# Patient Record
Sex: MALE | Race: WHITE | Employment: OTHER | RURAL
[De-identification: names, ages, dates, MRNs, and addresses within clinical notes are randomized per-mention and may not be internally consistent; named-entity substitution may affect disease eponyms.]

---

## 2017-01-04 ENCOUNTER — OFFICE VISIT (OUTPATIENT)
Dept: FAMILY MEDICINE CLINIC | Age: 72
End: 2017-01-04

## 2017-01-04 VITALS
HEART RATE: 61 BPM | BODY MASS INDEX: 24.2 KG/M2 | WEIGHT: 154.2 LBS | RESPIRATION RATE: 16 BRPM | OXYGEN SATURATION: 98 % | SYSTOLIC BLOOD PRESSURE: 118 MMHG | HEIGHT: 67 IN | DIASTOLIC BLOOD PRESSURE: 72 MMHG | TEMPERATURE: 98.2 F
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
## 2017-01-04 DIAGNOSIS — R41.3 MEMORY LOSS: ICD-10-CM

## 2017-01-04 DIAGNOSIS — G30.1 ALZHEIMER'S DEMENTIA, LATE ONSET, WITH BEHAVIORAL DISTURBANCE (HCC): ICD-10-CM

## 2017-01-04 DIAGNOSIS — I95.1 ORTHOSTATIC HYPOTENSION: ICD-10-CM

## 2017-01-04 DIAGNOSIS — M25.361 KNEE GIVES OUT, RIGHT: Primary | ICD-10-CM

## 2017-01-04 DIAGNOSIS — M25.561 RIGHT KNEE PAIN, UNSPECIFIED CHRONICITY: ICD-10-CM

## 2017-01-04 DIAGNOSIS — F02.818 ALZHEIMER'S DEMENTIA, LATE ONSET, WITH BEHAVIORAL DISTURBANCE (HCC): ICD-10-CM

## 2017-01-04 LAB
BILIRUB UR QL STRIP: NEGATIVE
GLUCOSE UR-MCNC: NEGATIVE MG/DL
KETONES P FAST UR STRIP-MCNC: NEGATIVE MG/DL
PH UR STRIP: 5.5 [PH] (ref 4.6–8)
PROT UR QL STRIP: NEGATIVE MG/DL
SP GR UR STRIP: 1.02 (ref 1–1.03)
UA UROBILINOGEN AMB POC: NORMAL (ref 0.2–1)
URINALYSIS CLARITY POC: CLEAR
URINALYSIS COLOR POC: YELLOW
URINE BLOOD POC: NEGATIVE
URINE LEUKOCYTES POC: NEGATIVE
URINE NITRITES POC: NEGATIVE

## 2017-01-04 RX ORDER — LISINOPRIL 5 MG/1
5 TABLET ORAL DAILY
COMMUNITY
Start: 2016-11-25 | End: 2017-09-15 | Stop reason: SDUPTHER

## 2017-01-04 RX ORDER — ACETAMINOPHEN AND CODEINE PHOSPHATE 300; 30 MG/1; MG/1
TABLET ORAL
COMMUNITY
Start: 2016-12-05 | End: 2017-07-17

## 2017-01-04 RX ORDER — GABAPENTIN 100 MG/1
CAPSULE ORAL
COMMUNITY
Start: 2016-11-25 | End: 2017-01-24 | Stop reason: SDUPTHER

## 2017-01-04 NOTE — PROGRESS NOTES
Yoshi Bush is a 66 y.o. male who presents to the office today with the following:  Chief Complaint   Patient presents with   Rere Po     has been fallen 3 times in a week, says his (R) knee gives out       HPI  Pt complains of his right knee giving out a lot recently causing him to fall multiple times this week. Wife confirms he fell twice yesterday. Refusing to use cane in house. Also fell once on frost on a ramp, hit his head, went to [facility] on 12/28/16. Reports neg imaging, pt says had neg CT scan, wife says she wass not sure if only performed XRs and/or CT. Denies any persistent HA or dizziness since fall, except for one episode of HA 2 days ago that resolved with Tylenol and one episode of dizziness upon standing yesterday. Going with wife to Ortho tomorrow. Plans to discuss knee with him then. Does not want further work-up here today. Wife also concerned he has been having worsening memory loss. Denies any confusion, behavioral changes, or hallucinations. Says drove past [employer] one day, forgot where they were going. Wife and pt have difficulty giving any other examples. She reports he saw Neurology once in the past, but has not had any f/u since. Dx with Alzheimer's, late onset with behavior dist.    Review of Systems   Constitutional: Negative for chills, fever and malaise/fatigue. Eyes: Negative. Respiratory: Negative for cough and shortness of breath. Cardiovascular: Negative for chest pain. Gastrointestinal: Negative. Genitourinary: Negative. Musculoskeletal:        + chronic pain   Skin: Negative for rash. Neurological: Negative for tingling, sensory change, speech change, focal weakness and loss of consciousness.        No Known Allergies    Current Outpatient Prescriptions   Medication Sig    acetaminophen-codeine (TYLENOL #3) 300-30 mg per tablet     gabapentin (NEURONTIN) 100 mg capsule     lisinopril (PRINIVIL, ZESTRIL) 5 mg tablet     metoprolol succinate (TOPROL-XL) 25 mg XL tablet Take 1 Tab by mouth daily. Indications: Hypertension    methocarbamol (ROBAXIN) 500 mg tablet TAKE 1/2 TABLET BY MOUTH TWICE DAILY    traMADol (ULTRAM) 50 mg tablet TAKE 1 TO 2 TABLETS BY MOUTH TID AS NEEDED    traZODone (DESYREL) 100 mg tablet Take 1 Tab by mouth nightly.  apixaban (ELIQUIS) 5 mg tablet Take 1 Tab by mouth every twelve (12) hours.  atorvastatin (LIPITOR) 40 mg tablet Take 1 Tab by mouth daily.  sertraline (ZOLOFT) 100 mg tablet Take 1 Tab by mouth daily.  fluticasone (FLONASE) 50 mcg/actuation nasal spray 2 Sprays by Both Nostrils route daily.  polyethylene glycol (MIRALAX) 17 gram/dose powder Take 17 g by mouth daily.  Incontinence Pad, Liner, Disp (BLADDER CONTROL PADS EX ABSORB) pads 1 Packet by Does Not Apply route as needed (incontinence).  nitroglycerin (NITROSTAT) 0.4 mg SL tablet 1 Tab by SubLINGual route every five (5) minutes as needed for Chest Pain (call 911 if not relieved by 3). No current facility-administered medications for this visit.         Past Medical History   Diagnosis Date    AICD (automatic cardioverter/defibrillator) present 5/13/2016 5/13/16 [employer] upgrade to dual chamber AICD implant  Dr. Andrea Greenberg [facility], other     Arrhythmia      'S IN THE PAST    Arthritis      OSTEO ARTHRITIS    AVNRT (AV bridgette re-entry tachycardia) ([facility] [address] 5/12/2016 5/12/16 AVNRT ablation    Back ache     CAD (coronary artery disease)     Cancer (HCC)      Prostate cancer    Chest tightness     Coagulation defects 2005     FACTOR V LEIDEN    Dizziness     FH: factor V Leiden deficiency     Generalized muscle ache     GERD (gastroesophageal reflux disease)      HEARTBURN    Heart failure ([facility] [address] 2014     EF 40%; Dr. Adela Morel    Hyperlipidemia, mixed     Hypertension      COREG    Other ill-defined conditions(799.89)      blood transfusion    Pacemaker     Pacemaker     Postsurgical aortocoronary bypass status 5/29/2012    Presence of cardiac defibrillator     Psychiatric disorder      depression    Stroke ([facility] [address]      SEVERAL-mini    Thromboembolus [facility])      2005    Weakness generalized        Past Surgical History   Procedure Laterality Date    Cardiac catheterization  3/4/2011          Hx other surgical       steroid injections    Hx prostatectomy        CA    Hx orthopaedic       LEFT KNEE CARTILAGE REPAIR;RIGHT ROTATOR CUFF REPAIR    Hx orthopaedic  2/14/12     C5-7 ANTERIIOR CERVICAL DISECTOMY AND FUSION    Hx orthopaedic  6/4/13     C5-C7 POSTERIOR DECOMPRESSION AND FUSION    Hx urological        urinary control system    Hx urological  12/3/13     REPLACEMENT ARTIFICAL URINARY SPHINCTER    Pr cardiac surg procedure unlist       CABG X1 3/5/11    Hx hernia repair      Hx pacemaker placement         Social History     Social History    Marital status:      Spouse name: N/A    Number of children: N/A    Years of education: N/A     Social History Main Topics    Smoking status: Former Smoker     Types: Cigarettes     Quit date: 10/10/1971    Smokeless tobacco: Never Used    Alcohol use 1.2 oz/week     0 Standard drinks or equivalent, 2 Cans of beer per week      Comment: QUIT 13 YEARS;occasional beer    Drug use: No    Sexual activity: No     Other Topics Concern    Sleep Concern Yes    Stress Concern Yes     Family concerns     Social History Narrative    , 2 children       Family History   Problem Relation Age of Onset    Asthma Mother     Alcohol abuse Mother     Alcohol abuse Father     Asthma Father     Cancer Sister     Heart Disease Sister     Alcohol abuse Brother     Cancer Brother     Heart Disease Brother          Physical Exam:  Visit Vitals    /80 (BP 1 Location: Left arm, BP Patient Position: Sitting)    Pulse 61    Temp 98.2 °F (36.8 °C) (Temporal)    Resp 16    Ht 5' 7\" (1.702 m)    Wt 154 lb 3.2 oz (69.9 kg)  SpO2 98%    BMI 24.15 kg/m2     Physical Exam   Constitutional: He is oriented to person, place, and time and well-developed, well-nourished, and in no distress. Thin WM. HENT:   Head: Normocephalic and atraumatic. Right Ear: External ear normal.   Left Ear: External ear normal.   Nose: Nose normal.   Mouth/Throat: Oropharynx is clear and moist.   [place]   Eyes: Conjunctivae and EOM are normal. Pupils are equal, round, and reactive to light. Neck: Normal range of motion. Neck supple. Cardiovascular: Normal rate and regular rhythm. AICD   Pulmonary/Chest: Effort normal and breath sounds normal.   Abdominal: Soft. There is no tenderness. Musculoskeletal: He exhibits tenderness (post right knee, no swelling/erythema). He exhibits no edema. Right knee: He exhibits normal range of motion, no swelling, no effusion, no deformity and no erythema. Tenderness found. Right ankle: Normal.   Lymphadenopathy:     He has no cervical adenopathy. Neurological: He is alert and oriented to person, place, and time. He has normal sensation, normal strength and normal reflexes. Gait (slow, uses cane for assistance) abnormal.   Skin: Skin is warm and dry. Psychiatric: Mood and affect normal.   Nursing note and vitals reviewed. Assessment/Plan:    ICD-10-CM ICD-9-CM    1. Knee gives out, right M25.361 718.86    2. Right knee pain, unspecified chronicity M25.561 719.46    3.  Alzheimer's dementia, late onset, with behavioral disturbance D56.4 228.0 METABOLIC PANEL, COMPREHENSIVE    F02.81 294.11 RPR      VITAMIN B12 & FOLATE      SED RATE (ESR)      REFERRAL TO NEUROLOGY   4. Memory loss R41.3 780.93 AMB POC URINALYSIS DIP STICK MANUAL W/O MICRO      CBC WITH AUTOMATED DIFF      NJ HANDLG&/OR CONVEY OF SPEC FOR TR OFFICE TO LAB      NJ COLLECTION VENOUS BLOOD,VENIPUNCTURE      METABOLIC PANEL, COMPREHENSIVE      RPR      VITAMIN B12 & FOLATE      SED RATE (ESR)      LYME AB/WESTERN BLOT REFLEX REFERRAL TO NEUROLOGY   5. Falls, initial encounter W19. XXXA E888.9    6. Orthostatic hypotension I95.1 458.0 Discussed staying properly hydrated. Taking Lisinopril at night. Encouraged to use cane in house and caution walking. Results for orders placed or performed in visit on 01/04/17   AMB POC URINALYSIS DIP STICK MANUAL W/O MICRO   Result Value Ref Range    Color (UA POC) Yellow     Clarity (UA POC) Clear     Glucose (UA POC) Negative Negative    Bilirubin (UA POC) Negative Negative    Ketones (UA POC) Negative Negative    Specific gravity (UA POC) 1.020 1.001 - 1.035    Blood (UA POC) Negative Negative    pH (UA POC) 5.5 4.6 - 8.0    Protein (UA POC) Negative Negative mg/dL    Urobilinogen (UA POC) 0.2 mg/dL 0.2 - 1    Nitrites (UA POC) Negative Negative    Leukocyte esterase (UA POC) Negative Negative     MRs with imaging from hospitals pending. Will confirm CT scan. Pt to return for repeat sed rate lab. Refer back to Neuro- wife will call tomorrow with name of previous. MMSE Moderate today. Labs pending. Wife believes pt has been on medication for memory loss previously, is not sure. Due to potential interaction will hold off and let discuss further with Neuro. Total time spent with the patient today was 25 minutes of which more than 50% was spent face to face with the patient. Discussed with Dr. Russel Merino who helped to devise/agrees with plan. Follow-up Disposition:  Return in about 1 month (around 2/4/2017), or sooner if symptoms worsen or fail to improve.     Roxanna Stone PA-C

## 2017-01-04 NOTE — MR AVS SNAPSHOT
Visit Information Date & Time Provider Department Dept. Phone Encounter #  
 1/4/2017  4:30 PM Shanique Mcdowell PA-C [id number] [id number] Follow-up Instructions Return if symptoms worsen or fail to improve. Your Appointments 2/9/2017  8:30 AM  
ESTABLISHED PATIENT with Shanique Mcdowell PA-C  
[facility] ([facility] Appt Note: f.up 3 months, cp$0 11/10/2016 [facility]  
 [address] [facility] [phone]  
[phone]  
  
   
 [address] [id number] Upcoming Health Maintenance Date Due FOBT Q 1 YEAR AGE 50-75 4/1/1994 ZOSTER VACCINE AGE 60> 4/1/2004 DTaP/Tdap/Td series (1 - Tdap) 6/20/2017* GLAUCOMA SCREENING Q2Y 12/14/2017* MEDICARE YEARLY EXAM 7/1/2017 *Topic was postponed. The date shown is not the original due date. Allergies as of 1/4/2017  Review Complete On: 1/4/2017 By: Shanique Mcdowell PA-C No Known Allergies Current Immunizations  Reviewed on 6/17/2016 Name Date Influenza High Dose Vaccine PF 11/10/2016 Influenza Vaccine 9/16/2014 Influenza Vaccine Split 9/14/2011 Influenza Vaccine Whole 9/1/2010 Pneumococcal Conjugate (PCV-13) 11/10/2016 Pneumococcal Vaccine (Unspecified Type) 9/14/2011 Not reviewed this visit You Were Diagnosed With   
  
 Codes Comments Knee gives out, right    -  Primary ICD-10-CM: M25.361 ICD-9-CM: 718.86 Right knee pain, unspecified chronicity     ICD-10-CM: M25.561 ICD-9-CM: 719.46 Alzheimer's dementia, late onset, with behavioral disturbance     ICD-10-CM: G30.1, F02.81 ICD-9-CM: 331.0, 294.11 Memory loss     ICD-10-CM: R41.3 ICD-9-CM: 780.93 Falls, initial encounter     ICD-10-CM: W19. Ana Rosa Fluke ICD-9-CM: E888.9 Vitals BP Pulse Temp Resp Height(growth percentile)  144/80 (BP 1 Location: Left arm, BP Patient Position: Sitting) 61 98.2 °F (36.8 °C) (Temporal) 16 5' 7\" (1.702 m) Weight(growth percentile) SpO2 BMI Smoking Status 154 lb 3.2 oz (69.9 kg) 98% 24.15 kg/m2 Former Smoker BMI and BSA Data Body Mass Index Body Surface Area  
 24.15 kg/m 2 1.82 m 2 Preferred Pharmacy Pharmacy Name Phone [facility] [address] [facility], [address] [place] [address] Your Updated Medication List  
  
   
This list is accurate as of: 1/4/17  6:15 PM.  Always use your most recent med list.  
  
  
  
  
 acetaminophen-codeine 300-30 mg per tablet Commonly known as:  TYLENOL #3  
  
 apixaban 5 mg tablet Commonly known as:  Lizeth Niece Take 1 Tab by mouth every twelve (12) hours. atorvastatin 40 mg tablet Commonly known as:  LIPITOR Take 1 Tab by mouth daily. fluticasone 50 mcg/actuation nasal spray Commonly known as:  Shoshana Gut 2 Sprays by Both Nostrils route daily. gabapentin 100 mg capsule Commonly known as:  NEURONTIN Incontinence Pad, Liner, Disp Pads Commonly known as:  BLADDER CONTROL PADS EX ABSORB  
1 Packet by Does Not Apply route as needed (incontinence). lisinopril 5 mg tablet Commonly known as:  PRINIVIL, ZESTRIL  
  
 methocarbamol 500 mg tablet Commonly known as:  ROBAXIN  
TAKE 1/2 TABLET BY MOUTH TWICE DAILY  
  
 metoprolol succinate 25 mg XL tablet Commonly known as:  TOPROL-XL Take 1 Tab by mouth daily. Indications: Hypertension MIRALAX 17 gram/dose powder Generic drug:  polyethylene glycol Take 17 g by mouth daily. nitroglycerin 0.4 mg SL tablet Commonly known as:  NITROSTAT  
1 Tab by SubLINGual route every five (5) minutes as needed for Chest Pain (call 911 if not relieved by 3). sertraline 100 mg tablet Commonly known as:  ZOLOFT Take 1 Tab by mouth daily. traMADol 50 mg tablet Commonly known as:  ULTRAM  
TAKE 1 TO 2 TABLETS BY MOUTH TID AS NEEDED  
  
 traZODone 100 mg tablet Commonly known as:  Adriana Braun Take 1 Tab by mouth nightly. Follow-up Instructions Return if symptoms worsen or fail to improve. To-Do List   
 01/04/2017 Lab:  SED RATE (ESR) Referral Information Referral ID Referred By Referred To  
  
 [id number] Luis KEITH Not Available Visits Status Start Date End Date 1 New Request 1/4/17 1/4/18 If your referral has a status of pending review or denied, additional information will be sent to support the outcome of this decision. Introducing [facility]! Dear Kelsey Huggins: Thank you for requesting a Lootsie account. Our records indicate that you already have an active Lootsie account. You can access your account anytime at [email or web address] Did you know that you can access your hospital and ER discharge instructions at any time in Lootsie? You can also review all of your test results from your hospital stay or ER visit. Additional Information If you have questions, please visit the Frequently Asked Questions section of the Lootsie website at [email or web address] Remember, Lootsie is NOT to be used for urgent needs. For medical emergencies, dial 911. Now available from your iPhone and Android! Please provide this summary of care documentation to your next provider. Lyme Disease Testing Disclaimer:   
 § 11.3-0244.0. (Expires July 1, 2018) Lyme disease testing information disclosure. A. Every licensee or his in-office designee who orders a laboratory test for the presence of Lyme disease shall provide to the patient or his legal representative the following written information: \"ACCORDING TO THE CENTERS FOR DISEASE CONTROL AND PREVENTION, AS OF 2011 LYME DISEASE IS THE SIXTH FASTEST GROWING DISEASE IN THE [place].   
YOUR HEALTH CARE PROVIDER HAS ORDERED A LABORATORY TEST FOR THE PRESENCE OF LYME DISEASE FOR YOU. CURRENT LABORATORY TESTING FOR LYME DISEASE CAN BE PROBLEMATIC AND STANDARD LABORATORY TESTS OFTEN RESULT IN FALSE NEGATIVE AND FALSE POSITIVE RESULTS, AND IF DONE TOO EARLY, YOU MAY NOT HAVE PRODUCED ENOUGH ANTIBODIES TO BE CONSIDERED POSITIVE BECAUSE YOUR IMMUNE RESPONSE REQUIRES TIME TO DEVELOP ANTIBODIES. IF YOU ARE TESTED FOR LYME DISEASE, AND THE RESULTS ARE NEGATIVE, THIS DOES NOT NECESSARILY MEAN YOU DO NOT HAVE LYME DISEASE. IF YOU CONTINUE TO EXPERIENCE SYMPTOMS, YOU SHOULD CONTACT YOUR HEALTH CARE PROVIDER AND INQUIRE ABOUT THE APPROPRIATENESS OF RETESTING OR ADDITIONAL TREATMENT. \"  
B. Licensees shall be immune from civil liability for the provision of the written information required by this section absent gross negligence or willful misconduct. Your primary care clinician is listed as Aicha Gomez. If you have any questions after today's visit, please call [phone].

## 2017-01-06 LAB
ALBUMIN SERPL-MCNC: 4.7 G/DL (ref 3.5–4.8)
ALBUMIN/GLOB SERPL: 1.7 {RATIO} (ref 1.1–2.5)
ALP SERPL-CCNC: 89 IU/L (ref 39–117)
ALT SERPL-CCNC: 13 IU/L (ref 0–44)
AST SERPL-CCNC: 20 IU/L (ref 0–40)
B BURGDOR IGG+IGM SER-ACNC: <0.91 ISR (ref 0–0.9)
B BURGDOR IGM SER IA-ACNC: <0.8 INDEX (ref 0–0.79)
BASOPHILS # BLD AUTO: 0 X10E3/UL (ref 0–0.2)
BASOPHILS NFR BLD AUTO: 0 %
BILIRUB SERPL-MCNC: 0.4 MG/DL (ref 0–1.2)
BUN SERPL-MCNC: 24 MG/DL (ref 8–27)
BUN/CREAT SERPL: 22 (ref 10–22)
CALCIUM SERPL-MCNC: 10.1 MG/DL (ref 8.6–10.2)
CHLORIDE SERPL-SCNC: 102 MMOL/L (ref 96–106)
CO2 SERPL-SCNC: 24 MMOL/L (ref 18–29)
CREAT SERPL-MCNC: 1.08 MG/DL (ref 0.76–1.27)
EOSINOPHIL # BLD AUTO: 0.1 X10E3/UL (ref 0–0.4)
EOSINOPHIL NFR BLD AUTO: 1 %
ERYTHROCYTE [DISTWIDTH] IN BLOOD BY AUTOMATED COUNT: 14 % (ref 12.3–15.4)
FOLATE SERPL-MCNC: 17.7 NG/ML
GLOBULIN SER CALC-MCNC: 2.8 G/DL (ref 1.5–4.5)
GLUCOSE SERPL-MCNC: 103 MG/DL (ref 65–99)
HCT VFR BLD AUTO: 36.3 % (ref 37.5–51)
HGB BLD-MCNC: 12.2 G/DL (ref 12.6–17.7)
IMM GRANULOCYTES # BLD: 0 X10E3/UL (ref 0–0.1)
IMM GRANULOCYTES NFR BLD: 0 %
LYMPHOCYTES # BLD AUTO: 1.6 X10E3/UL (ref 0.7–3.1)
LYMPHOCYTES NFR BLD AUTO: 22 %
MCH RBC QN AUTO: 28.8 PG (ref 26.6–33)
MCHC RBC AUTO-ENTMCNC: 33.6 G/DL (ref 31.5–35.7)
MCV RBC AUTO: 86 FL (ref 79–97)
MONOCYTES # BLD AUTO: 0.8 X10E3/UL (ref 0.1–0.9)
MONOCYTES NFR BLD AUTO: 11 %
NEUTROPHILS # BLD AUTO: 4.7 X10E3/UL (ref 1.4–7)
NEUTROPHILS NFR BLD AUTO: 66 %
PLATELET # BLD AUTO: 202 X10E3/UL (ref 150–379)
POTASSIUM SERPL-SCNC: 5.1 MMOL/L (ref 3.5–5.2)
PROT SERPL-MCNC: 7.5 G/DL (ref 6–8.5)
RBC # BLD AUTO: 4.23 X10E6/UL (ref 4.14–5.8)
RPR SER QL: NON REACTIVE
SODIUM SERPL-SCNC: 145 MMOL/L (ref 134–144)
VIT B12 SERPL-MCNC: 561 PG/ML (ref 211–946)
WBC # BLD AUTO: 7.2 X10E3/UL (ref 3.4–10.8)

## 2017-01-23 ENCOUNTER — OFFICE VISIT (OUTPATIENT)
Dept: NEUROLOGY | Age: 72
End: 2017-01-23

## 2017-01-23 VITALS
HEIGHT: 67 IN | WEIGHT: 156 LBS | BODY MASS INDEX: 24.48 KG/M2 | HEART RATE: 74 BPM | OXYGEN SATURATION: 98 % | SYSTOLIC BLOOD PRESSURE: 120 MMHG | DIASTOLIC BLOOD PRESSURE: 70 MMHG

## 2017-01-23 DIAGNOSIS — I65.23 STENOSIS OF BOTH CAROTID ARTERIES WITHOUT CEREBRAL INFARCTION: Primary | ICD-10-CM

## 2017-01-23 DIAGNOSIS — G30.1 ALZHEIMER'S DEMENTIA, LATE ONSET, WITH BEHAVIORAL DISTURBANCE (HCC): ICD-10-CM

## 2017-01-23 DIAGNOSIS — F02.818 ALZHEIMER'S DEMENTIA, LATE ONSET, WITH BEHAVIORAL DISTURBANCE (HCC): ICD-10-CM

## 2017-01-23 DIAGNOSIS — M54.16 LUMBAR BACK PAIN WITH RADICULOPATHY AFFECTING RIGHT LOWER EXTREMITY: ICD-10-CM

## 2017-01-23 DIAGNOSIS — M54.16 LUMBAR BACK PAIN WITH RADICULOPATHY AFFECTING LEFT LOWER EXTREMITY: ICD-10-CM

## 2017-01-23 DIAGNOSIS — I48.0 PAROXYSMAL ATRIAL FIBRILLATION (HCC): ICD-10-CM

## 2017-01-23 NOTE — PROGRESS NOTES
Date:            2017    Name:  Ben Selby  :  1945  MRN:  [id number]     PCP:  Danilo Herrera PA-C    Chief Complaint   Patient presents with    Follow-up    Dementia    Memory Loss    Stroke         HISTORY OF PRESENT ILLNESS:  Elise Small is a 66 y.o., male who presents today for follow up for memory loss, TIA. He is with his wife, who helps provide his history. He was in the hospital over the weekend for a possible TIA. He went to the hospital over the weekend because the left side of his face and neck went numb, still numb now. He has had progression of his dementia. He is forgetting words, has gotten more mean, he is falling more often. LDL controlled when last checked in 2016, no Hgb A1c in the system, BP is well controlled. He is on eliquis, has an AICD. Has factor V leiden mutation. He is falling, will feel wobbly and then his right leg gives out. This is not new, he has significant lumbar stenosis and is not a surgical candidate. Zoloft was increased after his last visit in April to help with irritability, his wife did not notice any improvement in his mood. He is also taking klonopin . 5 mg bid. Patient was on seroquel, is off it now and not sure why. Takes gabapentin 100 mg bid. 2016  7:41 AM - [place], [facility] Lab Results In   Component Results   Component Value Flag Ref Range Units Status   Cholesterol, total 157  100 - 199 mg/dL Final   Triglyceride 78  0 - 149 mg/dL Final   HDL Cholesterol 70  >39 mg/dL Final   Comment:   According to ATP-III Guidelines, HDL-C >59 mg/dL is considered a   negative risk factor for CHD. VLDL, calculated 16  5 - 40 mg/dL Final   LDL, calculated 71  0 - 99 mg/dL Final         2016 recap  Elise Small is a 66 y.o. right-handed  male seen for evaluation of a new problem of abnormal behavior and language around his grandchildren, increased irritability and abnormal behavior.  Patient is already on clonazepam 0.5 mg b.i.d., Aricept 10 mg a day Depakote 250 mg once a day for his dementia and Seroquel 50 mg a day for his depression. Patient is in chronic pain because of degenerative disc disease in his back and severe spinal stenosis. He had a recent CT myelogram done that shows severe stenosis at L2-L3, L3-L4, and L4-L5, and mild at L5-S1. CT myelogram of the cervical spine shows postoperative changes but no recurrent spinal stenosis, but moderate degenerative changes noted with these being done March 3, 2016. He is going to see his orthopedic surgeon soon. He thought he had several injections from Dr. Rudi Gusman without clear improvement. It maybe his dementia causing his problem board may be the severe pain he is having. He doesn't sleep well at night. We will have him try increasing his Zoloft to 100 mg at supper to see if this helps his pain, his behavior and his depression, other options may need to be Seroquel. Patient gives a one month history of headaches that occur every day in the posterior head regions described as a severe pressure sensation that is there all the time. He denies having this headache before. He had previous neck surgery about 3 years ago and had a cervical laminectomy and fusion from C4-C6. He denies any recent fever, meningismus, trauma or other precipitating causes. Patient states he takes 3-4 hydrocodone per day for his headaches. He has not tried any therapy but did have a CT of his head one week ago that was reportedly normal and we will try to get those results. His neck pain radiates into his shoulders, but is not associated with weakness or numbness in his arms, but he has chronic weakness and numbness in his arms and legs from his previous cervical myelopathy. He apparently also has memory loss which he downplays but is fairly obvious. His wife has to give him his medications so he doesn't overuse them. He has never been evaluated or treated for his memory loss.  He has no family history of similar problems. He has a cardiac pacemaker and probably can't get MRI. He also has a pump for his bladder but that is MRI compatible. He had prostate cancer and he has possibly had recurrence. He denies any past history of stroke, or head trauma or new medication or other causes of his memory loss. He had no family with him today. He has chronic visual problems in his right eye and his ophthalmologist recommends surgery for a retinal problem but he has not done it yet. Current Outpatient Prescriptions   Medication Sig    acetaminophen-codeine (TYLENOL #3) 300-30 mg per tablet     gabapentin (NEURONTIN) 100 mg capsule     lisinopril (PRINIVIL, ZESTRIL) 5 mg tablet     metoprolol succinate (TOPROL-XL) 25 mg XL tablet Take 1 Tab by mouth daily. Indications: Hypertension    methocarbamol (ROBAXIN) 500 mg tablet TAKE 1/2 TABLET BY MOUTH TWICE DAILY    traMADol (ULTRAM) 50 mg tablet TAKE 1 TO 2 TABLETS BY MOUTH TID AS NEEDED    traZODone (DESYREL) 100 mg tablet Take 1 Tab by mouth nightly.  apixaban (ELIQUIS) 5 mg tablet Take 1 Tab by mouth every twelve (12) hours.  atorvastatin (LIPITOR) 40 mg tablet Take 1 Tab by mouth daily.  sertraline (ZOLOFT) 100 mg tablet Take 1 Tab by mouth daily.  fluticasone (FLONASE) 50 mcg/actuation nasal spray 2 Sprays by Both Nostrils route daily.  polyethylene glycol (MIRALAX) 17 gram/dose powder Take 17 g by mouth daily.  Incontinence Pad, Liner, Disp (BLADDER CONTROL PADS EX ABSORB) pads 1 Packet by Does Not Apply route as needed (incontinence).  nitroglycerin (NITROSTAT) 0.4 mg SL tablet 1 Tab by SubLINGual route every five (5) minutes as needed for Chest Pain (call 911 if not relieved by 3). No current facility-administered medications for this visit.       No Known Allergies  Past Medical History   Diagnosis Date    AICD (automatic cardioverter/defibrillator) present 5/13/2016 5/13/16 [employer] upgrade to dual chamber AICD implant  Dr. Ruchi Christianson state, other     Arrhythmia      'S IN THE PAST    Arthritis      OSTEO ARTHRITIS    AVNRT (AV bridgette re-entry tachycardia) ([facility] [address] 5/12/2016 5/12/16 AVNRT ablation    Back ache     CAD (coronary artery disease)     Cancer (HCC)      Prostate cancer    Chest tightness     Coagulation defects 2005     FACTOR V LEIDEN    Dizziness     FH: factor V Leiden deficiency     Generalized muscle ache     GERD (gastroesophageal reflux disease)      HEARTBURN    Heart failure ([address] 2014     EF 40%; Dr. Mathews Channel    Hyperlipidemia, mixed     Hypertension      COREG    Other ill-defined conditions(799.89)      blood transfusion    Pacemaker     Pacemaker     Postsurgical aortocoronary bypass status 5/29/2012    Presence of cardiac defibrillator     Psychiatric disorder      depression    Stroke ([facility] [address]      SEVERAL-mini    Thromboembolus ([facility] [address]      2005    Weakness generalized      Past Surgical History   Procedure Laterality Date    Cardiac catheterization  3/4/2011          Hx other surgical       steroid injections    Hx prostatectomy        CA    Hx orthopaedic       LEFT KNEE CARTILAGE REPAIR;RIGHT ROTATOR CUFF REPAIR    Hx orthopaedic  2/14/12     C5-7 ANTERIIOR CERVICAL DISECTOMY AND FUSION    Hx orthopaedic  6/4/13     C5-C7 POSTERIOR DECOMPRESSION AND FUSION    Hx urological        urinary control system    Hx urological  12/3/13     REPLACEMENT ARTIFICAL URINARY SPHINCTER    Pr cardiac surg procedure unlist       CABG X1 3/5/11    Hx hernia repair      Hx pacemaker placement       Social History     Social History    Marital status:      Spouse name: N/A    Number of children: N/A    Years of education: N/A     Occupational History    Not on file.      Social History Main Topics    Smoking status: Former Smoker     Types: Cigarettes     Quit date: 10/10/1971    Smokeless tobacco: Never Used    Alcohol use 1.2 oz/week     0 Standard drinks or equivalent, 2 Cans of beer per week      Comment: QUIT 13 YEARS;occasional beer    Drug use: No    Sexual activity: No     Other Topics Concern    Sleep Concern Yes    Stress Concern Yes     Family concerns     Social History Narrative    , 2 children     Family History   Problem Relation Age of Onset    Asthma Mother     Alcohol abuse Mother     Alcohol abuse Father     Asthma Father     Cancer Sister     Heart Disease Sister     Alcohol abuse Brother     Cancer Brother     Heart Disease Brother          PHYSICAL EXAMINATION:    Visit Vitals    /70    Pulse 74    Ht 5' 7\" (1.702 m)    Wt 156 lb (70.8 kg)    SpO2 98%    BMI 24.43 kg/m2     General:  Well defined, nourished, and groomed individual in no acute distress. Neck: Supple, nontender, no bruits, no pain with resistance to active range of motion. Heart: Regular rate and rhythm, no murmurs, rub, or gallop. Normal S1S2. Lungs:  Clear to auscultation bilaterally with equal chest expansion, no cough, no wheeze  Musculoskeletal:  Extremities revealed no edema and had full range of motion of joints. Psych:  Good mood and bright affect    NEUROLOGICAL EXAMINATION:     Mental Status:   Alert and oriented to person, place, and time with recent and remote memory intact. Attention span and concentration are normal. Speech is fluent with a full fund of knowledge. Cranial Nerves:    II, III, IV, VI:  Visual acuity grossly intact. Pupils are equal, round, and reactive to light. Extra-ocular movements are full and fluid. No ptosis or nystagmus. V-XII: [place]. Facial features are symmetric, with normal sensation and strength. The palate rises symmetrically and the tongue protrudes midline. Sternocleidomastoids 5/5. Motor Examination: Normal tone, bulk, and strength, 5/5 muscle strength BUE, 4/5 BLE.    Coordination:  Finger to nose testing was normal.   No resting or intention tremor  Gait and Station:  Unsteady while walking, right leg drag. Normal arm swing. No pronator drift. No muscle wasting or fasciculations noted. ASSESSMENT AND PLAN    ICD-10-CM ICD-9-CM    1. Stenosis of both carotid arteries without cerebral infarction I65.23 433.10 DUPLEX CAROTID BILATERAL     433.30    2. Alzheimer's dementia, late onset, with behavioral disturbance G30.1 331.0 memantine (NAMENDA) 10 mg tablet    F02.81 294.11    3. Paroxysmal atrial fibrillation (HCC) I48.0 427.31    4. Lumbar back pain with radiculopathy affecting right lower extremity M54.17 724.4 gabapentin (NEURONTIN) 100 mg capsule   5. Lumbar back pain with radiculopathy affecting left lower extremity M54.17 724.4 gabapentin (NEURONTIN) 100 mg capsule    67-year-old male seen in follow-up for dementia, recently admitted for possible TIA because he developed left facial, neck, chest numbness. CT was unremarkable, MRI not able to be done because he has a pacemaker. Patient does have A. fib, but stroke risk factors are very well controlled. He is on Eliquis and has an AICD. LDL 71 in July 2016, no history of diabetes, blood pressure is well controlled. Memory may be worse per his wife, he is having more difficulty finding words and this is progressive. He gets agitated more easily, is more mean that he used to be. Falls frequently, but has severe stenosis and is not a surgical candidate. 1.  Increase gabapentin to 100 mg tid, discussed that this may help with both neuropathic pain and agitation  2. Patient appears to have been on Seroquel in the past for mood, they are not sure why this was stopped. May need to resume his mood and behavioral issues become more of an issue. 3.  Start memantine 10 mg twice daily for moderate dementia, titration instructions provided, side effects discussed.   4.  Carotid doppler to rule out stenosis      Follow-up in 6 months, call sooner with concerns    Donis Katz NP

## 2017-01-23 NOTE — PATIENT INSTRUCTIONS
[facility]   Statement to Patients  April 1, 2014      In an effort to ensure the large volume of patient prescription refills is processed in the most efficient and expeditious manner, we are asking our patients to assist us by calling your Pharmacy for all prescription refills, this will include also your  Mail Order Pharmacy. The pharmacy will contact our office electronically to continue the refill process. Please do not wait until the last minute to call your pharmacy. We need at least 48 hours (2days) to fill prescriptions. We also encourage you to call your pharmacy before going to  your prescription to make sure it is ready. With regard to controlled substance prescription refill requests (narcotic refills) that need to be picked up at our office, we ask your cooperation by providing us with at least 72 hours (3days) notice that you will need a refill. We will not refill narcotic prescription refill requests after 4:00pm on any weekday, Monday through Thursday, or after 2:00pm on Fridays, or on the weekends. We encourage everyone to explore another way of getting your prescription refill request processed using [employer], our patient web portal through our electronic medical record system. [employer] is an efficient and effective way to communicate your medication request directly to the office and  downloadable as an vernell on your smart phone . [employer] also features a review functionality that allows you to view your medication list as well as leave messages for your physician. Are you ready to get connected? If so please review the attatched instructions or speak to any of our staff to get you set up right away! Thank you so much for your cooperation. Should you have any questions please contact our Practice Administrator.     The Physicians and Staff,  [facility]          Alzheimer's Disease: Care Instructions  Your Care Instructions  Alzheimer's disease is a type of dementia. It causes memory loss and affects judgment, language, and behavior. You may have trouble making decisions or may get lost in places that you used to know well. Alzheimer's disease is different than mild memory loss that occurs with aging. It is not clear what causes Alzheimer's disease, but it is the most common form of dementia in older adults. Finding out that you have this disease is a shock. You may be afraid and worried about how the condition will change your life. Although there is no cure at this time, medicine in some cases may slow memory loss for a while. Other medicines may be able to help you sleep or cope with depression and behavior changes. Alzheimer's disease is different for everyone. It may take many years to develop. In some cases, people can function well for a long time. In the early stage of the disease, you can do things at home to make life easier and safer. You also can keep doing your hobbies and other activities. Many people find comfort in planning now for their future needs. Follow-up care is a key part of your treatment and safety. Be sure to make and go to all appointments, and call your doctor if you are having problems. Its also a good idea to know your test results and keep a list of the medicines you take. How can you care for yourself at home? Taking care of yourself  · If your doctor gives you medicines, take them exactly as prescribed. Call your doctor if you think you are having a problem with your medicine. You will get more details on the medicines your doctor prescribes. · Eat a balanced diet. Get plenty of whole grains, fruits, and vegetables every day. If you are not hungry at mealtimes, eat snacks at midmorning and in the afternoon. Try drinks such as Boost, Ensure, or Sustacal if you are having trouble keeping your weight up. · Stay active.  Exercise such as walking may slow the decline of your mental abilities. Try to stay active mentally too. Read and work crossword puzzles if you enjoy these activities. · If you have trouble sleeping, do not nap during the day. Get regular exercise (but not within several hours of bedtime). Drink a glass of warm milk or caffeine-free herbal tea before going to bed. · Ask your doctor about support groups and other resources in your area. They can help people who have Alzheimer's disease and their families. · Be patient. You may find that a task takes you longer than it used to. · If you have not already done so, make a list of advance directives. Advance directives are instructions to your doctor and family members about what kind of care you want if you become unable to speak or express yourself. Talk to a  about making a will, if you do not already have one. Keeping schedules  · Develop a routine. You will feel less frustrated or confused if you have a clear, simple plan of what to do every day. ¨ Make lists of your medicines and when to take them. ¨ Write down appointments and other tasks in a calendar. ¨ Put sticky notes around the house to help you remember events and other things you have to do. ¨ Schedule activities and tasks for times of the day when you are best able to handle them. Staying safe  · Tell someone when you are going out and where you are going. Let the person know when you will be back. Before you go out alone, write down where you are going, how to get there, and how to get back home. Do this even if you have gone there many times before. Take someone along with you when possible. · Make your home safe. Tack down rugs, put no-slip tape in the tub, use handrails, and put safety switches on stoves and appliances. · Have a family member or other caregiver tell you whether you are driving badly. Deciding to stop driving is very hard for many people. Driving helps you feel independent.  Your state s license bureau can do a driving test if there is any question. Plan for other means of getting around when you are no longer able to drive. · Use strong lighting, especially at night. Put night-lights in bedrooms, hallways, and bathrooms. · Lower the hot water temperature setting to 120°F or lower to avoid burns. When should you call for help? Call 911 anytime you think you may need emergency care. For example, call if:  · You are lost and do not know whom to call. · You are injured and do not know whom to call. Call your doctor now or seek immediate medical care if:  · Your symptoms suddenly get much worse. Watch closely for changes in your health, and be sure to contact your doctor if:  · You want more information about how you can take care of yourself. Where can you learn more? Go to http://kendell-stephanie.info/. Enter Y179 in the search box to learn more about \"Alzheimer's Disease: Care Instructions. \"  Current as of: July 26, 2016  Content Version: 11.1  © 2300-1727 [employer]. Care instructions adapted under license by [employer] (which disclaims liability or warranty for this information). If you have questions about a medical condition or this instruction, always ask your healthcare professional. [address] any warranty or liability for your use of this information.

## 2017-01-23 NOTE — MR AVS SNAPSHOT
Visit Information Date & Time Provider Department Dept. Phone Encounter #  
 1/23/2017  3:00 PM Jas Santillan NP [facility] [place] [phone] [id number] Your Appointments 2/9/2017  8:30 AM  
ESTABLISHED PATIENT with Shahzad Lee PA-C  
[facility] Appt Note: f.up 3 months, cp$0 11/10/2016 [facility]  
 [facility] [address]  
[phone]  
  
   
 [id number]  
  
    
 8/21/2017  3:00 PM  
Follow Up with Reba Braswell MD  
Neurology Clinic at [facility]) Appt Note: Follow up $0CP tdb 1/23/17  
 [address] [place] [address], [address] [phone]  
[address] [facility], [address] [place] [address], [address] [facility] [address] [address] Upcoming Health Maintenance Date Due FOBT Q 1 YEAR AGE 50-75 4/1/1994 ZOSTER VACCINE AGE 60> 4/1/2004 DTaP/Tdap/Td series (1 - Tdap) 6/20/2017* GLAUCOMA SCREENING Q2Y 12/14/2017* MEDICARE YEARLY EXAM 7/1/2017 *Topic was postponed. The date shown is not the original due date. Allergies as of 1/23/2017  Review Complete On: 1/23/2017 By: [place] [facility] [place]PN No Known Allergies Current Immunizations  Reviewed on 6/17/2016 Name Date Influenza High Dose Vaccine PF 11/10/2016 Influenza Vaccine 9/16/2014 Influenza Vaccine Split 9/14/2011 Influenza Vaccine Whole 9/1/2010 Pneumococcal Conjugate (PCV-13) 11/10/2016 Pneumococcal Vaccine (Unspecified Type) 9/14/2011 Not reviewed this visit You Were Diagnosed With   
  
 Codes Comments Stenosis of both carotid arteries without cerebral infarction    -  Primary ICD-10-CM: I65.23 ICD-9-CM: 433.10, 433.30 Alzheimer's dementia, late onset, with behavioral disturbance     ICD-10-CM: G30.1, F02.81 ICD-9-CM: 331.0, 294.11   
 Paroxysmal atrial fibrillation (HCC)     ICD-10-CM: I48.0 ICD-9-CM: 427.31 Vitals BP Pulse Height(growth percentile) Weight(growth percentile) SpO2 BMI  
 120/70 74 5' 7\" (1.702 m) 156 lb (70.8 kg) 98% 24.43 kg/m2 Smoking Status Former Smoker Vitals History BMI and BSA Data Body Mass Index Body Surface Area  
 24.43 kg/m 2 1.83 m 2 Preferred Pharmacy Pharmacy Name Phone [facility] [address] [place] [address], [address] [place] [address] Your Updated Medication List  
  
   
This list is accurate as of: 1/23/17  3:04 PM.  Always use your most recent med list.  
  
  
  
  
 acetaminophen-codeine 300-30 mg per tablet Commonly known as:  TYLENOL #3  
  
 apixaban 5 mg tablet Commonly known as:  Aleshia Can Take 1 Tab by mouth every twelve (12) hours. atorvastatin 40 mg tablet Commonly known as:  LIPITOR Take 1 Tab by mouth daily. fluticasone 50 mcg/actuation nasal spray Commonly known as:  Arlene Kincaid 2 Sprays by Both Nostrils route daily. gabapentin 100 mg capsule Commonly known as:  NEURONTIN Incontinence Pad, Liner, Disp Pads Commonly known as:  BLADDER CONTROL PADS EX ABSORB  
1 Packet by Does Not Apply route as needed (incontinence). lisinopril 5 mg tablet Commonly known as:  PRINIVIL, ZESTRIL  
  
 methocarbamol 500 mg tablet Commonly known as:  ROBAXIN  
TAKE 1/2 TABLET BY MOUTH TWICE DAILY  
  
 metoprolol succinate 25 mg XL tablet Commonly known as:  TOPROL-XL Take 1 Tab by mouth daily. Indications: Hypertension MIRALAX 17 gram/dose powder Generic drug:  polyethylene glycol Take 17 g by mouth daily. nitroglycerin 0.4 mg SL tablet Commonly known as:  NITROSTAT  
1 Tab by SubLINGual route every five (5) minutes as needed for Chest Pain (call 911 if not relieved by 3). sertraline 100 mg tablet Commonly known as:  ZOLOFT Take 1 Tab by mouth daily. traMADol 50 mg tablet Commonly known as:  ULTRAM  
TAKE 1 TO 2 TABLETS BY MOUTH TID AS NEEDED  
  
 traZODone 100 mg tablet Commonly known as:  Priscilla Camejoantonio Take 1 Tab by mouth nightly. To-Do List   
 01/24/2017 Imaging:  DUPLEX CAROTID BILATERAL Patient Instructions PRESCRIPTION REFILL POLICY [facility] Statement to Patients April 1, 2014 In an effort to ensure the large volume of patient prescription refills is processed in the most efficient and expeditious manner, we are asking our patients to assist us by calling your Pharmacy for all prescription refills, this will include also your  Mail Order Pharmacy. The pharmacy will contact our office electronically to continue the refill process. Please do not wait until the last minute to call your pharmacy. We need at least 48 hours (2days) to fill prescriptions. We also encourage you to call your pharmacy before going to  your prescription to make sure it is ready. With regard to controlled substance prescription refill requests (narcotic refills) that need to be picked up at our office, we ask your cooperation by providing us with at least 72 hours (3days) notice that you will need a refill. We will not refill narcotic prescription refill requests after 4:00pm on any weekday, Monday through Thursday, or after 2:00pm on Fridays, or on the weekends. We encourage everyone to explore another way of getting your prescription refill request processed using EXENDIS, our patient web portal through our electronic medical record system. EXENDIS is an efficient and effective way to communicate your medication request directly to the office and  downloadable as an vernell on your smart phone . EXENDIS also features a review functionality that allows you to view your medication list as well as leave messages for your physician. Are you ready to get connected?  If so please review the attatched instructions or speak to any of our staff to get you set up right away! Thank you so much for your cooperation. Should you have any questions please contact our Practice Administrator. The Physicians and Staff,  [facility] Alzheimer's Disease: Care Instructions Your Care Instructions Alzheimer's disease is a type of dementia. It causes memory loss and affects judgment, language, and behavior. You may have trouble making decisions or may get lost in places that you used to know well. Alzheimer's disease is different than mild memory loss that occurs with aging. It is not clear what causes Alzheimer's disease, but it is the most common form of dementia in older adults. Finding out that you have this disease is a shock. You may be afraid and worried about how the condition will change your life. Although there is no cure at this time, medicine in some cases may slow memory loss for a while. Other medicines may be able to help you sleep or cope with depression and behavior changes. Alzheimer's disease is different for everyone. It may take many years to develop. In some cases, people can function well for a long time. In the early stage of the disease, you can do things at home to make life easier and safer. You also can keep doing your hobbies and other activities. Many people find comfort in planning now for their future needs. Follow-up care is a key part of your treatment and safety. Be sure to make and go to all appointments, and call your doctor if you are having problems. Its also a good idea to know your test results and keep a list of the medicines you take. How can you care for yourself at home? Taking care of yourself · If your doctor gives you medicines, take them exactly as prescribed. Call your doctor if you think you are having a problem with your medicine. You will get more details on the medicines your doctor prescribes. · Eat a balanced diet. Get plenty of whole grains, fruits, and vegetables every day. If you are not hungry at mealtimes, eat snacks at midmorning and in the afternoon. Try drinks such as Boost, Ensure, or Sustacal if you are having trouble keeping your weight up. · Stay active. Exercise such as walking may slow the decline of your mental abilities. Try to stay active mentally too. Read and work crossword puzzles if you enjoy these activities. · If you have trouble sleeping, do not nap during the day. Get regular exercise (but not within several hours of bedtime). Drink a glass of warm milk or caffeine-free herbal tea before going to bed. · Ask your doctor about support groups and other resources in your area. They can help people who have Alzheimer's disease and their families. · Be patient. You may find that a task takes you longer than it used to. · If you have not already done so, make a list of advance directives. Advance directives are instructions to your doctor and family members about what kind of care you want if you become unable to speak or express yourself. Talk to a  about making a will, if you do not already have one. Keeping schedules · Develop a routine. You will feel less frustrated or confused if you have a clear, simple plan of what to do every day. ¨ Make lists of your medicines and when to take them. ¨ Write down appointments and other tasks in a calendar. ¨ Put sticky notes around the house to help you remember events and other things you have to do. ¨ Schedule activities and tasks for times of the day when you are best able to handle them. Staying safe · Tell someone when you are going out and where you are going. Let the person know when you will be back. Before you go out alone, write down where you are going, how to get there, and how to get back home. Do this even if you have gone there many times before. Take someone along with you when possible. · Make your home safe. Tack down rugs, put no-slip tape in the tub, use handrails, and put safety switches on stoves and appliances. · Have a family member or other caregiver tell you whether you are driving badly. Deciding to stop driving is very hard for many people. Driving helps you feel independent. Your state s license bureau can do a driving test if there is any question. Plan for other means of getting around when you are no longer able to drive. · Use strong lighting, especially at night. Put night-lights in bedrooms, hallways, and bathrooms. · Lower the hot water temperature setting to 120°F or lower to avoid burns. When should you call for help? Call 911 anytime you think you may need emergency care. For example, call if: 
· You are lost and do not know whom to call. · You are injured and do not know whom to call. Call your doctor now or seek immediate medical care if: 
· Your symptoms suddenly get much worse. Watch closely for changes in your health, and be sure to contact your doctor if: 
· You want more information about how you can take care of yourself. Where can you learn more? Go to http://kendell-stephanie.info/. Enter Y179 in the search box to learn more about \"Alzheimer's Disease: Care Instructions. \" Current as of: July 26, 2016 Content Version: 11.1 © 5711-5945 [employer]. Care instructions adapted under license by [employer]umb (which disclaims liability or warranty for this information). If you have questions about a medical condition or this instruction, always ask your healthcare professional. [place] [address] any warranty or liability for your use of this information. Introducing [facility]! Dear Amanda Perez: Thank you for requesting a Zilyo account. Our records indicate that you already have an active Zilyo account. You can access your account anytime at [email or web address] Did you know that you can access your hospital and ER discharge instructions at any time in OneTrueFan? You can also review all of your test results from your hospital stay or ER visit. Additional Information If you have questions, please visit the Frequently Asked Questions section of the OneTrueFan website at [email or web address] Remember, OneTrueFan is NOT to be used for urgent needs. For medical emergencies, dial 911. Now available from your iPhone and Android! Please provide this summary of care documentation to your next provider. Your primary care clinician is listed as Roxanna Stone. If you have any questions after today's visit, please call [phone].

## 2017-01-24 ENCOUNTER — OFFICE VISIT (OUTPATIENT)
Dept: FAMILY MEDICINE CLINIC | Age: 72
End: 2017-01-24

## 2017-01-24 ENCOUNTER — TELEPHONE (OUTPATIENT)
Dept: FAMILY MEDICINE CLINIC | Age: 72
End: 2017-01-24

## 2017-01-24 VITALS
OXYGEN SATURATION: 95 % | DIASTOLIC BLOOD PRESSURE: 67 MMHG | RESPIRATION RATE: 16 BRPM | HEIGHT: 67 IN | BODY MASS INDEX: 24.33 KG/M2 | TEMPERATURE: 99.2 F | HEART RATE: 81 BPM | SYSTOLIC BLOOD PRESSURE: 107 MMHG | WEIGHT: 155 LBS

## 2017-01-24 DIAGNOSIS — J32.0 CHRONIC MAXILLARY SINUSITIS: ICD-10-CM

## 2017-01-24 DIAGNOSIS — R05.9 COUGH: ICD-10-CM

## 2017-01-24 DIAGNOSIS — R50.9 FEVER, UNSPECIFIED FEVER CAUSE: Primary | ICD-10-CM

## 2017-01-24 DIAGNOSIS — B02.9 HERPES ZOSTER WITHOUT COMPLICATION: ICD-10-CM

## 2017-01-24 DIAGNOSIS — R51.9 FACIAL PAIN: ICD-10-CM

## 2017-01-24 DIAGNOSIS — F02.818 DEMENTIA DUE TO GENERAL MEDICAL CONDITION WITH BEHAVIORAL DISTURBANCE: ICD-10-CM

## 2017-01-24 RX ORDER — AMOXICILLIN AND CLAVULANATE POTASSIUM 500; 125 MG/1; MG/1
1 TABLET, FILM COATED ORAL 2 TIMES DAILY
Qty: 20 TAB | Refills: 0 | Status: SHIPPED | OUTPATIENT
Start: 2017-01-24 | End: 2017-02-03

## 2017-01-24 RX ORDER — FAMCICLOVIR 500 MG/1
500 TABLET ORAL 3 TIMES DAILY
Qty: 21 TAB | Refills: 0 | Status: SHIPPED | OUTPATIENT
Start: 2017-01-24 | End: 2017-01-31

## 2017-01-24 RX ORDER — AZITHROMYCIN 250 MG/1
TABLET, FILM COATED ORAL
Qty: 6 TAB | Refills: 0 | Status: SHIPPED | OUTPATIENT
Start: 2017-01-24 | End: 2017-01-24 | Stop reason: ALTCHOICE

## 2017-01-24 RX ORDER — GABAPENTIN 100 MG/1
100 CAPSULE ORAL 3 TIMES DAILY
Qty: 90 CAP | Refills: 5 | Status: SHIPPED | OUTPATIENT
Start: 2017-01-24 | End: 2017-04-24 | Stop reason: SDUPTHER

## 2017-01-24 RX ORDER — MEMANTINE HYDROCHLORIDE 10 MG/1
TABLET ORAL
Qty: 60 TAB | Refills: 5 | Status: SHIPPED | OUTPATIENT
Start: 2017-01-24 | End: 2017-05-11 | Stop reason: SDUPTHER

## 2017-01-24 NOTE — TELEPHONE ENCOUNTER
Mrs. Cole Holiday calling in regards to her . Reports he has recently been running a fever on/off since yest morn; often around 101.-102. F Comes down to 99 with Tylenol. Taryn in ER Sat for shoulder and chest pain also, unsure if febrile at that time, was told \"everything was fine,\" but she cannot recall specific testing that was done while there. Was also seen by Dr. Romana Deal' NP Alberta Rebolledo in Neuro yesterday, plans for carotid US. Otherwise she notes no other abnls with pt- denies him having any recent URI sxs or GI/ complaints, no cough, n/v/d. Did c/o of some ear pain recently. Advised pt to come in to clinic for further evaluation this morning, unless something changes/worsens in interim, then to seek more immediate med attn.

## 2017-01-24 NOTE — PROGRESS NOTES
Mercy Marquis is a 66 y.o. male who presents to the office today with the following:  Chief Complaint   Patient presents with    Facial Pain       No Known Allergies    Current Outpatient Prescriptions   Medication Sig    memantine (NAMENDA) 10 mg tablet Take one half tab twice a day for 1 week, then 1 tab twice a day    azithromycin (ZITHROMAX) 250 mg tablet Take 2 tablets today, then take 1 tablet daily    famciclovir (FAMVIR) 500 mg tablet Take 1 Tab by mouth three (3) times daily for 7 days.  acetaminophen-codeine (TYLENOL #3) 300-30 mg per tablet     lisinopril (PRINIVIL, ZESTRIL) 5 mg tablet     metoprolol succinate (TOPROL-XL) 25 mg XL tablet Take 1 Tab by mouth daily. Indications: Hypertension    methocarbamol (ROBAXIN) 500 mg tablet TAKE 1/2 TABLET BY MOUTH TWICE DAILY    traMADol (ULTRAM) 50 mg tablet TAKE 1 TO 2 TABLETS BY MOUTH TID AS NEEDED    traZODone (DESYREL) 100 mg tablet Take 1 Tab by mouth nightly.  atorvastatin (LIPITOR) 40 mg tablet Take 1 Tab by mouth daily.  sertraline (ZOLOFT) 100 mg tablet Take 1 Tab by mouth daily.  fluticasone (FLONASE) 50 mcg/actuation nasal spray 2 Sprays by Both Nostrils route daily.  polyethylene glycol (MIRALAX) 17 gram/dose powder Take 17 g by mouth daily.  Incontinence Pad, Liner, Disp (BLADDER CONTROL PADS EX ABSORB) pads 1 Packet by Does Not Apply route as needed (incontinence).  nitroglycerin (NITROSTAT) 0.4 mg SL tablet 1 Tab by SubLINGual route every five (5) minutes as needed for Chest Pain (call 911 if not relieved by 3).  gabapentin (NEURONTIN) 100 mg capsule Take 1 Cap by mouth three (3) times daily.  apixaban (ELIQUIS) 5 mg tablet Take 1 Tab by mouth every twelve (12) hours. No current facility-administered medications for this visit.         Past Medical History   Diagnosis Date    AICD (automatic cardioverter/defibrillator) present 5/13/2016 5/13/16 [employer] upgrade to dual chamber AICD implant Dr. Herbert Dickson state, other     Arrhythmia      'S IN THE PAST    Arthritis      OSTEO ARTHRITIS    AVNRT (AV bridgette re-entry tachycardia) ([address] 5/12/2016 5/12/16 AVNRT ablation    Back ache     CAD (coronary artery disease)     Cancer (HCC)      Prostate cancer    Chest tightness     Coagulation defects 2005     FACTOR V LEIDEN    Dizziness     FH: factor V Leiden deficiency     Generalized muscle ache     GERD (gastroesophageal reflux disease)      HEARTBURN    Heart failure ([address] 2014     EF 40%; Dr. Adrien Banegas    Hyperlipidemia, mixed     Hypertension      COREG    Other ill-defined conditions(799.89)      blood transfusion    Pacemaker     Pacemaker     Postsurgical aortocoronary bypass status 5/29/2012    Presence of cardiac defibrillator     Psychiatric disorder      depression    Stroke ([address]      SEVERAL-mini    Th[address]boembolus [facility]      2005    Weakness generalized        Past Surgical History   Procedure Laterality Date    Cardiac catheterization  3/4/2011          Hx other surgical       steroid injections    Hx prostatectomy        CA    Hx orthopaedic       LEFT KNEE CARTILAGE REPAIR;RIGHT ROTATOR CUFF REPAIR    Hx orthopaedic  2/14/12     C5-7 ANTERIIOR CERVICAL DISECTOMY AND FUSION    Hx orthopaedic  6/4/13     C5-C7 POSTERIOR DECOMPRESSION AND FUSION    Hx urological        urinary control system    Hx urological  12/3/13     REPLACEMENT ARTIFICAL URINARY SPHINCTER    Pr cardiac surg procedure unlist       CABG X1 3/5/11    Hx hernia repair      Hx pacemaker placement         History   Smoking Status    Former Smoker    Types: Cigarettes    Quit date: 10/10/1971   Smokeless Tobacco    Never Used       Family History   Problem Relation Age of Onset    Asthma Mother     Alcohol abuse Mother     Alcohol abuse Father     Asthma Father     Cancer Sister     Heart Disease Sister     Alcohol abuse Brother     Cancer Brother     Heart Disease Brother          History of Present Illness:  PHQ 2 / 9, over the last two weeks 6/2/2016   Little interest or pleasure in doing things Not at all   Feeling down, depressed or hopeless Not at all   Total Score PHQ 2 0   Trouble falling or staying asleep, or sleeping too much -   Feeling tired or having little energy -   Poor appetite or overeating -   Feeling bad about yourself - or that you are a failure or have let yourself or your family down -   Moving or speaking so slowly that other people could have noticed; or the opposite being so fidgety that others notice -   Thoughts of being better off dead, or hurting yourself in some way -   How difficult have these problems made it for you to do your work, take care of your home and get along with others -     Patient here for ER follow-up fever and facial pain    Patient states that this weekend he had the sensation of numbness and discomfort left side of his face cheek temple and behind his ear radiating to his shoulder. in the ER he had CBC which was essentially normal.  Chemistry panel normal.  Negative chest x-ray. EKG no acute changes. A CAT scan of his head which shows cerebral atrophy and some mild chronic sinus and mastoid air cell disease. He was told he might have had a small stroke. He just saw his neurologist yesterday who told him they did not think he had a new stroke. Starting Sunday he began to round low-grade fever 101/102. Fever has persisted although lower today. He does have some chills. He denies any nasal congestion or postnasal drip. He does have a slight cough during this time as well. As noted chest x-ray in emergency room was negative. He just saw his neurologist yesterday. He does have some dementia.   He has been started on Namenda for the dementia but I did ask that they wait until he is finished his current course of antibiotics before starting this new medication    He does continue on Eliquis for his atrial fibrillation. He does have a pacemaker implanted a year ago    Review of Systems:    Review of systems negative except as noted above      Physical Exam:  Visit Vitals    /67 (BP 1 Location: Right arm, BP Patient Position: Sitting)    Pulse 81    Temp 99.2 °F (37.3 °C) (Oral)    Resp 16    Ht 5' 7\" (1.702 m)    Wt 155 lb (70.3 kg)    SpO2 95%    BMI 24.28 kg/m2     Vitals:    01/24/17 1134   BP: 107/67   BP 1 Location: Right arm   BP Patient Position: Sitting   Pulse: 81   Resp: 16   Temp: 99.2 °F (37.3 °C)   TempSrc: Oral   SpO2: 95%   Weight: 155 lb (70.3 kg)   Height: 5' 7\" (1.702 m)     Patient no acute distress vitals as above. Temp minimally elevated. O2 sat acceptable  Head was normocephalic except he did have some erythema to the scalp in the left temple area. He had reproducible tenderness on palpation of the skin left temple cheek behind the ear and left side of his neck toward the shoulder. No vesicular lesions noted at this time. External ears were normal.  Ear canals normal.  Partially obscured with cerumen. Visualized portions of the TM were clear   nose external ears normal.  No lesions. Minimal congestion   Patient did have tenderness left cheek over the maxillary sinus but was more tender just to light touch     OP Mucosa normal.  Pharynx normal.  No erythema or exudate. Structures midline edentulous  Neck no nodes no masses  Chest is clear no wheezes rhonchi or rales. Good air exchange  Cor regular rate and rhythm no murmurs    Assessment/Plan:    Patient with several day history low-grade fever and left-sided facial pain sensitive to very light touch. Skin was erythematous in this area. I am most suspicious that we are dealing with early zoster here before the development of vesicular lesions. I am going to treat him with Famvir for this. He does however have some underlying sinus disease on CAT scan. Based on that I will also cover him with Augmentin.   I will check a sed rate to rule out less likely because of temporal arteritis. He will continue his routine medications except hold the Namenda until he is finished with the Famvir and Augmentin. He will be back in 1 week for follow-up sooner if he has any increasing new or changing symptoms    1. Fever, unspecified fever cause    - azithromycin (ZITHROMAX) 250 mg tablet; Take 2 tablets today, then take 1 tablet daily  Dispense: 6 Tab; Refill: 0    2. Cough      3. Herpes zoster without complication    - famciclovir (FAMVIR) 500 mg tablet; Take 1 Tab by mouth three (3) times daily for 7 days. Dispense: 21 Tab; Refill: 0    4. 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 Facial pain    - SED RATE (ESR)          Continue current therapy plan except for indicated above. Verbal and written instructions (see AVS) provided.  Patient expresses understanding of diagnosis and treatment plan. Follow-up Disposition:  Return in about 1 week (around 1/31/2017) for with [address] [place] [address]. Lorraine Perze.  Mirna Slaughter MD

## 2017-01-24 NOTE — MR AVS SNAPSHOT
Visit Information Date & Time Provider Department Dept. Phone Encounter #  
 1/24/2017 11:30 AM Marlyse Holter, MD [address] [place] [address] [phone] [id number] Follow-up Instructions Return in about 1 week (around 1/31/2017) for with Hallie Montalvo. Your Appointments 2/9/2017  8:30 AM  
ESTABLISHED PATIENT with Jocelin Oropeza PA-C  
[facility] ([address]) Appt Note: f.up 3 months, cp$0 11/10/2016 [facility]  
 [place] [address]  
[phone]  
  
   
 [id number]  
  
    
 8/21/2017  3:00 PM  
Follow Up with Art Mckinley MD  
[facility]urology [facility] at [facility] [address] [facility]) Appt Note: Follow up $0CP tdb 1/23/17  
 [address] [place] [address], 
[address] [place] [address], [address] [phone]  
[address] [facility], [address] [place] [address], [address] [facility] [address] [facility] [address] [phone] Upcoming Health Maintenance Date Due FOBT Q 1 YEAR AGE 50-75 4/1/1994 ZOSTER VACCINE AGE 60> 4/1/2004 DTaP/Tdap/Td series (1 - Tdap) 6/20/2017* GLAUCOMA SCREENING Q2Y 12/14/2017* MEDICARE YEARLY EXAM 7/1/2017 *Topic was postponed. The date shown is not the original due date. Allergies as of 1/24/2017  Review Complete On: 1/23/2017 By: Pete Montiel LPN No Known Allergies Current Immunizations  Reviewed on 6/17/2016 Name Date Influenza High Dose Vaccine PF 11/10/2016 Influenza Vaccine 9/16/2014 Influenza Vaccine Split 9/14/2011 Influenza Vaccine Whole 9/1/2010 Pneumococcal Conjugate (PCV-13) 11/10/2016 Pneumococcal Vaccine (Unspecified Type) 9/14/2011 Not reviewed this visit You Were Diagnosed With   
  
 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 0 Vitals BP Pulse Temp Resp Height(growth percentile) Weight(growth percentile) 107/67 (BP 1 Location: Right arm, BP Patient Position: Sitting) 81 99.2 °F (37.3 °C) (Oral) 16 5' 7\" (1.702 m) 155 lb (70.3 kg) SpO2 BMI Smoking Status 95% 24.28 kg/m2 Former Smoker Vitals History BMI and BSA Data Body Mass Index Body Surface Area  
 24.28 kg/m 2 1.82 m 2 Preferred Pharmacy Pharmacy Name Phone [facility] [address] [facility], [address] [place] [address] Street  Your Updated Medication List  
  
   
This list is accurate as of: 1/24/17 12:33 PM.  Always use your most recent med list.  
  
  
  
  
 acetaminophen-codeine 300-30 mg per tablet Commonly known as:  TYLENOL #3  
  
 apixaban 5 mg tablet Commonly known as:  Rosario Bryan Take 1 Tab by mouth every twelve (12) hours. atorvastatin 40 mg tablet Commonly known as:  LIPITOR Take 1 Tab by mouth daily. azithromycin 250 mg tablet Commonly known as:  Sunant Michelle Take 2 tablets today, then take 1 tablet daily  
  
 famciclovir 500 mg tablet Commonly known as:  [facility] Take 1 Tab by mouth three (3) times daily for 7 days. fluticasone 50 mcg/actuation nasal spray Commonly known as:  Shanks Blizzard 2 Sprays by Both Nostrils route daily. gabapentin 100 mg capsule Commonly known as:  NEURONTIN Take 1 Cap by mouth three (3) times daily. Incontinence Pad, Liner, Disp Pads Commonly known as:  BLADDER CONTROL PADS EX ABSORB  
1 Packet by Does Not Apply route as needed (incontinence). lisinopril 5 mg tablet Commonly known as:  PRINIVIL, ZESTRIL  
  
 memantine 10 mg tablet Commonly known as:  Omar Brood Take one half tab twice a day for 1 week, then 1 tab twice a day methocarbamol 500 mg tablet Commonly known as:  ROBAXIN  
TAKE 1/2 TABLET BY MOUTH TWICE DAILY  
  
 metoprolol succinate 25 mg XL tablet Commonly known as:  TOPROL-XL Take 1 Tab by mouth daily. Indications: Hypertension MIRALAX 17 gram/dose powder Generic drug:  polyethylene glycol Take 17 g by mouth daily. nitroglycerin 0.4 mg SL tablet Commonly known as:  NITROSTAT  
1 Tab by SubLINGual route every five (5) minutes as needed for Chest Pain (call 911 if not relieved by 3). sertraline 100 mg tablet Commonly known as:  ZOLOFT Take 1 Tab by mouth daily. traMADol 50 mg tablet Commonly known as:  ULTRAM  
TAKE 1 TO 2 TABLETS BY MOUTH TID AS NEEDED  
  
 traZODone 100 mg tablet Commonly known as:  Recinos Mary Ann Take 1 Tab by mouth nightly. Prescriptions Sent to Pharmacy Refills  
 azithromycin (ZITHROMAX) 250 mg tablet 0 Sig: Take 2 tablets today, then take 1 tablet daily Class: Normal  
 Pharmacy: [facility] [address] [place] [address], [address] [place] [address] [address] Ph #: [phone]  
 famciclovir (FAMVIR) 500 mg tablet 0 Sig: Take 1 Tab by mouth three (3) times daily for 7 days. Class: Normal  
 Pharmacy: [facility] [address] [place] [address] [address] Ph #: [phone] Route: Oral  
  
We Performed the Following RI COLLECTION VENOUS BLOOD,VENIPUNCTURE [id number] CPT(R)] RI HANDLG&/OR CONVEY OF SPEC FOR TR OFFICE TO LAB [27810 CPT(R)] SED RATE (ESR) [id number] CPT(R)] Follow-up Instructions Return in about 1 week (around 1/31/2017) for with Leon Meraz. Patient Instructions Same meds Add Famvir x 7 days Add augmentin Hold on meds for dementia until next week Await lab work Follow up 1 week sooner if not improving Introducing [facility]! Dear Mabel Stinson: Thank you for requesting a [employer] account. Our records indicate that you already have an active [employer] account.   You can access your account anytime at [email or web address] Did you know that you can access your hospital and ER discharge instructions at any time in [employer]? You can also review all of your test results from your hospital stay or ER visit. Additional Information If you have questions, please visit the Frequently Asked Questions section of the [employer] website at [email or web address] Remember, [employer] is NOT to be used for urgent needs. For medical emergencies, dial 911. Now available from your iPhone and Android! Please provide this summary of care documentation to your next provider. Your primary care clinician is listed as Elvia Amaya. If you have any questions after today's visit, please call [phone].

## 2017-01-24 NOTE — PATIENT INSTRUCTIONS
Same meds   Add Famvir x 7 days  Add augmentin    Hold on meds for dementia until next week  Await lab work  Follow up 1 week sooner if not improving

## 2017-01-25 ENCOUNTER — TELEPHONE (OUTPATIENT)
Dept: FAMILY MEDICINE CLINIC | Age: 72
End: 2017-01-25

## 2017-01-25 LAB — ERYTHROCYTE [SEDIMENTATION RATE] IN BLOOD BY WESTERGREN METHOD: 11 MM/HR (ref 0–30)

## 2017-01-26 ENCOUNTER — OFFICE VISIT (OUTPATIENT)
Dept: FAMILY MEDICINE CLINIC | Age: 72
End: 2017-01-26

## 2017-01-26 VITALS
DIASTOLIC BLOOD PRESSURE: 62 MMHG | SYSTOLIC BLOOD PRESSURE: 106 MMHG | OXYGEN SATURATION: 95 % | TEMPERATURE: 97.4 F | HEART RATE: 60 BPM | HEIGHT: 67 IN | BODY MASS INDEX: 24.33 KG/M2 | WEIGHT: 155 LBS

## 2017-01-26 DIAGNOSIS — R50.9 FEVER, UNSPECIFIED FEVER CAUSE: ICD-10-CM

## 2017-01-26 DIAGNOSIS — R19.5 DARK STOOLS: ICD-10-CM

## 2017-01-26 DIAGNOSIS — R51.9 NONINTRACTABLE HEADACHE, UNSPECIFIED CHRONICITY PATTERN, UNSPECIFIED HEADACHE TYPE: ICD-10-CM

## 2017-01-26 DIAGNOSIS — H61.22 IMPACTED CERUMEN, LEFT EAR: Primary | ICD-10-CM

## 2017-01-26 DIAGNOSIS — M62.838 MUSCLE SPASMS OF NECK: ICD-10-CM

## 2017-01-26 NOTE — PROGRESS NOTES
Awilda Carlin is a 66 y.o. male who presents to the office today with the following:  Chief Complaint   Patient presents with    Fever     x 2 days, bad headaches       HPI  Pt presents today c/o persistent low-grade fever, HAs, and left sided neck pain. Denies any injuries. Worse with movement, mostly when turning the the right and going back. No midline neck pain. No skin changes. Reports last elev temp was this morn 100.5F  However, wife states he has been running up to 102.6F  Has been taking Tylenol. Has had nothing since early this morning. Seen by Dr. Rajinder Martinez on Tues for left temporal/scalp/neck tenderness, fevers, HA. Started on Famvir and Augmentin; for suspected early zoster and maxillary sinusitis, respectively. Sinusitis was an incidental finding on CT, recently ordered by Neurologist Dr. Paula Velez pt saw on Monday, after visit to [facility] ER this past Sat for suspected TIA. Pt states he was told at Neurologists office that he did not have a TIA. At that time was also started on Namenda, his gabapentin and zoloft were also increased. Plans to see Ortho Dr. Eleanor Avendano soon for his back. Later (after pt's visit) wife also adds pt noted a small amt of dark \"almost black\" stool on TP one day. She asked him what colors his stool has been and he did not know. Worried it may be blood. Review of Systems   Constitutional: Negative for chills, fever and malaise/fatigue. HENT: Negative for congestion, ear pain (no ear pain, but fullness and worsening hearing L ear) and sore throat. Eyes: Negative. Respiratory: Negative for cough and shortness of breath. Cardiovascular: Negative for chest pain and leg swelling. Gastrointestinal: Negative for abdominal pain, diarrhea, nausea and vomiting. Genitourinary: Negative. Musculoskeletal: Negative for myalgias. Skin: Negative for rash. Neurological: Positive for headaches.  Negative for dizziness, tingling, tremors, sensory change, speech change and seizures. No Known Allergies    Current Outpatient Prescriptions   Medication Sig    memantine (NAMENDA) 10 mg tablet Take one half tab twice a day for 1 week, then 1 tab twice a day    gabapentin (NEURONTIN) 100 mg capsule Take 1 Cap by mouth three (3) times daily.  famciclovir (FAMVIR) 500 mg tablet Take 1 Tab by mouth three (3) times daily for 7 days.  amoxicillin-clavulanate (AUGMENTIN) 500-125 mg per tablet Take 1 Tab by mouth two (2) times a day for 10 days.  acetaminophen-codeine (TYLENOL #3) 300-30 mg per tablet     lisinopril (PRINIVIL, ZESTRIL) 5 mg tablet     metoprolol succinate (TOPROL-XL) 25 mg XL tablet Take 1 Tab by mouth daily. Indications: Hypertension    methocarbamol (ROBAXIN) 500 mg tablet TAKE 1/2 TABLET BY MOUTH TWICE DAILY    traMADol (ULTRAM) 50 mg tablet TAKE 1 TO 2 TABLETS BY MOUTH TID AS NEEDED    traZODone (DESYREL) 100 mg tablet Take 1 Tab by mouth nightly.  apixaban (ELIQUIS) 5 mg tablet Take 1 Tab by mouth every twelve (12) hours.  atorvastatin (LIPITOR) 40 mg tablet Take 1 Tab by mouth daily.  sertraline (ZOLOFT) 100 mg tablet Take 1 Tab by mouth daily.  fluticasone (FLONASE) 50 mcg/actuation nasal spray 2 Sprays by Both Nostrils route daily.  polyethylene glycol (MIRALAX) 17 gram/dose powder Take 17 g by mouth daily.  Incontinence Pad, Liner, Disp (BLADDER CONTROL PADS EX ABSORB) pads 1 Packet by Does Not Apply route as needed (incontinence).  nitroglycerin (NITROSTAT) 0.4 mg SL tablet 1 Tab by SubLINGual route every five (5) minutes as needed for Chest Pain (call 911 if not relieved by 3). No current facility-administered medications for this visit.         Past Medical History   Diagnosis Date    AICD (automatic cardioverter/defibrillator) present 5/13/2016 5/13/16 [employer] upgrade to dual chamber AICD implant  Dr. Guerita Jorge state, other     Arrhythmia      'S IN THE PAST    Arthritis OSTEO ARTHRITIS    AVNRT (AV bridgette re-entry tachycardia) ([facility] 5/12/2016 5/12/16 AVNRT ablation    Back ache     CAD (coronary artery disease)     Cancer (HCC)      Prostate cancer    Chest tightness     Coagulation defects 2005     FACTOR V LEIDEN    Dizziness     FH: factor V Leiden deficiency     Generalized muscle ache     GERD (gastroesophageal reflux disease)      HEARTBURN    Heart failure ([facility] 2014     EF 40%; Dr. Leatha Desir    Hyperlipidemia, mixed     Hypertension      COREG    Other ill-defined conditions(799.89)      blood transfusion    Pacemaker     Pacemaker     Postsurgical aortocoronary bypass status 5/29/2012    Presence of cardiac defibrillator     Psychiatric disorder      depression    Stroke ([facility]      SEVERAL-mini    Thromboembolus ([facility]      2005    Weakness generalized        Past Surgical History   Procedure Laterality Date    Cardiac catheterization  3/4/2011          Hx other surgical       steroid injections    Hx prostatectomy        CA    Hx orthopaedic       LEFT KNEE CARTILAGE REPAIR;RIGHT ROTATOR CUFF REPAIR    Hx orthopaedic  2/14/12     C5-7 ANTERIIOR CERVICAL DISECTOMY AND FUSION    Hx orthopaedic  6/4/13     C5-C7 POSTERIOR DECOMPRESSION AND FUSION    Hx urological        urinary control system    Hx urological  12/3/13     REPLACEMENT ARTIFICAL URINARY SPHINCTER    Pr cardiac surg procedure unlist       CABG X1 3/5/11    Hx hernia repair      Hx pacemaker placement         Social History     Social History    Marital status:      Spouse name: N/A    Number of children: N/A    Years of education: N/A     Social History Main Topics    Smoking status: Former Smoker     Types: Cigarettes     Quit date: 10/10/1971    Smokeless tobacco: Never Used    Alcohol use 1.2 oz/week     0 Standard drinks or equivalent, 2 Cans of beer per week      Comment: QUIT 13 YEARS;occasional beer    Drug use: No    Sexual activity: No     Other Topics Concern    Sleep Concern Yes    Stress Concern Yes     Family concerns     Social History Narrative    , 2 children       Family History   Problem Relation Age of Onset    Asthma Mother     Alcohol abuse Mother     Alcohol abuse Father     Asthma Father     Cancer Sister     Heart Disease Sister     Alcohol abuse Brother     Cancer Brother     Heart Disease Brother          Physical Exam:  Visit Vitals    /62 (BP 1 Location: Right arm, BP Patient Position: Sitting)    Pulse 60    Temp 97.4 °F (36.3 °C)    Ht 5' 7\" (1.702 m)    Wt 155 lb (70.3 kg)    SpO2 95%    BMI 24.28 kg/m2     Physical Exam   Constitutional: He is oriented to person, place, and time and well-developed, well-nourished, and in no distress. HENT:   Head: Normocephalic and atraumatic. Right Ear: Hearing, tympanic membrane, external ear and ear canal normal. No decreased hearing is noted. Left Ear: External ear and ear canal normal. Decreased hearing is noted. Nose: Right sinus exhibits no maxillary sinus tenderness and no frontal sinus tenderness. Left sinus exhibits no maxillary sinus tenderness and no frontal sinus tenderness. Mouth/Throat: Uvula is midline, oropharynx is clear and moist and mucous membranes are normal.   Yellow/white soft cerumen L ear canal, obstructing view and hearing decreased to finger rub. Eyes: Conjunctivae are normal.   Neck: Neck supple. RROM    Cardiovascular: Normal rate, regular rhythm and normal heart sounds. Pulmonary/Chest: Effort normal and breath sounds normal.   Musculoskeletal: He exhibits no edema. Left shoulder: Normal.        Cervical back: He exhibits decreased range of motion and spasm. He exhibits no tenderness (no midline or paraspinal ttp, trap ttp/spasm as described). Arms:  No abnls of LUE noted. Lymphadenopathy:     He has no cervical adenopathy. Neurological: He is alert and oriented to person, place, and time.  Gait normal. Skin: Skin is warm, dry and intact. Still no facial lesions/rash/erythema. No longer tender over scalp/face/temporal region. Still with neck ttp. Psychiatric: Mood and affect normal.   Nursing note and vitals reviewed. Assessment/Plan:    ICD-10-CM ICD-9-CM    1. Impacted cerumen, left ear H61.22 380.4 REMOVAL IMPACTED CERUMEN IRRIGATION/LVG UNILAT  - Left ear s/p irrig clear, wnl. Unable to appreciate full TM due to curvature of canal, but no abnls seen and pt reports improvement with hearing and also the discomfort he was feeling under his ear (alongside est tube/lat part of his neck)   2. Fever, unspecified fever cause R50.9 780.60 HI HANDLG&/OR CONVEY OF SPEC FOR TR OFFICE TO LAB      HI COLLECTION VENOUS BLOOD,VENIPUNCTURE      CBC WITH AUTOMATED DIFF   3. Nonintractable headache, unspecified chronicity pattern, unspecified headache type R51 784.0    4. Muscle spasms of neck M62.838 728.85 - MS tenderness vs zoster. - Will have pt finish current tx regimen as Rx, but also rec warm compresses 15-20min QID, light stretching, (warned against falling asleep with heating pad). - Already on muscle relaxant for spasms. 5. Dark stools R19.5 792.1 OCCULT BLOOD, IMMUNOASSAY (FIT)  Wife provided with FOBT for pt to take home and bring back. Instructed to rtc to be seen if any sxs persist/worsen. May continue Tylenol prn HA/fever. Rec try different thermometer when checking temp at home, afebrile in office. Cont to monitor and seek medical attn if continues high fever/sxs, notably if not resolving with Tylenol. Repeat CBC pending. Also has carotid doppler and further imaging pending with Neurology. Follow-up Disposition:  Return in about 1 week (around 2/2/2017), or sooner if symptoms worsen or fail to improve.     Jorje Houston PA-C

## 2017-01-26 NOTE — PATIENT INSTRUCTIONS
Learning About Fever  What is a fever? A fever is a high body temperature. It's one way your body fights being sick. A fever shows that the body is responding to infection or other illnesses, both minor and severe. A fever is a symptom, not an illness by itself. A fever can be a sign that you are ill, but most fevers are not caused by a serious problem. You may have a fever with a minor illness, such as a cold. But sometimes a very serious infection may cause little or no fever. It is important to look at other symptoms, other conditions you have, and how you feel in general. In children, notice how they act and see what symptoms they complain of. What is a normal body temperature? A normal body temperature is about 98. 6ºF. Some people have a normal temperature that is a little higher or a little lower than this. Your temperature may be a little lower in the morning than it is later in the day. It may go up during hot weather or when you exercise, wear heavy clothes, or take a hot bath. Your temperature may also be different depending on how you take it. A temperature taken in the mouth (oral) or under the arm may be a little lower than your core temperature (rectal). What is a fever temperature? A core temperature of 100.4°F or above is considered a fever. What can cause a fever? A fever may be caused by:  · Infections. This is the most common cause of a fever. Examples of infections that can cause a fever include the flu, a kidney infection, or pneumonia. · Some medicines. · Severe trauma or injury, such as a heart attack, stroke, heatstroke, or burns. · Other medical conditions, such as arthritis and some cancers. How can you treat a fever at home? · Ask your doctor if you can take an over-the-counter pain medicine, such as acetaminophen (Tylenol), ibuprofen (Advil, Motrin), or naproxen (Aleve). Be safe with medicines. Read and follow all instructions on the label.   · To prevent dehydration, drink plenty of fluids. Choose water and other caffeine-free clear liquids until you feel better. If you have kidney, heart, or liver disease and have to limit fluids, talk with your doctor before you increase the amount of fluids you drink. Follow-up care is a key part of your treatment and safety. Be sure to make and go to all appointments, and call your doctor if you are having problems. It's also a good idea to know your test results and keep a list of the medicines you take. Where can you learn more? Go to http://kendell-stephanie.info/. Enter P341 in the search box to learn more about \"Learning About Fever. \"  Current as of: May 27, 2016  Content Version: 11.1  © 0842-2927 [employer]. Care instructions adapted under license by [employer] (which disclaims liability or warranty for this information). If you have questions about a medical condition or this instruction, always ask your healthcare professional. [place] [address] any warranty or liability for your use of this information. Earwax Blockage: Care Instructions  Your Care Instructions    Earwax is a natural substance that protects the ear canal. Normally, earwax drains from the ears and does not cause problems. Sometimes earwax builds up and hardens. Earwax blockage (also called cerumen impaction) can cause some loss of hearing and pain. When wax is tightly packed, you will need to have your doctor remove it. Follow-up care is a key part of your treatment and safety. Be sure to make and go to all appointments, and call your doctor if you are having problems. Its also a good idea to know your test results and keep a list of the medicines you take. How can you care for yourself at home? · Do not try to remove earwax with cotton swabs, fingers, or other objects. This can make the blockage worse and damage the eardrum.   · If your doctor recommends that you try to remove earwax at home:  ¨ Soften and loosen the earwax with warm mineral oil. You also can try hydrogen peroxide mixed with an equal amount of room temperature water. Place 2 drops of the fluid, warmed to body temperature, in the ear two times a day for up to 5 days. ¨ Once the wax is loose and soft, all that is usually needed to remove it from the ear canal is a gentle, warm shower. Direct the water into the ear, then tip your head to let the earwax drain out. Dry your ear thoroughly with a hair dryer set on low. Hold the dryer several inches from your ear. ¨ If the warm mineral oil and shower do not work, use an over-the-counter wax softener followed by gentle flushing with an ear syringe each night for a week or two. Make sure the flushing solution is body temperature. Cool or hot fluids in the ear can cause dizziness. When should you call for help? Call your doctor now or seek immediate medical care if:  · Pus or blood drains from your ear. · Your ears are ringing or feel full. · You have a loss of hearing. Watch closely for changes in your health, and be sure to contact your doctor if:  · You have pain or reduced hearing after 1 week of home treatment. · You have any new symptoms, such as nausea or balance problems. Where can you learn more? Go to http://kendell-stephanie.info/. Enter D857 in the search box to learn more about \"Earwax Blockage: Care Instructions. \"  Current as of: May 27, 2016  Content Version: 11.1  © 2690-3526 [employer]. Care instructions adapted under license by [employer] (which disclaims liability or warranty for this information). If you have questions about a medical condition or this instruction, always ask your healthcare professional. [place] [address] any warranty or liability for your use of this information.        Head or Face Pain: Care Instructions  Your Care Instructions  Common causes of head or face pain are allergies, stress, and injuries. Other causes include tooth problems and sinus infections. Eating certain foods, such as chocolate or cheese, or drinking certain liquids, such as coffee or cola, can cause head pain for some people. If you have mild head pain, you may not need treatment. It is important to watch your symptoms and talk to your doctor if your pain continues or gets worse. Follow-up care is a key part of your treatment and safety. Be sure to make and go to all appointments, and call your doctor if you are having problems. It's also a good idea to know your test results and keep a list of the medicines you take. How can you care for yourself at home? · Take pain medicines exactly as directed. ¨ If the doctor gave you a prescription medicine for pain, take it as prescribed. ¨ If you are not taking a prescription pain medicine, ask your doctor if you can take an over-the-counter pain medicine. · Take it easy for the next few days or longer if you are not feeling well. · Use a warm, moist towel or heating pad set on low to relax tight muscles in your shoulder and neck. Have someone gently massage your neck and shoulders. · Put ice or a cold pack on the area for 10 to 20 minutes at a time. Put a thin cloth between the ice and your skin. When should you call for help? Call 911 anytime you think you may need emergency care. For example, call if:  · You have twitching, jerking, or a seizure. · You passed out (lost consciousness). · You have symptoms of a stroke. These may include:  ¨ Sudden numbness, tingling, weakness, or loss of movement in your face, arm, or leg, especially on only one side of your body. ¨ Sudden vision changes. ¨ Sudden trouble speaking. ¨ Sudden confusion or trouble understanding simple statements. ¨ Sudden problems with walking or balance. ¨ A sudden, severe headache that is different from past headaches. · You have jaw pain and pain in your chest, shoulder, neck, or arm.   Call your doctor now or seek immediate medical care if:  · You have a fever with a stiff neck or a severe headache. · You have nausea and vomiting, or you cannot keep food or liquids down. Watch closely for changes in your health, and be sure to contact your doctor if:  · Your head or face pain does not get better as expected. Where can you learn more? Go to http://kendell-stephanie.info/. Enter P568 in the search box to learn more about \"Head or Face Pain: Care Instructions. \"  Current as of: May 27, 2016  Content Version: 11.1  © 8756-3781 [employer]. Care instructions adapted under license by [employer] (which disclaims liability or warranty for this information). If you have questions about a medical condition or this instruction, always ask your healthcare professional. [address] any warranty or liability for your use of this information.

## 2017-01-26 NOTE — MR AVS SNAPSHOT
Visit Information Date & Time Provider Department Dept. Phone Encounter #  
 1/26/2017  1:30 PM Leotha Claude, PA-C [address] [id number] Your Appointments 1/31/2017  9:00 AM  
Any with Leotha Claude, PA-C  
[facility] ([address]) Appt Note: 1 wk f/u fever $0 CP mbm  
 [address]  
[phone]  
  
   
 [address]  
  
    
 2/9/2017  8:30 AM  
ESTABLISHED PATIENT with Leotha Claude, PA-C  
[facility] ([address]) Appt Note: f.up 3 months, cp$0 11/10/2016 hme  
 [address]  
[phone]  
  
   
 [address]  
  
    
 8/21/2017  3:00 PM  
Follow Up with Eva Underwood MD  
[facility] at [address]) Appt Note: Follow up $0CP tdb 1/23/17  
 [address], [address] [id number]  
[address], [address] [place] [address] [address] [phone] Upcoming Health Maintenance Date Due FOBT Q 1 YEAR AGE 50-75 4/1/1994 ZOSTER VACCINE AGE 60> 4/1/2004 DTaP/Tdap/Td series (1 - Tdap) 6/20/2017* GLAUCOMA SCREENING Q2Y 12/14/2017* MEDICARE YEARLY EXAM 7/1/2017 *Topic was postponed. The date shown is not the original due date. Allergies as of 1/26/2017  Review Complete On: 1/26/2017 By: Sara Aguilar NP No Known Allergies Current Immunizations  Reviewed on 6/17/2016 Name Date Influenza High Dose Vaccine PF 11/10/2016 Influenza Vaccine 9/16/2014 Influenza Vaccine Split 9/14/2011 Influenza Vaccine Whole 9/1/2010 Pneumococcal Conjugate (PCV-13) 11/10/2016 Pneumococcal Vaccine (Unspecified Type) 9/14/2011 Not reviewed this visit You Were Diagnosed With   
  
 Codes Comments Impacted cerumen, left ear    -  Primary ICD-10-CM: H61.22 
ICD-9-CM: 380.4 Fever, unspecified fever cause     ICD-10-CM: R50.9 ICD-9-CM: 780.60 Vitals BP Pulse Temp Height(growth percentile) Weight(growth percentile) SpO2  
 106/62 (BP 1 Location: Right arm, BP Patient Position: Sitting) 60 97.4 °F (36.3 °C) 5' 7\" (1.702 m) 155 lb (70.3 kg) 95% BMI Smoking Status 24.28 kg/m2 Former Smoker BMI and BSA Data Body Mass Index Body Surface Area  
 24.28 kg/m 2 1.82 m 2 Preferred Pharmacy Pharmacy Name Phone [facility] [address], [address] [place] [address] Your Updated Medication List  
  
   
This list is accurate as of: 1/26/17  3:17 PM.  Always use your most recent med list.  
  
  
  
  
 acetaminophen-codeine 300-30 mg per tablet Commonly known as:  TYLENOL #3  
  
 amoxicillin-clavulanate 500-125 mg per tablet Commonly known as:  AUGMENTIN Take 1 Tab by mouth two (2) times a day for 10 days. apixaban 5 mg tablet Commonly known as:  Jodelle Patient Take 1 Tab by mouth every twelve (12) hours. atorvastatin 40 mg tablet Commonly known as:  LIPITOR Take 1 Tab by mouth daily. famciclovir 500 mg tablet Commonly known as:  [facility] Take 1 Tab by mouth three (3) times daily for 7 days. fluticasone 50 mcg/actuation nasal spray Commonly known as:  [place] 2 Sprays by Both Nostrils route daily. gabapentin 100 mg capsule Commonly known as:  NEURONTIN Take 1 Cap by mouth three (3) times daily. Incontinence Pad, Liner, Disp Pads Commonly known as:  BLADDER CONTROL PADS EX ABSORB  
1 Packet by Does Not Apply route as needed (incontinence). lisinopril 5 mg tablet Commonly known as:  PRINIVIL, ZESTRIL  
  
 memantine 10 mg tablet Commonly known as:  Brigid Almanza Take one half tab twice a day for 1 week, then 1 tab twice a day  
  
 methocarbamol 500 mg tablet Commonly known as:  ROBAXIN  
TAKE 1/2 TABLET BY MOUTH TWICE DAILY  
  
 metoprolol succinate 25 mg XL tablet Commonly known as:  TOPROL-XL Take 1 Tab by mouth daily. Indications: Hypertension MIRALAX 17 gram/dose powder Generic drug:  polyethylene glycol Take 17 g by mouth daily. nitroglycerin 0.4 mg SL tablet Commonly known as:  NITROSTAT  
1 Tab by SubLINGual route every five (5) minutes as needed for Chest Pain (call 911 if not relieved by 3). sertraline 100 mg tablet Commonly known as:  ZOLOFT Take 1 Tab by mouth daily. traMADol 50 mg tablet Commonly known as:  ULTRAM  
TAKE 1 TO 2 TABLETS BY MOUTH TID AS NEEDED  
  
 traZODone 100 mg tablet Commonly known as:  Rebbecca Bunde Take 1 Tab by mouth nightly. We Performed the Following CBC WITH AUTOMATED DIFF [09370 CPT(R)] OR COLLECTION VENOUS BLOOD,VENIPUNCTURE B3329832 CPT(R)] OR HANDLG&/OR CONVEY OF SPEC FOR TR OFFICE TO LAB [23342 CPT(R)] REMOVAL IMPACTED CERUMEN IRRIGATION/LVG UNILAT [id number] CPT(R)] Introducing [facility]! Dear Milvia Boyle: Thank you for requesting a [employer] account. Our records indicate that you already have an active [employer] account. You can access your account anytime at [email or web address] Did you know that you can access your hospital and ER discharge instructions at any time in [employer]? You can also review all of your test results from your hospital stay or ER visit. Additional Information If you have questions, please visit the Frequently Asked Questions section of the [employer] website at [email or web address] Remember, [employer] is NOT to be used for urgent needs. For medical emergencies, dial 911. Now available from your iPhone and Android! Please provide this summary of care documentation to your next provider. Your primary care clinician is listed as Sigifredo Ackerman.  If you have any questions after today's visit, please call [phone].

## 2017-01-27 LAB
BASOPHILS # BLD AUTO: 0 X10E3/UL (ref 0–0.2)
BASOPHILS NFR BLD AUTO: 0 %
EOSINOPHIL # BLD AUTO: 0.1 X10E3/UL (ref 0–0.4)
EOSINOPHIL NFR BLD AUTO: 1 %
ERYTHROCYTE [DISTWIDTH] IN BLOOD BY AUTOMATED COUNT: 13.6 % (ref 12.3–15.4)
HCT VFR BLD AUTO: 33.7 % (ref 37.5–51)
HGB BLD-MCNC: 11.3 G/DL (ref 12.6–17.7)
IMM GRANULOCYTES # BLD: 0 X10E3/UL (ref 0–0.1)
IMM GRANULOCYTES NFR BLD: 0 %
LYMPHOCYTES # BLD AUTO: 1.6 X10E3/UL (ref 0.7–3.1)
LYMPHOCYTES NFR BLD AUTO: 20 %
MCH RBC QN AUTO: 29 PG (ref 26.6–33)
MCHC RBC AUTO-ENTMCNC: 33.5 G/DL (ref 31.5–35.7)
MCV RBC AUTO: 87 FL (ref 79–97)
MONOCYTES # BLD AUTO: 1 X10E3/UL (ref 0.1–0.9)
MONOCYTES NFR BLD AUTO: 13 %
NEUTROPHILS # BLD AUTO: 5 X10E3/UL (ref 1.4–7)
NEUTROPHILS NFR BLD AUTO: 66 %
PLATELET # BLD AUTO: 164 X10E3/UL (ref 150–379)
RBC # BLD AUTO: 3.89 X10E6/UL (ref 4.14–5.8)
WBC # BLD AUTO: 7.7 X10E3/UL (ref 3.4–10.8)

## 2017-01-27 NOTE — PROGRESS NOTES
Notify pt/wife, WBC still wnl. Very borderline anemic- encourage iron rich foods or otc vit with iron in it, otherwise lab unremarkable.

## 2017-01-30 ENCOUNTER — OFFICE VISIT (OUTPATIENT)
Dept: FAMILY MEDICINE CLINIC | Age: 72
End: 2017-01-30

## 2017-01-30 VITALS
RESPIRATION RATE: 18 BRPM | OXYGEN SATURATION: 94 % | TEMPERATURE: 96.1 F | HEIGHT: 67 IN | SYSTOLIC BLOOD PRESSURE: 120 MMHG | DIASTOLIC BLOOD PRESSURE: 62 MMHG | BODY MASS INDEX: 24.55 KG/M2 | WEIGHT: 156.4 LBS | HEART RATE: 60 BPM

## 2017-01-30 DIAGNOSIS — R50.9 FEVER OF UNKNOWN ORIGIN: ICD-10-CM

## 2017-01-30 DIAGNOSIS — R59.0 AXILLARY LYMPHADENOPATHY: Primary | ICD-10-CM

## 2017-01-30 DIAGNOSIS — Z09 FOLLOW UP: ICD-10-CM

## 2017-01-30 DIAGNOSIS — G89.29 CHRONIC LOW BACK PAIN WITH SCIATICA, SCIATICA LATERALITY UNSPECIFIED, UNSPECIFIED BACK PAIN LATERALITY: ICD-10-CM

## 2017-01-30 DIAGNOSIS — F51.01 PRIMARY INSOMNIA: ICD-10-CM

## 2017-01-30 DIAGNOSIS — M54.40 CHRONIC LOW BACK PAIN WITH SCIATICA, SCIATICA LATERALITY UNSPECIFIED, UNSPECIFIED BACK PAIN LATERALITY: ICD-10-CM

## 2017-01-30 DIAGNOSIS — F02.818 DEMENTIA DUE TO GENERAL MEDICAL CONDITION WITH BEHAVIORAL DISTURBANCE: ICD-10-CM

## 2017-01-30 RX ORDER — SULFAMETHOXAZOLE AND TRIMETHOPRIM 800; 160 MG/1; MG/1
1 TABLET ORAL 2 TIMES DAILY
Qty: 20 TAB | Refills: 0 | Status: SHIPPED | OUTPATIENT
Start: 2017-01-30 | End: 2017-02-09

## 2017-01-30 RX ORDER — ATORVASTATIN CALCIUM 40 MG/1
TABLET, FILM COATED ORAL
Qty: 30 TAB | Refills: 2 | Status: SHIPPED | OUTPATIENT
Start: 2017-01-30 | End: 2017-05-09 | Stop reason: SDUPTHER

## 2017-01-30 RX ORDER — TRAZODONE HYDROCHLORIDE 100 MG/1
TABLET ORAL
Qty: 30 TAB | Refills: 2 | OUTPATIENT
Start: 2017-01-30

## 2017-01-30 RX ORDER — METHOCARBAMOL 500 MG/1
TABLET, FILM COATED ORAL
Qty: 30 TAB | Refills: 0 | OUTPATIENT
Start: 2017-01-30

## 2017-01-30 RX ORDER — METHOCARBAMOL 500 MG/1
TABLET, FILM COATED ORAL
Qty: 30 TAB | Refills: 0 | Status: SHIPPED | OUTPATIENT
Start: 2017-01-30 | End: 2017-03-02

## 2017-01-30 NOTE — TELEPHONE ENCOUNTER
Requested Prescriptions     Pending Prescriptions Disp Refills    traZODone (DESYREL) 100 mg tablet [Pharmacy Med Name: TRAZODONE TABS 100mg] 30 Tab      Sig: TAKE 1 TABLET BY MOUTH NIGHTLY.     atorvastatin (LIPITOR) 40 mg tablet [Pharmacy Med Name: ATORVASTATIN CA TABS 40mg] 30 Tab      Sig: TAKE 1 TABLET BY MOUTH EVERY DAY

## 2017-01-30 NOTE — PROGRESS NOTES
Timoteo Mensah is a 66 y.o. male who presents to the office today with the following:  Chief Complaint   Patient presents with    Arm Pain     spot under left arm       HPI   Here for follow-up with new complaint. Reports neck pain is much better, however, now has a very painful knot under left arm. Thinks it showed up a few days ago. Wife reports still having borderline fevers. Checked with several thermometers to confirm. This morning temp was 100.1F, Yesterday was 101. ?F  No new cp, sob, or other complaints today. No longer c/o HA. Is coughing a little more than usual.  Wife is concerned of temperature still. Review of Systems   Constitutional: Positive for fever. Negative for chills and malaise/fatigue. Respiratory: Positive for cough (chronic, but a little worse recently). Negative for shortness of breath and wheezing. Cardiovascular: Negative for chest pain and leg swelling. Gastrointestinal: Negative. Genitourinary: Negative. Skin: Negative for rash. Neurological:        Reports no new sxs       No Known Allergies    Current Outpatient Prescriptions   Medication Sig    memantine (NAMENDA) 10 mg tablet Take one half tab twice a day for 1 week, then 1 tab twice a day    gabapentin (NEURONTIN) 100 mg capsule Take 1 Cap by mouth three (3) times daily.  famciclovir (FAMVIR) 500 mg tablet Take 1 Tab by mouth three (3) times daily for 7 days.  amoxicillin-clavulanate (AUGMENTIN) 500-125 mg per tablet Take 1 Tab by mouth two (2) times a day for 10 days.  acetaminophen-codeine (TYLENOL #3) 300-30 mg per tablet     lisinopril (PRINIVIL, ZESTRIL) 5 mg tablet     metoprolol succinate (TOPROL-XL) 25 mg XL tablet Take 1 Tab by mouth daily.  Indications: Hypertension    methocarbamol (ROBAXIN) 500 mg tablet TAKE 1/2 TABLET BY MOUTH TWICE DAILY    traMADol (ULTRAM) 50 mg tablet TAKE 1 TO 2 TABLETS BY MOUTH TID AS NEEDED    traZODone (DESYREL) 100 mg tablet Take 1 Tab by mouth nightly.  apixaban (ELIQUIS) 5 mg tablet Take 1 Tab by mouth every twelve (12) hours.  atorvastatin (LIPITOR) 40 mg tablet Take 1 Tab by mouth daily.  sertraline (ZOLOFT) 100 mg tablet Take 1 Tab by mouth daily.  fluticasone (FLONASE) 50 mcg/actuation nasal spray 2 Sprays by Both Nostrils route daily.  polyethylene glycol (MIRALAX) 17 gram/dose powder Take 17 g by mouth daily.  Incontinence Pad, Liner, Disp (BLADDER CONTROL PADS EX ABSORB) pads 1 Packet by Does Not Apply route as needed (incontinence).  nitroglycerin (NITROSTAT) 0.4 mg SL tablet 1 Tab by SubLINGual route every five (5) minutes as needed for Chest Pain (call 911 if not relieved by 3). No current facility-administered medications for this visit.         Past Medical History   Diagnosis Date    AICD (automatic cardioverter/defibrillator) present 5/13/2016 5/13/16 [employer] upgrade to dual chamber AICD implant  Dr. Gillian Powell state, other     Arrhythmia      'S IN THE PAST    Arthritis      OSTEO ARTHRITIS    AVNRT (AV bridgette re-entry tachycardia) ([facility] [address] 5/12/2016 5/12/16 AVNRT ablation    Back ache     CAD (coronary artery disease)     Cancer (HCC)      Prostate cancer    Chest tightness     Coagulation defects 2005     FACTOR V LEIDEN    Dizziness     FH: factor V Leiden deficiency     Generalized muscle ache     GERD (gastroesophageal reflux disease)      HEARTBURN    Heart failure ([address] 2014     EF 40%; Dr. Araceli Marquez    Hyperlipidemia, mixed     Hypertension      COREG    Other ill-defined conditions(799.89)      blood transfusion    Pacemaker     Pacemaker     Postsurgical aortocoronary bypass status 5/29/2012    Presence of cardiac defibrillator     Psychiatric disorder      depression    Stroke ([facility] [address]      SEVERAL-mini    Thromboembolus ([address]      2005    Weakness generalized        Past Surgical History   Procedure Laterality Date    Cardiac catheterization 3/4/2011          Hx other surgical       steroid injections    Hx prostatectomy        CA    Hx orthopaedic       LEFT KNEE CARTILAGE REPAIR;RIGHT ROTATOR CUFF REPAIR    Hx orthopaedic  2/14/12     C5-7 ANTERIIOR CERVICAL DISECTOMY AND FUSION    Hx orthopaedic  6/4/13     C5-C7 POSTERIOR DECOMPRESSION AND FUSION    Hx urological        urinary control system    Hx urological  12/3/13     REPLACEMENT ARTIFICAL URINARY SPHINCTER    Pr cardiac surg procedure unlist       CABG X1 3/5/11    Hx hernia repair      Hx pacemaker placement         Social History     Social History    Marital status:      Spouse name: N/A    Number of children: N/A    Years of education: N/A     Social History Main Topics    Smoking status: Former Smoker     Types: Cigarettes     Quit date: 10/10/1971    Smokeless tobacco: Never Used    Alcohol use 1.2 oz/week     0 Standard drinks or equivalent, 2 Cans of beer per week      Comment: QUIT 13 YEARS;occasional beer    Drug use: No    Sexual activity: No     Other Topics Concern    Sleep Concern Yes    Stress Concern Yes     Family concerns     Social History Narrative    , 2 children       Family History   Problem Relation Age of Onset    Asthma Mother     Alcohol abuse Mother     Alcohol abuse Father     Asthma Father     Cancer Sister     Heart Disease Sister     Alcohol abuse Brother     Cancer Brother     Heart Disease Brother          Physical Exam:  Visit Vitals    /62 (BP 1 Location: Left arm, BP Patient Position: Sitting)    Pulse 60    Temp 96.1 °F (35.6 °C) (Oral)    Resp 18    Ht 5' 7\" (1.702 m)    Wt 156 lb 6.4 oz (70.9 kg)    SpO2 94%    BMI 24.5 kg/m2     Physical Exam   Constitutional: He is oriented to person, place, and time and well-developed, well-nourished, and in no distress. HENT:   Head: Normocephalic and atraumatic. Eyes: Conjunctivae are normal.   Neck: Neck supple.    ROM improved, no longer tender alongside left neck   Cardiovascular: Normal rate and intact distal pulses. No murmur heard. Pulmonary/Chest: Effort normal and breath sounds normal.   Abdominal: Soft. There is no tenderness. Lymphadenopathy:     He has no cervical adenopathy. He has axillary adenopathy. Right axillary: No pectoral and no lateral adenopathy present. Left axillary: Lateral adenopathy present. No pectoral adenopathy present. Right: No supraclavicular and no epitrochlear adenopathy present. Left: No supraclavicular and no epitrochlear adenopathy present. Enlarged very tender to palpation, left axillary lymph node, soft, min mobile. No overlying skin abnls- no redness/warm/drainage. No cervical lymphadenopathy, pt denies any groin pain or swelling/nodes. Neurological: He is alert and oriented to person, place, and time. Skin: Skin is warm and dry. No erythema. Psychiatric: Mood and affect normal.       Assessment/Plan:    ICD-10-CM ICD-9-CM    1. Axillary lymphadenopathy R59.0 785.6 trimethoprim-sulfamethoxazole (BACTRIM DS, SEPTRA DS) 160-800 mg per tablet      METABOLIC PANEL, BASIC   2. Fever of unknown origin R50.9 780.60    3. Follow up Z09 V67.9      Pt also examined today by Dr. Davis Strauss who helped to devise/agrees with plan. Will have pt return later this week for BMP to monitor kidneys and potassium while on abx. If lymphadenopathy does not improve or worsens will consider further imaging/intervention at f/u visit; d/w pt/wife potential need for bx/CT if does not resolve. Also reassured recent white count on CBC was wnl. It is still unclear as to why he is having these low grade fevers at home, however, temps have been wnl here in office last several visits. Will continue to follow pt closely and send for further eval if sxs do persist.     Follow-up Disposition:  Return in about 1 week (around 2/6/2017), or sooner if symptoms worsen or fail to improve.     Beto Leiva, IFEOMA

## 2017-01-30 NOTE — PATIENT INSTRUCTIONS
Swollen Lymph Nodes: Care Instructions  Your Care Instructions  Lymph nodes are small, bean-shaped glands throughout the body. They help your body fight germs and infections. Lymph nodes often swell when there is a problem such as an injury, infection, or tumor. · The nodes in your neck, under your chin, or behind your ears may swell when you have a cold or sore throat. · An injury or infection in a leg or foot can make the nodes in your groin swell. · Sometimes medicine can make lymph nodes swell, but this is rare. Treatment depends on what caused your nodes to swell. Usually the nodes return to normal size without a problem. Follow-up care is a key part of your treatment and safety. Be sure to make and go to all appointments, and call your doctor if you are having problems. Its also a good idea to know your test results and keep a list of the medicines you take. How can you care for yourself at home? · Take your medicines exactly as prescribed. Call your doctor if you think you are having a problem with your medicine. · Avoid irritation. ¨ Do not squeeze or pick at the lump. ¨ Do not stick a needle in it. · Prevent infection. Do not squeeze, drain, or puncture a painful lump. Doing this can irritate or inflame the lump, push any existing infection deeper into the skin, or cause severe bleeding. · Get extra rest. Slow down just a little from your usual routine. · Drink plenty of fluids, enough so that your urine is light yellow or clear like water. If you have kidney, heart, or liver disease and have to limit fluids, talk with your doctor before you increase the amount of fluids you drink. · Take an over-the-counter pain medicine, such as acetaminophen (Tylenol), ibuprofen (Advil, Motrin), or naproxen (Aleve). Read and follow all instructions on the label. · Do not take two or more pain medicines at the same time unless the doctor told you to.  Many pain medicines have acetaminophen, which is Tylenol. Too much acetaminophen (Tylenol) can be harmful. When should you call for help? Watch closely for changes in your health, and be sure to contact your doctor if:  · Your lymph nodes do not get smaller, or they get bigger. · The area becomes red and feels tender. · The nodes feel hard and do not move when you push on them. · You have a fever of 100° F.  · You have night sweats. · You lose weight without trying. Where can you learn more? Go to http://kendell-stephanie.info/. Enter G341 in the search box to learn more about \"Swollen Lymph Nodes: Care Instructions. \"  Current as of: May 24, 2016  Content Version: 11.1  © 2068-0637 [employer], [employer]. Care instructions adapted under license by [employer] (which disclaims liability or warranty for this information). If you have questions about a medical condition or this instruction, always ask your healthcare professional. [address] any warranty or liability for your use of this information.

## 2017-02-09 ENCOUNTER — OFFICE VISIT (OUTPATIENT)
Dept: FAMILY MEDICINE CLINIC | Age: 72
End: 2017-02-09

## 2017-02-09 VITALS
OXYGEN SATURATION: 98 % | SYSTOLIC BLOOD PRESSURE: 124 MMHG | DIASTOLIC BLOOD PRESSURE: 65 MMHG | HEART RATE: 76 BPM | TEMPERATURE: 96.2 F | HEIGHT: 67 IN | WEIGHT: 153 LBS | BODY MASS INDEX: 24.01 KG/M2 | RESPIRATION RATE: 16 BRPM

## 2017-02-09 DIAGNOSIS — R59.0 LYMPHADENOPATHY, AXILLARY: Primary | ICD-10-CM

## 2017-02-09 DIAGNOSIS — R51.9 CHRONIC NONINTRACTABLE HEADACHE, UNSPECIFIED HEADACHE TYPE: ICD-10-CM

## 2017-02-09 DIAGNOSIS — G89.29 CHRONIC NONINTRACTABLE HEADACHE, UNSPECIFIED HEADACHE TYPE: ICD-10-CM

## 2017-02-09 DIAGNOSIS — H43.393 FLOATERS, BILATERAL: ICD-10-CM

## 2017-02-09 DIAGNOSIS — Z71.89 ACP (ADVANCE CARE PLANNING): ICD-10-CM

## 2017-02-09 NOTE — PROGRESS NOTES
Luz Mckenzie is a 66 y.o. male who presents to the office today with the following:  Chief Complaint   Patient presents with    Follow-up     lump under (L) arm       HPI  Here for f/u of left axillary lymph node enlargement. Reports still swollen and tender to touch. Has gone down slightly. No longer having fevers. Still having posterior headache, dull ache, no longer any scalp/temporal or neck pain. Has been tx with Tylenol #3, HAs do resolve with medication. Head CT scan was performed on 01/21/2017 no significant findings *see scanned media. Has been under the care of Neurology recently, understands will need to f/u. Otherwise doing okay with no new complaints. Also reports has had eye floaters for years, but worse recently. Sonny Doran Ophthalmologist in eye care. Plans to f/u soon for this also. Review of Systems   Constitutional: Negative for chills, fever and malaise/fatigue. HENT: Negative for congestion, ear pain and sore throat. Eyes: Negative for double vision, photophobia and pain. Floaters   Respiratory: Negative for cough, shortness of breath and wheezing. Cardiovascular: Negative for chest pain and leg swelling. Gastrointestinal: Negative. Genitourinary: Negative. Musculoskeletal:        No new complaints   Skin: Negative for rash. Neurological: Positive for headaches (not new). No Known Allergies    Current Outpatient Prescriptions   Medication Sig    atorvastatin (LIPITOR) 40 mg tablet TAKE 1 TABLET BY MOUTH EVERY DAY    trimethoprim-sulfamethoxazole (BACTRIM DS, SEPTRA DS) 160-800 mg per tablet Take 1 Tab by mouth two (2) times a day for 10 days.  methocarbamol (ROBAXIN) 500 mg tablet TAKE 1/2 TABLET BY MOUTH TWICE DAILY    memantine (NAMENDA) 10 mg tablet Take one half tab twice a day for 1 week, then 1 tab twice a day    gabapentin (NEURONTIN) 100 mg capsule Take 1 Cap by mouth three (3) times daily.     acetaminophen-codeine (TYLENOL #3) 300-30 mg per tablet     lisinopril (PRINIVIL, ZESTRIL) 5 mg tablet     metoprolol succinate (TOPROL-XL) 25 mg XL tablet Take 1 Tab by mouth daily. Indications: Hypertension    traMADol (ULTRAM) 50 mg tablet TAKE 1 TO 2 TABLETS BY MOUTH TID AS NEEDED    traZODone (DESYREL) 100 mg tablet Take 1 Tab by mouth nightly.  apixaban (ELIQUIS) 5 mg tablet Take 1 Tab by mouth every twelve (12) hours.  sertraline (ZOLOFT) 100 mg tablet Take 1 Tab by mouth daily.  fluticasone (FLONASE) 50 mcg/actuation nasal spray 2 Sprays by Both Nostrils route daily.  polyethylene glycol (MIRALAX) 17 gram/dose powder Take 17 g by mouth daily.  Incontinence Pad, Liner, Disp (BLADDER CONTROL PADS EX ABSORB) pads 1 Packet by Does Not Apply route as needed (incontinence).  nitroglycerin (NITROSTAT) 0.4 mg SL tablet 1 Tab by SubLINGual route every five (5) minutes as needed for Chest Pain (call 911 if not relieved by 3). No current facility-administered medications for this visit.         Past Medical History   Diagnosis Date    AICD (automatic cardioverter/defibrillator) present 5/13/2016 5/13/16 [employer] upgrade to dual chamber AICD implant  Dr. Kami Raman state, other     Arrhythmia      'S IN THE PAST    Arthritis      OSTEO ARTHRITIS    AVNRT (AV bridgette re-entry tachycardia) ([facility] [address] 5/12/2016 5/12/16 AVNRT ablation    Back ache     CAD (coronary artery disease)     Cancer (HCC)      Prostate cancer    Chest tightness     Coagulation defects 2005     FACTOR V LEIDEN    Dizziness     FH: factor V Leiden deficiency     Generalized muscle ache     GERD (gastroesophageal reflux disease)      HEARTBURN    Heart failure ([facility] [address] 2014     EF 40%; Dr. Leatha Desir    Hyperlipidemia, mixed     Hypertension      COREG    Other ill-defined conditions(799.89)      blood transfusion    Pacemaker     Pacemaker     Postsurgical aortocoronary bypass status 5/29/2012    Presence of cardiac defibrillator     Psychiatric disorder      depression    Stroke ([facility] [address]      SEVERAL-mini    Thromboembolus [facility])      2005    Weakness generalized        Past Surgical History   Procedure Laterality Date    Cardiac catheterization  3/4/2011          Hx other surgical       steroid injections    Hx prostatectomy        CA    Hx orthopaedic       LEFT KNEE CARTILAGE REPAIR;RIGHT ROTATOR CUFF REPAIR    Hx orthopaedic  2/14/12     C5-7 ANTERIIOR CERVICAL DISECTOMY AND FUSION    Hx orthopaedic  6/4/13     C5-C7 POSTERIOR DECOMPRESSION AND FUSION    Hx urological        urinary control system    Hx urological  12/3/13     REPLACEMENT ARTIFICAL URINARY SPHINCTER    Pr cardiac surg procedure unlist       CABG X1 3/5/11    Hx hernia repair      Hx pacemaker placement         Social History     Social History    Marital status:      Spouse name: N/A    Number of children: N/A    Years of education: N/A     Social History Main Topics    Smoking status: Former Smoker     Types: Cigarettes     Quit date: 10/10/1971    Smokeless tobacco: Never Used    Alcohol use 1.2 oz/week     0 Standard drinks or equivalent, 2 Cans of beer per week      Comment: QUIT 13 YEARS;occasional beer    Drug use: No    Sexual activity: No     Other Topics Concern    Sleep Concern Yes    Stress Concern Yes     Family concerns     Social History Narrative    , 2 children       Family History   Problem Relation Age of Onset    Asthma Mother     Alcohol abuse Mother     Alcohol abuse Father     Asthma Father     Cancer Sister     Heart Disease Sister     Alcohol abuse Brother     Cancer Brother     Heart Disease Brother          Physical Exam:  Visit Vitals    /65 (BP 1 Location: Right arm, BP Patient Position: Sitting)    Pulse 76    Temp 96.2 °F (35.7 °C) (Oral)    Resp 16    Ht 5' 7\" (1.702 m)    Wt 153 lb (69.4 kg)    SpO2 98%    BMI 23.96 kg/m2     Physical Exam   Constitutional: He is oriented to person, place, and time and well-developed, well-nourished, and in no distress. HENT:   Head: Normocephalic and atraumatic. Right Ear: External ear normal.   Left Ear: External ear normal.   Eyes: Conjunctivae are normal.   Neck: Neck supple. Cardiovascular: Normal rate. Pulmonary/Chest: Effort normal and breath sounds normal. No respiratory distress. He has no wheezes. He has no rales. Abdominal: Soft. There is no tenderness. Musculoskeletal: He exhibits no edema. Lymphadenopathy:     He has no cervical adenopathy. He has axillary adenopathy. Left axillary: Lateral (still inflammed and tender left axillary node, somewhat decreased in size (not visibly swollen as before) ) adenopathy present. Neurological: He is alert and oriented to person, place, and time. Gait normal.   Skin: Skin is warm and dry. Psychiatric: Mood and affect normal.       Assessment/Plan:  1. Lymphadenopathy, axillary  - Somewhat improved, but still enlarged and tender.  - Soft tissue US pending. 2. Chronic nonintractable headache, unspecified headache type  - F/u with Neuro. 3. Floaters, bilateral  - Chronic, but recently worsening.  - Instructed to f/u with Opthalmology. Pt verbalizes understanding and agrees with the plan. *NEEDS ACP. Follow-up Disposition:  Return in about 2 weeks (around 2/23/2017), or sooner prn.     Robson Wan PA-C

## 2017-02-09 NOTE — ACP (ADVANCE CARE PLANNING)
Advance Care Planning (ACP) Provider Conversation Snapshot    Date of ACP Conversation: 02/09/17  Persons included in Conversation:  patient  Length of ACP Conversation in minutes:  <16 minutes (Non-Billable)     Discussed with pt. Has previously been provided with ACP documentation, just has not brought it in. Reviewed documentation with him again and ask he provide his copy filled out. Pt agrees, will go home to discuss with wife.     Yinka Mirza PA-C

## 2017-02-10 ENCOUNTER — TELEPHONE (OUTPATIENT)
Dept: FAMILY MEDICINE CLINIC | Age: 72
End: 2017-02-10

## 2017-02-10 NOTE — TELEPHONE ENCOUNTER
Tried to call patient. No answer. Left voice message for a return call. Per verbal order from RENEE Ramirez:  Patient to be notified of benign ultra sound. Patient has a reactive lymph node that is doing it's job. Continue to monitor & follow up with [address] [place] [address] in 2 weeks.   Sunil Rock LPN

## 2017-02-13 DIAGNOSIS — R59.0 LYMPHADENOPATHY, AXILLARY: ICD-10-CM

## 2017-02-16 DIAGNOSIS — F51.01 PRIMARY INSOMNIA: ICD-10-CM

## 2017-02-16 RX ORDER — TRAZODONE HYDROCHLORIDE 100 MG/1
100 TABLET ORAL
Qty: 30 TAB | Refills: 5 | OUTPATIENT
Start: 2017-02-16

## 2017-02-17 ENCOUNTER — DOCUMENTATION ONLY (OUTPATIENT)
Dept: FAMILY MEDICINE CLINIC | Age: 72
End: 2017-02-17

## 2017-02-17 RX ORDER — TRAZODONE HYDROCHLORIDE 100 MG/1
100 TABLET ORAL
Qty: 30 TAB | Refills: 5 | Status: SHIPPED | OUTPATIENT
Start: 2017-02-17 | End: 2017-08-28 | Stop reason: SDUPTHER

## 2017-02-24 RX ORDER — BISMUTH SUBSALICYLATE 262 MG
1 TABLET,CHEWABLE ORAL DAILY
COMMUNITY
End: 2017-11-27

## 2017-02-24 RX ORDER — LANOLIN ALCOHOL/MO/W.PET/CERES
45 CREAM (GRAM) TOPICAL
COMMUNITY
End: 2017-08-21 | Stop reason: CLARIF

## 2017-02-24 NOTE — PERIOP NOTES
Called dr. Samson Theodore assistant, Juliana Serrato: patient taking Eliquis blood thinner. Please instruct patient per Dr. Samson Theodore protocol.

## 2017-02-28 ENCOUNTER — HOSPITAL ENCOUNTER (OUTPATIENT)
Dept: GENERAL RADIOLOGY | Age: 72
Discharge: HOME | End: 2017-02-28
Attending: PHYSICAL MEDICINE & REHABILITATION
Payer: MEDICARE

## 2017-02-28 ENCOUNTER — HOSPITAL ENCOUNTER (OUTPATIENT)
Age: 72
Setting detail: OUTPATIENT SURGERY
Discharge: HOME | End: 2017-02-28
Attending: PHYSICAL MEDICINE & REHABILITATION | Admitting: PHYSICAL MEDICINE & REHABILITATION
Payer: MEDICARE

## 2017-02-28 VITALS
OXYGEN SATURATION: 98 % | WEIGHT: 158.38 LBS | HEIGHT: 67 IN | SYSTOLIC BLOOD PRESSURE: 153 MMHG | RESPIRATION RATE: 16 BRPM | TEMPERATURE: 97.5 F | HEART RATE: 60 BPM | BODY MASS INDEX: 24.86 KG/M2 | DIASTOLIC BLOOD PRESSURE: 82 MMHG

## 2017-02-28 PROCEDURE — 74011250636 HC RX REV CODE- 250/636: Performed by: PHYSICAL MEDICINE & REHABILITATION

## 2017-02-28 PROCEDURE — 74011636320 HC RX REV CODE- 636/320: Performed by: PHYSICAL MEDICINE & REHABILITATION

## 2017-02-28 PROCEDURE — 76030000002 HC AMB SURG OR TIME FIRST 0.: Performed by: PHYSICAL MEDICINE & REHABILITATION

## 2017-02-28 PROCEDURE — 74011000250 HC RX REV CODE- 250: Performed by: PHYSICAL MEDICINE & REHABILITATION

## 2017-02-28 PROCEDURE — 76210000046 HC AMBSU PH II REC FIRST 0.5 HR: Performed by: PHYSICAL MEDICINE & REHABILITATION

## 2017-02-28 RX ORDER — METHYLPREDNISOLONE ACETATE 40 MG/ML
40 INJECTION, SUSPENSION INTRA-ARTICULAR; INTRALESIONAL; INTRAMUSCULAR; SOFT TISSUE ONCE
Status: COMPLETED | OUTPATIENT
Start: 2017-02-28 | End: 2017-02-28

## 2017-02-28 RX ORDER — BUPIVACAINE HYDROCHLORIDE 5 MG/ML
5 INJECTION, SOLUTION EPIDURAL; INTRACAUDAL ONCE
Status: DISCONTINUED | OUTPATIENT
Start: 2017-02-28 | End: 2017-02-28 | Stop reason: HOSPADM

## 2017-02-28 RX ORDER — LIDOCAINE HYDROCHLORIDE 20 MG/ML
7 INJECTION, SOLUTION INFILTRATION; PERINEURAL ONCE
Status: COMPLETED | OUTPATIENT
Start: 2017-02-28 | End: 2017-02-28

## 2017-02-28 NOTE — H&P
Procedural Case Note    2/28/2017    (2:59 PM)    Luna Alexander    1945   (66 y.o.)    [id number]    CC:  pain    ROS:   Complete ROS obtained, no CP, no SOB, no N or V    PMH:     Past Medical History:   Diagnosis Date    AICD (automatic cardioverter/defibrillator) present 5/13/2016 5/13/16 [employer] upgrade to dual chamber AICD implant  Dr. Yemi Patel state, other     Arrhythmia     'S IN THE PAST    Arthritis     OSTEO ARTHRITIS    AVNRT (AV bridgette re-entry tachycardia) ([facility] [address] 5/12/2016 5/12/16 AVNRT ablation: PM/AICD left upper chest :Dr. Filomena Tolliver Back ache     CAD (coronary artery disease)     Cancer [facility]) 2004    Prostate cancer    Chest tightness     last episode of chest pain 5/2016 before AICD placed; none since then    Coagulation defects 2005    FACTOR V LEIDEN    Dizziness     FH: factor V Leiden deficiency     Generalized muscle ache     GERD (gastroesophageal reflux disease)     HEARTBURN    Heart failure ([facility] [address] 2014    EF 40%; Dr. Alon Olsen    Hyperlipidemia, mixed     Hypertension     COREG    Other ill-defined conditions(799.89) 2004    blood transfusion    Pacemaker     Pacemaker     Postsurgical aortocoronary bypass status 05/29/2012    CABG    Presence of cardiac defibrillator 05/2016    left upper chest    Psychiatric disorder     depression    Stroke [facility]) 2011, 1990's    SEVERAL-mini    Thromboembolism ([facility] [address] 2014    left leg: changed to Eliquis from Warfarin    Thromboembolus ([facility] [address] 2005    left leg    Weakness generalized        ALLERGIES:   No Known Allergies    MEDS:     No current facility-administered medications for this encounter.            Visit Vitals    Ht 5' 7\" (1.702 m)    Wt 68.9 kg (152 lb)    BMI 23.81 kg/m2     PE:  Gen: NAD  Head: normocephalic  Heart: RRR  Lungs: CTA angeline  Abd: NT, ND, soft  Neuro: awake and alert  Skin: intact    IMPRESSION:   TINY DJD    Note:  The clinical status was discussed in detail with the patient. The procedure was again discussed and described in detail. All understand and accept the planned procedure and risks; reject other forms of treatment. All questions are answered.     Romain Stoddard MD

## 2017-02-28 NOTE — PERIOP NOTES
Skin assessment:   WNL   Skin color: pink   Skin condition: W,D, I     Neuro:  Push/Pull assessment:     LUE Response: strong   LLE Response: strong   RUE Response: strong   RLE Response: strong

## 2017-02-28 NOTE — PERIOP NOTES
Dgina Flores  1945  [id number]    Situation:  Verbal report given from: Dillon Agustin RN  Procedure: Procedure(s):  RIGHT L4-5 + L5-S1 TRANSFORAMINAL EPIDURAL STEROID INJECTION    Background:    Preoperative diagnosis: LS DDD    Postoperative diagnosis: LS DDD    :  Dr. Destini Panda    Assistant(s): Circ-1: Marley Simpson RN  Surg Asst-1: Pete Bartholomew    Specimens: * No specimens in log *    Assessment:  Intra-procedure medications         Anesthesia gave intra-procedure sedation and medications, see anesthesia flow sheet     Intravenous fluids: LR@ KVO     Vital signs stable. Pt states legs feel better. Denies chill.  Has equal dorsi and plantar flexion      Recommendation:    Permission to share finding with family or friend yes

## 2017-02-28 NOTE — OP NOTES
Epidural Steroid Injection Operative Report    Indications: This is a 66 y.o. male who presents with low back pain. He was positive for LS DDD. The patient was admitted for surgery as conservative measures have failed. Date of Surgery: 2/28/2017    Preoperative Diagnosis: LS DDD    Postoperative Diagnosis: LS DDD    Surgeon(s) and Role:     * Kelsy Sanchez MD - Primary     Procedure:  Procedure(s):  RIGHT L4-5 + L5-S1 TRANSFORAMINAL EPIDURAL STEROID INJECTION    Procedure in Detail:  After appropriate informed consent was obtained, the patient was taken to the operating suite and placed in the prone position on the operating table on appropriate padding. The LS region was prepped and draped in the usual sterile fashion. Intraoperative fluoroscopy was used to localize the LS spine. The skin was infiltrated with 2% lidocaine. An 22-g needle was advanced into the Right L4 and L5 neuroforamen under fluoroscopic guidance. A small amount of contrast was injected into the epidural space, confirming appropriate needle placement on fluoroscopy. Next, 2ml of 2% lidocaine and 80mg of Depo-Medrol were injected. The needle was removed from the patient. The patient was then turned back into the supine position on the stretcher and was taken to the Recovery Room in stable condition.     Estimated Blood Loss:  none     Specimens: None       Drains: None          Complications:  None    Signed By: Kelsy Sanchez MD                        February 28, 2017

## 2017-02-28 NOTE — PERIOP NOTES
Pt able to stand and hold weight. No swelling or bleeding at injection sites.  States legs feel tingling so stressed to assure extra care when standing

## 2017-02-28 NOTE — IP AVS SNAPSHOT
[address] [facility] 
[phone] Patient: Anel Dominguez MRN: [id number] HNR:[id number] You are allergic to the following No active allergies Recent Documentation Height  
  
  
  
  
  
 1.702 m Emergency Contacts Name Discharge Info Relation Home Work Mobile [address] CAREGIVER [3] Spouse [3] [phone] About your hospitalization You were admitted on:  February 28, 2017 You last received care in the:  [facility] You were discharged on:  February 28, 2017 Unit phone number:  [phone] Why you were hospitalized Your primary diagnosis was:  Not on File Providers Seen During Your Hospitalizations Provider Role Specialty Primary office phone Trev William MD Attending Provider Physical Medicine and Rehab [phone] Your Primary Care Physician (PCP) Primary Care Physician Office Phone Office Fax Henrey Ormond [phone] Follow-up Information Follow up With Details Comments Contact Info Kym Hawley PA-C   mickey [address] 
[phone] Your Appointments Tuesday February 28, 2017 LUMBAR EPIDURAL STEROID INJECTION with Trev William MD  
[facility] AMB SURGERY UNIT (RI OR PRE ASSESSMENT) [address] [facility]  
[phone] Current Discharge Medication List  
  
ASK your doctor about these medications Dose & Instructions Dispensing Information Comments Morning Noon Evening Bedtime  
 acetaminophen-codeine 300-30 mg per tablet Commonly known as:  TYLENOL #3 Your next dose is: Today, Tomorrow Other:  _________  
   
   
 two (2) times daily as needed. Refills:  0  
     
   
   
   
  
 apixaban 5 mg tablet Commonly known as:  Yazmin Harris Your next dose is: Today, Tomorrow Other:  _________ Dose:  5 mg Take 1 Tab by mouth every twelve (12) hours. Quantity:  60 Tab Refills:  6 Replaces order for 70 tabs  
    
   
   
   
  
 atorvastatin 40 mg tablet Commonly known as:  LIPITOR Your next dose is: Today, Tomorrow Other:  _________ TAKE 1 TABLET BY MOUTH EVERY DAY Quantity:  30 Tab Refills:  2  
     
   
   
   
  
 ferrous sulfate 325 mg (65 mg iron) tablet Your next dose is: Today, Tomorrow Other:  _________ Dose:  45 mg Take 45 mg by mouth Daily (before breakfast). Refills:  0  
     
   
   
   
  
 fluticasone 50 mcg/actuation nasal spray Commonly known as:  Amos Merles Your next dose is: Today, Tomorrow Other:  _________ Dose:  2 Spray 2 Sprays by Both Nostrils route daily. Quantity:  1 Bottle Refills:  6  
     
   
   
   
  
 gabapentin 100 mg capsule Commonly known as:  NEURONTIN Your next dose is: Today, Tomorrow Other:  _________ Dose:  100 mg Take 1 Cap by mouth three (3) times daily. Quantity:  90 Cap Refills:  5 Incontinence Pad, Liner, Disp Pads Commonly known as:  BLADDER CONTROL PADS EX ABSORB Your next dose is: Today, Tomorrow Other:  _________ Dose:  1 Packet 1 Packet by Does Not Apply route as needed (incontinence). Quantity:  30 Each Refills:  6  
     
   
   
   
  
 lisinopril 5 mg tablet Commonly known as:  Diana Shames Your next dose is: Today, Tomorrow Other:  _________ Dose:  5 mg  
5 mg daily. Refills:  0  
     
   
   
   
  
 memantine 10 mg tablet Commonly known as:  Danielle Sheller Your next dose is: Today, Tomorrow Other:  _________ Take one half tab twice a day for 1 week, then 1 tab twice a day Quantity:  60 Tab Refills:  5 methocarbamol 500 mg tablet Commonly known as:  ROBAXIN Your next dose is: Today, Tomorrow Other:  _________ TAKE 1/2 TABLET BY MOUTH TWICE DAILY Quantity:  30 Tab Refills:  0 Pt needs recheck   
    
   
   
   
  
 metoprolol succinate 25 mg XL tablet Commonly known as:  TOPROL-XL Your next dose is: Today, Tomorrow Other:  _________ Dose:  25 mg Take 1 Tab by mouth daily. Indications: Hypertension Quantity:  90 Tab Refills:  0 Recheck BP MIRALAX 17 gram/dose powder Generic drug:  polyethylene glycol Your next dose is: Today, Tomorrow Other:  _________ Dose:  17 g Take 17 g by mouth daily. Refills:  0  
     
   
   
   
  
 multivitamin tablet Commonly known as:  ONE A DAY Your next dose is: Today, Tomorrow Other:  _________ Dose:  1 Tab Take 1 Tab by mouth daily. Refills:  0  
     
   
   
   
  
 nitroglycerin 0.4 mg SL tablet Commonly known as:  NITROSTAT Your next dose is: Today, Tomorrow Other:  _________ Dose:  0.4 mg  
1 Tab by SubLINGual route every five (5) minutes as needed for Chest Pain (call 911 if not relieved by 3). Quantity:  25 Tab Refills:  1  
     
   
   
   
  
 sertraline 100 mg tablet Commonly known as:  ZOLOFT Your next dose is: Today, Tomorrow Other:  _________ Dose:  100 mg Take 1 Tab by mouth daily. Quantity:  30 Tab Refills:  11  
     
   
   
   
  
 traZODone 100 mg tablet Commonly known as:  Aubrey [place] Your next dose is: Today, Tomorrow Other:  _________ Dose:  100 mg Take 1 Tab by mouth nightly. Quantity:  30 Tab Refills:  5 Discharge Instructions Dr. Carrie Riojas Discharge Instructions Transforaminal Epidural Steroid Injection/ Selective Nerve Block You had a transforaminal epidural steroid injection/ selective nerve block today. You will probably have some numbness, and possibly weakness, in your leg for the next 6 to 8 hours. The steroids will slowly become effective, reducing your pain, over the next 2 weeks. You should begin feeling better after a few days, but it may take up to 2 weeks to notice the difference. The benefit you get from your injection will last a variable amount of time, depending on the severity of your lumbar spine problem. ? Pain: Most people do not have any increase in pain after this injection. However, you might experience some soreness in your low back. If this happens, putting an ice pack over the sore area will help. ? Bandage: You will have a small bandage covering the site of the injection. You may remove it once you get home. ? Restrictions: Someone should drive you home after the injection. After that, you have no restrictions. You need to be careful while walking, as you may still have some numbness or weakness in your leg. You may resume your normal level of activity. You may take a shower or bath, and you may eat normally. You should continue your current exercises and/or therapy routine. ?  Medications: Continue your current medications as prescribed. If your pain decreases, you may reduce the amount of your pain medicines. If you stopped taking anticoagulants or blood-thinners before the injection, start them tomorrow. If you have diabetes, your blood sugar may be elevated for a few days. Call your primary doctor with any questions. Call Dr. Samanta Lyons at [phone] if you experience: ? Fever (101 degrees Fahrenheit or greater) ? Nausea or vomiting 
? Headache unrelieved by your normal pain medicine ? Redness or swelling at the injection site that lasts more than 1 day ? New numbness, tingling, weakness, or pain that you didnt have before the injection Follow-up appointment: ? Call [phone] to schedule an appointment for 2-4 weeks DISCHARGE SUMMARY from Nurse The following personal items collected during your admission are returned to you:  
Dental Appliance: Dental Appliances: None Vision: Visual Aid: None Hearing Aid:   
Jewelry: Jewelry: None Clothing: Clothing: None Other Valuables: Other Valuables: [address] [facility] Valuables sent to safe: If you were given prescriptions, please review the written information on prescribed medications. · You will receive a Post Operative Call from one of the Recovery Room Nurses on the day after your surgery to check on you. It is very important for us to know how you are recovering after your surgery. · You may receive an e-mail or letter in the mail from [employer] regarding your experience with us in the Ambulatory Surgery Unit. Your feedback is valuable to us and we appreciate your participation in the survey. If you have not had your influenza or pneumococcal vaccines, please follow up with your primary care physician. The discharge information has been reviewed with the patient. The patient verbalized understanding. Discharge Orders None Introducing [facility]! Dear Mildred Caro: Thank you for requesting a [employer] [facility] account. Our records indicate that you already have an active [employer] [facility] account. You can access your account anytime at [email or web address] Did you know that you can access your hospital and ER discharge instructions at any time in [employer] [facility]? You can also review all of your test results from your hospital stay or ER visit. Additional Information If you have questions, please visit the Frequently Asked Questions section of the [employer] website at [email or web address] Remember, [employer] [facility] is NOT to be used for urgent needs. For medical emergencies, dial 911. Now available from your iPhone and Android! General Information Please provide this summary of care documentation to your next provider. Patient Signature:  ____________________________________________________________ Date:  ____________________________________________________________  
  
Scharlene Alosa Provider Signature:  ____________________________________________________________ Date:  ____________________________________________________________

## 2017-02-28 NOTE — DISCHARGE INSTRUCTIONS
Dr. Marquis Villa Discharge Instructions  Transforaminal Epidural Steroid Injection/ Selective Nerve Block    You had a transforaminal epidural steroid injection/ selective nerve block today. You will probably have some numbness, and possibly weakness, in your leg for the next 6 to 8 hours. The steroids will slowly become effective, reducing your pain, over the next 2 weeks. You should begin feeling better after a few days, but it may take up to 2 weeks to notice the difference. The benefit you get from your injection will last a variable amount of time, depending on the severity of your lumbar spine problem.  Pain: Most people do not have any increase in pain after this injection. However, you might experience some soreness in your low back. If this happens, putting an ice pack over the sore area will help.  Bandage: You will have a small bandage covering the site of the injection. You may remove it once you get home.  Restrictions: Someone should drive you home after the injection. After that, you have no restrictions. You need to be careful while walking, as you may still have some numbness or weakness in your leg. You may resume your normal level of activity. You may take a shower or bath, and you may eat normally. You should continue your current exercises and/or therapy routine.   Medications: Continue your current medications as prescribed. If your pain decreases, you may reduce the amount of your pain medicines. If you stopped taking anticoagulants or blood-thinners before the injection, start them tomorrow. If you have diabetes, your blood sugar may be elevated for a few days. Call your primary doctor with any questions.   Call Dr. Marquis Villa at [phone] if you experience:   Fever (101 degrees Fahrenheit or greater)   Nausea or vomiting   Headache unrelieved by your normal pain medicine   Redness or swelling at the injection site that lasts more than 1 day   New numbness, tingling, weakness, or pain that you didnt have before the injection    Follow-up appointment:  [facility] Call [phone] to schedule an appointment for 2-4 weeks        DISCHARGE SUMMARY from Nurse    The following personal items collected during your admission are returned to you:   Dental Appliance: Dental Appliances: None  Vision: Visual Aid: None  Hearing Aid:    Jewelry: Jewelry: None  Clothing: Clothing: None  Other Valuables: Other Valuables: Cane  Valuables sent to safe: If you were given prescriptions, please review the written information on prescribed medications. · You will receive a Post Operative Call from one of the Recovery Room Nurses on the day after your surgery to check on you. It is very important for us to know how you are recovering after your surgery. · You may receive an e-mail or letter in the mail from [place] regarding your experience with us in the Ambulatory Surgery Unit. Your feedback is valuable to us and we appreciate your participation in the survey. If you have not had your influenza or pneumococcal vaccines, please follow up with your primary care physician. The discharge information has been reviewed with the patient. The patient verbalized understanding.

## 2017-03-01 DIAGNOSIS — M54.40 CHRONIC LOW BACK PAIN WITH SCIATICA, SCIATICA LATERALITY UNSPECIFIED, UNSPECIFIED BACK PAIN LATERALITY: ICD-10-CM

## 2017-03-01 DIAGNOSIS — I82.492 ACUTE DEEP VEIN THROMBOSIS (DVT) OF OTHER SPECIFIED VEIN OF LEFT LOWER EXTREMITY (HCC): ICD-10-CM

## 2017-03-01 DIAGNOSIS — G89.29 CHRONIC LOW BACK PAIN WITH SCIATICA, SCIATICA LATERALITY UNSPECIFIED, UNSPECIFIED BACK PAIN LATERALITY: ICD-10-CM

## 2017-03-01 DIAGNOSIS — F02.818 DEMENTIA DUE TO GENERAL MEDICAL CONDITION WITH BEHAVIORAL DISTURBANCE: ICD-10-CM

## 2017-03-01 RX ORDER — METHOCARBAMOL 500 MG/1
TABLET, FILM COATED ORAL
Qty: 30 TAB | Refills: 0 | OUTPATIENT
Start: 2017-03-01

## 2017-03-01 RX ORDER — APIXABAN 5 MG/1
TABLET, FILM COATED ORAL
Qty: 60 TAB | Refills: 6 | Status: SHIPPED | OUTPATIENT
Start: 2017-03-01 | End: 2017-10-26 | Stop reason: SDUPTHER

## 2017-03-02 ENCOUNTER — OFFICE VISIT (OUTPATIENT)
Dept: FAMILY MEDICINE CLINIC | Age: 72
End: 2017-03-02

## 2017-03-02 VITALS
SYSTOLIC BLOOD PRESSURE: 140 MMHG | DIASTOLIC BLOOD PRESSURE: 60 MMHG | TEMPERATURE: 96.4 F | BODY MASS INDEX: 24.99 KG/M2 | WEIGHT: 159.2 LBS | OXYGEN SATURATION: 98 % | RESPIRATION RATE: 16 BRPM | HEART RATE: 60 BPM | HEIGHT: 67 IN

## 2017-03-02 DIAGNOSIS — G89.29 CHRONIC BILATERAL LOW BACK PAIN WITH LEFT-SIDED SCIATICA: ICD-10-CM

## 2017-03-02 DIAGNOSIS — Z12.11 COLON CANCER SCREENING: ICD-10-CM

## 2017-03-02 DIAGNOSIS — M62.830 MUSCLE SPASM OF BACK: Primary | ICD-10-CM

## 2017-03-02 DIAGNOSIS — M54.42 CHRONIC BILATERAL LOW BACK PAIN WITH LEFT-SIDED SCIATICA: ICD-10-CM

## 2017-03-02 DIAGNOSIS — M48.061 SPINAL STENOSIS OF LUMBAR REGION: ICD-10-CM

## 2017-03-02 RX ORDER — TIZANIDINE 2 MG/1
TABLET ORAL
Qty: 30 TAB | Refills: 0 | Status: SHIPPED | OUTPATIENT
Start: 2017-03-02 | End: 2017-05-05 | Stop reason: SDUPTHER

## 2017-03-02 NOTE — PROGRESS NOTES
Digna Flores is a 66 y.o. male who presents to the office today with the following:  Chief Complaint   Patient presents with    Medication Problem     Methocarbamol       HPI  Pt here for medication change with no complaints. Recently received notification from insurance that his muscle relaxant, Methocarbamol, is no longer on the formulary. Pt has run out and needs something for prn muscle spasms he often has in his lower back. Today he is in no pain, has no LE sxs, or bowel/bladder probs. Denies any recent fever/chills, n/t/w/s, HA, cp, or sob. Would like medication for if/when he needs it in the future. Pt recently seen by Callie Castañeda  and had epidural inj. He reports significant relief currently and is very happy with his results. Stating \"before I could barely walk, now I feel great! \"  Feeling well otherwise with no acute concerns. Pt has never had a colonoscopy. Agrees to FOBT but is not interested in pcx. Plans to go to local pharm for shingles. ROS  See HPI. No Known Allergies    Current Outpatient Prescriptions   Medication Sig    tiZANidine (ZANAFLEX) 2 mg tablet 2mg po x 1, may repeat q6-8hr prn. Max 3 doses/24h.  ELIQUIS 5 mg tablet TAKE 1 TABLET BY MOUTH EVERY 12 HOURS    multivitamin (ONE A DAY) tablet Take 1 Tab by mouth daily.  ferrous sulfate 325 mg (65 mg iron) tablet Take 45 mg by mouth Daily (before breakfast).  traZODone (DESYREL) 100 mg tablet Take 1 Tab by mouth nightly.  atorvastatin (LIPITOR) 40 mg tablet TAKE 1 TABLET BY MOUTH EVERY DAY    memantine (NAMENDA) 10 mg tablet Take one half tab twice a day for 1 week, then 1 tab twice a day    gabapentin (NEURONTIN) 100 mg capsule Take 1 Cap by mouth three (3) times daily.  acetaminophen-codeine (TYLENOL #3) 300-30 mg per tablet two (2) times daily as needed.  lisinopril (PRINIVIL, ZESTRIL) 5 mg tablet 5 mg daily.  metoprolol succinate (TOPROL-XL) 25 mg XL tablet Take 1 Tab by mouth daily. Indications: Hypertension    sertraline (ZOLOFT) 100 mg tablet Take 1 Tab by mouth daily.  fluticasone (FLONASE) 50 mcg/actuation nasal spray 2 Sprays by Both Nostrils route daily.  polyethylene glycol (MIRALAX) 17 gram/dose powder Take 17 g by mouth daily.  Incontinence Pad, Liner, Disp (BLADDER CONTROL PADS EX ABSORB) pads 1 Packet by Does Not Apply route as needed (incontinence).  nitroglycerin (NITROSTAT) 0.4 mg SL tablet 1 Tab by SubLINGual route every five (5) minutes as needed for Chest Pain (call 911 if not relieved by 3). No current facility-administered medications for this visit.         Past Medical History:   Diagnosis Date    AICD (automatic cardioverter/defibrillator) present 5/13/2016 5/13/16 [employer] upgrade to dual chamber AICD implant  Dr. Herbert Dickson state, other     Arrhythmia     'S IN THE PAST    Arthritis     OSTEO ARTHRITIS    AVNRT (AV bridgette re-entry tachycardia) ([facility] 5/12/2016 5/12/16 AVNRT ablation: PM/AICD left upper chest :Dr. Hook Bitter Back ache     CAD (coronary artery disease)     Cancer [facility]) 2004    Prostate cancer    Chest tightness     last episode of chest pain 5/2016 before AICD placed; none since then    Coagulation defects 2005    FACTOR V LEIDEN    Dizziness     FH: factor V Leiden deficiency     Generalized muscle ache     GERD (gastroesophageal reflux disease)     HEARTBURN    Heart failure ([facility] 2014    EF 40%; Dr. Adrien Banegas    Hyperlipidemia, mixed     Hypertension     COREG    Other ill-defined conditions(799.89) 2004    blood transfusion    Pacemaker     Pacemaker     Postsurgical aortocoronary bypass status 05/29/2012    CABG    Presence of cardiac defibrillator 05/2016    left upper chest    Psychiatric disorder     depression    Stroke [facility]) 2011, 1990's    [address]    Thromboembolism ([facility] 2014    left leg: changed to Eliquis from Warfarin    Thromboembolus ([facility] 2005    left leg    Weakness generalized        Past Surgical History:   Procedure Laterality Date    CARDIAC CATHETERIZATION  3/4/2011         CARDIAC SURG PROCEDURE UNLIST      CABG X1 3/5/11    HX HERNIA REPAIR  2002    Ingiunal hernia repair left    HX ORTHOPAEDIC      LEFT KNEE CARTILAGE REPAIR;RIGHT ROTATOR CUFF REPAIR    HX ORTHOPAEDIC  2/14/12    C5-7 ANTERIIOR CERVICAL DISECTOMY AND FUSION    HX ORTHOPAEDIC  6/4/13    C5-C7 POSTERIOR DECOMPRESSION AND FUSION    HX OTHER SURGICAL      steroid injections    HX PACEMAKER PLACEMENT      H[place] PR[place]ATECT[place]  2004     [place]    HX UROLOGICAL       urinary control system    HX UROLOGICAL  12/3/13    REPLACEMENT ARTIFICAL URINARY SPHINCTER       Social History     Social History    Marital status:      Spouse name: N/A    Number of children: N/A    Years of education: N/A     Social History Main Topics    Smoking status: Former Smoker     Types: Cigarettes     Quit date: 10/10/1971    Smokeless tobacco: Never Used    Alcohol use 0.6 oz/week     0 Standard drinks or equivalent, 1 Cans of beer per week      Comment: 2 times a year    Drug use: No    Sexual activity: No     Other Topics Concern    Sleep Concern Yes    Stress Concern Yes     Family concerns     Social History Narrative    , 2 children       Family History   Problem Relation Age of Onset    Asthma Mother     Alcohol abuse Mother     Alcohol abuse Father     Asthma Father     Cancer Sister     Heart Disease Sister     Alcohol abuse Brother     Cancer Brother     Heart Disease Brother          Physical Exam:  Visit Vitals    /60 (BP 1 Location: Left arm, BP Patient Position: Sitting)    Pulse 60    Temp 96.4 °F (35.8 °C) (Oral)    Resp 16    Ht 5' 7\" (1.702 m)    Wt 159 lb 3.2 oz (72.2 kg)    SpO2 98%    BMI 24.93 kg/m2     Physical Exam   Constitutional: He is oriented to person, place, and time and well-developed, well-nourished, and in no distress.    HENT: Head: Normocephalic and atraumatic. Right Ear: External ear normal.   Left Ear: External ear normal.   Eyes: Conjunctivae are normal.   Neck: Neck supple. Cardiovascular: Normal rate, regular rhythm, normal heart sounds and intact distal pulses. paced   Pulmonary/Chest: Effort normal and breath sounds normal.   Abdominal: Soft. There is no tenderness. Musculoskeletal: He exhibits no edema or tenderness. Lymphadenopathy:     He has no cervical adenopathy. Neurological: He is alert and oriented to person, place, and time. Gait normal.   Skin: Skin is warm and dry. Psychiatric: Mood and affect normal.   Nursing note and vitals reviewed. Assessment/Plan:    ICD-10-CM ICD-9-CM    1. Muscle spasm of back M62.830 724.8 tiZANidine (ZANAFLEX) 2 mg tablet   2. Chronic bilateral low back pain with left-sided sciatica M54.42 724.2     G89.29 724.3      338.29    3. Spinal stenosis of lumbar region M48.06 724.02    4. Colon cancer screening Z12.11 V76.51 OCCULT BLOOD, IMMUNOASSAY (FIT)      CANCELED: OCCULT BLOOD, IMMUNOASSAY (FIT)     Counseled pt on potential medication AEs/interactions. Avoid with other meds. Caution regarding sedation and safety. To notify and d/c if experiences any SEs. Reiterated need for ACP. Follow-up Disposition:  Return if symptoms worsen or fail to improve.     Anu Pizano PA-C

## 2017-03-02 NOTE — MR AVS SNAPSHOT
Visit Information Date & Time Provider Department Dept. Phone Encounter #  
 3/2/2017  3:00 PM Neo Hodgson PA-C [facility] [phone] [id number] Your Appointments 8/21/2017  3:00 PM  
Follow Up with Jens Madden MD  
[facility] at [facility]) Appt Note: Follow up $0CP tdb 1/23/17  
 [address], 
[address] [facility], [address] [phone]  
[address] , [address], [address] [phone] Upcoming Health Maintenance Date Due FOBT Q 1 YEAR AGE 50-75 4/1/1994 ZOSTER VACCINE AGE 60> 4/1/2004 DTaP/Tdap/Td series (1 - Tdap) 6/20/2017* GLAUCOMA SCREENING Q2Y 12/14/2017* MEDICARE YEARLY EXAM 7/1/2017 *Topic was postponed. The date shown is not the original due date. Allergies as of 3/2/2017  Review Complete On: 3/2/2017 By: Cecelia Berman LPN No Known Allergies Current Immunizations  Reviewed on 6/17/2016 Name Date Influenza High Dose Vaccine PF 11/10/2016 Influenza Vaccine 9/16/2014 Influenza Vaccine Split 9/14/2011 Influenza Vaccine Whole 9/1/2010 Pneumococcal Conjugate (PCV-13) 11/10/2016 Pneumococcal Vaccine (Unspecified Type) 9/14/2011 Not reviewed this visit You Were Diagnosed With   
  
 Codes Comments Muscle spasm of back    -  Primary ICD-10-CM: D73.920 ICD-9-CM: 724.8 Chronic bilateral low back pain with left-sided sciatica     ICD-10-CM: M54.42, G89.29 ICD-9-CM: 724.2, 724.3, 338.29 Spinal stenosis of lumbar region     ICD-10-CM: M48.06 
ICD-9-CM: 724.02 Vitals BP  
  
  
  
  
  
 140/60 (BP 1 Location: Left arm, BP Patient Position: Sitting) BMI and BSA Data Body Mass Index Body Surface Area 24.93 kg/m 2 1.85 m 2 Preferred Pharmacy Pharmacy Name Phone [facility] [address] [facility], [address] [place] [address] Your Updated Medication List  
  
   
This list is accurate as of: 3/2/17  3:45 PM.  Always use your most recent med list.  
  
  
  
  
 acetaminophen-codeine 300-30 mg per tablet Commonly known as:  TYLENOL #3  
two (2) times daily as needed. atorvastatin 40 mg tablet Commonly known as:  LIPITOR  
TAKE 1 TABLET BY MOUTH EVERY DAY  
  
 ELIQUIS 5 mg tablet Generic drug:  apixaban TAKE 1 TABLET BY MOUTH EVERY 12 HOURS  
  
 ferrous sulfate 325 mg (65 mg iron) tablet Take 45 mg by mouth Daily (before breakfast). fluticasone 50 mcg/actuation nasal spray Commonly known as:  Galen Sacks 2 Sprays by Both Nostrils route daily. gabapentin 100 mg capsule Commonly known as:  NEURONTIN Take 1 Cap by mouth three (3) times daily. Incontinence Pad, Liner, Disp Pads Commonly known as:  BLADDER CONTROL PADS EX ABSORB  
1 Packet by Does Not Apply route as needed (incontinence). lisinopril 5 mg tablet Commonly known as:  PRINIVIL, ZESTRIL  
5 mg daily. memantine 10 mg tablet Commonly known as:  Jayshree Frausto Take one half tab twice a day for 1 week, then 1 tab twice a day  
  
 metoprolol succinate 25 mg XL tablet Commonly known as:  TOPROL-XL Take 1 Tab by mouth daily. Indications: Hypertension MIRALAX 17 gram/dose powder Generic drug:  polyethylene glycol Take 17 g by mouth daily. multivitamin tablet Commonly known as:  ONE A DAY Take 1 Tab by mouth daily. nitroglycerin 0.4 mg SL tablet Commonly known as:  NITROSTAT  
1 Tab by SubLINGual route every five (5) minutes as needed for Chest Pain (call 911 if not relieved by 3). sertraline 100 mg tablet Commonly known as:  ZOLOFT Take 1 Tab by mouth daily. tiZANidine 2 mg tablet Commonly known as:  Dorenda Sham 2mg po x 1, may repeat q6-8hr prn. Max 3 doses/24h. traZODone 100 mg tablet Commonly known as:  Dany Durbins Take 1 Tab by mouth nightly. Prescriptions Sent to Pharmacy Refills  
 tiZANidine (ZANAFLEX) 2 mg tablet 0 Simg po x 1, may repeat q6-8hr prn. Max 3 doses/24h. Class: Normal  
 Pharmacy: [facility] [address] [place] [address] [address]  #: [phone] Patient Instructions Back Pain: Care Instructions Your Care Instructions Back pain has many possible causes. It is often related to problems with muscles and ligaments of the back. It may also be related to problems with the nerves, discs, or bones of the back. Moving, lifting, standing, sitting, or sleeping in an awkward way can strain the back. Sometimes you don't notice the injury until later. Arthritis is another common cause of back pain. Although it may hurt a lot, back pain usually improves on its own within several weeks. Most people recover in 12 weeks or less. Using good home treatment and being careful not to stress your back can help you feel better sooner. Follow-up care is a key part of your treatment and safety. Be sure to make and go to all appointments, and call your doctor if you are having problems. Its also a good idea to know your test results and keep a list of the medicines you take. How can you care for yourself at home? · Sit or lie in positions that are most comfortable and reduce your pain. Try one of these positions when you lie down: ¨ Lie on your back with your knees bent and supported by large pillows. ¨ Lie on the floor with your legs on the seat of a sofa or chair. Jeanna Gal on your side with your knees and hips bent and a pillow between your legs. ¨ Lie on your stomach if it does not make pain worse. · Do not sit up in bed, and avoid soft couches and twisted positions. Bed rest can help relieve pain at first, but it delays healing. Avoid bed rest after the first day of back pain. · Change positions every 30 minutes.  If you must sit for long periods of time, take breaks from sitting. Get up and walk around, or lie in a comfortable position. · Try using a heating pad on a low or medium setting for 15 to 20 minutes every 2 or 3 hours. Try a warm shower in place of one session with the heating pad. · You can also try an ice pack for 10 to 15 minutes every 2 to 3 hours. Put a thin cloth between the ice pack and your skin. · Take pain medicines exactly as directed. ¨ If the doctor gave you a prescription medicine for pain, take it as prescribed. ¨ If you are not taking a prescription pain medicine, ask your doctor if you can take an over-the-counter medicine. · Take short walks several times a day. You can start with 5 to 10 minutes, 3 or 4 times a day, and work up to longer walks. Walk on level surfaces and avoid hills and stairs until your back is better. · Return to work and other activities as soon as you can. Continued rest without activity is usually not good for your back. · To prevent future back pain, do exercises to stretch and strengthen your back and stomach. Learn how to use good posture, safe lifting techniques, and proper body mechanics. When should you call for help? Call your doctor now or seek immediate medical care if: 
· You have new or worsening numbness in your legs. · You have new or worsening weakness in your legs. (This could make it hard to stand up.) · You lose control of your bladder or bowels. Watch closely for changes in your health, and be sure to contact your doctor if: 
· Your pain gets worse. · You are not getting better after 2 weeks. Where can you learn more? Go to http://kendell-stephanie.info/. Enter O152 in the search box to learn more about \"Back Pain: Care Instructions. \" Current as of: May 23, 2016 Content Version: 11.1 © 1415-5354 [employer].  Care instructions adapted under license by [employer] (which disclaims liability or warranty for this information). If you have questions about a medical condition or this instruction, always ask your healthcare professional. [address] any warranty or liability for your use of this information. Back Stretches: Exercises Your Care Instructions Here are some examples of exercises for stretching your back. Start each exercise slowly. Ease off the exercise if you start to have pain. Your doctor or physical therapist will tell you when you can start these exercises and which ones will work best for you. How to do the exercises Overhead stretch 1. Stand comfortably with your feet shoulder-width apart. 2. Looking straight ahead, raise both arms over your head and reach toward the ceiling. Do not allow your head to tilt back. 3. Hold for 15 to 30 seconds, then lower your arms to your sides. 4. Repeat 2 to 4 times. Side stretch 1. Stand comfortably with your feet shoulder-width apart. 2. Raise one arm over your head, and then lean to the other side. 3. Slide your hand down your leg as you let the weight of your arm gently stretch your side muscles. Hold for 15 to 30 seconds. 4. Repeat 2 to 4 times on each side. Press-up 1. Lie on your stomach, supporting your body with your forearms. 2. Press your elbows down into the floor to raise your upper back. As you do this, relax your stomach muscles and allow your back to arch without using your back muscles. As your press up, do not let your hips or pelvis come off the floor. 3. Hold for 15 to 30 seconds, then relax. 4. Repeat 2 to 4 times. Relax and rest 
 
1. Lie on your back with a rolled towel under your neck and a pillow under your knees. Extend your arms comfortably to your sides. 2. Relax and breathe normally. 3. Remain in this position for about 10 minutes. 4. If you can, do this 2 or 3 times each day. Follow-up care is a key part of your treatment and safety.  Be sure to make and go to all appointments, and call your doctor if you are having problems. It's also a good idea to know your test results and keep a list of the medicines you take. Where can you learn more? Go to http://kendell-stephanie.info/. Enter E823 in the search box to learn more about \"Back Stretches: Exercises. \" Current as of: May 23, 2016 Content Version: 11.1 © 4778-5729 [employer]. Care instructions adapted under license by [employer] (which disclaims liability or warranty for this information). If you have questions about a medical condition or this instruction, always ask your healthcare professional. [address] any warranty or liability for your use of this information. Back Care and Preventing Injuries: Care Instructions Your Care Instructions You can hurt your back doing many everyday activities: lifting a heavy box, bending down to garden, exercising at the gym, and even getting out of bed. But you can keep your back strong and healthy by doing some exercises. You also can follow a few tips for sitting, sleeping, and lifting to avoid hurting your back again. Talk to your doctor before you start an exercise program. Ask for help if you want to learn more about keeping your back healthy. Follow-up care is a key part of your treatment and safety. Be sure to make and go to all appointments, and call your doctor if you are having problems. It's also a good idea to know your test results and keep a list of the medicines you take. How can you care for yourself at home? · Stay at a healthy weight to avoid strain on your lower back. · Do not smoke. Smoking increases the risk of osteoporosis, which weakens the spine. If you need help quitting, talk to your doctor about stop-smoking programs and medicines. These can increase your chances of quitting for good.  
· Make sure you sleep in a position that maintains your back's normal curves and on a mattress that feels comfortable. Sleep on your side with a pillow between your knees, or sleep on your back with a pillow under your knees. These positions can reduce strain on your back. · When you get out of bed, lie on your side and bend both knees. Drop your feet over the edge of the bed as you push up with both arms. Scoot to the edge of the bed. Make sure your feet are in line with your rear end (buttocks), and then stand up. · If you must stand for a long time, put one foot on a stool, ledge, or box. Exercise to strengthen your back and other muscles · Get at least 30 minutes of exercise on most days of the week. Walking is a good choice. You also may want to do other activities, such as running, swimming, cycling, or playing tennis or team sports. · Stretch your back muscles. Here are few exercises to try: ¨ Lie on your back with your knees bent and your feet flat on the floor. Gently pull one bent knee to your chest. Put that foot back on the floor, and then pull the other knee to your chest. Hold for 15 to 30 seconds. Repeat 2 to 4 times. ¨ Do pelvic tilts. Lie on your back with your knees bent. Tighten your stomach muscles. Pull your belly button (navel) in and up toward your ribs. You should feel like your back is pressing to the floor and your hips and pelvis are slightly lifting off the floor. Hold for 6 seconds while breathing smoothly. · Keep your core muscles strong. The muscles of your back, belly (abdomen), and buttocks support your spine. ¨ Pull in your belly, and imagine pulling your navel toward your spine. Hold this for 6 seconds, then relax. Remember to keep breathing normally as you tense your muscles. ¨ Do curl-ups. Always do them with your knees bent. Keep your low back on the floor, and curl your shoulders toward your knees using a smooth, slow motion.  Keep your arms folded across your chest. If this bothers your neck, try putting your hands behind your neck (not your head), with your elbows spread apart. ¨ Lie on your back with your knees bent and your feet flat on the floor. Tighten your belly muscles, and then push with your feet and raise your buttocks up a few inches. Hold this position 6 seconds as you continue to breathe normally, then lower yourself slowly to the floor. Repeat 8 to 12 times. ¨ If you like group exercise, try Pilates or yoga. These classes have poses that strengthen the core muscles. Protect your back when you sit · Place a small pillow, a rolled-up towel, or a lumbar roll in the curve of your back if you need extra support. · Sit in a chair that is low enough to let you place both feet flat on the floor with both knees nearly level with your hips. If your chair or desk is too high, use a foot rest to raise your knees. · When driving, keep your knees nearly level with your hips. Sit straight, and drive with both hands on the steering wheel. Your arms should be in a slightly bent position. · Try a kneeling chair, which helps tilt your hips forward. This takes pressure off your lower back. · Try sitting on an exercise ball. It can rock from side to side, which helps keep your back loose. Lift properly · Squat down, bending at the hips and knees only. If you need to, put one knee to the floor and extend your other knee in front of you, bent at a right angle (half kneeling). · Press your chest straight forward. This helps keep your upper back straight while keeping a slight arch in your low back. · Hold the load as close to your body as possible, at the level of your navel. · Use your feet to change direction, taking small steps. · Lead with your hips as you change direction. Keep your shoulders in line with your hips as you move. Do not twist your body. · Set down your load carefully, squatting with your knees and hips only. When should you call for help? Watch closely for changes in your health, and be sure to contact your doctor if: 
· You want more exercises to make your back and other core muscles stronger. Where can you learn more? Go to http://kendell-stephanie.info/. Enter S810 in the search box to learn more about \"Back Care and Preventing Injuries: Care Instructions. \" Current as of: May 23, 2016 Content Version: 11.1 © 6016-0073 [employer]. Care instructions adapted under license by [employer] (which disclaims liability or warranty for this information). If you have questions about a medical condition or this instruction, always ask your healthcare professional. [address] any warranty or liability for your use of this information. Introducing [facility]! Dear Tyler Xavier: Thank you for requesting a [facility] account. Our records indicate that you already have an active [facility] account. You can access your account anytime at [email or web address] Did you know that you can access your hospital and ER discharge instructions at any time in [facility]? You can also review all of your test results from your hospital stay or ER visit. Additional Information If you have questions, please visit the Frequently Asked Questions section of the [facility] website at [email or web address] Remember, [facility] is NOT to be used for urgent needs. For medical emergencies, dial 911. Now available from your iPhone and Android! Please provide this summary of care documentation to your next provider. Your primary care clinician is listed as Rios Cowart. If you have any questions after today's visit, please call [phone].

## 2017-03-02 NOTE — PATIENT INSTRUCTIONS
Back Pain: Care Instructions  Your Care Instructions    Back pain has many possible causes. It is often related to problems with muscles and ligaments of the back. It may also be related to problems with the nerves, discs, or bones of the back. Moving, lifting, standing, sitting, or sleeping in an awkward way can strain the back. Sometimes you don't notice the injury until later. Arthritis is another common cause of back pain. Although it may hurt a lot, back pain usually improves on its own within several weeks. Most people recover in 12 weeks or less. Using good home treatment and being careful not to stress your back can help you feel better sooner. Follow-up care is a key part of your treatment and safety. Be sure to make and go to all appointments, and call your doctor if you are having problems. Its also a good idea to know your test results and keep a list of the medicines you take. How can you care for yourself at home? · Sit or lie in positions that are most comfortable and reduce your pain. Try one of these positions when you lie down:  ¨ Lie on your back with your knees bent and supported by large pillows. ¨ Lie on the floor with your legs on the seat of a sofa or chair. Calista Natasha on your side with your knees and hips bent and a pillow between your legs. ¨ Lie on your stomach if it does not make pain worse. · Do not sit up in bed, and avoid soft couches and twisted positions. Bed rest can help relieve pain at first, but it delays healing. Avoid bed rest after the first day of back pain. · Change positions every 30 minutes. If you must sit for long periods of time, take breaks from sitting. Get up and walk around, or lie in a comfortable position. · Try using a heating pad on a low or medium setting for 15 to 20 minutes every 2 or 3 hours. Try a warm shower in place of one session with the heating pad. · You can also try an ice pack for 10 to 15 minutes every 2 to 3 hours.  Put a thin cloth between the ice pack and your skin. · Take pain medicines exactly as directed. ¨ If the doctor gave you a prescription medicine for pain, take it as prescribed. ¨ If you are not taking a prescription pain medicine, ask your doctor if you can take an over-the-counter medicine. · Take short walks several times a day. You can start with 5 to 10 minutes, 3 or 4 times a day, and work up to longer walks. Walk on level surfaces and avoid hills and stairs until your back is better. · Return to work and other activities as soon as you can. Continued rest without activity is usually not good for your back. · To prevent future back pain, do exercises to stretch and strengthen your back and stomach. Learn how to use good posture, safe lifting techniques, and proper body mechanics. When should you call for help? Call your doctor now or seek immediate medical care if:  · You have new or worsening numbness in your legs. · You have new or worsening weakness in your legs. (This could make it hard to stand up.)  · You lose control of your bladder or bowels. Watch closely for changes in your health, and be sure to contact your doctor if:  · Your pain gets worse. · You are not getting better after 2 weeks. Where can you learn more? Go to http://kendell-stephanie.info/. Enter C344 in the search box to learn more about \"Back Pain: Care Instructions. \"  Current as of: May 23, 2016  Content Version: 11.1  © 2273-0233 [employer]. Care instructions adapted under license by [employer] (which disclaims liability or warranty for this information). If you have questions about a medical condition or this instruction, always ask your healthcare professional. [address] any warranty or liability for your use of this information. Back Stretches: Exercises  Your Care Instructions  Here are some examples of exercises for stretching your back. Start each exercise slowly. Ease off the exercise if you start to have pain. Your doctor or physical therapist will tell you when you can start these exercises and which ones will work best for you. How to do the exercises  Overhead stretch    1. Stand comfortably with your feet shoulder-width apart. 2. Looking straight ahead, raise both arms over your head and reach toward the ceiling. Do not allow your head to tilt back. 3. Hold for 15 to 30 seconds, then lower your arms to your sides. 4. Repeat 2 to 4 times. Side stretch    1. Stand comfortably with your feet shoulder-width apart. 2. Raise one arm over your head, and then lean to the other side. 3. Slide your hand down your leg as you let the weight of your arm gently stretch your side muscles. Hold for 15 to 30 seconds. 4. Repeat 2 to 4 times on each side. Press-up    1. Lie on your stomach, supporting your body with your forearms. 2. Press your elbows down into the floor to raise your upper back. As you do this, relax your stomach muscles and allow your back to arch without using your back muscles. As your press up, do not let your hips or pelvis come off the floor. 3. Hold for 15 to 30 seconds, then relax. 4. Repeat 2 to 4 times. Relax and rest    1. Lie on your back with a rolled towel under your neck and a pillow under your knees. Extend your arms comfortably to your sides. 2. Relax and breathe normally. 3. Remain in this position for about 10 minutes. 4. If you can, do this 2 or 3 times each day. Follow-up care is a key part of your treatment and safety. Be sure to make and go to all appointments, and call your doctor if you are having problems. It's also a good idea to know your test results and keep a list of the medicines you take. Where can you learn more? Go to http://kendell-stephanie.info/. Enter C966 in the search box to learn more about \"Back Stretches: Exercises. \"  Current as of: May 23, 2016  Content Version: 11.1  © 9225-8709 [employer]. Care instructions adapted under license by [employer] (which disclaims liability or warranty for this information). If you have questions about a medical condition or this instruction, always ask your healthcare professional. [address] any warranty or liability for your use of this information. Back Care and Preventing Injuries: Care Instructions  Your Care Instructions  You can hurt your back doing many everyday activities: lifting a heavy box, bending down to garden, exercising at the gym, and even getting out of bed. But you can keep your back strong and healthy by doing some exercises. You also can follow a few tips for sitting, sleeping, and lifting to avoid hurting your back again. Talk to your doctor before you start an exercise program. Ask for help if you want to learn more about keeping your back healthy. Follow-up care is a key part of your treatment and safety. Be sure to make and go to all appointments, and call your doctor if you are having problems. It's also a good idea to know your test results and keep a list of the medicines you take. How can you care for yourself at home? · Stay at a healthy weight to avoid strain on your lower back. · Do not smoke. Smoking increases the risk of osteoporosis, which weakens the spine. If you need help quitting, talk to your doctor about stop-smoking programs and medicines. These can increase your chances of quitting for good. · Make sure you sleep in a position that maintains your back's normal curves and on a mattress that feels comfortable. Sleep on your side with a pillow between your knees, or sleep on your back with a pillow under your knees. These positions can reduce strain on your back. · When you get out of bed, lie on your side and bend both knees. Drop your feet over the edge of the bed as you push up with both arms. Scoot to the edge of the bed.  Make sure your feet are in line with your rear end (buttocks), and then stand up. · If you must stand for a long time, put one foot on a stool, ledge, or box. Exercise to strengthen your back and other muscles  · Get at least 30 minutes of exercise on most days of the week. Walking is a good choice. You also may want to do other activities, such as running, swimming, cycling, or playing tennis or team sports. · Stretch your back muscles. Here are few exercises to try:  Kip Robert on your back with your knees bent and your feet flat on the floor. Gently pull one bent knee to your chest. Put that foot back on the floor, and then pull the other knee to your chest. Hold for 15 to 30 seconds. Repeat 2 to 4 times. ¨ Do pelvic tilts. Lie on your back with your knees bent. Tighten your stomach muscles. Pull your belly button (navel) in and up toward your ribs. You should feel like your back is pressing to the floor and your hips and pelvis are slightly lifting off the floor. Hold for 6 seconds while breathing smoothly. · Keep your core muscles strong. The muscles of your back, belly (abdomen), and buttocks support your spine. ¨ Pull in your belly, and imagine pulling your navel toward your spine. Hold this for 6 seconds, then relax. Remember to keep breathing normally as you tense your muscles. ¨ Do curl-ups. Always do them with your knees bent. Keep your low back on the floor, and curl your shoulders toward your knees using a smooth, slow motion. Keep your arms folded across your chest. If this bothers your neck, try putting your hands behind your neck (not your head), with your elbows spread apart. ¨ Lie on your back with your knees bent and your feet flat on the floor. Tighten your belly muscles, and then push with your feet and raise your buttocks up a few inches. Hold this position 6 seconds as you continue to breathe normally, then lower yourself slowly to the floor. Repeat 8 to 12 times. ¨ If you like group exercise, try Pilates or yoga.  These classes have poses that strengthen the core muscles. Protect your back when you sit  · Place a small pillow, a rolled-up towel, or a lumbar roll in the curve of your back if you need extra support. · Sit in a chair that is low enough to let you place both feet flat on the floor with both knees nearly level with your hips. If your chair or desk is too high, use a foot rest to raise your knees. · When driving, keep your knees nearly level with your hips. Sit straight, and drive with both hands on the steering wheel. Your arms should be in a slightly bent position. · Try a kneeling chair, which helps tilt your hips forward. This takes pressure off your lower back. · Try sitting on an exercise ball. It can rock from side to side, which helps keep your back loose. Lift properly  · Squat down, bending at the hips and knees only. If you need to, put one knee to the floor and extend your other knee in front of you, bent at a right angle (half kneeling). · Press your chest straight forward. This helps keep your upper back straight while keeping a slight arch in your low back. · Hold the load as close to your body as possible, at the level of your navel. · Use your feet to change direction, taking small steps. · Lead with your hips as you change direction. Keep your shoulders in line with your hips as you move. Do not twist your body. · Set down your load carefully, squatting with your knees and hips only. When should you call for help? Watch closely for changes in your health, and be sure to contact your doctor if:  · You want more exercises to make your back and other core muscles stronger. Where can you learn more? Go to http://kendell-stephanie.info/. Enter S810 in the search box to learn more about \"Back Care and Preventing Injuries: Care Instructions. \"  Current as of: May 23, 2016  Content Version: 11.1  © 6609-5016 [employer].  Care instructions adapted under license by [employer] (which disclaims liability or warranty for this information). If you have questions about a medical condition or this instruction, always ask your healthcare professional. [address] any warranty or liability for your use of this information.

## 2017-03-02 NOTE — TELEPHONE ENCOUNTER
Tried to call patient. No answer. Left voice message for a return call. Patient needs an appointment to get medication refilled.   Sofia Arias LPN

## 2017-03-02 NOTE — PROGRESS NOTES
Health Maintenance Due   Topic Date Due    FOBT Q 1 YEAR AGE 50-75  04/01/1994 given to patient to do   [address] AGE 59>  04/01/2004 patient advised to get this

## 2017-03-10 DIAGNOSIS — I10 ESSENTIAL HYPERTENSION: Primary | ICD-10-CM

## 2017-03-10 RX ORDER — METOPROLOL SUCCINATE 25 MG/1
TABLET, EXTENDED RELEASE ORAL
Qty: 90 TAB | OUTPATIENT
Start: 2017-03-10

## 2017-03-10 RX ORDER — METOPROLOL SUCCINATE 25 MG/1
25 TABLET, EXTENDED RELEASE ORAL DAILY
Qty: 90 TAB | Refills: 0 | Status: SHIPPED | OUTPATIENT
Start: 2017-03-10 | End: 2017-06-20 | Stop reason: SDUPTHER

## 2017-04-24 DIAGNOSIS — M54.16 LUMBAR BACK PAIN WITH RADICULOPATHY AFFECTING RIGHT LOWER EXTREMITY: ICD-10-CM

## 2017-04-24 DIAGNOSIS — M54.16 LUMBAR BACK PAIN WITH RADICULOPATHY AFFECTING LEFT LOWER EXTREMITY: ICD-10-CM

## 2017-04-24 RX ORDER — GABAPENTIN 100 MG/1
100 CAPSULE ORAL 3 TIMES DAILY
Qty: 270 CAP | Refills: 3 | Status: SHIPPED | OUTPATIENT
Start: 2017-04-24 | End: 2018-03-20 | Stop reason: CLARIF

## 2017-04-24 NOTE — TELEPHONE ENCOUNTER
Requested Prescriptions     Pending Prescriptions Disp Refills    gabapentin (NEURONTIN) 100 mg capsule 270 Cap 3     Sig: Take 1 Cap by mouth three (3) times daily. Pharmacy would like a 90 day prescription.

## 2017-05-05 ENCOUNTER — OFFICE VISIT (OUTPATIENT)
Dept: FAMILY MEDICINE CLINIC | Age: 72
End: 2017-05-05

## 2017-05-05 ENCOUNTER — TELEPHONE (OUTPATIENT)
Dept: FAMILY MEDICINE CLINIC | Age: 72
End: 2017-05-05

## 2017-05-05 VITALS
HEIGHT: 67 IN | SYSTOLIC BLOOD PRESSURE: 100 MMHG | OXYGEN SATURATION: 96 % | DIASTOLIC BLOOD PRESSURE: 60 MMHG | WEIGHT: 158.8 LBS | TEMPERATURE: 97.4 F | HEART RATE: 65 BPM | BODY MASS INDEX: 24.92 KG/M2 | RESPIRATION RATE: 16 BRPM

## 2017-05-05 DIAGNOSIS — K59.00 CONSTIPATION, UNSPECIFIED CONSTIPATION TYPE: ICD-10-CM

## 2017-05-05 DIAGNOSIS — M62.830 MUSCLE SPASM OF BACK: ICD-10-CM

## 2017-05-05 DIAGNOSIS — I95.1 ORTHOSTATIC HYPOTENSION: ICD-10-CM

## 2017-05-05 DIAGNOSIS — I50.22 CHRONIC SYSTOLIC CONGESTIVE HEART FAILURE (HCC): Chronic | ICD-10-CM

## 2017-05-05 DIAGNOSIS — M25.512 ACUTE PAIN OF LEFT SHOULDER: ICD-10-CM

## 2017-05-05 DIAGNOSIS — S46.212A BICEPS STRAIN, LEFT, INITIAL ENCOUNTER: ICD-10-CM

## 2017-05-05 DIAGNOSIS — I49.5 SSS (SICK SINUS SYNDROME) (HCC): Chronic | ICD-10-CM

## 2017-05-05 DIAGNOSIS — Z95.0 PACEMAKER: ICD-10-CM

## 2017-05-05 DIAGNOSIS — R42 DIZZINESS: Primary | ICD-10-CM

## 2017-05-05 RX ORDER — METHOCARBAMOL 500 MG/1
TABLET, FILM COATED ORAL
COMMUNITY
Start: 2017-01-30 | End: 2017-06-05 | Stop reason: ALTCHOICE

## 2017-05-05 RX ORDER — TIZANIDINE 2 MG/1
TABLET ORAL
Qty: 30 TAB | Refills: 0 | Status: SHIPPED | OUTPATIENT
Start: 2017-05-05 | End: 2017-06-05 | Stop reason: SDUPTHER

## 2017-05-05 RX ORDER — TIZANIDINE 4 MG/1
TABLET ORAL
COMMUNITY
Start: 2017-03-02 | End: 2017-05-05

## 2017-05-05 NOTE — PROGRESS NOTES
Health Maintenance Due   Topic Date Due    FOBT Q 1 YEAR AGE 50-75  04/01/1994  Pt has the kit but hasn't done the test yet    ZOSTER VACCINE AGE 60>  04/01/2004  Discussed with pt and he will think about this vaccine     Ivon Quintanilla RN

## 2017-05-05 NOTE — PROGRESS NOTES
Nick Che is a 67 y.o. male who presents to the office today with the following:  Chief Complaint   Patient presents with    Dizziness     for about a month, and getting worse, also having (L) shoulder pain for about 3 days after picking up a        HPI  Left shoulder pain x 3 days ago. Had  on a charo, went to  and injured his shoulder. Pain worse with overhead movement. No n/t/w/s, except occasional tingling/cramps in hands when does not drink enough water. Also c/o feeling dizzy upon standing. Lasts few seconds. Denies CP, sob, agudelo, palpitations, syncope. Admits to not drinking any water. Also slightly constipated. Using Miralax. Otherwise feeling well. Review of Systems   Constitutional: Negative for chills, fever and malaise/fatigue. Respiratory: Negative for cough and shortness of breath. Cardiovascular: Negative for chest pain, palpitations and leg swelling. Gastrointestinal: Negative. Genitourinary: Negative. Skin: Negative for rash. Neurological: Positive for dizziness. Negative for tingling, tremors, sensory change, speech change, focal weakness, seizures, loss of consciousness and headaches. See HPI. No Known Allergies    Current Outpatient Prescriptions   Medication Sig    methocarbamol (ROBAXIN) 500 mg tablet     tiZANidine (ZANAFLEX) 2 mg tablet 2mg po x 1, may repeat q6-8hr prn. Max 3 doses/24h.  gabapentin (NEURONTIN) 100 mg capsule Take 1 Cap by mouth three (3) times daily.  metoprolol succinate (TOPROL-XL) 25 mg XL tablet Take 1 Tab by mouth daily. Indications: hypertension    ELIQUIS 5 mg tablet TAKE 1 TABLET BY MOUTH EVERY 12 HOURS    multivitamin (ONE A DAY) tablet Take 1 Tab by mouth daily.  ferrous sulfate 325 mg (65 mg iron) tablet Take 45 mg by mouth Daily (before breakfast).  traZODone (DESYREL) 100 mg tablet Take 1 Tab by mouth nightly.     atorvastatin (LIPITOR) 40 mg tablet TAKE 1 TABLET BY MOUTH EVERY DAY    memantine (NAMENDA) 10 mg tablet Take one half tab twice a day for 1 week, then 1 tab twice a day    acetaminophen-codeine (TYLENOL #3) 300-30 mg per tablet two (2) times daily as needed.  lisinopril (PRINIVIL, ZESTRIL) 5 mg tablet 5 mg daily.  sertraline (ZOLOFT) 100 mg tablet Take 1 Tab by mouth daily.  fluticasone (FLONASE) 50 mcg/actuation nasal spray 2 Sprays by Both Nostrils route daily.  polyethylene glycol (MIRALAX) 17 gram/dose powder Take 17 g by mouth daily.  Incontinence Pad, Liner, Disp (BLADDER CONTROL PADS EX ABSORB) pads 1 Packet by Does Not Apply route as needed (incontinence).  nitroglycerin (NITROSTAT) 0.4 mg SL tablet 1 Tab by SubLINGual route every five (5) minutes as needed for Chest Pain (call 911 if not relieved by 3). No current facility-administered medications for this visit.         Past Medical History:   Diagnosis Date    AICD (automatic cardioverter/defibrillator) present 5/13/2016 5/13/16 [employer] upgrade to dual chamber AICD implant  Dr. Christy Javier state, other     Arrhythmia     'S IN THE PAST    Arthritis     OSTEO ARTHRITIS    AVNRT (AV bridgette re-entry tachycardia) ([facility] [address] 5/12/2016 5/12/16 AVNRT ablation: PM/AICD left upper chest :Dr. Dannie Mauricio Back ache     CAD (coronary artery disease)     Cancer [facility]) 2004    Prostate cancer    Chest tightness     last episode of chest pain 5/2016 before AICD placed; none since then    Coagulation defects 2005    FACTOR V LEIDEN    Dizziness     FH: factor V Leiden deficiency     Generalized muscle ache     GERD (gastroesophageal reflux disease)     HEARTBURN    Heart failure ([facility] [address] 2014    EF 40%; Dr. Calos Solo    Hyperlipidemia, mixed     Hypertension     COREG    Other ill-defined conditions 2004    blood transfusion    Pacemaker     Pacemaker     Postsurgical aortocoronary bypass status 05/29/2012    CABG    Presence of cardiac defibrillator 05/2016    left upper chest    Psychiatric disorder     depression    Stroke [facility]) 2011, 1990's    SEVERAL-mini    Thromboembolism ([facility] [address] 2014    left leg: changed to Eliquis from Warfarin    Thromboembolus ([facility] [address] 2005    left leg    Weakness generalized        Past Surgical History:   Procedure Laterality Date    CARDIAC CATHETERIZATION  3/4/2011         CARDIAC SURG PROCEDURE UNLIST      CABG X1 3/5/11    HX HERNIA REPAIR  2002    Ingiunal hernia repair left    HX ORTHOPAEDIC      LEFT KNEE CARTILAGE REPAIR;RIGHT ROTATOR CUFF REPAIR    HX ORTHOPAEDIC  2/14/12    C5-7 ANTERIIOR CERVICAL DISECTOMY AND FUSION    HX ORTHOPAEDIC  6/4/13    C5-C7 POSTERIOR DECOMPRESSION AND FUSION    HX OTHER SURGICAL      steroid injections    HX PACEMAKER PLACEMENT      HX PROSTATECTOMY  2004     [place]    HX UROLOGICAL       urinary control system    HX UROLOGICAL  12/3/13    REPLACEMENT ARTIFICAL URINARY SPHINCTER       Social History     Social History    Marital status:      Spouse name: N/A    Number of children: N/A    Years of education: N/A     Social History Main Topics    Smoking status: Former Smoker     Types: Cigarettes     Quit date: 10/10/1971    Smokeless tobacco: Never Used    Alcohol use 0.6 oz/week     0 Standard drinks or equivalent, 1 Cans of beer per week      Comment: 2 times a year    Drug use: No    Sexual activity: No     Other Topics Concern    Sleep Concern Yes    Stress Concern Yes     Family concerns     Social History Narrative    , 2 children       Family History   Problem Relation Age of Onset    Asthma Mother     Alcohol abuse Mother     Alcohol abuse Father     Asthma Father     Cancer Sister     Heart Disease Sister     Alcohol abuse Brother     Cancer Brother     Heart Disease Brother          Physical Exam:  Visit Vitals    /60 (BP 1 Location: Left arm, BP Patient Position: Standing)    Pulse 65    Temp 97.4 °F (36.3 °C) (Oral)    Resp 16  Ht 5' 7\" (1.702 m)    Wt 158 lb 12.8 oz (72 kg)    SpO2 96%    BMI 24.87 kg/m2       Physical Exam   Constitutional: He is oriented to person, place, and time and well-developed, well-nourished, and in no distress. HENT:   Head: Normocephalic and atraumatic. Right Ear: External ear normal.   Left Ear: External ear normal.   Eyes: Conjunctivae are normal.   Neck: Normal range of motion. Cardiovascular: Normal rate, regular rhythm and normal heart sounds. Pulmonary/Chest: Effort normal and breath sounds normal.   Abdominal: Soft. There is no tenderness. Musculoskeletal:        Left shoulder: He exhibits decreased range of motion (pain in bicep with abduction and fwd flex >90 degrees). He exhibits no tenderness, no bony tenderness, no swelling, no effusion, no crepitus, no deformity, no laceration, no pain, no spasm, normal pulse and normal strength. Left upper arm: He exhibits tenderness (diffuse left bicep, no other abnls) and laceration (very small, superficial abrasion, no sxs of inf). He exhibits no bony tenderness, no swelling, no edema and no deformity. Remaining UE exam 5/5 strength. RROM of bilat shoulders with IR/ER that is chronic. Left shoulder limited as noted secondary to pain in bicep, no shoulder pain/tenderness. No weakness. Appears NV intact. Neurological: He is alert and oriented to person, place, and time. He has normal motor skills, normal sensation and normal strength. He displays no weakness, facial symmetry and normal speech. No cranial nerve deficit. He exhibits normal muscle tone. Gait normal. Coordination normal.   Skin: Skin is warm and dry. Psychiatric: Mood and affect normal.   Nursing note and vitals reviewed.       Assessment/Plan:    ICD-10-CM ICD-9-CM    1. Dizziness R42 780.4 CBC WITH AUTOMATED DIFF      AMB POC URINALYSIS DIP STICK MANUAL W/O MICRO      METABOLIC PANEL, BASIC      AMB POC EKG ROUTINE W/ 12 LEADS, INTER & REP      FL HANDLG&/OR CONVEY OF SPEC FOR TR OFFICE TO LAB      WI COLLECTION VENOUS BLOOD,VENIPUNCTURE   2. Orthostatic hypotension I95.1 458.0    3. Chronic systolic congestive heart failure (HCC) I50.22 428.22 AMB POC EKG ROUTINE W/ 12 LEADS, INTER & REP     428.0 WI HANDLG&/OR CONVEY OF SPEC FOR TR OFFICE TO LAB      WI COLLECTION VENOUS BLOOD,VENIPUNCTURE   4. SSS (sick sinus syndrome) ([facility]) I49.5 427.81 AMB POC EKG ROUTINE W/ 12 LEADS, INTER & REP      WI HANDLG&/OR CONVEY OF SPEC FOR TR OFFICE TO LAB      WI COLLECTION VENOUS BLOOD,VENIPUNCTURE   5. Pacemaker Z95.0 V45.01 AMB POC EKG ROUTINE W/ 12 LEADS, INTER & REP   6. Acute pain of left shoulder M25.512 719.41    7. Muscle spasm of back M62.830 724.8 tiZANidine (ZANAFLEX) 2 mg tablet   8. Biceps strain, left, initial encounter S46.112A 840.8 tiZANidine (ZANAFLEX) 2 mg tablet   9. Constipation, unspecified constipation type K59.00 564.00 - Will hold methocarbamol for now. Counseled pt on potential medication AEs/interactions with other meds. Caution regarding sedation and safety.  - Pt would like to hold off on any imaging for now, agrees likely strain/sprain. If pain continues/worsens will return for XR.  - Rec alt ice/heat. Tylenol. Limit overhead activity. Light rom/stretching. Avoid heavy lifting. Orthostatic Blood Pressure:  Blood pressure:    lying 134/74, sitting 108/72, standing 100/60. Pulse:    Lying 59 , sitting 65 , standing 65      EKG shows no change compared to previous. Counseled pt on orthostatic hypotension and importance of staying well hydrated. Pt agrees. Also counseled on caution/slow standing up. HO Provided. Will hold metoprolol x 1 if needed if BP/HR remains low and still symptomatic and resume tomorrow. Will monitor BP/HR closely. Call with any issues/concerns. F/u with cardiology (will check with wife on next apt). In meantime, to ER for any red flag sxs as discussed (cp, sob, worsening dizziness, fall/syncope or balance isues. ..etc).   Pt verbalizes understanding and agrees with the plan. Follow-up Disposition:  Return in about 2 months (around 7/5/2017), or sooner if symptoms worsen or fail to improve, for annual fasting labs.     Asim Jay PA-C

## 2017-05-05 NOTE — MR AVS SNAPSHOT
Visit Information Date & Time Provider Department Dept. Phone Encounter #  
 5/5/2017 11:00 AM Tata Heard PA-C [address] [facility] [id number] Your Appointments 8/21/2017  3:00 PM  
Follow Up with Bassam Gomez MD  
[facility]) Appt Note: Follow up $0CP tdb 1/23/17  
 [address] [place] [address], 
[address] [facility], [address] [phone]  
[address] , [address] [facility], [phone] Upcoming Health Maintenance Date Due FOBT Q 1 YEAR AGE 50-75 4/1/1994 ZOSTER VACCINE AGE 60> 4/1/2004 DTaP/Tdap/Td series (1 - Tdap) 6/20/2017* GLAUCOMA SCREENING Q2Y 12/14/2017* MEDICARE YEARLY EXAM 7/1/2017 INFLUENZA AGE 9 TO ADULT 8/1/2017 *Topic was postponed. The date shown is not the original due date. Allergies as of 5/5/2017  Review Complete On: 5/5/2017 By: Tata Heard PA-C No Known Allergies Current Immunizations  Reviewed on 6/17/2016 Name Date Influenza High Dose Vaccine PF 11/10/2016 Influenza Vaccine 9/16/2014 Influenza Vaccine Split 9/14/2011 Influenza Vaccine Whole 9/1/2010 Pneumococcal Conjugate (PCV-13) 11/10/2016 Pneumococcal Vaccine (Unspecified Type) 9/14/2011 Not reviewed this visit You Were Diagnosed With   
  
 Codes Comments Dizziness    -  Primary ICD-10-CM: O27 ICD-9-CM: 780.4 Orthostatic hypotension     ICD-10-CM: I95.1 ICD-9-CM: 046. 0 Chronic systolic congestive heart failure (HCC)     ICD-10-CM: I50.22 ICD-9-CM: 428.22, 428.0 SSS (sick sinus syndrome) (HCC)     ICD-10-CM: I49.5 ICD-9-CM: 427.81 Pacemaker     ICD-10-CM: Z95.0 ICD-9-CM: V45.01 Acute pain of left shoulder     ICD-10-CM: M25.512 ICD-9-CM: 719.41 Muscle spasm of back     ICD-10-CM: A72.451 ICD-9-CM: 724.8 Biceps strain, left, initial encounter     ICD-10-CM: F29.744L ICD-9-CM: 840.8 Vitals BP Pulse Temp Resp Height(growth percentile) Weight(growth percentile) 100/60 (BP 1 Location: Left arm, BP Patient Position: Standing) 65 97.4 °F (36.3 °C) (Oral) 16 5' 7\" (1.702 m) 158 lb 12.8 oz (72 kg) SpO2 BMI Smoking Status 96% 24.87 kg/m2 Former Smoker Vitals History BMI and BSA Data Body Mass Index Body Surface Area  
 24.87 kg/m 2 1.84 m 2 Preferred Pharmacy Pharmacy Name Phone [facility] [address] [facility], [address] [place] [address] Your Updated Medication List  
  
   
This list is accurate as of: 5/5/17 12:13 PM.  Always use your most recent med list.  
  
  
  
  
 acetaminophen-codeine 300-30 mg per tablet Commonly known as:  TYLENOL #3  
two (2) times daily as needed. atorvastatin 40 mg tablet Commonly known as:  LIPITOR  
TAKE 1 TABLET BY MOUTH EVERY DAY  
  
 ELIQUIS 5 mg tablet Generic drug:  apixaban TAKE 1 TABLET BY MOUTH EVERY 12 HOURS  
  
 ferrous sulfate 325 mg (65 mg iron) tablet Take 45 mg by mouth Daily (before breakfast). fluticasone 50 mcg/actuation nasal spray Commonly known as:  Arvind Bumpers 2 Sprays by Both Nostrils route daily. gabapentin 100 mg capsule Commonly known as:  NEURONTIN Take 1 Cap by mouth three (3) times daily. Incontinence Pad, Liner, Disp Pads Commonly known as:  BLADDER CONTROL PADS EX ABSORB  
1 Packet by Does Not Apply route as needed (incontinence). lisinopril 5 mg tablet Commonly known as:  PRINIVIL, ZESTRIL  
5 mg daily. memantine 10 mg tablet Commonly known as:  Lori Fabian Take one half tab twice a day for 1 week, then 1 tab twice a day  
  
 methocarbamol 500 mg tablet Commonly known as:  ROBAXIN  
  
 metoprolol succinate 25 mg XL tablet Commonly known as:  TOPROL-XL Take 1 Tab by mouth daily. Indications: hypertension MIRALAX 17 gram/dose powder Generic drug:  polyethylene glycol Take 17 g by mouth daily. multivitamin tablet Commonly known as:  ONE A DAY Take 1 Tab by mouth daily. nitroglycerin 0.4 mg SL tablet Commonly known as:  NITROSTAT  
1 Tab by SubLINGual route every five (5) minutes as needed for Chest Pain (call 911 if not relieved by 3). sertraline 100 mg tablet Commonly known as:  ZOLOFT Take 1 Tab by mouth daily. tiZANidine 2 mg tablet Commonly known as:  Cheryl Boyd 2mg po x 1, may repeat q6-8hr prn. Max 3 doses/24h. traZODone 100 mg tablet Commonly known as:  Roosevelt Cushing Take 1 Tab by mouth nightly. Prescriptions Sent to Pharmacy Refills  
 tiZANidine (ZANAFLEX) 2 mg tablet 0 Simg po x 1, may repeat q6-8hr prn. Max 3 doses/24h. Class: Normal  
 [facility] [address] &  Ph #: [phone] We Performed the Following AMB POC EKG ROUTINE W/ 12 LEADS, INTER & REP [82155 CPT(R)] AMB POC URINALYSIS DIP STICK MANUAL W/O MICRO [64514 CPT(R)] CBC WITH AUTOMATED DIFF [23870 CPT(R)] METABOLIC PANEL, BASIC [44036 CPT(R)] CT COLLECTION VENOUS BLOOD,VENIPUNCTURE [id number] CPT(R)] CT HANDLG&/OR CONVEY OF SPEC FOR TR OFFICE TO LAB [28187 CPT(R)] Patient Instructions Dizziness: Care Instructions Your Care Instructions Dizziness is the feeling of unsteadiness or fuzziness in your head. It is different than having vertigo, which is a feeling that the room is spinning or that you are moving or falling. It is also different from lightheadedness, which is the feeling that you are about to faint. It can be hard to know what causes dizziness. Some people feel dizzy when they have migraine headaches. Sometimes bouts of flu can make you feel dizzy. Some medical conditions, such as heart problems or high blood pressure, can make you feel dizzy. Many medicines can cause dizziness, including medicines for high blood pressure, pain, or anxiety. If a medicine causes your symptoms, your doctor may recommend that you stop or change the medicine. If it is a problem with your heart, you may need medicine to help your heart work better. If there is no clear reason for your symptoms, your doctor may suggest watching and waiting for a while to see if the dizziness goes away on its own. Follow-up care is a key part of your treatment and safety. Be sure to make and go to all appointments, and call your doctor if you are having problems. It's also a good idea to know your test results and keep a list of the medicines you take. How can you care for yourself at home? · If your doctor recommends or prescribes medicine, take it exactly as directed. Call your doctor if you think you are having a problem with your medicine. · Do not drive while you feel dizzy. · Try to prevent falls. Steps you can take include: ¨ Using nonskid mats, adding grab bars near the tub, and using night-lights. ¨ Clearing your home so that walkways are free of anything you might trip on. ¨ Letting family and friends know that you have been feeling dizzy. This will help them know how to help you. When should you call for help? Call 911 anytime you think you may need emergency care. For example, call if: 
· You passed out (lost consciousness). · You have dizziness along with symptoms of a heart attack. These may include: ¨ Chest pain or pressure, or a strange feeling in the chest. 
¨ Sweating. ¨ Shortness of breath. ¨ Nausea or vomiting. ¨ Pain, pressure, or a strange feeling in the back, neck, jaw, or upper belly or in one or both shoulders or arms. ¨ Lightheadedness or sudden weakness. ¨ A fast or irregular heartbeat. · You have symptoms of a stroke. These may include: 
¨ Sudden numbness, tingling, weakness, or loss of movement in your face, arm, or leg, especially on only one side of your body. ¨ Sudden vision changes. ¨ Sudden trouble speaking. ¨ Sudden confusion or trouble understanding simple statements. ¨ Sudden problems with walking or balance. ¨ A sudden, severe headache that is different from past headaches. Call your doctor now or seek immediate medical care if: 
· You feel dizzy and have a fever, headache, or ringing in your ears. · You have new or increased nausea and vomiting. · Your dizziness does not go away or comes back. Watch closely for changes in your health, and be sure to contact your doctor if: 
· You do not get better as expected. Where can you learn more? Go to http://kendell-stephanie.info/. Enter C101 in the search box to learn more about \"Dizziness: Care Instructions. \" Current as of: May 27, 2016 Content Version: 11.2 © 5923-3586 ClearEdge[employer]D. Care instructions adapted under license by [employer] (which disclaims liability or warranty for this information). If you have questions about a medical condition or this instruction, always ask your healthcare professional. [place] [address] any warranty or liability for your use of this information. Orthostatic Hypotension: Care Instructions Your Care Instructions Orthostatic hypotension is a quick drop in blood pressure. It happens when you get up from sitting or lying down. You may feel faint, lightheaded, or dizzy. When a person sits up or stands up, the body changes the way it pumps blood. This can slow the flow of blood to the brain for a very short time. And that can make you feel lightheaded. Many medicines can cause this problem, especially in older people. Lack of fluids (dehydration) or illnesses such as diabetes or heart disease also can cause it. Follow-up care is a key part of your treatment and safety. Be sure to make and go to all appointments, and call your doctor if you are having problems. It's also a good idea to know your test results and keep a list of the medicines you take. How can you care for yourself at home? · Tell your doctor about any problems you have with your medicines. · If your doctor prescribes medicine to help prevent a low blood pressure problem, take it exactly as prescribed. Call your doctor if you think you are having a problem with your medicine. · Drink plenty of fluids, enough so that your urine is light yellow or clear like water. Choose water and other caffeine-free clear liquids. If you have kidney, heart, or liver disease and have to limit fluids, talk with your doctor before you increase the amount of fluids you drink. · Limit or avoid alcohol and caffeine. · Get up slowly from bed or after sitting for a long time. If you are in bed, roll to your side and swing your legs over the edge of the bed and onto the floor. Push your body up to a sitting position. Wait for a while before you slowly stand up. If you are dizzy or lightheaded, sit or lie down. When should you call for help? Call 911 anytime you think you may need emergency care. For example, call if: 
· You passed out (lost consciousness). Watch closely for changes in your health, and be sure to contact your doctor if: 
· You do not get better as expected. Where can you learn more? Go to http://kendell-stephanie.info/. Enter F667 in the search box to learn more about \"Orthostatic Hypotension: Care Instructions. \" Current as of: January 27, 2016 Content Version: 11.2 © 6483-2182 PerkHub. Care instructions adapted under license by [employer] (which disclaims liability or warranty for this information). If you have questions about a medical condition or this instruction, always ask your healthcare professional. [address] any warranty or liability for your use of this information. Introducing hospitals & HEALTH SERVICES! Dear Kirti Freitas: Thank you for requesting a [employer] account.   Our records indicate that you already have an active [employer] account. You can access your account anytime at [email or web address] Did you know that you can access your hospital and ER discharge instructions at any time in GPNX? You can also review all of your test results from your hospital stay or ER visit. Additional Information If you have questions, please visit the Frequently Asked Questions section of the GPN[employer] website at [email or web address] Remember, GPNX is NOT to be used for urgent needs. For medical emergencies, dial 911. Now available from your iPhone and Android! Please provide this summary of care documentation to your next provider. Your primary care clinician is listed as Alejandro Vo. If you have any questions after today's visit, please call [phone].

## 2017-05-05 NOTE — PATIENT INSTRUCTIONS
Dizziness: Care Instructions  Your Care Instructions  Dizziness is the feeling of unsteadiness or fuzziness in your head. It is different than having vertigo, which is a feeling that the room is spinning or that you are moving or falling. It is also different from lightheadedness, which is the feeling that you are about to faint. It can be hard to know what causes dizziness. Some people feel dizzy when they have migraine headaches. Sometimes bouts of flu can make you feel dizzy. Some medical conditions, such as heart problems or high blood pressure, can make you feel dizzy. Many medicines can cause dizziness, including medicines for high blood pressure, pain, or anxiety. If a medicine causes your symptoms, your doctor may recommend that you stop or change the medicine. If it is a problem with your heart, you may need medicine to help your heart work better. If there is no clear reason for your symptoms, your doctor may suggest watching and waiting for a while to see if the dizziness goes away on its own. Follow-up care is a key part of your treatment and safety. Be sure to make and go to all appointments, and call your doctor if you are having problems. It's also a good idea to know your test results and keep a list of the medicines you take. How can you care for yourself at home? · If your doctor recommends or prescribes medicine, take it exactly as directed. Call your doctor if you think you are having a problem with your medicine. · Do not drive while you feel dizzy. · Try to prevent falls. Steps you can take include:  ¨ Using nonskid mats, adding grab bars near the tub, and using night-lights. ¨ Clearing your home so that walkways are free of anything you might trip on. ¨ Letting family and friends know that you have been feeling dizzy. This will help them know how to help you. When should you call for help? Call 911 anytime you think you may need emergency care.  For example, call if:  · You passed out (lost consciousness). · You have dizziness along with symptoms of a heart attack. These may include:  ¨ Chest pain or pressure, or a strange feeling in the chest.  ¨ Sweating. ¨ Shortness of breath. ¨ Nausea or vomiting. ¨ Pain, pressure, or a strange feeling in the back, neck, jaw, or upper belly or in one or both shoulders or arms. ¨ Lightheadedness or sudden weakness. ¨ A fast or irregular heartbeat. · You have symptoms of a stroke. These may include:  ¨ Sudden numbness, tingling, weakness, or loss of movement in your face, arm, or leg, especially on only one side of your body. ¨ Sudden vision changes. ¨ Sudden trouble speaking. ¨ Sudden confusion or trouble understanding simple statements. ¨ Sudden problems with walking or balance. ¨ A sudden, severe headache that is different from past headaches. Call your doctor now or seek immediate medical care if:  · You feel dizzy and have a fever, headache, or ringing in your ears. · You have new or increased nausea and vomiting. · Your dizziness does not go away or comes back. Watch closely for changes in your health, and be sure to contact your doctor if:  · You do not get better as expected. Where can you learn more? Go to http://kendell-stephanie.info/. Enter X144 in the search box to learn more about \"Dizziness: Care Instructions. \"  Current as of: May 27, 2016  Content Version: 11.2  © 0029-4050 [employer]ut. Care instructions adapted under license by [employer]OCK[employer] (which disclaims liability or warranty for this information). If you have questions about a medical condition or this instruction, always ask your healthcare professional. [place] [address] any warranty or liability for your use of this information. Orthostatic Hypotension: Care Instructions  Your Care Instructions  Orthostatic hypotension is a quick drop in blood pressure.  It happens when you get up from sitting or lying down. You may feel faint, lightheaded, or dizzy. When a person sits up or stands up, the body changes the way it pumps blood. This can slow the flow of blood to the brain for a very short time. And that can make you feel lightheaded. Many medicines can cause this problem, especially in older people. Lack of fluids (dehydration) or illnesses such as diabetes or heart disease also can cause it. Follow-up care is a key part of your treatment and safety. Be sure to make and go to all appointments, and call your doctor if you are having problems. It's also a good idea to know your test results and keep a list of the medicines you take. How can you care for yourself at home? · Tell your doctor about any problems you have with your medicines. · If your doctor prescribes medicine to help prevent a low blood pressure problem, take it exactly as prescribed. Call your doctor if you think you are having a problem with your medicine. · Drink plenty of fluids, enough so that your urine is light yellow or clear like water. Choose water and other caffeine-free clear liquids. If you have kidney, heart, or liver disease and have to limit fluids, talk with your doctor before you increase the amount of fluids you drink. · Limit or avoid alcohol and caffeine. · Get up slowly from bed or after sitting for a long time. If you are in bed, roll to your side and swing your legs over the edge of the bed and onto the floor. Push your body up to a sitting position. Wait for a while before you slowly stand up. If you are dizzy or lightheaded, sit or lie down. When should you call for help? Call 911 anytime you think you may need emergency care. For example, call if:  · You passed out (lost consciousness). Watch closely for changes in your health, and be sure to contact your doctor if:  · You do not get better as expected. Where can you learn more? Go to http://kendell-stephanie.info/.   Enter M096 in the search box to learn more about \"Orthostatic Hypotension: Care Instructions. \"  Current as of: January 27, 2016  Content Version: 11.2  © 1195-3707 TriplePulse. Care instructions adapted under license by [employer]mu[employer]te (which disclaims liability or warranty for this information). If you have questions about a medical condition or this instruction, always ask your healthcare professional. [address] any warranty or liability for your use of this information.

## 2017-05-05 NOTE — TELEPHONE ENCOUNTER
Called and spoke to patient's wife (on HIPPA), patient could not remember which medications he could not take together. I reviewed patient's visit notes from today, and advised her of recommendations from IFEOMA Saavedra. I advised her if they had anymore questions to please call the office. Wife verbalizes understanding.   Nicole Gomez RN

## 2017-05-06 LAB
BASOPHILS # BLD AUTO: 0 X10E3/UL (ref 0–0.2)
BASOPHILS NFR BLD AUTO: 0 %
BUN SERPL-MCNC: 29 MG/DL (ref 8–27)
BUN/CREAT SERPL: 29 (ref 10–24)
CALCIUM SERPL-MCNC: 10.4 MG/DL (ref 8.6–10.2)
CHLORIDE SERPL-SCNC: 99 MMOL/L (ref 96–106)
CO2 SERPL-SCNC: 27 MMOL/L (ref 18–29)
CREAT SERPL-MCNC: 1.01 MG/DL (ref 0.76–1.27)
EOSINOPHIL # BLD AUTO: 0 X10E3/UL (ref 0–0.4)
EOSINOPHIL NFR BLD AUTO: 0 %
ERYTHROCYTE [DISTWIDTH] IN BLOOD BY AUTOMATED COUNT: 15 % (ref 12.3–15.4)
GLUCOSE SERPL-MCNC: 86 MG/DL (ref 65–99)
HCT VFR BLD AUTO: 40.7 % (ref 37.5–51)
HGB BLD-MCNC: 13.3 G/DL (ref 12.6–17.7)
IMM GRANULOCYTES # BLD: 0 X10E3/UL (ref 0–0.1)
IMM GRANULOCYTES NFR BLD: 0 %
LYMPHOCYTES # BLD AUTO: 2 X10E3/UL (ref 0.7–3.1)
LYMPHOCYTES NFR BLD AUTO: 21 %
MCH RBC QN AUTO: 29.7 PG (ref 26.6–33)
MCHC RBC AUTO-ENTMCNC: 32.7 G/DL (ref 31.5–35.7)
MCV RBC AUTO: 91 FL (ref 79–97)
MONOCYTES # BLD AUTO: 0.8 X10E3/UL (ref 0.1–0.9)
MONOCYTES NFR BLD AUTO: 9 %
NEUTROPHILS # BLD AUTO: 6.5 X10E3/UL (ref 1.4–7)
NEUTROPHILS NFR BLD AUTO: 70 %
PLATELET # BLD AUTO: 245 X10E3/UL (ref 150–379)
POTASSIUM SERPL-SCNC: 5.3 MMOL/L (ref 3.5–5.2)
RBC # BLD AUTO: 4.48 X10E6/UL (ref 4.14–5.8)
SODIUM SERPL-SCNC: 139 MMOL/L (ref 134–144)
WBC # BLD AUTO: 9.5 X10E3/UL (ref 3.4–10.8)

## 2017-05-08 NOTE — PROGRESS NOTES
Notify pt, CBC is nl. Remaining labs okay except potassium and calcium just slightly elev. Rec repeat next lab to monitor, but in meantime avoid foods loaded with those vitamins.

## 2017-05-09 RX ORDER — ATORVASTATIN CALCIUM 40 MG/1
TABLET, FILM COATED ORAL
Qty: 30 TAB | Refills: 5 | Status: SHIPPED | OUTPATIENT
Start: 2017-05-09 | End: 2017-11-24 | Stop reason: SDUPTHER

## 2017-05-11 DIAGNOSIS — G30.1 ALZHEIMER'S DEMENTIA, LATE ONSET, WITH BEHAVIORAL DISTURBANCE (HCC): ICD-10-CM

## 2017-05-11 DIAGNOSIS — F02.818 ALZHEIMER'S DEMENTIA, LATE ONSET, WITH BEHAVIORAL DISTURBANCE (HCC): ICD-10-CM

## 2017-05-11 RX ORDER — MEMANTINE HYDROCHLORIDE 10 MG/1
TABLET ORAL
Qty: 180 TAB | Refills: 3 | Status: SHIPPED | OUTPATIENT
Start: 2017-05-11 | End: 2018-03-20 | Stop reason: SDUPTHER

## 2017-05-11 NOTE — TELEPHONE ENCOUNTER
Requested Prescriptions     Pending Prescriptions Disp Refills    memantine (NAMENDA) 10 mg tablet 120 Tab 3     Sig: Take one half tab twice a day for 1 week, then 1 tab twice a day       Patient needs a 90 day script.

## 2017-05-12 ENCOUNTER — OFFICE VISIT (OUTPATIENT)
Dept: FAMILY MEDICINE CLINIC | Age: 72
End: 2017-05-12

## 2017-05-12 VITALS
SYSTOLIC BLOOD PRESSURE: 112 MMHG | WEIGHT: 159 LBS | OXYGEN SATURATION: 99 % | BODY MASS INDEX: 24.96 KG/M2 | DIASTOLIC BLOOD PRESSURE: 74 MMHG | RESPIRATION RATE: 16 BRPM | HEIGHT: 67 IN | HEART RATE: 54 BPM | TEMPERATURE: 97 F

## 2017-05-12 DIAGNOSIS — M54.16 LUMBAR BACK PAIN WITH RADICULOPATHY AFFECTING RIGHT LOWER EXTREMITY: ICD-10-CM

## 2017-05-12 DIAGNOSIS — M54.16 LUMBAR BACK PAIN WITH RADICULOPATHY AFFECTING LEFT LOWER EXTREMITY: ICD-10-CM

## 2017-05-12 DIAGNOSIS — G30.1 ALZHEIMER'S DEMENTIA, LATE ONSET, WITH BEHAVIORAL DISTURBANCE (HCC): ICD-10-CM

## 2017-05-12 DIAGNOSIS — F02.818 ALZHEIMER'S DEMENTIA, LATE ONSET, WITH BEHAVIORAL DISTURBANCE (HCC): ICD-10-CM

## 2017-05-12 DIAGNOSIS — G44.86 CERVICOGENIC HEADACHE: ICD-10-CM

## 2017-05-12 DIAGNOSIS — R19.5 DARK STOOLS: ICD-10-CM

## 2017-05-12 DIAGNOSIS — M54.2 NECK PAIN: ICD-10-CM

## 2017-05-12 DIAGNOSIS — Z86.73 HISTORY OF STROKE: ICD-10-CM

## 2017-05-12 DIAGNOSIS — M48.062 SPINAL STENOSIS, LUMBAR REGION, WITH NEUROGENIC CLAUDICATION: ICD-10-CM

## 2017-05-12 DIAGNOSIS — I65.23 STENOSIS OF BOTH CAROTID ARTERIES WITHOUT CEREBRAL INFARCTION: ICD-10-CM

## 2017-05-12 DIAGNOSIS — M96.1 CERVICAL POST-LAMINECTOMY SYNDROME: ICD-10-CM

## 2017-05-12 DIAGNOSIS — M25.512 ACUTE PAIN OF LEFT SHOULDER: Primary | ICD-10-CM

## 2017-05-12 NOTE — TELEPHONE ENCOUNTER
Requested Prescriptions     Pending Prescriptions Disp Refills    sertraline (ZOLOFT) 100 mg tablet 30 Tab 11     Sig: Take 1 Tab by mouth daily.

## 2017-05-12 NOTE — PATIENT INSTRUCTIONS
Shoulder Stretches: Exercises  Your Care Instructions  Here are some examples of exercises for your shoulder. Start each exercise slowly. Ease off the exercise if you start to have pain. Your doctor or physical therapist will tell you when you can start these exercises and which ones will work best for you. How to do the exercises  Note: These exercises should cause you to feel a gentle stretch, but no pain. Shoulder stretch    1.  a doorway and place one arm against the door frame. Your elbow should be a little higher than your shoulder. 2. Relax your shoulders as you lean forward, allowing your chest and shoulder muscles to stretch. You can also turn your body slightly away from your arm to stretch the muscles even more. 3. Hold for 15 to 30 seconds. 4. Repeat 2 to 4 times with each arm. Shoulder and chest stretch    Shoulder and chest stretch  1. While sitting, relax your upper body so you slump slightly in your chair. 2. As you breathe in, straighten your back and open your arms out to the sides. 3. Gently pull your shoulder blades back and downward. 4. Hold for 15 to 30 seconds as your breathe normally. 5. Repeat 2 to 4 times. Overhead stretch    1. Reach up over your head with both arms. 2. Hold for 15 to 30 seconds. 3. Repeat 2 to 4 times. Follow-up care is a key part of your treatment and safety. Be sure to make and go to all appointments, and call your doctor if you are having problems. It's also a good idea to know your test results and keep a list of the medicines you take. Where can you learn more? Go to http://kendell-stephanie.info/. Enter S254 in the search box to learn more about \"Shoulder Stretches: Exercises. \"  Current as of: May 23, 2016  Content Version: 11.2  © 9614-3014 [email or web address]. Care instructions adapted under license by [employer] (which disclaims liability or warranty for this information).  If you have questions about a medical condition or this instruction, always ask your healthcare professional. [address] any warranty or liability for your use of this information. Shoulder Pain: Care Instructions  Your Care Instructions    You can hurt your shoulder by using it too much during an activity, such as fishing or baseball. It can also happen as part of the everyday wear and tear of getting older. Shoulder injuries can be slow to heal, but your shoulder should get better with time. Your doctor may recommend a sling to rest your shoulder. If you have injured your shoulder, you may need testing and treatment. Follow-up care is a key part of your treatment and safety. Be sure to make and go to all appointments, and call your doctor if you are having problems. It's also a good idea to know your test results and keep a list of the medicines you take. How can you care for yourself at home? · Take pain medicines exactly as directed. ¨ If the doctor gave you a prescription medicine for pain, take it as prescribed. ¨ If you are not taking a prescription pain medicine, ask your doctor if you can take an over-the-counter medicine. ¨ Do not take two or more pain medicines at the same time unless the doctor told you to. Many pain medicines contain acetaminophen, which is Tylenol. Too much acetaminophen (Tylenol) can be harmful. · If your doctor recommends that you wear a sling, use it as directed. Do not take it off before your doctor tells you to. · Put ice or a cold pack on the sore area for 10 to 20 minutes at a time. Put a thin cloth between the ice and your skin. · If there is no swelling, you can put moist heat, a heating pad, or a warm cloth on your shoulder. Some doctors suggest alternating between hot and cold. · Rest your shoulder for a few days. If your doctor recommends it, you can then begin gentle exercise of the shoulder, but do not lift anything heavy. When should you call for help?   Call 911 anytime you think you may need emergency care. For example, call if:  · You have chest pain or pressure. This may occur with:  ¨ Sweating. ¨ Shortness of breath. ¨ Nausea or vomiting. ¨ Pain that spreads from the chest to the neck, jaw, or one or both shoulders or arms. ¨ Dizziness or lightheadedness. ¨ A fast or uneven pulse. After calling 911, chew 1 adult-strength aspirin. Wait for an ambulance. Do not try to drive yourself. · Your arm or hand is cool or pale or changes color. Call your doctor now or seek immediate medical care if:  · You have signs of infection, such as:  ¨ Increased pain, swelling, warmth, or redness in your shoulder. ¨ Red streaks leading from a place on your shoulder. ¨ Pus draining from an area of your shoulder. ¨ Swollen lymph nodes in your neck, armpits, or groin. ¨ A fever. Watch closely for changes in your health, and be sure to contact your doctor if:  · You cannot use your shoulder. · Your shoulder does not get better as expected. Where can you learn more? Go to http://kendell-stephanie.info/. Enter M653 in the search box to learn more about \"Shoulder Pain: Care Instructions. \"  Current as of: May 23, 2016  Content Version: 11.2  © 6540-3128 [employer]. Care instructions adapted under license by [employer] (which disclaims liability or warranty for this information). If you have questions about a medical condition or this instruction, always ask your healthcare professional. [place] [address] any warranty or liability for your use of this information.

## 2017-05-12 NOTE — PROGRESS NOTES
Carmen Carbone is a 67 y.o. male who presents to the office today with the following:  Chief Complaint   Patient presents with    Arm Pain     L arm pain, shoulder to numbness in hand       HPI  Still with left shoulder pain. Good on medication, but would like to move fwd with XR. Has f/u with Ortho Dr. Suzy Barclay next week, does not want other referral.   Now with some intermittent tingling in left hand. No n/w/s. No skin changes. Still pain with overhead movement and RROM. Has had no new injuries. Also reported to nurse brief episode of dark stool last week, not red/tarry. Happened with 2-3 BMs, then completely resolved, Normal BM since. Did eat some dark greens. Noticed no blood. Had no pain. No n/v/d. Has had FOBT at home for months now and keeps forgetting to bring in. Review of Systems   Constitutional: Negative for chills and fever. Respiratory: Negative for shortness of breath. Cardiovascular: Negative for chest pain. Gastrointestinal: Negative for abdominal pain, blood in stool, constipation, diarrhea, heartburn, nausea and vomiting. Genitourinary: Negative. Musculoskeletal: Positive for joint pain (left shoulder). Negative for falls. Skin: Negative for rash. Neurological: Positive for tingling. Negative for dizziness, tremors, sensory change, speech change, focal weakness, seizures, loss of consciousness and headaches. See HPI. No Known Allergies    Current Outpatient Prescriptions   Medication Sig    memantine (NAMENDA) 10 mg tablet Take 1 tab twice a day    atorvastatin (LIPITOR) 40 mg tablet TAKE 1 TABLET BY MOUTH EVERY DAY    tiZANidine (ZANAFLEX) 2 mg tablet 2mg po x 1, may repeat q6-8hr prn. Max 3 doses/24h.  gabapentin (NEURONTIN) 100 mg capsule Take 1 Cap by mouth three (3) times daily.  metoprolol succinate (TOPROL-XL) 25 mg XL tablet Take 1 Tab by mouth daily.  Indications: hypertension    ELIQUIS 5 mg tablet TAKE 1 TABLET BY MOUTH EVERY 12 HOURS  multivitamin (ONE A DAY) tablet Take 1 Tab by mouth daily.  traZODone (DESYREL) 100 mg tablet Take 1 Tab by mouth nightly.  acetaminophen-codeine (TYLENOL #3) 300-30 mg per tablet two (2) times daily as needed.  lisinopril (PRINIVIL, ZESTRIL) 5 mg tablet 5 mg daily.  sertraline (ZOLOFT) 100 mg tablet Take 1 Tab by mouth daily.  fluticasone (FLONASE) 50 mcg/actuation nasal spray 2 Sprays by Both Nostrils route daily.  polyethylene glycol (MIRALAX) 17 gram/dose powder Take 17 g by mouth daily.  Incontinence Pad, Liner, Disp (BLADDER CONTROL PADS EX ABSORB) pads 1 Packet by Does Not Apply route as needed (incontinence).  nitroglycerin (NITROSTAT) 0.4 mg SL tablet 1 Tab by SubLINGual route every five (5) minutes as needed for Chest Pain (call 911 if not relieved by 3).  methocarbamol (ROBAXIN) 500 mg tablet     ferrous sulfate 325 mg (65 mg iron) tablet Take 45 mg by mouth Daily (before breakfast). No current facility-administered medications for this visit.         Past Medical History:   Diagnosis Date    AICD (automatic cardioverter/defibrillator) present 5/13/2016 5/13/16 [employer] upgrade to dual chamber AICD implant  Dr. Mario Whitman state, other     Arrhythmia     'S IN THE PAST    Arthritis     OSTEO ARTHRITIS    AVNRT (AV bridgette re-entry tachycardia) ([facility] [address] 5/12/2016 5/12/16 AVNRT ablation: PM/AICD left upper chest :Dr. Jean-Pierre Newell Back ache     CAD (coronary artery disease)     Cancer [facility]) 2004    Prostate cancer    Chest tightness     last episode of chest pain 5/2016 before AICD placed; none since then    Coagulation defects 2005    FACTOR V LEIDEN    Dizziness     FH: factor V Leiden deficiency     Generalized muscle ache     GERD (gastroesophageal reflux disease)     HEARTBURN    Heart failure ([facility] [address] 2014    EF 40%; Dr. Marley Forward    Hyperlipidemia, mixed     Hypertension     COREG    Other ill-defined conditions 2004 blood transfusion    Pacemaker     Pacemaker     Postsurgical aortocoronary bypass status 05/29/2012    CABG    Presence of cardiac defibrillator 05/2016    left upper chest    Psychiatric disorder     depression    Stroke [facility]) 2011, 1990's    SEVERAL-mini    Thromboembolism ([facility] [address] 2014    left leg: changed to Eliquis from Warfarin    Thromboembolus ([facility] [address] 2005    left leg    Weakness generalized        Past Surgical History:   Procedure Laterality Date    CARDIAC CATHETERIZATION  3/4/2011         CARDIAC SURG PROCEDURE UNLIST      CABG X1 3/5/11    HX HERNIA REPAIR  2002    Ingiunal hernia repair left    HX ORTHOPAEDIC      LEFT KNEE CARTILAGE REPAIR;RIGHT ROTATOR CUFF REPAIR    HX ORTHOPAEDIC  2/14/12    C5-7 ANTERIIOR CERVICAL DISECTOMY AND FUSION    HX ORTHOPAEDIC  6/4/13    C5-C7 POSTERIOR DECOMPRESSION AND FUSION    HX OTHER SURGICAL      steroid injections    HX PACEMAKER PLACEMENT      H[place] PROSTATECTOMY  2004     [place]    HX UROLOGICAL       urinary control system    HX UROLOGICAL  12/3/13    REPLACEMENT ARTIFICAL URINARY SPHINCTER       Social History     Social History    Marital status:      Spouse name: N/A    Number of children: N/A    Years of education: N/A     Social History Main Topics    Smoking status: Former Smoker     Types: Cigarettes     Quit date: 10/10/1971    Smokeless tobacco: Never Used    Alcohol use 0.6 oz/week     0 Standard drinks or equivalent, 1 Cans of beer per week      Comment: 2 times a year    Drug use: No    Sexual activity: No     Other Topics Concern    Sleep Concern Yes    Stress Concern Yes     Family concerns     Social History Narrative    , 2 children       Family History   Problem Relation Age of Onset    Asthma Mother     Alcohol abuse Mother     Alcohol abuse Father     Asthma Father     Cancer Sister     Heart Disease Sister     Alcohol abuse Brother     Cancer Brother     Heart Disease Brother          Physical Exam:  Visit Vitals    /74 (BP 1 Location: Right arm, BP Patient Position: Sitting)    Pulse (!) 54    Temp 97 °F (36.1 °C) (Temporal)    Resp 16    Ht 5' 7\" (1.702 m)    Wt 159 lb (72.1 kg)    SpO2 99%    BMI 24.9 kg/m2     Physical Exam   Constitutional: He is oriented to person, place, and time and well-developed, well-nourished, and in no distress. HENT:   Head: Normocephalic and atraumatic. Eyes: Conjunctivae are normal.   Neck: Neck supple. Cardiovascular: Normal rate and regular rhythm. Pulmonary/Chest: Effort normal and breath sounds normal.   Abdominal: Soft. There is no tenderness. Musculoskeletal: He exhibits no edema. Left shoulder: He exhibits decreased range of motion, tenderness, pain and decreased strength (strength ranges from 4/5-5/5, is actually better compared to right \"better\" arm ). He exhibits no bony tenderness, no swelling, no effusion, no crepitus, no deformity, no laceration, no spasm and normal pulse. Cervical back: He exhibits no tenderness and no swelling. Neg empty can test, except for asxs R arm 4/5 vs L arm (symptomatic shoulder) 5/5. Right shoulder exam otherwise nl, with FROM.  strength ~4/5 bilat. Neurological: He is alert and oriented to person, place, and time. He has normal motor skills, normal sensation and intact cranial nerves. Gait normal.   Skin: Skin is warm and dry. No rash noted. No erythema. Psychiatric: Mood and affect normal.   Nursing note and vitals reviewed. Assessment/Plan:    ICD-10-CM ICD-9-CM    1. Acute pain of left shoulder M25.512 719.41 XR SHOULDER LT AP/LAT MIN 2 V  - Will call Dr. Jas Salguero to confirm apt and also make aware of new shoulder issue. Can  XR report to take. - Counseled on light ROM/stretching. Avoid heavy lifting/overhead activity. May cont alt ice/heat.    2. Dark stools R19.5 792.1 - Agrees to bring in FOBT asap, today (still has at home, has not brought in as instructed previously). Declines rectal exam today, has no sxs. Was \"just mentioning, fine now\". Will need official f/u if sxs return for further eval if FOBT neg. To seek immediate care for any bleeding, abd pain, or alarm sxs. Already avoids NSAIDs. In meantime, counseled on red flag sxs and if any to seek immediate care/to ER. Follow-up Disposition:  Return if symptoms worsen or fail to improve.     Anjelica López PA-C

## 2017-05-12 NOTE — MR AVS SNAPSHOT
Visit Information Date & Time Provider Department Dept. Phone Encounter #  
 5/12/2017  8:30 AM Regina Garcia PA-C [address] [id number] Your Appointments 8/21/2017  3:00 PM  
Follow Up with Faina Zuleta MD  
[facility] at [facility] [address]) Appt Note: Follow up $0CP tdb 1/23/17  
 [address] [place] [address], 
[address] [facility], [address] [phone]  
[address], [address], [address] [address] Upcoming Health Maintenance Date Due FOBT Q 1 YEAR AGE 50-75 4/1/1994 ZOSTER VACCINE AGE 60> 4/1/2004 DTaP/Tdap/Td series (1 - Tdap) 6/20/2017* GLAUCOMA SCREENING Q2Y 12/14/2017* MEDICARE YEARLY EXAM 7/1/2017 INFLUENZA AGE 9 TO ADULT 8/1/2017 *Topic was postponed. The date shown is not the original due date. Allergies as of 5/12/2017  Review Complete On: 5/12/2017 By: Regina Garcia PA-C No Known Allergies Current Immunizations  Reviewed on 6/17/2016 Name Date Influenza High Dose Vaccine PF 11/10/2016 Influenza Vaccine 9/16/2014 Influenza Vaccine Split 9/14/2011 Influenza Vaccine Whole 9/1/2010 Pneumococcal Conjugate (PCV-13) 11/10/2016 Pneumococcal Vaccine (Unspecified Type) 9/14/2011 Not reviewed this visit You Were Diagnosed With   
  
 Codes Comments Acute pain of left shoulder    -  Primary ICD-10-CM: M25.512 ICD-9-CM: 719.41 Vitals BP Pulse Temp Resp Height(growth percentile) Weight(growth percentile) 112/74 (BP 1 Location: Right arm, BP Patient Position: Sitting) (!) 54 97 °F (36.1 °C) (Temporal) 16 5' 7\" (1.702 m) 159 lb (72.1 kg) SpO2 BMI Smoking Status 99% 24.9 kg/m2 Former Smoker BMI and BSA Data Body Mass Index Body Surface Area 24.9 kg/m 2 1.85 m 2 Preferred Pharmacy Pharmacy Name Phone [facility] [address] [place] [address], [address] [place] [address] Se Your Updated Medication List  
  
   
This list is accurate as of: 5/12/17  8:55 AM.  Always use your most recent med list.  
  
  
  
  
 acetaminophen-codeine 300-30 mg per tablet Commonly known as:  TYLENOL #3  
two (2) times daily as needed. atorvastatin 40 mg tablet Commonly known as:  LIPITOR  
TAKE 1 TABLET BY MOUTH EVERY DAY  
  
 ELIQUIS 5 mg tablet Generic drug:  apixaban TAKE 1 TABLET BY MOUTH EVERY 12 HOURS  
  
 ferrous sulfate 325 mg (65 mg iron) tablet Take 45 mg by mouth Daily (before breakfast). fluticasone 50 mcg/actuation nasal spray Commonly known as:  Angie Ariella 2 Sprays by Both Nostrils route daily. gabapentin 100 mg capsule Commonly known as:  NEURONTIN Take 1 Cap by mouth three (3) times daily. Incontinence Pad, Liner, Disp Pads Commonly known as:  BLADDER CONTROL PADS EX ABSORB  
1 Packet by Does Not Apply route as needed (incontinence). lisinopril 5 mg tablet Commonly known as:  PRINIVIL, ZESTRIL  
5 mg daily. memantine 10 mg tablet Commonly known as:  Ladi Aus Take 1 tab twice a day  
  
 methocarbamol 500 mg tablet Commonly known as:  ROBAXIN  
  
 metoprolol succinate 25 mg XL tablet Commonly known as:  TOPROL-XL Take 1 Tab by mouth daily. Indications: hypertension MIRALAX 17 gram/dose powder Generic drug:  polyethylene glycol Take 17 g by mouth daily. multivitamin tablet Commonly known as:  ONE A DAY Take 1 Tab by mouth daily. nitroglycerin 0.4 mg SL tablet Commonly known as:  NITROSTAT  
1 Tab by SubLINGual route every five (5) minutes as needed for Chest Pain (call 911 if not relieved by 3). sertraline 100 mg tablet Commonly known as:  ZOLOFT Take 1 Tab by mouth daily. tiZANidine 2 mg tablet Commonly known as:  Albertina Liming 2mg po x 1, may repeat q6-8hr prn. Max 3 doses/24h. traZODone 100 mg tablet Commonly known as:  Nichole Vasquez Take 1 Tab by mouth nightly. To-Do List   
 05/12/2017 Imaging:  XR SHOULDER LT AP/LAT MIN 2 V Introducing [facility]! Dear Yin Koroma: Thank you for requesting a [employer] account. Our records indicate that you already have an active [employer] Android account. You can access your account anytime at [email or web address] Did you know that you can access your hospital and ER discharge instructions at any time in Funky Android? You can also review all of your test results from your hospital stay or ER visit. Additional Information If you have questions, please visit the Frequently Asked Questions section of the Funky Android website at [email or web address] Remember, Funky Android is NOT to be used for urgent needs. For medical emergencies, dial 911. Now available from your iPhone and Android! Please provide this summary of care documentation to your next provider. Your primary care clinician is listed as Elizabeht Billings. If you have any questions after today's visit, please call [phone].

## 2017-05-15 ENCOUNTER — TELEPHONE (OUTPATIENT)
Dept: FAMILY MEDICINE CLINIC | Age: 72
End: 2017-05-15

## 2017-05-15 DIAGNOSIS — F02.818 ALZHEIMER'S DEMENTIA, LATE ONSET, WITH BEHAVIORAL DISTURBANCE (HCC): ICD-10-CM

## 2017-05-15 DIAGNOSIS — M54.2 NECK PAIN: ICD-10-CM

## 2017-05-15 DIAGNOSIS — G30.1 ALZHEIMER'S DEMENTIA, LATE ONSET, WITH BEHAVIORAL DISTURBANCE (HCC): ICD-10-CM

## 2017-05-15 DIAGNOSIS — M54.16 LUMBAR BACK PAIN WITH RADICULOPATHY AFFECTING RIGHT LOWER EXTREMITY: ICD-10-CM

## 2017-05-15 DIAGNOSIS — M96.1 CERVICAL POST-LAMINECTOMY SYNDROME: ICD-10-CM

## 2017-05-15 DIAGNOSIS — M54.16 LUMBAR BACK PAIN WITH RADICULOPATHY AFFECTING LEFT LOWER EXTREMITY: ICD-10-CM

## 2017-05-15 DIAGNOSIS — G44.86 CERVICOGENIC HEADACHE: ICD-10-CM

## 2017-05-15 DIAGNOSIS — Z86.73 HISTORY OF STROKE: ICD-10-CM

## 2017-05-15 DIAGNOSIS — I65.23 STENOSIS OF BOTH CAROTID ARTERIES WITHOUT CEREBRAL INFARCTION: ICD-10-CM

## 2017-05-15 DIAGNOSIS — M48.062 SPINAL STENOSIS, LUMBAR REGION, WITH NEUROGENIC CLAUDICATION: ICD-10-CM

## 2017-05-15 RX ORDER — SERTRALINE HYDROCHLORIDE 100 MG/1
100 TABLET, FILM COATED ORAL DAILY
Qty: 30 TAB | Refills: 11 | Status: SHIPPED | OUTPATIENT
Start: 2017-05-15 | End: 2018-05-29 | Stop reason: SDUPTHER

## 2017-05-15 RX ORDER — SERTRALINE HYDROCHLORIDE 100 MG/1
100 TABLET, FILM COATED ORAL DAILY
Qty: 30 TAB | Refills: 11 | OUTPATIENT
Start: 2017-05-15

## 2017-05-15 NOTE — TELEPHONE ENCOUNTER
Future Appointments  Date Time Provider Jayme Oralia   8/21/2017 3:00 PM Greg Florez MD [address]                         Last Appointment My Department:  1/23/2017    Please advise of refill below. Requested Prescriptions     Pending Prescriptions Disp Refills    sertraline (ZOLOFT) 100 mg tablet 30 Tab 11     Sig: Take 1 Tab by mouth daily.

## 2017-05-15 NOTE — TELEPHONE ENCOUNTER
Patient will get RX from Dr Massimo Lowry psych or Dr Larry Dozier Neurlogist thinks Dr Larry Dozier gave it to him because he was following up but with call Dr Massimo Lowry

## 2017-05-24 ENCOUNTER — TELEPHONE (OUTPATIENT)
Dept: NEUROLOGY | Age: 72
End: 2017-05-24

## 2017-05-24 RX ORDER — BUTALBITAL, ACETAMINOPHEN AND CAFFEINE 50; 325; 40 MG/1; MG/1; MG/1
TABLET ORAL
Qty: 50 TAB | Refills: 0 | Status: SHIPPED | OUTPATIENT
Start: 2017-05-24 | End: 2017-07-17

## 2017-05-24 NOTE — TELEPHONE ENCOUNTER
After looking in to the patient's chart, I noticed that the patient has not had this medication authorized since 9/2015. I called the patient's wife and was given below information. Patient started having headaches again a little while ago. Was taking fioricet every now and again when they would come on. I advised since we have not sent in since 9/22/2015 and was not mentioned in last office visit, we should discuss in the office before sending the medication. I put the patient on the schedule for this Friday to discuss.     Script was not called in to the pharmacy

## 2017-05-26 ENCOUNTER — TELEPHONE (OUTPATIENT)
Dept: NEUROLOGY | Age: 72
End: 2017-05-26

## 2017-05-26 ENCOUNTER — OFFICE VISIT (OUTPATIENT)
Dept: NEUROLOGY | Age: 72
End: 2017-05-26

## 2017-05-26 ENCOUNTER — HOSPITAL ENCOUNTER (OUTPATIENT)
Dept: GENERAL RADIOLOGY | Age: 72
Discharge: HOME | End: 2017-05-26
Payer: MEDICARE

## 2017-05-26 VITALS
HEIGHT: 67 IN | DIASTOLIC BLOOD PRESSURE: 72 MMHG | OXYGEN SATURATION: 97 % | WEIGHT: 170 LBS | BODY MASS INDEX: 26.68 KG/M2 | SYSTOLIC BLOOD PRESSURE: 118 MMHG | HEART RATE: 61 BPM

## 2017-05-26 DIAGNOSIS — Z98.1 S/P CERVICAL SPINAL FUSION: ICD-10-CM

## 2017-05-26 DIAGNOSIS — F02.818 ALZHEIMER'S DEMENTIA, LATE ONSET, WITH BEHAVIORAL DISTURBANCE (HCC): ICD-10-CM

## 2017-05-26 DIAGNOSIS — G30.1 ALZHEIMER'S DEMENTIA, LATE ONSET, WITH BEHAVIORAL DISTURBANCE (HCC): ICD-10-CM

## 2017-05-26 DIAGNOSIS — M54.2 NECK PAIN: Primary | ICD-10-CM

## 2017-05-26 DIAGNOSIS — G44.86 CERVICOGENIC HEADACHE: ICD-10-CM

## 2017-05-26 DIAGNOSIS — M54.2 NECK PAIN: ICD-10-CM

## 2017-05-26 PROCEDURE — 72050 X-RAY EXAM NECK SPINE 4/5VWS: CPT

## 2017-05-26 NOTE — PROGRESS NOTES
Please call the patient and let him know that there is no evidence of fracture or complication to hardware from his prior surgery, his degeneration in his discs is worse which may be causing some neck pain.   He should follow with Dr. Riri Leon to discuss whether epidural injections would be helpful

## 2017-05-26 NOTE — PROGRESS NOTES
Date:            May 26, 2017    Name:  Christy Aguilar  :  1945  MRN:  [id number]     PCP:  Senia Mitchell PA-C    Chief Complaint   Patient presents with    Follow-up    Dementia         HISTORY OF PRESENT ILLNESS:  Jazmin Fagan is a 67 y.o., male who presents today for follow up for memory loss, TIA, headache. When he was seen in January, headaches had improved. These have returned. His neck popped about 3 days ago, now HAs have worsened which is why he is here today. He also has pain in his right shoulder from bone spurs. Had surgery on his neck a few years ago. Dr. Noe Schmidt has been doing epidural injections in his back, but his wife does not think that he would want injections in his neck. He is currently unable to see the provider because he has an outstanding balance. Lately, his mood has been worse. His sister has noticed that he is less friendly some days. He does not get aggressive or inappropriate. He is still driving, his wife has noticed that he misses exits. He has never had we do not have formal memory testing results available, they think that he did that with the provider in the [address] [place] [address] Drive neck years ago. 2017 recap  Jazmin Fagan is a 66 y.o., male who presents today for follow up for memory loss, TIA. He is with his wife, who helps provide his history. He was in the hospital over the weekend for a possible TIA. He went to the hospital over the weekend because the left side of his face and neck went numb, still numb now. He has had progression of his dementia. He is forgetting words, has gotten more mean, he is falling more often. LDL controlled when last checked in 2016, no Hgb A1c in the system, BP is well controlled. He is on eliquis, has an AICD. Has factor V leiden mutation. He is falling, will feel wobbly and then his right leg gives out. This is not new, he has significant lumbar stenosis and is not a surgical candidate.  Zoloft was increased after his last visit in April to help with irritability, his wife did not notice any improvement in his mood. He is also taking klonopin . 5 mg bid. Patient was on seroquel, is off it now and not sure why. Takes gabapentin 100 mg bid. Current Outpatient Prescriptions   Medication Sig    butalbital-acetaminophen-caffeine (FIORICET, ESGIC) -40 mg per tablet TAKE 2 TABLETS BY MOUTH EVERY 8 HOURS AS NEEDED FOR HEADACHE    sertraline (ZOLOFT) 100 mg tablet Take 1 Tab by mouth daily.  memantine (NAMENDA) 10 mg tablet Take 1 tab twice a day    atorvastatin (LIPITOR) 40 mg tablet TAKE 1 TABLET BY MOUTH EVERY DAY    tiZANidine (ZANAFLEX) 2 mg tablet 2mg po x 1, may repeat q6-8hr prn. Max 3 doses/24h.  gabapentin (NEURONTIN) 100 mg capsule Take 1 Cap by mouth three (3) times daily.  metoprolol succinate (TOPROL-XL) 25 mg XL tablet Take 1 Tab by mouth daily. Indications: hypertension    ELIQUIS 5 mg tablet TAKE 1 TABLET BY MOUTH EVERY 12 HOURS    multivitamin (ONE A DAY) tablet Take 1 Tab by mouth daily.  ferrous sulfate 325 mg (65 mg iron) tablet Take 45 mg by mouth Daily (before breakfast).  traZODone (DESYREL) 100 mg tablet Take 1 Tab by mouth nightly.  lisinopril (PRINIVIL, ZESTRIL) 5 mg tablet 5 mg daily.  fluticasone (FLONASE) 50 mcg/actuation nasal spray 2 Sprays by Both Nostrils route daily.  polyethylene glycol (MIRALAX) 17 gram/dose powder Take 17 g by mouth daily.  Incontinence Pad, Liner, Disp (BLADDER CONTROL PADS EX ABSORB) pads 1 Packet by Does Not Apply route as needed (incontinence).  nitroglycerin (NITROSTAT) 0.4 mg SL tablet 1 Tab by SubLINGual route every five (5) minutes as needed for Chest Pain (call 911 if not relieved by 3).  methocarbamol (ROBAXIN) 500 mg tablet     acetaminophen-codeine (TYLENOL #3) 300-30 mg per tablet two (2) times daily as needed. No current facility-administered medications for this visit.       No Known Allergies  Past Medical History: Diagnosis Date    AICD (automatic cardioverter/defibrillator) present 5/13/2016 5/13/16 [employer] upgrade to dual chamber AICD implant  Dr. Micky Martinez state, other     Arrhythmia     'S IN THE PAST    Arthritis     OSTEO ARTHRITIS    AVNRT (AV bridgette re-entry tachycardia) ([facility] [address] 5/12/2016 5/12/16 AVNRT ablation: PM/AICD left upper chest :Dr. Mayi Dobbs Back ache     CAD (coronary artery disease)     Cancer [facility]) 2004    Prostate cancer    Chest tightness     last episode of chest pain 5/2016 before AICD placed; none since then    Coagulation defects 2005    FACTOR V LEIDEN    Dizziness     FH: factor V Leiden deficiency     Generalized muscle ache     GERD (gastroesophageal reflux disease)     HEARTBURN    Heart failure ([facility] [address] 2014    EF 40%; Dr. Alfredo Rivera    Hyperlipidemia, mixed     Hypertension     COREG    Other ill-defined conditions 2004    blood transfusion    Pacemaker     Pacemaker     Postsurgical aortocoronary bypass status 05/29/2012    CABG    Presence of cardiac defibrillator 05/2016    left upper chest    Psychiatric disorder     depression    Stroke [facility]) 2011, 1990's    [facility]    Thromboembolism ([facility] [address] 2014    left leg: changed to Eliquis from Warfarin    Thromboembolus ([facility] [address] 2005    left leg    Weakness generalized      Past Surgical History:   Procedure Laterality Date    CARDIAC CATHETERIZATION  3/4/2011         CARDIAC SURG PROCEDURE UNLIST      CABG X1 3/5/11    HX HERNIA REPAIR  2002    Ingiunal hernia repair left    HX ORTHOPAEDIC      LEFT KNEE CARTILAGE REPAIR;RIGHT ROTATOR CUFF REPAIR    HX ORTHOPAEDIC  2/14/12    C5-7 ANTERIIOR CERVICAL DISECTOMY AND FUSION    HX ORTHOPAEDIC  6/4/13    C5-C7 POSTERIOR DECOMPRESSION AND FUSION    HX OTHER SURGICAL      steroid injections    HX PACEMAKER PLACEMENT      H[place] PR[place]CT[place]  2004     [place]    HX UROLOGICAL       urinary control system    HX UROLOGICAL  12/3/13 REPLACEMENT ARTIFICAL URINARY SPHINCTER     Social History     Social History    Marital status:      Spouse name: N/A    Number of children: N/A    Years of education: N/A     Occupational History    Not on file. Social History Main Topics    Smoking status: Former Smoker     Types: Cigarettes     Quit date: 10/10/1971    Smokeless tobacco: Never Used    Alcohol use 0.6 oz/week     0 Standard drinks or equivalent, 1 Cans of beer per week      Comment: 2 times a year    Drug use: No    Sexual activity: No     Other Topics Concern    Sleep Concern Yes    Stress Concern Yes     Family concerns     Social History Narrative    , 2 children     Family History   Problem Relation Age of Onset    Asthma Mother     Alcohol abuse Mother     Alcohol abuse Father     Asthma Father     Cancer Sister     Heart Disease Sister     Alcohol abuse Brother     Cancer Brother     Heart Disease Brother          PHYSICAL EXAMINATION:    Visit Vitals    /72    Pulse 61    Ht 5' 7\" (1.702 m)    Wt 170 lb (77.1 kg)    SpO2 97%    BMI 26.63 kg/m2     General:  Well defined, nourished, and groomed individual in no acute distress. Neck: Supple, nontender, no bruits, no pain with resistance to active range of motion. Pain in the back of the neck with forward flexion, no radiation  Heart: Regular rate and rhythm, no murmurs, rub, or gallop. Normal S1S2. Lungs:  Clear to auscultation bilaterally with equal chest expansion, no cough, no wheeze  Musculoskeletal:  Extremities revealed no edema and had full range of motion of joints. Psych:  Good mood and bright affect    NEUROLOGICAL EXAMINATION:     Mental Status:   Alert and oriented to self,  year, self, month, president. Speech is clear, follows commands well. Cranial Nerves:    II, III, IV, VI:  Visual acuity grossly intact. Pupils are equal, round, and reactive to light. Extra-ocular movements are full and fluid.   No ptosis or nystagmus. V-XII: Hearing is grossly intact. Facial features are symmetric, with normal sensation and strength. The palate rises symmetrically and the tongue protrudes midline. Sternocleidomastoids 5/5. Motor Examination: Normal tone, bulk, and strength, 5/5 muscle strength BUE, 5/5 RLE, 4/5 LLE  Coordination:  Finger to nose testing was normal.   No resting or intention tremor  Gait and Station:  Steady while walking, left hemiplegic gait. Normal arm swing. No pronator drift. No muscle wasting or fasciculations noted. ASSESSMENT AND PLAN    ICD-10-CM ICD-9-CM    1. Neck pain M54.2 723.1 XR SPINE CERV 4 OR 5 V   2. S/P cervical spinal fusion Z98.1 V45.4 XR SPINE CERV 4 OR 5 V   3. Cervicogenic headache R51 784.0    4. Alzheimer's dementia, late onset, with behavioral disturbance G30.1 331.0 REFERRAL TO NEUROPSYCHOLOGY    F02.81 294.11      41-year-old male seen in follow-up for dementia and cervicogenic headache. Headaches were previously well controlled, but his neck pops this week and he has had significant neck pain and headaches since then. This happened about 3 days ago. He has tizanidine for that, has been taking it a little bit. Memory and mood seem to be getting worse per his wife, we do not have formal neuropsychological testing on record. They deny agitation, but he does have days where he seems to be angry. He is still driving. 1.  Referral to neuropsychology for updated memory testing, the fact that his wife is still letting them drive is concerning to me. We should have an evaluation of his current abilities, with recommendations for safety including driving  2. Can continue using tizanidine 2-4 mg as needed for neck pain, advised this will make him sleepy  3. Fioricet prescription are descended by Dr. Cesar Finley, he can use his for headache but advised him that use of it more than 1-2 days per week may cause a rebound headache  4.   We will get x-ray of cervical spine, patient has a history of laminectomy and is not a surgical candidate if any new issues are identified. Would need to follow with Dr. Larry Espinal to discuss interventional options    Follow-up after neuropsych, call sooner with concerns    Maryann Pfeiffer NP    This note was created using voice recognition software. Despite editing, there may be syntax errors.

## 2017-05-26 NOTE — PATIENT INSTRUCTIONS
You can take tizanidine 2-4 mg as needed for neck pain and tension, this medication will likely make you drowsy. You should not drive when you take it. You can also take Fioricet for headache, try to limit use of this medication to only severe headaches. Using it more than 1-2 days per week may give you a rebound headache        [facility]   Statement to Patients  April 1, 2014      In an effort to ensure the large volume of patient prescription refills is processed in the most efficient and expeditious manner, we are asking our patients to assist us by calling your Pharmacy for all prescription refills, this will include also your  Mail Order Pharmacy. The pharmacy will contact our office electronically to continue the refill process. Please do not wait until the last minute to call your pharmacy. We need at least 48 hours (2days) to fill prescriptions. We also encourage you to call your pharmacy before going to  your prescription to make sure it is ready. With regard to controlled substance prescription refill requests (narcotic refills) that need to be picked up at our office, we ask your cooperation by providing us with at least 72 hours (3days) notice that you will need a refill. We will not refill narcotic prescription refill requests after 4:00pm on any weekday, Monday through Thursday, or after 2:00pm on Fridays, or on the weekends. We encourage everyone to explore another way of getting your prescription refill request processed using [employer], our patient web portal through our electronic medical record system. [employer] is an efficient and effective way to communicate your medication request directly to the office and  downloadable as an vernell on your smart phone . [employer] also features a review functionality that allows you to view your medication list as well as leave messages for your physician. Are you ready to get connected?  If so please review the attatched instructions or speak to any of our staff to get you set up right away! Thank you so much for your cooperation. Should you have any questions please contact our Practice Administrator. The Physicians and Staff,  [facility]          A Healthy Lifestyle: Care Instructions  Your Care Instructions  A healthy lifestyle can help you feel good, stay at a healthy weight, and have plenty of energy for both work and play. A healthy lifestyle is something you can share with your whole family. A healthy lifestyle also can lower your risk for serious health problems, such as high blood pressure, heart disease, and diabetes. You can follow a few steps listed below to improve your health and the health of your family. Follow-up care is a key part of your treatment and safety. Be sure to make and go to all appointments, and call your doctor if you are having problems. Its also a good idea to know your test results and keep a list of the medicines you take. How can you care for yourself at home? · Do not eat too much sugar, fat, or fast foods. You can still have dessert and treats now and then. The goal is moderation. · Start small to improve your eating habits. Pay attention to portion sizes, drink less juice and soda pop, and eat more fruits and vegetables. ¨ Eat a healthy amount of food. A 3-ounce serving of meat, for example, is about the size of a deck of cards. Fill the rest of your plate with vegetables and whole grains. ¨ Limit the amount of soda and sports drinks you have every day. Drink more water when you are thirsty. ¨ Eat at least 5 servings of fruits and vegetables every day. It may seem like a lot, but it is not hard to reach this goal. A serving or helping is 1 piece of fruit, 1 cup of vegetables, or 2 cups of leafy, raw vegetables. Have an apple or some carrot sticks as an afternoon snack instead of a candy bar.  Try to have fruits and/or vegetables at every meal.  · Make exercise part of your daily routine. You may want to start with simple activities, such as walking, bicycling, or slow swimming. Try to be active 30 to 60 minutes every day. You do not need to do all 30 to 60 minutes all at once. For example, you can exercise 3 times a day for 10 or 20 minutes. Moderate exercise is safe for most people, but it is always a good idea to talk to your doctor before starting an exercise program.  · Keep moving. Ferdie Gold the lawn, work in the garden, or At [employer]. Take the stairs instead of the elevator at work. · If you smoke, quit. People who smoke have an increased risk for heart attack, stroke, cancer, and other lung illnesses. Quitting is hard, but there are ways to boost your chance of quitting tobacco for good. ¨ Use nicotine gum, patches, or lozenges. ¨ Ask your doctor about stop-smoking programs and medicines. ¨ Keep trying. In addition to reducing your risk of diseases in the future, you will notice some benefits soon after you stop using tobacco. If you have shortness of breath or asthma symptoms, they will likely get better within a few weeks after you quit. · Limit how much alcohol you drink. Moderate amounts of alcohol (up to 2 drinks a day for men, 1 drink a day for women) are okay. But drinking too much can lead to liver problems, high blood pressure, and other health problems. Family health  If you have a family, there are many things you can do together to improve your health. · Eat meals together as a family as often as possible. · Eat healthy foods. This includes fruits, vegetables, lean meats and dairy, and whole grains. · Include your family in your fitness plan. Most people think of activities such as jogging or tennis as the way to fitness, but there are many ways you and your family can be more active. Anything that makes you breathe hard and gets your heart pumping is exercise.  Here are some tips:  ¨ Walk to do errands or to take your child to school or the bus. ¨ Go for a family bike ride after dinner instead of watching TV. Where can you learn more? Go to http://kendell-stephanie.info/. Enter V206 in the search box to learn more about \"A Healthy Lifestyle: Care Instructions. \"  Current as of: July 26, 2016  Content Version: 11.2  © 6272-9025 [employer]. Care instructions adapted under license by [employer] (which disclaims liability or warranty for this information). If you have questions about a medical condition or this instruction, always ask your healthcare professional. [place] [address] any warranty or liability for your use of this information.

## 2017-05-26 NOTE — MR AVS SNAPSHOT
Visit Information Date & Time Provider Department Dept. Phone Encounter #  
 5/26/2017 11:00 AM Scarlett Farley NP Neurology Clinic at [facility] [phone] [id number] Your Appointments 8/21/2017  3:00 PM  
Follow Up with Francisco Javier Alvarez MD  
Neurology Clinic at [facility] Appt Note: Follow up $0CP tdb 1/23/17  
 [facility], 
[address] [place] [address], [address] [place] [id number]  
[address] [facility], [address] [place] [address], [address] [facility] [address] [facility] [address] Upcoming Health Maintenance Date Due FOBT Q 1 YEAR AGE 50-75 4/1/1994 ZOSTER VACCINE AGE 60> 4/1/2004 DTaP/Tdap/Td series (1 - Tdap) 6/20/2017* GLAUCOMA SCREENING Q2Y 12/14/2017* MEDICARE YEARLY EXAM 7/1/2017 INFLUENZA AGE 9 TO ADULT 8/1/2017 *Topic was postponed. The date shown is not the original due date. Allergies as of 5/26/2017  Review Complete On: 5/26/2017 By: Sarai Kowalski LPN No Known Allergies Current Immunizations  Reviewed on 6/17/2016 Name Date Influenza High Dose Vaccine PF 11/10/2016 Influenza Vaccine 9/16/2014 Influenza Vaccine Split 9/14/2011 Influenza Vaccine Whole 9/1/2010 Pneumococcal Conjugate (PCV-13) 11/10/2016 Pneumococcal Vaccine (Unspecified Type) 9/14/2011 Not reviewed this visit You Were Diagnosed With   
  
 Codes Comments Neck pain    -  Primary ICD-10-CM: M54.2 ICD-9-CM: 723.1 S/P cervical spinal fusion     ICD-10-CM: Z98.1 ICD-9-CM: V45.4 Cervicogenic headache     ICD-10-CM: Asim Gisselle ICD-9-CM: 784.0 Alzheimer's dementia, late onset, with behavioral disturbance     ICD-10-CM: G30.1, F02.81 ICD-9-CM: 331.0, 294.11 Vitals BP Pulse Height(growth percentile) Weight(growth percentile) SpO2 BMI  
 118/72 61 5' 7\" (1.702 m) 170 lb (77.1 kg) 97% 26.63 kg/m2 Smoking Status Former Smoker Vitals History BMI and BSA Data Body Mass Index Body Surface Area  
 26.63 kg/m 2 1.91 m 2 Preferred Pharmacy Pharmacy Name Phone [facility], [address] [place] [address] Your Updated Medication List  
  
   
This list is accurate as of: 5/26/17 11:07 AM.  Always use your most recent med list.  
  
  
  
  
 acetaminophen-codeine 300-30 mg per tablet Commonly known as:  TYLENOL #3  
two (2) times daily as needed. atorvastatin 40 mg tablet Commonly known as:  LIPITOR  
TAKE 1 TABLET BY MOUTH EVERY DAY  
  
 butalbital-acetaminophen-caffeine -40 mg per tablet Commonly known as:  FIORICET, ESGIC  
TAKE 2 TABLETS BY MOUTH EVERY 8 HOURS AS NEEDED FOR HEADACHE  
  
 ELIQUIS 5 mg tablet Generic drug:  apixaban TAKE 1 TABLET BY MOUTH EVERY 12 HOURS  
  
 ferrous sulfate 325 mg (65 mg iron) tablet Take 45 mg by mouth Daily (before breakfast). fluticasone 50 mcg/actuation nasal spray Commonly known as:  Daphnie Fray 2 Sprays by Both Nostrils route daily. gabapentin 100 mg capsule Commonly known as:  NEURONTIN Take 1 Cap by mouth three (3) times daily. Incontinence Pad, Liner, Disp Pads Commonly known as:  BLADDER CONTROL PADS EX ABSORB  
1 Packet by Does Not Apply route as needed (incontinence). lisinopril 5 mg tablet Commonly known as:  PRINIVIL, ZESTRIL  
5 mg daily. memantine 10 mg tablet Commonly known as:  Marvia Appl Take 1 tab twice a day  
  
 methocarbamol 500 mg tablet Commonly known as:  ROBAXIN  
  
 metoprolol succinate 25 mg XL tablet Commonly known as:  TOPROL-XL Take 1 Tab by mouth daily. Indications: hypertension MIRALAX 17 gram/dose powder Generic drug:  polyethylene glycol Take 17 g by mouth daily. multivitamin tablet Commonly known as:  ONE A DAY Take 1 Tab by mouth daily. nitroglycerin 0.4 mg SL tablet Commonly known as:  NITROSTAT 1 Tab by SubLINGual route every five (5) minutes as needed for Chest Pain (call 911 if not relieved by 3). sertraline 100 mg tablet Commonly known as:  ZOLOFT Take 1 Tab by mouth daily. tiZANidine 2 mg tablet Commonly known as:  Valerie Roseanne 2mg po x 1, may repeat q6-8hr prn. Max 3 doses/24h. traZODone 100 mg tablet Commonly known as:  Neisha Chroman Take 1 Tab by mouth nightly. We Performed the Following REFERRAL TO NEUROPSYCHOLOGY [DDK49 Custom] Comments:  
 Memory testing, evaluate for dementia, driving and safety recognitions appreciated To-Do List   
 05/26/2017 Imaging:  XR SPINE CERV 4 OR 5 V Referral Information Referral ID Referred By Referred To  
  
 [id number] Venus GRIGGS [address] [facility] [place] [address] [place] [address] [facility], [address] [facility] Phone: [phone] Fax: [phone] Visits Status Start Date End Date 1 New Request 5/26/17 5/26/18 If your referral has a status of pending review or denied, additional information will be sent to support the outcome of this decision. Patient Instructions You can take tizanidine 2-4 mg as needed for neck pain and tension, this medication will likely make you drowsy. You should not drive when you take it. You can also take Fioricet for headache, try to limit use of this medication to only severe headaches. Using it more than 1-2 days per week may give you a rebound headache PRESCRIPTION REFILL POLICY [facility] Statement to Patients April 1, 2014 In an effort to ensure the large volume of patient prescription refills is processed in the most efficient and expeditious manner, we are asking our patients to assist us by calling your Pharmacy for all prescription refills, this will include also your  Mail Order Pharmacy.  The pharmacy will contact our office electronically to continue the refill process. Please do not wait until the last minute to call your pharmacy. We need at least 48 hours (2days) to fill prescriptions. We also encourage you to call your pharmacy before going to  your prescription to make sure it is ready. With regard to controlled substance prescription refill requests (narcotic refills) that need to be picked up at our office, we ask your cooperation by providing us with at least 72 hours (3days) notice that you will need a refill. We will not refill narcotic prescription refill requests after 4:00pm on any weekday, Monday through Thursday, or after 2:00pm on Fridays, or on the weekends. We encourage everyone to explore another way of getting your prescription refill request processed using [employer] our patient web portal through our electronic medical record system. [employer] is an efficient and effective way to communicate your medication request directly to the office and  downloadable as an vernell on your smart phone . [employer] also features a review functionality that allows you to view your medication list as well as leave messages for your physician. Are you ready to get connected? If so please review the attatched instructions or speak to any of our staff to get you set up right away! Thank you so much for your cooperation. Should you have any questions please contact our Practice Administrator. The Physicians and Staff,  [facility] A Healthy Lifestyle: Care Instructions Your Care Instructions A healthy lifestyle can help you feel good, stay at a healthy weight, and have plenty of energy for both work and play. A healthy lifestyle is something you can share with your whole family. A healthy lifestyle also can lower your risk for serious health problems, such as high blood pressure, heart disease, and diabetes. You can follow a few steps listed below to improve your health and the health of your family. Follow-up care is a key part of your treatment and safety. Be sure to make and go to all appointments, and call your doctor if you are having problems. Its also a good idea to know your test results and keep a list of the medicines you take. How can you care for yourself at home? · Do not eat too much sugar, fat, or fast foods. You can still have dessert and treats now and then. The goal is moderation. · Start small to improve your eating habits. Pay attention to portion sizes, drink less juice and soda pop, and eat more fruits and vegetables. ¨ Eat a healthy amount of food. A 3-ounce serving of meat, for example, is about the size of a deck of cards. Fill the rest of your plate with vegetables and whole grains. ¨ Limit the amount of soda and sports drinks you have every day. Drink more water when you are thirsty. ¨ Eat at least 5 servings of fruits and vegetables every day. It may seem like a lot, but it is not hard to reach this goal. A serving or helping is 1 piece of fruit, 1 cup of vegetables, or 2 cups of leafy, raw vegetables. Have an apple or some carrot sticks as an afternoon snack instead of a candy bar. Try to have fruits and/or vegetables at every meal. 
· Make exercise part of your daily routine. You may want to start with simple activities, such as walking, bicycling, or slow swimming. Try to be active 30 to 60 minutes every day. You do not need to do all 30 to 60 minutes all at once. For example, you can exercise 3 times a day for 10 or 20 minutes. Moderate exercise is safe for most people, but it is always a good idea to talk to your doctor before starting an exercise program. 
· Keep moving. Gabriele Fritz the lawn, work in the garden, or [employer]. Take the stairs instead of the elevator at work. · If you smoke, quit.  People who smoke have an increased risk for heart attack, stroke, cancer, and other lung illnesses. Quitting is hard, but there are ways to boost your chance of quitting tobacco for good. ¨ Use nicotine gum, patches, or lozenges. ¨ Ask your doctor about stop-smoking programs and medicines. ¨ Keep trying. In addition to reducing your risk of diseases in the future, you will notice some benefits soon after you stop using tobacco. If you have shortness of breath or asthma symptoms, they will likely get better within a few weeks after you quit. · Limit how much alcohol you drink. Moderate amounts of alcohol (up to 2 drinks a day for men, 1 drink a day for women) are okay. But drinking too much can lead to liver problems, high blood pressure, and other health problems. Family health If you have a family, there are many things you can do together to improve your health. · Eat meals together as a family as often as possible. · Eat healthy foods. This includes fruits, vegetables, lean meats and dairy, and whole grains. · Include your family in your fitness plan. Most people think of activities such as jogging or tennis as the way to fitness, but there are many ways you and your family can be more active. Anything that makes you breathe hard and gets your heart pumping is exercise. Here are some tips: 
¨ Walk to do errands or to take your child to school or the bus. ¨ Go for a family bike ride after dinner instead of watching TV. Where can you learn more? Go to http://kendell-stephanie.info/. Enter M816 in the search box to learn more about \"A Healthy Lifestyle: Care Instructions. \" Current as of: July 26, 2016 Content Version: 11.2 © 4158-1182 [employer]. Care instructions adapted under license by [employer] (which disclaims liability or warranty for this information).  If you have questions about a medical condition or this instruction, always ask your healthcare professional. [employer] disclaims any warranty or liability for your use of this information. Introducing [facility] & HEALTH SERVICES! Dear Guilherme Roman: Thank you for requesting a [employer] account. Our records indicate that you already have an active [employer] account. You can access your account anytime at [email or web address] Did you know that you can access your hospital and ER discharge instructions at any time in [employer]? You can also review all of your test results from your hospital stay or ER visit. Additional Information If you have questions, please visit the Frequently Asked Questions section of the [employer] website at [email or web address] Remember, [employer] is NOT to be used for urgent needs. For medical emergencies, dial 911. Now available from your iPhone and Android! Please provide this summary of care documentation to your next provider. Your primary care clinician is listed as Nicole Maynard. If you have any questions after today's visit, please call [phone].

## 2017-06-08 ENCOUNTER — TELEPHONE (OUTPATIENT)
Dept: FAMILY MEDICINE CLINIC | Age: 72
End: 2017-06-08

## 2017-06-08 ENCOUNTER — TELEPHONE (OUTPATIENT)
Dept: NEUROLOGY | Age: 72
End: 2017-06-08

## 2017-06-08 NOTE — TELEPHONE ENCOUNTER
Patient wife is aware of patient's results and was given Dr. Abdulaziz Stephens phone number to schedule an appointment.

## 2017-06-16 DIAGNOSIS — S46.212A BICEPS STRAIN, LEFT, INITIAL ENCOUNTER: ICD-10-CM

## 2017-06-16 DIAGNOSIS — M62.830 MUSCLE SPASM OF BACK: ICD-10-CM

## 2017-06-16 RX ORDER — TIZANIDINE 4 MG/1
TABLET ORAL
Qty: 30 TAB | Refills: 0 | Status: SHIPPED | OUTPATIENT
Start: 2017-06-16 | End: 2017-08-10 | Stop reason: SDUPTHER

## 2017-06-20 DIAGNOSIS — I10 ESSENTIAL HYPERTENSION: ICD-10-CM

## 2017-06-22 RX ORDER — METOPROLOL SUCCINATE 25 MG/1
25 TABLET, EXTENDED RELEASE ORAL DAILY
Qty: 90 TAB | Refills: 1 | Status: SHIPPED | OUTPATIENT
Start: 2017-06-22 | End: 2019-02-20

## 2017-06-29 ENCOUNTER — OFFICE VISIT (OUTPATIENT)
Dept: FAMILY MEDICINE CLINIC | Age: 72
End: 2017-06-29

## 2017-06-29 VITALS
SYSTOLIC BLOOD PRESSURE: 110 MMHG | HEART RATE: 65 BPM | TEMPERATURE: 98.3 F | OXYGEN SATURATION: 97 % | DIASTOLIC BLOOD PRESSURE: 73 MMHG | RESPIRATION RATE: 16 BRPM | HEIGHT: 67 IN | BODY MASS INDEX: 25.36 KG/M2 | WEIGHT: 161.6 LBS

## 2017-06-29 DIAGNOSIS — Z95.810 AICD (AUTOMATIC CARDIOVERTER/DEFIBRILLATOR) PRESENT: ICD-10-CM

## 2017-06-29 DIAGNOSIS — Z00.00 MEDICARE ANNUAL WELLNESS VISIT, SUBSEQUENT: ICD-10-CM

## 2017-06-29 DIAGNOSIS — Z86.79 H/O ORTHOSTATIC HYPOTENSION: ICD-10-CM

## 2017-06-29 DIAGNOSIS — R42 DIZZINESS: Primary | ICD-10-CM

## 2017-06-29 DIAGNOSIS — F32.A DEPRESSION, UNSPECIFIED DEPRESSION TYPE: ICD-10-CM

## 2017-06-29 DIAGNOSIS — I10 ESSENTIAL HYPERTENSION: Chronic | ICD-10-CM

## 2017-06-29 NOTE — MR AVS SNAPSHOT
Visit Information Date & Time Provider Department Dept. Phone Encounter #  
 6/29/2017  1:00 PM Donna Amaya PA-C [facility] [phone] [id number] Your Appointments 8/21/2017  3:00 PM  
Follow Up with Kassie Angulo MD  
Neurology Clinic at [facility]) Appt Note: Follow up $0CP tdb 1/23/17  
 [address] [place] [address], 
[address] [place] [address], [address] [address]  
[address], [address], [address] [facility] [address] [address]  
  
    
 10/12/2017 11:20 AM  
New Patient with Meme Moe PsyD [address] [facility] Appt Note: new patient memory/ Jesus Lori [place] [address] [facility] [address] [address] [phone]  
  
   
 [place] [address]  
  
    
 10/12/2017  1:00 PM  
Any with Mmee Moe PsyD [address] [facility] Appt Note: 3 hour testing/ Jesus [place] [address] [facility] [address] [facility] [address] [address] [phone]  
  
   
 [place] [address] [address] [address] Upcoming Health Maintenance Date Due FOBT Q 1 YEAR AGE 50-75 4/1/1994 ZOSTER VACCINE AGE 60> 4/1/2004 GLAUCOMA SCREENING Q2Y 12/14/2017* MEDICARE YEARLY EXAM 7/1/2017 INFLUENZA AGE 9 TO ADULT 8/1/2017 DTaP/Tdap/Td series (2 - Td) 6/29/2027 *Topic was postponed. The date shown is not the original due date. Allergies as of 6/29/2017  Review Complete On: 6/29/2017 By: Donna Amaya PA-C No Known Allergies Current Immunizations  Reviewed on 6/17/2016 Name Date Influenza High Dose Vaccine PF 11/10/2016 Influenza Vaccine 9/16/2014 Influenza Vaccine Split 9/14/2011 Influenza Vaccine Whole 9/1/2010 Pneumococcal Conjugate (PCV-13) 11/10/2016 Pneumococcal Vaccine (Unspecified Type) 9/14/2011 Not reviewed this visit Vitals BP Pulse Temp Resp Height(growth percentile) Weight(growth percentile) 110/73 (BP 1 Location: Left arm, BP Patient Position: Standing) 65 98.3 °F (36.8 °C) (Oral) 16 5' 7\" (1.702 m) 161 lb 9.6 oz (73.3 kg) SpO2 BMI Smoking Status 97% 25.31 kg/m2 Former Smoker Vitals History BMI and BSA Data Body Mass Index Body Surface Area  
 25.31 kg/m 2 1.86 m 2 Preferred Pharmacy Pharmacy Name Phone [facility] [address] [facility], [address] [place] [address] Your Updated Medication List  
  
   
This list is accurate as of: 6/29/17  2:07 PM.  Always use your most recent med list.  
  
  
  
  
 acetaminophen-codeine 300-30 mg per tablet Commonly known as:  TYLENOL #3  
two (2) times daily as needed. atorvastatin 40 mg tablet Commonly known as:  LIPITOR  
TAKE 1 TABLET BY MOUTH EVERY DAY  
  
 butalbital-acetaminophen-caffeine -40 mg per tablet Commonly known as:  FIORICET, ESGIC  
TAKE 2 TABLETS BY MOUTH EVERY 8 HOURS AS NEEDED FOR HEADACHE  
  
 ELIQUIS 5 mg tablet Generic drug:  apixaban TAKE 1 TABLET BY MOUTH EVERY 12 HOURS  
  
 ferrous sulfate 325 mg (65 mg iron) tablet Take 45 mg by mouth Daily (before breakfast). fluticasone 50 mcg/actuation nasal spray Commonly known as:  Chales Copa 2 Sprays by Both Nostrils route daily. gabapentin 100 mg capsule Commonly known as:  NEURONTIN Take 1 Cap by mouth three (3) times daily. HYDROcodone-acetaminophen 5-325 mg per tablet Commonly known as:  Cammie Mandril Take 1 Tab by mouth every four (4) hours as needed for Pain. Max Daily Amount: 6 Tabs. Incontinence Pad, Liner, Disp Pads Commonly known as:  BLADDER CONTROL PADS EX ABSORB  
1 Packet by Does Not Apply route as needed (incontinence). lisinopril 5 mg tablet Commonly known as:  PRINIVIL, ZESTRIL  
5 mg daily. memantine 10 mg tablet Commonly known as:  Ree Lansing Take 1 tab twice a day  
  
 metoprolol succinate 25 mg XL tablet Commonly known as:  TOPROL-XL Take 1 Tab by mouth daily. Indications: hypertension MIRALAX 17 gram/dose powder Generic drug:  polyethylene glycol Take 17 g by mouth daily. multivitamin tablet Commonly known as:  ONE A DAY Take 1 Tab by mouth daily. nitroglycerin 0.4 mg SL tablet Commonly known as:  NITROSTAT  
1 Tab by SubLINGual route every five (5) minutes as needed for Chest Pain (call 911 if not relieved by 3). sertraline 100 mg tablet Commonly known as:  ZOLOFT Take 1 Tab by mouth daily. tiZANidine 4 mg tablet Commonly known as:  Richar Crew TAKE 1/2 TABLET (2MG) BY MOUTH EVERY 6-8 HOURS . MAX 3 DOSES IN 24 HOURS  
  
 traZODone 100 mg tablet Commonly known as:  Maralyn Lowers Take 1 Tab by mouth nightly. Patient Instructions Schedule of Personalized Health Plan (Provide Copy to Patient) The best way to stay healthy is to live a healthy lifestyle. A healthy lifestyle includes regular exercise, eating a well-balanced diet, keeping a healthy weight and not smoking. Regular physical exams and screening tests are another important way to take care of yourself. Preventive exams provided by health care providers can find health problems early when treatment works best and can keep you from getting certain diseases or illnesses. Preventive services include exams, lab tests, screenings, shots, monitoring and information to help you take care of your own health. All people over 65 should have a pneumonia shot. Pneumonia shots are usually only needed once in a lifetime unless your doctor decides differently. All people over 65 should have a yearly flu shot. People over 65 are at medium to high risk for Hepatitis B. Three shots are needed for complete protection. In addition to your physical exam, some screening tests are recommended: Bone mass measurement (dexa scan) is recommended every two years if you have certain risk factors, such as personal history of vertebral fracture or chronic steroid medication use Diabetes Mellitus screening is recommended every year. Glaucoma is an eye disease caused by high pressure in the eye. An eye exam is recommended every year. Cardiovascular screening tests that check your cholesterol and other blood fat (lipid) levels are recommended every five years. Colorectal Cancer screening tests help to find pre-cancerous polyps (growths in the colon) so they can be removed before they turn into cancer. Tests ordered for screening depend on your personal and family history risk factors. Screening for Prostate Cancer is recommended yearly with a digital rectal exam and/or a PSA test 
 
Here is a list of your current Health Maintenance items with a due date: 
Health Maintenance Topic Date Due  
 DTaP/Tdap/Td series (1 - Tdap) 04/01/1965  FOBT Q 1 YEAR AGE 50-75  04/01/1994  ZOSTER VACCINE AGE 60>  04/01/2004  GLAUCOMA SCREENING Q2Y  12/14/2017 (Originally 4/1/2009)  MEDICARE YEARLY EXAM  07/01/2017  INFLUENZA AGE 9 TO ADULT  08/01/2017  Pneumococcal 65+ High/Highest Risk  Completed Introducing [facility] & HEALTH SERVICES! Dear Beti Dietrich: Thank you for requesting a [employer] account. Our records indicate that you already have an active [employer] account. You can access your account anytime at [email or web address] Did you know that you can access your hospital and ER discharge instructions at any time in [employer]? You can also review all of your test results from your hospital stay or ER visit. Additional Information If you have questions, please visit the Frequently Asked Questions section of the [employer] website at [email or web address] Remember, [employer] is NOT to be used for urgent needs. For medical emergencies, dial 911. Now available from your iPhone and Android! Please provide this summary of care documentation to your next provider. Your primary care clinician is listed as Lorenzo Sykes. If you have any questions after today's visit, please call [phone].

## 2017-06-29 NOTE — PATIENT INSTRUCTIONS
Schedule of Personalized Health Plan  (Provide Copy to Patient)  The best way to stay healthy is to live a healthy lifestyle. A healthy lifestyle includes regular exercise, eating a well-balanced diet, keeping a healthy weight and not smoking. Regular physical exams and screening tests are another important way to take care of yourself. Preventive exams provided by health care providers can find health problems early when treatment works best and can keep you from getting certain diseases or illnesses. Preventive services include exams, lab tests, screenings, shots, monitoring and information to help you take care of your own health. All people over 65 should have a pneumonia shot. Pneumonia shots are usually only needed once in a lifetime unless your doctor decides differently. All people over 65 should have a yearly flu shot. People over 65 are at medium to high risk for Hepatitis B. Three shots are needed for complete protection. In addition to your physical exam, some screening tests are recommended:    Bone mass measurement (dexa scan) is recommended every two years if you have certain risk factors, such as personal history of vertebral fracture or chronic steroid medication use    Diabetes Mellitus screening is recommended every year. Glaucoma is an eye disease caused by high pressure in the eye. An eye exam is recommended every year. Cardiovascular screening tests that check your cholesterol and other blood fat (lipid) levels are recommended every five years. Colorectal Cancer screening tests help to find pre-cancerous polyps (growths in the colon) so they can be removed before they turn into cancer. Tests ordered for screening depend on your personal and family history risk factors.     Screening for Prostate Cancer is recommended yearly with a digital rectal exam and/or a PSA test    Here is a list of your current Health Maintenance items with a due date:  Health Maintenance Topic Date Due    DTaP/Tdap/Td series (1 - Tdap) 04/01/1965    FOBT Q 1 YEAR AGE 50-75  04/01/1994    ZOSTER VACCINE AGE 60>  04/01/2004    GLAUCOMA SCREENING Q2Y  12/14/2017 (Originally 4/1/2009)    MEDICARE YEARLY EXAM  07/01/2017    INFLUENZA AGE 9 TO ADULT  08/01/2017    Pneumococcal 65+ High/Highest Risk  Completed

## 2017-06-29 NOTE — PROGRESS NOTES
Tomas Miguel is a 67 y.o. male and presents for annual Medicare Wellness Visit. Problem List: Reviewed with patient and discussed risk factors.     Patient Active Problem List   Diagnosis Code    CAD (coronary artery disease) I25.10    Hypertension I10    Hyperlipemia E78.5    DVT (deep venous thrombosis) chronic coumadin anti-coagulation I82.409    History of factor V Leiden mutation Z86.2    CABG (coronary artery bypass graft)     Chronic venous embolism and thrombosis of unspecified deep vessels of lower extremity I82.509    Osteoarthritis M19.90    Prostate cancer ([facility] [address] C61    Hyperthyroidism E05.90    Mitral valve disorders I05.9    Postsurgical aortocoronary bypass status Z95.1    Coronary atherosclerosis of native coronary artery I25.10    Paroxysmal supraventricular tachycardia (HCC) I47.1    Chronic systolic heart failure (HCC) I50.22    Atrial fibrillation (HCC) I48.91    Mixed hyperlipidemia E78.2    Palpitations R00.2    S/P cervical spinal fusion Z98.1    Bradycardia R00.1    Left-sided chest wall pain R07.89    SSS (sick sinus syndrome) ([facility]) I49.5    Chest pain R07.9    Pacemaker Z95.0    Cardiac pacemaker in situ Z95.0    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    Lower GI bleeding K92.2    B12 deficiency E53.8    Hypothyroidism due to acquired atrophy of thyroid E03.4    Osteoporosis M81.0    Cervical post-laminectomy syndrome M96.1    Neck pain M54.2    ACP (advance care planning) Z71.89    Stenosis of both carotid arteries without cerebral infarction I65.23    Cervicogenic headache R51    Alzheimer's dementia, late onset, with behavioral disturbance G30.1, F02.81    Spinal stenosis, lumbar region, with neurogenic claudication M48.06    Lumbar back pain with radiculopathy affecting right lower extremity M54.17    Lumbar back pain with radiculopathy affecting left lower extremity M54.17    History of stroke Z86.73  SVT (supraventricular tachycardia) ([facility]) I47.1    Cardiomyopathy ([facility]) I42.9    Chronic systolic congestive heart failure (HCC) I50.22    AVNRT (AV bridgette re-entry tachycardia) ([facility]) I47.1    AICD (automatic cardioverter/defibrillator) present Z95.810    S/P CABG (coronary artery bypass graft) Z95.1       Current medical providers:  Patient Care Team:  Arturo Guzman PA-C as PCP - General (Physician Assistant)  Tawana Sanchez RN as Nurse Navigator (Cardiology)  Tawana Sanchez RN as Nurse Gordon Payne MD as Physician (Cardiology)  Shruti Gonzalez MD as Physician (Cardiology)  Alejandro Rachel MD (Family Practice)  Alden Kaur MD (Family Practice)    PSH: Reviewed with patient  Past Surgical History:   Procedure Laterality Date    CARDIAC CATHETERIZATION  3/4/2011         CARDIAC SURG PROCEDURE UNLIST      CABG X1 3/5/11    HX HERNIA REPAIR  2002    Ingiunal hernia repair left    HX ORTHOPAEDIC      LEFT KNEE CARTILAGE REPAIR;RIGHT ROTATOR CUFF REPAIR    HX ORTHOPAEDIC  2/14/12    C5-7 ANTERIIOR CERVICAL DISECTOMY AND FUSION    HX ORTHOPAEDIC  6/4/13    C5-C7 POSTERIOR DECOMPRESSION AND FUSION    HX OTHER SURGICAL      steroid injections    HX PACEMAKER PLACEMENT      [place]    HX UROLOGICAL       urinary control system    HX UROLOGICAL  12/3/13    REPLACEMENT ARTIFICAL URINARY SPHINCTER        SH: Reviewed with patient  Social History   Substance Use Topics    Smoking status: Former Smoker     Types: Cigarettes     Quit date: 10/10/1971    Smokeless tobacco: Never Used    Alcohol use No       FH: Reviewed with patient  Family History   Problem Relation Age of Onset    Asthma Mother     Alcohol abuse Mother     Alcohol abuse Father     Asthma Father     Cancer Sister     Heart Disease Sister     Alcohol abuse Brother     Cancer Brother     Heart Disease Brother        Medications/Allergies: Reviewed with patient  Current Outpatient Prescriptions on File Prior to Visit   Medication Sig Dispense Refill    metoprolol succinate (TOPROL-XL) 25 mg XL tablet Take 1 Tab by mouth daily. Indications: hypertension 90 Tab 1    tiZANidine (ZANAFLEX) 4 mg tablet TAKE 1/2 TABLET (2MG) BY MOUTH EVERY 6-8 HOURS . MAX 3 DOSES IN 24 HOURS 30 Tab 0    HYDROcodone-acetaminophen (NORCO) 5-325 mg per tablet Take 1 Tab by mouth every four (4) hours as needed for Pain. Max Daily Amount: 6 Tabs. 10 Tab 0    butalbital-acetaminophen-caffeine (FIORICET, ESGIC) -40 mg per tablet TAKE 2 TABLETS BY MOUTH EVERY 8 HOURS AS NEEDED FOR HEADACHE 50 Tab 0    sertraline (ZOLOFT) 100 mg tablet Take 1 Tab by mouth daily. 30 Tab 11    memantine (NAMENDA) 10 mg tablet Take 1 tab twice a day 180 Tab 3    atorvastatin (LIPITOR) 40 mg tablet TAKE 1 TABLET BY MOUTH EVERY DAY 30 Tab 5    gabapentin (NEURONTIN) 100 mg capsule Take 1 Cap by mouth three (3) times daily. 270 Cap 3    ELIQUIS 5 mg tablet TAKE 1 TABLET BY MOUTH EVERY 12 HOURS 60 Tab 6    multivitamin (ONE A DAY) tablet Take 1 Tab by mouth daily.  ferrous sulfate 325 mg (65 mg iron) tablet Take 45 mg by mouth Daily (before breakfast).  traZODone (DESYREL) 100 mg tablet Take 1 Tab by mouth nightly. 30 Tab 5    acetaminophen-codeine (TYLENOL #3) 300-30 mg per tablet two (2) times daily as needed.  lisinopril (PRINIVIL, ZESTRIL) 5 mg tablet 5 mg daily.  fluticasone (FLONASE) 50 mcg/actuation nasal spray 2 Sprays by Both Nostrils route daily. 1 Bottle 6    polyethylene glycol (MIRALAX) 17 gram/dose powder Take 17 g by mouth daily.  Incontinence Pad, Liner, Disp (BLADDER CONTROL PADS EX ABSORB) pads 1 Packet by Does Not Apply route as needed (incontinence). 30 Each 6    nitroglycerin (NITROSTAT) 0.4 mg SL tablet 1 Tab by SubLINGual route every five (5) minutes as needed for Chest Pain (call 911 if not relieved by 3).  25 Tab 1     No current facility-administered medications on file prior to visit. No Known Allergies    Objective: There were no vitals taken for this visit. There is no height or weight on file to calculate BMI. Assessment of cognitive impairment: Alert and oriented x 3    Depression Screen:   PHQ over the last two weeks 6/29/2017   PHQ Not Done -   Little interest or pleasure in doing things More than half the days   Feeling down, depressed or hopeless More than half the days   Total Score PHQ 2 4   Trouble falling or staying asleep, or sleeping too much Not at all   Feeling tired or having little energy Several days   Poor appetite or overeating Several days   Feeling bad about yourself - or that you are a failure or have let yourself or your family down More than half the days   Trouble concentrating on things such as school, work, reading or watching TV Not at all   Moving or speaking so slowly that other people could have noticed; or the opposite being so fidgety that others notice Several days   Thoughts of being better off dead, or hurting yourself in some way Not at all   PHQ 9 Score 9   How difficult have these problems made it for you to do your work, take care of your home and get along with others -       Fall Risk Assessment:    Fall Risk Assessment, last 12 mths 6/29/2017   Able to walk? Yes   Fall in past 12 months? No   Fall with injury? -   Number of falls in past 12 months -   Fall Risk Score -       Functional Ability:   Does the patient exhibit a steady gait?  no   How long did it take the patient to get up and walk from a sitting position? 4 seconds   Is the patient self reliant?  (ie can do own laundry, meals, household chores)  no     Does the patient handle his/her own medications?  no     Does the patient handle his/her own money? no     Is the patients home safe (ie good lighting, handrails on stairs and bath, etc.)? yes     Did you notice or did patient express any hearing difficulties?    yes     Did you notice or did patient express any vision difficulties? yes     Were distance and reading eye charts used? no       Advance Care Planning:   Patient was offered the opportunity to discuss advance care planning:  yes   Does patient have an Advance Directive:  no   If no, did you provide information on [facility]? No, patient already has the paperwork       Plan:      No orders of the defined types were placed in this encounter. Health Maintenance   Topic Date Due    DTaP/Tdap/Td series (1 - Tdap) 04/01/1965  Pt will RTC when needed    FOBT Q 1 YEAR AGE 50-75  04/01/1994  Pt already has a kit at home   [address] [place],[address] AGE 59>  04/01/2004  Pt will discuss with his pharmacist   [address]  12/14/2017 (Originally 4/1/2009)  Goes to [facility]. In [address]  07/01/2017  Done today    INFLUENZA AGE 9 TO ADULT  08/01/2017    Pneumococcal 65+ High/Highest Risk  Completed       *Patient verbalized understanding and agreement with the plan. A copy of the After Visit Summary with personalized health plan was given to the patient today.       Adriana Navas RN

## 2017-06-29 NOTE — PROGRESS NOTES
Briana Oscar is a 67 y.o. male who presents to the office today with the following:  Chief Complaint   Patient presents with    Depression     wants to discuss symptoms, low BP and dizziness, doesn't feel good    Annual Wellness Visit            HPI  Woke up feeling \"not too good\" yesterday morning, better today. Notices dizziness with standing, almost lost balance once while walking. Denies room spinning. No sxs at rest.  Reports sxs same as at previous visit. Hx orthostatic hypotension. Says he is drinking plenty of water, ~ 3 bottles of water daily. Wife reports his BP has been very good, has checked when pt dizzy and has been wnl. Known HTN, extensive cardiac hx with AICD in place. Denies any assoc cp, sob, agudelo, palpitations. Has not experienced any syncope or falls. No TIA sxs. Admits dizziness only yesterday, feeling better today. Was working outside in heat, cutting grass and working on . Otherwise feeling well with no other complaints or acute concerns today. Hx Depression. Has been well-controlled on Zoloft. Says recently has a lot on his mind. Has been stressing out over bills. Does not feel any significant worsening of depression. Denies any recent SI/HI. Is not interested in changing medication or going to psychiatry. Has been to counseling in the past, says was very helpful but does not feel necessary at this time. He will make apt to see mental health if he feels it gets that bad, which currently does not. Review of Systems   Constitutional: Negative for chills, fever and malaise/fatigue. Respiratory: Negative for cough and shortness of breath. Cardiovascular: Negative for chest pain and leg swelling. Genitourinary: Negative. Skin: Negative for rash. Neurological: Positive for dizziness. Negative for tingling, tremors, sensory change, speech change, focal weakness, loss of consciousness and headaches.    Psychiatric/Behavioral: Negative for hallucinations, memory loss, substance abuse and suicidal ideas. The patient is not nervous/anxious and does not have insomnia (doing well on medication). See HPI. No Known Allergies    Current Outpatient Prescriptions   Medication Sig    metoprolol succinate (TOPROL-XL) 25 mg XL tablet Take 1 Tab by mouth daily. Indications: hypertension    tiZANidine (ZANAFLEX) 4 mg tablet TAKE 1/2 TABLET (2MG) BY MOUTH EVERY 6-8 HOURS . MAX 3 DOSES IN 24 HOURS    HYDROcodone-acetaminophen (NORCO) 5-325 mg per tablet Take 1 Tab by mouth every four (4) hours as needed for Pain. Max Daily Amount: 6 Tabs.  butalbital-acetaminophen-caffeine (FIORICET, ESGIC) -40 mg per tablet TAKE 2 TABLETS BY MOUTH EVERY 8 HOURS AS NEEDED FOR HEADACHE    sertraline (ZOLOFT) 100 mg tablet Take 1 Tab by mouth daily.  memantine (NAMENDA) 10 mg tablet Take 1 tab twice a day    atorvastatin (LIPITOR) 40 mg tablet TAKE 1 TABLET BY MOUTH EVERY DAY    gabapentin (NEURONTIN) 100 mg capsule Take 1 Cap by mouth three (3) times daily.  ELIQUIS 5 mg tablet TAKE 1 TABLET BY MOUTH EVERY 12 HOURS    multivitamin (ONE A DAY) tablet Take 1 Tab by mouth daily.  ferrous sulfate 325 mg (65 mg iron) tablet Take 45 mg by mouth Daily (before breakfast).  traZODone (DESYREL) 100 mg tablet Take 1 Tab by mouth nightly.  acetaminophen-codeine (TYLENOL #3) 300-30 mg per tablet two (2) times daily as needed.  lisinopril (PRINIVIL, ZESTRIL) 5 mg tablet 5 mg daily.  fluticasone (FLONASE) 50 mcg/actuation nasal spray 2 Sprays by Both Nostrils route daily.  polyethylene glycol (MIRALAX) 17 gram/dose powder Take 17 g by mouth daily.  Incontinence Pad, Liner, Disp (BLADDER CONTROL PADS EX ABSORB) pads 1 Packet by Does Not Apply route as needed (incontinence).  nitroglycerin (NITROSTAT) 0.4 mg SL tablet 1 Tab by SubLINGual route every five (5) minutes as needed for Chest Pain (call 911 if not relieved by 3).      No current facility-administered medications for this visit.         Past Medical History:   Diagnosis Date    AICD (automatic cardioverter/defibrillator) present 5/13/2016 5/13/16 [employer] upgrade to dual chamber AICD implant  Dr. Sheri Murphy state, other     Arrhythmia     'S IN THE PAST    Arthritis     OSTEO ARTHRITIS    AVNRT (AV bridgette re-entry tachycardia) ([facility] [address] 5/12/2016 5/12/16 AVNRT ablation: PM/AICD left upper chest :Dr. Hunter Led Back ache     CAD (coronary artery disease)     Cancer [facility]) 2004    Prostate cancer    Chest tightness     last episode of chest pain 5/2016 before AICD placed; none since then    Coagulation defects 2005    FACTOR V LEIDEN    Dizziness     FH: factor V Leiden deficiency     Generalized muscle ache     GERD (gastroesophageal reflux disease)     HEARTBURN    Heart failure ([facility] [address] 2014    EF 40%; Dr. Alfredo Damico    Hyperlipidemia, mixed     Hypertension     COREG    Other ill-defined conditions 2004    blood transfusion    Pacemaker     Pacemaker     Postsurgical aortocoronary bypass status 05/29/2012    CABG    Presence of cardiac defibrillator 05/2016    left upper chest    Psychiatric disorder     depression    Stroke [facility]) 2011, 1990's    [facility]    Thromboembolism ([facility] [address] 2014    left leg: changed to Eliquis from Warfarin    Thromboembolus ([facility] [address] 2005    left leg    Weakness generalized        Past Surgical History:   Procedure Laterality Date    CARDIAC CATHETERIZATION  3/4/2011         CARDIAC SURG PROCEDURE UNLIST      CABG X1 3/5/11    HX HERNIA REPAIR  2002    Ingiunal hernia repair left    HX ORTHOPAEDIC      LEFT KNEE CARTILAGE REPAIR;RIGHT ROTATOR CUFF REPAIR    HX ORTHOPAEDIC  2/14/12    C5-7 ANTERIIOR CERVICAL DISECTOMY AND FUSION    HX ORTHOPAEDIC  6/4/13    C5-C7 POSTERIOR DECOMPRESSION AND FUSION    HX OTHER SURGICAL      steroid injections    HX PACEMAKER PLACEMENT      [place]  HX UROLOGICAL       urinary control system    HX UROLOGICAL  12/3/13    REPLACEMENT ARTIFICAL URINARY SPHINCTER       Social History     Social History    Marital status:      Spouse name: N/A    Number of children: N/A    Years of education: N/A     Social History Main Topics    Smoking status: Former Smoker     Types: Cigarettes     Quit date: 10/10/1971    Smokeless tobacco: Never Used    Alcohol use No    Drug use: No    Sexual activity: No     Other Topics Concern    Sleep Concern Yes    Stress Concern Yes     Family concerns     Social History Narrative    , 2 children       Family History   Problem Relation Age of Onset    Asthma Mother     Alcohol abuse Mother     Alcohol abuse Father     Asthma Father     Cancer Sister     Heart Disease Sister     Alcohol abuse Brother     Cancer Brother     Heart Disease Brother          Physical Exam:  Visit Vitals    /73 (BP 1 Location: Left arm, BP Patient Position: Standing)    Pulse 65    Temp 98.3 °F (36.8 °C) (Oral)    Resp 16    Ht 5' 7\" (1.702 m)    Wt 161 lb 9.6 oz (73.3 kg)    SpO2 97%    BMI 25.31 kg/m2     Physical Exam   Constitutional: He is oriented to person, place, and time and well-developed, well-nourished, and in no distress. HENT:   Head: Normocephalic and atraumatic. Right Ear: External ear normal.   Left Ear: External ear normal.   Nose: Nose normal.   Mouth/Throat: Oropharynx is clear and moist.   Eyes: Conjunctivae and EOM are normal.   Neck: Normal range of motion. Neck supple. Cardiovascular: Normal rate and regular rhythm. Pulmonary/Chest: Effort normal and breath sounds normal.   Abdominal: Soft. There is no tenderness. Musculoskeletal: He exhibits no edema. Lymphadenopathy:     He has no cervical adenopathy. Neurological: He is alert and oriented to person, place, and time. He has normal motor skills, normal sensation and normal strength. No cranial nerve deficit.  Gait normal.   Skin: Skin is warm and dry. Psychiatric: Mood and affect normal.   Nursing note and vitals reviewed. Assessment/Plan:    ICD-10-CM ICD-9-CM    1. Dizziness R42 780.4    2. H/O orthostatic hypotension Z86.79 V12.59    3. Essential hypertension I10 401.9    4. AICD (automatic cardioverter/defibrillator) present Z95.810 V45.02    5. Depression, unspecified depression type F32.9 311    6. Medicare annual wellness visit, subsequent Z00.00 V70.0      Orthostatic BP okay today. Recommend take Metoprolol in PM instead AM.  F/u with Cardiology soon. Reiterated proper hydration, avoid staying out/working in heat, caution with standing/walking/fall precautions given. Rec avoid muscle relaxants/pain medication or other medications that can cause drowsiness if has been feeling dizzy. To ER if develops any red flag sxs as discussed. Informed wife who agrees and will help pt follow through with instructions. Discussed A&P with Dr. Kurt Rodriguez who agrees. Follow-up Disposition:  Return if symptoms worsen or fail to improve. Also reminded pt to bring in FOBT and advance care plan documentation, still has both at home.     Senia Mitchell PA-C

## 2017-07-17 ENCOUNTER — OFFICE VISIT (OUTPATIENT)
Dept: FAMILY MEDICINE CLINIC | Age: 72
End: 2017-07-17

## 2017-07-17 VITALS
BODY MASS INDEX: 25.58 KG/M2 | HEART RATE: 60 BPM | RESPIRATION RATE: 16 BRPM | SYSTOLIC BLOOD PRESSURE: 102 MMHG | OXYGEN SATURATION: 96 % | TEMPERATURE: 97.1 F | DIASTOLIC BLOOD PRESSURE: 60 MMHG | WEIGHT: 163 LBS | HEIGHT: 67 IN

## 2017-07-17 DIAGNOSIS — M54.41 ACUTE BACK PAIN WITH SCIATICA, RIGHT: Primary | ICD-10-CM

## 2017-07-17 DIAGNOSIS — Z95.810 AICD (AUTOMATIC CARDIOVERTER/DEFIBRILLATOR) PRESENT: ICD-10-CM

## 2017-07-17 DIAGNOSIS — M54.16 LUMBAR BACK PAIN WITH RADICULOPATHY AFFECTING RIGHT LOWER EXTREMITY: ICD-10-CM

## 2017-07-17 DIAGNOSIS — Z79.01 CHRONIC ANTICOAGULATION: ICD-10-CM

## 2017-07-17 RX ORDER — PREDNISONE 10 MG/1
10 TABLET ORAL 3 TIMES DAILY
Qty: 12 TAB | Refills: 0 | Status: SHIPPED | OUTPATIENT
Start: 2017-07-17 | End: 2017-07-21

## 2017-07-17 NOTE — PROGRESS NOTES
Michi West is a 67 y.o. male who presents to the office today with the following:  Chief Complaint   Patient presents with    Follow-up     hypotension       HPI  Here for f/u from [facility] ER for back strain, with wife. No red flag sxs noted. XR unremarkable for acute process. Rx Hydrocodone and told to f/u PCP. Pt was bending over to fix toilet and felt a \"pop\" in his back. Admits to same acute on chronic pain in the past.  Goes to Dr. Zee Duque. Has no apt anytime soon that he knows of.  Dr. Alcira Vo does injections. Still reports right sided with sciatica sxs- pain down right leg. No n/t/w/s or skin changes. No new bowel/bladder sxs or hx incontinence. Otherwise feeling well with no other complaints or acute concerns. Review of Systems   Constitutional: Negative for chills, fever and malaise/fatigue. Respiratory: Negative for shortness of breath. Cardiovascular: Negative for chest pain. Gastrointestinal: Negative. Genitourinary: Negative. Musculoskeletal: Positive for back pain. Negative for myalgias. Skin: Negative for rash. Neurological: Negative. Negative for headaches. See HPI. No Known Allergies    Current Outpatient Prescriptions   Medication Sig    predniSONE (DELTASONE) 10 mg tablet Take 1 Tab by mouth three (3) times daily for 4 days.  HYDROcodone-acetaminophen (NORCO) 5-325 mg per tablet Take 1 Tab by mouth every four (4) hours as needed for Pain. Max Daily Amount: 6 Tabs.  metoprolol succinate (TOPROL-XL) 25 mg XL tablet Take 1 Tab by mouth daily. Indications: hypertension    tiZANidine (ZANAFLEX) 4 mg tablet TAKE 1/2 TABLET (2MG) BY MOUTH EVERY 6-8 HOURS . MAX 3 DOSES IN 24 HOURS    sertraline (ZOLOFT) 100 mg tablet Take 1 Tab by mouth daily.     memantine (NAMENDA) 10 mg tablet Take 1 tab twice a day    atorvastatin (LIPITOR) 40 mg tablet TAKE 1 TABLET BY MOUTH EVERY DAY    gabapentin (NEURONTIN) 100 mg capsule Take 1 Cap by mouth three (3) times daily.    ELIQUIS 5 mg tablet TAKE 1 TABLET BY MOUTH EVERY 12 HOURS    multivitamin (ONE A DAY) tablet Take 1 Tab by mouth daily.  traZODone (DESYREL) 100 mg tablet Take 1 Tab by mouth nightly.  lisinopril (PRINIVIL, ZESTRIL) 5 mg tablet 5 mg daily.  fluticasone (FLONASE) 50 mcg/actuation nasal spray 2 Sprays by Both Nostrils route daily.  polyethylene glycol (MIRALAX) 17 gram/dose powder Take 17 g by mouth daily.  Incontinence Pad, Liner, Disp (BLADDER CONTROL PADS EX ABSORB) pads 1 Packet by Does Not Apply route as needed (incontinence).  ferrous sulfate 325 mg (65 mg iron) tablet Take 45 mg by mouth Daily (before breakfast).  nitroglycerin (NITROSTAT) 0.4 mg SL tablet 1 Tab by SubLINGual route every five (5) minutes as needed for Chest Pain (call 911 if not relieved by 3). No current facility-administered medications for this visit.         Past Medical History:   Diagnosis Date    AICD (automatic cardioverter/defibrillator) present 5/13/2016 5/13/16 [employer] upgrade to dual chamber AICD implant  Dr. Jessika Mccall state, other     Arrhythmia     'S IN THE PAST    Arthritis     OSTEO ARTHRITIS    AVNRT (AV bridgette re-entry tachycardia) ([facility] [address] 5/12/2016 5/12/16 AVNRT ablation: PM/AICD left upper chest :Dr. Janessa Fournier Back ache     CAD (coronary artery disease)     Cancer [facility]) 2004    Prostate cancer    Chest tightness     last episode of chest pain 5/2016 before AICD placed; none since then    Coagulation defects 2005    FACTOR V LEIDEN    Dizziness     FH: factor V Leiden deficiency     Generalized muscle ache     GERD (gastroesophageal reflux disease)     HEARTBURN    Heart failure ([address] 2014    EF 40%; Dr. Pardeep Stinson    Hyperlipidemia, mixed     Hypertension     COREG    Other ill-defined conditions 2004    blood transfusion    Pacemaker     Pacemaker     Postsurgical aortocoronary bypass status 05/29/2012    CABG    Presence of cardiac defibrillator 05/2016    left upper chest    Psychiatric disorder     depression    Stroke [facility]) 2011, 1990's    SEVERAL-mini    Thromboembolism ([facility] [address] 2014    left leg: changed to Eliquis from Warfarin    Thromboembolus ([facility] [address] 2005    left leg    Weakness generalized        Past Surgical History:   Procedure Laterality Date    CARDIAC CATHETERIZATION  3/4/2011         CARDIAC SURG PROCEDURE UNLIST      CABG X1 3/5/11    HX HERNIA REPAIR  2002    Ingiunal hernia repair left    HX ORTHOPAEDIC      LEFT KNEE CARTILAGE REPAIR;RIGHT ROTATOR CUFF REPAIR    HX ORTHOPAEDIC  2/14/12    C5-7 ANTERIIOR CERVICAL DISECTOMY AND FUSION    HX ORTHOPAEDIC  6/4/13    C5-C7 POSTERIOR DECOMPRESSION AND FUSION    HX OTHER SURGICAL      steroid injections    HX PACEMAKER PLACEMENT      H[place] PROSTATECTOMY  2004     [place]    HX UROLOGICAL       urinary control system    HX UROLOGICAL  12/3/13    REPLACEMENT ARTIFICAL URINARY SPHINCTER       Social History     Social History    Marital status:      Spouse name: N/A    Number of children: N/A    Years of education: N/A     Social History Main Topics    Smoking status: Former Smoker     Types: Cigarettes     Quit date: 10/10/1971    Smokeless tobacco: Never Used    Alcohol use No    Drug use: No    Sexual activity: No     Other Topics Concern    Sleep Concern Yes    Stress Concern Yes     Family concerns     Social History Narrative    , 2 children       Family History   Problem Relation Age of Onset    Asthma Mother     Alcohol abuse Mother     Alcohol abuse Father     Asthma Father     Cancer Sister     Heart Disease Sister     Alcohol abuse Brother     Cancer Brother     Heart Disease Brother          Physical Exam:  Visit Vitals    /60 (BP 1 Location: Left arm, BP Patient Position: Sitting)    Pulse 60    Temp 97.1 °F (36.2 °C) (Oral)    Resp 16    Ht 5' 7\" (1.702 m)    Wt 163 lb (73.9 kg)    SpO2 96%    BMI 25.53 kg/m2     Physical Exam   Constitutional: He is oriented to person, place, and time and well-developed, well-nourished, and in no distress. HENT:   Head: Normocephalic and atraumatic. Eyes: Conjunctivae are normal.   Neck: Normal range of motion. Neck supple. Cardiovascular: Normal rate and regular rhythm. Pulmonary/Chest: Effort normal and breath sounds normal.   Abdominal: Soft. There is no tenderness. Musculoskeletal:        Lumbar back: He exhibits decreased range of motion, tenderness (lower lumbar midline and right paraspinal) and pain. He exhibits no swelling, no edema, no deformity and normal pulse. Lymphadenopathy:     He has no cervical adenopathy. Neurological: He is alert and oriented to person, place, and time. He has normal sensation and normal strength. He has an abnormal Straight Leg Raise Test (right leg with pain). Gait (slow, small steps, antalgic) abnormal.   Difficulty doing full assessment of LEs secondary to pt wants to stay in chair on floor to avoid getting on exam table. Skin: Skin is warm and dry. Psychiatric: Mood and affect normal.   Nursing note and vitals reviewed. Assessment/Plan:    ICD-10-CM ICD-9-CM    1. Acute back pain with sciatica, right M54.41 724. 3 predniSONE (DELTASONE) 10 mg tablet   2. Lumbar back pain with radiculopathy affecting right lower extremity M54.17 724.4    3. AICD (automatic cardioverter/defibrillator) present Z95.810 V45.02    4. Chronic anticoagulation Z79.01 V58.61      Avoid nsaids. Counseled pt on potential medication AEs/interactions. Caution regarding sedation and safety. Rec taking half pain pill, as wife reports making him drowsy. Discussed fall prevention. Rec avoid taking with any other pain medication or at same time as muscle relaxant. Counseled on otc medications for sxs relief and anti-inflammation. Recommend pt apply heat. Avoid strenuous activity/bending/lifting.   Light ROM and stretching exercises reviewed. RTC if sxs worsen or fail to improve. Handouts provided. Discussed potential for PT or further imaging/referral to Ortho. Declines for now. Follow-up Disposition:  Return if symptoms worsen or fail to improve.     Brandi Goldsmith PA-C

## 2017-07-17 NOTE — MR AVS SNAPSHOT
Visit Information Date & Time Provider Department Dept. Phone Encounter #  
 7/17/2017  1:00 PM Yulia Harris PA-C [facility] [phone] [id number] Your Appointments 8/17/2017 11:00 AM  
6 MONTH with Juan Martin MD  
[facility] [address] [place] [address]) Appt Note: both needed per nurse/pt  $0cp  ekr [address]  
[phone] [address]  
  
    
 8/17/2017 11:15 AM  
ANNUAL with Mehreen Obregon MD  
[facility] [address] [place] [address]) Appt Note: both needed per nurse/pt  $0cp  ekr [address]  
[phone] [address]  
  
    
 8/21/2017  3:00 PM  
Follow Up with Clemencia Fabian MD  
[facility] at [address]) Appt Note: Follow up $0CP tdb 1/23/17  
 [facility], 
[address] [place] [address], [address] [address]  
[facility], [address] [place] [address], [address] [place] [address] [address] [id number]  
  
    
 10/12/2017 11:20 AM  
New Patient with Ofelia Burgos PsyD 1991 [facility] ([address] [place] [address]) Appt Note: new patient memory/ Jesus Lori [place] [address] [facility] [address] [address] [phone]  
  
   
 [place] [address]  
  
    
 10/12/2017  1:00 PM  
Any with Ofelia Burgos, PsyD [facility] ([address] [place] [address]) Appt Note: 3 hour testing/ Jesus [place]ott [place] [address] [facility] [address] [address] [phone]  
  
   
 [place] [address] [id number] [address] Upcoming Health Maintenance Date Due FOBT Q 1 YEAR AGE 50-75 4/1/1994 ZOSTER VACCINE AGE 60> 4/1/2004 GLAUCOMA SCREENING Q2Y 12/14/2017* INFLUENZA AGE 9 TO ADULT 8/1/2017 MEDICARE YEARLY EXAM 6/30/2018 DTaP/Tdap/Td series (2 - Td) 6/29/2027 *Topic was postponed. The date shown is not the original due date. Allergies as of 7/17/2017  Review Complete On: 7/17/2017 By: Julieth Rivas PA-C No Known Allergies Current Immunizations  Reviewed on 7/17/2017 Name Date Influenza High Dose Vaccine PF 11/10/2016 Influenza Vaccine 9/16/2014 Influenza Vaccine Split 9/14/2011 Influenza Vaccine Whole 9/1/2010 Pneumococcal Conjugate (PCV-13) 11/10/2016 Pneumococcal Vaccine (Unspecified Type) 9/14/2011 Td 5/8/2012 Reviewed by Lisa Zimmerman LPN on 0/13/3420 at  9:45 AM  
You Were Diagnosed With   
  
 Codes Comments Acute back pain with sciatica, right    -  Primary ICD-10-CM: M54.41 
ICD-9-CM: 724.3 Vitals BP Pulse Temp Resp Height(growth percentile) Weight(growth percentile) 102/60 (BP 1 Location: Left arm, BP Patient Position: Sitting) 60 97.1 °F (36.2 °C) (Oral) 16 5' 7\" (1.702 m) 163 lb (73.9 kg) SpO2 BMI Smoking Status 96% 25.53 kg/m2 Former Smoker Vitals History BMI and BSA Data Body Mass Index Body Surface Area 25.53 kg/m 2 1.87 m 2 Preferred Pharmacy Pharmacy Name Phone [facility] [address] [place] [address] Your Updated Medication List  
  
   
This list is accurate as of: 7/17/17  1:50 PM.  Always use your most recent med list.  
  
  
  
  
 atorvastatin 40 mg tablet Commonly known as:  LIPITOR  
TAKE 1 TABLET BY MOUTH EVERY DAY  
  
 ELIQUIS 5 mg tablet Generic drug:  apixaban TAKE 1 TABLET BY MOUTH EVERY 12 HOURS  
  
 ferrous sulfate 325 mg (65 mg iron) tablet Take 45 mg by mouth Daily (before breakfast). fluticasone 50 mcg/actuation nasal spray Commonly known as:  Trenton Walker 2 Sprays by Both Nostrils route daily. gabapentin 100 mg capsule Commonly known as:  NEURONTIN Take 1 Cap by mouth three (3) times daily. HYDROcodone-acetaminophen 5-325 mg per tablet Commonly known as:  Daniella Dieter Take 1 Tab by mouth every four (4) hours as needed for Pain. Max Daily Amount: 6 Tabs. Incontinence Pad, Liner, Disp Pads Commonly known as:  BLADDER CONTROL PADS EX ABSORB  
1 Packet by Does Not Apply route as needed (incontinence). lisinopril 5 mg tablet Commonly known as:  PRINIVIL, ZESTRIL  
5 mg daily. memantine 10 mg tablet Commonly known as:  Jana Bill Take 1 tab twice a day  
  
 metoprolol succinate 25 mg XL tablet Commonly known as:  TOPROL-XL Take 1 Tab by mouth daily. Indications: hypertension MIRALAX 17 gram/dose powder Generic drug:  polyethylene glycol Take 17 g by mouth daily. multivitamin tablet Commonly known as:  ONE A DAY Take 1 Tab by mouth daily. nitroglycerin 0.4 mg SL tablet Commonly known as:  NITROSTAT  
1 Tab by SubLINGual route every five (5) minutes as needed for Chest Pain (call 911 if not relieved by 3). predniSONE 10 mg tablet Commonly known as:  Darene Reels Take 1 Tab by mouth three (3) times daily for 4 days. sertraline 100 mg tablet Commonly known as:  ZOLOFT Take 1 Tab by mouth daily. tiZANidine 4 mg tablet Commonly known as:  Lee Ann Core TAKE 1/2 TABLET (2MG) BY MOUTH EVERY 6-8 HOURS . MAX 3 DOSES IN 24 HOURS  
  
 traZODone 100 mg tablet Commonly known as:  Xiao Odor Take 1 Tab by mouth nightly. Prescriptions Sent to Pharmacy Refills  
 predniSONE (DELTASONE) 10 mg tablet 0 Sig: Take 1 Tab by mouth three (3) times daily for 4 days. Class: Normal  
 Pharmacy: [facility] [address] [place] [address] [address] Ph #: [phone] Route: Oral  
  
Patient Instructions Learning About Relief for Back Pain What is back tension and strain? Back strain happens when you overstretch, or pull, a muscle in your back. You may hurt your back in an accident or when you exercise or lift something. Most back pain will get better with rest and time. You can take care of yourself at home to help your back heal. 
What can you do first to relieve back pain? When you first feel back pain, try these steps: 
· Walk. Take a short walk (10 to 20 minutes) on a level surface (no slopes, hills, or stairs) every 2 to 3 hours. Walk only distances you can manage without pain, especially leg pain. · Relax. Find a comfortable position for rest. Some people are comfortable on the floor or a medium-firm bed with a small pillow under their head and another under their knees. Some people prefer to lie on their side with a pillow between their knees. Don't stay in one position for too long. · Try heat or ice. Try using a heating pad on a low or medium setting, or take a warm shower, for 15 to 20 minutes every 2 to 3 hours. Or you can buy single-use heat wraps that last up to 8 hours. You can also try an ice pack for 10 to 15 minutes every 2 to 3 hours. You can use an ice pack or a bag of frozen vegetables wrapped in a thin towel. There is not strong evidence that either heat or ice will help, but you can try them to see if they help. You may also want to try switching between heat and cold. · Take pain medicine exactly as directed. ¨ If the doctor gave you a prescription medicine for pain, take it as prescribed. ¨ If you are not taking a prescription pain medicine, ask your doctor if you can take an over-the-counter medicine. What else can you do? · Stretch and exercise. Exercises that increase flexibility may relieve your pain and make it easier for your muscles to keep your spine in a good, neutral position. And don't forget to keep walking. · Do self-massage.  You can use self-massage to unwind after work or school or to energize yourself in the morning. You can easily massage your feet, hands, or neck. Self-massage works best if you are in comfortable clothes and are sitting or lying in a comfortable position. Use oil or lotion to massage bare skin. · Reduce stress. Back pain can lead to a vicious [place]: Distress about the pain tenses the muscles in your back, which in turn causes more pain. Learn how to relax your mind and your muscles to lower your stress. Where can you learn more? Go to http://kendell-stephanie.info/. Enter T460 in the search box to learn more about \"Learning About Relief for Back Pain. \" Current as of: March 21, 2017 Content Version: 11.3 © 6306-0957 [employer]. Care instructions adapted under license by [employer] (which disclaims liability or warranty for this information). If you have questions about a medical condition or this instruction, always ask your healthcare professional. [place] [address] any warranty or liability for your use of this information. Back Pain, Emergency or Urgent Symptoms: Care Instructions Your Care Instructions Many people have back pain at one time or another. In most cases, pain gets better with self-care that includes over-the-counter pain medicine, ice, heat, and exercises. Unless you have symptoms of a severe injury or heart attack, you may be able to give yourself a few days before you call a doctor. But some back problems are very serious. Do not ignore symptoms that need to be checked right away. Follow-up care is a key part of your treatment and safety. Be sure to make and go to all appointments, and call your doctor if you are having problems. It's also a good idea to know your test results and keep a list of the medicines you take. How can you care for yourself at home?  
· Sit or lie in positions that are most comfortable and that reduce your pain. Try one of these positions when you lie down: ¨ Lie on your back with your knees bent and supported by large pillows. ¨ Lie on the floor with your legs on the seat of a sofa or chair. Chandra Ramp on your side with your knees and hips bent and a pillow between your legs. ¨ Lie on your stomach if it does not make pain worse. · Do not sit up in bed, and avoid soft couches and twisted positions. Bed rest can help relieve pain at first, but it delays healing. Avoid bed rest after the first day. · Change positions every 30 minutes. If you must sit for long periods of time, take breaks from sitting. Get up and walk around, or lie flat. · Try using a heating pad on a low or medium setting, for 15 to 20 minutes every 2 or 3 hours. Try a warm shower in place of one session with the heating pad. You can also buy single-use heat wraps that last up to 8 hours. You can also try ice or cold packs on your back for 10 to 20 minutes at a time, several times a day. (Put a thin cloth between the ice pack and your skin.) This reduces pain and makes it easier to be active and exercise. · Take pain medicines exactly as directed. ¨ If the doctor gave you a prescription medicine for pain, take it as prescribed. ¨ If you are not taking a prescription pain medicine, ask your doctor if you can take an over-the-counter medicine. When should you call for help? Call 911 anytime you think you may need emergency care. For example, call if: 
· You are unable to move a leg at all. · You have back pain with severe belly pain. · You have symptoms of a heart attack. These may include: ¨ Chest pain or pressure, or a strange feeling in the chest. 
¨ Sweating. ¨ Shortness of breath. ¨ Nausea or vomiting. ¨ Pain, pressure, or a strange feeling in the back, neck, jaw, or upper belly or in one or both shoulders or arms. ¨ Lightheadedness or sudden weakness. ¨ A fast or irregular heartbeat. After you call 911, the  may tell you to chew 1 adult-strength or 2 to 4 low-dose aspirin. Wait for an ambulance. Do not try to drive yourself. Call your doctor now or seek immediate medical care if: 
· You have new or worse symptoms in your arms, legs, chest, belly, or buttocks. Symptoms may include: ¨ Numbness or tingling. ¨ Weakness. ¨ Pain. · You lose bladder or bowel control. · You have back pain and: 
¨ You have injured your back while lifting or doing some other activity. Call if the pain is severe, has not gone away after 1 or 2 days, and you cannot do your normal daily activities. ¨ You have had a back injury before that needed treatment. ¨ Your pain has lasted longer than 4 weeks. ¨ You have had weight loss you cannot explain. ¨ You are age 48 or older. ¨ You have cancer now or have had it before. Watch closely for changes in your health, and be sure to contact your doctor if you are not getting better as expected. Where can you learn more? Go to http://kendell-stephanie.info/. Enter J286 in the search box to learn more about \"Back Pain, Emergency or Urgent Symptoms: Care Instructions. \" Current as of: March 20, 2017 Content Version: 11.3 © 0769-5577 [employer]. Care instructions adapted under license by [employer] (which disclaims liability or warranty for this information). If you have questions about a medical condition or this instruction, always ask your healthcare professional. [place] [address] any warranty or liability for your use of this information. Back Stretches: Exercises Your Care Instructions Here are some examples of exercises for stretching your back. Start each exercise slowly. Ease off the exercise if you start to have pain. Your doctor or physical therapist will tell you when you can start these exercises and which ones will work best for you. How to do the exercises Overhead stretch 1. Stand comfortably with your feet shoulder-width apart. 2. Looking straight ahead, raise both arms over your head and reach toward the ceiling. Do not allow your head to tilt back. 3. Hold for 15 to 30 seconds, then lower your arms to your sides. 4. Repeat 2 to 4 times. Side stretch 1. Stand comfortably with your feet shoulder-width apart. 2. Raise one arm over your head, and then lean to the other side. 3. Slide your hand down your leg as you let the weight of your arm gently stretch your side muscles. Hold for 15 to 30 seconds. 4. Repeat 2 to 4 times on each side. Press-up 1. Lie on your stomach, supporting your body with your forearms. 2. Press your elbows down into the floor to raise your upper back. As you do this, relax your stomach muscles and allow your back to arch without using your back muscles. As your press up, do not let your hips or pelvis come off the floor. 3. Hold for 15 to 30 seconds, then relax. 4. Repeat 2 to 4 times. Relax and rest 
 
1. Lie on your back with a rolled towel under your neck and a pillow under your knees. Extend your arms comfortably to your sides. 2. Relax and breathe normally. 3. Remain in this position for about 10 minutes. 4. If you can, do this 2 or 3 times each day. Follow-up care is a key part of your treatment and safety. Be sure to make and go to all appointments, and call your doctor if you are having problems. It's also a good idea to know your test results and keep a list of the medicines you take. Where can you learn more? Go to http://kendell-stephanie.info/. Enter Q126 in the search box to learn more about \"Back Stretches: Exercises. \" Current as of: March 21, 2017 Content Version: 11.3 © 6927-8047 [employer]. Care instructions adapted under license by J. [employer] (which disclaims liability or warranty for this information).  If you have questions about a medical condition or this instruction, always ask your healthcare professional. [address] any warranty or liability for your use of this information. Back Care and Preventing Injuries: Care Instructions Your Care Instructions You can hurt your back doing many everyday activities: lifting a heavy box, bending down to garden, exercising at the gym, and even getting out of bed. But you can keep your back strong and healthy by doing some exercises. You also can follow a few tips for sitting, sleeping, and lifting to avoid hurting your back again. Talk to your doctor before you start an exercise program. Ask for help if you want to learn more about keeping your back healthy. Follow-up care is a key part of your treatment and safety. Be sure to make and go to all appointments, and call your doctor if you are having problems. It's also a good idea to know your test results and keep a list of the medicines you take. How can you care for yourself at home? · Stay at a healthy weight to avoid strain on your lower back. · Do not smoke. Smoking increases the risk of osteoporosis, which weakens the spine. If you need help quitting, talk to your doctor about stop-smoking programs and medicines. These can increase your chances of quitting for good. · Make sure you sleep in a position that maintains your back's normal curves and on a mattress that feels comfortable. Sleep on your side with a pillow between your knees, or sleep on your back with a pillow under your knees. These positions can reduce strain on your back. · When you get out of bed, lie on your side and bend both knees. Drop your feet over the edge of the bed as you push up with both arms. Scoot to the edge of the bed. Make sure your feet are in line with your rear end (buttocks), and then stand up. · If you must stand for a long time, put one foot on a stool, ledge, or box. Exercise to strengthen your back and other muscles · Get at least 30 minutes of exercise on most days of the week. Walking is a good choice. You also may want to do other activities, such as running, swimming, cycling, or playing tennis or team sports. · Stretch your back muscles. Here are few exercises to try: ¨ Lie on your back with your knees bent and your feet flat on the floor. Gently pull one bent knee to your chest. Put that foot back on the floor, and then pull the other knee to your chest. Hold for 15 to 30 seconds. Repeat 2 to 4 times. ¨ Do pelvic tilts. Lie on your back with your knees bent. Tighten your stomach muscles. Pull your belly button (navel) in and up toward your ribs. You should feel like your back is pressing to the floor and your hips and pelvis are slightly lifting off the floor. Hold for 6 seconds while breathing smoothly. · Keep your core muscles strong. The muscles of your back, belly (abdomen), and buttocks support your spine. ¨ Pull in your belly, and imagine pulling your navel toward your spine. Hold this for 6 seconds, then relax. Remember to keep breathing normally as you tense your muscles. ¨ Do curl-ups. Always do them with your knees bent. Keep your low back on the floor, and curl your shoulders toward your knees using a smooth, slow motion. Keep your arms folded across your chest. If this bothers your neck, try putting your hands behind your neck (not your head), with your elbows spread apart. ¨ Lie on your back with your knees bent and your feet flat on the floor. Tighten your belly muscles, and then push with your feet and raise your buttocks up a few inches. Hold this position 6 seconds as you continue to breathe normally, then lower yourself slowly to the floor. Repeat 8 to 12 times. ¨ If you like group exercise, try Pilates or yoga. These classes have poses that strengthen the core muscles. Protect your back when you sit · Place a small pillow, a rolled-up towel, or a lumbar roll in the curve of your back if you need extra support. · Sit in a chair that is low enough to let you place both feet flat on the floor with both knees nearly level with your hips. If your chair or desk is too high, use a foot rest to raise your knees. · When driving, keep your knees nearly level with your hips. Sit straight, and drive with both hands on the steering wheel. Your arms should be in a slightly bent position. · Try a kneeling chair, which helps tilt your hips forward. This takes pressure off your lower back. · Try sitting on an exercise ball. It can rock from side to side, which helps keep your back loose. Lift properly · Squat down, bending at the hips and knees only. If you need to, put one knee to the floor and extend your other knee in front of you, bent at a right angle (half kneeling). · Press your chest straight forward. This helps keep your upper back straight while keeping a slight arch in your low back. · Hold the load as close to your body as possible, at the level of your navel. · Use your feet to change direction, taking small steps. · Lead with your hips as you change direction. Keep your shoulders in line with your hips as you move. Do not twist your body. · Set down your load carefully, squatting with your knees and hips only. When should you call for help? Watch closely for changes in your health, and be sure to contact your doctor if: 
· You want more exercises to make your back and other core muscles stronger. Where can you learn more? Go to http://kendell-stephanie.info/. Enter S810 in the search box to learn more about \"Back Care and Preventing Injuries: Care Instructions. \" Current as of: March 21, 2017 Content Version: 11.3 © 3079-0564 [employer]. Care instructions adapted under license by [employer] (which disclaims liability or warranty for this information).  If you have questions about a medical condition or this instruction, always ask your healthcare professional. [place] [address] any warranty or liability for your use of this information. Sciatica: Exercises Your Care Instructions Here are some examples of typical rehabilitation exercises for your condition. Start each exercise slowly. Ease off the exercise if you start to have pain. Your doctor or physical therapist will tell you when you can start these exercises and which ones will work best for you. When you are not being active, find a comfortable position for rest. Some people are comfortable on the floor or a medium-firm bed with a small pillow under their head and another under their knees. Some people prefer to lie on their side with a pillow between their knees. Don't stay in one position for too long. Take short walks (10 to 20 minutes) every 2 to 3 hours. Avoid slopes, hills, and stairs until you feel better. Walk only distances you can manage without pain, especially leg pain. How to do the exercises Back stretches 5. Get down on your hands and knees on the floor. 6. Relax your head and allow it to droop. Round your back up toward the ceiling until you feel a nice stretch in your upper, middle, and lower back. Hold this stretch for as long as it feels comfortable, or about 15 to 30 seconds. 7. Return to the starting position with a flat back while you are on your hands and knees. 8. Let your back sway by pressing your stomach toward the floor. Lift your buttocks toward the ceiling. 9. Hold this position for 15 to 30 seconds. 10. Repeat 2 to 4 times. Follow-up care is a key part of your treatment and safety. Be sure to make and go to all appointments, and call your doctor if you are having problems. It's also a good idea to know your test results and keep a list of the medicines you take. Where can you learn more? Go to http://kendell-stephanie.info/. Enter Z538 in the search box to learn more about \"Sciatica: Exercises. \" Current as of: March 21, 2017 Content Version: 11.3 © 4788-2413 [employer]. Care instructions adapted under license by [employer] (which disclaims liability or warranty for this information). If you have questions about a medical condition or this instruction, always ask your healthcare professional. [facility] [address] any warranty or liability for your use of this information. Sciatica: Care Instructions Your Care Instructions Sciatica (say \"xek-CG-lt-kuh\") is an irritation of one of the sciatic nerves, which come from the spinal cord in the lower back. The sciatic nerves and their branches extend down through the buttock to the foot. Sciatica can develop when an injured disc in the back presses against a spinal nerve root. Its main symptom is pain, numbness, or weakness that is often worse in the leg or foot than in the back. Sciatica often will improve and go away with time. Early treatment usually includes medicines and exercises to relieve pain. Follow-up care is a key part of your treatment and safety. Be sure to make and go to all appointments, and call your doctor if you are having problems. It's also a good idea to know your test results and keep a list of the medicines you take. How can you care for yourself at home? · Take pain medicines exactly as directed. ¨ If the doctor gave you a prescription medicine for pain, take it as prescribed. ¨ If you are not taking a prescription pain medicine, ask your doctor if you can take an over-the-counter medicine. · Use heat or ice to relieve pain. ¨ To apply heat, put a warm water bottle, heating pad set on low, or warm cloth on your back. Do not go to sleep with a heating pad on your skin. ¨ To use ice, put ice or a cold pack on the area for 10 to 20 minutes at a time. Put a thin cloth between the ice and your skin. · Avoid sitting if possible, unless it feels better than standing. · Alternate lying down with short walks. Increase your walking distance as you are able to without making your symptoms worse. · Do not do anything that makes your symptoms worse. When should you call for help? Call 911 anytime you think you may need emergency care. For example, call if: 
· You are unable to move a leg at all. Call your doctor now or seek immediate medical care if: 
· You have new or worse symptoms in your legs or buttocks. Symptoms may include: ¨ Numbness or tingling. ¨ Weakness. ¨ Pain. · You lose bladder or bowel control. Watch closely for changes in your health, and be sure to contact your doctor if: 
· You are not getting better as expected. Where can you learn more? Go to http://kendell-stephanie.info/. Enter [phone] in the search box to learn more about \"Sciatica: Care Instructions. \" Current as of: March 21, 2017 Content Version: 11.3 © 5305-0221 [employer]. Care instructions adapted under license by [employer] (which disclaims liability or warranty for this information). If you have questions about a medical condition or this instruction, always ask your healthcare professional. [place] [address] any warranty or liability for your use of this information. Introducing [facility]! Dear Esther Tinajero: Thank you for requesting a [facility] account. Our records indicate that you already have an active [employer] account. You can access your account anytime at [email or web address] Did you know that you can access your hospital and ER discharge instructions at any time in [employer]? You can also review all of your test results from your hospital stay or ER visit. Additional Information If you have questions, please visit the Frequently Asked Questions section of the [employer] website at [email or web address] Remember, MyChart is NOT to be used for urgent needs. For medical emergencies, dial 911. Now available from your iPhone and Android! Please provide this summary of care documentation to your next provider. Your primary care clinician is listed as Radha Wilkinson. If you have any questions after today's visit, please call [phone].

## 2017-07-17 NOTE — PATIENT INSTRUCTIONS
Learning About Relief for Back Pain  What is back tension and strain? Back strain happens when you overstretch, or pull, a muscle in your back. You may hurt your back in an accident or when you exercise or lift something. Most back pain will get better with rest and time. You can take care of yourself at home to help your back heal.  What can you do first to relieve back pain? When you first feel back pain, try these steps:  · Walk. Take a short walk (10 to 20 minutes) on a level surface (no slopes, hills, or stairs) every 2 to 3 hours. Walk only distances you can manage without pain, especially leg pain. · Relax. Find a comfortable position for rest. Some people are comfortable on the floor or a medium-firm bed with a small pillow under their head and another under their knees. Some people prefer to lie on their side with a pillow between their knees. Don't stay in one position for too long. · Try heat or ice. Try using a heating pad on a low or medium setting, or take a warm shower, for 15 to 20 minutes every 2 to 3 hours. Or you can buy single-use heat wraps that last up to 8 hours. You can also try an ice pack for 10 to 15 minutes every 2 to 3 hours. You can use an ice pack or a bag of frozen vegetables wrapped in a thin towel. There is not strong evidence that either heat or ice will help, but you can try them to see if they help. You may also want to try switching between heat and cold. · Take pain medicine exactly as directed. ¨ If the doctor gave you a prescription medicine for pain, take it as prescribed. ¨ If you are not taking a prescription pain medicine, ask your doctor if you can take an over-the-counter medicine. What else can you do? · Stretch and exercise. Exercises that increase flexibility may relieve your pain and make it easier for your muscles to keep your spine in a good, neutral position. And don't forget to keep walking. · Do self-massage.  You can use self-massage to unwind after work or school or to energize yourself in the morning. You can easily massage your feet, hands, or neck. Self-massage works best if you are in comfortable clothes and are sitting or lying in a comfortable position. Use oil or lotion to massage bare skin. · Reduce stress. Back pain can lead to a vicious [place]: Distress about the pain tenses the muscles in your back, which in turn causes more pain. Learn how to relax your mind and your muscles to lower your stress. Where can you learn more? Go to http://kendell-stephanie.info/. Enter J346 in the search box to learn more about \"Learning About Relief for Back Pain. \"  Current as of: March 21, 2017  Content Version: 11.3  © 3026-4787 [employer]. Care instructions adapted under license by [employer] (which disclaims liability or warranty for this information). If you have questions about a medical condition or this instruction, always ask your healthcare professional. [place] [address] any warranty or liability for your use of this information. Back Pain, Emergency or Urgent Symptoms: Care Instructions  Your Care Instructions  Many people have back pain at one time or another. In most cases, pain gets better with self-care that includes over-the-counter pain medicine, ice, heat, and exercises. Unless you have symptoms of a severe injury or heart attack, you may be able to give yourself a few days before you call a doctor. But some back problems are very serious. Do not ignore symptoms that need to be checked right away. Follow-up care is a key part of your treatment and safety. Be sure to make and go to all appointments, and call your doctor if you are having problems. It's also a good idea to know your test results and keep a list of the medicines you take. How can you care for yourself at home? · Sit or lie in positions that are most comfortable and that reduce your pain.  Try one of these positions when you lie down:  ¨ Lie on your back with your knees bent and supported by large pillows. ¨ Lie on the floor with your legs on the seat of a sofa or chair. Merlin Antu on your side with your knees and hips bent and a pillow between your legs. ¨ Lie on your stomach if it does not make pain worse. · Do not sit up in bed, and avoid soft couches and twisted positions. Bed rest can help relieve pain at first, but it delays healing. Avoid bed rest after the first day. · Change positions every 30 minutes. If you must sit for long periods of time, take breaks from sitting. Get up and walk around, or lie flat. · Try using a heating pad on a low or medium setting, for 15 to 20 minutes every 2 or 3 hours. Try a warm shower in place of one session with the heating pad. You can also buy single-use heat wraps that last up to 8 hours. You can also try ice or cold packs on your back for 10 to 20 minutes at a time, several times a day. (Put a thin cloth between the ice pack and your skin.) This reduces pain and makes it easier to be active and exercise. · Take pain medicines exactly as directed. ¨ If the doctor gave you a prescription medicine for pain, take it as prescribed. ¨ If you are not taking a prescription pain medicine, ask your doctor if you can take an over-the-counter medicine. When should you call for help? Call 911 anytime you think you may need emergency care. For example, call if:  · You are unable to move a leg at all. · You have back pain with severe belly pain. · You have symptoms of a heart attack. These may include:  ¨ Chest pain or pressure, or a strange feeling in the chest.  ¨ Sweating. ¨ Shortness of breath. ¨ Nausea or vomiting. ¨ Pain, pressure, or a strange feeling in the back, neck, jaw, or upper belly or in one or both shoulders or arms. ¨ Lightheadedness or sudden weakness. ¨ A fast or irregular heartbeat.   After you call 911, the  may tell you to chew 1 adult-strength or 2 to 4 low-dose aspirin. Wait for an ambulance. Do not try to drive yourself. Call your doctor now or seek immediate medical care if:  · You have new or worse symptoms in your arms, legs, chest, belly, or buttocks. Symptoms may include:  ¨ Numbness or tingling. ¨ Weakness. ¨ Pain. · You lose bladder or bowel control. · You have back pain and:  ¨ You have injured your back while lifting or doing some other activity. Call if the pain is severe, has not gone away after 1 or 2 days, and you cannot do your normal daily activities. ¨ You have had a back injury before that needed treatment. ¨ Your pain has lasted longer than 4 weeks. ¨ You have had weight loss you cannot explain. ¨ You are age 48 or older. ¨ You have cancer now or have had it before. Watch closely for changes in your health, and be sure to contact your doctor if you are not getting better as expected. Where can you learn more? Go to http://kendell-stephanie.info/. Enter Q741 in the search box to learn more about \"Back Pain, Emergency or Urgent Symptoms: Care Instructions. \"  Current as of: March 20, 2017  Content Version: 11.3  © 4155-6082 [employer]. Care instructions adapted under license by [employer] (which disclaims liability or warranty for this information). If you have questions about a medical condition or this instruction, always ask your healthcare professional. [place] [address] any warranty or liability for your use of this information. Back Stretches: Exercises  Your Care Instructions  Here are some examples of exercises for stretching your back. Start each exercise slowly. Ease off the exercise if you start to have pain. Your doctor or physical therapist will tell you when you can start these exercises and which ones will work best for you. How to do the exercises  Overhead stretch    1. Stand comfortably with your feet shoulder-width apart.   2. Looking straight ahead, raise both arms over your head and reach toward the ceiling. Do not allow your head to tilt back. 3. Hold for 15 to 30 seconds, then lower your arms to your sides. 4. Repeat 2 to 4 times. Side stretch    1. Stand comfortably with your feet shoulder-width apart. 2. Raise one arm over your head, and then lean to the other side. 3. Slide your hand down your leg as you let the weight of your arm gently stretch your side muscles. Hold for 15 to 30 seconds. 4. Repeat 2 to 4 times on each side. Press-up    1. Lie on your stomach, supporting your body with your forearms. 2. Press your elbows down into the floor to raise your upper back. As you do this, relax your stomach muscles and allow your back to arch without using your back muscles. As your press up, do not let your hips or pelvis come off the floor. 3. Hold for 15 to 30 seconds, then relax. 4. Repeat 2 to 4 times. Relax and rest    1. Lie on your back with a rolled towel under your neck and a pillow under your knees. Extend your arms comfortably to your sides. 2. Relax and breathe normally. 3. Remain in this position for about 10 minutes. 4. If you can, do this 2 or 3 times each day. Follow-up care is a key part of your treatment and safety. Be sure to make and go to all appointments, and call your doctor if you are having problems. It's also a good idea to know your test results and keep a list of the medicines you take. Where can you learn more? Go to http://kendell-stephanie.info/. Enter V066 in the search box to learn more about \"Back Stretches: Exercises. \"  Current as of: March 21, 2017  Content Version: 11.3  © 7691-1252 [employer]. Care instructions adapted under license by "[employer] (which disclaims liability or warranty for this information).  If you have questions about a medical condition or this instruction, always ask your healthcare professional. Albert Villagran any warranty or liability for your use of this information. Back Care and Preventing Injuries: Care Instructions  Your Care Instructions  You can hurt your back doing many everyday activities: lifting a heavy box, bending down to garden, exercising at the gym, and even getting out of bed. But you can keep your back strong and healthy by doing some exercises. You also can follow a few tips for sitting, sleeping, and lifting to avoid hurting your back again. Talk to your doctor before you start an exercise program. Ask for help if you want to learn more about keeping your back healthy. Follow-up care is a key part of your treatment and safety. Be sure to make and go to all appointments, and call your doctor if you are having problems. It's also a good idea to know your test results and keep a list of the medicines you take. How can you care for yourself at home? · Stay at a healthy weight to avoid strain on your lower back. · Do not smoke. Smoking increases the risk of osteoporosis, which weakens the spine. If you need help quitting, talk to your doctor about stop-smoking programs and medicines. These can increase your chances of quitting for good. · Make sure you sleep in a position that maintains your back's normal curves and on a mattress that feels comfortable. Sleep on your side with a pillow between your knees, or sleep on your back with a pillow under your knees. These positions can reduce strain on your back. · When you get out of bed, lie on your side and bend both knees. Drop your feet over the edge of the bed as you push up with both arms. Scoot to the edge of the bed. Make sure your feet are in line with your rear end (buttocks), and then stand up. · If you must stand for a long time, put one foot on a stool, ledge, or box. Exercise to strengthen your back and other muscles  · Get at least 30 minutes of exercise on most days of the week. Walking is a good choice.  You also may want to do other activities, such as running, swimming, cycling, or playing tennis or team sports. · Stretch your back muscles. Here are few exercises to try:  Bisi Lockett on your back with your knees bent and your feet flat on the floor. Gently pull one bent knee to your chest. Put that foot back on the floor, and then pull the other knee to your chest. Hold for 15 to 30 seconds. Repeat 2 to 4 times. ¨ Do pelvic tilts. Lie on your back with your knees bent. Tighten your stomach muscles. Pull your belly button (navel) in and up toward your ribs. You should feel like your back is pressing to the floor and your hips and pelvis are slightly lifting off the floor. Hold for 6 seconds while breathing smoothly. · Keep your core muscles strong. The muscles of your back, belly (abdomen), and buttocks support your spine. ¨ Pull in your belly, and imagine pulling your navel toward your spine. Hold this for 6 seconds, then relax. Remember to keep breathing normally as you tense your muscles. ¨ Do curl-ups. Always do them with your knees bent. Keep your low back on the floor, and curl your shoulders toward your knees using a smooth, slow motion. Keep your arms folded across your chest. If this bothers your neck, try putting your hands behind your neck (not your head), with your elbows spread apart. ¨ Lie on your back with your knees bent and your feet flat on the floor. Tighten your belly muscles, and then push with your feet and raise your buttocks up a few inches. Hold this position 6 seconds as you continue to breathe normally, then lower yourself slowly to the floor. Repeat 8 to 12 times. ¨ If you like group exercise, try Pilates or yoga. These classes have poses that strengthen the core muscles. Protect your back when you sit  · Place a small pillow, a rolled-up towel, or a lumbar roll in the curve of your back if you need extra support.   · Sit in a chair that is low enough to let you place both feet flat on the floor with both knees nearly level with your hips. If your chair or desk is too high, use a foot rest to raise your knees. · When driving, keep your knees nearly level with your hips. Sit straight, and drive with both hands on the steering wheel. Your arms should be in a slightly bent position. · Try a kneeling chair, which helps tilt your hips forward. This takes pressure off your lower back. · Try sitting on an exercise ball. It can rock from side to side, which helps keep your back loose. Lift properly  · Squat down, bending at the hips and knees only. If you need to, put one knee to the floor and extend your other knee in front of you, bent at a right angle (half kneeling). · Press your chest straight forward. This helps keep your upper back straight while keeping a slight arch in your low back. · Hold the load as close to your body as possible, at the level of your navel. · Use your feet to change direction, taking small steps. · Lead with your hips as you change direction. Keep your shoulders in line with your hips as you move. Do not twist your body. · Set down your load carefully, squatting with your knees and hips only. When should you call for help? Watch closely for changes in your health, and be sure to contact your doctor if:  · You want more exercises to make your back and other core muscles stronger. Where can you learn more? Go to http://kendell-stephanie.info/. Enter S810 in the search box to learn more about \"Back Care and Preventing Injuries: Care Instructions. \"  Current as of: March 21, 2017  Content Version: 11.3  © 4541-7741 [employer]wise, Incorporated. Care instructions adapted under license by [employer] (which disclaims liability or warranty for this information). If you have questions about a medical condition or this instruction, always ask your healthcare professional. [address] any warranty or liability for your use of this information. Sciatica: Exercises  Your Care Instructions  Here are some examples of typical rehabilitation exercises for your condition. Start each exercise slowly. Ease off the exercise if you start to have pain. Your doctor or physical therapist will tell you when you can start these exercises and which ones will work best for you. When you are not being active, find a comfortable position for rest. Some people are comfortable on the floor or a medium-firm bed with a small pillow under their head and another under their knees. Some people prefer to lie on their side with a pillow between their knees. Don't stay in one position for too long. Take short walks (10 to 20 minutes) every 2 to 3 hours. Avoid slopes, hills, and stairs until you feel better. Walk only distances you can manage without pain, especially leg pain. How to do the exercises  Back stretches    5. Get down on your hands and knees on the floor. 6. Relax your head and allow it to droop. Round your back up toward the ceiling until you feel a nice stretch in your upper, middle, and lower back. Hold this stretch for as long as it feels comfortable, or about 15 to 30 seconds. 7. Return to the starting position with a flat back while you are on your hands and knees. 8. Let your back sway by pressing your stomach toward the floor. Lift your buttocks toward the ceiling. 9. Hold this position for 15 to 30 seconds. 10. Repeat 2 to 4 times. Follow-up care is a key part of your treatment and safety. Be sure to make and go to all appointments, and call your doctor if you are having problems. It's also a good idea to know your test results and keep a list of the medicines you take. Where can you learn more? Go to http://kendell-stephanie.info/. Enter J817 in the search box to learn more about \"Sciatica: Exercises. \"  Current as of: March 21, 2017  Content Version: 11.3  © 4492-2795 [employer].  Care instructions adapted under license by [employer] (which disclaims liability or warranty for this information). If you have questions about a medical condition or this instruction, always ask your healthcare professional. [address] any warranty or liability for your use of this information. Sciatica: Care Instructions  Your Care Instructions    Sciatica (say \"yxy-FW-is-kuh\") is an irritation of one of the sciatic nerves, which come from the spinal cord in the lower back. The sciatic nerves and their branches extend down through the buttock to the foot. Sciatica can develop when an injured disc in the back presses against a spinal nerve root. Its main symptom is pain, numbness, or weakness that is often worse in the leg or foot than in the back. Sciatica often will improve and go away with time. Early treatment usually includes medicines and exercises to relieve pain. Follow-up care is a key part of your treatment and safety. Be sure to make and go to all appointments, and call your doctor if you are having problems. It's also a good idea to know your test results and keep a list of the medicines you take. How can you care for yourself at home? · Take pain medicines exactly as directed. ¨ If the doctor gave you a prescription medicine for pain, take it as prescribed. ¨ If you are not taking a prescription pain medicine, ask your doctor if you can take an over-the-counter medicine. · Use heat or ice to relieve pain. ¨ To apply heat, put a warm water bottle, heating pad set on low, or warm cloth on your back. Do not go to sleep with a heating pad on your skin. ¨ To use ice, put ice or a cold pack on the area for 10 to 20 minutes at a time. Put a thin cloth between the ice and your skin. · Avoid sitting if possible, unless it feels better than standing. · Alternate lying down with short walks. Increase your walking distance as you are able to without making your symptoms worse.   · Do not do anything that makes your symptoms worse. When should you call for help? Call 911 anytime you think you may need emergency care. For example, call if:  · You are unable to move a leg at all. Call your doctor now or seek immediate medical care if:  · You have new or worse symptoms in your legs or buttocks. Symptoms may include:  ¨ Numbness or tingling. ¨ Weakness. ¨ Pain. · You lose bladder or bowel control. Watch closely for changes in your health, and be sure to contact your doctor if:  · You are not getting better as expected. Where can you learn more? Go to http://kendell-stephanie.info/. Enter [phone] in the search box to learn more about \"Sciatica: Care Instructions. \"  Current as of: March 21, 2017  Content Version: 11.3  © 9547-5400 Healthwise, Incorporated. Care instructions adapted under license by [employer] (which disclaims liability or warranty for this information). If you have questions about a medical condition or this instruction, always ask your healthcare professional. [place] [address] any warranty or liability for your use of this information.

## 2017-07-18 ENCOUNTER — TELEPHONE (OUTPATIENT)
Dept: CARDIOLOGY CLINIC | Age: 72
End: 2017-07-18

## 2017-07-18 NOTE — TELEPHONE ENCOUNTER
Per Collette Eliquis 5 mg BID approved til 12-31-17 reference # [place] [id number]. Sarah Bray with [facility] informed.

## 2017-07-28 ENCOUNTER — TELEPHONE (OUTPATIENT)
Dept: FAMILY MEDICINE CLINIC | Age: 72
End: 2017-07-28

## 2017-07-28 ENCOUNTER — OFFICE VISIT (OUTPATIENT)
Dept: FAMILY MEDICINE CLINIC | Age: 72
End: 2017-07-28

## 2017-07-28 VITALS
DIASTOLIC BLOOD PRESSURE: 55 MMHG | WEIGHT: 163.4 LBS | SYSTOLIC BLOOD PRESSURE: 106 MMHG | RESPIRATION RATE: 16 BRPM | TEMPERATURE: 97 F | BODY MASS INDEX: 25.59 KG/M2 | OXYGEN SATURATION: 95 % | HEART RATE: 65 BPM

## 2017-07-28 DIAGNOSIS — J06.9 VIRAL URI WITH COUGH: ICD-10-CM

## 2017-07-28 DIAGNOSIS — J02.9 SORE THROAT: Primary | ICD-10-CM

## 2017-07-28 LAB
S PYO AG THROAT QL: NEGATIVE
VALID INTERNAL CONTROL?: YES

## 2017-07-28 RX ORDER — BUTALBITAL, ACETAMINOPHEN AND CAFFEINE 50; 325; 40 MG/1; MG/1; MG/1
TABLET ORAL
COMMUNITY
Start: 2017-06-05 | End: 2017-10-04 | Stop reason: SDUPTHER

## 2017-07-28 RX ORDER — AMOXICILLIN 500 MG/1
500 CAPSULE ORAL 2 TIMES DAILY
Qty: 20 CAP | Refills: 0 | Status: SHIPPED | OUTPATIENT
Start: 2017-07-28 | End: 2017-08-07

## 2017-07-28 NOTE — MR AVS SNAPSHOT
Visit Information Date & Time Provider Department Dept. Phone Encounter #  
 7/28/2017  4:30 PM Gregoria Caldwell PA-C [address] [id number] Follow-up Instructions Return if symptoms worsen or fail to improve. Follow-up and Disposition History Your Appointments 7/31/2017  1:30 PM  
Any with Gregoria Caldwell PA-C  
[address] [facility] ([facility]) Appt Note: 3 day f/u $0 cp mrm [address]  
[phone]  
  
   
 [address] [address]  
  
    
 8/17/2017 11:00 AM  
6 MONTH with Haritha Baig MD  
[facility]) Appt Note: both needed per nurse/pt  $0cp  [address]  
[phone] [address]  
  
    
 8/17/2017 11:15 AM  
ANNUAL with Irving Stanford MD  
[facility]) Appt Note: both needed per nurse/pt  $0cp  [address]  
[phone] [address]  
  
    
 8/21/2017  3:00 PM  
Follow Up with Cline Nyhan, MD  
[facility]) Appt Note: Follow up $0CP tdb 1/23/17  
 [address] [place] [address], [address] [address]  
[address] [facility], [address] [place] [address], [address] [facility] [address] [address]  
  
    
 10/12/2017 11:20 AM  
New Patient with Belen Moe PsyD [address] [facility] ([facility]) Appt Note: new patient memory/ Jesus [place] [address] [facility] [address] [facility] [address] [phone] [phone]  
  
   
 [place] [address]  
  
    
 10/12/2017  1:00 PM  
Any with Belen Moe PsyD [address] [facility] ([address]) Appt Note: 3 hour testing/ Jesus Lori [place] [address] [facility] [address] [facility] [address] [phone]  
  
   
 Rodrick Sierra  
  
    
 6/4/2018  1:00 PM  
Medicare Physical with Jos Keita PA-C  
175 [facility] ([address]) Appt Note:  $0 CP mbm  
 [phone]  
[phone]  
  
   
 [address] [facility] [facility] [address] Upcoming Health Maintenance Date Due FOBT Q 1 YEAR AGE 50-75 4/1/1994 ZOSTER VACCINE AGE 60> 2/1/2004 GLAUCOMA SCREENING Q2Y 12/14/2017* INFLUENZA AGE 9 TO ADULT 8/1/2017 MEDICARE YEARLY EXAM 6/30/2018 DTaP/Tdap/Td series (2 - Td) 6/29/2027 *Topic was postponed. The date shown is not the original due date. Allergies as of 7/28/2017  Review Complete On: 7/28/2017 By: Jos Keita PA-C No Known Allergies Current Immunizations  Reviewed on 7/17/2017 Name Date Influenza High Dose Vaccine PF 11/10/2016 Influenza Vaccine 9/16/2014 Influenza Vaccine Split 9/14/2011 Influenza Vaccine Whole 9/1/2010 Pneumococcal Conjugate (PCV-13) 11/10/2016 Td 5/8/2012 ZZZ-RETIRED (DO NOT USE) Pneumococcal Vaccine (Unspecified Type) 9/14/2011 Not reviewed this visit You Were Diagnosed With   
  
 Codes Comments Sore throat    -  Primary ICD-10-CM: J02.9 ICD-9-CM: 246 Viral URI with cough     ICD-10-CM: J06.9, B97.89 ICD-9-CM: 465.9 Vitals BP Pulse Temp Resp Weight(growth percentile) SpO2  
 106/55 (BP 1 Location: Right arm, BP Patient Position: Sitting) 65 97 °F (36.1 °C) (Oral) 16 163 lb 6.4 oz (74.1 kg) 95% BMI Smoking Status 25.59 kg/m2 Former Smoker BMI and BSA Data Body Mass Index Body Surface Area 25.59 kg/m 2 1.87 m 2 Preferred Pharmacy Pharmacy Name Phone [facility], [address] [place] [address] Your Updated Medication List  
  
   
This list is accurate as of: 7/28/17  5:37 PM.  Always use your most recent med list.  
  
  
  
  
 amoxicillin 500 mg capsule Commonly known as:  AMOXIL Take 1 Cap by mouth two (2) times a day for 10 days. atorvastatin 40 mg tablet Commonly known as:  LIPITOR  
TAKE 1 TABLET BY MOUTH EVERY DAY  
  
 butalbital-acetaminophen-caffeine -40 mg per tablet Commonly known as:  FIORICET, ESGIC  
  
 ELIQUIS 5 mg tablet Generic drug:  apixaban TAKE 1 TABLET BY MOUTH EVERY 12 HOURS  
  
 ferrous sulfate 325 mg (65 mg iron) tablet Take 45 mg by mouth Daily (before breakfast). fluticasone 50 mcg/actuation nasal spray Commonly known as:  Jared [place] 2 Sprays by Both Nostrils route daily. gabapentin 100 mg capsule Commonly known as:  NEURONTIN Take 1 Cap by mouth three (3) times daily. HYDROcodone-acetaminophen 5-325 mg per tablet Commonly known as:  Verlee Shack Take 1 Tab by mouth every four (4) hours as needed for Pain. Max Daily Amount: 6 Tabs. Incontinence Pad, Liner, Disp Pads Commonly known as:  BLADDER CONTROL PADS EX ABSORB  
1 Packet by Does Not Apply route as needed (incontinence). lisinopril 5 mg tablet Commonly known as:  PRINIVIL, ZESTRIL  
5 mg daily. memantine 10 mg tablet Commonly known as:  [place] Take 1 tab twice a day  
  
 metoprolol succinate 25 mg XL tablet Commonly known as:  TOPROL-XL Take 1 Tab by mouth daily. Indications: hypertension MIRALAX 17 gram/dose powder Generic drug:  polyethylene glycol Take 17 g by mouth daily. multivitamin tablet Commonly known as:  ONE A DAY Take 1 Tab by mouth daily. nitroglycerin 0.4 mg SL tablet Commonly known as:  NITROSTAT  
1 Tab by SubLINGual route every five (5) minutes as needed for Chest Pain (call 911 if not relieved by 3). sertraline 100 mg tablet Commonly known as:  ZOLOFT Take 1 Tab by mouth daily. tiZANidine 4 mg tablet Commonly known as:  Skye [place] TAKE 1/2 TABLET (2MG) BY MOUTH EVERY 6-8 HOURS . MAX 3 DOSES IN 24 HOURS  
  
 traZODone 100 mg tablet Commonly known as:  Darreld [place] Take 1 Tab by mouth nightly. Prescriptions Sent to Pharmacy Refills  
 amoxicillin (AMOXIL) 500 mg capsule 0 Sig: Take 1 Cap by mouth two (2) times a day for 10 days. Class: Normal  
 Pharmacy: [facility] [address] [place] [address] [address]  #: [phone] Route: Oral  
  
We Performed the Following AMB POC RAPID STREP A [61787 CPT(R)] Follow-up Instructions Return if symptoms worsen or fail to improve. Patient Instructions Sore Throat: Care Instructions Your Care Instructions Infection by bacteria or a virus causes most sore throats. Cigarette smoke, dry air, air pollution, allergies, and yelling can also cause a sore throat. Sore throats can be painful and annoying. Fortunately, most sore throats go away on their own. If you have a bacterial infection, your doctor may prescribe antibiotics. Follow-up care is a key part of your treatment and safety. Be sure to make and go to all appointments, and call your doctor if you are having problems. It's also a good idea to know your test results and keep a list of the medicines you take. How can you care for yourself at home? · If your doctor prescribed antibiotics, take them as directed. Do not stop taking them just because you feel better. You need to take the full course of antibiotics. · Gargle with warm salt water once an hour to help reduce swelling and relieve discomfort. Use 1 teaspoon of salt mixed in 1 cup of warm water. · Take an over-the-counter pain medicine, such as acetaminophen (Tylenol), ibuprofen (Advil, Motrin), or naproxen (Aleve).  Read and follow all instructions on the label. · Be careful when taking over-the-counter cold or flu medicines and Tylenol at the same time. Many of these medicines have acetaminophen, which is Tylenol. Read the labels to make sure that you are not taking more than the recommended dose. Too much acetaminophen (Tylenol) can be harmful. · Drink plenty of fluids. Fluids may help soothe an irritated throat. Hot fluids, such as tea or soup, may help decrease throat pain. · Use over-the-counter throat lozenges to soothe pain. Regular cough drops or hard candy may also help. These should not be given to young children because of the risk of choking. · Do not smoke or allow others to smoke around you. If you need help quitting, talk to your doctor about stop-smoking programs and medicines. These can increase your chances of quitting for good. · Use a vaporizer or humidifier to add moisture to your bedroom. Follow the directions for cleaning the machine. When should you call for help? Call your doctor now or seek immediate medical care if: 
· You have new or worse trouble swallowing. · Your sore throat gets much worse on one side. Watch closely for changes in your health, and be sure to contact your doctor if you do not get better as expected. Where can you learn more? Go to http://kendell-stephanie.info/. Enter [id number] in the search box to learn more about \"Sore Throat: Care Instructions. \" Current as of: July 29, 2016 Content Version: 11.3 © 5991-2720 [employer]. Care instructions adapted under license by [employer] (which disclaims liability or warranty for this information). If you have questions about a medical condition or this instruction, always ask your healthcare professional. [place] [address] any warranty or liability for your use of this information. Viral Respiratory Infection: Care Instructions Your Care Instructions Viruses are very small organisms. They grow in number after they enter your body. There are many types that cause different illnesses, such as colds and the mumps. The symptoms of a viral respiratory infection often start quickly. They include a fever, sore throat, and runny nose. You may also just not feel well. Or you may not want to eat much. Most viral respiratory infections are not serious. They usually get better with time and self-care. Antibiotics are not used to treat a viral infection. That's because antibiotics will not help cure a viral illness. In some cases, antiviral medicine can help your body fight a serious viral infection. Follow-up care is a key part of your treatment and safety. Be sure to make and go to all appointments, and call your doctor if you are having problems. It's also a good idea to know your test results and keep a list of the medicines you take. How can you care for yourself at home? · Rest as much as possible until you feel better. · Be safe with medicines. Take your medicine exactly as prescribed. Call your doctor if you think you are having a problem with your medicine. You will get more details on the specific medicine your doctor prescribes. · Take an over-the-counter pain medicine, such as acetaminophen (Tylenol), ibuprofen (Advil, Motrin), or naproxen (Aleve), as needed for pain and fever. Read and follow all instructions on the label. Do not give aspirin to anyone younger than 20. It has been linked to Reye syndrome, a serious illness. · Drink plenty of fluids, enough so that your urine is light yellow or clear like water. Hot fluids, such as tea or soup, may help relieve congestion in your nose and throat. If you have kidney, heart, or liver disease and have to limit fluids, talk with your doctor before you increase the amount of fluids you drink. · Try to clear mucus from your lungs by breathing deeply and coughing. · Gargle with warm salt water once an hour. This can help reduce swelling and throat pain. Use 1 teaspoon of salt mixed in 1 cup of warm water. · Do not smoke or allow others to smoke around you. If you need help quitting, talk to your doctor about stop-smoking programs and medicines. These can increase your chances of quitting for good. To avoid spreading the virus · Cough or sneeze into a tissue. Then throw the tissue away. · If you don't have a tissue, use your hand to cover your cough or sneeze. Then clean your hand. You can also cough into your sleeve. · Wash your hands often. Use soap and warm water. Wash for 15 to 20 seconds each time. · If you don't have soap and water near you, you can clean your hands with alcohol wipes or gel. When should you call for help? Call your doctor now or seek immediate medical care if: 
· You have a new or higher fever. · Your fever lasts more than 48 hours. · You have trouble breathing. · You have a fever with a stiff neck or a severe headache. · You are sensitive to light. · You feel very sleepy or confused. Watch closely for changes in your health, and be sure to contact your doctor if: 
· You do not get better as expected. Where can you learn more? Go to http://kendell-stephanie.info/. Enter B162 in the search box to learn more about \"Viral Respiratory Infection: Care Instructions. \" Current as of: March 25, 2017 Content Version: 11.3 © 9297-4069 [employer]. Care instructions adapted under license by [employer] (which disclaims liability or warranty for this information). If you have questions about a medical condition or this instruction, always ask your healthcare professional. [place] [address] any warranty or liability for your use of this information. Patient Instructions History Introducing [facility]! Dear Francisco Posey: Thank you for requesting a [employer] account. Our records indicate that you already have an active [employer] account. You can access your account anytime at [email or web address] Did you know that you can access your hospital and ER discharge instructions at any time in [employer]? You can also review all of your test results from your hospital stay or ER visit. Additional Information If you have questions, please visit the Frequently Asked Questions section of the [employer] website at [email or web address] Remember, [employer] is NOT to be used for urgent needs. For medical emergencies, dial 911. Now available from your iPhone and Android! Please provide this summary of care documentation to your next provider. Your primary care clinician is listed as Jonn Dover. If you have any questions after today's visit, please call [phone].

## 2017-07-28 NOTE — PROGRESS NOTES
Chief Complaint   Patient presents with    Sore Throat     sore throat X2 days, irritable, back hurts     Visit Vitals    /55 (BP 1 Location: Right arm, BP Patient Position: Sitting)    Pulse 65    Temp 97 °F (36.1 °C) (Oral)    Resp 16    Wt 163 lb 6.4 oz (74.1 kg)    SpO2 95%    BMI 25.59 kg/m2     Julia Pittman LPN

## 2017-07-28 NOTE — PROGRESS NOTES
Johanna Chow is a 67 y.o. male who presents to the office today with the following:  Chief Complaint   Patient presents with    Sore Throat     sore throat X2 days, irritable, back hurts       HPI  ST started yesterday. Hurts to swallow. Also with mild dry cough. No sick contacts. Did visit great granddaughter in hospital yesterday. Otherwise feeling well with no other complaints or acute concerns. Denies HA, ear pain, neck pain, cp, sob, wheeze, or sputum. Acute on chronic, usual low back pain, worse since lifted/moved sisters bird bath. Says he is \"too stubborn\" to say no or avoid lifting heavy things. Has another steroid injection planned on 7th or 17th. Says hydrocodone worked well from ER. Review of Systems   Constitutional: Negative for chills, fever and malaise/fatigue. HENT: Positive for congestion and sore throat. Negative for ear pain. Eyes: Negative. Respiratory: Positive for cough. Negative for sputum production, shortness of breath and wheezing. Cardiovascular: Negative for chest pain. Gastrointestinal: Negative. Genitourinary: Negative. Skin: Negative for rash. Neurological: Negative for headaches. See HPI. No Known Allergies    Current Outpatient Prescriptions   Medication Sig    butalbital-acetaminophen-caffeine (FIORICET, ESGIC) -40 mg per tablet     amoxicillin (AMOXIL) 500 mg capsule Take 1 Cap by mouth two (2) times a day for 10 days.  metoprolol succinate (TOPROL-XL) 25 mg XL tablet Take 1 Tab by mouth daily. Indications: hypertension    tiZANidine (ZANAFLEX) 4 mg tablet TAKE 1/2 TABLET (2MG) BY MOUTH EVERY 6-8 HOURS . MAX 3 DOSES IN 24 HOURS    sertraline (ZOLOFT) 100 mg tablet Take 1 Tab by mouth daily.  memantine (NAMENDA) 10 mg tablet Take 1 tab twice a day    atorvastatin (LIPITOR) 40 mg tablet TAKE 1 TABLET BY MOUTH EVERY DAY    gabapentin (NEURONTIN) 100 mg capsule Take 1 Cap by mouth three (3) times daily.     ELIQUIS 5 mg tablet TAKE 1 TABLET BY MOUTH EVERY 12 HOURS    multivitamin (ONE A DAY) tablet Take 1 Tab by mouth daily.  traZODone (DESYREL) 100 mg tablet Take 1 Tab by mouth nightly.  lisinopril (PRINIVIL, ZESTRIL) 5 mg tablet 5 mg daily.  fluticasone (FLONASE) 50 mcg/actuation nasal spray 2 Sprays by Both Nostrils route daily.  polyethylene glycol (MIRALAX) 17 gram/dose powder Take 17 g by mouth daily.  Incontinence Pad, Liner, Disp (BLADDER CONTROL PADS EX ABSORB) pads 1 Packet by Does Not Apply route as needed (incontinence).  nitroglycerin (NITROSTAT) 0.4 mg SL tablet 1 Tab by SubLINGual route every five (5) minutes as needed for Chest Pain (call 911 if not relieved by 3).  HYDROcodone-acetaminophen (NORCO) 5-325 mg per tablet Take 1 Tab by mouth every four (4) hours as needed for Pain. Max Daily Amount: 6 Tabs.  ferrous sulfate 325 mg (65 mg iron) tablet Take 45 mg by mouth Daily (before breakfast). No current facility-administered medications for this visit.         Past Medical History:   Diagnosis Date    AICD (automatic cardioverter/defibrillator) present 5/13/2016 5/13/16 [employer] upgrade to dual chamber AICD implant  Dr. Erika Mackenzie state, other     Arrhythmia     'S IN THE PAST    Arthritis     OSTEO ARTHRITIS    AVNRT (AV bridgette re-entry tachycardia) ([facility] [address] [address] 5/12/2016 5/12/16 AVNRT ablation: PM/AICD left upper chest :Dr. Leslie Krishnan Back ache     CAD (coronary artery disease)     Cancer [facility]) 2004    Prostate cancer    Chest tightness     last episode of chest pain 5/2016 before AICD placed; none since then    Coagulation defects 2005    FACTOR V LEIDEN    Dizziness     FH: factor V Leiden deficiency     Generalized muscle ache     GERD (gastroesophageal reflux disease)     HEARTBURN    Heart failure ([facility] [address] 2014    EF 40%; Dr. Jett Carter    Hyperlipidemia, mixed     Hypertension     COREG    Other ill-defined conditions 2004    blood transfusion    Pacemaker     Pacemaker     Postsurgical aortocoronary bypass status 05/29/2012    CABG    Presence of cardiac defibrillator 05/2016    left upper chest    Psychiatric disorder     depression    Stroke [facility]) 2011, 1990's    SEVERAL-mini    Thromboembolism ([facility] 2014    left leg: changed to Eliquis from Warfarin    Thromboembolus ([facility] 2005    left leg    Weakness generalized        Past Surgical History:   Procedure Laterality Date    CARDIAC CATHETERIZATION  3/4/2011         CARDIAC SURG PROCEDURE UNLIST      CABG X1 3/5/11    HX HERNIA REPAIR  2002    Ingiunal hernia repair left    HX ORTHOPAEDIC      LEFT KNEE CARTILAGE REPAIR;RIGHT ROTATOR CUFF REPAIR    HX ORTHOPAEDIC  2/14/12    C5-7 ANTERIIOR CERVICAL DISECTOMY AND FUSION    HX ORTHOPAEDIC  6/4/13    C5-C7 POSTERIOR DECOMPRESSION AND FUSION    HX OTHER SURGICAL      steroid injections    HX PACEMAKER PLACEMENT      H[place] PROST[place]CTOM[place]  2004     [place]    HX UROLOGICAL       urinary control system    HX UROLOGICAL  12/3/13    REPLACEMENT ARTIFICAL URINARY SPHINCTER       Social History     Social History    Marital status:      Spouse name: N/A    Number of children: N/A    Years of education: N/A     Social History Main Topics    Smoking status: Former Smoker     Types: Cigarettes     Quit date: 10/10/1971    Smokeless tobacco: Never Used    Alcohol use No    Drug use: No    Sexual activity: No     Other Topics Concern    Sleep Concern Yes    Stress Concern Yes     Family concerns     Social History Narrative    , 2 children       Family History   Problem Relation Age of Onset    Asthma Mother     Alcohol abuse Mother     Alcohol abuse Father     Asthma Father     Cancer Sister     Heart Disease Sister     Alcohol abuse Brother     Cancer Brother     Heart Disease Brother          Physical Exam:  Visit Vitals    /55 (BP 1 Location: Right arm, BP Patient Position: Sitting)    Pulse 65  Temp 97 °F (36.1 °C) (Oral)    Resp 16    Wt 163 lb 6.4 oz (74.1 kg)    SpO2 95%    BMI 25.59 kg/m2     Physical Exam   Constitutional: He is oriented to person, place, and time and well-developed, well-nourished, and in no distress. HENT:   Head: Normocephalic and atraumatic. Right Ear: Tympanic membrane, external ear and ear canal normal.   Left Ear: External ear and ear canal normal. Decreased hearing is noted. Nose: Mucosal edema (very mild, otherwise nl, trace clear mucus) present. Right sinus exhibits no maxillary sinus tenderness and no frontal sinus tenderness. Left sinus exhibits no maxillary sinus tenderness and no frontal sinus tenderness. Mouth/Throat: Uvula is midline and mucous membranes are normal. Posterior oropharyngeal erythema (very minimal along pillars only) present. No oropharyngeal exudate, posterior oropharyngeal edema or tonsillar abscesses. Cerumen left canal, unable to see TM. Ear with hearing aid. Eyes: Conjunctivae and EOM are normal.   Neck: Normal range of motion. Neck supple. Cardiovascular: Normal rate and regular rhythm. Pulmonary/Chest: Effort normal and breath sounds normal. No respiratory distress. He has no decreased breath sounds. He has no wheezes. He has no rhonchi. He has no rales. Abdominal: Soft. There is no tenderness. Musculoskeletal:        Lumbar back: He exhibits tenderness (lower midline somewhat ttp). He exhibits no swelling, no edema and no deformity. Lymphadenopathy:     He has no cervical adenopathy. Neurological: He is alert and oriented to person, place, and time. Gait normal.   Skin: Skin is warm and dry. Psychiatric: Mood and affect normal.   Nursing note and vitals reviewed. Assessment/Plan:    ICD-10-CM ICD-9-CM    1. Sore throat J02.9 462 AMB POC RAPID STREP A      amoxicillin (AMOXIL) 500 mg capsule   2.  Viral URI with cough J06.9 465.9     B97.89       Results for orders placed or performed in visit on 07/28/17 AMB POC RAPID STREP A   Result Value Ref Range    VALID INTERNAL CONTROL POC Yes     Group A Strep Ag Negative Negative       Explained to pt likely viral.  Rec hold abx to see if sxs improve with conservative tx. Rest & fluids. Warm salt water gargles. Tylenol prn for fever/discomfort. Counseled on otc meds for symptomatic relief. RTO if sxs persist/worsen or if develops any new sxs/concerns. To seek immediate care/to ER for any \"red flag\" sxs. Will see Ortho for back pain. Rec avoid heavy lifting. Continue usual meds. Follow-up Disposition:  Return if symptoms worsen or fail to improve.     Jonel Butterfield PA-C

## 2017-07-28 NOTE — PATIENT INSTRUCTIONS
Sore Throat: Care Instructions  Your Care Instructions    Infection by bacteria or a virus causes most sore throats. Cigarette smoke, dry air, air pollution, allergies, and yelling can also cause a sore throat. Sore throats can be painful and annoying. Fortunately, most sore throats go away on their own. If you have a bacterial infection, your doctor may prescribe antibiotics. Follow-up care is a key part of your treatment and safety. Be sure to make and go to all appointments, and call your doctor if you are having problems. It's also a good idea to know your test results and keep a list of the medicines you take. How can you care for yourself at home? · If your doctor prescribed antibiotics, take them as directed. Do not stop taking them just because you feel better. You need to take the full course of antibiotics. · Gargle with warm salt water once an hour to help reduce swelling and relieve discomfort. Use 1 teaspoon of salt mixed in 1 cup of warm water. · Take an over-the-counter pain medicine, such as acetaminophen (Tylenol), ibuprofen (Advil, Motrin), or naproxen (Aleve). Read and follow all instructions on the label. · Be careful when taking over-the-counter cold or flu medicines and Tylenol at the same time. Many of these medicines have acetaminophen, which is Tylenol. Read the labels to make sure that you are not taking more than the recommended dose. Too much acetaminophen (Tylenol) can be harmful. · Drink plenty of fluids. Fluids may help soothe an irritated throat. Hot fluids, such as tea or soup, may help decrease throat pain. · Use over-the-counter throat lozenges to soothe pain. Regular cough drops or hard candy may also help. These should not be given to young children because of the risk of choking. · Do not smoke or allow others to smoke around you. If you need help quitting, talk to your doctor about stop-smoking programs and medicines.  These can increase your chances of quitting for good. · Use a vaporizer or humidifier to add moisture to your bedroom. Follow the directions for cleaning the machine. When should you call for help? Call your doctor now or seek immediate medical care if:  · You have new or worse trouble swallowing. · Your sore throat gets much worse on one side. Watch closely for changes in your health, and be sure to contact your doctor if you do not get better as expected. Where can you learn more? Go to http://kendell-stephanie.info/. Enter [id number] [id number] in the search box to learn more about \"Sore Throat: Care Instructions. \"  Current as of: July 29, 2016  Content Version: 11.3  © 4210-5312 [employer]. Care instructions adapted under license by [employer] (which disclaims liability or warranty for this information). If you have questions about a medical condition or this instruction, always ask your healthcare professional. [place] [address] any warranty or liability for your use of this information. Viral Respiratory Infection: Care Instructions  Your Care Instructions  Viruses are very small organisms. They grow in number after they enter your body. There are many types that cause different illnesses, such as colds and the mumps. The symptoms of a viral respiratory infection often start quickly. They include a fever, sore throat, and runny nose. You may also just not feel well. Or you may not want to eat much. Most viral respiratory infections are not serious. They usually get better with time and self-care. Antibiotics are not used to treat a viral infection. That's because antibiotics will not help cure a viral illness. In some cases, antiviral medicine can help your body fight a serious viral infection. Follow-up care is a key part of your treatment and safety. Be sure to make and go to all appointments, and call your doctor if you are having problems.  It's also a good idea to know your test results and keep a list of the medicines you take. How can you care for yourself at home? · Rest as much as possible until you feel better. · Be safe with medicines. Take your medicine exactly as prescribed. Call your doctor if you think you are having a problem with your medicine. You will get more details on the specific medicine your doctor prescribes. · Take an over-the-counter pain medicine, such as acetaminophen (Tylenol), ibuprofen (Advil, Motrin), or naproxen (Aleve), as needed for pain and fever. Read and follow all instructions on the label. Do not give aspirin to anyone younger than 20. It has been linked to Reye syndrome, a serious illness. · Drink plenty of fluids, enough so that your urine is light yellow or clear like water. Hot fluids, such as tea or soup, may help relieve congestion in your nose and throat. If you have kidney, heart, or liver disease and have to limit fluids, talk with your doctor before you increase the amount of fluids you drink. · Try to clear mucus from your lungs by breathing deeply and coughing. · Gargle with warm salt water once an hour. This can help reduce swelling and throat pain. Use 1 teaspoon of salt mixed in 1 cup of warm water. · Do not smoke or allow others to smoke around you. If you need help quitting, talk to your doctor about stop-smoking programs and medicines. These can increase your chances of quitting for good. To avoid spreading the virus  · Cough or sneeze into a tissue. Then throw the tissue away. · If you don't have a tissue, use your hand to cover your cough or sneeze. Then clean your hand. You can also cough into your sleeve. · Wash your hands often. Use soap and warm water. Wash for 15 to 20 seconds each time. · If you don't have soap and water near you, you can clean your hands with alcohol wipes or gel. When should you call for help? Call your doctor now or seek immediate medical care if:  · You have a new or higher fever.   · Your fever lasts more than 48 hours. · You have trouble breathing. · You have a fever with a stiff neck or a severe headache. · You are sensitive to light. · You feel very sleepy or confused. Watch closely for changes in your health, and be sure to contact your doctor if:  · You do not get better as expected. Where can you learn more? Go to http://kendell-stephanie.info/. Enter K914 in the search box to learn more about \"Viral Respiratory Infection: Care Instructions. \"  Current as of: March 25, 2017  Content Version: 11.3  © 5130-1047 [employer]. Care instructions adapted under license by [employer] (which disclaims liability or warranty for this information). If you have questions about a medical condition or this instruction, always ask your healthcare professional. [address] any warranty or liability for your use of this information.

## 2017-07-31 ENCOUNTER — OFFICE VISIT (OUTPATIENT)
Dept: FAMILY MEDICINE CLINIC | Age: 72
End: 2017-07-31

## 2017-07-31 VITALS
HEART RATE: 60 BPM | DIASTOLIC BLOOD PRESSURE: 82 MMHG | OXYGEN SATURATION: 97 % | RESPIRATION RATE: 16 BRPM | WEIGHT: 161.4 LBS | BODY MASS INDEX: 25.28 KG/M2 | TEMPERATURE: 96.8 F | SYSTOLIC BLOOD PRESSURE: 144 MMHG

## 2017-07-31 DIAGNOSIS — J06.9 RECENT URI: ICD-10-CM

## 2017-07-31 DIAGNOSIS — R49.0 HOARSENESS OF VOICE: Primary | ICD-10-CM

## 2017-07-31 NOTE — PATIENT INSTRUCTIONS
Hoarseness: Care Instructions  Your Care Instructions  Many things can cause your voice to become rough, raspy, or hard to hear. Having a cold or a sinus infection, talking too loudly or yelling, smoking, or breathing dry air can cause a hoarse voice. You also can have voice problems from pollution and allergies. Sometimes, acid from your stomach can back up into your throat--called acid reflux--and change your voice. In some cases, a problem with the voice box, or larynx, causes hoarseness. Rest and home care may be all you need if you have a hoarse voice. Follow-up care is a key part of your treatment and safety. Be sure to make and go to all appointments, and call your doctor if you are having problems. It's also a good idea to know your test results and keep a list of the medicines you take. How can you care for yourself at home? · Follow your doctor's advice about how much to talk. Use email or write notes when you can to rest your voice. · If your doctor prescribed antibiotics, take them as directed. Do not stop taking them just because you feel better. You need to take the full course of antibiotics. · Talk to your doctor about treatment for allergies if you do not already treat them. · Follow your treatment plan for acid reflux (if you have the condition): ¨ Take your medicines exactly as prescribed. Call your doctor if you think you are having a problem with your medicine. ¨ Limit or stop eating foods that make your acid reflux worse. These may include tomatoes, spicy foods, and chocolate. ¨ Limit or stop drinking alcohol and drinks that have caffeine, such as coffee, tea, and sushma. ¨ Raise the head of your bed a few inches. Place a brick or a foam wedge under the mattress at the head of the bed. This will help keep stomach acid out of your throat at night. · When you do talk, do not whisper. It can be hard on your voice. · Use a vaporizer or humidifier to add moisture to your bedroom. Follow the directions for cleaning the machine. · Drink plenty of water to keep your throat moist. If you have kidney, heart, or liver disease and have to limit fluids, talk with your doctor before you increase the amount of fluids you drink. · Do not smoke. Smoking can make your voice raspy and can increase your risk of throat cancer. If you need help quitting, talk to your doctor about stop-smoking programs and medicines. These can increase your chances of quitting for good. · To keep your voice from getting hoarse in the future, try not to talk loudly or shout, such as at sports events. When should you call for help? Call 911 anytime you think you may need emergency care. For example, call if:  · You have trouble breathing. Call your doctor now or seek immediate medical care if:  · You have trouble swallowing. · You have new or worse pain. Watch closely for changes in your health, and be sure to contact your doctor if:  · You do not get better as expected. Where can you learn more? Go to http://kendell-stephanie.info/. Enter P454 in the search box to learn more about \"Hoarseness: Care Instructions. \"  Current as of: March 20, 2017  Content Version: 11.3  © 7368-1218 [employer]. Care instructions adapted under license by [employer] (which disclaims liability or warranty for this information). If you have questions about a medical condition or this instruction, always ask your healthcare professional. [place] [address] any warranty or liability for your use of this information.

## 2017-07-31 NOTE — PROGRESS NOTES
Chief Complaint   Patient presents with    Follow-up     f/u from last week; pt has been taking abx BID; still hoarse     Visit Vitals    /82 (BP 1 Location: Right arm, BP Patient Position: Sitting)    Pulse 60    Temp 96.8 °F (36 °C) (Oral)    Resp 16    Wt 161 lb 6.4 oz (73.2 kg)    SpO2 97%    BMI 25.28 kg/m2     Pola Wilson LPN

## 2017-07-31 NOTE — PROGRESS NOTES
Yelena Pimentel is a 67 y.o. male who presents to the office today with the following:  Chief Complaint   Patient presents with    Follow-up     f/u from last week; pt has been taking abx BID; still hoarse       HPI   Here for f/u from last week. Reports ST has fully resolved. Pt reports feeling much better overall, but is now with hoarse voice. Coughing still, but really only at night now, also getting better. Non-productive. Has not taken anything for cough. Otherwise feeling well with no other complaints or acute concerns. No new or worsening sxs. Denies any reflux/heartburn. No difficulty breathing/swallowing. Review of Systems   Constitutional: Negative for chills, fever and malaise/fatigue. HENT: Negative for congestion, ear pain and sore throat. Respiratory: Negative for shortness of breath and wheezing. Cardiovascular: Negative for chest pain. Gastrointestinal: Negative. Genitourinary: Negative. Musculoskeletal: Negative for myalgias. Skin: Negative for rash. Neurological: Negative. See HPI. No Known Allergies    Current Outpatient Prescriptions   Medication Sig    butalbital-acetaminophen-caffeine (FIORICET, ESGIC) -40 mg per tablet     amoxicillin (AMOXIL) 500 mg capsule Take 1 Cap by mouth two (2) times a day for 10 days.  HYDROcodone-acetaminophen (NORCO) 5-325 mg per tablet Take 1 Tab by mouth every four (4) hours as needed for Pain. Max Daily Amount: 6 Tabs.  metoprolol succinate (TOPROL-XL) 25 mg XL tablet Take 1 Tab by mouth daily. Indications: hypertension    tiZANidine (ZANAFLEX) 4 mg tablet TAKE 1/2 TABLET (2MG) BY MOUTH EVERY 6-8 HOURS . MAX 3 DOSES IN 24 HOURS    sertraline (ZOLOFT) 100 mg tablet Take 1 Tab by mouth daily.  memantine (NAMENDA) 10 mg tablet Take 1 tab twice a day    atorvastatin (LIPITOR) 40 mg tablet TAKE 1 TABLET BY MOUTH EVERY DAY    gabapentin (NEURONTIN) 100 mg capsule Take 1 Cap by mouth three (3) times daily.     ELIQUIS 5 mg tablet TAKE 1 TABLET BY MOUTH EVERY 12 HOURS    multivitamin (ONE A DAY) tablet Take 1 Tab by mouth daily.  traZODone (DESYREL) 100 mg tablet Take 1 Tab by mouth nightly.  lisinopril (PRINIVIL, ZESTRIL) 5 mg tablet 5 mg daily.  fluticasone (FLONASE) 50 mcg/actuation nasal spray 2 Sprays by Both Nostrils route daily.  polyethylene glycol (MIRALAX) 17 gram/dose powder Take 17 g by mouth daily.  Incontinence Pad, Liner, Disp (BLADDER CONTROL PADS EX ABSORB) pads 1 Packet by Does Not Apply route as needed (incontinence).  nitroglycerin (NITROSTAT) 0.4 mg SL tablet 1 Tab by SubLINGual route every five (5) minutes as needed for Chest Pain (call 911 if not relieved by 3).  ferrous sulfate 325 mg (65 mg iron) tablet Take 45 mg by mouth Daily (before breakfast). No current facility-administered medications for this visit.         Past Medical History:   Diagnosis Date    AICD (automatic cardioverter/defibrillator) present 5/13/2016 5/13/16 [employer] upgrade to dual chamber AICD implant  Dr. Martha Hernandez state, other     Arrhythmia     'S IN THE PAST    Arthritis     OSTEO ARTHRITIS    AVNRT (AV bridgette re-entry tachycardia) ([facility] [address] 5/12/2016 5/12/16 AVNRT ablation: PM/AICD left upper chest :Dr. Jame Canchola Back ache     CAD (coronary artery disease)     Cancer [facility]) 2004    Prostate cancer    Chest tightness     last episode of chest pain 5/2016 before AICD placed; none since then    Coagulation defects 2005    FACTOR V LEIDEN    Dizziness     FH: factor V Leiden deficiency     Generalized muscle ache     GERD (gastroesophageal reflux disease)     HEARTBURN    Heart failure ([address] 2014    EF 40%; Dr. Marcio Tiwari    Hyperlipidemia, mixed     Hypertension     COREG    Other ill-defined conditions 2004    blood transfusion    Pacemaker     Pacemaker     Postsurgical aortocoronary bypass status 05/29/2012    CABG    Presence of cardiac defibrillator 05/2016    left upper chest    Psychiatric disorder     depression    Stroke [facility]) 2011, 1990's    SEVERAL-mini    Thromboembolism ([facility] [address] 2014    left leg: changed to Eliquis from Warfarin    Thromboembolus ([facility] [address] 2005    left leg    Weakness generalized        Past Surgical History:   Procedure Laterality Date    CARDIAC CATHETERIZATION  3/4/2011         CARDIAC SURG PROCEDURE UNLIST      CABG X1 3/5/11    HX HERNIA REPAIR  2002    Ingiunal hernia repair left    HX ORTHOPAEDIC      LEFT KNEE CARTILAGE REPAIR;RIGHT ROTATOR CUFF REPAIR    HX ORTHOPAEDIC  2/14/12    C5-7 ANTERIIOR CERVICAL DISECTOMY AND FUSION    HX ORTHOPAEDIC  6/4/13    C5-C7 POSTERIOR DECOMPRESSION AND FUSION    HX OTHER SURGICAL      steroid injections    HX PACEMAKER PLACEMENT      H[place] PR[place]CT[place]  2004     [place]    HX UROLOGICAL       urinary control system    HX UROLOGICAL  12/3/13    REPLACEMENT ARTIFICAL URINARY SPHINCTER       Social History     Social History    Marital status:      Spouse name: N/A    Number of children: N/A    Years of education: N/A     Social History Main Topics    Smoking status: Former Smoker     Types: Cigarettes     Quit date: 10/10/1971    Smokeless tobacco: Never Used    Alcohol use No    Drug use: No    Sexual activity: No     Other Topics Concern    Sleep Concern Yes    Stress Concern Yes     Family concerns     Social History Narrative    , 2 children       Family History   Problem Relation Age of Onset    Asthma Mother     Alcohol abuse Mother     Alcohol abuse Father     Asthma Father     Cancer Sister     Heart Disease Sister     Alcohol abuse Brother     Cancer Brother     Heart Disease Brother          Physical Exam:  Visit Vitals    /82 (BP 1 Location: Right arm, BP Patient Position: Sitting)    Pulse 60    Temp 96.8 °F (36 °C) (Oral)    Resp 16    Wt 161 lb 6.4 oz (73.2 kg)    SpO2 97%    BMI 25.28 kg/m2     Physical Exam Constitutional: He is oriented to person, place, and time and well-developed, well-nourished, and in no distress. HENT:   Head: Normocephalic and atraumatic. Right Ear: Tympanic membrane, external ear and ear canal normal.   Left Ear: Tympanic membrane, external ear and ear canal normal.   Nose: Nose normal.   Mouth/Throat: Uvula is midline, oropharynx is clear and moist and mucous membranes are normal.   Eyes: Conjunctivae and EOM are normal.   Neck: Neck supple. Cardiovascular: Normal rate and regular rhythm. Pulmonary/Chest: Effort normal and breath sounds normal. No respiratory distress. He has no decreased breath sounds. He has no wheezes. He has no rhonchi. He has no rales. Abdominal: Soft. There is no tenderness. Musculoskeletal: He exhibits no edema. Lymphadenopathy:     He has no cervical adenopathy. Neurological: He is alert and oriented to person, place, and time. Gait normal.   Skin: Skin is warm and dry. Psychiatric: Mood and affect normal.   Nursing note and vitals reviewed. Assessment/Plan:    ICD-10-CM ICD-9-CM    1. Hoarseness of voice R49.0 784.42    2. Recent URI Z87.09 V49.89      Encouraged to finish tx, continue plenty rest & fluids, warm salt water gargles, warm liquids, voice rest, elevated head of bed. Discussed otc medications for symptomatic relief. Discussed some otc couch meds (rec for HTN if any), caution to check for interactions, may ask pharmacist or call office. RTO if sxs persist/worsen or develops any additional sxs/concerns. Seek immediate care/to ER for any \"red flag\" sxs. Handouts provided. Follow-up Disposition:  Return if symptoms worsen or fail to improve.     Gregoria Caldwell PA-C

## 2017-08-10 DIAGNOSIS — S46.212A BICEPS STRAIN, LEFT, INITIAL ENCOUNTER: ICD-10-CM

## 2017-08-10 DIAGNOSIS — M62.830 MUSCLE SPASM OF BACK: ICD-10-CM

## 2017-08-10 RX ORDER — TIZANIDINE 4 MG/1
TABLET ORAL
Qty: 30 TAB | Refills: 2 | Status: SHIPPED | OUTPATIENT
Start: 2017-08-10 | End: 2017-11-13 | Stop reason: SDUPTHER

## 2017-08-17 ENCOUNTER — CLINICAL SUPPORT (OUTPATIENT)
Dept: CARDIOLOGY CLINIC | Age: 72
End: 2017-08-17

## 2017-08-17 ENCOUNTER — OFFICE VISIT (OUTPATIENT)
Dept: CARDIOLOGY CLINIC | Age: 72
End: 2017-08-17

## 2017-08-17 VITALS
SYSTOLIC BLOOD PRESSURE: 120 MMHG | HEIGHT: 67 IN | HEART RATE: 60 BPM | WEIGHT: 166 LBS | RESPIRATION RATE: 18 BRPM | OXYGEN SATURATION: 93 % | DIASTOLIC BLOOD PRESSURE: 72 MMHG | BODY MASS INDEX: 26.06 KG/M2

## 2017-08-17 VITALS
HEART RATE: 60 BPM | WEIGHT: 166.5 LBS | DIASTOLIC BLOOD PRESSURE: 72 MMHG | HEIGHT: 67 IN | RESPIRATION RATE: 16 BRPM | SYSTOLIC BLOOD PRESSURE: 120 MMHG | OXYGEN SATURATION: 94 % | BODY MASS INDEX: 26.13 KG/M2

## 2017-08-17 DIAGNOSIS — I47.1 AVNRT (AV NODAL RE-ENTRY TACHYCARDIA) (HCC): ICD-10-CM

## 2017-08-17 DIAGNOSIS — Z95.810 AICD (AUTOMATIC CARDIOVERTER/DEFIBRILLATOR) PRESENT: Primary | ICD-10-CM

## 2017-08-17 DIAGNOSIS — I25.10 ATHEROSCLEROSIS OF NATIVE CORONARY ARTERY OF NATIVE HEART WITHOUT ANGINA PECTORIS: ICD-10-CM

## 2017-08-17 DIAGNOSIS — I25.10 CORONARY ARTERY DISEASE INVOLVING NATIVE CORONARY ARTERY OF NATIVE HEART WITHOUT ANGINA PECTORIS: Primary | Chronic | ICD-10-CM

## 2017-08-17 DIAGNOSIS — E78.5 HYPERLIPIDEMIA, UNSPECIFIED HYPERLIPIDEMIA TYPE: Chronic | ICD-10-CM

## 2017-08-17 DIAGNOSIS — I10 ESSENTIAL HYPERTENSION: Chronic | ICD-10-CM

## 2017-08-17 DIAGNOSIS — I50.22 CHRONIC SYSTOLIC HEART FAILURE (HCC): ICD-10-CM

## 2017-08-17 DIAGNOSIS — I47.1 PAROXYSMAL SUPRAVENTRICULAR TACHYCARDIA (HCC): ICD-10-CM

## 2017-08-17 DIAGNOSIS — I49.5 SSS (SICK SINUS SYNDROME) (HCC): Chronic | ICD-10-CM

## 2017-08-17 NOTE — PROGRESS NOTES
Chief Complaint   Patient presents with    Irregular Heart Beat     6 mo appt. Denied cardiac symptoms. EKG done today.

## 2017-08-17 NOTE — PROGRESS NOTES
NAME:  Felecia Patel   :   1945   MRN:   [id number]   PCP:  Joahn Hill PA-C           Subjective: The patient is a 67y.o. year old male  who returns for a routine follow-up. Since the last visit, patient reports no change in exercise tolerance, chest pain, edema, medication intolerance, palpitations, shortness of breath, PND/orthopnea wheezing, sputum, syncope, dizziness or light headedness. Doing well. Some discomfort at ICD site but admits that he is very active. Check today without any issues.      Patient Active Problem List   Diagnosis Code    CAD (coronary artery disease) I25.10    Hypertension I10    Hyperlipemia E78.5    DVT (deep venous thrombosis) chronic coumadin anti-coagulation I82.409    History of factor V Leiden mutation Z86.2    CABG (coronary artery bypass graft)     Chronic venous embolism and thrombosis of unspecified deep vessels of lower extremity I82.509    Osteoarthritis M19.90    Prostate cancer ([facility] C61    Hyperthyroidism E05.90    Mitral valve disorders I05.9    Postsurgical aortocoronary bypass status Z95.1    Coronary atherosclerosis of native coronary artery I25.10    Paroxysmal supraventricular tachycardia (HCC) I47.1    Chronic systolic heart failure (HCC) I50.22    Atrial fibrillation (HCC) I48.91    Mixed hyperlipidemia E78.2    Palpitations R00.2    S/P cervical spinal fusion Z98.1    Bradycardia R00.1    Left-sided chest wall pain R07.89    SSS (sick sinus syndrome) (HCC) I49.5    Chest pain R07.9    Pacemaker Z95.0    Cardiac pacemaker in situ Z95.0    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    Lower GI bleeding K92.2    B12 deficiency E53.8    Hypothyroidism due to acquired atrophy of thyroid E03.4    Osteoporosis M81.0    Cervical post-laminectomy syndrome M96.1    Neck pain M54.2    ACP (advance care planning) Z71.89    Stenosis of both carotid arteries without cerebral infarction I65.23    Cervicogenic headache R51    Alzheimer's dementia, late onset, with behavioral disturbance G30.1, F02.81    Spinal stenosis, lumbar region, with neurogenic claudication M48.06    Lumbar back pain with radiculopathy affecting right lower extremity M54.17    Lumbar back pain with radiculopathy affecting left lower extremity M54.17    History of stroke Z86.73    SVT (supraventricular tachycardia) ([facility]) I47.1    Cardiomyopathy ([facility]) I42.9    Chronic systolic congestive heart failure (HCC) I50.22    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 Jessika Mccall state, other     Arrhythmia     'S IN THE PAST    Arthritis     OSTEO ARTHRITIS    AVNRT (AV bridgette re-entry tachycardia) ([facility] 5/12/2016 5/12/16 AVNRT ablation: PM/AICD left upper chest :Dr. Janessa Fournier Back ache     CAD (coronary artery disease)     Cancer [facility]) 2004    Prostate cancer    Chest tightness     last episode of chest pain 5/2016 before AICD placed; none since then    Coagulation defects 2005    FACTOR V LEIDEN    Dizziness     FH: factor V Leiden deficiency     Generalized muscle ache     GERD (gastroesophageal reflux disease)     HEARTBURN    Heart failure ([facility] 2014    EF 40%; Dr. Pardeep Stinson    Hyperlipidemia, mixed     Hypertension     COREG    Other ill-defined conditions 2004    blood transfusion    Pacemaker     Pacemaker     Postsurgical aortocoronary bypass status 05/29/2012    CABG    Presence of cardiac defibrillator 05/2016    left upper chest    Psychiatric disorder     depression    Stroke [facility]) 2011, 1990's    SEVERAL-mini    Thromboembolism ([facility] 2014    left leg: changed to Eliquis from Warfarin    Thromboembolus ([facility] 2005    left leg    Weakness generalized        Social History   Substance Use Topics    Smoking status: Former Smoker     Types: Cigarettes     Quit date: 10/10/1971    Smokeless tobacco: Never Used    Alcohol use No      Family History   Problem Relation Age of Onset    Asthma Mother     Alcohol abuse Mother     Alcohol abuse Father     Asthma Father     Cancer Sister     Heart Disease Sister     Alcohol abuse Brother     Cancer Brother     Heart Disease Brother         Review of Systems  Constitutional: Negative for fever, chills, and diaphoresis. Respiratory: Negative for cough, hemoptysis, sputum production, shortness of breath and wheezing. Cardiovascular: Negative for chest pain, palpitations, orthopnea, claudication, leg swelling and PND. Gastrointestinal: Negative for heartburn, nausea, vomiting, blood in stool and melena. Genitourinary: Negative for dysuria and flank pain. Musculoskeletal: Negative for joint pain and back pain. Skin: Negative for rash. Neurological: Negative for focal weakness, seizures, loss of consciousness, weakness and headaches. Endo/Heme/Allergies: Does not bruise/bleed easily. Psychiatric/Behavioral: Negative for memory loss. The patient does not have insomnia. Objective:       Vitals:    08/17/17 1037 08/17/17 1046   BP: 114/72 120/72   Pulse: 60    Resp: 16    SpO2: 94%    Weight: 166 lb 8 oz (75.5 kg)    Height: 5' 7\" (1.702 m)     Body mass index is 26.08 kg/(m^2). General PE    Gen: NAD     Mental Status - Alert. General Appearance - Not in acute distress. Neck - no JVD     Chest and Lung Exam     Inspection: Accessory muscles - No use of accessory muscles in breathing.    Auscultation:   Breath sounds: - Normal.     Cardiovascular   Inspection: Jugular vein - Bilateral - Inspection Normal.   Palpation/Percussion:   Apical Impulse: - Normal. Auscultation: Rhythm - Regular. Heart Sounds - S1 WNL and S2 WNL. No S3 or S4. Murmurs & Other Heart Sounds: Auscultation of the heart reveals - No Murmurs. Peripheral Vascular   Upper Extremity: Inspection - Bilateral - No Cyanotic nailbeds or Digital clubbing. Lower Extremity:   Palpation: Edema - Bilateral - No edema. Abdomen: Soft, non-tender, bowel sounds are active. Neuro: A&O times 3, CN and motor grossly WNL      Data Review:     EKG -  A paced. Labs-  Lab Results   Component Value Date/Time    Cholesterol, total 157 07/05/2016 08:33 AM    HDL Cholesterol 70 07/05/2016 08:33 AM    LDL, calculated 71 07/05/2016 08:33 AM    VLDL, calculated 16 07/05/2016 08:33 AM    Triglyceride 78 07/05/2016 08:33 AM    CHOL/HDL Ratio 1.7 11/24/2014 02:05 AM       Allergies reviewed  No Known Allergies    Medications reviewed  Current Outpatient Prescriptions   Medication Sig    tiZANidine (ZANAFLEX) 4 mg tablet TAKE 1/2 TABLET (2MG) BY MOUTH EVERY 6-8 HOURS . MAX 3 DOSES IN 24 HOURS    butalbital-acetaminophen-caffeine (FIORICET, ESGIC) -40 mg per tablet     metoprolol succinate (TOPROL-XL) 25 mg XL tablet Take 1 Tab by mouth daily. Indications: hypertension    sertraline (ZOLOFT) 100 mg tablet Take 1 Tab by mouth daily.  memantine (NAMENDA) 10 mg tablet Take 1 tab twice a day    atorvastatin (LIPITOR) 40 mg tablet TAKE 1 TABLET BY MOUTH EVERY DAY    gabapentin (NEURONTIN) 100 mg capsule Take 1 Cap by mouth three (3) times daily.  ELIQUIS 5 mg tablet TAKE 1 TABLET BY MOUTH EVERY 12 HOURS    multivitamin (ONE A DAY) tablet Take 1 Tab by mouth daily.  traZODone (DESYREL) 100 mg tablet Take 1 Tab by mouth nightly.  lisinopril (PRINIVIL, ZESTRIL) 5 mg tablet 5 mg daily.  fluticasone (FLONASE) 50 mcg/actuation nasal spray 2 Sprays by Both Nostrils route daily.  polyethylene glycol (MIRALAX) 17 gram/dose powder Take 17 g by mouth daily.     Incontinence Pad, Liner, Disp (BLADDER CONTROL PADS EX ABSORB) pads 1 Packet by Does Not Apply route as needed (incontinence).  nitroglycerin (NITROSTAT) 0.4 mg SL tablet 1 Tab by SubLINGual route every five (5) minutes as needed for Chest Pain (call 911 if not relieved by 3).  HYDROcodone-acetaminophen (NORCO) 5-325 mg per tablet Take 1 Tab by mouth every four (4) hours as needed for Pain. Max Daily Amount: 6 Tabs.  ferrous sulfate 325 mg (65 mg iron) tablet Take 45 mg by mouth Daily (before breakfast). No current facility-administered medications for this visit. Assessment:       ICD-10-CM ICD-9-CM    1. Coronary artery disease involving native coronary artery of native heart without angina pectoris I25.10 414.01 2D ECHO COMPLETE ADULT (TTE) W OR WO CONTR   2. Hyperlipidemia, unspecified hyperlipidemia type E78.5 272.4 AMB POC EKG ROUTINE W/ 12 LEADS, INTER & REP      2D ECHO COMPLETE ADULT (TTE) W OR WO CONTR   3. SSS (sick sinus syndrome) (HCC) I49.5 427.81 2D ECHO COMPLETE ADULT (TTE) W OR WO CONTR   4. Essential hypertension I10 401.9 2D ECHO COMPLETE ADULT (TTE) W OR WO CONTR   5. Atherosclerosis of native coronary artery of native heart without angina pectoris I25.10 414.01 2D ECHO COMPLETE ADULT (TTE) W OR WO CONTR   6. Chronic systolic heart failure (HCC) I50.22 428.22 2D ECHO COMPLETE ADULT (TTE) W OR WO CONTR        Orders Placed This Encounter    AMB POC EKG ROUTINE W/ 12 LEADS, INTER & REP     Order Specific Question:   Reason for Exam:     Answer:   routine    2D ECHO COMPLETE ADULT (TTE) W OR WO CONTR     Standing Status:   Future     Standing Expiration Date:   2/14/2018     Order Specific Question:   Reason for Exam:     Answer:   HF           Plan:     Patient presents for follow up. CAD/ CABG:  Continue AS, BB, statin- clinically stable.     Cardiomyopathy, LVEF 30-35% by echo at [facility] April 2016:  Continue BB, ACEI, ASA  AICD in place.  Checked 08/17/17   Recheck echo at his wife's upcoming appointment here in our office. Depending on ejection fraction and percentage of RV pacing, may need to consider upgrade to Bi-V ICD and/or change from metoprolol to carvedilol    Hx of DVT:  On eliquis. Follow up in 6 months, sooner PRN.     Jarvis Kraus MD

## 2017-08-17 NOTE — MR AVS SNAPSHOT
Visit Information Date & Time Provider Department Dept. Phone Encounter #  
 8/17/2017 11:15 AM [place], [address] [facility] [phone] [id number] Your Appointments 8/21/2017  3:00 PM  
Follow Up with Mara Pratt MD  
[facility]) Appt Note: Follow up $0CP tdb 1/23/17  
 [address] [place] [address], 
[address] [place] [address], [address] [phone]  
[address], [address], [address] [address]  
  
    
 10/12/2017 11:20 AM  
New Patient with Duarte Puckett PsyD [address] [facility] ([facility]) Appt Note: new patient memory/ Jesus Lori [place] [address] [facility] [address] [address] [phone]  
  
   
 [place] [address]  
  
    
 10/12/2017  1:00 PM  
Any with Duarte Puckett PsyD [address] [facility] ([facility]) Appt Note: 3 hour testing/ Jesus [place] [address] [facility] [address] [address] [phone]  
  
   
 [place] [address]  
  
    
 11/16/2017  8:45 AM  
PACEMAKER with PACEMAKER, [facility]) Appt Note: BSC DC ICD 3mo check, no landline [address]  
[phone] [address]  
  
    
 6/4/2018  1:00 PM  
Medicare Physical with Carlos Roque PA-C  
[address] [facility] ([facility]) Appt Note:  $0 CP mbm  
 [address] [phone]  
[phone]  
  
   
 [address] [address] Upcoming Health Maintenance Date Due FOBT Q 1 YEAR AGE 50-75 4/1/1994 ZOSTER VACCINE AGE 60> 2/1/2004 INFLUENZA AGE 9 TO ADULT 8/1/2017 GLAUCOMA SCREENING Q2Y 12/14/2017* MEDICARE YEARLY EXAM 6/30/2018 DTaP/Tdap/Td series (2 - Td) 6/29/2027 *Topic was postponed. The date shown is not the original due date. Allergies as of 8/17/2017  Review Complete On: 8/17/2017 By: Nataliia Brown MD  
 No Known Allergies Current Immunizations  Reviewed on 7/17/2017 Name Date Influenza High Dose Vaccine PF 11/10/2016 Influenza Vaccine 9/16/2014 Influenza Vaccine Split 9/14/2011 Influenza Vaccine Whole 9/1/2010 Pneumococcal Conjugate (PCV-13) 11/10/2016 Td 5/8/2012 ZZZ-RETIRED (DO NOT USE) Pneumococcal Vaccine (Unspecified Type) 9/14/2011 Not reviewed this visit You Were Diagnosed With   
  
 Codes Comments Coronary artery disease involving native coronary artery of native heart without angina pectoris    -  Primary ICD-10-CM: I25.10 ICD-9-CM: 414.01 Hyperlipidemia, unspecified hyperlipidemia type     ICD-10-CM: E78.5 ICD-9-CM: 272.4 SSS (sick sinus syndrome) (HCC)     ICD-10-CM: I49.5 ICD-9-CM: 427.81 Essential hypertension     ICD-10-CM: I10 
ICD-9-CM: 401.9 Atherosclerosis of native coronary artery of native heart without angina pectoris     ICD-10-CM: I25.10 ICD-9-CM: 414.01 Chronic systolic heart failure (HCC)     ICD-10-CM: I50.22 ICD-9-CM: 428.22 Vitals BP Pulse Resp Height(growth percentile) Weight(growth percentile) SpO2  
 120/72 (BP 1 Location: Right arm, BP Patient Position: Sitting) 60 16 5' 7\" (1.702 m) 166 lb 8 oz (75.5 kg) 94% BMI Smoking Status 26.08 kg/m2 Former Smoker Vitals History BMI and BSA Data Body Mass Index Body Surface Area 26.08 kg/m 2 1.89 m 2 Preferred Pharmacy Pharmacy Name Phone [facility] [address] [facility], [address] Your Updated Medication List  
  
   
This list is accurate as of: 8/17/17 11:52 AM.  Always use your most recent med list.  
  
  
  
  
 atorvastatin 40 mg tablet Commonly known as:  LIPITOR  
TAKE 1 TABLET BY MOUTH EVERY DAY  
  
 butalbital-acetaminophen-caffeine -40 mg per tablet Commonly known as:  FIORICET, ESGIC  
  
 ELIQUIS 5 mg tablet Generic drug:  apixaban TAKE 1 TABLET BY MOUTH EVERY 12 HOURS  
  
 ferrous sulfate 325 mg (65 mg iron) tablet Take 45 mg by mouth Daily (before breakfast). fluticasone 50 mcg/actuation nasal spray Commonly known as:  Deetta Britain 2 Sprays by Both Nostrils route daily. gabapentin 100 mg capsule Commonly known as:  NEURONTIN Take 1 Cap by mouth three (3) times daily. HYDROcodone-acetaminophen 5-325 mg per tablet Commonly known as:  Charanjit Quirk Take 1 Tab by mouth every four (4) hours as needed for Pain. Max Daily Amount: 6 Tabs. Incontinence Pad, Liner, Disp Pads Commonly known as:  BLADDER CONTROL PADS EX ABSORB  
1 Packet by Does Not Apply route as needed (incontinence). lisinopril 5 mg tablet Commonly known as:  PRINIVIL, ZESTRIL  
5 mg daily. memantine 10 mg tablet Commonly known as:  Wellington Hung Take 1 tab twice a day  
  
 metoprolol succinate 25 mg XL tablet Commonly known as:  TOPROL-XL Take 1 Tab by mouth daily. Indications: hypertension MIRALAX 17 gram/dose powder Generic drug:  polyethylene glycol Take 17 g by mouth daily. multivitamin tablet Commonly known as:  ONE A DAY Take 1 Tab by mouth daily. nitroglycerin 0.4 mg SL tablet Commonly known as:  NITROSTAT  
1 Tab by SubLINGual route every five (5) minutes as needed for Chest Pain (call 911 if not relieved by 3). sertraline 100 mg tablet Commonly known as:  ZOLOFT Take 1 Tab by mouth daily. tiZANidine 4 mg tablet Commonly known as:  Flakita Morales TAKE 1/2 TABLET (2MG) BY MOUTH EVERY 6-8 HOURS . MAX 3 DOSES IN 24 HOURS  
  
 traZODone 100 mg tablet Commonly known as:  Tyler Carpio Take 1 Tab by mouth nightly. We Performed the Following AMB POC EKG ROUTINE W/ 12 LEADS, INTER & REP [74421 CPT(R)] Introducing [facility]! Dear Rivas Barreto: Thank you for requesting a [employer] account. Our records indicate that you already have an active [employer] account. You can access your account anytime at [email or web address] Did you know that you can access your hospital and ER discharge instructions at any time in [employer]? You can also review all of your test results from your hospital stay or ER visit. Additional Information If you have questions, please visit the Frequently Asked Questions section of the [employer] website at [email or web address] Remember, [employer] is NOT to be used for urgent needs. For medical emergencies, dial 911. Now available from your iPhone and Android! Please provide this summary of care documentation to your next provider. Your primary care clinician is listed as Yoshi Welch. If you have any questions after today's visit, please call [phone].

## 2017-08-17 NOTE — PROGRESS NOTES
Chief Complaint   Patient presents with    Cholesterol Problem     annual follow up, C/O SOB with activity and chest soreness at times at pacer site

## 2017-08-21 ENCOUNTER — OFFICE VISIT (OUTPATIENT)
Dept: NEUROLOGY | Age: 72
End: 2017-08-21

## 2017-08-21 VITALS
DIASTOLIC BLOOD PRESSURE: 52 MMHG | BODY MASS INDEX: 25.9 KG/M2 | HEART RATE: 58 BPM | HEIGHT: 67 IN | WEIGHT: 165 LBS | OXYGEN SATURATION: 97 % | SYSTOLIC BLOOD PRESSURE: 124 MMHG

## 2017-08-21 DIAGNOSIS — G30.1 ALZHEIMER'S DEMENTIA, LATE ONSET, WITH BEHAVIORAL DISTURBANCE (HCC): ICD-10-CM

## 2017-08-21 DIAGNOSIS — G44.86 CERVICOGENIC HEADACHE: ICD-10-CM

## 2017-08-21 DIAGNOSIS — F02.818 DEMENTIA DUE TO GENERAL MEDICAL CONDITION WITH BEHAVIORAL DISTURBANCE: ICD-10-CM

## 2017-08-21 DIAGNOSIS — I65.23 STENOSIS OF BOTH CAROTID ARTERIES WITHOUT CEREBRAL INFARCTION: Primary | ICD-10-CM

## 2017-08-21 DIAGNOSIS — F02.818 ALZHEIMER'S DEMENTIA, LATE ONSET, WITH BEHAVIORAL DISTURBANCE (HCC): ICD-10-CM

## 2017-08-21 NOTE — PROGRESS NOTES
Consult    Subjective:     Michi West is a 67 y.o. right-handed  male seen for evaluation of a problem of abnormal behavior and language around his grandchildren, increased irritability and abnormal behavior. Patient is already on clonazepam 0.5 mg b.i.d., Namenda 10 mg twice a day, for his dementia, Neurontin 100 mg 3 times a day for his chronic pain, trazodone 100 mg at nighttime, Zanaflex one half a 4 mg tablet 3 times a day and Zoloft 100 mg a day. Patient was previously on Aricept for his dementia, but for some reason that was stopped, probably on cardiovascular basis. His behavior and his memory seem to be stabilized on current medications and his main problem is his back pain. Patient is in chronic pain because of degenerative disc disease in his back and severe spinal stenosis. He had a recent CT myelogram done that shows severe stenosis at L2-L3, L3-L4, and L4-L5, and mild at L5-S1. CT myelogram of the cervical spine shows postoperative changes but no recurrent spinal stenosis, but moderate degenerative changes noted with these being done March 3, 2016. He is seeing his orthopedic surgeon soon. He thought he had several injections from Dr. Kelvin Mackey without clear improvement. He doesn't sleep well at night. We tried increasing his Zoloft to 100 mg at supper to see if this helps his pain, his behavior and his depression. Patient gives a history of headaches that occur every day in the posterior head regions described as a severe pressure sensation that is there all the time. He had previous neck surgery about 4 years ago and had a cervical laminectomy and fusion from C4-C6. He denies any recent fever, meningismus, trauma or other precipitating causes. Patient states he takes 3-4 hydrocodone per day for his headaches. He has not tried any therapy but did have a CT of his head one week ago that was reportedly normal and we will try to get those results.  His neck pain radiates into his shoulders, but is not associated with weakness or numbness in his arms, but he has chronic weakness and numbness in his arms and legs from his previous cervical myelopathy. He apparently also has memory loss which he downplays but is fairly obvious. His wife has to give him his medications so he doesn't overuse them. He has never been evaluated or treated for his memory loss. He has no family history of similar problems. He has a cardiac pacemaker and probably can't get MRI. He also has a pump for his bladder but that is MRI compatible. He had prostate cancer and he has possibly had recurrence. He denies any past history of stroke, or head trauma or new medication or other causes of his memory loss. He had no family with him today. He has chronic visual problems in his right eye and his ophthalmologist recommends surgery for a retinal problem but he has not done it yet.     Past Medical History:   Diagnosis Date    AICD (automatic cardioverter/defibrillator) present 5/13/2016 5/13/16 [employer] upgrade to dual chamber AICD implant  Dr. Makenna Whalen state, other     Arrhythmia     'S IN THE PAST    Arthritis     OSTEO ARTHRITIS    AVNRT (AV bridgette re-entry tachycardia) ([facility] [address] 5/12/2016 5/12/16 AVNRT ablation: PM/AICD left upper chest :Dr. Amita Uriostegui Back ache     CAD (coronary artery disease)     Cancer [facility]) 2004    Prostate cancer    Chest tightness     last episode of chest pain 5/2016 before AICD placed; none since then    Coagulation defects 2005    FACTOR V LEIDEN    Dizziness     FH: factor V Leiden deficiency     Generalized muscle ache     GERD (gastroesophageal reflux disease)     HEARTBURN    Heart failure ([address] 2014    EF 40%; Dr. Ralf Anne    Hyperlipidemia, mixed     Hypertension     COREG    Other ill-defined conditions 2004    blood transfusion    Pacemaker     Pacemaker     Postsurgical aortocoronary bypass status 05/29/2012    CABG    Presence of cardiac defibrillator 05/2016    left upper chest    Psychiatric disorder     depression    Stroke [facility]) 2011, 1990's    SEVERAL-mini    Thromboembolism ([facility] [address] 2014    left leg: changed to Eliquis from Warfarin    Thromboembolus ([facility] [address] 2005    left leg    Weakness generalized       Past Surgical History:   Procedure Laterality Date    CARDIAC CATHETERIZATION  3/4/2011         CARDIAC SURG PROCEDURE UNLIST      CABG X1 3/5/11    HX HERNIA REPAIR  2002    Ingiunal hernia repair left    HX ORTHOPAEDIC      LEFT KNEE CARTILAGE REPAIR;RIGHT ROTATOR CUFF REPAIR    HX ORTHOPAEDIC  2/14/12    C5-7 ANTERIIOR CERVICAL DISECTOMY AND FUSION    HX ORTHOPAEDIC  6/4/13    C5-C7 POSTERIOR DECOMPRESSION AND FUSION    HX OTHER SURGICAL      steroid injections    HX PACEMAKER PLACEMENT      HX PROSTATECTOMY  2004     [place]    HX UROLOGICAL       urinary control system    HX UROLOGICAL  12/3/13    REPLACEMENT ARTIFICAL URINARY SPHINCTER     Family History   Problem Relation Age of Onset    Asthma Mother     Alcohol abuse Mother     Alcohol abuse Father     Asthma Father     Cancer Sister     Heart Disease Sister     Alcohol abuse Brother     Cancer Brother     Heart Disease Brother       Social History   Substance Use Topics    Smoking status: Former Smoker     Types: Cigarettes     Quit date: 10/10/1971    Smokeless tobacco: Never Used    Alcohol use No         Current Outpatient Prescriptions:     tiZANidine (ZANAFLEX) 4 mg tablet, TAKE 1/2 TABLET (2MG) BY MOUTH EVERY 6-8 HOURS . MAX 3 DOSES IN 24 HOURS, Disp: 30 Tab, Rfl: 2    butalbital-acetaminophen-caffeine (FIORICET, ESGIC) -40 mg per tablet, , Disp: , Rfl:     metoprolol succinate (TOPROL-XL) 25 mg XL tablet, Take 1 Tab by mouth daily. Indications: hypertension, Disp: 90 Tab, Rfl: 1    sertraline (ZOLOFT) 100 mg tablet, Take 1 Tab by mouth daily. , Disp: 30 Tab, Rfl: 11    memantine (NAMENDA) 10 mg tablet, Take 1 tab twice a day, Disp: 180 Tab, Rfl: 3    atorvastatin (LIPITOR) 40 mg tablet, TAKE 1 TABLET BY MOUTH EVERY DAY, Disp: 30 Tab, Rfl: 5    gabapentin (NEURONTIN) 100 mg capsule, Take 1 Cap by mouth three (3) times daily. , Disp: 270 Cap, Rfl: 3    ELIQUIS 5 mg tablet, TAKE 1 TABLET BY MOUTH EVERY 12 HOURS, Disp: 60 Tab, Rfl: 6    multivitamin (ONE A DAY) tablet, Take 1 Tab by mouth daily. , Disp: , Rfl:     traZODone (DESYREL) 100 mg tablet, Take 1 Tab by mouth nightly., Disp: 30 Tab, Rfl: 5    lisinopril (PRINIVIL, ZESTRIL) 5 mg tablet, 5 mg daily. , Disp: , Rfl:     fluticasone (FLONASE) 50 mcg/actuation nasal spray, 2 Sprays by Both Nostrils route daily. , Disp: 1 Bottle, Rfl: 6    polyethylene glycol (MIRALAX) 17 gram/dose powder, Take 17 g by mouth daily. , Disp: , Rfl:     Incontinence Pad, Liner, Disp (BLADDER CONTROL PADS EX ABSORB) pads, 1 Packet by Does Not Apply route as needed (incontinence). , Disp: 30 Each, Rfl: 6    nitroglycerin (NITROSTAT) 0.4 mg SL tablet, 1 Tab by SubLINGual route every five (5) minutes as needed for Chest Pain (call 911 if not relieved by 3). , Disp: 25 Tab, Rfl: 1    HYDROcodone-acetaminophen (NORCO) 5-325 mg per tablet, Take 1 Tab by mouth every four (4) hours as needed for Pain. Max Daily Amount: 6 Tabs., Disp: 10 Tab, Rfl: 0    ferrous sulfate 325 mg (65 mg iron) tablet, Take 45 mg by mouth Daily (before breakfast). , Disp: , Rfl:         No Known Allergies     Review of Systems:  A comprehensive review of systems was negative except for: Constitutional: positive for fatigue and malaise  Eyes: positive for visual disturbance  Ears, nose, mouth, throat, and face: positive for hearing loss and tinnitus  Genitourinary: positive for frequency, nocturia, urinary incontinence and decreased stream  Musculoskeletal: positive for myalgias, arthralgias, stiff joints, neck pain, back pain and muscle weakness  Neurological: positive for headaches, memory problems, coordination problems, gait problems and weakness  Behvioral/Psych: positive for dementia and depression   Vitals:    08/21/17 1505   BP: 124/52   Pulse: (!) 58   SpO2: 97%   Weight: 165 lb (74.8 kg)   Height: 5' 7\" (1.702 m)     Objective:     I      NEUROLOGICAL EXAM:    Appearance: The patient is well developed, well nourished, provides a poor history and is in no acute distress. Mental Status: Oriented to place and person only, can remember one of 3 things at 30 seconds with distraction, cannot do serial sevens, cannot spell the word world backwards, and has difficulty drawing a clock that shows the time 10 minutes to 11. Mood and affect appropriate but depressed. Cranial Nerves:   Intact visual fields. Fundi are poorly seen. JANESSA, EOM's full, no nystagmus, no ptosis. Facial sensation is normal. Corneal reflexes are not tested. Facial movement is symmetric. Hearing is abnormal bilaterally. Palate is midline with normal sternocleidomastoid and trapezius muscles are normal. Tongue is midline. Neck without meningismus or bruits  Temporal arteries are not tender or enlarged   Motor:  4/5 strength in upper and lower proximal and distal muscles. Normal bulk and tone. No fasciculations. Neck muscles very tight with limited range of motion of the neck   Reflexes:   Deep tendon reflexes 2+/4 and symmetrical.  No babinski or clonus present   Sensory:   Normal to touch, pinprick and vibration and temperature decreased in both feet. DSS is intact   Gait:  Abnormal gait for his age Because he walks bent over because of severe back pain and has to move very slowly. Tremor:   No tremor noted. Cerebellar:  Mildly abnormal Romberg and tandem cerebellar signs present. Neurovascular:  Normal heart sounds and regular rhythm, peripheral pulses decreased in both feet, and no carotid bruits. Assessment:       ICD-10-CM ICD-9-CM    1. Stenosis of both carotid arteries without cerebral infarction I65.23 433.10      433.30    2.  Alzheimer's dementia, late onset, with behavioral disturbance G30.1 331.0     F02.81 294.11    3. Cervicogenic headache R51 784.0    4. 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 Patient did not improve with injections and will see his orthopedic surgeon soon  Patient went to physical therapy for his headaches and neck pain and is encouraged to continue the exercise program they gave him metabolic parameters sent ruled out treatable causes of his memory loss  He is to take a multivitamin and vitamin D on a daily basis and exercise regularly and remain mentally and physically active  He will call for results of his tests and is any problem in the interim and followup in 3-6 months time  One half hour spent with the patient and wife today    Signed By: Suyapa Fang MD     August 21, 2017       This note will not be viewable in [address]

## 2017-08-21 NOTE — PATIENT INSTRUCTIONS

## 2017-08-21 NOTE — MR AVS SNAPSHOT
Visit Information Date & Time Provider Department Dept. Phone Encounter #  
 8/21/2017  3:00 PM Chi Hickey MD [facility] [phone] [id number] Follow-up Instructions Return in about 6 months (around 2/21/2018). Your Appointments 10/12/2017 11:20 AM  
New Patient with Samra Santo PsyD [address] ([address]) Appt Note: new patient memory/ [place] [address] [address] [phone]  
  
   
 [place] [address]  
  
    
 10/12/2017  1:00 PM  
Any with Samramickey Santo PsyD [address] ([address]) Appt Note: 3 hour testing/ [place] [address] [address] [phone]  
  
   
 [place] [address]  
  
    
 11/16/2017  8:45 AM  
PACEMAKER with PACEMAKER, [facility] [address]) Appt Note: BSC DC ICD 3mo check, no landline [address]  
[phone] [address]  
  
    
 6/4/2018  1:00 PM  
Medicare Physical with Godfrey Barragan PA-C  
[facility] ([address]) Appt Note:  $0 CP mbm  
 [address] [phone]  
[phone]  
  
   
 [address] [address] Upcoming Health Maintenance Date Due FOBT Q 1 YEAR AGE 50-75 4/1/1994 ZOSTER VACCINE AGE 60> 2/1/2004 INFLUENZA AGE 9 TO ADULT 8/1/2017 GLAUCOMA SCREENING Q2Y 12/14/2017* MEDICARE YEARLY EXAM 6/30/2018 DTaP/Tdap/Td series (2 - Td) 6/29/2027 *Topic was postponed. The date shown is not the original due date. Allergies as of 8/21/2017  Review Complete On: 8/21/2017 By: Shane Pierce No Known Allergies Current Immunizations  Reviewed on 7/17/2017 Name Date Influenza High Dose Vaccine PF 11/10/2016 Influenza Vaccine 9/16/2014 Influenza Vaccine Split 9/14/2011 Influenza Vaccine Whole 9/1/2010 Pneumococcal Conjugate (PCV-13) 11/10/2016 Td 5/8/2012 ZZZ-RETIRED (DO NOT USE) Pneumococcal Vaccine (Unspecified Type) 9/14/2011 Not reviewed this visit You Were Diagnosed With   
  
 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  
 124/52 (!) 58 5' 7\" (1.702 m) 165 lb (74.8 kg) 97% 25.84 kg/m2 Smoking Status Former Smoker BMI and BSA Data Body Mass Index Body Surface Area  
 25.84 kg/m 2 1.88 m 2 Preferred Pharmacy Pharmacy Name Phone [facility] [address] [place] [address], [address] [place] [address] Your Updated Medication List  
  
   
This list is accurate as of: 8/21/17  3:18 PM.  Always use your most recent med list.  
  
  
  
  
 atorvastatin 40 mg tablet Commonly known as:  LIPITOR  
TAKE 1 TABLET BY MOUTH EVERY DAY  
  
 butalbital-acetaminophen-caffeine -40 mg per tablet Commonly known as:  FIORICET, ESGIC  
  
 ELIQUIS 5 mg tablet Generic drug:  apixaban TAKE 1 TABLET BY MOUTH EVERY 12 HOURS  
  
 ferrous sulfate 325 mg (65 mg iron) tablet Take 45 mg by mouth Daily (before breakfast). fluticasone 50 mcg/actuation nasal spray Commonly known as:  Nila Jarvis 2 Sprays by Both Nostrils route daily. gabapentin 100 mg capsule Commonly known as:  NEURONTIN Take 1 Cap by mouth three (3) times daily. HYDROcodone-acetaminophen 5-325 mg per tablet Commonly known as:  Kolton Ortega Take 1 Tab by mouth every four (4) hours as needed for Pain. Max Daily Amount: 6 Tabs. Incontinence Pad, Liner, Disp Pads Commonly known as:  BLADDER CONTROL PADS EX ABSORB  
1 Packet by Does Not Apply route as needed (incontinence). lisinopril 5 mg tablet Commonly known as:  PRINIVIL, ZESTRIL  
5 mg daily. memantine 10 mg tablet Commonly known as:  Jhon Parcel Take 1 tab twice a day  
  
 metoprolol succinate 25 mg XL tablet Commonly known as:  TOPROL-XL Take 1 Tab by mouth daily. Indications: hypertension MIRALAX 17 gram/dose powder Generic drug:  polyethylene glycol Take 17 g by mouth daily. multivitamin tablet Commonly known as:  ONE A DAY Take 1 Tab by mouth daily. nitroglycerin 0.4 mg SL tablet Commonly known as:  NITROSTAT  
1 Tab by SubLINGual route every five (5) minutes as needed for Chest Pain (call 911 if not relieved by 3). sertraline 100 mg tablet Commonly known as:  ZOLOFT Take 1 Tab by mouth daily. tiZANidine 4 mg tablet Commonly known as:  [place] Marroquin TAKE 1/2 TABLET (2MG) BY MOUTH EVERY 6-8 HOURS . MAX 3 DOSES IN 24 HOURS  
  
 traZODone 100 mg tablet Commonly known as:  Donlianla Eaton Take 1 Tab by mouth nightly. Follow-up Instructions Return in about 6 months (around 2/21/2018). Patient Instructions A Healthy Lifestyle: Care Instructions Your Care Instructions A healthy lifestyle can help you feel good, stay at a healthy weight, and have plenty of energy for both work and play. A healthy lifestyle is something you can share with your whole family. A healthy lifestyle also can lower your risk for serious health problems, such as high blood pressure, heart disease, and diabetes. You can follow a few steps listed below to improve your health and the health of your family. Follow-up care is a key part of your treatment and safety. Be sure to make and go to all appointments, and call your doctor if you are having problems. Its also a good idea to know your test results and keep a list of the medicines you take. How can you care for yourself at home? · Do not eat too much sugar, fat, or fast foods. You can still have dessert and treats now and then. The goal is moderation. · Start small to improve your eating habits. Pay attention to portion sizes, drink less juice and soda pop, and eat more fruits and vegetables. ¨ Eat a healthy amount of food. A 3-ounce serving of meat, for example, is about the size of a deck of cards. Fill the rest of your plate with vegetables and whole grains. ¨ Limit the amount of soda and sports drinks you have every day. Drink more water when you are thirsty. ¨ Eat at least 5 servings of fruits and vegetables every day. It may seem like a lot, but it is not hard to reach this goal. A serving or helping is 1 piece of fruit, 1 cup of vegetables, or 2 cups of leafy, raw vegetables. Have an apple or some carrot sticks as an afternoon snack instead of a candy bar. Try to have fruits and/or vegetables at every meal. 
· Make exercise part of your daily routine. You may want to start with simple activities, such as walking, bicycling, or slow swimming. Try to be active 30 to 60 minutes every day. You do not need to do all 30 to 60 minutes all at once. For example, you can exercise 3 times a day for 10 or 20 minutes. Moderate exercise is safe for most people, but it is always a good idea to talk to your doctor before starting an exercise program. 
· Keep moving. [place] Dine the lawn, work in the garden, or [employer]. Take the stairs instead of the elevator at work. · If you smoke, quit.  People who smoke have an increased risk for heart attack, stroke, cancer, and other lung illnesses. Quitting is hard, but there are ways to boost your chance of quitting tobacco for good. ¨ Use nicotine gum, patches, or lozenges. ¨ Ask your doctor about stop-smoking programs and medicines. ¨ Keep trying. In addition to reducing your risk of diseases in the future, you will notice some benefits soon after you stop using tobacco. If you have shortness of breath or asthma symptoms, they will likely get better within a few weeks after you quit. · Limit how much alcohol you drink. Moderate amounts of alcohol (up to 2 drinks a day for men, 1 drink a day for women) are okay. But drinking too much can lead to liver problems, high blood pressure, and other health problems. Family health If you have a family, there are many things you can do together to improve your health. · Eat meals together as a family as often as possible. · Eat healthy foods. This includes fruits, vegetables, lean meats and dairy, and whole grains. · Include your family in your fitness plan. Most people think of activities such as jogging or tennis as the way to fitness, but there are many ways you and your family can be more active. Anything that makes you breathe hard and gets your heart pumping is exercise. Here are some tips: 
¨ Walk to do errands or to take your child to school or the bus. ¨ Go for a family bike ride after dinner instead of watching TV. Where can you learn more? Go to http://kendell-stephanie.info/. Enter P000 in the search box to learn more about \"A Healthy Lifestyle: Care Instructions. \" Current as of: July 26, 2016 Content Version: 11.3 © 0649-0207 [employer]. Care instructions adapted under license by [employer] (which disclaims liability or warranty for this information).  If you have questions about a medical condition or this instruction, always ask your healthcare professional. [employer] disclaims any warranty or liability for your use of this information. Introducing [facility]! Dear Maria Fernanda Molina: Thank you for requesting a [employer] account. Our records indicate that you already have an active [employer] account. You can access your account anytime at [email or web address]. [email or web address] [email or web address] Did you know that you can access your hospital and ER discharge instructions at any time in [employer]? You can also review all of your test results from your hospital stay or ER visit. Additional Information If you have questions, please visit the Frequently Asked Questions section of the [employer] website at [email or web address] Remember, [employer] is NOT to be used for urgent needs. For medical emergencies, dial 911. Now available from your iPhone and Android! Please provide this summary of care documentation to your next provider. Your primary care clinician is listed as Irwin Schmitz. If you have any questions after today's visit, please call [phone].

## 2017-08-21 NOTE — LETTER
8/21/2017 6:16 PM 
 
Patient:  Gómez Heredia YOB: 1945 Date of Visit: 8/21/2017 Dear No Recipients: Thank you for referring . Kale Cuello to me for evaluation/treatment. Below are the relevant portions of my assessment and plan of care. Consult Subjective: Gómez Heredia is a 67 y.o. right-handed  male seen for evaluation of a problem of abnormal behavior and language around his grandchildren, increased irritability and abnormal behavior. Patient is already on clonazepam 0.5 mg b.i.d., Namenda 10 mg twice a day, for his dementia, Neurontin 100 mg 3 times a day for his chronic pain, trazodone 100 mg at nighttime, Zanaflex one half a 4 mg tablet 3 times a day and Zoloft 100 mg a day. Patient was previously on Aricept for his dementia, but for some reason that was stopped, probably on cardiovascular basis. His behavior and his memory seem to be stabilized on current medications and his main problem is his back pain. Patient is in chronic pain because of degenerative disc disease in his back and severe spinal stenosis. He had a recent CT myelogram done that shows severe stenosis at L2-L3, L3-L4, and L4-L5, and mild at L5-S1. CT myelogram of the cervical spine shows postoperative changes but no recurrent spinal stenosis, but moderate degenerative changes noted with these being done March 3, 2016. He is seeing his orthopedic surgeon soon. He thought he had several injections from Dr. Quita Campbell without clear improvement. He doesn't sleep well at night. We tried increasing his Zoloft to 100 mg at supper to see if this helps his pain, his behavior and his depression. Patient gives a history of headaches that occur every day in the posterior head regions described as a severe pressure sensation that is there all the time.  He had previous neck surgery about 4 years ago and had a cervical laminectomy and fusion from C4-C6. He denies any recent fever, meningismus, trauma or other precipitating causes. Patient states he takes 3-4 hydrocodone per day for his headaches. He has not tried any therapy but did have a CT of his head one week ago that was reportedly normal and we will try to get those results. His neck pain radiates into his shoulders, but is not associated with weakness or numbness in his arms, but he has chronic weakness and numbness in his arms and legs from his previous cervical myelopathy. He apparently also has memory loss which he downplays but is fairly obvious. His wife has to give him his medications so he doesn't overuse them. He has never been evaluated or treated for his memory loss. He has no family history of similar problems. He has a cardiac pacemaker and probably can't get MRI. He also has a pump for his bladder but that is MRI compatible. He had prostate cancer and he has possibly had recurrence. He denies any past history of stroke, or head trauma or new medication or other causes of his memory loss. He had no family with him today. He has chronic visual problems in his right eye and his ophthalmologist recommends surgery for a retinal problem but he has not done it yet. Past Medical History:  
Diagnosis Date  AICD (automatic cardioverter/defibrillator) present 5/13/2016 5/13/16 [employer] upgrade to dual chamber AICD implant  Dr. Sarah Segundo  Anxiety state, other  Arrhythmia 'S IN THE PAST  Arthritis OSTEO ARTHRITIS  AVNRT (AV bridgette re-entry tachycardia) ([facility] [address] 5/12/2016 5/12/16 AVNRT ablation: PM/AICD left upper chest :Dr. Peter Ward  Back ache  CAD (coronary artery disease)  Cancer [facility]) 2004 Prostate cancer  Chest tightness   
 last episode of chest pain 5/2016 before AICD placed; none since then  Coagulation defects 2005 FACTOR V LEIDEN  
 Dizziness  FH: factor V Leiden deficiency  Generalized muscle ache  GERD (gastroesophageal reflux disease) HEARTBURN  
 Heart failure ([facility] [address] 2014 EF 40%; Dr. Everardo Finley  Hyperlipidemia, mixed  Hypertension COREG  
 Other ill-defined conditions 2004  
 blood transfusion  Pacemaker  Pacemaker  Postsurgical aortocoronary bypass status 05/29/2012 CABG  
 Presence of cardiac defibrillator 05/2016  
 left upper chest  
 Psychiatric disorder   
 depression  Stroke [facility]) 2011, 1990's Scherry Edy  Thromboembolism ([facility] [address] 2014  
 left leg: changed to Eliquis from Warfarin  Thromboembolus ([facility] [address] 2005  
 left leg  Weakness generalized Past Surgical History:  
Procedure Laterality Date  CARDIAC CATHETERIZATION  3/4/2011  CARDIAC SURG PROCEDURE UNLIST CABG X1 3/5/11  HX HERNIA REPAIR  2002 Ingiunal hernia repair left  HX ORTHOPAEDIC    
 LEFT KNEE CARTILAGE REPAIR;RIGHT ROTATOR CUFF REPAIR  
 HX ORTHOPAEDIC  2/14/12 C5-7 ANTERIIOR CERVICAL DISECTOMY AND FUSION  
 HX ORTHOPAEDIC  6/4/13 C5-C7 POSTERIOR DECOMPRESSION AND FUSION  
 HX OTHER SURGICAL    
 steroid injections  HX PACEMAKER PLACEMENT    
 HX PROSTATECTOMY  2004 [place]  
 HX UROLOGICAL  urinary control system  HX UROLOGICAL  12/3/13 REPLACEMENT ARTIFICAL URINARY SPHINCTER Family History Problem Relation Age of Onset  Asthma Mother  Alcohol abuse Mother  Alcohol abuse Father  Asthma Father  Cancer Sister  Heart Disease Sister  Alcohol abuse Brother  Cancer Brother  Heart Disease Brother Social History Substance Use Topics  Smoking status: Former Smoker Types: Cigarettes Quit date: 10/10/1971  Smokeless tobacco: Never Used  Alcohol use No  
   
 
Current Outpatient Prescriptions:  
  tiZANidine (ZANAFLEX) 4 mg tablet, TAKE 1/2 TABLET (2MG) BY MOUTH EVERY 6-8 HOURS . MAX 3 DOSES IN 24 HOURS, Disp: 30 Tab, Rfl: 2   butalbital-acetaminophen-caffeine (FIORICET, ESGIC) -40 mg per tablet, , Disp: , Rfl:  
  metoprolol succinate (TOPROL-XL) 25 mg XL tablet, Take 1 Tab by mouth daily. Indications: hypertension, Disp: 90 Tab, Rfl: 1 
  sertraline (ZOLOFT) 100 mg tablet, Take 1 Tab by mouth daily. , Disp: 30 Tab, Rfl: 11 
  memantine (NAMENDA) 10 mg tablet, Take 1 tab twice a day, Disp: 180 Tab, Rfl: 3 
  atorvastatin (LIPITOR) 40 mg tablet, TAKE 1 TABLET BY MOUTH EVERY DAY, Disp: 30 Tab, Rfl: 5 
  gabapentin (NEURONTIN) 100 mg capsule, Take 1 Cap by mouth three (3) times daily. , Disp: 270 Cap, Rfl: 3 
  ELIQUIS 5 mg tablet, TAKE 1 TABLET BY MOUTH EVERY 12 HOURS, Disp: 60 Tab, Rfl: 6 
  multivitamin (ONE A DAY) tablet, Take 1 Tab by mouth daily. , Disp: , Rfl:  
  traZODone (DESYREL) 100 mg tablet, Take 1 Tab by mouth nightly., Disp: 30 Tab, Rfl: 5 
  lisinopril (PRINIVIL, ZESTRIL) 5 mg tablet, 5 mg daily. , Disp: , Rfl:  
  fluticasone (FLONASE) 50 mcg/actuation nasal spray, 2 Sprays by Both Nostrils route daily. , Disp: 1 Bottle, Rfl: 6 
  polyethylene glycol (MIRALAX) 17 gram/dose powder, Take 17 g by mouth daily. , Disp: , Rfl:  
  Incontinence Pad, Liner, Disp (BLADDER CONTROL PADS EX ABSORB) pads, 1 Packet by Does Not Apply route as needed (incontinence). , Disp: 30 Each, Rfl: 6 
  nitroglycerin (NITROSTAT) 0.4 mg SL tablet, 1 Tab by SubLINGual route every five (5) minutes as needed for Chest Pain (call 911 if not relieved by 3). , Disp: 25 Tab, Rfl: 1 
  HYDROcodone-acetaminophen (NORCO) 5-325 mg per tablet, Take 1 Tab by mouth every four (4) hours as needed for Pain. Max Daily Amount: 6 Tabs., Disp: 10 Tab, Rfl: 0 
  ferrous sulfate 325 mg (65 mg iron) tablet, Take 45 mg by mouth Daily (before breakfast). , Disp: , Rfl:  
 
 
 
No Known Allergies Review of Systems: A comprehensive review of systems was negative except for: Constitutional: positive for fatigue and malaise Eyes: positive for visual disturbance Ears, nose, mouth, throat, and face: positive for hearing loss and tinnitus Genitourinary: positive for frequency, nocturia, urinary incontinence and decreased stream 
Musculoskeletal: positive for myalgias, arthralgias, stiff joints, neck pain, back pain and muscle weakness Neurological: positive for headaches, memory problems, coordination problems, gait problems and weakness Behvioral/Psych: positive for dementia and depression Vitals:  
 08/21/17 1505 BP: 124/52 Pulse: (!) 58 SpO2: 97% Weight: 165 lb (74.8 kg) Height: 5' 7\" (1.702 m) Objective: I 
 
 
NEUROLOGICAL EXAM: 
 
Appearance: The patient is well developed, well nourished, provides a poor history and is in no acute distress. Mental Status: Oriented to place and person only, can remember one of 3 things at 30 seconds with distraction, cannot do serial sevens, cannot spell the word world backwards, and has difficulty drawing a clock that shows the time 10 minutes to 11. Mood and affect appropriate but depressed. Cranial Nerves:   Intact visual fields. Fundi are poorly seen. JANESSA, EOM's full, no nystagmus, no ptosis. Facial sensation is normal. Corneal reflexes are not tested. Facial movement is symmetric. Hearing is abnormal bilaterally. Palate is midline with normal sternocleidomastoid and trapezius muscles are normal. Tongue is midline. Neck without meningismus or bruits Temporal arteries are not tender or enlarged Motor:  4/5 strength in upper and lower proximal and distal muscles. Normal bulk and tone. No fasciculations. Neck muscles very tight with limited range of motion of the neck Reflexes:   Deep tendon reflexes 2+/4 and symmetrical. 
No babinski or clonus present Sensory:   Normal to touch, pinprick and vibration and temperature decreased in both feet. DSS is intact Gait:  Abnormal gait for his age Because he walks bent over because of severe back pain and has to move very slowly. Tremor:   No tremor noted. Cerebellar:  Mildly abnormal Romberg and tandem cerebellar signs present. Neurovascular:  Normal heart sounds and regular rhythm, peripheral pulses decreased in both feet, and no carotid bruits. Assessment: ICD-10-CM ICD-9-CM 1. Stenosis of both carotid arteries without cerebral infarction I65.23 433.10   
  433.30 2. Alzheimer's dementia, late onset, with behavioral disturbance G30.1 331.0 F02.81 294.11   
3. Cervicogenic headache R51 784.0 4. 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  
 
Patient with abnormal behavior, he'll continue on Zoloft and trazodone and Neurontin and Fioricet and Zanaflex and Namenda as prescribed Patient probably has muscle tension headaches because of his neck problem CT myelogram of the cervical spine showed moderate arthritis without recurrent spinal stenosis, but CT myelogram of the lumbar spine showed multilevel severe spinal stenosis. Patient did not improve with injections and will see his orthopedic surgeon soon Patient went to physical therapy for his headaches and neck pain and is encouraged to continue the exercise program they gave him metabolic parameters sent ruled out treatable causes of his memory loss He is to take a multivitamin and vitamin D on a daily basis and exercise regularly and remain mentally and physically active He will call for results of his tests and is any problem in the interim and followup in 3-6 months time One half hour spent with the patient and wife today Signed By: Ronny Finley MD   
 August 21, 2017 This note will not be viewable in [address] If you have questions, please do not hesitate to call me. I look forward to following Mr. Jing Webb along with you. Sincerely, Ronny Finley MD

## 2017-09-06 ENCOUNTER — OFFICE VISIT (OUTPATIENT)
Dept: FAMILY MEDICINE CLINIC | Age: 72
End: 2017-09-06

## 2017-09-06 VITALS
TEMPERATURE: 98.3 F | HEIGHT: 67 IN | SYSTOLIC BLOOD PRESSURE: 112 MMHG | HEART RATE: 64 BPM | WEIGHT: 165 LBS | OXYGEN SATURATION: 98 % | DIASTOLIC BLOOD PRESSURE: 64 MMHG | RESPIRATION RATE: 18 BRPM | BODY MASS INDEX: 25.9 KG/M2

## 2017-09-06 DIAGNOSIS — J40 BRONCHITIS: ICD-10-CM

## 2017-09-06 DIAGNOSIS — J01.10 ACUTE NON-RECURRENT FRONTAL SINUSITIS: Primary | ICD-10-CM

## 2017-09-06 RX ORDER — AMOXICILLIN AND CLAVULANATE POTASSIUM 500; 125 MG/1; MG/1
1 TABLET, FILM COATED ORAL 2 TIMES DAILY
Qty: 20 TAB | Refills: 0 | Status: SHIPPED | OUTPATIENT
Start: 2017-09-06 | End: 2017-09-16

## 2017-09-06 RX ORDER — BENZONATATE 200 MG/1
200 CAPSULE ORAL
Qty: 10 CAP | Refills: 0 | Status: SHIPPED | OUTPATIENT
Start: 2017-09-06 | End: 2017-10-11 | Stop reason: ALTCHOICE

## 2017-09-06 NOTE — PROGRESS NOTES
Anjali Cordova is a 67 y.o. male who presents to the office today with the following:  Chief Complaint   Patient presents with    Cough     bad cough x 1 week, congestion, head ache, right side back pain       No Known Allergies    Current Outpatient Prescriptions   Medication Sig    amoxicillin-clavulanate (AUGMENTIN) 500-125 mg per tablet Take 1 Tab by mouth two (2) times a day for 10 days.  benzonatate (TESSALON) 200 mg capsule Take 1 Cap by mouth three (3) times daily as needed for Cough.  traZODone (DESYREL) 100 mg tablet TAKE 1 TABLET BY MOUTH NIGHTLY.  tiZANidine (ZANAFLEX) 4 mg tablet TAKE 1/2 TABLET (2MG) BY MOUTH EVERY 6-8 HOURS . MAX 3 DOSES IN 24 HOURS    butalbital-acetaminophen-caffeine (FIORICET, ESGIC) -40 mg per tablet     metoprolol succinate (TOPROL-XL) 25 mg XL tablet Take 1 Tab by mouth daily. Indications: hypertension    sertraline (ZOLOFT) 100 mg tablet Take 1 Tab by mouth daily.  memantine (NAMENDA) 10 mg tablet Take 1 tab twice a day    atorvastatin (LIPITOR) 40 mg tablet TAKE 1 TABLET BY MOUTH EVERY DAY    gabapentin (NEURONTIN) 100 mg capsule Take 1 Cap by mouth three (3) times daily.  ELIQUIS 5 mg tablet TAKE 1 TABLET BY MOUTH EVERY 12 HOURS    multivitamin (ONE A DAY) tablet Take 1 Tab by mouth daily.  lisinopril (PRINIVIL, ZESTRIL) 5 mg tablet 5 mg daily.  fluticasone (FLONASE) 50 mcg/actuation nasal spray 2 Sprays by Both Nostrils route daily.  polyethylene glycol (MIRALAX) 17 gram/dose powder Take 17 g by mouth daily.  nitroglycerin (NITROSTAT) 0.4 mg SL tablet 1 Tab by SubLINGual route every five (5) minutes as needed for Chest Pain (call 911 if not relieved by 3).  Incontinence Pad, Liner, Disp (BLADDER CONTROL PADS EX ABSORB) pads 1 Packet by Does Not Apply route as needed (incontinence). No current facility-administered medications for this visit.         Past Medical History:   Diagnosis Date    AICD (automatic cardioverter/defibrillator) present 5/13/2016 5/13/16 [employer] upgrade to dual chamber AICD implant  Dr. Makenna Whalen state, other     Arrhythmia     'S IN THE PAST    Arthritis     OSTEO ARTHRITIS    AVNRT (AV bridgette re-entry tachycardia) ([facility] [address] 5/12/2016 5/12/16 AVNRT ablation: PM/AICD left upper chest :Dr. Amita Uriostegui Back ache     CAD (coronary artery disease)     Cancer [facility]) 2004    Prostate cancer    Chest tightness     last episode of chest pain 5/2016 before AICD placed; none since then    Coagulation defects 2005    FACTOR V LEIDEN    Dizziness     FH: factor V Leiden deficiency     Generalized muscle ache     GERD (gastroesophageal reflux disease)     HEARTBURN    Heart failure ([address] 2014    EF 40%; Dr. Ralf Anne    Hyperlipidemia, mixed     Hypertension     COREG    Other ill-defined conditions 2004    blood transfusion    Pacemaker     Pacemaker     Postsurgical aortocoronary bypass status 05/29/2012    CABG    Presence of cardiac defibrillator 05/2016    left upper chest    Psychiatric disorder     depression    Stroke [facility]) 2011, 1990's    [facility]    Thromboembolism ([address] 2014    left leg: changed to Eliquis from Warfarin    Thromboembolus ([address] 2005    left leg    Weakness generalized        Past Surgical History:   Procedure Laterality Date    CARDIAC CATHETERIZATION  3/4/2011         CARDIAC SURG PROCEDURE UNLIST      CABG X1 3/5/11    HX HERNIA REPAIR  2002    Ingiunal hernia repair left    HX ORTHOPAEDIC      LEFT KNEE CARTILAGE REPAIR;RIGHT ROTATOR CUFF REPAIR    HX ORTHOPAEDIC  2/14/12    C5-7 ANTERIIOR CERVICAL DISECTOMY AND FUSION    HX ORTHOPAEDIC  6/4/13    C5-C7 POSTERIOR DECOMPRESSION AND FUSION    HX OTHER SURGICAL      steroid injections    HX PACEMAKER PLACEMENT      H[place]ATECTOMY  2004     [place]    HX UROLOGICAL       urinary control system    HX UROLOGICAL  12/3/13    REPLACEMENT ARTIFICAL URINARY SPHINCTER       History   Smoking Status    Former Smoker    Types: Cigarettes    Quit date: 10/10/1971   Smokeless Tobacco    Never Used       Family History   Problem Relation Age of Onset    Asthma Mother     Alcohol abuse Mother     Alcohol abuse Father     Asthma Father     Cancer Sister     Heart Disease Sister     Alcohol abuse Brother     Cancer Brother     Heart Disease Brother          History of Present Illness:  Patient here for evaluation respiratory symptoms    Patient sick over the last 5-6 days. Complaining of nasal congestion with some postnasal drip. Frontal sinus discomfort. Cough productive of yellow phlegm. He has had no fever no chest pain. He has felt a bit short of breath during this time. He does not smoke. No history of COPD. He does have intermittent atrial fibrillation. He did experience some palpitations the other evening at rest no complaints today. He does have chronic low back pain with sciatic discomfort. This does bother his buttocks and leg on the right side when he coughs    He follows with Diane Toledo for his routine medical care he has had the pneumonia shots. I recommended he get the flu shot later on the season when he is feeling better        Review of Systems:      Review of systems negative except as noted above      Physical Exam:  Visit Vitals    /64 (BP 1 Location: Right arm, BP Patient Position: Sitting)    Pulse 64    Temp 98.3 °F (36.8 °C) (Temporal)    Resp 18    Ht 5' 7\" (1.702 m)    Wt 165 lb (74.8 kg)    SpO2 98%    BMI 25.84 kg/m2     Vitals:    09/06/17 1314   BP: 112/64   BP 1 Location: Right arm   BP Patient Position: Sitting   Pulse: 64   Resp: 18   Temp: 98.3 °F (36.8 °C)   TempSrc: Temporal   SpO2: 98%   Weight: 165 lb (74.8 kg)   Height: 5' 7\" (1.702 m)       Patient no acute distress vitals as above. Afebrile.   O2 sat was normal  Head was normocephalic  External ears were normal.  Ear canals normal.  TMs were clear  Nose external nose normal.  No lesions. There was some deformity of the nose from previous injuries. Plus congestion    with yellow rhinorrhea  Tenderness over the frontal sinus area  OP Mucosa normal.  Pharynx normal.  No erythema or exudate. Structures midline  Neck no nodes no masses  Chest had some expiratory coarse breath sounds no wheezes rhonchi or rales. Good air exchange  Cor regular rate and rhythm no murmurs. Sinus rhythm today. No murmurs    Assessment/Plan:  1. Acute non-recurrent frontal sinusitis  Patient with increasing respiratory symptoms consistent with sinusitis/bronchitis. He has no medication allergies. I will treat him with Augmentin. He has been taking over-the-counter cough syrup without much help. I have written for some Tessalon Perles. He is tolerated these well before. He will notify us and follow-up if he is getting worse or not better over the next several days    - amoxicillin-clavulanate (AUGMENTIN) 500-125 mg per tablet; Take 1 Tab by mouth two (2) times a day for 10 days. Dispense: 20 Tab; Refill: 0    2. Bronchitis    - amoxicillin-clavulanate (AUGMENTIN) 500-125 mg per tablet; Take 1 Tab by mouth two (2) times a day for 10 days. Dispense: 20 Tab; Refill: 0  - benzonatate (TESSALON) 200 mg capsule; Take 1 Cap by mouth three (3) times daily as needed for Cough. Dispense: 10 Cap; Refill: 0    Patient Instructions   Home, rest, lots of fluids,   augmentin x 10 days  Tessalon asneeded for cough  Flu shot later this season    . Follow up if worse or not better next 3-5 days. Continue current therapy plan except for indicated above. Verbal and written instructions (see AVS) provided.  Patient expresses understanding of diagnosis and treatment plan. Follow-up Disposition:  Return if symptoms worsen or fail to improve. Arlene Conte.  Beto Aaron MD

## 2017-09-06 NOTE — PATIENT INSTRUCTIONS
Home, rest, lots of fluids,   augmentin x 10 days  Tessalon asneeded for cough  Flu shot later this season    . Follow up if worse or not better next 3-5 days.

## 2017-09-15 RX ORDER — LISINOPRIL 5 MG/1
TABLET ORAL
Qty: 90 TAB | Refills: 1 | Status: SHIPPED | OUTPATIENT
Start: 2017-09-15 | End: 2018-03-26

## 2017-10-04 RX ORDER — BUTALBITAL, ACETAMINOPHEN AND CAFFEINE 50; 325; 40 MG/1; MG/1; MG/1
2 TABLET ORAL
Qty: 50 TAB | Refills: 1 | Status: SHIPPED | OUTPATIENT
Start: 2017-10-04 | End: 2017-11-27

## 2017-10-04 NOTE — TELEPHONE ENCOUNTER
Future Appointments  Date Time Provider Jayme Barton   10/11/2017 10:40 AM Wallace Monroe  [facility]   10/12/2017 11:20 AM Korina Neville 27   10/12/2017 1:00 PM Korina Neville 27   11/16/2017 8:45 AM PACEMAKER, [facility] SCHED   3/20/2018 1:40 PM Angelica Castro MD [address]   6/4/2018 1:00 PM Peggyann Shone, PA-C [address] [facility]                         Last Appointment My Department:  8/21/2017    Please advise of refill below. Requested Prescriptions     Pending Prescriptions Disp Refills    butalbital-acetaminophen-caffeine (FIORICET, ESGIC) -40 mg per tablet [Pharmacy Med Name: BUT/CAF/APAP TABS (QUAL)] 50 Tab 1     Sig: Take 2 Tabs by mouth every eight (8) hours as needed for Pain. Max Daily Amount: 6 Tabs.  MUST LAST 30 DAYS

## 2017-10-11 ENCOUNTER — OFFICE VISIT (OUTPATIENT)
Dept: FAMILY MEDICINE CLINIC | Age: 72
End: 2017-10-11

## 2017-10-11 VITALS
WEIGHT: 163.2 LBS | DIASTOLIC BLOOD PRESSURE: 66 MMHG | BODY MASS INDEX: 24.81 KG/M2 | SYSTOLIC BLOOD PRESSURE: 127 MMHG | HEART RATE: 60 BPM | TEMPERATURE: 96.6 F | RESPIRATION RATE: 18 BRPM | OXYGEN SATURATION: 95 %

## 2017-10-11 DIAGNOSIS — Z23 ENCOUNTER FOR IMMUNIZATION: ICD-10-CM

## 2017-10-11 DIAGNOSIS — G89.29 CHRONIC LEFT SHOULDER PAIN: Primary | ICD-10-CM

## 2017-10-11 DIAGNOSIS — M25.512 CHRONIC LEFT SHOULDER PAIN: Primary | ICD-10-CM

## 2017-10-11 RX ORDER — HYDROCODONE BITARTRATE AND ACETAMINOPHEN 5; 325 MG/1; MG/1
TABLET ORAL
Refills: 0 | COMMUNITY
Start: 2017-07-14 | End: 2017-10-11

## 2017-10-11 NOTE — MR AVS SNAPSHOT
Visit Information Date & Time Provider Department Dept. Phone Encounter #  
 10/11/2017 10:40 AM Isabel Quan MD [facility] [id number] Follow-up Instructions Return if symptoms worsen or fail to improve. Your Appointments 10/12/2017 11:20 AM  
New Patient with Alvaro Ambrocio PsyD [facility] ([facility]) Appt Note: new patient memory/ Jesus L[place] [address] [place] [address] [phone]  
  
   
 [place] [address]  
  
    
 10/12/2017  1:00 PM  
Any with Alvaro Ambrocio PsyD [facility] ([facility]) Appt Note: 3 hour testing/ Jesus [place] [address] [place] [address] [phone]  
  
   
 [place] [address]  
  
    
 11/16/2017  8:45 AM  
PACEMAKER with PACEMAKER, [facility]) Appt Note: BSC DC ICD 3mo check, no landline [address]  
[phone] [address]  
  
    
 3/20/2018  1:40 PM  
Follow Up with Ben Duffy MD  
[facility]) Appt Note: f/u memory loss, jrb 8/21/17  
 [address] [place] [address], [address] [phone]  
[address], [address] [place] [address], [address] [place] [address] St [address] [phone]  
  
    
 6/4/2018  1:00 PM  
Medicare Physical with Sigifredo Ackerman PA-C  
[facility] ([facility]) Appt Note:  $0 CP mbm  
 [phone] [phone]  
[phone]  
  
   
 [address] [address] Upcoming Health Maintenance Date Due FOBT Q 1 YEAR AGE 50-75 4/1/1994 ZOSTER VACCINE AGE 60> 2/1/2004 INFLUENZA AGE 9 TO ADULT 8/1/2017 GLAUCOMA SCREENING Q2Y 12/14/2017* MEDICARE YEARLY EXAM 6/30/2018 DTaP/Tdap/Td series (2 - Td) 6/29/2027 *Topic was postponed. The date shown is not the original due date. Allergies as of 10/11/2017  Review Complete On: 10/11/2017 By: Bethany Severin, MD  
 No Known Allergies Current Immunizations  Reviewed on 7/17/2017 Name Date Influenza High Dose Vaccine PF  Incomplete, 11/10/2016 Influenza Vaccine 9/16/2014 Influenza Vaccine Split 9/14/2011 Influenza Vaccine Whole 9/1/2010 Pneumococcal Conjugate (PCV-13) 11/10/2016 Pneumococcal Polysaccharide (PPSV-23) 11/13/2012 12:00 AM  
 Td 5/8/2012 ZZZ-RETIRED (DO NOT USE) Pneumococcal Vaccine (Unspecified Type) 9/14/2011 Not reviewed this visit You Were Diagnosed With   
  
 Codes Comments Chronic left shoulder pain    -  Primary ICD-10-CM: M25.512, X58.73 ICD-9-CM: 719.41, 338.29 Encounter for immunization     ICD-10-CM: F57 ICD-9-CM: V03.89 Vitals BP Pulse Temp Resp Weight(growth percentile) SpO2  
 127/66 (BP 1 Location: Right arm, BP Patient Position: Sitting) 60 96.6 °F (35.9 °C) (Oral) 18 163 lb 3.2 oz (74 kg) 95% BMI Smoking Status 24.81 kg/m2 Former Smoker BMI and BSA Data Body Mass Index Body Surface Area  
 24.81 kg/m 2 1.88 m 2 Preferred Pharmacy Pharmacy Name Phone [facility] [address] [place] [address], [address] [place] [address] Your Updated Medication List  
  
   
This list is accurate as of: 10/11/17 11:14 AM.  Always use your most recent med list.  
  
  
  
  
 atorvastatin 40 mg tablet Commonly known as:  LIPITOR  
TAKE 1 TABLET BY MOUTH EVERY DAY  
  
 butalbital-acetaminophen-caffeine -40 mg per tablet Commonly known as:  Renaye Seen Take 2 Tabs by mouth every eight (8) hours as needed for Pain.  Max Daily Amount: 6 Tabs. MUST LAST 30 DAYS  
  
 ELIQUIS 5 mg tablet Generic drug:  apixaban TAKE 1 TABLET BY MOUTH EVERY 12 HOURS  
  
 fluticasone 50 mcg/actuation nasal spray Commonly known as:  Arlene [place] 2 Sprays by Both Nostrils route daily. gabapentin 100 mg capsule Commonly known as:  NEURONTIN Take 1 Cap by mouth three (3) times daily. Incontinence Pad, Liner, Disp Pads Commonly known as:  BLADDER CONTROL PADS EX ABSORB  
1 Packet by Does Not Apply route as needed (incontinence). lisinopril 5 mg tablet Commonly known as:  PRINIVIL, ZESTRIL  
TAKE 1 TABLET BY MOUTH DAILY FOR 30 DAYS  
  
 memantine 10 mg tablet Commonly known as:  Bettey Renan Take 1 tab twice a day  
  
 metoprolol succinate 25 mg XL tablet Commonly known as:  TOPROL-XL Take 1 Tab by mouth daily. Indications: hypertension MIRALAX 17 gram/dose powder Generic drug:  polyethylene glycol Take 17 g by mouth daily. multivitamin tablet Commonly known as:  ONE A DAY Take 1 Tab by mouth daily. nitroglycerin 0.4 mg SL tablet Commonly known as:  NITROSTAT  
1 Tab by SubLINGual route every five (5) minutes as needed for Chest Pain (call 911 if not relieved by 3). sertraline 100 mg tablet Commonly known as:  ZOLOFT Take 1 Tab by mouth daily. tiZANidine 4 mg tablet Commonly known as:  Corlis Ax TAKE 1/2 TABLET (2MG) BY MOUTH EVERY 6-8 HOURS . MAX 3 DOSES IN 24 HOURS  
  
 traMADol 50 mg tablet Commonly known as:  ULTRAM  
Take 1 Tab by mouth every six (6) hours as needed for Pain. Max Daily Amount: 200 mg.  
  
 traZODone 100 mg tablet Commonly known as:  DESYREL  
TAKE 1 TABLET BY MOUTH NIGHTLY. We Performed the Following INFLUENZA VIRUS VACCINE, HIGH DOSE SEASONAL, PRESERVATIVE FREE [23080 CPT(R)] REFERRAL TO ORTHOPEDICS [QIC804 Custom] Comments: Follow up left shoulder pain Follow-up Instructions Return if symptoms worsen or fail to improve. Referral Information Referral ID Referred By Referred To  
  
 [id number] Jerome BOWDEN [address] [place], [address] [facility] Visits Status Start Date End Date 1 Authorized 10/11/17 10/11/18 If your referral has a status of pending review or denied, additional information will be sent to support the outcome of this decision. Patient Instructions Same meds Shoulder care as shown Follow up ortho Introducing [facility] SERVICES! Dear John Castillo: Thank you for requesting a [facility] account. Our records indicate that you already have an active [facility] account. You can access your account anytime at [email or web address] Did you know that you can access your hospital and ER discharge instructions at any time in [facility]? You can also review all of your test results from your hospital stay or ER visit. Additional Information If you have questions, please visit the Frequently Asked Questions section of the [facility] website at [email or web address] Remember, [facility] is NOT to be used for urgent needs. For medical emergencies, dial 911. Now available from your iPhone and Android! Please provide this summary of care documentation to your next provider. Your primary care clinician is listed as Lia De La Cruz. If you have any questions after today's visit, please call [phone].

## 2017-10-11 NOTE — PROGRESS NOTES
Chief Complaint   Patient presents with    Shoulder Pain     left shoulder pain (bone spurs) x3/4 MOS     Visit Vitals    /66 (BP 1 Location: Right arm, BP Patient Position: Sitting)    Pulse 60    Temp 96.6 °F (35.9 °C) (Oral)    Resp 18    Wt 163 lb 3.2 oz (74 kg)    SpO2 95%    BMI 24.81 kg/m2     Shira Garrido LPN

## 2017-10-11 NOTE — PROGRESS NOTES
Marcella Banegas is a 67 y.o. male who presents to the office today with the following:  Chief Complaint   Patient presents with    Shoulder Pain     left shoulder pain (bone spurs) x3/4 MOS       No Known Allergies    Current Outpatient Prescriptions   Medication Sig    traMADol (ULTRAM) 50 mg tablet Take 1 Tab by mouth every six (6) hours as needed for Pain. Max Daily Amount: 200 mg.  butalbital-acetaminophen-caffeine (FIORICET, ESGIC) -40 mg per tablet Take 2 Tabs by mouth every eight (8) hours as needed for Pain. Max Daily Amount: 6 Tabs. MUST LAST 30 DAYS    lisinopril (PRINIVIL, ZESTRIL) 5 mg tablet TAKE 1 TABLET BY MOUTH DAILY FOR 30 DAYS    traZODone (DESYREL) 100 mg tablet TAKE 1 TABLET BY MOUTH NIGHTLY.  tiZANidine (ZANAFLEX) 4 mg tablet TAKE 1/2 TABLET (2MG) BY MOUTH EVERY 6-8 HOURS . MAX 3 DOSES IN 24 HOURS    metoprolol succinate (TOPROL-XL) 25 mg XL tablet Take 1 Tab by mouth daily. Indications: hypertension    sertraline (ZOLOFT) 100 mg tablet Take 1 Tab by mouth daily.  memantine (NAMENDA) 10 mg tablet Take 1 tab twice a day    atorvastatin (LIPITOR) 40 mg tablet TAKE 1 TABLET BY MOUTH EVERY DAY    gabapentin (NEURONTIN) 100 mg capsule Take 1 Cap by mouth three (3) times daily.  ELIQUIS 5 mg tablet TAKE 1 TABLET BY MOUTH EVERY 12 HOURS    multivitamin (ONE A DAY) tablet Take 1 Tab by mouth daily.  fluticasone (FLONASE) 50 mcg/actuation nasal spray 2 Sprays by Both Nostrils route daily.  polyethylene glycol (MIRALAX) 17 gram/dose powder Take 17 g by mouth daily.  Incontinence Pad, Liner, Disp (BLADDER CONTROL PADS EX ABSORB) pads 1 Packet by Does Not Apply route as needed (incontinence).  nitroglycerin (NITROSTAT) 0.4 mg SL tablet 1 Tab by SubLINGual route every five (5) minutes as needed for Chest Pain (call 911 if not relieved by 3). No current facility-administered medications for this visit.         Past Medical History:   Diagnosis Date    [facility] 5/13/2016 5/13/16 [employer] upgrade to dual chamber AICD implant  Dr. Denis Mathew state, other     Arrhythmia     'S IN THE PAST    Arthritis     OSTEO ARTHRITIS    AVNRT (AV bridgette re-entry tachycardia) ([address] 5/12/2016 5/12/16 AVNRT ablation: PM/AICD left upper chest :Dr. Francisca Roblero Back ache     CAD (coronary artery disease)     Cancer [facility]) 2004    Prostate cancer    Chest tightness     last episode of chest pain 5/2016 before AICD placed; none since then    Coagulation defects 2005    FACTOR V LEIDEN    Dizziness     FH: factor V Leiden deficiency     Generalized muscle ache     GERD (gastroesophageal reflux disease)     HEARTBURN    Heart failure ([address] 2014    EF 40%; Dr. Kyle Kelley    Hyperlipidemia, mixed     Hypertension     COREG    Other ill-defined conditions(799.89) 2004    blood transfusion    Pacemaker     Pacemaker     Postsurgical aortocoronary bypass status 05/29/2012    CABG    Presence of cardiac defibrillator 05/2016    left upper chest    Psychiatric disorder     depression    Stroke [facility]) 2011, 1990's    [facility]    Thromboembolism ([address] 2014    left leg: changed to Eliquis from Warfarin    Thromboembolus ([address] 2005    left leg    Weakness generalized        Past Surgical History:   Procedure Laterality Date    CARDIAC CATHETERIZATION  3/4/2011         CARDIAC SURG PROCEDURE UNLIST      CABG X1 3/5/11    HX HERNIA REPAIR  2002    Ingiunal hernia repair left    HX ORTHOPAEDIC      LEFT KNEE CARTILAGE REPAIR;RIGHT ROTATOR CUFF REPAIR    HX ORTHOPAEDIC  2/14/12    C5-7 ANTERIIOR CERVICAL DISECTOMY AND FUSION    HX ORTHOPAEDIC  6/4/13    C5-C7 POSTERIOR DECOMPRESSION AND FUSION    HX OTHER SURGICAL      steroid injections    HX PACEMAKER PLACEMENT      [place]    HX UROLOGICAL       urinary control system    HX UROLOGICAL  12/3/13    REPLACEMENT ARTIFICAL URINARY SPHINCTER       History   Smoking Status    Former Smoker    Types: Cigarettes    Quit date: 10/10/1971   Smokeless Tobacco    Never Used       Family History   Problem Relation Age of Onset    Asthma Mother     Alcohol abuse Mother     Alcohol abuse Father     Asthma Father     Cancer Sister     Heart Disease Sister     Alcohol abuse Brother     Cancer Brother     Heart Disease Brother          History of Present Illness:  Patient here for ER follow-up left shoulder pain    Patient with a history of left shoulder pain. Previous x-rays do show some bone spurring at the [facility] joint as well as generalized DJD. He has been seen by orthopedist in the past.  He has had a cortisone shot in the past.  He is complaining of ongoing discomfort superior aspect of the shoulder. Pain when he tries to abduct his shoulder. Hurts to sleep on it at night. He was seen in the emergency room a couple nights ago. They did give him a very small supply of tramadol. This is not helping significantly    No other complaints today    Flu shot was given      Review of Systems:    Review of systems negative except as noted above \\      Physical Exam:  Visit Vitals    /66 (BP 1 Location: Right arm, BP Patient Position: Sitting)    Pulse 60    Temp 96.6 °F (35.9 °C) (Oral)    Resp 18    Wt 163 lb 3.2 oz (74 kg)    SpO2 95%    BMI 24.81 kg/m2     Vitals:    10/11/17 1039   BP: 127/66   BP 1 Location: Right arm   BP Patient Position: Sitting   Pulse: 60   Resp: 18   Temp: 96.6 °F (35.9 °C)   TempSrc: Oral   SpO2: 95%   Weight: 163 lb 3.2 oz (74 kg)       Patient no acute distress. Vital signs stable  Left shoulder today had no obvious deformity or rash  There was some tenderness over the [facility] joint  Decreased range of motion with discomfort on abduction and internal and external rotation    Assessment/Plan:  1.  Chronic left shoulder pain  Patient with DJD and bone spurs in the shoulder with ongoing discomfort. I did review Codman's exercise with the patient. I am referring him back to orthopedist for ongoing evaluation and consideration for additional therapy/cortisone shots  - REFERRAL TO ORTHOPEDICS    2. Encounter for immunization    - Influenza virus vaccine (FLUZONE HIGH DOSE) 65 years and older ([address])      Patient Instructions   Same meds  Shoulder care as shown  Follow up ortho        Continue current therapy plan except for indicated above. Verbal and written instructions (see AVS) provided.  Patient expresses understanding of diagnosis and treatment plan. Follow-up Disposition:  Return if symptoms worsen or fail to improve. Jackie Dhillon.  Martine Dover MD

## 2017-10-26 DIAGNOSIS — I82.492 ACUTE DEEP VEIN THROMBOSIS (DVT) OF OTHER SPECIFIED VEIN OF LEFT LOWER EXTREMITY (HCC): ICD-10-CM

## 2017-10-27 RX ORDER — APIXABAN 5 MG/1
TABLET, FILM COATED ORAL
Qty: 60 TAB | Refills: 12 | Status: SHIPPED | OUTPATIENT
Start: 2017-10-27 | End: 2018-12-21 | Stop reason: SDUPTHER

## 2017-11-13 ENCOUNTER — OFFICE VISIT (OUTPATIENT)
Dept: FAMILY MEDICINE CLINIC | Age: 72
End: 2017-11-13

## 2017-11-13 VITALS
SYSTOLIC BLOOD PRESSURE: 106 MMHG | HEART RATE: 53 BPM | RESPIRATION RATE: 18 BRPM | BODY MASS INDEX: 25.31 KG/M2 | OXYGEN SATURATION: 98 % | WEIGHT: 167 LBS | HEIGHT: 68 IN | TEMPERATURE: 97.4 F | DIASTOLIC BLOOD PRESSURE: 52 MMHG

## 2017-11-13 DIAGNOSIS — M54.50 MIDLINE LOW BACK PAIN WITHOUT SCIATICA, UNSPECIFIED CHRONICITY: Primary | ICD-10-CM

## 2017-11-13 DIAGNOSIS — M62.830 MUSCLE SPASM OF BACK: ICD-10-CM

## 2017-11-13 RX ORDER — PREDNISONE 10 MG/1
10 TABLET ORAL 3 TIMES DAILY
Qty: 12 TAB | Refills: 0 | Status: SHIPPED | OUTPATIENT
Start: 2017-11-13 | End: 2017-11-27

## 2017-11-13 RX ORDER — TIZANIDINE 4 MG/1
TABLET ORAL
Qty: 30 TAB | Refills: 2 | Status: SHIPPED | OUTPATIENT
Start: 2017-11-13 | End: 2018-03-20 | Stop reason: CLARIF

## 2017-11-27 ENCOUNTER — OFFICE VISIT (OUTPATIENT)
Dept: FAMILY MEDICINE CLINIC | Age: 72
End: 2017-11-27

## 2017-11-27 VITALS
TEMPERATURE: 98 F | HEART RATE: 60 BPM | RESPIRATION RATE: 16 BRPM | OXYGEN SATURATION: 96 % | BODY MASS INDEX: 25.31 KG/M2 | WEIGHT: 167 LBS | HEIGHT: 68 IN | DIASTOLIC BLOOD PRESSURE: 60 MMHG | SYSTOLIC BLOOD PRESSURE: 103 MMHG

## 2017-11-27 DIAGNOSIS — M51.36 DDD (DEGENERATIVE DISC DISEASE), LUMBAR: Primary | ICD-10-CM

## 2017-11-27 RX ORDER — METHYLPREDNISOLONE 4 MG/1
TABLET ORAL
Qty: 1 DOSE PACK | Refills: 0 | Status: SHIPPED | OUTPATIENT
Start: 2017-11-27 | End: 2017-12-06 | Stop reason: ALTCHOICE

## 2017-11-27 RX ORDER — HYDROCODONE BITARTRATE AND ACETAMINOPHEN 5; 325 MG/1; MG/1
1 TABLET ORAL
Qty: 15 TAB | Refills: 0 | Status: SHIPPED | OUTPATIENT
Start: 2017-11-27 | End: 2017-12-06 | Stop reason: SDUPTHER

## 2017-11-27 NOTE — PROGRESS NOTES
HISTORY OF PRESENT ILLNESS  Jennifer Waters is a 67 y.o. male.   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  Has seen Dr Yamile Yoo  Had CT scan done 1.5 w ago  Has not seen Dr Yamile Yoo again  Was told to need shot or poss surgery  CT showed DDD and spinal stenosis and impingement on review    Now pain 4-5/10 while sitting  Worse standing up 7-8/10  occ pain in right leg   No numbness or tingling  Has some weakness in leg  No incontinence  Had to use laxative     Not on NAIDS d/t Eliquis    No SE with current meds    Tramadol and Prednisone did not help  On Neurontin 100 tid  Muscle relaxant helping some    Dizziness this am      ROS  Past Medical History:   Diagnosis Date    AICD (automatic cardioverter/defibrillator) present 5/13/2016 5/13/16 [employer] upgrade to dual chamber AICD implant  Dr. Eddie Triplett state, other     Arrhythmia     'S IN THE PAST    Arthritis     OSTEO ARTHRITIS    AVNRT (AV bridgette re-entry tachycardia) ([facility] 5/12/2016 5/12/16 AVNRT ablation: PM/AICD left upper chest :Dr. Thai Leos Back ache     CAD (coronary artery disease)     Cancer [facility]) 2004    Prostate cancer    Chest tightness     last episode of chest pain 5/2016 before AICD placed; none since then    Coagulation defects 2005    FACTOR V LEIDEN    Dizziness     FH: factor V Leiden deficiency     Generalized muscle ache     GERD (gastroesophageal reflux disease)     HEARTBURN    Heart failure ([facility] 2014    EF 40%; Dr. Tonja Vu    Hyperlipidemia, mixed     Hypertension     COREG    Other ill-defined conditions(799.89) 2004    blood transfusion    Pacemaker     Pacemaker     Postsurgical aortocoronary bypass status 05/29/2012    CABG    Presence of cardiac defibrillator 05/2016    left upper chest    Psychiatric disorder     depression    Stroke [facility]) 2011, 1990's SEVERAL-mini    Thromboembolism ([facility] 2014    left leg: changed to Eliquis from Warfarin    Thromboembolus ([facility] 2005    left leg    Weakness generalized      Current Outpatient Prescriptions   Medication Sig Dispense Refill    HYDROcodone-acetaminophen (NORCO) 5-325 mg per tablet Take 1 Tab by mouth every eight (8) hours as needed for Pain. Max Daily Amount: 3 Tabs. 15 Tab 0    methylPREDNISolone (MEDROL DOSEPACK) 4 mg tablet Follow instructions 1 Dose Pack 0    atorvastatin (LIPITOR) 40 mg tablet TAKE 1 TABLET BY MOUTH EVERY DAY 30 Tab 0    tiZANidine (ZANAFLEX) 4 mg tablet TAKE 1/2 TABLET (2MG) BY MOUTH EVERY 6-8 HOURS . MAX 3 DOSES IN 24 HOURS 30 Tab 2    ELIQUIS 5 mg tablet TAKE 1 TABLET BY MOUTH EVERY 12 HOURS 60 Tab 12    lisinopril (PRINIVIL, ZESTRIL) 5 mg tablet TAKE 1 TABLET BY MOUTH DAILY FOR 30 DAYS 90 Tab 1    traZODone (DESYREL) 100 mg tablet TAKE 1 TABLET BY MOUTH NIGHTLY. 30 Tab 5    metoprolol succinate (TOPROL-XL) 25 mg XL tablet Take 1 Tab by mouth daily. Indications: hypertension 90 Tab 1    sertraline (ZOLOFT) 100 mg tablet Take 1 Tab by mouth daily. 30 Tab 11    memantine (NAMENDA) 10 mg tablet Take 1 tab twice a day 180 Tab 3    gabapentin (NEURONTIN) 100 mg capsule Take 1 Cap by mouth three (3) times daily. 270 Cap 3    fluticasone (FLONASE) 50 mcg/actuation nasal spray 2 Sprays by Both Nostrils route daily. 1 Bottle 6    polyethylene glycol (MIRALAX) 17 gram/dose powder Take 17 g by mouth daily.  Incontinence Pad, Liner, Disp (BLADDER CONTROL PADS EX ABSORB) pads 1 Packet by Does Not Apply route as needed (incontinence). 30 Each 6    nitroglycerin (NITROSTAT) 0.4 mg SL tablet 1 Tab by SubLINGual route every five (5) minutes as needed for Chest Pain (call 911 if not relieved by 3).  25 Tab 1     No Known Allergies  Visit Vitals    /60 (BP 1 Location: Left arm, BP Patient Position: Sitting)    Pulse 60    Temp 98 °F (36.7 °C) (Temporal)    Resp 16    Ht 5' 8\" (1.727 m)    Wt 167 lb (75.8 kg)    SpO2 96%    BMI 25.39 kg/m2     Physical Exam   Constitutional: He is oriented to person, place, and time. He appears well-developed and well-nourished. No distress. HENT:   Head: Normocephalic and atraumatic. Eyes: Conjunctivae and EOM are normal.   Cardiovascular: Normal rate and regular rhythm. Pulmonary/Chest: Effort normal and breath sounds normal.   Musculoskeletal: He exhibits tenderness (lumbar spine). He exhibits no edema or deformity. Neurological: He is alert and oriented to person, place, and time. He exhibits normal muscle tone. Skin: Skin is warm and dry. Psychiatric: He has a normal mood and affect. Nursing note and vitals reviewed. ASSESSMENT and PLAN    ICD-10-CM ICD-9-CM    1.  DDD (degenerative disc disease), lumbar M51.36 722.52 HYDROcodone-acetaminophen (NORCO) 5-325 mg per tablet      methylPREDNISolone (MEDROL DOSEPACK) 4 mg tablet   F/U with Dr Juan Miguel Prery asap  BP low at first, better after drinking fluids, advised to cont increasing fluids

## 2017-12-06 ENCOUNTER — OFFICE VISIT (OUTPATIENT)
Dept: FAMILY MEDICINE CLINIC | Age: 72
End: 2017-12-06

## 2017-12-06 VITALS
HEIGHT: 68 IN | DIASTOLIC BLOOD PRESSURE: 77 MMHG | BODY MASS INDEX: 25.01 KG/M2 | OXYGEN SATURATION: 97 % | WEIGHT: 165 LBS | TEMPERATURE: 97.5 F | SYSTOLIC BLOOD PRESSURE: 141 MMHG | RESPIRATION RATE: 18 BRPM | HEART RATE: 60 BPM

## 2017-12-06 DIAGNOSIS — M51.36 DDD (DEGENERATIVE DISC DISEASE), LUMBAR: Primary | ICD-10-CM

## 2017-12-06 RX ORDER — HYDROCODONE BITARTRATE AND ACETAMINOPHEN 5; 325 MG/1; MG/1
1 TABLET ORAL
Qty: 20 TAB | Refills: 0 | Status: SHIPPED | OUTPATIENT
Start: 2017-12-06 | End: 2018-01-24
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
## 2017-12-29 RX ORDER — ATORVASTATIN CALCIUM 40 MG/1
TABLET, FILM COATED ORAL
Qty: 30 TAB | Refills: 0 | Status: SHIPPED | OUTPATIENT
Start: 2017-12-29 | End: 2018-02-03 | Stop reason: SDUPTHER

## 2017-12-29 NOTE — TELEPHONE ENCOUNTER
I have called and talked to patient's wife, advised that RX filled for a month, patient needs apt for visit and FBW to check his cholesterol. Patient's wife verbalizes understanding.

## 2017-12-29 NOTE — TELEPHONE ENCOUNTER
Patient of Ricki Bai is requesting refill of atorvastatin   Patient to be contacted and requested to follow-up for lab work   last lab work for lipids 1 year ago  Medication refilled ×1 month

## 2018-01-09 ENCOUNTER — CLINICAL SUPPORT (OUTPATIENT)
Dept: CARDIOLOGY CLINIC | Age: 73
End: 2018-01-09

## 2018-01-09 DIAGNOSIS — I50.22 CHRONIC SYSTOLIC CONGESTIVE HEART FAILURE (HCC): Chronic | ICD-10-CM

## 2018-01-09 DIAGNOSIS — I50.22 CHRONIC SYSTOLIC HEART FAILURE (HCC): ICD-10-CM

## 2018-01-09 DIAGNOSIS — Z95.810 AICD (AUTOMATIC CARDIOVERTER/DEFIBRILLATOR) PRESENT: Primary | ICD-10-CM

## 2018-01-09 DIAGNOSIS — I47.1 PAROXYSMAL SUPRAVENTRICULAR TACHYCARDIA (HCC): ICD-10-CM

## 2018-01-16 ENCOUNTER — TELEPHONE (OUTPATIENT)
Dept: FAMILY MEDICINE CLINIC | Age: 73
End: 2018-01-16

## 2018-01-16 ENCOUNTER — OFFICE VISIT (OUTPATIENT)
Dept: FAMILY MEDICINE CLINIC | Age: 73
End: 2018-01-16

## 2018-01-16 VITALS
RESPIRATION RATE: 16 BRPM | TEMPERATURE: 97.2 F | BODY MASS INDEX: 24.63 KG/M2 | HEART RATE: 63 BPM | WEIGHT: 162 LBS | OXYGEN SATURATION: 97 % | DIASTOLIC BLOOD PRESSURE: 69 MMHG | SYSTOLIC BLOOD PRESSURE: 116 MMHG

## 2018-01-16 DIAGNOSIS — T59.811A SMOKE INHALATION: Primary | ICD-10-CM

## 2018-01-16 DIAGNOSIS — R05.9 COUGH: ICD-10-CM

## 2018-01-16 RX ORDER — BISACODYL 5 MG
5 TABLET, DELAYED RELEASE (ENTERIC COATED) ORAL DAILY PRN
COMMUNITY
End: 2019-09-04

## 2018-01-16 RX ORDER — PREDNISONE 10 MG/1
TABLET ORAL
Qty: 14 TAB | Refills: 0 | Status: SHIPPED | OUTPATIENT
Start: 2018-01-16 | End: 2018-01-24 | Stop reason: ALTCHOICE

## 2018-01-16 RX ORDER — AZITHROMYCIN 250 MG/1
TABLET, FILM COATED ORAL
Qty: 6 TAB | Refills: 0 | Status: SHIPPED | OUTPATIENT
Start: 2018-01-16 | End: 2018-01-21

## 2018-01-16 NOTE — PATIENT INSTRUCTIONS
Same meds  zithromax x 5 days  Prednisone taper  Over counter throat spray  Xray  Recheck next week, sooner if not improving
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
## 2018-01-24 ENCOUNTER — OFFICE VISIT (OUTPATIENT)
Dept: FAMILY MEDICINE CLINIC | Age: 73
End: 2018-01-24

## 2018-01-24 VITALS
RESPIRATION RATE: 18 BRPM | HEART RATE: 67 BPM | DIASTOLIC BLOOD PRESSURE: 70 MMHG | BODY MASS INDEX: 23.89 KG/M2 | HEIGHT: 68 IN | TEMPERATURE: 96.7 F | WEIGHT: 157.6 LBS | SYSTOLIC BLOOD PRESSURE: 110 MMHG | OXYGEN SATURATION: 97 %

## 2018-01-24 DIAGNOSIS — T59.811A SMOKE INHALATION: Primary | ICD-10-CM

## 2018-01-24 DIAGNOSIS — M25.551 RIGHT HIP PAIN: ICD-10-CM
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
## 2018-02-03 DIAGNOSIS — E78.5 HYPERLIPIDEMIA, UNSPECIFIED HYPERLIPIDEMIA TYPE: Primary | ICD-10-CM

## 2018-02-05 RX ORDER — ATORVASTATIN CALCIUM 40 MG/1
TABLET, FILM COATED ORAL
Qty: 30 TAB | Refills: 2 | Status: SHIPPED | OUTPATIENT
Start: 2018-02-05 | End: 2018-04-25 | Stop reason: SDUPTHER

## 2018-02-05 NOTE — TELEPHONE ENCOUNTER
Patient patient requested refill Lipitor  Currently following with Darl Butt refilled ×2 months  Patient due for routine lab work  To follow-up on his lipids.   This is been ordered  Patient already scheduled to see Filomena Richard back in March

## 2018-02-09 ENCOUNTER — OFFICE VISIT (OUTPATIENT)
Dept: FAMILY MEDICINE CLINIC | Age: 73
End: 2018-02-09

## 2018-02-09 VITALS
DIASTOLIC BLOOD PRESSURE: 91 MMHG | BODY MASS INDEX: 24.71 KG/M2 | TEMPERATURE: 98.4 F | RESPIRATION RATE: 18 BRPM | HEART RATE: 70 BPM | HEIGHT: 68 IN | OXYGEN SATURATION: 97 % | SYSTOLIC BLOOD PRESSURE: 119 MMHG | WEIGHT: 163 LBS

## 2018-02-09 DIAGNOSIS — M54.31 SCIATIC PAIN, RIGHT: Primary | ICD-10-CM

## 2018-02-09 RX ORDER — HYDROCODONE BITARTRATE AND ACETAMINOPHEN 5; 325 MG/1; MG/1
TABLET ORAL
COMMUNITY
Start: 2017-12-06 | End: 2018-02-09 | Stop reason: SDUPTHER

## 2018-02-09 RX ORDER — DEXAMETHASONE SODIUM PHOSPHATE 4 MG/ML
10 INJECTION, SOLUTION INTRA-ARTICULAR; INTRALESIONAL; INTRAMUSCULAR; INTRAVENOUS; SOFT TISSUE ONCE
Qty: 2.5 ML | Refills: 0
Start: 2018-02-09 | End: 2018-02-09

## 2018-02-09 RX ORDER — HYDROCODONE BITARTRATE AND ACETAMINOPHEN 5; 325 MG/1; MG/1
1 TABLET ORAL
Qty: 10 TAB | Refills: 0 | Status: SHIPPED | OUTPATIENT
Start: 2018-02-09 | End: 2018-03-01 | Stop reason: ALTCHOICE

## 2018-02-09 NOTE — PROGRESS NOTES
HISTORY OF PRESENT ILLNESS  Za Steve is a 67 y.o. male.   Chief Complaint   Patient presents with    Hip Pain       HPI  Right hip goes out  Started 2 d ago  No injury  Painful in right low back radiating to right lateral side to knee  8/10, shooting pain, knife like, last 5-10 min  Sitting in chair helps  No numbness or tingling  No urine or bowel problems  No weakness    Tried nothing    On muscle relaxant and Neurontin    ROS  Past Medical History:   Diagnosis Date    AICD (automatic cardioverter/defibrillator) present 5/13/2016 5/13/16 [employer] upgrade to dual chamber AICD implant  Dr. Alfie Carrizaels state, other     Arrhythmia     'S IN THE PAST    Arthritis     OSTEO ARTHRITIS    AVNRT (AV bridgette re-entry tachycardia) ([facility] [address] 5/12/2016 5/12/16 AVNRT ablation: PM/AICD left upper chest :Dr. Virginia Mackenzie Back ache     CAD (coronary artery disease)     Cancer [facility]) 2004    Prostate cancer    Chest tightness     last episode of chest pain 5/2016 before AICD placed; none since then    Coagulation defects 2005    FACTOR V LEIDEN    Dizziness     FH: factor V Leiden deficiency     Generalized muscle ache     GERD (gastroesophageal reflux disease)     HEARTBURN    Heart failure ([facility] [address] 2014    EF 40%; Dr. Edgardo Larios    Hyperlipidemia, mixed     Hypertension     COREG    Other ill-defined conditions(799.89) 2004    blood transfusion    Pacemaker     Pacemaker     Postsurgical aortocoronary bypass status 05/29/2012    CABG    Presence of cardiac defibrillator 05/2016    left upper chest    Psychiatric disorder     depression    Stroke [facility]) 2011, 1990's    SEVERAL-mini    Thromboembolism ([facility] [address] 2014    left leg: changed to Eliquis from Warfarin    Thromboembolus ([facility] [address] 2005    left leg    Weakness generalized      Current Outpatient Prescriptions   Medication Sig Dispense Refill    dexamethasone (DECADRON) 4 mg/mL injection 2.5 mL by IntraMUSCular route once for 1 dose. 2.5 mL 0    HYDROcodone-acetaminophen (NORCO) 5-325 mg per tablet Take 1 Tab by mouth every eight (8) hours as needed for Pain. Max Daily Amount: 3 Tabs. 10 Tab 0    atorvastatin (LIPITOR) 40 mg tablet TAKE 1 TABLET BY MOUTH EVERY DAY 30 Tab 2    bisacodyl (DULCOLAX, BISACODYL,) 5 mg EC tablet Take 5 mg by mouth daily as needed for Constipation.  tiZANidine (ZANAFLEX) 4 mg tablet TAKE 1/2 TABLET (2MG) BY MOUTH EVERY 6-8 HOURS . MAX 3 DOSES IN 24 HOURS 30 Tab 2    ELIQUIS 5 mg tablet TAKE 1 TABLET BY MOUTH EVERY 12 HOURS 60 Tab 12    lisinopril (PRINIVIL, ZESTRIL) 5 mg tablet TAKE 1 TABLET BY MOUTH DAILY FOR 30 DAYS 90 Tab 1    metoprolol succinate (TOPROL-XL) 25 mg XL tablet Take 1 Tab by mouth daily. Indications: hypertension 90 Tab 1    sertraline (ZOLOFT) 100 mg tablet Take 1 Tab by mouth daily. 30 Tab 11    memantine (NAMENDA) 10 mg tablet Take 1 tab twice a day 180 Tab 3    gabapentin (NEURONTIN) 100 mg capsule Take 1 Cap by mouth three (3) times daily. 270 Cap 3    polyethylene glycol (MIRALAX) 17 gram/dose powder Take 17 g by mouth daily.  Incontinence Pad, Liner, Disp (BLADDER CONTROL PADS EX ABSORB) pads 1 Packet by Does Not Apply route as needed (incontinence). 30 Each 6    nitroglycerin (NITROSTAT) 0.4 mg SL tablet 1 Tab by SubLINGual route every five (5) minutes as needed for Chest Pain (call 911 if not relieved by 3). 25 Tab 1    traZODone (DESYREL) 100 mg tablet TAKE 1 TABLET BY MOUTH NIGHTLY. 30 Tab 5    fluticasone (FLONASE) 50 mcg/actuation nasal spray 2 Sprays by Both Nostrils route daily. 1 Bottle 6     No Known Allergies  Visit Vitals    BP (!) 119/91 (BP 1 Location: Right arm, BP Patient Position: Sitting)    Pulse 70    Temp 98.4 °F (36.9 °C) (Temporal)    Resp 18    Ht 5' 8\" (1.727 m)    Wt 163 lb (73.9 kg)    SpO2 97%    BMI 24.78 kg/m2     Physical Exam   Constitutional: He is oriented to person, place, and time.  He appears well-developed and well-nourished. No distress. HENT:   Head: Normocephalic and atraumatic. Eyes: Conjunctivae and EOM are normal.   Pulmonary/Chest: Effort normal.   Musculoskeletal:   Tender right buttock, normal ROM of right hip w/o pain, pt able to go on toes and heels   Neurological: He is alert and oriented to person, place, and time. Skin: Skin is warm and dry. Psychiatric: He has a normal mood and affect. Nursing note and vitals reviewed.       ASSESSMENT and PLAN    ICD-10-CM ICD-9-CM    1. Sciatic pain, right M54.31 724.3 dexamethasone (DECADRON) 4 mg/mL injection      DEXAMETHASONE SODIUM PHOSPHATE INJECTION 1 MG      ND THER/PROPH/DIAG INJECTION, SUBCUT/IM      HYDROcodone-acetaminophen (NORCO) 5-325 mg per tablet

## 2018-02-22 ENCOUNTER — CLINICAL SUPPORT (OUTPATIENT)
Dept: CARDIOLOGY CLINIC | Age: 73
End: 2018-02-22

## 2018-02-22 DIAGNOSIS — I48.0 PAROXYSMAL ATRIAL FIBRILLATION (HCC): ICD-10-CM

## 2018-02-22 DIAGNOSIS — Z95.0 CARDIAC PACEMAKER IN SITU: Primary | ICD-10-CM

## 2018-03-01 ENCOUNTER — OFFICE VISIT (OUTPATIENT)
Dept: FAMILY MEDICINE CLINIC | Age: 73
End: 2018-03-01

## 2018-03-01 VITALS
OXYGEN SATURATION: 99 % | TEMPERATURE: 96.6 F | DIASTOLIC BLOOD PRESSURE: 64 MMHG | HEART RATE: 40 BPM | BODY MASS INDEX: 24.71 KG/M2 | WEIGHT: 163 LBS | HEIGHT: 68 IN | SYSTOLIC BLOOD PRESSURE: 170 MMHG | RESPIRATION RATE: 16 BRPM

## 2018-03-01 DIAGNOSIS — M54.31 SCIATIC PAIN, RIGHT: ICD-10-CM

## 2018-03-01 DIAGNOSIS — M25.551 RIGHT HIP PAIN: Primary | ICD-10-CM

## 2018-03-01 RX ORDER — HYDROCODONE BITARTRATE AND ACETAMINOPHEN 5; 325 MG/1; MG/1
1 TABLET ORAL
Qty: 10 TAB | Refills: 0 | Status: SHIPPED | OUTPATIENT
Start: 2018-03-01 | End: 2018-03-20 | Stop reason: CLARIF

## 2018-03-01 NOTE — PROGRESS NOTES
HISTORY OF PRESENT ILLNESS  Bella Catherine is a 67 y.o. male.   Chief Complaint   Patient presents with    Hip Pain     started  about 2 weeks ago, worse today and can barely walk on it       HPI  Right hip to knee 'killing me'  Did not injury himself  Started 2 w ago  Getting worse  Sharp when standing on it and walking 6-7/10  No bad pain pain sitting 4/10  No redness or swelling  Knee brace is not helping    Had Gout in past, years ago  Not on meds for it  Has not had Xrays done of hip or knee    Has Hx of back pain and pain radiating from there    Tried steroid shot in left shoulder  And Norco has helped  Finished them 2 d ago    Not taking antiinflammatory meds  Did not try Aleve or Advil d/t Eliquis  Tylenol ES helped when taking 3 tablets    No knee or hip replacement on right bit had it on left side    ROS  Past Medical History:   Diagnosis Date    AICD (automatic cardioverter/defibrillator) present 5/13/2016 5/13/16 [employer] upgrade to dual chamber AICD implant  Dr. Meaghan Canchola state, other     Arrhythmia     'S IN THE PAST    Arthritis     OSTEO ARTHRITIS    AVNRT (AV bridgette re-entry tachycardia) ([facility] [address] 5/12/2016 5/12/16 AVNRT ablation: PM/AICD left upper chest :Dr. Tre Blevins Back ache     CAD (coronary artery disease)     Cancer [facility]) 2004    Prostate cancer    Chest tightness     last episode of chest pain 5/2016 before AICD placed; none since then    Coagulation defects 2005    FACTOR V LEIDEN    Dizziness     FH: factor V Leiden deficiency     Generalized muscle ache     GERD (gastroesophageal reflux disease)     HEARTBURN    Heart failure ([facility] [address] 2014    EF 40%; Dr. Mikie Dao    Hyperlipidemia, mixed     Hypertension     COREG    Other ill-defined conditions(799.89) 2004    blood transfusion    Pacemaker     Pacemaker     Postsurgical aortocoronary bypass status 05/29/2012    CABG    Presence of cardiac defibrillator 05/2016    left upper chest    Psychiatric disorder     depression    Stroke [facility]) 2011, 1990's    SEVERAL-mini    Thromboembolism ([facility] 2014    left leg: changed to Eliquis from Warfarin    Thromboembolus ([facility] 2005    left leg    Weakness generalized      Current Outpatient Prescriptions   Medication Sig Dispense Refill    HYDROcodone-acetaminophen (NORCO) 5-325 mg per tablet Take 1 Tab by mouth every eight (8) hours as needed for Pain. Max Daily Amount: 3 Tabs. 10 Tab 0    atorvastatin (LIPITOR) 40 mg tablet TAKE 1 TABLET BY MOUTH EVERY DAY 30 Tab 2    tiZANidine (ZANAFLEX) 4 mg tablet TAKE 1/2 TABLET (2MG) BY MOUTH EVERY 6-8 HOURS . MAX 3 DOSES IN 24 HOURS 30 Tab 2    ELIQUIS 5 mg tablet TAKE 1 TABLET BY MOUTH EVERY 12 HOURS 60 Tab 12    lisinopril (PRINIVIL, ZESTRIL) 5 mg tablet TAKE 1 TABLET BY MOUTH DAILY FOR 30 DAYS 90 Tab 1    traZODone (DESYREL) 100 mg tablet TAKE 1 TABLET BY MOUTH NIGHTLY. 30 Tab 5    metoprolol succinate (TOPROL-XL) 25 mg XL tablet Take 1 Tab by mouth daily. Indications: hypertension 90 Tab 1    sertraline (ZOLOFT) 100 mg tablet Take 1 Tab by mouth daily. 30 Tab 11    memantine (NAMENDA) 10 mg tablet Take 1 tab twice a day 180 Tab 3    gabapentin (NEURONTIN) 100 mg capsule Take 1 Cap by mouth three (3) times daily. 270 Cap 3    fluticasone (FLONASE) 50 mcg/actuation nasal spray 2 Sprays by Both Nostrils route daily. 1 Bottle 6    polyethylene glycol (MIRALAX) 17 gram/dose powder Take 17 g by mouth daily.  Incontinence Pad, Liner, Disp (BLADDER CONTROL PADS EX ABSORB) pads 1 Packet by Does Not Apply route as needed (incontinence). 30 Each 6    nitroglycerin (NITROSTAT) 0.4 mg SL tablet 1 Tab by SubLINGual route every five (5) minutes as needed for Chest Pain (call 911 if not relieved by 3). 25 Tab 1    bisacodyl (DULCOLAX, BISACODYL,) 5 mg EC tablet Take 5 mg by mouth daily as needed for Constipation.        No Known Allergies  Visit Vitals    /64 (BP 1 Location: Right arm, BP Patient Position: Sitting)    Pulse (!) 40    Temp 96.6 °F (35.9 °C) (Oral)    Resp 16    Ht 5' 8\" (1.727 m)    Wt 163 lb (73.9 kg)    SpO2 99%    BMI 24.78 kg/m2     Physical Exam   Constitutional: He is oriented to person, place, and time. He appears well-developed and well-nourished. No distress. HENT:   Head: Normocephalic and atraumatic. Eyes: Conjunctivae and EOM are normal.   Pulmonary/Chest: Effort normal.   Musculoskeletal: He exhibits tenderness. He exhibits no edema or deformity. Right knee no redness or swelling, normal ROM and no pain with it but crepitus  Right hip tender laterally and pain with ROM   Neurological: He is alert and oriented to person, place, and time. Skin: Skin is warm and dry. Psychiatric: He has a normal mood and affect. Nursing note and vitals reviewed. ASSESSMENT and PLAN    ICD-10-CM ICD-9-CM    1.  Right hip pain M25.551 719.45 XR HIP RT W OR WO PELV 2-3 VWS   2. Sciatic pain, right M54.31 724.3 HYDROcodone-acetaminophen (NORCO) 5-325 mg per tablet

## 2018-03-20 ENCOUNTER — OFFICE VISIT (OUTPATIENT)
Dept: NEUROLOGY | Age: 73
End: 2018-03-20

## 2018-03-20 VITALS
DIASTOLIC BLOOD PRESSURE: 50 MMHG | WEIGHT: 168 LBS | HEART RATE: 76 BPM | SYSTOLIC BLOOD PRESSURE: 116 MMHG | BODY MASS INDEX: 25.46 KG/M2 | RESPIRATION RATE: 16 BRPM | TEMPERATURE: 98 F | OXYGEN SATURATION: 96 % | HEIGHT: 68 IN

## 2018-03-20 DIAGNOSIS — M48.062 SPINAL STENOSIS, LUMBAR REGION, WITH NEUROGENIC CLAUDICATION: ICD-10-CM

## 2018-03-20 DIAGNOSIS — F02.818 DEMENTIA DUE TO GENERAL MEDICAL CONDITION WITH BEHAVIORAL DISTURBANCE: Primary | ICD-10-CM

## 2018-03-20 DIAGNOSIS — M54.16 LUMBAR BACK PAIN WITH RADICULOPATHY AFFECTING LEFT LOWER EXTREMITY: ICD-10-CM

## 2018-03-20 DIAGNOSIS — I65.23 STENOSIS OF BOTH CAROTID ARTERIES WITHOUT CEREBRAL INFARCTION: ICD-10-CM

## 2018-03-20 DIAGNOSIS — M96.1 CERVICAL POST-LAMINECTOMY SYNDROME: ICD-10-CM

## 2018-03-20 DIAGNOSIS — G30.1 ALZHEIMER'S DEMENTIA, LATE ONSET, WITH BEHAVIORAL DISTURBANCE (HCC): ICD-10-CM

## 2018-03-20 DIAGNOSIS — G44.86 CERVICOGENIC HEADACHE: ICD-10-CM

## 2018-03-20 DIAGNOSIS — M54.16 LUMBAR BACK PAIN WITH RADICULOPATHY AFFECTING RIGHT LOWER EXTREMITY: ICD-10-CM

## 2018-03-20 DIAGNOSIS — F02.818 ALZHEIMER'S DEMENTIA, LATE ONSET, WITH BEHAVIORAL DISTURBANCE (HCC): ICD-10-CM

## 2018-03-20 RX ORDER — GABAPENTIN 300 MG/1
300 CAPSULE ORAL 3 TIMES DAILY
Qty: 100 CAP | Refills: 11 | Status: SHIPPED | OUTPATIENT
Start: 2018-03-20 | End: 2018-04-02 | Stop reason: SINTOL

## 2018-03-20 RX ORDER — MEMANTINE HYDROCHLORIDE 10 MG/1
TABLET ORAL
Qty: 180 TAB | Refills: 3 | Status: SHIPPED | OUTPATIENT
Start: 2018-03-20 | End: 2019-04-27 | Stop reason: SDUPTHER

## 2018-03-20 NOTE — MR AVS SNAPSHOT
[address] [facility] [address], 
[address], [address] 
[phone] Patient: Elmer Kirkpatrick MRN: [id number] OFH:[birth year] Visit Information Date & Time Provider Department Dept. Phone Encounter #  
 3/20/2018  1:40 PM Jhon Bautista MD Neurology [facility] at [facility] [phone] [id number] Follow-up Instructions Return in about 6 months (around 9/20/2018). Your Appointments 3/22/2018  1:00 PM  
Any with Katherine Barone PA-C  
[address] [place] [address] ([facility]) Appt Note: 2 mo f/u,CP$0/1.24.2018  
 [address]  
[phone]  
  
   
 [address] [address]  
  
    
 4/12/2018 10:45 AM  
PACEMAKER with PACEMAKER, [facility]) Appt Note: BSC DC ICD 3mo check, no landline [address] [facility] [address]  
[phone] [address] [facility] [address]  
  
    
 6/4/2018  1:00 PM  
Medicare Physical with Katherine Barone PA-C  
[address] [place] [address] ([facility]) Appt Note:  $0 CP mbm  
 [address] [address]  
[phone]  
  
   
 [address] [address] Upcoming Health Maintenance Date Due FOBT Q 1 YEAR AGE 50-75 4/1/1994 ZOSTER VACCINE AGE 60> 2/1/2004 GLAUCOMA SCREENING Q2Y 4/1/2009 MEDICARE YEARLY EXAM 6/30/2018 DTaP/Tdap/Td series (2 - Td) 6/29/2027 Allergies as of 3/20/2018  Review Complete On: 3/20/2018 By: Jhon Bautista MD  
 No Known Allergies Current Immunizations  Reviewed on 7/17/2017 Name Date Influenza High Dose Vaccine PF 10/11/2017, 11/10/2016 Influenza Vaccine 9/16/2014 Influenza Vaccine Split 9/14/2011 Influenza Vaccine Whole 9/1/2010 Pneumococcal Conjugate (PCV-13) 11/10/2016 Pneumococcal Polysaccharide (PPSV-23) 11/13/2012 12:00 AM  
 Td 5/8/2012 ZZZ-RETIRED (DO NOT USE) Pneumococcal Vaccine (Unspecified Type) 9/14/2011 Not reviewed this visit You Were Diagnosed With   
  
 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    -  Primary ICD-10-CM: F02.81 ICD-9-CM: 294.11 Stenosis of both carotid arteries without cerebral infarction     ICD-10-CM: I65.23 ICD-9-CM: 433.10, 433.30 Cervicogenic headache     ICD-10-CM: R46 ICD-9-CM: 784.0 Lumbar back pain with radiculopathy affecting right lower extremity     ICD-10-CM: M54.17 ICD-9-CM: 724.4 Lumbar back pain with radiculopathy affecting left lower extremity     ICD-10-CM: M54.17 ICD-9-CM: 724.4 Alzheimer's dementia, late onset, with behavioral disturbance     ICD-10-CM: G30.1, F02.81 ICD-9-CM: 331.0, 294.11 Cervical post-laminectomy syndrome     ICD-10-CM: M96.1 ICD-9-CM: 722.81 Spinal stenosis, lumbar region, with neurogenic claudication     ICD-10-CM: M48.062 
ICD-9-CM: 724.03 Vitals BP Pulse Temp Resp Height(growth percentile) Weight(growth percentile) 116/50 76 98 °F (36.7 °C) 16 5' 8\" (1.727 m) 168 lb (76.2 kg) SpO2 BMI Smoking Status 96% 25.54 kg/m2 Former Smoker Vitals History BMI and BSA Data Body Mass Index Body Surface Area 25.54 kg/m 2 1.91 m 2 Preferred Pharmacy Pharmacy Name Phone [facility] [address] [place] [address] Your Updated Medication List  
  
   
This list is accurate as of 3/20/18  2:06 PM.  Always use your most recent med list.  
  
  
  
  
 atorvastatin 40 mg tablet Commonly known as:  LIPITOR  
TAKE 1 TABLET BY MOUTH EVERY DAY  
  
 DULCOLAX (BISACODYL) 5 mg EC tablet Generic drug:  bisacodyl Take 5 mg by mouth daily as needed for Constipation. ELIQUIS 5 mg tablet Generic drug:  apixaban TAKE 1 TABLET BY MOUTH EVERY 12 HOURS  
  
 fluticasone 50 mcg/actuation nasal spray Commonly known as:  Memory Lust 2 Sprays by Both Nostrils route daily. gabapentin 300 mg capsule Commonly known as:  NEURONTIN Take 1 Cap by mouth three (3) times daily. Incontinence Pad, Liner, Disp Pads Commonly known as:  BLADDER CONTROL PADS EX ABSORB  
1 Packet by Does Not Apply route as needed (incontinence). lisinopril 5 mg tablet Commonly known as:  PRINIVIL, ZESTRIL  
TAKE 1 TABLET BY MOUTH DAILY FOR 30 DAYS  
  
 memantine 10 mg tablet Commonly known as:  Lelon Dus Take 1 tab twice a day  
  
 metoprolol succinate 25 mg XL tablet Commonly known as:  TOPROL-XL Take 1 Tab by mouth daily. Indications: hypertension MIRALAX 17 gram/dose powder Generic drug:  polyethylene glycol Take 17 g by mouth daily. nitroglycerin 0.4 mg SL tablet Commonly known as:  NITROSTAT  
1 Tab by SubLINGual route every five (5) minutes as needed for Chest Pain (call 911 if not relieved by 3). sertraline 100 mg tablet Commonly known as:  ZOLOFT Take 1 Tab by mouth daily. Prescriptions Sent to Pharmacy Refills  
 gabapentin (NEURONTIN) 300 mg capsule 11 Sig: Take 1 Cap by mouth three (3) times daily. Class: Normal  
 Pharmacy: [facility] [id number] Ph #: [phone] Route: Oral  
 memantine (NAMENDA) 10 mg tablet 3 Sig: Take 1 tab twice a day Class: Normal  
 Pharmacy: [facility] [address] Ph #: [phone] Follow-up Instructions Return in about 6 months (around 9/20/2018). Patient Instructions A Healthy Lifestyle: Care Instructions Your Care Instructions A healthy lifestyle can help you feel good, stay at a healthy weight, and have plenty of energy for both work and play. A healthy lifestyle is something you can share with your whole family. A healthy lifestyle also can lower your risk for serious health problems, such as high blood pressure, heart disease, and diabetes. You can follow a few steps listed below to improve your health and the health of your family. Follow-up care is a key part of your treatment and safety. Be sure to make and go to all appointments, and call your doctor if you are having problems. It's also a good idea to know your test results and keep a list of the medicines you take. How can you care for yourself at home? · Do not eat too much sugar, fat, or fast foods. You can still have dessert and treats now and then. The goal is moderation. · Start small to improve your eating habits. Pay attention to portion sizes, drink less juice and soda pop, and eat more fruits and vegetables. ¨ Eat a healthy amount of food. A 3-ounce serving of meat, for example, is about the size of a deck of cards. Fill the rest of your plate with vegetables and whole grains. ¨ Limit the amount of soda and sports drinks you have every day. Drink more water when you are thirsty. ¨ Eat at least 5 servings of fruits and vegetables every day. It may seem like a lot, but it is not hard to reach this goal. A serving or helping is 1 piece of fruit, 1 cup of vegetables, or 2 cups of leafy, raw vegetables. Have an apple or some carrot sticks as an afternoon snack instead of a candy bar. Try to have fruits and/or vegetables at every meal. 
· Make exercise part of your daily routine. You may want to start with simple activities, such as walking, bicycling, or slow swimming. Try to be active 30 to 60 minutes every day. You do not need to do all 30 to 60 minutes all at once. For example, you can exercise 3 times a day for 10 or 20 minutes. Moderate exercise is safe for most people, but it is always a good idea to talk to your doctor before starting an exercise program. 
· Keep moving. Stevenson Hoop the lawn, work in the garden, or Kontest.  Take the stairs instead of the elevator at work. · If you smoke, quit. People who smoke have an increased risk for heart attack, stroke, cancer, and other lung illnesses. Quitting is hard, but there are ways to boost your chance of quitting tobacco for good. ¨ Use nicotine gum, patches, or lozenges. ¨ Ask your doctor about stop-smoking programs and medicines. ¨ Keep trying. In addition to reducing your risk of diseases in the future, you will notice some benefits soon after you stop using tobacco. If you have shortness of breath or asthma symptoms, they will likely get better within a few weeks after you quit. · Limit how much alcohol you drink. Moderate amounts of alcohol (up to 2 drinks a day for men, 1 drink a day for women) are okay. But drinking too much can lead to liver problems, high blood pressure, and other health problems. Family health If you have a family, there are many things you can do together to improve your health. · Eat meals together as a family as often as possible. · Eat healthy foods. This includes fruits, vegetables, lean meats and dairy, and whole grains. · Include your family in your fitness plan. Most people think of activities such as jogging or tennis as the way to fitness, but there are many ways you and your family can be more active. Anything that makes you breathe hard and gets your heart pumping is exercise. Here are some tips: 
¨ Walk to do errands or to take your child to school or the bus. ¨ Go for a family bike ride after dinner instead of watching TV. Where can you learn more? Go to http://kendell-stephanie.info/. Enter T509 in the search box to learn more about \"A Healthy Lifestyle: Care Instructions. \" Current as of: May 12, 2017 Content Version: 11.4 © 2345-1777 [employer].  Care instructions adapted under license by [employer] (which disclaims liability or warranty for this information). If you have questions about a medical condition or this instruction, always ask your healthcare professional. [address] any warranty or liability for your use of this information. Introducing hospitals & HEALTH SERVICES! Dear Grazyna Montano: Thank you for requesting a nextsocial account. Our records indicate that you already have an active nextsocial account. You can access your account anytime at [email or web address] Did you know that you can access your hospital and ER discharge instructions at any time in nextsocial? You can also review all of your test results from your hospital stay or ER visit. Additional Information If you have questions, please visit the Frequently Asked Questions section of the nextsocial website at [email or web address] Remember, nextsocial is NOT to be used for urgent needs. For medical emergencies, dial 911. Now available from your iPhone and Android! Please provide this summary of care documentation to your next provider. Your primary care clinician is listed as José Miguel Manzo. If you have any questions after today's visit, please call [phone].

## 2018-03-20 NOTE — LETTER
3/20/2018 2:17 PM 
 
Patient:  Barbara Hoover YOB: 1945 Date of Visit: 3/20/2018 Dear No Recipients: Thank you for referring Mr. Rajinder Figueredo to me for evaluation/treatment. Below are the relevant portions of my assessment and plan of care. Consult Subjective: Barbara Hoover is a 67 y.o. right-handed  male seen for evaluation at the request of Dr. Gurinder Garland of a problem of abnormal behavior and language around his grandchildren, increased irritability and abnormal behavior and for severe back and right leg pain that is getting worse, and the wife wants to know we can give him anything for it. He was given lumbar epidural steroid injection by Dr. Diann Petersen, and did better for a while, and has another one coming in several weeks, so we will try to increase his Neurontin to 300 mg 3 times a day from 100 mg 3 times a day. He does not sleep well at nighttime, and the wife is already taking 1800 mg are soft and notes a side effect of the medication will watch him for any drowsiness, ataxia, or side effects of the medication. 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 Patient is already on clonazepam 0.5 mg b.i.d., Namenda 10 mg twice a day, for his dementia, Neurontin 100 mg 3 times a day for his chronic pain, and he is off trazodone 100 mg at nighttime, and Zoloft 100 mg a day. Patient was previously on Aricept for his dementia, but for some reason that was stopped, probably on cardiovascular basis. His behavior and his memory seem to be stabilized on current medications and his main problem is his back pain. Patient is in chronic pain because of degenerative disc disease in his back and severe spinal stenosis. He had a recent CT myelogram done that shows severe stenosis at L2-L3, L3-L4, and L4-L5, and mild at L5-S1.  CT myelogram of the cervical spine shows postoperative changes but no recurrent spinal stenosis, but moderate degenerative changes noted with these being done March 3, 2016. He is seeing his orthopedic surgeon soon. He thought he had several injections from Dr. Margi Beebe without clear improvement. He doesn't sleep well at night. Patient gives a history of headaches that occur every day in the posterior head regions described as a severe pressure sensation that is there all the time, and we will increase to gabapentin for this to see if that helps. He had previous neck surgery about 4 years ago and had a cervical laminectomy and fusion from C4-C6. He denies any recent fever, meningismus, trauma or other precipitating causes. Patient states he takes 3-4 hydrocodone per day for his headaches. He has not tried any therapy but did have a CT of his head one week ago that was reportedly normal and we will try to get those results. His neck pain radiates into his shoulders, but is not associated with weakness or numbness in his arms, but he has chronic weakness and numbness in his arms and legs from his previous cervical myelopathy. He apparently also has memory loss which he downplays but is fairly obvious. His wife has to give him his medications so he doesn't overuse them. He has never been evaluated or treated for his memory loss. He has no family history of similar problems. He has a cardiac pacemaker and probably can't get MRI. He also has a pump for his bladder but that is MRI compatible. He had prostate cancer and he has possibly had recurrence. He denies any past history of stroke, or head trauma or new medication or other causes of his memory loss. He had no family with him today. He has chronic visual problems in his right eye and his ophthalmologist recommends surgery for a retinal problem but he has not done it yet. Past Medical History:  
Diagnosis Date  AICD (automatic cardioverter/defibrillator) present 5/13/2016 5/13/16 [employer] upgrade to dual chamber AICD implant  Dr. Phill Davenport  Anxiety state, other  Arrhythmia 'S IN THE PAST  Arthritis OSTEO ARTHRITIS  AVNRT (AV bridgette re-entry tachycardia) ([address] 5/12/2016 5/12/16 AVNRT ablation: PM/AICD left upper chest :Dr. Jean Damico  Back ache  CAD (coronary artery disease)  Cancer [facility]) 2004 Prostate cancer  Chest tightness   
 last episode of chest pain 5/2016 before AICD placed; none since then  Coagulation defects 2005 FACTOR V LEIDEN  
 Dizziness  FH: factor V Leiden deficiency  Generalized muscle ache  GERD (gastroesophageal reflux disease) HEARTBURN  
 Heart failure ([address] 2014 EF 40%; Dr. Jonathan Bowen  Hyperlipidemia, mixed  Hypertension COREG  
 Other ill-defined conditions(799.89) 2004  
 blood transfusion  Pacemaker  Pacemaker  Postsurgical aortocoronary bypass status 05/29/2012 CABG  
 Presence of cardiac defibrillator 05/2016  
 left upper chest  
 Psychiatric disorder   
 depression  Stroke [facility]) 2011, 1990's Argelia Garbe  Thromboembolism ([address] 2014  
 left leg: changed to Eliquis from Warfarin  Thromboembolus ([address] 2005  
 left leg  Weakness generalized Past Surgical History:  
Procedure Laterality Date  CARDIAC CATHETERIZATION  3/4/2011  CARDIAC SURG PROCEDURE UNLIST CABG X1 3/5/11  HX HERNIA REPAIR  2002 Ingiunal hernia repair left  HX ORTHOPAEDIC    
 LEFT KNEE CARTILAGE REPAIR;RIGHT ROTATOR CUFF REPAIR  
 HX ORTHOPAEDIC  2/14/12 C5-7 ANTERIIOR CERVICAL DISECTOMY AND FUSION  
 HX ORTHOPAEDIC  6/4/13 C5-C7 POSTERIOR DECOMPRESSION AND FUSION  
 HX OTHER SURGICAL    
 steroid injections  HX PACEMAKER PLACEMENT    
 HX PROSTATECTOMY  2004 CA  
 HX UROLOGICAL  urinary control system  HX UROLOGICAL  12/3/13 REPLACEMENT ARTIFICAL URINARY SPHINCTER Family History Problem Relation Age of Onset  Asthma Mother  Alcohol abuse Mother  Alcohol abuse Father  Asthma Father  Cancer Sister  Heart Disease Sister  Alcohol abuse Brother  Cancer Brother  Heart Disease Brother Social History Substance Use Topics  Smoking status: Former Smoker Types: Pipe, Cigars Quit date: 10/10/1971  Smokeless tobacco: Never Used  Alcohol use No  
   
 
Current Outpatient Prescriptions:  
  gabapentin (NEURONTIN) 300 mg capsule, Take 1 Cap by mouth three (3) times daily. , Disp: 100 Cap, Rfl: 11 
  memantine (NAMENDA) 10 mg tablet, Take 1 tab twice a day, Disp: 180 Tab, Rfl: 3 
  atorvastatin (LIPITOR) 40 mg tablet, TAKE 1 TABLET BY MOUTH EVERY DAY, Disp: 30 Tab, Rfl: 2 
  bisacodyl (DULCOLAX, BISACODYL,) 5 mg EC tablet, Take 5 mg by mouth daily as needed for Constipation. , Disp: , Rfl:  
  ELIQUIS 5 mg tablet, TAKE 1 TABLET BY MOUTH EVERY 12 HOURS, Disp: 60 Tab, Rfl: 12 
  lisinopril (PRINIVIL, ZESTRIL) 5 mg tablet, TAKE 1 TABLET BY MOUTH DAILY FOR 30 DAYS, Disp: 90 Tab, Rfl: 1 
  metoprolol succinate (TOPROL-XL) 25 mg XL tablet, Take 1 Tab by mouth daily. Indications: hypertension, Disp: 90 Tab, Rfl: 1 
  sertraline (ZOLOFT) 100 mg tablet, Take 1 Tab by mouth daily. , Disp: 30 Tab, Rfl: 11 
  polyethylene glycol (MIRALAX) 17 gram/dose powder, Take 17 g by mouth daily. , Disp: , Rfl:  
  Incontinence Pad, Liner, Disp (BLADDER CONTROL PADS EX ABSORB) pads, 1 Packet by Does Not Apply route as needed (incontinence). , Disp: 30 Each, Rfl: 6 
  nitroglycerin (NITROSTAT) 0.4 mg SL tablet, 1 Tab by SubLINGual route every five (5) minutes as needed for Chest Pain (call 911 if not relieved by 3). , Disp: 25 Tab, Rfl: 1 
  fluticasone (FLONASE) 50 mcg/actuation nasal spray, 2 Sprays by Both Nostrils route daily. , Disp: 1 Bottle, Rfl: 6 No Known Allergies Review of Systems: A comprehensive review of systems was negative except for: Constitutional: positive for fatigue and malaise Eyes: positive for visual disturbance Ears, nose, mouth, throat, and face: positive for hearing loss and tinnitus Genitourinary: positive for frequency, nocturia, urinary incontinence and decreased stream 
Musculoskeletal: positive for myalgias, arthralgias, stiff joints, neck pain, back pain and muscle weakness Neurological: positive for headaches, memory problems, coordination problems, gait problems and weakness Behvioral/Psych: positive for dementia and depression Vitals:  
 03/20/18 1351 BP: 116/50 Pulse: 76 Resp: 16 Temp: 98 °F (36.7 °C) SpO2: 96% Weight: 168 lb (76.2 kg) Height: 5' 8\" (1.727 m) Objective: I 
 
 
NEUROLOGICAL EXAM: 
 
Appearance: The patient is well developed, well nourished, provides a poor history and is in no acute distress. Mental Status: Oriented to place and person and the date and president only, can remember one of 3 things at 30 seconds with distraction, cannot do serial sevens, cannot spell the word world backwards, and has difficulty drawing a clock that shows the time 10 minutes to 11. Mood and affect appropriate but depressed. Patient has normal speech without aphasia or dysarthria Cranial Nerves:   Intact visual fields. Fundi are poorly seen. JANESSA, EOM's full, no nystagmus, no ptosis. Facial sensation is normal. Corneal reflexes are not tested. Facial movement is symmetric. Hearing is abnormal bilaterally. Palate is midline with normal sternocleidomastoid and trapezius muscles are normal. Tongue is midline. Neck without meningismus or bruits Temporal arteries are not tender or enlarged Motor:  4/5 strength in upper and lower proximal and distal muscles. Normal bulk and tone. No fasciculations. Neck muscles very tight with limited range of motion of the neck Reflexes:   Deep tendon reflexes 2+/4 and symmetrical. 
 No babinski or clonus present Sensory:   Normal to touch, pinprick and vibration and temperature decreased in both feet. DSS is intact Gait:  Abnormal gait for his age Because he walks bent over because of severe back pain and has to move very slowly and limps very badly because of his right hip and back pain. Tremor:   No tremor noted. Cerebellar:  Mildly abnormal Romberg and tandem cerebellar signs present. Neurovascular:  Normal heart sounds and regular rhythm, peripheral pulses decreased in both feet, and no carotid bruits. Assessment: ICD-10-CM ICD-9-CM 1. 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 Stenosis of both carotid arteries without cerebral infarction I65.23 433.10 gabapentin (NEURONTIN) 300 mg capsule 433.30 3. Cervicogenic headache R51 784.0 gabapentin (NEURONTIN) 300 mg capsule 4. Lumbar back pain with radiculopathy affecting right lower extremity M54.17 724.4 gabapentin (NEURONTIN) 300 mg capsule 5. Lumbar back pain with radiculopathy affecting left lower extremity M54.17 724.4 gabapentin (NEURONTIN) 300 mg capsule 6. Alzheimer's dementia, late onset, with behavioral disturbance G30.1 331.0 gabapentin (NEURONTIN) 300 mg capsule F02.81 294.11 memantine (NAMENDA) 10 mg tablet 7. Cervical post-laminectomy syndrome M96.1 722.81 gabapentin (NEURONTIN) 300 mg capsule 8. Spinal stenosis, lumbar region, with neurogenic claudication M48.062 724.03 gabapentin (NEURONTIN) 300 mg capsule Plan:  
 
Patient with severe right hip and right lumbar radiculopathy, will try to increase his gabapentin for this to 300 mg 3 times a day, and new prescription sent in for patient, and wife knows the side effect of the medication to watch for further drowsiness sedation ataxia any side effect to call us immediately. His been taking off his trazodone and his tizanidine, and hopefully the increase in Neurontin will help with his pain Patient with abnormal behavior, he'll continue on Zoloft and Neurontin and Fioricet and Namenda as prescribed Patient probably has muscle tension headaches because of his neck problem CT myelogram of the cervical spine showed moderate arthritis without recurrent spinal stenosis, but CT myelogram of the lumbar spine showed multilevel severe spinal stenosis. Patient did not improve with injections and will see his orthopedic surgeon soon Patient went to physical therapy for his headaches and neck pain and is encouraged to continue the exercise program they gave him metabolic parameters sent ruled out treatable causes of his memory loss He is to take a multivitamin and vitamin D on a daily basis and exercise regularly and remain mentally and physically active He will call for results of his tests and is any problem in the interim and followup in 3-6 months time 35 minutes spent with the patient and wife today Signed By: Tab Fair MD   
 March 20, 2018 This note will not be viewable in [address] If you have questions, please do not hesitate to call me. I look forward to following Mr. Jl Cardenas along with you. Sincerely, Tab Fair MD

## 2018-03-20 NOTE — PATIENT INSTRUCTIONS

## 2018-03-22 ENCOUNTER — OFFICE VISIT (OUTPATIENT)
Dept: FAMILY MEDICINE CLINIC | Age: 73
End: 2018-03-22

## 2018-03-22 VITALS
SYSTOLIC BLOOD PRESSURE: 139 MMHG | OXYGEN SATURATION: 97 % | HEIGHT: 68 IN | WEIGHT: 169.4 LBS | BODY MASS INDEX: 25.67 KG/M2 | HEART RATE: 63 BPM | RESPIRATION RATE: 14 BRPM | DIASTOLIC BLOOD PRESSURE: 75 MMHG | TEMPERATURE: 96.1 F

## 2018-03-22 DIAGNOSIS — M19.019 SHOULDER ARTHRITIS: ICD-10-CM

## 2018-03-22 DIAGNOSIS — G89.29 CHRONIC LEFT SHOULDER PAIN: Primary | ICD-10-CM

## 2018-03-22 DIAGNOSIS — M75.82 BONE SPUR OF ACROMIOCLAVICULAR JOINT, LEFT: ICD-10-CM

## 2018-03-22 DIAGNOSIS — E78.5 HYPERLIPIDEMIA, UNSPECIFIED HYPERLIPIDEMIA TYPE: ICD-10-CM

## 2018-03-22 DIAGNOSIS — M25.512 CHRONIC LEFT SHOULDER PAIN: Primary | ICD-10-CM

## 2018-03-22 RX ORDER — HYDROCODONE BITARTRATE AND ACETAMINOPHEN 5; 325 MG/1; MG/1
1 TABLET ORAL
Qty: 10 TAB | Refills: 0 | Status: SHIPPED | OUTPATIENT
Start: 2018-03-22 | End: 2018-04-02

## 2018-03-22 RX ORDER — HYDROCODONE BITARTRATE AND ACETAMINOPHEN 5; 325 MG/1; MG/1
1 TABLET ORAL
COMMUNITY
End: 2018-03-22 | Stop reason: SDUPTHER

## 2018-03-22 NOTE — PROGRESS NOTES
Aury Oropeza is a 67 y.o. male who presents to the office today with the following:  Chief Complaint   Patient presents with    Shoulder Pain     left shoulder pain worse X2 days       HPI  Left shoulder pain has flared up past two days after working on [employer]. Known hx of DJD and spurring. Pain worse with ABDuction with some weakness, noting difficulty even lifting a pot of coffee. Pain radiates down to fingers at times, but no numbness/tingling. When it begins to hurt he goes in and rests. Has tried max dose of Tylenol with some relief. No relief with Tramadol in the past and tries to avoid NSAIDS due to anticoag. 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 Was referred back in the fall to Dr. Birdie Hogue for this and received a CS injection which did provide relief temporarily. He has not been seen since and Dr. Birdie Hogue has since retired. Interested in f/u with Dr. Elian Barkley now. Says had good results with right shoulder after that was operated on and has not had trouble with since. Otherwise feeling well with no other complaints or acute concerns. Review of Systems   Constitutional: Negative for chills, fever, malaise/fatigue and weight loss. Respiratory: Negative for cough and shortness of breath. Cardiovascular: Negative for chest pain and leg swelling. Gastrointestinal: Negative. Genitourinary: Negative. Musculoskeletal: Positive for joint pain. Negative for myalgias. Skin: Negative for rash. Neurological: Negative for tingling, sensory change, speech change and focal weakness. See HPI.     Past Medical History:   Diagnosis Date    AICD (automatic cardioverter/defibrillator) present 5/13/2016 5/13/16 [employer] upgrade to dual chamber AICD implant  Dr. Nely Miguel state, other     Arrhythmia     'S IN THE PAST    Arthritis     OSTEO ARTHRITIS    AVNRT (AV bridgette re-entry tachycardia) ([facility] [address] 5/12/2016 5/12/16 AVNRT ablation: PM/AICD left upper chest :Dr. Mason Faria    Back ache     CAD (coronary artery disease)     Cancer [facility]) 2004    Prostate cancer    Chest tightness     last episode of chest pain 5/2016 before AICD placed; none since then    Coagulation defects 2005    FACTOR V LEIDEN    Dizziness     FH: factor V Leiden deficiency     Generalized muscle ache     GERD (gastroesophageal reflux disease)     HEARTBURN    Heart failure ([facility] [address] 2014    EF 40%; Dr. Melly Pollock    Hyperlipidemia, mixed     Hypertension     COREG    Other ill-defined conditions(799.89) 2004    blood transfusion    Pacemaker     Pacemaker     Postsurgical aortocoronary bypass status 05/29/2012    CABG    Presence of cardiac defibrillator 05/2016    left upper chest    Psychiatric disorder     depression    Stroke [facility]) 2011, 1990's    SEVERAL-mini    Thromboembolism ([facility] [address] 2014    left leg: changed to Eliquis from Warfarin    Thromboembolus ([facility] [address] 2005    left leg    Weakness generalized        Past Surgical History:   Procedure Laterality Date    CARDIAC CATHETERIZATION  3/4/2011         CARDIAC SURG PROCEDURE UNLIST      CABG X1 3/5/11    HX HERNIA REPAIR  2002    Ingiunal hernia repair left    HX ORTHOPAEDIC      LEFT KNEE CARTILAGE REPAIR;RIGHT ROTATOR CUFF REPAIR    HX ORTHOPAEDIC  2/14/12    C5-7 ANTERIIOR CERVICAL DISECTOMY AND FUSION    HX ORTHOPAEDIC  6/4/13    C5-C7 POSTERIOR DECOMPRESSION AND FUSION    HX OTHER SURGICAL      steroid injections    HX PACEMAKER PLACEMENT      [place]  2004     [place]    HX UROLOGICAL       urinary control system    HX UROLOGICAL  12/3/13    REPLACEMENT ARTIFICAL URINARY SPHINCTER       No Known Allergies    Current Outpatient Prescriptions   Medication Sig    HYDROcodone-acetaminophen (NORCO) 5-325 mg per tablet Take 1 Tab by mouth every six (6) hours as needed for Pain. Max Daily Amount: 4 Tabs.     gabapentin (NEURONTIN) 300 mg capsule Take 1 Cap by mouth three (3) times daily.    memantine (NAMENDA) 10 mg tablet Take 1 tab twice a day    atorvastatin (LIPITOR) 40 mg tablet TAKE 1 TABLET BY MOUTH EVERY DAY    bisacodyl (DULCOLAX, BISACODYL,) 5 mg EC tablet Take 5 mg by mouth daily as needed for Constipation.  ELIQUIS 5 mg tablet TAKE 1 TABLET BY MOUTH EVERY 12 HOURS    lisinopril (PRINIVIL, ZESTRIL) 5 mg tablet TAKE 1 TABLET BY MOUTH DAILY FOR 30 DAYS    metoprolol succinate (TOPROL-XL) 25 mg XL tablet Take 1 Tab by mouth daily. Indications: hypertension    sertraline (ZOLOFT) 100 mg tablet Take 1 Tab by mouth daily.  fluticasone (FLONASE) 50 mcg/actuation nasal spray 2 Sprays by Both Nostrils route daily.  polyethylene glycol (MIRALAX) 17 gram/dose powder Take 17 g by mouth daily.  Incontinence Pad, Liner, Disp (BLADDER CONTROL PADS EX ABSORB) pads 1 Packet by Does Not Apply route as needed (incontinence).  nitroglycerin (NITROSTAT) 0.4 mg SL tablet 1 Tab by SubLINGual route every five (5) minutes as needed for Chest Pain (call 911 if not relieved by 3). No current facility-administered medications for this visit.         Social History     Social History    Marital status:      Spouse name: N/A    Number of children: N/A    Years of education: N/A     Social History Main Topics    Smoking status: Former Smoker     Types: Pipe, Cigars     Quit date: 10/10/1971    Smokeless tobacco: Never Used    Alcohol use No    Drug use: No    Sexual activity: No     Other Topics Concern    Sleep Concern Yes    Stress Concern Yes     Family concerns     Social History Narrative    , 2 children       Family History   Problem Relation Age of Onset    Asthma Mother     Alcohol abuse Mother     Alcohol abuse Father     Asthma Father     Cancer Sister     Heart Disease Sister     Alcohol abuse Brother     Cancer Brother     Heart Disease Brother          Physical Exam:  Visit Vitals    /75 (BP 1 Location: Left arm, BP Patient Position: Sitting)    Pulse 63    Temp 96.1 °F (35.6 °C) (Oral)    Resp 14    Ht 5' 8\" (1.727 m)    Wt 169 lb 6.4 oz (76.8 kg)    SpO2 97%    BMI 25.76 kg/m2     Physical Exam   Constitutional: He is oriented to person, place, and time and well-developed, well-nourished, and in no distress. HENT:   Head: Normocephalic and atraumatic. Eyes: Conjunctivae are normal.   Neck: Neck supple. Cardiovascular: Normal rate and regular rhythm. Pulmonary/Chest: Effort normal and breath sounds normal.   Musculoskeletal: He exhibits tenderness. He exhibits no edema. Left shoulder: He exhibits decreased range of motion (limited throughout, worse with ABD >90 degrees in all axes,, unable to ER >5degrees), tenderness, bony tenderness, pain (anterior shoulder tenderness, worse along AC jt) and decreased strength (good , but shoulder 4/5 secondary to pain). He exhibits no swelling, no effusion and normal pulse. NV intact UE. Lymphadenopathy:     He has no cervical adenopathy. Neurological: He is alert and oriented to person, place, and time. Gait normal.   Skin: Skin is warm and dry. No erythema. Psychiatric: Mood and affect normal.       Assessment/Plan:    ICD-10-CM ICD-9-CM    1. Chronic left shoulder pain M25.512 719.41 REFERRAL TO ORTHOPEDICS    G89.29 338.29 HYDROcodone-acetaminophen (NORCO) 5-325 mg per tablet   2. Shoulder arthritis M19.019 716.91 REFERRAL TO ORTHOPEDICS      HYDROcodone-acetaminophen (NORCO) 5-325 mg per tablet   3. Bone spur of acromioclavicular joint, left M75.82 726.91 REFERRAL TO ORTHOPEDICS      HYDROcodone-acetaminophen (NORCO) 5-325 mg per tablet   4. Hyperlipidemia, unspecified hyperlipidemia type D43.6 515.5 METABOLIC PANEL, BASIC      LIPID PANEL      AST      ALT      RI HANDLG&/OR CONVEY OF SPEC FOR TR OFFICE TO LAB      COLLECTION VENOUS BLOOD,VENIPUNCTURE        reviewed. Counseled pt on potential medication AEs/interactions.   Caution regarding sedation and safety- will only take pain medication sparingly as needed, may start with half dose. No additional Tylenol. Has tolerated well with other meds in the past w/o issues. F/u with Ortho as planned. Seek care in interim for any concerns. Follow-up Disposition:  Return if symptoms worsen or fail to improve.     Carmel Cazares PA-C

## 2018-03-22 NOTE — MR AVS SNAPSHOT
[facility] [address] 
[phone] Patient: Emerald Bedolla MRN: [id number] MQT:8[birth year] Visit Information Date & Time Provider Department Dept. Phone Encounter #  
 3/22/2018  8:00 AM Walter Potts PA-C [address] [facility] [id number] Follow-up Instructions Return if symptoms worsen or fail to improve. Your Appointments 4/12/2018 10:45 AM  
PACEMAKER with PACEMAKER, [facility] [address]) Appt Note: BSC DC ICD 3mo check, no landline [id number] [facility]  
[phone] [id number] [facility]  
  
    
 6/4/2018  1:00 PM  
Medicare Physical with Walter Potts PA-C  
[facility] ([address]) Appt Note:  $0 CP mbm  
 [address] [facility]  
[phone]  
  
   
 [address]  Chris Reeves  
  
    
 11/12/2018  2:00 PM  
Follow Up with Aneta Berrios MD  
[facility] [address] [facility]) Appt Note: f/u memory, jrb 3/20/18  
 [facility], 
[address] [place] [address], [address] [phone]  
[address], [address] [place] [address] [address] [phone] Upcoming Health Maintenance Date Due FOBT Q 1 YEAR AGE 50-75 4/1/1994 ZOSTER VACCINE AGE 60> 2/1/2004 GLAUCOMA SCREENING Q2Y 4/1/2009 MEDICARE YEARLY EXAM 6/30/2018 DTaP/Tdap/Td series (2 - Td) 6/29/2027 Allergies as of 3/22/2018  Review Complete On: 3/22/2018 By: Walter Potts PA-C No Known Allergies Current Immunizations  Reviewed on 7/17/2017 Name Date Influenza High Dose Vaccine PF 10/11/2017, 11/10/2016 Influenza Vaccine 9/16/2014 Influenza Vaccine Split 9/14/2011 Influenza Vaccine Whole 9/1/2010 Pneumococcal Conjugate (PCV-13) 11/10/2016 Pneumococcal Polysaccharide (PPSV-23) 11/13/2012 12:00 AM  
 Td 5/8/2012 ZZZ-RETIRED (DO NOT USE) Pneumococcal Vaccine (Unspecified Type) 9/14/2011 Not reviewed this visit You Were Diagnosed With   
  
 Codes Comments Chronic left shoulder pain    -  Primary ICD-10-CM: M25.512, E95.64 ICD-9-CM: 719.41, 338.29 Shoulder arthritis     ICD-10-CM: M19.019 
ICD-9-CM: 716.91 Bone spur of acromioclavicular joint, left     ICD-10-CM: M75.82 ICD-9-CM: 726.91 Hyperlipidemia, unspecified hyperlipidemia type     ICD-10-CM: E78.5 ICD-9-CM: 272.4 Vitals BP Pulse Temp Resp Height(growth percentile) Weight(growth percentile) 139/75 (BP 1 Location: Left arm, BP Patient Position: Sitting) 63 96.1 °F (35.6 °C) (Oral) 14 5' 8\" (1.727 m) 169 lb 6.4 oz (76.8 kg) SpO2 BMI Smoking Status 97% 25.76 kg/m2 Former Smoker BMI and BSA Data Body Mass Index Body Surface Area 25.76 kg/m 2 1.92 m 2 Preferred Pharmacy Pharmacy Name Phone [facility] [address] [place] [address] Your Updated Medication List  
  
   
This list is accurate as of 3/22/18  8:49 AM.  Always use your most recent med list.  
  
  
  
  
 atorvastatin 40 mg tablet Commonly known as:  LIPITOR  
TAKE 1 TABLET BY MOUTH EVERY DAY  
  
 DULCOLAX (BISACODYL) 5 mg EC tablet Generic drug:  bisacodyl Take 5 mg by mouth daily as needed for Constipation. ELIQUIS 5 mg tablet Generic drug:  apixaban TAKE 1 TABLET BY MOUTH EVERY 12 HOURS  
  
 fluticasone 50 mcg/actuation nasal spray Commonly known as:  Delmon Pickup 2 Sprays by Both Nostrils route daily. gabapentin 300 mg capsule Commonly known as:  NEURONTIN Take 1 Cap by mouth three (3) times daily. HYDROcodone-acetaminophen 5-325 mg per tablet Commonly known as:  Mortimer Newness  
 Take 1 Tab by mouth every six (6) hours as needed for Pain. Max Daily Amount: 4 Tabs. Incontinence Pad, Liner, Disp Pads Commonly known as:  BLADDER CONTROL PADS EX ABSORB  
1 Packet by Does Not Apply route as needed (incontinence). lisinopril 5 mg tablet Commonly known as:  PRINIVIL, ZESTRIL  
TAKE 1 TABLET BY MOUTH DAILY FOR 30 DAYS  
  
 memantine 10 mg tablet Commonly known as:  Roxy Flirt Take 1 tab twice a day  
  
 metoprolol succinate 25 mg XL tablet Commonly known as:  TOPROL-XL Take 1 Tab by mouth daily. Indications: hypertension MIRALAX 17 gram/dose powder Generic drug:  polyethylene glycol Take 17 g by mouth daily. nitroglycerin 0.4 mg SL tablet Commonly known as:  NITROSTAT  
1 Tab by SubLINGual route every five (5) minutes as needed for Chest Pain (call 911 if not relieved by 3). sertraline 100 mg tablet Commonly known as:  ZOLOFT Take 1 Tab by mouth daily. Prescriptions Printed Refills HYDROcodone-acetaminophen (NORCO) 5-325 mg per tablet 0 Sig: Take 1 Tab by mouth every six (6) hours as needed for Pain. Max Daily Amount: 4 Tabs. Class: Print Route: Oral  
  
We Performed the Following ALT [id number] CPT(R)] AST [id number] CPT(R)] LIPID PANEL [90313 CPT(R)] METABOLIC PANEL, BASIC [44271 CPT(R)] REFERRAL TO ORTHOPEDICS [MVA952 Custom] Comments:  
 Left shoulder pain/weakness/ROM- DJD/spurring Follow-up Instructions Return if symptoms worsen or fail to improve. Referral Information Referral ID Referred By Referred To  
  
 [id number] Juanita Higgins MD   
   [address] [place] [address] Phone: [phone] Fax: [phone] Visits Status Start Date End Date 1 New Request 3/22/18 3/22/19 If your referral has a status of pending review or denied, additional information will be sent to support the outcome of this decision. Introducing [facility]! Dear Sascha Tate: Thank you for requesting a [employer] account. Our records indicate that you already have an active [employer] account. You can access your account anytime at [email or web address] Did you know that you can access your hospital and ER discharge instructions at any time in [employer]? You can also review all of your test results from your hospital stay or ER visit. Additional Information If you have questions, please visit the Frequently Asked Questions section of the [employer] website at [email or web address] Remember, [employer] is NOT to be used for urgent needs. For medical emergencies, dial 911. Now available from your iPhone and Android! Please provide this summary of care documentation to your next provider. Your primary care clinician is listed as Fareed Hearn. If you have any questions after today's visit, please call [phone].

## 2018-03-22 NOTE — PROGRESS NOTES
Chief Complaint   Patient presents with    Shoulder Pain     left shoulder pain worse X2 days     Health Maintenance Due   Topic Date Due    FOBT Q 1 YEAR AGE 50-75  04/01/1994    ZOSTER VACCINE AGE 60>  02/01/2004    GLAUCOMA SCREENING Q2Y  04/01/2009     Visit Vitals    /75 (BP 1 Location: Left arm, BP Patient Position: Sitting)    Pulse 63    Temp 96.1 °F (35.6 °C) (Oral)    Resp 14    Ht 5' 8\" (1.727 m)    Wt 169 lb 6.4 oz (76.8 kg)    SpO2 97%    BMI 25.76 kg/m2     James Bell LPN

## 2018-03-23 LAB
ALT SERPL-CCNC: 11 IU/L (ref 0–44)
AST SERPL-CCNC: 20 IU/L (ref 0–40)
BUN SERPL-MCNC: 25 MG/DL (ref 8–27)
BUN/CREAT SERPL: 25 (ref 10–24)
CALCIUM SERPL-MCNC: 9.8 MG/DL (ref 8.6–10.2)
CHLORIDE SERPL-SCNC: 105 MMOL/L (ref 96–106)
CHOLEST SERPL-MCNC: 157 MG/DL (ref 100–199)
CO2 SERPL-SCNC: 25 MMOL/L (ref 18–29)
CREAT SERPL-MCNC: 1.01 MG/DL (ref 0.76–1.27)
GFR SERPLBLD CREATININE-BSD FMLA CKD-EPI: 73 ML/MIN/1.73
GFR SERPLBLD CREATININE-BSD FMLA CKD-EPI: 85 ML/MIN/1.73
GLUCOSE SERPL-MCNC: 80 MG/DL (ref 65–99)
HDLC SERPL-MCNC: 66 MG/DL
INTERPRETATION, 910389: NORMAL
LDLC SERPL CALC-MCNC: 74 MG/DL (ref 0–99)
POTASSIUM SERPL-SCNC: 4.7 MMOL/L (ref 3.5–5.2)
SODIUM SERPL-SCNC: 146 MMOL/L (ref 134–144)
TRIGL SERPL-MCNC: 84 MG/DL (ref 0–149)
VLDLC SERPL CALC-MCNC: 17 MG/DL (ref 5–40)

## 2018-03-26 ENCOUNTER — OFFICE VISIT (OUTPATIENT)
Dept: FAMILY MEDICINE CLINIC | Age: 73
End: 2018-03-26

## 2018-03-26 VITALS
HEIGHT: 70 IN | WEIGHT: 166.8 LBS | BODY MASS INDEX: 23.88 KG/M2 | OXYGEN SATURATION: 97 % | HEART RATE: 65 BPM | DIASTOLIC BLOOD PRESSURE: 63 MMHG | SYSTOLIC BLOOD PRESSURE: 122 MMHG | TEMPERATURE: 97.1 F

## 2018-03-26 DIAGNOSIS — R42 DIZZINESS: Primary | ICD-10-CM

## 2018-03-26 DIAGNOSIS — F02.81 ALZHEIMER'S DEMENTIA WITH BEHAVIORAL DISTURBANCE, UNSPECIFIED TIMING OF DEMENTIA ONSET: ICD-10-CM

## 2018-03-26 DIAGNOSIS — G30.9 ALZHEIMER'S DEMENTIA WITH BEHAVIORAL DISTURBANCE, UNSPECIFIED TIMING OF DEMENTIA ONSET: ICD-10-CM

## 2018-03-26 DIAGNOSIS — M19.019 SHOULDER ARTHRITIS: ICD-10-CM

## 2018-03-26 RX ORDER — LISINOPRIL 5 MG/1
5 TABLET ORAL DAILY
COMMUNITY
End: 2018-03-28 | Stop reason: SDUPTHER

## 2018-03-26 NOTE — PATIENT INSTRUCTIONS
Hold neurontin  Hold hydrocodone  CT scan  Ambulate slowly   Use walker  Follow up 1 week, to ER if worse    Tylenol if needed for pain

## 2018-03-26 NOTE — PROGRESS NOTES
Kaley Overall  Identified pt with two pt identifiers(name and ). Chief Complaint   Patient presents with    Dizziness     patient was not able to walk into the exam room/unstable       1. Have you been to the ER, urgent care clinic since your last visit? No   Hospitalized since your last visit? NO    2. Have you seen or consulted any other health care providers outside of the [address] [place] [address] since your last visit? Include any pap smears or colon screening. NO      Dr Emily Maharaj notified of reason for visit, vitals and flowsheets obtained on patients.      Patient received paperwork for advance directive during previous visit but has not completed at this time     Reviewed record In preparation for visit, huddled with provider and have obtained necessary documentation      Health Maintenance Due   Topic    FOBT Q 1 YEAR AGE 50-75     ZOSTER VACCINE AGE 60>     GLAUCOMA SCREENING Q2Y        Wt Readings from Last 3 Encounters:   18 166 lb 12.8 oz (75.7 kg)   18 169 lb 6.4 oz (76.8 kg)   18 169 lb (76.7 kg)     Temp Readings from Last 3 Encounters:   18 97.1 °F (36.2 °C) (Oral)   18 96.1 °F (35.6 °C) (Oral)   18 98 °F (36.7 °C)     BP Readings from Last 3 Encounters:   18 122/63   18 139/75   18 116/50     Pulse Readings from Last 3 Encounters:   18 65   18 63   18 76     Vitals:    18 1413   BP: 122/63   Pulse: 65   Temp: 97.1 °F (36.2 °C)   TempSrc: Oral   SpO2: 97%   Weight: 166 lb 12.8 oz (75.7 kg)   Height: 5' 10\" (1.778 m)   PainSc:   0 - No pain         Learning Assessment:  :     Learning Assessment 2017   PRIMARY LEARNER Patient Patient Patient Patient Patient Patient Patient   HIGHEST LEVEL OF EDUCATION - PRIMARY LEARNER  - DID NOT GRADUATE HIGH SCHOOL - - DID NOT GRADUATE HIGH SCHOOL - DID NOT GRADUATE [facility] LEARNER - - - - - - HEARING   CO-LEARNER CAREGIVER - - - - - - No   PRIMARY LANGUAGE ENGLISH ENGLISH ENGLISH ENGLISH ENGLISH ENGLISH ENGLISH    NEED - - - - - - No   LEARNER PREFERENCE PRIMARY DEMONSTRATION DEMONSTRATION DEMONSTRATION DEMONSTRATION LISTENING DEMONSTRATION READING   LEARNING SPECIAL TOPICS - - - - - - no   ANSWERED BY patient patient patient patient patient patient patient   RELATIONSHIP SELF SELF SELF SELF SELF SELF SELF       Depression Screening:  :     PHQ over the last two weeks 3/26/2018   PHQ Not Done -   Little interest or pleasure in doing things Not at all   Feeling down, depressed or hopeless Several days   Total Score PHQ 2 1   Trouble falling or staying asleep, or sleeping too much -   Feeling tired or having little energy -   Poor appetite or overeating -   Feeling bad about yourself - or that you are a failure or have let yourself or your family down -   Trouble concentrating on things such as school, work, reading or watching TV -   Moving or speaking so slowly that other people could have noticed; or the opposite being so fidgety that others notice -   Thoughts of being better off dead, or hurting yourself in some way -   PHQ 9 Score -   How difficult have these problems made it for you to do your work, take care of your home and get along with others -       Fall Risk Assessment:  :     Fall Risk Assessment, last 12 mths 3/26/2018   Able to walk? No   Fall in past 12 months? -   Fall with injury? -   Number of falls in past 12 months -   Fall Risk Score -       Abuse Screening:  :     Abuse Screening Questionnaire 3/19/2015   Do you ever feel afraid of your partner? N   Are you in a relationship with someone who physically or mentally threatens you? N   Is it safe for you to go home?  Y       ADL Screening:  :     ADL Assessment 6/29/2017   Feeding yourself No Help Needed   Getting from bed to chair No Help Needed   Getting dressed No Help Needed   Bathing or showering No Help Needed   Walk across the room (includes cane/walker) No Help Needed   Using the telphone No Help Needed   Taking your medications No Help Needed   Preparing meals No Help Needed   Managing money (expenses/bills) No Help Needed   Moderately strenuous housework (laundry) No Help Needed   Shopping for personal items (toiletries/medicines) No Help Needed   Shopping for groceries No Help Needed   Driving No Help Needed   Climbing a flight of stairs No Help Needed   Getting to places beyond walking distances No Help Needed               Patient is accompanied by self I have received verbal consent from Kalyani Hirsch to discuss any/all medical information while they are present in the room. Medication reconciliation up to date and corrected with patient at this time.

## 2018-03-26 NOTE — PERIOP NOTES
[facility]  Ambulatory Surgery Unit  Pre-operative Instructions    Procedure Date  3/27            Tentative Arrival Time 2390      1. On the day of your procedure, please report to the Ambulatory Surgery Unit Registration Desk and sign in at your designated time. The Ambulatory Surgery Unit is located in [facility] on the [facility] side of the hospitals across from the [address] [place] [address]. Please have all of your health insurance cards and a photo ID. 2. You must have someone with you to drive you home as directed by your surgeon. 3. You may have a light breakfast and take normal morning medications. 4. We recommend you do not drink any alcoholic beverages for 24 hours before and after your procedure. 5. Contact your surgeons office for instructions on the following medications: non-steroidal anti-inflammatory drugs (i.e. Advil, Aleve), vitamins, and supplements. (Some surgeons will want you to stop these medications prior to surgery and others may allow you to take them)   **If you are currently taking Plavix, Coumadin, Aspirin and/or other blood-thinning agents, contact your surgeon for instructions. ** Your surgeon will partner with the physician prescribing these medications to determine if it is safe to stop or if you need to continue taking. Please do not stop taking these medications without instructions from your surgeon. 6. In an effort to help prevent surgical site infection, we ask that you shower with an anti-bacterial soap (i.e. Dial or Safeguard) on the morning of your procedure. Do not apply any lotions, powders, or deodorants after showering. 7. Wear comfortable clothes. Wear glasses instead of contacts. Do not bring any jewelry or money (other than copays or fees as instructed). Do not wear make-up, particularly mascara, the morning of your procedure. Wear your hair loose or down, no ponytails, buns, louise pins or clips.  All body piercings must be removed. 8. You should understand that if you do not follow these instructions your procedure may be cancelled. If your physical condition changes (i.e. fever, cold or flu) please contact your surgeon as soon as possible. 9. It is important that you be on time. If a situation occurs where you may be late, or if you have any questions or problems, please call [phone].    10. Your procedure time may be subject to change. You will receive a phone call the day prior to confirm your arrival time. I understand a pre-operative phone call will be made to verify my procedure time. In the event that I am not available, I give permission for a message to be left on my answering service and/or with another person?       yes    Reviewed by phone with wife, verbalized understanding.     ___________________      ___________________      ___________________  (Signature of Patient)          (Witness)                   (Date and Time)

## 2018-03-26 NOTE — PROGRESS NOTES
Suzy Zayas is a 67 y.o. male who presents to the office today with the following:  Chief Complaint   Patient presents with    Dizziness     patient was not able to walk into the exam room/unstable       No Known Allergies    Current Outpatient Prescriptions   Medication Sig    lisinopril (PRINIVIL, ZESTRIL) 5 mg tablet Take 5 mg by mouth daily.  gabapentin (NEURONTIN) 300 mg capsule Take 1 Cap by mouth three (3) times daily.  memantine (NAMENDA) 10 mg tablet Take 1 tab twice a day    atorvastatin (LIPITOR) 40 mg tablet TAKE 1 TABLET BY MOUTH EVERY DAY    ELIQUIS 5 mg tablet TAKE 1 TABLET BY MOUTH EVERY 12 HOURS    metoprolol succinate (TOPROL-XL) 25 mg XL tablet Take 1 Tab by mouth daily. Indications: hypertension    sertraline (ZOLOFT) 100 mg tablet Take 1 Tab by mouth daily.  polyethylene glycol (MIRALAX) 17 gram/dose powder Take 17 g by mouth daily.  nitroglycerin (NITROSTAT) 0.4 mg SL tablet 1 Tab by SubLINGual route every five (5) minutes as needed for Chest Pain (call 911 if not relieved by 3).  HYDROcodone-acetaminophen (NORCO) 5-325 mg per tablet Take 1 Tab by mouth every six (6) hours as needed for Pain. Max Daily Amount: 4 Tabs.  bisacodyl (DULCOLAX, BISACODYL,) 5 mg EC tablet Take 5 mg by mouth daily as needed for Constipation.  fluticasone (FLONASE) 50 mcg/actuation nasal spray 2 Sprays by Both Nostrils route daily.  Incontinence Pad, Liner, Disp (BLADDER CONTROL PADS EX ABSORB) pads 1 Packet by Does Not Apply route as needed (incontinence). No current facility-administered medications for this visit.         Past Medical History:   Diagnosis Date    AICD (automatic cardioverter/defibrillator) present 5/13/2016 5/13/16 [employer] upgrade to dual chamber AICD implant  Dr. Steve Hughes state, other     Arrhythmia     'S IN THE PAST    Arthritis     OSTEO ARTHRITIS    AVNRT (AV bridgette re-entry tachycardia) ([facility] [address] 5/12/2016 5/12/16 AVNRT ablation: PM/AICD left upper chest :Dr. Fabio Loja Back ache     CAD (coronary artery disease)     Cancer [facility]) 2004    Prostate cancer    Chest tightness     last episode of chest pain 5/2016 before AICD placed; none since then    Coagulation defects 2005    FACTOR V LEIDEN    Dementia     Dizziness     FH: factor V Leiden deficiency     Generalized muscle ache     GERD (gastroesophageal reflux disease)     HEARTBURN    Heart failure ([facility] [address] 2014    EF 40%; Dr. Rosalee Stock    Hyperlipidemia, mixed     Hypertension     COREG    Other ill-defined conditions(799.89) 2004    blood transfusion    Pacemaker     Pacemaker     Postsurgical aortocoronary bypass status 05/29/2012    CABG    Presence of cardiac defibrillator 05/2016    left upper chest    Psychiatric disorder     depression    Stroke [facility]) 2011, 1990's    SEVERAL-mini    Thromboembolism ([facility] [address] 2014    left leg: changed to Eliquis from Warfarin    Thromboembolus ([facility] [address] 2005    left leg    Weakness generalized        Past Surgical History:   Procedure Laterality Date    CARDIAC CATHETERIZATION  3/4/2011         CARDIAC SURG PROCEDURE UNLIST      CABG X1 3/5/11    HX HERNIA REPAIR  2002    Ingiunal hernia repair left    HX ORTHOPAEDIC      LEFT KNEE CARTILAGE REPAIR;RIGHT ROTATOR CUFF REPAIR    HX ORTHOPAEDIC  2/14/12    C5-7 ANTERIIOR CERVICAL DISECTOMY AND FUSION    HX ORTHOPAEDIC  6/4/13    C5-C7 POSTERIOR DECOMPRESSION AND FUSION    HX OTHER SURGICAL      steroid injections    HX PACEMAKER PLACEMENT      H[place] PR[place]CT[place]  2004     [place]    HX UROLOGICAL       urinary control system    HX UROLOGICAL  12/3/13    REPLACEMENT ARTIFICAL URINARY SPHINCTER       History   Smoking Status    Former Smoker    Types: Pipe, Cigars    Quit date: 10/10/1971   Smokeless Tobacco    Never Used       Family History   Problem Relation Age of Onset    Asthma Mother     Alcohol abuse Mother     Alcohol abuse Father     Asthma Father     Cancer Sister     Heart Disease Sister     Alcohol abuse Brother     Cancer Brother     Heart Disease Brother          History of Present Illness:  Patient here with his wife for evaluation of dizziness    According to his wife patient has been feeling a bit dizzy and unsteady on his feet over the last month. Symptoms significantly worse over the last 2 days. Patient states when he tries to walk he feels like he is \"drunk\". He wobbles from side to side front to back. He has not fallen. On one occasion when he sat up this morning the room seemed to spin but otherwise he is not having any nystagmus type symptoms. No associated palpitations or cardiac complaints. No other focal neurologic complaints. Some nasal congestion but no significant ENT complaints. \    Patient was seen by neurology last week. He saw them for evaluation of memory loss with some behavioral changes. He did not mention any symptoms of dizziness or gait unsteadiness to them. they felt that he did have an Alzheimer's type dementia and wanted him to continue with the [address] Neurology also felt many of his symptoms had to do with his severe spinal stenosis. They did recommend he increase his Neurontin from 100 mg to 300 mg daily and did warn him of potential side effects of dizziness or gait unsteadiness. Patient's wife states they have not made that increase yet. Neurology was under the impression that he had a CAT scan of his head one week ago that was normal.  He did not. Last CT of the head was over a year ago. He did have some headaches last month but none currently. .  Blood pressures have been normal during the last month and are normal today    Patient was seen in this office by Justin Gonzalez end of last week with shoulder pain and given a prescription for hydrocodone. There seems to be some confusion on whether his been taking it. His wife does the medicine box but she states she let him handle the hydrocodone.   He states he has not been taking it but again there are some memory deficits here. He also used to be on Klonopin but states he is not taking that currently and his wife confirms    He does have A. fib. He is compliant with his Eliquis          Review of Systems:      Review of systems negative except as noted above    Physical Exam:  Visit Vitals    /63 (BP 1 Location: Left arm, BP Patient Position: Sitting)    Pulse 65    Temp 97.1 °F (36.2 °C) (Oral)    Ht 5' 10\" (1.778 m)    Wt 166 lb 12.8 oz (75.7 kg)    SpO2 97%    BMI 23.93 kg/m2     Vitals:    03/26/18 1413   BP: 122/63   BP 1 Location: Left arm   BP Patient Position: Sitting   Pulse: 65   Temp: 97.1 °F (36.2 °C)   TempSrc: Oral   SpO2: 97%   Weight: 166 lb 12.8 oz (75.7 kg)   Height: 5' 10\" (1.778 m)     Patient no acute distress vitals as above. Blood pressure was normal today. Patient was seated in a wheelchair in my office. He was alert in my office. Recent mental status exam with neurology reveals cognitive problems and dementia  My nursing staff did note that he was quite unsteady on his feet and did place him in a wheelchair  Head was normocephalic  External ears were normal.  Ear canals normal.  TMs were partially obscured with cerumen. Visualized portions were clear  Nose external nose normal.  No lesions. No significant congestion      OP Mucosa normal.  Pharynx normal.  No erythema or exudate. Structures midline  Neck no nodes no masses no bruits  Chest is clear no wheezes rhonchi or rales. Good air exchange  Cor atrial fibrillation with good rate control no murmurs  Cranial nerves II through XII are intact  Motor sensory reflexes intact upper and lower extremity. No tremors      Assessment/Plan:  1. Dizziness  Given the above history I am most suspicious that symptoms are due to side effects from medication. I cannot be completely sure he has not increased to the higher dose of Neurontin.   He is also taking hydrocodone at times with this. I have asked his wife to take over his medications entirely at this point. I am going to hold his Neurontin and hold his hydrocodone at this point. He will use Tylenol for pain. I confirmed that he should not be taking the Klonopin. He will continue his other routine medications including the lisinopril Namenda Lipitor Eliquis metoprolol Zoloft Flonase. Recent lab work within the last month was normal I am going to get a CAT scan of the head. I will see him back next week in follow-up. In the interim I encouraged him to ambulate slowly using a walker that they have at home. He is not driving. They will follow-up sooner with any increasing symptoms     \" CT HEAD W CONT; Future    2. Shoulder arthritis  Holding on hydrocodone at this point    3. Alzheimer's dementia with behavioral disturbance, unspecified timing of dementia onset  Followed by neurology. Will continue Namenda      Patient Instructions   Hold neurontin  Hold hydrocodone  CT scan  Ambulate slowly   Use walker  Follow up 1 week, to ER if worse    Tylenol if needed for pain        Continue current therapy plan except for indicated above. Verbal and written instructions (see AVS) provided.  Patient expresses understanding of diagnosis and treatment plan. Follow-up Disposition:  Return in about 1 week (around 4/2/2018). Mervat Cavanaugh.  Gurinder Garland MD

## 2018-03-26 NOTE — MR AVS SNAPSHOT
[facility] 
[phone] Patient: Tyler Luu MRN: [id number]:[id number] Visit Information Date & Time Provider Department Dept. Phone Encounter #  
 3/26/2018  2:00 PM Kristi Quinones MD [address] [facility] [id number] Follow-up Instructions Return in about 1 week (around 4/2/2018). Your Appointments 4/12/2018 10:45 AM  
PACEMAKER with PACEMAKER, [facility]) Appt Note: BSC DC ICD 3mo check, no landline [id number] [facility]  
[phone] [address] [facility]  
  
    
 6/4/2018  1:00 PM  
Medicare Physical with Ekta Ely PA-C  
[address] [facility] ([facility]) Appt Note:  $0 CP mbm  
 [address] [facility] [address] [facility] 6  
[phone]  
  
   
 [id number]  
  
    
 11/12/2018  2:00 PM  
Follow Up with Heather Ro MD  
[facility]) Appt Note: f/u memory, jrb 3/20/18  
 [address], [address] [address]  
[address], [address] [place] [address] [address] [address] Upcoming Health Maintenance Date Due FOBT Q 1 YEAR AGE 50-75 4/1/1994 ZOSTER VACCINE AGE 60> 2/1/2004 GLAUCOMA SCREENING Q2Y 4/1/2009 MEDICARE YEARLY EXAM 6/30/2018 DTaP/Tdap/Td series (2 - Td) 6/29/2027 Allergies as of 3/26/2018  Review Complete On: 3/26/2018 By: Kristi Quinones MD  
 No Known Allergies Current Immunizations  Reviewed on 7/17/2017 Name Date Influenza High Dose Vaccine PF 10/11/2017, 11/10/2016 Influenza Vaccine 9/16/2014 Influenza Vaccine Split 9/14/2011 Influenza Vaccine Whole 9/1/2010 Pneumococcal Conjugate (PCV-13) 11/10/2016 Pneumococcal Polysaccharide (PPSV-23) 11/13/2012 12:00 AM  
 Td 5/8/2012 ZZZ-RETIRED (DO NOT USE) Pneumococcal Vaccine (Unspecified Type) 9/14/2011 Not reviewed this visit You Were Diagnosed With   
  
 Codes Comments Dizziness    -  Primary ICD-10-CM: S37 ICD-9-CM: 780. 4 Shoulder arthritis     ICD-10-CM: M19.019 
ICD-9-CM: 716.91 Alzheimer's dementia with behavioral disturbance, unspecified timing of dementia onset     ICD-10-CM: G30.8, F02.81 ICD-9-CM: 331.0, 294.11 Vitals BP Pulse Temp Height(growth percentile) Weight(growth percentile) SpO2  
 122/63 (BP 1 Location: Left arm, BP Patient Position: Sitting) 65 97.1 °F (36.2 °C) (Oral) 5' 10\" (1.778 m) 166 lb 12.8 oz (75.7 kg) 97% BMI Smoking Status 23.93 kg/m2 Former Smoker Vitals History BMI and BSA Data Body Mass Index Body Surface Area  
 23.93 kg/m 2 1.93 m 2 Preferred Pharmacy Pharmacy Name Phone [facility] [address] [place] [address] Your Updated Medication List  
  
   
This list is accurate as of 3/26/18  2:48 PM.  Always use your most recent med list.  
  
  
  
  
 atorvastatin 40 mg tablet Commonly known as:  LIPITOR  
TAKE 1 TABLET BY MOUTH EVERY DAY  
  
 DULCOLAX (BISACODYL) 5 mg EC tablet Generic drug:  bisacodyl Take 5 mg by mouth daily as needed for Constipation. ELIQUIS 5 mg tablet Generic drug:  apixaban TAKE 1 TABLET BY MOUTH EVERY 12 HOURS  
  
 fluticasone 50 mcg/actuation nasal spray Commonly known as:  Lorry Sinning 2 Sprays by Both Nostrils route daily. gabapentin 300 mg capsule Commonly known as:  NEURONTIN Take 1 Cap by mouth three (3) times daily. HYDROcodone-acetaminophen 5-325 mg per tablet Commonly known as:  Elena Kai Take 1 Tab by mouth every six (6) hours as needed for Pain. Max Daily Amount: 4 Tabs. Incontinence Pad, Liner, Disp Pads Commonly known as:  BLADDER CONTROL PADS EX ABSORB  
1 Packet by Does Not Apply route as needed (incontinence). lisinopril 5 mg tablet Commonly known as:  Gideon Cleaning Take 5 mg by mouth daily. memantine 10 mg tablet Commonly known as:  Samantha Jimmie Take 1 tab twice a day  
  
 metoprolol succinate 25 mg XL tablet Commonly known as:  TOPROL-XL Take 1 Tab by mouth daily. Indications: hypertension MIRALAX 17 gram/dose powder Generic drug:  polyethylene glycol Take 17 g by mouth daily. nitroglycerin 0.4 mg SL tablet Commonly known as:  NITROSTAT  
1 Tab by SubLINGual route every five (5) minutes as needed for Chest Pain (call 911 if not relieved by 3). sertraline 100 mg tablet Commonly known as:  ZOLOFT Take 1 Tab by mouth daily. Follow-up Instructions Return in about 1 week (around 4/2/2018). To-Do List   
 03/27/2018 Imaging:  CT HEAD W CONT Patient Instructions Hold neurontin Hold hydrocodone CT scan Ambulate slowly Use walker Follow up 1 week, to ER if worse Tylenol if needed for pain Introducing [facility]! Dear Julio Winters: Thank you for requesting a [email or web address] account. Our records indicate that you already have an active [email or web address] account. You can access your account anytime at [email or web address] Did you know that you can access your hospital and ER discharge instructions at any time in [email or web address]? You can also review all of your test results from your hospital stay or ER visit. Additional Information If you have questions, please visit the Frequently Asked Questions section of the [email or web address] website at [email or web address] Remember, [email or web address] is NOT to be used for urgent needs. For medical emergencies, dial 911. Now available from your iPhone and Android! Please provide this summary of care documentation to your next provider. Your primary care clinician is listed as Esme Soriano. If you have any questions after today's visit, please call [phone].

## 2018-03-27 ENCOUNTER — HOSPITAL ENCOUNTER (OUTPATIENT)
Age: 73
Setting detail: OUTPATIENT SURGERY
Discharge: HOME | End: 2018-03-27
Attending: PHYSICAL MEDICINE & REHABILITATION | Admitting: PHYSICAL MEDICINE & REHABILITATION
Payer: MEDICARE

## 2018-03-27 ENCOUNTER — APPOINTMENT (OUTPATIENT)
Dept: GENERAL RADIOLOGY | Age: 73
End: 2018-03-27
Attending: PHYSICAL MEDICINE & REHABILITATION
Payer: MEDICARE

## 2018-03-27 VITALS
HEART RATE: 66 BPM | RESPIRATION RATE: 16 BRPM | BODY MASS INDEX: 23.34 KG/M2 | HEIGHT: 70 IN | OXYGEN SATURATION: 96 % | DIASTOLIC BLOOD PRESSURE: 81 MMHG | WEIGHT: 163 LBS | SYSTOLIC BLOOD PRESSURE: 153 MMHG | TEMPERATURE: 98 F

## 2018-03-27 PROCEDURE — 74011250636 HC RX REV CODE- 250/636: Performed by: PHYSICAL MEDICINE & REHABILITATION

## 2018-03-27 PROCEDURE — 72020 X-RAY EXAM OF SPINE 1 VIEW: CPT

## 2018-03-27 PROCEDURE — 76210000046 HC AMBSU PH II REC FIRST 0.5 HR: Performed by: PHYSICAL MEDICINE & REHABILITATION

## 2018-03-27 PROCEDURE — 74011000250 HC RX REV CODE- 250: Performed by: PHYSICAL MEDICINE & REHABILITATION

## 2018-03-27 PROCEDURE — 77030003665 HC NDL SPN BBMI -A: Performed by: PHYSICAL MEDICINE & REHABILITATION

## 2018-03-27 PROCEDURE — 76000 FLUOROSCOPY <1 HR PHYS/QHP: CPT

## 2018-03-27 PROCEDURE — 76030000002 HC AMB SURG OR TIME FIRST 0.: Performed by: PHYSICAL MEDICINE & REHABILITATION

## 2018-03-27 RX ORDER — LIDOCAINE HYDROCHLORIDE 20 MG/ML
5 INJECTION, SOLUTION INFILTRATION; PERINEURAL ONCE
Status: COMPLETED | OUTPATIENT
Start: 2018-03-27 | End: 2018-03-27

## 2018-03-27 RX ORDER — BUPIVACAINE HYDROCHLORIDE 5 MG/ML
5 INJECTION, SOLUTION EPIDURAL; INTRACAUDAL ONCE
Status: COMPLETED | OUTPATIENT
Start: 2018-03-27 | End: 2018-03-27

## 2018-03-27 RX ORDER — METHYLPREDNISOLONE ACETATE 40 MG/ML
40 INJECTION, SUSPENSION INTRA-ARTICULAR; INTRALESIONAL; INTRAMUSCULAR; SOFT TISSUE ONCE
Status: COMPLETED | OUTPATIENT
Start: 2018-03-27 | End: 2018-03-27

## 2018-03-27 NOTE — OP NOTES
Facet Steroid Injection Operative Report    Indications: This is a 67 y.o. male who presents with LBP. He was positive for LS DJD. The patient was admitted for surgery as conservative measures have failed. Date of Surgery: 3/27/2018    Preoperative Diagnosis: LS DJD    Postoperative Diagnosis: LS DJD    Surgeon(s) and Role:     * Roxana Leung MD - Primary     Procedure:  Procedure(s):  BILATERAL L4-5 + L5-S1 FACET INJECTION    Procedure in Detail:  After appropriate informed consent was obtained, the patient was taken to the operating suite and placed in the prone position on the operating table on appropriate padding. The LS region was prepped and draped in the usual sterile fashion. Intraoperative fluoroscopy was used to localize the LS spine. The skin was infiltrated with 2% lidocaine. An 22-g needle was advanced into the [place] L4-5 and L5-S1 facets under fluoroscopic guidance. Next, 2ml of 0.5% marcaine and 80mg of Depo-Medrol were injected. The needle was removed from the patient. The patient was then turned back into the supine position on the stretcher and was taken to the Recovery Room in stable condition.     Estimated Blood Loss:  none     Specimens: None       Drains: None          Complications:  None    Signed By: Roxana Leung MD                        March 27, 2018

## 2018-03-27 NOTE — IP AVS SNAPSHOT
[address] [facility] [address] [facility] 83. [phone] Patient: Evelyn Son MRN: [id number] About your hospitalization You were admitted on:  March 27, 2018 You last received care in the:  [facility] You were discharged on:  March 27, 2018 Why you were hospitalized Your primary diagnosis was:  Not on File Follow-up Information Follow up With Details Comments Contact Info Harrison Muhammad MD   [address] [facility] 
[phone] Your Scheduled Appointments Tuesday March 27, 2018 FACET JOINT BLOCK/INJECTION with Viola Frank MD  
[facility] AMB SURGERY UNIT (RI OR PRE ASSESSMENT) [address] [facility] [address]. [phone] Monday April 02, 2018  1:20 PM EDT Any with Harrison Muhammad MD  
[address] [place] [address]) [facility] [address]  
[phone] Thursday April 12, 2018 10:45 AM EDT  
PACEMAKER with PACEMAKER, [facility] [facility]) [address]. [phone] Discharge Orders None A check linda indicates which time of day the medication should be taken. My Medications ASK your doctor about these medications Instructions Each Dose to Equal  
 Morning Noon Evening Bedtime  
 atorvastatin 40 mg tablet Commonly known as:  LIPITOR Your last dose was: Your next dose is: TAKE 1 TABLET BY MOUTH EVERY DAY  
     
   
   
   
  
 DULCOLAX (BISACODYL) 5 mg EC tablet Generic drug:  bisacodyl Your last dose was: Your next dose is: Take 5 mg by mouth daily as needed for Constipation. 5 mg ELIQUIS 5 mg tablet Generic drug:  apixaban Your last dose was: Your next dose is: TAKE 1 TABLET BY MOUTH EVERY 12 HOURS  
     
   
   
   
  
 fluticasone 50 mcg/actuation nasal spray Commonly known as:  Richar Golden Your last dose was: Your next dose is: 2 Sprays by Both Nostrils route daily. 2 Spray  
    
   
   
   
  
 gabapentin 300 mg capsule Commonly known as:  NEURONTIN Your last dose was: Your next dose is: Take 1 Cap by mouth three (3) times daily. 300 mg HYDROcodone-acetaminophen 5-325 mg per tablet Commonly known as:  Archie Wise Your last dose was: Your next dose is: Take 1 Tab by mouth every six (6) hours as needed for Pain. Max Daily Amount: 4 Tabs. 1 Tab Incontinence Pad, Liner, Disp Pads Commonly known as:  BLADDER CONTROL PADS EX ABSORB Your last dose was: Your next dose is:    
   
   
 1 Packet by Does Not Apply route as needed (incontinence). 1 Packet  
    
   
   
   
  
 lisinopril 5 mg tablet Commonly known as:  Scottie King Your last dose was: Your next dose is: Take 5 mg by mouth daily. 5 mg  
    
   
   
   
  
 memantine 10 mg tablet Commonly known as:  Norva Koyanagi Your last dose was: Your next dose is: Take 1 tab twice a day  
     
   
   
   
  
 metoprolol succinate 25 mg XL tablet Commonly known as:  TOPROL-XL Your last dose was: Your next dose is: Take 1 Tab by mouth daily. Indications: hypertension 25 mg MIRALAX 17 gram/dose powder Generic drug:  polyethylene glycol Your last dose was: Your next dose is: Take 17 g by mouth daily. 17 g  
    
   
   
   
  
 nitroglycerin 0.4 mg SL tablet Commonly known as:  NITROSTAT Your last dose was:     
   
Your next dose is:    
   
   
 1 Tab by SubLINGual route every five (5) minutes as needed for Chest Pain (call 911 if not relieved by 3). 0.4 mg  
    
   
   
   
  
 sertraline 100 mg tablet Commonly known as:  ZOLOFT Your last dose was: Your next dose is: Take 1 Tab by mouth daily. 100 mg Opioid Education Prescription Opioids: What You Need to Know: 
 
Prescription opioids can be used to help relieve moderate-to-severe pain and are often prescribed following a surgery or injury, or for certain health conditions. These medications can be an important part of treatment but also come with serious risks. Opioids are strong pain medicines. Examples include hydrocodone, oxycodone, fentanyl, and morphine. Heroin is an example of an illegal opioid. It is important to work with your health care provider to make sure you are getting the safest, most effective care. WHAT ARE THE RISKS AND SIDE EFFECTS OF OPIOID USE? Prescription opioids carry serious risks of addiction and overdose, especially with prolonged use. An opioid overdose, often marked by slow breathing, can cause sudden death. The use of prescription opioids can have a number of side effects as well, even when taken as directed. · Tolerance-meaning you might need to take more of a medication for the same pain relief · Physical dependence-meaning you have symptoms of withdrawal when the medication is stopped. Withdrawal symptoms can include nausea, sweating, chills, diarrhea, stomach cramps, and muscle aches. Withdrawal can last up to several weeks, depending on which drug you took and how long you took it. · Increased sensitivity to pain · Constipation · Nausea, vomiting, and dry mouth · Sleepiness and dizziness · Confusion · Depression · Low levels of testosterone that can result in lower sex drive, energy, and strength · Itching and sweating RISKS ARE GREATER WITH:      
· History of drug misuse, substance use disorder, or overdose · Mental health conditions (such as depression or anxiety) · Sleep apnea · Older age (72 years or older) · Pregnancy Avoid alcohol while taking prescription opioids. Also, unless specifically advised by your health care provider, medications to avoid include: · Benzodiazepines (such as Xanax or Valium) · Muscle relaxants (such as Soma or Flexeril) · Hypnotics (such as Ambien or Lunesta) · Other prescription opioids KNOW YOUR OPTIONS Talk to your health care provider about ways to manage your pain that don't involve prescription opioids. Some of these options may actually work better and have fewer risks and side effects. Options may include: 
· Pain relievers such as acetaminophen, ibuprofen, and naproxen · Some medications that are also used for depression or seizures · Physical therapy and exercise · Counseling to help patients learn how to cope better with triggers of pain and stress. · Application of heat or cold compress · Massage therapy · Relaxation techniques Be Informed Make sure you know the name of your medication, how much and how often to take it, and its potential risks & side effects. IF YOU ARE PRESCRIBED OPIOIDS FOR PAIN: 
· Never take opioids in greater amounts or more often than prescribed. Remember the goal is not to be pain-free but to manage your pain at a tolerable level. · Follow up with your primary care provider to: · Work together to create a plan on how to manage your pain. · Talk about ways to help manage your pain that don't involve prescription opioids. · Talk about any and all concerns and side effects. · Help prevent misuse and abuse. · Never sell or share prescription opioids · Help prevent misuse and abuse. · Store prescription opioids in a secure place and out of reach of others (this may include visitors, children, friends, and family).  
· Safely dispose of unused/unwanted prescription opioids: Find your community drug take-back program or your pharmacy mail-back program, or flush them down the toilet, following guidance from the Food and Drug Administration (www.fda.gov/Drugs/ResourcesForYou). · Visit www.cdc.gov/drugoverdose to learn about the risks of opioid abuse and overdose. · If you believe you may be struggling with addiction, tell your health care provider and ask for guidance or call Zarina true[x[employer] at 9-995-565-GQAA. Discharge Instructions Discharge Instructions Lumbar Facet Block/Medial Branch Block You had a lumbar facet injection today. You will probably have some numbness in your lower back area for the next 6-8 hours. The steroids will slowly become effective, reducing your pain, over the next 2 weeks. You should begin feeling better after a few days, but it may take up to 2 weeks to notice the difference. The benefit you get from your injection will last a variable amount of time, depending on the severity of your spine problem. If the results you experience are significant, but not lasting a long time, you may be a candidate for a procedure that can be longer lasting (radiofrequency ablation of the nerves innervating the facet joints). ? Pain:  Most people do not have any increase in pain after this injection. However, you might experience some soreness at the site of the injection. If this happens, putting an icepack over the sore area will help. ? Bandage: You have a small bandage covering the site of the injection. You may remove it when you get home. ? Restrictions:  Someone should drive you home after the injection. After that, you have no restrictions. You may resume your normal level of activity. You may take a shower or bath, and you may eat normally. You should continue your current exercised and/or therapy routine. ? Medications:  Continue your current medications as prescribed. If your pain decreases, you may reduce the amount of your pain medicines. If you stopped taking anticoagulants or blood-thinners before the injection, start them tomorrow. If you have diabetes, your blood sugar may be elevated for a few days. Call your primary doctor with any questions. Call Dr. Manan Treadwell at [phone] if you experience: ? Fever (101 degrees Fahrenheit or greater) ? Nausea or vomiting 
? Headache unrelieved by your normal pain medicine ? Redness or swelling at the injection site that lasts more than 1 day ? New numbness, tingling, weakness, or pain that you didnt have before the injection If still having pain in 1-2 weeks, call office at [phone] for a follow up appointment. DISCHARGE SUMMARY from Nurse The following personal items collected during your admission are returned to you:  
Dental Appliance:   
Vision:   
Hearing Aid:   
Jewelry:   
Clothing:   
Other Valuables:   
Valuables sent to safe: If you were given prescriptions, please review the written information on prescribed medications. · You will receive a Post Operative Call from one of the Recovery Room Nurses on the day after your surgery to check on you. It is very important for us to know how you are recovering after your surgery. · You may receive an e-mail or letter in the mail from [employer] regarding your experience with us in the Ambulatory Surgery Unit. Your feedback is valuable to us and we appreciate your participation in the survey. If you have not had your influenza or pneumococcal vaccines, please follow up with your primary care physician. The discharge information has been reviewed with the patient. The patient verbalized understanding. ACO Transitions of Care Introducing [employer] [address] Tanya Reilly offers a voluntary care coordination program to provide high quality service and care to [facility] fee-for-service beneficiaries. Lia Mccall was designed to help you enhance your health and well-being through the following services: ? Transitions of Care  support for individuals who are transitioning from one care setting to another (example: Hospital to home). ? Chronic and Complex Care Coordination  support for individuals and caregivers of those with serious or chronic illnesses or with more than one chronic (ongoing) condition and those who take a number of different medications. If you meet specific medical criteria, a [address] [place] [address] Rd may call you directly to coordinate your care with your primary care physician and your other care providers. For questions about the [facility] programs, please, contact your physicians office. For general questions or additional information about Accountable Care Organizations: 
Please visit www.medicare.gov/acos. html or call 1-800-MEDICARE (5-614.741.5301) TTY users should call 0-591.663.3600. Introducing \[facility] [place] [facility]\"" & HEALTH SERVICES! Dear Mmoo Cornejo: Thank you for requesting a [employer] account. Our records indicate that you already have an active [employer] account. You can access your account anytime at [email or web address] Did you know that you can access your hospital and ER discharge instructions at any time in [employer]? You can also review all of your test results from your hospital stay or ER visit. Additional Information If you have questions, please visit the Frequently Asked Questions section of the [employer] website at [email or web address] Remember, [employer] is NOT to be used for urgent needs. For medical emergencies, dial 911. Now available from your iPhone and Android! Introducing Julian Kasper As a Stacey Roblero patient, I wanted to make you aware of our electronic visit tool called Julian FinchInPronto. Stacey Roblero [employer]Matcher[employer] allows you to connect within minutes with a medical provider 24 hours a day, seven days a week via a mobile device or tablet or logging into a secure website from your computer. You can access Julian Finchfin from anywhere in the [place]. A virtual visit might be right for you when you have a simple condition and feel like you just dont want to get out of bed, or cant get away from work for an appointment, when your regular [facility] provider is not available (evenings, weekends or holidays), or when youre out of town and need minor care. Electronic visits cost only $49 and if the [facility] PadMatcher/7 provider determines a prescription is needed to treat your condition, one can be electronically transmitted to a nearby pharmacy*. Please take a moment to enroll today if you have not already done so. The enrollment process is free and takes just a few minutes. To enroll, please download the [employer] vernell to your tablet or phone, or visit [email or web address] to enroll on your computer. And, as an [address] [place] [address] patient with a [facility] account, the results of your visits will be scanned into your electronic medical record and your primary care provider will be able to view the scanned results. We urge you to continue to see your regular [facility] provider for your ongoing medical care. And while your primary care provider may not be the one available when you seek a Julian Kasper virtual visit, the peace of mind you get from getting a real diagnosis real time can be priceless. For more information on Julian Goldbergnimafin, view our Frequently Asked Questions (FAQs) at [email or web address]. Sincerely, 
 
Jonn Engle MD 
Chief Medical Officer Nicky Reilly *:  certain medications cannot be prescribed via Julian Kasper Providers Seen During Your Hospitalization Provider Specialty Primary office phone Julian Parnell MD Physical Medicine and Rehab [phone] Your Primary Care Physician (PCP) Primary Care Physician Office Phone Office Fax Herlinda Bryson [phone] You are allergic to the following No active allergies Recent Documentation Height Weight BMI Smoking Status 1.778 m 76.7 kg 24.25 kg/m2 Former Smoker Emergency Contacts Name Discharge Info Relation Home Work Mobile [address] CAREGIVER [3] Spouse [3] [phone] Patient Belongings The following personal items are in your possession at time of discharge: 
                             
 
  
  
 Please provide this summary of care documentation to your next provider. Signatures-by signing, you are acknowledging that this After Visit Summary has been reviewed with you and you have received a copy. Patient Signature:  ____________________________________________________________ Date:  ____________________________________________________________  
  
Cong Yen Provider Signature:  ____________________________________________________________ Date:  ____________________________________________________________

## 2018-03-27 NOTE — PERIOP NOTES
Genaro Sneed  1945  [id number]    Situation:  Verbal report given from: SESAR Polanco RN  Procedure: Procedure(s):  BILATERAL L4-5 + L5-S1 FACET INJECTION    Background:    Preoperative diagnosis: LUMBOSACRAL DEGENERATIVE JOINT DISEASE    Postoperative diagnosis: LUMBOSACRAL DEGENERATIVE JOINT DISEASE    :  Dr. Dunham [facility]:  Intra-procedure medications, procedure, and allergies reviewed        Recommendation:    Discharge patient home after discharge instructions reviewed with patient. Rest until local has worn off.

## 2018-03-27 NOTE — DISCHARGE INSTRUCTIONS
Discharge Instructions    Lumbar Facet Block/Medial Branch Block    You had a lumbar facet injection today. You will probably have some numbness in your lower back area for the next 6-8 hours. The steroids will slowly become effective, reducing your pain, over the next 2 weeks. You should begin feeling better after a few days, but it may take up to 2 weeks to notice the difference. The benefit you get from your injection will last a variable amount of time, depending on the severity of your spine problem. If the results you experience are significant, but not lasting a long time, you may be a candidate for a procedure that can be longer lasting (radiofrequency ablation of the nerves innervating the facet joints).  Pain:  Most people do not have any increase in pain after this injection. However, you might experience some soreness at the site of the injection. If this happens, putting an icepack over the sore area will help.  Bandage: You have a small bandage covering the site of the injection. You may remove it when you get home.  Restrictions:  Someone should drive you home after the injection. After that, you have no restrictions. You may resume your normal level of activity. You may take a shower or bath, and you may eat normally. You should continue your current exercised and/or therapy routine.  Medications:  Continue your current medications as prescribed. If your pain decreases, you may reduce the amount of your pain medicines. If you stopped taking anticoagulants or blood-thinners before the injection, start them tomorrow. If you have diabetes, your blood sugar may be elevated for a few days. Call your primary doctor with any questions.     Call Dr. Alfredo Marcos at [phone] if you experience:   Fever (101 degrees Fahrenheit or greater)   Nausea or vomiting   Headache unrelieved by your normal pain medicine   Redness or swelling at the injection site that lasts more than 1 day   New numbness, tingling, weakness, or pain that you didnt have before the injection     If still having pain in 1-2 weeks, call office at [phone] for a follow up appointment. DISCHARGE SUMMARY from Nurse    The following personal items collected during your admission are returned to you:   Dental Appliance:    Vision:    Hearing Aid:    Jewelry:    Clothing:    Other Valuables:    Valuables sent to safe: If you were given prescriptions, please review the written information on prescribed medications. · You will receive a Post Operative Call from one of the Recovery Room Nurses on the day after your surgery to check on you. It is very important for us to know how you are recovering after your surgery. · You may receive an e-mail or letter in the mail from [employer] regarding your experience with us in the Ambulatory Surgery Unit. Your feedback is valuable to us and we appreciate your participation in the survey. If you have not had your influenza or pneumococcal vaccines, please follow up with your primary care physician. The discharge information has been reviewed with the patient. The patient verbalized understanding.

## 2018-03-27 NOTE — PERIOP NOTES
Skin assessment:   WNL   Skin color: WNL   Skin condition: multiple red bruises to bilateral forearms   Skin integrity: WNL   Turgor:  WNL    Neuro:  Push/Pull assessment:     LUE Response:  strong, equal bilaterally   LLE Response: push/pull strong, equal bilaterally   RUE Response:  strong, equal bilaterally   RLE Response: push/pull strong, equal bilaterally

## 2018-03-27 NOTE — H&P
Procedural Case Note    3/27/2018    (2:07 PM)    Za Steve    1945   (67 y.o.)    [id number]    CC:  pain    ROS:   Complete ROS obtained, no CP, no SOB, no N or V    PMH:     Past Medical History:   Diagnosis Date    AICD (automatic cardioverter/defibrillator) present 5/13/2016 5/13/16 [employer] upgrade to dual chamber AICD implant  Dr. Alfie Carrizales state, other     Arrhythmia     'S IN THE PAST    Arthritis     OSTEO ARTHRITIS    AVNRT (AV bridgette re-entry tachycardia) ([facility] 5/12/2016 5/12/16 AVNRT ablation: PM/AICD left upper chest :Dr. Virginia Mackenzie Back ache     CAD (coronary artery disease)     Cancer [facility]) 2004    Prostate cancer    Chest tightness     last episode of chest pain 5/2016 before AICD placed; none since then    Coagulation defects 2005    FACTOR V LEIDEN    Dementia     Dizziness     FH: factor V Leiden deficiency     Generalized muscle ache     GERD (gastroesophageal reflux disease)     HEARTBURN    Heart failure ([facility] 2014    EF 40%; Dr. Edgardo Larios    Hyperlipidemia, mixed     Hypertension     COREG    Other ill-defined conditions(799.89) 2004    blood transfusion    Pacemaker     Pacemaker     Postsurgical aortocoronary bypass status 05/29/2012    CABG    Presence of cardiac defibrillator 05/2016    left upper chest    Psychiatric disorder     depression    Stroke [facility]) 2011, 1990's    SEVERAL-mini    Thromboembolism ([facility] 2014    left leg: changed to Eliquis from Warfarin    Thromboembolus ([facility] 2005    left leg    Weakness generalized        ALLERGIES:   No Known Allergies    MEDS:     No current facility-administered medications for this encounter.            Visit Vitals    BP (!) 160/92 (BP 1 Location: Right arm, BP Patient Position: At rest)    Pulse 60    Temp 98 °F (36.7 °C)    Resp 18    Ht 5' 10\" (1.778 m)    Wt 73.9 kg (163 lb)    SpO2 98%    BMI 23.39 kg/m2     PE:  Gen: NAD  Head: normocephalic  Heart: RRR  Lungs: CTA angeline  Abd: NT, ND, soft  Neuro: awake and alert  Skin: intact    IMPRESSION:   DDD/DJD    Note:  The clinical status was discussed in detail with the patient. The procedure was again discussed and described in detail. All understand and accept the planned procedure and risks; reject other forms of treatment. All questions are answered.     Maureen Martinez MD

## 2018-03-28 ENCOUNTER — DOCUMENTATION ONLY (OUTPATIENT)
Dept: FAMILY MEDICINE CLINIC | Age: 73
End: 2018-03-28

## 2018-03-30 ENCOUNTER — TELEPHONE (OUTPATIENT)
Dept: FAMILY MEDICINE CLINIC | Age: 73
End: 2018-03-30

## 2018-03-30 RX ORDER — LISINOPRIL 5 MG/1
TABLET ORAL
Qty: 90 TAB | Refills: 3 | Status: SHIPPED | OUTPATIENT
Start: 2018-03-30 | End: 2019-01-29 | Stop reason: SDUPTHER

## 2018-03-30 NOTE — TELEPHONE ENCOUNTER
I have called and talked to patient's wife, advised of IFEOMA Woodruff response to her question. She verbalizes understanding.

## 2018-03-30 NOTE — TELEPHONE ENCOUNTER
Pt's wife calls. She is very concerned because pt is acting very 'mean'. She also notes that all he wants to do is sleep. He has an appointment on 4/2/2018 to be seen which is this coming Monday. Please call to advise at [phone].

## 2018-03-30 NOTE — TELEPHONE ENCOUNTER
I have called and talked to the patient's wife, patient was taken off his Gabapentin and pain medicaiton on Monday 3/26/18. He has started to be rude and mean, he seems to sleep more. She does not that the dizziness is gone. Would like to know if this is normal, or does she need to be concerned.

## 2018-04-02 ENCOUNTER — OFFICE VISIT (OUTPATIENT)
Dept: FAMILY MEDICINE CLINIC | Age: 73
End: 2018-04-02

## 2018-04-02 VITALS
HEART RATE: 51 BPM | TEMPERATURE: 97.1 F | BODY MASS INDEX: 23.22 KG/M2 | SYSTOLIC BLOOD PRESSURE: 134 MMHG | HEIGHT: 70 IN | OXYGEN SATURATION: 97 % | WEIGHT: 162.2 LBS | RESPIRATION RATE: 14 BRPM | DIASTOLIC BLOOD PRESSURE: 72 MMHG

## 2018-04-02 DIAGNOSIS — G30.9 ALZHEIMER'S DEMENTIA WITH BEHAVIORAL DISTURBANCE, UNSPECIFIED TIMING OF DEMENTIA ONSET: ICD-10-CM

## 2018-04-02 DIAGNOSIS — Z95.810 AICD (AUTOMATIC CARDIOVERTER/DEFIBRILLATOR) PRESENT: ICD-10-CM

## 2018-04-02 DIAGNOSIS — I10 ESSENTIAL HYPERTENSION: ICD-10-CM

## 2018-04-02 DIAGNOSIS — M51.36 DDD (DEGENERATIVE DISC DISEASE), LUMBAR: ICD-10-CM

## 2018-04-02 DIAGNOSIS — Z85.46 HISTORY OF PROSTATE CANCER: ICD-10-CM

## 2018-04-02 DIAGNOSIS — Z79.01 CHRONIC ANTICOAGULATION: ICD-10-CM

## 2018-04-02 DIAGNOSIS — F02.81 ALZHEIMER'S DEMENTIA WITH BEHAVIORAL DISTURBANCE, UNSPECIFIED TIMING OF DEMENTIA ONSET: ICD-10-CM

## 2018-04-02 DIAGNOSIS — R42 DIZZINESS: Primary | ICD-10-CM

## 2018-04-02 DIAGNOSIS — E53.8 B12 DEFICIENCY: ICD-10-CM

## 2018-04-02 NOTE — PROGRESS NOTES
Daniel Silva is a 75 y.o. male who presents to the office today with the following:  Chief Complaint   Patient presents with    Follow-up     1 wk f/u dizziness       No Known Allergies    Current Outpatient Prescriptions   Medication Sig    lisinopril (PRINIVIL, ZESTRIL) 5 mg tablet TAKE 1 TABLET BY MOUTH DAILY FOR 30 DAYS    memantine (NAMENDA) 10 mg tablet Take 1 tab twice a day    atorvastatin (LIPITOR) 40 mg tablet TAKE 1 TABLET BY MOUTH EVERY DAY    bisacodyl (DULCOLAX, BISACODYL,) 5 mg EC tablet Take 5 mg by mouth daily as needed for Constipation.  ELIQUIS 5 mg tablet TAKE 1 TABLET BY MOUTH EVERY 12 HOURS    metoprolol succinate (TOPROL-XL) 25 mg XL tablet Take 1 Tab by mouth daily. Indications: hypertension    sertraline (ZOLOFT) 100 mg tablet Take 1 Tab by mouth daily.  polyethylene glycol (MIRALAX) 17 gram/dose powder Take 17 g by mouth daily.  Incontinence Pad, Liner, Disp (BLADDER CONTROL PADS EX ABSORB) pads 1 Packet by Does Not Apply route as needed (incontinence).  nitroglycerin (NITROSTAT) 0.4 mg SL tablet 1 Tab by SubLINGual route every five (5) minutes as needed for Chest Pain (call 911 if not relieved by 3).  fluticasone (FLONASE) 50 mcg/actuation nasal spray 2 Sprays by Both Nostrils route daily. No current facility-administered medications for this visit.         Past Medical History:   Diagnosis Date    AICD (automatic cardioverter/defibrillator) present 5/13/2016 5/13/16 [employer] upgrade to dual chamber AICD implant  Dr. Whaley Marcelo state, other     Arrhythmia     'S IN THE PAST    Arthritis     OSTEO ARTHRITIS    AVNRT (AV bridgette re-entry tachycardia) ([facility] [address] 5/12/2016 5/12/16 AVNRT ablation: PM/AICD left upper chest :Dr. Shahla Gross Back ache     CAD (coronary artery disease)     Cancer [facility]) 2004    Prostate cancer    Chest tightness     last episode of chest pain 5/2016 before AICD placed; none since then   Catrachita Kirby Coagulation defects 2005    FACTOR V LEIDEN    Dementia     Dizziness     FH: factor V Leiden deficiency     Generalized muscle ache     GERD (gastroesophageal reflux disease)     HEARTBURN    Heart failure ([facility] [address] 2014    EF 40%; Dr. Sophia Hernandez    Hyperlipidemia, mixed     Hypertension     COREG    Other ill-defined conditions(799.89) 2004    blood transfusion    Pacemaker     Pacemaker     Postsurgical aortocoronary bypass status 05/29/2012    CABG    Presence of cardiac defibrillator 05/2016    left upper chest    Psychiatric disorder     depression    Stroke [facility]) 2011, 1990's    SEVERAL-mini    Thromboembolism ([facility] [address] 2014    left leg: changed to Eliquis from Warfarin    Thromboembolus ([facility] [address] 2005    left leg    Weakness generalized        Past Surgical History:   Procedure Laterality Date    CARDIAC CATHETERIZATION  3/4/2011         CARDIAC SURG PROCEDURE UNLIST      CABG X1 3/5/11    HX HERNIA REPAIR  2002    Ingiunal hernia repair left    HX ORTHOPAEDIC      LEFT KNEE CARTILAGE REPAIR;RIGHT ROTATOR CUFF REPAIR    HX ORTHOPAEDIC  2/14/12    C5-7 ANTERIIOR CERVICAL DISECTOMY AND FUSION    HX ORTHOPAEDIC  6/4/13    C5-C7 POSTERIOR DECOMPRESSION AND FUSION    HX OTHER SURGICAL      steroid injections    HX PACEMAKER PLACEMENT      H[place] PROSTATECTOMY  2004     [place]    HX UROLOGICAL       urinary control system    HX UROLOGICAL  12/3/13    REPLACEMENT ARTIFICAL URINARY SPHINCTER       History   Smoking Status    Former Smoker    Types: Pipe, Cigars    Quit date: 10/10/1971   Smokeless Tobacco    Never Used       Family History   Problem Relation Age of Onset    Asthma Mother     Alcohol abuse Mother     Alcohol abuse Father     Asthma Father     Cancer Sister     Heart Disease Sister     Alcohol abuse Brother     Cancer Brother     Heart Disease Brother          History of Present Illness:  Patient here for follow-up from last week and for ongoing medical follow-up.   He is switching to my practice    Patient was seen last week for symptoms of dizziness and gait unsteadiness. Cranial CAT scan done since that time revealed no acute changes. It was felt secondary to recent medication changes including increasing dose of Neurontin and hydrocodone that he was taking for pain. We stopped both those medications. He feels much better at this point. He is not feeling dizzy. Gait is normal.  No significant pain. He is taking Tylenol. Patient does have chronic degenerative disc disease lumbar and cervical area. He has had surgery in the past.  He is currently getting Facet injections. He is going to continue follow-up with his orthopedist    Patient does have hypertension. Blood pressure in good control on his current regimen. Recent base metabolic profile normal.  No cardiac complaints. Patient on Eliquis. Follows with cardiology    Patient does have hyperlipidemia. Compliant with this medication. Recent lipid panel acceptable with normal LFTs March 2018. Patient has extensive cardiac history including coronary artery disease post stenting. History of cardiomyopathy and CHF. History of atrial fibrillation. History of sick sinus syndrome and PSVT with an implantable defibrillator/pacemaker. As noted he has no cardiac complaints. He keeps routine follow-up with cardiology. Has an upcoming appointment to see them. Patient does have a history of anxiety and depression. Currently on Zoloft. He states he is doing well on this. No complaints of depression. No thoughts of hurting himself. Patient does have history of some dementia. He is currently followed by neurology. They do have him on Namenda. Patient with remote history of prostate cancer. Had surgery with the prostatectomy in 2005. No prostate complaints. No recent exams. He is willing to have a PSA. Patient with a history of factor V Leiden defect. Currently on Eliquis.   No recent thromboembolic events. He has had DVT in the past.    Remote history of GERD but currently a symptomatically no medications    Patient does have history of B12 deficiency. Not currently on replacement. B12 levels ordered     we discussed routine screening for colon cancer. He declines colonoscopy or Hemoccult fit tests. Aware of the indications    Patient has had both pneumonia shots. Tetanus booster 2012. He gets flu shots. Shingles shot has been advised for his pharmacy          Review of Systems:      Review of systems negative except as noted above    Physical Exam:  Visit Vitals    /72 (BP 1 Location: Left arm, BP Patient Position: Sitting)    Pulse (!) 51    Temp 97.1 °F (36.2 °C) (Oral)    Resp 14    Ht 5' 10\" (1.778 m)    Wt 162 lb 3.2 oz (73.6 kg)    SpO2 97%    BMI 23.27 kg/m2     Vitals:    04/02/18 1314   BP: 134/72   BP 1 Location: Left arm   BP Patient Position: Sitting   Pulse: (!) 51   Resp: 14   Temp: 97.1 °F (36.2 °C)   TempSrc: Oral   SpO2: 97%   Weight: 162 lb 3.2 oz (73.6 kg)   Height: 5' 10\" (1.778 m)     Patient no acute distress vital signs stable as above  Head is normocephalic  External ears normal.  Hearing aids in place  Eyes PERRLA. EOMs full. Sclera clear. Routine eye exam recommended  Nose within normal limits. Nares  normal.  No significant congestion  OP mucosa normal, no obvious lesions. Pharynx normal.  No erythema or exudate. Structures midline. Edentulous  Neck no nodes no masses no bruits   Chest was clear no wheezes rhonchi or rales. Good air exchange  Cor regular rate and rhythm, sinus rhythm today no S3-S4 no murmurs  Abdomen no HSM no masses soft and was nontender  Extremities no edema. Nontender. Good range of motion  Gait and gross neurologic exam were normal  Gait was normal    Assessment/Plan:  1. Dizziness  Improved off Neurontin and hydrocodone. Will continue holding these medications. He will follow-up with neurology and orthopedist    2. Alzheimer's dementia with behavioral disturbance, unspecified timing of dementia onset  Followed by neurology    3. Essential hypertension  Controlled on his current regimen    4. DDD (degenerative disc disease), lumbar  Being followed by his orthopedist getting injections    5. AICD (automatic cardioverter/defibrillator) present  Followed by cardiology    6. Chronic anticoagulation  On Eliquis  - CBC WITH AUTOMATED DIFF    7. History of prostate cancer    - PROSTATE SPECIFIC AG    Overall patient seems medically stable at this point. He is going to continue his current medications. I am getting some additional screening lab work today. If all goes well I will see him back in 3 months sooner if needed    Patient Instructions   Same medications  Continue to hold on hydrocodone and Neurontin  Keep plan follow-up with neurology orthopedist cardiology and all of your other providers  Follow-up 3 months. Sooner if needed  Lab work today        Continue current therapy plan except for indicated above. Verbal and written instructions (see AVS) provided.  Patient expresses understanding of diagnosis and treatment plan. Follow-up Disposition:  Return in about 3 months (around 7/2/2018). Jil Guallpa.  Gregory Wheatley MD

## 2018-04-02 NOTE — PATIENT INSTRUCTIONS
Same medications  Continue to hold on hydrocodone and Neurontin  Keep plan follow-up with neurology orthopedist cardiology and all of your other providers  Follow-up 3 months.   Sooner if needed  Lab work today

## 2018-04-02 NOTE — MR AVS SNAPSHOT
[facility] 
[phone] Patient: Shanika Duarte MRN: [id number] LXB:[birth year] Visit Information Date & Time Provider Department Dept. Phone Encounter #  
 4/2/2018  1:20 PM Isaías Conte  [facility] [phone] [id number] Follow-up Instructions Return in about 3 months (around 7/2/2018). Your Appointments 4/12/2018 10:45 AM  
PACEMAKER with PACEMAKER, [facility] [address]) Appt Note: BSC DC ICD 3mo check, no landline [id number] [facility]  
[phone] [address] [facility]  
  
    
 6/4/2018  1:00 PM  
Medicare Physical with Rony Yu PA-C  
[address] [facility] ([address]) Appt Note:  $0 CP mbm  
 [address]  
[phone]  
  
   
 [address]  Chris [address]ala  
  
    
 11/12/2018  2:00 PM  
Follow Up with Graciela Roland MD  
[facility] [address]) Appt Note: f/u memory, jrb 3/20/18  
 [facility], 
[address] [place] [address], [address] [phone]  
[address], [address] [address] Upcoming Health Maintenance Date Due FOBT Q 1 YEAR AGE 50-75 4/1/1994 ZOSTER VACCINE AGE 60> 2/1/2004 GLAUCOMA SCREENING Q2Y 4/1/2009 MEDICARE YEARLY EXAM 6/30/2018 DTaP/Tdap/Td series (2 - Td) 6/29/2027 Allergies as of 4/2/2018  Review Complete On: 4/2/2018 By: Isaías Conte MD  
 No Known Allergies Current Immunizations  Reviewed on 7/17/2017 Name Date Influenza High Dose Vaccine PF 10/11/2017, 11/10/2016 Influenza Vaccine 9/16/2014 Influenza Vaccine Split 9/14/2011 Influenza Vaccine Whole 9/1/2010 Pneumococcal Conjugate (PCV-13) 11/10/2016 Pneumococcal Polysaccharide (PPSV-23) 11/13/2012 12:00 AM  
 Td 5/8/2012 ZZZ-RETIRED (DO NOT USE) Pneumococcal Vaccine (Unspecified Type) 9/14/2011 Not reviewed this visit You Were Diagnosed With   
  
 Codes Comments Dizziness    -  Primary ICD-10-CM: F28 ICD-9-CM: 780.4 Alzheimer's dementia with behavioral disturbance, unspecified timing of dementia onset     ICD-10-CM: G30.9, F02.81 ICD-9-CM: 331.0, 294.11 Essential hypertension     ICD-10-CM: I10 
ICD-9-CM: 401.9 DDD (degenerative disc disease), lumbar     ICD-10-CM: M51.36 
ICD-9-CM: 722.52   
 AICD (automatic cardioverter/defibrillator) present     ICD-10-CM: Z95.810 ICD-9-CM: V45.02 Chronic anticoagulation     ICD-10-CM: Z79.01 
ICD-9-CM: V58.61 History of prostate cancer     ICD-10-CM: Z85.46 
ICD-9-CM: V10.46   
 B12 deficiency     ICD-10-CM: E53.8 ICD-9-CM: 266.2 Vitals BP Pulse Temp Resp Height(growth percentile) Weight(growth percentile) 134/72 (BP 1 Location: Left arm, BP Patient Position: Sitting) (!) 51 97.1 °F (36.2 °C) (Oral) 14 5' 10\" (1.778 m) 162 lb 3.2 oz (73.6 kg) SpO2 BMI Smoking Status 97% 23.27 kg/m2 Former Smoker BMI and BSA Data Body Mass Index Body Surface Area  
 23.27 kg/m 2 1.91 m 2 Preferred Pharmacy Pharmacy Name Phone [facility] [address] [place] [address], [address] [place] [address] Your Updated Medication List  
  
   
This list is accurate as of 4/2/18  1:38 PM.  Always use your most recent med list.  
  
  
  
  
 atorvastatin 40 mg tablet Commonly known as:  LIPITOR  
TAKE 1 TABLET BY MOUTH EVERY DAY  
  
 DULCOLAX (BISACODYL) 5 mg EC tablet Generic drug:  bisacodyl Take 5 mg by mouth daily as needed for Constipation. ELIQUIS 5 mg tablet Generic drug:  apixaban TAKE 1 TABLET BY MOUTH EVERY 12 HOURS  
  
 fluticasone 50 mcg/actuation nasal spray Commonly known as:  Nila Jarvis 2 Sprays by Both Nostrils route daily. Incontinence Pad, Liner, Disp Pads Commonly known as:  BLADDER CONTROL PADS EX ABSORB  
1 Packet by Does Not Apply route as needed (incontinence). lisinopril 5 mg tablet Commonly known as:  PRINIVIL, ZESTRIL  
TAKE 1 TABLET BY MOUTH DAILY FOR 30 DAYS  
  
 memantine 10 mg tablet Commonly known as:  Chanel Spinner Take 1 tab twice a day  
  
 metoprolol succinate 25 mg XL tablet Commonly known as:  TOPROL-XL Take 1 Tab by mouth daily. Indications: hypertension MIRALAX 17 gram/dose powder Generic drug:  polyethylene glycol Take 17 g by mouth daily. nitroglycerin 0.4 mg SL tablet Commonly known as:  NITROSTAT  
1 Tab by SubLINGual route every five (5) minutes as needed for Chest Pain (call 911 if not relieved by 3). sertraline 100 mg tablet Commonly known as:  ZOLOFT Take 1 Tab by mouth daily. We Performed the Following CBC WITH AUTOMATED DIFF [11351 CPT(R)] PSA, DIAGNOSTIC (PROSTATE SPECIFIC AG) [id number] CPT(R)] Follow-up Instructions Return in about 3 months (around 7/2/2018). Patient Instructions Same medications Continue to hold on hydrocodone and Neurontin Keep plan follow-up with neurology orthopedist cardiology and all of your other providers Follow-up 3 months. Sooner if needed Lab work today [facility] SERVICES! Dear Tony Gilford: Thank you for requesting a [employer] account. Our records indicate that you already have an active [employer] account. You can access your account anytime at [email or web address] Did you know that you can access your hospital and ER discharge instructions at any time in [employer]? You can also review all of your test results from your hospital stay or ER visit. Additional Information If you have questions, please visit the Frequently Asked Questions section of the [employer] website at [email or web address] Remember, [employer] is NOT to be used for urgent needs. For medical emergencies, dial 911. Now available from your iPhone and Android! Please provide this summary of care documentation to your next provider. Your primary care clinician is listed as Tony Clancy. If you have any questions after today's visit, please call [phone].

## 2018-04-02 NOTE — PROGRESS NOTES
Chief Complaint   Patient presents with    Follow-up     1 wk f/u dizziness     Health Maintenance Due   Topic Date Due    FOBT Q 1 YEAR AGE 50-75  04/01/1994    ZOSTER VACCINE AGE 60>  02/01/2004    GLAUCOMA SCREENING Q2Y  04/01/2009     Visit Vitals    /72 (BP 1 Location: Left arm, BP Patient Position: Sitting)    Pulse (!) 51    Temp 97.1 °F (36.2 °C) (Oral)    Resp 14    Ht 5' 10\" (1.778 m)    Wt 162 lb 3.2 oz (73.6 kg)    SpO2 97%    BMI 23.27 kg/m2     Larry Flores LPN

## 2018-04-03 LAB
BASOPHILS # BLD AUTO: 0 X10E3/UL (ref 0–0.2)
BASOPHILS NFR BLD AUTO: 0 %
EOSINOPHIL # BLD AUTO: 0 X10E3/UL (ref 0–0.4)
EOSINOPHIL NFR BLD AUTO: 1 %
ERYTHROCYTE [DISTWIDTH] IN BLOOD BY AUTOMATED COUNT: 14.2 % (ref 12.3–15.4)
HCT VFR BLD AUTO: 38.7 % (ref 37.5–51)
HGB BLD-MCNC: 12.7 G/DL (ref 13–17.7)
IMM GRANULOCYTES # BLD: 0 X10E3/UL (ref 0–0.1)
IMM GRANULOCYTES NFR BLD: 0 %
LYMPHOCYTES # BLD AUTO: 1.8 X10E3/UL (ref 0.7–3.1)
LYMPHOCYTES NFR BLD AUTO: 23 %
MCH RBC QN AUTO: 29 PG (ref 26.6–33)
MCHC RBC AUTO-ENTMCNC: 32.8 G/DL (ref 31.5–35.7)
MCV RBC AUTO: 88 FL (ref 79–97)
MONOCYTES # BLD AUTO: 0.7 X10E3/UL (ref 0.1–0.9)
MONOCYTES NFR BLD AUTO: 9 %
NEUTROPHILS # BLD AUTO: 5.3 X10E3/UL (ref 1.4–7)
NEUTROPHILS NFR BLD AUTO: 67 %
PLATELET # BLD AUTO: 199 X10E3/UL (ref 150–379)
PSA SERPL-MCNC: <0.1 NG/ML (ref 0–4)
RBC # BLD AUTO: 4.38 X10E6/UL (ref 4.14–5.8)
VIT B12 SERPL-MCNC: 530 PG/ML (ref 232–1245)
WBC # BLD AUTO: 7.8 X10E3/UL (ref 3.4–10.8)

## 2018-04-03 NOTE — PROGRESS NOTES
I have called and talked to this patient's wife, advised of lab results per Dr Yeni Perez review. Wife verbalizes understanding.

## 2018-04-12 ENCOUNTER — CLINICAL SUPPORT (OUTPATIENT)
Dept: CARDIOLOGY CLINIC | Age: 73
End: 2018-04-12

## 2018-04-12 DIAGNOSIS — I47.1 PAROXYSMAL SUPRAVENTRICULAR TACHYCARDIA (HCC): ICD-10-CM

## 2018-04-12 DIAGNOSIS — I42.9 CARDIOMYOPATHY, UNSPECIFIED TYPE (HCC): Chronic | ICD-10-CM

## 2018-04-12 DIAGNOSIS — I47.1 AVNRT (AV NODAL RE-ENTRY TACHYCARDIA) (HCC): ICD-10-CM

## 2018-04-12 DIAGNOSIS — Z95.810 AICD (AUTOMATIC CARDIOVERTER/DEFIBRILLATOR) PRESENT: Primary | ICD-10-CM

## 2018-04-12 DIAGNOSIS — I50.22 CHRONIC SYSTOLIC CONGESTIVE HEART FAILURE (HCC): Chronic | ICD-10-CM

## 2018-04-25 DIAGNOSIS — E78.5 HYPERLIPIDEMIA, UNSPECIFIED HYPERLIPIDEMIA TYPE: ICD-10-CM

## 2018-04-25 RX ORDER — ATORVASTATIN CALCIUM 40 MG/1
TABLET, FILM COATED ORAL
Qty: 90 TAB | Refills: 1 | Status: SHIPPED | OUTPATIENT
Start: 2018-04-25 | End: 2018-11-16 | Stop reason: SDUPTHER

## 2018-04-25 NOTE — TELEPHONE ENCOUNTER
Lipitor refilled ×6 months  Recent lipid panel LFTs normal  Patient has follow-up scheduled the summer

## 2018-04-25 NOTE — TELEPHONE ENCOUNTER
Requested Prescriptions     Pending Prescriptions Disp Refills    atorvastatin (LIPITOR) 40 mg tablet 30 Tab 2         Would like 90 day supply.   Cheaper than 30 day supply with his insurance

## 2018-04-26 ENCOUNTER — TELEPHONE (OUTPATIENT)
Dept: FAMILY MEDICINE CLINIC | Age: 73
End: 2018-04-26

## 2018-04-26 DIAGNOSIS — H91.90 DECREASED HEARING, UNSPECIFIED LATERALITY: Primary | ICD-10-CM

## 2018-04-26 NOTE — TELEPHONE ENCOUNTER
Ms. Nicole Conte reports that Sy Al wants to go to [facility].     The fax to send to referral is [phone]

## 2018-04-26 NOTE — TELEPHONE ENCOUNTER
Patient requesting referral to audiology for decreased hearing    Referral ordered    Please contact patient and schedule

## 2018-04-26 NOTE — TELEPHONE ENCOUNTER
I have called and talked to Dmitriy Kidd, patient's wife. She advises that patient used to wear hearing aids and lost them, now his Insurance will pay for new ones. He needs an order to have his hearing checked sent to [facility] so that he can be evaluated and get new hearing aids.

## 2018-05-14 ENCOUNTER — TELEPHONE (OUTPATIENT)
Dept: FAMILY MEDICINE CLINIC | Age: 73
End: 2018-05-14

## 2018-05-14 DIAGNOSIS — J20.9 ACUTE BRONCHITIS, UNSPECIFIED ORGANISM: ICD-10-CM

## 2018-05-14 RX ORDER — FLUTICASONE PROPIONATE 50 MCG
2 SPRAY, SUSPENSION (ML) NASAL DAILY
Qty: 1 BOTTLE | Refills: 6 | OUTPATIENT
Start: 2018-05-14

## 2018-05-14 RX ORDER — FLUTICASONE PROPIONATE 50 MCG
2 SPRAY, SUSPENSION (ML) NASAL DAILY
Qty: 1 BOTTLE | Refills: 6 | Status: SHIPPED | OUTPATIENT
Start: 2018-05-14 | End: 2019-01-29

## 2018-05-14 NOTE — TELEPHONE ENCOUNTER
Requested Prescriptions     Pending Prescriptions Disp Refills    fluticasone (FLONASE) 50 mcg/actuation nasal spray 1 Bottle 6     Si Sprays by Both Nostrils route daily.

## 2018-05-14 NOTE — TELEPHONE ENCOUNTER
I have called and left a detailed message on this patient's wife's voice mail box. YF'K approved and sent to pharmacy to fill.

## 2018-05-17 NOTE — PERIOP NOTES
[facility]  Ambulatory Surgery Unit  Pre-operative Instructions    Procedure Date  May 22, 2018            Tentative Arrival Time 1300      1. On the day of your procedure, please report to the Ambulatory Surgery Unit Registration Desk and sign in at your designated time. The Ambulatory Surgery Unit is located in [facility] on the [facility] side [facility] across from the [address] [place] [address]. Please have all of your health insurance cards and a photo ID. 2. You must have someone with you to drive you home as directed by your surgeon. 3. You may have a light breakfast and take normal morning medications. 4. We recommend you do not drink any alcoholic beverages for 24 hours before and after your procedure. 5. Contact your surgeons office for instructions on the following medications: non-steroidal anti-inflammatory drugs (i.e. Advil, Aleve), vitamins, and supplements. (Some surgeons will want you to stop these medications prior to surgery and others may allow you to take them)   **If you are currently taking Plavix, Coumadin, Aspirin and/or other blood-thinning agents, contact your surgeon for instructions. ** Your surgeon will partner with the physician prescribing these medications to determine if it is safe to stop or if you need to continue taking. Please do not stop taking these medications without instructions from your surgeon. 6. In an effort to help prevent surgical site infection, we ask that you shower with an anti-bacterial soap (i.e. Dial or Safeguard) on the morning of your procedure. Do not apply any lotions, powders, or deodorants after showering. 7. Wear comfortable clothes. Wear glasses instead of contacts. Do not bring any jewelry or money (other than copays or fees as instructed). Do not wear make-up, particularly mascara, the morning of your procedure. Wear your hair loose or down, no ponytails, buns, louise pins or clips.  All body piercings must be removed. 8. You should understand that if you do not follow these instructions your procedure may be cancelled. If your physical condition changes (i.e. fever, cold or flu) please contact your surgeon as soon as possible. 9. It is important that you be on time. If a situation occurs where you may be late, or if you have any questions or problems, please call [phone].    10. Your procedure time may be subject to change. You will receive a phone call the day prior to confirm your arrival time. I understand a pre-operative phone call will be made to verify my procedure time. In the event that I am not available, I give permission for a message to be left on my answering service and/or with another person?       yes    Preop instructions reviewed  Pt verbalized understanding.    ___________________      ___________________      ___________________  (Signature of Patient)          (Witness)                   (Date and Time)

## 2018-05-22 ENCOUNTER — HOSPITAL ENCOUNTER (OUTPATIENT)
Age: 73
Setting detail: OUTPATIENT SURGERY
Discharge: HOME | End: 2018-05-22
Attending: PHYSICAL MEDICINE & REHABILITATION | Admitting: PHYSICAL MEDICINE & REHABILITATION
Payer: MEDICARE

## 2018-05-22 ENCOUNTER — APPOINTMENT (OUTPATIENT)
Dept: GENERAL RADIOLOGY | Age: 73
End: 2018-05-22
Attending: PHYSICAL MEDICINE & REHABILITATION
Payer: MEDICARE

## 2018-05-22 VITALS
RESPIRATION RATE: 20 BRPM | WEIGHT: 164.2 LBS | SYSTOLIC BLOOD PRESSURE: 153 MMHG | HEIGHT: 69 IN | BODY MASS INDEX: 24.32 KG/M2 | OXYGEN SATURATION: 97 % | HEART RATE: 67 BPM | DIASTOLIC BLOOD PRESSURE: 91 MMHG | TEMPERATURE: 97.7 F

## 2018-05-22 PROCEDURE — 77030003666 HC NDL SPINAL BD -A: Performed by: PHYSICAL MEDICINE & REHABILITATION

## 2018-05-22 PROCEDURE — 74011636320 HC RX REV CODE- 636/320: Performed by: PHYSICAL MEDICINE & REHABILITATION

## 2018-05-22 PROCEDURE — 72020 X-RAY EXAM OF SPINE 1 VIEW: CPT

## 2018-05-22 PROCEDURE — 74011250636 HC RX REV CODE- 250/636: Performed by: PHYSICAL MEDICINE & REHABILITATION

## 2018-05-22 PROCEDURE — 74011000250 HC RX REV CODE- 250: Performed by: PHYSICAL MEDICINE & REHABILITATION

## 2018-05-22 PROCEDURE — 76000 FLUOROSCOPY <1 HR PHYS/QHP: CPT

## 2018-05-22 PROCEDURE — 76210000046 HC AMBSU PH II REC FIRST 0.5 HR: Performed by: PHYSICAL MEDICINE & REHABILITATION

## 2018-05-22 PROCEDURE — 76030000002 HC AMB SURG OR TIME FIRST 0.: Performed by: PHYSICAL MEDICINE & REHABILITATION

## 2018-05-22 RX ORDER — METHYLPREDNISOLONE ACETATE 40 MG/ML
80 INJECTION, SUSPENSION INTRA-ARTICULAR; INTRALESIONAL; INTRAMUSCULAR; SOFT TISSUE ONCE
Status: COMPLETED | OUTPATIENT
Start: 2018-05-22 | End: 2018-05-22

## 2018-05-22 RX ORDER — BUPIVACAINE HYDROCHLORIDE 5 MG/ML
10 INJECTION, SOLUTION EPIDURAL; INTRACAUDAL ONCE
Status: DISCONTINUED | OUTPATIENT
Start: 2018-05-22 | End: 2018-05-22 | Stop reason: HOSPADM

## 2018-05-22 RX ORDER — LIDOCAINE HYDROCHLORIDE 20 MG/ML
5 INJECTION, SOLUTION EPIDURAL; INFILTRATION; INTRACAUDAL; PERINEURAL ONCE
Status: COMPLETED | OUTPATIENT
Start: 2018-05-22 | End: 2018-05-22

## 2018-05-22 NOTE — PERIOP NOTES
Patient with strong bilateral dorsi and plantar flexion. Injection site without drainage. Ambulate to wheelchair with good gait, denies weakness or pain. Pt discharged via wheelchair, accompanied by RN. Pt discharged awake and alert, respirations equal and unlabored, skin warm, dry, and intact. Pt and family members' questions and concerns addressed prior to discharge.

## 2018-05-22 NOTE — PERIOP NOTES
Skin assessment:   WNL   Skin color: normal for ethnicity   Skin condition: normal for age   Skin integrity: good   Turgor: good    Neuro:  Push/Pull assessment:     LUE Response: strong   LLE Response: strong push/pull   RUE Response: strong   RLE Response: strong push/pull

## 2018-05-22 NOTE — IP AVS SNAPSHOT
[address] [place] [facility] 
[phone] Patient: Lambert Miranda MRN: [id number] PUA:[id number] About your hospitalization You were admitted on:  May 22, 2018 You last received care in the:  [facility] ASU [facility]ING You were discharged on:  May 22, 2018 Why you were hospitalized Your primary diagnosis was:  Not on File Follow-up Information Follow up With Details Comments Contact Info Chi Hutchins MD   [address] [facility], [facility] [address] 
[phone] Your Scheduled Appointments Tuesday May 22, 2018 LUMBAR EPIDURAL STEROID INJECTION with Pastora Martinez MD  
[facility] AMB SURGERY UNIT (RI OR PRE ASSESSMENT) [address] [facility]  
[phone] Monday June 04, 2018  1:00 PM EDT Medicare Physical with Viola Juan PA-C  
[facility] ([facility]) [facility] [address]  
[phone] Tuesday July 03, 2018  1:00 PM EDT Any with Chi Hutchins MD  
[address] [place] [address]) [facility] [address]  
[phone] Tuesday July 03, 2018  1:20 PM EDT Any with Chi Hutchins MD  
[address] [place] [address] [address] [facility] 6  
[phone] Discharge Orders None A check linda indicates which time of day the medication should be taken. My Medications ASK your doctor about these medications Instructions Each Dose to Equal  
 Morning Noon Evening Bedtime  
 atorvastatin 40 mg tablet Commonly known as:  LIPITOR Your last dose was: Your next dose is: TAKE 1 TABLET BY MOUTH EVERY DAY  
     
   
   
   
  
 DULCOLAX (BISACODYL) 5 mg EC tablet Generic drug:  bisacodyl Your last dose was: Your next dose is: Take 5 mg by mouth daily as needed for Constipation. 5 mg ELIQUIS 5 mg tablet Generic drug:  apixaban Your last dose was: Your next dose is: TAKE 1 TABLET BY MOUTH EVERY 12 HOURS  
     
   
   
   
  
 fluticasone 50 mcg/actuation nasal spray Commonly known as:  Julita Lee Your last dose was: Your next dose is: 2 Sprays by Both Nostrils route daily. 2 Spray  
    
   
   
   
  
 gabapentin 300 mg capsule Commonly known as:  NEURONTIN Your last dose was: Your next dose is: Take 300 mg by mouth three (3) times daily. 300 mg Incontinence Pad, Liner, Disp Pads Commonly known as:  BLADDER CONTROL PADS EX ABSORB Your last dose was: Your next dose is:    
   
   
 1 Packet by Does Not Apply route as needed (incontinence). 1 Packet  
    
   
   
   
  
 lisinopril 5 mg tablet Commonly known as:  Anival Bernabe Your last dose was: Your next dose is: TAKE 1 TABLET BY MOUTH DAILY FOR 30 DAYS  
     
   
   
   
  
 meclizine 25 mg tablet Commonly known as:  ANTIVERT Your last dose was: Your next dose is: Take 1 Tab by mouth three (3) times daily as needed for Dizziness. 25 mg  
    
   
   
   
  
 memantine 10 mg tablet Commonly known as:  Luz Golden Your last dose was: Your next dose is: Take 1 tab twice a day  
     
   
   
   
  
 metoprolol succinate 25 mg XL tablet Commonly known as:  TOPROL-XL Your last dose was: Your next dose is: Take 1 Tab by mouth daily. Indications: hypertension 25 mg MIRALAX 17 gram/dose powder Generic drug:  polyethylene glycol Your last dose was: Your next dose is: Take 17 g by mouth daily. 17 g nitroglycerin 0.4 mg SL tablet Commonly known as:  NITROSTAT Your last dose was: Your next dose is:    
   
   
 1 Tab by SubLINGual route every five (5) minutes as needed for Chest Pain (call 911 if not relieved by 3). 0.4 mg  
    
   
   
   
  
 sertraline 100 mg tablet Commonly known as:  ZOLOFT Your last dose was: Your next dose is: Take 1 Tab by mouth daily. 100 mg Discharge Instructions Dr. Kelsey Forrest Discharge Instructions Transforaminal Epidural Steroid Injection/ Selective Nerve Block You had a transforaminal epidural steroid injection/ selective nerve block today. You will probably have some numbness, and possibly weakness, in your leg for the next 6 to 8 hours. The steroids will slowly become effective, reducing your pain, over the next 2 weeks. You should begin feeling better after a few days, but it may take up to 2 weeks to notice the difference. The benefit you get from your injection will last a variable amount of time, depending on the severity of your lumbar spine problem. ? Pain: Most people do not have any increase in pain after this injection. However, you might experience some soreness in your low back. If this happens, putting an ice pack over the sore area will help. ? Bandage: You will have a small bandage covering the site of the injection. You may remove it once you get home. ? Restrictions: Someone should drive you home after the injection. After that, you have no restrictions. You need to be careful while walking, as you may still have some numbness or weakness in your leg. You may resume your normal level of activity. You may take a shower or bath, and you may eat normally. You should continue your current exercises and/or therapy routine. ?  Medications: Continue your current medications as prescribed. If your pain decreases, you may reduce the amount of your pain medicines.   If you stopped taking anticoagulants or blood-thinners before the injection, start them tomorrow. If you have diabetes, your blood sugar may be elevated for a few days. Call your primary doctor with any questions. Call Dr. Kelsey Forrest at [phone] if you experience: ? Fever (101 degrees Fahrenheit or greater) ? Nausea or vomiting 
? Headache unrelieved by your normal pain medicine ? Redness or swelling at the injection site that lasts more than 1 day ? New numbness, tingling, weakness, or pain that you didnt have before the injection Follow-up appointment: If still having pain in 1-2 weeks, call office at [phone] for a follow up appointment. DISCHARGE SUMMARY from Nurse The following personal items collected during your admission are returned to you:  
Dental Appliance: Dental Appliances: None Vision:   
Hearing Aid:   
Jewelry: Jewelry: Watch, With patient Clothing: Clothing: With patient Other Valuables: Other Valuables: Other (comment), With patient (hearing aids) Valuables sent to safe: If you were given prescriptions, please review the written information on prescribed medications. · You will receive a Post Operative Call from one of the Recovery Room Nurses on the day after your surgery to check on you. It is very important for us to know how you are recovering after your surgery. · You may receive an e-mail or letter in the mail from [employer] regarding your experience with us in the Ambulatory Surgery Unit. Your feedback is valuable to us and we appreciate your participation in the survey. If you have not had your influenza or pneumococcal vaccines, please follow up with your primary care physician. The discharge information has been reviewed with the patient. The patient verbalized understanding. ACO Transitions of Care Introducing [employer] [address] offers a voluntary care coordination program to provide high quality service and care to [facility] fee-for-service beneficiaries. Angela Polanco was designed to help you enhance your health and well-being through the following services: ? Transitions of Care  support for individuals who are transitioning from one care setting to another (example: Hospital to home). ? Chronic and Complex Care Coordination  support for individuals and caregivers of those with serious or chronic illnesses or with more than one chronic (ongoing) condition and those who take a number of different medications. If you meet specific medical criteria, a [address] [place] [address] Rd may call you directly to coordinate your care with your primary care physician and your other care providers. For questions about the [facility] programs, please, contact your physicians office. For general questions or additional information about Accountable Care Organizations: 
Please visit www.medicare.gov/acos. html or call 1-800-MEDICARE (4-624.319.5229) TTY users should call 2-303.542.8822. Introducing [facility] & HEALTH SERVICES! Dear Francisco Posey: Thank you for requesting a Riidr account. Our records indicate that you already have an active Riidr account. You can access your account anytime at [email or web address] Did you know that you can access your hospital and ER discharge instructions at any time in Riidr? You can also review all of your test results from your hospital stay or ER visit. Additional Information If you have questions, please visit the Frequently Asked Questions section of the Riidr website at [email or web address] Remember, Riidr is NOT to be used for urgent needs. For medical emergencies, dial 911. Now available from your iPhone and Android! Introducing Julian Kasper As a [employer] patient, I wanted to make you aware of our electronic visit tool called Julian Kasper. [facility] 24/7 allows you to connect within minutes with a medical provider 24 hours a day, seven days a week via a mobile device or tablet or logging into a secure website from your computer. You can access GloNav from anywhere in the [place]. A virtual visit might be right for you when you have a simple condition and feel like you just dont want to get out of bed, or cant get away from work for an appointment, when your regular [email or web address] System provider is not available (evenings, weekends or holidays), or when youre out of town and need minor care. Electronic visits cost only $49 and if the [email or web address] provider determines a prescription is needed to treat your condition, one can be electronically transmitted to a nearby pharmacy*. Please take a moment to enroll today if you have not already done so. The enrollment process is free and takes just a few minutes. To enroll, please download the GoToTags vernell to your tablet or phone, or visit [email or web address] to enroll on your computer. And, as an [address] [place] [address] patient with a Ultius account, the results of your visits will be scanned into your electronic medical record and your primary care provider will be able to view the scanned results. We urge you to continue to see your regular [facility] provider for your ongoing medical care. And while your primary care provider may not be the one available when you seek a [employer] Regenixnim[employer]in virtual visit, the peace of mind you get from getting a real diagnosis real time can be priceless. For more information on Julian Tissue Regenixnimafin, view our Frequently Asked Questions (FAQs) at [email or web address]. Sincerely, 
 
Mercedes Gupta MD 
Chief Medical Officer [address] *:  certain medications cannot be prescribed via Julian Kasper Providers Seen During Your Hospitalization Provider Specialty Primary office phone Ramu Moore MD Physical Medicine and Rehab [phone] Your Primary Care Physician (PCP) Primary Care Physician Office Phone Office Fax Sarah Voss [phone] You are allergic to the following No active allergies Recent Documentation Height Weight BMI Smoking Status 1.753 m 74.5 kg 24.25 kg/m2 Former Smoker Emergency Contacts Name Discharge Info Relation Home Work Mobile [address] CAREGIVER [3] Spouse [3] [phone] Patient Belongings The following personal items are in your possession at time of discharge: 
  Dental Appliances: None         Home Medications: None   Jewelry: Watch, With patient  Clothing: With patient    Other Valuables: Other (comment), With patient (hearing aids) Please provide this summary of care documentation to your next provider. Signatures-by signing, you are acknowledging that this After Visit Summary has been reviewed with you and you have received a copy. Patient Signature:  ____________________________________________________________ Date:  ____________________________________________________________  
  
[facility] Provider Signature:  ____________________________________________________________ Date:  ____________________________________________________________

## 2018-05-22 NOTE — DISCHARGE INSTRUCTIONS
Dr. Aguilar Ou Discharge Instructions  Transforaminal Epidural Steroid Injection/ Selective Nerve Block    You had a transforaminal epidural steroid injection/ selective nerve block today. You will probably have some numbness, and possibly weakness, in your leg for the next 6 to 8 hours. The steroids will slowly become effective, reducing your pain, over the next 2 weeks. You should begin feeling better after a few days, but it may take up to 2 weeks to notice the difference. The benefit you get from your injection will last a variable amount of time, depending on the severity of your lumbar spine problem.  Pain: Most people do not have any increase in pain after this injection. However, you might experience some soreness in your low back. If this happens, putting an ice pack over the sore area will help.  Bandage: You will have a small bandage covering the site of the injection. You may remove it once you get home.  Restrictions: Someone should drive you home after the injection. After that, you have no restrictions. You need to be careful while walking, as you may still have some numbness or weakness in your leg. You may resume your normal level of activity. You may take a shower or bath, and you may eat normally. You should continue your current exercises and/or therapy routine.   Medications: Continue your current medications as prescribed. If your pain decreases, you may reduce the amount of your pain medicines. If you stopped taking anticoagulants or blood-thinners before the injection, start them tomorrow. If you have diabetes, your blood sugar may be elevated for a few days. Call your primary doctor with any questions.   Call Dr. Lauren Wynne at [phone] if you experience:   Fever (101 degrees Fahrenheit or greater)   Nausea or vomiting   Headache unrelieved by your normal pain medicine   Redness or swelling at the injection site that lasts more than 1 day   New numbness, tingling, weakness, or pain that you didnt have before the injection    Follow-up appointment:   If still having pain in 1-2 weeks, call office at [phone] for a follow up appointment. DISCHARGE SUMMARY from Nurse    The following personal items collected during your admission are returned to you:   Dental Appliance: Dental Appliances: None  Vision:    Hearing Aid:    Jewelry: Jewelry: Watch, With patient  Clothing: Clothing: With patient  Other Valuables: Other Valuables: Other (comment), With patient (hearing aids)  Valuables sent to safe: If you were given prescriptions, please review the written information on prescribed medications. · You will receive a Post Operative Call from one of the Recovery Room Nurses on the day after your surgery to check on you. It is very important for us to know how you are recovering after your surgery. · You may receive an e-mail or letter in the mail from [employer] regarding your experience with us in the Ambulatory Surgery Unit. Your feedback is valuable to us and we appreciate your participation in the survey. If you have not had your influenza or pneumococcal vaccines, please follow up with your primary care physician. The discharge information has been reviewed with the patient. The patient verbalized understanding.

## 2018-05-22 NOTE — PERIOP NOTES
Neetu Crhardt  1945  [id number]    Situation:  Verbal report given from: HEATHER Santoro RN  Procedure: Procedure(s):  RIGHT L4-L5 AND L5-S1 TRANSFORAMINAL EPIDURAL STEROID INJECTION    Background:    Preoperative diagnosis: UNKNOWN-OFFICE DID NOT PROVIDE    Postoperative diagnosis: UNKNOWN-OFFICE DID NOT PROVIDE    :  Dr. Paola Hernandez      Assessment:  Intra-procedure medications, procedure, and allergies reviewed        Recommendation:    Discharge patient home after discharge instructions reviewed with patient. Rest until local has worn off.

## 2018-05-22 NOTE — H&P
Procedural Case Note    5/22/2018    (2:13 PM)    Cris Montero    1945   (75 y.o.)    [id number]    CC:  pain    ROS:   Complete ROS obtained, no CP, no SOB, no N or V    PMH:     Past Medical History:   Diagnosis Date    AICD (automatic cardioverter/defibrillator) present 5/13/2016 5/13/16 [employer] upgrade to dual chamber AICD implant  Dr. Janet Kurtz state, other     Arrhythmia     'S IN THE PAST    Arthritis     OSTEO ARTHRITIS    AVNRT (AV bridgette re-entry tachycardia) ([facility] [address] 5/12/2016 5/12/16 AVNRT ablation: PM/AICD left upper chest :Dr. Hutchins Sircornell Back ache     CAD (coronary artery disease)     Cancer [facility]) 2004    Prostate cancer    Chest tightness     last episode of chest pain 5/2016 before AICD placed; none since then    Coagulation defects 2005    FACTOR V LEIDEN    Dementia     Dizziness     FH: factor V Leiden deficiency     Generalized muscle ache     GERD (gastroesophageal reflux disease)     HEARTBURN    Heart failure ([facility] [address] 2014    EF 40%; Dr. Priscilla Villafana    Hyperlipidemia, mixed     Hypertension     COREG    Other ill-defined conditions(799.89) 2004    blood transfusion    Pacemaker     Pacemaker     Postsurgical aortocoronary bypass status 05/29/2012    CABG    Presence of cardiac defibrillator 05/2016    left upper chest    Psychiatric disorder     depression    Stroke [facility]) 2011, 1990's    SEVERAL-mini    Thromboembolism ([facility] [address] [facility] 2014    left leg: changed to Eliquis from Warfarin    Thromboembolus ([facility] [address] 2005    left leg    Weakness generalized        ALLERGIES:   No Known Allergies    MEDS:     No current facility-administered medications for this encounter.            Visit Vitals    /84 (BP 1 Location: Right arm, BP Patient Position: At rest)    Pulse 68    Temp 98.1 °F (36.7 °C)    Resp 20    Ht 5' 9\" (1.753 m)    Wt 74.5 kg (164 lb 3.2 oz)    SpO2 98%    BMI 24.25 kg/m2     PE:  Gen: NAD  Head: normocephalic  Heart: RRR  Lungs: CTA angeline  Abd: NT, ND, soft  Neuro: awake and alert  Skin: intact    IMPRESSION:   DDD/DJD    Note:  The clinical status was discussed in detail with the patient. The procedure was again discussed and described in detail. All understand and accept the planned procedure and risks; reject other forms of treatment. All questions are answered.     Lauren Finn MD

## 2018-05-22 NOTE — H&P
Procedural Case Note    5/22/2018    (3:14 PM)    Julia Watters    1945   (75 y.o.)    [id number]    CC:  pain    ROS:   Complete ROS obtained, no CP, no SOB, no N or V    PMH:     Past Medical History:   Diagnosis Date    AICD (automatic cardioverter/defibrillator) present 5/13/2016 5/13/16 [employer] upgrade to dual chamber AICD implant  Dr. Licea Roxbury state, other     Arrhythmia     'S IN THE PAST    Arthritis     OSTEO ARTHRITIS    AVNRT (AV bridgette re-entry tachycardia) ([facility] [address] 5/12/2016 5/12/16 AVNRT ablation: PM/AICD left upper chest :Dr. Chitra Aleman Back ache     CAD (coronary artery disease)     Cancer [facility]) 2004    Prostate cancer    Chest tightness     last episode of chest pain 5/2016 before AICD placed; none since then    Coagulation defects 2005    FACTOR V LEIDEN    Dementia     Dizziness     FH: factor V Leiden deficiency     Generalized muscle ache     GERD (gastroesophageal reflux disease)     HEARTBURN    Heart failure ([facility] [address] [facility] 2014    EF 40%; Dr. Mandy Snowden    Hyperlipidemia, mixed     Hypertension     COREG    Other ill-defined conditions(799.89) 2004    blood transfusion    Pacemaker     Pacemaker     Postsurgical aortocoronary bypass status 05/29/2012    CABG    Presence of cardiac defibrillator 05/2016    left upper chest    Psychiatric disorder     depression    Stroke [facility]) 2011, 1990's    SEVERAL-mini    Thromboembolism ([facility] [address] [facility] 2014    left leg: changed to Eliquis from Warfarin    Thromboembolus ([facility] [address] 2005    left leg    Weakness generalized        ALLERGIES:   No Known Allergies    MEDS:     No current facility-administered medications for this encounter.            Visit Vitals    /84 (BP 1 Location: Right arm, BP Patient Position: At rest)    Pulse 68    Temp 98.1 °F (36.7 °C)    Resp 20    Ht 5' 9\" (1.753 m)    Wt 74.5 kg (164 lb 3.2 oz)    SpO2 98%    BMI 24.25 kg/m2     PE:  Gen: NAD  Head: normocephalic  Heart: RRR  Lungs: CTA angeline  Abd: NT, ND, soft  Neuro: awake and alert  Skin: intact    IMPRESSION:   LS DDD    Note:  The clinical status was discussed in detail with the patient. The procedure was again discussed and described in detail. All understand and accept the planned procedure and risks; reject other forms of treatment. All questions are answered.     Jaxson Buchanan MD

## 2018-05-22 NOTE — OP NOTES
Epidural Steroid Injection Operative Report    Indications: This is a 75 y.o. male who presents with low back pain. He was positive for LS DDD. The patient was admitted for surgery as conservative measures have failed. Date of Surgery: 5/22/2018    Preoperative Diagnosis: LS DDD    Postoperative Diagnosis: LS DDD    Surgeon(s) and Role:     * Greer Crum MD - Primary     Procedure:  Procedure(s):  RIGHT L4-L5 AND L5-S1 TRANSFORAMINAL EPIDURAL STEROID INJECTION    Procedure in Detail:  After appropriate informed consent was obtained, the patient was taken to the operating suite and placed in the prone position on the operating table on appropriate padding. The LS region was prepped and draped in the usual sterile fashion. Intraoperative fluoroscopy was used to localize the LS spine. The skin was infiltrated with 2% lidocaine. An 22-g needle was advanced into the Right L4 and L5 neuroforamen under fluoroscopic guidance. A small amount of contrast was injected into the epidural space, confirming appropriate needle placement on fluoroscopy. Next, 2ml of 2% lidocaine and 80mg of Depo-Medrol were injected. The needle was removed from the patient. The patient was then turned back into the supine position on the stretcher and was taken to the Recovery Room in stable condition.     Estimated Blood Loss:  none     Specimens: None       Drains: None          Complications:  None    Signed By: Greer Crum MD                        May 22, 2018

## 2018-05-29 DIAGNOSIS — M96.1 CERVICAL POST-LAMINECTOMY SYNDROME: ICD-10-CM

## 2018-05-29 DIAGNOSIS — M54.2 NECK PAIN: ICD-10-CM

## 2018-05-29 DIAGNOSIS — I65.23 STENOSIS OF BOTH CAROTID ARTERIES WITHOUT CEREBRAL INFARCTION: ICD-10-CM

## 2018-05-29 DIAGNOSIS — Z86.73 HISTORY OF STROKE: ICD-10-CM

## 2018-05-29 DIAGNOSIS — G30.1 ALZHEIMER'S DEMENTIA, LATE ONSET, WITH BEHAVIORAL DISTURBANCE (HCC): ICD-10-CM

## 2018-05-29 DIAGNOSIS — M54.16 LUMBAR BACK PAIN WITH RADICULOPATHY AFFECTING RIGHT LOWER EXTREMITY: ICD-10-CM

## 2018-05-29 DIAGNOSIS — M54.16 LUMBAR BACK PAIN WITH RADICULOPATHY AFFECTING LEFT LOWER EXTREMITY: ICD-10-CM

## 2018-05-29 DIAGNOSIS — F02.818 ALZHEIMER'S DEMENTIA, LATE ONSET, WITH BEHAVIORAL DISTURBANCE (HCC): ICD-10-CM

## 2018-05-29 DIAGNOSIS — G44.86 CERVICOGENIC HEADACHE: ICD-10-CM

## 2018-05-29 DIAGNOSIS — M48.062 SPINAL STENOSIS, LUMBAR REGION, WITH NEUROGENIC CLAUDICATION: ICD-10-CM

## 2018-05-29 RX ORDER — SERTRALINE HYDROCHLORIDE 100 MG/1
TABLET, FILM COATED ORAL
Qty: 30 TAB | Refills: 5 | Status: SHIPPED | OUTPATIENT
Start: 2018-05-29 | End: 2018-07-03 | Stop reason: SDUPTHER

## 2018-05-31 ENCOUNTER — OFFICE VISIT (OUTPATIENT)
Dept: FAMILY MEDICINE CLINIC | Age: 73
End: 2018-05-31

## 2018-05-31 VITALS
BODY MASS INDEX: 24.68 KG/M2 | HEART RATE: 66 BPM | SYSTOLIC BLOOD PRESSURE: 111 MMHG | WEIGHT: 166.6 LBS | TEMPERATURE: 96.7 F | DIASTOLIC BLOOD PRESSURE: 58 MMHG | RESPIRATION RATE: 18 BRPM | HEIGHT: 69 IN | OXYGEN SATURATION: 97 %

## 2018-05-31 DIAGNOSIS — M51.36 DDD (DEGENERATIVE DISC DISEASE), LUMBAR: ICD-10-CM

## 2018-05-31 DIAGNOSIS — R30.0 DYSURIA: Primary | ICD-10-CM

## 2018-05-31 DIAGNOSIS — M25.551 RIGHT HIP PAIN: ICD-10-CM

## 2018-05-31 DIAGNOSIS — Z85.46 HISTORY OF PROSTATE CANCER: ICD-10-CM

## 2018-05-31 LAB
BILIRUB UR QL STRIP: NEGATIVE
GLUCOSE UR-MCNC: NEGATIVE MG/DL
KETONES P FAST UR STRIP-MCNC: NEGATIVE MG/DL
PH UR STRIP: 5.5 [PH] (ref 4.6–8)
PROT UR QL STRIP: NEGATIVE
SP GR UR STRIP: 1 (ref 1–1.03)
UA UROBILINOGEN AMB POC: NORMAL (ref 0.2–1)
URINALYSIS CLARITY POC: CLEAR
URINALYSIS COLOR POC: YELLOW
URINE BLOOD POC: NEGATIVE
URINE LEUKOCYTES POC: NEGATIVE
URINE NITRITES POC: NEGATIVE

## 2018-05-31 RX ORDER — GABAPENTIN 400 MG/1
400 CAPSULE ORAL 3 TIMES DAILY
Qty: 90 CAP | Refills: 1 | Status: SHIPPED | OUTPATIENT
Start: 2018-05-31 | End: 2018-08-03 | Stop reason: SDUPTHER

## 2018-05-31 RX ORDER — CEFUROXIME AXETIL 250 MG/1
250 TABLET ORAL 2 TIMES DAILY
Qty: 20 TAB | Refills: 0 | Status: SHIPPED | OUTPATIENT
Start: 2018-05-31 | End: 2018-06-10

## 2018-05-31 RX ORDER — MECLIZINE HYDROCHLORIDE 25 MG/1
TABLET ORAL
COMMUNITY
Start: 2018-04-13 | End: 2018-05-31 | Stop reason: SDUPTHER

## 2018-05-31 NOTE — PATIENT INSTRUCTIONS
Finish antibiotics  Increase gabapentin to 400 mg 3 times daily  Await urine culture  Follow-up of urinary symptoms persist  Discuss recurrent hip/leg pain with your orthopedist

## 2018-05-31 NOTE — PROGRESS NOTES
Pramod Chaudhry is a 75 y.o. male who presents to the office today with the following:  Chief Complaint   Patient presents with    Leg Pain     right leg pain; had injection only lasted 2 weeks; pt also c/o burning when urinating       No Known Allergies    Current Outpatient Prescriptions   Medication Sig    cefUROXime (CEFTIN) 250 mg tablet Take 1 Tab by mouth two (2) times a day for 10 days.  gabapentin (NEURONTIN) 400 mg capsule Take 1 Cap by mouth three (3) times daily.  sertraline (ZOLOFT) 100 mg tablet TAKE 1 TABLET BY MOUTH EVERY DAY    fluticasone (FLONASE) 50 mcg/actuation nasal spray 2 Sprays by Both Nostrils route daily.  atorvastatin (LIPITOR) 40 mg tablet TAKE 1 TABLET BY MOUTH EVERY DAY    meclizine (ANTIVERT) 25 mg tablet Take 1 Tab by mouth three (3) times daily as needed for Dizziness.  lisinopril (PRINIVIL, ZESTRIL) 5 mg tablet TAKE 1 TABLET BY MOUTH DAILY FOR 30 DAYS    memantine (NAMENDA) 10 mg tablet Take 1 tab twice a day    bisacodyl (DULCOLAX, BISACODYL,) 5 mg EC tablet Take 5 mg by mouth daily as needed for Constipation.  ELIQUIS 5 mg tablet TAKE 1 TABLET BY MOUTH EVERY 12 HOURS    metoprolol succinate (TOPROL-XL) 25 mg XL tablet Take 1 Tab by mouth daily. Indications: hypertension    polyethylene glycol (MIRALAX) 17 gram/dose powder Take 17 g by mouth daily.  Incontinence Pad, Liner, Disp (BLADDER CONTROL PADS EX ABSORB) pads 1 Packet by Does Not Apply route as needed (incontinence).  nitroglycerin (NITROSTAT) 0.4 mg SL tablet 1 Tab by SubLINGual route every five (5) minutes as needed for Chest Pain (call 911 if not relieved by 3). No current facility-administered medications for this visit.         Past Medical History:   Diagnosis Date    AICD (automatic cardioverter/defibrillator) present 5/13/2016 5/13/16 [employer] upgrade to dual chamber AICD implant  Dr. Herrera Born state, other     Arrhythmia     'S IN THE PAST    Arthritis     OSTEO ARTHRITIS    AVNRT (AV bridgette re-entry tachycardia) ([facility] [address] 5/12/2016 5/12/16 AVNRT ablation: PM/AICD left upper chest :Dr. Kaylie Cisneros    Back ache     CAD (coronary artery disease)     Cancer [facility]) 2004    Prostate cancer    Chest tightness     last episode of chest pain 5/2016 before AICD placed; none since then    Coagulation defects 2005    FACTOR V LEIDEN    Dementia     Dizziness     FH: factor V Leiden deficiency     Generalized muscle ache     GERD (gastroesophageal reflux disease)     HEARTBURN    Heart failure ([facility] [address] 2014    EF 40%; Dr. Ralf Anne    Hyperlipidemia, mixed     Hypertension     COREG    Other ill-defined conditions(799.89) 2004    blood transfusion    Pacemaker     Pacemaker     Postsurgical aortocoronary bypass status 05/29/2012    CABG    Presence of cardiac defibrillator 05/2016    left upper chest    Psychiatric disorder     depression    Stroke [facility]) 2011, 1990's    SEVERAL-mini    Thromboembolism ([facility] [address] 2014    left leg: changed to Eliquis from Warfarin    Thromboembolus ([facility] [address] 2005    left leg    Weakness generalized        Past Surgical History:   Procedure Laterality Date    CARDIAC CATHETERIZATION  3/4/2011         CARDIAC SURG PROCEDURE UNLIST      CABG X1 3/5/11    HX HERNIA REPAIR  2002    Ingiunal hernia repair left    HX ORTHOPAEDIC      LEFT KNEE CARTILAGE REPAIR;RIGHT ROTATOR CUFF REPAIR    HX ORTHOPAEDIC  2/14/12    C5-7 ANTERIIOR CERVICAL DISECTOMY AND FUSION    HX ORTHOPAEDIC  6/4/13    C5-C7 POSTERIOR DECOMPRESSION AND FUSION    HX OTHER SURGICAL      steroid injections    HX PACEMAKER Left     HX PACEMAKER PLACEMENT      [place]    HX UROLOGICAL       urinary control system    HX UROLOGICAL  12/3/13    REPLACEMENT ARTIFICAL URINARY SPHINCTER       History   Smoking Status    Former Smoker    Types: Pipe, Cigars    Quit date: 10/10/1971   Smokeless Tobacco    Never Used Family History   Problem Relation Age of Onset    Asthma Mother     Alcohol abuse Mother     Alcohol abuse Father     Asthma Father     Cancer Sister     Heart Disease Sister     Alcohol abuse Brother     Cancer Brother     Heart Disease Brother          History of Present Illness:  Patient here for evaluation back and leg pain as well as some urinary symptoms    Patient states over the last week he has had some burning when he urinates. He has urinary frequency and some stress incontinence. Those symptoms are a bit worse during this time. He has had no fever no chills no abdominal pain. He is status post prostatectomy for prostate cancer. PSA 0 April 2018. He has had UTIs in the past and states symptoms are very similar to his current complaints. Patient does have chronic degenerative disc disease of his lumbar spine as well as DJD of his hips. He has followed with orthopedist.  He recently underwent facet injections in his back. He states his symptoms improved for a couple days but now has recurred again. He is complaining of pain right lower lumbar area radiating to the  lateral right hip. He tells me his orthopedist recommended hip replacement in the past but he declined. He is currently on gabapentin. At the last visit this was being held because of some complaints of dizziness. He states he is back on the 300 mg 3 times a day tolerating it well. He states that has helped the pain but does not take it away. He uses Tylenol as needed. He is not a candidate for the and nonsteroidals because he is on Eliquis. He used to take hydrocodone and is wondering if I would prescribe this for him. He tells me his orthopedist told him he would not write for pain medication    Patient was in to see me in April for routine medical follow-up.   He is Irvin scheduled back over the summer      Review of Systems:      Review of systems negative except as noted above    Physical Exam:  Visit Vitals    /58 (BP 1 Location: Left arm, BP Patient Position: Sitting)    Pulse 66    Temp 96.7 °F (35.9 °C) (Oral)    Resp 18    Ht 5' 9\" (1.753 m)    Wt 166 lb 9.6 oz (75.6 kg)    SpO2 97%    BMI 24.6 kg/m2     Vitals:    05/31/18 1443   BP: 111/58   BP 1 Location: Left arm   BP Patient Position: Sitting   Pulse: 66   Resp: 18   Temp: 96.7 °F (35.9 °C)   TempSrc: Oral   SpO2: 97%   Weight: 166 lb 9.6 oz (75.6 kg)   Height: 5' 9\" (1.753 m)     Patient was in no acute distress vital signs as above. Afebrile  Chest was clear no wheezes rhonchi rales  Cor regular  rate and rhythm. No murmurs no ectopy  Abdomen soft and nontender  Back had no CVA tenderness. There was reproducible tenderness right lower lumbar area and sciatic notch. Right hip today no tenderness to palpation. Some discomfort with flexion and internal and external rotation  Normal male genitalia uncircumcised. No evidence of phimosis. Descended testes. He did have an erectile pump noted in the right scrotum. Testes and epididymis nontender  UA was dip negative    Assessment/Plan:  1. Dysuria  Symptoms consistent with UTI. I am going to presumptively treat him with Ceftin for 10 days. I am sending urine for culture. He will follow-up if his symptoms persist  - AMB POC URINALYSIS DIP STICK MANUAL W/O MICRO  - cefUROXime (CEFTIN) 250 mg tablet; Take 1 Tab by mouth two (2) times a day for 10 days. Dispense: 20 Tab; Refill: 0  - CULTURE, URINE  - OK HANDLG&/OR CONVEY OF SPEC FOR TR OFFICE TO LAB    2. DDD (degenerative disc disease), lumbar  Patient with both degenerative disc disease lumbar spine and DJD hip. I would like to avoid starting long-term narcotic medications with him. I am going to increase his gabapentin. I told him he needs to speak with his orthopedist about his persistent pain discuss other treatment options.   If his orthopedist feels that there are no other options other than chronic pain medication for this patient than I would request that he contact me with that information and requests that I write the medication  - gabapentin (NEURONTIN) 400 mg capsule; Take 1 Cap by mouth three (3) times daily. Dispense: 90 Cap; Refill: 1    3. DJD right hip  - gabapentin (NEURONTIN) 400 mg capsule; Take 1 Cap by mouth three (3) times daily. Dispense: 90 Cap; Refill: 1    4. History of prostate cancer  Recent PSA normal    Patient Instructions   Finish antibiotics  Increase gabapentin to 400 mg 3 times daily  Await urine culture  Follow-up of urinary symptoms persist  Discuss recurrent hip/leg pain with your orthopedist          Continue current therapy plan except for indicated above. Verbal and written instructions (see AVS) provided.  Patient expresses understanding of diagnosis and treatment plan. Follow-up Disposition:  Return if symptoms worsen or fail to improve. Mookie Cai.  Sheldon Brandt MD

## 2018-05-31 NOTE — PROGRESS NOTES
Chief Complaint   Patient presents with    Leg Pain     right leg pain; had injection only lasted 2 weeks     Health Maintenance Due   Topic Date Due    FOBT Q 1 YEAR AGE 50-75  04/01/1994    ZOSTER VACCINE AGE 60>  02/01/2004    GLAUCOMA SCREENING Q2Y  04/01/2009     Visit Vitals    /58 (BP 1 Location: Left arm, BP Patient Position: Sitting)    Pulse 66    Temp 96.7 °F (35.9 °C) (Oral)    Resp 18    Ht 5' 9\" (1.753 m)    Wt 166 lb 9.6 oz (75.6 kg)    SpO2 97%    BMI 24.6 kg/m2     1. Have you been to the ER, urgent care clinic since your last visit? Hospitalized since your last visit? No    2. Have you seen or consulted any other health care providers outside of the [address] [place] [address] since your last visit? Include any pap smears or colon screening.  Yes Where: Brenda Oviedo LPN

## 2018-06-02 LAB — BACTERIA UR CULT: NORMAL

## 2018-07-03 ENCOUNTER — OFFICE VISIT (OUTPATIENT)
Dept: FAMILY MEDICINE CLINIC | Age: 73
End: 2018-07-03

## 2018-07-03 VITALS
HEIGHT: 69 IN | TEMPERATURE: 97.9 F | HEART RATE: 58 BPM | DIASTOLIC BLOOD PRESSURE: 72 MMHG | BODY MASS INDEX: 25.03 KG/M2 | WEIGHT: 169 LBS | SYSTOLIC BLOOD PRESSURE: 133 MMHG | RESPIRATION RATE: 16 BRPM

## 2018-07-03 DIAGNOSIS — M54.16 LUMBAR BACK PAIN WITH RADICULOPATHY AFFECTING LEFT LOWER EXTREMITY: ICD-10-CM

## 2018-07-03 DIAGNOSIS — I10 ESSENTIAL HYPERTENSION: ICD-10-CM

## 2018-07-03 DIAGNOSIS — M51.36 DDD (DEGENERATIVE DISC DISEASE), LUMBAR: ICD-10-CM

## 2018-07-03 DIAGNOSIS — M96.1 CERVICAL POST-LAMINECTOMY SYNDROME: ICD-10-CM

## 2018-07-03 DIAGNOSIS — H11.32 CONJUNCTIVAL HEMORRHAGE OF LEFT EYE: ICD-10-CM

## 2018-07-03 DIAGNOSIS — M54.2 NECK PAIN: ICD-10-CM

## 2018-07-03 DIAGNOSIS — F32.A DEPRESSION, UNSPECIFIED DEPRESSION TYPE: ICD-10-CM

## 2018-07-03 DIAGNOSIS — Z00.00 MEDICARE ANNUAL WELLNESS VISIT, SUBSEQUENT: Primary | ICD-10-CM

## 2018-07-03 DIAGNOSIS — Z12.11 SCREENING FOR COLON CANCER: ICD-10-CM

## 2018-07-03 DIAGNOSIS — Z86.73 HISTORY OF STROKE: ICD-10-CM

## 2018-07-03 DIAGNOSIS — M48.062 SPINAL STENOSIS, LUMBAR REGION, WITH NEUROGENIC CLAUDICATION: ICD-10-CM

## 2018-07-03 DIAGNOSIS — G44.86 CERVICOGENIC HEADACHE: ICD-10-CM

## 2018-07-03 DIAGNOSIS — E78.5 HYPERLIPIDEMIA, UNSPECIFIED HYPERLIPIDEMIA TYPE: ICD-10-CM

## 2018-07-03 DIAGNOSIS — Z79.01 CHRONIC ANTICOAGULATION: ICD-10-CM

## 2018-07-03 DIAGNOSIS — G30.1 ALZHEIMER'S DEMENTIA, LATE ONSET, WITH BEHAVIORAL DISTURBANCE (HCC): ICD-10-CM

## 2018-07-03 DIAGNOSIS — M54.16 LUMBAR BACK PAIN WITH RADICULOPATHY AFFECTING RIGHT LOWER EXTREMITY: ICD-10-CM

## 2018-07-03 DIAGNOSIS — F02.818 ALZHEIMER'S DEMENTIA, LATE ONSET, WITH BEHAVIORAL DISTURBANCE (HCC): ICD-10-CM

## 2018-07-03 DIAGNOSIS — I65.23 STENOSIS OF BOTH CAROTID ARTERIES WITHOUT CEREBRAL INFARCTION: ICD-10-CM

## 2018-07-03 PROBLEM — F32.1 MODERATE MAJOR DEPRESSION (HCC): Status: ACTIVE | Noted: 2018-07-03

## 2018-07-03 RX ORDER — SERTRALINE HYDROCHLORIDE 100 MG/1
TABLET, FILM COATED ORAL
Qty: 45 TAB | Refills: 5 | Status: SHIPPED | OUTPATIENT
Start: 2018-07-03 | End: 2018-12-27 | Stop reason: SDUPTHER

## 2018-07-03 NOTE — PROGRESS NOTES
Adrian Garcia is a 75 y.o. male and presents for annual Medicare Wellness Visit. Problem List: Reviewed with patient and discussed risk factors.     Patient Active Problem List   Diagnosis Code    CAD (coronary artery disease) I25.10    Hypertension I10    Hyperlipemia E78.5    DVT (deep venous thrombosis) chronic coumadin anti-coagulation I82.409    History of factor V Leiden mutation Z86.2    CABG (coronary artery bypass graft)     Chronic venous embolism and thrombosis of unspecified deep vessels of lower extremity I82.509    Osteoarthritis M19.90    Prostate cancer ([facility] [address] C61    Hyperthyroidism E05.90    Mitral valve disorders(424.0) I05.9    Postsurgical aortocoronary bypass status Z95.1    Coronary atherosclerosis of native coronary artery I25.10    Paroxysmal supraventricular tachycardia (HCC) I47.1    Chronic systolic heart failure (HCC) I50.22    Atrial fibrillation (HCC) I48.91    Mixed hyperlipidemia E78.2    Palpitations R00.2    S/P cervical spinal fusion Z98.1    Bradycardia R00.1    Left-sided chest wall pain R07.89    SSS (sick sinus syndrome) (HCC) I49.5    Chest pain R07.9    Pacemaker Z95.0    Cardiac pacemaker in situ Z95.0    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    Lower GI bleeding K92.2    B12 deficiency E53.8    Hypothyroidism due to acquired atrophy of thyroid E03.4    Osteoporosis M81.0    Cervical post-laminectomy syndrome M96.1    Neck pain M54.2    ACP (advance care planning) Z71.89    Stenosis of both carotid arteries without cerebral infarction I65.23    Cervicogenic headache R51    Alzheimer's dementia, late onset, with behavioral disturbance G30.1, F02.81    Spinal stenosis, lumbar region, with neurogenic claudication M48.062    Lumbar back pain with radiculopathy affecting right lower extremity M54.17    Lumbar back pain with radiculopathy affecting left lower extremity M54.17    History of stroke Z80.78    SVT (supraventricular tachycardia) ([facility]) I47.1    Cardiomyopathy (HCC) I42.9    Chronic systolic congestive heart failure (HCC) I50.22    AVNRT (AV bridgette re-entry tachycardia) ([facility]) I47.1    AICD (automatic cardioverter/defibrillator) present Z95.810    S/P CABG (coronary artery bypass graft) Z95.1    Chronic anticoagulation Z79.01    DDD (degenerative disc disease), lumbar M51.36       Current medical providers:  Patient Care Team:  Mony Elliott MD as PCP - General (Family Practice)  Gino Lowery RN as Nurse Navigator (Cardiology)  Gino Lowery RN as Nurse Kaylan Cabrera MD as Physician (Cardiology)  Alison Alvarado MD as Physician (Cardiology)  Nicolasa Pimentel MD ([facility])  Mony Elliott MD ([facility])    PSH: Reviewed with patient  Past Surgical History:   Procedure Laterality Date    CARDIAC CATHETERIZATION  3/4/2011         CARDIAC SURG PROCEDURE UNLIST      CABG X1 3/5/11    HX HERNIA REPAIR  2002    Ingiunal hernia repair left    HX ORTHOPAEDIC      LEFT KNEE CARTILAGE REPAIR;RIGHT ROTATOR CUFF REPAIR    HX ORTHOPAEDIC  2/14/12    C5-7 ANTERIIOR CERVICAL DISECTOMY AND FUSION    HX ORTHOPAEDIC  6/4/13    C5-C7 POSTERIOR DECOMPRESSION AND FUSION    HX OTHER SURGICAL      steroid injections    HX PACEMAKER Left     HX PACEMAKER PLACEMENT      [place]    HX UROLOGICAL       urinary control system    HX UROLOGICAL  12/3/13    REPLACEMENT ARTIFICAL URINARY SPHINCTER        SH: Reviewed with patient  Social History   Substance Use Topics    Smoking status: Former Smoker     Types: Pipe, Cigars     Quit date: 10/10/1971    Smokeless tobacco: Never Used    Alcohol use No       FH: Reviewed with patient  Family History   Problem Relation Age of Onset    Asthma Mother     Alcohol abuse Mother     Alcohol abuse Father     Asthma Father     Cancer Sister     Heart Disease Sister     Alcohol abuse Brother     Cancer Brother     Heart Disease Brother        Medications/Allergies: Reviewed with patient  Current Outpatient Prescriptions on File Prior to Visit   Medication Sig Dispense Refill    gabapentin (NEURONTIN) 400 mg capsule Take 1 Cap by mouth three (3) times daily. 90 Cap 1    sertraline (ZOLOFT) 100 mg tablet TAKE 1 TABLET BY MOUTH EVERY DAY 30 Tab 5    fluticasone (FLONASE) 50 mcg/actuation nasal spray 2 Sprays by Both Nostrils route daily. 1 Bottle 6    atorvastatin (LIPITOR) 40 mg tablet TAKE 1 TABLET BY MOUTH EVERY DAY 90 Tab 1    meclizine (ANTIVERT) 25 mg tablet Take 1 Tab by mouth three (3) times daily as needed for Dizziness. 20 Tab 0    lisinopril (PRINIVIL, ZESTRIL) 5 mg tablet TAKE 1 TABLET BY MOUTH DAILY FOR 30 DAYS 90 Tab 3    memantine (NAMENDA) 10 mg tablet Take 1 tab twice a day 180 Tab 3    bisacodyl (DULCOLAX, BISACODYL,) 5 mg EC tablet Take 5 mg by mouth daily as needed for Constipation.  ELIQUIS 5 mg tablet TAKE 1 TABLET BY MOUTH EVERY 12 HOURS 60 Tab 12    metoprolol succinate (TOPROL-XL) 25 mg XL tablet Take 1 Tab by mouth daily. Indications: hypertension 90 Tab 1    polyethylene glycol (MIRALAX) 17 gram/dose powder Take 17 g by mouth daily.  Incontinence Pad, Liner, Disp (BLADDER CONTROL PADS EX ABSORB) pads 1 Packet by Does Not Apply route as needed (incontinence). 30 Each 6    nitroglycerin (NITROSTAT) 0.4 mg SL tablet 1 Tab by SubLINGual route every five (5) minutes as needed for Chest Pain (call 911 if not relieved by 3). 25 Tab 1     No current facility-administered medications on file prior to visit. No Known Allergies    Objective:  Visit Vitals    Wt 169 lb (76.7 kg)    BMI 24.96 kg/m2    Body mass index is 24.96 kg/(m^2).     Assessment of cognitive impairment: Alert and oriented x 3    Depression Screen:   PHQ over the last two weeks 3/26/2018   PHQ Not Done -   Little interest or pleasure in doing things Not at all   Feeling down, depressed or hopeless Several days   Total Score PHQ 2 1   Trouble falling or staying asleep, or sleeping too much -   Feeling tired or having little energy -   Poor appetite or overeating -   Feeling bad about yourself - or that you are a failure or have let yourself or your family down -   Trouble concentrating on things such as school, work, reading or watching TV -   Moving or speaking so slowly that other people could have noticed; or the opposite being so fidgety that others notice -   Thoughts of being better off dead, or hurting yourself in some way -   PHQ 9 Score -   How difficult have these problems made it for you to do your work, take care of your home and get along with others -       Fall Risk Assessment:    Fall Risk Assessment, last 12 mths 3/26/2018   Able to walk? No   Fall in past 12 months? -   Fall with injury? -   Number of falls in past 12 months -   Fall Risk Score -       Functional Ability:   Does the patient exhibit a steady gait? No,unbalanced   How long did it take the patient to get up and walk from a sitting position? 5 to 6 seconds   Is the patient self reliant?  (ie can do own laundry, meals, household chores)  yes     Does the patient handle his/her own medications? No,wife handles this     Does the patient handle his/her own money? No,wife handles this     Is the patients home safe (ie good lighting, handrails on stairs and bath, etc.)? yes     Did you notice or did patient express any hearing difficulties? Yes,wears hearing aids     Did you notice or did patient express any vision difficulties? Yes,wears glasses     Were distance and reading eye charts used? no       Advance Care Planning:   Patient was offered the opportunity to discuss advance care planning:  yes     Does patient have an Advance Directive:  no   If no, did you provide information on [facility]ctions? yes       Plan: Information reviewed.   Hemoccult fit test ordered shingles shot recommended  Sertraline increased    No orders of the defined types were placed in this encounter. Health Maintenance   Topic Date Due    FOBT Q 1 YEAR AGE 50-75  04/01/1994    ZOSTER VACCINE AGE 60>  02/01/2004    GLAUCOMA SCREENING Q2Y  04/01/2009    MEDICARE YEARLY EXAM  06/30/2018    Influenza Age 9 to Adult  08/01/2018    DTaP/Tdap/Td series (2 - Td) 06/29/2027    Pneumococcal 65+ High/Highest Risk  Completed       *Patient verbalized understanding and agreement with the plan. A copy of the After Visit Summary with personalized health plan was given to the patient today. Chief Complaint   Patient presents with    Cholesterol Problem     3 month f/u    Annual Wellness Visit     [facility] Maintenance Due   Topic Date Due    FOBT Q 1 YEAR AGE 50-75  04/01/1994 gave this to patient    ZOSTER VACCINE AGE 60>  02/01/2004     GLAUCOMA SCREENING Q2Y  04/01/2009 has a future appointment set up   [address] [place] [address]  06/30/2018     Visit Vitals    /72 (BP 1 Location: Left arm, BP Patient Position: Sitting)    Pulse (!) 58    Temp 97.9 °F (36.6 °C) (Oral)    Resp 16    Ht 5' 9\" (1.753 m)    Wt 169 lb (76.7 kg)    BMI 24.96 kg/m2     1. Have you been to the ER, urgent care clinic since your last visit? Hospitalized since your last visit? No    2. Have you seen or consulted any other health care providers outside of the [address] [place] [address] since your last visit? Include any pap smears or colon screening.  No   Geno Webber LPN

## 2018-07-03 NOTE — PROGRESS NOTES
Katherine Hernandez is a 75 y.o. male who presents to the office today with the following:  Chief Complaint   Patient presents with    Cholesterol Problem     3 month f/u    Annual Wellness Visit     ,Sub,Well       No Known Allergies    Current Outpatient Prescriptions   Medication Sig    gabapentin (NEURONTIN) 400 mg capsule Take 1 Cap by mouth three (3) times daily.  sertraline (ZOLOFT) 100 mg tablet TAKE 1 TABLET BY MOUTH EVERY DAY    fluticasone (FLONASE) 50 mcg/actuation nasal spray 2 Sprays by Both Nostrils route daily.  atorvastatin (LIPITOR) 40 mg tablet TAKE 1 TABLET BY MOUTH EVERY DAY    meclizine (ANTIVERT) 25 mg tablet Take 1 Tab by mouth three (3) times daily as needed for Dizziness.  lisinopril (PRINIVIL, ZESTRIL) 5 mg tablet TAKE 1 TABLET BY MOUTH DAILY FOR 30 DAYS    memantine (NAMENDA) 10 mg tablet Take 1 tab twice a day    bisacodyl (DULCOLAX, BISACODYL,) 5 mg EC tablet Take 5 mg by mouth daily as needed for Constipation.  ELIQUIS 5 mg tablet TAKE 1 TABLET BY MOUTH EVERY 12 HOURS    metoprolol succinate (TOPROL-XL) 25 mg XL tablet Take 1 Tab by mouth daily. Indications: hypertension    polyethylene glycol (MIRALAX) 17 gram/dose powder Take 17 g by mouth daily.  Incontinence Pad, Liner, Disp (BLADDER CONTROL PADS EX ABSORB) pads 1 Packet by Does Not Apply route as needed (incontinence).  nitroglycerin (NITROSTAT) 0.4 mg SL tablet 1 Tab by SubLINGual route every five (5) minutes as needed for Chest Pain (call 911 if not relieved by 3). No current facility-administered medications for this visit.         Past Medical History:   Diagnosis Date    AICD (automatic cardioverter/defibrillator) present 5/13/2016 5/13/16 [employer] upgrade to dual chamber AICD implant  Dr. Luisa Monroy state, other     Arrhythmia     'S IN THE PAST    Arthritis     OSTEO ARTHRITIS    AVNRT (AV bridgette re-entry tachycardia) ([facility] [address] 5/12/2016 5/12/16 AVNRT ablation: PM/AICD left upper chest :Dr. Josafat Gibson Back ache     CAD (coronary artery disease)     Cancer [facility]) 2004    Prostate cancer    Chest tightness     last episode of chest pain 5/2016 before AICD placed; none since then    Coagulation defects 2005    FACTOR V LEIDEN    Dementia     Dizziness     FH: factor V Leiden deficiency     Generalized muscle ache     GERD (gastroesophageal reflux disease)     HEARTBURN    Heart failure ([facility] [address] 2014    EF 40%; Dr. Nataly Brown    Hyperlipidemia, mixed     Hypertension     COREG    Other ill-defined conditions(799.89) 2004    blood transfusion    Pacemaker     Pacemaker     Postsurgical aortocoronary bypass status 05/29/2012    CABG    Presence of cardiac defibrillator 05/2016    left upper chest    Psychiatric disorder     depression    Stroke [facility]) 2011, 1990's    SEVERAL-mini    Thromboembolism ([facility] [address] 2014    left leg: changed to Eliquis from Warfarin    Thromboembolus ([facility] [address] 2005    left leg    Weakness generalized        Past Surgical History:   Procedure Laterality Date    CARDIAC CATHETERIZATION  3/4/2011         CARDIAC SURG PROCEDURE UNLIST      CABG X1 3/5/11    HX HERNIA REPAIR  2002    Ingiunal hernia repair left    HX ORTHOPAEDIC      LEFT KNEE CARTILAGE REPAIR;RIGHT ROTATOR CUFF REPAIR    HX ORTHOPAEDIC  2/14/12    C5-7 ANTERIIOR CERVICAL DISECTOMY AND FUSION    HX ORTHOPAEDIC  6/4/13    C5-C7 POSTERIOR DECOMPRESSION AND FUSION    HX OTHER SURGICAL      steroid injections    HX PACEMAKER Left     HX PACEMAKER PLACEMENT      [place]    HX UROLOGICAL       urinary control system    HX UROLOGICAL  12/3/13    REPLACEMENT ARTIFICAL URINARY SPHINCTER       History   Smoking Status    Former Smoker    Types: Pipe, Cigars    Quit date: 10/10/1971   Smokeless Tobacco    Never Used       Family History   Problem Relation Age of Onset    Asthma Mother     Alcohol abuse Mother     Alcohol abuse Father     Asthma Father     Cancer Sister     Heart Disease Sister     Alcohol abuse Brother     Cancer Brother     Heart Disease Brother          History of Present Illness:  Patient here for Medicare wellness visit and ongoing medical follow-up    Patient was seen last earlier in the spring for symptoms of dizziness and gait unsteadiness. Cranial CAT scan done at that time revealed no acute changes. He had recently increase his dose of Neurontin and hydrocodone. Those were both held. He did go to the emergency room afterwards and received some meclizine. Since that time symptoms have improved. No further dizziness. He is back on the gabapentin. The hydrocodone has not been restarted. No complaints of dizziness today. Patient does have chronic degenerative disc disease lumbar and cervical area. He has had surgery in the past.  He is currently getting Facet injections. He does have continued low back pain with some radiation down his right leg toward the ankle. He does wear back brace. He has an appointment later on this month for some additional injections in his back. He is going to keep planned follow-up with his orthopedist.  No new complaints there    Patient does have hypertension. Blood pressure in good control on his current regimen. Most recent basic metabolic profile normal 8/90. No cardiac complaints. Patient on Eliquis. Follows with cardiology    Patient does have hyperlipidemia. Compliant with this medication. Recent lipid panel acceptable with normal LFTs March 2018. Patient has extensive cardiac history including coronary artery disease post stenting. History of cardiomyopathy and CHF. History of atrial fibrillation. History of sick sinus syndrome and PSVT with an implantable defibrillator/pacemaker. He is compliant with his Eliquis as noted he has no cardiac complaints. He keeps routine follow-up with cardiology.     Patient does have a history of anxiety and depression. Currently on Zoloft. When I asked the day he admits that he feels sad at times. He also gets very irritable. He tells me he then says mean things to his wife which she regrets. She confirmed this. No reported physical abuse. No thoughts of hurting himself. He would like to increase his Zoloft      Patient does have history of some dementia. He is currently followed by neurology. They do have him on Namenda. He is up-to-date with his follow-ups there    Patient with remote history of prostate cancer. Had surgery with the prostatectomy in 2005. No prostate complaints. Addendum 9/24/18. I confirmed that patient has had problems with urinary incontinence ever since his prostate surgery and does use disposable liner. .  No recent exams. PSA 0 4/18    Patient with a history of factor V Leiden defect. Currently on Eliquis. No recent thromboembolic events. He has had DVT in the past.    Remote history of GERD but currently a symptomatically no medications    Patient does have history of B12 deficiency. Not currently on replacement. B12 levels normal 4/18    Patient states over the weekend he was watching an automobile race on a dirt track. Something flew into his left eye. Was read at that time. It is improving at this point. His vision is normal there is no pain there is no discharge. we discussed routine screening for colon cancer. He declines colonoscopy he has previously declined the Hemoccult fit test.  He was willing to do this today    Patient has had both pneumonia shots. Tetanus booster 2012. He gets flu shots.   Shingles shot has been advised for his pharmacy          Review of Systems:      Review of systems negative except as noted above    Physical Exam:  Visit Vitals    /72 (BP 1 Location: Left arm, BP Patient Position: Sitting)    Pulse (!) 58    Temp 97.9 °F (36.6 °C) (Oral)    Resp 16    Ht 5' 9\" (1.753 m)    Wt 169 lb (76.7 kg)    BMI 24.96 kg/m2 Vitals:    07/03/18 1316   BP: 133/72   BP 1 Location: Left arm   BP Patient Position: Sitting   Pulse: (!) 58   Resp: 16   Temp: 97.9 °F (36.6 °C)   TempSrc: Oral   Weight: 169 lb (76.7 kg)   Height: 5' 9\" (1.753 m)     Patient no acute distress vital signs stable as above  Head is normocephalic  External ears normal.  Hearing aids in place  Eyes PERRLA. EOMs full. Sclera clear on the right. The left eye did have a sub-conjunctival hemorrhage lower portion of the sclera. Fluorescein test was negative. Vision was grossly normal.  I have recommended routine eye exam with an eye doctor  Nose within normal limits. Nares  normal.  No significant congestion  OP mucosa normal, no obvious lesions. Pharynx normal.  No erythema or exudate. Structures midline. Edentulous  Neck no nodes no masses no bruits. Band-Aid left side of his neck where he cut himself shaving this morning  Chest was clear no wheezes rhonchi or rales. Good air exchange  Cor regular rate and rhythm, sinus rhythm today no S3-S4 no murmurs  Abdomen no HSM no masses soft and was nontender  He was wearing a back brace  Extremities no edema. Nontender. Good range of motion  Gait and gross neurologic exam were normal  Gait was normal  Negative sitting straight leg raising sign. No calf tenderness. Good pedal pulses on the right    1. Medicare annual wellness visit, subsequent  See separate Medicare wellness note    2. Essential hypertension  Blood pressure in good control. Recent lab work stable. Overall patient medically stable. He is can continue his current medical regimen except as noted below. I will see him back in 3 months for follow-up sooner if needed    3. Hyperlipidemia, unspecified hyperlipidemia type  In good control on his current regiment    4. DDD (degenerative disc disease), lumbar  We will continue his Neurontin. Patient will follow with his surgeon    5.  Chronic anticoagulation  We will continue current medications including Eliquis. Patient will follow with cardiology    6. Conjunctival hemorrhage of left eye  Mild  subconjunctival hemorrhage. Improving on its own. Patient will notify me if not continuing to resolve    7. Depression, unspecified depression type  I am increasing his Zoloft to 150 mg daily. - sertraline (ZOLOFT) 100 mg tablet; 1-1/2 tablets daily  Dispense: 45 Tab; Refill: 5    8. Screening for colon cancer    - OCCULT BLOOD, IMMUNOASSAY (FIT); Future        Patient Instructions     Schedule of Personalized Health Plan  (Provide Copy to Patient)  The best way to stay healthy is to live a healthy lifestyle. A healthy lifestyle includes regular exercise, eating a well-balanced diet, keeping a healthy weight and not smoking. Regular physical exams and screening tests are another important way to take care of yourself. Preventive exams provided by health care providers can find health problems early when treatment works best and can keep you from getting certain diseases or illnesses. Preventive services include exams, lab tests, screenings, shots, monitoring and information to help you take care of your own health. All people over 65 should have a pneumonia shot. Pneumonia shots are usually only needed once in a lifetime unless your doctor decides differently. All people over 65 should have a yearly flu shot. People over 65 are at medium to high risk for Hepatitis B. Three shots are needed for complete protection. In addition to your physical exam, some screening tests are recommended:    Bone mass measurement (dexa scan) is recommended every two years if you have certain risk factors, such as personal history of vertebral fracture or chronic steroid medication use    Diabetes Mellitus screening is recommended every year. Glaucoma is an eye disease caused by high pressure in the eye. An eye exam is recommended every year.      Cardiovascular screening tests that check your cholesterol and other blood fat (lipid) levels are recommended every five years. Colorectal Cancer screening tests help to find pre-cancerous polyps (growths in the colon) so they can be removed before they turn into cancer. Tests ordered for screening depend on your personal and family history risk factors. Screening for Prostate Cancer is recommended yearly with a digital rectal exam and/or a PSA test    Here is a list of your current Health Maintenance items with a due date:  Health Maintenance   Topic Date Due    FOBT Q 1 YEAR AGE 50-75  04/01/1994    ZOSTER VACCINE AGE 60>  02/01/2004    GLAUCOMA SCREENING Q2Y  04/01/2009    MEDICARE YEARLY EXAM  06/30/2018    Influenza Age 9 to Adult  08/01/2018    DTaP/Tdap/Td series (2 - Td) 06/29/2027    Pneumococcal 65+ High/Highest Risk  Completed     Continue current medications  Increase sertraline to 1-1/2 tablets daily    Work on diet and exercise    Turn in stool study    Keep planned follow up visit         Continue current therapy plan except for indicated above. Verbal and written instructions (see AVS) provided.  Patient expresses understanding of diagnosis and treatment plan. Follow-up Disposition:  Return in about 3 months (around 10/3/2018). Piotr Bedolla.  Yudith Alcantar MD

## 2018-07-03 NOTE — MR AVS SNAPSHOT
[facility] 
[phone] Patient: Katherine Hernandez MRN: [id number] PATO:[birth year] Visit Information Date & Time Provider Department Dept. Phone Encounter #  
 7/3/2018  1:20 PM Sarah Wheeler MD [address] [place] [address] [phone] [id number] Your Appointments 7/19/2018 10:15 AM  
PROCEDURE with PACEMAKER, [facility]) Appt Note: BSC DC ICD 3mo check, no landline [id number] [facility]  
[phone] [id number] [facility]  
  
    
 11/12/2018  2:00 PM  
Follow Up with Rubina Mendez MD  
[facility] Appt Note: f/u memory, jrb 3/20/18  
 [facility], 
[address] [place] [address], [phone]  
[address] [facility], [address] [place] [address], [phone] Upcoming Health Maintenance Date Due FOBT Q 1 YEAR AGE 50-75 4/1/1994 ZOSTER VACCINE AGE 60> 2/1/2004 GLAUCOMA SCREENING Q2Y 4/1/2009 MEDICARE YEARLY EXAM 6/30/2018 Influenza Age 5 to Adult 8/1/2018 DTaP/Tdap/Td series (2 - Td) 6/29/2027 Allergies as of 7/3/2018  Review Complete On: 7/3/2018 By: Sarah Wheeler MD  
 No Known Allergies Current Immunizations  Reviewed on 7/17/2017 Name Date Influenza High Dose Vaccine PF 10/11/2017, 11/10/2016 Influenza Vaccine 9/16/2014 Influenza Vaccine Split 9/14/2011 Influenza Vaccine Whole 9/1/2010 Pneumococcal Conjugate (PCV-13) 11/10/2016 Pneumococcal Polysaccharide (PPSV-23) 11/13/2012 12:00 AM  
 Td 5/8/2012 ZZZ-RETIRED (DO NOT USE) Pneumococcal Vaccine (Unspecified Type) 9/14/2011 Not reviewed this visit You Were Diagnosed With   
  
 Codes Comments Medicare annual wellness visit, subsequent    -  Primary ICD-10-CM: Z00.00 ICD-9-CM: V70.0 Vitals BP Pulse Temp Resp Height(growth percentile) Weight(growth percentile) 133/72 (BP 1 Location: Left arm, BP Patient Position: Sitting) (!) 58 97.9 °F (36.6 °C) (Oral) 16 5' 9\" (1.753 m) 169 lb (76.7 kg) BMI Smoking Status 24.96 kg/m2 Former Smoker Vitals History BMI and BSA Data Body Mass Index Body Surface Area 24.96 kg/m 2 1.93 m 2 Preferred Pharmacy Pharmacy Name Phone [facility] [address] [facility], [address] [place] [address] Your Updated Medication List  
  
   
This list is accurate as of 7/3/18  2:10 PM.  Always use your most recent med list.  
  
  
  
  
 atorvastatin 40 mg tablet Commonly known as:  LIPITOR  
TAKE 1 TABLET BY MOUTH EVERY DAY  
  
 DULCOLAX (BISACODYL) 5 mg EC tablet Generic drug:  bisacodyl Take 5 mg by mouth daily as needed for Constipation. ELIQUIS 5 mg tablet Generic drug:  apixaban TAKE 1 TABLET BY MOUTH EVERY 12 HOURS  
  
 fluticasone 50 mcg/actuation nasal spray Commonly known as:  [place] 2 Sprays by Both Nostrils route daily. gabapentin 400 mg capsule Commonly known as:  NEURONTIN Take 1 Cap by mouth three (3) times daily. Incontinence Pad, Liner, Disp Pads Commonly known as:  BLADDER CONTROL PADS EX ABSORB  
1 Packet by Does Not Apply route as needed (incontinence). lisinopril 5 mg tablet Commonly known as:  PRINIVIL, ZESTRIL  
TAKE 1 TABLET BY MOUTH DAILY FOR 30 DAYS  
  
 meclizine 25 mg tablet Commonly known as:  ANTIVERT Take 1 Tab by mouth three (3) times daily as needed for Dizziness. memantine 10 mg tablet Commonly known as:  Norlin Gambler Take 1 tab twice a day  
  
 metoprolol succinate 25 mg XL tablet Commonly known as:  TOPROL-XL Take 1 Tab by mouth daily. Indications: hypertension MIRALAX 17 gram/dose powder Generic drug:  polyethylene glycol Take 17 g by mouth daily. nitroglycerin 0.4 mg SL tablet Commonly known as:  NITROSTAT  
1 Tab by SubLINGual route every five (5) minutes as needed for Chest Pain (call 911 if not relieved by 3). sertraline 100 mg tablet Commonly known as:  ZOLOFT  
TAKE 1 TABLET BY MOUTH EVERY DAY Patient Instructions Schedule of Personalized Health Plan (Provide Copy to Patient) The best way to stay healthy is to live a healthy lifestyle. A healthy lifestyle includes regular exercise, eating a well-balanced diet, keeping a healthy weight and not smoking. Regular physical exams and screening tests are another important way to take care of yourself. Preventive exams provided by health care providers can find health problems early when treatment works best and can keep you from getting certain diseases or illnesses. Preventive services include exams, lab tests, screenings, shots, monitoring and information to help you take care of your own health. All people over 65 should have a pneumonia shot. Pneumonia shots are usually only needed once in a lifetime unless your doctor decides differently. All people over 65 should have a yearly flu shot. People over 65 are at medium to high risk for Hepatitis B. Three shots are needed for complete protection. In addition to your physical exam, some screening tests are recommended: 
 
Bone mass measurement (dexa scan) is recommended every two years if you have certain risk factors, such as personal history of vertebral fracture or chronic steroid medication use Diabetes Mellitus screening is recommended every year. Glaucoma is an eye disease caused by high pressure in the eye. An eye exam is recommended every year. Cardiovascular screening tests that check your cholesterol and other blood fat (lipid) levels are recommended every five years.   
 
Colorectal Cancer screening tests help to find pre-cancerous polyps (growths in the colon) so they can be removed before they turn into cancer. Tests ordered for screening depend on your personal and family history risk factors. Screening for Prostate Cancer is recommended yearly with a digital rectal exam and/or a PSA test 
 
Here is a list of your current Health Maintenance items with a due date: 
Health Maintenance Topic Date Due  
 FOBT Q 1 YEAR AGE 50-75  04/01/1994  ZOSTER VACCINE AGE 60>  02/01/2004  GLAUCOMA SCREENING Q2Y  04/01/2009  MEDICARE YEARLY EXAM  06/30/2018  Influenza Age 5 to Adult  08/01/2018  DTaP/Tdap/Td series (2 - Td) 06/29/2027  Pneumococcal 65+ High/Highest Risk  Completed Introducing [facility] SERVICES! Dear Eloy Monique: Thank you for requesting a [facility] account. Our records indicate that you already have an active [facility] account. You can access your account anytime at [email or web address] Did you know that you can access your hospital and ER discharge instructions at any time in [facility]? You can also review all of your test results from your hospital stay or ER visit. Additional Information If you have questions, please visit the Frequently Asked Questions section of the [facility] website at [email or web address] Remember, [facility] is NOT to be used for urgent needs. For medical emergencies, dial 911. Now available from your iPhone and Android! Please provide this summary of care documentation to your next provider. Your primary care clinician is listed as Kym Tarango. If you have any questions after today's visit, please call [phone].

## 2018-07-03 NOTE — PATIENT INSTRUCTIONS
Schedule of Personalized Health Plan  (Provide Copy to Patient)  The best way to stay healthy is to live a healthy lifestyle. A healthy lifestyle includes regular exercise, eating a well-balanced diet, keeping a healthy weight and not smoking. Regular physical exams and screening tests are another important way to take care of yourself. Preventive exams provided by health care providers can find health problems early when treatment works best and can keep you from getting certain diseases or illnesses. Preventive services include exams, lab tests, screenings, shots, monitoring and information to help you take care of your own health. All people over 65 should have a pneumonia shot. Pneumonia shots are usually only needed once in a lifetime unless your doctor decides differently. All people over 65 should have a yearly flu shot. People over 65 are at medium to high risk for Hepatitis B. Three shots are needed for complete protection. In addition to your physical exam, some screening tests are recommended:    Bone mass measurement (dexa scan) is recommended every two years if you have certain risk factors, such as personal history of vertebral fracture or chronic steroid medication use    Diabetes Mellitus screening is recommended every year. Glaucoma is an eye disease caused by high pressure in the eye. An eye exam is recommended every year. Cardiovascular screening tests that check your cholesterol and other blood fat (lipid) levels are recommended every five years. Colorectal Cancer screening tests help to find pre-cancerous polyps (growths in the colon) so they can be removed before they turn into cancer. Tests ordered for screening depend on your personal and family history risk factors.     Screening for Prostate Cancer is recommended yearly with a digital rectal exam and/or a PSA test    Here is a list of your current Health Maintenance items with a due date:  Health Maintenance Topic Date Due    FOBT Q 1 YEAR AGE 50-75  04/01/1994    ZOSTER VACCINE AGE 60>  02/01/2004    GLAUCOMA SCREENING Q2Y  04/01/2009    MEDICARE YEARLY EXAM  06/30/2018    Influenza Age 9 to Adult  08/01/2018    DTaP/Tdap/Td series (2 - Td) 06/29/2027    Pneumococcal 65+ High/Highest Risk  Completed     Continue current medications  Increase sertraline to 1-1/2 tablets daily    Work on diet and exercise    Turn in stool study    Keep planned follow up visit

## 2018-07-19 ENCOUNTER — CLINICAL SUPPORT (OUTPATIENT)
Dept: CARDIOLOGY CLINIC | Age: 73
End: 2018-07-19

## 2018-07-19 DIAGNOSIS — I48.0 PAROXYSMAL ATRIAL FIBRILLATION (HCC): ICD-10-CM

## 2018-07-19 DIAGNOSIS — Z95.810 AICD (AUTOMATIC CARDIOVERTER/DEFIBRILLATOR) PRESENT: Primary | ICD-10-CM

## 2018-07-19 DIAGNOSIS — I47.1 PAROXYSMAL SUPRAVENTRICULAR TACHYCARDIA (HCC): ICD-10-CM

## 2018-07-19 DIAGNOSIS — I50.22 CHRONIC SYSTOLIC HEART FAILURE (HCC): ICD-10-CM

## 2018-07-25 ENCOUNTER — DOCUMENTATION ONLY (OUTPATIENT)
Dept: FAMILY MEDICINE CLINIC | Age: 73
End: 2018-07-25

## 2018-07-25 ENCOUNTER — OFFICE VISIT (OUTPATIENT)
Dept: FAMILY MEDICINE CLINIC | Age: 73
End: 2018-07-25

## 2018-07-25 VITALS
DIASTOLIC BLOOD PRESSURE: 68 MMHG | WEIGHT: 169.6 LBS | TEMPERATURE: 95.7 F | BODY MASS INDEX: 24.28 KG/M2 | SYSTOLIC BLOOD PRESSURE: 121 MMHG | HEIGHT: 70 IN | RESPIRATION RATE: 14 BRPM | HEART RATE: 64 BPM

## 2018-07-25 DIAGNOSIS — S46.812A TRAPEZIUS STRAIN, LEFT, INITIAL ENCOUNTER: ICD-10-CM

## 2018-07-25 DIAGNOSIS — M25.612 DECREASED ROM OF LEFT SHOULDER: ICD-10-CM

## 2018-07-25 DIAGNOSIS — M25.512 ACUTE PAIN OF LEFT SHOULDER: Primary | ICD-10-CM

## 2018-07-25 DIAGNOSIS — M19.012 ARTHRITIS OF LEFT SHOULDER REGION: ICD-10-CM

## 2018-07-25 RX ORDER — HYDROCODONE BITARTRATE AND ACETAMINOPHEN 5; 325 MG/1; MG/1
1 TABLET ORAL
Qty: 15 TAB | Refills: 0 | Status: SHIPPED | OUTPATIENT
Start: 2018-07-25 | End: 2018-08-14 | Stop reason: SDUPTHER

## 2018-07-25 NOTE — PERIOP NOTES
[facility]  Ambulatory Surgery Unit  Pre-operative Instructions    Procedure Date  Tuesday, July 31, 2018            Tentative Arrival Time 200      1. On the day of your procedure, please report to the Ambulatory Surgery Unit Registration Desk and sign in at your designated time. The Ambulatory Surgery Unit is located in [facility] on the [facility] side of the [facility] across from the [address] [place] [address]. Please have all of your health insurance cards and a photo ID. 2. You must have someone with you to drive you home as directed by your surgeon. 3. You may have a light breakfast and take normal morning medications. 4. We recommend you do not drink any alcoholic beverages for 24 hours before and after your procedure. 5. Contact your surgeons office for instructions on the following medications: non-steroidal anti-inflammatory drugs (i.e. Advil, Aleve), vitamins, and supplements. (Some surgeons will want you to stop these medications prior to surgery and others may allow you to take them)   **If you are currently taking Plavix, Coumadin, Aspirin and/or other blood-thinning agents, contact your surgeon for instructions. ** Your surgeon will partner with the physician prescribing these medications to determine if it is safe to stop or if you need to continue taking. Please do not stop taking these medications without instructions from your surgeon. 6. In an effort to help prevent surgical site infection, we ask that you shower with an anti-bacterial soap (i.e. Dial or Safeguard) on the morning of your procedure. Do not apply any lotions, powders, or deodorants after showering. 7. Wear comfortable clothes. Wear glasses instead of contacts. Do not bring any jewelry or money (other than copays or fees as instructed). Do not wear make-up, particularly mascara, the morning of your procedure. Wear your hair loose or down, no ponytails, buns, louise pins or clips.  All body piercings must be removed. 8. You should understand that if you do not follow these instructions your procedure may be cancelled. If your physical condition changes (i.e. fever, cold or flu) please contact your surgeon as soon as possible. 9. It is important that you be on time. If a situation occurs where you may be late, or if you have any questions or problems, please call [phone].    10. Your procedure time may be subject to change. You will receive a phone call the day prior to confirm your arrival time. I understand a pre-operative phone call will be made to verify my procedure time. In the event that I am not available, I give permission for a message to be left on my answering service and/or with another person?       yes    Preop instructions reviewed  Pt verbalized understanding.      ___________________      ___________________      ___________________  (Signature of Patient)          (Witness)                   (Date and Time)

## 2018-07-25 NOTE — PROGRESS NOTES
Chief Complaint   Patient presents with    Arm Pain     left arm pain since yesterday, can't move left arm today     Health Maintenance Due   Topic Date Due    FOBT Q 1 YEAR AGE 50-75  04/01/1994    ZOSTER VACCINE AGE 60>  02/01/2004    GLAUCOMA SCREENING Q2Y  04/01/2009     Visit Vitals    /68 (BP 1 Location: Right arm, BP Patient Position: Sitting)    Pulse 64    Temp 95.7 °F (35.4 °C) (Oral)    Resp 14    Ht 5' 10\" (1.778 m)    Wt 169 lb 9.6 oz (76.9 kg)    BMI 24.34 kg/m2     1. Have you been to the ER, urgent care clinic since your last visit? Hospitalized since your last visit? No    2. Have you seen or consulted any other health care providers outside of the [address] [place] [address] since your last visit? Include any pap smears or colon screening.  No    Antonio Ledezma LPN

## 2018-07-25 NOTE — PROGRESS NOTES
Katherine Hernandez is a 75 y.o. male who presents to the office today with the following:  Chief Complaint   Patient presents with    Arm Pain     left arm pain since yesterday, can't move left arm today       HPI  Pain in left shoulder x 1 day after lifting large  motor yesterday. Arm also feels a little swollen. No redness, warmth. Some tingling, no numbness. Unable to lift arm much in any direction due to severe pain. Known arthritis- XR last May of \"moderate spurring at the margins [facility] joint and at  the caudal margin of the glenohumeral joint. The space between the acromion and  the humeral head is narrowed. Coronal acromial spur is also noted. The  visualized portions of the left lung and ribs are unremarkable. Extensive spinal  fixation hardware within the cervical spine. \"    H/o RC injury other shoulder. Has f/u with Ortho for inj in back next week. Otherwise no other new sxs or addnl complaints today. Took zanaflex this morning w/o relief. Review of Systems   Constitutional: Negative for chills, fever and malaise/fatigue. Respiratory: Negative for shortness of breath. Cardiovascular: Negative for chest pain. Gastrointestinal: Negative. Musculoskeletal: Positive for joint pain. Negative for falls and neck pain. Skin: Negative for rash. Neurological: Positive for tingling. Negative for sensory change. See HPI.     Past Medical History:   Diagnosis Date    AICD (automatic cardioverter/defibrillator) present 5/13/2016 5/13/16 [employer] upgrade to dual chamber AICD implant  Dr. Luisa Monroy state, other     Arrhythmia     'S IN THE PAST    Arthritis     OSTEO ARTHRITIS    AVNRT (AV bridgette re-entry tachycardia) ([facility] [address] 5/12/2016 5/12/16 AVNRT ablation: PM/AICD left upper chest :Dr. Kecia Spence Back ache     CAD (coronary artery disease)     Cancer [facility]) 2004    Prostate cancer    Chest tightness     last episode of chest pain 5/2016 before AICD placed; none since then    Coagulation defects 2005    FACTOR V LEIDEN    Dementia     Dizziness     FH: factor V Leiden deficiency     Generalized muscle ache     GERD (gastroesophageal reflux disease)     HEARTBURN    Heart failure ([facility] [address] 2014    EF 40%; Dr. Ole Klinefelter    Hyperlipidemia, mixed     Hypertension     COREG    Other ill-defined conditions(799.89) 2004    blood transfusion    Pacemaker     Pacemaker     Postsurgical aortocoronary bypass status 05/29/2012    CABG    Presence of cardiac defibrillator 05/2016    left upper chest    Psychiatric disorder     depression    Stroke [facility]) 2011, 1990's    SEVERAL-mini    Thromboembolism ([facility] [address] 2014    left leg: changed to Eliquis from Warfarin    Thromboembolus ([facility] [address] 2005    left leg    Weakness generalized        Past Surgical History:   Procedure Laterality Date    CARDIAC CATHETERIZATION  3/4/2011         CARDIAC SURG PROCEDURE UNLIST      CABG X1 3/5/11    HX HERNIA REPAIR  2002    Ingiunal hernia repair left    HX ORTHOPAEDIC      LEFT KNEE CARTILAGE REPAIR;RIGHT ROTATOR CUFF REPAIR    HX ORTHOPAEDIC  2/14/12    C5-7 ANTERIIOR CERVICAL DISECTOMY AND FUSION    HX ORTHOPAEDIC  6/4/13    C5-C7 POSTERIOR DECOMPRESSION AND FUSION    HX ORTHOPAEDIC      right thumb partial amputation of tip    HX OTHER SURGICAL      steroid injections    HX PACEMAKER Left     HX PACEMAKER PLACEMENT      H[place] PROSTATECTOMY  2004     [place]    HX UROLOGICAL       urinary control system    HX UROLOGICAL  12/3/13    REPLACEMENT ARTIFICAL URINARY SPHINCTER       No Known Allergies    Current Outpatient Prescriptions   Medication Sig    HYDROcodone-acetaminophen (NORCO) 5-325 mg per tablet Take 1 Tab by mouth every six (6) hours as needed for Pain. Max Daily Amount: 4 Tabs.  sertraline (ZOLOFT) 100 mg tablet 1-1/2 tablets daily (Patient taking differently: nightly.  1-1/2 tablets daily)    gabapentin (NEURONTIN) 400 mg capsule Take 1 Cap by mouth three (3) times daily.  atorvastatin (LIPITOR) 40 mg tablet TAKE 1 TABLET BY MOUTH EVERY DAY    lisinopril (PRINIVIL, ZESTRIL) 5 mg tablet TAKE 1 TABLET BY MOUTH DAILY FOR 30 DAYS    memantine (NAMENDA) 10 mg tablet Take 1 tab twice a day    bisacodyl (DULCOLAX, BISACODYL,) 5 mg EC tablet Take 5 mg by mouth daily as needed for Constipation.  ELIQUIS 5 mg tablet TAKE 1 TABLET BY MOUTH EVERY 12 HOURS    metoprolol succinate (TOPROL-XL) 25 mg XL tablet Take 1 Tab by mouth daily. Indications: hypertension    polyethylene glycol (MIRALAX) 17 gram/dose powder Take 17 g by mouth daily.  Incontinence Pad, Liner, Disp (BLADDER CONTROL PADS EX ABSORB) pads 1 Packet by Does Not Apply route as needed (incontinence).  nitroglycerin (NITROSTAT) 0.4 mg SL tablet 1 Tab by SubLINGual route every five (5) minutes as needed for Chest Pain (call 911 if not relieved by 3).  fluticasone (FLONASE) 50 mcg/actuation nasal spray 2 Sprays by Both Nostrils route daily.  meclizine (ANTIVERT) 25 mg tablet Take 1 Tab by mouth three (3) times daily as needed for Dizziness. No current facility-administered medications for this visit.         Social History     Social History    Marital status:      Spouse name: N/A    Number of children: N/A    Years of education: N/A     Social History Main Topics    Smoking status: Former Smoker     Types: Pipe, Cigars     Quit date: 10/10/1971    Smokeless tobacco: Never Used    Alcohol use No    Drug use: No    Sexual activity: No     Other Topics Concern    Sleep Concern Yes    Stress Concern Yes     Family concerns     Social History Narrative    , 2 children       Family History   Problem Relation Age of Onset    Asthma Mother     Alcohol abuse Mother     Alcohol abuse Father     Asthma Father     Cancer Sister     Heart Disease Sister     Alcohol abuse Brother     Cancer Brother     Heart Disease Brother          Physical Exam:  Visit Vitals    /68 (BP 1 Location: Right arm, BP Patient Position: Sitting)    Pulse 64    Temp 95.7 °F (35.4 °C) (Oral)    Resp 14    Ht 5' 10\" (1.778 m)    Wt 169 lb 9.6 oz (76.9 kg)    BMI 24.34 kg/m2     Physical Exam   Constitutional: He is oriented to person, place, and time and well-developed, well-nourished, and in no distress. HENT:   Head: Normocephalic and atraumatic. Eyes: Conjunctivae are normal.   Neck: Neck supple. Cardiovascular: Normal rate and regular rhythm. Pulmonary/Chest: Effort normal and breath sounds normal.   Musculoskeletal: He exhibits tenderness. He exhibits no edema. Left shoulder: He exhibits decreased range of motion, tenderness (diffuse L shoulder and notably over L trap, severe at [facility] jt/bicipital groove), pain and decreased strength (throughout, secondary to severe pain). He exhibits no bony tenderness, no swelling, no effusion, no deformity, no laceration, no spasm and normal pulse. Left elbow: Normal.        Left wrist: Normal.   Neurological: He is alert and oriented to person, place, and time. Skin: Skin is warm and dry. Psychiatric: Mood and affect normal.   Nursing note and vitals reviewed. Assessment/Plan:    ICD-10-CM ICD-9-CM    1. Acute pain of left shoulder M25.512 719.41 REFERRAL TO ORTHOPEDICS      HYDROcodone-acetaminophen (NORCO) 5-325 mg per tablet   2. Trapezius strain, left, initial encounter S46.812A 840.8    3. Arthritis of left shoulder region M19.012 716.91 REFERRAL TO ORTHOPEDICS      HYDROcodone-acetaminophen (NORCO) 5-325 mg per tablet   4. Decreased ROM of left shoulder M25.612 719.51 REFERRAL TO ORTHOPEDICS      HYDROcodone-acetaminophen (NORCO) 5-325 mg per tablet      reviewed. Counseled pt on potential medication AEs/interactions. Caution regarding sedation and safety. Take as sparingly as possible, understand temp refill only to allow time to get to Ortho. F/u with Ortho as planned.   Seek immediate care in interim for any new/worsening sxs. Follow-up Disposition:  Return if symptoms worsen or fail to improve.     Peggyann Shone, PA-C

## 2018-07-31 ENCOUNTER — HOSPITAL ENCOUNTER (OUTPATIENT)
Age: 73
Setting detail: OUTPATIENT SURGERY
Discharge: HOME | End: 2018-07-31
Attending: PHYSICAL MEDICINE & REHABILITATION | Admitting: PHYSICAL MEDICINE & REHABILITATION
Payer: MEDICARE

## 2018-07-31 ENCOUNTER — APPOINTMENT (OUTPATIENT)
Dept: GENERAL RADIOLOGY | Age: 73
End: 2018-07-31
Attending: PHYSICAL MEDICINE & REHABILITATION
Payer: MEDICARE

## 2018-07-31 VITALS
WEIGHT: 168 LBS | BODY MASS INDEX: 24.05 KG/M2 | HEIGHT: 70 IN | SYSTOLIC BLOOD PRESSURE: 149 MMHG | DIASTOLIC BLOOD PRESSURE: 82 MMHG | OXYGEN SATURATION: 97 % | HEART RATE: 63 BPM | RESPIRATION RATE: 17 BRPM | TEMPERATURE: 97.4 F

## 2018-07-31 PROCEDURE — 76000 FLUOROSCOPY <1 HR PHYS/QHP: CPT

## 2018-07-31 PROCEDURE — 74011000250 HC RX REV CODE- 250: Performed by: PHYSICAL MEDICINE & REHABILITATION

## 2018-07-31 PROCEDURE — 74011250636 HC RX REV CODE- 250/636: Performed by: PHYSICAL MEDICINE & REHABILITATION

## 2018-07-31 PROCEDURE — 76030000002 HC AMB SURG OR TIME FIRST 0.: Performed by: PHYSICAL MEDICINE & REHABILITATION

## 2018-07-31 PROCEDURE — 74011636320 HC RX REV CODE- 636/320: Performed by: PHYSICAL MEDICINE & REHABILITATION

## 2018-07-31 PROCEDURE — 76210000046 HC AMBSU PH II REC FIRST 0.5 HR: Performed by: PHYSICAL MEDICINE & REHABILITATION

## 2018-07-31 PROCEDURE — 72020 X-RAY EXAM OF SPINE 1 VIEW: CPT

## 2018-07-31 RX ORDER — METHYLPREDNISOLONE ACETATE 40 MG/ML
80 INJECTION, SUSPENSION INTRA-ARTICULAR; INTRALESIONAL; INTRAMUSCULAR; SOFT TISSUE ONCE
Status: COMPLETED | OUTPATIENT
Start: 2018-07-31 | End: 2018-07-31

## 2018-07-31 RX ORDER — BUPIVACAINE HYDROCHLORIDE 5 MG/ML
10 INJECTION, SOLUTION EPIDURAL; INTRACAUDAL ONCE
Status: DISCONTINUED | OUTPATIENT
Start: 2018-07-31 | End: 2018-07-31 | Stop reason: HOSPADM

## 2018-07-31 RX ORDER — LIDOCAINE HYDROCHLORIDE 20 MG/ML
5 INJECTION, SOLUTION EPIDURAL; INFILTRATION; INTRACAUDAL; PERINEURAL ONCE
Status: COMPLETED | OUTPATIENT
Start: 2018-07-31 | End: 2018-07-31

## 2018-07-31 NOTE — PERIOP NOTES
Neuro:  Push/Pull assessment: 
  
                      LUE Response: moderate , pt states left rotator cuff pain LLE Response: moderate push/pull                       RUE Response: moderate , slightly stronger than LUE 
                      RLE Response: moderate push/pull, equal to LLE

## 2018-07-31 NOTE — IP AVS SNAPSHOT
[address] [facility] 
[phone] Patient: Yoshi Bsuh MRN: [id number] NCC:[id number] About your hospitalization You were admitted on:  July 31, 2018 You last received care in the:  [facility] You were discharged on:  July 31, 2018 Why you were hospitalized Your primary diagnosis was:  Not on File Follow-up Information Follow up With Details Comments Contact Info Aníbal Herrera MD   [address] [facility] 
[phone] Your Scheduled Appointments Tuesday July 31, 2018 LUMBAR EPIDURAL STEROID INJECTION with Estuardo Linton MD  
[facility] AMB SURGERY UNIT (RI OR PRE ASSESSMENT) [address] [facility]  
[phone] Discharge Orders None A check linda indicates which time of day the medication should be taken. My Medications ASK your doctor about these medications Instructions Each Dose to Equal  
 Morning Noon Evening Bedtime  
 atorvastatin 40 mg tablet Commonly known as:  LIPITOR Your last dose was: Your next dose is: TAKE 1 TABLET BY MOUTH EVERY DAY  
     
   
   
   
  
 DULCOLAX (BISACODYL) 5 mg EC tablet Generic drug:  bisacodyl Your last dose was: Your next dose is: Take 5 mg by mouth daily as needed for Constipation. 5 mg ELIQUIS 5 mg tablet Generic drug:  apixaban Your last dose was: Your next dose is: TAKE 1 TABLET BY MOUTH EVERY 12 HOURS  
     
   
   
   
  
 fluticasone 50 mcg/actuation nasal spray Commonly known as:  Alric Eaves Your last dose was: Your next dose is: 2 Sprays by Both Nostrils route daily. 2 Spray  
    
   
   
   
  
 gabapentin 400 mg capsule Commonly known as:  NEURONTIN Your last dose was: Your next dose is: Take 1 Cap by mouth three (3) times daily. 400 mg HYDROcodone-acetaminophen 5-325 mg per tablet Commonly known as:  Solange King Your last dose was: Your next dose is: Take 1 Tab by mouth every six (6) hours as needed for Pain. Max Daily Amount: 4 Tabs. 1 Tab Incontinence Pad, Liner, Disp Pads Commonly known as:  BLADDER CONTROL PADS EX ABSORB Your last dose was: Your next dose is:    
   
   
 1 Packet by Does Not Apply route as needed (incontinence). 1 Packet  
    
   
   
   
  
 lisinopril 5 mg tablet Commonly known as:  Tri Kennedy Your last dose was: Your next dose is: TAKE 1 TABLET BY MOUTH DAILY FOR 30 DAYS  
     
   
   
   
  
 meclizine 25 mg tablet Commonly known as:  ANTIVERT Your last dose was: Your next dose is: Take 1 Tab by mouth three (3) times daily as needed for Dizziness. 25 mg  
    
   
   
   
  
 memantine 10 mg tablet Commonly known as:  Diandra Campos Your last dose was: Your next dose is: Take 1 tab twice a day  
     
   
   
   
  
 metoprolol succinate 25 mg XL tablet Commonly known as:  TOPROL-XL Your last dose was: Your next dose is: Take 1 Tab by mouth daily. Indications: hypertension 25 mg MIRALAX 17 gram/dose powder Generic drug:  polyethylene glycol Your last dose was: Your next dose is: Take 17 g by mouth daily. 17 g  
    
   
   
   
  
 nitroglycerin 0.4 mg SL tablet Commonly known as:  NITROSTAT Your last dose was: Your next dose is:    
   
   
 1 Tab by SubLINGual route every five (5) minutes as needed for Chest Pain (call 911 if not relieved by 3). 0.4 mg  
    
   
   
   
  
 sertraline 100 mg tablet Commonly known as:  ZOLOFT Your last dose was: Your next dose is:    
   
   
 1-1/2 tablets daily Opioid Education Prescription Opioids: What You Need to Know: 
 
Prescription opioids can be used to help relieve moderate-to-severe pain and are often prescribed following a surgery or injury, or for certain health conditions. These medications can be an important part of treatment but also come with serious risks. Opioids are strong pain medicines. Examples include hydrocodone, oxycodone, fentanyl, and morphine. Heroin is an example of an illegal opioid. It is important to work with your health care provider to make sure you are getting the safest, most effective care. WHAT ARE THE RISKS AND SIDE EFFECTS OF OPIOID USE? Prescription opioids carry serious risks of addiction and overdose, especially with prolonged use. An opioid overdose, often marked by slow breathing, can cause sudden death. The use of prescription opioids can have a number of side effects as well, even when taken as directed. · Tolerance-meaning you might need to take more of a medication for the same pain relief · Physical dependence-meaning you have symptoms of withdrawal when the medication is stopped. Withdrawal symptoms can include nausea, sweating, chills, diarrhea, stomach cramps, and muscle aches. Withdrawal can last up to several weeks, depending on which drug you took and how long you took it. · Increased sensitivity to pain · Constipation · Nausea, vomiting, and dry mouth · Sleepiness and dizziness · Confusion · Depression · Low levels of testosterone that can result in lower sex drive, energy, and strength · Itching and sweating RISKS ARE GREATER WITH:      
· History of drug misuse, substance use disorder, or overdose · Mental health conditions (such as depression or anxiety) · Sleep apnea · Older age (72 years or older) · Pregnancy Avoid alcohol while taking prescription opioids.   Also, unless specifically advised by your health care provider, medications to avoid include: · Benzodiazepines (such as Xanax or Valium) · Muscle relaxants (such as Soma or Flexeril) · Hypnotics (such as Ambien or Lunesta) · Other prescription opioids KNOW YOUR OPTIONS Talk to your health care provider about ways to manage your pain that don't involve prescription opioids. Some of these options may actually work better and have fewer risks and side effects. Options may include: 
· Pain relievers such as acetaminophen, ibuprofen, and naproxen · Some medications that are also used for depression or seizures · Physical therapy and exercise · Counseling to help patients learn how to cope better with triggers of pain and stress. · Application of heat or cold compress · Massage therapy · Relaxation techniques Be Informed Make sure you know the name of your medication, how much and how often to take it, and its potential risks & side effects. IF YOU ARE PRESCRIBED OPIOIDS FOR PAIN: 
· Never take opioids in greater amounts or more often than prescribed. Remember the goal is not to be pain-free but to manage your pain at a tolerable level. · Follow up with your primary care provider to: · Work together to create a plan on how to manage your pain. · Talk about ways to help manage your pain that don't involve prescription opioids. · Talk about any and all concerns and side effects. · Help prevent misuse and abuse. · Never sell or share prescription opioids · Help prevent misuse and abuse. · Store prescription opioids in a secure place and out of reach of others (this may include visitors, children, friends, and family). · Safely dispose of unused/unwanted prescription opioids: Find your community drug take-back program or your pharmacy mail-back program, or flush them down the toilet, following guidance from the Food and Drug Administration (www.fda.gov/Drugs/ResourcesForYou). · Visit www.cdc.gov/drugoverdose to learn about the risks of opioid abuse and overdose. · If you believe you may be struggling with addiction, tell your health care provider and ask for guidance or call Zarina Barajas at 1-354-091-APJO. Discharge Instructions Dr. Rebekah Savage Discharge Instructions Transforaminal Epidural Steroid Injection/ Selective Nerve Block You had a transforaminal epidural steroid injection/ selective nerve block today. You will probably have some numbness, and possibly weakness, in your leg for the next 6 to 8 hours. The steroids will slowly become effective, reducing your pain, over the next 2 weeks. You should begin feeling better after a few days, but it may take up to 2 weeks to notice the difference. The benefit you get from your injection will last a variable amount of time, depending on the severity of your lumbar spine problem. ? Pain: Most people do not have any increase in pain after this injection. However, you might experience some soreness in your low back. If this happens, putting an ice pack over the sore area will help. ? Bandage: You will have a small bandage covering the site of the injection. You may remove it once you get home. ? Restrictions: Someone should drive you home after the injection. After that, you have no restrictions. You need to be careful while walking, as you may still have some numbness or weakness in your leg. You may resume your normal level of activity. You may take a shower or bath, and you may eat normally. You should continue your current exercises and/or therapy routine. ?  Medications: Continue your current medications as prescribed. If your pain decreases, you may reduce the amount of your pain medicines. If you stopped taking anticoagulants or blood-thinners before the injection, start them tomorrow.  If you have diabetes, your blood sugar may be elevated for a few days. Call your primary doctor with any questions. Call Dr. Merline Mallet at [phone] if you experience: ? Fever (101 degrees Fahrenheit or greater) ? Nausea or vomiting 
? Headache unrelieved by your normal pain medicine ? Redness or swelling at the injection site that lasts more than 1 day ? New numbness, tingling, weakness, or pain that you didnt have before the injection Follow-up appointment: If still having pain in 1-2 weeks, call office at [phone] for a follow up appointment. DISCHARGE SUMMARY from Nurse The following personal items collected during your admission are returned to you:  
Dental Appliance: Dental Appliances: None Vision:   
Hearing Aid:   
Jewelry: Jewelry: None Clothing: Clothing: With patient Other Valuables: Other Valuables: Minna Fall (spouse) Valuables sent to safe: If you were given prescriptions, please review the written information on prescribed medications. · You will receive a Post Operative Call from one of the Recovery Room Nurses on the day after your surgery to check on you. It is very important for us to know how you are recovering after your surgery. · You may receive an e-mail or letter in the mail from [employer] regarding your experience with us in the Ambulatory Surgery Unit. Your feedback is valuable to us and we appreciate your participation in the survey. If you have not had your influenza or pneumococcal vaccines, please follow up with your primary care physician. The discharge information has been reviewed with the patient. The patient verbalized understanding. ACO Transitions of Care Introducing [employer] [address] Tanya Reilly offers a voluntary care coordination program to provide high quality service and care to [facility] fee-for-service beneficiaries.   
 
Bridgette Pabon was designed to help you enhance your health and well-being through the following services: ? Transitions of Care  support for individuals who are transitioning from one care setting to another (example: Hospital to home). ? Chronic and Complex Care Coordination  support for individuals and caregivers of those with serious or chronic illnesses or with more than one chronic (ongoing) condition and those who take a number of different medications. If you meet specific medical criteria, a [facility] [address] may call you directly to coordinate your care with your primary care physician and your other care providers. For questions about the [facility] programs, please, contact your physicians office. For general questions or additional information about Accountable Care Organizations: 
Please visit www.medicare.gov/acos. html or call 1-800-MEDICARE (6-620.864.3781) TTY users should call 3-202.666.3712. Introducing [facility] [place] [facility]! Dear Mabel Stinson: Thank you for requesting a [employer] account. Our records indicate that you already have an active [employer] account. You can access your account anytime at [email or web address] Did you know that you can access your hospital and ER discharge instructions at any time in [employer]? You can also review all of your test results from your hospital stay or ER visit. Additional Information If you have questions, please visit the Frequently Asked Questions section of the [employer] website at [email or web address] Remember, [employer] is NOT to be used for urgent needs. For medical emergencies, dial 911. Now available from your iPhone and Android! Introducing Julian Kasper As a [employer] patient, I wanted to make you aware of our electronic visit tool called Julian Kasper. [employer] 24/7 allows you to connect within minutes with a medical provider 24 hours a day, seven days a week via a mobile device or tablet or logging into a secure website from your computer. You can access [employer] from anywhere in the [place]. A virtual visit might be right for you when you have a simple condition and feel like you just dont want to get out of bed, or cant get away from work for an appointment, when your [facility] is not available (evenings, weekends or holidays), or when youre out of town and need minor care. Electronic visits cost only $49 and if the [facility] 24/7 provider determines a prescription is needed to treat your condition, one can be electronically transmitted to a nearby pharmacy*. Please take a moment to enroll today if you have not already done so. The enrollment process is free and takes just a few minutes. To enroll, please download the [facility] 24/7 vernell to your tablet or phone, or visit [email or web address] to enroll on your computer. And, as an [address] [place] [address] patient with a [employer] account, the results of your visits will be scanned into your electronic medical record and your primary care provider will be able to view the scanned results. We urge you to continue to see your regular [facility] provider for your ongoing medical care. And while your primary care provider may not be the one available when you seek a [employer] virtual visit, the peace of mind you get from getting a real diagnosis real time can be priceless. For more information on [employer], view our Frequently Asked Questions (FAQs) at [email or web address]. Sincerely, 
 
Boy Mares MD 
Chief Medical Officer [employer] *:  certain medications cannot be prescribed via [employer] Providers Seen During Your Hospitalization Provider Specialty Primary office phone Mariel Aly MD Physical Medicine and Rehab [phone] Your Primary Care Physician (PCP) Primary Care Physician Office Phone Office Fax Marilee Adorno [phone] You are allergic to the following No active allergies Recent Documentation Height Weight BMI Smoking Status 1.778 m 76.2 kg 24.11 kg/m2 Former Smoker Emergency Contacts Name Discharge Info Relation Home Work Mobile [address] CAREGIVER [3] Spouse [3] [phone] Patient Belongings The following personal items are in your possession at time of discharge: 
  Dental Appliances: None         Home Medications: None   Jewelry: None  Clothing: With patient    Other Valuables: Becky Monet (spouse) Please provide this summary of care documentation to your next provider. Signatures-by signing, you are acknowledging that this After Visit Summary has been reviewed with you and you have received a copy. Patient Signature:  ____________________________________________________________ Date:  ____________________________________________________________  
  
Amanda Sky Provider Signature:  ____________________________________________________________ Date:  ____________________________________________________________

## 2018-07-31 NOTE — DISCHARGE INSTRUCTIONS
Dr. Rojelio Aleman Discharge Instructions  Transforaminal Epidural Steroid Injection/ Selective Nerve Block    You had a transforaminal epidural steroid injection/ selective nerve block today. You will probably have some numbness, and possibly weakness, in your leg for the next 6 to 8 hours. The steroids will slowly become effective, reducing your pain, over the next 2 weeks. You should begin feeling better after a few days, but it may take up to 2 weeks to notice the difference. The benefit you get from your injection will last a variable amount of time, depending on the severity of your lumbar spine problem.  Pain: Most people do not have any increase in pain after this injection. However, you might experience some soreness in your low back. If this happens, putting an ice pack over the sore area will help.  Bandage: You will have a small bandage covering the site of the injection. You may remove it once you get home.  Restrictions: Someone should drive you home after the injection. After that, you have no restrictions. You need to be careful while walking, as you may still have some numbness or weakness in your leg. You may resume your normal level of activity. You may take a shower or bath, and you may eat normally. You should continue your current exercises and/or therapy routine.   Medications: Continue your current medications as prescribed. If your pain decreases, you may reduce the amount of your pain medicines. If you stopped taking anticoagulants or blood-thinners before the injection, start them tomorrow. If you have diabetes, your blood sugar may be elevated for a few days. Call your primary doctor with any questions.   Call Dr. Rojelio Aleman at [phone] if you experience:   Fever (101 degrees Fahrenheit or greater)   Nausea or vomiting   Headache unrelieved by your normal pain medicine   Redness or swelling at the injection site that lasts more than 1 day   New numbness, tingling, weakness, or pain that you didnt have before the injection    Follow-up appointment:   If still having pain in 1-2 weeks, call office at [phone] for a follow up appointment. DISCHARGE SUMMARY from Nurse    The following personal items collected during your admission are returned to you:   Dental Appliance: Dental Appliances: None  Vision:    Hearing Aid:    Jewelry: Jewelry: None  Clothing: Clothing: With patient  Other Valuables: Other Valuables: Jennifer 1924 (spouse)  Valuables sent to safe: If you were given prescriptions, please review the written information on prescribed medications. · You will receive a Post Operative Call from one of the Recovery Room Nurses on the day after your surgery to check on you. It is very important for us to know how you are recovering after your surgery. · You may receive an e-mail or letter in the mail from [employer] regarding your experience with us in the Ambulatory Surgery Unit. Your feedback is valuable to us and we appreciate your participation in the survey. If you have not had your influenza or pneumococcal vaccines, please follow up with your primary care physician. The discharge information has been reviewed with the patient. The patient verbalized understanding.

## 2018-07-31 NOTE — PERIOP NOTES
Permission received to review discharge instructions and discuss private health information with Chris Allan, wife.

## 2018-07-31 NOTE — PERIOP NOTES
1545: Reviewed dc instructions with patient. Voiced understanding. Denies any questions. Patient states that he is ready to go home. 1545: Patient's sensation intact to legs, bilat. Denies back pain. States that he needs a left shoulder replacement and that is the only place where he currently has pain. Leg/foot pushes are equally strong, bilat. 1546: Pt has all of his belongings he came with. Pt discharged home in stable condition via w/c to car. Instructed pt to get up with assistance today.

## 2018-07-31 NOTE — H&P
Procedural Case Note 7/31/2018    (2:10 PM) Augustine Cao 1945   (75 y.o.) [id number] CC:  pain ROS:   Complete ROS obtained, no CP, no SOB, no N or V 
 
PMH:    
Past Medical History:  
Diagnosis Date  AICD (automatic cardioverter/defibrillator) present 5/13/2016 5/13/16 [employer] upgrade to dual chamber AICD implant  Dr. Tamara Swan  Anxiety state, other  Arrhythmia 'S IN THE PAST  Arthritis OSTEO ARTHRITIS  AVNRT (AV bridgette re-entry tachycardia) ([facility] [address] 5/12/2016 5/12/16 AVNRT ablation: PM/AICD left upper chest :Dr. Salas Snider  Back ache  CAD (coronary artery disease)  Cancer [facility]) 2004 Prostate cancer  Chest tightness   
 last episode of chest pain 5/2016 before AICD placed; none since then  Coagulation defects 2005 FACTOR V LEIDEN  
 Dementia  Dizziness  FH: factor V Leiden deficiency  Generalized muscle ache  GERD (gastroesophageal reflux disease) HEARTBURN  
 Heart failure ([facility] [address] 2014 EF 40%; Dr. Christine Guzmán  Hyperlipidemia, mixed  Hypertension COREG  
 Other ill-defined conditions(799.89) 2004  
 blood transfusion  Pacemaker  Pacemaker  Postsurgical aortocoronary bypass status 05/29/2012 CABG  
 Presence of cardiac defibrillator 05/2016  
 left upper chest  
 Psychiatric disorder   
 depression  Stroke [facility]) 2011, 1990's Valentin Given  Thromboembolism ([facility] [address] 2014  
 left leg: changed to Eliquis from Warfarin  Thromboembolus ([facility] [address] 2005  
 left leg  Weakness generalized ALLERGIES:   No Known Allergies MEDS:    
No current facility-administered medications for this encounter. Visit Vitals  /80 (BP 1 Location: Right arm, BP Patient Position: At rest)  Pulse 63  Temp 97.6 °F (36.4 °C)  Resp 18  Ht 5' 10\" (1.778 m)  Wt 76.2 kg (168 lb)  SpO2 98%  BMI 24.11 kg/m2 PE: 
Gen: NAD Head: normocephalic Heart: RRR 
Lungs: CTA angeline Abd: NT, ND, soft Neuro: awake and alert Skin: intact IMPRESSION:   LS DDD Note:  The clinical status was discussed in detail with the patient. The procedure was again discussed and described in detail. All understand and accept the planned procedure and risks; reject other forms of treatment. All questions are answered.  
 
Trev William MD

## 2018-07-31 NOTE — OP NOTES
Epidural Steroid Injection Operative Report    Indications: This is a 75 y.o. male who presents with low back pain. He was positive for LS DDD. The patient was admitted for surgery as conservative measures have failed. Date of Surgery: 7/31/2018    Preoperative Diagnosis: LS DDD    Postoperative Diagnosis: LS DDD    Surgeon(s) and Role:     * Sandra Barber MD - Primary     Procedure:  Procedure(s):  RIGHT L4-5 AND L5-S1 TRANSFORAMINAL EPIDURAL STEROID INJECTION    Procedure in Detail:  After appropriate informed consent was obtained, the patient was taken to the operating suite and placed in the prone position on the operating table on appropriate padding. The LS region was prepped and draped in the usual sterile fashion. Intraoperative fluoroscopy was used to localize the LS spine. The skin was infiltrated with 2% lidocaine. An 22-g needle was advanced into the Right L4 and L5 neuroforamen under fluoroscopic guidance. A small amount of contrast was injected into the epidural space, confirming appropriate needle placement on fluoroscopy. Next, 2ml of 2% lidocaine and 80mg of Depo-Medrol were injected. The needle was removed from the patient. The patient was then turned back into the supine position on the stretcher and was taken to the Recovery Room in stable condition.     Estimated Blood Loss:  none     Specimens: None       Drains: None          Complications:  None    Signed By: Sandra Barber MD                        July 31, 2018

## 2018-07-31 NOTE — PERIOP NOTES
Awilda Carlin 1945 
[id number] Situation: 
Verbal report given from: SESAR [place] Procedure: Procedure(s): RIGHT L4-5 AND L5-S1 TRANSFORAMINAL EPIDURAL STEROID INJECTION Background: 
 
Preoperative diagnosis: Annular tear of lumbar disc [M51.36] Postoperative diagnosis: Annular tear of lumbar disc [M51.36] :  Dr. Zan Rojas Assessment: 
Intra-procedure medications, procedure, and allergies reviewed Recommendation: 
 
Discharge patient home after discharge instructions reviewed with patient. Rest until local has worn off.

## 2018-08-03 DIAGNOSIS — M25.551 RIGHT HIP PAIN: ICD-10-CM

## 2018-08-03 DIAGNOSIS — M51.36 DDD (DEGENERATIVE DISC DISEASE), LUMBAR: ICD-10-CM

## 2018-08-03 RX ORDER — GABAPENTIN 400 MG/1
CAPSULE ORAL
Qty: 90 CAP | Refills: 0 | Status: SHIPPED | OUTPATIENT
Start: 2018-08-03 | End: 2018-08-14 | Stop reason: DRUGHIGH

## 2018-08-09 ENCOUNTER — OFFICE VISIT (OUTPATIENT)
Dept: FAMILY MEDICINE CLINIC | Age: 73
End: 2018-08-09

## 2018-08-09 VITALS
TEMPERATURE: 97.9 F | RESPIRATION RATE: 16 BRPM | WEIGHT: 165 LBS | HEIGHT: 70 IN | BODY MASS INDEX: 23.62 KG/M2 | SYSTOLIC BLOOD PRESSURE: 116 MMHG | DIASTOLIC BLOOD PRESSURE: 60 MMHG | HEART RATE: 60 BPM

## 2018-08-09 DIAGNOSIS — M25.512 ACUTE PAIN OF LEFT SHOULDER: ICD-10-CM

## 2018-08-09 DIAGNOSIS — L82.1 SEBORRHEIC KERATOSIS: Primary | ICD-10-CM

## 2018-08-09 NOTE — MR AVS SNAPSHOT
[place] [address] 
[phone] Patient: Anel Dominguez MRN: [id number] SUA:[birth year] Visit Information Date & Time Provider Department Dept. Phone Encounter #  
 8/9/2018 11:00 AM Clint Roper  [facility] [phone] [id number] Your Appointments 10/18/2018  1:30 PM  
PROCEDURE with PACEMAKER, [address] [place] [address]) Appt Note: BSC ICD 3 mo device check no landline [address]  
[phone] 400 [facility]  
  
    
 10/18/2018  1:45 PM  
ESTABLISHED PATIENT with Andrea Claude, ANP [facility] [address] [place] [address]) Appt Note: BSC ICD 3 mo device check no landline [address]  
[phone] [address]  
  
    
 11/12/2018  2:00 PM  
Follow Up with Adrianna Orozco MD  
Neurology Clinic at [address]) Appt Note: f/u memory, jrb 3/20/18  
 [address] [place] [address], [address] [phone]  
[address] [facility], [address] [place] [address], [address] [place] [address] [address] [phone] Upcoming Health Maintenance Date Due FOBT Q 1 YEAR AGE 50-75 4/1/1994 ZOSTER VACCINE AGE 60> 2/1/2004 GLAUCOMA SCREENING Q2Y 4/1/2009 Influenza Age 5 to Adult 8/1/2018 MEDICARE YEARLY EXAM 7/4/2019 DTaP/Tdap/Td series (2 - Td) 6/29/2027 Allergies as of 8/9/2018  Review Complete On: 8/9/2018 By: Clint Roper MD  
 No Known Allergies Current Immunizations  Reviewed on 7/17/2017 Name Date Influenza High Dose Vaccine PF 10/11/2017, 11/10/2016 Influenza Vaccine 9/16/2014 Influenza Vaccine Split 9/14/2011 Influenza Vaccine Whole 9/1/2010 Pneumococcal Conjugate (PCV-13) 11/10/2016 Pneumococcal Polysaccharide (PPSV-23) 11/13/2012 12:00 AM  
 Td 5/8/2012 ZZZ-RETIRED (DO NOT USE) Pneumococcal Vaccine (Unspecified Type) 9/14/2011 Not reviewed this visit You Were Diagnosed With   
  
 Codes Comments Seborrheic keratosis    -  Primary ICD-10-CM: L82.1 ICD-9-CM: 702.19 Acute pain of left shoulder     ICD-10-CM: M25.512 ICD-9-CM: 719.41 Vitals BP Pulse Temp Resp Height(growth percentile) Weight(growth percentile) 116/60 (BP 1 Location: Right arm, BP Patient Position: Sitting) 60 97.9 °F (36.6 °C) (Oral) 16 5' 10\" (1.778 m) 165 lb (74.8 kg) BMI Smoking Status 23.68 kg/m2 Former Smoker Vitals History BMI and BSA Data Body Mass Index Body Surface Area  
 23.68 kg/m 2 1.92 m 2 Preferred Pharmacy Pharmacy Name Phone [facility] [address] [place] [address] Your Updated Medication List  
  
   
This list is accurate as of 8/9/18 11:20 AM.  Always use your most recent med list.  
  
  
  
  
 atorvastatin 40 mg tablet Commonly known as:  LIPITOR  
TAKE 1 TABLET BY MOUTH EVERY DAY  
  
 DULCOLAX (BISACODYL) 5 mg EC tablet Generic drug:  bisacodyl Take 5 mg by mouth daily as needed for Constipation. ELIQUIS 5 mg tablet Generic drug:  apixaban TAKE 1 TABLET BY MOUTH EVERY 12 HOURS  
  
 fluticasone 50 mcg/actuation nasal spray Commonly known as:  [place] 2 Sprays by Both Nostrils route daily. gabapentin 400 mg capsule Commonly known as:  NEURONTIN  
TAKE 1 CAPSULE BY MOUTH THREE TIMES A DAY - STRENGTH INCREASED- DO NOT USE WITH 300MG HYDROcodone-acetaminophen 5-325 mg per tablet Commonly known as:  [address] Take 1 Tab by mouth every six (6) hours as needed for Pain. Max Daily Amount: 4 Tabs. Incontinence Pad, Liner, Disp Pads Commonly known as:  BLADDER CONTROL PADS EX ABSORB  
1 Packet by Does Not Apply route as needed (incontinence). lisinopril 5 mg tablet Commonly known as:  PRINIVIL, ZESTRIL  
TAKE 1 TABLET BY MOUTH DAILY FOR 30 DAYS  
  
 meclizine 25 mg tablet Commonly known as:  ANTIVERT Take 1 Tab by mouth three (3) times daily as needed for Dizziness. memantine 10 mg tablet Commonly known as:  Ollen Arleen Take 1 tab twice a day  
  
 metoprolol succinate 25 mg XL tablet Commonly known as:  TOPROL-XL Take 1 Tab by mouth daily. Indications: hypertension MIRALAX 17 gram/dose powder Generic drug:  polyethylene glycol Take 17 g by mouth daily. nitroglycerin 0.4 mg SL tablet Commonly known as:  NITROSTAT  
1 Tab by SubLINGual route every five (5) minutes as needed for Chest Pain (call 911 if not relieved by 3). sertraline 100 mg tablet Commonly known as:  ZOLOFT  
1-1/2 tablets daily Introducing [facility] SERVICES! Dear Shira Vee: Thank you for requesting a [employer]ived[employer] account. Our records indicate that you already have an active Kiveda account. You can access your account anytime at [email or web address]. [email or web address] Did you know that you can access your hospital and ER discharge instructions at any time in Kiveda? You can also review all of your test results from your hospital stay or ER visit. Additional Information If you have questions, please visit the Frequently Asked Questions section of the Kiveda website at [email or web address] Remember, Kiveda is NOT to be used for urgent needs. For medical emergencies, dial 911. Now available from your iPhone and Android! Please provide this summary of care documentation to your next provider. Your primary care clinician is listed as Prisca Cartwright. If you have any questions after today's visit, please call [phone].

## 2018-08-09 NOTE — PROGRESS NOTES
Calin Joya is a 75 y.o. male who presents to the office today with the following:  Chief Complaint   Patient presents with    Skin Problem     on his back       No Known Allergies    Current Outpatient Prescriptions   Medication Sig    gabapentin (NEURONTIN) 400 mg capsule TAKE 1 CAPSULE BY MOUTH THREE TIMES A DAY - STRENGTH INCREASED- DO NOT USE WITH 300MG    HYDROcodone-acetaminophen (NORCO) 5-325 mg per tablet Take 1 Tab by mouth every six (6) hours as needed for Pain. Max Daily Amount: 4 Tabs.  sertraline (ZOLOFT) 100 mg tablet 1-1/2 tablets daily (Patient taking differently: nightly. 1-1/2 tablets daily)    fluticasone (FLONASE) 50 mcg/actuation nasal spray 2 Sprays by Both Nostrils route daily.  atorvastatin (LIPITOR) 40 mg tablet TAKE 1 TABLET BY MOUTH EVERY DAY    meclizine (ANTIVERT) 25 mg tablet Take 1 Tab by mouth three (3) times daily as needed for Dizziness.  lisinopril (PRINIVIL, ZESTRIL) 5 mg tablet TAKE 1 TABLET BY MOUTH DAILY FOR 30 DAYS    memantine (NAMENDA) 10 mg tablet Take 1 tab twice a day    bisacodyl (DULCOLAX, BISACODYL,) 5 mg EC tablet Take 5 mg by mouth daily as needed for Constipation.  ELIQUIS 5 mg tablet TAKE 1 TABLET BY MOUTH EVERY 12 HOURS    metoprolol succinate (TOPROL-XL) 25 mg XL tablet Take 1 Tab by mouth daily. Indications: hypertension    polyethylene glycol (MIRALAX) 17 gram/dose powder Take 17 g by mouth daily.  Incontinence Pad, Liner, Disp (BLADDER CONTROL PADS EX ABSORB) pads 1 Packet by Does Not Apply route as needed (incontinence).  nitroglycerin (NITROSTAT) 0.4 mg SL tablet 1 Tab by SubLINGual route every five (5) minutes as needed for Chest Pain (call 911 if not relieved by 3). No current facility-administered medications for this visit.         Past Medical History:   Diagnosis Date    AICD (automatic cardioverter/defibrillator) present 5/13/2016 5/13/16 [employer] upgrade to dual chamber AICD implant  Dr. Lindsay Salgado  Anxiety state, other     Arrhythmia     'S IN THE PAST    Arthritis     OSTEO ARTHRITIS    AVNRT (AV bridgette re-entry tachycardia) ([facility] [address] 5/12/2016 5/12/16 AVNRT ablation: PM/AICD left upper chest :Dr. Brad Patino Back ache     CAD (coronary artery disease)     Cancer [facility]) 2004    Prostate cancer    Chest tightness     last episode of chest pain 5/2016 before AICD placed; none since then    Coagulation defects 2005    FACTOR V LEIDEN    Dementia     Dizziness     FH: factor V Leiden deficiency     Generalized muscle ache     GERD (gastroesophageal reflux disease)     HEARTBURN    Heart failure ([address] 2014    EF 40%; Dr. Matias Jones    Hyperlipidemia, mixed     Hypertension     COREG    Other ill-defined conditions(799.89) 2004    blood transfusion    Pacemaker     Pacemaker     Postsurgical aortocoronary bypass status 05/29/2012    CABG    Presence of cardiac defibrillator 05/2016    left upper chest    Psychiatric disorder     depression    [facility]roke [facility]) 2011, 1990's    [facility]    Thromboembolism ([facility] [place] [address] 2014    left leg: changed to Eliquis from Warfarin    Thromboembolus ([facility] [address] 2005    left leg    Weakness generalized        Past Surgical History:   Procedure Laterality Date    CARDIAC CATHETERIZATION  3/4/2011         CARDIAC SURG PROCEDURE UNLIST      CABG X1 3/5/11    HX HERNIA REPAIR  2002    Ingiunal hernia repair left    HX ORTHOPAEDIC      LEFT KNEE CARTILAGE REPAIR;RIGHT ROTATOR CUFF REPAIR    HX ORTHOPAEDIC  2/14/12    C5-7 ANTERIIOR CERVICAL DISECTOMY AND FUSION    HX ORTHOPAEDIC  6/4/13    C5-C7 POSTERIOR DECOMPRESSION AND FUSION    HX ORTHOPAEDIC      right thumb partial amputation of tip    HX OTHER SURGICAL      steroid injections    HX PACEMAKER Left     HX PACEMAKER PLACEMENT      [place]    HX UROLOGICAL       urinary control system    HX UROLOGICAL  12/3/13    REPLACEMENT ARTIFICAL URINARY SPHINCTER History   Smoking Status    Former Smoker    Types: Pipe, Cigars    Quit date: 10/10/1971   Smokeless Tobacco    Never Used       Family History   Problem Relation Age of Onset    Asthma Mother     Alcohol abuse Mother     Alcohol abuse Father     Asthma Father     Cancer Sister     Heart Disease Sister     Alcohol abuse Brother     Cancer Brother     Heart Disease Brother          History of Present Illness:  Patient here for evaluation skin lesion right shoulder    Patient has a lesion upper back right shoulder area. It has been there for some time. He cannot see it but he states his wife keeps picking at it and he wanted to know what it was. It does not hurt it has not bled     he was in to see my partner end of last month with intermittent left shoulder pain. Mabeline Sink He was referred to orthopedist.  He seen him. He is seeing them back and they are contemplating whether he is going to need any additional treatment surgery etc.    Patient was in to see me early this summer for Medicare wellness visit and is scheduled to see me back in October          Review of Systems:      Review of systems negative except as noted above    Physical Exam:  Visit Vitals    /60 (BP 1 Location: Right arm, BP Patient Position: Sitting)    Pulse 60    Temp 97.9 °F (36.6 °C) (Oral)    Resp 16    Ht 5' 10\" (1.778 m)    Wt 165 lb (74.8 kg)    BMI 23.68 kg/m2     Vitals:    08/09/18 1046   BP: 116/60   BP 1 Location: Right arm   BP Patient Position: Sitting   Pulse: 60   Resp: 16   Temp: 97.9 °F (36.6 °C)   TempSrc: Oral   Weight: 165 lb (74.8 kg)   Height: 5' 10\" (1.778 m)     Patient was in no acute distress vital signs as above  Posterior aspect of right shoulder patient had a 2 cm waxy brown skin lesion classic for a seborrheic keratosis    Assessment/Plan:  1. Seborrheic keratosis  Patient with seborrheic keratosis. I discussed the benign nature of this lesion with him.   If it bothers him I could treated with cryotherapy but since it is not bothering him at all we are going to leave this alone and watch it. I I recommended his wife not to pick at it    2. Acute pain of left shoulder  Patient will follow-up with orthopedist as scheduled    Patient will otherwise continue his current medication and keep previously planned follow-up          Continue current therapy plan except for indicated above. Verbal and written instructions (see AVS) provided.  Patient expresses understanding of diagnosis and treatment plan. Follow-up Disposition: Not on [address] [place] [address].  Demetrio Fong MD

## 2018-08-09 NOTE — PROGRESS NOTES
1. Have you been to the ER, urgent care clinic since your last visit? Hospitalized since your last visit? No    2. Have you seen or consulted any other health care providers outside of the [address] [place] [address] since your last visit? Include any pap smears or colon screening. Yes When: saw  in [address] [place] [address]. for his left shoulder pain

## 2018-08-14 ENCOUNTER — OFFICE VISIT (OUTPATIENT)
Dept: FAMILY MEDICINE CLINIC | Age: 73
End: 2018-08-14

## 2018-08-14 VITALS
OXYGEN SATURATION: 96 % | SYSTOLIC BLOOD PRESSURE: 116 MMHG | HEART RATE: 70 BPM | BODY MASS INDEX: 23.59 KG/M2 | DIASTOLIC BLOOD PRESSURE: 68 MMHG | TEMPERATURE: 98 F | WEIGHT: 164.8 LBS | HEIGHT: 70 IN | RESPIRATION RATE: 18 BRPM

## 2018-08-14 DIAGNOSIS — M19.012 ARTHRITIS OF LEFT SHOULDER REGION: ICD-10-CM

## 2018-08-14 DIAGNOSIS — M25.612 DECREASED ROM OF LEFT SHOULDER: ICD-10-CM

## 2018-08-14 DIAGNOSIS — M51.36 DDD (DEGENERATIVE DISC DISEASE), LUMBAR: Primary | ICD-10-CM

## 2018-08-14 DIAGNOSIS — M25.512 ACUTE PAIN OF LEFT SHOULDER: ICD-10-CM

## 2018-08-14 RX ORDER — GABAPENTIN 600 MG/1
600 TABLET ORAL 3 TIMES DAILY
Qty: 90 TAB | Refills: 1 | Status: SHIPPED | OUTPATIENT
Start: 2018-08-14 | End: 2018-10-25 | Stop reason: SDUPTHER

## 2018-08-14 RX ORDER — HYDROCODONE BITARTRATE AND ACETAMINOPHEN 5; 325 MG/1; MG/1
1 TABLET ORAL
Qty: 15 TAB | Refills: 0 | Status: SHIPPED | OUTPATIENT
Start: 2018-08-14 | End: 2018-11-14 | Stop reason: SDUPTHER

## 2018-08-14 NOTE — PROGRESS NOTES
Sandy Hilton is a 75 y.o. male who presents to the office today with the following:  Chief Complaint   Patient presents with    Hip Pain     right hip pain X3 years since pt hurt back       No Known Allergies    Current Outpatient Prescriptions   Medication Sig    HYDROcodone-acetaminophen (NORCO) 5-325 mg per tablet Take 1 Tab by mouth every six (6) hours as needed for Pain. Max Daily Amount: 4 Tabs.  gabapentin (NEURONTIN) 600 mg tablet Take 1 Tab by mouth three (3) times daily.  sertraline (ZOLOFT) 100 mg tablet 1-1/2 tablets daily (Patient taking differently: nightly. 1-1/2 tablets daily)    fluticasone (FLONASE) 50 mcg/actuation nasal spray 2 Sprays by Both Nostrils route daily.  atorvastatin (LIPITOR) 40 mg tablet TAKE 1 TABLET BY MOUTH EVERY DAY    meclizine (ANTIVERT) 25 mg tablet Take 1 Tab by mouth three (3) times daily as needed for Dizziness.  lisinopril (PRINIVIL, ZESTRIL) 5 mg tablet TAKE 1 TABLET BY MOUTH DAILY FOR 30 DAYS    memantine (NAMENDA) 10 mg tablet Take 1 tab twice a day    bisacodyl (DULCOLAX, BISACODYL,) 5 mg EC tablet Take 5 mg by mouth daily as needed for Constipation.  ELIQUIS 5 mg tablet TAKE 1 TABLET BY MOUTH EVERY 12 HOURS    metoprolol succinate (TOPROL-XL) 25 mg XL tablet Take 1 Tab by mouth daily. Indications: hypertension    polyethylene glycol (MIRALAX) 17 gram/dose powder Take 17 g by mouth daily.  Incontinence Pad, Liner, Disp (BLADDER CONTROL PADS EX ABSORB) pads 1 Packet by Does Not Apply route as needed (incontinence).  nitroglycerin (NITROSTAT) 0.4 mg SL tablet 1 Tab by SubLINGual route every five (5) minutes as needed for Chest Pain (call 911 if not relieved by 3). No current facility-administered medications for this visit.         Past Medical History:   Diagnosis Date    AICD (automatic cardioverter/defibrillator) present 5/13/2016 5/13/16 [employer] upgrade to dual chamber AICD implant  Dr. Grace Self state, other     Arrhythmia     'S IN THE PAST    Arthritis     OSTEO ARTHRITIS    AVNRT (AV bridgette re-entry tachycardia) ([facility] [address] 5/12/2016 5/12/16 AVNRT ablation: PM/AICD left upper chest :Dr. Edelmira Vaz Back ache     CAD (coronary artery disease)     Cancer [facility]) 2004    Prostate cancer    Chest tightness     last episode of chest pain 5/2016 before AICD placed; none since then    Coagulation defects 2005    FACTOR V LEIDEN    Dementia     Dizziness     FH: factor V Leiden deficiency     Generalized muscle ache     GERD (gastroesophageal reflux disease)     HEARTBURN    Heart failure ([address] 2014    EF 40%; Dr. Matthieu Linda    Hyperlipidemia, mixed     Hypertension     COREG    Other ill-defined conditions(799.89) 2004    blood transfusion    Pacemaker     Pacemaker     Postsurgical aortocoronary bypass status 05/29/2012    CABG    Presence of cardiac defibrillator 05/2016    left upper chest    Psychiatric disorder     depression    Stroke [facility]) 2011, 1990's    [facility]    Thromboembolism ([facility] 2014    left leg: changed to Eliquis from Warfarin    Thromboembolus ([facility] 2005    left leg    Weakness generalized        Past Surgical History:   Procedure Laterality Date    CARDIAC CATHETERIZATION  3/4/2011         CARDIAC SURG PROCEDURE UNLIST      CABG X1 3/5/11    HX HERNIA REPAIR  2002    Ingiunal hernia repair left    HX ORTHOPAEDIC      LEFT KNEE CARTILAGE REPAIR;RIGHT ROTATOR CUFF REPAIR    HX ORTHOPAEDIC  2/14/12    C5-7 ANTERIIOR CERVICAL DISECTOMY AND FUSION    HX ORTHOPAEDIC  6/4/13    C5-C7 POSTERIOR DECOMPRESSION AND FUSION    HX ORTHOPAEDIC      right thumb partial amputation of tip    HX OTHER SURGICAL      steroid injections    HX PACEMAKER Left     HX PACEMAKER PLACEMENT      [place]    HX UROLOGICAL       urinary control system    HX UROLOGICAL  12/3/13    REPLACEMENT ARTIFICAL URINARY SPHINCTER       History   Smoking Status  Former Smoker    Types: Pipe, Cigars    Quit date: 10/10/1971   Smokeless Tobacco    Never Used       Family History   Problem Relation Age of Onset    Asthma Mother     Alcohol abuse Mother     Alcohol abuse Father     Asthma Father     Cancer Sister     Heart Disease Sister     Alcohol abuse Brother     Cancer Brother     Heart Disease Brother          History of Present Illness:  Patient here for follow-up evaluation of right-sided sciatica    Patient with a history of lumbar disc disease and annular tear of the lumbar disc. He is currently being followed by orthopedist.  He has had a series of injections in that area. He continues to complain of some right-sided low back pain. It does radiate down his right leg toward his ankle. At times it feels a bit weak. No numbness. No change in his bowel or bladder control. Patient states his surgeon has discussed surgical options in the past but he has been quite hesitant to proceed as his wife had complications from back surgery. He is currently on Neurontin 400 mg 3 times a day. It does help some but he has persistent pain. He is not a candidate for nonsteroidals as he is on Eliquis. He has taken some occasional hydrocodone in the past with some temporary relief as well. He was in physical therapy in the past but has not tried this recently.     He has no other new complaints    Patient was in recently for routine medical checkup and is already scheduled to see me back in October      Review of Systems:    Review of systems negative except as noted above      Physical Exam:  Visit Vitals    /68 (BP 1 Location: Left arm, BP Patient Position: Sitting)    Pulse 70    Temp 98 °F (36.7 °C) (Oral)    Resp 18    Ht 5' 10\" (1.778 m)    Wt 164 lb 12.8 oz (74.8 kg)    SpO2 96%    BMI 23.65 kg/m2     Vitals:    08/14/18 1511   BP: 116/68   BP 1 Location: Left arm   BP Patient Position: Sitting   Pulse: 70   Resp: 18   Temp: 98 °F (36.7 °C)   TempSrc: Oral   SpO2: 96%   Weight: 164 lb 12.8 oz (74.8 kg)   Height: 5' 10\" (1.778 m)     Patient was in no acute distress. Vital signs as above  Back today had some reproducible tenderness right lower lumbar area and sciatic notch area  Motor sensory reflexes intact no lower extremity. Negative sitting straight leg raising sign    Assessment/Plan:  1. DDD (degenerative disc disease), lumbar  I had a long discussion with patient and his wife. I recommended follow-up with orthopedist and at least consider the surgical options for more definitive long-term solution. In the interim I am going to increase his Neurontin. I am reinstituting physical therapy. I have given him a very short supply of hydrocodone to be used as needed.  was reviewed. - HYDROcodone-acetaminophen (NORCO) 5-325 mg per tablet; Take 1 Tab by mouth every six (6) hours as needed for Pain. Max Daily Amount: 4 Tabs. Dispense: 15 Tab; Refill: 0  - gabapentin (NEURONTIN) 600 mg tablet; Take 1 Tab by mouth three (3) times daily. Dispense: 90 Tab; Refill: 1  - REFERRAL TO PHYSICAL THERAPY    Patient will otherwise continue his routine medications and keep his planned follow-up with me        Continue current therapy plan except for indicated above. Verbal and written instructions (see AVS) provided.  Patient expresses understanding of diagnosis and treatment plan. Follow-up Disposition: Not on [address] [place] [address].  Bridger Adan MD

## 2018-08-14 NOTE — PROGRESS NOTES
Chief Complaint   Patient presents with    Hip Pain     right hip pain X3 years since pt hurt back     Health Maintenance Due   Topic Date Due    FOBT Q 1 YEAR AGE 50-75  04/01/1994    ZOSTER VACCINE AGE 60>  02/01/2004    GLAUCOMA SCREENING Q2Y  04/01/2009    Influenza Age 5 to Adult  08/01/2018     Visit Vitals    /68 (BP 1 Location: Left arm, BP Patient Position: Sitting)    Pulse 70    Temp 98 °F (36.7 °C) (Oral)    Resp 18    Ht 5' 10\" (1.778 m)    Wt 164 lb 12.8 oz (74.8 kg)    SpO2 96%    BMI 23.65 kg/m2     1. Have you been to the ER, urgent care clinic since your last visit? Hospitalized since your last visit? No    2. Have you seen or consulted any other health care providers outside of the [address] [place] [address] since your last visit? Include any pap smears or colon screening.  No    Chemo Sharma LPN

## 2018-08-14 NOTE — PATIENT INSTRUCTIONS
Increase gabapentin to 600 mg three times daily  May use occasional hydrocodone  Physical therapy  Follow up with ortho and discuss surgical options

## 2018-08-14 NOTE — MR AVS SNAPSHOT
[place] [address] 
[phone] Patient: Lea Grover MRN: [id number] Visit Information Date & Time Provider Department Dept. Phone Encounter #  
 8/14/2018  3:40 PM Keila Bowling  [facility] [phone] [id number] Your Appointments 10/18/2018  1:30 PM  
PROCEDURE with PACEMAKER, [address] [place] [address]) Appt Note: BSC ICD 3 mo device check no landline [address]  
[phone] 400 [facility]  
  
    
 10/18/2018  1:45 PM  
ESTABLISHED PATIENT with CARLENE Mcgill [facility] [address] [place] [address]) Appt Note: BSC ICD 3 mo device check no landline [address]  
[phone] [address]  
  
    
 11/12/2018  2:00 PM  
Follow Up with Garima Myers MD  
Neurology Clinic at [address]) Appt Note: f/u memory, jrb 3/20/18  
 [address], [address] [phone]  
[address], [address] [place] [address] St [address] [phone] Upcoming Health Maintenance Date Due FOBT Q 1 YEAR AGE 50-75 4/1/1994 ZOSTER VACCINE AGE 60> 2/1/2004 GLAUCOMA SCREENING Q2Y 4/1/2009 Influenza Age 5 to Adult 8/1/2018 MEDICARE YEARLY EXAM 7/4/2019 DTaP/Tdap/Td series (2 - Td) 6/29/2027 Allergies as of 8/14/2018  Review Complete On: 8/14/2018 By: Keila Bowling MD  
 No Known Allergies Current Immunizations  Reviewed on 7/17/2017 Name Date Influenza High Dose Vaccine PF 10/11/2017, 11/10/2016 Influenza Vaccine 9/16/2014 Influenza Vaccine Split 9/14/2011 Influenza Vaccine Whole 9/1/2010 Pneumococcal Conjugate (PCV-13) 11/10/2016 Pneumococcal Polysaccharide (PPSV-23) 11/13/2012 12:00 AM  
 Td 5/8/2012 ZZZ-RETIRED (DO NOT USE) Pneumococcal Vaccine (Unspecified Type) 9/14/2011 Not reviewed this visit You Were Diagnosed With   
  
 Codes Comments DDD (degenerative disc disease), lumbar    -  Primary ICD-10-CM: M51.36 
ICD-9-CM: 722.52 Acute pain of left shoulder     ICD-10-CM: M25.512 ICD-9-CM: 719.41 Decreased ROM of left shoulder     ICD-10-CM: M25.612 ICD-9-CM: 719.51 Arthritis of left shoulder region     ICD-10-CM: M19.012 
ICD-9-CM: 716.91 Vitals BP Pulse Temp Resp Height(growth percentile) Weight(growth percentile) 116/68 (BP 1 Location: Left arm, BP Patient Position: Sitting) 70 98 °F (36.7 °C) (Oral) 18 5' 10\" (1.778 m) 164 lb 12.8 oz (74.8 kg) SpO2 BMI Smoking Status 96% 23.65 kg/m2 Former Smoker BMI and BSA Data Body Mass Index Body Surface Area  
 23.65 kg/m 2 1.92 m 2 Preferred Pharmacy Pharmacy Name Phone [facility] [address] [place] [address] Your Updated Medication List  
  
   
This list is accurate as of 8/14/18  4:10 PM.  Always use your most recent med list.  
  
  
  
  
 atorvastatin 40 mg tablet Commonly known as:  LIPITOR  
TAKE 1 TABLET BY MOUTH EVERY DAY  
  
 DULCOLAX (BISACODYL) 5 mg EC tablet Generic drug:  bisacodyl Take 5 mg by mouth daily as needed for Constipation. ELIQUIS 5 mg tablet Generic drug:  apixaban TAKE 1 TABLET BY MOUTH EVERY 12 HOURS  
  
 fluticasone 50 mcg/actuation nasal spray Commonly known as:  Michaelyn Drought 2 Sprays by Both Nostrils route daily. gabapentin 600 mg tablet Commonly known as:  NEURONTIN Take 1 Tab by mouth three (3) times daily. HYDROcodone-acetaminophen 5-325 mg per tablet Commonly known as:  Del Rosario Jocy Take 1 Tab by mouth every six (6) hours as needed for Pain. Max Daily Amount: 4 Tabs. Incontinence Pad, Liner, Disp Pads Commonly known as:  BLADDER CONTROL PADS EX ABSORB  
1 Packet by Does Not Apply route as needed (incontinence). lisinopril 5 mg tablet Commonly known as:  PRINIVIL, ZESTRIL  
TAKE 1 TABLET BY MOUTH DAILY FOR 30 DAYS  
  
 meclizine 25 mg tablet Commonly known as:  ANTIVERT Take 1 Tab by mouth three (3) times daily as needed for Dizziness. memantine 10 mg tablet Commonly known as:  Jeremiah Sorrow Take 1 tab twice a day  
  
 metoprolol succinate 25 mg XL tablet Commonly known as:  TOPROL-XL Take 1 Tab by mouth daily. Indications: hypertension MIRALAX 17 gram/dose powder Generic drug:  polyethylene glycol Take 17 g by mouth daily. nitroglycerin 0.4 mg SL tablet Commonly known as:  NITROSTAT  
1 Tab by SubLINGual route every five (5) minutes as needed for Chest Pain (call 911 if not relieved by 3). sertraline 100 mg tablet Commonly known as:  ZOLOFT  
1-1/2 tablets daily Prescriptions Printed Refills HYDROcodone-acetaminophen (NORCO) 5-325 mg per tablet 0 Sig: Take 1 Tab by mouth every six (6) hours as needed for Pain. Max Daily Amount: 4 Tabs. Class: Print Route: Oral  
  
Prescriptions Sent to Pharmacy Refills  
 gabapentin (NEURONTIN) 600 mg tablet 1 Sig: Take 1 Tab by mouth three (3) times daily. Class: Normal  
 [facility]: [facility] [address] & [facility] #: [phone] Route: Oral  
  
We Performed the Following REFERRAL TO PHYSICAL THERAPY [VDG36 Custom] Comments:  
 Right sciatica Referral Information Referral ID Referred By Referred To  
  
 [id number] Emanuel Hamilton Not Available Visits Status Start Date End Date 1 New Request 8/14/18 8/14/19 If your referral has a status of pending review or denied, additional information will be sent to support the outcome of this decision. Patient Instructions Increase gabapentin to 600 mg three times daily May use occasional hydrocodone Physical therapy Follow up with ortho and discuss surgical options Introducing [facility]! Dear Laura Vidales: Thank you for requesting a [employer]al[employer] account. Our records indicate that you already have an active Zaldiva account. You can access your account anytime at [email or web address] Did you know that you can access your hospital and ER discharge instructions at any time in Zaldiva? You can also review all of your test results from your hospital stay or ER visit. Additional Information If you have questions, please visit the Frequently Asked Questions section of the Zaldiva website at [email or web address] Remember, Zaldiva is NOT to be used for urgent needs. For medical emergencies, dial 911. Now available from your iPhone and Android! Please provide this summary of care documentation to your next provider. Your primary care clinician is listed as Chayito Delacruz. If you have any questions after today's visit, please call [phone].

## 2018-08-23 ENCOUNTER — DOCUMENTATION ONLY (OUTPATIENT)
Dept: FAMILY MEDICINE CLINIC | Age: 73
End: 2018-08-23

## 2018-10-18 ENCOUNTER — CLINICAL SUPPORT (OUTPATIENT)
Dept: CARDIOLOGY CLINIC | Age: 73
End: 2018-10-18

## 2018-10-18 ENCOUNTER — OFFICE VISIT (OUTPATIENT)
Dept: CARDIOLOGY CLINIC | Age: 73
End: 2018-10-18

## 2018-10-18 VITALS
BODY MASS INDEX: 24.2 KG/M2 | HEIGHT: 70 IN | RESPIRATION RATE: 18 BRPM | WEIGHT: 169 LBS | OXYGEN SATURATION: 98 % | HEART RATE: 60 BPM | DIASTOLIC BLOOD PRESSURE: 86 MMHG | SYSTOLIC BLOOD PRESSURE: 150 MMHG

## 2018-10-18 DIAGNOSIS — I47.1 SVT (SUPRAVENTRICULAR TACHYCARDIA) (HCC): ICD-10-CM

## 2018-10-18 DIAGNOSIS — I47.1 AVNRT (AV NODAL RE-ENTRY TACHYCARDIA) (HCC): ICD-10-CM

## 2018-10-18 DIAGNOSIS — I49.9 IRREGULAR CARDIAC RHYTHM: ICD-10-CM

## 2018-10-18 DIAGNOSIS — Z95.810 AICD (AUTOMATIC CARDIOVERTER/DEFIBRILLATOR) PRESENT: ICD-10-CM

## 2018-10-18 DIAGNOSIS — I25.10 ATHEROSCLEROSIS OF NATIVE CORONARY ARTERY OF NATIVE HEART WITHOUT ANGINA PECTORIS: ICD-10-CM

## 2018-10-18 DIAGNOSIS — I50.22 CHRONIC SYSTOLIC HEART FAILURE (HCC): ICD-10-CM

## 2018-10-18 DIAGNOSIS — I49.9 IRREGULAR CARDIAC RHYTHM: Primary | ICD-10-CM

## 2018-10-18 DIAGNOSIS — I10 ESSENTIAL HYPERTENSION: ICD-10-CM

## 2018-10-18 DIAGNOSIS — I49.5 SSS (SICK SINUS SYNDROME) (HCC): ICD-10-CM

## 2018-10-18 DIAGNOSIS — I50.22 CHRONIC SYSTOLIC CONGESTIVE HEART FAILURE (HCC): Chronic | ICD-10-CM

## 2018-10-18 DIAGNOSIS — Z95.810 PRESENCE OF AUTOMATIC CARDIOVERTER/DEFIBRILLATOR (AICD): Primary | ICD-10-CM

## 2018-10-18 DIAGNOSIS — I42.9 CARDIOMYOPATHY, UNSPECIFIED TYPE (HCC): ICD-10-CM

## 2018-10-18 NOTE — PROGRESS NOTES
Subjective:      Reva Moralez is a 75 y.o. male is here for EP follow up. He  denies chest pain/ shortness of breath, orthopnea, PND, LE edema, palpitations, syncope, presyncope or fatigue.        Patient Active Problem List    Diagnosis Date Noted    Moderate major depression ([address] 07/03/2018    Depression 07/03/2018    DDD (degenerative disc disease), lumbar 04/02/2018    Chronic anticoagulation 07/17/2017    S/P CABG (coronary artery bypass graft) 06/17/2016    AICD (automatic cardioverter/defibrillator) present 05/13/2016    AVNRT (AV bridgette re-entry tachycardia) ([address] 05/12/2016    SVT (supraventricular tachycardia) ([address] 04/27/2016    Cardiomyopathy ([address] 04/27/2016    Chronic systolic congestive heart failure ([address] 04/27/2016    Stenosis of both carotid arteries without cerebral infarction 04/12/2016    Cervicogenic headache 04/12/2016    Alzheimer's dementia, late onset, with behavioral disturbance 04/12/2016    Spinal stenosis, lumbar region, with neurogenic claudication 04/12/2016    Lumbar back pain with radiculopathy affecting right lower extremity 04/12/2016    Lumbar back pain with radiculopathy affecting left lower extremity 04/12/2016    History of stroke 04/12/2016    ACP (advance care planning) 12/30/2015    B12 deficiency 09/22/2015    Hypothyroidism due to acquired atrophy of thyroid 09/22/2015    Osteoporosis 09/22/2015    Cervical post-laminectomy syndrome 09/22/2015    Neck pain 09/22/2015    Lower GI bleeding 08/19/2015    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 07/23/2015    Cardiac pacemaker in situ 03/17/2015    Pacemaker 11/26/2014    Chest pain 11/24/2014    Bradycardia 11/23/2014    Left-sided chest wall pain 11/23/2014    SSS (sick sinus syndrome) ([address] 11/23/2014    S/P cervical spinal fusion 06/06/2013    Chronic venous embolism and thrombosis of unspecified deep vessels of lower extremity 05/29/2012    Osteoarthritis 05/29/2012    Prostate cancer ([address] 05/29/2012    Hyperthyroidism 05/29/2012    Mitral valve disorders(424.0) 05/29/2012    Postsurgical aortocoronary bypass status 05/29/2012    Coronary atherosclerosis of native coronary artery 05/29/2012    Paroxysmal supraventricular tachycardia ([address] 05/29/2012    Chronic systolic heart failure ([address] 05/29/2012    Atrial fibrillation ([address] 05/29/2012    Mixed hyperlipidemia 05/29/2012    Palpitations 05/29/2012    History of factor V Leiden mutation 03/07/2011    CABG (coronary artery bypass graft) 03/07/2011    CAD (coronary artery disease) 03/04/2011    Hypertension 03/04/2011    Hyperlipemia 03/04/2011    DVT (deep venous thrombosis) chronic coumadin anti-coagulation 03/04/2011      Delmi Villafana MD  Past Medical History:   Diagnosis Date    AICD (automatic cardioverter/defibrillator) present 5/13/2016 5/13/16 [employer] upgrade to dual chamber AICD implant  Dr. Andrae Tripathi state, other     Arrhythmia     'S IN THE PAST    Arthritis     OSTEO ARTHRITIS    AVNRT (AV bridgette re-entry tachycardia) ([facility] 5/12/2016 5/12/16 AVNRT ablation: PM/AICD left upper chest :Dr. Leana Lemos Back ache     CAD (coronary artery disease)     Cancer [facility]) 2004    Prostate cancer    Chest tightness     last episode of chest pain 5/2016 before AICD placed; none since then    Coagulation defects 2005    FACTOR V LEIDEN    Dementia     Dizziness     FH: factor V Leiden deficiency     Generalized muscle ache     GERD (gastroesophageal reflux disease)     HEARTBURN    Heart failure ([address] 2014    EF 40%; Dr. Christos Wilson    Hyperlipidemia, mixed     Hypertension     COREG    Other ill-defined conditions(799.89) 2004    blood transfusion    Pacemaker     Pacemaker     Postsurgical aortocoronary bypass status 05/29/2012    CABG    Presence of cardiac defibrillator 05/2016    left upper chest    Psychiatric disorder     depression    Stroke ([facility]     SEVERAL-mini    Thromboembolism ([facility]     left leg: changed to Eliquis from Warfarin    Thromboembolus ([facility]     left leg    Weakness generalized       Past Surgical History:   Procedure Laterality Date    CARDIAC CATHETERIZATION  3/4/2011         CARDIAC SURG PROCEDURE UNLIST      CABG X1 3/5/11    HX HERNIA REPAIR  2002    Ingiunal hernia repair left    HX ORTHOPAEDIC      LEFT KNEE CARTILAGE REPAIR;RIGHT ROTATOR CUFF REPAIR    HX ORTHOPAEDIC  12    C5-7 ANTERIIOR CERVICAL DISECTOMY AND FUSION    HX ORTHOPAEDIC  13    C5-C7 POSTERIOR DECOMPRESSION AND FUSION    HX ORTHOPAEDIC      right thumb partial amputation of tip    HX OTHER SURGICAL      steroid injections    HX PACEMAKER Left     HX PACEMAKER PLACEMENT      HX PROSTATECTOMY       CA    HX UROLOGICAL       urinary control system    HX UROLOGICAL  12/3/13    REPLACEMENT ARTIFICAL URINARY SPHINCTER     No Known Allergies   Family History   Problem Relation Age of Onset    Asthma Mother     Alcohol abuse Mother     Alcohol abuse Father     Asthma Father     Cancer Sister     Heart Disease Sister     Alcohol abuse Brother     Cancer Brother     Heart Disease Brother     negative for cardiac disease  Social History     Socioeconomic History    Marital status:      Spouse name: Not on file    Number of children: Not on file    Years of education: Not on file    Highest education level: Not on file   Social Needs    Financial resource strain: Not on file    Food insecurity - worry: Not on file    Food insecurity - inability: Not on file   [facility] needs - medical: Not on file   [facility] needs - non-medical: Not on file   Occupational History    Not on file   Tobacco Use    Smoking status: Former Smoker     Types: Pipe, Cigars     Last attempt to quit: 10/10/1971     Years since quittin.0    Smokeless tobacco: Never Used Substance and Sexual Activity    Alcohol use: No     Alcohol/week: 0.6 oz     Types: 1 Cans of beer per week    Drug use: No    Sexual activity: No     Partners: Female   Other Topics Concern     Service Not Asked    Blood Transfusions Not Asked    Caffeine Concern Not Asked    Occupational Exposure Not Asked    Hobby Hazards Not Asked    Sleep Concern Yes    Stress Concern Yes     Comment: Family concerns    Weight Concern Not Asked    Special Diet Not Asked    Back Care Not Asked    Exercise Not Asked    Bike Helmet Not Asked   [address] Not Asked    Self-Exams Not Asked   Social History Narrative    , 2 children     Current Outpatient Medications   Medication Sig    gabapentin (NEURONTIN) 600 mg tablet Take 1 Tab by mouth three (3) times daily.  sertraline (ZOLOFT) 100 mg tablet 1-1/2 tablets daily (Patient taking differently: nightly. 1-1/2 tablets daily)    fluticasone (FLONASE) 50 mcg/actuation nasal spray 2 Sprays by Both Nostrils route daily.  atorvastatin (LIPITOR) 40 mg tablet TAKE 1 TABLET BY MOUTH EVERY DAY    lisinopril (PRINIVIL, ZESTRIL) 5 mg tablet TAKE 1 TABLET BY MOUTH DAILY FOR 30 DAYS    memantine (NAMENDA) 10 mg tablet Take 1 tab twice a day    bisacodyl (DULCOLAX, BISACODYL,) 5 mg EC tablet Take 5 mg by mouth daily as needed for Constipation.  ELIQUIS 5 mg tablet TAKE 1 TABLET BY MOUTH EVERY 12 HOURS    metoprolol succinate (TOPROL-XL) 25 mg XL tablet Take 1 Tab by mouth daily. Indications: hypertension    polyethylene glycol (MIRALAX) 17 gram/dose powder Take 17 g by mouth daily.  Incontinence Pad, Liner, Disp (BLADDER CONTROL PADS EX ABSORB) pads 1 Packet by Does Not Apply route as needed (incontinence).  nitroglycerin (NITROSTAT) 0.4 mg SL tablet 1 Tab by SubLINGual route every five (5) minutes as needed for Chest Pain (call 911 if not relieved by 3).     HYDROcodone-acetaminophen (NORCO) 5-325 mg per tablet Take 1 Tab by mouth every six (6) hours as needed for Pain. Max Daily Amount: 4 Tabs.  meclizine (ANTIVERT) 25 mg tablet Take 1 Tab by mouth three (3) times daily as needed for Dizziness. No current facility-administered medications for this visit. Vitals:    10/18/18 1400   BP: 150/86   Pulse: 60   Resp: 18   SpO2: 98%   Weight: 169 lb (76.7 kg)   Height: 5' 10\" (1.778 m)       I have reviewed the nurses notes, vitals, problem list, allergy list, medical history, family, social history and medications. Review of Symptoms:    General: Pt denies excessive weight gain or loss. Pt is able to conduct ADL's  HEENT: Denies blurred vision, headaches, epistaxis and difficulty swallowing. Respiratory: Denies shortness of breath, HOLCOMB, wheezing or stridor. Cardiovascular: Denies precordial pain, palpitations, edema or PND  Gastrointestinal: Denies poor appetite, indigestion, abdominal pain or blood in stool  Urinary: Denies dysuria, pyuria  Musculoskeletal: Denies pain or swelling from muscles or joints  Neurologic: Denies tremor, paresthesias, or sensory motor disturbance  Skin: Denies rash, itching or texture change. Psych: Denies depression      Physical Exam:      General: Well developed, in no acute distress. HEENT: Eyes - PERRL, no jvd  Heart:  Normal S1/S2 negative S3 or S4. Regular, no murmur, gallop or rub.   Respiratory: Clear bilaterally x 4, no wheezing or rales  Extremities:  No edema, normal cap refill, no cyanosis. Musculoskeletal: No clubbing  Neuro: A&Ox3, speech clear, gait stable. Skin: Skin color is normal. No rashes or lesions.  Non diaphoretic  Vascular: 2+ pulses symmetric in all extremities    Cardiographics    Ekg: Electronic atrial pacemaker  -possibly demand type   -Poor R-wave progression -nonspecific -consider old anterior infarct.    -Nonspecific ST depression     Results for orders placed or performed during the hospital encounter of 11/23/14   EKG, 12 LEAD, INITIAL   Result Value Ref Range Ventricular Rate 44 BPM    Atrial Rate 44 BPM    P-R Interval 168 ms    QRS Duration 100 ms    Q-T Interval 490 ms    QTC Calculation (Bezet) 418 ms    Calculated P Axis 50 degrees    Calculated R Axis 46 degrees    Calculated T Axis 129 degrees    Diagnosis       Marked sinus bradycardia  ST & T wave abnormality, consider lateral ischemia  When compared with ECG of 23-NOV-2014 04:09,  MANUAL COMPARISON REQUIRED, DATA IS UNCONFIRMED         Lab Results   Component Value Date/Time    WBC 7.8 04/02/2018 01:38 PM    Hemoglobin (POC) 11.6 (L) 02/14/2012 10:03 AM    HGB 12.7 (L) 04/02/2018 01:38 PM    Hematocrit (POC) 34 (L) 02/14/2012 10:03 AM    HCT 38.7 04/02/2018 01:38 PM    PLATELET 969 07/44/3665 01:38 PM    MCV 88 04/02/2018 01:38 PM      Lab Results   Component Value Date/Time    Sodium 146 (H) 03/22/2018 08:44 AM    Potassium 4.7 03/22/2018 08:44 AM    Chloride 105 03/22/2018 08:44 AM    CO2 25 03/22/2018 08:44 AM    Anion gap 3 (L) 06/17/2016 12:51 AM    Glucose 80 03/22/2018 08:44 AM    BUN 25 03/22/2018 08:44 AM    Creatinine 1.01 03/22/2018 08:44 AM    BUN/Creatinine ratio 25 (H) 03/22/2018 08:44 AM    GFR est AA 85 03/22/2018 08:44 AM    GFR est non-AA 73 03/22/2018 08:44 AM    Calcium 9.8 03/22/2018 08:44 AM    Bilirubin, total 0.4 01/04/2017 06:01 PM    AST (SGOT) 20 03/22/2018 08:44 AM    Alk. phosphatase 89 01/04/2017 06:01 PM    Protein, total 7.5 01/04/2017 06:01 PM    Albumin 4.7 01/04/2017 06:01 PM    Globulin 3.5 11/25/2014 01:30 AM    A-G Ratio 1.7 01/04/2017 06:01 PM    ALT (SGPT) 11 03/22/2018 08:44 AM      Lab Results   Component Value Date/Time    TSH 0.805 08/19/2016 03:41 PM        Assessment:            ICD-10-CM ICD-9-CM    1. Irregular cardiac rhythm I49.9 427.9 AMB POC EKG ROUTINE W/ 12 LEADS, INTER & REP      2D ECHO COMPLETE ADULT (TTE) W OR WO CONTR   2. AVNRT (AV bridgette re-entry tachycardia) (HCC) I47.1 427.89 2D ECHO COMPLETE ADULT (TTE) W OR WO CONTR   3.  AICD (automatic cardioverter/defibrillator) present Z95.810 V45.02 2D ECHO COMPLETE ADULT (TTE) W OR WO CONTR   4. Chronic systolic heart failure (HCC) I50.22 428.22 2D ECHO COMPLETE ADULT (TTE) W OR WO CONTR   5. SSS (sick sinus syndrome) (HCC) I49.5 427.81 2D ECHO COMPLETE ADULT (TTE) W OR WO CONTR   6. Atherosclerosis of native coronary artery of native heart without angina pectoris I25.10 414.01 2D ECHO COMPLETE ADULT (TTE) W OR WO CONTR   7. Essential hypertension I10 401.9 2D ECHO COMPLETE ADULT (TTE) W OR WO CONTR     Orders Placed This Encounter    AMB POC EKG ROUTINE W/ 12 LEADS, INTER & REP     Order Specific Question:   Reason for Exam:     Answer:   routine    2D ECHO COMPLETE ADULT (TTE) W OR WO CONTR     Standing Status:   Future     Standing Expiration Date:   10/18/2019     Order Specific Question:   Reason for Exam:     Answer:   ICD, cardiolyopathy     Order Specific Question:   Contrast Enhancement (Bubble Study, Definity, Optison) may be used if criteria listed in established evidence-based protocol has been identified. Answer:   Yes        Plan:     Mr Art Jiang is here for annual follow up on dual chamber ICD. He is 45% AP and > 1% RVP. No events. Hx of Cardiomyopathy, LVEF 30-35% by echo at [facility] April 2016. WiIl repeat echo today. Set up on [facility]. We will see him back in 1 year. Continue medical management for CAD, hyperlipidemia and cardiomyopathy. Thank you for allowing me to participate in [facility] 's care.       Eleni Evans NP

## 2018-10-18 NOTE — PROGRESS NOTES
Chief Complaint   Patient presents with    Irregular Heart Beat     3 mo appt. Denied cardiac symptoms. 1. Have you been to the ER, urgent care clinic since your last visit? Hospitalized since your last visit? No    2. Have you seen or consulted any other health care providers outside of the [address] [place] [address] since your last visit? Include any pap smears or colon screening.  No

## 2018-10-25 DIAGNOSIS — M51.36 DDD (DEGENERATIVE DISC DISEASE), LUMBAR: ICD-10-CM

## 2018-10-25 RX ORDER — GABAPENTIN 600 MG/1
TABLET ORAL
Qty: 90 TAB | Refills: 0 | Status: SHIPPED | OUTPATIENT
Start: 2018-10-25 | End: 2018-11-23 | Stop reason: SDUPTHER

## 2018-10-25 NOTE — TELEPHONE ENCOUNTER
Patient requesting refill gabapentin  Refill okay times 1 month  Patient was supposed to follow-up with me in October  He will be contacted and reminded to schedule this for November

## 2018-10-26 ENCOUNTER — TELEPHONE (OUTPATIENT)
Dept: CARDIOLOGY CLINIC | Age: 73
End: 2018-10-26

## 2018-10-26 NOTE — TELEPHONE ENCOUNTER
----- Message from Wilbern Closs, ANP sent at 10/24/2018  3:51 PM EDT -----  His Ef is down a bit from before however he is asymptomatic on bb and ACE. Please let him know that there are new drugs for heart failure and a heart failure clinic if he is interested. Thanks.    ----- Message -----  From: Jaquan, Jovanni Result In  Sent: 10/19/2018   2:49 PM  To:  CARLENE Shafer

## 2018-10-26 NOTE — TELEPHONE ENCOUNTER
----- Message from CARLENE Tang sent at 10/24/2018  3:51 PM EDT -----  His Ef is down a bit from before however he is asymptomatic on bb and ACE. Please let him know that there are new drugs for heart failure and a heart failure clinic if he is interested. Thanks.    ----- Message -----  From: Jovanni Partida Result In  Sent: 10/19/2018   2:49 PM  To:  CARLENE Back

## 2018-10-26 NOTE — TELEPHONE ENCOUNTER
Spoke with patient. He reports that he is not sure if he wants to try anything new yet but he will think about it and let us know. He verbalized understanding of test results noted below.

## 2018-11-23 DIAGNOSIS — M51.36 DDD (DEGENERATIVE DISC DISEASE), LUMBAR: ICD-10-CM

## 2018-11-23 RX ORDER — GABAPENTIN 600 MG/1
TABLET ORAL
Qty: 90 TAB | Refills: 0 | Status: SHIPPED | OUTPATIENT
Start: 2018-11-23 | End: 2018-12-27 | Stop reason: SDUPTHER

## 2018-11-29 ENCOUNTER — TELEPHONE (OUTPATIENT)
Dept: FAMILY MEDICINE CLINIC | Age: 73
End: 2018-11-29

## 2018-11-29 NOTE — TELEPHONE ENCOUNTER
Patient's wife calling to see if Dr. Klarissa Morelos had any openings today. States patient is having back pain and wants a refill on his pain medication. States he is driving her crazy. Offered patient's wife an appointment with Lilly Montez, but wife is adamant they do not want to see her. Offered appointment with Dr. Campbell [place], but per wife, they \"can not wait that long. \" Wife states if patient gets worse, she will either take him to the ED or an urgent care.

## 2018-12-21 DIAGNOSIS — I82.492 ACUTE DEEP VEIN THROMBOSIS (DVT) OF OTHER SPECIFIED VEIN OF LEFT LOWER EXTREMITY (HCC): ICD-10-CM

## 2018-12-21 RX ORDER — APIXABAN 5 MG/1
TABLET, FILM COATED ORAL
Qty: 180 TAB | Refills: 0 | Status: SHIPPED | OUTPATIENT
Start: 2018-12-21 | End: 2019-04-08 | Stop reason: SDUPTHER

## 2018-12-27 DIAGNOSIS — M51.36 DDD (DEGENERATIVE DISC DISEASE), LUMBAR: ICD-10-CM

## 2018-12-27 DIAGNOSIS — F32.A DEPRESSION, UNSPECIFIED DEPRESSION TYPE: ICD-10-CM

## 2018-12-27 RX ORDER — SERTRALINE HYDROCHLORIDE 100 MG/1
TABLET, FILM COATED ORAL
Qty: 135 TAB | Refills: 0 | Status: SHIPPED | OUTPATIENT
Start: 2018-12-27 | End: 2019-04-08 | Stop reason: SDUPTHER

## 2018-12-27 RX ORDER — GABAPENTIN 600 MG/1
TABLET ORAL
Qty: 90 TAB | Refills: 0 | Status: SHIPPED | OUTPATIENT
Start: 2018-12-27 | End: 2019-01-31 | Stop reason: SDUPTHER

## 2018-12-27 NOTE — TELEPHONE ENCOUNTER
Gabapentin refilled times 1 month. Last refill in November.   He will be reminded to schedule follow-up

## 2019-01-23 ENCOUNTER — CLINICAL SUPPORT (OUTPATIENT)
Dept: CARDIOLOGY CLINIC | Age: 74
End: 2019-01-23

## 2019-01-23 DIAGNOSIS — Z95.810 PRESENCE OF AUTOMATIC CARDIOVERTER/DEFIBRILLATOR (AICD): Primary | ICD-10-CM

## 2019-01-23 DIAGNOSIS — I50.22 CHRONIC SYSTOLIC CONGESTIVE HEART FAILURE (HCC): Chronic | ICD-10-CM

## 2019-02-20 ENCOUNTER — OFFICE VISIT (OUTPATIENT)
Dept: CARDIOLOGY CLINIC | Age: 74
End: 2019-02-20

## 2019-02-20 VITALS
RESPIRATION RATE: 16 BRPM | DIASTOLIC BLOOD PRESSURE: 68 MMHG | HEIGHT: 67 IN | BODY MASS INDEX: 27.81 KG/M2 | OXYGEN SATURATION: 94 % | SYSTOLIC BLOOD PRESSURE: 110 MMHG | WEIGHT: 177.2 LBS | HEART RATE: 75 BPM

## 2019-02-20 DIAGNOSIS — Z95.1 S/P CABG X 1: ICD-10-CM

## 2019-02-20 DIAGNOSIS — I47.1 AVNRT (AV NODAL RE-ENTRY TACHYCARDIA) (HCC): ICD-10-CM

## 2019-02-20 DIAGNOSIS — Z95.0 CARDIAC PACEMAKER IN SITU: ICD-10-CM

## 2019-02-20 DIAGNOSIS — E78.2 MIXED HYPERLIPIDEMIA: ICD-10-CM

## 2019-02-20 DIAGNOSIS — I25.10 ASHD (ARTERIOSCLEROTIC HEART DISEASE): Primary | ICD-10-CM

## 2019-02-20 DIAGNOSIS — I49.5 SSS (SICK SINUS SYNDROME) (HCC): ICD-10-CM

## 2019-02-20 DIAGNOSIS — Z95.810 PRESENCE OF AUTOMATIC CARDIOVERTER/DEFIBRILLATOR (AICD): ICD-10-CM

## 2019-02-20 DIAGNOSIS — I10 ESSENTIAL HYPERTENSION: ICD-10-CM

## 2019-02-20 DIAGNOSIS — I50.22 CHRONIC SYSTOLIC CONGESTIVE HEART FAILURE (HCC): ICD-10-CM

## 2019-02-20 DIAGNOSIS — E78.5 HYPERLIPIDEMIA, UNSPECIFIED HYPERLIPIDEMIA TYPE: Chronic | ICD-10-CM

## 2019-02-20 RX ORDER — GUAIFENESIN 100 MG/5ML
81 LIQUID (ML) ORAL DAILY
Qty: 30 TAB | Refills: 11 | Status: SHIPPED | OUTPATIENT
Start: 2019-02-20 | End: 2020-02-06

## 2019-02-20 RX ORDER — CARVEDILOL 3.12 MG/1
3.12 TABLET ORAL 2 TIMES DAILY
Qty: 180 TAB | Refills: 2 | Status: SHIPPED | OUTPATIENT
Start: 2019-02-20 | End: 2019-05-17

## 2019-02-20 RX ORDER — FUROSEMIDE 20 MG/1
20 TABLET ORAL
Qty: 30 TAB | Refills: 0 | Status: SHIPPED | OUTPATIENT
Start: 2019-02-20 | End: 2019-04-27 | Stop reason: SDUPTHER

## 2019-02-20 NOTE — PROGRESS NOTES
[address] [facility]  [phone]     Subjective:      Minnie Garcia is a 75 y.o. male is here for annual f/u. Reports agudelo; this is a new symptom. Also mild dizziness after taking Tramadol for back pain. Pt has some mild dementia, wife is present to help answer questions. The patient denies shortness of breath, orthopnea, PND, LE edema, palpitations, syncope, or presyncope.        Patient Active Problem List    Diagnosis Date Noted    Moderate major depression ([address] 07/03/2018    Depression 07/03/2018    DDD (degenerative disc disease), lumbar 04/02/2018    Chronic anticoagulation 07/17/2017    S/P CABG (coronary artery bypass graft) 06/17/2016    AICD (automatic cardioverter/defibrillator) present 05/13/2016    AVNRT (AV bridgette re-entry tachycardia) ([address] 05/12/2016    SVT (supraventricular tachycardia) ([address] 04/27/2016    Cardiomyopathy ([address] 04/27/2016    Chronic systolic congestive heart failure ([address] 04/27/2016    Stenosis of both carotid arteries without cerebral infarction 04/12/2016    Cervicogenic headache 04/12/2016    Alzheimer's dementia, late onset, with behavioral disturbance 04/12/2016    Spinal stenosis, lumbar region, with neurogenic claudication 04/12/2016    Lumbar back pain with radiculopathy affecting right lower extremity 04/12/2016    Lumbar back pain with radiculopathy affecting left lower extremity 04/12/2016    History of stroke 04/12/2016    ACP (advance care planning) 12/30/2015    B12 deficiency 09/22/2015    Hypothyroidism due to acquired atrophy of thyroid 09/22/2015    Osteoporosis 09/22/2015    Cervical post-laminectomy syndrome 09/22/2015    Neck pain 09/22/2015    Lower GI bleeding 08/19/2015    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 07/23/2015    Cardiac pacemaker in situ 03/17/2015    Pacemaker 11/26/2014    Chest pain 11/24/2014    Bradycardia 11/23/2014    Left-sided chest wall pain 11/23/2014    SSS (sick sinus syndrome) ([address] 11/23/2014    S/P cervical spinal fusion 06/06/2013    Chronic venous embolism and thrombosis of unspecified deep vessels of lower extremity 05/29/2012    Osteoarthritis 05/29/2012    Prostate cancer ([address] 05/29/2012    Hyperthyroidism 05/29/2012    Mitral valve disorders(424.0) 05/29/2012    Postsurgical aortocoronary bypass status 05/29/2012    Coronary atherosclerosis of native coronary artery 05/29/2012    Paroxysmal supraventricular tachycardia ([address] 05/29/2012    Chronic systolic heart failure ([address] 05/29/2012    Atrial fibrillation ([address] 05/29/2012    Mixed hyperlipidemia 05/29/2012    Palpitations 05/29/2012    History of factor V Leiden mutation 03/07/2011    CABG (coronary artery bypass graft) 03/07/2011    CAD (coronary artery disease) 03/04/2011    Hypertension 03/04/2011    Hyperlipemia 03/04/2011    DVT (deep venous thrombosis) chronic coumadin anti-coagulation 03/04/2011      Scottie Jacob MD  Past Medical History:   Diagnosis Date    AICD (automatic cardioverter/defibrillator) present 5/13/2016 5/13/16 [employer] upgrade to dual chamber AICD implant  Dr. Janet Kurtz state, other     Arrhythmia     'S IN THE PAST    Arthritis     OSTEO ARTHRITIS    AVNRT (AV bridgette re-entry tachycardia) ([address] 5/12/2016 5/12/16 AVNRT ablation: PM/AICD left upper chest :Dr. Hutchins Sircornell Back ache     CAD (coronary artery disease)     Cancer [facility]) 2004    Prostate cancer    Chest tightness     last episode of chest pain 5/2016 before AICD placed; none since then    Coagulation defects 2005    FACTOR V LEIDEN    Dementia     Dizziness     FH: factor V Leiden deficiency     Generalized muscle ache     GERD (gastroesophageal reflux disease)     HEARTBURN    Heart failure ([address] 2014    EF 40%; Dr. Priscilla Villafana    Hyperlipidemia, mixed     Hypertension     COREG    Other ill-defined conditions(799.89) 2004    blood transfusion    Pacemaker     Pacemaker     Postsurgical aortocoronary bypass status 05/29/2012    CABG    Presence of cardiac defibrillator 05/2016    left upper chest    Psychiatric disorder     depression    Stroke [facility]) 2011, 1990's    SEVERAL-mini    Thromboembolism ([facility] 2014    left leg: changed to Eliquis from Warfarin    Thromboembolus ([facility] 2005    left leg    Weakness generalized       Past Surgical History:   Procedure Laterality Date    CARDIAC CATHETERIZATION  3/4/2011         CARDIAC SURG PROCEDURE UNLIST      CABG X1 3/5/11    HX HERNIA REPAIR  2002    Ingiunal hernia repair left    HX ORTHOPAEDIC      LEFT KNEE CARTILAGE REPAIR;RIGHT ROTATOR CUFF REPAIR    HX ORTHOPAEDIC  2/14/12    C5-7 ANTERIIOR CERVICAL DISECTOMY AND FUSION    HX ORTHOPAEDIC  6/4/13    C5-C7 POSTERIOR DECOMPRESSION AND FUSION    HX ORTHOPAEDIC      right thumb partial amputation of tip    HX OTHER SURGICAL      steroid injections    HX PACEMAKER Left     HX PACEMAKER PLACEMENT      H[place] PR[place]CT[place]  2004     [place]    HX UROLOGICAL       urinary control system    HX UROLOGICAL  12/3/13    REPLACEMENT ARTIFICAL URINARY SPHINCTER     No Known Allergies   Family History   Problem Relation Age of Onset    Asthma Mother     Alcohol abuse Mother     Alcohol abuse Father     Asthma Father     Cancer Sister     Heart Disease Sister     Alcohol abuse Brother     Cancer Brother     Heart Disease Brother       Social History     Socioeconomic History    Marital status:      Spouse name: Not on file    Number of children: Not on file    Years of education: Not on file    Highest education level: Not on file   Social Needs    Financial resource strain: Not on file    Food insecurity - worry: Not on file    Food insecurity - inability: Not on file   [employer] needs - medical: Not on file   [employer] needs - non-medical: Not on file   Occupational History    Not on file   Tobacco Use    Smoking status: Former Smoker     Types: Pipe, Cigars     Last attempt to quit: 10/10/1971     Years since quittin.3    Smokeless tobacco: Never Used   Substance and Sexual Activity    Alcohol use: No     Alcohol/week: 0.6 oz     Types: 1 Cans of beer per week    Drug use: No    Sexual activity: No     Partners: Female   Other Topics Concern     Service Not Asked    Blood Transfusions Not Asked    Caffeine Concern Not Asked    Occupational Exposure Not Asked    Hobby Hazards Not Asked    Sleep Concern Yes    Stress Concern Yes     Comment: Family concerns    Weight Concern Not Asked    Special Diet Not Asked    Back Care Not Asked    Exercise Not Asked    Bike Helmet Not Asked    [facility] Not Asked    Self-Exams Not Asked   Social History Narrative    , 2 children      Current Outpatient Medications   Medication Sig    multivit-min/FA/lycopen/lutein (SENTRY SENIOR PO) Take  by mouth daily.  carvedilol (COREG) 3.125 mg tablet Take 1 Tab by mouth two (2) times a day.  aspirin 81 mg chewable tablet Take 1 Tab by mouth daily.  furosemide (LASIX) 20 mg tablet Take 1 Tab by mouth daily as needed.  traMADol (ULTRAM) 50 mg tablet Take 1 Tab by mouth two (2) times daily as needed for Pain. Max Daily Amount: 100 mg.    lisinopril (PRINIVIL, ZESTRIL) 5 mg tablet TAKE 1 TABLET BY MOUTH DAILY    gabapentin (NEURONTIN) 600 mg tablet TAKE 1 TABLET BY MOUTH THREE TIMES A DAY    traZODone (DESYREL) 100 mg tablet Take 1 Tab by mouth nightly.  sertraline (ZOLOFT) 100 mg tablet TAKE 1&1/2 TABLETS (150MG) BY MOUTH ONCE DAILY    ELIQUIS 5 mg tablet TAKE 1 TABLET BY MOUTH EVERY 12 HOURS    atorvastatin (LIPITOR) 40 mg tablet TAKE 1 TABLET BY MOUTH EVERY DAY    memantine (NAMENDA) 10 mg tablet Take 1 tab twice a day    bisacodyl (DULCOLAX, BISACODYL,) 5 mg EC tablet Take 5 mg by mouth daily as needed for Constipation.     polyethylene glycol (MIRALAX) 17 gram/dose powder Take 17 g by mouth daily.  Incontinence Pad, Liner, Disp (BLADDER CONTROL PADS EX ABSORB) pads 1 Packet by Does Not Apply route as needed (incontinence).  nitroglycerin (NITROSTAT) 0.4 mg SL tablet 1 Tab by SubLINGual route every five (5) minutes as needed for Chest Pain (call 911 if not relieved by 3). No current facility-administered medications for this visit. Review of Symptoms:  11 systems reviewed, negative other than as stated in the HPI    Physical ExamPhysical Exam:    Vitals:    02/20/19 1435 02/20/19 1452 02/20/19 1453   BP: 120/78 118/70 110/68   Pulse: 75     Resp: 16     SpO2: 94%     Weight: 177 lb 3.2 oz (80.4 kg)     Height: 5' 7\" (1.702 m)       Body mass index is 27.75 kg/m². General PE   Gen:  NAD  Mental Status - Alert. General Appearance - Not in acute distress. Chest and Lung Exam   Inspection: Accessory muscles - No use of accessory muscles in breathing. Auscultation:   Breath sounds: - Normal.   Cardiovascular   Inspection: Jugular vein - Bilateral - Inspection Normal.   Palpation/Percussion:   Apical Impulse: - Normal.   Auscultation: Rhythm - Regular. Heart Sounds - S1 WNL and S2 WNL. No S3 or S4. Murmurs & Other Heart Sounds: Auscultation of the heart reveals - No Murmurs. Peripheral Vascular   Upper Extremity: Inspection - Bilateral - No Cyanotic nailbeds or Digital clubbing. Lower Extremity:   Palpation: Edema - Bilateral - No edema. Abdomen:   Soft, non-tender, bowel sounds are active.   Neuro: A&O times 3, CN and motor grossly WNL    Labs:   Lab Results   Component Value Date/Time    Cholesterol, total 160 11/14/2018 12:02 PM    Cholesterol, total 157 03/22/2018 08:44 AM    Cholesterol, total 157 07/05/2016 08:33 AM    Cholesterol, total 170 03/19/2015 11:36 AM    Cholesterol, total 145 11/24/2014 02:05 AM    HDL Cholesterol 72 11/14/2018 12:02 PM    HDL Cholesterol 66 03/22/2018 08:44 AM    HDL Cholesterol 70 07/05/2016 08:33 AM    HDL Cholesterol 83 03/19/2015 11:36 AM    HDL Cholesterol 83 11/24/2014 02:05 AM    LDL, calculated 67 11/14/2018 12:02 PM    LDL, calculated 74 03/22/2018 08:44 AM    LDL, calculated 71 07/05/2016 08:33 AM    LDL, calculated 77 03/19/2015 11:36 AM    LDL, calculated 52.8 11/24/2014 02:05 AM    Triglyceride 105 11/14/2018 12:02 PM    Triglyceride 84 03/22/2018 08:44 AM    Triglyceride 78 07/05/2016 08:33 AM    Triglyceride 51 03/19/2015 11:36 AM    Triglyceride 46 11/24/2014 02:05 AM    CHOL/HDL Ratio 1.7 11/24/2014 02:05 AM    CHOL/HDL Ratio 3.6 03/04/2011 11:30 AM     Lab Results   Component Value Date/Time    CK 75 11/23/2014 12:10 PM     Lab Results   Component Value Date/Time    Sodium 141 12/01/2018 11:05 AM    Potassium 4.3 12/01/2018 11:05 AM    Chloride 104 12/01/2018 11:05 AM    CO2 27 12/01/2018 11:05 AM    Anion gap 10 12/01/2018 11:05 AM    Glucose 105 (H) 12/01/2018 11:05 AM    BUN 25 (H) 12/01/2018 11:05 AM    Creatinine 1.29 12/01/2018 11:05 AM    BUN/Creatinine ratio 19 12/01/2018 11:05 AM    GFR est AA >60 12/01/2018 11:05 AM    GFR est non-AA 54 (L) 12/01/2018 11:05 AM    Calcium 9.6 12/01/2018 11:05 AM    Bilirubin, total 0.5 12/01/2018 11:05 AM    AST (SGOT) 17 12/01/2018 11:05 AM    Alk. phosphatase 86 12/01/2018 11:05 AM    Protein, total 7.5 12/01/2018 11:05 AM    Albumin 3.6 12/01/2018 11:05 AM    Globulin 3.9 12/01/2018 11:05 AM    A-G Ratio 0.9 (L) 12/01/2018 11:05 AM    ALT (SGPT) 19 12/01/2018 11:05 AM       EKG:  Paced     Assessment:     Assessment:      1. ASHD (arteriosclerotic heart disease)    2. Hyperlipidemia, unspecified hyperlipidemia type    3. AVNRT (AV bridgette re-entry tachycardia) ([address]    4. Chronic systolic congestive heart failure ([address]    5. Mixed hyperlipidemia    6. SSS (sick sinus syndrome) (HCC)    7. Cardiac pacemaker in situ    8. Presence of automatic cardioverter/defibrillator (AICD)    9. Essential hypertension    10.  S/P CABG x 1 Orders Placed This Encounter    AMB POC EKG ROUTINE W/ 12 LEADS, INTER & REP     Order Specific Question:   Reason for Exam:     Answer:   routine    multivit-min/FA/lycopen/lutein (SENTRY SENIOR PO)     Sig: Take  by mouth daily.  carvedilol (COREG) 3.125 mg tablet     Sig: Take 1 Tab by mouth two (2) times a day. Dispense:  180 Tab     Refill:  2    aspirin 81 mg chewable tablet     Sig: Take 1 Tab by mouth daily. Dispense:  30 Tab     Refill:  11    furosemide (LASIX) 20 mg tablet     Sig: Take 1 Tab by mouth daily as needed. Dispense:  30 Tab     Refill:  0     For weight gain 2-3 lbs overnight        Plan:     Patient presents for annual f/u. Reports agudelo; this is a new symptom. Also mild dizziness after taking Tramadol for back pain. Pt has some mild dementia, wife is present to help answer questions. ASHD, Hx CABG x 1 in 2011  Reports agudelo. Has not been on ASA, BB. They do not know who stopped these medications. Will restart BB in the form of coreg daily, baby ASA  On statin  Evaluate with Lexiscan-this was previously ordered but not done    HFrEF, dual chamber ICD  EF 48-12%, grade 2 diastolic dysfunction, mild MR/AR per echo 10/18 (EF 30-35% by [facility] 4[facility]16)  Wt up 6 lbs since October 2018 OV with EP. No swelling on exam.  Continue ACEI. Restarting BB. Stable kidney function 12/18. Will start Furosemide 20 mg daily prn wt gain 2-3 lbs overnight. Device followed by EP. No events noted per most recent device check 2/19    BP controlled. Non orthostatic    HLD  11/18 LDL at 67. On statin. Lipids and labs followed by PCP.       Hx of DVT:  On eliquis.     Continue current care and f/u in 1 month unless testing abnormal    Ivis Bray MD

## 2019-02-20 NOTE — PROGRESS NOTES
Chief Complaint   Patient presents with    Cholesterol Problem     annual follow up     1. Have you been to the ER, urgent care clinic since your last visit? Hospitalized since your last visit? No    2. Have you seen or consulted any other health care providers outside of the [address] [place] [address] since your last visit? Include any pap smears or colon screening.  No

## 2019-03-11 ENCOUNTER — TELEPHONE (OUTPATIENT)
Dept: CARDIOLOGY CLINIC | Age: 74
End: 2019-03-11

## 2019-03-11 NOTE — TELEPHONE ENCOUNTER
Message left.----- Message from Josse Kirkpatrick NP sent at 3/8/2019  4:05 PM EST -----  Danica Higgins,    Mr. Barroso's stress test is inconclusive. We can't tell whether it's positive, but we can't say it's negative either. Please make sure that he is taking the mediations we restarted at his last visit. We will keep his f/u appointment scheduled for 1 month to reassess symptoms and determine further plan of care from there.     Thanks,  [employer]

## 2019-03-11 NOTE — TELEPHONE ENCOUNTER
Patient's wife, on [employer], called again. I scheduled an appointment for Wednesday, April 10, 2019 01:45 PM per Clarissa's note since he did not have a scheduled appointment already. She is concerned/reassure  patient is \"giving up\" and she was quite tearful. I did my best to comfort her, but she still would like to speak with you. Thanks!

## 2019-03-12 NOTE — TELEPHONE ENCOUNTER
Patient seen in ED was told he had a virus. His wife is questing the results of his recent stress test. Have recommend PCP for evaluation for depression if he continues to be inactive.

## 2019-04-08 DIAGNOSIS — I82.492 ACUTE DEEP VEIN THROMBOSIS (DVT) OF OTHER SPECIFIED VEIN OF LEFT LOWER EXTREMITY (HCC): ICD-10-CM

## 2019-04-08 RX ORDER — APIXABAN 5 MG/1
TABLET, FILM COATED ORAL
Qty: 180 TAB | Refills: 0 | Status: SHIPPED | OUTPATIENT
Start: 2019-04-08 | End: 2019-07-15 | Stop reason: SDUPTHER

## 2019-04-10 ENCOUNTER — OFFICE VISIT (OUTPATIENT)
Dept: CARDIOLOGY CLINIC | Age: 74
End: 2019-04-10

## 2019-04-10 VITALS
DIASTOLIC BLOOD PRESSURE: 80 MMHG | OXYGEN SATURATION: 95 % | WEIGHT: 172.5 LBS | BODY MASS INDEX: 27.07 KG/M2 | HEART RATE: 60 BPM | HEIGHT: 67 IN | RESPIRATION RATE: 16 BRPM | SYSTOLIC BLOOD PRESSURE: 140 MMHG

## 2019-04-10 DIAGNOSIS — I50.22 CHRONIC SYSTOLIC CONGESTIVE HEART FAILURE (HCC): Chronic | ICD-10-CM

## 2019-04-10 DIAGNOSIS — I10 ESSENTIAL HYPERTENSION: Chronic | ICD-10-CM

## 2019-04-10 DIAGNOSIS — Z95.1 S/P CABG (CORONARY ARTERY BYPASS GRAFT): ICD-10-CM

## 2019-04-10 DIAGNOSIS — Z95.810 AICD (AUTOMATIC CARDIOVERTER/DEFIBRILLATOR) PRESENT: ICD-10-CM

## 2019-04-10 DIAGNOSIS — I48.0 PAROXYSMAL ATRIAL FIBRILLATION (HCC): ICD-10-CM

## 2019-04-10 DIAGNOSIS — I25.10 CORONARY ARTERY DISEASE INVOLVING NATIVE CORONARY ARTERY OF NATIVE HEART WITHOUT ANGINA PECTORIS: Primary | Chronic | ICD-10-CM

## 2019-04-10 DIAGNOSIS — E78.2 MIXED HYPERLIPIDEMIA: Chronic | ICD-10-CM

## 2019-04-10 NOTE — LETTER
4/10/19 Patient: Matias Worley YOB: 1945 Date of Visit: 4/10/2019 Bethany Severin, MD 
[address] [facility] [phone] VIA In Basket Dear Bethany Severin, MD, Thank you for referring Mr. Teresa Campos to [address] [place] [address] [place] for evaluation. My notes for this consultation are attached. If you have questions, please do not hesitate to call me. I look forward to following your patient along with you.  
 
 
Sincerely, 
 
Dawit Cardona MD

## 2019-04-10 NOTE — PROGRESS NOTES
1. Have you been to the ER, urgent care clinic since your last visit? Hospitalized since your last visit? Yes 3/2019 fatigue & malaise. 2. Have you seen or consulted any other health care providers outside of the [address] [place] [address] since your last visit? Include any pap smears or colon screening. No  Chief Complaint   Patient presents with   [facility] Follow Up     Chronic systolic heart failure     Patient had recent testing is being worked up GI issues he C/O weakness .

## 2019-04-24 ENCOUNTER — CLINICAL SUPPORT (OUTPATIENT)
Dept: CARDIOLOGY CLINIC | Age: 74
End: 2019-04-24

## 2019-04-24 DIAGNOSIS — I47.1 SVT (SUPRAVENTRICULAR TACHYCARDIA) (HCC): ICD-10-CM

## 2019-04-24 DIAGNOSIS — I48.0 PAROXYSMAL ATRIAL FIBRILLATION (HCC): ICD-10-CM

## 2019-04-24 DIAGNOSIS — I42.9 CARDIOMYOPATHY, UNSPECIFIED TYPE (HCC): ICD-10-CM

## 2019-04-24 DIAGNOSIS — Z95.810 PRESENCE OF AUTOMATIC CARDIOVERTER/DEFIBRILLATOR (AICD): Primary | ICD-10-CM

## 2019-04-27 DIAGNOSIS — G30.1 ALZHEIMER'S DEMENTIA, LATE ONSET, WITH BEHAVIORAL DISTURBANCE (HCC): ICD-10-CM

## 2019-04-27 DIAGNOSIS — F02.818 ALZHEIMER'S DEMENTIA, LATE ONSET, WITH BEHAVIORAL DISTURBANCE (HCC): ICD-10-CM

## 2019-04-29 RX ORDER — MEMANTINE HYDROCHLORIDE 10 MG/1
TABLET ORAL
Qty: 180 TAB | Refills: 2 | Status: SHIPPED | OUTPATIENT
Start: 2019-04-29 | End: 2019-09-04

## 2019-04-30 RX ORDER — FUROSEMIDE 20 MG/1
TABLET ORAL
Qty: 30 TAB | Refills: 0 | Status: SHIPPED | OUTPATIENT
Start: 2019-04-30 | End: 2019-06-24 | Stop reason: SDUPTHER

## 2019-05-20 ENCOUNTER — OFFICE VISIT (OUTPATIENT)
Dept: CARDIOLOGY CLINIC | Age: 74
End: 2019-05-20

## 2019-05-20 VITALS
HEIGHT: 67 IN | OXYGEN SATURATION: 98 % | HEART RATE: 59 BPM | DIASTOLIC BLOOD PRESSURE: 62 MMHG | RESPIRATION RATE: 16 BRPM | WEIGHT: 170.4 LBS | SYSTOLIC BLOOD PRESSURE: 104 MMHG | BODY MASS INDEX: 26.74 KG/M2

## 2019-05-20 DIAGNOSIS — I25.10 ATHEROSCLEROSIS OF NATIVE CORONARY ARTERY OF NATIVE HEART WITHOUT ANGINA PECTORIS: ICD-10-CM

## 2019-05-20 DIAGNOSIS — I48.0 PAROXYSMAL ATRIAL FIBRILLATION (HCC): ICD-10-CM

## 2019-05-20 DIAGNOSIS — I50.22 CHRONIC SYSTOLIC CONGESTIVE HEART FAILURE (HCC): ICD-10-CM

## 2019-05-20 DIAGNOSIS — E78.5 HYPERLIPIDEMIA, UNSPECIFIED HYPERLIPIDEMIA TYPE: Chronic | ICD-10-CM

## 2019-05-20 DIAGNOSIS — I25.10 ASHD (ARTERIOSCLEROTIC HEART DISEASE): Primary | ICD-10-CM

## 2019-05-20 DIAGNOSIS — Z95.810 AICD (AUTOMATIC CARDIOVERTER/DEFIBRILLATOR) PRESENT: ICD-10-CM

## 2019-05-20 DIAGNOSIS — Z95.1 S/P CABG (CORONARY ARTERY BYPASS GRAFT): ICD-10-CM

## 2019-05-20 NOTE — LETTER
5/20/19 Patient: Marcella Castellanos YOB: 1945 Date of Visit: 5/20/2019 Tha Verde MD 
[phone] VIA In Basket Dear Tha Verde MD, Thank you for referring Mr. Santi Mendoza to [address] [place] [address] for evaluation. My notes for this consultation are attached. If you have questions, please do not hesitate to call me. I look forward to following your patient along with you.  
 
 
Sincerely, 
 
Lissett Spicer MD

## 2019-05-20 NOTE — PROGRESS NOTES
1. Have you been to the ER, urgent care clinic since your last visit? Hospitalized since your last visit? Yes When: on 5/17/19 for acute chest pain at [address]    2. Have you seen or consulted any other health care providers outside of the [address] [place] [address] since your last visit? Include any pap smears or colon screening.  No    Chief Complaint   Patient presents with    Medication Evaluation     for Entresto 1 mo f/u   [facility] Follow Up     ED f/u for chest pain; pt denies chest pain episode since ED visit    Shortness of Breath    Leg Swelling     pt c/o swelling to legs bilaterally; denies any pain, numbness, tingling, but feels heavy and achy at times x 6 mos

## 2019-05-20 NOTE — PROGRESS NOTES
[address] [place] [address], [address] [facility]  [phone]     Subjective:      Figueroa Alejo is a 75 y.o. male presents for one month f/u since initiation of Entresto therapy. He was also in the ED 5/17/19 c/o cp (burning sensation) and dizziness:  negative troponin x 2 but EKG was abnormal: appears more ST depression laterally. His CXR showed  Bibasilar interstitial prominence left greater than right. Consider developing  interstitial edema. He was dc on increased dose of Coreg (6.25 mg BID), instructed to take Lasix 20 mg x 3 days, and start Protonix 40 mg daily. Today, he denies any further cp. He does report unchanged agudelo and fatigue. His weight is down 2 lbs, but still endorses bilateral ankle swelling which resolves with leg elevation. The patient denies chest pain, orthopnea, PND, palpitations, syncope, or presyncope.        Patient Active Problem List    Diagnosis Date Noted    Moderate major depression ([address] 07/03/2018    Depression 07/03/2018    DDD (degenerative disc disease), lumbar 04/02/2018    Chronic anticoagulation 07/17/2017    S/P CABG (coronary artery bypass graft) 06/17/2016    AICD (automatic cardioverter/defibrillator) present 05/13/2016    AVNRT (AV bridgette re-entry tachycardia) ([address] 05/12/2016    SVT (supraventricular tachycardia) ([address] 04/27/2016    Cardiomyopathy ([address] 04/27/2016    Chronic systolic congestive heart failure ([address] 04/27/2016    Stenosis of both carotid arteries without cerebral infarction 04/12/2016    Cervicogenic headache 04/12/2016    Alzheimer's dementia, late onset, with behavioral disturbance 04/12/2016    Spinal stenosis, lumbar region, with neurogenic claudication 04/12/2016    Lumbar back pain with radiculopathy affecting right lower extremity 04/12/2016    Lumbar back pain with radiculopathy affecting left lower extremity 04/12/2016    History of stroke 04/12/2016    ACP (advance care planning) 12/30/2015    B12 deficiency 09/22/2015    Hypothyroidism due to acquired atrophy of thyroid 09/22/2015    Osteoporosis 09/22/2015    Cervical post-laminectomy syndrome 09/22/2015    Neck pain 09/22/2015    Lower GI bleeding 08/19/2015    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 07/23/2015    Cardiac pacemaker in situ 03/17/2015    Pacemaker 11/26/2014    Chest pain 11/24/2014    Bradycardia 11/23/2014    Left-sided chest wall pain 11/23/2014    SSS (sick sinus syndrome) ([address] 11/23/2014    S/P cervical spinal fusion 06/06/2013    Chronic venous embolism and thrombosis of unspecified deep vessels of lower extremity 05/29/2012    Osteoarthritis 05/29/2012    Prostate cancer ([address] 05/29/2012    Hyperthyroidism 05/29/2012    Mitral valve disorders(424.0) 05/29/2012    Postsurgical aortocoronary bypass status 05/29/2012    Coronary atherosclerosis of native coronary artery 05/29/2012    Paroxysmal supraventricular tachycardia ([address] 05/29/2012    Chronic systolic heart failure ([address] 05/29/2012    Atrial fibrillation ([address] 05/29/2012    Mixed hyperlipidemia 05/29/2012    Palpitations 05/29/2012    History of factor V Leiden mutation 03/07/2011    CABG (coronary artery bypass graft) 03/07/2011    CAD (coronary artery disease) 03/04/2011    Hypertension 03/04/2011    Hyperlipemia 03/04/2011    DVT (deep venous thrombosis) chronic coumadin anti-coagulation 03/04/2011      Lambert Townsend MD  Past Medical History:   Diagnosis Date    AICD (automatic cardioverter/defibrillator) present 5/13/2016 5/13/16 [employer] upgrade to dual chamber AICD implant  Dr. Delgado Ramachandran state, other     Arrhythmia     'S IN THE PAST    Arthritis     OSTEO ARTHRITIS    AVNRT (AV bridgette re-entry tachycardia) ([address] 5/12/2016 5/12/16 AVNRT ablation: PM/AICD left upper chest :Dr. Earline Kevin    Back ache     CAD (coronary artery disease)     Cancer [facility]) 2004 Prostate cancer    Chest tightness     last episode of chest pain 5/2016 before AICD placed; none since then    Coagulation defects 2005    FACTOR V LEIDEN    Dementia     Dizziness     FH: factor V Leiden deficiency     Generalized muscle ache     GERD (gastroesophageal reflux disease)     HEARTBURN    Heart failure ([facility] 2014    EF 40%; Dr. Arlene Figueredo    Hyperlipidemia, mixed     Hypertension     COREG    Other ill-defined conditions(799.89) 2004    blood transfusion    Pacemaker     Pacemaker     Postsurgical aortocoronary bypass status 05/29/2012    CABG    Presence of cardiac defibrillator 05/2016    left upper chest    Psychiatric disorder     depression    Stroke [facility]) 2011, 1990's    SEVERAL-mini    Thromboembolism ([facility] 2014    left leg: changed to Eliquis from Warfarin    Thromboembolus ([facility] 2005    left leg    Weakness generalized       Past Surgical History:   Procedure Laterality Date    CARDIAC CATHETERIZATION  3/4/2011         CARDIAC SURG PROCEDURE UNLIST      CABG X1 3/5/11    HX HERNIA REPAIR  2002    Ingiunal hernia repair left    HX ORTHOPAEDIC      LEFT KNEE CARTILAGE REPAIR;RIGHT ROTATOR CUFF REPAIR    HX ORTHOPAEDIC  2/14/12    C5-7 ANTERIIOR CERVICAL DISECTOMY AND FUSION    HX ORTHOPAEDIC  6/4/13    C5-C7 POSTERIOR DECOMPRESSION AND FUSION    HX ORTHOPAEDIC      right thumb partial amputation of tip    HX OTHER SURGICAL      steroid injections    HX PACEMAKER Left     HX PACEMAKER PLACEMENT      H[place] PROSTATECT[place]  2004     [place]    HX UROLOGICAL       urinary control system    HX UROLOGICAL  12/3/13    REPLACEMENT ARTIFICAL URINARY SPHINCTER     No Known Allergies   Family History   Problem Relation Age of Onset    Asthma Mother     Alcohol abuse Mother     Alcohol abuse Father     Asthma Father     Cancer Sister     Heart Disease Sister     Alcohol abuse Brother     Cancer Brother     Heart Disease Brother       Social History Socioeconomic History    Marital status:      Spouse name: Not on file    Number of children: Not on file    Years of education: Not on file    Highest education level: Not on file   Occupational History    Not on file   Social Needs    Financial resource strain: Not on file    Food insecurity:     Worry: Not on file     Inability: Not on file    Transportation needs:     Medical: Not on file     Non-medical: Not on file   Tobacco Use    Smoking status: Former Smoker     Types: Pipe, Cigars     Last attempt to quit: 10/10/1971     Years since quittin.6    Smokeless tobacco: Never Used   Substance and Sexual Activity    Alcohol use: No     Alcohol/week: 0.6 oz     Types: 1 Cans of beer per week    Drug use: No    Sexual activity: Never     Partners: Female   Lifestyle    Physical activity:     Days per week: Not on file     Minutes per session: Not on file    Stress: Not on file   Relationships    Social connections:     Talks on phone: Not on file     Gets together: Not on file     Attends Sabianist service: Not on file     Active member of club or organization: Not on file     Attends meetings of clubs or organizations: Not on file     Relationship status: Not on file    Intimate partner violence:     Fear of current or ex partner: Not on file     Emotionally abused: Not on file     Physically abused: Not on file     Forced sexual activity: Not on file   Other Topics Concern   [address] Service Not Asked    Blood Transfusions Not Asked    Caffeine Concern Not Asked    Occupational Exposure Not Asked    Hobby Hazards Not Asked    Sleep Concern Yes    Stress Concern Yes     Comment: Family concerns    Weight Concern Not Asked    Special Diet Not Asked    Back Care Not Asked    Exercise Not Asked    Bike Helmet Not Asked    [address] Not Asked    Self-Exams Not Asked   Social History Narrative    , 2 children      Current Outpatient Medications   Medication Sig    carvedilol (COREG) 3.125 mg tablet Take 2 Tabs by mouth two (2) times a day.  pantoprazole (PROTONIX) 40 mg tablet Take 1 Tab by mouth daily for 20 days.  gabapentin (NEURONTIN) 600 mg tablet TAKE 1 TABLET BY MOUTH THREE TIMES A DAY    furosemide (LASIX) 20 mg tablet TAKE 1 TABLET BY MOUTH ONCE DAILY AS NEEDED FOR 2 TO 3 LB WEIGHT GAIN OVERNIGHT    memantine (NAMENDA) 10 mg tablet TAKE 1 TABLET BY MOUTH TWICE DAILY    sacubitril-valsartan (ENTRESTO) 24 mg/26 mg tablet Take 1 Tab by mouth two (2) times a day.  ELIQUIS 5 mg tablet TAKE 1 TABLET BY MOUTH EVERY 12 HOURS    sertraline (ZOLOFT) 100 mg tablet TAKE 1&1/2 TABLETS (150MG) BY MOUTH ONCE DAILY    multivit-min/FA/lycopen/lutein (SENTRY SENIOR PO) Take  by mouth daily.  aspirin 81 mg chewable tablet Take 1 Tab by mouth daily.  atorvastatin (LIPITOR) 40 mg tablet TAKE 1 TABLET BY MOUTH EVERY DAY    bisacodyl (DULCOLAX, BISACODYL,) 5 mg EC tablet Take 5 mg by mouth daily as needed for Constipation.  polyethylene glycol (MIRALAX) 17 gram/dose powder Take 17 g by mouth daily.  Incontinence Pad, Liner, Disp (BLADDER CONTROL PADS EX ABSORB) pads 1 Packet by Does Not Apply route as needed (incontinence).  nitroglycerin (NITROSTAT) 0.4 mg SL tablet 1 Tab by SubLINGual route every five (5) minutes as needed for Chest Pain (call 911 if not relieved by 3). No current facility-administered medications for this visit. Review of Symptoms:  11 systems reviewed, negative other than as stated in the HPI    Physical ExamPhysical Exam:    Vitals:    05/20/19 1059 05/20/19 1114   BP: 98/70 104/62   Pulse: (!) 59    Resp: 16    SpO2: 98%    Weight: 170 lb 6.4 oz (77.3 kg)    Height: 5' 7\" (1.702 m)      Body mass index is 26.69 kg/m². General PE   Gen:  NAD  Mental Status - Alert. General Appearance - Not in acute distress. Chest and Lung Exam   Inspection: Accessory muscles - No use of accessory muscles in breathing.    Auscultation: Breath sounds: - Normal.   Cardiovascular   Inspection: Jugular vein - Bilateral - Inspection Normal.   Palpation/Percussion:   Apical Impulse: - Normal.   Auscultation: Rhythm - Regular. Heart Sounds - S1 WNL and S2 WNL. No S3 or S4. Murmurs & Other Heart Sounds: Auscultation of the heart reveals - No Murmurs. Peripheral Vascular   Upper Extremity: Inspection - Bilateral - No Cyanotic nailbeds or Digital clubbing. Lower Extremity:   Palpation: Edema - Bilateral - No edema. Abdomen:   Soft, non-tender, bowel sounds are active.   Neuro: A&O times 3, CN and motor grossly WNL    Labs:   Lab Results   Component Value Date/Time    Cholesterol, total 160 11/14/2018 12:02 PM    Cholesterol, total 157 03/22/2018 08:44 AM    Cholesterol, total 157 07/05/2016 08:33 AM    Cholesterol, total 170 03/19/2015 11:36 AM    Cholesterol, total 145 11/24/2014 02:05 AM    HDL Cholesterol 72 11/14/2018 12:02 PM    HDL Cholesterol 66 03/22/2018 08:44 AM    HDL Cholesterol 70 07/05/2016 08:33 AM    HDL Cholesterol 83 03/19/2015 11:36 AM    HDL Cholesterol 83 11/24/2014 02:05 AM    LDL, calculated 67 11/14/2018 12:02 PM    LDL, calculated 74 03/22/2018 08:44 AM    LDL, calculated 71 07/05/2016 08:33 AM    LDL, calculated 77 03/19/2015 11:36 AM    LDL, calculated 52.8 11/24/2014 02:05 AM    Triglyceride 105 11/14/2018 12:02 PM    Triglyceride 84 03/22/2018 08:44 AM    Triglyceride 78 07/05/2016 08:33 AM    Triglyceride 51 03/19/2015 11:36 AM    Triglyceride 46 11/24/2014 02:05 AM    CHOL/HDL Ratio 1.7 11/24/2014 02:05 AM    CHOL/HDL Ratio 3.6 03/04/2011 11:30 AM     Lab Results   Component Value Date/Time    CK 55 03/10/2019 10:10 AM     Lab Results   Component Value Date/Time    Sodium 142 05/17/2019 12:22 PM    Potassium 4.5 05/17/2019 12:22 PM    Chloride 106 05/17/2019 12:22 PM    CO2 27 05/17/2019 12:22 PM    Anion gap 9 05/17/2019 12:22 PM    Glucose 108 (H) 05/17/2019 12:22 PM    BUN 21 (H) 05/17/2019 12:22 PM    Creatinine 1.32 (H) 05/17/2019 12:22 PM    BUN/Creatinine ratio 16 05/17/2019 12:22 PM    GFR est AA >60 05/17/2019 12:22 PM    GFR est non-AA 53 (L) 05/17/2019 12:22 PM    Calcium 9.4 05/17/2019 12:22 PM    Bilirubin, total 0.5 05/17/2019 12:22 PM    AST (SGOT) 20 05/17/2019 12:22 PM    Alk. phosphatase 88 05/17/2019 12:22 PM    Protein, total 7.0 05/17/2019 12:22 PM    Albumin 3.5 05/17/2019 12:22 PM    Globulin 3.5 05/17/2019 12:22 PM    A-G Ratio 1.0 (L) 05/17/2019 12:22 PM    ALT (SGPT) 23 05/17/2019 12:22 PM       EKG:       Assessment:     Assessment:      1. ASHD (arteriosclerotic heart disease)    2. Hyperlipidemia, unspecified hyperlipidemia type    3. S/P CABG (coronary artery bypass graft)    4. Chronic systolic congestive heart failure ([address]    5. Atherosclerosis of native coronary artery of native heart without angina pectoris    6. Paroxysmal atrial fibrillation (HCC)    7. AICD (automatic cardioverter/defibrillator) present        Orders Placed This Encounter    AMB POC EKG ROUTINE W/ 12 LEADS, INTER & REP     Order Specific Question:   Reason for Exam:     Answer:   Routine        Plan:     Patient presents for one month f/u since initiation of Entresto therapy. He was also in the ED 5/17/19 c/o cp (burning sensation) and dizziness:  negative troponin x 2 but EKG was abnormal: appears more ST depression laterally. His CXR showed  Bibasilar interstitial prominence left greater than right. Consider developing  interstitial edema. He was dc on increased dose of Coreg (6.25 mg BID), instructed to take Lasix 20 mg x 3 days, and start Protonix 40 mg daily. Today, he denies any further cp. He does report unchanged agudelo and fatigue. His weight is down 2 lbs, but still endorses bilateral ankle swelling which resolves with leg elevation.     Coronary artery disease involving native coronary artery of native heart without angina pectoris  S/p CABGx1 in 2011 with LIMA to LAD   Lexiscan in 3/2019 was negative for ischemia. Reports inconclusive stress test due to cardiomyopathy, which is known. Continue BB, ASA and statin therapy. Hgb 13.1 in 5/19      Chronic systolic congestive heart failure ([address]  Echo in 10/2018 with LVEF 25-30% with grade 2 DD with mild MR/AI and mild-mod TR. NYHA II. Wt down 2 pounds. Continue coreg and Lasix as needed  Stable kidney function 5/17/19 Cr 1.23 BP low end, will defer further titration of Entresto at this time  Limited echo in 7/19, 3 months after starting Entresto.     Paroxysmal atrial fibrillation (HCC)  Continue Eliquis therapy and rate control with Coreg.     Essential hypertension  BP controlled; continue anti-hypertensive therapy and low sodium diet.     Mixed hyperlipidemia  LDL 67 in 11/2018; continue statin therapy and low fat, low cholesterol diet.     AICD (automatic cardioverter/defibrillator) present  S/p dual chamber [employer] ICD. Continue with routine interrogations with Dr. Artemio Sprageu current care and f/u in on the same day as echo in July.       Sheri Klein MD

## 2019-06-21 ENCOUNTER — TELEPHONE (OUTPATIENT)
Dept: NEUROLOGY | Age: 74
End: 2019-06-21

## 2019-06-21 NOTE — TELEPHONE ENCOUNTER
----- Message from [employer] sent at 6/21/2019 12:05 PM EDT -----  Regarding: Dr. Rajendra Dobbs  Contact: [phone]  [address] [place] [address]) requesting a soon appt for pt due to serval headaches, stated has been to the ER for it but the medication they give him only works for that moment. Also stated the pt was on Rx \"Fioricet\" and it seem to work for the pt.  [facility] ([facility] file) [phone]

## 2019-06-25 DIAGNOSIS — G44.86 CERVICOGENIC HEADACHE: Primary | ICD-10-CM

## 2019-06-25 RX ORDER — BUTALBITAL, ACETAMINOPHEN AND CAFFEINE 50; 325; 40 MG/1; MG/1; MG/1
2 TABLET ORAL
Qty: 50 TAB | Refills: 11 | Status: SHIPPED | OUTPATIENT
Start: 2019-06-25 | End: 2019-09-04

## 2019-06-25 RX ORDER — FUROSEMIDE 20 MG/1
TABLET ORAL
Qty: 30 TAB | Refills: 0 | Status: SHIPPED | OUTPATIENT
Start: 2019-06-25 | End: 2019-08-31 | Stop reason: SDUPTHER

## 2019-06-28 ENCOUNTER — TELEPHONE (OUTPATIENT)
Dept: NEUROLOGY | Age: 74
End: 2019-06-28

## 2019-06-28 NOTE — TELEPHONE ENCOUNTER
Fioricet PA     Sent via CMM and faxed manually to [facility] D[facility]G w/ Nov 2018 office visit notes. 14 pgs.

## 2019-07-01 ENCOUNTER — TELEPHONE (OUTPATIENT)
Dept: NEUROLOGY | Age: 74
End: 2019-07-01

## 2019-07-01 NOTE — TELEPHONE ENCOUNTER
rec'd call from [address] [place]: Lisa DURAN    Answered additional clinicals - letter scanned into chart. Status pending for case [id number].

## 2019-07-17 DIAGNOSIS — I50.22 CHRONIC SYSTOLIC CONGESTIVE HEART FAILURE (HCC): Primary | ICD-10-CM

## 2019-07-18 ENCOUNTER — OFFICE VISIT (OUTPATIENT)
Dept: CARDIOLOGY CLINIC | Age: 74
End: 2019-07-18

## 2019-07-18 VITALS
WEIGHT: 176.2 LBS | BODY MASS INDEX: 27.65 KG/M2 | OXYGEN SATURATION: 98 % | HEART RATE: 44 BPM | HEIGHT: 67 IN | SYSTOLIC BLOOD PRESSURE: 112 MMHG | DIASTOLIC BLOOD PRESSURE: 60 MMHG | RESPIRATION RATE: 16 BRPM

## 2019-07-18 DIAGNOSIS — E78.2 MIXED HYPERLIPIDEMIA: ICD-10-CM

## 2019-07-18 DIAGNOSIS — I50.22 CHRONIC SYSTOLIC CONGESTIVE HEART FAILURE (HCC): Primary | Chronic | ICD-10-CM

## 2019-07-18 DIAGNOSIS — Z95.1 S/P CABG (CORONARY ARTERY BYPASS GRAFT): ICD-10-CM

## 2019-07-18 DIAGNOSIS — I48.0 PAROXYSMAL ATRIAL FIBRILLATION (HCC): ICD-10-CM

## 2019-07-18 DIAGNOSIS — I25.10 CORONARY ARTERY DISEASE INVOLVING NATIVE CORONARY ARTERY OF NATIVE HEART WITHOUT ANGINA PECTORIS: Chronic | ICD-10-CM

## 2019-07-18 DIAGNOSIS — Z95.810 AICD (AUTOMATIC CARDIOVERTER/DEFIBRILLATOR) PRESENT: ICD-10-CM

## 2019-07-18 NOTE — LETTER
7/18/19 Patient: Jose E Duvall YOB: 1945 Date of Visit: 7/18/2019 Bao Aguilar MD 
[id number]  [address] [id number] VIA In Basket Dear Bao Aguilar MD, Thank you for referring Mr. Radha Hennessy to [facility] for evaluation. My notes for this consultation are attached. If you have questions, please do not hesitate to call me. I look forward to following your patient along with you.  
 
 
Sincerely, 
 
Mal Tyson MD

## 2019-07-18 NOTE — PROGRESS NOTES
Brandyn Nieto, [facility]    Subjective/HPI:     Mr. Kimberly Verde is a 75 y.o. male is here for routine f/u. He has a PMHx of CAD s/p CABGx1, ischemic cardiomyopathy s/p ICD, PAF, chronic DVT with Factor V Leiden mutation, HTN and HLD. He had limited echocardiogram done today for reassessment of ejection fraction after initiation of Entresto therapy. He feels well. He stays active throughout the day. He works on his lawn mowers most days. A few days ago, he went over to his sister's and helped fix her storm door. He notes no complaints of chest pains, dizziness, orthopnea, PND or edema. He denies palpitation symptoms or shortness of breath.          PCP Provider  Ilda Lloyd MD    Patient Active Problem List   Diagnosis Code    CAD (coronary artery disease) I25.10    Hypertension I10    Hyperlipemia E78.5    DVT (deep venous thrombosis) chronic coumadin anti-coagulation I82.409    History of factor V Leiden mutation Z86.2    CABG (coronary artery bypass graft)     Chronic venous embolism and thrombosis of unspecified deep vessels of lower extremity I82.509    Osteoarthritis M19.90    Prostate cancer ([facility] [address] C61    Hyperthyroidism E05.90    Mitral valve disorders(424.0) I05.9    Postsurgical aortocoronary bypass status Z95.1    Coronary atherosclerosis of native coronary artery I25.10    Paroxysmal supraventricular tachycardia (HCC) I47.1    Chronic systolic heart failure (HCC) I50.22    Atrial fibrillation (HCC) I48.91    Mixed hyperlipidemia E78.2    Palpitations R00.2    S/P cervical spinal fusion Z98.1    Bradycardia R00.1    Left-sided chest wall pain R07.89    SSS (sick sinus syndrome) (HCC) I49.5    Chest pain R07.9    Pacemaker Z95.0    Cardiac pacemaker in situ Z95.0    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    Lower GI bleeding K92.2    B12 deficiency E53.8    Hypothyroidism due to acquired atrophy of thyroid E03.4  Osteoporosis M81.0    Cervical post-laminectomy syndrome M96.1    Neck pain M54.2    ACP (advance care planning) Z71.89    Stenosis of both carotid arteries without cerebral infarction I65.23    Cervicogenic headache R51    Alzheimer's dementia, late onset, with behavioral disturbance G30.1, F02.81    Spinal stenosis, lumbar region, with neurogenic claudication M48.062    Lumbar back pain with radiculopathy affecting right lower extremity M54.16    Lumbar back pain with radiculopathy affecting left lower extremity M54.16    History of stroke Z86.73    SVT (supraventricular tachycardia) ([facility]) I47.1    Cardiomyopathy ([facility]) I42.9    Chronic systolic congestive heart failure ([facility]) I50.22    AVNRT (AV bridgette re-entry tachycardia) ([facility]) I47.1    AICD (automatic cardioverter/defibrillator) present Z95.810    S/P CABG (coronary artery bypass graft) Z95.1    Chronic anticoagulation Z79.01    DDD (degenerative disc disease), lumbar M51.36    Moderate major depression ([facility]) F32.1    Depression F32.9     Past Surgical History:   Procedure Laterality Date    CARDIAC CATHETERIZATION  3/4/2011         CARDIAC SURG PROCEDURE UNLIST      CABG X1 3/5/11    HX HERNIA REPAIR  2002    Ingiunal hernia repair left    HX ORTHOPAEDIC      LEFT KNEE CARTILAGE REPAIR;RIGHT ROTATOR CUFF REPAIR    HX ORTHOPAEDIC  2/14/12    C5-7 ANTERIIOR CERVICAL DISECTOMY AND FUSION    HX ORTHOPAEDIC  6/4/13    C5-C7 POSTERIOR DECOMPRESSION AND FUSION    HX ORTHOPAEDIC      right thumb partial amputation of tip    HX OTHER SURGICAL      steroid injections    HX PACEMAKER Left     HX PACEMAKER PLACEMENT      [place]    HX UROLOGICAL       urinary control system    HX UROLOGICAL  12/3/13    REPLACEMENT ARTIFICAL URINARY SPHINCTER     Family History   Problem Relation Age of Onset    Asthma Mother     Alcohol abuse Mother     Alcohol abuse Father     Asthma Father     Cancer Sister     Heart Disease Sister     Alcohol abuse Brother     Cancer Brother     Heart Disease Brother      Social History     Socioeconomic History    Marital status:      Spouse name: Not on file    Number of children: Not on file    Years of education: Not on file    Highest education level: Not on file   Occupational History    Not on file   Social Needs    Financial resource strain: Not on file    Food insecurity:     Worry: Not on file     Inability: Not on file    Transportation needs:     Medical: Not on file     Non-medical: Not on file   Tobacco Use    Smoking status: Former Smoker     Types: Pipe, Cigars     Last attempt to quit: 10/10/1971     Years since quittin.8    Smokeless tobacco: Never Used   Substance and Sexual Activity    Alcohol use: No     Alcohol/week: 1.0 standard drinks     Types: 1 Cans of beer per week    Drug use: No    Sexual activity: Never     Partners: Female   Lifestyle    Physical activity:     Days per week: Not on file     Minutes per session: Not on file    Stress: Not on file   Relationships    Social connections:     Talks on phone: Not on file     Gets together: Not on file     Attends Hinduism service: Not on file     Active member of club or organization: Not on file     Attends meetings of clubs or organizations: Not on file     Relationship status: Not on file    Intimate partner violence:     Fear of current or ex partner: Not on file     Emotionally abused: Not on file     Physically abused: Not on file     Forced sexual activity: Not on file   Other Topics Concern   [address] Service Not Asked    Blood Transfusions Not Asked    Caffeine Concern Not Asked    Occupational Exposure Not Asked    Hobby Hazards Not Asked    Sleep Concern Yes    Stress Concern Yes     Comment: Family concerns    Weight Concern Not Asked    Special Diet Not Asked    Back Care Not Asked    Exercise Not Asked    Bike Helmet Not Asked    [address] Not Asked    Self-Exams Not Asked   Social History Narrative    , 2 children       No Known Allergies     Current Outpatient Medications   Medication Sig    ELIQUIS 5 mg tablet TAKE 1 TABLET BY MOUTH EVERY 12 HOURS    pantoprazole (PROTONIX) 40 mg tablet TAKE 1 TABLET BY MOUTH ONCE DAILY    gabapentin (NEURONTIN) 600 mg tablet TAKE 1 TABLET BY MOUTH THREE TIMES A DAY    sertraline (ZOLOFT) 100 mg tablet TAKE 1&1/2 TABLETS (150MG) BY MOUTH ONCE DAILY (Patient taking differently: 200 mg. TAKE 1&1/2 TABLETS (150MG) BY MOUTH ONCE DAILY)    carvedilol (COREG) 3.125 mg tablet TAKE 2 TABLETS BY MOUTH TWICE DAILY    furosemide (LASIX) 20 mg tablet TAKE 1 TABLET BY MOUTH ONCE DAILY AS NEEDED FOR 2 TO 3 LB WEIGHT GAIN OVERNIGHT    butalbital-acetaminophen-caffeine (FIORICET, ESGIC) -40 mg per tablet Take 2 Tabs by mouth every eight (8) hours as needed for Pain or Headache. Indications: migraine headache, Tension Headache    atorvastatin (LIPITOR) 40 mg tablet TAKE 1 TABLET BY MOUTH EVERY DAY    memantine (NAMENDA) 10 mg tablet TAKE 1 TABLET BY MOUTH TWICE DAILY    sacubitril-valsartan (ENTRESTO) 24 mg/26 mg tablet Take 1 Tab by mouth two (2) times a day.  multivit-min/FA/lycopen/lutein (SENTRY SENIOR PO) Take  by mouth daily.  bisacodyl (DULCOLAX, BISACODYL,) 5 mg EC tablet Take 5 mg by mouth daily as needed for Constipation.  polyethylene glycol (MIRALAX) 17 gram/dose powder Take 17 g by mouth as needed.  Incontinence Pad, Liner, Disp (BLADDER CONTROL PADS EX ABSORB) pads 1 Packet by Does Not Apply route as needed (incontinence).  nitroglycerin (NITROSTAT) 0.4 mg SL tablet 1 Tab by SubLINGual route every five (5) minutes as needed for Chest Pain (call 911 if not relieved by 3).  aspirin 81 mg chewable tablet Take 1 Tab by mouth daily. No current facility-administered medications for this visit.          I have reviewed the problem list, allergy list, medical history, family, social history and medications. Review of Symptoms:    Review of Systems   Constitutional: Negative for chills, fever and weight loss. HENT: Negative for nosebleeds. Eyes: Negative for blurred vision and double vision. Respiratory: Negative for cough, shortness of breath and wheezing. Cardiovascular: Negative for chest pain, palpitations, orthopnea, leg swelling and PND. Gastrointestinal: Negative for abdominal pain, blood in stool, diarrhea, nausea and vomiting. Musculoskeletal: Negative for joint pain. Skin: Negative for rash. Neurological: Negative for dizziness, tingling and loss of consciousness. Endo/Heme/Allergies: Does not bruise/bleed easily. Physical Exam:      General: Well developed, in no acute distress, cooperative and alert  HEENT: No carotid bruits, no JVD, trach is midline. Neck Supple, PEERL, EOM intact. Heart:  reg rate and rhythm; normal S1/S2; no murmurs, gallops or rubs. Respiratory: Clear bilaterally x 4, no wheezing or rales  Abdomen:   Soft, non-tender, no distention, no masses. + BS. Extremities:  Normal cap refill, no cyanosis, atraumatic. No edema. Neuro: A&Ox3, speech clear, gait stable. Skin: Skin color is normal. No rashes or lesions.  Non diaphoretic  Vascular: 2+ pulses symmetric in all extremities    Vitals:    07/18/19 0859 07/18/19 0913 07/18/19 0914   BP: 140/70 130/70 112/60   Pulse: (!) 44     Resp: 16     SpO2: 98%     Weight: 176 lb 3.2 oz (79.9 kg)     Height: 5' 7\" (1.702 m)         Cardiographics    ECG: sinus rhythm with trigeminal multifocal PVCs  Results for orders placed or performed during the hospital encounter of 05/17/19   EKG, 12 LEAD, INITIAL   Result Value Ref Range    Ventricular Rate 69 BPM    Atrial Rate 60 BPM    P-R Interval 200 ms    QRS Duration 106 ms    Q-T Interval 444 ms    QTC Calculation (Bezet) 475 ms    Calculated P Axis 0 degrees    Calculated R Axis 5 degrees    Calculated T Axis 146 degrees    Diagnosis       Atrial-paced rhythm with frequent premature ventricular complexes  Incomplete left bundle branch block  ST & T wave abnormality, consider lateral ischemia  Prolonged QT  Abnormal ECG  When compared with ECG of 10-MAR-2019 10:02,  Electronic atrial pacemaker has replaced Sinus rhythm  Confirmed by Celeste Ruiz MD, --- ([id number]) on 5/18/2019 8:19:05 PM         Cardiology Labs:  Lab Results   Component Value Date/Time    Cholesterol, total 160 11/14/2018 12:02 PM    HDL Cholesterol 72 11/14/2018 12:02 PM    LDL, calculated 67 11/14/2018 12:02 PM    Triglyceride 105 11/14/2018 12:02 PM    CHOL/HDL Ratio 1.7 11/24/2014 02:05 AM       Lab Results   Component Value Date/Time    Sodium 142 05/17/2019 12:22 PM    Potassium 4.5 05/17/2019 12:22 PM    Chloride 106 05/17/2019 12:22 PM    CO2 27 05/17/2019 12:22 PM    Anion gap 9 05/17/2019 12:22 PM    Glucose 108 (H) 05/17/2019 12:22 PM    BUN 21 (H) 05/17/2019 12:22 PM    Creatinine 1.32 (H) 05/17/2019 12:22 PM    BUN/Creatinine ratio 16 05/17/2019 12:22 PM    GFR est AA >60 05/17/2019 12:22 PM    GFR est non-AA 53 (L) 05/17/2019 12:22 PM    Calcium 9.4 05/17/2019 12:22 PM    Bilirubin, total 0.5 05/17/2019 12:22 PM    AST (SGOT) 20 05/17/2019 12:22 PM    Alk. phosphatase 88 05/17/2019 12:22 PM    Protein, total 7.0 05/17/2019 12:22 PM    Albumin 3.5 05/17/2019 12:22 PM    Globulin 3.5 05/17/2019 12:22 PM    A-G Ratio 1.0 (L) 05/17/2019 12:22 PM    ALT (SGPT) 23 05/17/2019 12:22 PM        Orders Placed This Encounter    AMB POC EKG ROUTINE W/ 12 LEADS, INTER & REP     Order Specific Question:   Reason for Exam:     Answer:   ROUTINE        Assessment:     Assessment:       ICD-10-CM ICD-9-CM    1. Chronic systolic congestive heart failure (HCC) I50.22 428.22      428.0    2. Coronary artery disease involving native coronary artery of native heart without angina pectoris I25.10 414.01    3.  Paroxysmal atrial fibrillation (HCC) I48.0 427.31 AMB POC EKG ROUTINE W/ 12 LEADS, INTER & REP 4. Mixed hyperlipidemia E78.2 272.2    5. S/P CABG (coronary artery bypass graft) Z95.1 V45.81    6. AICD (automatic cardioverter/defibrillator) present Z95.810 V45.02         Plan:     1. Chronic systolic congestive heart failure (HCC)  Previous echo in 10/2018 with LVEF 25-30% with grade 2 DD and mild MR/AI and mild-mod TR  On Coreg, Entresto, and Lasix every other day  Echocardiogram done today shows ejection fraction improved, now 30-35%  Would continue with present medications for now  NYHA FC I-II. Reassess sx at f/u with borderline LBBB, consider eventual repeat echo and upgrade to Bi-V PRN    2. Coronary artery disease involving native coronary artery of native heart without angina pectoris  S/p CABGx1 with LIMA to LAD in 3/2011  Jose De Jesus Gretchen in 3/2019 with normal perfusion, without evidence of TID -- reported inconclusive due to cardiomyopathy, which is known  Without anginal or anginal equivalent symptoms  Continue with present medical management    3. Paroxysmal atrial fibrillation (HCC)  Stable; asymptomatic  Continue rate control with Coreg; continue Eliquis therapy for stroke prevention  [id number] (chf/age/vasc/htn)    4. Mixed hyperlipidemia  LDL 67 in 11/2018  Continue statin therapy and low fat, low cholesterol diet  Lipids managed by PCP    5. S/P CABG (coronary artery bypass graft)  Stable    6.  AICD (automatic cardioverter/defibrillator) present  Continue with routine device interrogation with Dr. Leesa Mccullough    F/u with Dr. Macy De Los Santos in 6 months    Sallie Moe MD

## 2019-07-24 ENCOUNTER — HOSPITAL ENCOUNTER (OUTPATIENT)
Dept: GENERAL RADIOLOGY | Age: 74
Discharge: HOME | End: 2019-07-24
Attending: ORTHOPAEDIC SURGERY
Payer: MEDICARE

## 2019-07-24 ENCOUNTER — HOSPITAL ENCOUNTER (OUTPATIENT)
Dept: CT IMAGING | Age: 74
Discharge: HOME | End: 2019-07-24
Attending: ORTHOPAEDIC SURGERY
Payer: MEDICARE

## 2019-07-24 ENCOUNTER — OFFICE VISIT (OUTPATIENT)
Dept: CARDIOLOGY CLINIC | Age: 74
End: 2019-07-24

## 2019-07-24 DIAGNOSIS — M51.36 DDD (DEGENERATIVE DISC DISEASE), LUMBAR: ICD-10-CM

## 2019-07-24 DIAGNOSIS — M54.41 CHRONIC RIGHT-SIDED LOW BACK PAIN WITH BILATERAL SCIATICA: ICD-10-CM

## 2019-07-24 DIAGNOSIS — G89.29 CHRONIC RIGHT-SIDED LOW BACK PAIN WITH BILATERAL SCIATICA: ICD-10-CM

## 2019-07-24 DIAGNOSIS — M54.42 CHRONIC RIGHT-SIDED LOW BACK PAIN WITH BILATERAL SCIATICA: ICD-10-CM

## 2019-07-24 DIAGNOSIS — I50.22 CHRONIC SYSTOLIC CONGESTIVE HEART FAILURE (HCC): ICD-10-CM

## 2019-07-24 DIAGNOSIS — Z95.810 AICD (AUTOMATIC CARDIOVERTER/DEFIBRILLATOR) PRESENT: Primary | ICD-10-CM

## 2019-07-24 PROCEDURE — 62304 MYELOGRAPHY LUMBAR INJECTION: CPT

## 2019-07-24 PROCEDURE — 74011636320 HC RX REV CODE- 636/320: Performed by: RADIOLOGY

## 2019-07-24 PROCEDURE — 72132 CT LUMBAR SPINE W/DYE: CPT

## 2019-07-24 RX ADMIN — IOHEXOL 15 ML: 180 INJECTION INTRAVENOUS at 10:08

## 2019-07-24 NOTE — DISCHARGE INSTRUCTIONS
[facility]  [facility]  [phone]    Radiologist: Dr Simona Barnhart  Date: 07/24/2019      Myelogram Discharge Instructions    Go home and rest and restrict your activity the next 24 - 48 hours. Rest in a reclined position, keep your head elevated to minimize post procedure complications. Resume your previous diet and medications. Increase your fluid intake over the next 1-2 days to help the kidneys flush out the dye that was injected during your procedure. You may take Tylenol, as directed on the label, for pain or discomfort. Avoid Ibuprofen (Advil, Motrin etc.) and Aspirin today as they may increase your risk of bleeding. Avoid heavy lifting (nothing greater than 5 pounds),  excessive bending, pushing or pulling movements for 2 days to minimize your risk of post procedure headache. You may shower in 24 hours. Do not soak or swim until the site has healed completely. Results will be sent to your physician as soon as they become available. Follow up with your physician as previously discussed and be sure to bring the CD to that was provided for you today to your appointment.

## 2019-08-08 ENCOUNTER — TELEPHONE (OUTPATIENT)
Dept: CARDIOLOGY CLINIC | Age: 74
End: 2019-08-08

## 2019-08-08 NOTE — TELEPHONE ENCOUNTER
Left voicemail instructing patient to notify his orthopedic surgeon that he has a pacemaker and the doctors office will fax us a form for pacemaker instructions.

## 2019-08-08 NOTE — TELEPHONE ENCOUNTER
Patient is having surgery on 9/6, with Adi Lamb MD shoulder surgery. going to do cardiac clearance with Rebecca Gorman however; what instructions will be need with him having a pacemaker?

## 2019-08-09 ENCOUNTER — DOCUMENTATION ONLY (OUTPATIENT)
Dept: CARDIOLOGY CLINIC | Age: 74
End: 2019-08-09

## 2019-08-09 NOTE — PROGRESS NOTES
Patient recently seen in office on 7/18/19 for routine cardiac follow-up  Patient had no complaints at this time  Echocardiogram showed improved ejection fraction 30-35%  Lexiscan in 3/2019 without evidence of ischemia  Patient was recommended to continue present medical management  Patient requiring cardiac clearance for orthopedic surgery  Given no new symptoms, patient would be considered low operative risk for procedure from cardiovascular standpoint. He may hold Eliquis for 3 days prior to procedure. He may hold ASA 5-7 days prior to procedure.     Marii Galarza NP  8/9/2019  12:48 PM

## 2019-08-14 ENCOUNTER — TELEPHONE (OUTPATIENT)
Dept: CARDIOLOGY CLINIC | Age: 74
End: 2019-08-14

## 2019-08-14 NOTE — TELEPHONE ENCOUNTER
----- Message from Dominique Ny sent at 8/9/2019  9:05 AM EDT -----  Called and spoke with wife who said he was not having any symptoms. Appt has been cancelled. Thanks!  ----- Message -----  From: Ezekiel Briones NP  Sent: 8/9/2019   8:43 AM EDT  To: Tre Henry RENEE, [facility],  Mr. Salima Townsend has an appt on 8/12/19 for cardiac clearance. We just saw him in 7/2019 and he was doing well and needed a 6 month f/u. If he has no symptoms, he does not need to come in for clearance, we can provide a clearance letter to Dr. Yanelis Parks. He will need directions on managing his pacemaker settings via EP though.     Thanks,  [employer]

## 2019-08-28 ENCOUNTER — HOSPITAL ENCOUNTER (OUTPATIENT)
Dept: SURGERY | Age: 74
Discharge: HOME | End: 2019-08-28
Attending: ORTHOPAEDIC SURGERY
Payer: MEDICARE

## 2019-08-28 VITALS
HEIGHT: 67 IN | HEART RATE: 46 BPM | DIASTOLIC BLOOD PRESSURE: 35 MMHG | BODY MASS INDEX: 25.74 KG/M2 | WEIGHT: 164 LBS | RESPIRATION RATE: 19 BRPM | SYSTOLIC BLOOD PRESSURE: 92 MMHG | TEMPERATURE: 97.7 F | OXYGEN SATURATION: 97 %

## 2019-08-28 LAB
ABO + RH BLD: NORMAL
ALBUMIN SERPL-MCNC: 4.2 G/DL (ref 3.5–5)
ALBUMIN/GLOB SERPL: 1.2 {RATIO} (ref 1.1–2.2)
ALP SERPL-CCNC: 89 U/L (ref 45–117)
ALT SERPL-CCNC: 19 U/L (ref 12–78)
ANION GAP SERPL CALC-SCNC: 7 MMOL/L (ref 5–15)
APPEARANCE UR: CLEAR
AST SERPL-CCNC: 15 U/L (ref 15–37)
BACTERIA URNS QL MICRO: NEGATIVE /HPF
BILIRUB SERPL-MCNC: 0.4 MG/DL (ref 0.2–1)
BILIRUB UR QL: NEGATIVE
BLOOD GROUP ANTIBODIES SERPL: NORMAL
BUN SERPL-MCNC: 28 MG/DL (ref 6–20)
BUN/CREAT SERPL: 20 (ref 12–20)
CALCIUM SERPL-MCNC: 9.6 MG/DL (ref 8.5–10.1)
CHLORIDE SERPL-SCNC: 102 MMOL/L (ref 97–108)
CO2 SERPL-SCNC: 29 MMOL/L (ref 21–32)
COLOR UR: NORMAL
CREAT SERPL-MCNC: 1.39 MG/DL (ref 0.7–1.3)
EPITH CASTS URNS QL MICRO: NORMAL /LPF
ERYTHROCYTE [DISTWIDTH] IN BLOOD BY AUTOMATED COUNT: 13.6 % (ref 11.5–14.5)
EST. AVERAGE GLUCOSE BLD GHB EST-MCNC: 114 MG/DL
GLOBULIN SER CALC-MCNC: 3.5 G/DL (ref 2–4)
GLUCOSE SERPL-MCNC: 99 MG/DL (ref 65–100)
GLUCOSE UR STRIP.AUTO-MCNC: NEGATIVE MG/DL
HBA1C MFR BLD: 5.6 % (ref 4.2–6.3)
HCT VFR BLD AUTO: 39.7 % (ref 36.6–50.3)
HGB BLD-MCNC: 12.7 G/DL (ref 12.1–17)
HGB UR QL STRIP: NEGATIVE
HYALINE CASTS URNS QL MICRO: NORMAL /LPF (ref 0–5)
INR PPP: 1.1 (ref 0.9–1.1)
KETONES UR QL STRIP.AUTO: NEGATIVE MG/DL
LEUKOCYTE ESTERASE UR QL STRIP.AUTO: NEGATIVE
MCH RBC QN AUTO: 29.5 PG (ref 26–34)
MCHC RBC AUTO-ENTMCNC: 32 G/DL (ref 30–36.5)
MCV RBC AUTO: 92.3 FL (ref 80–99)
NITRITE UR QL STRIP.AUTO: NEGATIVE
NRBC # BLD: 0 K/UL (ref 0–0.01)
NRBC BLD-RTO: 0 PER 100 WBC
PH UR STRIP: 5.5 [PH] (ref 5–8)
PLATELET # BLD AUTO: 174 K/UL (ref 150–400)
PMV BLD AUTO: 11.7 FL (ref 8.9–12.9)
POTASSIUM SERPL-SCNC: 4.5 MMOL/L (ref 3.5–5.1)
PROT SERPL-MCNC: 7.7 G/DL (ref 6.4–8.2)
PROT UR STRIP-MCNC: NEGATIVE MG/DL
PROTHROMBIN TIME: 11.1 SEC (ref 9–11.1)
RBC # BLD AUTO: 4.3 M/UL (ref 4.1–5.7)
RBC #/AREA URNS HPF: NORMAL /HPF (ref 0–5)
SODIUM SERPL-SCNC: 138 MMOL/L (ref 136–145)
SP GR UR REFRACTOMETRY: 1.01 (ref 1–1.03)
SPECIMEN EXP DATE BLD: NORMAL
UA: UC IF INDICATED,UAUC: NORMAL
UROBILINOGEN UR QL STRIP.AUTO: 0.2 EU/DL (ref 0.2–1)
WBC # BLD AUTO: 7 K/UL (ref 4.1–11.1)
WBC URNS QL MICRO: NORMAL /HPF (ref 0–4)

## 2019-08-28 PROCEDURE — 81001 URINALYSIS AUTO W/SCOPE: CPT

## 2019-08-28 PROCEDURE — 36415 COLL VENOUS BLD VENIPUNCTURE: CPT

## 2019-08-28 PROCEDURE — 85027 COMPLETE CBC AUTOMATED: CPT

## 2019-08-28 PROCEDURE — 77030020782 HC GWN BAIR PAWS FLX 3M -B

## 2019-08-28 PROCEDURE — 86900 BLOOD TYPING SEROLOGIC ABO: CPT

## 2019-08-28 PROCEDURE — 83036 HEMOGLOBIN GLYCOSYLATED A1C: CPT

## 2019-08-28 PROCEDURE — 77030032497 HC WRP SHLDR WO BGS SOLM -B

## 2019-08-28 PROCEDURE — 85610 PROTHROMBIN TIME: CPT

## 2019-08-28 PROCEDURE — 80053 COMPREHEN METABOLIC PANEL: CPT

## 2019-08-28 RX ORDER — KETOROLAC TROMETHAMINE 10 MG/1
10 TABLET, FILM COATED ORAL
COMMUNITY
End: 2019-08-28

## 2019-08-28 RX ORDER — TRAMADOL HYDROCHLORIDE 50 MG/1
50 TABLET ORAL
COMMUNITY
End: 2019-09-08

## 2019-08-28 NOTE — PERIOP NOTES
[facility]  Ambulatory Surgery Unit  Pre-operative Instructions    Surgery/Procedure Date 09/6/19  Tentative Arrival Time TBD      1. On the day of your surgery/procedure, please report to the Ambulatory Surgery Unit Registration Desk and sign in at your designated time. The Ambulatory Surgery Unit is located in [facility] on the [facility] side of the Hospitals in [place] across from the [address] [place] [address]. Please have all of your health insurance cards and a photo ID. 2. You must have someone with you to drive you home, as you should not drive a car for 24 hours following anesthesia. Please make arrangements for a responsible adult friend or family member to stay with you for at least the first 24 hours after your surgery. 3. Do not have anything to eat or drink (including water, gum, mints, coffee, juice) after 11:59 PM  08/5/19. This may not apply to medications prescribed by your physician. (Please note below the special instructions with medications to take the morning of surgery, if applicable.)    4. We recommend you do not drink any alcoholic beverages for 24 hours before and after your surgery. 5. Contact your surgeons office for instructions on the following medications: non-steroidal anti-inflammatory drugs (i.e. Advil, Aleve), vitamins, and supplements. (Some surgeons will want you to stop these medications prior to surgery and others may allow you to take them)   **If you are currently taking Plavix, Coumadin, Aspirin and/or other blood-thinning agents, contact your surgeon for instructions. ** Your surgeon will partner with the physician prescribing these medications to determine if it is safe to stop or if you need to continue taking. Please do not stop taking these medications without instructions from your surgeon. 6. See next set of instructions     7. Wear comfortable clothes. Wear glasses instead of contacts.  Do not bring any jewelry or money (other than copays or fees as instructed). Do not wear make-up, particularly mascara, the morning of your surgery. Do not wear nail polish, particularly if you are having foot /hand surgery. Wear your hair loose or down, no ponytails, buns, louise pins or clips. All body piercings must be removed. 8. You should understand that if you do not follow these instructions your surgery may be cancelled. If your physical condition changes (i.e. fever, cold or flu) please contact your surgeon as soon as possible. 9. It is important that you be on time. If a situation occurs where you may be late, or if you have any questions or problems, please call [phone].    10. Your surgery time may be subject to change. You will receive a phone call the day prior to surgery to confirm your arrival time. Special Instructions: Take all medications and inhalers, as prescribed, on the morning of surgery with a sip of water EXCEPT:  sacubitril-valsartan (ENTRESTO)  per Dr Katerine Oneal :  Stop ASA 81 mg on Sunday 09/1 and Eliquis 5 mg on Tuesday 9/3     I understand a pre-operative phone call will be made to verify my surgery time. In the event that I am not available, I give permission for a message to be left on my answering service and/or with another person?       yes         ___________________      ___________________      ________________  (Signature of Patient)          (Witness)                   (Date and Time)

## 2019-08-28 NOTE — PERIOP NOTES
Kaela Noyola called , Pt with BP 92/35 and 84/49 C/o feeling dizzy this morning     08:51 Orpha Plenty with patient     [id number]  Discharge Instruction reviewed with patient and wife  verbalized understanding     Patient is following up with PCP today at 13:30    If you have any symptoms of dizziness/lightheadedness/feeling faint/ or change in mental status- get evaluated in the Emergency Room

## 2019-08-28 NOTE — PERIOP NOTES
Preventing Infections Before and After  Your Surgery    IMPORTANT INSTRUCTIONS    Please read and follow these instructions carefully. If you are unable to comply with the below instructions your procedure will be cancelled. Every Night for Three (3) nights before your surgery:  1. Shower with an antibacterial soap, such as Dial, or the soap provided at your preassessment appointment. A shower is better than a bath for cleaning your skin. 2. If needed, ask someone to help you reach all areas of your body. Dont forget to clean your belly button with every shower. The night before your surgery: If you lose your Hibiclens please contact surgery center or you can purchase it at a local pharmacy  1. On the night before your surgery, shower with an antibacterial soap, such as Dial, or the soap provided at your preassessment appointment. 2. With one packet of Hibiclens in hand, turn water off.  3. Apply Hibiclens antiseptic skin cleanser with a clean, freshly washed washcloth. ? Gently apply to your body from chin to toes (except the genital area) and especially the area(s) where your incision(s) will be. ? Leave Hibiclens on your skin for at least 20 seconds. CAUTION: If needed, Hibiclens may be used to clean the folds of skin of the legs (such as in the area of the groin) and on your buttocks and hips. However, do not use Hibiclens above the neck or in the genital area (your bottom) or put inside any area of your body. 4. Turn the water back on and rinse. 5. Dry gently with a clean, freshly washed towel. 6. After your shower, do not use any powder, deodorant, perfumes or lotion. 7. Use clean, freshly washed towels and washcloths every time you shower. 8. Wear clean, freshly washed pajamas to bed the night before surgery. 9. Sleep on clean, freshly washed sheets. 10. Do not allow pets to sleep in your bed with you. The Morning of your surgery:  1.  Shower again thoroughly with an antibacterial soap, such as Dial or the soap provided at your preassessment appointment. If needed, ask someone for help to reach all areas of your body. Dont forget to clean your belly button! Rinse. 2. Dry gently with a clean, freshly washed towel. 3. After your shower, do not use any powder, deodorant, perfumes or lotion prior to surgery. 4. Put on clean, freshly washed clothing. Tips to help prevent infections after your surgery:  1. Protect your surgical wound from germs:  ? Hand washing is the most important thing you and your caregivers can do to prevent infections. ? Keep your bandage clean and dry! ? Do not touch your surgical wound. 2. Use clean, freshly washed towels and washcloths every time you shower; do not share bath linens with others. 3. Until your surgical wound is healed, wear clothing and sleep on bed linens each day that are clean and freshly washed. 4. Do not allow pets to sleep in your bed with you or touch your surgical wound. 5. Do not smoke  smoking delays wound healing. This may be a good time to stop smoking. 6. If you have diabetes, it is important for you to manage your blood sugar levels properly before your surgery as well as after your surgery. Poorly managed blood sugar levels slow down wound healing and prevent you from healing completely. If you lose your Hibiclens, please call the [facility], or it is available for purchase at your pharmacy.                ___________________      ___________________      ________________  (Signature of Patient)          (Witness)                   (Date and Time)

## 2019-08-28 NOTE — ADVANCED PRACTICE NURSE
Pt in for PAT assessment prior to surgery w/ lower than normal for pt BPs noted. Pt and wife report recent adjustment of medications, as well as decrease intake of fluids secondary to increase in traveling. Pt reports feeling a \"little dizzy today\" but denies CP, SOB at this time. Recommended pt FU w/ PCP today for evaluation of low blood pressure w/ dizziness reported this morning. Appt scheduled by PAT RN for pt with PCP today. PAT RN to contact surgeon regarding pt's reported hx of Factor V Leiden deficiency for further recommendations prior to surgery.

## 2019-08-29 LAB
BACTERIA SPEC CULT: NORMAL
BACTERIA SPEC CULT: NORMAL
SERVICE CMNT-IMP: NORMAL

## 2019-09-04 ENCOUNTER — OFFICE VISIT (OUTPATIENT)
Dept: CARDIOLOGY CLINIC | Age: 74
End: 2019-09-04

## 2019-09-04 VITALS
HEIGHT: 67 IN | OXYGEN SATURATION: 96 % | BODY MASS INDEX: 26.12 KG/M2 | RESPIRATION RATE: 16 BRPM | DIASTOLIC BLOOD PRESSURE: 61 MMHG | HEART RATE: 61 BPM | SYSTOLIC BLOOD PRESSURE: 104 MMHG | WEIGHT: 166.4 LBS

## 2019-09-04 DIAGNOSIS — I47.1 AVNRT (AV NODAL RE-ENTRY TACHYCARDIA) (HCC): ICD-10-CM

## 2019-09-04 DIAGNOSIS — Z95.810 AICD (AUTOMATIC CARDIOVERTER/DEFIBRILLATOR) PRESENT: ICD-10-CM

## 2019-09-04 DIAGNOSIS — I50.22 CHRONIC SYSTOLIC CONGESTIVE HEART FAILURE (HCC): ICD-10-CM

## 2019-09-04 DIAGNOSIS — I49.5 SSS (SICK SINUS SYNDROME) (HCC): ICD-10-CM

## 2019-09-04 DIAGNOSIS — I82.492 ACUTE DEEP VEIN THROMBOSIS (DVT) OF OTHER SPECIFIED VEIN OF LEFT LOWER EXTREMITY (HCC): ICD-10-CM

## 2019-09-04 DIAGNOSIS — I25.10 CORONARY ARTERY DISEASE INVOLVING NATIVE CORONARY ARTERY OF NATIVE HEART WITHOUT ANGINA PECTORIS: Primary | Chronic | ICD-10-CM

## 2019-09-04 DIAGNOSIS — Z95.0 CARDIAC PACEMAKER IN SITU: ICD-10-CM

## 2019-09-04 DIAGNOSIS — E78.2 MIXED HYPERLIPIDEMIA: ICD-10-CM

## 2019-09-04 DIAGNOSIS — Z95.1 S/P CABG (CORONARY ARTERY BYPASS GRAFT): ICD-10-CM

## 2019-09-04 DIAGNOSIS — I48.0 PAROXYSMAL ATRIAL FIBRILLATION (HCC): ICD-10-CM

## 2019-09-04 DIAGNOSIS — E78.5 HYPERLIPIDEMIA, UNSPECIFIED HYPERLIPIDEMIA TYPE: ICD-10-CM

## 2019-09-04 PROBLEM — M75.101 RIGHT ROTATOR CUFF TEAR ARTHROPATHY: Status: ACTIVE | Noted: 2019-09-04

## 2019-09-04 PROBLEM — M12.811 RIGHT ROTATOR CUFF TEAR ARTHROPATHY: Status: ACTIVE | Noted: 2019-09-04

## 2019-09-04 RX ORDER — METOPROLOL SUCCINATE 25 MG/1
12.5 TABLET, EXTENDED RELEASE ORAL DAILY
Qty: 45 TAB | Refills: 3 | Status: SHIPPED | OUTPATIENT
Start: 2019-09-04 | End: 2020-01-16

## 2019-09-04 NOTE — H&P
PRE- OP History and Physical                             Subjective:     Patient is a 75 y.o. male presented with a history of several years of atraumatic left shoulder pain. He was last seen 1 year ago and had a cortisone injection for treatment of left cuff arthropathy. At that time he was having significant pain and considering surgery and had discussed the CT scan for preoperative planning purposes. He is here today to review results of CT scan and discuss surgical treatment with a reverse shoulder arthroplasty.     He has persistent left shoulder pain a locates anterior lateral deltoid. Pain worse with any use the left arm and has significant pain at night that wakes him from sleep. Rates the pain 8/10. Somewhat improved with previous cortisone injection. He is a non . The patient's condition has been resistant to non-operative treatment and is being admitted for surgical management of this condition.      Patient Active Problem List    Diagnosis Date Noted    Right rotator cuff tear arthropathy 09/04/2019    Moderate major depression ([address] 07/03/2018    Depression 07/03/2018    DDD (degenerative disc disease), lumbar 04/02/2018    Chronic anticoagulation 07/17/2017    S/P CABG (coronary artery bypass graft) 06/17/2016    AICD (automatic cardioverter/defibrillator) present 05/13/2016    AVNRT (AV bridgette re-entry tachycardia) ([address] 05/12/2016    SVT (supraventricular tachycardia) ([address] 04/27/2016    Cardiomyopathy ([address] 04/27/2016    Chronic systolic congestive heart failure ([address] 04/27/2016    Stenosis of both carotid arteries without cerebral infarction 04/12/2016    Cervicogenic headache 04/12/2016    Alzheimer's dementia, late onset, with behavioral disturbance 04/12/2016    Spinal stenosis, lumbar region, with neurogenic claudication 04/12/2016    Lumbar back pain with radiculopathy affecting right lower extremity 04/12/2016    Lumbar back pain with radiculopathy affecting left lower extremity 04/12/2016    History of stroke 04/12/2016    ACP (advance care planning) 12/30/2015    B12 deficiency 09/22/2015    Hypothyroidism due to acquired atrophy of thyroid 09/22/2015    Osteoporosis 09/22/2015    Cervical post-laminectomy syndrome 09/22/2015    Neck pain 09/22/2015    Lower GI bleeding 08/19/2015    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 07/23/2015    Cardiac pacemaker in situ 03/17/2015    Pacemaker 11/26/2014    Chest pain 11/24/2014    Bradycardia 11/23/2014    Left-sided chest wall pain 11/23/2014    SSS (sick sinus syndrome) ([facility] [address] 11/23/2014    S/P cervical spinal fusion 06/06/2013    Chronic venous embolism and thrombosis of unspecified deep vessels of lower extremity 05/29/2012    Osteoarthritis 05/29/2012    Prostate cancer ([address] 05/29/2012    Hyperthyroidism 05/29/2012    Mitral valve disorders(424.0) 05/29/2012    Postsurgical aortocoronary bypass status 05/29/2012    Coronary atherosclerosis of native coronary artery 05/29/2012    Paroxysmal supraventricular tachycardia ([address] 05/29/2012    Chronic systolic heart failure ([address] 05/29/2012    Atrial fibrillation ([address] 05/29/2012    Mixed hyperlipidemia 05/29/2012    Palpitations 05/29/2012    History of factor V Leiden mutation 03/07/2011    CABG (coronary artery bypass graft) 03/07/2011    CAD (coronary artery disease) 03/04/2011    Hypertension 03/04/2011    Hyperlipemia 03/04/2011    DVT (deep venous thrombosis) chronic coumadin anti-coagulation 03/04/2011     Past Medical History:   Diagnosis Date    AICD (automatic cardioverter/defibrillator) present 5/13/2016 5/13/16 [employer] upgrade to dual chamber AICD implant  Dr. Jeannie Collet state, other     Arthritis     OSTEO ARTHRITIS    AVNRT (AV bridgette re-entry tachycardia) ([address] 5/12/2016 5/12/16 AVNRT ablation: PM/AICD left upper chest :Dr. Camacho Quiet Wittkamp    Back ache     CAD (coronary artery disease)     Cancer ([facility] [address] 2004    Prostate cancer    Chest tightness     last episode of chest pain 5/2016 before AICD placed; none since then    Coagulation defects 2005    FACTOR V LEIDEN    Dementia     Dizziness     FH: factor V Leiden deficiency     Generalized muscle ache     GERD (gastroesophageal reflux disease)     HEARTBURN    Heart failure ([facility] [address] 2014    as of 07/2019 EF 30-35; Dr. Nicole Hu    Hyperlipidemia, mixed     Hypertension     Other ill-defined conditions(799.89) 2004    blood transfusion    Pacemaker     Paroxysmal atrial fibrillation ([facility] [address]     rate controlled with coreg     Postsurgical aortocoronary bypass status 05/29/2012    CABG    Presence of cardiac defibrillator 05/2016    left upper chest    Psychiatric disorder     depression    Stroke [facility]) 2011, 1990's    SEVERAL-mini    Thromboembolism ([facility] [address] 2014    left leg: changed to Eliquis from Warfarin    Thromboembolus ([facility] [address] 2005    left leg    Weakness generalized       Past Surgical History:   Procedure Laterality Date    CARDIAC CATHETERIZATION  3/4/2011         CARDIAC SURG PROCEDURE UNLIST      CABG X1 3/5/11    HX HERNIA REPAIR  2002    Ingiunal hernia repair left    HX ORTHOPAEDIC      LEFT KNEE CARTILAGE REPAIR;RIGHT ROTATOR CUFF REPAIR    HX ORTHOPAEDIC  2/14/12    C5-7 ANTERIIOR CERVICAL DISECTOMY AND FUSION    HX ORTHOPAEDIC  6/4/13    C5-C7 POSTERIOR DECOMPRESSION AND FUSION    HX ORTHOPAEDIC      right thumb partial amputation of tip    HX OTHER SURGICAL      steroid injections    HX PACEMAKER Left     HX PACEMAKER PLACEMENT      [place]  2004     [place]    HX UROLOGICAL       urinary control system    HX UROLOGICAL  12/3/13    REPLACEMENT ARTIFICAL URINARY SPHINCTER      Prior to Admission medications    Medication Sig Start Date End Date Taking?  Authorizing Provider   atorvastatin (LIPITOR) 40 mg tablet TAKE 1 TABLET BY MOUTH EVERY DAY 19  Yes Hayes Higgins PA-C   traMADol [facility]) 50 mg tablet Take 50 mg by mouth every six (6) hours as needed for Pain. Yes Provider, Historical   sucralfate (CARAFATE) 1 gram tablet Take 1 Tab by mouth four (4) times daily. 19  Yes Estuardo Malhotra MD   pantoprazole (PROTONIX) 40 mg tablet TAKE 1 TABLET BY MOUTH ONCE DAILY  Patient taking differently: 80 mg. 19  Yes uKlwant Koch MD   sertraline (ZOLOFT) 100 mg tablet TAKE 1&1/2 TABLETS (150MG) BY MOUTH ONCE DAILY  Patient taking differently: 200 mg. TAKE 1&1/2 TABLETS (150MG) BY MOUTH ONCE DAILY 19  Yes Kulwant Koch MD   aspirin 81 mg chewable tablet Take 1 Tab by mouth daily. 19  Yes Tasha Romero MD   polyethylene glycol (MIRALAX) 17 gram/dose powder Take 17 g by mouth as needed. Yes Provider, Historical   nitroglycerin (NITROSTAT) 0.4 mg SL tablet 1 Tab by SubLINGual route every five (5) minutes as needed for Chest Pain (call 911 if not relieved by 3). 13  Yes Lita Garcia, ANP   metoprolol succinate (TOPROL-XL) 25 mg XL tablet Take 0.5 Tabs by mouth daily. 19   Fabi Toussaint, NP   gabapentin (NEURONTIN) 600 mg tablet TAKE 1 TABLET BY MOUTH THREE TIMES A DAY 19   Kulwant Koch MD   ELIQUIS 5 mg tablet TAKE 1 TABLET BY MOUTH EVERY 12 HOURS 7/15/19   Tasha Romero MD   multivit-min/FA/lycopen/lutein (SENTRY SENIOR PO) Take  by mouth daily. Provider, Historical   Incontinence Pad, Liner, Disp (BLADDER CONTROL PADS EX ABSORB) pads 1 Packet by Does Not Apply route as needed (incontinence).  12/3/13   James Mendez MD     No Known Allergies   Social History     Tobacco Use    Smoking status: Former Smoker     Types: Pipe, Cigars     Last attempt to quit: 10/10/1971     Years since quittin.9    Smokeless tobacco: Never Used   Substance Use Topics    Alcohol use: No     Alcohol/week: 1.0 standard drinks     Types: 1 Cans of beer per week      Family History Problem Relation Age of Onset    Asthma Mother     Alcohol abuse Mother     Alcohol abuse Father     Asthma Father     Cancer Sister     Heart Disease Sister     Alcohol abuse Brother     Cancer Brother     Heart Disease Brother       Review of Systems  A comprehensive review of systems was negative except for that written in the HPI. Objective:     Patient Vitals for the past 8 hrs:   BP Temp Pulse Resp SpO2 Weight   09/06/19 0613 141/90 97.6 °F (36.4 °C) 68 18 96 %    09/06/19 0601      74.8 kg (164 lb 14.5 oz)     Visit Vitals  /90 (BP 1 Location: Right arm, BP Patient Position: At rest;Supine)   Pulse 68   Temp 97.6 °F (36.4 °C)   Resp 18   Wt 74.8 kg (164 lb 14.5 oz)   SpO2 96%   BMI 25.83 kg/m²     General:  Alert, cooperative, no distress, appears stated age. Head:  Normocephalic, without obvious abnormality, atraumatic. Eyes:  Conjunctivae/corneas clear. PERRL, EOMs intact. Ears:  Normal TMs and external ear canals both ears. Nose: Nares normal. Septum midline. Mucosa normal. No drainage or sinus tenderness. Throat: Lips, mucosa, and tongue normal. Teeth and gums normal.   Neck: Supple, symmetrical, trachea midline, no adenopathy, thyroid: no enlargement/tenderness/nodules, no carotid bruit and no JVD. Back:   Symmetric, no curvature. ROM normal. No CVA tenderness. Lungs:   Clear to auscultation bilaterally. Chest wall:  No tenderness or deformity. Heart:  Regular rate and rhythm, S1, S2, no murmur, click, rub or gallop. Abdomen:   Soft, non-tender. Bowel sounds normal. No masses,  No organomegaly. Extremities: Extremities normal except Range of motion 0 to 60 and forward elevation on the left.  He has pain and weakness with forward elevation and external rotation testing strength testing.  External rotation range of motion 0 to 40.  Good biceps and triceps strength, no apparent biceps deformity.  No significant AC pain bilaterally.  Positive painful Jose Shed. , atraumatic, no cyanosis or edema. Pulses: 2+ and symmetric all extremities. Skin: Skin color, texture, turgor normal. No rashes or lesions   Lymph nodes: Cervical, supraclavicular, and axillary nodes normal.   Neurologic: CNII-XII intact. Neurovascular exam intact in distal extremities        Imaging Review  MRI IMPRESSION: 1. Massive full-thickness tears of the supraspinatus and infraspinatus with high riding humeral head and moderate degeneration of the glenohumeral joint      Assessment:     Active Problems:    Right rotator cuff tear arthropathy (9/4/2019)        Plan:   Reviewed his diagnosis of cuff arthropathy and treatment options to include no treatment, anti-inflammatories or Tylenol, cortisone injections, Ama deltoid exercises, heat, stretching, and reverse shoulder arthroplasty.     We reviewed indications, perioperative experience, and rehab for reverse shoulder arthroplasty. Discussed the risks and benefits including range of motion, stiffness, infection, persistent pain.     Patient has significant past medical history and will need preoperative clearance prior to any surgery. We will schedule for reverse shoulder arthroplasty after clearance is obtained. Patient has Factor V Leiden deficiency and is undergoing an upperextremity surgery. Will have SCDs intra-op and post-op, will be encouraged to ambulate after surgery, and will resume anticoagulation with Eliquis POD#1. Operative and non-operative treatments have been discussed with the patient including risks and benefits of each. After consideration of risks, benefits limitations to the consented procedures and alternative options for treatment, the patient has consented to surgical interventions. Questions were answered and Pre-op teaching was completed.       RENEE Reece

## 2019-09-04 NOTE — PROGRESS NOTES
Edis Hinson, [facility]    Subjective/HPI:     Mr. Sarah Hutson is a 75 y.o. male is here with concerns for hypotension. He has a PMHx of CAD s/p CABGx1, ischemic cardiomyopathy s/p ICD, PAF, chronic DVT with Factor V Leiden mutation, HTN and HLD. He was getting pre-op testing done in anticipation of left shoulder surgery and was noted to be hypotensive with BP in the 90s/50s. He was seen by PCP who stopped his Entresto, Lasix and Namenda on 8/28. Follow-up in 2 days with BP in the 100/60, so was restarted on Entresto. However, his BP dropped again, so he stopped the Entresto a few days ago. His blood pressure remains normal.  He reports dizziness only when he sits up or stands up. He otherwise denies complaints of chest pains, orthopnea, PND or edema. He denies orthopnea, PND or edema.   He denies shortness of breath symptoms or palpitations         PCP Provider  Shantal Rodriguez PA-C    Patient Active Problem List   Diagnosis Code    CAD (coronary artery disease) I25.10    Hypertension I10    Hyperlipemia E78.5    DVT (deep venous thrombosis) chronic coumadin anti-coagulation I82.409    History of factor V Leiden mutation Z86.2    CABG (coronary artery bypass graft)     Chronic venous embolism and thrombosis of unspecified deep vessels of lower extremity I82.509    Osteoarthritis M19.90    Prostate cancer ([facility] C61    Hyperthyroidism E05.90    Mitral valve disorders(424.0) I05.9    Postsurgical aortocoronary bypass status Z95.1    Coronary atherosclerosis of native coronary artery I25.10    Paroxysmal supraventricular tachycardia (HCC) I47.1    Chronic systolic heart failure (HCC) I50.22    Atrial fibrillation (HCC) I48.91    Mixed hyperlipidemia E78.2    Palpitations R00.2    S/P cervical spinal fusion Z98.1    Bradycardia R00.1    Left-sided chest wall pain R07.89    SSS (sick sinus syndrome) ([facility] I49.5    Chest pain R07.9    Pacemaker Z95.0    Cardiac pacemaker in situ Z95.0    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    Lower GI bleeding K92.2    B12 deficiency E53.8    Hypothyroidism due to acquired atrophy of thyroid E03.4    Osteoporosis M81.0    Cervical post-laminectomy syndrome M96.1    Neck pain M54.2    ACP (advance care planning) Z71.89    Stenosis of both carotid arteries without cerebral infarction I65.23    Cervicogenic headache R51    Alzheimer's dementia, late onset, with behavioral disturbance G30.1, F02.81    Spinal stenosis, lumbar region, with neurogenic claudication M48.062    Lumbar back pain with radiculopathy affecting right lower extremity M54.16    Lumbar back pain with radiculopathy affecting left lower extremity M54.16    History of stroke Z86.73    SVT (supraventricular tachycardia) ([facility]) I47.1    Cardiomyopathy ([facility]) I42.9    Chronic systolic congestive heart failure ([facility]) I50.22    AVNRT (AV bridgette re-entry tachycardia) ([facility]) I47.1    AICD (automatic cardioverter/defibrillator) present Z95.810    S/P CABG (coronary artery bypass graft) Z95.1    Chronic anticoagulation Z79.01    DDD (degenerative disc disease), lumbar M51.36    Moderate major depression ([facility]) F32.1    Depression F32.9     Past Surgical History:   Procedure Laterality Date    CARDIAC CATHETERIZATION  3/4/2011         CARDIAC SURG PROCEDURE UNLIST      CABG X1 3/5/11    HX HERNIA REPAIR  2002    Ingiunal hernia repair left    HX ORTHOPAEDIC      LEFT KNEE CARTILAGE REPAIR;RIGHT ROTATOR CUFF REPAIR    HX ORTHOPAEDIC  2/14/12    C5-7 ANTERIIOR CERVICAL DISECTOMY AND FUSION    HX ORTHOPAEDIC  6/4/13    C5-C7 POSTERIOR DECOMPRESSION AND FUSION    HX ORTHOPAEDIC      right thumb partial amputation of tip    HX OTHER SURGICAL      steroid injections    HX PACEMAKER Left     HX PACEMAKER PLACEMENT      H[place] PR[place]CT[place]  2004     [place]    HX UROLOGICAL       urinary control system    HX UROLOGICAL  12/3/13 REPLACEMENT ARTIFICAL URINARY SPHINCTER     Family History   Problem Relation Age of Onset    Asthma Mother     Alcohol abuse Mother     Alcohol abuse Father     Asthma Father     Cancer Sister     Heart Disease Sister     Alcohol abuse Brother     Cancer Brother     Heart Disease Brother      Social History     Socioeconomic History    Marital status:      Spouse name: Not on file    Number of children: Not on file    Years of education: Not on file    Highest education level: Not on file   Occupational History    Not on file   Social Needs    Financial resource strain: Not on file    Food insecurity:     Worry: Not on file     Inability: Not on file    Transportation needs:     Medical: Not on file     Non-medical: Not on file   Tobacco Use    Smoking status: Former Smoker     Types: Pipe, Cigars     Last attempt to quit: 10/10/1971     Years since quittin.9    Smokeless tobacco: Never Used   Substance and Sexual Activity    Alcohol use: No     Alcohol/week: 1.0 standard drinks     Types: 1 Cans of beer per week    Drug use: No    Sexual activity: Never     Partners: Female   Lifestyle    Physical activity:     Days per week: Not on file     Minutes per session: Not on file    Stress: Not on file   Relationships    Social connections:     Talks on phone: Not on file     Gets together: Not on file     Attends [employer] service: Not on file     Active member of club or organization: Not on file     Attends meetings of clubs or organizations: Not on file     Relationship status: Not on file    Intimate partner violence:     Fear of current or ex partner: Not on file     Emotionally abused: Not on file     Physically abused: Not on file     Forced sexual activity: Not on file   Other Topics Concern     Service Not Asked    Blood Transfusions Not Asked    Caffeine Concern Not Asked    Occupational Exposure Not Asked    Hobby Hazards Not Asked    Sleep Concern Yes    Stress Concern Yes     Comment: Family concerns    Weight Concern Not Asked    Special Diet Not Asked    Back Care Not Asked    Exercise Not Asked    Bike Helmet Not Asked   [address] Not Asked    Self-Exams Not Asked   Social History Narrative    , 2 children       No Known Allergies     Current Outpatient Medications   Medication Sig    metoprolol succinate (TOPROL-XL) 25 mg XL tablet Take 0.5 Tabs by mouth daily.  atorvastatin (LIPITOR) 40 mg tablet TAKE 1 TABLET BY MOUTH EVERY DAY    traMADol (ULTRAM) 50 mg tablet Take 50 mg by mouth every six (6) hours as needed for Pain.  sucralfate (CARAFATE) 1 gram tablet Take 1 Tab by mouth four (4) times daily.  pantoprazole (PROTONIX) 40 mg tablet TAKE 1 TABLET BY MOUTH ONCE DAILY (Patient taking differently: 80 mg.)    sertraline (ZOLOFT) 100 mg tablet TAKE 1&1/2 TABLETS (150MG) BY MOUTH ONCE DAILY (Patient taking differently: 200 mg. TAKE 1&1/2 TABLETS (150MG) BY MOUTH ONCE DAILY)    multivit-min/FA/lycopen/lutein (SENTRY SENIOR PO) Take  by mouth daily.  polyethylene glycol (MIRALAX) 17 gram/dose powder Take 17 g by mouth as needed.  Incontinence Pad, Liner, Disp (BLADDER CONTROL PADS EX ABSORB) pads 1 Packet by Does Not Apply route as needed (incontinence).  nitroglycerin (NITROSTAT) 0.4 mg SL tablet 1 Tab by SubLINGual route every five (5) minutes as needed for Chest Pain (call 911 if not relieved by 3).  ELIQUIS 5 mg tablet TAKE 1 TABLET BY MOUTH EVERY 12 HOURS    aspirin 81 mg chewable tablet Take 1 Tab by mouth daily. No current facility-administered medications for this visit. I have reviewed the problem list, allergy list, medical history, family, social history and medications. Review of Symptoms:    Review of Systems   Constitutional: Negative for chills, fever and weight loss. HENT: Negative for nosebleeds. Eyes: Negative for blurred vision and double vision.    Respiratory: Negative for cough, shortness of breath and wheezing. Cardiovascular: Negative for chest pain, palpitations, orthopnea, leg swelling and PND. Gastrointestinal: Negative for abdominal pain, blood in stool, diarrhea, nausea and vomiting. Musculoskeletal: Negative for joint pain. Skin: Negative for rash. Neurological: Negative for dizziness, tingling and loss of consciousness. Endo/Heme/Allergies: Does not bruise/bleed easily. Physical Exam:      General: Well developed, in no acute distress, cooperative and alert  HEENT: No carotid bruits, no JVD, trach is midline. Neck Supple, PEERL, EOM intact. Heart:  reg rate and rhythm; normal S1/S2; no murmurs, gallops or rubs. Respiratory: Clear bilaterally x 4, no wheezing or rales  Abdomen:   Soft, non-tender, no distention, no masses. + BS. Extremities:  Normal cap refill, no cyanosis, atraumatic. No edema. Neuro: A&Ox3, speech clear, gait stable. Skin: Skin color is normal. No rashes or lesions.  Non diaphoretic  Vascular: 2+ pulses symmetric in all extremities    Vitals:    09/04/19 1310 09/04/19 1326 09/04/19 1327   BP: 122/64 114/66 104/61   Pulse: (!) 59 (!) 58 61   Resp: 16     SpO2: 96%     Weight: 166 lb 6.4 oz (75.5 kg)     Height: 5' 7\" (1.702 m)         Cardiographics    ECG: atrially paced rhythm; PVCs  Results for orders placed or performed during the hospital encounter of 08/24/19   EKG, 12 LEAD, INITIAL   Result Value Ref Range    Ventricular Rate 65 BPM    Atrial Rate 65 BPM    P-R Interval 236 ms    QRS Duration 118 ms    Q-T Interval 424 ms    QTC Calculation (Bezet) 440 ms    Calculated P Axis 8 degrees    Calculated R Axis 29 degrees    Calculated T Axis 151 degrees    Diagnosis       Atrial-paced rhythm with prolonged AV conduction with frequent premature   ventricular complexes  Incomplete left bundle branch block  ST & T wave abnormality, consider lateral ischemia  Abnormal ECG  When compared with ECG of 17-MAY-2019 11:40,  No significant change was found  Confirmed by Prema Carlos MD, --- ([id number]) on 8/24/2019 7:48:27 PM         Cardiology Labs:  Lab Results   Component Value Date/Time    Cholesterol, total 160 11/14/2018 12:02 PM    HDL Cholesterol 72 11/14/2018 12:02 PM    LDL, calculated 67 11/14/2018 12:02 PM    Triglyceride 105 11/14/2018 12:02 PM    CHOL/HDL Ratio 1.7 11/24/2014 02:05 AM       Lab Results   Component Value Date/Time    Sodium 138 08/28/2019 10:28 AM    Potassium 4.5 08/28/2019 10:28 AM    Chloride 102 08/28/2019 10:28 AM    CO2 29 08/28/2019 10:28 AM    Anion gap 7 08/28/2019 10:28 AM    Glucose 99 08/28/2019 10:28 AM    BUN 28 (H) 08/28/2019 10:28 AM    Creatinine 1.39 (H) 08/28/2019 10:28 AM    BUN/Creatinine ratio 20 08/28/2019 10:28 AM    GFR est AA >60 08/28/2019 10:28 AM    GFR est non-AA 50 (L) 08/28/2019 10:28 AM    Calcium 9.6 08/28/2019 10:28 AM    Bilirubin, total 0.4 08/28/2019 10:28 AM    AST (SGOT) 15 08/28/2019 10:28 AM    Alk. phosphatase 89 08/28/2019 10:28 AM    Protein, total 7.7 08/28/2019 10:28 AM    Albumin 4.2 08/28/2019 10:28 AM    Globulin 3.5 08/28/2019 10:28 AM    A-G Ratio 1.2 08/28/2019 10:28 AM    ALT (SGPT) 19 08/28/2019 10:28 AM        Orders Placed This Encounter    AMB POC EKG ROUTINE W/ 12 LEADS, INTER & REP     Order Specific Question:   Reason for Exam:     Answer:   ROUTINE    metoprolol succinate (TOPROL-XL) 25 mg XL tablet     Sig: Take 0.5 Tabs by mouth daily. Dispense:  45 Tab     Refill:  3        Assessment:     Assessment:       ICD-10-CM ICD-9-CM    1. Coronary artery disease involving native coronary artery of native heart without angina pectoris I25.10 414.01 AMB POC EKG ROUTINE W/ 12 LEADS, INTER & REP      metoprolol succinate (TOPROL-XL) 25 mg XL tablet   2. AICD (automatic cardioverter/defibrillator) present Z95.810 V45.02    3. Chronic systolic congestive heart failure (HCC) I50.22 428.22      428.0    4. S/P CABG (coronary artery bypass graft) Z95.1 V45.81    5.  Mixed hyperlipidemia E78.2 272.2    6. Paroxysmal atrial fibrillation (HCC) I48.0 427.31    7. Acute deep vein thrombosis (DVT) of other specified vein of left lower extremity ([facility]) I82.492 453.40    8. Hyperlipidemia, unspecified hyperlipidemia type E78.5 272.4    9. SSS (sick sinus syndrome) ([facility]) I49.5 427.81    10. Cardiac pacemaker in situ Z95.0 V45.01    11. AVNRT (AV bridgette re-entry tachycardia) ([facility] I47.1 427.89         Plan:     1. Chronic systolic congestive heart failure ([facility]  Echo in 7/2019 with LVEF 30-35%  Was on Entresto, Coreg and Lasix, but Entresto & Lasix were held due to hypotension  No signs of volume overload  Will stop Coreg given borderline orthostatic hypotension with dizziness. Switch to Toprol 12.5 mg at bedtime. Discussed slow, positional changes, and use compression stockings  Plan to follow-up in 1 month to reassess BP. Consider repeat echocardiogram about 6 months to consider BiV ICD implant --  msec    2. Coronary artery disease involving native coronary artery of native heart without angina pectoris  S/p CABGx1 with LIMA to LAD in 3/2011  Jogreta Paiz in 3/2019 with normal perfusion  Denies anginal or anginal equivalent symptoms  Continue with present medical management    3. Paroxysmal atrial fibrillation (HCC)  Stable; asymptomatic  Switch to Toprol today  Continue Eliquis therapy for stroke prevention; on hold now for pre-op  [id number] (chf/age/vasc/htn)    4. Mixed hyperlipidemia  LDL 67 in 11/2018  Continue statin therapy  Encouraged low fat, low cholesterol diet  Lipids managed by PCP    5. S/P CABG (coronary artery bypass graft)  Stable    6. AICD (automatic cardioverter/defibrillator) present  Continue with routine device interrogation with Dr. Virginie Romero    He may proceed with planned left shoulder surgery, scheduled 9/6/19, considered low operative risk from cardiac standpoint. Consider neosynephrine for BP support in the alice- and post-operative period if needed.   No further cardiac testing is necessary at this time. Low threshold for cardiology consultation perioperatively if any concerns. F/u with NP in 1 month to reassess BP, MD follow-up to be determined at that time.     Madyson Ramos MD

## 2019-09-04 NOTE — PERIOP NOTES
Reached out to [facility] at Dr. Spencer Pino' office and notified pt needs moved to main OR due to bp issues and cardiology note. Dr. Spencer Pino also needs to treat pt's Factor V disease. She will pass on information.

## 2019-09-04 NOTE — PROGRESS NOTES
Chief Complaint   Patient presents with    Blood Pressure Check     Blood pressure has been running very low 84/50 and less     Dizziness     octavio getting up quickly     1. Have you been to the ER, urgent care clinic since your last visit? Hospitalized since your last visit? Yes [facility] for stomach     2. Have you seen or consulted any other health care providers outside of the [address] [place] [address] since your last visit? Include any pap smears or colon screening. Yes Dr Irasema Pinto - having left shoulder replacement on 9/6/19  Verified patient with 2 identifiers   Reviewed record in preparation for visit and obtained necessary documentation.

## 2019-09-04 NOTE — LETTER
9/4/19 Patient: Nilesh Reynolds YOB: 1945 Date of Visit: 9/4/2019 IFEOMA [facility] [address] [id number] VIA In Basket Dear Jackelin Vance PA-C, Thank you for referring Mr. Trent Queen to [address] for evaluation. My notes for this consultation are attached. If you have questions, please do not hesitate to call me. I look forward to following your patient along with you.  
 
 
Sincerely, 
 
Destini Crow MD

## 2019-09-05 ENCOUNTER — ANESTHESIA EVENT (OUTPATIENT)
Dept: SURGERY | Age: 74
DRG: 483 | End: 2019-09-05
Payer: MEDICARE

## 2019-09-06 ENCOUNTER — ANESTHESIA (OUTPATIENT)
Dept: SURGERY | Age: 74
DRG: 483 | End: 2019-09-06
Payer: MEDICARE

## 2019-09-06 ENCOUNTER — HOSPITAL ENCOUNTER (INPATIENT)
Age: 74
LOS: 2 days | Discharge: HOME | DRG: 483 | End: 2019-09-08
Attending: ORTHOPAEDIC SURGERY | Admitting: ORTHOPAEDIC SURGERY
Payer: MEDICARE

## 2019-09-06 DIAGNOSIS — M75.101 RIGHT ROTATOR CUFF TEAR ARTHROPATHY: Primary | ICD-10-CM

## 2019-09-06 DIAGNOSIS — M12.811 RIGHT ROTATOR CUFF TEAR ARTHROPATHY: Primary | ICD-10-CM

## 2019-09-06 PROBLEM — Z96.619 STATUS POST REVERSE ARTHROPLASTY OF SHOULDER: Status: ACTIVE | Noted: 2019-09-06

## 2019-09-06 PROBLEM — M12.812 ROTATOR CUFF ARTHROPATHY OF LEFT SHOULDER: Status: ACTIVE | Noted: 2019-09-06

## 2019-09-06 PROCEDURE — 74011250636 HC RX REV CODE- 250/636: Performed by: ANESTHESIOLOGY

## 2019-09-06 PROCEDURE — 74011000250 HC RX REV CODE- 250: Performed by: ORTHOPAEDIC SURGERY

## 2019-09-06 PROCEDURE — 74011250636 HC RX REV CODE- 250/636: Performed by: ORTHOPAEDIC SURGERY

## 2019-09-06 PROCEDURE — 77030002922 HC SUT FBRWRE ARTH -B: Performed by: ORTHOPAEDIC SURGERY

## 2019-09-06 PROCEDURE — 77010033678 HC OXYGEN DAILY

## 2019-09-06 PROCEDURE — 77030018673: Performed by: ORTHOPAEDIC SURGERY

## 2019-09-06 PROCEDURE — 77030010782 HC BOWL MX BN ENCR -B: Performed by: ORTHOPAEDIC SURGERY

## 2019-09-06 PROCEDURE — 77030013797 HC KT TRNSDUC PRSSR EDWD -A: Performed by: NURSE ANESTHETIST, CERTIFIED REGISTERED

## 2019-09-06 PROCEDURE — 77030002912 HC SUT ETHBND J&J -A: Performed by: ORTHOPAEDIC SURGERY

## 2019-09-06 PROCEDURE — 77030010516 HC APPL HEMA CLP TELE -B: Performed by: ORTHOPAEDIC SURGERY

## 2019-09-06 PROCEDURE — 76060000034 HC ANESTHESIA 1.5 TO 2 HR: Performed by: ORTHOPAEDIC SURGERY

## 2019-09-06 PROCEDURE — C1713 ANCHOR/SCREW BN/BN,TIS/BN: HCPCS | Performed by: ORTHOPAEDIC SURGERY

## 2019-09-06 PROCEDURE — 74011250636 HC RX REV CODE- 250/636: Performed by: NURSE ANESTHETIST, CERTIFIED REGISTERED

## 2019-09-06 PROCEDURE — 74011250637 HC RX REV CODE- 250/637: Performed by: PHYSICIAN ASSISTANT

## 2019-09-06 PROCEDURE — 77030011640 HC PAD GRND REM COVD -A: Performed by: ORTHOPAEDIC SURGERY

## 2019-09-06 PROCEDURE — 77030006835 HC BLD SAW SAG STRY -B: Performed by: ORTHOPAEDIC SURGERY

## 2019-09-06 PROCEDURE — 74011000250 HC RX REV CODE- 250: Performed by: NURSE ANESTHETIST, CERTIFIED REGISTERED

## 2019-09-06 PROCEDURE — 94760 N-INVAS EAR/PLS OXIMETRY 1: CPT

## 2019-09-06 PROCEDURE — 77030031139 HC SUT VCRL2 J&J -A: Performed by: ORTHOPAEDIC SURGERY

## 2019-09-06 PROCEDURE — 77030013708 HC HNDPC SUC IRR PULS STRY –B: Performed by: ORTHOPAEDIC SURGERY

## 2019-09-06 PROCEDURE — 0RRK00Z REPLACEMENT OF LEFT SHOULDER JOINT WITH REVERSE BALL AND SOCKET SYNTHETIC SUBSTITUTE, OPEN APPROACH: ICD-10-PCS | Performed by: ORTHOPAEDIC SURGERY

## 2019-09-06 PROCEDURE — 77030040361 HC SLV COMPR DVT MDII -B: Performed by: ORTHOPAEDIC SURGERY

## 2019-09-06 PROCEDURE — 77030018846 HC SOL IRR STRL H20 ICUM -A: Performed by: ORTHOPAEDIC SURGERY

## 2019-09-06 PROCEDURE — 77030033138 HC SUT PGA STRATFX J&J -B: Performed by: ORTHOPAEDIC SURGERY

## 2019-09-06 PROCEDURE — 65270000029 HC RM PRIVATE

## 2019-09-06 PROCEDURE — 76010000162 HC OR TIME 1.5 TO 2 HR INTENSV-TIER 1: Performed by: ORTHOPAEDIC SURGERY

## 2019-09-06 PROCEDURE — 76210000006 HC OR PH I REC 0.5 TO 1 HR: Performed by: ORTHOPAEDIC SURGERY

## 2019-09-06 PROCEDURE — 77030018836 HC SOL IRR NACL ICUM -A: Performed by: ORTHOPAEDIC SURGERY

## 2019-09-06 PROCEDURE — 77030008684 HC TU ET CUF COVD -B: Performed by: NURSE ANESTHETIST, CERTIFIED REGISTERED

## 2019-09-06 PROCEDURE — 74011250636 HC RX REV CODE- 250/636

## 2019-09-06 PROCEDURE — 77030026438 HC STYL ET INTUB CARD -A: Performed by: NURSE ANESTHETIST, CERTIFIED REGISTERED

## 2019-09-06 PROCEDURE — 77030039266 HC ADH SKN EXOFIN S2SG -A: Performed by: ORTHOPAEDIC SURGERY

## 2019-09-06 PROCEDURE — 77030019908 HC STETH ESOPH SIMS -A: Performed by: NURSE ANESTHETIST, CERTIFIED REGISTERED

## 2019-09-06 PROCEDURE — 74011250636 HC RX REV CODE- 250/636: Performed by: PHYSICIAN ASSISTANT

## 2019-09-06 PROCEDURE — C1776 JOINT DEVICE (IMPLANTABLE): HCPCS | Performed by: ORTHOPAEDIC SURGERY

## 2019-09-06 PROCEDURE — 74011250637 HC RX REV CODE- 250/637: Performed by: ORTHOPAEDIC SURGERY

## 2019-09-06 DEVICE — GLENOID, HEAD W/RETAINING SCREW, RSP, 36MM, NEUTRAL
Type: IMPLANTABLE DEVICE | Site: SHOULDER | Status: FUNCTIONAL
Brand: DJO SURGICAL

## 2019-09-06 DEVICE — PLUG, CEMENT SZ. 10.0
Type: IMPLANTABLE DEVICE | Site: SHOULDER | Status: FUNCTIONAL
Brand: DJO SURGICAL

## 2019-09-06 DEVICE — COBALT G-HV BONE CEMENT 40GM
Type: IMPLANTABLE DEVICE | Site: SHOULDER | Status: FUNCTIONAL
Brand: DJO SURGICAL

## 2019-09-06 DEVICE — SCREW, LOCKING BONE, RSP, 5X14
Type: IMPLANTABLE DEVICE | Site: SHOULDER | Status: FUNCTIONAL
Brand: DJO SURGICAL

## 2019-09-06 DEVICE — SCREW, LOCKING BONE, RSP, 5X18
Type: IMPLANTABLE DEVICE | Site: SHOULDER | Status: FUNCTIONAL
Brand: DJO SURGICAL

## 2019-09-06 DEVICE — BASEPLATE, GLENOID HA-COAT, RSP, 6.5MM X 30MM
Type: IMPLANTABLE DEVICE | Site: SHOULDER | Status: FUNCTIONAL
Brand: DJO SURGICAL

## 2019-09-06 DEVICE — COMPONENT SHLDR REVERSED RSP: Type: IMPLANTABLE DEVICE | Status: FUNCTIONAL

## 2019-09-06 RX ORDER — SUCCINYLCHOLINE CHLORIDE 20 MG/ML
INJECTION INTRAMUSCULAR; INTRAVENOUS AS NEEDED
Status: DISCONTINUED | OUTPATIENT
Start: 2019-09-06 | End: 2019-09-06 | Stop reason: HOSPADM

## 2019-09-06 RX ORDER — HYDROMORPHONE HYDROCHLORIDE 1 MG/ML
0.2 INJECTION, SOLUTION INTRAMUSCULAR; INTRAVENOUS; SUBCUTANEOUS
Status: DISCONTINUED | OUTPATIENT
Start: 2019-09-06 | End: 2019-09-06 | Stop reason: HOSPADM

## 2019-09-06 RX ORDER — CEFAZOLIN SODIUM/WATER 2 G/20 ML
2 SYRINGE (ML) INTRAVENOUS EVERY 8 HOURS
Status: COMPLETED | OUTPATIENT
Start: 2019-09-06 | End: 2019-09-06

## 2019-09-06 RX ORDER — SODIUM CHLORIDE, SODIUM LACTATE, POTASSIUM CHLORIDE, CALCIUM CHLORIDE 600; 310; 30; 20 MG/100ML; MG/100ML; MG/100ML; MG/100ML
25 INJECTION, SOLUTION INTRAVENOUS CONTINUOUS
Status: DISCONTINUED | OUTPATIENT
Start: 2019-09-06 | End: 2019-09-06

## 2019-09-06 RX ORDER — ACETAMINOPHEN 500 MG
1000 TABLET ORAL EVERY 6 HOURS
Status: DISCONTINUED | OUTPATIENT
Start: 2019-09-06 | End: 2019-09-08 | Stop reason: HOSPADM

## 2019-09-06 RX ORDER — NALOXONE HYDROCHLORIDE 0.4 MG/ML
0.4 INJECTION, SOLUTION INTRAMUSCULAR; INTRAVENOUS; SUBCUTANEOUS AS NEEDED
Status: DISCONTINUED | OUTPATIENT
Start: 2019-09-06 | End: 2019-09-08 | Stop reason: HOSPADM

## 2019-09-06 RX ORDER — DIPHENHYDRAMINE HYDROCHLORIDE 50 MG/ML
12.5 INJECTION, SOLUTION INTRAMUSCULAR; INTRAVENOUS AS NEEDED
Status: DISCONTINUED | OUTPATIENT
Start: 2019-09-06 | End: 2019-09-06 | Stop reason: HOSPADM

## 2019-09-06 RX ORDER — ROCURONIUM BROMIDE 10 MG/ML
INJECTION, SOLUTION INTRAVENOUS AS NEEDED
Status: DISCONTINUED | OUTPATIENT
Start: 2019-09-06 | End: 2019-09-06 | Stop reason: HOSPADM

## 2019-09-06 RX ORDER — SERTRALINE HYDROCHLORIDE 50 MG/1
200 TABLET, FILM COATED ORAL
Status: DISCONTINUED | OUTPATIENT
Start: 2019-09-06 | End: 2019-09-08 | Stop reason: HOSPADM

## 2019-09-06 RX ORDER — SERTRALINE HYDROCHLORIDE 50 MG/1
200 TABLET, FILM COATED ORAL DAILY
Status: DISCONTINUED | OUTPATIENT
Start: 2019-09-06 | End: 2019-09-06 | Stop reason: DRUGHIGH

## 2019-09-06 RX ORDER — FAMOTIDINE 20 MG/1
20 TABLET, FILM COATED ORAL EVERY EVENING
Status: DISCONTINUED | OUTPATIENT
Start: 2019-09-06 | End: 2019-09-08 | Stop reason: HOSPADM

## 2019-09-06 RX ORDER — OXYCODONE HYDROCHLORIDE 5 MG/1
10 TABLET ORAL
Status: DISCONTINUED | OUTPATIENT
Start: 2019-09-06 | End: 2019-09-08 | Stop reason: HOSPADM

## 2019-09-06 RX ORDER — SODIUM CHLORIDE, SODIUM LACTATE, POTASSIUM CHLORIDE, CALCIUM CHLORIDE 600; 310; 30; 20 MG/100ML; MG/100ML; MG/100ML; MG/100ML
25 INJECTION, SOLUTION INTRAVENOUS CONTINUOUS
Status: DISCONTINUED | OUTPATIENT
Start: 2019-09-06 | End: 2019-09-06 | Stop reason: HOSPADM

## 2019-09-06 RX ORDER — SODIUM CHLORIDE 9 MG/ML
100 INJECTION, SOLUTION INTRAVENOUS CONTINUOUS
Status: DISPENSED | OUTPATIENT
Start: 2019-09-06 | End: 2019-09-07

## 2019-09-06 RX ORDER — SODIUM CHLORIDE 0.9 % (FLUSH) 0.9 %
5-40 SYRINGE (ML) INJECTION EVERY 8 HOURS
Status: DISCONTINUED | OUTPATIENT
Start: 2019-09-06 | End: 2019-09-06 | Stop reason: HOSPADM

## 2019-09-06 RX ORDER — POLYETHYLENE GLYCOL 3350 17 G/17G
17 POWDER, FOR SOLUTION ORAL DAILY
Status: DISCONTINUED | OUTPATIENT
Start: 2019-09-06 | End: 2019-09-08 | Stop reason: HOSPADM

## 2019-09-06 RX ORDER — SODIUM CHLORIDE 0.9 % (FLUSH) 0.9 %
5-40 SYRINGE (ML) INJECTION EVERY 8 HOURS
Status: DISCONTINUED | OUTPATIENT
Start: 2019-09-06 | End: 2019-09-08 | Stop reason: HOSPADM

## 2019-09-06 RX ORDER — GUAIFENESIN 100 MG/5ML
81 LIQUID (ML) ORAL DAILY
Status: DISCONTINUED | OUTPATIENT
Start: 2019-09-07 | End: 2019-09-08 | Stop reason: HOSPADM

## 2019-09-06 RX ORDER — ONDANSETRON 2 MG/ML
INJECTION INTRAMUSCULAR; INTRAVENOUS AS NEEDED
Status: DISCONTINUED | OUTPATIENT
Start: 2019-09-06 | End: 2019-09-06 | Stop reason: HOSPADM

## 2019-09-06 RX ORDER — PROPOFOL 10 MG/ML
INJECTION, EMULSION INTRAVENOUS AS NEEDED
Status: DISCONTINUED | OUTPATIENT
Start: 2019-09-06 | End: 2019-09-06 | Stop reason: HOSPADM

## 2019-09-06 RX ORDER — LIDOCAINE HYDROCHLORIDE 20 MG/ML
INJECTION, SOLUTION EPIDURAL; INFILTRATION; INTRACAUDAL; PERINEURAL AS NEEDED
Status: DISCONTINUED | OUTPATIENT
Start: 2019-09-06 | End: 2019-09-06 | Stop reason: HOSPADM

## 2019-09-06 RX ORDER — METOPROLOL SUCCINATE 25 MG/1
12.5 TABLET, EXTENDED RELEASE ORAL DAILY
Status: DISCONTINUED | OUTPATIENT
Start: 2019-09-06 | End: 2019-09-08 | Stop reason: HOSPADM

## 2019-09-06 RX ORDER — FENTANYL CITRATE 50 UG/ML
INJECTION, SOLUTION INTRAMUSCULAR; INTRAVENOUS AS NEEDED
Status: DISCONTINUED | OUTPATIENT
Start: 2019-09-06 | End: 2019-09-06 | Stop reason: HOSPADM

## 2019-09-06 RX ORDER — GLYCOPYRROLATE 0.2 MG/ML
INJECTION INTRAMUSCULAR; INTRAVENOUS AS NEEDED
Status: DISCONTINUED | OUTPATIENT
Start: 2019-09-06 | End: 2019-09-06 | Stop reason: HOSPADM

## 2019-09-06 RX ORDER — NEOSTIGMINE METHYLSULFATE 1 MG/ML
INJECTION INTRAVENOUS AS NEEDED
Status: DISCONTINUED | OUTPATIENT
Start: 2019-09-06 | End: 2019-09-06 | Stop reason: HOSPADM

## 2019-09-06 RX ORDER — DIPHENHYDRAMINE HYDROCHLORIDE 50 MG/ML
12.5 INJECTION, SOLUTION INTRAMUSCULAR; INTRAVENOUS
Status: DISPENSED | OUTPATIENT
Start: 2019-09-06 | End: 2019-09-07

## 2019-09-06 RX ORDER — CEFAZOLIN SODIUM/WATER 2 G/20 ML
2 SYRINGE (ML) INTRAVENOUS ONCE
Status: COMPLETED | OUTPATIENT
Start: 2019-09-06 | End: 2019-09-06

## 2019-09-06 RX ORDER — FENTANYL CITRATE 50 UG/ML
25 INJECTION, SOLUTION INTRAMUSCULAR; INTRAVENOUS
Status: DISCONTINUED | OUTPATIENT
Start: 2019-09-06 | End: 2019-09-06 | Stop reason: HOSPADM

## 2019-09-06 RX ORDER — AMOXICILLIN 250 MG
1 CAPSULE ORAL 2 TIMES DAILY
Status: DISCONTINUED | OUTPATIENT
Start: 2019-09-06 | End: 2019-09-08 | Stop reason: HOSPADM

## 2019-09-06 RX ORDER — GABAPENTIN 300 MG/1
600 CAPSULE ORAL 2 TIMES DAILY
Status: DISCONTINUED | OUTPATIENT
Start: 2019-09-06 | End: 2019-09-08 | Stop reason: HOSPADM

## 2019-09-06 RX ORDER — SODIUM CHLORIDE 0.9 % (FLUSH) 0.9 %
5-40 SYRINGE (ML) INJECTION AS NEEDED
Status: DISCONTINUED | OUTPATIENT
Start: 2019-09-06 | End: 2019-09-06 | Stop reason: HOSPADM

## 2019-09-06 RX ORDER — ONDANSETRON 2 MG/ML
4 INJECTION INTRAMUSCULAR; INTRAVENOUS
Status: ACTIVE | OUTPATIENT
Start: 2019-09-06 | End: 2019-09-07

## 2019-09-06 RX ORDER — LIDOCAINE HYDROCHLORIDE 10 MG/ML
0.1 INJECTION, SOLUTION EPIDURAL; INFILTRATION; INTRACAUDAL; PERINEURAL AS NEEDED
Status: DISCONTINUED | OUTPATIENT
Start: 2019-09-06 | End: 2019-09-06 | Stop reason: HOSPADM

## 2019-09-06 RX ORDER — SUCRALFATE 1 G/1
1 TABLET ORAL 4 TIMES DAILY
Status: DISCONTINUED | OUTPATIENT
Start: 2019-09-06 | End: 2019-09-08 | Stop reason: HOSPADM

## 2019-09-06 RX ORDER — LIDOCAINE HYDROCHLORIDE AND EPINEPHRINE 10; 10 MG/ML; UG/ML
INJECTION, SOLUTION INFILTRATION; PERINEURAL AS NEEDED
Status: DISCONTINUED | OUTPATIENT
Start: 2019-09-06 | End: 2019-09-06 | Stop reason: HOSPADM

## 2019-09-06 RX ORDER — NITROGLYCERIN 0.4 MG/1
0.4 TABLET SUBLINGUAL
Status: DISCONTINUED | OUTPATIENT
Start: 2019-09-06 | End: 2019-09-08 | Stop reason: HOSPADM

## 2019-09-06 RX ORDER — ATORVASTATIN CALCIUM 40 MG/1
40 TABLET, FILM COATED ORAL
Status: DISCONTINUED | OUTPATIENT
Start: 2019-09-06 | End: 2019-09-08 | Stop reason: HOSPADM

## 2019-09-06 RX ORDER — HYDROMORPHONE HYDROCHLORIDE 1 MG/ML
0.5 INJECTION, SOLUTION INTRAMUSCULAR; INTRAVENOUS; SUBCUTANEOUS
Status: ACTIVE | OUTPATIENT
Start: 2019-09-06 | End: 2019-09-07

## 2019-09-06 RX ORDER — MIDAZOLAM HYDROCHLORIDE 1 MG/ML
INJECTION, SOLUTION INTRAMUSCULAR; INTRAVENOUS AS NEEDED
Status: DISCONTINUED | OUTPATIENT
Start: 2019-09-06 | End: 2019-09-06 | Stop reason: HOSPADM

## 2019-09-06 RX ORDER — ROPIVACAINE HYDROCHLORIDE 5 MG/ML
INJECTION, SOLUTION EPIDURAL; INFILTRATION; PERINEURAL AS NEEDED
Status: DISCONTINUED | OUTPATIENT
Start: 2019-09-06 | End: 2019-09-06 | Stop reason: HOSPADM

## 2019-09-06 RX ORDER — SODIUM CHLORIDE 0.9 % (FLUSH) 0.9 %
5-40 SYRINGE (ML) INJECTION AS NEEDED
Status: DISCONTINUED | OUTPATIENT
Start: 2019-09-06 | End: 2019-09-08 | Stop reason: HOSPADM

## 2019-09-06 RX ORDER — PANTOPRAZOLE SODIUM 40 MG/1
80 TABLET, DELAYED RELEASE ORAL
Status: DISCONTINUED | OUTPATIENT
Start: 2019-09-07 | End: 2019-09-08 | Stop reason: HOSPADM

## 2019-09-06 RX ORDER — OXYCODONE HYDROCHLORIDE 5 MG/1
5 TABLET ORAL
Status: DISCONTINUED | OUTPATIENT
Start: 2019-09-06 | End: 2019-09-08 | Stop reason: HOSPADM

## 2019-09-06 RX ADMIN — ROCURONIUM BROMIDE 10 MG: 10 INJECTION INTRAVENOUS at 07:38

## 2019-09-06 RX ADMIN — GLYCOPYRROLATE 0.4 MG: 0.2 INJECTION, SOLUTION INTRAMUSCULAR; INTRAVENOUS at 08:50

## 2019-09-06 RX ADMIN — Medication 2 G: at 15:51

## 2019-09-06 RX ADMIN — SENNOSIDES, DOCUSATE SODIUM 1 TABLET: 50; 8.6 TABLET, FILM COATED ORAL at 18:36

## 2019-09-06 RX ADMIN — PHENYLEPHRINE HYDROCHLORIDE 60 MCG/MIN: 10 INJECTION INTRAVENOUS at 07:44

## 2019-09-06 RX ADMIN — SENNOSIDES, DOCUSATE SODIUM 1 TABLET: 50; 8.6 TABLET, FILM COATED ORAL at 11:00

## 2019-09-06 RX ADMIN — ROPIVACAINE HYDROCHLORIDE 30 ML: 5 INJECTION, SOLUTION EPIDURAL; INFILTRATION; PERINEURAL at 07:08

## 2019-09-06 RX ADMIN — SUCRALFATE 1 G: 1 TABLET ORAL at 13:45

## 2019-09-06 RX ADMIN — SODIUM CHLORIDE 100 ML/HR: 900 INJECTION, SOLUTION INTRAVENOUS at 09:34

## 2019-09-06 RX ADMIN — Medication 10 ML: at 13:46

## 2019-09-06 RX ADMIN — ROCURONIUM BROMIDE 20 MG: 10 INJECTION INTRAVENOUS at 07:51

## 2019-09-06 RX ADMIN — Medication 2 G: at 07:45

## 2019-09-06 RX ADMIN — FENTANYL CITRATE 100 MCG: 50 INJECTION, SOLUTION INTRAMUSCULAR; INTRAVENOUS at 07:38

## 2019-09-06 RX ADMIN — ONDANSETRON HYDROCHLORIDE 4 MG: 2 INJECTION, SOLUTION INTRAMUSCULAR; INTRAVENOUS at 08:46

## 2019-09-06 RX ADMIN — SODIUM CHLORIDE, SODIUM LACTATE, POTASSIUM CHLORIDE, AND CALCIUM CHLORIDE 25 ML/HR: 600; 310; 30; 20 INJECTION, SOLUTION INTRAVENOUS at 06:46

## 2019-09-06 RX ADMIN — NEOSTIGMINE METHYLSULFATE 3 MG: 1 INJECTION INTRAVENOUS at 08:50

## 2019-09-06 RX ADMIN — PROPOFOL 100 MG: 10 INJECTION, EMULSION INTRAVENOUS at 07:38

## 2019-09-06 RX ADMIN — ATORVASTATIN CALCIUM 40 MG: 40 TABLET, FILM COATED ORAL at 21:21

## 2019-09-06 RX ADMIN — OXYCODONE HYDROCHLORIDE 5 MG: 5 TABLET ORAL at 23:51

## 2019-09-06 RX ADMIN — LIDOCAINE HYDROCHLORIDE 100 MG: 20 INJECTION, SOLUTION INTRAVENOUS at 07:38

## 2019-09-06 RX ADMIN — FAMOTIDINE 20 MG: 20 TABLET ORAL at 18:36

## 2019-09-06 RX ADMIN — Medication 2 G: at 23:41

## 2019-09-06 RX ADMIN — SUCRALFATE 1 G: 1 TABLET ORAL at 21:21

## 2019-09-06 RX ADMIN — SUCCINYLCHOLINE CHLORIDE 140 MG: 20 INJECTION, SOLUTION INTRAMUSCULAR; INTRAVENOUS at 07:38

## 2019-09-06 RX ADMIN — ACETAMINOPHEN 1000 MG: 500 TABLET ORAL at 11:00

## 2019-09-06 RX ADMIN — SUCRALFATE 1 G: 1 TABLET ORAL at 18:36

## 2019-09-06 RX ADMIN — ACETAMINOPHEN 1000 MG: 500 TABLET ORAL at 18:36

## 2019-09-06 RX ADMIN — POLYETHYLENE GLYCOL 3350 17 G: 17 POWDER, FOR SOLUTION ORAL at 11:00

## 2019-09-06 RX ADMIN — GABAPENTIN 600 MG: 300 CAPSULE ORAL at 18:36

## 2019-09-06 RX ADMIN — Medication 10 ML: at 21:26

## 2019-09-06 RX ADMIN — MIDAZOLAM HYDROCHLORIDE 2 MG: 1 INJECTION INTRAMUSCULAR; INTRAVENOUS at 07:01

## 2019-09-06 RX ADMIN — SODIUM CHLORIDE 100 ML/HR: 900 INJECTION, SOLUTION INTRAVENOUS at 19:01

## 2019-09-06 RX ADMIN — METOPROLOL SUCCINATE 12.5 MG: 25 TABLET, EXTENDED RELEASE ORAL at 15:51

## 2019-09-06 RX ADMIN — SERTRALINE HYDROCHLORIDE 200 MG: 50 TABLET ORAL at 21:21

## 2019-09-06 NOTE — PERIOP NOTES
[address] and updated wife in waiting room. Allowed time for questions and answers. Richard Muniz to clarify A-line status. Order received to discontinue A-line. 0968 Right A-line removed with ease and pressure applied. New bandage placed on site. Encouraged to rest arm.

## 2019-09-06 NOTE — ANESTHESIA POSTPROCEDURE EVALUATION
Procedure(s):  LEFT REVERSE SHOULDER ARTHROPLASTY/AXILLARY NERVE NEUROPLASTY/ROTATOR CUFF REPAIR/BICEPS TENODESIS. general    Anesthesia Post Evaluation        Patient location during evaluation: PACU  Note status: Adequate. Level of consciousness: responsive to verbal stimuli and sleepy but conscious  Pain management: satisfactory to patient  Airway patency: patent  Anesthetic complications: no  Cardiovascular status: acceptable  Respiratory status: acceptable  Hydration status: acceptable  Comments: +Post-Anesthesia Evaluation and Assessment    Patient: Gurinder Ordonez MRN: [id number]  SSN: [id number]   YOB: 1945  Age: 75 y.o. Sex: male      Cardiovascular Function/Vital Signs    /42 (BP 1 Location: Right arm, BP Patient Position: At rest)   Pulse 66   Temp 36.8 °C (98.2 °F)   Resp 14   Wt 74.8 kg (164 lb 14.5 oz)   SpO2 99%   BMI 25.83 kg/m²     Patient is status post Procedure(s):  LEFT REVERSE SHOULDER ARTHROPLASTY/AXILLARY NERVE NEUROPLASTY/ROTATOR CUFF REPAIR/BICEPS TENODESIS. Nausea/Vomiting: Controlled. Postoperative hydration reviewed and adequate. Pain:  Pain Scale 1: Numeric (0 - 10) (09/06/19 0925)  Pain Intensity 1: 0 (09/06/19 0925)   Managed. Neurological Status:   Neuro (WDL): Exceptions to WDL (09/06/19 0909)   At baseline. Mental Status and Level of Consciousness: Arousable. Pulmonary Status:   O2 Device: Nasal cannula (09/06/19 0925)   Adequate oxygenation and airway patent. Complications related to anesthesia: None    Post-anesthesia assessment completed. No concerns. Signed By: Kelly Flood MD    9/6/2019  Post anesthesia nausea and vomiting:  controlled      Vitals Value Taken Time   /42 9/6/2019  9:30 AM   Temp 36.8 °C (98.2 °F) 9/6/2019  9:14 AM   Pulse 64 9/6/2019  9:38 AM   Resp 16 9/6/2019  9:38 AM   SpO2 94 % 9/6/2019  9:38 AM   Vitals shown include unvalidated device data.

## 2019-09-06 NOTE — ANESTHESIA PROCEDURE NOTES
Peripheral Block    Performed by: Lillie Bamberger, MD  Authorized by: Lillie Bamberger, MD       Block Type:     Assessment:    Injection Assessment:           Interscalene Nerve Block    left Interscalene nerve block:     Risks, benefits, alternatives explained at length and patient agrees to proceed. Time out performed and site for block/surgery identified. Standard monitors applied, 3 L NC O2, and sedation given as recorded by RN so as to achieve patient comfort and anxiolysis, but maintain meaningful verbal contact. Sterile prep followed by 1-2 mL local at insertion site. A 40 mm, 22G insulated stimiplex needle was inserted with ultrasound guidance. 30 ml 0.5% ropivacaine was injected without resistance and with gentle aspiration every 3-5 ml. No pain, paresthesias, or blood were noted. No complications. VSS throughout. Per the request of surgeon for post-op pain.

## 2019-09-06 NOTE — PROGRESS NOTES
Orthopedic End of Shift Note    Bedside shift change report given to MARY CARMEN Bonner (oncoming nurse) by Rose Montero RN (offgoing nurse). Report included the following information SBAR, Kardex, OR Summary, Procedure Summary, Intake/Output, MAR, Accordion, Recent Results and Med Rec Status.      POD#     Significant issues during shift: none    Issues for Physician to address: none    Activity This Shift  (check all that apply) [] chair  [x] dangle   [] bathroom  [] bedside commode [] hallway  [] bedrest   Nausea/Vomiting [] yes [x] no     Voiding Status [x] void [] Cote [] I&O Cath   Bowel Movements [] yes [x] no     Foot Pumps or SCD [x] yes [] no    Ice Pack [x] yes    [] no    Incentive Spirometer [] yes [] no Volume:      Telemetry Monitoring   [] yes [x] no Rhythm:   Supplemental O2 [] yes [] no Sat off O2:   92%

## 2019-09-06 NOTE — PROGRESS NOTES
Pharmacy Automatic Renal Dosing Protocol     Current Regimen:  Gabapentin 600mg po q8h  Famotidine 20 mg po BID    Labs:  No results for input(s): CREA, BUN, WBC in the last 72 hours. Temp (24hrs), Av.6 °F (36.4 °C), Min:96.5 °F (35.8 °C), Max:98.2 °F (36.8 °C)    Creatinine Clearance (mL/min) or Dialysis: 43    Impression/Plan:   Due to CrCl of 43 mL/min will reduce gabapentin dose to 600mg po BID and famotidine dose to 20 mg po daily     Pharmacy will follow daily and adjust medications as appropriate for renal function and/or serum levels. Thank you,  Herbert Harrington    Renal Dosing  [email or web address] pdf

## 2019-09-06 NOTE — BRIEF OP NOTE
BRIEF OPERATIVE NOTE    Date of Procedure: 9/6/2019   Preoperative Diagnosis: LEFT ROTATOR CUFF TEAR ARTHROPATHY  Postoperative Diagnosis: LEFT ROTATOR CUFF TEAR ARTHROPATHY    Procedure(s):  LEFT REVERSE SHOULDER ARTHROPLASTY/AXILLARY NERVE NEUROPLASTY/ROTATOR CUFF REPAIR/BICEPS TENODESIS  Surgeon(s) and Role:     Nestor Michaels MD - Primary         Surgical Assistant: Corien Cornelius PA-C  - Assist     Surgical Staff:  Circ-1: Deanna Eason RN  Physician Assistant: RENEE Del Valle  Scrub Tech-1: Lucrecia Calderon Staff: Cali JETER  Event Time In Time Out   Incision Start 2578    Incision Close       Anesthesia: General   Estimated Blood Loss: 80cc  Specimens: * No specimens in log *   Findings: see above   Complications: none  Implants:   Implant Name Type Inv.  Item Serial No.  Lot No. LRB No. Used Action   CEMENT BNE [place] G-HV 40/20GM -- [id number] - SNA  CEMENT BNE [place] G-HV 40/20GM -- [id number] NA [employer] [id number] Left 2 Implanted   BASEPLT GLENOID REVER 6.5X30MM --  - SNA  BASEPLT GLENOID REVER 6.5X30MM --  NA [employer] [id number] Left 1 Implanted   HEAD GLENOID REVERSE SHOULDER --  - SNA  HEAD GLENOID REVERSE SHOULDER --  NA [employer] [id number] Left 1 Implanted   SCR LCK GLENOID REVERSE BA --  - SNA  SCR LCK GLENOID REVERSE BA --  NA [employer] [id number] Left 1 Implanted   SCR LCK GLENOID REVERSE BA --  - SNA  SCR LCK GLENOID REVERSE BA --  NA [employer] [id number] Left 1 Implanted   SCR LCK GLENOID REVERSE BA --  - SNA  SCR LCK GLENOID REVERSE BA --  NA [employer] [id number] Left 1 Implanted   SCR LCK GLENOID REVERSE BA --  - SNA  SCR LCK GLENOID REVERSE BA --  NA [employer] [id number] Left 1 Implanted   RSTRCTR LETI BNE BIOABSRB 10MM --  - SNA  RSTRCTR LETI BNE BIOABSRB 10MM --  NA [employer] [id number] Left 1 Implanted   36mm humeral socket   NA  [id number] Left 1 Implanted   8 monoblock  stem   NA  [id number] Left 1 Implanted

## 2019-09-06 NOTE — PROGRESS NOTES
Attempted to see pt for OT eval.  Pt is lethargic and unable to stay awake. Wife was present at bedside and was briefly educated on precautions. Pillow placed behind operative shoulder to prevent extension. Will have OT follow up tomorrow.

## 2019-09-06 NOTE — PERIOP NOTES
Left SC block completed by Dr Anna Olsen w/out complications noted. Pt drowsy but able to respond appropriately to verbal and nonverbal stimuli. VSS pt on 2lpm NC O2. . Denies of any SOB or CP at this time. Will continue to closely monitor pt.

## 2019-09-06 NOTE — ANESTHESIA PREPROCEDURE EVALUATION
Anesthetic History   No history of anesthetic complications            Review of Systems / Medical History  Patient summary reviewed, nursing notes reviewed and pertinent labs reviewed    Pulmonary  Within defined limits                 Neuro/Psych       CVA  TIA, psychiatric history and dementia     Cardiovascular    Hypertension        Dysrhythmias ( AVNRT (AV bridgette re-entry tachycardia) ) : atrial fibrillation  Pacemaker (Aicd) and CAD    Exercise tolerance: >4 METS  Comments: EF 31-35%    Cleared by cardiology for this    Stenosis of both carotid arteries    GI/Hepatic/Renal     GERD           Endo/Other      Hypothyroidism  Arthritis     Other Findings   Comments: S/P cervical spinal fusion         Physical Exam    Airway  Mallampati: II  TM Distance: 4 - 6 cm  Neck ROM: normal range of motion   Mouth opening: Normal     Cardiovascular  Regular rate and rhythm,  S1 and S2 normal,  no murmur, click, rub, or gallop             Dental    Dentition: Edentulous     Pulmonary  Breath sounds clear to auscultation               Abdominal  GI exam deferred       Other Findings            Anesthetic Plan    ASA: 3  Anesthesia type: general    Monitoring Plan: BIS  Post-op pain plan if not by surgeon: peripheral nerve block single    Induction: Intravenous  Anesthetic plan and risks discussed with: Patient

## 2019-09-06 NOTE — PERIOP NOTES
Handoff Report from Operating Room to PACU    Report received from Jacqui Valdivia RN and Jorge Dunlap CRNA regarding Yas Alvarado. Surgeon(s):  Delores Monte MD  And Procedure(s) (LRB):  LEFT REVERSE SHOULDER ARTHROPLASTY/AXILLARY NERVE NEUROPLASTY/ROTATOR CUFF REPAIR/BICEPS TENODESIS (Left)  confirmed   with allergies and dressings discussed. Anesthesia type, drugs, patient history, complications, estimated blood loss, vital signs, intake and output, and last pain medication, lines, reversal medications and temperature were reviewed.

## 2019-09-07 PROCEDURE — 65270000029 HC RM PRIVATE

## 2019-09-07 PROCEDURE — 74011250636 HC RX REV CODE- 250/636: Performed by: PHYSICIAN ASSISTANT

## 2019-09-07 PROCEDURE — 74011250637 HC RX REV CODE- 250/637: Performed by: PHYSICIAN ASSISTANT

## 2019-09-07 PROCEDURE — 97165 OT EVAL LOW COMPLEX 30 MIN: CPT | Performed by: OCCUPATIONAL THERAPIST

## 2019-09-07 PROCEDURE — 97110 THERAPEUTIC EXERCISES: CPT | Performed by: OCCUPATIONAL THERAPIST

## 2019-09-07 PROCEDURE — 97535 SELF CARE MNGMENT TRAINING: CPT | Performed by: OCCUPATIONAL THERAPIST

## 2019-09-07 PROCEDURE — 77010033678 HC OXYGEN DAILY

## 2019-09-07 PROCEDURE — 97530 THERAPEUTIC ACTIVITIES: CPT | Performed by: OCCUPATIONAL THERAPIST

## 2019-09-07 PROCEDURE — 94760 N-INVAS EAR/PLS OXIMETRY 1: CPT

## 2019-09-07 PROCEDURE — 74011250637 HC RX REV CODE- 250/637: Performed by: ORTHOPAEDIC SURGERY

## 2019-09-07 RX ADMIN — SUCRALFATE 1 G: 1 TABLET ORAL at 08:18

## 2019-09-07 RX ADMIN — Medication 10 ML: at 21:24

## 2019-09-07 RX ADMIN — SUCRALFATE 1 G: 1 TABLET ORAL at 12:17

## 2019-09-07 RX ADMIN — ACETAMINOPHEN 1000 MG: 500 TABLET ORAL at 17:20

## 2019-09-07 RX ADMIN — OXYCODONE HYDROCHLORIDE 5 MG: 5 TABLET ORAL at 12:16

## 2019-09-07 RX ADMIN — SERTRALINE HYDROCHLORIDE 200 MG: 50 TABLET ORAL at 21:24

## 2019-09-07 RX ADMIN — SUCRALFATE 1 G: 1 TABLET ORAL at 17:20

## 2019-09-07 RX ADMIN — APIXABAN 5 MG: 5 TABLET, FILM COATED ORAL at 17:21

## 2019-09-07 RX ADMIN — Medication 10 ML: at 06:28

## 2019-09-07 RX ADMIN — SUCRALFATE 1 G: 1 TABLET ORAL at 21:24

## 2019-09-07 RX ADMIN — Medication 10 ML: at 17:21

## 2019-09-07 RX ADMIN — GABAPENTIN 600 MG: 300 CAPSULE ORAL at 08:18

## 2019-09-07 RX ADMIN — FAMOTIDINE 20 MG: 20 TABLET ORAL at 17:21

## 2019-09-07 RX ADMIN — ACETAMINOPHEN 1000 MG: 500 TABLET ORAL at 12:17

## 2019-09-07 RX ADMIN — ATORVASTATIN CALCIUM 40 MG: 40 TABLET, FILM COATED ORAL at 21:24

## 2019-09-07 RX ADMIN — SODIUM CHLORIDE 100 ML/HR: 900 INJECTION, SOLUTION INTRAVENOUS at 05:12

## 2019-09-07 RX ADMIN — ACETAMINOPHEN 1000 MG: 500 TABLET ORAL at 06:28

## 2019-09-07 RX ADMIN — ASPIRIN 81 MG 81 MG: 81 TABLET ORAL at 08:18

## 2019-09-07 RX ADMIN — GABAPENTIN 600 MG: 300 CAPSULE ORAL at 17:21

## 2019-09-07 RX ADMIN — PANTOPRAZOLE SODIUM 80 MG: 40 TABLET, DELAYED RELEASE ORAL at 08:18

## 2019-09-07 RX ADMIN — APIXABAN 5 MG: 5 TABLET, FILM COATED ORAL at 08:18

## 2019-09-07 RX ADMIN — POLYETHYLENE GLYCOL 3350 17 G: 17 POWDER, FOR SOLUTION ORAL at 08:19

## 2019-09-07 RX ADMIN — OXYCODONE HYDROCHLORIDE 5 MG: 5 TABLET ORAL at 23:35

## 2019-09-07 RX ADMIN — METOPROLOL SUCCINATE 12.5 MG: 25 TABLET, EXTENDED RELEASE ORAL at 08:19

## 2019-09-07 RX ADMIN — SENNOSIDES, DOCUSATE SODIUM 1 TABLET: 50; 8.6 TABLET, FILM COATED ORAL at 08:18

## 2019-09-07 RX ADMIN — SENNOSIDES, DOCUSATE SODIUM 1 TABLET: 50; 8.6 TABLET, FILM COATED ORAL at 17:21

## 2019-09-07 NOTE — PROGRESS NOTES
Orthopedic End of Shift Note    Bedside and Verbal shift change report given to MARY CARMEN Bonner (oncoming nurse) by Ricarda Vela RN (offgoing nurse). Report included the following information SBAR, Kardex, Intake/Output, MAR, Accordion and Recent Results.      POD# 1    Significant issues during shift: none    Issues for Physician to address: none    Activity This Shift  (check all that apply) [x] chair  [] dangle   [x] bathroom  [] bedside commode [x] hallway  [x] bedrest   Nausea/Vomiting [] yes [x] no     Voiding Status [x] void [] Cote [] I&O Cath   Bowel Movements [] yes [x] no     Foot Pumps or SCD [x] yes [] no    Ice Pack [x] yes    [] no    Incentive Spirometer [] yes [x] no    Telemetry Monitoring   [] yes [x] no Rhythm:   Supplemental O2 [] yes [x] no Sat off O2:

## 2019-09-07 NOTE — PROGRESS NOTES
Orthopedic End of Shift Note    Bedside and Verbal shift change report given to Neetu Santiago (oncoming nurse) by Jamey Zarco (offgoing nurse). Report included the following information SBAR, Kardex, Intake/Output, MAR and Med Rec Status. POD# 1    Significant issues during shift: n/a    Issues for Physician to address:       Activity This Shift  (check all that apply) [] chair  [x] dangle   [] bathroom  [] bedside commode [] hallway  [x] bedrest   Nausea/Vomiting [] yes [x] no     Voiding Status [x] void [] Cote [] I&O Cath   Bowel Movements [] yes [x] no     Foot Pumps or SCD [x] yes [] no    Ice Pack [x] yes    [] no    Incentive Spirometer [] yes [] no Volume:       Telemetry Monitoring   [] yes [x] no Rhythm:   Supplemental O2 [x] yes [] no Sat off O2:   86%

## 2019-09-07 NOTE — PROGRESS NOTES
OCCUPATIONAL THERAPY EVALUATION/DISCHARGE  Patient: Patricia Alston (89 y.o. male)  Date: 9/7/2019  Primary Diagnosis: Rotator cuff arthropathy of left shoulder [M12.812]  Status post reverse arthroplasty of shoulder [Z96.619]  Procedure(s) (LRB):  LEFT REVERSE SHOULDER ARTHROPLASTY/AXILLARY NERVE NEUROPLASTY/ROTATOR CUFF REPAIR/BICEPS TENODESIS (Left) 1 Day Post-Op   Precautions: NWB on LUE, no L shoulder external rotation, passive or active assisted L shoulder flexion permitted and performance of Codman's. ASSESSMENT  Based on the objective data described below, the patient presents with post op precautions s/p L reverse total shoulder which prevent him from using his LUE for ADL performance. His is otherwise demonstrating intact strength, activity tolerance and balance for the performance of ADLs and functional mobility. Patient and his wife were receptive to all training and education provided this session. Patient noted to demonstrate compliance with all LUE post op precautions and only needed cueing for proper technique during dressing ADL and LUE HEP training. Patient's wife observed all training provided, verbalized full understanding of training and is competent to provide the patient with any need cueing, supervision and assistance needed after discharge. The patient is functioning within the limits of his post op LUE precaution to the best of his ability for all ADL performance, as described below under current level of function. No further acute OT needs indicated, but he will need out patient therapy for L shoulder rehabilitation once cleared by his surgeon. Current Level of Function (ADLs/self-care): Patient is supervision/setup to mod A for ADLs and is independent with all functional mobility. Patient managing LUE sling with min A and is performing L shoulder/UE HEP with minimal cueing for proper performance s/p training.  Patient's wife present for and verbalizing full understanding of all HEP, sling management and all compensatory ADL training provided. Functional Outcome Measure: The patient scored 65/100 on the Barthel Index outcome measure which is indicative of a 35% decline in function. .      Other factors to consider for discharge: Patient has adequate support of his wife for after discharge. PLAN :    Recommendation for discharge: (in order for the patient to meet his/her long term goals)  No skilled occupational therapy/ follow up rehabilitation needs identified at this time. Patient will need out patient therapy to address his shoulder rehabilitation once cleared by his surgeon. Equipment recommendations for successful discharge: none         OBJECTIVE DATA SUMMARY:   HISTORY:   Past Medical History:   Diagnosis Date    AICD (automatic cardioverter/defibrillator) present 5/13/2016 5/13/16 [employer] upgrade to dual chamber AICD implant  Dr. Dieudonne Hernandez state, other     Arthritis     OSTEO ARTHRITIS    AVNRT (AV bridgette re-entry tachycardia) ([facility] [address] 5/12/2016 5/12/16 AVNRT ablation: PM/AICD left upper chest :Dr. Tony Dickerson Back ache     CAD (coronary artery disease)     Cancer [facility]) 2004    Prostate cancer    Chest tightness     last episode of chest pain 5/2016 before AICD placed; none since then    Coagulation defects 2005    FACTOR V LEIDEN    Dementia     Dizziness     FH: factor V Leiden deficiency     Generalized muscle ache     GERD (gastroesophageal reflux disease)     HEARTBURN    Heart failure ([address] 2014    as of 07/2019 EF 30-35;  Dr. Rose Xavier    Hyperlipidemia, mixed     Hypertension     Other ill-defined conditions(799.89) 2004    blood transfusion    Pacemaker     Paroxysmal atrial fibrillation ([address]     rate controlled with coreg     Postsurgical aortocoronary bypass status 05/29/2012    CABG    Presence of cardiac defibrillator 05/2016    left upper chest    Psychiatric disorder     depression    Stroke [facility]) 2011, 1990's    SEVERAL-mini    Thromboembolism ([facility] [address] 2014    left leg: changed to Eliquis from Warfarin    Thromboembolus ([facility] [address] 2005    left leg    Weakness generalized      Past Surgical History:   Procedure Laterality Date    CARDIAC CATHETERIZATION  3/4/2011         CARDIAC SURG PROCEDURE UNLIST      CABG X1 3/5/11    HX HERNIA REPAIR  2002    Ingiunal hernia repair left    HX ORTHOPAEDIC      LEFT KNEE CARTILAGE REPAIR;RIGHT ROTATOR CUFF REPAIR    HX ORTHOPAEDIC  2/14/12    C5-7 ANTERIIOR CERVICAL DISECTOMY AND FUSION    HX ORTHOPAEDIC  6/4/13    C5-C7 POSTERIOR DECOMPRESSION AND FUSION    HX ORTHOPAEDIC      right thumb partial amputation of tip    HX OTHER SURGICAL      steroid injections    HX PACEMAKER Left     HX PACEMAKER PLACEMENT      H[place] PR[place]CT[place]  2004     [place]    HX UROLOGICAL       urinary control system    HX UROLOGICAL  12/3/13    REPLACEMENT ARTIFICAL URINARY SPHINCTER       Prior Level of Function/Environment/Context: Independent with ADLs, assisting with IADLs and ambulating without an AD. Expanded or extensive additional review of patient history:   Home Situation  One/Two Story Residence: One story  Living Alone: No  Tub or Shower Type: Tub/Shower combination    Hand dominance: Right    EXAMINATION OF PERFORMANCE DEFICITS:  Cognitive/Behavioral Status:  Neurologic State: Alert  Orientation Level: Oriented X4  Cognition: Appropriate for age attention/concentration; Appropriate decision making; Appropriate safety awareness; Follows commands        Safety/Judgement: Insight into deficits; Awareness of environment;Good awareness of safety precautions    Hearing:   WFL wearing B hearing Aides    Vision/Perceptual:              Acuity: Within Defined Limits         Range of Motion:  AROM: Generally decreased, functional(with exception of post op L shoulder)                         Strength:  Strength: Generally decreased, functional(with exception of post op L shoulder) Coordination:  Coordination: Within functional limits  Fine Motor Skills-Upper: Left Intact; Right Intact    Gross Motor Skills-Upper: Left Intact; Right Intact    Tone & Sensation:  Tone: Normal  Sensation: Intact                      Balance:  Sitting: Intact  Standing: Intact    Functional Mobility and Transfers for ADLs:  Bed Mobility:  Rolling: Independent  Supine to Sit: Independent  Scooting: Independent    Transfers:  Sit to Stand: Independent  Stand to Sit: Independent  Bed to Chair: Independent  Bathroom Mobility: Independent  Toilet Transfer : Independent    ADL Assessment:  Feeding: Supervision;Setup    Oral Facial Hygiene/Grooming: Supervision;Setup    Bathing: Minimum assistance    Upper Body Dressing: Moderate assistance    Lower Body Dressing: Moderate assistance    Toileting: Minimum assistance                ADL Intervention and task modifications:  Upper Body Dressing Assistance  Front Opened Shirt: Moderate assistance; Compensatory technique training  Cues: Verbal cues provided; Tactile cues provided    Lower Body Dressing Assistance  Underpants: Supervision;Set-up  Pants With Button/Zipper: Supervision;Set-up  Socks: Maximum assistance  Shoes with Cloth Laces: Maximum assistance  Leg Crossed Method Used: Yes  Position Performed: Seated edge of bed;Bending forward method;Standing  Cues: Verbal cues provided;Visual cues provided; Anand Contreras patient on donning/doffing of LUE sling and patient able to perform with min A. Also issued hand out on sling management. Cognitive Retraining  Safety/Judgement: Insight into deficits; Awareness of environment;Good awareness of safety precautions    Therapeutic Exercise:  LUE  EXERCISE   Sets   Reps   Active Active Assist   Passive   Comments   Shoulder Flexion 1 5 []           [x]           []           Performed supine    Codman's/ pendulums 1 set each 5 each []           []           [x]              Elbow flexion/extension 1 5 [x]           [x] []              Wrist flexion/extension 1 10 [x]           []           []              Wrist supination pronation 1 10 [x]           []           []              Digit flexion/extension 1 10 [x]           []           []              Issued HEP hand outs. Functional Measure:  Barthel Index:    Bathin  Bladder: 10  Bowels: 10  Groomin  Dressin  Feedin  Mobility: 15  Stairs: 5  Toilet Use: 5  Transfer (Bed to Chair and Back): 15  Total: 65/100        The Barthel ADL Index: Guidelines  1. The index should be used as a record of what a patient does, not as a record of what a patient could do. 2. The main aim is to establish degree of independence from any help, physical or verbal, however minor and for whatever reason. 3. The need for supervision renders the patient not independent. 4. A patient's performance should be established using the best available evidence. Asking the patient, friends/relatives and nurses are the usual sources, but direct observation and common sense are also important. However direct testing is not needed. 5. Usually the patient's performance over the preceding 24-48 hours is important, but occasionally longer periods will be relevant. 6. Middle categories imply that the patient supplies over 50 per cent of the effort. 7. Use of aids to be independent is allowed. [place]., Barthel, D.W. ([id number]). Functional evaluation: the Barthel Index. [address] [facility] (14)2. JAYDEN Russell, Joshua Capone., [facility], [address] [place] [address] (). Measuring the change indisability after inpatient rehabilitation; comparison of the responsiveness of the Barthel Index and Functional [place] Measure. Journal of Neurology, Neurosurgery, and Psychiatry, 66(4), 544-775. Eduardo Mckeon, N.J.A, ELIER Ambrose, & Emiliana Quiroga M.A. (2004.) Assessment of post-stroke quality of life in cost-effectiveness studies:  The usefulness of the Barthel Index and the EuroQoL-5D. Quality of Life Research, 13, 474-19       Activity Tolerance:   Good  Please refer to the flowsheet for vital signs taken during this treatment. After treatment patient left in no apparent distress:    Sitting in chair, Call bell within reach and Caregiver / family present    COMMUNICATION/EDUCATION:   The patients plan of care was discussed with: Registered Nurse.     Thank you for this referral.  Shan Snow, OTR/L  Time Calculation: 75 mins

## 2019-09-07 NOTE — OP NOTES
[address]  OPERATIVE REPORT    Name:  Radha Pelaez  MR#:  [id number]  :  1945  ACCOUNT #:  [de-identified]  DATE OF SERVICE:  2019      PREOPERATIVE DIAGNOSIS:  Left shoulder rotator cuff arthropathy. POSTOPERATIVE DIAGNOSIS:  Left shoulder rotator cuff arthropathy. PROCEDURE PERFORMED:  Left reverse total shoulder arthroplasty, including glenosphere, axillary nerve neuroplasty. SURGEON:  Tanika Sofia MD.    ASSISTANT:  RENEE Greenwood.    ANESTHESIA:  General endotracheal.    COMPLICATIONS:  None. SPECIMENS REMOVED:  Humeral head. IMPLANTS:  DJO reverse arthroplasty. ESTIMATED BLOOD LOSS:  50 mL. INDICATION:  This is a 48-year-old with intractable pain due to cuff arthropathy, comes in for arthroplasty. This is a complex surgical procedure, requiring an assistant for patient positioning, for wound closure, but critically for retraction and assistance in placing implants, and one was used. PROCEDURE:  The patient was brought into the operating room theater, given airway, IV antibiotics, and preoperative interscalene block. Sat in the beach chair position, prepped and draped. After time-out, I made a deltopectoral incision taking the vein laterally and protecting it throughout the procedure. A subdeltoid dissection was performed with the retractor placed over the humeral head. The supraspinatus and infraspinatus both torn. Subscap three vessels of the bottom of the muscle were suture ligated and the subscap was released, including the inferior capsular release of the medial humeral neck, protecting the axillary nerve with a retractor. This was done by assistant extending and externally rotated the proximal humerus exposing the bald head. A 30-degree retrograde proximal humerus cut was made and the osteophytes removed. Then we put a Fukuda posteroinferiorly and exposed the glenoid.   I first dissected out the axillary nerve from the bottom of the subscapular posterior humeral shaft. Then using curved Mayos, cut the interval between the anterior capsule and the back of the subscapularis down the 3 o' clock. Then put Zee Bias on the inferior capsule and with the never in full view, we used curved Mayos to resect the inferior capsule, then the anterior capsule, then superior, then posterior labrum and stump of biceps. Posterior  retractor placed exposing the glenoid. Preoperative CAT scan measurements were taken into account. Guide pin using the VoiceBunnyO system placed in the center of the glenoid. Small medium larger reamer was used and baseplate placed with 4 locking screws, 36 neutral head placed with locking screw. Attention turned then back to the humeral side, rigidly reamed to an 8, broached to an 8, used the small medium large counterforce reamers, trailed the standard 2 mm poly, I found no impingement and good stability. So cemented in using a 10 mm cement restrictor, and 8 monoblock stem, 30 degrees retroversion, standard 2 mm poly insert. Again, final construct reduced, stability was excellent. No impingement. Copiously irrigated with bacitracin and saline multiple times throughout the procedure, closed in layers, took the patient to recovery room in stable condition.       Edwar Calzada MD [id number]  D:  09/06/2019 17:21  T:  09/06/2019 22:44  JOB #:  [id number]

## 2019-09-07 NOTE — PROGRESS NOTES
[facility] - Progress Note  POD#1 Left reverse shoulder arthroplasty    Pt resting in bed. No complaints. Pain well managed. Tolerating PO. OT working with the patient. Some hypotension. VSS Afebrile. Incision and dressing c.d.i  Calves soft and supple; No pain with passive stretch  Sensation and motor intact  SCD/Foot pumps for mechanical DVT proph while in bed     PLAN:  1) PT/OT BID  2) Pain control continue current  3) Plan d/c possibly later today, will monitor BP and progress with PT/OT, will have a very low threshold to keep patient tonight.     RENEE Kohli

## 2019-09-08 VITALS
SYSTOLIC BLOOD PRESSURE: 113 MMHG | OXYGEN SATURATION: 96 % | HEART RATE: 77 BPM | TEMPERATURE: 98 F | BODY MASS INDEX: 25.83 KG/M2 | WEIGHT: 164.9 LBS | DIASTOLIC BLOOD PRESSURE: 63 MMHG | RESPIRATION RATE: 16 BRPM

## 2019-09-08 LAB
ANION GAP SERPL CALC-SCNC: 5 MMOL/L (ref 5–15)
BUN SERPL-MCNC: 15 MG/DL (ref 6–20)
BUN/CREAT SERPL: 13 (ref 12–20)
CALCIUM SERPL-MCNC: 9.2 MG/DL (ref 8.5–10.1)
CHLORIDE SERPL-SCNC: 103 MMOL/L (ref 97–108)
CO2 SERPL-SCNC: 26 MMOL/L (ref 21–32)
CREAT SERPL-MCNC: 1.13 MG/DL (ref 0.7–1.3)
ERYTHROCYTE [DISTWIDTH] IN BLOOD BY AUTOMATED COUNT: 13.4 % (ref 11.5–14.5)
GLUCOSE SERPL-MCNC: 111 MG/DL (ref 65–100)
HCT VFR BLD AUTO: 33.2 % (ref 36.6–50.3)
HGB BLD-MCNC: 10.6 G/DL (ref 12.1–17)
MCH RBC QN AUTO: 29.8 PG (ref 26–34)
MCHC RBC AUTO-ENTMCNC: 31.9 G/DL (ref 30–36.5)
MCV RBC AUTO: 93.3 FL (ref 80–99)
NRBC # BLD: 0 K/UL (ref 0–0.01)
NRBC BLD-RTO: 0 PER 100 WBC
PLATELET # BLD AUTO: 127 K/UL (ref 150–400)
PMV BLD AUTO: 11.5 FL (ref 8.9–12.9)
POTASSIUM SERPL-SCNC: 3.7 MMOL/L (ref 3.5–5.1)
RBC # BLD AUTO: 3.56 M/UL (ref 4.1–5.7)
SODIUM SERPL-SCNC: 134 MMOL/L (ref 136–145)
WBC # BLD AUTO: 10.4 K/UL (ref 4.1–11.1)

## 2019-09-08 PROCEDURE — 80048 BASIC METABOLIC PNL TOTAL CA: CPT

## 2019-09-08 PROCEDURE — 74011250637 HC RX REV CODE- 250/637: Performed by: PHYSICIAN ASSISTANT

## 2019-09-08 PROCEDURE — 85027 COMPLETE CBC AUTOMATED: CPT

## 2019-09-08 PROCEDURE — 36415 COLL VENOUS BLD VENIPUNCTURE: CPT

## 2019-09-08 PROCEDURE — 77010033678 HC OXYGEN DAILY

## 2019-09-08 PROCEDURE — 94760 N-INVAS EAR/PLS OXIMETRY 1: CPT

## 2019-09-08 RX ORDER — OXYCODONE HYDROCHLORIDE 5 MG/1
5 TABLET ORAL
Qty: 40 TAB | Refills: 0 | Status: SHIPPED
Start: 2019-09-08 | End: 2019-09-11

## 2019-09-08 RX ADMIN — SUCRALFATE 1 G: 1 TABLET ORAL at 09:20

## 2019-09-08 RX ADMIN — ACETAMINOPHEN 1000 MG: 500 TABLET ORAL at 12:14

## 2019-09-08 RX ADMIN — SENNOSIDES, DOCUSATE SODIUM 1 TABLET: 50; 8.6 TABLET, FILM COATED ORAL at 09:21

## 2019-09-08 RX ADMIN — ASPIRIN 81 MG 81 MG: 81 TABLET ORAL at 09:21

## 2019-09-08 RX ADMIN — GABAPENTIN 600 MG: 300 CAPSULE ORAL at 09:21

## 2019-09-08 RX ADMIN — POLYETHYLENE GLYCOL 3350 17 G: 17 POWDER, FOR SOLUTION ORAL at 09:19

## 2019-09-08 RX ADMIN — SUCRALFATE 1 G: 1 TABLET ORAL at 12:14

## 2019-09-08 RX ADMIN — PANTOPRAZOLE SODIUM 80 MG: 40 TABLET, DELAYED RELEASE ORAL at 09:21

## 2019-09-08 RX ADMIN — ACETAMINOPHEN 1000 MG: 500 TABLET ORAL at 06:47

## 2019-09-08 RX ADMIN — METOPROLOL SUCCINATE 12.5 MG: 25 TABLET, EXTENDED RELEASE ORAL at 09:21

## 2019-09-08 RX ADMIN — APIXABAN 5 MG: 5 TABLET, FILM COATED ORAL at 09:21

## 2019-09-08 RX ADMIN — Medication 10 ML: at 06:49

## 2019-09-08 NOTE — CONSULTS
[address]  [phone]        Date of  Admission: 9/6/2019  5:30 AM     Admission type:Elective    Consult for: Rotator cuff arthropathy of left shoulder [M12.812]  Status post reverse arthroplasty of shoulder [Z96.619]  Consult by: Dr Chele Montez, NP     Subjective:     Carrington Holder is a 75 y.o. male admitted for Rotator cuff arthropathy of left shoulder [M12.812]  Status post reverse arthroplasty of shoulder [Z96.619]. Patient  Denies chest pain, pressure, tightness, dyspnea or edema. Previous treatment/evaluation includes echocardiogram and stress thallium . Cardiac risk factors: dyslipidemia, obesity, male gender, hypertension.     Patient Active Problem List    Diagnosis Date Noted    Rotator cuff arthropathy of left shoulder 09/06/2019    Status post reverse arthroplasty of shoulder 09/06/2019    Right rotator cuff tear arthropathy 09/04/2019    Moderate major depression ([address] 07/03/2018    Depression 07/03/2018    DDD (degenerative disc disease), lumbar 04/02/2018    Chronic anticoagulation 07/17/2017    S/P CABG (coronary artery bypass graft) 06/17/2016    AICD (automatic cardioverter/defibrillator) present 05/13/2016    AVNRT (AV bridgette re-entry tachycardia) ([address] 05/12/2016    SVT (supraventricular tachycardia) ([address] 04/27/2016    Cardiomyopathy ([address] 04/27/2016    Chronic systolic congestive heart failure ([address] 04/27/2016    Stenosis of both carotid arteries without cerebral infarction 04/12/2016    Cervicogenic headache 04/12/2016    Alzheimer's dementia, late onset, with behavioral disturbance 04/12/2016    Spinal stenosis, lumbar region, with neurogenic claudication 04/12/2016    Lumbar back pain with radiculopathy affecting right lower extremity 04/12/2016    Lumbar back pain with radiculopathy affecting left lower extremity 04/12/2016    History of stroke 04/12/2016    ACP (advance care planning) 12/30/2015    B12 deficiency 09/22/2015    Hypothyroidism due to acquired atrophy of thyroid 09/22/2015    Osteoporosis 09/22/2015    Cervical post-laminectomy syndrome 09/22/2015    Neck pain 09/22/2015    Lower GI bleeding 08/19/2015    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 07/23/2015    Cardiac pacemaker in situ 03/17/2015    Pacemaker 11/26/2014    Chest pain 11/24/2014    Bradycardia 11/23/2014    Left-sided chest wall pain 11/23/2014    SSS (sick sinus syndrome) ([address] 11/23/2014    S/P cervical spinal fusion 06/06/2013    Chronic venous embolism and thrombosis of unspecified deep vessels of lower extremity 05/29/2012    Osteoarthritis 05/29/2012    Prostate cancer ([address] 05/29/2012    Hyperthyroidism 05/29/2012    Mitral valve disorders(424.0) 05/29/2012    Postsurgical aortocoronary bypass status 05/29/2012    Coronary atherosclerosis of native coronary artery 05/29/2012    Paroxysmal supraventricular tachycardia ([address] 05/29/2012    Chronic systolic heart failure ([address] 05/29/2012    Atrial fibrillation ([address] 05/29/2012    Mixed hyperlipidemia 05/29/2012    Palpitations 05/29/2012    History of factor V Leiden mutation 03/07/2011    CABG (coronary artery bypass graft) 03/07/2011    CAD (coronary artery disease) 03/04/2011    Hypertension 03/04/2011    Hyperlipemia 03/04/2011    DVT (deep venous thrombosis) chronic coumadin anti-coagulation 03/04/2011      Ulises Gonzalez MD  Past Medical History:   Diagnosis Date    AICD (automatic cardioverter/defibrillator) present 5/13/2016 5/13/16 [employer] upgrade to dual chamber AICD implant  Dr. Salvador Crumble state, other     Arthritis     OSTEO ARTHRITIS    AVNRT (AV bridgette re-entry tachycardia) ([address] 5/12/2016 5/12/16 AVNRT ablation: PM/AICD left upper chest :Dr. Nelson Carry    Back ache     CAD (coronary artery disease)     Cancer [facility]) 2004    Prostate cancer    Chest tightness last episode of chest pain 2016 before AICD placed; none since then    Coagulation defects     FACTOR V LEIDEN    Dementia     Dizziness     FH: factor V Leiden deficiency     Generalized muscle ache     GERD (gastroesophageal reflux disease)     HEARTBURN    Heart failure ([facility]     as of 2019 EF 30-35;  Dr. Bibi Saleh    Hyperlipidemia, mixed     Hypertension     Other ill-defined conditions(799.89) 2004    blood transfusion    Pacemaker     Paroxysmal atrial fibrillation ([facility]     rate controlled with coreg     Postsurgical aortocoronary bypass status 2012    CABG    Presence of cardiac defibrillator 2016    left upper chest    Psychiatric disorder     depression    Stroke [facility]) ,     SEVERAL-mini    Thromboembolism ([facility]     left leg: changed to Eliquis from Warfarin    [facility]     left leg    Weakness generalized       Social History     Socioeconomic History    Marital status:      Spouse name: Not on file    Number of children: Not on file    Years of education: Not on file    Highest education level: Not on file   Tobacco Use    Smoking status: Former Smoker     Types: Pipe, Cigars     Last attempt to quit: 10/10/1971     Years since quittin.9    Smokeless tobacco: Never Used   Substance and Sexual Activity    Alcohol use: No     Alcohol/week: 1.0 standard drinks     Types: 1 Cans of beer per week    Drug use: No    Sexual activity: Never     Partners: Female   Other Topics Concern    Sleep Concern Yes    Stress Concern Yes     Comment: Family concerns   Social History Narrative    , 2 children     No Known Allergies   Family History   Problem Relation Age of Onset    Asthma Mother     Alcohol abuse Mother     Alcohol abuse Father     Asthma Father     Cancer Sister     Heart Disease Sister     Alcohol abuse Brother     Cancer Brother     Heart Disease Brother       Current Facility-Administered Medications   Medication Dose Route Frequency    aspirin chewable tablet 81 mg  81 mg Oral DAILY    atorvastatin (LIPITOR) tablet 40 mg  40 mg Oral QHS    apixaban (ELIQUIS) tablet 5 mg  5 mg Oral BID    gabapentin (NEURONTIN) capsule 600 mg  600 mg Oral BID    metoprolol succinate (TOPROL-XL) XL tablet 12.5 mg  12.5 mg Oral DAILY    nitroglycerin (NITROSTAT) tablet 0.4 mg  0.4 mg SubLINGual Q5MIN PRN    pantoprazole (PROTONIX) tablet 80 mg  80 mg Oral ACB    sucralfate (CARAFATE) tablet 1 g  1 g Oral QID    sodium chloride (NS) flush 5-40 mL  5-40 mL IntraVENous Q8H    sodium chloride (NS) flush 5-40 mL  5-40 mL IntraVENous PRN    naloxone (NARCAN) injection 0.4 mg  0.4 mg IntraVENous PRN    famotidine (PEPCID) tablet 20 mg  20 mg Oral QPM    senna-docusate (PERICOLACE) 8.6-50 mg per tablet 1 Tab  1 Tab Oral BID    polyethylene glycol (MIRALAX) packet 17 g  17 g Oral DAILY    acetaminophen (TYLENOL) tablet 1,000 mg  1,000 mg Oral Q6H    oxyCODONE IR (ROXICODONE) tablet 5 mg  5 mg Oral Q3H PRN    oxyCODONE IR (ROXICODONE) tablet 10 mg  10 mg Oral Q3H PRN    sertraline (ZOLOFT) tablet 200 mg  200 mg Oral QHS         Review of Symptoms:  Constitutional: negative  Eyes: negative  Ears, nose, mouth, throat, and face: negative  Respiratory: negative  Cardiovascular: negative  Gastrointestinal: negative  Genitourinary:negative  Musculoskeletal:negative  Neurological: negative  Endocrine: negative     Subjective:      Visit Vitals  /63 (BP 1 Location: Right arm, BP Patient Position: At rest)   Pulse 77   Temp 98 °F (36.7 °C)   Resp 16   Wt 164 lb 14.5 oz (74.8 kg)   SpO2 96%   BMI 25.83 kg/m²       Physical:    General: WD, WN. Alert, cooperative, no acute distress  Heart: Regular, S1 WNL and S2 WNL.  No S3 or S4, no m/S3/JVD, no carotid bruits   Lungs: clear   Abdomen: Soft, +BS, NTND   Extremities: LE angeline +DP/PT, no edema   Neurologic: Grossly normal    Data Review:   Recent Labs     09/08/19  0357 WBC 10.4   HGB 10.6*   HCT 33.2*   *     Recent Labs     09/08/19  0359   *   K 3.7      CO2 26   *   BUN 15   CREA 1.13   CA 9.2       No results for input(s): TROIQ, CPK, CKMB in the last 72 hours. Intake/Output Summary (Last 24 hours) at 9/8/2019 1110  Last data filed at 9/8/2019 0041  Gross per 24 hour   Intake    Output 700 ml   Net -700 ml        Cardiographics    Telemetry:   ECG:   Echocardiogram:      Assessment:     Assessment:       Active Problems:    Right rotator cuff tear arthropathy (9/4/2019)      Rotator cuff arthropathy of left shoulder (9/6/2019)      Status post reverse arthroplasty of shoulder (9/6/2019)         Plan:     Felix Fair is a pleasant 75year old male, s/p LEFT REVERSE SHOULDER ARTHROPLASTY/AXILLARY NERVE NEUROPLASTY/ROTATOR CUFF REPAIR/BICEPS TENODESIS. He has a PMHx of CAD s/p CABGx1, ischemic cardiomyopathy s/p ICD, PAF, chronic DVT with Factor V Leiden mutation, HTN and HLD. He is without cardiac complaints at this time. Has not been on tele this visit. Last remote check he was 45% AP and 1% . Due for annual follow up in our office in 2 mo. He is stable cardiac wise for discharge. Thank you for this interesting consultation. Lita Bryant ANP    Patient seen and examined by me with nurse practitioner. I personally performed all components of the history, physical, and medical decision making and agree with the assessment and plan with minor modifications as noted. Doing well. A paced 45% for sick sinus. Cont med rx for htn and hyperlipidemia. [address] [address]ari [address] for [employer]. F/u in our office in 1-2 months.     [place] Boast, MD, [place] [facility]

## 2019-09-08 NOTE — PROGRESS NOTES
Orthopedic End of Shift Note    Bedside and Verbal shift change report given to Dean (oncoming nurse) by Kieran Cade (offgoing nurse). Report included the following information SBAR, Kardex, Intake/Output, MAR, Recent Results and Med Rec Status.      POD# 2    Significant issues during shift: N/a    Issues for Physician to address: Discharge    Activity This Shift  (check all that apply) [] chair  [] dangle   [x] bathroom  [] bedside commode [] hallway  [] bedrest   Nausea/Vomiting [] yes [x] no     Voiding Status [x] void [] Cote [] I&O Cath   Bowel Movements [] yes [x] no     Foot Pumps or SCD [x] yes [] no    Ice Pack [x] yes    [] no    Incentive Spirometer [x] yes [] no Volume:   1000   Telemetry Monitoring   [] yes [x] no Rhythm:   Supplemental O2 [] yes [x] no Sat off O2:   * %

## 2019-09-08 NOTE — PROGRESS NOTES
Reviewed discharge instructions, prescriptions, and side effects with patient and his wife. Reviewed wound care, follow-up instructions, and medication instructions. Answered all questions and provided contact information for future questions. Took IV out per policy, Catheter tip intact. Provided Post-op Hygiene kit. Going home in a car with family.

## 2019-09-08 NOTE — PROGRESS NOTES
Called cardiology consult. Informed that Dr Veronique Brownlee was on call.  Waiting for a call back

## 2019-09-08 NOTE — PROGRESS NOTES
ORTHO - Progress Note  Post Op day: 2 Days Post-Op    Jose E Duvall     [id number]  male    75 y.o.    1945    Admit date: 2019  Date of Surgery: 2019   Procedures: Procedure(s):LEFT REVERSE SHOULDER ARTHROPLASTY/AXILLARY NERVE NEUROPLASTY/ROTATOR CUFF REPAIR/BICEPS TENODESIS  Admitting Physician: Souleymane Livingston MD   Surgeon:  Rosalinda Cole) and Role:   Jesus Orr MD - Primary    Consulting Physician(s): Treatment Team: Attending Provider: Leonidas Tuttle MD    SUBJECTIVE:     Jose E Duvall is a 75 y.o. male s/p a LEFT REVERSE SHOULDER ARTHROPLASTY/AXILLARY NERVE NEUROPLASTY/ROTATOR CUFF REPAIR/BICEPS TENODESIS resting in the bed/ambulating in room. Patient has complaints of appropriate(minimal) post-op pain, tolerating PO pain medications. \"I feel like i've put on weight. \"  Denies F/C, nausea, vomiting, dizziness, lightheadedness, chest pain, or shortness of breath. OBJECTIVE:       Physical Exam:  General: alert, cooperative, no distress. Gastrointestinal:  non-distended . Cardiovascular: equal pulses in the upper extremities,  Brisk cap refill in all distal extremities   Genitourinary: Voiding independently   Respiratory: No respiratory distress   Neurological:Neurovascular exam within normal limits. Senstion intact: LUE, axillary nerve distrubution    Motor: + hand/digits,  wrist and elbow  Musculoskeletal: Sana's sign negative in bilateral lower extremities. Calves soft, supple, non-tender upon palpation or with passive stretch. Dressing/Wound:  Clean, dry and intact. No significant erythema or swelling.     Vital Signs:         Patient Vitals for the past 8 hrs:   BP Temp Pulse Resp SpO2   19 0834  98 °F (36.7 °C)      19 0737 113/63 100.1 °F (37.8 °C) 77 16 96 %   19 0315 125/69 98.9 °F (37.2 °C) 72 16 97 %                                          Temp (24hrs), Av.9 °F (37.2 °C), Min:98 °F (36.7 °C), Max:100.1 °F (37.8 °C)      Labs:        Recent Labs     09/08/19  0359   HCT 33.2*   HGB 10.6*     PT/OT:              ASSESSMENT / PLAN:   Active Problems:    Right rotator cuff tear arthropathy (9/4/2019)      Rotator cuff arthropathy of left shoulder (9/6/2019)      Status post reverse arthroplasty of shoulder (9/6/2019)         -  Continue PT/OT - can use sling as needed.  NWB on the the LUE  -  DVT prophylaxis- eliquis  -  Cardio consult requested due to multiple heart issues, Wife very concerned about his well being  -  DC planning - Home today likely     Signed By: Luis Harris PA-C

## 2019-09-08 NOTE — DISCHARGE INSTRUCTIONS
Nita Giraldo M.D.           Shanti Comer  Surgery: Total Shoulder Replacement  Surgeon:   Alex De Leon MD  Surgery Date:  9/6/2019      Going Home from the [address] [place] [address] can let your arm hang loosely by your side, but avoid actively lifting the surgical arm. You may perform Codman dangles and shoulder blade pinching exercises as instructed. Do not put weight on the affected arm. Do not lean on it, and do not hold objects with that hand. In general for the first week keep arm in sling, rest, perform simple stretching exercises and ice your shoulder. Common Post-op Concerns      Hand swelling: Adjust sling, squeeze a stress ball, do biceps curls to help pump  the fluid out of your arm. Call the office if severe and we can see you to wrap  your hand/arm and send you for hand therapy. Bruising: Very common and will subside over 2-3 weeks. Nausea: If you have severe nausea call the office and a medicine can be called  in. Often times reducing pain medicine doses may provide relief. Fever: Somewhat common the first 3 days after surgery, take Tylenol    Sling  Your sling is optional.  You can wear the sling for comfort. You may need to adjust the front shoulder strap so that your hand is slightly above your elbow, this will prevent some of the hand swelling that is common after surgery. You may remove the sling when sitting down and allow your arm to rest in your lap. You may take the sling off to shower. To relieve pain:   Use ice/gel packs.  Put the ice pack directly over the wound, or anywhere you are hurting or swollen.  To control pain and swelling, keep ice on regularly, especially after physical activity.  The packs should stay cold for 3-4 hours. When it is not cold anymore, rotate with the packs in the freezer.     Rest for at least 20 minutes between activity or exercises.      You will be sent home with 2 different pain medicines. We recommend using hydromorphone first.  If this medicine makes you feel nauseous or does not adequately control her pain then stopped using this medicine, throw away, and use oxycodone.  To keep track of your pain medications, write down what you take and when you take it.  The last dose of pain medication you got in the hospital was:       Medication    Dose    Date & Time             Choose your medications based on the pain scale below:     To keep your pain under control, take Tylenol every 6 hours for 14 days - even if you feel like you dont need it.  For mild to moderate pain (1-6 on pain scale), take one pain pill every 3-4 hours as needed.  For severe pain (7-10 on pain scale), take two pain pills every 3-4 hours as needed.  To prevent nausea, take your pain medications with food. Pain Scale                   As your pain lessens:     Slowly start taking less pain medication. You may do this by waiting longer between doses or by taking smaller doses.  Stop using the pain medications as soon as you no longer need it, usually in 2-3 weeks.  Generally after shoulder surgery, ambulation or walking around and being active is the best prevention for blood clots.  If you are at risk for blood clots due to your inability to walk around or have had blood clots in the past you may take Aspirin 325 mg once a day to lower the chance of developing a blood clot.  To prevent stomach upset or bleeding:   Do not take non-steroidal anti-inflammatory medications (Ibuprofen, Advil, Motrin, Naproxen, etc.)    Take Pepcid 20 mg twice a day, or a similar home medication, while you are taking a blood thinner.             OPSITE (Honeycomb dressing)     Keep your clear, waterproof dressing in place for 5-7 days after your leave the hospital.     If you are still having drainage, you will need to change your dressing in 5-7 days. You will be given one extra dressing to use at home.  If there is no more drainage from the wound, you may leave it open to the air.    Your staples will be removed at your 1st postop appointment usually about 10 days after surgery. OPSITE DRESSING INSTRUCTIONS                       To increase and promote healing:   Stop Smoking (or at least cut back on       Smoking).  Eat a well-balanced diet (high in protein       and vitamin C).  If you have a poor appetite, drink Ensure, Glucerna, or         [place] Instant Breakfast for the next 30 days.  If you are diabetic, you should control your blood         sugars to prevent infection and help your wound         to heal.                         To prevent constipation, stay active and drink plenty of fluid.  While using pain medications, you should also take stool softeners and laxatives, such as Pericolace       and Miralax.  If you are having too many bowel       Movements, then you may need       to stop taking the laxatives.  You should have a bowel movement 3-4 days        after surgery and then at least every other day while       on pain medication.  Take short walks (in your home)         about every 1-2 hours during the day.  Try to increase how far you walk each day.  NO DRIVING until your surgeon tells you it is ok. Rehabilitation  Healing is the main focus during the early phase of your rehabilitation. This means protecting the shoulder and only performing elbow/hand/wrist exercises to prevent stiffness. DO NOT PUSH OFF WITH YOUR LEFT ARM(for example getting up from a seated position)    · Weeks 0-3:  You should begin dangle exercises the day after surgery. Work at this for 5 repetitions about 5 times a day.  You may also begin active-assisted forward flexion at this time by starting to lift your operative arm with your good arm there to help push it up. At this time avoid moving your arm out to the side or externally rotating. · Weeks 3-6: You may begin active forward flexion at this time and continue using your other arm to help raise the operative arm. You may be set up to see a physical therapist to work on your range of motion at this time.  Weeks 6-12: Focus is on actively lifting your arm and achieving full range of motion and beginning with some light weight lifting. You may now begin externally rotating your arm and gently strengthening. At 2 months weight bearing on the arm is now permitted for the use of walkers, pushing up out of a chair, etc.        Please call your physician immediately if you have:     Constant bleeding from your wound.  Increasing redness or swelling around your wound (Some warmth, bruising and swelling is normal).  Change in wound drainage (increase in amount, color, or bad smell).  Change in mental status (unusual behavior   Temperature over 101.5 degrees Fahrenheit      Pain or redness in the calf (back of your lower leg - see picture)     Increased swelling of the thigh, ankle, calf, or foot.  Emergency: CALL 911 if you have:     Shortness of breath     Chest pain when you cough or taking a deep breath          A follow up appointment card will be given to you or your family member after the surgery. If you are not aware of your follow up time or lost the card, please call the office at [phone].  If you have questions or concerns during normal business hours, you may reach Dr. Raymona Babinski team at [phone]. If you have immediate concerns or questions you may call the office and reach Dr Raymona Babinski or another [address] [place] [address] provider who is on call.  [phone]

## 2019-09-08 NOTE — PROGRESS NOTES
ANJU: Follow-up Care Appointments (PCP, Ortho)    Pt to discharge home today by private vehicle with family. OT consulted - no recommendations at this time, however will recommend Outpatient OT once cleared by surgeon. Ortho f/u appointment scheduled. Pt/family to call and schedule PCP f/u appointment, as offices are closed at this time. All information entered into pt AVS.     Pt has no additional CM needs at this time. Care Management Interventions  PCP Verified by CM: Yes  Palliative Care Criteria Met (RRAT>21 & CHF Dx)?: Yes  Palliative Consult Recommended?: No  Reason Palliative Care Not Recommended?: Provider preference to wait  Mode of Transport at Discharge:  Other (see comment)(by private vehicle with family)  Transition of Care Consult (CM Consult): Discharge Planning  Discharge Durable Medical Equipment: No  Health Maintenance Reviewed: Yes  Physical Therapy Consult: No  Occupational Therapy Consult: Yes  Speech Therapy Consult: No  Current Support Network: Lives with Spouse  Confirm Follow Up Transport: Family  Plan discussed with Pt/Family/Caregiver: Yes  Discharge Location  Discharge Placement: Home with family assistance    JOSE MARTIN Salas [employer] in Social Work, [address] [place] [address]  [phone]

## 2019-09-11 NOTE — DISCHARGE SUMMARY
Ortho Discharge Summary    Patient ID:  Sindy Storm  [id number]  male  75 y.o.  1945    Admit date: 9/6/2019    Discharge date: 9/11/2019    Date of Surgery:   9/6/2019     Preoperative Diagnosis:  LEFT ROTATOR CUFF TEAR ARTHROPATHY    Postoperative Diagnosis:   LEFT ROTATOR CUFF TEAR ARTHROPATHY    Procedure(s):  LEFT REVERSE SHOULDER ARTHROPLASTY/AXILLARY NERVE NEUROPLASTY/ROTATOR CUFF REPAIR/BICEPS TENODESIS     Anesthesia Type:   General     Surgeon: Lei Montano MD                            HPI:  Pt is a 75 y.o. male who has a history of LEFT ROTATOR CUFF TEAR ARTHROPATHY with pain and limitations of activities of daily living who presents at this time for a LEFT REVERSE SHOULDER ARTHROPLASTY/AXILLARY NERVE NEUROPLASTY/ROTATOR CUFF REPAIR/BICEPS TENODESIS following the failure of conservative management. PMH:   Past Medical History:   Diagnosis Date    AICD (automatic cardioverter/defibrillator) present 5/13/2016 5/13/16 [employer] upgrade to dual chamber AICD implant  Dr. Iglesias Sicks state, other     Arthritis     OSTEO ARTHRITIS    AVNRT (AV bridgette re-entry tachycardia) ([facility] 5/12/2016 5/12/16 AVNRT ablation: PM/AICD left upper chest :Dr. Lannie Hatchet Back ache     CAD (coronary artery disease)     Cancer [facility]) 2004    Prostate cancer    Chest tightness     last episode of chest pain 5/2016 before AICD placed; none since then    Coagulation defects 2005    FACTOR V LEIDEN    Dementia     Dizziness     FH: factor V Leiden deficiency     Generalized muscle ache     GERD (gastroesophageal reflux disease)     HEARTBURN    Heart failure ([facility] [address] 2014    as of 07/2019 EF 30-35;  Dr. Yakelin Varela    Hyperlipidemia, mixed     Hypertension     Other ill-defined conditions(799.89) 2004    blood transfusion    Pacemaker     Paroxysmal atrial fibrillation (HCC)     rate controlled with coreg     Postsurgical aortocoronary bypass status 05/29/2012    CABG    Presence of cardiac defibrillator 2016    left upper chest    Psychiatric disorder     depression    Stroke [facility]) ,     SEVERAL-mini    Thromboembolism ([facility] [address]     left leg: changed to Eliquis from Warfarin    Thromboembolus ([facility] [address]     left leg    Weakness generalized        Medications upon admission :   Prior to Admission Medications   Prescriptions Last Dose Informant Patient Reported? Taking? ELIQUIS 5 mg tablet 9/3/2019  No No   Sig: TAKE 1 TABLET BY MOUTH EVERY 12 HOURS   Incontinence Pad, Liner, Disp (BLADDER CONTROL PADS EX ABSORB) pads   No No   Si Packet by Does Not Apply route as needed (incontinence). aspirin 81 mg chewable tablet 2019 at Unknown time  No Yes   Sig: Take 1 Tab by mouth daily. atorvastatin (LIPITOR) 40 mg tablet 2019 at Unknown time  No Yes   Sig: TAKE 1 TABLET BY MOUTH EVERY DAY   gabapentin (NEURONTIN) 600 mg tablet Not Taking at Unknown time  No No   Sig: TAKE 1 TABLET BY MOUTH THREE TIMES A DAY   metoprolol succinate (TOPROL-XL) 25 mg XL tablet Not Taking at Unknown time  No No   Sig: Take 0.5 Tabs by mouth daily. multivit-min/FA/lycopen/lutein (SENTRY SENIOR PO) 2019  Yes No   Sig: Take  by mouth daily. nitroglycerin (NITROSTAT) 0.4 mg SL tablet 2019 at Unknown time  No Yes   Si Tab by SubLINGual route every five (5) minutes as needed for Chest Pain (call 911 if not relieved by 3). pantoprazole (PROTONIX) 40 mg tablet 2019 at Unknown time  No Yes   Sig: TAKE 1 TABLET BY MOUTH ONCE DAILY   Patient taking differently: 80 mg.   polyethylene glycol (MIRALAX) 17 gram/dose powder 2019 at Unknown time  Yes Yes   Sig: Take 17 g by mouth as needed. sertraline (ZOLOFT) 100 mg tablet 2019 at Unknown time  No Yes   Sig: TAKE 1&1/2 TABLETS (150MG) BY MOUTH ONCE DAILY   Patient taking differently: 200 mg.   sucralfate (CARAFATE) 1 gram tablet 2019 at Unknown time  No Yes   Sig: Take 1 Tab by mouth four (4) times daily. traMADol (ULTRAM) 50 mg tablet 2019 at Unknown time  Yes No   Sig: Take 50 mg by mouth every six (6) hours as needed for Pain. Facility-Administered Medications: None        Allergies:  No Known Allergies     Hospital Course: The patient underwent surgery. Complications:  None; patient tolerated the procedure well. Was taken to the PACU in stable condition and then transferred to the ortho floor. Perioperative Antibiotics:  Ancef     Postoperative Pain Management:  Oxycodone po     DVT Prophylaxis: SCD,     Postoperative transfusions:    Number of units banked PRBCs =   none     Post Op complications: none    Hemoglobin at discharge:    Lab Results   Component Value Date/Time    HGB 10.6 (L) 2019 03:59 AM    INR 1.1 2019 10:28 AM       Dressing was changed on POD # 1. Incision - clean, dry and intact. No significant erythema or swelling. Neurovascular exam found to be within normal limits. Wound appears to be healing without any evidence of infection. Physical Therapy started on the day following surgery and participated in bed mobility, transfers and ambulation. Home exercises explained and demonstrated. Will begin formal PT after post op office visit. Gait:                      Discharged to: Home. Condition on Discharge:   good    Discharge instructions:  - Take pain medications as prescribed  - Resume pre hospital diet      - Discharge activity: activity as tolerated, perform home exercises as instructed  - Wound Care Keep wound clean and dry.   See discharge instruction sheet.  - Staples or Sutures to be removed around 7-10 days after surgery at post op office visit            -Myriam Cowan 15[address] LIST     Discharge Medication List as of 2019 11:20 AM      CONTINUE these medications which have NOT CHANGED    Details   atorvastatin (LIPITOR) 40 mg tablet TAKE 1 TABLET BY MOUTH EVERY DAY, Normal, Disp-90 Tab, R-0      sucralfate (CARAFATE) 1 gram tablet Take 1 Tab by mouth four (4) times daily. , Print, Disp-30 Tab, R-0      pantoprazole (PROTONIX) 40 mg tablet TAKE 1 TABLET BY MOUTH ONCE DAILY, Normal, Disp-30 Tab, R-3      sertraline (ZOLOFT) 100 mg tablet TAKE 1&1/2 TABLETS (150MG) BY MOUTH ONCE DAILY, Normal, Disp-135 Tab, R-1      aspirin 81 mg chewable tablet Take 1 Tab by mouth daily. , Normal, Disp-30 Tab, R-11      polyethylene glycol (MIRALAX) 17 gram/dose powder Take 17 g by mouth as needed., Historical Med      nitroglycerin (NITROSTAT) 0.4 mg SL tablet 1 Tab by SubLINGual route every five (5) minutes as needed for Chest Pain (call 911 if not relieved by 3). , Normal, Disp-25 Tab, R-1      metoprolol succinate (TOPROL-XL) 25 mg XL tablet Take 0.5 Tabs by mouth daily. , Normal, Disp-45 Tab, R-3      gabapentin (NEURONTIN) 600 mg tablet TAKE 1 TABLET BY MOUTH THREE TIMES A DAY, Print, Disp-90 Tab, R-5      traMADol (ULTRAM) 50 mg tablet Take 50 mg by mouth every six (6) hours as needed for Pain., Historical Med      ELIQUIS 5 mg tablet TAKE 1 TABLET BY MOUTH EVERY 12 HOURS, Normal, Disp-180 Tab, R-0      multivit-min/FA/lycopen/lutein (SENTRY SENIOR PO) Take  by mouth daily. , Historical Med      Incontinence Pad, Liner, Disp (BLADDER CONTROL PADS EX ABSORB) pads 1 Packet by Does Not Apply route as needed (incontinence). , Print, Disp-30 Each, R-6          per medical continuation form      -Follow up in office as scheduled, appointment card given. Signed:   Ana Sin [place]    9/11/2019  8:10 AM

## 2019-10-23 ENCOUNTER — OFFICE VISIT (OUTPATIENT)
Dept: CARDIOLOGY CLINIC | Age: 74
End: 2019-10-23

## 2019-10-23 DIAGNOSIS — I50.22 CHRONIC SYSTOLIC CONGESTIVE HEART FAILURE (HCC): ICD-10-CM

## 2019-10-23 DIAGNOSIS — Z95.810 AICD (AUTOMATIC CARDIOVERTER/DEFIBRILLATOR) PRESENT: Primary | ICD-10-CM

## 2019-11-01 ENCOUNTER — OFFICE VISIT (OUTPATIENT)
Dept: CARDIOLOGY CLINIC | Age: 74
End: 2019-11-01

## 2019-11-01 VITALS
RESPIRATION RATE: 18 BRPM | WEIGHT: 161 LBS | OXYGEN SATURATION: 97 % | HEART RATE: 74 BPM | HEIGHT: 67 IN | DIASTOLIC BLOOD PRESSURE: 70 MMHG | BODY MASS INDEX: 25.27 KG/M2 | SYSTOLIC BLOOD PRESSURE: 128 MMHG

## 2019-11-01 DIAGNOSIS — I42.9 CARDIOMYOPATHY, UNSPECIFIED TYPE (HCC): Primary | Chronic | ICD-10-CM

## 2019-11-01 DIAGNOSIS — I48.0 PAROXYSMAL ATRIAL FIBRILLATION (HCC): ICD-10-CM

## 2019-11-01 DIAGNOSIS — Z95.810 AICD (AUTOMATIC CARDIOVERTER/DEFIBRILLATOR) PRESENT: ICD-10-CM

## 2019-11-01 DIAGNOSIS — E78.2 MIXED HYPERLIPIDEMIA: ICD-10-CM

## 2019-11-01 DIAGNOSIS — I50.22 CHRONIC SYSTOLIC CONGESTIVE HEART FAILURE (HCC): ICD-10-CM

## 2019-11-01 DIAGNOSIS — I25.10 CORONARY ARTERY DISEASE INVOLVING NATIVE CORONARY ARTERY OF NATIVE HEART WITHOUT ANGINA PECTORIS: ICD-10-CM

## 2019-11-01 DIAGNOSIS — Z95.1 S/P CABG (CORONARY ARTERY BYPASS GRAFT): ICD-10-CM

## 2019-11-01 NOTE — PROGRESS NOTES
Alen Roth, [facility]    Subjective/HPI:     Mr. Jovani Díaz is a 75 y.o. male is here with concerns for hypotension. He has a PMHx of CAD s/p CABGx1, ischemic cardiomyopathy s/p ICD, PAF, chronic DVT with Factor V Leiden mutation, HTN and HLD. At last visit, we stopped Entresto and Lasix for hypotension and switched Coreg to Toprol. He notes BP has improved since. He denies dizziness, light-headedness. He has successful shoulder surgery. He notes weakness and shortness of breath with usual activities. It took him 30 minutes trying to get dressed this morning. He reports weights have been stable since coming off Entresto & Lasix. He weighed 161 lbs this morning.            PCP Provider  Celi Hutchison MD    Patient Active Problem List   Diagnosis Code    CAD (coronary artery disease) I25.10    Hypertension I10    Hyperlipemia E78.5    DVT (deep venous thrombosis) chronic coumadin anti-coagulation I82.409    History of factor V Leiden mutation Z86.2    CABG (coronary artery bypass graft)     Chronic venous embolism and thrombosis of unspecified deep vessels of lower extremity I82.509    Osteoarthritis M19.90    Prostate cancer ([facility] [address] C61    Hyperthyroidism E05.90    Mitral valve disorders(424.0) I05.9    Postsurgical aortocoronary bypass status Z95.1    Coronary atherosclerosis of native coronary artery I25.10    Paroxysmal supraventricular tachycardia (HCC) I47.1    Chronic systolic heart failure (HCC) I50.22    Atrial fibrillation (HCC) I48.91    Mixed hyperlipidemia E78.2    Palpitations R00.2    S/P cervical spinal fusion Z98.1    Bradycardia R00.1    Left-sided chest wall pain R07.89    SSS (sick sinus syndrome) ([facility]) I49.5    Chest pain R07.9    Pacemaker Z95.0    Cardiac pacemaker in situ Z95.0    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 ([address] F02.81    Lower GI bleeding K92.2    B12 deficiency E53.8    Hypothyroidism due to acquired atrophy of thyroid E03.4    Osteoporosis M81.0    Cervical post-laminectomy syndrome M96.1    Neck pain M54.2    ACP (advance care planning) Z71.89    Stenosis of both carotid arteries without cerebral infarction I65.23    Cervicogenic headache R51    Alzheimer's dementia, late onset, with behavioral disturbance ([facility]) G30.1, F02.81    Spinal stenosis, lumbar region, with neurogenic claudication M48.062    Lumbar back pain with radiculopathy affecting right lower extremity M54.16    Lumbar back pain with radiculopathy affecting left lower extremity M54.16    History of stroke Z86.73    SVT (supraventricular tachycardia) ([facility]) I47.1    Cardiomyopathy ([facility]) I42.9    Chronic systolic congestive heart failure ([facility]) I50.22    AVNRT (AV bridgette re-entry tachycardia) ([facility]) I47.1    AICD (automatic cardioverter/defibrillator) present Z95.810    S/P CABG (coronary artery bypass graft) Z95.1    Chronic anticoagulation Z79.01    DDD (degenerative disc disease), lumbar M51.36    Moderate major depression ([facility]) F32.1    Depression F32.9    Right rotator cuff tear arthropathy M75.101, M12.811    Rotator cuff arthropathy of left shoulder M12.812    Status post reverse arthroplasty of shoulder Z96.619     Past Surgical History:   Procedure Laterality Date    CARDIAC CATHETERIZATION  3/4/2011         CARDIAC SURG PROCEDURE UNLIST      CABG X1 3/5/11    HX HERNIA REPAIR  2002    Ingiunal hernia repair left    HX ORTHOPAEDIC      LEFT KNEE CARTILAGE REPAIR;RIGHT ROTATOR CUFF REPAIR    HX ORTHOPAEDIC  2/14/12    C5-7 ANTERIIOR CERVICAL DISECTOMY AND FUSION    HX ORTHOPAEDIC  6/4/13    C5-C7 POSTERIOR DECOMPRESSION AND FUSION    HX ORTHOPAEDIC      right thumb partial amputation of tip    HX OTHER SURGICAL      steroid injections    HX PACEMAKER Left     HX PACEMAKER PLACEMENT      H[place] PROSTATECTOMY  2004     [place]    HX UROLOGICAL       urinary control system    HX UROLOGICAL  12/3/13 REPLACEMENT ARTIFICAL URINARY SPHINCTER     Family History   Problem Relation Age of Onset    Asthma Mother     Alcohol abuse Mother     Alcohol abuse Father     Asthma Father     Cancer Sister     Heart Disease Sister     Alcohol abuse Brother     Cancer Brother     Heart Disease Brother      Social History     Socioeconomic History    Marital status:      Spouse name: Not on file    Number of children: Not on file    Years of education: Not on file    Highest education level: Not on file   Occupational History    Not on file   Social Needs    Financial resource strain: Not on file    Food insecurity:     Worry: Not on file     Inability: Not on file    Transportation needs:     Medical: Not on file     Non-medical: Not on file   Tobacco Use    Smoking status: Former Smoker     Types: Pipe, Cigars     Last attempt to quit: 10/10/1971     Years since quittin.1    Smokeless tobacco: Never Used   Substance and Sexual Activity    Alcohol use: No     Alcohol/week: 1.0 standard drinks     Types: 1 Cans of beer per week    Drug use: No    Sexual activity: Never     Partners: Female   Lifestyle    Physical activity:     Days per week: Not on file     Minutes per session: Not on file    Stress: Not on file   Relationships    Social connections:     Talks on phone: Not on file     Gets together: Not on file     Attends Buddhism service: Not on file     Active member of club or organization: Not on file     Attends meetings of clubs or organizations: Not on file     Relationship status: Not on file    Intimate partner violence:     Fear of current or ex partner: Not on file     Emotionally abused: Not on file     Physically abused: Not on file     Forced sexual activity: Not on file   Other Topics Concern     Service Not Asked    Blood Transfusions Not Asked    Caffeine Concern Not Asked    Occupational Exposure Not Asked    Hobby Hazards Not Asked    Sleep Concern Yes    Stress Concern Yes     Comment: Family concerns    Weight Concern Not Asked    Special Diet Not Asked    Back Care Not Asked    Exercise Not Asked    Bike Helmet Not Asked   [address] Not Asked    Self-Exams Not Asked   Social History Narrative    , 2 children       No Known Allergies     Current Outpatient Medications   Medication Sig    metoprolol succinate (TOPROL-XL) 25 mg XL tablet Take 0.5 Tabs by mouth daily.  gabapentin (NEURONTIN) 600 mg tablet TAKE 1 TABLET BY MOUTH THREE TIMES A DAY    atorvastatin (LIPITOR) 40 mg tablet TAKE 1 TABLET BY MOUTH EVERY DAY    sucralfate (CARAFATE) 1 gram tablet Take 1 Tab by mouth four (4) times daily.  pantoprazole (PROTONIX) 40 mg tablet TAKE 1 TABLET BY MOUTH ONCE DAILY (Patient taking differently: 80 mg.)    ELIQUIS 5 mg tablet TAKE 1 TABLET BY MOUTH EVERY 12 HOURS    sertraline (ZOLOFT) 100 mg tablet TAKE 1&1/2 TABLETS (150MG) BY MOUTH ONCE DAILY (Patient taking differently: 200 mg.)    multivit-min/FA/lycopen/lutein (SENTRY SENIOR PO) Take  by mouth daily.  aspirin 81 mg chewable tablet Take 1 Tab by mouth daily.  polyethylene glycol (MIRALAX) 17 gram/dose powder Take 17 g by mouth as needed.  Incontinence Pad, Liner, Disp (BLADDER CONTROL PADS EX ABSORB) pads 1 Packet by Does Not Apply route as needed (incontinence).  nitroglycerin (NITROSTAT) 0.4 mg SL tablet 1 Tab by SubLINGual route every five (5) minutes as needed for Chest Pain (call 911 if not relieved by 3). No current facility-administered medications for this visit. I have reviewed the problem list, allergy list, medical history, family, social history and medications. Review of Symptoms:    Review of Systems   Constitutional: Negative for chills, fever and weight loss. HENT: Negative for nosebleeds. Eyes: Negative for blurred vision and double vision. Respiratory: Negative for cough, shortness of breath and wheezing.     Cardiovascular: Negative for chest pain, palpitations, orthopnea, leg swelling and PND. Gastrointestinal: Negative for abdominal pain, blood in stool, diarrhea, nausea and vomiting. Musculoskeletal: Negative for joint pain. Skin: Negative for rash. Neurological: Negative for dizziness, tingling and loss of consciousness. Endo/Heme/Allergies: Does not bruise/bleed easily. Physical Exam:      General: Well developed, in no acute distress, cooperative and alert  HEENT: No carotid bruits, no JVD, trach is midline. Neck Supple, PEERL, EOM intact. Heart:  reg rate and rhythm; normal S1/S2; no murmurs, gallops or rubs. Respiratory: Clear bilaterally x 4, no wheezing or rales  Abdomen:   Soft, non-tender, no distention, no masses. + BS. Extremities:  Normal cap refill, no cyanosis, atraumatic. No edema. Neuro: A&Ox3, speech clear, gait stable. Skin: Skin color is normal. No rashes or lesions.  Non diaphoretic  Vascular: 2+ pulses symmetric in all extremities    Vitals:    11/01/19 1025 11/01/19 1032   BP: 118/64 128/70   Pulse: 74    Resp: 18    SpO2: 97%    Weight: 161 lb (73 kg)    Height: 5' 7\" (1.702 m)        Cardiographics    ECG: atrially paced rhythm; PVCs  Results for orders placed or performed during the hospital encounter of 08/24/19   EKG, 12 LEAD, INITIAL   Result Value Ref Range    Ventricular Rate 65 BPM    Atrial Rate 65 BPM    P-R Interval 236 ms    QRS Duration 118 ms    Q-T Interval 424 ms    QTC Calculation (Bezet) 440 ms    Calculated P Axis 8 degrees    Calculated R Axis 29 degrees    Calculated T Axis 151 degrees    Diagnosis       Atrial-paced rhythm with prolonged AV conduction with frequent premature   ventricular complexes  Incomplete left bundle branch block  ST & T wave abnormality, consider lateral ischemia  Abnormal ECG  When compared with ECG of 17-MAY-2019 11:40,  No significant change was found  Confirmed by Patti Pacheco MD, --- ([id number]) on 8/24/2019 7:48:27 PM         Cardiology Labs:  Lab Results   Component Value Date/Time    Cholesterol, total 160 11/14/2018 12:02 PM    HDL Cholesterol 72 11/14/2018 12:02 PM    LDL, calculated 67 11/14/2018 12:02 PM    Triglyceride 105 11/14/2018 12:02 PM    CHOL/HDL Ratio 1.7 11/24/2014 02:05 AM       Lab Results   Component Value Date/Time    Sodium 134 (L) 09/08/2019 03:59 AM    Potassium 3.7 09/08/2019 03:59 AM    Chloride 103 09/08/2019 03:59 AM    CO2 26 09/08/2019 03:59 AM    Anion gap 5 09/08/2019 03:59 AM    Glucose 111 (H) 09/08/2019 03:59 AM    BUN 15 09/08/2019 03:59 AM    Creatinine 1.13 09/08/2019 03:59 AM    BUN/Creatinine ratio 13 09/08/2019 03:59 AM    GFR est AA >60 09/08/2019 03:59 AM    GFR est non-AA >60 09/08/2019 03:59 AM    Calcium 9.2 09/08/2019 03:59 AM    Bilirubin, total 0.4 08/28/2019 10:28 AM    AST (SGOT) 15 08/28/2019 10:28 AM    Alk. phosphatase 89 08/28/2019 10:28 AM    Protein, total 7.7 08/28/2019 10:28 AM    Albumin 4.2 08/28/2019 10:28 AM    Globulin 3.5 08/28/2019 10:28 AM    A-G Ratio 1.2 08/28/2019 10:28 AM    ALT (SGPT) 19 08/28/2019 10:28 AM        Orders Placed This Encounter    AMB POC EKG ROUTINE W/ 12 LEADS, INTER & REP     Order Specific Question:   Reason for Exam:     Answer:   routine        Assessment:     Assessment:       ICD-10-CM ICD-9-CM    1. Cardiomyopathy, unspecified type (HCC) I42.9 425.4 AMB POC EKG ROUTINE W/ 12 LEADS, INTER & REP   2. Chronic systolic congestive heart failure (HCC) I50.22 428.22 ECHO ADULT COMPLETE     428.0    3. Coronary artery disease involving native coronary artery of native heart without angina pectoris I25.10 414.01    4. Paroxysmal atrial fibrillation (HCC) I48.0 427.31    5. Mixed hyperlipidemia E78.2 272.2    6. S/P CABG (coronary artery bypass graft) Z95.1 V45.81    7. AICD (automatic cardioverter/defibrillator) present Z95.810 V45.02         Plan:     1.  Chronic systolic congestive heart failure ([address]  Echo in 7/2019 with LVEF 30-35%  Doing well; BP has normalized  Continue with Toprol only; hold ACEi/ARB/ARNI given low BP. Hold Lasix -- has not needed it, weights have remained stable  Plan to repeat echo in 3 months, may require BiV ICD upgrade;  msec with NYHA Class IIB    2. Coronary artery disease involving native coronary artery of native heart without angina pectoris  S/p CABGx1 with LIMA to LAD in 3/2011  Lexiscan done 3/2019 without evidence of ischemia  No anginal or anginal equivalent symptoms  Continue Toprol, ASA and statin    3. Paroxysmal atrial fibrillation (HCC)  Remains asymptomatic; no recurrence of A fib on monitor  Continue Eliquis therapy for stroke prevention  [id number] (chf/age/vasc/htn)    4. Mixed hyperlipidemia  Continue statin therapy and low fat, low cholesterol diet  Lipids managed by PCP    5. S/P CABG (coronary artery bypass graft)  Stable    6. AICD (automatic cardioverter/defibrillator) present  Continue with routine device interrogation with Dr. Anuja South.    Follow-up in 3 months for echo and EKG. Based on results, consider whether he is a candidate for bi-V upgrade. Follow-up in 6 months with me.     Julianna Lewis MD

## 2019-11-01 NOTE — LETTER
11/4/19 Patient: Polo  YOB: 1945 Date of Visit: 11/1/2019 Sheldon Kevin MD 
[address] [facility]  [phone] VIA In Basket Dear Sheldon Kevin MD, Thank you for referring Mr. Jaylyn Saldaña to [address] for evaluation. My notes for this consultation are attached. If you have questions, please do not hesitate to call me. I look forward to following your patient along with you.  
 
 
Sincerely, 
 
Khushbu Dunn MD

## 2019-11-01 NOTE — PROGRESS NOTES
1. Have you been to the ER, urgent care clinic since your last visit? Hospitalized since your last visit? ED [facility] Sept 2019 right rotator cuff repair. 2. Have you seen or consulted any other health care providers outside of the [address] [place] [address] since your last visit? Include any pap smears or colon screening. No    Chief Complaint   Patient presents with    Cardiomyopathy     1 mo appt. Denied cardiac symptoms.

## 2019-12-05 ENCOUNTER — CLINICAL SUPPORT (OUTPATIENT)
Dept: CARDIOLOGY CLINIC | Age: 74
End: 2019-12-05

## 2019-12-05 ENCOUNTER — OFFICE VISIT (OUTPATIENT)
Dept: CARDIOLOGY CLINIC | Age: 74
End: 2019-12-05

## 2019-12-05 VITALS
WEIGHT: 168.5 LBS | HEART RATE: 45 BPM | BODY MASS INDEX: 26.45 KG/M2 | OXYGEN SATURATION: 99 % | HEIGHT: 67 IN | SYSTOLIC BLOOD PRESSURE: 118 MMHG | RESPIRATION RATE: 18 BRPM | DIASTOLIC BLOOD PRESSURE: 72 MMHG

## 2019-12-05 DIAGNOSIS — Z95.0 CARDIAC PACEMAKER IN SITU: ICD-10-CM

## 2019-12-05 DIAGNOSIS — Z95.810 AICD (AUTOMATIC CARDIOVERTER/DEFIBRILLATOR) PRESENT: Primary | ICD-10-CM

## 2019-12-05 DIAGNOSIS — E78.2 MIXED HYPERLIPIDEMIA: ICD-10-CM

## 2019-12-05 DIAGNOSIS — I42.9 CARDIOMYOPATHY, UNSPECIFIED TYPE (HCC): ICD-10-CM

## 2019-12-05 DIAGNOSIS — I48.0 PAROXYSMAL ATRIAL FIBRILLATION (HCC): Primary | ICD-10-CM

## 2019-12-05 DIAGNOSIS — I50.22 CHRONIC SYSTOLIC CONGESTIVE HEART FAILURE (HCC): ICD-10-CM

## 2019-12-05 DIAGNOSIS — Z95.810 AICD (AUTOMATIC CARDIOVERTER/DEFIBRILLATOR) PRESENT: ICD-10-CM

## 2019-12-05 DIAGNOSIS — I10 ESSENTIAL HYPERTENSION: ICD-10-CM

## 2019-12-05 RX ORDER — TRAMADOL HYDROCHLORIDE 50 MG/1
50 TABLET ORAL
COMMUNITY
End: 2019-12-20 | Stop reason: ALTCHOICE

## 2019-12-05 NOTE — PROGRESS NOTES
Verified patient with 2 identifiers   Reviewed record in preparation for visit and obtained necessary documentation. Chief Complaint   Patient presents with    Pacemaker Check     Annual follow up     Leg Swelling     angeline' legs, ankles and feet      1. Have you been to the ER, urgent care clinic since your last visit? Hospitalized since your last visit? No     2. Have you seen or consulted any other health care providers outside of the [address] [place] [address] since your last visit? Include any pap smears or colon screening.   No

## 2019-12-05 NOTE — PROGRESS NOTES
Subjective:      Estefany Carson is a 75 y.o. male is here for EP follow up. The patient denies chest pain/ shortness of breath, orthopnea, PND, LE edema, palpitations, syncope, presyncope or fatigue. Did well post shoulder surgery. Here for annual follow up.       Patient Active Problem List    Diagnosis Date Noted    Rotator cuff arthropathy of left shoulder 09/06/2019    Status post reverse arthroplasty of shoulder 09/06/2019    Right rotator cuff tear arthropathy 09/04/2019    Moderate major depression ([address] 07/03/2018    Depression 07/03/2018    DDD (degenerative disc disease), lumbar 04/02/2018    Chronic anticoagulation 07/17/2017    S/P CABG (coronary artery bypass graft) 06/17/2016    AICD (automatic cardioverter/defibrillator) present 05/13/2016    AVNRT (AV bridgette re-entry tachycardia) ([address] 05/12/2016    SVT (supraventricular tachycardia) ([address] 04/27/2016    Cardiomyopathy ([address] 04/27/2016    Chronic systolic congestive heart failure ([address] 04/27/2016    Stenosis of both carotid arteries without cerebral infarction 04/12/2016    Cervicogenic headache 04/12/2016    Alzheimer's dementia, late onset, with behavioral disturbance ([address] 04/12/2016    Spinal stenosis, lumbar region, with neurogenic claudication 04/12/2016    Lumbar back pain with radiculopathy affecting right lower extremity 04/12/2016    Lumbar back pain with radiculopathy affecting left lower extremity 04/12/2016    History of stroke 04/12/2016    ACP (advance care planning) 12/30/2015    B12 deficiency 09/22/2015    Hypothyroidism due to acquired atrophy of thyroid 09/22/2015    Osteoporosis 09/22/2015    Cervical post-laminectomy syndrome 09/22/2015    Neck pain 09/22/2015    Lower GI bleeding 08/19/2015    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 ([address] 07/23/2015    Cardiac pacemaker in situ 03/17/2015    Pacemaker 11/26/2014    Chest pain 11/24/2014    Bradycardia 11/23/2014    Left-sided chest wall pain 11/23/2014    SSS (sick sinus syndrome) ([address] 11/23/2014    S/P cervical spinal fusion 06/06/2013    Chronic venous embolism and thrombosis of unspecified deep vessels of lower extremity 05/29/2012    Osteoarthritis 05/29/2012    Prostate cancer ([address] 05/29/2012    Hyperthyroidism 05/29/2012    Mitral valve disorders(424.0) 05/29/2012    Postsurgical aortocoronary bypass status 05/29/2012    Coronary atherosclerosis of native coronary artery 05/29/2012    Paroxysmal supraventricular tachycardia ([address] 05/29/2012    Chronic systolic heart failure ([address] 05/29/2012    Atrial fibrillation ([address] 05/29/2012    Mixed hyperlipidemia 05/29/2012    Palpitations 05/29/2012    History of factor V Leiden mutation 03/07/2011    CABG (coronary artery bypass graft) 03/07/2011    CAD (coronary artery disease) 03/04/2011    Hypertension 03/04/2011    Hyperlipemia 03/04/2011    DVT (deep venous thrombosis) chronic coumadin anti-coagulation 03/04/2011      Carlos Eduardo Momin MD  Past Medical History:   Diagnosis Date    AICD (automatic cardioverter/defibrillator) present 5/13/2016 5/13/16 [employer] upgrade to dual chamber AICD implant  Dr. Samantha Watters state, other     Arthritis     OSTEO ARTHRITIS    AVNRT (AV bridgette re-entry tachycardia) ([address] 5/12/2016 5/12/16 AVNRT ablation: PM/AICD left upper chest :Dr. Renato Palmer Back ache     CAD (coronary artery disease)     Cancer [facility]) 2004    Prostate cancer    Chest tightness     last episode of chest pain 5/2016 before AICD placed; none since then    Coagulation defects 2005    FACTOR V LEIDEN    Dementia ([address]     Dizziness     FH: factor V Leiden deficiency     Generalized muscle ache     GERD (gastroesophageal reflux disease)     HEARTBURN    Heart failure ([address] 2014    as of 07/2019 EF 30-35;  Dr. Juice Spain    Hyperlipidemia, mixed     Hypertension     Other ill-defined conditions(799.89) 2004    blood transfusion    Pacemaker     Paroxysmal atrial fibrillation [facility]     rate controlled with coreg     Postsurgical aortocoronary bypass status 05/29/2012    CABG    Presence of cardiac defibrillator 05/2016    left upper chest    Psychiatric disorder     depression    Stroke [facility]) 2011, 1990's    SEVERAL-mini    Thromboembolism ([address] 2014    left leg: changed to Eliquis from Warfarin    Thromboembolus ([facility] 2005    left leg    Weakness generalized       Past Surgical History:   Procedure Laterality Date    CARDIAC CATHETERIZATION  3/4/2011         CARDIAC SURG PROCEDURE UNLIST      CABG X1 3/5/11    HX HERNIA REPAIR Left 2002    Ingiunal hernia repair left    HX ORTHOPAEDIC      LEFT KNEE CARTILAGE REPAIR;RIGHT ROTATOR CUFF REPAIR    HX ORTHOPAEDIC  2/14/12    C5-7 ANTERIIOR CERVICAL DISECTOMY AND FUSION    HX ORTHOPAEDIC  6/4/13    C5-C7 POSTERIOR DECOMPRESSION AND FUSION    HX ORTHOPAEDIC      right thumb partial amputation of tip    HX OTHER SURGICAL      steroid injections    HX PACEMAKER Left     HX PACEMAKER PLACEMENT      [place]OST[place]    HX ROTATOR CUFF REPAIR Left 2019    HX UROLOGICAL       urinary control system    HX UROLOGICAL  12/3/13    REPLACEMENT ARTIFICAL URINARY SPHINCTER     No Known Allergies   Family History   Problem Relation Age of Onset    Asthma Mother     Alcohol abuse Mother     Alcohol abuse Father     Asthma Father     Cancer Sister     Heart Disease Sister     Alcohol abuse Brother     Cancer Brother     Heart Disease Brother     negative for cardiac disease  Social History     Socioeconomic History    Marital status:      Spouse name: Not on file    Number of children: Not on file    Years of education: Not on file    Highest education level: Not on file   Tobacco Use    Smoking status: Former Smoker     Types: Pipe, Cigars     Last attempt to quit: 10/10/1971     Years since quitting: 48.1    Smokeless tobacco: Never Used   Substance and Sexual Activity    Alcohol use: No     Alcohol/week: 1.0 standard drinks     Types: 1 Cans of beer per week    Drug use: No    Sexual activity: Never     Partners: Female   Other Topics Concern    Sleep Concern Yes    Stress Concern Yes     Comment: Family concerns   Social History Narrative    , 2 children     Current Outpatient Medications   Medication Sig    traMADol (ULTRAM) 50 mg tablet Take 50 mg by mouth every six (6) hours as needed for Pain.  pantoprazole (PROTONIX) 40 mg tablet TAKE 1 TABLET BY MOUTH ONCE DAILY    sertraline (ZOLOFT) 100 mg tablet Take 2 Tabs by mouth daily.  ELIQUIS 5 mg tablet TAKE 1 TABLET BY MOUTH EVERY 12 HOURS    metoprolol succinate (TOPROL-XL) 25 mg XL tablet Take 0.5 Tabs by mouth daily.  gabapentin (NEURONTIN) 600 mg tablet TAKE 1 TABLET BY MOUTH THREE TIMES A DAY    atorvastatin (LIPITOR) 40 mg tablet TAKE 1 TABLET BY MOUTH EVERY DAY    multivit-min/FA/lycopen/lutein (SENTRY SENIOR PO) Take  by mouth daily.  aspirin 81 mg chewable tablet Take 1 Tab by mouth daily.  polyethylene glycol (MIRALAX) 17 gram/dose powder Take 17 g by mouth as needed.  Incontinence Pad, Liner, Disp (BLADDER CONTROL PADS EX ABSORB) pads 1 Packet by Does Not Apply route as needed (incontinence).  nitroglycerin (NITROSTAT) 0.4 mg SL tablet 1 Tab by SubLINGual route every five (5) minutes as needed for Chest Pain (call 911 if not relieved by 3).  sucralfate (CARAFATE) 1 gram tablet Take 1 Tab by mouth four (4) times daily. No current facility-administered medications for this visit. Vitals:    12/05/19 1351   BP: 118/72   Pulse: (!) 45   Resp: 18   SpO2: 99%   Weight: 168 lb 8 oz (76.4 kg)   Height: 5' 7\" (1.702 m)       I have reviewed the nurses notes, vitals, problem list, allergy list, medical history, family, social history and medications.     Review of Symptoms:    General: Pt denies excessive weight gain or loss. Pt is able to conduct ADL's  HEENT: Denies blurred vision, headaches, epistaxis and difficulty swallowing. Respiratory: Denies shortness of breath, HOLCOMB, wheezing or stridor. Cardiovascular: Denies precordial pain, palpitations, edema or PND  Gastrointestinal: Denies poor appetite, indigestion, abdominal pain or blood in stool  Urinary: Denies dysuria, pyuria  Musculoskeletal: Denies pain or swelling from muscles or joints  Neurologic: Denies tremor, paresthesias, or sensory motor disturbance  Skin: Denies rash, itching or texture change. Psych: Denies depression      Physical Exam:      General: Well developed, in no acute distress. HEENT: Eyes - PERRL, no jvd  Heart:  Normal S1/S2 negative S3 or S4. Regular, no murmur, gallop or rub. Respiratory: Clear bilaterally x 4, no wheezing or rales  Extremities:  No edema, normal cap refill, no cyanosis. Musculoskeletal: No clubbing  Neuro: A&Ox3, speech clear, gait stable. Skin: Skin color is normal. No rashes or lesions. Non diaphoretic  Vascular: 2+ pulses symmetric in all extremities    Cardiographics    Echo7/19  · Left Ventricle: Normal cavity size. Mild concentric hypertrophy. Moderate global systolic dysfunction. Estimated left ventricular ejection fraction is 31 - 35%. Biplane method used to measure ejection fraction. Left ventricular global hypokinesis. · Left Atrium: Moderately dilated left atrium. · Right Ventricle: Pacing wire present and appears normal.  · Right Atrium: Mildly dilated right atrium.        Ekg: A paced    Results for orders placed or performed during the hospital encounter of 08/24/19   EKG, 12 LEAD, INITIAL   Result Value Ref Range    Ventricular Rate 65 BPM    Atrial Rate 65 BPM    P-R Interval 236 ms    QRS Duration 118 ms    Q-T Interval 424 ms    QTC Calculation (Bezet) 440 ms    Calculated P Axis 8 degrees    Calculated R Axis 29 degrees    Calculated T Axis 151 degrees    Diagnosis       Atrial-paced rhythm with prolonged AV conduction with frequent premature   ventricular complexes  Incomplete left bundle branch block  ST & T wave abnormality, consider lateral ischemia  Abnormal ECG  When compared with ECG of 17-MAY-2019 11:40,  No significant change was found  Confirmed by Dot Yi MD, --- ([id number]) on 8/24/2019 7:48:27 PM           Lab Results   Component Value Date/Time    WBC 10.4 09/08/2019 03:59 AM    Hemoglobin (POC) 11.6 (L) 02/14/2012 10:03 AM    HGB 10.6 (L) 09/08/2019 03:59 AM    Hematocrit (POC) 34 (L) 02/14/2012 10:03 AM    HCT 33.2 (L) 09/08/2019 03:59 AM    PLATELET 527 (L) 38/56/4554 03:59 AM    MCV 93.3 09/08/2019 03:59 AM      Lab Results   Component Value Date/Time    Sodium 134 (L) 09/08/2019 03:59 AM    Potassium 3.7 09/08/2019 03:59 AM    Chloride 103 09/08/2019 03:59 AM    CO2 26 09/08/2019 03:59 AM    Anion gap 5 09/08/2019 03:59 AM    Glucose 111 (H) 09/08/2019 03:59 AM    BUN 15 09/08/2019 03:59 AM    Creatinine 1.13 09/08/2019 03:59 AM    BUN/Creatinine ratio 13 09/08/2019 03:59 AM    GFR est AA >60 09/08/2019 03:59 AM    GFR est non-AA >60 09/08/2019 03:59 AM    Calcium 9.2 09/08/2019 03:59 AM    Bilirubin, total 0.4 08/28/2019 10:28 AM    AST (SGOT) 15 08/28/2019 10:28 AM    Alk. phosphatase 89 08/28/2019 10:28 AM    Protein, total 7.7 08/28/2019 10:28 AM    Albumin 4.2 08/28/2019 10:28 AM    Globulin 3.5 08/28/2019 10:28 AM    A-G Ratio 1.2 08/28/2019 10:28 AM    ALT (SGPT) 19 08/28/2019 10:28 AM      Lab Results   Component Value Date/Time    TSH 0.529 03/21/2019 04:31 PM        Assessment:           ICD-10-CM ICD-9-CM    1. Paroxysmal atrial fibrillation (HCC) I48.0 427.31 AMB POC EKG ROUTINE W/ 12 LEADS, INTER & REP   2. Cardiac pacemaker in situ Z95.0 V45.01    3. Essential hypertension I10 401.9    4. Mixed hyperlipidemia E78.2 272.2    5. Chronic systolic congestive heart failure (HCC) I50.22 428.22      428.0    6.  AICD (automatic cardioverter/defibrillator) present Z95.810 V45.02    7. Cardiomyopathy, unspecified type ([facility] I42.9 425.4      Orders Placed This Encounter    AMB POC EKG ROUTINE W/ 12 LEADS, INTER & REP     Order Specific Question:   Reason for Exam:     Answer:   routine    traMADol (ULTRAM) 50 mg tablet     Sig: Take 50 mg by mouth every six (6) hours as needed for Pain. Plan:     Mr Dayton Maynard is here for annual follow up and device check. He is 48% AP and 1 % RVP. He has 10 years battery left, <1 % ATR on [address] [place] [address]. EKG AP and normotensive. Continue same medications. Is scheduled for repeat echo and EKG in February to assess for upgrade to BiV. Follow up remotely and then in our office in 1 year. Continue medical management for AF, HTN and hyperlipidemia. Thank you for allowing me to participate in Stephanie Shelby 's care.       Nadja Jefferson NP

## 2019-12-24 NOTE — PROGRESS NOTES
Arrived into the xray recovery area post procedure, A/O x 3 w/o complaint. Resting comfortably, drinking a ginger ale.
No

## 2019-12-31 ENCOUNTER — DOCUMENTATION ONLY (OUTPATIENT)
Dept: FAMILY MEDICINE CLINIC | Age: 74
End: 2019-12-31

## 2020-01-16 DIAGNOSIS — I25.10 CORONARY ARTERY DISEASE INVOLVING NATIVE CORONARY ARTERY OF NATIVE HEART WITHOUT ANGINA PECTORIS: Chronic | ICD-10-CM

## 2020-01-16 RX ORDER — METOPROLOL SUCCINATE 25 MG/1
TABLET, EXTENDED RELEASE ORAL
Qty: 45 TAB | Refills: 2 | Status: SHIPPED | OUTPATIENT
Start: 2020-01-16 | End: 2020-02-18

## 2020-01-20 ENCOUNTER — TELEPHONE (OUTPATIENT)
Dept: CARDIOLOGY CLINIC | Age: 75
End: 2020-01-20

## 2020-01-20 NOTE — TELEPHONE ENCOUNTER
Requesting cardiac clearance for surgery with Dr Igor Briseno LV 9/4/19 [place] 2/3/20 please advise thanks

## 2020-01-28 ENCOUNTER — TELEPHONE (OUTPATIENT)
Dept: FAMILY MEDICINE CLINIC | Age: 75
End: 2020-01-28

## 2020-01-29 ENCOUNTER — OFFICE VISIT (OUTPATIENT)
Dept: NEUROLOGY | Age: 75
End: 2020-01-29

## 2020-01-29 VITALS
HEIGHT: 67 IN | BODY MASS INDEX: 26.21 KG/M2 | HEART RATE: 54 BPM | WEIGHT: 167 LBS | SYSTOLIC BLOOD PRESSURE: 138 MMHG | DIASTOLIC BLOOD PRESSURE: 78 MMHG | OXYGEN SATURATION: 99 %

## 2020-01-29 DIAGNOSIS — M48.062 SPINAL STENOSIS, LUMBAR REGION, WITH NEUROGENIC CLAUDICATION: ICD-10-CM

## 2020-01-29 DIAGNOSIS — G30.1 ALZHEIMER'S DEMENTIA, LATE ONSET, WITH BEHAVIORAL DISTURBANCE (HCC): Primary | ICD-10-CM

## 2020-01-29 DIAGNOSIS — F02.818 ALZHEIMER'S DEMENTIA, LATE ONSET, WITH BEHAVIORAL DISTURBANCE (HCC): Primary | ICD-10-CM

## 2020-01-29 DIAGNOSIS — G44.86 CERVICOGENIC HEADACHE: ICD-10-CM

## 2020-01-29 DIAGNOSIS — Z63.6 CAREGIVER BURDEN: ICD-10-CM

## 2020-01-29 DIAGNOSIS — M54.16 LUMBAR BACK PAIN WITH RADICULOPATHY AFFECTING RIGHT LOWER EXTREMITY: ICD-10-CM

## 2020-01-29 RX ORDER — ACETAMINOPHEN 325 MG/1
1000 TABLET ORAL
Status: ON HOLD | COMMUNITY
End: 2022-01-01

## 2020-01-29 RX ORDER — QUETIAPINE FUMARATE 25 MG/1
TABLET, FILM COATED ORAL
Qty: 90 TAB | Refills: 2 | Status: SHIPPED | OUTPATIENT
Start: 2020-01-29 | End: 2020-02-18

## 2020-01-29 RX ORDER — MEMANTINE HYDROCHLORIDE 10 MG/1
10 TABLET ORAL 2 TIMES DAILY
Qty: 180 TAB | Refills: 4 | Status: SHIPPED | OUTPATIENT
Start: 2020-01-29 | End: 2021-02-22

## 2020-01-29 RX ORDER — PREDNISONE 5 MG/1
TABLET ORAL
COMMUNITY
Start: 2020-01-13 | End: 2020-01-29

## 2020-01-29 RX ORDER — BUTALBITAL, ASPIRIN AND CAFFEINE 50; 325; 40 MG/1; MG/1; MG/1
2 TABLET ORAL EVERY 8 HOURS
COMMUNITY
End: 2020-01-29

## 2020-01-29 RX ORDER — TRAMADOL HYDROCHLORIDE 50 MG/1
50 TABLET ORAL
Qty: 120 TAB | Refills: 5 | Status: ON HOLD | OUTPATIENT
Start: 2020-01-29 | End: 2020-02-12 | Stop reason: SDUPTHER

## 2020-01-29 SDOH — SOCIAL STABILITY - SOCIAL INSECURITY: DEPENDENT RELATIVE NEEDING CARE AT HOME: Z63.6

## 2020-01-29 NOTE — PATIENT INSTRUCTIONS
Office Policies 
 
o Phone calls/patient messages: Please allow up to 24 hours for someone in the office to contact you about your call or message. Be mindful your provider may be out of the office or your message may require further review. We encourage you to use [employer] for your messages as this is a faster, more efficient way to communicate with our office 
 
o Medication Refills: 
Prescription medications require up to 48 business hours to process. We encourage you to use [employer] for your refills. For controlled medications: Please allow up to 72 business hours to process. Certain medications may require you to  a written prescription at our office. NO narcotic/controlled medications will be prescribed after 4pm Monday through Friday or on weekends 
 
o Form/Paperwork Completion: We ask that you allow 7-14 business days. You may also download your forms to [employer] to have your doctor print off. We will restart Namenda. Week one 1/2 tablet daily Week 2: 1/2 tablet 2 times per day Week 3: 1 full tablet in the a.m. 1/2 tablet in the p.m. Week 4 and thereafter: 1 full tablet twice a day Caregiver resources: 36-hour day by Inez Eaton and [employer] You can buy this a [employer] or order it online off [facility] [employer]. 
 
Agitation: 
May improve with reinstating the use of Namenda Pain is the trigger we need to keep this under better control therefore will adjust medications. We will discontinue the Fioricet as that is ineffective in managing her headaches. You have chronic low back pain along with headaches We will liberalize the tramadol to 1 tablet 3 times a day. If agitation continues to increase you can try Seroquel 25 mg. You can use that up to 3 times a day as needed Do not wait till your pain level becomes an 8 9 or 10. Take your pain medicine as the pain starts to escalate from your baseline generally speaking you will use less pain medicine to manage your pain Pick and choose your battles and try to minimize factors that cause agitation to include person-to-person behaviors Try a whiteboard to help with daily reminders So in summary we are discontinuing the Fioricet for headaches. We are increasing the use of the tramadol up to 3 times a day We are adding Seroquel as backup for agitation or worsening headaches We are reinstituting Namenda

## 2020-01-29 NOTE — PROGRESS NOTES
Luz Mckenzie is a 75 y.o. male presents today for   Chief Complaint   Patient presents with    Follow-up    Alzheimers    Dementia     patient's wife requests testing to see progression       HPI  Historical Data    Patient is known to the practice. We follow him for dementia with behavioral disturbances. He is no longer on Aricept presumably for cardiac reasons    He is maintained on Namenda 10 mg twice daily    He is on clonazepam and Zoloft    He also has lumbar stenosis resulting in chronic pain and chronic headaches              Interim Data:   Patient is known to this practice. This is my first time seeing the patient. Chart and history reviewed in detail at today's office visit. Patient is here and accompanied by his wife history is obtained mostly from his wife    It is reported that he remains essentially independent with ADLs. He does have some difficulty with getting a short arm but that is mostly due to orthopedic and he has some difficulty getting his shoes on and tying them more due to back pain than functional ability. He is more easily confused.   For example his wife tells me he cannot remember what are the things that they need to accomplish for the day such as going to a doctor's office visit etc.    Patient's Blase Cater was reportedly stopped because he was deriving \"no benefit from it\"  Over the past several months his wife is stated his irritability and mood has gotten significantly worse. He has had longstanding problems with that but this is escalated. There is more activity in the house with a grandson living there [which overall tends to be a positive experience but he has certain issues due to prior traumas] and the wife's sister is also living with them. So there is a lot of activity in the house which can be aggravated to the patient. Pain is another major issue for the patient. He has chronic pain in his back and his leg. And he also has chronic headache. He tries to refrain from taking medications because often they do not work he does not want to take more medicines than he needs to. But when the pain escalates he tends to again get very agitated and irritable. For his headaches he has been using Fioricet but it is reported that it has 0 effect in reducing his headaches  He also has tramadol which she can take 1 a day as needed but it is on a limited basis. So he tends not to use it        Flowsheets    3 most recent PHQ Screens 1/20/2020   PHQ Not Done -   Little interest or pleasure in doing things Not at all   Feeling down, depressed, irritable, or hopeless Not at all   Total Score PHQ 2 0   Trouble falling or staying asleep, or sleeping too much -   Feeling tired or having little energy -   Poor appetite, weight loss, or overeating Not at all   Feeling bad about yourself - or that you are a failure or have let yourself or your family down -   Trouble concentrating on things such as school, work, reading, or watching TV -   Moving or speaking so slowly that other people could have noticed; or the opposite being so fidgety that others notice -   Thoughts of being better off dead, or hurting yourself in some way -   PHQ 9 Score -   How difficult have these problems made it for you to do your work, take care of your home and get along with others -       Fall Risk Assessment, last 12 mths 1/29/2020   Able to walk? Yes   Fall in past 12 months?  Yes   Fall with injury? No   Number of falls in past 12 months 2   Fall Risk Score 2       No flowsheet data found. Past Medical History:   Diagnosis Date    AICD (automatic cardioverter/defibrillator) present 5/13/2016 5/13/16 [employer] upgrade to dual chamber AICD implant  Dr. Monroy Bi state, other     Arthritis     OSTEO ARTHRITIS    AVNRT (AV bridgette re-entry tachycardia) ([facility] [address] 5/12/2016 5/12/16 AVNRT ablation: PM/AICD left upper chest :Dr. Oscar Forget Back ache     CAD (coronary artery disease)     Cancer [facility]) 2004    Prostate cancer    Chest tightness     last episode of chest pain 5/2016 before AICD placed; none since then    Coagulation defects 2005    FACTOR V LEIDEN    Dementia ([address]     Dizziness     FH: factor V Leiden deficiency     Generalized muscle ache     GERD (gastroesophageal reflux disease)     HEARTBURN    Heart failure ([address] 2014    as of 07/2019 EF 30-35;  Dr. Jeffy Cardenas    Hyperlipidemia, mixed     Hypertension     Other ill-defined conditions(799.89) 2004    blood transfusion    Pacemaker     Paroxysmal atrial fibrillation ([address]     rate controlled with coreg     Postsurgical aortocoronary bypass status 05/29/2012    CABG    Presence of cardiac defibrillator 05/2016    left upper chest    Psychiatric disorder     depression    Stroke [facility]) 2011, 1990's    SEVERAL-mini    Thromboembolism ([address] 2014    left leg: changed to Eliquis from Warfarin    Thromboembolus ([address] 2005    left leg    Weakness generalized        Past Surgical History:   Procedure Laterality Date    CARDIAC CATHETERIZATION  3/4/2011         CARDIAC SURG PROCEDURE UNLIST      CABG X1 3/5/11    HX HERNIA REPAIR Left 2002    Ingiunal hernia repair left    HX ORTHOPAEDIC      LEFT KNEE CARTILAGE REPAIR;RIGHT ROTATOR CUFF REPAIR    HX ORTHOPAEDIC  2/14/12    C5-7 ANTERIIOR CERVICAL DISECTOMY AND FUSION    HX ORTHOPAEDIC  6/4/13    C5-C7 POSTERIOR DECOMPRESSION AND FUSION    HX ORTHOPAEDIC      right thumb partial amputation of tip    HX OTHER SURGICAL      steroid injections    HX PACEMAKER Left     HX PACEMAKER PLACEMENT      [place]    HX ROTATOR CUFF REPAIR Left 2019    HX UROLOGICAL       urinary control system    HX UROLOGICAL  12/3/13    REPLACEMENT ARTIFICAL URINARY SPHINCTER       Social History     Socioeconomic History    Marital status:      Spouse name: Not on file    Number of children: Not on file    Years of education: Not on file    Highest education level: Not on file   Tobacco Use    Smoking status: Former Smoker     Types: Pipe, Cigars     Last attempt to quit: 10/10/1971     Years since quittin.3    Smokeless tobacco: Never Used   Substance and Sexual Activity    Alcohol use: No     Alcohol/week: 1.0 standard drinks     Types: 1 Cans of beer per week    Drug use: No    Sexual activity: Never     Partners: Female   Other Topics Concern    Sleep Concern Yes    Stress Concern Yes     Comment: Family concerns   Social History Narrative    , 2 children       Patient does not qualify to have social determinant information on file (likely too young). Family History   Problem Relation Age of Onset    Asthma Mother     Alcohol abuse Mother     Alcohol abuse Father     Asthma Father     Cancer Sister     Heart Disease Sister     Alcohol abuse Brother     Cancer Brother     Heart Disease Brother        Current Outpatient Medications   Medication Sig    acetaminophen (TYLENOL) 325 mg tablet Take  by mouth every four (4) hours as needed for Pain.  sacubitril-valsartan (ENTRESTO) 24 mg/26 mg tablet Take 1 Tab by mouth daily.  memantine (NAMENDA) 10 mg tablet Take 1 Tab by mouth two (2) times a day.  traMADol (ULTRAM) 50 mg tablet Take 1 Tab by mouth every six (6) hours as needed for Pain for up to 30 days. Max Daily Amount: 200 mg.  Indications: pain    QUEtiapine (SEROQUEL) 25 mg tablet 1 tab Tid prn agitation headache    metoprolol succinate (TOPROL-XL) 25 mg XL tablet TAKE 1/2 TABLET BY MOUTH ONCE DAILY    cyclobenzaprine (FLEXERIL) 10 mg tablet Take 1 Tab by mouth three (3) times daily as needed for Muscle Spasm(s).  atorvastatin (LIPITOR) 40 mg tablet TAKE 1 TABLET BY MOUTH EVERY DAY    pantoprazole (PROTONIX) 40 mg tablet TAKE 1 TABLET BY MOUTH ONCE DAILY    sertraline (ZOLOFT) 100 mg tablet Take 2 Tabs by mouth daily.  ELIQUIS 5 mg tablet TAKE 1 TABLET BY MOUTH EVERY 12 HOURS    multivit-min/FA/lycopen/lutein (SENTRY SENIOR PO) Take  by mouth daily.  aspirin 81 mg chewable tablet Take 1 Tab by mouth daily.  polyethylene glycol (MIRALAX) 17 gram/dose powder Take 17 g by mouth as needed.  Incontinence Pad, Liner, Disp (BLADDER CONTROL PADS EX ABSORB) pads 1 Packet by Does Not Apply route as needed (incontinence).  nitroglycerin (NITROSTAT) 0.4 mg SL tablet 1 Tab by SubLINGual route every five (5) minutes as needed for Chest Pain (call 911 if not relieved by 3). No current facility-administered medications for this visit. No visits with results within 3 Month(s) from this visit.    Latest known visit with results is:   Admission on 09/06/2019, Discharged on 09/08/2019   Component Date Value    WBC 09/08/2019 10.4     RBC 09/08/2019 3.56*    HGB 09/08/2019 10.6*    HCT 09/08/2019 33.2*    MCV 09/08/2019 93.3     MCH 09/08/2019 29.8     MCHC 09/08/2019 31.9     RDW 09/08/2019 13.4     PLATELET 19/20/7520 132*    MPV 09/08/2019 11.5     NRBC 09/08/2019 0.0     ABSOLUTE NRBC 09/08/2019 0.00     Sodium 09/08/2019 134*    Potassium 09/08/2019 3.7     Chloride 09/08/2019 103     CO2 09/08/2019 26     Anion gap 09/08/2019 5     Glucose 09/08/2019 111*    BUN 09/08/2019 15     Creatinine 09/08/2019 1.13     BUN/Creatinine ratio 09/08/2019 13     GFR est AA 09/08/2019 >60     GFR est non-AA 09/08/2019 >60     Calcium 09/08/2019 9.2 XR Results (maximum last 3): Results from [place] encounter on 01/12/20   XR SPINE LUMB 2 OR 3 V    Impression IMPRESSION:  1. Extensive multilevel degenerative change without interval progression     Results from Hospital Encounter encounter on 11/13/19   XR HIPS BI W PELV 3 OR 4 VWS    Impression IMPRESSION: Evidence of degenerative change involving both hips. Results from [place] encounter on 07/24/19   XR MYELO LUMBAR    Impression IMPRESSION: Spinal stenosis worse at L3-4 and L4-5. CT obtained for further  evaluation. CT Results (maximum last 3): Results from [place] encounter on 08/05/19   CT UP EXT LT WO CONT    Impression IMPRESSION:   1. Massive full-thickness tears of the supraspinatus and infraspinatus with high  riding humeral head and moderate degeneration of the glenohumeral joint     Results from Hospital Encounter encounter on 07/24/19   CT SPINE LUMB W CONT    Impression IMPRESSION:   Multilevel foraminal stenosis from L1 to L5. Findings appear worse at the   L3-L4 and L4-5. Left-sided disc protrusion T11-T12. Results from [place] encounter on 02/06/19   CT HEAD WO CONT    Impression IMPRESSION:  1. No evidence of intracranial hemorrhage. 2. Evidence of mild white matter disease. 3. Evidence of moderate cerebral atrophy. 4. Evidence of right maxillary sinus disease. 5. Evidence of bilateral mastoiditis (left greater than right). MRI Results (maximum last 3): Results from [place] encounter on 05/16/13   MRI CERV SPINE WO CONT   Results from [place] encounter on 04/20/10   MRI [facility]       Nuclear Medicine Results (maximum last 3): Results from [place] encounter on 12/03/09   NM CARDIAC SPECT REST AND STRESS         IR Results (maximum last 3): No results found for this or any previous visit.         A ten system review of constitutional, cardiovascular, respiratory, musculoskeletal, endocrine, skin, SHEENT, genitourinary, psychiatric and neurologic systems was obtained and is unremarkable with the exception of the following: Except is what is discussed under HPI, nothing additional to add       EXAMINATION  Visit Vitals  /78 (BP 1 Location: Left arm, BP Patient Position: Sitting)   Pulse (!) 54   Ht 5' 7\" (1.702 m)   Wt 167 lb (75.8 kg)   SpO2 99%   BMI 26.16 kg/m²        General:   General appearance: Patient is well-developed and well-nourished in no apparent distress. They are well groomed. Affect: Nonagitated, calm in the office    Neurological Examination:   Mental Status:    Formal testing was not completed however he lets his wife do most of the talking. When asked a question directly he will look to his wife to answer. He will spontaneously contribute to the conversation seemingly appropriate but in general terms    He is oriented to his full name and date of birth. When asked today's date he could not tell me but when broken down into the smaller elements he could tell me that the month was January and the year was 2020 he cannot tell me the date or the day    Cranial Nerves:  PERRLA, Extraocular movements are full. Gaze is conjugate. Facial sensation intact V1- V3. Facial movement intact, symmetric. Hearing intact to conversation. Voice is strong without evidence of secretion pooling, palate elevates symmetrically. Shoulder shrug symmetric. Tongue midline. Motor: Moves all extremities spontaneously and without abnormal movements  Normal tone and bulk  No tremor appreciated      Sensation: Light touch - Normal    Coordination/Cerebellar: Grossly intact    Gait: Ambulates independently. Casual gait unremarkable            Assessment and Plan  Dementia with behavioral issues is the major concern.     Reducing agitative factors would be helpful to include better pain management and reduction of household stimuli    There is also caregiver burden issues. His wife needs some support as well. I think would be best to restart the Namenda. I explained realistic expectations to the patient and his wife. In addition sometimes going back on these medications because it helps stabilize neurochemical's behavior can somewhat improved. There was baseline behavior before he came off but it seemed to escalate significantly after this was discontinued several months ago. Patient's pain needs to be under better control. I am going to liberalize the tramadol to 1 tablet 3 times a day. I have also suggested taking the pain medication at the onset of escalation of pain to keep it better controlled    I am going to discontinue the Fioricet since it is ineffective    Finally I am going to give Seroquel 25 mg as directed up to 1 tab 3 times a day for agitation if it is not controllable or if the headache becomes rather severe. They are aware that this is a total off label use. Common and serious side effects were discussed. They are wanting to proceed      ICD-10-CM ICD-9-CM    1. Alzheimer's dementia, late onset, with behavioral disturbance ([facility] G30.1 331.0     F02.81 294.11    2. Lumbar back pain with radiculopathy affecting right lower extremity M54.16 724.4 traMADol (ULTRAM) 50 mg tablet   3. Spinal stenosis, lumbar region, with neurogenic claudication M48.062 724.03    4. Cervicogenic headache R51 784.0    5. Caregiver burden Z63.6 V61.49        Follow-up and Dispositions    · Return in about 3 months (around 4/29/2020).            Shiv Smith, MS, ANP-BC, MSCN, CNRN

## 2020-02-03 ENCOUNTER — OFFICE VISIT (OUTPATIENT)
Dept: CARDIOLOGY CLINIC | Age: 75
End: 2020-02-03

## 2020-02-03 VITALS
SYSTOLIC BLOOD PRESSURE: 118 MMHG | DIASTOLIC BLOOD PRESSURE: 62 MMHG | HEIGHT: 67 IN | OXYGEN SATURATION: 99 % | BODY MASS INDEX: 26.38 KG/M2 | HEART RATE: 70 BPM | WEIGHT: 168.1 LBS | RESPIRATION RATE: 18 BRPM

## 2020-02-03 DIAGNOSIS — E78.2 MIXED HYPERLIPIDEMIA: ICD-10-CM

## 2020-02-03 DIAGNOSIS — I25.10 ASHD (ARTERIOSCLEROTIC HEART DISEASE): Primary | ICD-10-CM

## 2020-02-03 DIAGNOSIS — Z95.0 CARDIAC PACEMAKER IN SITU: ICD-10-CM

## 2020-02-03 DIAGNOSIS — I48.0 PAROXYSMAL ATRIAL FIBRILLATION (HCC): ICD-10-CM

## 2020-02-03 DIAGNOSIS — I10 ESSENTIAL HYPERTENSION: ICD-10-CM

## 2020-02-03 DIAGNOSIS — I50.22 CHRONIC SYSTOLIC CONGESTIVE HEART FAILURE (HCC): ICD-10-CM

## 2020-02-03 NOTE — PROGRESS NOTES
[address] [address] [address] [facility]  [phone]     Subjective:      Dariusz Zhou is a 75 y.o. male with pmhx of CAD s/p CABGx1, ischemic cardiomyopathy s/p ICD, PAF, chronic DVT with Factor V Leiden mutation, HTN and HLD  presents for follow up re EKG check--- mSec today. He is also scheduled for echocardiogram to reeval EF. Patient has baseline dementia, wife present to help answer questions. His home weight has been stable at 166 lbs. His dizziness has resolved, BP normotensive. Occasional mild sob, unchanged from previous. The patient denies chest pain, orthopnea, PND, LE edema, palpitations, syncope, or presyncope.        Patient Active Problem List    Diagnosis Date Noted    Rotator cuff arthropathy of left shoulder 09/06/2019    Status post reverse arthroplasty of shoulder 09/06/2019    Right rotator cuff tear arthropathy 09/04/2019    Moderate major depression ([address] 07/03/2018    Depression 07/03/2018    DDD (degenerative disc disease), lumbar 04/02/2018    Chronic anticoagulation 07/17/2017    S/P CABG (coronary artery bypass graft) 06/17/2016    AICD (automatic cardioverter/defibrillator) present 05/13/2016    AVNRT (AV bridgette re-entry tachycardia) ([address] 05/12/2016    SVT (supraventricular tachycardia) ([address] 04/27/2016    Cardiomyopathy ([address] 04/27/2016    Chronic systolic congestive heart failure ([address] 04/27/2016    Stenosis of both carotid arteries without cerebral infarction 04/12/2016    Cervicogenic headache 04/12/2016    Alzheimer's dementia, late onset, with behavioral disturbance ([address] 04/12/2016    Spinal stenosis, lumbar region, with neurogenic claudication 04/12/2016    Lumbar back pain with radiculopathy affecting right lower extremity 04/12/2016    Lumbar back pain with radiculopathy affecting left lower extremity 04/12/2016    History of stroke 04/12/2016    ACP (advance care planning) 12/30/2015    B12 deficiency 09/22/2015    Hypothyroidism due to acquired atrophy of thyroid 09/22/2015    Osteoporosis 09/22/2015    Cervical post-laminectomy syndrome 09/22/2015    Neck pain 09/22/2015    Lower GI bleeding 08/19/2015    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 ([address] 07/23/2015    Cardiac pacemaker in situ 03/17/2015    Pacemaker 11/26/2014    Chest pain 11/24/2014    Bradycardia 11/23/2014    Left-sided chest wall pain 11/23/2014    SSS (sick sinus syndrome) ([address] 11/23/2014    S/P cervical spinal fusion 06/06/2013    Chronic venous embolism and thrombosis of unspecified deep vessels of lower extremity 05/29/2012    Osteoarthritis 05/29/2012    Prostate cancer ([address] 05/29/2012    Hyperthyroidism 05/29/2012    Mitral valve disorders(424.0) 05/29/2012    Postsurgical aortocoronary bypass status 05/29/2012    Coronary atherosclerosis of native coronary artery 05/29/2012    Paroxysmal supraventricular tachycardia ([address] 05/29/2012    Chronic systolic heart failure ([address] 05/29/2012    Atrial fibrillation ([address] 05/29/2012    Mixed hyperlipidemia 05/29/2012    Palpitations 05/29/2012    History of factor V Leiden mutation 03/07/2011    CABG (coronary artery bypass graft) 03/07/2011    CAD (coronary artery disease) 03/04/2011    Hypertension 03/04/2011    Hyperlipemia 03/04/2011    DVT (deep venous thrombosis) chronic coumadin anti-coagulation 03/04/2011      Kyle Taylor MD  Past Medical History:   Diagnosis Date    AICD (automatic cardioverter/defibrillator) present 5/13/2016 5/13/16 [employer] upgrade to dual chamber AICD implant  Dr. Nela Toscano state, other     Arthritis     OSTEO ARTHRITIS    AVNRT (AV bridgette re-entry tachycardia) ([address] 5/12/2016 5/12/16 AVNRT ablation: PM/AICD left upper chest :Dr. Lake Thurman    Back ache     CAD (coronary artery disease)     Cancer ([address] 2004    Prostate cancer    Chest tightness     last episode of chest pain 5/2016 before AICD placed; none since then    Coagulation defects 2005    FACTOR V LEIDEN    Dementia ([facility]     Dizziness     FH: factor V Leiden deficiency     Generalized muscle ache     GERD (gastroesophageal reflux disease)     HEARTBURN    Heart failure ([address] 2014    as of 07/2019 EF 30-35;  Dr. Emmy Garcia    Hyperlipidemia, mixed     Hypertension     Other ill-defined conditions(799.89) 2004    blood transfusion    Pacemaker     Paroxysmal atrial fibrillation ([address]     rate controlled with coreg     Postsurgical aortocoronary bypass status 05/29/2012    CABG    Presence of cardiac defibrillator 05/2016    left upper chest    Psychiatric disorder     depression    Stroke [facility]) 2011, 1990's    SEVERAL-mini    Thromboembolism ([address] 2014    left leg: changed to Eliquis from Warfarin    Thromboembolus ([facility] 2005    left leg    Weakness generalized       Past Surgical History:   Procedure Laterality Date    CARDIAC CATHETERIZATION  3/4/2011         CARDIAC SURG PROCEDURE UNLIST      CABG X1 3/5/11    HX HERNIA REPAIR Left 2002    Ingiunal hernia repair left    HX ORTHOPAEDIC      LEFT KNEE CARTILAGE REPAIR;RIGHT ROTATOR CUFF REPAIR    HX ORTHOPAEDIC  2/14/12    C5-7 ANTERIIOR CERVICAL DISECTOMY AND FUSION    HX ORTHOPAEDIC  6/4/13    C5-C7 POSTERIOR DECOMPRESSION AND FUSION    HX ORTHOPAEDIC      right thumb partial amputation of tip    HX OTHER SURGICAL      steroid injections    HX PACEMAKER Left     HX PACEMAKER PLACEMENT      [place]OST[place]    HX ROTATOR CUFF REPAIR Left 2019    HX UROLOGICAL       urinary control system    HX UROLOGICAL  12/3/13    REPLACEMENT ARTIFICAL URINARY SPHINCTER     No Known Allergies   Family History   Problem Relation Age of Onset    Asthma Mother     Alcohol abuse Mother     Alcohol abuse Father     Asthma Father     Cancer Sister     Heart Disease Sister     Alcohol abuse Brother     Cancer Brother  Heart Disease Brother       Social History     Socioeconomic History    Marital status:      Spouse name: Not on file    Number of children: Not on file    Years of education: Not on file    Highest education level: Not on file   Occupational History    Not on file   Social Needs    Financial resource strain: Not on file    Food insecurity:     Worry: Not on file     Inability: Not on file    Transportation needs:     Medical: Not on file     Non-medical: Not on file   Tobacco Use    Smoking status: Never Smoker    Smokeless tobacco: Never Used   Substance and Sexual Activity    Alcohol use: No     Alcohol/week: 1.0 standard drinks     Types: 1 Cans of beer per week    Drug use: No    Sexual activity: Never     Partners: Female   Lifestyle    Physical activity:     Days per week: Not on file     Minutes per session: Not on file    Stress: Not on file   Relationships    Social connections:     Talks on phone: Not on file     Gets together: Not on file     Attends Restorationism service: Not on file     Active member of club or organization: Not on file     Attends meetings of clubs or organizations: Not on file     Relationship status: Not on file    Intimate partner violence:     Fear of current or ex partner: Not on file     Emotionally abused: Not on file     Physically abused: Not on file     Forced sexual activity: Not on file   Other Topics Concern     Service Not Asked    Blood Transfusions Not Asked    Caffeine Concern Not Asked    Occupational Exposure Not Asked    Hobby Hazards Not Asked    Sleep Concern Yes    Stress Concern Yes     Comment: Family concerns    Weight Concern Not Asked    Special Diet Not Asked    Back Care Not Asked    Exercise Not Asked    Bike Helmet Not Asked    Seat Belt Not Asked    Self-Exams Not Asked   Social History Narrative    , 2 children      Current Outpatient Medications   Medication Sig    acetaminophen (TYLENOL) 325 mg tablet Take  by mouth every four (4) hours as needed for Pain.  sacubitril-valsartan (ENTRESTO) 24 mg/26 mg tablet Take 1 Tab by mouth daily.  memantine (NAMENDA) 10 mg tablet Take 1 Tab by mouth two (2) times a day.  traMADol (ULTRAM) 50 mg tablet Take 1 Tab by mouth every six (6) hours as needed for Pain for up to 30 days. Max Daily Amount: 200 mg. Indications: pain    QUEtiapine (SEROQUEL) 25 mg tablet 1 tab Tid prn agitation headache    metoprolol succinate (TOPROL-XL) 25 mg XL tablet TAKE 1/2 TABLET BY MOUTH ONCE DAILY    cyclobenzaprine (FLEXERIL) 10 mg tablet Take 1 Tab by mouth three (3) times daily as needed for Muscle Spasm(s).  atorvastatin (LIPITOR) 40 mg tablet TAKE 1 TABLET BY MOUTH EVERY DAY    pantoprazole (PROTONIX) 40 mg tablet TAKE 1 TABLET BY MOUTH ONCE DAILY    sertraline (ZOLOFT) 100 mg tablet Take 2 Tabs by mouth daily.  ELIQUIS 5 mg tablet TAKE 1 TABLET BY MOUTH EVERY 12 HOURS    multivit-min/FA/lycopen/lutein (SENTRY SENIOR PO) Take  by mouth daily.  aspirin 81 mg chewable tablet Take 1 Tab by mouth daily.  polyethylene glycol (MIRALAX) 17 gram/dose powder Take 17 g by mouth as needed.  Incontinence Pad, Liner, Disp (BLADDER CONTROL PADS EX ABSORB) pads 1 Packet by Does Not Apply route as needed (incontinence).  nitroglycerin (NITROSTAT) 0.4 mg SL tablet 1 Tab by SubLINGual route every five (5) minutes as needed for Chest Pain (call 911 if not relieved by 3). No current facility-administered medications for this visit. Review of Symptoms:  11 systems reviewed, negative other than as stated in the HPI    Physical ExamPhysical Exam:    Vitals:    02/03/20 0918 02/03/20 0926   BP: 120/66 118/62   Pulse: 70    Resp: 18    SpO2: 99%    Weight: 168 lb 1.6 oz (76.2 kg)    Height: 5' 7\" (1.702 m)      Body mass index is 26.33 kg/m². General PE  Gen:  NAD  Mental Status - Alert. General Appearance - Not in acute distress.    HEENT:  PERRL, no carotid bruits or JVD  Chest and Lung Exam   Inspection: Accessory muscles - No use of accessory muscles in breathing. Auscultation:   Breath sounds: - Normal.   Cardiovascular   Inspection: Jugular vein - Bilateral - Inspection Normal.   Palpation/Percussion:   Apical Impulse: - Normal.   Auscultation: Rhythm - Regular. Heart Sounds - S1 WNL and S2 WNL. No S3 or S4. Murmurs & Other Heart Sounds: Auscultation of the heart reveals - No Murmurs. Peripheral Vascular   Upper Extremity: Inspection - Bilateral - No Cyanotic nailbeds or Digital clubbing. Lower Extremity:   Palpation: Edema - Bilateral - No edema. Abdomen:   Soft, non-tender, bowel sounds are active.   Neuro: A&O times 2, CN and motor grossly WNL    Labs:   Lab Results   Component Value Date/Time    Cholesterol, total 160 11/14/2018 12:02 PM    Cholesterol, total 157 03/22/2018 08:44 AM    Cholesterol, total 157 07/05/2016 08:33 AM    Cholesterol, total 170 03/19/2015 11:36 AM    Cholesterol, total 145 11/24/2014 02:05 AM    HDL Cholesterol 72 11/14/2018 12:02 PM    HDL Cholesterol 66 03/22/2018 08:44 AM    HDL Cholesterol 70 07/05/2016 08:33 AM    HDL Cholesterol 83 03/19/2015 11:36 AM    HDL Cholesterol 83 11/24/2014 02:05 AM    LDL, calculated 67 11/14/2018 12:02 PM    LDL, calculated 74 03/22/2018 08:44 AM    LDL, calculated 71 07/05/2016 08:33 AM    LDL, calculated 77 03/19/2015 11:36 AM    LDL, calculated 52.8 11/24/2014 02:05 AM    Triglyceride 105 11/14/2018 12:02 PM    Triglyceride 84 03/22/2018 08:44 AM    Triglyceride 78 07/05/2016 08:33 AM    Triglyceride 51 03/19/2015 11:36 AM    Triglyceride 46 11/24/2014 02:05 AM    CHOL/HDL Ratio 1.7 11/24/2014 02:05 AM    CHOL/HDL Ratio 3.6 03/04/2011 11:30 AM     Lab Results   Component Value Date/Time    CK 55 03/10/2019 10:10 AM     Lab Results   Component Value Date/Time    Sodium 134 (L) 09/08/2019 03:59 AM    Potassium 3.7 09/08/2019 03:59 AM    Chloride 103 09/08/2019 03:59 AM    CO2 26 09/08/2019 03:59 AM    Anion gap 5 09/08/2019 03:59 AM    Glucose 111 (H) 09/08/2019 03:59 AM    BUN 15 09/08/2019 03:59 AM    Creatinine 1.13 09/08/2019 03:59 AM    BUN/Creatinine ratio 13 09/08/2019 03:59 AM    GFR est AA >60 09/08/2019 03:59 AM    GFR est non-AA >60 09/08/2019 03:59 AM    Calcium 9.2 09/08/2019 03:59 AM    Bilirubin, total 0.4 08/28/2019 10:28 AM    AST (SGOT) 15 08/28/2019 10:28 AM    Alk. phosphatase 89 08/28/2019 10:28 AM    Protein, total 7.7 08/28/2019 10:28 AM    Albumin 4.2 08/28/2019 10:28 AM    Globulin 3.5 08/28/2019 10:28 AM    A-G Ratio 1.2 08/28/2019 10:28 AM    ALT (SGPT) 19 08/28/2019 10:28 AM       EKG:  Paced     Assessment:     Assessment:      1. ASHD (arteriosclerotic heart disease)    2. Paroxysmal atrial fibrillation (HCC)    3. Cardiac pacemaker in situ    4. Chronic systolic congestive heart failure ([address]    5. Mixed hyperlipidemia    6. Essential hypertension        Orders Placed This Encounter    AMB POC EKG ROUTINE W/ 12 LEADS, INTER & REP     Order Specific Question:   Reason for Exam:     Answer:   routine        Plan:     Patient presents for follow up re EKG check--- mSec today. He is also scheduled for echocardiogram to reeval EF. Patient has baseline dementia, wife present to help answer questions. His home weight has been stable at 166 lbs. His dizziness has resolved, BP normotensive. Occasional mild sob, unchanged from previous. Chronic systolic congestive heart failure, ICD in 5/2016  Echo in 7/2019 with LVEF 30-35%  Wt stable  Continue with low dose BB, Entresto 24/26 mg BID.  Kidney fxn stable 9/19  For echo today,  msec with NYHA Class IIB--may require BiV-ICD upgrade       ASHD  S/p CABGx1 with LIMA to LAD in 3/2011  Lexiscan done 3/2019 without evidence of ischemia  No anginal or anginal equivalent symptoms  Continue Toprol, ASA and statin     PAF  Continue Eliquis therapy for stroke prevention  [id number] 4 (chf/age/vasc/htn)  On low dose BB     HLD  Continue statin therapy and low fat, low cholesterol diet  Lipids managed by PCP       Continue current care and f/u in 6 months from with DR Milena Hay or sooner depending on echo result        Miguel Angel Roberts, NP

## 2020-02-03 NOTE — PROGRESS NOTES
1. Have you been to the ER, urgent care clinic since your last visit? Hospitalized since your last visit? ED [facility] ER 1-12-20, leg pain. 2. Have you seen or consulted any other health care providers outside of the [address] [place] [address] since your last visit? Include any pap smears or colon screening. No    Chief Complaint   Patient presents with    Coronary Artery Disease     ref by Dr. Mniisterio Auguste for EKG.

## 2020-02-05 ENCOUNTER — TELEPHONE (OUTPATIENT)
Dept: CARDIOLOGY CLINIC | Age: 75
End: 2020-02-05

## 2020-02-05 NOTE — TELEPHONE ENCOUNTER
----- Message from Trista Galan NP sent at 2/3/2020  2:34 PM EST -----  Jemima,    Please call the patient and inform that echocardiogram is normal, ejection fraction 50-55%. This is an improvement from EF 30-35%, which is good. No concern for heart failure at this time. Valves working appropriately. Would still keep appt with Dr. Alvaro Lloyd as he may be eligible for BiV upgrade if he remains symptomatic. Will route this to Lita for evaluation.     Thanks,  [employer]

## 2020-02-06 ENCOUNTER — HOSPITAL ENCOUNTER (OUTPATIENT)
Dept: PREADMISSION TESTING | Age: 75
Discharge: HOME | End: 2020-02-06
Attending: ORTHOPAEDIC SURGERY
Payer: MEDICARE

## 2020-02-06 ENCOUNTER — HOSPITAL ENCOUNTER (OUTPATIENT)
Dept: GENERAL RADIOLOGY | Age: 75
Discharge: HOME | End: 2020-02-06
Attending: ORTHOPAEDIC SURGERY
Payer: MEDICARE

## 2020-02-06 ENCOUNTER — HOSPITAL ENCOUNTER (OUTPATIENT)
Dept: GENERAL RADIOLOGY | Age: 75
End: 2020-02-06
Attending: ORTHOPAEDIC SURGERY
Payer: MEDICARE

## 2020-02-06 VITALS
OXYGEN SATURATION: 98 % | WEIGHT: 171.74 LBS | HEIGHT: 67 IN | DIASTOLIC BLOOD PRESSURE: 65 MMHG | HEART RATE: 60 BPM | TEMPERATURE: 97.9 F | RESPIRATION RATE: 18 BRPM | SYSTOLIC BLOOD PRESSURE: 138 MMHG | BODY MASS INDEX: 26.96 KG/M2

## 2020-02-06 LAB
25(OH)D3 SERPL-MCNC: 31.3 NG/ML (ref 30–100)
ABO + RH BLD: NORMAL
ALBUMIN SERPL-MCNC: 3.4 G/DL (ref 3.5–5)
ALBUMIN/GLOB SERPL: 1 {RATIO} (ref 1.1–2.2)
ALP SERPL-CCNC: 99 U/L (ref 45–117)
ALT SERPL-CCNC: 16 U/L (ref 12–78)
ANION GAP SERPL CALC-SCNC: 3 MMOL/L (ref 5–15)
APPEARANCE UR: CLEAR
AST SERPL-CCNC: 19 U/L (ref 15–37)
BACTERIA URNS QL MICRO: NEGATIVE /HPF
BILIRUB SERPL-MCNC: 0.8 MG/DL (ref 0.2–1)
BILIRUB UR QL: NEGATIVE
BLOOD GROUP ANTIBODIES SERPL: NORMAL
BUN SERPL-MCNC: 21 MG/DL (ref 6–20)
BUN/CREAT SERPL: 18 (ref 12–20)
CALCIUM SERPL-MCNC: 8.8 MG/DL (ref 8.5–10.1)
CHLORIDE SERPL-SCNC: 108 MMOL/L (ref 97–108)
CO2 SERPL-SCNC: 27 MMOL/L (ref 21–32)
COLOR UR: NORMAL
CREAT SERPL-MCNC: 1.15 MG/DL (ref 0.7–1.3)
EPITH CASTS URNS QL MICRO: NORMAL /LPF
ERYTHROCYTE [DISTWIDTH] IN BLOOD BY AUTOMATED COUNT: 14.5 % (ref 11.5–14.5)
EST. AVERAGE GLUCOSE BLD GHB EST-MCNC: 120 MG/DL
GLOBULIN SER CALC-MCNC: 3.4 G/DL (ref 2–4)
GLUCOSE SERPL-MCNC: 94 MG/DL (ref 65–100)
GLUCOSE UR STRIP.AUTO-MCNC: NEGATIVE MG/DL
HBA1C MFR BLD: 5.8 % (ref 4–5.6)
HCT VFR BLD AUTO: 36 % (ref 36.6–50.3)
HGB BLD-MCNC: 11.3 G/DL (ref 12.1–17)
HGB UR QL STRIP: NEGATIVE
HYALINE CASTS URNS QL MICRO: NORMAL /LPF (ref 0–5)
INR PPP: 1.1 (ref 0.9–1.1)
KETONES UR QL STRIP.AUTO: NEGATIVE MG/DL
LEUKOCYTE ESTERASE UR QL STRIP.AUTO: NEGATIVE
MCH RBC QN AUTO: 28.7 PG (ref 26–34)
MCHC RBC AUTO-ENTMCNC: 31.4 G/DL (ref 30–36.5)
MCV RBC AUTO: 91.4 FL (ref 80–99)
NITRITE UR QL STRIP.AUTO: NEGATIVE
NRBC # BLD: 0 K/UL (ref 0–0.01)
NRBC BLD-RTO: 0 PER 100 WBC
PH UR STRIP: 6 [PH] (ref 5–8)
PLATELET # BLD AUTO: 185 K/UL (ref 150–400)
PMV BLD AUTO: 11.2 FL (ref 8.9–12.9)
POTASSIUM SERPL-SCNC: 4.6 MMOL/L (ref 3.5–5.1)
PREALB SERPL-MCNC: 19.4 MG/DL (ref 20–40)
PROT SERPL-MCNC: 6.8 G/DL (ref 6.4–8.2)
PROT UR STRIP-MCNC: NEGATIVE MG/DL
PROTHROMBIN TIME: 11.1 SEC (ref 9–11.1)
RBC # BLD AUTO: 3.94 M/UL (ref 4.1–5.7)
RBC #/AREA URNS HPF: NORMAL /HPF (ref 0–5)
SODIUM SERPL-SCNC: 138 MMOL/L (ref 136–145)
SP GR UR REFRACTOMETRY: 1.01 (ref 1–1.03)
SPECIMEN EXP DATE BLD: NORMAL
UA: UC IF INDICATED,UAUC: NORMAL
UROBILINOGEN UR QL STRIP.AUTO: 1 EU/DL (ref 0.2–1)
WBC # BLD AUTO: 6.9 K/UL (ref 4.1–11.1)
WBC URNS QL MICRO: NORMAL /HPF (ref 0–4)

## 2020-02-06 PROCEDURE — 80053 COMPREHEN METABOLIC PANEL: CPT

## 2020-02-06 PROCEDURE — 97116 GAIT TRAINING THERAPY: CPT

## 2020-02-06 PROCEDURE — 83036 HEMOGLOBIN GLYCOSYLATED A1C: CPT

## 2020-02-06 PROCEDURE — 85027 COMPLETE CBC AUTOMATED: CPT

## 2020-02-06 PROCEDURE — 86900 BLOOD TYPING SEROLOGIC ABO: CPT

## 2020-02-06 PROCEDURE — 97161 PT EVAL LOW COMPLEX 20 MIN: CPT

## 2020-02-06 PROCEDURE — 85610 PROTHROMBIN TIME: CPT

## 2020-02-06 PROCEDURE — 84134 ASSAY OF PREALBUMIN: CPT

## 2020-02-06 PROCEDURE — 71046 X-RAY EXAM CHEST 2 VIEWS: CPT

## 2020-02-06 PROCEDURE — 81001 URINALYSIS AUTO W/SCOPE: CPT

## 2020-02-06 PROCEDURE — 82306 VITAMIN D 25 HYDROXY: CPT

## 2020-02-06 PROCEDURE — 36415 COLL VENOUS BLD VENIPUNCTURE: CPT

## 2020-02-06 RX ORDER — ACETAMINOPHEN 10 MG/ML
1000 INJECTION, SOLUTION INTRAVENOUS ONCE
Status: CANCELLED | OUTPATIENT
Start: 2020-02-10 | End: 2020-02-10

## 2020-02-06 RX ORDER — PREGABALIN 150 MG/1
150 CAPSULE ORAL ONCE
Status: CANCELLED | OUTPATIENT
Start: 2020-02-10 | End: 2020-02-10

## 2020-02-06 RX ORDER — SODIUM CHLORIDE, SODIUM LACTATE, POTASSIUM CHLORIDE, CALCIUM CHLORIDE 600; 310; 30; 20 MG/100ML; MG/100ML; MG/100ML; MG/100ML
25 INJECTION, SOLUTION INTRAVENOUS CONTINUOUS
Status: CANCELLED | OUTPATIENT
Start: 2020-02-10

## 2020-02-06 RX ORDER — GABAPENTIN 600 MG/1
600 TABLET ORAL 3 TIMES DAILY
COMMUNITY
End: 2020-03-06

## 2020-02-06 NOTE — PERIOP NOTES
Hibaclens/Chlorhexidine    Preventing Infections Before and After  Your Surgery    IMPORTANT INSTRUCTIONS    Please read and follow these instructions carefully. If you are unable to comply with the below instructions your procedure will be cancelled. Every Night for Three (3) nights before your surgery:  1. Shower with an antibacterial soap, such as Dial, or the soap provided at your preassessment appointment. A shower is better than a bath for cleaning your skin. 2. If needed, ask someone to help you reach all areas of your body. Dont forget to clean your belly button with every shower. The night before your surgery: If you lose your Hibiclens/chlorhexidine please contact surgery center or you can purchase it at a local pharmacy  1. On the night before your surgery, shower with an antibacterial soap, such as Dial, or the soap provided at your preassessment appointment. 2. With one packet of Hibiclens/Chlorhexidine in hand, turn water off.  3. Apply Hibiclens antiseptic skin cleanser with a clean, freshly washed washcloth. ? Gently apply to your body from chin to toes (except the genital area) and especially the area(s) where your incision(s) will be. ? Leave Hibiclens/Chlorhexidine on your skin for at least 20 seconds. CAUTION: If needed, Hibiclens/chlorhexidine may be used to clean the folds of skin of the legs (such as in the area of the groin) and on your buttocks and hips. However, do not use Hibiclens/Chlorhexidine above the neck or in the genital area (your bottom) or put inside any area of your body. 4. Turn the water back on and rinse. 5. Dry gently with a clean, freshly washed towel. 6. After your shower, do not use any powder, deodorant, perfumes or lotion. 7. Use clean, freshly washed towels and washcloths every time you shower. 8. Wear clean, freshly washed pajamas to bed the night before surgery. 9. Sleep on clean, freshly washed sheets.   10. Do not allow pets to sleep in your bed with you. The Morning of your surgery:  1. Shower again thoroughly with an antibacterial soap, such as Dial or the soap provided at your preassessment appointment. If needed, ask someone for help to reach all areas of your body. Dont forget to clean your belly button! Rinse. 2. Dry gently with a clean, freshly washed towel. 3. After your shower, do not use any powder, deodorant, perfumes or lotion prior to surgery. 4. Put on clean, freshly washed clothing. Tips to help prevent infections after your surgery:  1. Protect your surgical wound from germs:  ? Hand washing is the most important thing you and your caregivers can do to prevent infections. ? Keep your bandage clean and dry! ? Do not touch your surgical wound. 2. Use clean, freshly washed towels and washcloths every time you shower; do not share bath linens with others. 3. Until your surgical wound is healed, wear clothing and sleep on bed linens each day that are clean and freshly washed. 4. Do not allow pets to sleep in your bed with you or touch your surgical wound. 5. Do not smoke  smoking delays wound healing. This may be a good time to stop smoking. 6. If you have diabetes, it is important for you to manage your blood sugar levels properly before your surgery as well as after your surgery. Poorly managed blood sugar levels slow down wound healing and prevent you from healing completely. If you lose your Hibiclens/chlorhexidine, please call the [facility], or it is available for purchase at your pharmacy.                ___________________      ___________________      2/6/20 @ 1000  (Signature of Patient)          (Witness)                   (Date and Time)

## 2020-02-06 NOTE — PERIOP NOTES
2/6/20 @ 1000: called to Dr. Myron Astudillo office, [facility] for Costania Bellis. Requested Surgery Clearance, when pt is to stop Eliquis, and Orders for second surgery scheduled for 2/11/20. Advised unable to obtain Consent for second surgery due to no orders received.

## 2020-02-06 NOTE — PERIOP NOTES
[facility]  Joint/Spine Preoperative Instructions    Surgery Date 2/10/20          Time of Arrival 1230  Contact #:  [phone] cell    1. On the day of your surgery, please report to the Surgical Services Registration Desk and sign in at your designated time. The Surgery Center is located to the right of the Emergency Room. 2. You must have someone with you to drive you home. You should not drive a car for 24 hours following surgery. Please make arrangements for a friend or family member to stay with you for the first 24 hours after your surgery. 3. No food after midnight 2/9/20. Medications morning of surgery should be taken with a sip of water. Please follow pre-surgery drink instructions that were given at your Pre Admission Testing appointment. 4. We recommend you do not drink any alcoholic beverages for 24 hours before and after your surgery. 5. Contact your surgeons office for instructions on the following medications: non-steroidal anti-inflammatory drugs (i.e. Advil, Aleve), vitamins, and supplements. (Some surgeons will want you to stop these medications prior to surgery and others may allow you to take them)  **If you are currently taking Plavix, Coumadin, Aspirin and/or other blood-thinning agents, contact your surgeon for instructions. ** Your surgeon will partner with the physician prescribing these medications to determine if it is safe to stop or if you need to continue taking. Please do not stop taking these medications without instructions from your surgeon    6. Wear comfortable clothes. Wear glasses instead of contacts. Do not bring any money or jewelry. Please bring picture ID, insurance card, and any prearranged co-payment or hospital payment. Do not wear make-up, particularly mascara the morning of your surgery. Do not wear nail polish, particularly if you are having foot /hand surgery.   Wear your hair loose or down, no ponytails, buns, louise pins or clips. All body piercings must be removed. Please shower with antibacterial soap for three consecutive days before and on the morning of surgery, but do not apply any lotions, powders or deodorants after the shower on the day of surgery. Please use a fresh towels after each shower. Please sleep in clean clothes and change bed linens the night before surgery. Please do not shave for 48 hours prior to surgery. Shaving of the face is acceptable. 7. You should understand that if you do not follow these instructions your surgery may be cancelled. If your physical condition changes (I.e. fever, cold or flu) please contact your surgeon as soon as possible. 8. It is important that you be on time. If a situation occurs where you may be late, please call [phone] (OR Holding Area). 9. If you have any questions and or problems, please call [phone] (Pre-admission Testing). 10. Your surgery time may be subject to change. You will receive a phone call the evening prior if your time changes. 11.  If having outpatient surgery, you must have someone to drive you here, stay with you during the duration of your stay, and to drive you home at time of discharge. 12.   In an effort to improve the efficiency, privacy, and safety for all of our Pre-op patients visitors are not allowed in the Holding area. Once you arrive and are registered your family/visitors will be asked to remain in the waiting room. The Pre-op staff will get you from the Surgical Waiting Area and will explain to you and your family/visitors that the Pre-op phase is beginning. The staff will answer any questions and provide instructions for tracking of the patient, by use of the existing tracking number and color-coded status board in the waiting room.   At this time the staff will also ask for your designated spokesperson information in the event that the physician or staff need to provide an update or obtain any pertinent information. The designated spokesperson will be notified if the physician needs to speak to family during the pre-operative phase. If at any time your family/visitors has questions or concerns they may approach the volunteer desk in the waiting area for assistance. Special Instructions:  Practice incentive spirometer per instructions and bring to hospital day of surgery. Review your Spine Surgery Book and follow all your doctors instructions. Follow your doctors instructions when to stop Eliquis. TAKE ALL MEDICATIONS THE DAY OF SURGERY EXCEPT:  Entresto ([address] [facility])    I understand a pre-operative phone call will be made to verify my surgery time. In the event that I am not available, I give permission for a message to be left on my answering service and/or with another person?   yes         ___________________        __________  2/6/20 @ 1000    (Signature of Patient)             (Witness)                (Date and Time)

## 2020-02-06 NOTE — PROGRESS NOTES
[facility]  Physical Therapy Pre-surgery evaluation  [address]  [place], [address] [facility]    physical Therapy pre SPINE surgery EVALUATION  Patient:Dharmesh Kumar (75 y.o. male)  Date: 2/6/2020    PAT       Precautions:          ASSESSMENT :  Based on the objective data described below, the patient presents with increased pain, falls, and decreased mobility due to end stage degenerative joint disease in the spinal level: lumbar spine. Discussed anticipated disposition to home with possible discharge within a 1 to 2 day time frame post-surgery. Patient and  in agreement. Anticipate patient will need acute PT and OT orders based on staged procedure planned. GOALS: (Goals have been discussed and agreed upon with patient.)  DISCHARGE GOALS: Time Frame: 1 DAY  1. Patient will demonstrate increased strength, range of motion, and pain control via a home exercise program in order to minimize functional deficits in preparation for their upcoming surgery. This will be achieved by using education, demonstration and through the use of an informational handout including a home exercise program.  REHABILITATION POTENTIAL FOR STATED GOALS: Good     RECOMMENDATIONS AND PLANNED INTERVENTIONS: (Benefits and precautions of physical therapy have been discussed with the patient.)  1. Home Exercise Program  TREATMENT PLAN EFFECTIVE DATES: 2/6/2020 to 2/6/2020   FREQUENCY/DURATION: Patient to continue to perform home exercise program at least twice daily until surgery. SUBJECTIVE:   Patient stated I went back to work two days after I got a pacemaker.     OBJECTIVE DATA SUMMARY:   HISTORY:      Past Medical History:   Diagnosis Date    AICD (automatic cardioverter/defibrillator) present 5/13/2016 5/13/16 [employer] upgrade to dual chamber AICD implant  Dr. Coy Abreu    Alzheimer disease [facility])     Anxiety state, other     Arthritis     OSTEO ARTHRITIS    AVNRT (AV bridgette re-entry tachycardia) ([facility] 5/12/2016 5/12/16 AVNRT ablation: PM/AICD left upper chest :Dr. Sin Hamming    Back ache     CAD (coronary artery disease)     Cancer [facility]) 2004    Prostate cancer    Chest tightness     last episode of chest pain 5/2016 before AICD placed; none since then    Coagulation defects 2005    FACTOR V LEIDEN    Dementia ([facility]     Dementia ([address]     Dizziness     FH: factor V Leiden deficiency     Generalized muscle ache     GERD (gastroesophageal reflux disease)     HEARTBURN    Heart failure ([address] 2014    as of 07/2019 EF 30-35;  Dr. Nando Nur, Cardiomyopathy    Hyperlipidemia, mixed     Hypertension     Other ill-defined conditions(799.89) 2004    blood transfusion    Pacemaker     Paroxysmal atrial fibrillation ([address]     rate controlled with coreg     Postsurgical aortocoronary bypass status 05/29/2012    CABG    Presence of cardiac defibrillator 05/2016    left upper chest    Psychiatric disorder     depression    SSS (sick sinus syndrome) ([facility]     Stroke ([facility] 2011, 1990's    SEVERAL-mini    Thromboembolism ([address] 2014    left leg: changed to Eliquis from Warfarin    Thromboembolus ([facility] 2005    left leg    Weakness generalized      Past Surgical History:   Procedure Laterality Date    CARDIAC CATHETERIZATION  3/4/2011         CARDIAC SURG PROCEDURE UNLIST      CABG X1 3/5/11    HX HEENT  2019    oral surgery, removed teeth, dentures upper/lower    HX HERNIA REPAIR Left 2002    Ingiunal hernia repair left    HX ORTHOPAEDIC      LEFT KNEE CARTILAGE REPAIR;RIGHT ROTATOR CUFF REPAIR    HX ORTHOPAEDIC  2/14/12    C5-7 ANTERIIOR CERVICAL DISECTOMY AND FUSION    HX ORTHOPAEDIC  6/4/13    C5-C7 POSTERIOR DECOMPRESSION AND FUSION    HX ORTHOPAEDIC      right thumb partial amputation of tip    HX OTHER SURGICAL      steroid injections    HX PACEMAKER Left 05/13/2016    BS D142, Dr. Bibi Bass HX PROSTATECT[place]Y  2004     [place]    HX ROTATOR CUFF REPAIR Left 2019    HX UROLOGICAL       urinary control system    HX UROLOGICAL  12/3/13    REPLACEMENT ARTIFICAL URINARY SPHINCTER       Prior Level of Function/Home Situation: patient lives with his wife and family. He is independent with all aspects of functional mobility and ADLS. He has difficulty tucking in a shirt due to L shoulder surgery. He reports 3 falls recently from BLE giving out. He enjoys working on [employer]. Personal factors and/or comorbidities impacting plan of care: pain      Home Situation  Home Environment: Trailer/mobile home  # Steps to Enter: 0  Wheelchair Ramp: Yes  One/Two Story Residence: One story  Living Alone: No  Support Systems: Family member(s), Spouse/Significant Other/Partner  Patient Expects to be Discharged to[de-identified] Trailer/mobile home  Current DME Used/Available at Home: [address], [place], straight, Walker, rolling, Shower chair, Commode, bedside, Wheelchair  Tub or Shower Type: Tub/Shower combination           EXAMINATION/PRESENTATION/DECISION MAKING:     ADLs (Current Functional Status):    Bathing/Showering:   [x] Independent  [] Requires Assistance from Someone  [] Sponge Bath Only   Ambulation:  [x] Independent  [] Walk Indoors Only  [] Walk Outdoors  [] Use Assistive Device  [] Use Wheelchair Only     Dressing:  [x] [address] from Someone for:  [] Sock/Shoes  [] Pants  [] Everything   Household Activities:  [x] Routine house and yard work  [] Light Housework Only  [] None                     Strength:     Strength: Generally decreased, functional                       Tone & Sensation:   Tone: Normal              Sensation: Impaired(tingling in B toes )                  Range Of Motion:     AROM: Within functional limits           PROM: Within functional limits                Coordination:   Coordination: Within functional limits    Functional Mobility:  Transfers:  Sit to Stand: Independent  Stand to Sit: Independent Balance:   Sitting: Intact  Standing: Intact  Ambulation/Gait Training:  Distance (ft): 150 Feet (ft)     Ambulation - Level of Assistance: Independent     Gait Description (WDL): Exceptions to WDL  Gait Abnormalities: Antalgic        Base of Support: Widened     Speed/Hazel: Pace decreased (<100 feet/min)                         Functional Measure:  10 Meter walk test:  (Specify if any supplemental oxygen is used, the type, pre, during and post sats.)    Self-Selected Or Fast-Velocity: Self Selected Velocity  Trial 1 (Time to Walk 10 Meters): 11.63 Seconds  Trial 2 (Time to Walk 10 Meters): 11.6 Seconds  Trial 3 (Time to Walk 10 Meters): 10.73 Seconds  Average : 11.3 Seconds  Score (Meters/Second): 0.9 Meters/Second             Walking Speed (m/s)  Modifier Scale Age 52-63 Age 61-76 Age 66-77 Age 80-80    Male Female Male Female Male Female Male Female   CH   0% Impaired ? 1.39 ? 1.40 ? 1.36 ? 1.30 ? 1.33 ? 1.27 ? 1.21 ? 1.15   CI   1-19% Impaired 1.11-1.38 1.12-1.39 1.09-1.35 1.04-1.29 1.06-1.32 1.01-1.26 0.96-1.20 0.92-1.14   CJ   20-39% Impaired 0.83-1.10 0.84-1.11 0.82-1.08 0.78-1.03 0.80-1.05 0.76-1.00 0.72-0.95 0.69-0.91   CK   40-59% Impaired 0.56-0.82 0.57-0.83 0.54-0.81 0.52-0.77 0.53-0.79 0.51-0.75 0.48-0.71 0.46-0.68   CL   60-79% Impaired 0.28-0.55 0.28-0.56 0.27-0.53 0.26-0.51 0.27-0.52 0.25-0.50 0.24-0.49 0.23-0.45   CM   80-99% Impaired 0.01-0.28 < 0.01-0.28 < 0.01-0.27 < 0.01-0.26 0.01-0.27 0.01-0.24 0.01-0.23 0.01-0.22   CN   100% Impaired Cannot Perform   Minimal Detectable Change (MDC-90) = 0.1 m/s  Angela KILGORE \"Comfortable and maximum walking speed of adults aged 20-79 years: reference values and determinants. \" Age and Agin Volume 26(1):15-9. Katja Givens. \"Age- and gender-related test performance in community-dwelling elderly people: Six-Minute Walk Test, Caban Balance Scale, Timed Up & Go Test, and gait speeds. \" Physical Therapy: 2002 Volume 82(2):128-37. John Magdaleno DM, Fernando PW, Clint MULLINSD, Johan DOBSON. \"Assessing stability and change of four performance measures: a longitudinal study evaluating outcome following total hip and knee arthroplasty. \" [facility] Musculoskeletal Disorders: 2005 Volume 6(3). Kesha Sharma, PhD; Marion Hubbard, PhD. Saar Andreanirudh Paper: \"Walking Speed: the Sixth Vital Sign\" Journal of Geriatric Physical Therapy: 2009 - Volume 32 - Issue 2 - p 25 . Pain:  Pain Scale 1: Numeric (0 - 10)  Pain Intensity 1: 0                Radicular pain:   Reports RLE shooting pain to toes and B feet tingling. Activity Tolerance:   Good. Patient []   does  [x]   does not demonstrate signs/symptoms of shortness of breath/dyspnea on exertion/respiratory distress. COMMUNICATION/EDUCATION:   The patient was educated on:  [x]         Early post operative mobility is imperative to achieve a patient's desired outcomes and to restore biological function. [x]         Post operative spinal precautions may/may not be applicable. These precautions are based on the patient's physician and the procedure(s) performed. [x]         Spinal precautions including:  ·   No bending forward, sideways, or backwards  ·   No twisting   ·   No lifting more than 5-10 pounds  ·   No sitting longer than 30-60 minutes at a time  ·   Prince brace when out of bed and mobilizing    The patients plan of care was discussed as follows:   [x]         The patient verbalized understanding of his/her plan in preparation for their upcoming surgery  [x]         The patient's  was present for this session  []        The patient reports that he/she does not have a  identified at this time  [x]         The  verbalized understanding of the education regarding the patient's upcoming surgery  [x]         Patient/family agree to work toward stated goals and plan of care.   []         Patient understands intent and goals of therapy, but is neutral about his/her participation. []         Patient is unable to participate in goal setting and plan of care.       Thank you for this referral.  Chriss Merlos, PT, DPT    Time Calculation: 17 mins

## 2020-02-06 NOTE — PERIOP NOTES
Incentive Spirometer        Using the incentive spirometer helps expand the small air sacs of your lungs, helps you breathe deeply, and helps improve your lung function. Use your incentive spirometer twice a day (10 breaths each time) prior to surgery. How to Use Your Incentive Spirometer:  1. Hold the incentive spirometer in an upright position. 2. Breathe out as usual.   3. Place the mouthpiece in your mouth and seal your lips tightly around it. 4. Take a deep breath. Breathe in slowly and as deeply as possible. Keep the blue flow rate guide between the arrows. 5. Hold your breath as long as possible. Then exhale slowly and allow the piston to fall to the bottom of the column. 6. Rest for a few seconds and repeat steps one through five at least 10 times. PAT Tidal Volume____1250_____  x___2___  Date__2/6/20    Leory Hose THE INCENTIVE SPIROMETER WITH YOU TO THE HOSPITAL ON THE DAY OF YOUR SURGERY. Opportunity given to ask and answer questions as well as to observe return demonstration.     Patient signature_____________________________            Witness____________________________

## 2020-02-06 NOTE — PERIOP NOTES

## 2020-02-07 LAB
BACTERIA SPEC CULT: NORMAL
BACTERIA SPEC CULT: NORMAL
SERVICE CMNT-IMP: NORMAL

## 2020-02-07 NOTE — PERIOP NOTES
MRSA nares swab is still preliminary but show Apparent Staph. Prescription called in for Mupirocin ointment bid x 5 days to pharmacy. Spoke to patients wife and gave instructions to  and start today. She verbalized understanding.

## 2020-02-10 ENCOUNTER — APPOINTMENT (OUTPATIENT)
Dept: GENERAL RADIOLOGY | Age: 75
DRG: 454 | End: 2020-02-10
Attending: ORTHOPAEDIC SURGERY
Payer: MEDICARE

## 2020-02-10 ENCOUNTER — ANESTHESIA EVENT (OUTPATIENT)
Dept: SURGERY | Age: 75
DRG: 454 | End: 2020-02-10
Payer: MEDICARE

## 2020-02-10 ENCOUNTER — APPOINTMENT (OUTPATIENT)
Dept: CT IMAGING | Age: 75
DRG: 454 | End: 2020-02-10
Attending: ORTHOPAEDIC SURGERY
Payer: MEDICARE

## 2020-02-10 ENCOUNTER — HOSPITAL ENCOUNTER (INPATIENT)
Age: 75
LOS: 5 days | Discharge: HOME | DRG: 454 | End: 2020-02-15
Attending: ORTHOPAEDIC SURGERY | Admitting: ORTHOPAEDIC SURGERY
Payer: MEDICARE

## 2020-02-10 ENCOUNTER — ANESTHESIA (OUTPATIENT)
Dept: SURGERY | Age: 75
DRG: 454 | End: 2020-02-10
Payer: MEDICARE

## 2020-02-10 DIAGNOSIS — M54.16 LUMBAR BACK PAIN WITH RADICULOPATHY AFFECTING RIGHT LOWER EXTREMITY: ICD-10-CM

## 2020-02-10 PROCEDURE — 77030014007 HC SPNG HEMSTAT J&J -B: Performed by: ORTHOPAEDIC SURGERY

## 2020-02-10 PROCEDURE — 74011000250 HC RX REV CODE- 250: Performed by: NURSE ANESTHETIST, CERTIFIED REGISTERED

## 2020-02-10 PROCEDURE — 77030018723 HC ELCTRD BLD COVD -A: Performed by: ORTHOPAEDIC SURGERY

## 2020-02-10 PROCEDURE — 76210000016 HC OR PH I REC 1 TO 1.5 HR: Performed by: ORTHOPAEDIC SURGERY

## 2020-02-10 PROCEDURE — 77030040356 HC CORD BPLR FRCP COVD -A: Performed by: ORTHOPAEDIC SURGERY

## 2020-02-10 PROCEDURE — 74011000250 HC RX REV CODE- 250: Performed by: ORTHOPAEDIC SURGERY

## 2020-02-10 PROCEDURE — 77030014647 HC SEAL FBRN TISSL BAXT -D: Performed by: ORTHOPAEDIC SURGERY

## 2020-02-10 PROCEDURE — 77030013079 HC BLNKT BAIR HGGR 3M -A: Performed by: ANESTHESIOLOGY

## 2020-02-10 PROCEDURE — 77030026438 HC STYL ET INTUB CARD -A: Performed by: ANESTHESIOLOGY

## 2020-02-10 PROCEDURE — 77030020268 HC MISC GENERAL SUPPLY: Performed by: ORTHOPAEDIC SURGERY

## 2020-02-10 PROCEDURE — 74011250636 HC RX REV CODE- 250/636: Performed by: ORTHOPAEDIC SURGERY

## 2020-02-10 PROCEDURE — C1713 ANCHOR/SCREW BN/BN,TIS/BN: HCPCS | Performed by: ORTHOPAEDIC SURGERY

## 2020-02-10 PROCEDURE — 77030018836 HC SOL IRR NACL ICUM -A: Performed by: ORTHOPAEDIC SURGERY

## 2020-02-10 PROCEDURE — 77030018846 HC SOL IRR STRL H20 ICUM -A: Performed by: ORTHOPAEDIC SURGERY

## 2020-02-10 PROCEDURE — 01NB0ZZ RELEASE LUMBAR NERVE, OPEN APPROACH: ICD-10-PCS | Performed by: ORTHOPAEDIC SURGERY

## 2020-02-10 PROCEDURE — 72100 X-RAY EXAM L-S SPINE 2/3 VWS: CPT

## 2020-02-10 PROCEDURE — 77030040361 HC SLV COMPR DVT MDII -B: Performed by: ORTHOPAEDIC SURGERY

## 2020-02-10 PROCEDURE — 77030033138 HC SUT PGA STRATFX J&J -B: Performed by: ORTHOPAEDIC SURGERY

## 2020-02-10 PROCEDURE — 77030005513 HC CATH URETH FOL11 MDII -B: Performed by: ORTHOPAEDIC SURGERY

## 2020-02-10 PROCEDURE — 74011250636 HC RX REV CODE- 250/636: Performed by: NURSE ANESTHETIST, CERTIFIED REGISTERED

## 2020-02-10 PROCEDURE — 0ST20ZZ RESECTION OF LUMBAR VERTEBRAL DISC, OPEN APPROACH: ICD-10-PCS | Performed by: ORTHOPAEDIC SURGERY

## 2020-02-10 PROCEDURE — 74011250636 HC RX REV CODE- 250/636: Performed by: ANESTHESIOLOGY

## 2020-02-10 PROCEDURE — 77030039267 HC ADH SKN EXOFIN S2SG -B: Performed by: ORTHOPAEDIC SURGERY

## 2020-02-10 PROCEDURE — 77030008684 HC TU ET CUF COVD -B: Performed by: ANESTHESIOLOGY

## 2020-02-10 PROCEDURE — 77030008462 HC STPLR SKN PROX J&J -A: Performed by: ORTHOPAEDIC SURGERY

## 2020-02-10 PROCEDURE — 72131 CT LUMBAR SPINE W/O DYE: CPT

## 2020-02-10 PROCEDURE — 76000 FLUOROSCOPY <1 HR PHYS/QHP: CPT

## 2020-02-10 PROCEDURE — 76060000034 HC ANESTHESIA 1.5 TO 2 HR: Performed by: ORTHOPAEDIC SURGERY

## 2020-02-10 PROCEDURE — 77030040951 HC GRFT BN OSTEOAMP SELCT BTUS -I: Performed by: ORTHOPAEDIC SURGERY

## 2020-02-10 PROCEDURE — 77030003029 HC SUT VCRL J&J -B: Performed by: ORTHOPAEDIC SURGERY

## 2020-02-10 PROCEDURE — 76010000162 HC OR TIME 1.5 TO 2 HR INTENSV-TIER 1: Performed by: ORTHOPAEDIC SURGERY

## 2020-02-10 PROCEDURE — 07DR3ZZ EXTRACTION OF ILIAC BONE MARROW, PERCUTANEOUS APPROACH: ICD-10-PCS | Performed by: ORTHOPAEDIC SURGERY

## 2020-02-10 PROCEDURE — 77030034475 HC MISC IMPL SPN: Performed by: ORTHOPAEDIC SURGERY

## 2020-02-10 PROCEDURE — 65270000029 HC RM PRIVATE

## 2020-02-10 PROCEDURE — 0SG10AJ FUSION OF 2 OR MORE LUMBAR VERTEBRAL JOINTS WITH INTERBODY FUSION DEVICE, POSTERIOR APPROACH, ANTERIOR COLUMN, OPEN APPROACH: ICD-10-PCS | Performed by: ORTHOPAEDIC SURGERY

## 2020-02-10 PROCEDURE — 77030035129: Performed by: ORTHOPAEDIC SURGERY

## 2020-02-10 PROCEDURE — 77030003028 HC SUT VCRL J&J -A: Performed by: ORTHOPAEDIC SURGERY

## 2020-02-10 PROCEDURE — 77030002986 HC SUT PROL J&J -A: Performed by: ORTHOPAEDIC SURGERY

## 2020-02-10 PROCEDURE — 77030029251 HC SPCR SPN GLMB -K1: Performed by: ORTHOPAEDIC SURGERY

## 2020-02-10 PROCEDURE — 74011250637 HC RX REV CODE- 250/637: Performed by: ORTHOPAEDIC SURGERY

## 2020-02-10 PROCEDURE — 77030029099 HC BN WAX SSPC -A: Performed by: ORTHOPAEDIC SURGERY

## 2020-02-10 PROCEDURE — 77030020704 HC DISECT ENDOSC BLNT J&J -B: Performed by: ORTHOPAEDIC SURGERY

## 2020-02-10 DEVICE — PLYMOUTH THORACOLUMBAR PLATE, L4-L5, 21MM
Type: IMPLANTABLE DEVICE | Site: SPINE LUMBAR | Status: FUNCTIONAL
Brand: PLYMOUTH

## 2020-02-10 DEVICE — 5.5MM VARIABLE ANGLE SCREW, 30MM
Type: IMPLANTABLE DEVICE | Site: SPINE LUMBAR | Status: FUNCTIONAL
Brand: TRUSS

## 2020-02-10 DEVICE — GRAFT BONE 10 CC: Type: IMPLANTABLE DEVICE | Site: SPINE LUMBAR | Status: FUNCTIONAL

## 2020-02-10 DEVICE — RISE-L SPACER 22 X 55MM, 7-14MM, 3-15&DEG;
Type: IMPLANTABLE DEVICE | Site: SPINE LUMBAR | Status: FUNCTIONAL
Brand: RISE-L

## 2020-02-10 DEVICE — RISE-L SPACER 22 X 60MM, 7-14MM, 3-15&DEG;
Type: IMPLANTABLE DEVICE | Site: SPINE LUMBAR | Status: FUNCTIONAL
Brand: RISE-L

## 2020-02-10 RX ORDER — SODIUM CHLORIDE, SODIUM LACTATE, POTASSIUM CHLORIDE, CALCIUM CHLORIDE 600; 310; 30; 20 MG/100ML; MG/100ML; MG/100ML; MG/100ML
25 INJECTION, SOLUTION INTRAVENOUS CONTINUOUS
Status: DISCONTINUED | OUTPATIENT
Start: 2020-02-10 | End: 2020-02-10 | Stop reason: HOSPADM

## 2020-02-10 RX ORDER — PREGABALIN 75 MG/1
150 CAPSULE ORAL ONCE
Status: COMPLETED | OUTPATIENT
Start: 2020-02-10 | End: 2020-02-10

## 2020-02-10 RX ORDER — FENTANYL CITRATE 50 UG/ML
25 INJECTION, SOLUTION INTRAMUSCULAR; INTRAVENOUS
Status: DISCONTINUED | OUTPATIENT
Start: 2020-02-10 | End: 2020-02-10 | Stop reason: HOSPADM

## 2020-02-10 RX ORDER — MEMANTINE HYDROCHLORIDE 10 MG/1
10 TABLET ORAL 2 TIMES DAILY
Status: DISCONTINUED | OUTPATIENT
Start: 2020-02-10 | End: 2020-02-15 | Stop reason: HOSPADM

## 2020-02-10 RX ORDER — HYDROCODONE BITARTRATE AND ACETAMINOPHEN 5; 325 MG/1; MG/1
1 TABLET ORAL AS NEEDED
Status: DISCONTINUED | OUTPATIENT
Start: 2020-02-10 | End: 2020-02-10 | Stop reason: HOSPADM

## 2020-02-10 RX ORDER — EPHEDRINE SULFATE/0.9% NACL/PF 50 MG/5 ML
SYRINGE (ML) INTRAVENOUS AS NEEDED
Status: DISCONTINUED | OUTPATIENT
Start: 2020-02-10 | End: 2020-02-10 | Stop reason: HOSPADM

## 2020-02-10 RX ORDER — ACETAMINOPHEN 10 MG/ML
1000 INJECTION, SOLUTION INTRAVENOUS ONCE
Status: COMPLETED | OUTPATIENT
Start: 2020-02-10 | End: 2020-02-10

## 2020-02-10 RX ORDER — MIDAZOLAM HYDROCHLORIDE 1 MG/ML
1 INJECTION, SOLUTION INTRAMUSCULAR; INTRAVENOUS AS NEEDED
Status: DISCONTINUED | OUTPATIENT
Start: 2020-02-10 | End: 2020-02-10 | Stop reason: HOSPADM

## 2020-02-10 RX ORDER — THERA TABS 400 MCG
1 TAB ORAL DAILY
Status: DISCONTINUED | OUTPATIENT
Start: 2020-02-11 | End: 2020-02-15 | Stop reason: HOSPADM

## 2020-02-10 RX ORDER — HYDROMORPHONE HYDROCHLORIDE 1 MG/ML
0.5 INJECTION, SOLUTION INTRAMUSCULAR; INTRAVENOUS; SUBCUTANEOUS
Status: DISCONTINUED | OUTPATIENT
Start: 2020-02-10 | End: 2020-02-10 | Stop reason: HOSPADM

## 2020-02-10 RX ORDER — QUETIAPINE FUMARATE 25 MG/1
25 TABLET, FILM COATED ORAL
Status: DISCONTINUED | OUTPATIENT
Start: 2020-02-10 | End: 2020-02-15 | Stop reason: HOSPADM

## 2020-02-10 RX ORDER — MIDAZOLAM HYDROCHLORIDE 1 MG/ML
0.5 INJECTION, SOLUTION INTRAMUSCULAR; INTRAVENOUS
Status: DISCONTINUED | OUTPATIENT
Start: 2020-02-10 | End: 2020-02-10 | Stop reason: HOSPADM

## 2020-02-10 RX ORDER — FENTANYL CITRATE 50 UG/ML
INJECTION, SOLUTION INTRAMUSCULAR; INTRAVENOUS AS NEEDED
Status: DISCONTINUED | OUTPATIENT
Start: 2020-02-10 | End: 2020-02-10 | Stop reason: HOSPADM

## 2020-02-10 RX ORDER — ONDANSETRON 2 MG/ML
INJECTION INTRAMUSCULAR; INTRAVENOUS AS NEEDED
Status: DISCONTINUED | OUTPATIENT
Start: 2020-02-10 | End: 2020-02-10 | Stop reason: HOSPADM

## 2020-02-10 RX ORDER — PROPOFOL 10 MG/ML
INJECTION, EMULSION INTRAVENOUS AS NEEDED
Status: DISCONTINUED | OUTPATIENT
Start: 2020-02-10 | End: 2020-02-10 | Stop reason: HOSPADM

## 2020-02-10 RX ORDER — PROPOFOL 10 MG/ML
INJECTION, EMULSION INTRAVENOUS
Status: DISCONTINUED | OUTPATIENT
Start: 2020-02-10 | End: 2020-02-10 | Stop reason: HOSPADM

## 2020-02-10 RX ORDER — GABAPENTIN 300 MG/1
600 CAPSULE ORAL 3 TIMES DAILY
Status: DISCONTINUED | OUTPATIENT
Start: 2020-02-10 | End: 2020-02-15 | Stop reason: HOSPADM

## 2020-02-10 RX ORDER — SUCCINYLCHOLINE CHLORIDE 20 MG/ML
INJECTION INTRAMUSCULAR; INTRAVENOUS AS NEEDED
Status: DISCONTINUED | OUTPATIENT
Start: 2020-02-10 | End: 2020-02-10 | Stop reason: HOSPADM

## 2020-02-10 RX ORDER — SERTRALINE HYDROCHLORIDE 50 MG/1
200 TABLET, FILM COATED ORAL DAILY
Status: DISCONTINUED | OUTPATIENT
Start: 2020-02-11 | End: 2020-02-15 | Stop reason: HOSPADM

## 2020-02-10 RX ORDER — NITROGLYCERIN 0.4 MG/1
0.4 TABLET SUBLINGUAL
Status: DISCONTINUED | OUTPATIENT
Start: 2020-02-10 | End: 2020-02-15 | Stop reason: HOSPADM

## 2020-02-10 RX ORDER — PHENYLEPHRINE HCL IN 0.9% NACL 0.4MG/10ML
SYRINGE (ML) INTRAVENOUS AS NEEDED
Status: DISCONTINUED | OUTPATIENT
Start: 2020-02-10 | End: 2020-02-10 | Stop reason: HOSPADM

## 2020-02-10 RX ORDER — LIDOCAINE HYDROCHLORIDE 20 MG/ML
INJECTION, SOLUTION EPIDURAL; INFILTRATION; INTRACAUDAL; PERINEURAL AS NEEDED
Status: DISCONTINUED | OUTPATIENT
Start: 2020-02-10 | End: 2020-02-10 | Stop reason: HOSPADM

## 2020-02-10 RX ORDER — DEXAMETHASONE SODIUM PHOSPHATE 4 MG/ML
INJECTION, SOLUTION INTRA-ARTICULAR; INTRALESIONAL; INTRAMUSCULAR; INTRAVENOUS; SOFT TISSUE AS NEEDED
Status: DISCONTINUED | OUTPATIENT
Start: 2020-02-10 | End: 2020-02-10 | Stop reason: HOSPADM

## 2020-02-10 RX ORDER — LIDOCAINE HYDROCHLORIDE 10 MG/ML
0.1 INJECTION, SOLUTION EPIDURAL; INFILTRATION; INTRACAUDAL; PERINEURAL AS NEEDED
Status: DISCONTINUED | OUTPATIENT
Start: 2020-02-10 | End: 2020-02-10 | Stop reason: HOSPADM

## 2020-02-10 RX ORDER — ATORVASTATIN CALCIUM 40 MG/1
40 TABLET, FILM COATED ORAL
Status: DISCONTINUED | OUTPATIENT
Start: 2020-02-10 | End: 2020-02-15 | Stop reason: HOSPADM

## 2020-02-10 RX ORDER — PANTOPRAZOLE SODIUM 40 MG/1
40 TABLET, DELAYED RELEASE ORAL
Status: DISCONTINUED | OUTPATIENT
Start: 2020-02-11 | End: 2020-02-15 | Stop reason: HOSPADM

## 2020-02-10 RX ORDER — POLYETHYLENE GLYCOL 3350 17 G/17G
17 POWDER, FOR SOLUTION ORAL
Status: DISCONTINUED | OUTPATIENT
Start: 2020-02-10 | End: 2020-02-15 | Stop reason: HOSPADM

## 2020-02-10 RX ORDER — METOPROLOL SUCCINATE 25 MG/1
12.5 TABLET, EXTENDED RELEASE ORAL DAILY
Status: DISCONTINUED | OUTPATIENT
Start: 2020-02-11 | End: 2020-02-15 | Stop reason: HOSPADM

## 2020-02-10 RX ORDER — DIPHENHYDRAMINE HYDROCHLORIDE 50 MG/ML
12.5 INJECTION, SOLUTION INTRAMUSCULAR; INTRAVENOUS AS NEEDED
Status: DISCONTINUED | OUTPATIENT
Start: 2020-02-10 | End: 2020-02-10 | Stop reason: HOSPADM

## 2020-02-10 RX ORDER — FENTANYL CITRATE 50 UG/ML
50 INJECTION, SOLUTION INTRAMUSCULAR; INTRAVENOUS AS NEEDED
Status: DISCONTINUED | OUTPATIENT
Start: 2020-02-10 | End: 2020-02-10 | Stop reason: HOSPADM

## 2020-02-10 RX ORDER — ONDANSETRON 2 MG/ML
4 INJECTION INTRAMUSCULAR; INTRAVENOUS AS NEEDED
Status: DISCONTINUED | OUTPATIENT
Start: 2020-02-10 | End: 2020-02-10 | Stop reason: HOSPADM

## 2020-02-10 RX ADMIN — ONDANSETRON HYDROCHLORIDE 4 MG: 2 INJECTION, SOLUTION INTRAMUSCULAR; INTRAVENOUS at 17:19

## 2020-02-10 RX ADMIN — Medication 1 AMPULE: at 21:50

## 2020-02-10 RX ADMIN — Medication 10 MG: at 15:51

## 2020-02-10 RX ADMIN — Medication 10 MG: at 15:55

## 2020-02-10 RX ADMIN — PHENYLEPHRINE HYDROCHLORIDE 50 MCG/MIN: 10 INJECTION INTRAVENOUS at 16:15

## 2020-02-10 RX ADMIN — SUCCINYLCHOLINE CHLORIDE 140 MG: 20 INJECTION, SOLUTION INTRAMUSCULAR; INTRAVENOUS at 15:46

## 2020-02-10 RX ADMIN — PROPOFOL 125 MCG/KG/MIN: 10 INJECTION, EMULSION INTRAVENOUS at 17:14

## 2020-02-10 RX ADMIN — ACETAMINOPHEN 1000 MG: 10 INJECTION, SOLUTION INTRAVENOUS at 15:00

## 2020-02-10 RX ADMIN — ATORVASTATIN CALCIUM 40 MG: 40 TABLET, FILM COATED ORAL at 22:02

## 2020-02-10 RX ADMIN — PROPOFOL 140 MG: 10 INJECTION, EMULSION INTRAVENOUS at 15:45

## 2020-02-10 RX ADMIN — Medication 10 MG: at 16:00

## 2020-02-10 RX ADMIN — FENTANYL CITRATE 25 MCG: 50 INJECTION, SOLUTION INTRAMUSCULAR; INTRAVENOUS at 18:21

## 2020-02-10 RX ADMIN — WATER 2 G: 1 INJECTION INTRAMUSCULAR; INTRAVENOUS; SUBCUTANEOUS at 15:55

## 2020-02-10 RX ADMIN — PROPOFOL 150 MCG/KG/MIN: 10 INJECTION, EMULSION INTRAVENOUS at 15:51

## 2020-02-10 RX ADMIN — FENTANYL CITRATE 50 MCG: 50 INJECTION, SOLUTION INTRAMUSCULAR; INTRAVENOUS at 16:11

## 2020-02-10 RX ADMIN — LIDOCAINE HYDROCHLORIDE 80 MG: 20 INJECTION, SOLUTION EPIDURAL; INFILTRATION; INTRACAUDAL; PERINEURAL at 15:45

## 2020-02-10 RX ADMIN — SODIUM CHLORIDE, SODIUM LACTATE, POTASSIUM CHLORIDE, AND CALCIUM CHLORIDE 25 ML/HR: 600; 310; 30; 20 INJECTION, SOLUTION INTRAVENOUS at 15:00

## 2020-02-10 RX ADMIN — GABAPENTIN 600 MG: 300 CAPSULE ORAL at 22:06

## 2020-02-10 RX ADMIN — MEMANTINE HYDROCHLORIDE 10 MG: 10 TABLET ORAL at 22:02

## 2020-02-10 RX ADMIN — Medication 80 MCG: at 16:09

## 2020-02-10 RX ADMIN — FENTANYL CITRATE 50 MCG: 50 INJECTION, SOLUTION INTRAMUSCULAR; INTRAVENOUS at 15:45

## 2020-02-10 RX ADMIN — DEXAMETHASONE SODIUM PHOSPHATE 4 MG: 4 INJECTION, SOLUTION INTRAMUSCULAR; INTRAVENOUS at 16:00

## 2020-02-10 RX ADMIN — Medication 80 MCG: at 16:04

## 2020-02-10 RX ADMIN — PREGABALIN 150 MG: 75 CAPSULE ORAL at 15:00

## 2020-02-10 RX ADMIN — FENTANYL CITRATE 25 MCG: 50 INJECTION, SOLUTION INTRAMUSCULAR; INTRAVENOUS at 18:30

## 2020-02-10 RX ADMIN — PROPOFOL 40 MG: 10 INJECTION, EMULSION INTRAVENOUS at 16:17

## 2020-02-10 RX ADMIN — Medication 3 AMPULE: at 15:00

## 2020-02-10 NOTE — ANESTHESIA POSTPROCEDURE EVALUATION
Procedure(s):  L3-5 LATERAL FUSION WITH BONE MARROW ASPIRATION FROM ILIAC CREST. general    Anesthesia Post Evaluation        Patient location during evaluation: PACU  Note status: Adequate. Level of consciousness: responsive to verbal stimuli and sleepy but conscious  Pain management: satisfactory to patient  Airway patency: patent  Anesthetic complications: no  Cardiovascular status: acceptable  Respiratory status: acceptable  Hydration status: acceptable  Comments: +Post-Anesthesia Evaluation and Assessment    Patient: Augustine Cao MRN: [id number]  SSN: [id number]   YOB: 1945  Age: 75 y.o. Sex: male      Cardiovascular Function/Vital Signs    /64 (BP 1 Location: Left arm, BP Patient Position: At rest)   Pulse 72   Temp 36.5 °C (97.7 °F)   Resp 13   Wt 75.3 kg (166 lb 0.1 oz)   SpO2 98%   BMI 26.00 kg/m²     Patient is status post Procedure(s):  L3-5 LATERAL FUSION WITH BONE MARROW ASPIRATION FROM ILIAC CREST. Nausea/Vomiting: Controlled. Postoperative hydration reviewed and adequate. Pain:  Pain Scale 1: Numeric (0 - 10) (02/10/20 1830)  Pain Intensity 1: 5 (02/10/20 1830)   Managed. Neurological Status:   Neuro (WDL): Exceptions to WDL (02/10/20 1750)   At baseline. Mental Status and Level of Consciousness: Arousable. Pulmonary Status:   O2 Device: Nasal cannula (02/10/20 1830)   Adequate oxygenation and airway patent. Complications related to anesthesia: None    Post-anesthesia assessment completed. No concerns. Signed By: Dilshad Herring MD    2/10/2020  Post anesthesia nausea and vomiting:  controlled      Vitals Value Taken Time   /64 2/10/2020  6:30 PM   Temp 36.5 °C (97.7 °F) 2/10/2020  5:45 PM   Pulse 75 2/10/2020  6:38 PM   Resp 18 2/10/2020  6:38 PM   SpO2 96 % 2/10/2020  6:38 PM   Vitals shown include unvalidated device data.

## 2020-02-10 NOTE — PERIOP NOTES
TRANSFER - OUT REPORT:    Verbal report given to TASHA Berumen on Stephanie Shelby  being transferred to [facility] [address], [address] for routine post - op       Report consisted of patients Situation, Background, Assessment and   Recommendations(SBAR). Information from the following report(s) SBAR, Kardex, OR Summary, Intake/Output, MAR and Cardiac Rhythm paced was reviewed with the receiving nurse. Opportunity for questions and clarification was provided. Patient transported with:   O2 @ 2 liters  Tech     Updated patient's family at time of transfer.

## 2020-02-10 NOTE — BRIEF OP NOTE
BRIEF OPERATIVE NOTE    Date of Procedure: 2/10/2020   Preoperative Diagnosis: SPINAL STENOSIS, AQUIRED SPONDILOSIS, FORAMINAL STENOSIS, RIGHT SCIATICA, CHRONIC LOW BACK PAIN  Postoperative Diagnosis: SPINAL STENOSIS, AQUIRED SPONDILOSIS, FORAMINAL STENOSIS, RIGHT SCIATICA, CHRONIC LOW BACK PAIN    Procedure(s):  L3-5 LATERAL FUSION WITH BONE MARROW ASPIRATION FROM ILIAC CREST  Surgeon(s) and Role:     Barb Mahan MD - Primary         Surgical Assistant: Felix Goodman    Surgical Staff:  Circ-1: Manuel Dillon  Circ-2: Soumya Lockett  Circ-Relief: Royer Wan RN  Physician Assistant: RENEE Barton  Scrub Tech-1: Donald Philip  Scrub Tech-Relief: Ferrell Lunch A  Event Time In Time Out   Incision Start 1623    Incision Close 1727      Anesthesia: General   Estimated Blood Loss: 50cc  Specimens: * No specimens in log *   Findings: stenosis   Complications: None  Implants:   Implant Name Type Inv.  Item Serial No.  Lot No. LRB No. Used Action   OSTEOAMP FIBERS 10CC   [id number] [facility] N/A N/A 1 Implanted   SPACER EXP [id number] 7MM 3-15D -- RISE-L - SN/A  SPACER EXP [id number] 7MM 3-15D -- RISE[employer]L N/A [employer] N/A Left 1 Implanted   PLATE THORLUM J0-[id number] 13DA -- [place] - SN/A  PLATE THORLUM [id number] 72WD -- [place] N/A [employer] N/A Left 2 Implanted   SCR BNE VA 5.5X30MM -- [place] - SN/A  SCR BNE VA 5.5X30MM -- [place] N/A [employer] N/A Left 4 Implanted   7-14X60 mm spacer   N/A [employer] N/A Left 1 Implanted

## 2020-02-10 NOTE — H&P
Progress notes     Expand All Collapse All    Subjective:      Patient ID: Anthony Chris is a 75 y.o. male.     Chief Complaint: Pain of the Lower Back        HPI:  Anthony Chris is a 75 y.o. male with complaints of low back pain radiating into the right hip into the groin, down the RLE, posterior > lateral, to the big toe. He admits to giving way of the right leg. The pain has been present 1-3 months. It is described as dull, sharp, tingling, burning, achy and stabbing and is there constantly. It is worse with walking, climbing stairs, bending over, standing and lifting and better with sitting. Anthony Chris has tried prednisone and 600 mg gabapentin TID. The pain is rated 8 out of 10 on the VAS. He ambulates with a cane.  Recently, he had a shoulder surgery.               Patient Active Problem List     Diagnosis Date Noted    Depression 07/03/2018    Moderate major depression 07/03/2018    DDD (degenerative disc disease), lumbar 04/02/2018    Chronic anticoagulation 07/17/2017    AICD (automatic cardioverter/defibrillator) present 05/13/2016    AVNRT (AV bridgette re-entry tachycardia) 05/12/2016    Cardiomyopathy 04/27/2016    Alzheimer's dementia, late onset, with behavioral disturbance 04/12/2016    Cervicogenic headache 04/12/2016    History of stroke 04/12/2016    Spinal stenosis, lumbar region, with neurogenic claudication 04/12/2016    Stenosis of both carotid arteries without cerebral infarction 04/12/2016    ACP (advance care planning) 12/30/2015    B12 deficiency 09/22/2015    Cervical post-laminectomy syndrome 09/22/2015    Hypothyroidism due to acquired atrophy of thyroid 09/22/2015    Osteoporosis 09/22/2015    Neck pain 09/22/2015    Lower GI bleeding 08/19/2015    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 07/23/2015    Presence of cardiac pacemaker 11/26/2014    Bradycardia 11/23/2014    Left-sided chest wall pain 11/23/2014    SSS (sick sinus syndrome) 11/23/2014    S/P cervical spinal fusion 06/06/2013    Atrial fibrillation 05/29/2012    Chronic systolic heart failure 45/35/1398    Chronic venous embolism and thrombosis of deep vessels of lower extremity 05/29/2012    Hyperthyroidism 05/29/2012    Mitral valve disease 05/29/2012    Osteoarthritis 05/29/2012    Palpitations 05/29/2012    Paroxysmal supraventricular tachycardia 05/29/2012    Prostate cancer 05/29/2012    History of coronary artery bypass surgery 03/07/2011    History of factor V Leiden mutation 03/07/2011    Coronary atherosclerosis of native coronary artery 03/04/2011    DVT (deep venous thrombosis) 03/04/2011    Hypertension 03/04/2011    Hyperlipemia 03/04/2011               Family History   Problem Relation Age of Onset    No Known Problems Mother      No Known Problems Father      No Known Problems Brother      No Known Problems Sister      No Known Problems Son      No Known Problems Daughter      No Known Problems Other      Diabetes Neg Hx      Coronary artery disease Neg Hx      Clotting disorder Neg Hx      Anesthesia problems Neg Hx           Social History           Tobacco Use    Smoking status: Never Smoker    Smokeless tobacco: Never Used   Substance Use Topics    Alcohol use:  Yes         Medical History        Past Medical History:   Diagnosis Date    Alzheimer's dementia, late onset, with behavioral disturbance 4/12/2016    Atrial fibrillation 5/29/2012    Cancer       prostate    Coronary artery disease      Deep vein thrombosis      Depression 7/3/2018    Hyperlipemia 3/4/2011    Hypertension      Osteoarthritis      Osteoporosis              Surgical History         Past Surgical History:   Procedure Laterality Date    CARDIAC DEFIBRILLATOR PLACEMENT        INSERT / REPLACE / REMOVE PACEMAKER                   Current Outpatient Medications:     atorvastatin (LIPITOR) 40 MG tablet, , Disp: , Rfl:     carvedilol (COREG) 3.125 MG tablet, Take 3.125 mg by mouth 2 (two) times a day with meals, Disp: , Rfl:     cyclobenzaprine (FLEXERIL) 10 MG tablet, , Disp: , Rfl:     ELIQUIS 5 MG tablet, Take 1 tablet by mouth daily  , Disp: , Rfl:     furosemide (LASIX) 20 MG tablet, Take 20 mg by mouth 2 (two) times a day, Disp: , Rfl:     gabapentin (NEURONTIN) 100 MG capsule, Take 600 mg by mouth 3 (three) times a day  , Disp: , Rfl:     gabapentin (NEURONTIN) 600 MG tablet, , Disp: , Rfl:     memantine (NAMENDA) 10 MG tablet, Take 10 mg by mouth daily  , Disp: , Rfl:     metoprolol succinate XL (TOPROL-XL) 25 MG 24 hr tablet, , Disp: , Rfl:     ondansetron (ZOFRAN) 4 MG tablet, Take 1 tablet (4 mg total) by mouth every 8 (eight) hours as needed for nausea or vomiting, Disp: 30 tablet, Rfl: 0    pantoprazole (PROTONIX) 40 MG EC tablet, Take 40 mg by mouth daily, Disp: , Rfl:     predniSONE (DELTASONE) 5 MG tablet, See administration instruction per 5mg dose pack, Disp: , Rfl:     Sacubitril-Valsartan (ENTRESTO) 24-26 MG tablet, Take 1 tablet by mouth daily, Disp: , Rfl:     sertraline (ZOLOFT) 100 MG tablet, Take 100 mg by mouth daily  , Disp: , Rfl:     traMADol (ULTRAM) 50 MG tablet, Take 50 mg by mouth, Disp: , Rfl:      No Known Allergies      ROS:   No new bowel or bladder incontinence. No fever. No saddle anesthesia. Objective:          Vitals:     01/20/20 1037   BP: 116/66         Body mass index is 26 kg/m². , a BMI over 30 is considered obese and a BMI over 40 has been associated with a higher risk of surgical complications.     Constitutional: No acute distress. Well nourished. HEENT: Normocephalic. Respiratory:  No labored breathing. Cardiovascular:  No marked cyanosis. Skin:  No marked skin ulcers/lesions on bilateral upper or lower extremities. Psychiatric: Alert and oriented x3. Inspection: No gross deformity of bilateral upper or lower extremities.   Musculoskeletal/Neurological:   Gait/balance:  - Normal. Able to walk on heels and toes. Thoracolumbar spine:  - No tenderness to palpation  - Full range of motion. Right lower extremity:  - No tenderness to palpation   - Full range of motion  - No pain with internal/external rotation of the hip  - Strength:  - 5 out of 5 to hip flexors  - 5 out of 5 to quads  - 5 out of 5 to TA  - 5 out of 5 to EHL  - 5 out of 5 to Gastroc/Soleus  - Negative straight leg raise  Left lower extremity:  - No tenderness to palpation   - Full range of motion  - No pain with internal/external rotation of the hip  - Strength:  - 5 out of 5 to hip flexors  - 5 out of 5 to quads  - 5 out of 5 to TA  - 5 out of 5 to EHL  - 5 out of 5 to Gastroc/Soleus  - Negative straight leg raise  Sensation:  - Intact to light touch  Reflexes:  - +2 Patellar tendon   - +2 Achilles tendon            Radiographs:           No imaging obtained   I independently reviewed the CT myelogram that shows spondylilosthesis at L4-L5 and a spinal stenosis at L3-S1. Assessment:          ICD-10-CM   1. Spinal stenosis, lumbar region, with neurogenic claudication M48.062   2. Acquired spondylolisthesis M43.10   3. Chronic low back pain with bilateral sciatica, unspecified back pain laterality M54.41     G89.29     M54.42   4. Foraminal stenosis of lumbar region M48.061   5. Sciatica of right side M54.31   6. Chronic bilateral low back pain with left-sided sciatica M54.42     G89.29         Plan:   I reviewed the above findings and discussed treatment options with Mr. Yanique Naik today. He presents with low back pain radiating into the right hip into the groin, down the RLE, posterior > lateral, to the big toe. I scheduled a staged operation: L3-L5 lateral, day one, L3-S1 posterior, day two.  Mr Yanique Naik must obtain clearance from his cardiologist before we proceed.      I have discussed the procedure in detail with the patient and mentioned complications, including but not limited to: death, permanent disability, heart attack, stroke, lung injury or infection, blindness, ileus, bladder or bowel problems, ureter injury, bleeding, nerve injury (including numbness, pain and weakness), paralysis (which may be permanent), failure to heal, failure to fuse bone together in fusion procedures, failure to relief symptoms, failure to relief pain, increased pain, need for further surgeries, failure or breakage or hardware, malpositioning of hardware, need to fuse or operate on additional levels determined either during or after surgery, destabilization of the spine (which may require fusion or later surgery), infections (which may or may not require additional surgery), dural tears (tears of the sac holding in nerves and spinal fluid), meningitis, voice changes, vocal cord injury, hoarseness, blood clots, pulmonary embolus, Neville syndrome, recurrent disc herniation, diaphragm paralysis, and anesthetic complications. Comorbidities such as obesity, smoking, rheumatoid arthritis, chronic steroid use and diabetes increase these risks. The patient understands and wants to proceed.      The patient has been prescribed a LSO spinal orthosis pre-operatively for pain relief. The orthosis is medically necessary to reduce pain by restricting mobility of the trunk and to otherwise support weak spinal muscles and/or deformed spine. The patient will meet with our bracing coordinator to be fit for the brace. Follow up after the surgery.             I have instructed the patient to continue to work on the physician supervised home exercise program.          Orders Placed This Encounter    Surgical Posting Sheet    Surgical Posting Sheet    BMI >=25 PATIENT INSTRUCTIONS & EDUCATION      Return for Follow up 2-3 weeks after surgery.      I, Sadia Garcia MD, personally, performed the services described in this documentation, as scribed in my presence, and it is both accurate and complete.   Scribed by: Jude Bangura      This note has been transcribed electronically using voice recognition and a trained scribe. It is believed to be accurate, but may contain errors secondary to technological limitations and other factors.

## 2020-02-10 NOTE — ANESTHESIA PREPROCEDURE EVALUATION
Anesthetic History   No history of anesthetic complications            Review of Systems / Medical History  Patient summary reviewed, nursing notes reviewed and pertinent labs reviewed    Pulmonary  Within defined limits                 Neuro/Psych       CVA  TIA, psychiatric history and dementia     Cardiovascular    Hypertension        Dysrhythmias ( AVNRT (AV bridgette re-entry tachycardia) ) : atrial fibrillation  Pacemaker (Aicd) and CAD    Exercise tolerance: >4 METS  Comments: EF 31-35%  Stenosis of both carotid arteries    GI/Hepatic/Renal     GERD           Endo/Other      Hypothyroidism  Arthritis     Other Findings   Comments: S/P cervical spinal fusion           Physical Exam    Airway  Mallampati: II  TM Distance: 4 - 6 cm  Neck ROM: normal range of motion   Mouth opening: Normal     Cardiovascular  Regular rate and rhythm,  S1 and S2 normal,  no murmur, click, rub, or gallop             Dental    Dentition: Edentulous     Pulmonary  Breath sounds clear to auscultation               Abdominal  GI exam deferred       Other Findings            Anesthetic Plan    ASA: 3  Anesthesia type: general    Monitoring Plan: BIS      Induction: Intravenous  Anesthetic plan and risks discussed with: Patient

## 2020-02-10 NOTE — PERIOP NOTES
Patient: Polo  MRN: [id number]  SSN: [id number]   YOB: 1945  Age: 75 y.o. Sex: male     Patient is status post Procedure(s):  L3-5 LATERAL FUSION WITH BONE MARROW ASPIRATION FROM ILIAC CREST. Surgeon(s) and Role:     Argelia Ashley MD - Primary                    Peripheral IV 02/10/20 Right Forearm (Active)   Site Assessment Clean, dry, & intact 2/10/2020  3:05 PM   Phlebitis Assessment 0 2/10/2020  3:05 PM   Infiltration Assessment 0 2/10/2020  3:05 PM   Dressing Status Clean, dry, & intact 2/10/2020  3:05 PM   Dressing Type Tape;Transparent 2/10/2020  3:05 PM   Hub Color/Line Status Pink; Infusing;Patent 2/10/2020  3:05 PM            Airway - Endotracheal Tube 02/10/20 Oral (Active)   Line Gurmeet Lips 2/10/2020 12:00 AM       Airway - Endotracheal Tube 02/10/20 Oral (Active)                   Dressing/Packing:  Wound Flank Left-Dressing Type: Topical skin adhesive/glue (02/10/20 1613)    Splint/Cast:  ]

## 2020-02-11 ENCOUNTER — APPOINTMENT (OUTPATIENT)
Dept: GENERAL RADIOLOGY | Age: 75
DRG: 454 | End: 2020-02-11
Attending: ORTHOPAEDIC SURGERY
Payer: MEDICARE

## 2020-02-11 ENCOUNTER — ANESTHESIA (OUTPATIENT)
Dept: SURGERY | Age: 75
DRG: 454 | End: 2020-02-11
Payer: MEDICARE

## 2020-02-11 PROCEDURE — 77030003029 HC SUT VCRL J&J -B: Performed by: ORTHOPAEDIC SURGERY

## 2020-02-11 PROCEDURE — 76000 FLUOROSCOPY <1 HR PHYS/QHP: CPT

## 2020-02-11 PROCEDURE — 74011250636 HC RX REV CODE- 250/636: Performed by: ORTHOPAEDIC SURGERY

## 2020-02-11 PROCEDURE — 74011250636 HC RX REV CODE- 250/636: Performed by: ANESTHESIOLOGY

## 2020-02-11 PROCEDURE — 0SG3071 FUSION OF LUMBOSACRAL JOINT WITH AUTOLOGOUS TISSUE SUBSTITUTE, POSTERIOR APPROACH, POSTERIOR COLUMN, OPEN APPROACH: ICD-10-PCS | Performed by: ORTHOPAEDIC SURGERY

## 2020-02-11 PROCEDURE — C1713 ANCHOR/SCREW BN/BN,TIS/BN: HCPCS | Performed by: ORTHOPAEDIC SURGERY

## 2020-02-11 PROCEDURE — 0SG1071 FUSION OF 2 OR MORE LUMBAR VERTEBRAL JOINTS WITH AUTOLOGOUS TISSUE SUBSTITUTE, POSTERIOR APPROACH, POSTERIOR COLUMN, OPEN APPROACH: ICD-10-PCS | Performed by: ORTHOPAEDIC SURGERY

## 2020-02-11 PROCEDURE — 77030003666 HC NDL SPINAL BD -A: Performed by: ORTHOPAEDIC SURGERY

## 2020-02-11 PROCEDURE — 74011000250 HC RX REV CODE- 250: Performed by: NURSE ANESTHETIST, CERTIFIED REGISTERED

## 2020-02-11 PROCEDURE — 8E0W0CZ ROBOTIC ASSISTED PROCEDURE OF TRUNK REGION, OPEN APPROACH: ICD-10-PCS | Performed by: ORTHOPAEDIC SURGERY

## 2020-02-11 PROCEDURE — 77010033678 HC OXYGEN DAILY

## 2020-02-11 PROCEDURE — 65270000029 HC RM PRIVATE

## 2020-02-11 PROCEDURE — 77030002986 HC SUT PROL J&J -A: Performed by: ORTHOPAEDIC SURGERY

## 2020-02-11 PROCEDURE — 74011000250 HC RX REV CODE- 250: Performed by: ORTHOPAEDIC SURGERY

## 2020-02-11 PROCEDURE — 74011250636 HC RX REV CODE- 250/636: Performed by: NURSE PRACTITIONER

## 2020-02-11 PROCEDURE — 74011250637 HC RX REV CODE- 250/637: Performed by: ORTHOPAEDIC SURGERY

## 2020-02-11 PROCEDURE — 76060000035 HC ANESTHESIA 2 TO 2.5 HR: Performed by: ORTHOPAEDIC SURGERY

## 2020-02-11 PROCEDURE — 76210000016 HC OR PH I REC 1 TO 1.5 HR: Performed by: ORTHOPAEDIC SURGERY

## 2020-02-11 PROCEDURE — 76010000171 HC OR TIME 2 TO 2.5 HR INTENSV-TIER 1: Performed by: ORTHOPAEDIC SURGERY

## 2020-02-11 PROCEDURE — 74011250637 HC RX REV CODE- 250/637: Performed by: NURSE PRACTITIONER

## 2020-02-11 PROCEDURE — 77030018723 HC ELCTRD BLD COVD -A: Performed by: ORTHOPAEDIC SURGERY

## 2020-02-11 PROCEDURE — 77030002933 HC SUT MCRYL J&J -A: Performed by: ORTHOPAEDIC SURGERY

## 2020-02-11 PROCEDURE — 07DR0ZZ EXTRACTION OF ILIAC BONE MARROW, OPEN APPROACH: ICD-10-PCS | Performed by: ORTHOPAEDIC SURGERY

## 2020-02-11 PROCEDURE — 77030022704 HC SUT VLOC COVD -B: Performed by: ORTHOPAEDIC SURGERY

## 2020-02-11 PROCEDURE — 72100 X-RAY EXAM L-S SPINE 2/3 VWS: CPT

## 2020-02-11 PROCEDURE — 77030008684 HC TU ET CUF COVD -B: Performed by: ANESTHESIOLOGY

## 2020-02-11 PROCEDURE — 77030040356 HC CORD BPLR FRCP COVD -A: Performed by: ORTHOPAEDIC SURGERY

## 2020-02-11 PROCEDURE — 74011250636 HC RX REV CODE- 250/636: Performed by: NURSE ANESTHETIST, CERTIFIED REGISTERED

## 2020-02-11 PROCEDURE — 77030035129: Performed by: ORTHOPAEDIC SURGERY

## 2020-02-11 PROCEDURE — 77030040951 HC GRFT BN OSTEOAMP SELCT BTUS -I: Performed by: ORTHOPAEDIC SURGERY

## 2020-02-11 PROCEDURE — 77030018846 HC SOL IRR STRL H20 ICUM -A: Performed by: ORTHOPAEDIC SURGERY

## 2020-02-11 PROCEDURE — 77030029099 HC BN WAX SSPC -A: Performed by: ORTHOPAEDIC SURGERY

## 2020-02-11 PROCEDURE — 77030032806 HC CAP SPN LOK CREO GLBM -B: Performed by: ORTHOPAEDIC SURGERY

## 2020-02-11 PROCEDURE — 77030018836 HC SOL IRR NACL ICUM -A: Performed by: ORTHOPAEDIC SURGERY

## 2020-02-11 PROCEDURE — 77030040236 HC DEV DRSG WND PICO S&N -D: Performed by: ORTHOPAEDIC SURGERY

## 2020-02-11 PROCEDURE — 77030008462 HC STPLR SKN PROX J&J -A: Performed by: ORTHOPAEDIC SURGERY

## 2020-02-11 PROCEDURE — 77030003028 HC SUT VCRL J&J -A: Performed by: ORTHOPAEDIC SURGERY

## 2020-02-11 PROCEDURE — 77030040361 HC SLV COMPR DVT MDII -B: Performed by: ORTHOPAEDIC SURGERY

## 2020-02-11 PROCEDURE — 74011250636 HC RX REV CODE- 250/636

## 2020-02-11 PROCEDURE — 77030014647 HC SEAL FBRN TISSL BAXT -D: Performed by: ORTHOPAEDIC SURGERY

## 2020-02-11 DEVICE — 7.5 X 45MM CANNULATED SCREW, MODULAR, CREO AMP
Type: IMPLANTABLE DEVICE | Site: SPINE LUMBAR | Status: FUNCTIONAL
Brand: CREO

## 2020-02-11 DEVICE — CREO MIS MODULAR POLYAXIAL TULIP, 30MM REDUCTION
Type: IMPLANTABLE DEVICE | Site: SPINE LUMBAR | Status: FUNCTIONAL
Brand: CREO

## 2020-02-11 DEVICE — 7.5 X 50MM CANNULATED SCREW, MODULAR, CREO AMP
Type: IMPLANTABLE DEVICE | Site: SPINE LUMBAR | Status: FUNCTIONAL
Brand: CREO

## 2020-02-11 DEVICE — 6.5 X 45MM CANNULATED SCREW, MODULAR, CREO AMP
Type: IMPLANTABLE DEVICE | Site: SPINE LUMBAR | Status: FUNCTIONAL
Brand: CREO

## 2020-02-11 RX ORDER — TRAMADOL HYDROCHLORIDE 50 MG/1
50 TABLET ORAL
Status: DISCONTINUED | OUTPATIENT
Start: 2020-02-11 | End: 2020-02-15 | Stop reason: HOSPADM

## 2020-02-11 RX ORDER — MIDAZOLAM HYDROCHLORIDE 1 MG/ML
1 INJECTION, SOLUTION INTRAMUSCULAR; INTRAVENOUS AS NEEDED
Status: DISCONTINUED | OUTPATIENT
Start: 2020-02-11 | End: 2020-02-11 | Stop reason: HOSPADM

## 2020-02-11 RX ORDER — PROPOFOL 10 MG/ML
INJECTION, EMULSION INTRAVENOUS
Status: DISCONTINUED | OUTPATIENT
Start: 2020-02-11 | End: 2020-02-11 | Stop reason: HOSPADM

## 2020-02-11 RX ORDER — LIDOCAINE HYDROCHLORIDE 10 MG/ML
0.1 INJECTION, SOLUTION EPIDURAL; INFILTRATION; INTRACAUDAL; PERINEURAL AS NEEDED
Status: DISCONTINUED | OUTPATIENT
Start: 2020-02-11 | End: 2020-02-11 | Stop reason: HOSPADM

## 2020-02-11 RX ORDER — SODIUM CHLORIDE, SODIUM LACTATE, POTASSIUM CHLORIDE, CALCIUM CHLORIDE 600; 310; 30; 20 MG/100ML; MG/100ML; MG/100ML; MG/100ML
100 INJECTION, SOLUTION INTRAVENOUS CONTINUOUS
Status: DISCONTINUED | OUTPATIENT
Start: 2020-02-11 | End: 2020-02-12

## 2020-02-11 RX ORDER — ONDANSETRON 2 MG/ML
INJECTION INTRAMUSCULAR; INTRAVENOUS AS NEEDED
Status: DISCONTINUED | OUTPATIENT
Start: 2020-02-11 | End: 2020-02-11 | Stop reason: HOSPADM

## 2020-02-11 RX ORDER — DIPHENHYDRAMINE HYDROCHLORIDE 50 MG/ML
12.5 INJECTION, SOLUTION INTRAMUSCULAR; INTRAVENOUS AS NEEDED
Status: DISCONTINUED | OUTPATIENT
Start: 2020-02-11 | End: 2020-02-11 | Stop reason: HOSPADM

## 2020-02-11 RX ORDER — LIDOCAINE HYDROCHLORIDE AND EPINEPHRINE 10; 10 MG/ML; UG/ML
INJECTION, SOLUTION INFILTRATION; PERINEURAL AS NEEDED
Status: DISCONTINUED | OUTPATIENT
Start: 2020-02-11 | End: 2020-02-11 | Stop reason: HOSPADM

## 2020-02-11 RX ORDER — FENTANYL CITRATE 50 UG/ML
INJECTION, SOLUTION INTRAMUSCULAR; INTRAVENOUS AS NEEDED
Status: DISCONTINUED | OUTPATIENT
Start: 2020-02-11 | End: 2020-02-11 | Stop reason: HOSPADM

## 2020-02-11 RX ORDER — DEXAMETHASONE SODIUM PHOSPHATE 4 MG/ML
INJECTION, SOLUTION INTRA-ARTICULAR; INTRALESIONAL; INTRAMUSCULAR; INTRAVENOUS; SOFT TISSUE AS NEEDED
Status: DISCONTINUED | OUTPATIENT
Start: 2020-02-11 | End: 2020-02-11 | Stop reason: HOSPADM

## 2020-02-11 RX ORDER — SODIUM CHLORIDE 0.9 % (FLUSH) 0.9 %
5-40 SYRINGE (ML) INJECTION EVERY 8 HOURS
Status: DISCONTINUED | OUTPATIENT
Start: 2020-02-11 | End: 2020-02-11 | Stop reason: HOSPADM

## 2020-02-11 RX ORDER — HYDROMORPHONE HYDROCHLORIDE 2 MG/ML
INJECTION, SOLUTION INTRAMUSCULAR; INTRAVENOUS; SUBCUTANEOUS AS NEEDED
Status: DISCONTINUED | OUTPATIENT
Start: 2020-02-11 | End: 2020-02-11 | Stop reason: HOSPADM

## 2020-02-11 RX ORDER — SODIUM CHLORIDE 9 MG/ML
25 INJECTION, SOLUTION INTRAVENOUS CONTINUOUS
Status: DISCONTINUED | OUTPATIENT
Start: 2020-02-11 | End: 2020-02-11 | Stop reason: HOSPADM

## 2020-02-11 RX ORDER — SODIUM CHLORIDE 0.9 % (FLUSH) 0.9 %
5-40 SYRINGE (ML) INJECTION AS NEEDED
Status: DISCONTINUED | OUTPATIENT
Start: 2020-02-11 | End: 2020-02-11 | Stop reason: HOSPADM

## 2020-02-11 RX ORDER — ROPIVACAINE HYDROCHLORIDE 5 MG/ML
INJECTION, SOLUTION EPIDURAL; INFILTRATION; PERINEURAL AS NEEDED
Status: DISCONTINUED | OUTPATIENT
Start: 2020-02-11 | End: 2020-02-11 | Stop reason: HOSPADM

## 2020-02-11 RX ORDER — LIDOCAINE HYDROCHLORIDE 20 MG/ML
INJECTION, SOLUTION EPIDURAL; INFILTRATION; INTRACAUDAL; PERINEURAL AS NEEDED
Status: DISCONTINUED | OUTPATIENT
Start: 2020-02-11 | End: 2020-02-11 | Stop reason: HOSPADM

## 2020-02-11 RX ORDER — ONDANSETRON 2 MG/ML
4 INJECTION INTRAMUSCULAR; INTRAVENOUS AS NEEDED
Status: DISCONTINUED | OUTPATIENT
Start: 2020-02-11 | End: 2020-02-11 | Stop reason: HOSPADM

## 2020-02-11 RX ORDER — ACETAMINOPHEN 325 MG/1
650 TABLET ORAL ONCE
Status: DISCONTINUED | OUTPATIENT
Start: 2020-02-11 | End: 2020-02-11 | Stop reason: HOSPADM

## 2020-02-11 RX ORDER — FENTANYL CITRATE 50 UG/ML
25 INJECTION, SOLUTION INTRAMUSCULAR; INTRAVENOUS
Status: DISCONTINUED | OUTPATIENT
Start: 2020-02-11 | End: 2020-02-11 | Stop reason: HOSPADM

## 2020-02-11 RX ORDER — MORPHINE SULFATE 10 MG/ML
2 INJECTION, SOLUTION INTRAMUSCULAR; INTRAVENOUS
Status: DISCONTINUED | OUTPATIENT
Start: 2020-02-11 | End: 2020-02-11 | Stop reason: HOSPADM

## 2020-02-11 RX ORDER — FENTANYL CITRATE 50 UG/ML
INJECTION, SOLUTION INTRAMUSCULAR; INTRAVENOUS
Status: COMPLETED
Start: 2020-02-11 | End: 2020-02-11

## 2020-02-11 RX ORDER — MIDAZOLAM HYDROCHLORIDE 1 MG/ML
0.5 INJECTION, SOLUTION INTRAMUSCULAR; INTRAVENOUS
Status: DISCONTINUED | OUTPATIENT
Start: 2020-02-11 | End: 2020-02-11 | Stop reason: HOSPADM

## 2020-02-11 RX ORDER — ROPIVACAINE HYDROCHLORIDE 5 MG/ML
30 INJECTION, SOLUTION EPIDURAL; INFILTRATION; PERINEURAL AS NEEDED
Status: DISCONTINUED | OUTPATIENT
Start: 2020-02-11 | End: 2020-02-11 | Stop reason: HOSPADM

## 2020-02-11 RX ORDER — CEFAZOLIN SODIUM 1 G/3ML
INJECTION, POWDER, FOR SOLUTION INTRAMUSCULAR; INTRAVENOUS AS NEEDED
Status: DISCONTINUED | OUTPATIENT
Start: 2020-02-11 | End: 2020-02-11 | Stop reason: HOSPADM

## 2020-02-11 RX ORDER — PROPOFOL 10 MG/ML
INJECTION, EMULSION INTRAVENOUS AS NEEDED
Status: DISCONTINUED | OUTPATIENT
Start: 2020-02-11 | End: 2020-02-11 | Stop reason: HOSPADM

## 2020-02-11 RX ORDER — SODIUM CHLORIDE, SODIUM LACTATE, POTASSIUM CHLORIDE, CALCIUM CHLORIDE 600; 310; 30; 20 MG/100ML; MG/100ML; MG/100ML; MG/100ML
100 INJECTION, SOLUTION INTRAVENOUS CONTINUOUS
Status: DISCONTINUED | OUTPATIENT
Start: 2020-02-11 | End: 2020-02-11 | Stop reason: HOSPADM

## 2020-02-11 RX ORDER — FENTANYL CITRATE 50 UG/ML
50 INJECTION, SOLUTION INTRAMUSCULAR; INTRAVENOUS AS NEEDED
Status: DISCONTINUED | OUTPATIENT
Start: 2020-02-11 | End: 2020-02-11 | Stop reason: HOSPADM

## 2020-02-11 RX ORDER — ACETAMINOPHEN 325 MG/1
650 TABLET ORAL
Status: DISCONTINUED | OUTPATIENT
Start: 2020-02-11 | End: 2020-02-15 | Stop reason: HOSPADM

## 2020-02-11 RX ORDER — HYDROMORPHONE HYDROCHLORIDE 1 MG/ML
0.5 INJECTION, SOLUTION INTRAMUSCULAR; INTRAVENOUS; SUBCUTANEOUS
Status: DISCONTINUED | OUTPATIENT
Start: 2020-02-11 | End: 2020-02-11 | Stop reason: HOSPADM

## 2020-02-11 RX ORDER — ROCURONIUM BROMIDE 10 MG/ML
INJECTION, SOLUTION INTRAVENOUS AS NEEDED
Status: DISCONTINUED | OUTPATIENT
Start: 2020-02-11 | End: 2020-02-11 | Stop reason: HOSPADM

## 2020-02-11 RX ORDER — PHENYLEPHRINE HCL IN 0.9% NACL 0.4MG/10ML
SYRINGE (ML) INTRAVENOUS AS NEEDED
Status: DISCONTINUED | OUTPATIENT
Start: 2020-02-11 | End: 2020-02-11 | Stop reason: HOSPADM

## 2020-02-11 RX ORDER — SUCCINYLCHOLINE CHLORIDE 20 MG/ML
INJECTION INTRAMUSCULAR; INTRAVENOUS AS NEEDED
Status: DISCONTINUED | OUTPATIENT
Start: 2020-02-11 | End: 2020-02-11 | Stop reason: HOSPADM

## 2020-02-11 RX ADMIN — PHENYLEPHRINE HYDROCHLORIDE 20 MCG/MIN: 10 INJECTION INTRAVENOUS at 14:24

## 2020-02-11 RX ADMIN — SERTRALINE HYDROCHLORIDE 200 MG: 50 TABLET ORAL at 08:51

## 2020-02-11 RX ADMIN — Medication 3 AMPULE: at 11:22

## 2020-02-11 RX ADMIN — SODIUM CHLORIDE, SODIUM LACTATE, POTASSIUM CHLORIDE, AND CALCIUM CHLORIDE: 600; 310; 30; 20 INJECTION, SOLUTION INTRAVENOUS at 16:04

## 2020-02-11 RX ADMIN — METOPROLOL SUCCINATE 12.5 MG: 25 TABLET, EXTENDED RELEASE ORAL at 08:51

## 2020-02-11 RX ADMIN — SODIUM CHLORIDE, SODIUM LACTATE, POTASSIUM CHLORIDE, AND CALCIUM CHLORIDE 100 ML/HR: 600; 310; 30; 20 INJECTION, SOLUTION INTRAVENOUS at 11:20

## 2020-02-11 RX ADMIN — GABAPENTIN 600 MG: 300 CAPSULE ORAL at 08:50

## 2020-02-11 RX ADMIN — Medication 80 MCG: at 14:20

## 2020-02-11 RX ADMIN — PROPOFOL 130 MG: 10 INJECTION, EMULSION INTRAVENOUS at 14:11

## 2020-02-11 RX ADMIN — CEFAZOLIN 2 G: 1 INJECTION, POWDER, FOR SOLUTION INTRAMUSCULAR; INTRAVENOUS; PARENTERAL at 14:20

## 2020-02-11 RX ADMIN — ROCURONIUM BROMIDE 20 MG: 10 INJECTION INTRAVENOUS at 15:16

## 2020-02-11 RX ADMIN — Medication 1 AMPULE: at 08:49

## 2020-02-11 RX ADMIN — GABAPENTIN 600 MG: 300 CAPSULE ORAL at 22:04

## 2020-02-11 RX ADMIN — FENTANYL CITRATE 25 MCG: 50 INJECTION, SOLUTION INTRAMUSCULAR; INTRAVENOUS at 16:53

## 2020-02-11 RX ADMIN — ACETAMINOPHEN 650 MG: 325 TABLET ORAL at 09:28

## 2020-02-11 RX ADMIN — PROPOFOL 75 MCG/KG/MIN: 10 INJECTION, EMULSION INTRAVENOUS at 14:18

## 2020-02-11 RX ADMIN — Medication 1 AMPULE: at 21:00

## 2020-02-11 RX ADMIN — LIDOCAINE HYDROCHLORIDE 80 MG: 20 INJECTION, SOLUTION EPIDURAL; INFILTRATION; INTRACAUDAL; PERINEURAL at 14:11

## 2020-02-11 RX ADMIN — FENTANYL CITRATE 25 MCG: 50 INJECTION, SOLUTION INTRAMUSCULAR; INTRAVENOUS at 16:39

## 2020-02-11 RX ADMIN — FENTANYL CITRATE 50 MCG: 50 INJECTION, SOLUTION INTRAMUSCULAR; INTRAVENOUS at 15:22

## 2020-02-11 RX ADMIN — DEXAMETHASONE SODIUM PHOSPHATE 8 MG: 4 INJECTION, SOLUTION INTRAMUSCULAR; INTRAVENOUS at 14:20

## 2020-02-11 RX ADMIN — FENTANYL CITRATE 50 MCG: 50 INJECTION, SOLUTION INTRAMUSCULAR; INTRAVENOUS at 14:11

## 2020-02-11 RX ADMIN — ACETAMINOPHEN 650 MG: 325 TABLET ORAL at 17:34

## 2020-02-11 RX ADMIN — ATORVASTATIN CALCIUM 40 MG: 40 TABLET, FILM COATED ORAL at 22:05

## 2020-02-11 RX ADMIN — PANTOPRAZOLE SODIUM 40 MG: 40 TABLET, DELAYED RELEASE ORAL at 08:50

## 2020-02-11 RX ADMIN — SODIUM CHLORIDE, SODIUM LACTATE, POTASSIUM CHLORIDE, AND CALCIUM CHLORIDE 100 ML/HR: 600; 310; 30; 20 INJECTION, SOLUTION INTRAVENOUS at 18:14

## 2020-02-11 RX ADMIN — MEMANTINE HYDROCHLORIDE 10 MG: 10 TABLET ORAL at 19:00

## 2020-02-11 RX ADMIN — PROPOFOL 40 MG: 10 INJECTION, EMULSION INTRAVENOUS at 14:22

## 2020-02-11 RX ADMIN — SUCCINYLCHOLINE CHLORIDE 120 MG: 20 INJECTION, SOLUTION INTRAMUSCULAR; INTRAVENOUS at 14:13

## 2020-02-11 RX ADMIN — THERA TABS 1 TABLET: TAB at 08:50

## 2020-02-11 RX ADMIN — GABAPENTIN 600 MG: 300 CAPSULE ORAL at 17:34

## 2020-02-11 RX ADMIN — MEMANTINE HYDROCHLORIDE 10 MG: 10 TABLET ORAL at 08:51

## 2020-02-11 RX ADMIN — HYDROMORPHONE HYDROCHLORIDE 0.2 MG: 2 INJECTION, SOLUTION INTRAMUSCULAR; INTRAVENOUS; SUBCUTANEOUS at 15:18

## 2020-02-11 RX ADMIN — ROCURONIUM BROMIDE 30 MG: 10 INJECTION INTRAVENOUS at 14:30

## 2020-02-11 RX ADMIN — Medication 80 MCG: at 14:17

## 2020-02-11 RX ADMIN — ONDANSETRON HYDROCHLORIDE 4 MG: 2 INJECTION, SOLUTION INTRAMUSCULAR; INTRAVENOUS at 14:04

## 2020-02-11 NOTE — PERIOP NOTES
TRANSFER - OUT REPORT:    Verbal report given to Paulette Elaine on Adelina Lancaster  being transferred to [facility], [address] [facility] for ordered procedure       Report consisted of patients Situation, Background, Assessment and   Recommendations(SBAR). Information from the following report(s) SBAR, Kardex, OR Summary, Intake/Output, MAR and Cardiac Rhythm paced was reviewed with the receiving nurse. Opportunity for questions and clarification was provided. Patient transported with:   O2 @ 3 liters  Tech     Patient reports wife left hospital prior to surgery.

## 2020-02-11 NOTE — ANESTHESIA POSTPROCEDURE EVALUATION
Procedure(s):  ROBOTIC ASSISTED L3-S1 POSTERIOR DECOMPRESSION AND FUSION WITH BONE MARROW ASPIRATION FROM ILIAC CREST. general    Anesthesia Post Evaluation        Patient location during evaluation: PACU  Note status: Adequate. Level of consciousness: responsive to verbal stimuli and sleepy but conscious  Pain management: satisfactory to patient  Airway patency: patent  Anesthetic complications: no  Cardiovascular status: acceptable  Respiratory status: acceptable  Hydration status: acceptable  Comments: +Post-Anesthesia Evaluation and Assessment    Patient: Luna Alexander MRN: [id number]  SSN: [id number]   YOB: 1945  Age: 75 y.o. Sex: male      Cardiovascular Function/Vital Signs    /72 (BP 1 Location: Left arm, BP Patient Position: At rest)   Pulse 68   Temp 36.3 °C (97.4 °F)   Resp 14   Wt 75.3 kg (166 lb 0.1 oz)   SpO2 96%   BMI 26.00 kg/m²     Patient is status post Procedure(s):  ROBOTIC ASSISTED L3-S1 POSTERIOR DECOMPRESSION AND FUSION WITH BONE MARROW ASPIRATION FROM ILIAC CREST. Nausea/Vomiting: Controlled. Postoperative hydration reviewed and adequate. Pain:  Pain Scale 1: Numeric (0 - 10) (02/11/20 1734)  Pain Intensity 1: 4 (02/11/20 1734)   Managed. Neurological Status:   Neuro (WDL): Exceptions to WDL (02/11/20 1625)   At baseline. Mental Status and Level of Consciousness: Arousable. Pulmonary Status:   O2 Device: Nasal cannula (02/11/20 1734)   Adequate oxygenation and airway patent. Complications related to anesthesia: None    Post-anesthesia assessment completed. No concerns. Signed By: Lashawn Ruiz MD    2/11/2020  Post anesthesia nausea and vomiting:  controlled      Vitals Value Taken Time   /72 2/11/2020  5:34 PM   Temp 36.3 °C (97.4 °F) 2/11/2020  4:21 PM   Pulse 68 2/11/2020  5:40 PM   Resp 18 2/11/2020  5:40 PM   SpO2 95 % 2/11/2020  5:40 PM   Vitals shown include unvalidated device data.

## 2020-02-11 NOTE — PROGRESS NOTES
TRANSFER - IN REPORT:    Verbal report received from Luis Amador RN(name) on Adelina Lancaster  being received from [facility](unit) for ordered procedure      Report consisted of patients Situation, Background, Assessment and   Recommendations(SBAR). Information from the following report(s) SBAR, MAR and Recent Results was reviewed with the receiving nurse. Opportunity for questions and clarification was provided. Assessment completed upon patients arrival to unit and care assumed.

## 2020-02-11 NOTE — PERIOP NOTES
Handoff Report from Operating Room to PACU    Report received from José Ochoa and Elisa Tolliver CRNA regarding Rosy Her. Surgeon(s):  Stephanie Hicks MD  And Procedure(s) (LRB):  ROBOTIC ASSISTED L3-S1 POSTERIOR DECOMPRESSION AND FUSION WITH BONE MARROW ASPIRATION FROM ILIAC CREST (N/A)  confirmed   with allergies and dressings discussed. Anesthesia type, drugs, patient history, complications, estimated blood loss, vital signs, intake and output, and last pain medication, lines and temperature were reviewed.

## 2020-02-11 NOTE — OP NOTES
[address]  OPERATIVE REPORT    Name:  David Pollock  MR#:  [id number]  :  1945  ACCOUNT #:  [de-identified]  DATE OF SERVICE:  02/10/2020      PREOPERATIVE DIAGNOSES:  1. Spinal stenosis with claudication. 2.  Lumbago. 3.  Sciatica. 4.  Lumbar spondylolisthesis. 5.  Foraminal stenosis. POSTOPERATIVE DIAGNOSES:  1. Spinal stenosis with claudication. 2.  Lumbago. 3.  Sciatica. 4.  Lumbar spondylolisthesis. 5.  Foraminal stenosis. PROCEDURES PERFORMED:  1. An L3 through L5 anterior fusion. 2.  An L3 through L5 anterior instrumentation. 3.  L3 through L5 insertion of interbody biomechanical device x2.  4.  Bone marrow aspirate from the left anterior superior iliac spine for spinal fusion augmentation. 5.  Use of allograft bone for spine fusion. SURGEON:  Ileana Gasca MD    FIRST ASSISTANT:  RENEE Coombs    ANESTHESIA:  General.    COMPLICATIONS:  None. SPECIMENS REMOVED:  None. DRAINS:  None. IMPLANTS:  Globus RISE-L interbody biomechanical device x2 and the Globus anterior plate called [facility]. ESTIMATED BLOOD LOSS:  50 mL. INDICATIONS FOR PROCEDURE:  The patient is a pleasant 26-year-old gentleman with lumbar spinal stenosis, lumbar spondylolisthesis, lumbago and sciatica that has failed to improve with nonoperative treatment. At this point, would like to proceed with surgical intervention. I have given him warnings about possible complications including, but not limited to pain, scar, bleeding, infection, nonunion, damage to surrounding structures, death, paralysis, blindness, stroke. He understands and wants to proceed. NARRATIVE OF THE PROCEDURE:  After informed consent was obtained and the operative site was properly marked, the patient was brought back to the operating room and underwent general endotracheal anesthesia. He was positioned in lateral decubitus with the left side up using the regular OR bed.   All the bony extremities were well padded and he was safely secured to the bed with 3-inch tape. Then proceeded to prep and drape in the usual manner followed by obtaining a time-out verifying that this is the correct patient, the correct surgery, the correct site as well as that he had received IV Ancef within 30 minutes of the incision. I then proceeded to perform a standard anterior approach to the lumbar spine for an L3 through L5 OLIF. Once the abdominal musculature was incised in layers, I was able to enter the retroperitoneal space. In the retroperitoneal space, I was able to retract the abdominal contents anteriorly and the psoas muscle posteriorly exposing the lumbar spine. I exposed L3-4 and L4-5 disks. Once both disks were exposed, I proceeded to place a self-retaining retractor beginning at L3-L4. I was able to perform a box annulotomy and remove the annulus with a pituitary. Once the annulus was removed, I proceeded to remove it with a pituitary, followed by using the Royal to detach the superior and inferior cartilaginous endplate and leaving behind punctate bleeding bone. I then proceeded to use a box shaver to complete a full diskectomy and then proceeded to use a curette to remove the remainder of the cartilaginous endplate. Once this was completed, a trial was used to determine the appropriate size for the implant and then a Jamshidi needle was inserted on the left anterior superior iliac spine and 20 mL of bone marrow were aspirated to use for bone graft augmentation. I then proceeded to use a 15-blade after retracting the vasculature and performed an ALL release. Once the ALL was incised, I proceeded to insert my interbody biomechanical device at L3-L4 and I proceeded to use the distractor to distract to its final height.   Once in its final height, I proceeded to use a funnel to backfill it with bone graft soaked in bone marrow aspirate followed by selecting a plate and inserting a plate and screws at L3 and L4. Once that was completed, I moved my retractor down to L4-5 and repeated the procedure in the exact same manner including the diskectomy and insertion of interbody biomechanical device, the anterior fusion and the anterior instrumentation. Once this was completed, the wound was irrigated with normal saline and final AP and lateral x-rays were taken. I proceeded to close the abdominal musculature in layers with #1 Vicryl figure-of-eight interrupted sutures, followed by irrigating the subcu, closed the subcu with 2-0 Vicryl and the skin with 3-0 running Monocryl and Dermabond. Sterile dressing was applied. The patient was then awakened and transferred to PACU in stable condition. POSTOPERATIVE PLAN:  The patient is going to remain here overnight. We are going to give him SCDs and JAMSHID hoses for DVT prophylaxis and Ancef for infection prophylaxis.         Kelly Hall MD      [id number]  D:  02/10/2020 17:34  T:  02/11/2020 2:23  JOB #:  [id number]  CC:  Doni Persaud MD

## 2020-02-11 NOTE — ANESTHESIA PREPROCEDURE EVALUATION
Relevant Problems   No relevant active problems       Anesthetic History   No history of anesthetic complications            Review of Systems / Medical History  Patient summary reviewed, nursing notes reviewed and pertinent labs reviewed    Pulmonary  Within defined limits                 Neuro/Psych   Within defined limits      TIA and psychiatric history     Cardiovascular  Within defined limits  Hypertension      CHF  Dysrhythmias   Pacemaker, CAD, CABG and hyperlipidemia    Exercise tolerance: <4 METS  Comments: as of 07/2019 EF 30-35; Dr. Jennifer Mcclain, Cardiomyopathy    The ventricle was mildly dilated. Systolic function was  severely reduced. Ejection fraction was estimated in the range of 25 % to  30 %. There was moderate diffuse hypokinesis. Features were consistent  with a pseudonormal left ventricular filling pattern, with concomitant  abnormal relaxation and increased filling pressure (grade 2 diastolic  dysfunction).    GI/Hepatic/Renal  Within defined limits   GERD           Endo/Other  Within defined limits    Hypothyroidism  Arthritis and cancer     Other Findings   Comments: FACTOR V LEIDEN  REPLACEMENT ARTIFICAL URINARY SPHINCTER           Physical Exam    Airway  Mallampati: III  TM Distance: 4 - 6 cm  Neck ROM: decreased range of motion   Mouth opening: Normal     Cardiovascular  Regular rate and rhythm,  S1 and S2 normal,  no murmur, click, rub, or gallop             Dental    Dentition: Edentulous     Pulmonary  Breath sounds clear to auscultation               Abdominal  GI exam deferred       Other Findings            Anesthetic Plan    ASA: 3  Anesthesia type: general          Induction: Intravenous  Anesthetic plan and risks discussed with: Patient

## 2020-02-11 NOTE — BRIEF OP NOTE
BRIEF OPERATIVE NOTE    Date of Procedure: 2/11/2020   Preoperative Diagnosis: SPINAL STENOSIS, AQUIRED SPONDILOSIS, FORAMINAL STENOSIS, RIGHT SCIATICA, CHRONIC LOW BACK PAIN  Postoperative Diagnosis: SPINAL STENOSIS, AQUIRED SPONDILOSIS, FORAMINAL STENOSIS, RIGHT SCIATICA, CHRONIC LOW BACK PAIN    Procedure(s):  ROBOTIC ASSISTED L3-S1 POSTERIOR DECOMPRESSION AND FUSION WITH BONE MARROW ASPIRATION FROM ILIAC CREST  Surgeon(s) and Role:     Alona Almaraz MD - Primary         Surgical Assistant: Erick Fry    Surgical Staff:  Circ-1: Pooja Almonte  Physician Assistant: Robbie Murry [place]  Radiology Technician: Tatiana Wolfe  Scrub Tech-1: Riley CADENA  Event Time In Time Out   Incision Start 1447    Incision Close       Anesthesia: General   Estimated Blood Loss: 100cc  Specimens: * No specimens in log *   Findings: Stenosis   Complications: None  Implants:   Implant Name Type Inv.  Item Serial No.  Lot No. LRB No. Used Action   OSTEOAMP SELCT FIBERS 10CC   [id number] [employer] N/A N/A 1 Implanted   TULIP POLYAXL MOD 30MM REDUC -- CREO MIS - SN/A  TULIP POLYAXL MOD 30MM REDUC -- CREO MIS N/A [employer] N/A N/A 7 Implanted   SET SCREW   N/A [employer] N/A N/A 7 Implanted   SCR TAMAR MOD 6.5X45MM -- CREO - SN/A  SCR TAMAR MOD 6.5X45MM -- CREO N/A [employer] N/A N/A 4 Implanted   SCR TAMAR MOD 7.5X45MM -- CREO - SN/A  SCR TAMAR MOD 7.5X45MM -- CREO N/A [employer] N/A N/A 2 Implanted   SCR TAMAR MOD 7.5X50MM -- CREO - SN/A  SCR TAMAR MOD 7.5X50MM -- CREO N/A [employer] N/A N/A 1 Implanted   100MM MIKE   N/A [employer] N/A N/A 2 Implanted

## 2020-02-11 NOTE — PERIOP NOTES
Patient: David Valenzuela MRN: [id number]  SSN: [id number]   YOB: 1945  Age: 75 y.o. Sex: male     Patient is status post Procedure(s):  ROBOTIC ASSISTED L3-S1 POSTERIOR DECOMPRESSION AND FUSION WITH BONE MARROW ASPIRATION FROM ILIAC CREST. Surgeon(s) and Role:     * Sly Lira MD - Primary    Local/Dose/Irrigation:  20ml  Lidocaine 1% with epi, 60ml 0.5% ropivicaine                    Peripheral IV 02/10/20 Right Forearm (Active)   Site Assessment Clean, dry, & intact 2/11/2020  9:00 AM   Phlebitis Assessment 0 2/11/2020  9:00 AM   Infiltration Assessment 0 2/11/2020  9:00 AM   Dressing Status Clean, dry, & intact 2/11/2020  9:00 AM   Dressing Type Tape;Transparent 2/11/2020  9:00 AM   Hub Color/Line Status Pink; Infusing 2/11/2020  9:00 AM                           Dressing/Packing:  Wound Flank Left exofin fusion dressing-Dressing Type: Topical skin adhesive/glue (02/11/20 0900)  Wound Back Incision sites x2-Dressing Type: Staples(CURT) (02/11/20 1541)  Wound Back Left and right pin sites-Dressing Type:  Adhesive wound dressing (Band-Aid) (02/11/20 1501)    Splint/Cast:  ]

## 2020-02-12 ENCOUNTER — APPOINTMENT (OUTPATIENT)
Dept: GENERAL RADIOLOGY | Age: 75
DRG: 454 | End: 2020-02-12
Attending: NURSE PRACTITIONER
Payer: MEDICARE

## 2020-02-12 LAB
ANION GAP SERPL CALC-SCNC: 4 MMOL/L (ref 5–15)
BUN SERPL-MCNC: 17 MG/DL (ref 6–20)
BUN/CREAT SERPL: 18 (ref 12–20)
CALCIUM SERPL-MCNC: 8.5 MG/DL (ref 8.5–10.1)
CHLORIDE SERPL-SCNC: 109 MMOL/L (ref 97–108)
CO2 SERPL-SCNC: 25 MMOL/L (ref 21–32)
CREAT SERPL-MCNC: 0.94 MG/DL (ref 0.7–1.3)
ERYTHROCYTE [DISTWIDTH] IN BLOOD BY AUTOMATED COUNT: 14.7 % (ref 11.5–14.5)
GLUCOSE SERPL-MCNC: 114 MG/DL (ref 65–100)
HCT VFR BLD AUTO: 30.6 % (ref 36.6–50.3)
HGB BLD-MCNC: 9.6 G/DL (ref 12.1–17)
MCH RBC QN AUTO: 29.6 PG (ref 26–34)
MCHC RBC AUTO-ENTMCNC: 31.4 G/DL (ref 30–36.5)
MCV RBC AUTO: 94.4 FL (ref 80–99)
NRBC # BLD: 0 K/UL (ref 0–0.01)
NRBC BLD-RTO: 0 PER 100 WBC
PLATELET # BLD AUTO: 129 K/UL (ref 150–400)
PMV BLD AUTO: 11.5 FL (ref 8.9–12.9)
POTASSIUM SERPL-SCNC: 5 MMOL/L (ref 3.5–5.1)
RBC # BLD AUTO: 3.24 M/UL (ref 4.1–5.7)
SODIUM SERPL-SCNC: 138 MMOL/L (ref 136–145)
WBC # BLD AUTO: 9.9 K/UL (ref 4.1–11.1)

## 2020-02-12 PROCEDURE — 71045 X-RAY EXAM CHEST 1 VIEW: CPT

## 2020-02-12 PROCEDURE — 65270000029 HC RM PRIVATE

## 2020-02-12 PROCEDURE — 94760 N-INVAS EAR/PLS OXIMETRY 1: CPT

## 2020-02-12 PROCEDURE — 97535 SELF CARE MNGMENT TRAINING: CPT

## 2020-02-12 PROCEDURE — 97530 THERAPEUTIC ACTIVITIES: CPT

## 2020-02-12 PROCEDURE — 97162 PT EVAL MOD COMPLEX 30 MIN: CPT

## 2020-02-12 PROCEDURE — 97116 GAIT TRAINING THERAPY: CPT

## 2020-02-12 PROCEDURE — 36415 COLL VENOUS BLD VENIPUNCTURE: CPT

## 2020-02-12 PROCEDURE — 74011250637 HC RX REV CODE- 250/637: Performed by: ORTHOPAEDIC SURGERY

## 2020-02-12 PROCEDURE — 77010033678 HC OXYGEN DAILY

## 2020-02-12 PROCEDURE — 97165 OT EVAL LOW COMPLEX 30 MIN: CPT

## 2020-02-12 PROCEDURE — 85027 COMPLETE CBC AUTOMATED: CPT

## 2020-02-12 PROCEDURE — 80048 BASIC METABOLIC PNL TOTAL CA: CPT

## 2020-02-12 RX ORDER — POLYETHYLENE GLYCOL 3350 17 G/17G
17 POWDER, FOR SOLUTION ORAL
Qty: 15 PACKET | Refills: 0 | Status: SHIPPED | OUTPATIENT
Start: 2020-02-12 | End: 2020-02-25 | Stop reason: DRUGHIGH

## 2020-02-12 RX ORDER — TRAMADOL HYDROCHLORIDE 50 MG/1
50-100 TABLET ORAL
Qty: 120 TAB | Refills: 5 | Status: SHIPPED | OUTPATIENT
Start: 2020-02-12 | End: 2020-02-26

## 2020-02-12 RX ORDER — AMOXICILLIN 250 MG
1 CAPSULE ORAL DAILY
Qty: 30 TAB | Refills: 0 | Status: SHIPPED | OUTPATIENT
Start: 2020-02-12 | End: 2022-01-01

## 2020-02-12 RX ADMIN — METOPROLOL SUCCINATE 12.5 MG: 25 TABLET, EXTENDED RELEASE ORAL at 08:43

## 2020-02-12 RX ADMIN — Medication 1 AMPULE: at 08:47

## 2020-02-12 RX ADMIN — APIXABAN 5 MG: 5 TABLET, FILM COATED ORAL at 05:53

## 2020-02-12 RX ADMIN — GABAPENTIN 600 MG: 300 CAPSULE ORAL at 08:42

## 2020-02-12 RX ADMIN — THERA TABS 1 TABLET: TAB at 08:43

## 2020-02-12 RX ADMIN — GABAPENTIN 600 MG: 300 CAPSULE ORAL at 21:14

## 2020-02-12 RX ADMIN — MEMANTINE HYDROCHLORIDE 10 MG: 10 TABLET ORAL at 08:42

## 2020-02-12 RX ADMIN — SACUBITRIL AND VALSARTAN 1 TABLET: 24; 26 TABLET, FILM COATED ORAL at 08:42

## 2020-02-12 RX ADMIN — TRAMADOL HYDROCHLORIDE 50 MG: 50 TABLET, FILM COATED ORAL at 13:32

## 2020-02-12 RX ADMIN — ACETAMINOPHEN 650 MG: 325 TABLET ORAL at 08:42

## 2020-02-12 RX ADMIN — APIXABAN 5 MG: 5 TABLET, FILM COATED ORAL at 17:13

## 2020-02-12 RX ADMIN — GABAPENTIN 600 MG: 300 CAPSULE ORAL at 17:13

## 2020-02-12 RX ADMIN — PANTOPRAZOLE SODIUM 40 MG: 40 TABLET, DELAYED RELEASE ORAL at 08:42

## 2020-02-12 RX ADMIN — SERTRALINE HYDROCHLORIDE 200 MG: 50 TABLET ORAL at 08:42

## 2020-02-12 RX ADMIN — MEMANTINE HYDROCHLORIDE 10 MG: 10 TABLET ORAL at 17:13

## 2020-02-12 RX ADMIN — ATORVASTATIN CALCIUM 40 MG: 40 TABLET, FILM COATED ORAL at 21:14

## 2020-02-12 RX ADMIN — ACETAMINOPHEN 650 MG: 325 TABLET ORAL at 03:28

## 2020-02-12 RX ADMIN — ACETAMINOPHEN 650 MG: 325 TABLET ORAL at 17:13

## 2020-02-12 RX ADMIN — Medication 1 AMPULE: at 21:14

## 2020-02-12 NOTE — PROGRESS NOTES
ANJU: home with home health and RW. Wife to provide transportation at d/c    Update:   bs declined patient for [facility] services as they would not be able to staff case. CM made room visit with patient who provided [facility] as second option. FOC updated. Referral sent to [facility] and waiting for response. Update:  [facility] reported that they would not be able to deliver RW to patient in hospital but could ship to patient's house with anticipated 3 buisness days to arrive. CM sent referral to [facility] who reported that they could deliver RW to patient this evening. AVS updated. FANNY requested that be done and notified [place] that patient will be using another DME agency. CM contacted [facility] and spoke to Noris as CM has not received an update as to if they can accept patient since FANNY has not received an update since 11:50am (it is now 2:45pm). Noris reported that she would make some phone calls and get back with CM promptly. Initial Note:  Reason for Admission: L3-S1 posterior decompression and fusion with bone marrow aspiration from iliac crest                     RUR Score: 18%                    Plan for utilizing home health: patient agreeable and requesting bs                          Current Advanced Directive/Advance Care Plan: not at this time                          Transition of Care Plan:                      Patient is a 76 y/o male who was admitted to [facility] on 02/10/2020 for a planned L3-S1 posterior decompression and fusion with bone marrow aspiration from iliac crest. CM made room visit with patient who was alert and oriented with wife and grandson (15 y/o) at bedside. Patient confirmed demographics, insurance, and emergency contact on file. Patient's PCP is Dr. Corrine San and was last seen 2 wk ago. Patient uses [facility] pharmacy in [place]. Patient, wife, sister-in-law, and grandson (15 y/o) reside in a single level home with an entry ramp. Patient has access to a cane at home but will need a RW prior to d/c. Patient agreeable to a RW being ordered. Prior to admission patient was independent with ALDs, IADLs (some difficulty, unsteady balance, no device used), and driving. Patient denied any history of SNF or IPR but has used [facility] for [facility] in the past.     Patient's plan is to d/c home with home health. Patient's wife to provide transportation at d/c. Patient requested to use bs as he has in the past. FOC singed and on bedside chart. Referral sent to [facility] and waiting for response. Referral also sent to [facility] and waiting for response. Care Management Interventions  PCP Verified by CM: Yes(last seen 2 wk ago )  Mode of Transport at Discharge:  Other (see comment)(wife)  Transition of Care Consult (CM Consult): Discharge Planning, Home Health  Discharge Durable Medical Equipment: Yes(RW)  Physical Therapy Consult: Yes  Occupational Therapy Consult: Yes  Speech Therapy Consult: No  Current Support Network: Lives with Spouse, Own Home, Family Lives Nearby, Other(patient, wife, sister-in-law, and grandson live in a single level home with entry ramp)  Confirm Follow Up Transport: Family(wife)  The Plan for Transition of Care is Related to the Following Treatment Goals : [facility]  The Patient and/or Patient Representative was Provided with a Choice of Provider and Agrees with the Discharge Plan?: Yes  Freedom of Choice List was Provided with Basic Dialogue that Supports the Patient's Individualized Plan of Care/Goals, Treatment Preferences and Shares the Quality Data Associated with the Providers?: Yes  Discharge Location  Discharge Placement: Home with home health    Mirza Navarro, [address]

## 2020-02-12 NOTE — PROGRESS NOTES
Problem: Mobility Impaired (Adult and Pediatric)  Goal: *Acute Goals and Plan of Care (Insert Text)  Description  FUNCTIONAL STATUS PRIOR TO ADMISSION: Patient was modified independent using a single point cane for functional mobility. HOME SUPPORT PRIOR TO ADMISSION: The patient lived with wife. Wife uses a rollator and they take care of their grandson. Physical Therapy Goals  Initiated 2/12/2020    1. Patient will move from supine to sit and sit to supine  in bed with independence within 4 days. 2. Patient will perform sit to stand with modified independence within 4 days. 3. Patient will ambulate with modified independence for 300 feet with the least restrictive device within 4 days. 4. Patient will ascend/descend 4 stairs with B handrail(s) with supervision/set-up within 4 days. 5. Patient will verbalize and demonstrate understanding of spinal precautions (No bending, lifting greater than 5 lbs, or twisting; log-roll technique; frequent repositioning as instructed) within 4 days. Outcome: Progressing Towards Goal  PHYSICAL THERAPY EVALUATION  Patient: Michelle Goodman (35 y.o. male)  Date: 2/12/2020  Primary Diagnosis: Spinal stenosis of lumbar region at multiple levels [M48.061]  Procedure(s) (LRB):  ROBOTIC ASSISTED L3-S1 POSTERIOR DECOMPRESSION AND FUSION WITH BONE MARROW ASPIRATION FROM ILIAC CREST (N/A) 1 Day Post-Op   Precautions:   Spinal, Fall      ASSESSMENT  Based on the objective data described below, the patient presents with generalized weakness, increased pain, SOB, increased need for oxygen, impaired balance and altered gait. Pt was received in supine on 2L and cleared by nursing to mobilize. Educated pt on log rolling and he was able to come to the EOB with increased time. Donned brace with minimal assistance. Ambulated into the sepulveda with RW to wheelchair, oxygen drops to 84% with activity but HR remains in the 70s. At rest if he PLB he can recover to 94%.  Transported ortho gym, negotiated steps, performed car transfer and tub transfer. He was able to ambulate back to the room and left sitting up working with OT. NP aware of pt response during session. Current Level of Function Impacting Discharge (mobility/balance): CGA    Functional Outcome Measure: The patient scored 65/100 on the barthel outcome measure which is indicative of 35% impaired. Other factors to consider for discharge: SOB     Patient will benefit from skilled therapy intervention to address the above noted impairments. PLAN :  Recommendations and Planned Interventions: bed mobility training, transfer training, gait training, therapeutic exercises, patient and family training/education, and therapeutic activities      Frequency/Duration: Patient will be followed by physical therapy:  twice daily to address goals. Recommendation for discharge: (in order for the patient to meet his/her long term goals)  Physical therapy at least 2 days/week in the home AND ensure assist and/or supervision for safety with mobility     This discharge recommendation:  Has been made in collaboration with the attending provider and/or case management    IF patient discharges home will need the following DME: rolling walker         SUBJECTIVE:   Patient stated I feel better sitting up .     OBJECTIVE DATA SUMMARY:   HISTORY:    Past Medical History:   Diagnosis Date    AICD (automatic cardioverter/defibrillator) present 5/13/2016 5/13/16 [employer] upgrade to dual chamber AICD implant  Dr. Charlene Langston    Alzheimer disease [facility])     Anxiety state, other     Arthritis     OSTEO ARTHRITIS    AVNRT (AV bridgetet re-entry tachycardia) ([address] 5/12/2016 5/12/16 AVNRT ablation: PM/AICD left upper chest :Dr. Carlos Eduardo Garza    Back ache     CAD (coronary artery disease)     Cancer [facility]) 2004    Prostate cancer    Chest tightness     last episode of chest pain 5/2016 before AICD placed; none since then    Coagulation defects 2005 FACTOR V LEIDEN    Dementia ([facility]     Dementia (HCC)     Dizziness     FH: factor V Leiden deficiency     Generalized muscle ache     GERD (gastroesophageal reflux disease)     HEARTBURN    Heart failure ([facility] 2014    as of 07/2019 EF 30-35;  Dr. Indu Sky, Cardiomyopathy    Hyperlipidemia, mixed     Hypertension     Other ill-defined conditions(799.89) 2004    blood transfusion    Pacemaker     Paroxysmal atrial fibrillation ([address]     rate controlled with coreg     Postsurgical aortocoronary bypass status 05/29/2012    CABG    Presence of cardiac defibrillator 05/2016    left upper chest    Psychiatric disorder     depression    SSS (sick sinus syndrome) ([facility]     Stroke ([facility] [address] 2011, 1990's    SEVERAL-mini    Thromboembolism ([facility] [address] [facility] 2014    left leg: changed to Eliquis from Warfarin    Thromboembolus ([facility] [address] [facility] 2005    left leg    Weakness generalized      Past Surgical History:   Procedure Laterality Date    CARDIAC CATHETERIZATION  3/4/2011         CARDIAC SURG PROCEDURE UNLIST      CABG X1 3/5/11    HX HEENT  2019    oral surgery, removed teeth, dentures upper/lower    HX HERNIA REPAIR Left 2002    Ingiunal hernia repair left    HX ORTHOPAEDIC      LEFT KNEE CARTILAGE REPAIR;RIGHT ROTATOR CUFF REPAIR    HX ORTHOPAEDIC  2/14/12    C5-7 ANTERIIOR CERVICAL DISECTOMY AND FUSION    HX ORTHOPAEDIC  6/4/13    C5-C7 POSTERIOR DECOMPRESSION AND FUSION    HX ORTHOPAEDIC      right thumb partial amputation of tip    HX OTHER SURGICAL      steroid injections    HX PACEMAKER Left 05/13/2016    BS D142, Dr. Pandey Bolus      HX PROSTATECTOMY  2004     [place]    HX ROTATOR CUFF REPAIR Left 2019    HX UROLOGICAL       urinary control system    HX UROLOGICAL  12/3/13    REPLACEMENT ARTIFICAL URINARY SPHINCTER       Personal factors and/or comorbidities impacting plan of care:     Home Situation  Home Environment: Private residence  # Steps to Enter: 0  Wheelchair Ramp: Yes  One/Two Story Residence: [facility] story  Living Alone: No  Patient Expects to be Discharged to[de-identified] Private residence  Current DME Used/Available at Home: Shira Quinn, straight, Shower chair  Tub or Shower Type: Tub/Shower combination    EXAMINATION/PRESENTATION/DECISION MAKING:   Critical Behavior:  Neurologic State: Alert, Appropriate for age  Orientation Level: Oriented X4  Cognition: Follows commands     Hearing: Auditory  Auditory Impairment: Hard of hearing, bilateral, Hearing aid(s)  Hearing Aids/Status: Functioning  Skin:  intact  Edema: none  Range Of Motion:  AROM: Generally decreased, functional           PROM: Generally decreased, functional           Strength:    Strength: Generally decreased, functional                    Tone & Sensation:   Tone: Normal              Sensation: Intact               Coordination:  Coordination: Within functional limits  Vision:      Functional Mobility:  Bed Mobility:  Rolling: Contact guard assistance  Supine to Sit: Contact guard assistance     Scooting: Stand-by assistance  Transfers:  Sit to Stand: Contact guard assistance  Stand to Sit: Contact guard assistance                       Balance:   Sitting: Intact  Standing: Impaired  Standing - Static: Good;Constant support  Standing - Dynamic : Fair;Constant support  Ambulation/Gait Training:  Distance (ft): 300 Feet (ft)  Assistive Device: Gait belt;Walker, rolling  Ambulation - Level of Assistance: Contact guard assistance        Gait Abnormalities: Decreased step clearance        Base of Support: Narrowed     Speed/Hazel: Pace decreased (<100 feet/min); Shuffled  Step Length: Left shortened;Right shortened              Stairs:  Number of Stairs Trained: 4  Stairs - Level of Assistance: Contact guard assistance   Rail Use: Both        Functional Measure:  Barthel Index:    Bathin  Bladder: 5  Bowels: 10  Groomin  Dressin  Feeding: 10  Mobility: 10  Stairs: 5  Toilet Use: 5  Transfer (Bed to Chair and Back): 10  Total: 65/100       The Barthel ADL Index: Guidelines  1. The index should be used as a record of what a patient does, not as a record of what a patient could do. 2. The main aim is to establish degree of independence from any help, physical or verbal, however minor and for whatever reason. 3. The need for supervision renders the patient not independent. 4. A patient's performance should be established using the best available evidence. Asking the patient, friends/relatives and nurses are the usual sources, but direct observation and common sense are also important. However direct testing is not needed. 5. Usually the patient's performance over the preceding 24-48 hours is important, but occasionally longer periods will be relevant. 6. Middle categories imply that the patient supplies over 50 per cent of the effort. 7. Use of aids to be independent is allowed. Jean-Pierre Busch., Barthel, DEnW. (7285). Functional evaluation: the Barthel Index. [address] [facility] (14)2. Farheen Ernandez nate JAYDEN Rodriguez, Miguel brittny., [facility] [place], [address] (1999). Measuring the change indisability after inpatient rehabilitation; comparison of the responsiveness of the Barthel Index and Functional [place] Measure. Journal of Neurology, Neurosurgery, and Psychiatry, 66(4), 941-957. Logan Ortega, N.J.A, ELIER Ambrose, & Lalo Ellis M.A. (2004.) Assessment of post-stroke quality of life in cost-effectiveness studies: The usefulness of the Barthel Index and the EuroQoL-5D.  Quality of Life Research, 15, 456-02            Physical Therapy Evaluation Charge Determination   History Examination Presentation Decision-Making   HIGH Complexity :3+ comorbidities / personal factors will impact the outcome/ POC  MEDIUM Complexity : 3 Standardized tests and measures addressing body structure, function, activity limitation and / or participation in recreation  MEDIUM Complexity : Evolving with changing characteristics  Other outcome measures barthel  MEDIUM      Based on the above components, the patient evaluation is determined to be of the following complexity level: MEDIUM        Activity Tolerance:   Fair and desaturates with exertion and requires oxygen  Please refer to the flowsheet for vital signs taken during this treatment. After treatment patient left in no apparent distress:   Sitting in chair and Call bell within reach    COMMUNICATION/EDUCATION:   The patients plan of care was discussed with: Occupational Therapist and Registered Nurse. Fall prevention education was provided and the patient/caregiver indicated understanding. and Patient/family agree to work toward stated goals and plan of care.     Thank you for this referral.  Jaclyn Reyna, PT, DPT   Time Calculation: 53 mins

## 2020-02-12 NOTE — DISCHARGE INSTRUCTIONS
[facility]    Discharge Instruction Sheet: POSTERIOR & Lateral two stage SPINAL FUSION    DR. Shaan Lang    Pain control:   Typically, we will prescribe a narcotic usually 1-2 tabs every four hours is    sufficient for the pain. Most patients need this only for the first few weeks. You   should discontinue this as the pain decreases. You should not drive while taking any narcotic pain medications. Constipation  Pain medicines and anesthesia can be constipating-this can be prevented by gentle physical activity and drinking plenty of fluid. It should be treated with over-the-counter medications such as Miralax or suppositories, and/or Fleets enema. You should have a bowel movement at least every other day following surgery. Incision care     Keep this area clean and dry. Your dressing is designed to stay in place for 5-7 days. You will be sent home with one additional dressing to change at that time. Leave this new dressing in place until our follow up visit in the office in about 10-14 days. If staples are in place, they should be removed about 14-20 days after surgery. You may shower with this impermeable dressing in place. DO NOT take a tub bath or go swimming until cleared by your doctor. DO NOT apply lotions, oils, or creams to incision. To increase and promote healing:   Stop Smoking (or at least cut back on smoking).  Eat a well-balanced diet (high in protein and vitamin C)   If your appetite is poor, consider nutritional supplements like Ensure, Glucerna, or [place] Instant Breakfast.   If you are diabetic, controlling you blood sugars is very important to prevent infection and promote wound healing. Nutrition:   If you were on a supplement such as Ensure or Glucerna) while in the hospital, please continue using them with each meal for the next 30 days.    Eat a well-balanced diet - High in protein, high in vitamins and minerals, especially vitamin C and zinc.     Restrictions:   Remember your \"BLT's\"    1. Limited bending at waist    2. Lift no more than 10 pounds    3. No Twisting     If you were given a brace, wear it when out of bed. Warning signs : Please call your physician immediately at [phone] if you have   Bleeding from incision that is constant.  Change in mental status (unusual behavior or confusion)   If your incision develops redness or swelling   Change in wound drainage (increase in amount, color, or foul odor)   [place] over 101.5 degrees Fahrenheit    Headache that is not relieved with pain medication   Tenderness or redness in the calf of your leg    Emergency: CALL 911 if you have   Shortness of breath   Chest pain   Localized chest pain when coughing or taking a deep breath    Follow-up  Please call Dr. Pérez Rear office for a follow up appointment in 2-3 weeks at [phone]. You can return to work when cleared by a physician. During normal business hours you may reach Dr. Ashvin Recio' team directly at [phone] if you have concerns or questions.     Nola Edwards

## 2020-02-12 NOTE — PROGRESS NOTES
Ortho / Neurosurgery NP Note    POD# 1  s/p ROBOTIC ASSISTED L3-S1 POSTERIOR DECOMPRESSION AND FUSION WITH BONE MARROW ASPIRATION FROM ILIAC CREST   Pt seen with Aissatou Dickinson NP    Pt resting in bed. No complaints. States his pre-op pains radiating down legs are completely gone now. VSS Afebrile. Wean O2 - currently on nasal cannula  Voiding status: + void  Output (mL)  Urine Voided: 400 ml (02/12/20 0735)  Last Bowel Movement Date: 02/10/20 (02/11/20 2200)  Unmeasurable Output  Urine Occurrence(s): 200 (02/11/20 2200)      Labs  Lab Results   Component Value Date/Time    HGB 9.6 (L) 02/12/2020 03:43 AM      Lab Results   Component Value Date/Time    INR 1.1 02/06/2020 09:07 AM        Recent Glucose Results:   Lab Results   Component Value Date/Time     (H) 02/12/2020 03:43 AM         Body mass index is 26 kg/m². : A BMI > 30 is classified as obesity and > 40 is classified as morbid obesity. CURT Dressing c.d.i  Cryotherapy in place over incision  Calves soft and supple; No pain with passive stretch  Sensation and motor intact  SCDs for mechanical DVT proph while in bed     PLAN:  1) PT BID  2) Pain control  3) Anticoagulation - Eliquis - hx of Factor V leiden. 4) Readniess for discharge:     [x] Vital Signs stable    [x] Hgb stable    [x] + Voiding    [x] Wound intact, drainage minimal    [x] Tolerating PO intake     [] Cleared by PT (OT if applicable)     [] Stair training completed (if applicable)    [] Independent / Contact Guard Assist (household distance)     [] Bed mobility     [] Car transfers     [] ADLs    [x] Adequate pain control on oral medication alone     Plan home with family when clears PT    Dino Cifuentes NP  DNP, ACNP-BC, ONP-C      Addendum @ 11:00- Per PT, O2 sats dropped to mid 80's with dyspnea during session. Pt was on 2L O2 at his time. HR remained 70s. Pt re-evaluated, sitting up in chair. Denies dyspnea, cough, cp. Lungs clear. Now on 3L O2.  Encouraged IS hourly - pt demonstrated correct use to 1250. Explained plan to continue pulmonary hygiene, monitor VSS, and discussed warning signs to alert nursing staff. Will f/u this afternoon. 14:00- Nursing weaning O2, now 2L. Pt denies dyspnea with ambulation, lungs remain clear, HR  Stable. CM still working on obtaining RW. Pt lives > 80 miles away, wife has left for the day. Plans to stay overnight, monitor VS, wean O2 and await RW.     15:30- Pt seen by PT for pm session, continues to desat with activity (as low as 82% on 3L) with dyspnea. Pt reports increased sob prior to admission at home as well. CXR ordered.    Aleisha Bustos NP

## 2020-02-12 NOTE — PROGRESS NOTES
1115 - Bedside shift change report given to Mary Ann Villagran RN (oncoming nurse) by Gianna Carlin RN (offgoing nurse). Report included the following information SBAR, Kardex, Procedure Summary, Intake/Output, MAR, Accordion, Recent Results and Med Rec Status.

## 2020-02-12 NOTE — PROGRESS NOTES
Problem: Self Care Deficits Care Plan (Adult)  Goal: *Acute Goals and Plan of Care (Insert Text)  Description  FUNCTIONAL STATUS PRIOR TO ADMISSION: Patient was modified independent using a single point cane for functional mobility. Wife helped don socks/shoes PTA d/t back pain. Drives. Enjoys fixing lawn mowers/cars. HOME SUPPORT PRIOR TO ADMISSION: The patient lived with wife. Wife uses a rollator and they take care of their grandson. Occupational Therapy Goals  Initiated 2/12/2020    1. Patient will perform grooming tasks standing at sink with modified independence within 7 days. 2.  Patient will perform seated/standing aspects of lower body dressing with modified independence using most appropriate DME within 7 days. 3.  Patient will perform all aspects of toileting with modified independence (adaptive strategies/AE PRN) within 7 days. 4.  Patient will don/doff back brace with modified independence within 7 days. 5.  Patient will verbalize/demonstrate 3/3 back precautions during ADL tasks without cues within 7 days. Outcome: Progressing Towards Goal   OCCUPATIONAL THERAPY EVALUATION  Patient: Phil Hernandez (75 y.o. male)  Date: 2/12/2020  Primary Diagnosis: Spinal stenosis of lumbar region at multiple levels [M48.061]  Procedure(s) (LRB):  ROBOTIC ASSISTED L3-S1 POSTERIOR DECOMPRESSION AND FUSION WITH BONE MARROW ASPIRATION FROM ILIAC CREST (N/A) 1 Day Post-Op   Precautions:  Spinal, Fall    ASSESSMENT  Based on the objective data described below, the patient presents with increased need for O2 supplementation, decreased activity tolerance and decreased functional mobility on POD #1 s/p L3-S1 posterior decompression and fusion. BP remained stable with position changes. Pt was received on 2 l/min O2 with O2 sats in low 90s at rest and desaturating to mid 80s with minimal activity, yet HR remained stable within the 70s this session.  Pt recalled 3/3 back precautions at onset of session and is requiring only Min A with seated aspects of LB ADLs using long handled AE issued this session. He is performing excellent return demonstration of PLB techniques throughout session during seated rest breaks for energy conservation and activity pacing. Completed trial/return demo using sock aid, dressing stick and reacher this session to don underpants and doff/don socks. Plan to review additional LB AE next session in addition to item retrieval during functional tasks prior to Pt discharging home. He completed tub transfer trial with Min A this session. Recommended to Pt that he continue sitting on his shower chair to bathe at d/c to maximize his safety with showering. Recommend HH OT at and assist at D/C. Current Level of Function Impacting Discharge (ADLs/self-care): Independent to Min A with ADLs; CGA with mobility using RW    Functional Outcome Measure: The patient scored 65/100 on the Barthel Index outcome measure which is indicative of moderate impairment with ADLs and related mobility. Other factors to consider for discharge: Currently requires O2 supplementation     Patient will benefit from skilled therapy intervention to address the above noted impairments. PLAN :  Recommendations and Planned Interventions: self care training, functional mobility training, therapeutic activities, endurance activities, patient education, and home safety training    Frequency/Duration: Patient will be followed by occupational therapy 5 times a week to address goals. Recommendation for discharge: (in order for the patient to meet his/her long term goals)  Occupational therapy at least 2 days/week in the home AND ensure assist and/or supervision for safety with tub transfers and standing aspects of ADLs.       This discharge recommendation:  Has been made in collaboration with the attending provider and/or case management    IF patient discharges home will need the following DME: Issued kit with long handled bathing/dressing equipment this session       SUBJECTIVE:   Patient stated I like fixing lawn mowers\"    OBJECTIVE DATA SUMMARY:   HISTORY:   Past Medical History:   Diagnosis Date    AICD (automatic cardioverter/defibrillator) present 5/13/2016 5/13/16 [employer] upgrade to dual chamber AICD implant  Dr. Cinthia Haddad Alzheimer disease [facility])     Anxiety state, other     Arthritis     OSTEO ARTHRITIS    AVNRT (AV bridgette re-entry tachycardia) ([address] 5/12/2016 5/12/16 AVNRT ablation: PM/AICD left upper chest :Dr. Mishel Cardona Back ache     CAD (coronary artery disease)     Cancer [facility]) 2004    Prostate cancer    Chest tightness     last episode of chest pain 5/2016 before AICD placed; none since then    Coagulation defects 2005    FACTOR V LEIDEN    Dementia ([address]     Dementia ([address]     Dizziness     FH: factor V Leiden deficiency     Generalized muscle ache     GERD (gastroesophageal reflux disease)     HEARTBURN    Heart failure ([address] 2014    as of 07/2019 EF 30-35;  Dr. Eloy Bender, Cardiomyopathy    Hyperlipidemia, mixed     Hypertension     Other ill-defined conditions(799.89) 2004    blood transfusion    Pacemaker     Paroxysmal atrial fibrillation ([address]     rate controlled with coreg     Postsurgical aortocoronary bypass status 05/29/2012    CABG    Presence of cardiac defibrillator 05/2016    left upper chest    Psychiatric disorder     depression    SSS (sick sinus syndrome) ([address]     Stroke ([address] 2011, 1990's    SEVERAL-mini    Thromboembolism ([address] 2014    left leg: changed to Eliquis from Warfarin    Thromboembolus ([address] 2005    left leg    Weakness generalized      Past Surgical History:   Procedure Laterality Date    CARDIAC CATHETERIZATION  3/4/2011         CARDIAC SURG PROCEDURE UNLIST      CABG X1 3/5/11    HX HEENT  2019    oral surgery, removed teeth, dentures upper/lower    HX HERNIA REPAIR Left 2002    Ingiunal hernia repair left    HX ORTHOPAEDIC      LEFT KNEE CARTILAGE REPAIR;RIGHT ROTATOR CUFF REPAIR    HX ORTHOPAEDIC  2/14/12    C5-7 ANTERIIOR CERVICAL DISECTOMY AND FUSION    HX ORTHOPAEDIC  6/4/13    C5-C7 POSTERIOR DECOMPRESSION AND FUSION    HX ORTHOPAEDIC      right thumb partial amputation of tip    HX OTHER SURGICAL      steroid injections    HX PACEMAKER Left 05/13/2016    BS D142, Dr. Bhargavi Branch HX PROST[place]CT[place]  2004     [place]    HX ROTATOR CUFF REPAIR Left 2019    HX UROLOGICAL       urinary control system    HX UROLOGICAL  12/3/13    REPLACEMENT ARTIFICAL URINARY SPHINCTER       Expanded or extensive additional review of patient history:     Home Situation  Home Environment: Private residence  # Steps to Enter: 0  Wheelchair Ramp: Yes  One/Two Story Residence: One story  Living Alone: No  Patient Expects to be Discharged to[de-identified] Private residence  Current DME Used/Available at Home: Mimi Yi, harshal, Shower chair  Tub or Shower Type: Tub/Shower combination    Hand dominance: Right    EXAMINATION OF PERFORMANCE DEFICITS:  Cognitive/Behavioral Status:  Neurologic State: Alert  Orientation Level: Oriented X4  Cognition: Follows commands             Skin: Surgical dressing intact    Edema: Mild edema b/l feet    Hearing: Auditory  Auditory Impairment: Hard of hearing, bilateral, Hearing aid(s)  Hearing Aids/Status: Functioning    Vision/Perceptual:         Acuity: Within Defined Limits         Range of Motion:  AROM: Generally decreased, functional(limited L shoulder AROM, Hx of prior L TSA in 2019)  PROM: Generally decreased, functional                      Strength:  Strength: Generally decreased, functional                Coordination:  Coordination: Within functional limits  Fine Motor Skills-Upper: Left Intact; Right Intact    Gross Motor Skills-Upper: Left Impaired;Right Intact    Tone & Sensation:  Tone: Normal  Sensation: Intact          Balance:  Sitting: Intact  Standing: Impaired  Standing - Static: Good;Constant support  Standing - Dynamic : Fair;Constant support    Functional Mobility and Transfers for ADLs:  Bed Mobility:  Rolling: Contact guard assistance  Supine to Sit: Contact guard assistance  Scooting: Stand-by assistance    Transfers:  Sit to Stand: Contact guard assistance  Stand to Sit: Contact guard assistance  Bed to Chair: Contact guard assistance  Bathroom Mobility: Contact guard assistance  Toilet Transfer : Contact guard assistance  Tub Transfer: Minimum assistance  Assistive Device : Walker, rolling    ADL Assessment:  Feeding: Independent    Oral Facial Hygiene/Grooming: Setup(seated)    Bathing: Minimum assistance(using LB AE issued this session)    Upper Body Dressing: Setup    Lower Body Dressing: Minimum assistance(using AE during trial this session, unable to cross legs)    Toileting: Minimum assistance    ADL Intervention and task modifications:  Patient instructed and demonstrated 3/3 back precautions with verbal cues. Upper Body Dressing Assistance  Orthotics(Brace): Moderate assistance  Cues: Physical assistance;Verbal cues provided;Visual cues provided    Lower Body Dressing Assistance  Underpants: Minimum assistance(using reacher)  Socks: Minimum assistance  Leg Crossed Method Used: No  Position Performed: Seated in chair  Cues: Doff;Don;Physical assistance; Tactile cues provided;Verbal cues provided;Visual cues provided  Adaptive Equipment Used: Dressing stick; Reacher;Sock aid    Patient instructed and indicated understanding the benefits of maintaining activity tolerance, functional mobility, and independence with self care tasks during acute stay  to ensure safe return home and to baseline. Encouraged patient to increase frequency and duration OOB, not sitting longer than 30 mins without marching/walking with staff, be out of bed for all meals, perform daily ADLs (as approved by RN/MD regarding bathing etc), and performing functional mobility to/from bathroom. Patient instruction and indicated understanding on body mechanics, ergonomics and gravitational force on the spine during different body positions to plan activities in prep for return home to complete basic ADLs, instrumental ADLs and back to work safely. Bathing: Patient instructed and indicated understanding when bathing to not submerge wound in water, stand to shower or sponge bathe, cover wound with plastic and tape to ensure no water reaches bandage/wound without cues. Dressing lower body: Patient instructed to don brace first and on the benefits to remain seated to don all clothing to increase independence with precautions and pain management. Patient instructed and demonstrated tailor sitting for lower body dressing with Min A. Standing: Patient instructed and indicated understanding to walk up to sink/counter top/surfaces, step into walker, square off while using objects, slide objects along surfaces, to increase adherence to back precautions and fall prevention. Patient instructed to increase amount of time standing in order to increase independence and tolerance with ADLs. During prolonged standing, can open cabinet door or place foot on stool to decrease spinal pressure/increase pain. Tub transfer: Patient instructed and indicated understanding regarding when it is safe to begin transfer into tub (complete stairs with PT, advance exercises with PT high enough to clear tub height, and while clothes donned practice with another person present). Functional Measure:  Barthel Index:    Bathin  Bladder: 5  Bowels: 10  Groomin  Dressin  Feeding: 10  Mobility: 10  Stairs: 5  Toilet Use: 5  Transfer (Bed to Chair and Back): 10  Total: 65/100        The Barthel ADL Index: Guidelines  1. The index should be used as a record of what a patient does, not as a record of what a patient could do.   2. The main aim is to establish degree of independence from any help, physical or verbal, however minor and for whatever reason. 3. The need for supervision renders the patient not independent. 4. A patient's performance should be established using the best available evidence. Asking the patient, friends/relatives and nurses are the usual sources, but direct observation and common sense are also important. However direct testing is not needed. 5. Usually the patient's performance over the preceding 24-48 hours is important, but occasionally longer periods will be relevant. 6. Middle categories imply that the patient supplies over 50 per cent of the effort. 7. Use of aids to be independent is allowed. Jesus Lyman., Barthel, D.W. (3085). Functional evaluation: the Barthel Index. [facility] (14)2. Raven Hennessy nate JAYDEN Rodriguez, Peggy Puga., Rosana Falcon., Kiesha, [address] (1999). Measuring the change indisability after inpatient rehabilitation; comparison of the responsiveness of the Barthel Index and Functional [place] Measure. Journal of Neurology, Neurosurgery, and Psychiatry, 66(4), 080-879. Preet Gray, [place].LENCHO, ELIER Ambrose, & Bianca Abbott MLUCIA. (2004.) Assessment of post-stroke quality of life in cost-effectiveness studies: The usefulness of the Barthel Index and the EuroQoL-5D.  Quality of Life Research, 15, 076-15        Occupational Therapy Evaluation Charge Determination   History Examination Decision-Making   LOW Complexity : Brief history review  LOW Complexity : 1-3 performance deficits relating to physical, cognitive , or psychosocial skils that result in activity limitations and / or participation restrictions  LOW Complexity : No comorbidities that affect functional and no verbal or physical assistance needed to complete eval tasks       Based on the above components, the patient evaluation is determined to be of the following complexity level: LOW   Pain Rating:  Minimal pain     Activity Tolerance:   Fair, desaturates with exertion and requires oxygen, requires rest breaks, and observed SOB with activity  Please refer to the flowsheet for vital signs taken during this treatment. Vitals:    02/12/20 0451 02/12/20 0756 02/12/20 1002 02/12/20 1033   BP: 121/72 150/69 109/75    BP 1 Location:  Left arm Left arm    BP Patient Position:  At rest Sitting;Pre-activity    Pulse: 68 72 60    Resp: 18 18 20    Temp: 98.2 °F (36.8 °C) 97.9 °F (36.6 °C)     SpO2: 99% 95% 94% 93%   Weight:            After treatment patient left in no apparent distress:    Sitting in chair, Call bell within reach, and Caregiver / family present    COMMUNICATION/EDUCATION:   The patients plan of care was discussed with: Physical Therapist, Registered Nurse, and Patient. Home safety education was provided and the patient/caregiver indicated understanding., Patient/family have participated as able in goal setting and plan of care. , and Patient/family agree to work toward stated goals and plan of care.     Thank you for this referral.  Lj Petersen OTR/L  Time Calculation: 58 mins

## 2020-02-12 NOTE — PROGRESS NOTES
Problem: Falls - Risk of  Goal: *Absence of Falls  Description  Document Celi Tavera Fall Risk and appropriate interventions in the flowsheet. Outcome: Progressing Towards Goal  Note: Fall Risk Interventions:  Mobility Interventions: Utilize walker, cane, or other assistive device, PT Consult for assist device competence, PT Consult for mobility concerns, Patient to call before getting OOB, Communicate number of staff needed for ambulation/transfer         Medication Interventions: Assess postural VS orthostatic hypotension, Patient to call before getting OOB, Teach patient to arise slowly    Elimination Interventions: Elevated toilet seat, Call light in reach, Toileting schedule/hourly rounds              Problem: Pressure Injury - Risk of  Goal: *Prevention of pressure injury  Description  Document Fernando Scale and appropriate interventions in the flowsheet.   Outcome: Progressing Towards Goal  Note: Pressure Injury Interventions:  Sensory Interventions: Assess changes in LOC    Moisture Interventions: Absorbent underpads    Activity Interventions: Increase time out of bed, Pressure redistribution bed/mattress(bed type), PT/OT evaluation    Mobility Interventions: HOB 30 degrees or less, Pressure redistribution bed/mattress (bed type), PT/OT evaluation    Nutrition Interventions: Document food/fluid/supplement intake

## 2020-02-12 NOTE — PROGRESS NOTES
Problem: Mobility Impaired (Adult and Pediatric)  Goal: *Acute Goals and Plan of Care (Insert Text)  Description  FUNCTIONAL STATUS PRIOR TO ADMISSION: Patient was modified independent using a single point cane for functional mobility. HOME SUPPORT PRIOR TO ADMISSION: The patient lived with wife. Wife uses a rollator and they take care of their grandson. Physical Therapy Goals  Initiated 2/12/2020    1. Patient will move from supine to sit and sit to supine  in bed with independence within 4 days. 2. Patient will perform sit to stand with modified independence within 4 days. 3. Patient will ambulate with modified independence for 300 feet with the least restrictive device within 4 days. 4. Patient will ascend/descend 4 stairs with B handrail(s) with supervision/set-up within 4 days. 5. Patient will verbalize and demonstrate understanding of spinal precautions (No bending, lifting greater than 5 lbs, or twisting; log-roll technique; frequent repositioning as instructed) within 4 days. 8/08/9424 [id number] by Conrado Boswell PT  Outcome: Progressing Towards Goal  PHYSICAL THERAPY TREATMENT  Patient: Gee Heredia (82 y.o. male)  Date: 2/12/2020  Diagnosis: Spinal stenosis of lumbar region at multiple levels [M48.061]   <principal problem not specified>  Procedure(s) (LRB):  ROBOTIC ASSISTED L3-S1 POSTERIOR DECOMPRESSION AND FUSION WITH BONE MARROW ASPIRATION FROM ILIAC CREST (N/A) 1 Day Post-Op  Precautions: Spinal, Fall  Chart, physical therapy assessment, plan of care and goals were reviewed. ASSESSMENT  Patient continues with skilled PT services and is progressing towards goals. Pt was received in supine on 2L and cleared by nursing to mobilize. Performed log roll to come to the EOB and donned back brace. While on room A with minimal activity oxygen saturation dropped to 83%. He was placed on 3L for activity and ambulated into the sepulveda. If pt is breathing normally he desats to 82% on 3L.  If he is PLB he cant maintain oxygen saturation 88%-93%. He was returned to the room and left sitting up in the chair. Current Level of Function Impacting Discharge (mobility/balance): CGA    Other factors to consider for discharge: desaturated with activity         PLAN :  Patient continues to benefit from skilled intervention to address the above impairments. Continue treatment per established plan of care. to address goals. Recommendation for discharge: (in order for the patient to meet his/her long term goals)  Physical therapy at least 2 days/week in the home AND ensure assist and/or supervision for safety with mobility. Concerned with pt oxygen levels and wife is on a rollator . Rehab may be safer option if he does not improve. This discharge recommendation:  Has been made in collaboration with the attending provider and/or case management    IF patient discharges home will need the following DME: to be determined (TBD)       SUBJECTIVE:   Patient stated i get short of breath at home while messing around in the shed.     OBJECTIVE DATA SUMMARY:   Critical Behavior:  Neurologic State: Alert, Appropriate for age  Orientation Level: Oriented X4  Cognition: Appropriate decision making, Appropriate for age attention/concentration, Appropriate safety awareness, Follows commands     Functional Mobility Training:  Bed Mobility:  Rolling: Contact guard assistance  Supine to Sit: Minimum assistance     Scooting: Contact guard assistance        Transfers:  Sit to Stand: Contact guard assistance  Stand to Sit: Setup        Bed to Chair: Contact guard assistance                    Balance:  Sitting: Intact  Standing: Impaired  Standing - Static: Constant support;Good  Standing - Dynamic : Constant support; Fair  Ambulation/Gait Training:  Distance (ft): 200 Feet (ft)  Assistive Device: Gait belt;Walker, rolling  Ambulation - Level of Assistance: Contact guard assistance        Gait Abnormalities: Decreased step clearance        Base of Support: Narrowed     Speed/Hazel: Slow  Step Length: Left shortened;Right shortened                Stairs:  Number of Stairs Trained: 4  Stairs - Level of Assistance: Contact guard assistance   Rail Use: Both    Activity Tolerance:   desaturates with exertion and requires oxygen  Please refer to the flowsheet for vital signs taken during this treatment.     After treatment patient left in no apparent distress:   Sitting in chair and Call bell within reach    COMMUNICATION/COLLABORATION:   The patients plan of care was discussed with: Registered Nurse and NP    Tip Pierce PT, DPT   Time Calculation: 24 mins

## 2020-02-12 NOTE — OP NOTES
[address]  OPERATIVE REPORT    Name:  Chetan Guerra  MR#:  [id number]  :  1945  ACCOUNT #:  [de-identified]  DATE OF SERVICE:  2020    PREOPERATIVE DIAGNOSES:  1. Spinal stenosis with claudication. 2.  Lumbago. 3.  Sciatica. 4.  Lumbar spondylolisthesis. 5.  Spinal stenosis both central and foraminal.    POSTOPERATIVE DIAGNOSES:  1. Spinal stenosis with claudication. 2.  Lumbago. 3.  Sciatica. 4.  Lumbar spondylolisthesis. 5.  Spinal stenosis both central and foraminal.    PROCEDURE PERFORMED:  1. L3 through S1 posterior fusion. 2.  L3 through S1 posterior instrumentation. 3.  Use of allograft bone for spine fusion. 4.  Bone marrow aspirate from the left posterior superior iliac spine for spinal fusion augmentation. 5.  Use of [employer] robotic system for placement of pedicle screws. SURGEON:  Jonatan Christian MD    ASSISTANT:  RENEE Bone    ANESTHESIA:  General.    COMPLICATIONS:  None. SPECIMENS REMOVED:  None. IMPLANTS:  [employer] pedicle screw system. ESTIMATED BLOOD LOSS:  100 mL. DRAINS:  None. INDICATIONS FOR THE PROCEDURE:  The patient is a pleasant 79-year-old gentleman with lumbar spinal stenosis, lumbago and sciatica. He has failed to improve with nonoperative treatment and at this point, would like to proceed with surgical intervention. He has been given warnings about possible complications including, but not limited to pain, scar, bleeding, infection, nonunion, damage to surrounding structures, death, paralysis, blindness and stroke. He understands and wants to proceed. NARRATIVE OF THE PROCEDURE:  After informed consent was obtained and the operative site was properly marked, the patient was moved back to the operating room and underwent general endotracheal anesthesia. He was positioned prone in the operating room table using the [employer] frame. His arms were placed in a 90/90 position.   The knees were gently bent with pillows. Fluoroscopy was used to linda the level of the incision. We then proceeded to prep and drape in the usual manner. A time-out was obtained verifying that this was the correct patient, the correct surgery, and the correct site as well as that he had received IV Ancef within 30 minutes of the incision. I then proceeded to perform a stab incision over the left posterior superior iliac spine and aspirated 20 mL of bone marrow to be used for augmentation of the bone graft. We then proceeded to place a navigation pin through that incision for the [employer] robotic navigation. We then proceeded to place a second pin on the opposite side and fluoroscopy was brought into the field to register all the levels for this procedure. Once fluoroscopy was in place, we proceeded to register with an AP view, both at L3-L4, L5 and S1 and then in the lateral views. With all of them registered, we then brought in the [employer] robot and proceeded to demarcate bilateral Kimani approaches. The Kimani approach was performed bilaterally and I proceeded then to use a dilator to linda the entry points for L3 and then used the drill followed by the reamer and finally the premeasured screw. I did that at L4, L5, and S1 on the right and then repeated on the same way on the left at all four levels. Once all the screws were in place, fluoroscopy was brought into the field. We verified that the screws were in the proper position on both AP and lateral views. I then proceeded to decorticate the posterior elements bilaterally, placing the allograft bone soaked in bone marrow aspirate from L3 through S1 bilaterally for the posterior fusion followed by then placing a jonatan, then locking caps and final tightened the construct in place.   Once this was completed, I proceeded to close the fascia with #1 Vicryl figure-of-eight interrupted sutures, followed by irrigating the subcu, closed the subcu with 2-0 Vicryl and the skin with staples. Sterile dressing was applied. The patient was then awakened and transferred to PACU in stable condition. POSTOPERATIVE PLAN:  The patient is going to remain here overnight. We are going to give him SCDs and JAMSHID blood for DVT prophylaxis and Ancef for infection prophylaxis.         Lashay Naik MD      [id number]  D:  02/11/2020 16:03  T:  02/11/2020 20:20  JOB #:  [id number]  CC:  [facility]

## 2020-02-12 NOTE — PROGRESS NOTES
1805 - Patient returned to floor from surgery    1930 - Bedside shift change report given to Oscar West RN (oncoming nurse) by Jeni Lagos RN (offgoing nurse). Report included the following information SBAR, Kardex, Procedure Summary, Intake/Output, MAR, Accordion, Recent Results and Med Rec Status.

## 2020-02-13 ENCOUNTER — APPOINTMENT (OUTPATIENT)
Dept: CT IMAGING | Age: 75
DRG: 454 | End: 2020-02-13
Attending: INTERNAL MEDICINE
Payer: MEDICARE

## 2020-02-13 LAB
ANION GAP SERPL CALC-SCNC: 3 MMOL/L (ref 5–15)
BUN SERPL-MCNC: 14 MG/DL (ref 6–20)
BUN/CREAT SERPL: 12 (ref 12–20)
CALCIUM SERPL-MCNC: 8.4 MG/DL (ref 8.5–10.1)
CHLORIDE SERPL-SCNC: 105 MMOL/L (ref 97–108)
CO2 SERPL-SCNC: 30 MMOL/L (ref 21–32)
CREAT SERPL-MCNC: 1.13 MG/DL (ref 0.7–1.3)
GLUCOSE SERPL-MCNC: 115 MG/DL (ref 65–100)
POTASSIUM SERPL-SCNC: 4.2 MMOL/L (ref 3.5–5.1)
SODIUM SERPL-SCNC: 138 MMOL/L (ref 136–145)

## 2020-02-13 PROCEDURE — 74011250637 HC RX REV CODE- 250/637: Performed by: ORTHOPAEDIC SURGERY

## 2020-02-13 PROCEDURE — 97530 THERAPEUTIC ACTIVITIES: CPT

## 2020-02-13 PROCEDURE — 65270000029 HC RM PRIVATE

## 2020-02-13 PROCEDURE — 36415 COLL VENOUS BLD VENIPUNCTURE: CPT

## 2020-02-13 PROCEDURE — 74011250636 HC RX REV CODE- 250/636: Performed by: INTERNAL MEDICINE

## 2020-02-13 PROCEDURE — 97110 THERAPEUTIC EXERCISES: CPT

## 2020-02-13 PROCEDURE — 77010033678 HC OXYGEN DAILY

## 2020-02-13 PROCEDURE — 71250 CT THORAX DX C-: CPT

## 2020-02-13 PROCEDURE — 97535 SELF CARE MNGMENT TRAINING: CPT

## 2020-02-13 PROCEDURE — 80048 BASIC METABOLIC PNL TOTAL CA: CPT

## 2020-02-13 PROCEDURE — 97116 GAIT TRAINING THERAPY: CPT

## 2020-02-13 PROCEDURE — 94760 N-INVAS EAR/PLS OXIMETRY 1: CPT

## 2020-02-13 RX ORDER — FUROSEMIDE 10 MG/ML
20 INJECTION INTRAMUSCULAR; INTRAVENOUS DAILY
Status: DISCONTINUED | OUTPATIENT
Start: 2020-02-13 | End: 2020-02-13

## 2020-02-13 RX ADMIN — THERA TABS 1 TABLET: TAB at 08:50

## 2020-02-13 RX ADMIN — APIXABAN 5 MG: 5 TABLET, FILM COATED ORAL at 06:22

## 2020-02-13 RX ADMIN — SERTRALINE HYDROCHLORIDE 200 MG: 50 TABLET ORAL at 08:50

## 2020-02-13 RX ADMIN — Medication 1 AMPULE: at 08:49

## 2020-02-13 RX ADMIN — Medication 1 AMPULE: at 21:39

## 2020-02-13 RX ADMIN — PANTOPRAZOLE SODIUM 40 MG: 40 TABLET, DELAYED RELEASE ORAL at 06:32

## 2020-02-13 RX ADMIN — MEMANTINE HYDROCHLORIDE 10 MG: 10 TABLET ORAL at 08:50

## 2020-02-13 RX ADMIN — METOPROLOL SUCCINATE 12.5 MG: 25 TABLET, EXTENDED RELEASE ORAL at 08:50

## 2020-02-13 RX ADMIN — SACUBITRIL AND VALSARTAN 1 TABLET: 24; 26 TABLET, FILM COATED ORAL at 08:50

## 2020-02-13 RX ADMIN — METHYLPREDNISOLONE SODIUM SUCCINATE 40 MG: 40 INJECTION, POWDER, FOR SOLUTION INTRAMUSCULAR; INTRAVENOUS at 23:38

## 2020-02-13 RX ADMIN — FUROSEMIDE 20 MG: 10 INJECTION, SOLUTION INTRAMUSCULAR; INTRAVENOUS at 17:39

## 2020-02-13 RX ADMIN — MEMANTINE HYDROCHLORIDE 10 MG: 10 TABLET ORAL at 17:39

## 2020-02-13 RX ADMIN — GABAPENTIN 600 MG: 300 CAPSULE ORAL at 08:50

## 2020-02-13 RX ADMIN — ACETAMINOPHEN 650 MG: 325 TABLET ORAL at 03:06

## 2020-02-13 RX ADMIN — ATORVASTATIN CALCIUM 40 MG: 40 TABLET, FILM COATED ORAL at 21:38

## 2020-02-13 RX ADMIN — ACETAMINOPHEN 650 MG: 325 TABLET ORAL at 11:28

## 2020-02-13 RX ADMIN — GABAPENTIN 600 MG: 300 CAPSULE ORAL at 21:38

## 2020-02-13 RX ADMIN — GABAPENTIN 600 MG: 300 CAPSULE ORAL at 17:39

## 2020-02-13 NOTE — PROGRESS NOTES
Ortho / Neurosurgery NP Note    POD# 2  s/p ROBOTIC ASSISTED L3-S1 POSTERIOR DECOMPRESSION AND FUSION WITH BONE MARROW ASPIRATION FROM ILIAC CREST     Pt resting in bed. No complaints. Asking when he can go home - explained results of CXR and consult to IM  States his pre-op pains radiating down legs are completely gone now. VSS Afebrile. 3L O2     Visit Vitals  /75 (BP 1 Location: Left arm, BP Patient Position: At rest)   Pulse 78   Temp 98.5 °F (36.9 °C)   Resp 14   Wt 75.3 kg (166 lb 0.1 oz)   SpO2 92%   BMI 26.00 kg/m²       Voiding status: + void  Output (mL)  Urine Voided: 500 ml (02/13/20 0855)  Last Bowel Movement Date: 02/10/20 (02/13/20 9421)  Unmeasurable Output  Urine Occurrence(s): 200 (02/11/20 2200)      Labs  Lab Results   Component Value Date/Time    HGB 9.6 (L) 02/12/2020 03:43 AM      Lab Results   Component Value Date/Time    INR 1.1 02/06/2020 09:07 AM        Recent Glucose Results:   Lab Results   Component Value Date/Time     (H) 02/13/2020 03:10 AM         Body mass index is 26 kg/m². : A BMI > 30 is classified as obesity and > 40 is classified as morbid obesity. CURT Dressing c.d.i  Calves soft and supple; No pain with passive stretch  Sensation and motor intact  SCDs for mechanical DVT proph while in bed  Denies dyspnea at rest but reports dyspnea with activity. Non-labored breathing while resting in bed - reg rate - crackles in left base, right side clear     PLAN:  1) PT BID  2) Pain control  3) Anticoagulation - Eliquis - hx of Factor V leiden.     4) Left Pleural Effusion - Hospitalist consulted for management   5) Readniess for discharge:     [x] Vital Signs stable    [x] Hgb stable    [x] + Voiding    [x] Wound intact, drainage minimal    [x] Tolerating PO intake     [] Cleared by PT (OT if applicable)     [] Stair training completed (if applicable)    [] Independent / Contact Guard Assist (household distance)     [] Bed mobility     [] Car transfers     [] ADLs    [x] Adequate pain control on oral medication alone     Consult IM today for management of left pleural effusion     Tammy Johnson NP

## 2020-02-13 NOTE — PROGRESS NOTES
Problem: Mobility Impaired (Adult and Pediatric)  Goal: *Acute Goals and Plan of Care (Insert Text)  Description  FUNCTIONAL STATUS PRIOR TO ADMISSION: Patient was modified independent using a single point cane for functional mobility. HOME SUPPORT PRIOR TO ADMISSION: The patient lived with wife. Wife uses a rollator and they take care of their grandson. Physical Therapy Goals  Initiated 2/12/2020    1. Patient will move from supine to sit and sit to supine  in bed with independence within 4 days. 2. Patient will perform sit to stand with modified independence within 4 days. 3. Patient will ambulate with modified independence for 300 feet with the least restrictive device within 4 days. 4. Patient will ascend/descend 4 stairs with B handrail(s) with supervision/set-up within 4 days. 5. Patient will verbalize and demonstrate understanding of spinal precautions (No bending, lifting greater than 5 lbs, or twisting; log-roll technique; frequent repositioning as instructed) within 4 days. Outcome: Progressing Towards Goal   PHYSICAL THERAPY TREATMENT  Patient: Polo Wan (58 y.o. male)  Date: 2/13/2020  Diagnosis: Spinal stenosis of lumbar region at multiple levels [M48.061]   <principal problem not specified>  Procedure(s) (LRB):  ROBOTIC ASSISTED L3-S1 POSTERIOR DECOMPRESSION AND FUSION WITH BONE MARROW ASPIRATION FROM ILIAC CREST (N/A) 2 Days Post-Op  Precautions: Spinal, Fall No bending, no lifting greater than 5 lbs, no twisting, log-roll technique, repositioning every 20-30 min except when sleeping, brace when OOB (if ordered)  Chart, physical therapy assessment, plan of care and goals were reviewed. ASSESSMENT  Patient continues with skilled PT services and is progressing towards goals. Pt presents with decreased strength and endurance. Pt performed bed mobility at min A with cueing for sequencing. Pt performed sit to stand transfer at min A/CGA.  Pt with improved transfer after initial transfer due to stiffness. Pt ambulated 90ft x2 with RW at [facility]. Pts o2 stats stable after first bout but decreased to 85% after second bout. Pt performed standing exercises to improve LE strength. Current Level of Function Impacting Discharge (mobility/balance): bed mobility at min A, sit to stand transfer min A/CGA, ambulation at CGA. Other factors to consider for discharge: respiratory status         PLAN :  Patient continues to benefit from skilled intervention to address the above impairments. Continue treatment per established plan of care. to address goals. Recommendation for discharge: (in order for the patient to meet his/her long term goals)  Physical therapy at least 2 days/week in the home AND ensure assist and/or supervision for safety with mobility. This discharge recommendation:  Has been made in collaboration with the attending provider and/or case management    IF patient discharges home will need the following DME: pt has received rolling walker       SUBJECTIVE:   Patient stated  They say I have fluid on my lungs.     OBJECTIVE DATA SUMMARY:   Critical Behavior:  Neurologic State: Alert  Orientation Level: Oriented X4  Cognition: Appropriate for age attention/concentration, Follows commands       Spinal diagnosis intervention:  The patient stated 3/3 back precautions when prompted. Reviewed all 3 back precautions, log roll technique, and sitting for 30 minutes at a time. Functional Mobility Training:    Bed Mobility:  Log Rolling: Contact guard assistance  Supine to Sit: Minimum assistance     Scooting: Contact guard assistance        Transfers:  Sit to Stand: Contact guard assistance  Stand to Sit: Stand-by assistance                             Balance:  Sitting: Intact  Standing: Impaired; With support  Standing - Static: Good;Constant support  Standing - Dynamic : Fair;Constant support  Ambulation/Gait Training:  Distance (ft): 90 Feet (ft)(x2)  Assistive Device: Gait belt;Walker, rolling  Ambulation - Level of Assistance: Contact guard assistance        Gait Abnormalities: Decreased step clearance        Base of Support: Narrowed     Speed/Hazel: Pace decreased (<100 feet/min)  Step Length: Right shortened;Left shortened          Therapeutic Exercises:   Standing   Marching x7  Mini squats x7  Pain Rating:  Pt with no complaints of pain     Activity Tolerance:   Fair, desaturates with exertion and requires oxygen, and requires rest breaks  Please refer to the flowsheet for vital signs taken during this treatment.     After treatment patient left in no apparent distress:   Sitting in chair and Call bell within reach    COMMUNICATION/COLLABORATION:   The patients plan of care was discussed with: Registered Nurse    Sami Yen PTA   Time Calculation: 35 mins

## 2020-02-13 NOTE — PROGRESS NOTES
Problem: Mobility Impaired (Adult and Pediatric)  Goal: *Acute Goals and Plan of Care (Insert Text)  Description  FUNCTIONAL STATUS PRIOR TO ADMISSION: Patient was modified independent using a single point cane for functional mobility. HOME SUPPORT PRIOR TO ADMISSION: The patient lived with wife. Wife uses a rollator and they take care of their grandson. Physical Therapy Goals  Initiated 2/12/2020    1. Patient will move from supine to sit and sit to supine  in bed with independence within 4 days. 2. Patient will perform sit to stand with modified independence within 4 days. 3. Patient will ambulate with modified independence for 300 feet with the least restrictive device within 4 days. 4. Patient will ascend/descend 4 stairs with B handrail(s) with supervision/set-up within 4 days. 5. Patient will verbalize and demonstrate understanding of spinal precautions (No bending, lifting greater than 5 lbs, or twisting; log-roll technique; frequent repositioning as instructed) within 4 days. 2/13/2020 1636 by Wing Gao PTA  Outcome: Progressing Towards Goal  2/13/2020 1541 by Wing Gao PTA  Outcome: Progressing Towards Goal   PHYSICAL THERAPY TREATMENT  Patient: Michelle Goodman (75 y.o. male)  Date: 2/13/2020  Diagnosis: Spinal stenosis of lumbar region at multiple levels [M48.061]   <principal problem not specified>  Procedure(s) (LRB):  ROBOTIC ASSISTED L3-S1 POSTERIOR DECOMPRESSION AND FUSION WITH BONE MARROW ASPIRATION FROM ILIAC CREST (N/A) 2 Days Post-Op  Precautions: Spinal, Fall No bending, no lifting greater than 5 lbs, no twisting, log-roll technique, repositioning every 20-30 min except when sleeping, brace when OOB (if ordered)  Chart, physical therapy assessment, plan of care and goals were reviewed. ASSESSMENT  Patient continues with skilled PT services and is progressing towards goals. Pt presents with decreased strength and endurance.  Pt receive don 2LPM. Pt performed supine to sit at min A. Pt terrence to stand at [facility]. Pt ambulated 90ft x2 with RW at [facility]. Pt requiring cueing for PLB during ambulation. Pts o2 stats maintaining at >90% throughout activity. Pt requiring an extended rest break after first bout. Pt educated on seated exercises to performed while in chair. Pt set up for lunch after session. Pt with improved transfers this afternoon. Current Level of Function Impacting Discharge (mobility/balance): bed mobility at min A, sit to stand at [facility], ambulation at [facility]. Other factors to consider for discharge: respiratory status          PLAN :  Patient continues to benefit from skilled intervention to address the above impairments. Continue treatment per established plan of care. to address goals. Recommendation for discharge: (in order for the patient to meet his/her long term goals)  Physical therapy at least 2 days/week in the home AND ensure assist and/or supervision for safety with mobility. This discharge recommendation:  Has been made in collaboration with the attending provider and/or case management    IF patient discharges home will need the following DME: rolling walker       SUBJECTIVE:   Patient stated  I keep sleeping so much.     OBJECTIVE DATA SUMMARY:   Critical Behavior:  Neurologic State: Alert  Orientation Level: Oriented X4  Cognition: Appropriate for age attention/concentration, Follows commands       Spinal diagnosis intervention:  The patient stated 3/3 back precautions when prompted. Reviewed all 3 back precautions, log roll technique, and sitting for 30 minutes at a time. Functional Mobility Training:    Bed Mobility:  Log Rolling: Contact guard assistance  Supine to Sit: Minimum assistance     Scooting: Contact guard assistance        Transfers:  Sit to Stand: Contact guard assistance  Stand to Sit: Contact guard assistance                             Balance:  Sitting: Intact  Standing: Impaired; With support  Standing - Static: Good;Constant support  Standing - Dynamic : Fair;Constant support  Ambulation/Gait Training:  Distance (ft): 90 Feet (ft)(x2)  Assistive Device: Gait belt;Walker, rolling  Ambulation - Level of Assistance: Contact guard assistance        Gait Abnormalities: Decreased step clearance        Base of Support: Narrowed     Speed/Hazel: Pace decreased (<100 feet/min)  Step Length: Right shortened;Left shortened     Pain Rating:  Pt with no complaints of pain     Activity Tolerance:   Fair and requires rest breaks  Please refer to the flowsheet for vital signs taken during this treatment.     After treatment patient left in no apparent distress:   Sitting in chair and Call bell within reach    COMMUNICATION/COLLABORATION:   The patients plan of care was discussed with: Registered Nurse    Ebenezer Sainz PTA   Time Calculation: 23 mins

## 2020-02-13 NOTE — CONSULTS
Hospitalist Consultation Note    NAME:  Jessica Garrido   :   1945   MRN:   [id number]     ATTENDING: Bright Jackson MD  PCP:  Bella Hemphill MD    Date/Time:  2020 3:13 PM      Recommendations/Plan:     Lumbar spinal stenosis with neurogenic claudication, now POD #2 s/p robotic assisted L3-S1 posterior decompression and fusion with bone marrow aspiration from iliac crest and POD #3 s/p L3-5 lateral fusion with bone marrow aspiration from iliac crest  -Management as per primary team    Left pleural effusion  -Difficult to assess size based on single portable CXR, especially given patient's lung scarring and possible concurrent lung opacity. He actually sounds fairly clear on exam, all the way to the left lung base. I think that CT chest would better inform size of effusion and potential need for thoracentesis. Order placed. -Most likely culprit seems congestive heart failure by patient's history as well as relatively acute development of effusion (not present on CXR 2/6), though typically this would be associated with bilateral pleural effusions. On my view of CXR, it does appear that there may be a small right pleural effusion as well, as the right costophrenic angle is not sharp.   -Parapneumonic effusion is also a consideration given possible opacity on CXR, though I would tend to expect fever and leukocytosis in that circumstance. Patient does endorse new cough with yellow phlegm that just started today. Send sputum for culture. No antibiotics unless there is clinical change and/or pending CT findings.  -Pending CT, will hold Eliquis in case patient needs thoracentesis. -Will empirically start diuretics and adjust plan pending CT result.   -Per wife, patient's \"dry\" weight is 165-168 lb. Today's weight is a bed weight. Plan to obtain standing weight in AM before oral intake.     Acute hypoxic respiratory failure  -Presumably due to effusion and lung opacity  -Supplemental oxygen as needed, wean as able  -Diuretics ordered    Complicated cardiac history including chronic congestive heart failure s/p AICD, last EF 50-55% with mild LVDD (2/3/20), AVNRT s/p ablation, sick sinus syndrome, paroxysmal atrial fibrillation, CABG status  -Clinically stable with no chest pain, no dyspnea at present, warm extremities with no edema, hemodynamically stable  -Hold Eliquis for possible thoracentesis  -Continue Lipitor  -Continue metoprolol  -Continue Entresto    Factor V Leiden deficiency with history of VTE  -Resume Eliquis if no need for thoracentesis    Constipation  -Patient reports no BM yet this admission  -Consider adding a stimulant laxative to patient's regimen    Dementia  -Continue Namenda    History of prostate cancer    Hypertension  -Fairly well controlled presently; continue current regimen    Probable CKD  -Per lab review, it appears patient's baseline creatinine ranges 1.1-1.3  -As I am adding a diuretic, monitor renal function and electrolytes closely    GERD    DVT Prophylaxis: s/p Eliquis, last dose this AM, holding for now pending CT          Subjective:   REQUESTING PHYSICIAN: Dr. Alcaraz Me:      Rock Hernandes is a 75 y.o.  male who I was asked to see for hypoxia and pleural effusion. Patient is currently hospitalized following staged surgical procedure for management of lumbar spinal stenosis, including L3-5 lateral fusion with bone marrow aspiration from iliac crest on 2/10 and robotic assisted L3-S1 posterior decompression and fusion with bone marrow aspiration from iliac crest on 2/11. Yesterday, the patient was noted to develop dyspnea during a physical therapy session with hypoxia, SpO2 to mid-80s. This improved with application of supplemental oxygen at 2L. This recurred during an afternoon PT session as well. The patient is noted to be on no supplemental oxygen at home and has an extensive cardiac history but no known pulmonary disease.  A chest x-ray was completed yesterday evening and was notable for left pleural effusion with left lung opacity, mildly prominent interstitium. The effusion and opacity are new compared to chest x-ray obtained 2/6/20. At bedside, the patient denies complaints. He denies feeling short of breath currently but says that he would get short-winded if he were to take a walk. He notes new cough productive of yellow phlegm that started today per his report. He denies lower extremity edema. Denies chest pain or pressure or pleuritic pain. Denies personal history of pleural effusion but states that his brother \"is dying of fluid around the lungs,\" requiring frequent drainage. He endorses minimal smoking history (if I understood him correctly, he has smoked 1 cigar in his life) and quit drinking alcohol several years ago. He does measure his weight daily and his wife thinks his dry weight is 165-168 lb. A bed weight today is documented at 184 lb; patient was 166 lb at time of admission. Back pain is improved with surgery and patient is no longer experiencing lower extremity paresthesias, which were a problem before surgery.     Past Medical History:   Diagnosis Date    AICD (automatic cardioverter/defibrillator) present 5/13/2016 5/13/16 [employer] upgrade to dual chamber AICD implant  Dr. Iris Amin Alzheimer disease [facility])     Anxiety state, other     Arthritis     OSTEO ARTHRITIS    AVNRT (AV bridgette re-entry tachycardia) ([facility] 5/12/2016 5/12/16 AVNRT ablation: PM/AICD left upper chest :Dr. Renato Palmer Back ache     CAD (coronary artery disease)     Cancer [facility]) 2004    Prostate cancer    Chest tightness     last episode of chest pain 5/2016 before AICD placed; none since then    Coagulation defects 2005    FACTOR V LEIDEN    Dementia ([facility]     Dementia ([facility] [facility]     Dizziness     FH: factor V Leiden deficiency     Generalized muscle ache     GERD (gastroesophageal reflux disease)     HEARTBURN    Heart failure ([facility] [address] 2014    as of 07/2019 EF 30-35;  Dr. Jose Mckay, Cardiomyopathy    Hyperlipidemia, mixed     Hypertension     Other ill-defined conditions(799.89) 2004    blood transfusion    Pacemaker     Paroxysmal atrial fibrillation ([address]     rate controlled with coreg     Postsurgical aortocoronary bypass status 05/29/2012    CABG    Presence of cardiac defibrillator 05/2016    left upper chest    Psychiatric disorder     depression    SSS (sick sinus syndrome) ([facility] [address] [facility]     Stroke ([facility] [address] [facility] 2011, 1990's    SEVERAL-mini    Thromboembolism ([facility] [address] 2014    left leg: changed to Eliquis from Warfarin    Thromboembolus ([facility] [address] 2005    left leg    Weakness generalized       Past Surgical History:   Procedure Laterality Date    CARDIAC CATHETERIZATION  3/4/2011         CARDIAC SURG PROCEDURE UNLIST      CABG X1 3/5/11    HX HEENT  2019    oral surgery, removed teeth, dentures upper/lower    HX HERNIA REPAIR Left 2002    Ingiunal hernia repair left    HX ORTHOPAEDIC      LEFT KNEE CARTILAGE REPAIR;RIGHT ROTATOR CUFF REPAIR    HX ORTHOPAEDIC  2/14/12    C5-7 ANTERIIOR CERVICAL DISECTOMY AND FUSION    HX ORTHOPAEDIC  6/4/13    C5-C7 POSTERIOR DECOMPRESSION AND FUSION    HX ORTHOPAEDIC      right thumb partial amputation of tip    HX OTHER SURGICAL      steroid injections    HX PACEMAKER Left 05/13/2016    BS D142, Dr. Lissy Quan HX PROSTATECT[place]  2004     [place]    HX ROTATOR CUFF REPAIR Left 2019    HX UROLOGICAL       urinary control system    HX UROLOGICAL  12/3/13    REPLACEMENT ARTIFICAL URINARY SPHINCTER     Social History     Tobacco Use    Smoking status: Never Smoker    Smokeless tobacco: Never Used   Substance Use Topics    Alcohol use: Not Currently     Alcohol/week: 0.0 standard drinks     Comment: stopped 2010      Family History   Problem Relation Age of Onset    Asthma Mother     Alcohol abuse Mother     Alcohol abuse Father     Asthma Father     Cancer Sister     Heart Disease Sister     Alcohol abuse Brother     Cancer Brother     Heart Disease Brother        No Known Allergies   Prior to Admission medications    Medication Sig Start Date End Date Taking? Authorizing Provider   traMADol (ULTRAM) 50 mg tablet Take 1-2 Tabs by mouth every six (6) hours as needed for Pain for up to 14 days. Max Daily Amount: 400 mg. Indications: pain 2/12/20 2/26/20 Yes Ciara Alvarez NP   polyethylene glycol [facility]) 17 gram packet Take 1 Packet by mouth daily as needed (constipation) for up to 15 days. 2/12/20 2/27/20 Yes Ciara Alvarez NP   senna-docusate (PERICOLACE) 8.6-50 mg per tablet Take 1 Tab by mouth daily. 2/12/20  Yes Ciara Alvarez NP   gabapentin (NEURONTIN) 600 mg tablet Take 600 mg by mouth three (3) times daily. Yes Provider, Historical   acetaminophen (TYLENOL) 325 mg tablet Take  by mouth every four (4) hours as needed for Pain. Yes Provider, Historical   memantine (NAMENDA) 10 mg tablet Take 1 Tab by mouth two (2) times a day. 1/29/20  Yes Neal Alexander NP   QUEtiapine (SEROQUEL) 25 mg tablet 1 tab Tid prn agitation headache 1/29/20  Yes Neal Alexander NP   metoprolol succinate (TOPROL-XL) 25 mg XL tablet TAKE 1/2 TABLET BY MOUTH ONCE DAILY  Patient taking differently: Take 12.5 mg by mouth daily. May split tablet but DO NOT CHEW OR CRUSH tablet, swallow whole 1/16/20  Yes Lv Toussaint NP   atorvastatin (LIPITOR) 40 mg tablet TAKE 1 TABLET BY MOUTH EVERY DAY  Patient taking differently: Take 40 mg by mouth nightly. TAKE 1 TABLET BY MOUTH EVERY DAY 12/17/19  Yes Kathryn Aguilar MD   pantoprazole (PROTONIX) 40 mg tablet TAKE 1 TABLET BY MOUTH ONCE DAILY 11/29/19  Yes Kathryn Aguilar MD   sertraline (ZOLOFT) 100 mg tablet Take 2 Tabs by mouth daily. 11/20/19  Yes Kathryn Aguilar MD   polyethylene glycol [facility]) 17 gram/dose powder Take 17 g by mouth as needed.    Yes Provider, Historical   sacubitril-valsartan (ENTRESTO) 24 mg/26 mg tablet Take 1 Tab by mouth daily. Provider, Historical   ELIQUIS 5 mg tablet TAKE 1 TABLET BY MOUTH EVERY 12 HOURS 11/12/19   Alberta Andino MD   multivit-min/FA/lycopen/lutein (SENTRY SENIOR PO) Take  by mouth daily. Provider, Historical   Incontinence Pad, Liner, Disp (BLADDER CONTROL PADS EX ABSORB) pads 1 Packet by Does Not Apply route as needed (incontinence). 12/3/13   Inessa Bar MD   nitroglycerin (NITROSTAT) 0.4 mg SL tablet 1 Tab by SubLINGual route every five (5) minutes as needed for Chest Pain (call 911 if not relieved by 3). 11/25/13   Lita Garcia, ANP       REVIEW OF SYSTEMS:     Total of 12 systems reviewed as follows:   I am not able to complete the review of systems because:    The patient is intubated and sedated    The patient has altered mental status due to his acute medical problems    The patient has baseline aphasia from prior stroke(s)    The patient has baseline dementia and is not reliable historian                 POSITIVE= underlined text  Negative = text not underlined  General:  fever, chills (last night), sweats, generalized weakness, weight loss/gain, loss of appetite   Eyes:    blurred vision, eye pain, loss of vision, double vision  ENT:    rhinorrhea, pharyngitis   Respiratory:   cough, sputum production, SOB, wheezing, HOLCOMB, pleuritic pain   Cardiology:   chest pain, palpitations, orthopnea, PND, edema, syncope   Gastrointestinal:  abdominal pain , N/V, dysphagia, diarrhea, constipation, bleeding   Genitourinary:  frequency, urgency, dysuria, hematuria, incontinence   Muskuloskeletal :  arthralgia, myalgia, back pain  Hematology:  easy bruising, nose or gum bleeding, lymphadenopathy   Dermatological: rash, ulceration, pruritis   Endocrine:   hot flashes or polydipsia   Neurological:  headache, dizziness, confusion, focal weakness, paresthesia,     Speech difficulties, memory loss, gait disturbance  Psychological: Feelings of anxiety, depression, agitation    Objective:   VITALS:    Visit Vitals  /77   Pulse 73   Temp 97.9 °F (36.6 °C)   Resp 16   Wt 83.8 kg (184 lb 12.8 oz)   SpO2 94%   BMI 28.94 kg/m²     Temp (24hrs), Av.5 °F (36.9 °C), Min:97.9 °F (36.6 °C), Max:99 °F (37.2 °C)      PHYSICAL EXAM:   General:    Alert, cooperative, no distress, appears older than stated age. HEENT: Atraumatic, anicteric sclerae, pink conjunctivae. Hearing aids in place bilaterally. No oral ulcers, mucosa moist, throat clear, edentulous  Neck:  Supple, symmetrical,  thyroid: non tender  Lungs:   Clear to auscultation bilaterally. No Wheezing or Rhonchi. No rales. Chest wall:  No tenderness  No Accessory muscle use. Heart:   Regular  rhythm,  No  murmur   No edema  Abdomen:   Soft, non-tender. Not distended. Bowel sounds normal  Extremities: No cyanosis. No clubbing  Skin:     Not pale. Not Jaundiced  No rashes   Psych:  Fair insight. Not depressed. Not anxious or agitated. Neurologic: EOMs intact. No facial asymmetry. No aphasia or slurred speech. Alert and oriented X to self, place, purpose. Somewhat hard of hearing.     _______________________________________________________________________  Care Plan discussed with:    Comments   Patient x    Family  x    RN x    Care Manager                    Consultant:  x Primary orthopedic team NP   ____________________________________________________________________  TOTAL TIME:     45 mins    Comments    x Reviewed previous records   >50% of visit spent in counseling and coordination of care x Discussion with patient and/or family and questions answered       Critical Care Provided     Minutes non procedure based  ________________________________________________________________________  Signed: Bia Nguyen MD      Procedures: see electronic medical records for all procedures/Xrays and details which were not copied into this note but were reviewed prior to creation of Plan.     LAB DATA REVIEWED:    Recent Results (from the past 24 hour(s))   METABOLIC PANEL, BASIC    Collection Time: 02/13/20  3:10 AM   Result Value Ref Range    Sodium 138 136 - 145 mmol/L    Potassium 4.2 3.5 - 5.1 mmol/L    Chloride 105 97 - 108 mmol/L    CO2 30 21 - 32 mmol/L    Anion gap 3 (L) 5 - 15 mmol/L    Glucose 115 (H) 65 - 100 mg/dL    BUN 14 6 - 20 MG/DL    Creatinine 1.13 0.70 - 1.30 MG/DL    BUN/Creatinine ratio 12 12 - 20      GFR est AA >60 >60 ml/min/1.73m2    GFR est non-AA >60 >60 ml/min/1.73m2    Calcium 8.4 (L) 8.5 - 10.1 MG/DL       _____________________________  Hospitalist: Barry Kapadia MD

## 2020-02-13 NOTE — PROGRESS NOTES
Bedside and Verbal shift change report given to Bruna Winter RN  (oncoming nurse) by Radha Hernandez  (offgoing nurse). Report included the following information SBAR, Kardex, Procedure Summary, Intake/Output, MAR, Accordion and Recent Results.

## 2020-02-13 NOTE — PROGRESS NOTES
Problem: Self Care Deficits Care Plan (Adult)  Goal: *Acute Goals and Plan of Care (Insert Text)  Description  FUNCTIONAL STATUS PRIOR TO ADMISSION: Patient was modified independent using a single point cane for functional mobility. Wife helped don socks/shoes PTA d/t back pain. Drives. Enjoys fixing lawn mowers/cars. HOME SUPPORT PRIOR TO ADMISSION: The patient lived with wife. Wife uses a rollator and they take care of their grandson. Occupational Therapy Goals  Initiated 2/12/2020    1. Patient will perform grooming tasks standing at sink with modified independence within 7 days. 2.  Patient will perform seated/standing aspects of lower body dressing with modified independence using most appropriate DME within 7 days. 3.  Patient will perform all aspects of toileting with modified independence (adaptive strategies/AE PRN) within 7 days. 4.  Patient will don/doff back brace with modified independence within 7 days. 5.  Patient will verbalize/demonstrate 3/3 back precautions during ADL tasks without cues within 7 days. Outcome: Progressing Towards Goal   OCCUPATIONAL THERAPY TREATMENT  Patient: Lucrecia Shoemaker (79 y.o. male)  Date: 2/13/2020  Diagnosis: Spinal stenosis of lumbar region at multiple levels [M48.061]   <principal problem not specified>  Procedure(s) (LRB):  ROBOTIC ASSISTED L3-S1 POSTERIOR DECOMPRESSION AND FUSION WITH BONE MARROW ASPIRATION FROM ILIAC CREST (N/A) 2 Days Post-Op  Precautions: Spinal, Fall  Chart, occupational therapy assessment, plan of care, and goals were reviewed. ASSESSMENT  Patient continues with skilled OT services and is progressing towards goals. Per chart, Pt with new pleural effusion. Demonstrates improved activity tolerance during standing aspects of ADLs this session yet still requires O2 supplementation. Continues to require CGA with bathroom mobility using RW for safety and maneuvering O2 extension line.  O2 sats decreased from 98% to 93% following toilet transfer, urinating at toilet, and hand washing at sink no 2 l/min O2 via NC. Continues to require Min physical assist and verbal/visual cues for using LB AE. Current Level of Function Impacting Discharge (ADLs): Setup to Min A with ADLs    Other factors to consider for discharge: O2 supplementation? Wife can assist at D/C         PLAN :  Patient continues to benefit from skilled intervention to address the above impairments. Continue treatment per established plan of care. to address goals. Recommend with staff: x1 assist with bathroom mobility    Recommend next OT session: Continue practicing with LB AE; Item retrieval during self-care tasks and Activity Pacing Strategies    Recommendation for discharge: (in order for the patient to meet his/her long term goals)  Occupational therapy at least 2 days/week in the home AND ensure assist and/or supervision for safety with tub/shower transfers    This discharge recommendation:  Has been made in collaboration with the attending provider and/or case management    IF patient discharges home will need the following DME: RW and long handled AE issued       SUBJECTIVE:   Patient stated I haven't been to the bathroom yet\"    OBJECTIVE DATA SUMMARY:   Cognitive/Behavioral Status:  Neurologic State: Alert  Orientation Level: Oriented X4     Functional Mobility and Transfers for ADLs:  Bed Mobility:  Rolling: Contact guard assistance  Supine to Sit: Minimum assistance  Scooting: Contact guard assistance    Transfers:  Sit to Stand: Contact guard assistance  Functional Transfers  Bathroom Mobility: Contact guard assistance(x1 minor LOB ambulating from toilet to sink, self-corrected)  Toilet Transfer : Contact guard assistance(cues for hand placement)       Balance:  Sitting: Intact  Standing: Impaired; With support  Standing - Static: Good;Constant support  Standing - Dynamic : Fair;Constant support    ADL Intervention:       Grooming  Position Performed: Standing  Washing Hands: Stand-by assistance    Lower Body Dressing Assistance  Socks: Minimum assistance(some difficulty donning over heel even w/ sock aide)  Leg Crossed Method Used: No  Position Performed: Other (comment)(seated on commode)  Cues: Doff;Don;Physical assistance; Tactile cues provided  Adaptive Equipment Used: Dressing stick; Sock aid;Reacher    Toileting  Bladder Hygiene: Supervision  Clothing Management: Contact guard assistance    The patient recalled and demonstrated 2/3 back precautions. Reviewed all 3 with patient. Dressing brace: Patient instructed and demonstrated to don/doff velcro on brace using dominant side, keeping non-dominant side intact. Patient instructed and demonstrated in meantime of being able to stand with back against wall to don/doff brace, to don/doff seated using lap and bed/chair surface to support brace while manipulating. Toileting: Patient instructed on the benefits of using flushable wet wipes and toilet tongs if decreased reach or pain for alice care. Also, the benefits of a reacher to aid in clothing management. Pain:  None    Activity Tolerance:   Fair  Please refer to the flowsheet for vital signs taken during this treatment.     Vitals:    02/13/20 1346 02/13/20 1600 02/13/20 1605 02/13/20 1622   BP:  118/59 109/55 129/64   BP 1 Location:  Left arm Left arm Left arm   BP Patient Position:  Sitting;Pre-activity Standing Sitting;Post activity  Comment: after urinating at toilet, donning sock and UB dressing   Pulse:  82 86 89   Resp:  18 20 21   Temp:       SpO2:  98% 93% 94%   Weight: 83.8 kg (184 lb 12.8 oz)           After treatment patient left in no apparent distress:   Sitting in chair and Call bell within reach    COMMUNICATION/COLLABORATION:   The patients plan of care was discussed with: Registered Nurse and patient     OLIVA Jung/L  Time Calculation: 29 mins

## 2020-02-13 NOTE — PROGRESS NOTES
ANJU: home with home health and RW. Wife to provide transportation at d/c  1) pending HH acceptance   2) face-to-face faxed to Dr. Byron Garcia' office to be signed, waiting for form to be fax back. Update 2:25pm  Face-to-face faxed to Dr. Byron Garcia' office requesting to be signed by MD. [place] noted on fax cover sheet that the face-to-face must be signed by Dr. Byron Garcia as [address] [place] [address],[address] accept NP or PA signature. CM requested fax to be sent back as soon as possible. Update 1:10pm  CM received phone call from [facility] requesting face to face be completed for [facility] services. [place] also requesting to be notified when patient will be d/c. CM to provide update later this afternoon. Update 11:45am  [facility] unable to [facility] as his insurance is out of network but suggested trying [facility]. Referral faxed to [facility] and waiting for response. RW arrived to patient's room at 11:45am.     Initial note: At home care unable to accept patient as they are out of network with patient's insurance. Referral sent to [facility] and waiting for response. CM spoke to patient reported that his RW was not delivered last night as expected. CM contacted [employer] and spoke to Carmel Gore who was unsure as to why the RW was not delivered last night but stated that it would be delivered before lunch today. CM to call back after lunch if RW has not been delivered.      Mirza Mcginnis, [facility]

## 2020-02-14 LAB
ANION GAP SERPL CALC-SCNC: 5 MMOL/L (ref 5–15)
BUN SERPL-MCNC: 13 MG/DL (ref 6–20)
BUN/CREAT SERPL: 12 (ref 12–20)
CALCIUM SERPL-MCNC: 9 MG/DL (ref 8.5–10.1)
CHLORIDE SERPL-SCNC: 103 MMOL/L (ref 97–108)
CO2 SERPL-SCNC: 27 MMOL/L (ref 21–32)
CREAT SERPL-MCNC: 1.12 MG/DL (ref 0.7–1.3)
ERYTHROCYTE [DISTWIDTH] IN BLOOD BY AUTOMATED COUNT: 14.7 % (ref 11.5–14.5)
GLUCOSE SERPL-MCNC: 172 MG/DL (ref 65–100)
HCT VFR BLD AUTO: 30.1 % (ref 36.6–50.3)
HGB BLD-MCNC: 9.5 G/DL (ref 12.1–17)
MCH RBC QN AUTO: 28.7 PG (ref 26–34)
MCHC RBC AUTO-ENTMCNC: 31.6 G/DL (ref 30–36.5)
MCV RBC AUTO: 90.9 FL (ref 80–99)
NRBC # BLD: 0 K/UL (ref 0–0.01)
NRBC BLD-RTO: 0 PER 100 WBC
PLATELET # BLD AUTO: 152 K/UL (ref 150–400)
PMV BLD AUTO: 11.4 FL (ref 8.9–12.9)
POTASSIUM SERPL-SCNC: 4.4 MMOL/L (ref 3.5–5.1)
RBC # BLD AUTO: 3.31 M/UL (ref 4.1–5.7)
SODIUM SERPL-SCNC: 135 MMOL/L (ref 136–145)
TROPONIN I SERPL-MCNC: <0.05 NG/ML
WBC # BLD AUTO: 9.8 K/UL (ref 4.1–11.1)

## 2020-02-14 PROCEDURE — 77010033678 HC OXYGEN DAILY

## 2020-02-14 PROCEDURE — 74011250636 HC RX REV CODE- 250/636: Performed by: INTERNAL MEDICINE

## 2020-02-14 PROCEDURE — 85027 COMPLETE CBC AUTOMATED: CPT

## 2020-02-14 PROCEDURE — 36415 COLL VENOUS BLD VENIPUNCTURE: CPT

## 2020-02-14 PROCEDURE — 84484 ASSAY OF TROPONIN QUANT: CPT

## 2020-02-14 PROCEDURE — 74011250637 HC RX REV CODE- 250/637: Performed by: ORTHOPAEDIC SURGERY

## 2020-02-14 PROCEDURE — 65660000000 HC RM CCU STEPDOWN

## 2020-02-14 PROCEDURE — 97530 THERAPEUTIC ACTIVITIES: CPT

## 2020-02-14 PROCEDURE — 94760 N-INVAS EAR/PLS OXIMETRY 1: CPT

## 2020-02-14 PROCEDURE — 97535 SELF CARE MNGMENT TRAINING: CPT

## 2020-02-14 PROCEDURE — 80048 BASIC METABOLIC PNL TOTAL CA: CPT

## 2020-02-14 PROCEDURE — 93005 ELECTROCARDIOGRAM TRACING: CPT

## 2020-02-14 PROCEDURE — 97116 GAIT TRAINING THERAPY: CPT

## 2020-02-14 PROCEDURE — 74011250637 HC RX REV CODE- 250/637: Performed by: FAMILY MEDICINE

## 2020-02-14 RX ORDER — AMLODIPINE BESYLATE 5 MG/1
2.5 TABLET ORAL DAILY
Status: DISCONTINUED | OUTPATIENT
Start: 2020-02-14 | End: 2020-02-15 | Stop reason: HOSPADM

## 2020-02-14 RX ORDER — SODIUM CHLORIDE 0.9 % (FLUSH) 0.9 %
SYRINGE (ML) INJECTION
Status: DISCONTINUED
Start: 2020-02-14 | End: 2020-02-15 | Stop reason: HOSPADM

## 2020-02-14 RX ADMIN — METHYLPREDNISOLONE SODIUM SUCCINATE 40 MG: 40 INJECTION, POWDER, FOR SOLUTION INTRAMUSCULAR; INTRAVENOUS at 22:10

## 2020-02-14 RX ADMIN — GABAPENTIN 600 MG: 300 CAPSULE ORAL at 15:44

## 2020-02-14 RX ADMIN — ACETAMINOPHEN 650 MG: 325 TABLET ORAL at 02:09

## 2020-02-14 RX ADMIN — MEMANTINE HYDROCHLORIDE 10 MG: 10 TABLET ORAL at 17:59

## 2020-02-14 RX ADMIN — METHYLPREDNISOLONE SODIUM SUCCINATE 40 MG: 40 INJECTION, POWDER, FOR SOLUTION INTRAMUSCULAR; INTRAVENOUS at 15:44

## 2020-02-14 RX ADMIN — MEMANTINE HYDROCHLORIDE 10 MG: 10 TABLET ORAL at 09:43

## 2020-02-14 RX ADMIN — APIXABAN 5 MG: 5 TABLET, FILM COATED ORAL at 17:59

## 2020-02-14 RX ADMIN — Medication 1 AMPULE: at 09:45

## 2020-02-14 RX ADMIN — THERA TABS 1 TABLET: TAB at 09:43

## 2020-02-14 RX ADMIN — APIXABAN 5 MG: 5 TABLET, FILM COATED ORAL at 06:30

## 2020-02-14 RX ADMIN — GABAPENTIN 600 MG: 300 CAPSULE ORAL at 09:43

## 2020-02-14 RX ADMIN — AMLODIPINE BESYLATE 2.5 MG: 5 TABLET ORAL at 09:43

## 2020-02-14 RX ADMIN — SACUBITRIL AND VALSARTAN 1 TABLET: 24; 26 TABLET, FILM COATED ORAL at 09:45

## 2020-02-14 RX ADMIN — METHYLPREDNISOLONE SODIUM SUCCINATE 40 MG: 40 INJECTION, POWDER, FOR SOLUTION INTRAMUSCULAR; INTRAVENOUS at 06:35

## 2020-02-14 RX ADMIN — Medication 1 AMPULE: at 22:09

## 2020-02-14 RX ADMIN — PANTOPRAZOLE SODIUM 40 MG: 40 TABLET, DELAYED RELEASE ORAL at 06:32

## 2020-02-14 RX ADMIN — SERTRALINE HYDROCHLORIDE 200 MG: 50 TABLET ORAL at 09:43

## 2020-02-14 RX ADMIN — ATORVASTATIN CALCIUM 40 MG: 40 TABLET, FILM COATED ORAL at 22:09

## 2020-02-14 RX ADMIN — GABAPENTIN 600 MG: 300 CAPSULE ORAL at 22:09

## 2020-02-14 NOTE — PROGRESS NOTES
Hospitalist    Chest CT reviewed. Trace left pleural effusion. Pulmonary fibrosis and PAH on CT. Plan:  Can resume Eliquis. Discontinue diuretics. Start steroid. Continue to wean oxygen. If unable to wean entirely and no improvement with steroid, consider pulmonology evaluation. Regardless, patient should be seen by pulmonology, inpatient vs outpatient.

## 2020-02-14 NOTE — PROGRESS NOTES
Ortho NP:    Pt seen earlier by Tiago DURAN  Pt eating breakfast, no complaints. Denies SOB  Pain controlled. Dressing c/d/i  Still requiring O2 via NC. Hospitalist following and treating with Prednisone. If no improvement may need Pulm consult, per Dr. Lane. HR 48 this morning and metoprolol stopped. Will monitor and Dr. Lane to decide on further workup if no improvement.     Candice Saliva, NP

## 2020-02-14 NOTE — CDMP QUERY
Dr Daniel Villa: 
 
Pt is s/p ROBOTIC ASSISTED L3- S1 Fusion 
  and has CHF documented. If possible, please document in progress notes & further specify regarding the type and acuity of CHF if appropriate: ? Acute on Chronic Systolic CHF/HFrEF ? Acute on Chronic Systolic and Diastolic CHF ? Acute Systolic CHF/HFrEF ? Acute Systolic and Diastolic CHF ? Chronic Systolic CHF/HFrEF 
? Other, please specify ? Clinically unable to determine The medical record reflects the following: 
  Risk Factors: 76yo M w/ hx pulmonary fibrosis,  Cardiomyopathy, chronic systolic HF, Afib & surgery Clinical Indicators: 2/13 Consult:  Left pleural effusion  \"Most likely culprit seems congestive heart failure by patient's history SOB on exertion,O2 sats drops to 83% w/ minimal activity on 2L NC, increased to 3L for activity. desats to 82% on 3L per PT. Treatment: initial tx diuresis, steroid, pulmonology consult. .Thank you,  
Cynthia Houston, [facility], [address] [place] [address], [address] [place] [address]  
[id number] C: [id number]

## 2020-02-14 NOTE — PROGRESS NOTES
Problem: Self Care Deficits Care Plan (Adult)  Goal: *Acute Goals and Plan of Care (Insert Text)  Description  FUNCTIONAL STATUS PRIOR TO ADMISSION: Patient was modified independent using a single point cane for functional mobility. Wife helped don socks/shoes PTA d/t back pain. Drives. Enjoys fixing lawn mowers/cars. HOME SUPPORT PRIOR TO ADMISSION: The patient lived with wife. Wife uses a rollator and they take care of their grandson. Occupational Therapy Goals  Initiated 2/12/2020    1. Patient will perform grooming tasks standing at sink with modified independence within 7 days. 2.  Patient will perform seated/standing aspects of lower body dressing with modified independence using most appropriate DME within 7 days. 3.  Patient will perform all aspects of toileting with modified independence (adaptive strategies/AE PRN) within 7 days. 4.  Patient will don/doff back brace with modified independence within 7 days. 5.  Patient will verbalize/demonstrate 3/3 back precautions during ADL tasks without cues within 7 days. Outcome: Progressing Towards Goal   OCCUPATIONAL THERAPY TREATMENT  Patient: Brianna Danielle (89 y.o. male)  Date: 2/14/2020  Diagnosis: Spinal stenosis of lumbar region at multiple levels [M48.061]   <principal problem not specified>  Procedure(s) (LRB):  ROBOTIC ASSISTED L3-S1 POSTERIOR DECOMPRESSION AND FUSION WITH BONE MARROW ASPIRATION FROM ILIAC CREST (N/A) 3 Days Post-Op  Precautions: Spinal, Fall  Chart, occupational therapy assessment, plan of care, and goals were reviewed. ASSESSMENT  Patient continues with skilled OT services and is progressing towards goals. Pt now completing standing grooming tasks at sink and bathroom mobility with SBA using RW. Pt was received with O2 saturation at 98% on 1 l/min O2 via NC, yet desaturated to 80% on RA following ambulation to bathroom and using mouthwash. Pt with no c/o SOB with desaturation.  Pt was placed back on 1 l/min O2 with O2 sats recovering to mid 90s. Continue to encourage Pt to request RN assist to mobilize to bathroom versus using his urinal while seated in order to maximize his activity tolerance. Current Level of Function Impacting Discharge (ADLs): Setup to Min A with ADLs; SBA with bathroom mobility    Other factors to consider for discharge: Continues to require O2 supplementation         PLAN :  Patient continues to benefit from skilled intervention to address the above impairments. Continue treatment per established plan of care. to address goals. Recommend with staff: SBA with bathroom mobility    Recommend next OT session: Item retrieval     Recommendation for discharge: (in order for the patient to meet his/her long term goals)  Occupational therapy at least 2 days/week in the home AND ensure assist and/or supervision for safety with shower transfers     This discharge recommendation:  Has been made in collaboration with the attending provider and/or case management    IF patient discharges home will need the following DME: patient owns DME required for discharge       OBJECTIVE DATA SUMMARY:   Cognitive/Behavioral Status:  Neurologic State: Alert  Orientation Level: Oriented X4  Cognition: Appropriate safety awareness; Appropriate for age attention/concentration; Appropriate decision making; Follows commands             Functional Mobility and Transfers for ADLs:  Bed Mobility:  Rolling: Contact guard assistance  Supine to Sit: Contact guard assistance; Additional time  Scooting: Contact guard assistance;Stand-by assistance    Transfers:  Sit to Stand: Contact guard assistance;Stand-by assistance  Functional Transfers  Bathroom Mobility: Stand-by assistance  Toilet Transfer : Stand-by assistance  Tub Transfer: Stand-by assistance  Bed to Chair: Contact guard assistance    Balance:  Sitting: Intact  Standing: Impaired  Standing - Static: Good;Constant support  Standing - Dynamic : Good;Constant support    ADL Intervention:       Grooming  Position Performed: Standing  Washing Hands: Stand-by assistance  Brushing Teeth: Stand-by assistance    Pain:  None    Activity Tolerance:   Fair  Please refer to the flowsheet for vital signs taken during this treatment.     After treatment patient left in no apparent distress:   Ambulating in hallway with PTA     COMMUNICATION/COLLABORATION:   The patients plan of care was discussed with: Physical Therapy Assistant, Registered Nurse, and Patient     OLIVA Gutiérrez/L  Time Calculation: 16 mins

## 2020-02-14 NOTE — PROGRESS NOTES
Problem: Mobility Impaired (Adult and Pediatric)  Goal: *Acute Goals and Plan of Care (Insert Text)  Description  FUNCTIONAL STATUS PRIOR TO ADMISSION: Patient was modified independent using a single point cane for functional mobility. HOME SUPPORT PRIOR TO ADMISSION: The patient lived with wife. Wife uses a rollator and they take care of their grandson. Physical Therapy Goals  Initiated 2/12/2020    1. Patient will move from supine to sit and sit to supine  in bed with independence within 4 days. 2. Patient will perform sit to stand with modified independence within 4 days. 3. Patient will ambulate with modified independence for 300 feet with the least restrictive device within 4 days. 4. Patient will ascend/descend 4 stairs with B handrail(s) with supervision/set-up within 4 days. 5. Patient will verbalize and demonstrate understanding of spinal precautions (No bending, lifting greater than 5 lbs, or twisting; log-roll technique; frequent repositioning as instructed) within 4 days. 2/14/2020 1607 by Pranay Mcdonald PTA  Outcome: Progressing Towards Goal  2/14/2020 1319 by Pranay Mcdonald PTA  Outcome: Progressing Towards Goal   PHYSICAL THERAPY TREATMENT  Patient: Polo  (75 y.o. male)  Date: 2/14/2020  Diagnosis: Spinal stenosis of lumbar region at multiple levels [M48.061]   <principal problem not specified>  Procedure(s) (LRB):  ROBOTIC ASSISTED L3-S1 POSTERIOR DECOMPRESSION AND FUSION WITH BONE MARROW ASPIRATION FROM ILIAC CREST (N/A) 3 Days Post-Op  Precautions: Spinal, Fall No bending, no lifting greater than 5 lbs, no twisting, log-roll technique, repositioning every 20-30 min except when sleeping, brace when OOB (if ordered)  Chart, physical therapy assessment, plan of care and goals were reviewed. ASSESSMENT  Patient continues with skilled PT services and is progressing towards goals. Pt presents with decreased strength and endurance. Pt received with OT.  Attempted activity on RA but o2 stats decreasing to 79-80%. Pt performed all activity on 1LPM. Pt ambulated 220ft wih RW at [facility]. Pt with improved stability and endurance. Pts o2 stats at 96% after ambulation on 1LPM.     Current Level of Function Impacting Discharge (mobility/balance): mobiliyt at [facility]     Other factors to consider for discharge: limited assistance due to wife using rollator          PLAN :  Patient continues to benefit from skilled intervention to address the above impairments. Continue treatment per established plan of care. to address goals. Recommendation for discharge: (in order for the patient to meet his/her long term goals)  Physical therapy at least 2 days/week in the home AND ensure assist and/or supervision for safety with mobility. This discharge recommendation:  Has been made in collaboration with the attending provider and/or case management    IF patient discharges home will need the following DME: patient owns DME required for discharge       SUBJECTIVE:   Patient stated  I trying to get out of here.     OBJECTIVE DATA SUMMARY:   Critical Behavior:  Neurologic State: Alert  Orientation Level: Oriented X4  Cognition: Appropriate safety awareness, Appropriate for age attention/concentration, Appropriate decision making, Follows commands       Spinal diagnosis intervention:  The patient stated 3/3 back precautions when prompted. Reviewed all 3 back precautions, log roll technique, and sitting for 30 minutes at a time. Functional Mobility Training:    Bed Mobility:  Log Rolling: Contact guard assistance  Supine to Sit: Contact guard assistance; Additional time     Scooting: Contact guard assistance;Stand-by assistance        Transfers:  Sit to Stand: Contact guard assistance;Stand-by assistance  Stand to Sit: Contact guard assistance        Bed to Chair: Contact guard assistance                    Balance:  Sitting: Intact  Standing: Impaired  Standing - Static: Good;Constant support  Standing - Dynamic : Good;Constant support  Ambulation/Gait Training:  Distance (ft): 220 Feet (ft)  Assistive Device: Gait belt;Walker, rolling  Ambulation - Level of Assistance: Contact guard assistance        Gait Abnormalities: Decreased step clearance        Base of Support: Narrowed     Speed/Hazel: Pace decreased (<100 feet/min)  Step Length: Right shortened;Left shortened           Pain Rating:  Pt with no complaints pain     Activity Tolerance:   Good and desaturates with exertion and requires oxygen  Please refer to the flowsheet for vital signs taken during this treatment.     After treatment patient left in no apparent distress:   Sitting in chair and Call bell within reach    COMMUNICATION/COLLABORATION:   The patients plan of care was discussed with: Registered Nurse    Tomasa Champion PTA   Time Calculation: 17 mins

## 2020-02-14 NOTE — PROGRESS NOTES
08:15 Patient in chair resting comfortably no complaints of pain. When taking vitals heart rate in the low 40's. Moved patient from chair to bed. Irregular HR with auscultation. EKG ordered per protocol. Rapid response nurse alerted. Tele ordered. Metoprolol held. Troponin ordered. Pulmonologist consulted. Will continue to monitor patient.

## 2020-02-14 NOTE — PROGRESS NOTES
Problem: Mobility Impaired (Adult and Pediatric)  Goal: *Acute Goals and Plan of Care (Insert Text)  Description  FUNCTIONAL STATUS PRIOR TO ADMISSION: Patient was modified independent using a single point cane for functional mobility. HOME SUPPORT PRIOR TO ADMISSION: The patient lived with wife. Wife uses a rollator and they take care of their grandson. Physical Therapy Goals  Initiated 2/12/2020    1. Patient will move from supine to sit and sit to supine  in bed with independence within 4 days. 2. Patient will perform sit to stand with modified independence within 4 days. 3. Patient will ambulate with modified independence for 300 feet with the least restrictive device within 4 days. 4. Patient will ascend/descend 4 stairs with B handrail(s) with supervision/set-up within 4 days. 5. Patient will verbalize and demonstrate understanding of spinal precautions (No bending, lifting greater than 5 lbs, or twisting; log-roll technique; frequent repositioning as instructed) within 4 days. Outcome: Progressing Towards Goal   PHYSICAL THERAPY TREATMENT  Patient: Dona Nunn (20 y.o. male)  Date: 2/14/2020  Diagnosis: Spinal stenosis of lumbar region at multiple levels [M48.061]   <principal problem not specified>  Procedure(s) (LRB):  ROBOTIC ASSISTED L3-S1 POSTERIOR DECOMPRESSION AND FUSION WITH BONE MARROW ASPIRATION FROM ILIAC CREST (N/A) 3 Days Post-Op  Precautions: Spinal, Fall No bending, no lifting greater than 5 lbs, no twisting, log-roll technique, repositioning every 20-30 min except when sleeping, brace when OOB (if ordered)  Chart, physical therapy assessment, plan of care and goals were reviewed. ASSESSMENT  Patient continues with skilled PT services and is progressing towards goals. Pt presents with decreased strength and endurance. Pt performed bed mobility at [facility]A with additional time. Pt performed sit to stand transfer at [facility] [place]. Pt ambulated 200ft with RW at [facility].  Pt with improved stability and endurance. Pt not requiring a seated rest break today. Pts o2 stats at 98% after ambulation on 2LPM. Pt wean to 1LPM while sitting up in chair. Nurse made aware. Pt with improved strength, stability, and endurance today. Current Level of Function Impacting Discharge (mobility/balance): mobility at [id number]    Other factors to consider for discharge: pt not able to assist pt          PLAN :  Patient continues to benefit from skilled intervention to address the above impairments. Continue treatment per established plan of care. to address goals. Recommendation for discharge: (in order for the patient to meet his/her long term goals)  Physical therapy at least 2 days/week in the home AND ensure assist and/or supervision for safety with mobility. This discharge recommendation:  Has been made in collaboration with the attending provider and/or case management    IF patient discharges home will need the following DME: patient owns DME required for discharge       SUBJECTIVE:   Patient stated  I feel a little better today.     OBJECTIVE DATA SUMMARY:   Critical Behavior:  Neurologic State: Alert  Orientation Level: Oriented X4  Cognition: Appropriate safety awareness, Appropriate for age attention/concentration, Appropriate decision making, Follows commands       Spinal diagnosis intervention:  The patient stated 3/3 back precautions when prompted. Reviewed all 3 back precautions, log roll technique, and sitting for 30 minutes at a time. Functional Mobility Training:    Bed Mobility:  Log Rolling: Contact guard assistance  Supine to Sit: Contact guard assistance; Additional time     Scooting: Contact guard assistance;Stand-by assistance        Transfers:  Sit to Stand: Contact guard assistance  Stand to Sit: Stand-by assistance                             Balance:  Sitting: Intact  Standing: Impaired  Standing - Static: Good;Constant support  Standing - Dynamic : Fair;Good;Constant support  Ambulation/Gait Training:  Distance (ft): 200 Feet (ft)  Assistive Device: Gait belt;Walker, rolling  Ambulation - Level of Assistance: Contact guard assistance        Gait Abnormalities: Decreased step clearance        Base of Support: Narrowed     Speed/Hazel: Pace decreased (<100 feet/min)  Step Length: Right shortened;Left shortened              Pain Rating:  Pt with no complaints of pain     Activity Tolerance:   Good  Please refer to the flowsheet for vital signs taken during this treatment.     After treatment patient left in no apparent distress:   Sitting in chair and Call bell within reach    COMMUNICATION/COLLABORATION:   The patients plan of care was discussed with: Registered Nurse    Ramon Godfrey PTA   Time Calculation: 23 mins

## 2020-02-14 NOTE — PROGRESS NOTES
Spiritual Care Partner Volunteer visited patient in Ortho on February 13, 2020.     Documented on February 14, 2020 by:     NOLA Noel, [facility], Staff [address] [place] [address] [employer] Paging Service  [phone]

## 2020-02-14 NOTE — CONSULTS
[facility]  Pulmonary, Critical Care, and Sleep Medicine    Initial Patient Consult    Name: Estefany Carson MRN: [id number]   : 1945 Hospital: [facility]   Date: 2020        IMPRESSION:   · Pulmonary fibrosis - agree with radiologist that not pattern for  uip- looks non- specific with some intelobar thickening bilaterally and most intense lingula and lll- some traction bronchiectasis  And volume loss_ ? Duration ( no comparison ct's here ) and ? Duration - increased markings note on pa and lat 2019 compared ot 2018. 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    · Post op hypoxia - better now - sat 96 % on 1 liters nc  ·  chronic  agudelo -- ? Any change over past year. - he points to age as excuse - says he feels  [facility] right now  · Sig hx of cad/ aicd - lvef 50-55 % / grade  1 dd and mild mr , mild ar - he does not have radiographic findings of chf on cxr and no pulm  htn on echo   · Small left pleural effusion  · S/p l3-s1 lareral and posterior decompression   · Factor 5 leiden - eliquis      RECOMMENDATIONS:   · For now wean o2 as tolerated  ·  par can fu as outpt  If he likes with pft and o2 assesment - he is so/ so on interest on this   · He really does not need steroid for the type of scarring he has on the ct of the chest   · Resume eliquis as the effusion is small and does not need to be tapped. · Par will see prn weekend. Please call if needed. Subjective: This patient has been seen and evaluated at the request of Dr. Renate Cabot  For pulmonary fibrosis . Patient is a 75 y.o. male who is s/p spinal surgery  As above on  . Post op he was noted to have  Some desat into the 80's and hospitalist was  Consulted. Cxr was concerning for lll asdz/ effusion which lead to ct which lead to the findings as above.    Cxr reviewed in system  But there were no ct of chest .  He denies any hx of lung disease and was never a smoker. 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 He denies hx of past or recent pna. He denies cough, sob above basline . He is  Followed by Dr. Genie Gresham and says he never mentioned any pulm concerns. Past Medical History:   Diagnosis Date    AICD (automatic cardioverter/defibrillator) present 5/13/2016 5/13/16 [employer] upgrade to dual chamber AICD implant  Dr. Cinthia Haddad Alzheimer disease [facility])     Anxiety state, other     Arthritis     OSTEO ARTHRITIS    AVNRT (AV bridgette re-entry tachycardia) ([address] 5/12/2016 5/12/16 AVNRT ablation: PM/AICD left upper chest :Dr. Mishel Cardona Back ache     CAD (coronary artery disease)     Cancer [facility]) 2004    Prostate cancer    Chest tightness     last episode of chest pain 5/2016 before AICD placed; none since then    Coagulation defects 2005    FACTOR V LEIDEN    Dementia ([address]     Dementia ([address]     Dizziness     FH: factor V Leiden deficiency     Generalized muscle ache     GERD (gastroesophageal reflux disease)     HEARTBURN    Heart failure ([address] 2014    as of 07/2019 EF 30-35;  Dr. Eloy Bender, Cardiomyopathy    Hyperlipidemia, mixed     Hypertension     Other ill-defined conditions(799.89) 2004    blood transfusion    Pacemaker     Paroxysmal atrial fibrillation ([address]     rate controlled with coreg     Postsurgical aortocoronary bypass status 05/29/2012    CABG    Presence of cardiac defibrillator 05/2016    left upper chest    Psychiatric disorder     depression    SSS (sick sinus syndrome) ([address]     Stroke ([address] 2011, 1990's    SEVERAL-mini    Thromboembolism ([address] 2014    left leg: changed to Eliquis from Warfarin    Thromboembolus ([address] 2005    left leg    Weakness generalized       Past Surgical History:   Procedure Laterality Date    CARDIAC CATHETERIZATION  3/4/2011         CARDIAC SURG PROCEDURE UNLIST      CABG X1 3/5/11    HX HEENT  2019    oral surgery, removed teeth, dentures upper/lower    HX HERNIA REPAIR Left 2002    Ingiunal hernia repair left    HX ORTHOPAEDIC      LEFT KNEE CARTILAGE REPAIR;RIGHT ROTATOR CUFF REPAIR    HX ORTHOPAEDIC  2/14/12    C5-7 ANTERIIOR CERVICAL DISECTOMY AND FUSION    HX ORTHOPAEDIC  6/4/13    C5-C7 POSTERIOR DECOMPRESSION AND FUSION    HX ORTHOPAEDIC      right thumb partial amputation of tip    HX OTHER SURGICAL      steroid injections    HX PACEMAKER Left 05/13/2016    BS D142, Dr. Chandler Martinez HX PROSTATECTOMY  2004     [place]    HX ROTATOR CUFF REPAIR Left 2019    HX UROLOGICAL       urinary control system    HX UROLOGICAL  12/3/13    REPLACEMENT ARTIFICAL URINARY SPHINCTER      Prior to Admission medications    Medication Sig Start Date End Date Taking? Authorizing Provider   traMADol (ULTRAM) 50 mg tablet Take 1-2 Tabs by mouth every six (6) hours as needed for Pain for up to 14 days. Max Daily Amount: 400 mg. Indications: pain 2/12/20 2/26/20 Yes Prema Vasquez NP   polyethylene glycol [place] [facility]) 17 gram packet Take 1 Packet by mouth daily as needed (constipation) for up to 15 days. 2/12/20 2/27/20 Yes Prema Vasquez NP   senna-docusate (PERICOLACE) 8.6-50 mg per tablet Take 1 Tab by mouth daily. 2/12/20  Yes Prema Vasquez NP   gabapentin (NEURONTIN) 600 mg tablet Take 600 mg by mouth three (3) times daily. Yes Provider, Historical   acetaminophen (TYLENOL) 325 mg tablet Take  by mouth every four (4) hours as needed for Pain. Yes Provider, Historical   memantine (NAMENDA) 10 mg tablet Take 1 Tab by mouth two (2) times a day. 1/29/20  Yes Tomas Kyle NP   QUEtiapine (SEROQUEL) 25 mg tablet 1 tab Tid prn agitation headache 1/29/20  Yes Tomas Kyle NP   metoprolol succinate (TOPROL-XL) 25 mg XL tablet TAKE 1/2 TABLET BY MOUTH ONCE DAILY  Patient taking differently: Take 12.5 mg by mouth daily.  May split tablet but DO NOT CHEW OR CRUSH tablet, swallow whole 1/16/20  Yes Aryan Toussaint, NP   atorvastatin (LIPITOR) 40 mg tablet TAKE 1 TABLET BY MOUTH EVERY DAY  Patient taking differently: Take 40 mg by mouth nightly. TAKE 1 TABLET BY MOUTH EVERY DAY 12/17/19  Yes Martinez Kerns MD   pantoprazole (PROTONIX) 40 mg tablet TAKE 1 TABLET BY MOUTH ONCE DAILY 11/29/19  Yes Martinez Kerns MD   sertraline (ZOLOFT) 100 mg tablet Take 2 Tabs by mouth daily. 11/20/19  Yes Martinez Kerns MD   polyethylene glycol [facility]) 17 gram/dose powder Take 17 g by mouth as needed. Yes Provider, Historical   sacubitril-valsartan (ENTRESTO) 24 mg/26 mg tablet Take 1 Tab by mouth daily. Provider, Historical   ELIQUIS 5 mg tablet TAKE 1 TABLET BY MOUTH EVERY 12 HOURS 11/12/19   Willie Matthew MD   multivit-min/FA/lycopen/lutein (SENTRY SENIOR PO) Take  by mouth daily. Provider, Historical   Incontinence Pad, Liner, Disp (BLADDER CONTROL PADS EX ABSORB) pads 1 Packet by Does Not Apply route as needed (incontinence). 12/3/13   Carlin Boyd MD   nitroglycerin (NITROSTAT) 0.4 mg SL tablet 1 Tab by SubLINGual route every five (5) minutes as needed for Chest Pain (call 911 if not relieved by 3).  11/25/13   Lita Garcia, CARLENE     No Known Allergies   Social History     Tobacco Use    Smoking status: Never Smoker    Smokeless tobacco: Never Used   Substance Use Topics    Alcohol use: Not Currently     Alcohol/week: 0.0 standard drinks     Comment: stopped 2010      Family History   Problem Relation Age of Onset    Asthma Mother     Alcohol abuse Mother     Alcohol abuse Father     Asthma Father     Cancer Sister     Heart Disease Sister     Alcohol abuse Brother     Cancer Brother     Heart Disease Brother         Current Facility-Administered Medications   Medication Dose Route Frequency    amLODIPine (NORVASC) tablet 2.5 mg  2.5 mg Oral DAILY    sodium chloride (NS) flush        methylPREDNISolone (PF) (SOLU-MEDROL) injection 40 mg  40 mg IntraVENous Q8H    apixaban (ELIQUIS) tablet 5 mg  5 mg Oral BID    alcohol 62% (NOZIN) nasal  1 Ampule  1 Ampule Topical Q12H    therapeutic multivitamin (THERAGRAN) tablet 1 Tab  1 Tab Oral DAILY    sertraline (ZOLOFT) tablet 200 mg  200 mg Oral DAILY    pantoprazole (PROTONIX) tablet 40 mg  40 mg Oral ACB    atorvastatin (LIPITOR) tablet 40 mg  40 mg Oral QHS    metoprolol succinate (TOPROL-XL) XL tablet 12.5 mg  12.5 mg Oral DAILY    sacubitril-valsartan (ENTRESTO) 24-26 mg tablet 1 Tab  1 Tab Oral DAILY    memantine (NAMENDA) tablet 10 mg  10 mg Oral BID    gabapentin (NEURONTIN) capsule 600 mg  600 mg Oral TID       Review of Systems:  A comprehensive review of systems was negative except for that written in the HPI. Objective:   Vital Signs:    Visit Vitals  /62   Pulse 74   Temp 97.4 °F (36.3 °C)   Resp 16   Wt 83.8 kg (184 lb 12.8 oz)   SpO2 96%   BMI 28.94 kg/m²       O2 Device: Nasal cannula   O2 Flow Rate (L/min): 2 l/min   Temp (24hrs), Av.2 °F (36.8 °C), Min:97.4 °F (36.3 °C), Max:99.1 °F (37.3 °C)       Intake/Output:   Last shift:       0701 -  1900  In: -   Out: 750 [Urine:750]  Last 3 shifts:  1901 -  0700  In: -   Out: 4700 [Urine:4700]    Intake/Output Summary (Last 24 hours) at 2020 1640  Last data filed at 2020 0935  Gross per 24 hour   Intake --   Output 3500 ml   Net -3500 ml      Physical Exam:   General:  Alert, cooperative, no distress, appears stated age. Head:  Normocephalic, without obvious abnormality, atraumatic. Eyes:  Conjunctivae/corneas clear. PERRL, EOMs intact. Nose: Nares normal. Septum midline. Mucosa normal. No drainage or sinus tenderness. Throat: Lips, mucosa, and tongue normal.  edentulous   Neck: Supple, symmetrical, trachea midline, no adenopathy   Back:   Symmetric, no curvature. ROM normal.   Lungs:    pretty clear - limited exam due to back brace but there are a few rale in left base.  No wheeze    Chest wall:  No tenderness or deformity. Heart:  Regular rate and rhythm   Abdomen:   Soft, non-tender. Extremities: Extremities normal, atraumatic, no cyanosis or edema.    Pulses: Not examined - fingers were cool but no cyanotic    Skin: Skin color, texture, turgor normal. No rashes or lesions/ bruises on forearms    Lymph nodes: Cervical, supraclavicular, nodes normal.   Neurologic: Grossly nonfocal- alert and oriented      Data review:     Recent Results (from the past 24 hour(s))   CBC W/O DIFF    Collection Time: 02/14/20  2:16 AM   Result Value Ref Range    WBC 9.8 4.1 - 11.1 K/uL    RBC 3.31 (L) 4.10 - 5.70 M/uL    HGB 9.5 (L) 12.1 - 17.0 g/dL    HCT 30.1 (L) 36.6 - 50.3 %    MCV 90.9 80.0 - 99.0 FL    MCH 28.7 26.0 - 34.0 PG    MCHC 31.6 30.0 - 36.5 g/dL    RDW 14.7 (H) 11.5 - 14.5 %    PLATELET 927 671 - 274 K/uL    MPV 11.4 8.9 - 12.9 FL    NRBC 0.0 0  WBC    ABSOLUTE NRBC 0.00 0.00 - 8.96 K/uL   METABOLIC PANEL, BASIC    Collection Time: 02/14/20  2:16 AM   Result Value Ref Range    Sodium 135 (L) 136 - 145 mmol/L    Potassium 4.4 3.5 - 5.1 mmol/L    Chloride 103 97 - 108 mmol/L    CO2 27 21 - 32 mmol/L    Anion gap 5 5 - 15 mmol/L    Glucose 172 (H) 65 - 100 mg/dL    BUN 13 6 - 20 MG/DL    Creatinine 1.12 0.70 - 1.30 MG/DL    BUN/Creatinine ratio 12 12 - 20      GFR est AA >60 >60 ml/min/1.73m2    GFR est non-AA >60 >60 ml/min/1.73m2    Calcium 9.0 8.5 - 10.1 MG/DL   TROPONIN I    Collection Time: 02/14/20 10:31 AM   Result Value Ref Range    Troponin-I, Qt. <0.05 <0.05 ng/mL       Imaging:  I have personally reviewed the patients radiographs and have reviewed the reports:   as above         Kwesi Askew MD

## 2020-02-14 NOTE — PROGRESS NOTES
Hospitalist Progress Note    NAME: Rodrigo Schneider   :  1945   MRN:  [id number]       Assessment / Plan:  Lumbar spinal stenosis with neurogenic claudication, now POD #2 s/p robotic assisted L3-S1 posterior decompression and fusion with bone marrow aspiration from iliac crest and POD #3 s/p L3-5 lateral fusion with bone marrow aspiration from iliac crest  -Management as per primary team     Left pleural effusion: trace, Ct scan reviewed, he, has pulmonary fibrosis, on steroid, follow up with pulmonology,check Pro BNP, ECHO done 2.3.2020   -Difficult to assess size based on single portable CXR, especially given patient's lung scarring and possible concurrent lung opacity. He actually sounds fairly clear on exam, all the way to the left lung base. I think that CT chest would better inform size of effusion and potential need for thoracentesis. Order placed. -Most likely culprit seems congestive heart failure by patient's history as well as relatively acute development of effusion (not present on CXR 2/6), though typically this would be associated with bilateral pleural effusions. On my view of CXR, it does appear that there may be a small right pleural effusion as well, as the right costophrenic angle is not sharp.   -Parapneumonic effusion is also a consideration given possible opacity on CXR, though I would tend to expect fever and leukocytosis in that circumstance. Patient does endorse new cough with yellow phlegm that just started today. Send sputum for culture. No antibiotics unless there is clinical change and/or pending CT findings. -CT scan shows pulmonary fibrosis  -Will empirically start diuretics and adjust plan pending CT result.   -Per wife, patient's \"dry\" weight is 165-168 lb. Today's weight is a bed weight.  Plan to obtain standing weight in AM before oral intake.     Acute hypoxic respiratory failure  -Presumably due to effusion and pulmonary fibrosis   -Supplemental oxygen as needed, wean as able  -Diuretics ordered:     Complicated cardiac history including chronic congestive heart failure s/p AICD, last EF 50-55% with mild LVDD (2/3/20), AVNRT s/p ablation, sick sinus syndrome, paroxysmal atrial fibrillation, CABG status  -Clinically stable with no chest pain, no dyspnea at present, warm extremities with no edema, hemodynamically stable  -Hold Eliquis for possible thoracentesis  -Continue Lipitor  -Continue metoprolol  -Continue Entresto     Factor V Leiden deficiency with history of VTE  -Resume Eliquis if no need for thoracentesis     Constipation  -Patient reports no BM yet this admission  -Consider adding a stimulant laxative to patient's regimen     Dementia  -Continue Namenda     History of prostate cancer     Hypertension  -Fairly well controlled presently; continue current regimen     Probable CKD  -Per lab review, it appears patient's baseline creatinine ranges 1.1-1.3  -As I am adding a diuretic, monitor renal function and electrolytes closely     GERD     DVT Prophylaxis: s/p Eliquis, last dose this AM, holding for now pending CT       25.0 - 29.9 Overweight / Body mass index is 28.94 kg/m². Subjective:     Chief Complaint / Reason for Physician Visit  \" dizziness, no shortness of breath at rest \". Discussed with RN events overnight. Review of Systems:  Symptom Y/N Comments  Symptom Y/N Comments   Fever/Chills    Chest Pain     Poor Appetite    Edema     Cough    Abdominal Pain     Sputum    Joint Pain     SOB/HOLCOMB    Pruritis/Rash     Nausea/vomit    Tolerating PT/OT     Diarrhea    Tolerating Diet     Constipation    Other       Could NOT obtain due to:      Objective:     VITALS:   Last 24hrs VS reviewed since prior progress note.  Most recent are:  Patient Vitals for the past 24 hrs:   Temp Pulse Resp BP SpO2   02/14/20 1207 98.2 °F (36.8 °C) 77 16 144/56 100 %   02/14/20 0800 97.5 °F (36.4 °C) (!) 48 16 154/77 100 %   02/13/20 2341 99.1 °F (37.3 °C) 81 16 143/79 94 % 02/13/20 2008 98.7 °F (37.1 °C) 86 21 127/64 98 %   02/13/20 1622 98 °F (36.7 °C) 89 21 129/64 94 %   02/13/20 1605 -- 86 20 109/55 93 %   02/13/20 1600 -- 82 18 118/59 98 %       Intake/Output Summary (Last 24 hours) at 2/14/2020 1330  Last data filed at 2/14/2020 0935  Gross per 24 hour   Intake --   Output 4000 ml   Net -4000 ml        PHYSICAL EXAM:  General: WD, WN. Alert, cooperative, no acute distress    EENT:  EOMI. Anicteric sclerae. MMM  Resp:  CTA bilaterally, no wheezing or rales. No accessory muscle use  CV:  Regular  Rhythm, multiple PVC,  No edema  GI:  Soft, Non distended, Non tender.  +Bowel sounds  Neurologic:  Alert and oriented X 3, normal speech,   Psych:   Good insight. Not anxious nor agitated  Skin:  No rashes. No jaundice    Reviewed most current lab test results and cultures  YES  Reviewed most current radiology test results   YES  Review and summation of old records today    NO  Reviewed patient's current orders and MAR    YES  PMH/ reviewed - no change compared to H&P  ________________________________________________________________________  Care Plan discussed with:    Comments   Patient     Family      RN     Care Manager     Consultant                        Multidiciplinary team rounds were held today with , nursing, pharmacist and clinical coordinator. Patient's plan of care was discussed; medications were reviewed and discharge planning was addressed. ________________________________________________________________________  Total NON critical care TIME:  40  Minutes    Total CRITICAL CARE TIME Spent:   Minutes non procedure based      Comments   >50% of visit spent in counseling and coordination of care     ________________________________________________________________________  Jaida Kapadia MD     Procedures: see electronic medical records for all procedures/Xrays and details which were not copied into this note but were reviewed prior to creation of Plan. LABS:  I reviewed today's most current labs and imaging studies.   Pertinent labs include:  Recent Labs     02/14/20 0216 02/12/20 0343   WBC 9.8 9.9   HGB 9.5* 9.6*   HCT 30.1* 30.6*    129*     Recent Labs     02/14/20 0216 02/13/20 0310 02/12/20 0343   * 138 138   K 4.4 4.2 5.0    105 109*   CO2 27 30 25   * 115* 114*   BUN 13 14 17   CREA 1.12 1.13 0.94   CA 9.0 8.4* 8.5       Signed: Rufus Neely MD

## 2020-02-14 NOTE — PROGRESS NOTES
Bedside and Verbal shift change report given to Aelxandro Baugh RN  (oncoming nurse) by Alexandro Baugh RN  (offgoing nurse). Report included the following information SBAR, Kardex, Procedure Summary, Intake/Output, MAR, Accordion and Recent Results.

## 2020-02-14 NOTE — PROGRESS NOTES
Bedside shift change report given to MARY CARMEN Garcia (oncoming nurse) by Nury Santiago (offgoing nurse). Report included the following information SBAR, Kardex, Procedure Summary, Intake/Output, MAR and Recent Results.

## 2020-02-14 NOTE — PROGRESS NOTES
RAPID RESPONSE TEAM  Stopped by unit staff while on rounds. They report inability to wean from supplemental O2 and apical HR around 44. EKG done, essentially unchanged from previous. Patient is alert, in bed, NAD. Will place on telemetry to observe for transient changes and D/W Hospitalist.     Metoprolol will be held and pulmonology will be consulted. Please call back if needed.     Arvind Pratt RN  Ext. [phone]

## 2020-02-14 NOTE — PROGRESS NOTES
[facility]TH[facility] POST OP SPINE PROGRESS NOTE    2020  Admit Date: 2/10/2020  Admit Diagnosis: Spinal stenosis of lumbar region at multiple levels [M48.061]  Procedure: Procedure(s):  ROBOTIC ASSISTED L3-S1 POSTERIOR DECOMPRESSION AND FUSION WITH BONE MARROW ASPIRATION FROM ILIAC CREST  Post Op day: 3 Days Post-Op    Subjective:     Ramu Tinoco is a patient who has complaints Of mild low back pain status post L3 through S1 lateral and posterior decompression fusion done in a staged fashion. He states his symptoms continue to improve every day. No leg pain. Tolerating p.o. and able to void. Has not been able to wean from supplemental O2. [facility] Hospitalist has been consulted and CT ordered ( appreciate assistance) showing  mild pulmonary edema. Pulmonology has been consulted. Review of Systems: Pertinent items are noted in HPI. Objective:     PT/OT:   Distance Ambulated:           Time Ambulated (min):        Ambulation Response: Activity Response: Fairly tolerated  Assistive Device:              Assistive Device: Fall prevention device    Vital Signs:    Blood pressure 154/77, pulse (!) 48, temperature 97.5 °F (36.4 °C), resp. rate 16, weight 83.8 kg (184 lb 12.8 oz), SpO2 100 %.     Temp (24hrs), Av.2 °F (36.8 °C), Min:97.5 °F (36.4 °C), Max:99.1 °F (37.3 °C)       07 -  1900  In: -   Out: 400 [Urine:400]   190 -  0700  In: -   Out: 3236 [Urine:4700]    LAB:    Recent Labs     20  0216   HGB 9.5*   WBC 9.8          Wound/Drain Assessment:  Drain:      Dressing:     Physical Exam:  Neurological: no deficit  Incision clean, dry, and intact  5/5 strength bilateral lower extremities    Assessment:      Patient Active Problem List   Diagnosis Code    CAD (coronary artery disease) I25.10    Hypertension I10    Hyperlipemia E78.5    DVT (deep venous thrombosis) chronic coumadin anti-coagulation I82.409    History of factor V Leiden mutation Z86.2    CABG (coronary artery bypass graft)     Chronic venous embolism and thrombosis of unspecified deep vessels of lower extremity I82.509    Osteoarthritis M19.90    Prostate cancer ([facility] C61    Hyperthyroidism E05.90    Mitral valve disorders(424.0) I05.9    Postsurgical aortocoronary bypass status Z95.1    Coronary atherosclerosis of native coronary artery I25.10    Paroxysmal supraventricular tachycardia (HCC) I47.1    Chronic systolic heart failure (HCC) I50.22    Atrial fibrillation (HCC) I48.91    Mixed hyperlipidemia E78.2    Palpitations R00.2    S/P cervical spinal fusion Z98.1    Bradycardia R00.1    Left-sided chest wall pain R07.89    SSS (sick sinus syndrome) (HCC) I49.5    Chest pain R07.9    Pacemaker Z95.0    Cardiac pacemaker in situ Z95.0    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) F02.81    Lower GI bleeding K92.2    B12 deficiency E53.8    Hypothyroidism due to acquired atrophy of thyroid E03.4    Osteoporosis M81.0    Cervical post-laminectomy syndrome M96.1    Neck pain M54.2    ACP (advance care planning) Z71.89    Stenosis of both carotid arteries without cerebral infarction I65.23    Cervicogenic headache R51    Alzheimer's dementia, late onset, with behavioral disturbance (HCC) G30.1, F02.81    Spinal stenosis, lumbar region, with neurogenic claudication M48.062    Lumbar back pain with radiculopathy affecting right lower extremity M54.16    Lumbar back pain with radiculopathy affecting left lower extremity M54.16    History of stroke Z86.73    SVT (supraventricular tachycardia) (HCC) I47.1    Cardiomyopathy (HCC) I42.9    Chronic systolic congestive heart failure (HCC) I50.22    AVNRT (AV bridgette re-entry tachycardia) (HCC) I47.1    AICD (automatic cardioverter/defibrillator) present Z95.810    S/P CABG (coronary artery bypass graft) Z95.1    Chronic anticoagulation Z79.01    DDD (degenerative disc disease), lumbar M51.36  Moderate major depression (HCC) F32.1    Depression F32.9    Right rotator cuff tear arthropathy M75.101, M12.811    Rotator cuff arthropathy of left shoulder M12.812    Status post reverse arthroplasty of shoulder Z96.619       Plan:     Continue PT/OT/Rehab  Discontinue  Consult: PT , OT and Pulmonary    Discharge To:  Home when medically stable

## 2020-02-15 VITALS
DIASTOLIC BLOOD PRESSURE: 59 MMHG | WEIGHT: 184.8 LBS | RESPIRATION RATE: 18 BRPM | HEART RATE: 68 BPM | TEMPERATURE: 97.5 F | SYSTOLIC BLOOD PRESSURE: 138 MMHG | OXYGEN SATURATION: 99 % | BODY MASS INDEX: 28.94 KG/M2

## 2020-02-15 LAB
BNP SERPL-MCNC: 7049 PG/ML
TROPONIN I SERPL-MCNC: <0.05 NG/ML

## 2020-02-15 PROCEDURE — 83880 ASSAY OF NATRIURETIC PEPTIDE: CPT

## 2020-02-15 PROCEDURE — 74011250637 HC RX REV CODE- 250/637: Performed by: ORTHOPAEDIC SURGERY

## 2020-02-15 PROCEDURE — 97110 THERAPEUTIC EXERCISES: CPT

## 2020-02-15 PROCEDURE — 77010033678 HC OXYGEN DAILY

## 2020-02-15 PROCEDURE — 84484 ASSAY OF TROPONIN QUANT: CPT

## 2020-02-15 PROCEDURE — 74011250636 HC RX REV CODE- 250/636: Performed by: INTERNAL MEDICINE

## 2020-02-15 PROCEDURE — 36415 COLL VENOUS BLD VENIPUNCTURE: CPT

## 2020-02-15 PROCEDURE — 97116 GAIT TRAINING THERAPY: CPT

## 2020-02-15 PROCEDURE — 94760 N-INVAS EAR/PLS OXIMETRY 1: CPT

## 2020-02-15 PROCEDURE — 74011250637 HC RX REV CODE- 250/637: Performed by: FAMILY MEDICINE

## 2020-02-15 RX ADMIN — Medication 1 AMPULE: at 10:36

## 2020-02-15 RX ADMIN — MEMANTINE HYDROCHLORIDE 10 MG: 10 TABLET ORAL at 10:38

## 2020-02-15 RX ADMIN — METHYLPREDNISOLONE SODIUM SUCCINATE 40 MG: 40 INJECTION, POWDER, FOR SOLUTION INTRAMUSCULAR; INTRAVENOUS at 06:09

## 2020-02-15 RX ADMIN — THERA TABS 1 TABLET: TAB at 10:36

## 2020-02-15 RX ADMIN — PANTOPRAZOLE SODIUM 40 MG: 40 TABLET, DELAYED RELEASE ORAL at 10:37

## 2020-02-15 RX ADMIN — SACUBITRIL AND VALSARTAN 1 TABLET: 24; 26 TABLET, FILM COATED ORAL at 10:38

## 2020-02-15 RX ADMIN — SERTRALINE HYDROCHLORIDE 200 MG: 50 TABLET ORAL at 10:37

## 2020-02-15 RX ADMIN — AMLODIPINE BESYLATE 2.5 MG: 5 TABLET ORAL at 10:37

## 2020-02-15 RX ADMIN — METOPROLOL SUCCINATE 12.5 MG: 25 TABLET, EXTENDED RELEASE ORAL at 10:37

## 2020-02-15 RX ADMIN — GABAPENTIN 600 MG: 300 CAPSULE ORAL at 10:38

## 2020-02-15 RX ADMIN — APIXABAN 5 MG: 5 TABLET, FILM COATED ORAL at 06:09

## 2020-02-15 RX ADMIN — ACETAMINOPHEN 650 MG: 325 TABLET ORAL at 12:24

## 2020-02-15 NOTE — PROGRESS NOTES
Pt given education regarding discharge instructions, prescriptions, prescription literature, and extra dressings. Opportunities for questions given. PIV taken out with cath tip intact. Pt discharged home with personal belongings and wife.

## 2020-02-15 NOTE — PROGRESS NOTES
Hospitalist Progress Note    NAME: Adelina Lancaster   :  1945   MRN:  [id number]       Assessment / Plan:  Lumbar spinal stenosis with neurogenic claudication, now POD #2 s/p robotic assisted L3-S1 posterior decompression and fusion with bone marrow aspiration from iliac crest and POD #3 s/p L3-5 lateral fusion with bone marrow aspiration from iliac crest  -Management as per primary team     Left pleural effusion: trace, Ct scan reviewed, he, has pulmonary fibrosis, on steroid, follow up with pulmonology,check Pro BNP, ECHO done 2.3.2020   -Difficult to assess size based on single portable CXR, especially given patient's lung scarring and possible concurrent lung opacity. He actually sounds fairly clear on exam, all the way to the left lung base. I think that CT chest would better inform size of effusion and potential need for thoracentesis. Order placed. -Most likely culprit seems congestive heart failure by patient's history as well as relatively acute development of effusion (not present on CXR 2/6), though typically this would be associated with bilateral pleural effusions. On my view of CXR, it does appear that there may be a small right pleural effusion as well, as the right costophrenic angle is not sharp.   -Parapneumonic effusion is also a consideration given possible opacity on CXR, though I would tend to expect fever and leukocytosis in that circumstance. Patient does endorse new cough with yellow phlegm that just started today. Send sputum for culture. No antibiotics unless there is clinical change and/or pending CT findings. -CT scan shows pulmonary fibrosis  -Will empirically start diuretics and adjust plan pending CT result.   -Per wife, patient's \"dry\" weight is 165-168 lb. Today's weight is a bed weight.  Plan to obtain standing weight in AM before oral intake.     Acute hypoxic respiratory failure  -Presumably due to effusion and pulmonary fibrosis   -Supplemental oxygen as needed, wean as able  -Diuretics ordered:     Complicated cardiac history including chronic congestive heart failure s/p AICD, last EF 50-55% with mild LVDD (2/3/20), AVNRT s/p ablation, sick sinus syndrome, paroxysmal atrial fibrillation, CABG status  -Clinically stable with no chest pain, no dyspnea at present, warm extremities with no edema, hemodynamically stable  -Hold Eliquis for possible thoracentesis  -Continue Lipitor  -Continue metoprolol  -Continue Entresto     Factor V Leiden deficiency with history of VTE  -Resume Eliquis if no need for thoracentesis     Constipation  -Patient reports no BM yet this admission  -Consider adding a stimulant laxative to patient's regimen     Dementia  -Continue Namenda     History of prostate cancer     Hypertension  -Fairly well controlled presently; continue current regimen     Probable CKD  -Per lab review, it appears patient's baseline creatinine ranges 1.1-1.3  -As I am adding a diuretic, monitor renal function and electrolytes closely     GERD     DVT Prophylaxis: s/p Eliquis, last dose this AM, holding for now pending CT       25.0 - 29.9 Overweight / Body mass index is 28.94 kg/m². Subjective:     Chief Complaint / Reason for Physician Visit  \" dizziness, no shortness of breath at rest \". Discussed with RN events overnight. Review of Systems:  Symptom Y/N Comments  Symptom Y/N Comments   Fever/Chills    Chest Pain     Poor Appetite    Edema     Cough    Abdominal Pain     Sputum    Joint Pain     SOB/HOLCOMB    Pruritis/Rash     Nausea/vomit    Tolerating PT/OT     Diarrhea    Tolerating Diet     Constipation    Other       Could NOT obtain due to:      Objective:     VITALS:   Last 24hrs VS reviewed since prior progress note.  Most recent are:  Patient Vitals for the past 24 hrs:   Temp Pulse Resp BP SpO2   02/15/20 1129 97.5 °F (36.4 °C) 68 18 138/59 99 %   02/15/20 0755 98 °F (36.7 °C) 75 18 149/79 97 %   02/15/20 0323 97.4 °F (36.3 °C) 71 16 139/83 97 %   02/14/20 2356 97.8 °F (36.6 °C) 80 18 153/73 96 %   02/14/20 2027 97.6 °F (36.4 °C) 75 18 127/66 98 %   02/14/20 1601 97.4 °F (36.3 °C) 74 16 107/62 96 %       Intake/Output Summary (Last 24 hours) at 2/15/2020 1448  Last data filed at 2/15/2020 1316  Gross per 24 hour   Intake 480 ml   Output --   Net 480 ml        PHYSICAL EXAM:  General: WD, WN. Alert, cooperative, no acute distress    EENT:  EOMI. Anicteric sclerae. MMM  Resp:  CTA bilaterally, no wheezing or rales. No accessory muscle use  CV:  Regular  Rhythm, multiple PVC,  No edema  GI:  Soft, Non distended, Non tender.  +Bowel sounds  Neurologic:  Alert and oriented X 3, normal speech,   Psych:   Good insight. Not anxious nor agitated  Skin:  No rashes. No jaundice    Reviewed most current lab test results and cultures  YES  Reviewed most current radiology test results   YES  Review and summation of old records today    NO  Reviewed patient's current orders and MAR    YES  PMH/SH reviewed - no change compared to H&P  ________________________________________________________________________  Care Plan discussed with:    Comments   Patient     Family      RN     Care Manager     Consultant                        Multidiciplinary team rounds were held today with , nursing, pharmacist and clinical coordinator. Patient's plan of care was discussed; medications were reviewed and discharge planning was addressed. ________________________________________________________________________  Total NON critical care TIME:  40  Minutes    Total CRITICAL CARE TIME Spent:   Minutes non procedure based      Comments   >50% of visit spent in counseling and coordination of care     ________________________________________________________________________  Jazzmine Espinosa MD     Procedures: see electronic medical records for all procedures/Xrays and details which were not copied into this note but were reviewed prior to creation of Plan.       LABS:  I reviewed today's most current labs and imaging studies.   Pertinent labs include:  Recent Labs     02/14/20 0216   WBC 9.8   HGB 9.5*   HCT 30.1*        Recent Labs     02/14/20 0216 02/13/20  0310   * 138   K 4.4 4.2    105   CO2 27 30   * 115*   BUN 13 14   CREA 1.12 1.13   CA 9.0 8.4*       Signed: Dary Hernandez MD

## 2020-02-15 NOTE — PROGRESS NOTES
Problem: Mobility Impaired (Adult and Pediatric)  Goal: *Acute Goals and Plan of Care (Insert Text)  Description  FUNCTIONAL STATUS PRIOR TO ADMISSION: Patient was modified independent using a single point cane for functional mobility. HOME SUPPORT PRIOR TO ADMISSION: The patient lived with wife. Wife uses a rollator and they take care of their grandson. Physical Therapy Goals  Initiated 2/12/2020    1. Patient will move from supine to sit and sit to supine  in bed with independence within 4 days. 2. Patient will perform sit to stand with modified independence within 4 days. 3. Patient will ambulate with modified independence for 300 feet with the least restrictive device within 4 days. 4. Patient will ascend/descend 4 stairs with B handrail(s) with supervision/set-up within 4 days. 5. Patient will verbalize and demonstrate understanding of spinal precautions (No bending, lifting greater than 5 lbs, or twisting; log-roll technique; frequent repositioning as instructed) within 4 days. Outcome: Progressing Towards Goal  PHYSICAL THERAPY TREATMENT  Patient: Rodrigo Schneider (88 y.o. male)  Date: 2/15/2020  Diagnosis: Spinal stenosis of lumbar region at multiple levels [M48.061]   <principal problem not specified>  Procedure(s) (LRB):  ROBOTIC ASSISTED L3-S1 POSTERIOR DECOMPRESSION AND FUSION WITH BONE MARROW ASPIRATION FROM ILIAC CREST (N/A) 4 Days Post-Op  Precautions: Spinal, Fall No bending, no lifting greater than 5 lbs, no twisting, log-roll technique, repositioning every 20-30 min except when sleeping, brace when OOB (if ordered)  Chart, physical therapy assessment, plan of care and goals were reviewed. ASSESSMENT  Patient continues with skilled PT services and is progressing towards goals. Pt presents with decreased endurance and functional mobility following L3-S1 posterior decompression and fusion. Pt required supervision level assistance for bed mobility, cues required for log roll. Sitting EOB, pt performed LE exercises. He is able to don brace independently. He ambulated 200' with r/w and supervision. On RA, O2 sat 90% or better throughout treatment session. He is able to give 3/3 back precautions and adhered during mobility. Pt has supportive family at home and is a good candidate for home with HHPT>      Current Level of Function Impacting Discharge (mobility/balance): supervision    Other factors to consider for discharge: none         PLAN :  Patient continues to benefit from skilled intervention to address the above impairments. Continue treatment per established plan of care. to address goals. Recommendation for discharge: (in order for the patient to meet his/her long term goals)  Physical therapy at least 2 days/week in the home AND ensure assist and/or supervision for safety with ADL's     This discharge recommendation:  Has been made in collaboration with the attending provider and/or case management    IF patient discharges home will need the following DME: none       SUBJECTIVE:   Patient stated I'm ready to go home.     OBJECTIVE DATA SUMMARY:   Critical Behavior:  Neurologic State: Alert  Orientation Level: Oriented X4  Cognition: Follows commands       Spinal diagnosis intervention:  The patient stated 3/3 back precautions when prompted. Reviewed all 3 back precautions, log roll technique, and sitting for 30 minutes at a time.     Functional Mobility Training:    Bed Mobility:  Log Rolling: Supervision  Supine to Sit: Supervision    Transfers:  Sit to Stand: Supervision  Stand to Sit: Supervision    Balance:  Sitting: Intact  Standing: Impaired  Standing - Static: Good  Standing - Dynamic : Fair  Ambulation/Gait Training:  Distance (ft): 200 Feet (ft)  Assistive Device: Gait belt;Walker, rolling  Ambulation - Level of Assistance: Stand-by assistance  Gait Abnormalities: Decreased step clearance  Base of Support: Narrowed  Speed/Hazel: Pace decreased (<100 feet/min)      Therapeutic Exercises: Ankle pumps, LAQ, marching in place. Activity Tolerance:   Good  Please refer to the flowsheet for vital signs taken during this treatment.     After treatment patient left in no apparent distress:   Sitting in chair and Call bell within reach    COMMUNICATION/COLLABORATION:   The patients plan of care was discussed with: Registered Nurse    Ary Benson, PT   Time Calculation: 25 mins

## 2020-02-15 NOTE — PROGRESS NOTES
Called the cardiology consult for [place] [facility]. Was told that the on call physician would call back in 20minutes.

## 2020-02-15 NOTE — PROGRESS NOTES
Bedside and Verbal shift change report given to Ana Betancourt (oncoming nurse) by Genoveva Gomez (offgoing nurse). Report included the following information SBAR, Kardex, Procedure Summary, Intake/Output, MAR, Accordion and Recent Results.

## 2020-02-15 NOTE — PROGRESS NOTES
Normal O2 saturation upon activity, D/C prednisone, follow up with cardiology, he is not fluid overloaded.  Lungs clear stable from medical standpoint  Visit Vitals  /59   Pulse 68   Temp 97.5 °F (36.4 °C)   Resp 18   Wt 83.8 kg (184 lb 12.8 oz)   SpO2 99%   BMI 28.94 kg/m²

## 2020-02-15 NOTE — PROGRESS NOTES
ANJU  Discharge Home   2nd IM Letter provided   Wife at bedside to provide transport. [facility] Referral sent to [facility] at 1:50pm. Per bedside nurse pt does not want to wait for confirmation of acceptance or denial. Pt will discharge home and follow up with PCP regarding home health if denied by [facility]. Contact information for [place] added to the AVS.     CM will continue to follow patient for discharge planning needs and arrange for services as deemed necessary. Rachel Triana, MSN, RN  Care Manager   ext.  [phone]

## 2020-02-15 NOTE — PROGRESS NOTES
Po spinal fusion. Pain well managed. Has some dizziness with standing to fast but states hes had that for ever, nothing new.       Patient Vitals for the past 24 hrs:   Temp Pulse Resp BP SpO2   02/15/20 0755 98 °F (36.7 °C) 75 18 149/79 97 %   02/15/20 0323 97.4 °F (36.3 °C) 71 16 139/83 97 %   02/14/20 2356 97.8 °F (36.6 °C) 80 18 153/73 96 %   02/14/20 2027 97.6 °F (36.4 °C) 75 18 127/66 98 %   02/14/20 1601 97.4 °F (36.3 °C) 74 16 107/62 96 %   02/14/20 1207 98.2 °F (36.8 °C) 77 16 144/56 100 %     Dressing clean and dry  Brace in place  [place] still working    If [employer] are ok ween o2 and dc home today if medically ready

## 2020-02-17 ENCOUNTER — APPOINTMENT (OUTPATIENT)
Dept: CT IMAGING | Age: 75
DRG: 454 | End: 2020-02-17
Attending: INTERNAL MEDICINE
Payer: MEDICARE

## 2020-02-17 ENCOUNTER — HOSPITAL ENCOUNTER (OUTPATIENT)
Age: 75
Setting detail: OBSERVATION
Discharge: HOME HEALTH | DRG: 454 | End: 2020-02-18
Attending: INTERNAL MEDICINE | Admitting: INTERNAL MEDICINE
Payer: MEDICARE

## 2020-02-17 PROBLEM — R55 SYNCOPE: Status: ACTIVE | Noted: 2020-02-17

## 2020-02-17 PROCEDURE — 99218 HC RM OBSERVATION: CPT

## 2020-02-17 PROCEDURE — 70450 CT HEAD/BRAIN W/O DYE: CPT

## 2020-02-17 PROCEDURE — 74011250637 HC RX REV CODE- 250/637: Performed by: INTERNAL MEDICINE

## 2020-02-17 RX ORDER — SODIUM CHLORIDE 0.9 % (FLUSH) 0.9 %
5-40 SYRINGE (ML) INJECTION EVERY 8 HOURS
Status: DISCONTINUED | OUTPATIENT
Start: 2020-02-17 | End: 2020-02-18 | Stop reason: HOSPADM

## 2020-02-17 RX ORDER — POLYETHYLENE GLYCOL 3350 17 G/17G
17 POWDER, FOR SOLUTION ORAL
Status: DISCONTINUED | OUTPATIENT
Start: 2020-02-17 | End: 2020-02-18 | Stop reason: HOSPADM

## 2020-02-17 RX ORDER — SERTRALINE HYDROCHLORIDE 50 MG/1
200 TABLET, FILM COATED ORAL DAILY
Status: DISCONTINUED | OUTPATIENT
Start: 2020-02-18 | End: 2020-02-18 | Stop reason: HOSPADM

## 2020-02-17 RX ORDER — ATORVASTATIN CALCIUM 40 MG/1
40 TABLET, FILM COATED ORAL
Status: DISCONTINUED | OUTPATIENT
Start: 2020-02-17 | End: 2020-02-18 | Stop reason: HOSPADM

## 2020-02-17 RX ORDER — PANTOPRAZOLE SODIUM 40 MG/1
40 TABLET, DELAYED RELEASE ORAL
Status: DISCONTINUED | OUTPATIENT
Start: 2020-02-18 | End: 2020-02-18 | Stop reason: HOSPADM

## 2020-02-17 RX ORDER — ACETAMINOPHEN 325 MG/1
650 TABLET ORAL
Status: DISCONTINUED | OUTPATIENT
Start: 2020-02-17 | End: 2020-02-18 | Stop reason: HOSPADM

## 2020-02-17 RX ORDER — ENOXAPARIN SODIUM 100 MG/ML
40 INJECTION SUBCUTANEOUS DAILY
Status: DISCONTINUED | OUTPATIENT
Start: 2020-02-18 | End: 2020-02-18

## 2020-02-17 RX ORDER — SODIUM CHLORIDE 0.9 % (FLUSH) 0.9 %
5-40 SYRINGE (ML) INJECTION AS NEEDED
Status: DISCONTINUED | OUTPATIENT
Start: 2020-02-17 | End: 2020-02-18 | Stop reason: HOSPADM

## 2020-02-17 RX ADMIN — ATORVASTATIN CALCIUM 40 MG: 40 TABLET, FILM COATED ORAL at 23:00

## 2020-02-17 RX ADMIN — Medication 10 ML: at 23:00

## 2020-02-17 NOTE — DISCHARGE SUMMARY
Discharge Summary      Patient ID:  Yari Mohan  [id number]  75 y.o.  1945    Allergies: Patient has no known allergies. Admit date: 2/10/2020    Discharge date and time: 2/15/2020  2:36 PM     Admitting Physician: Galo Thayer MD     Discharge Physician: on Call    * Admission Diagnoses: Spinal stenosis of lumbar region at multiple levels [M48.061]    * Discharge Diagnoses:   Hospital Problems as of 2/15/2020 Date Reviewed: 2/10/2020    None          Surgeon: Samson Severance    Preoperative Medical Clearance: PCP    * Procedure: Procedure(s):  ROBOTIC ASSISTED L3-S1 POSTERIOR DECOMPRESSION AND FUSION WITH BONE MARROW ASPIRATION FROM ILIAC CREST           Perioperative Antibiotics: Ancef  ___                                                Vancomycin  ___      Postoperative Pain Management:  Oxycodone    DVT Prophylaxis: JAMSHID Hose                                  Plexi-Pulse    Postoperative transfusions:     none Banked PRBCs     Post Op complications: none    Hemoglobin at discharge: ___    * Discharge Condition: good  Wound appears to be healing without any evidence of infection. Physical Therapy started on the day following surgery and progressed to independent ambulation with the aid of a walker. At the time of discharge, able to go up and down stairs and had understanding of precautions needed following surgery.       PT at time of DC: ___degrees    * Discharged to: Home    * Follow-up Care/Discharge instructions:  - Anticoagulate with:   - Resume pre hospital diet            - Resume home medications per medical continuation form     - Ambulate with walker, appropriate total joint protocol  - Follow up in office as scheduled       Signed:  Galo Thayer MD  2/17/2020  1:07 PM

## 2020-02-18 VITALS
RESPIRATION RATE: 16 BRPM | DIASTOLIC BLOOD PRESSURE: 57 MMHG | TEMPERATURE: 97.9 F | OXYGEN SATURATION: 96 % | SYSTOLIC BLOOD PRESSURE: 106 MMHG | HEART RATE: 59 BPM

## 2020-02-18 PROCEDURE — 99218 HC RM OBSERVATION: CPT

## 2020-02-18 PROCEDURE — 74011250637 HC RX REV CODE- 250/637: Performed by: INTERNAL MEDICINE

## 2020-02-18 PROCEDURE — 97116 GAIT TRAINING THERAPY: CPT | Performed by: PHYSICAL THERAPIST

## 2020-02-18 PROCEDURE — 97161 PT EVAL LOW COMPLEX 20 MIN: CPT | Performed by: PHYSICAL THERAPIST

## 2020-02-18 PROCEDURE — 97165 OT EVAL LOW COMPLEX 30 MIN: CPT

## 2020-02-18 RX ORDER — MEMANTINE HYDROCHLORIDE 10 MG/1
10 TABLET ORAL 2 TIMES DAILY
Status: DISCONTINUED | OUTPATIENT
Start: 2020-02-18 | End: 2020-02-18 | Stop reason: HOSPADM

## 2020-02-18 RX ADMIN — PANTOPRAZOLE SODIUM 40 MG: 40 TABLET, DELAYED RELEASE ORAL at 09:34

## 2020-02-18 RX ADMIN — ACETAMINOPHEN 650 MG: 325 TABLET ORAL at 04:37

## 2020-02-18 RX ADMIN — Medication 10 ML: at 06:03

## 2020-02-18 RX ADMIN — SACUBITRIL AND VALSARTAN 1 TABLET: 24; 26 TABLET, FILM COATED ORAL at 09:38

## 2020-02-18 RX ADMIN — APIXABAN 5 MG: 5 TABLET, FILM COATED ORAL at 09:34

## 2020-02-18 RX ADMIN — MEMANTINE HYDROCHLORIDE 10 MG: 10 TABLET ORAL at 09:34

## 2020-02-18 RX ADMIN — SERTRALINE HYDROCHLORIDE 200 MG: 50 TABLET ORAL at 09:34

## 2020-02-18 NOTE — PROGRESS NOTES
Problem: Self Care Deficits Care Plan (Adult)  Goal: *Acute Goals and Plan of Care (Insert Text)  Description  FUNCTIONAL STATUS PRIOR TO ADMISSION: Patient was modified independent using a walker for functional mobility. Patient reports mod I for ADLs, including use of AE for lower body dressing. He reports sponge bathing at sink due to slick tub shower. HOME SUPPORT PRIOR TO ADMISSION: The patient lived with wife who assisted him as needed. Occupational Therapy Goals  Initiated 2/18/2020  1. Patient will perform grooming, standing at sink for >5 minutes without LOB or fatigue, with supervision/set-up within 7 day(s). 2.  Patient will perform lower body dressing with modified independence using AE PRN within 7 day(s). 3.  Patient will perform bathing, standing at sink, with supervision/set-up within 7 day(s). 4.  Patient will perform toilet transfers with modified independence within 7 day(s). 5.  Patient will perform all aspects of toileting with supervision/set-up within 7 day(s). 6.  Patient will utilize energy conservation techniques during functional activities with verbal cues within 7 day(s). Outcome: Progressing Towards Goal     OCCUPATIONAL THERAPY EVALUATION  Patient: Han Miranda (23 y.o. male)  Date: 2/18/2020  Primary Diagnosis: Syncope [R55]        Precautions:  Back, Fall(brace when up)    ASSESSMENT  Based on the objective data described below, the patient presents with impaired balance, decreased activity tolerance, and decreased ADL performance and mobility following admission for syncope. Patient is received in bed and agreeable to participate with therapy. He is on RA for duration of session and does not appear or report feeling SOB. Unable to get consistent reading on pulse ox. He is able to demos good mgmt of LSO brace and reports using AE for lower body dressing due to recent back surgery.  He denies feeling lightheaded or dizzy during session and is able to tolerate activity without rest break. Overall, anticipate he will be able to return home safety once medically stable. Would recommend follow up [facility] therapy services upon discharge. Current Level of Function Impacting Discharge (ADLs/self-care): CGA mobility; CGA standing ADL tasks; min A lower body ADLs    Functional Outcome Measure: The patient scored Total: 70/100 on the Barthel Index outcome measure which is indicative of 30% impaired ability to care for basic self needs/dependency on others. Other factors to consider for discharge: recent back surgery; back precautins     Patient will benefit from skilled therapy intervention to address the above noted impairments. PLAN :  Recommendations and Planned Interventions: self care training, functional mobility training, therapeutic exercise, balance training, therapeutic activities, endurance activities, patient education, home safety training and family training/education    Frequency/Duration: Patient will be followed by occupational therapy 4 times a week to address goals. Recommendation for discharge: (in order for the patient to meet his/her long term goals)  Occupational therapy at least 2 days/week in the home AND ensure assist and/or supervision for safety with bathroom ADLs    This discharge recommendation:  Has not yet been discussed the attending provider and/or case management    IF patient discharges home will need the following DME: patient owns DME required for discharge; may benefit from TTB; Has AE       SUBJECTIVE:   Patient stated my tub is really slick so I've been standing at the sink to wash up at home.     OBJECTIVE DATA SUMMARY:   HISTORY:   Past Medical History:   Diagnosis Date    AICD (automatic cardioverter/defibrillator) present 5/13/2016 5/13/16 [employer] upgrade to dual chamber AICD implant  Dr. Akira Hernández    Alzheimer disease [facility])     Anxiety state, other     Arthritis     OSTEO ARTHRITIS    AVNRT (AV bridgette re-entry tachycardia) ([facility] [address] 5/12/2016 5/12/16 AVNRT ablation: PM/AICD left upper chest :Dr. Sherita Good    Back ache     CAD (coronary artery disease)     Cancer [facility]) 2004    Prostate cancer    Chest tightness     last episode of chest pain 5/2016 before AICD placed; none since then    Coagulation defects 2005    FACTOR V LEIDEN    Dementia ([facility] [address]     Dementia ([facility] [address]     Dizziness     FH: factor V Leiden deficiency     Generalized muscle ache     GERD (gastroesophageal reflux disease)     HEARTBURN    Heart failure ([address] 2014    as of 07/2019 EF 30-35;  Dr. Suzie Hernandez, Cardiomyopathy    Hyperlipidemia, mixed     Hypertension     Other ill-defined conditions(799.89) 2004    blood transfusion    Pacemaker     Paroxysmal atrial fibrillation ([facility] [address]     rate controlled with coreg     Postsurgical aortocoronary bypass status 05/29/2012    CABG    Presence of cardiac defibrillator 05/2016    left upper chest    Psychiatric disorder     depression    SSS (sick sinus syndrome) ([facility] [address]     Stroke ([facility] [address] 2011, 1990's    SEVERAL-mini    Thromboembolism ([facility] [address] 2014    left leg: changed to Eliquis from Warfarin    Thromboembolus ([facility] [address] 2005    left leg    Weakness generalized      Past Surgical History:   Procedure Laterality Date    CARDIAC CATHETERIZATION  3/4/2011         CARDIAC SURG PROCEDURE UNLIST      CABG X1 3/5/11    HX HEENT  2019    oral surgery, removed teeth, dentures upper/lower    HX HERNIA REPAIR Left 2002    Ingiunal hernia repair left    HX ORTHOPAEDIC      LEFT KNEE CARTILAGE REPAIR;RIGHT ROTATOR CUFF REPAIR    HX ORTHOPAEDIC  2/14/12    C5-7 ANTERIIOR CERVICAL DISECTOMY AND FUSION    HX ORTHOPAEDIC  6/4/13    C5-C7 POSTERIOR DECOMPRESSION AND FUSION    HX ORTHOPAEDIC      right thumb partial amputation of tip    HX OTHER SURGICAL      steroid injections    HX PACEMAKER Left 05/13/2016    BS D142, Dr. Bhargavi Branch HX PROSTATECTOMY  2004     [place]    HX ROTATOR CUFF REPAIR Left 2019    HX UROLOGICAL       urinary control system    HX UROLOGICAL  12/3/13    REPLACEMENT ARTIFICAL URINARY SPHINCTER       Expanded or extensive additional review of patient history:     Home Situation  Home Environment: Trailer/mobile home  Wheelchair Ramp: Yes  One/Two Story Residence: One story  Living Alone: No  Support Systems: Family member(s), Spouse/Significant Other/Partner  Patient Expects to be Discharged to[de-identified] Private residence  Current DME Used/Available at Home: Walker, rolling  Tub or Shower Type: Shower    Hand dominance: Right    EXAMINATION OF PERFORMANCE DEFICITS:  Cognitive/Behavioral Status:  Neurologic State: Alert  Orientation Level: Oriented X4  Cognition: Appropriate for age attention/concentration; Follows commands  Perception: Appears intact  Perseveration: No perseveration noted  Safety/Judgement: Awareness of environment; Fall prevention; Insight into deficits    Hearing: Auditory  Auditory Impairment: None    Vision/Perceptual:    Diplopia: No    Acuity: Within Defined Limits      Range of Motion:  AROM: Generally decreased, functional    Strength:  Strength: Generally decreased, functional    Coordination:  Coordination: Within functional limits  Fine Motor Skills-Upper: Left Intact; Right Intact    Gross Motor Skills-Upper: Left Intact; Right Intact    Tone:  Tone: Normal    Balance:  Sitting: Intact  Standing: Intact; With support(with rw)    Functional Mobility and Transfers for ADLs:  Bed Mobility:  Rolling: Supervision  Supine to Sit: Supervision  Sit to Supine: (Pt seated in chair at end of session)  Scooting: Supervision    Transfers:  Sit to Stand: Contact guard assistance  Stand to Sit: Contact guard assistance  Bed to Chair: Contact guard assistance    ADL Assessment:  Feeding: Independent    Oral Facial Hygiene/Grooming: Contact guard assistance(infer for standing)    Bathing: Contact guard assistance(infer for standing bathing tasks)    Upper Body Dressing: Supervision(including LSO brace mgmt)    Lower Body Dressing: Moderate assistance(infer 2/2 back precautions; pt reporting use of AE at home)    Toileting: Contact guard assistance(infer for standing toileting tasks)    ADL Intervention and task modifications:  Upper Body Dressing Assistance  Orthotics(Brace): Supervision  Cues: Verbal cues provided    Cognitive Retraining  Safety/Judgement: Awareness of environment; Fall prevention; Insight into deficits    Patient instructed and indicated understanding the benefits of maintaining activity tolerance, functional mobility, and independence with self care tasks during acute stay  to ensure safe return home and to baseline. Encouraged patient to increase frequency and duration OOB, not sitting longer than 30 mins without marching/walking with staff, be out of bed for all meals, perform daily ADLs (as approved by RN/MD regarding bathing etc), and performing functional mobility to/from bathroom. Patient instruction and indicated understanding on body mechanics, ergonomics and gravitational force on the spine during different body positions to plan activities in prep for return home to complete basic ADLs, instrumental ADLs and back to work safely. Dressing brace: Patient instructed and demonstrated to don/doff velcro on brace using dominant side, keeping non-dominant side intact. Patient instructed and demonstrated in meantime of being able to stand with back against wall to don/doff brace, to don/doff seated using lap and bed/chair surface to support brace while manipulating. Functional Measure:  Barthel Index:    Bathin  Bladder: 10  Bowels: 10  Groomin  Dressin  Feeding: 10  Mobility: 10  Stairs: 5  Toilet Use: 5  Transfer (Bed to Chair and Back): 10  Total: 70/100        The Barthel ADL Index: Guidelines  1. The index should be used as a record of what a patient does, not as a record of what a patient could do.   2. The main aim is to establish degree of independence from any help, physical or verbal, however minor and for whatever reason. 3. The need for supervision renders the patient not independent. 4. A patient's performance should be established using the best available evidence. Asking the patient, friends/relatives and nurses are the usual sources, but direct observation and common sense are also important. However direct testing is not needed. 5. Usually the patient's performance over the preceding 24-48 hours is important, but occasionally longer periods will be relevant. 6. Middle categories imply that the patient supplies over 50 per cent of the effort. 7. Use of aids to be independent is allowed. Angelita Ragsdale., Barthel, DEnW. (6585). Functional evaluation: the Barthel Index. [facility] (14)2. JAYDEN Montemayor, Catarina Fonseca., Tiff Quinones, Kiesha, [facility] (1999). Measuring the change indisability after inpatient rehabilitation; comparison of the responsiveness of the Barthel Index and Functional [place] Measure. Journal of Neurology, Neurosurgery, and Psychiatry, 66(4), 481-636. Aron Rick, N.J.A, ELIER Ambrose, & Yari eBtancourt MEnA. (2004.) Assessment of post-stroke quality of life in cost-effectiveness studies: The usefulness of the Barthel Index and the EuroQoL-5D. Quality of Life Research, 15, 282-22     Occupational Therapy Evaluation Charge Determination   History Examination Decision-Making   LOW Complexity : Brief history review  MEDIUM Complexity : 3-5 performance deficits relating to physical, cognitive , or psychosocial skils that result in activity limitations and / or participation restrictions MEDIUM Complexity : Patient may present with comorbidities that affect occupational performnce.  Miniml to moderate modification of tasks or assistance (eg, physical or verbal ) with assesment(s) is necessary to enable patient to complete evaluation       Based on the above components, the patient evaluation is determined to be of the following complexity level: LOW   Pain Rating:  Pt reporting moderate pain this session    Activity Tolerance:   Fair and SpO2 stable on RA  Please refer to the flowsheet for vital signs taken during this treatment. After treatment patient left in no apparent distress:    Sitting in chair, Call bell within reach and Bed / chair alarm activated    COMMUNICATION/EDUCATION:   The patients plan of care was discussed with: Physical Therapist and Registered Nurse. Home safety education was provided and the patient/caregiver indicated understanding., Patient/family have participated as able in goal setting and plan of care. and Patient/family agree to work toward stated goals and plan of care. This patients plan of care is appropriate for delegation to [facility].     Thank you for this referral.  Bridger Espino OT  Time Calculation: 17 mins

## 2020-02-18 NOTE — PROGRESS NOTES
Transition of Care   · Resumption PT/OT [facility]   · PCP follow-up   · UAI to be completed for PCA     Reason for Admission:   syncope               RUR Score:    26%              Resources/supports as identified by patient/family:   Pt's wife Rekha Almeida is his primary support . Top Challenges facing patient (as identified by patient/family and CM): Finances/Medication cost?  No issues reported                  Transportation? No issues reported               Support system or lack thereof? No issues reported                      Living arrangements? Pt lives with his wife              Self-care/ADLs/Cognition? Pt is independent at baseline. Usually alert and oriented at baseline. Current Advanced Directive/Advance Care Plan:  Not on file at this time                          Plan for utilizing home health:   Pt is currently open to [address] [place] [address]. Transition of Care Plan:  Pt to discharge home with follow-up appointment and resume [facility] services. Pt is a 75year old male under observation status. Pt's wife was contacted to provide information due to Pt resting. Pt was recently admitted on 2/10/2020 for a planned L3-S1 posterior decompression and fusion with bone marrow aspiration from iliac crest Pt was then discharged home with [facility] with [facility] on 2/15/2020. Pt is reported to in a 1 story home with a ramp at the entrance. Pt's wife is Pt's only support in the home at this time. Pt's sister in-law and grandson are in the home but is unable to assist.     Pt's wife is to transport home. All questions were answered, no additional discharge needs identified at this time and patient expects to return to their (home, assisted living facility, relatives home, etc.) after discharge today.  Oral and Written notification given to patient and/or caregiver informing them that they are currently an Outpatient receiving care in our facility. Outpatient services include Observation Services under [facility] requirements. Care Management Interventions  PCP Verified by CM: Yes  Mode of Transport at Discharge:  Other (see comment)  Transition of Care Consult (CM Consult): Discharge Planning, [address]: No  Reason Outside [place]: Patient already serviced by other home care/hospice agency  Physical Therapy Consult: Yes  Occupational Therapy Consult: Yes  Current Support Network: Lives with Spouse  Discharge Location  Discharge Placement: Home with home health    [place], [address] [place] [address]

## 2020-02-18 NOTE — CDMP QUERY
Dr Jese Dela Cruz: 
 
Patient is s/p spinal surgery at multiple levels. INitially Hospitalist was consulted for pleural effusion & postop hypoxia followed by Pulmonologist as pt was unable to wean off O2 on POD 2  . Hospitalist noted 'Pulmonary Fibrosis, and acute hypoxic respiratory failure.' Pulmonologist noted 'post-op hypoxia - better now - sat 96 % on 1 liters nc.' If possible, please document in progress notes and discharge summary if this patient was evaluated and /or treated for any of the following: ? Acute hypoxic respiratory failure d/t pulmonary fibrosis & trace pleural effusion ? Acute pulmonary insufficiency following spinal surgery d/t pulmonary fibrosis & pleural effusion ? Chronic pulmonary insufficiency following spinal surgery d/t pulmonary fibrosis & pleural effusion ? Other, please specify ? Clinically unable to determine The medical record reflects the following: 
  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 POD #2 pt desat w/ ambulation, O2 sats dropped to mid 80% - 83% on RA  w/ dyspnea with ambulation during PT sessions. Placed on 3L for activity, still desats to 82% on 3L. At rest on 3L O2 sats at 92%. RR  Recorded 18-20. Pt does complain of dizziness. 2/14 POD#3  Rapid Response team called still unable to wean off O2. Calculated P/F ratio:  SpO2 83% ~ 48mm Hg pO2 on 3L NC~VIQ904% PF ratio: 48 /0.32 = 150  ~ PF ratio <300 represents acute hypoxemic respiratory failure Treatment:  Supplemental O2 from 3L NC to 1L NC until patient weaned off O2 on POD4 on day of discharge,  Hospitalist & pulmonology consult recommending PFT & O2 assessment, diuresis incentive spirometer, IV solumedrol. Thank you,  
Kenrick Escobar, [facility], [address] [place] [address], [address] [place] [address]  
[phone]

## 2020-02-18 NOTE — PROGRESS NOTES
Bedside and Verbal shift change report given to *** (oncoming nurse) by Cong Boyd  (offgoing nurse). Report included the following information SBAR, Kardex, Procedure Summary, Intake/Output, MAR, Accordion and Recent Results.

## 2020-02-18 NOTE — DISCHARGE SUMMARY
Hospitalist Discharge Summary     Patient ID:  Gee Heredia  [id number]  75 y.o.  1945 2/17/2020    PCP on record: Ike Roe MD    Admit date: 2/17/2020  Discharge date and time: 2/18/2020    DISCHARGE DIAGNOSIS:  See below    CONSULTATIONS:  IP CONSULT TO ORTHOPEDIC SURGERY    Excerpted HPI from H&P of [facility], DO:  Andres Rosas is a 75 y.o. With past medical history significant for spinal stenosis, CAD, GERD, dementia, hypertension, CHF who presented to area ED with complaint of possible syncopal episode after falling out of bed at skilled nursing facility. Patient reported that his legs buckled and he collapsed to the floor. Fall was unwitnessed; however, patient seems to recall the events around the fall and doubtful there was LOC. Patient denies any visual changes, headache, palpitations, chest pain. He also denies any fevers chills or nausea/vomiting. Of note, he recently underwent surgical decompression and fusion of L3-S1. MR GOMEZ with postop course complicated by transient hypoxia. He is currently maintaining sats on room air.  ______________________________________________________________________  DISCHARGE SUMMARY/HOSPITAL COURSE:  for full details see H&P, daily progress notes, labs, consult notes. ? Syncope  Ground level fall  Spinal stenosis/back pain  -Status post lumbar decompression and fusion  -Ortho courtesy consult  -PT/OT     2/18:  Pt back to baseline. Telemetry noted; no significant abnormalities noted. Pt's fall was mechanical, as he states that his knee buckled prior to the fall.   PT/OT evals appreciated - recommendation is for [facility] PT/OT  Pt stable for discharge back home.     Transient post-op hypoxia - resolved  -Maintaining sats on room air     Factor V Leiden deficiency with history of VTE  -Continue Eliquis     Dementia  -Continue Namenda     History of prostate cancer     Hypertension  -Will discontinue home BB as BP is well controlled without and do not exacerbate any potential orthostatic hypotension in this elderly pt.     _______________________________________________________________________  Patient seen and examined by me on discharge day. Pertinent Findings:  Gen:    Not in distress  Chest: Clear lungs  CVS:   Regular rhythm. No edema  Abd:  Soft, not distended, not tender  Neuro:  Alert, oriented x3, no focal deficits  _______________________________________________________________________  DISCHARGE MEDICATIONS:   Current Discharge Medication List      CONTINUE these medications which have NOT CHANGED    Details   traMADol (ULTRAM) 50 mg tablet Take 1-2 Tabs by mouth every six (6) hours as needed for Pain for up to 14 days. Max Daily Amount: 400 mg. Indications: pain  Qty: 120 Tab, Refills: 5    Associated Diagnoses: Lumbar back pain with radiculopathy affecting right lower extremity      polyethylene glycol (MIRALAX) 17 gram packet Take 1 Packet by mouth daily as needed (constipation) for up to 15 days. Qty: 15 Packet, Refills: 0      senna-docusate (PERICOLACE) 8.6-50 mg per tablet Take 1 Tab by mouth daily. Qty: 30 Tab, Refills: 0      gabapentin (NEURONTIN) 600 mg tablet Take 600 mg by mouth three (3) times daily. sacubitril-valsartan (ENTRESTO) 24 mg/26 mg tablet Take 1 Tab by mouth daily. memantine (NAMENDA) 10 mg tablet Take 1 Tab by mouth two (2) times a day. Qty: 180 Tab, Refills: 4      atorvastatin (LIPITOR) 40 mg tablet TAKE 1 TABLET BY MOUTH EVERY DAY  Qty: 90 Tab, Refills: 0    Associated Diagnoses: Hyperlipidemia, unspecified hyperlipidemia type      pantoprazole (PROTONIX) 40 mg tablet TAKE 1 TABLET BY MOUTH ONCE DAILY  Qty: 30 Tab, Refills: 2    Associated Diagnoses: GERD without esophagitis      sertraline (ZOLOFT) 100 mg tablet Take 2 Tabs by mouth daily.   Qty: 180 Tab, Refills: 1    Associated Diagnoses: Depression, unspecified depression type      ELIQUIS 5 mg tablet TAKE 1 TABLET BY MOUTH EVERY 12 HOURS  Qty: 180 Tab, Refills: 2    Associated Diagnoses: Acute deep vein thrombosis (DVT) of other specified vein of left lower extremity (HCC)      multivit-min/FA/lycopen/lutein (SENTRY SENIOR PO) Take  by mouth daily. polyethylene glycol (MIRALAX) 17 gram/dose powder Take 17 g by mouth as needed. Incontinence Pad, Liner, Disp (BLADDER CONTROL PADS EX ABSORB) pads 1 Packet by Does Not Apply route as needed (incontinence). Qty: 30 Each, Refills: 6      nitroglycerin (NITROSTAT) 0.4 mg SL tablet 1 Tab by SubLINGual route every five (5) minutes as needed for Chest Pain (call 911 if not relieved by 3). Qty: 25 Tab, Refills: 1         STOP taking these medications       QUEtiapine (SEROQUEL) 25 mg tablet Comments:   Reason for Stopping:         metoprolol succinate (TOPROL-XL) 25 mg XL tablet Comments:   Reason for Stopping:               Patient Follow Up Instructions:    Activity: Activity as tolerated  Diet: Resume previous diet  Wound Care: None needed    Follow-up Information     Follow up With Specialties Details Why Contact Info    Kaz Pham MD [facility] [address]  [phone]          ________________________________________________________________    Risk of deterioration: Low    Condition at Discharge:  Stable  __________________________________________________________________    Disposition  Home with family and home health services    ____________________________________________________________________    Code Status: Full Code  ___________________________________________________________________      Total time in minutes spent coordinating this discharge (includes going over instructions, follow-up, prescriptions, and preparing report for sign off to her PCP) :  35 minutes    Signed:  Pradeep Trejo MD

## 2020-02-18 NOTE — CONSULTS
Ortho Consult Note:    HPI-   Pt is s/p L3-S1 fusion with Dr. Evie Ratliff, pt is 7 days post op. Pt was discharged home with [facility] PT and was ambulating well at [place]. Pt states his RLE has been buckling with he first gets up and still has pain, but improved. Pt was transferred from [facility] for admission due to a \"fall\" in the ED after a fall at home. Exam -   FROM of Bilat UE/LE with no focal weakness, icnsion clean and dry with staples in place    Plan -   PT/OT - as tolerated, ambulate with assistance   Pain management PRN     Dr. Evie Ratliff aware of readmission and plan       Babatunde Agueror.  FINN Hayes-BC  Orthopedic Trauma Team ED [facility]

## 2020-02-18 NOTE — H&P
Hospitalist Admission Note    NAME: Za Patel   :  1945   MRN:  [id number]     Date/Time:  2020 10:06 PM    Patient PCP: Melinda Parham MD  ______________________________________________________________________  Given the patient's current clinical presentation, I have a high level of concern for decompensation if discharged from the emergency department. Complex decision making was performed, which includes reviewing the patient's available past medical records, laboratory results, and x-ray films. My assessment of this patient's clinical condition and my plan of care is as follows. Assessment / Plan:  Syncope  Ground level fall  Spinal stenosis/back pain  -Status post lumbar decompression and fusion  -Ortho courtesy consult  -PT/OT     Transient post-op hypoxia  -seems resolved  -Maintaining sats on room air    Factor V Leiden deficiency with history of VTE  -Continue Eliquis    Dementia  -Continue Namenda    History of prostate cancer    Hypertension  -well-controlled  -Hold beta-blocker for now in setting of syncope       Code Status: Full  Surrogate Decision Maker:    DVT Prophylaxis: eliquis  GI Prophylaxis: not indicated      Subjective:   CHIEF COMPLAINT: fall at SNF    HISTORY OF PRESENT ILLNESS:     Genie Cm is a 75 y.o. With past medical history significant for spinal stenosis, CAD, GERD, dementia, hypertension, CHF who presented to area ED with complaint of possible syncopal episode after falling out of bed at skilled nursing facility. Patient reported that his legs buckled and he collapsed to the floor. Fall was unwitnessed; however, patient seems to recall the events around the fall and doubtful there was LOC. Patient denies any visual changes, headache, palpitations, chest pain. He also denies any fevers chills or nausea/vomiting. Of note, he recently underwent surgical decompression and fusion of L3-S1.   MR GOMEZ with postop course complicated by transient hypoxia. He is currently maintaining sats on room air. We were asked to admit for work up and evaluation of the above problems. Past Medical History:   Diagnosis Date    AICD (automatic cardioverter/defibrillator) present 5/13/2016 5/13/16 [employer] upgrade to dual chamber AICD implant  Dr. Cynthia Iqbal Alzheimer disease [facility])     Anxiety state, other     Arthritis     OSTEO ARTHRITIS    AVNRT (AV bridgette re-entry tachycardia) ([address] 5/12/2016 5/12/16 AVNRT ablation: PM/AICD left upper chest :Dr. Yahaira Unger Back ache     CAD (coronary artery disease)     Cancer [facility]) 2004    Prostate cancer    Chest tightness     last episode of chest pain 5/2016 before AICD placed; none since then    Coagulation defects 2005    FACTOR V LEIDEN    Dementia ([address]     Dementia ([address]     Dizziness     FH: factor V Leiden deficiency     Generalized muscle ache     GERD (gastroesophageal reflux disease)     HEARTBURN    Heart failure ([address] 2014    as of 07/2019 EF 30-35;  Dr. Violetta Allen, Cardiomyopathy    Hyperlipidemia, mixed     Hypertension     Other ill-defined conditions(799.89) 2004    blood transfusion    Pacemaker     Paroxysmal atrial fibrillation ([address]     rate controlled with coreg     Postsurgical aortocoronary bypass status 05/29/2012    CABG    Presence of cardiac defibrillator 05/2016    left upper chest    Psychiatric disorder     depression    SSS (sick sinus syndrome) ([address]     Stroke ([address] 2011, 1990's    SEVERAL-mini    Thromboembolism ([address] 2014    left leg: changed to Eliquis from Warfarin    Thromboembolus ([address] 2005    left leg    Weakness generalized         Past Surgical History:   Procedure Laterality Date    CARDIAC CATHETERIZATION  3/4/2011         CARDIAC SURG PROCEDURE UNLIST      CABG X1 3/5/11    HX HEENT  2019    oral surgery, removed teeth, dentures upper/lower    HX HERNIA REPAIR Left 2002    Ingiunal hernia repair left    HX ORTHOPAEDIC      LEFT KNEE CARTILAGE REPAIR;RIGHT ROTATOR CUFF REPAIR    HX ORTHOPAEDIC  2/14/12    C5-7 ANTERIIOR CERVICAL DISECTOMY AND FUSION    HX ORTHOPAEDIC  6/4/13    C5-C7 POSTERIOR DECOMPRESSION AND FUSION    HX ORTHOPAEDIC      right thumb partial amputation of tip    HX OTHER SURGICAL      steroid injections    HX PACEMAKER Left 05/13/2016    BS D142, Dr. Solange Chamberlain [place]  [place]    HX ROTATOR CUFF REPAIR Left 2019    HX UROLOGICAL       urinary control system    HX UROLOGICAL  12/3/13    REPLACEMENT ARTIFICAL URINARY SPHINCTER       Social History     Tobacco Use    Smoking status: Never Smoker    Smokeless tobacco: Never Used   Substance Use Topics    Alcohol use: Not Currently     Alcohol/week: 0.0 standard drinks     Comment: stopped 2010        Family History   Problem Relation Age of Onset    Asthma Mother     Alcohol abuse Mother     Alcohol abuse Father     Asthma Father     Cancer Sister     Heart Disease Sister     Alcohol abuse Brother     Cancer Brother     Heart Disease Brother      No Known Allergies     Prior to Admission medications    Medication Sig Start Date End Date Taking? Authorizing Provider   traMADol (ULTRAM) 50 mg tablet Take 1-2 Tabs by mouth every six (6) hours as needed for Pain for up to 14 days. Max Daily Amount: 400 mg. Indications: pain 2/12/20 2/26/20  Yrn Tripathi NP   polyethylene glycol [facility]) 17 gram packet Take 1 Packet by mouth daily as needed (constipation) for up to 15 days. 2/12/20 2/27/20  Yrn Tripathi NP   senna-docusate (PERICOLACE) 8.6-50 mg per tablet Take 1 Tab by mouth daily. 2/12/20   Yrn Tripathi NP   gabapentin (NEURONTIN) 600 mg tablet Take 600 mg by mouth three (3) times daily. Provider, Historical   acetaminophen (TYLENOL) 325 mg tablet Take  by mouth every four (4) hours as needed for Pain.     Provider, Historical   sacubitril-valsartan (ENTRESTO) 24 mg/26 mg tablet Take 1 Tab by mouth daily. Provider, Historical   memantine (NAMENDA) 10 mg tablet Take 1 Tab by mouth two (2) times a day. 1/29/20   Albertina Smith NP   QUEtiapine (SEROQUEL) 25 mg tablet 1 tab Tid prn agitation headache 1/29/20   Albertina Smith NP   metoprolol succinate (TOPROL-XL) 25 mg XL tablet TAKE 1/2 TABLET BY MOUTH ONCE DAILY  Patient taking differently: Take 12.5 mg by mouth daily. May split tablet but DO NOT CHEW OR CRUSH tablet, swallow whole 1/16/20   Daisy Toussaint NP   atorvastatin (LIPITOR) 40 mg tablet TAKE 1 TABLET BY MOUTH EVERY DAY  Patient taking differently: Take 40 mg by mouth nightly. TAKE 1 TABLET BY MOUTH EVERY DAY 12/17/19   Celi Hutchison MD   pantoprazole (PROTONIX) 40 mg tablet TAKE 1 TABLET BY MOUTH ONCE DAILY 11/29/19   Celi Hutchison MD   sertraline (ZOLOFT) 100 mg tablet Take 2 Tabs by mouth daily. 11/20/19   Celi Hutchison MD   ELIQUIS 5 mg tablet TAKE 1 TABLET BY MOUTH EVERY 12 HOURS 11/12/19   Irwin Martinez MD   multivit-min/FA/lycopen/lutein (SENTRY SENIOR PO) Take  by mouth daily. Provider, Historical   polyethylene glycol (MIRALAX) 17 gram/dose powder Take 17 g by mouth as needed. Provider, Historical   Incontinence Pad, Liner, Disp (BLADDER CONTROL PADS EX ABSORB) pads 1 Packet by Does Not Apply route as needed (incontinence). 12/3/13   Scotty Youssef MD   nitroglycerin (NITROSTAT) 0.4 mg SL tablet 1 Tab by SubLINGual route every five (5) minutes as needed for Chest Pain (call 911 if not relieved by 3). 11/25/13   Lita Garcia ANP       REVIEW OF SYSTEMS:     I am not able to complete the review of systems because:    The patient is intubated and sedated    The patient has altered mental status due to his acute medical problems    The patient has baseline aphasia from prior stroke(s)    The patient has baseline dementia and is not reliable historian    The patient is in acute medical distress and unable to provide information           Total of 12 systems reviewed as follows:       POSITIVE= underlined text  Negative = text not underlined  General:  fever, chills, sweats, generalized weakness, weight loss/gain,      loss of appetite   Eyes:    blurred vision, eye pain, loss of vision, double vision  ENT:    rhinorrhea, pharyngitis   Respiratory:   cough, sputum production, SOB, HOLCOMB, wheezing, pleuritic pain   Cardiology:   chest pain, palpitations, orth Bystolic at this from worsening swelling only a few documents, was given recent as a [employer] raise consult was given opnea, PND, edema, syncope   Gastrointestinal:  abdominal pain , N/V, diarrhea, dysphagia, constipation, bleeding   Genitourinary:  frequency, urgency, dysuria, hematuria, incontinence   Muskuloskeletal :  arthralgia, myalgia, back pain  Hematology:  easy bruising, nose or gum bleeding, lymphadenopathy   Dermatological: rash, ulceration, pruritis, color change / jaundice  Endocrine:   hot flashes or polydipsia   Neurological:  headache, dizziness, confusion, focal weakness, paresthesia,     Speech difficulties, memory loss, gait difficulty  Psychological: Feelings of anxiety, depression, agitation    Objective:   VITALS:    Visit Vitals  BP (P) 127/62   Pulse (P) 62   Temp (P) 98.1 °F (36.7 °C)   Resp (P) 16   SpO2 (P) 98%       PHYSICAL EXAM:    General:    Alert, cooperative, no distress, appears stated age. HEENT: Atraumatic, anicteric sclerae, pink conjunctivae     No oral ulcers, mucosa moist, throat clear, dentition fair  Neck:  Supple, symmetrical,  thyroid: non tender  Lungs:   Clear to auscultation bilaterally. No Wheezing or Rhonchi. No rales. Chest wall:  No tenderness  No Accessory muscle use. Heart:   Regular  rhythm,  No  murmur   No edema  Abdomen:   Soft, non-tender. Not distended. Bowel sounds normal  Extremities: No cyanosis. No clubbing,      Skin turgor normal, Capillary refill normal, Radial dial pulse 2+  Skin:     Not pale.   Not Jaundiced  No rashes   Psych:  Fair insight not depressed. Not anxious or agitated. Flat affect  Neurologic: EOMs intact. No facial asymmetry. No aphasia or slurred speech. Symmetrical strength, Sensation grossly intact. Alert and oriented X 4.     _______________________________________________________________________  Care Plan discussed with:    Comments   Patient x    Family      RN x    Care Manager                    Consultant:      _______________________________________________________________________  Expected  Disposition:   Home with Family    HH/PT/OT/RN    SNF/LTC x   NORM    ________________________________________________________________________  TOTAL TIME:  72 Minutes    Critical Care Provided     Minutes non procedure based      Comments    x Reviewed previous records   >50% of visit spent in counseling and coordination of care x Discussion with patient and/or family and questions answered       ________________________________________________________________________  Signed: Clearance Hale, DO    Procedures: see electronic medical records for all procedures/Xrays and details which were not copied into this note but were reviewed prior to creation of Plan. LAB DATA REVIEWED:    Recent Results (from the past 24 hour(s))   CBC WITH AUTOMATED DIFF    Collection Time: 02/17/20  3:10 PM   Result Value Ref Range    WBC 7.8 4.1 - 11.1 K/uL    RBC 3.63 (L) 4.10 - 5.70 M/uL    HGB 10.4 (L) 12.1 - 17.0 g/dL    HCT 33.4 (L) 36.6 - 50.3 %    MCV 92.0 80.0 - 99.0 FL    MCH 28.7 26.0 - 34.0 PG    MCHC 31.1 30.0 - 36.5 g/dL    RDW 14.7 (H) 11.5 - 14.5 %    PLATELET 730 123 - 130 K/uL    MPV 11.0 8.9 - 12.9 FL    NRBC 0.0 0  WBC    ABSOLUTE NRBC 0.00 0.00 - 0.01 K/uL    NEUTROPHILS 58 32 - 75 %    LYMPHOCYTES 27 12 - 49 %    MONOCYTES 11 5 - 13 %    EOSINOPHILS 3 0 - 7 %    BASOPHILS 0 0 - 1 %    IMMATURE GRANULOCYTES 1 (H) 0.0 - 0.5 %    ABS. NEUTROPHILS 4.5 1.8 - 8.0 K/UL    ABS.  LYMPHOCYTES 2.1 0.8 - 3.5 K/UL    ABS. MONOCYTES 0.8 0.0 - 1.0 K/UL    ABS. EOSINOPHILS 0.2 0.0 - 0.4 K/UL    ABS. BASOPHILS 0.0 0.0 - 0.1 K/UL    ABS. IMM. GRANS. 0.1 (H) 0.00 - 0.04 K/UL    DF AUTOMATED     METABOLIC PANEL, COMPREHENSIVE    Collection Time: 02/17/20  3:10 PM   Result Value Ref Range    Sodium 141 136 - 145 mmol/L    Potassium 4.0 3.5 - 5.1 mmol/L    Chloride 105 97 - 108 mmol/L    CO2 31 21 - 32 mmol/L    Anion gap 5 5 - 15 mmol/L    Glucose 81 65 - 100 mg/dL    BUN 35 (H) 6 - 20 MG/DL    Creatinine 1.09 0.70 - 1.30 MG/DL    BUN/Creatinine ratio 32 (H) 12 - 20      GFR est AA >60 >60 ml/min/1.73m2    GFR est non-AA >60 >60 ml/min/1.73m2    Calcium 8.8 8.5 - 10.1 MG/DL    Bilirubin, total 0.5 0.2 - 1.0 MG/DL    ALT (SGPT) 81 (H) 12 - 78 U/L    AST (SGOT) 31 15 - 37 U/L    Alk.  phosphatase 104 45 - 117 U/L    Protein, total 6.3 (L) 6.4 - 8.2 g/dL    Albumin 2.6 (L) 3.5 - 5.0 g/dL    Globulin 3.7 2.0 - 4.0 g/dL    A-G Ratio 0.7 (L) 1.1 - 2.2     TROPONIN I    Collection Time: 02/17/20  3:10 PM   Result Value Ref Range    Troponin-I, Qt. <0.05 <0.05 ng/mL   URINALYSIS W/ REFLEX CULTURE    Collection Time: 02/17/20  3:35 PM   Result Value Ref Range    Color YELLOW/STRAW      Appearance CLEAR CLEAR      Specific gravity 1.010 1.003 - 1.030      pH (UA) 7.0 5.0 - 8.0      Protein NEGATIVE  NEG mg/dL    Glucose NEGATIVE  NEG mg/dL    Ketone NEGATIVE  NEG mg/dL    Bilirubin NEGATIVE  NEG      Blood NEGATIVE  NEG      Urobilinogen >8.0 (H) 0.2 - 1.0 EU/dL    Nitrites NEGATIVE  NEG      Leukocyte Esterase NEGATIVE  NEG      WBC 0-4 /hpf    RBC 5-10 /hpf    Epithelial cells FEW /lpf    Bacteria NEGATIVE  /hpf    UA:UC IF INDICATED CULTURE NOT INDICATED BY UA RESULT

## 2020-02-18 NOTE — PROGRESS NOTES
Problem: Mobility Impaired (Adult and Pediatric)  Goal: *Acute Goals and Plan of Care (Insert Text)  Description  FUNCTIONAL STATUS PRIOR TO ADMISSION: Patient was modified independent using a walker for functional mobility. HOME SUPPORT PRIOR TO ADMISSION: The patient lived with wife who assisted him as needed. Physical Therapy Goals  Initiated 2/18/2020    1. Patient will move from supine to sit and sit to supine  in bed with modified independence within 4 days. 2. Patient will perform sit to stand with modified independence within 4 days. 3. Patient will ambulate with modified independence for 200 feet with the least restrictive device within 4 days. 4. Patient will ascend/descend 4 stairs with 1 handrail(s) with modified independence within 4 days. 5. Patient will verbalize and demonstrate understanding of spinal precautions (No bending, lifting greater than 5 lbs, or twisting; log-roll technique; frequent repositioning as instructed) within 4 days. Outcome: Progressing Towards Goal   PHYSICAL THERAPY EVALUATION  Patient: Za Patel (37 y.o. male)  Date: 2/18/2020  Primary Diagnosis: Syncope [R55]        Precautions:   Back, Fall(brace when up)      ASSESSMENT  Based on the objective data described below, the patient presents with decreased functional mobility from baseline level of function. Patient limited by mild pain but otherwise moving well. Admits to being mainly sedentary at home and ambulates only short distances majority of the time. Overall needing CGA to ambulate with RW without any overt LOB. SpO2 maintained 98% on room air during gait training. Anticipate he will be safe to DC home when medically ready. Suggest resumption of  PT to continue mobility progression. Current Level of Function Impacting Discharge (mobility/balance):  CGA for ambulation with RW    Other factors to consider for discharge: at risk for falls, some memory deficits at baseline     Patient will benefit from skilled therapy intervention to address the above noted impairments. PLAN :  Recommendations and Planned Interventions: bed mobility training, transfer training, gait training, therapeutic exercises, neuromuscular re-education, patient and family training/education, and therapeutic activities      Frequency/Duration: Patient will be followed by physical therapy:  5 times a week to address goals. 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     OBJECTIVE DATA SUMMARY:   HISTORY:    Past Medical History:   Diagnosis Date    AICD (automatic cardioverter/defibrillator) present 5/13/2016 5/13/16 [employer] upgrade to dual chamber AICD implant  Dr. Viola Baig    Alzheimer disease [facility])     Anxiety state, other     Arthritis     OSTEO ARTHRITIS    AVNRT (AV bridgette re-entry tachycardia) ([facility] 5/12/2016 5/12/16 AVNRT ablation: PM/AICD left upper chest :Dr. Arianna Manzo    Back ache     CAD (coronary artery disease)     Cancer ([address] 2004    Prostate cancer    Chest tightness     last episode of chest pain 5/2016 before AICD placed; none since then    Coagulation defects 2005    FACTOR V LEIDEN    Dementia ([address]     Dementia ([facility]     Dizziness     FH: factor V Leiden deficiency     Generalized muscle ache     GERD (gastroesophageal reflux disease)     HEARTBURN    Heart failure ([address] 2014    as of 07/2019 EF 30-35;  Dr. Villareal Hand, Cardiomyopathy    Hyperlipidemia, mixed     Hypertension     Other ill-defined conditions(799.89) 2004    blood transfusion    Pacemaker     Paroxysmal atrial fibrillation (HCC)     rate controlled with coreg     Postsurgical aortocoronary bypass status 05/29/2012    CABG    Presence of cardiac defibrillator 05/2016    left upper chest    Psychiatric disorder     depression    SSS (sick sinus syndrome) [facility]     Stroke ([facility] 2011, 1990's    SEVERAL-mini    Thromboembolism ([facility] 2014    left leg: changed to Eliquis from Warfarin    Thromboembolus ([facility] 2005    left leg    Weakness generalized      Past Surgical History:   Procedure Laterality Date    CARDIAC CATHETERIZATION  3/4/2011         CARDIAC SURG PROCEDURE UNLIST      CABG X1 3/5/11    HX HEENT  2019    oral surgery, removed teeth, dentures upper/lower    HX HERNIA REPAIR Left 2002    Ingiunal hernia repair left    HX ORTHOPAEDIC      LEFT KNEE CARTILAGE REPAIR;RIGHT ROTATOR CUFF REPAIR    HX ORTHOPAEDIC  2/14/12    C5-7 ANTERIIOR CERVICAL DISECTOMY AND FUSION    HX ORTHOPAEDIC  6/4/13    C5-C7 POSTERIOR DECOMPRESSION AND FUSION    HX ORTHOPAEDIC      right thumb partial amputation of tip    HX OTHER SURGICAL      steroid injections    HX PACEMAKER Left 05/13/2016    BS D142, Dr. Elisa Gomez      [place]  [place]     [place]    HX Birdena Drum Left 2019    HX UROLOGICAL       urinary control system    HX UROLOGICAL  12/3/13    REPLACEMENT ARTIFICAL URINARY SPHINCTER       Personal factors and/or comorbidities impacting plan of care:     Home Situation  Home Environment: Trailer/mobile home  Wheelchair Ramp: Yes  One/Two Story Residence: One story  Living Alone: No  Support Systems: Family member(s), Spouse/Significant Other/Partner  Patient Expects to be Discharged to[de-identified] Private residence  Current DME Used/Available at Home: Walker, rolling    EXAMINATION/PRESENTATION/DECISION MAKING:   Critical Behavior:  Neurologic State: Alert  Orientation Level: Oriented to person, Oriented to place, Oriented to situation       Range Of Motion:  AROM: Generally decreased, functional                       Strength:    Strength: Generally decreased, functional           Functional Mobility:  Bed Mobility:  Rolling: Supervision  Supine to Sit: Supervision     Scooting: Supervision  Transfers:  Sit to Stand: Contact guard assistance  Stand to Sit: Contact guard assistance                       Balance:   Sitting: Intact  Standing: Intact; With support  Ambulation/Gait Training:  Distance (ft): 115 Feet (ft)  Assistive Device: Gait belt;Walker, rolling  Ambulation - Level of Assistance: Contact guard assistance     Gait Description (WDL): Exceptions to WDL  Gait Abnormalities: Decreased step clearance        Base of Support: Narrowed     Speed/Hazel: Pace decreased (<100 feet/min); Slow  Step Length: Left shortened;Right shortened         Pain Rating:  No c/o pain    Activity Tolerance:   Fair and requires rest breaks  Please refer to the flowsheet for vital signs taken during this treatment. After treatment patient left in no apparent distress:   Sitting in chair and Call bell within reach, chair alarm in place    COMMUNICATION/EDUCATION:   The patients plan of care was discussed with: Physical Therapist, Occupational Therapist, and Registered Nurse. Fall prevention education was provided and the patient/caregiver indicated understanding., Patient/family have participated as able in goal setting and plan of care. , and Patient/family agree to work toward stated goals and plan of care.     Thank you for this referral.  Mily Ramirez, PT, DPT   Time Calculation: 18 mins

## 2020-02-18 NOTE — ROUTINE PROCESS
The following appointments have been successfully scheduled: 
 
Date/time Tuesday, February 25, 2020 10:30 AM 
Patient  Scotty Jacobson 1945 (19TL M) #[id number] S#[id number] [facility] OFFICE Appointment type Any Provider Bandar Parrish

## 2020-02-19 DIAGNOSIS — I50.22 CHRONIC SYSTOLIC (CONGESTIVE) HEART FAILURE (HCC): ICD-10-CM

## 2020-02-19 LAB
ATRIAL RATE: 77 BPM
CALCULATED P AXIS, ECG09: 41 DEGREES
CALCULATED R AXIS, ECG10: 167 DEGREES
CALCULATED T AXIS, ECG11: 4 DEGREES
DIAGNOSIS, 93000: NORMAL
P-R INTERVAL, ECG05: 152 MS
Q-T INTERVAL, ECG07: 510 MS
QRS DURATION, ECG06: 194 MS
QTC CALCULATION (BEZET), ECG08: 577 MS
VENTRICULAR RATE, ECG03: 77 BPM

## 2020-02-19 RX ORDER — SACUBITRIL AND VALSARTAN 24; 26 MG/1; MG/1
TABLET, FILM COATED ORAL
Qty: 180 TAB | Refills: 0 | Status: SHIPPED | OUTPATIENT
Start: 2020-02-19 | End: 2020-06-16

## 2020-02-28 NOTE — PERIOP NOTES
Reviewed case with Dr Florinda Luna. Pt was seen today by PCP Bp 84/50 pulse 43  Was instructed to stop entresto and furosemide     Call to [facility] notified of above information,  Pt is symptomatic with dizziness and bp's 84/50 with pulse of 43.     Msg sent to NP with Dr Ksenia Carrizales office   Cardiac clearance was based on patient being asymptomatic Home

## 2020-03-11 ENCOUNTER — OFFICE VISIT (OUTPATIENT)
Dept: CARDIOLOGY CLINIC | Age: 75
End: 2020-03-11

## 2020-03-11 DIAGNOSIS — Z95.810 AICD (AUTOMATIC CARDIOVERTER/DEFIBRILLATOR) PRESENT: Primary | ICD-10-CM

## 2020-03-11 DIAGNOSIS — I42.9 CARDIOMYOPATHY, UNSPECIFIED TYPE (HCC): ICD-10-CM

## 2020-03-13 ENCOUNTER — TELEPHONE (OUTPATIENT)
Dept: CARDIOLOGY CLINIC | Age: 75
End: 2020-03-13

## 2020-03-13 NOTE — TELEPHONE ENCOUNTER
Please give pts wife a call back, she has some concerns about the pts health, she also asked for an apt, April 20th apt was offered she wanted something sooner, I advised her that that was the earliest apt, and she said what is she to do about his health, advised if she feel he need help asap to go to the ER and one of our cardiologist will see the pt if it is a cardiac problem, pt did not want the apt and an apt was not made.     thanks

## 2020-03-13 NOTE — TELEPHONE ENCOUNTER
JUAN Castro LPN   Caller: Unspecified (Today,  2:00 PM)             I reviewed his transmission from the 11th of this month. He has a lower rate limit of 60 which means his HR will not drop for long below 60 before his device stimulates his heart to beat. He is actually pacing less this check then he was before. Only 37% of the time in the top chambers. 1% in the bottom chambers which is his normal. He is having a lot of premature extra beats which will cause the HR to look artificially low when being palpated or by machine. It doesn't count the extra beats. Stopping the Metoprolol will likely worsen this issue. I would not stop unless his BP is running systolic 502 or lower. We can bring him in to see Lita sooner if they want. It doesn't look like he needs the upgrade by the improved EF and low VPacing     Verified patient with two identifiers. Spoke with Laurence Stephen on HIPAA, advised her of above note. She will continue to take metoprolol.

## 2020-03-13 NOTE — TELEPHONE ENCOUNTER
Verified patient with two identifiers. Spoke with Sera Daniels on HIPAA, pt has been having low HR 40-44, /70. He has not energy, sleeping all the time. No c/o chest pain, swelling or sob. Last office visit Dr. Josesito Rodriguez suggested he may been an ICD. Please advise.

## 2020-04-15 ENCOUNTER — NURSE TRIAGE (OUTPATIENT)
Dept: OTHER | Facility: CLINIC | Age: 75
End: 2020-04-15

## 2020-04-15 NOTE — TELEPHONE ENCOUNTER
Patient called in via [facility] [facility] to report having symptoms of sore throat, which began yesterday. Patient informed of disposition. Provided patient with telehealth options and walk-in clinics. Care advice as documented. Instructed patient to call back with worsening symptoms. Patient verbalized understanding and denies any further questions/concerns. Please do not respond to the triage nurse through this encounter. Any subsequent communication should be directly with the patient.     Reason for Disposition   [1] Drinking very little AND [2] dehydration suspected (e.g., no urine > 12 hours, very dry mouth, very lightheaded)    Protocols used: SORE THROAT-ADULT-AH

## 2020-04-17 ENCOUNTER — TELEPHONE (OUTPATIENT)
Dept: CARDIOLOGY CLINIC | Age: 75
End: 2020-04-17

## 2020-05-22 ENCOUNTER — PATIENT OUTREACH (OUTPATIENT)
Dept: FAMILY MEDICINE CLINIC | Age: 75
End: 2020-05-22

## 2020-05-22 NOTE — PROGRESS NOTES
Patient contacted regarding COVID-19  risk. Discussed COVID-19 related testing which was pending at this time. Test results were pending. Patient informed of results, if available? Pending     Care Transition Nurse/ Ambulatory Care Manager contacted the patient by telephone to perform post discharge assessment. Verified name and  with patient as identifiers. Provided introduction to self, and explanation of the CTN/ACM role, and reason for call due to risk factors for infection and/or exposure to COVID-19. Symptoms reviewed with patient who verbalized the following symptoms: weakness. Due to no new or worsening symptoms encounter was not routed to provider for escalation. Patient has following risk factors of: CHF, HTN. CTN/ACM reviewed discharge instructions, medical action plan and red flags such as increased shortness of breath, increasing fever and signs of decompensation with patient who verbalized understanding. Discussed exposure protocols and quarantine with CDC Guidelines What to do if you are sick with coronavirus disease .  Patient was given an opportunity for questions and concerns. The patient agrees to contact the [facility] exposure line [phone], [facility]  ([phone]) and PCP office for questions related to their healthcare. CTN/ACM provided contact information for future needs. Reviewed and educated patient on any new and changed medications related to discharge diagnosis. Patient/family/caregiver given information for [employer] and agrees to enroll yes  Patient's preferred e-mail:  [email or web address]  Patient's preferred phone [phone]  Based on Loop alert triggers, patient will be contacted by nurse care manager for worsening symptoms. Pt will be further monitored by COVID Loop Team based on severity of symptoms and risk factors. Patient spoke with PCP today.

## 2020-06-11 ENCOUNTER — OFFICE VISIT (OUTPATIENT)
Dept: CARDIOLOGY CLINIC | Age: 75
End: 2020-06-11

## 2020-06-11 DIAGNOSIS — I42.9 CARDIOMYOPATHY, UNSPECIFIED TYPE (HCC): ICD-10-CM

## 2020-06-11 DIAGNOSIS — Z95.810 AICD (AUTOMATIC CARDIOVERTER/DEFIBRILLATOR) PRESENT: Primary | ICD-10-CM

## 2020-06-16 NOTE — PROGRESS NOTES
1. Have you been to the ER, urgent care clinic since your last visit? Hospitalized since your last visit? No    2. Have you seen or consulted any other health care providers outside of the [address] [place] [address] since your last visit? Include any pap smears or colon screening.  No    C/O PAIN IN LEFT SHOULDER WHEN TRYING TO  ANYTHING, SWELLING IN BLE, DIZZINESS Called and talked with patient  States that she got blood work taken at [facility]  States that her sed rate was 34 and notified her PCP regarding high rate  The other test looks to be within normal range  Patient will attempt to send results through [employer]

## 2020-08-03 ENCOUNTER — APPOINTMENT (OUTPATIENT)
Dept: CT IMAGING | Age: 75
End: 2020-08-03
Attending: EMERGENCY MEDICINE
Payer: MEDICARE

## 2020-08-03 ENCOUNTER — HOSPITAL ENCOUNTER (EMERGENCY)
Age: 75
Discharge: HOME | End: 2020-08-03
Attending: EMERGENCY MEDICINE
Payer: MEDICARE

## 2020-08-03 VITALS
SYSTOLIC BLOOD PRESSURE: 110 MMHG | RESPIRATION RATE: 16 BRPM | BODY MASS INDEX: 24.8 KG/M2 | OXYGEN SATURATION: 100 % | HEIGHT: 67 IN | HEART RATE: 66 BPM | TEMPERATURE: 97.6 F | DIASTOLIC BLOOD PRESSURE: 63 MMHG | WEIGHT: 158 LBS

## 2020-08-03 DIAGNOSIS — S20.212A CONTUSION OF LEFT CHEST WALL, INITIAL ENCOUNTER: Primary | ICD-10-CM

## 2020-08-03 PROCEDURE — 74011000250 HC RX REV CODE- 250: Performed by: PHYSICIAN ASSISTANT

## 2020-08-03 PROCEDURE — 99283 EMERGENCY DEPT VISIT LOW MDM: CPT

## 2020-08-03 PROCEDURE — 71250 CT THORAX DX C-: CPT

## 2020-08-03 RX ORDER — LIDOCAINE 4 G/100G
1 PATCH TOPICAL EVERY 12 HOURS
Qty: 30 PATCH | Refills: 0 | Status: SHIPPED | OUTPATIENT
Start: 2020-08-03 | End: 2021-03-03

## 2020-08-03 RX ORDER — LIDOCAINE 4 G/100G
1 PATCH TOPICAL
Status: DISCONTINUED | OUTPATIENT
Start: 2020-08-03 | End: 2020-08-03 | Stop reason: HOSPADM

## 2020-08-03 NOTE — ED TRIAGE NOTES
[facility] last Wednesday had xray Friday at [address] that showed no fractures but continuing to have pain left ribs since fall.

## 2020-08-03 NOTE — ED PROVIDER NOTES
EMERGENCY DEPARTMENT HISTORY AND PHYSICAL EXAM      Date: 8/3/2020  Patient Name: Keena Echeverria    History of Presenting Illness     Chief Complaint   Patient presents with    Fall    Rib Injury       History Provided By: Patient and Patient's Wife    HPI: Keena Echeverria, 67 y.o. male with PMHx significant for Alzheimer disease, paroxysmal A. fib, stroke, hypertension, coronary artery disease, presents to the ED with cc of left chest wall pain after fall 5 days ago. The patient reports that he lost his balance and fell over, hitting the left side of his chest wall against the railing to a ramp. Since then, he has had pain to his left anterior chest wall, particularly at the site of his defibrillator. He saw his PCP and had negative x-rays of his ribs. He has been taking tramadol without relief of pain. He reports he sometimes has difficulty breathing due to the pain but denies any shortness of breath currently. He denies other injuries. There are no other complaints, changes, or physical findings at this time. PCP: Delmy Cardona MD    No current facility-administered medications on file prior to encounter. Current Outpatient Medications on File Prior to Encounter   Medication Sig Dispense Refill    donepeziL (ARICEPT) 10 mg tablet Take 1 Tab by mouth nightly. 90 Tab 1    atorvastatin (LIPITOR) 40 mg tablet TAKE 1 TABLET BY MOUTH AT BEDTIME 90 Tab 1    sertraline (ZOLOFT) 100 mg tablet TAKE 2 TABLETS BY MOUTH ONCE DAILY 180 Tab 1    Entresto 24-26 mg tablet TAKE 1 TABLET BY MOUTH TWICE DAILY 180 Tab 3    traMADoL (ULTRAM) 50 mg tablet Take 50 mg by mouth every six (6) hours as needed for Pain.  pantoprazole (PROTONIX) 40 mg tablet TAKE 1 TABLET BY MOUTH ONCE DAILY 90 Tab 1    gabapentin (NEURONTIN) 600 mg tablet TAKE 1 TABLET BY MOUTH THREE TIMES A DAY 90 Tab 4    senna-docusate (PERICOLACE) 8.6-50 mg per tablet Take 1 Tab by mouth daily.  30 Tab 0    acetaminophen (TYLENOL) 325 mg tablet Take  by mouth every four (4) hours as needed for Pain.  memantine (NAMENDA) 10 mg tablet Take 1 Tab by mouth two (2) times a day. 180 Tab 4    ELIQUIS 5 mg tablet TAKE 1 TABLET BY MOUTH EVERY 12 HOURS 180 Tab 2    multivit-min/FA/lycopen/lutein (SENTRY SENIOR PO) Take  by mouth daily.  polyethylene glycol (MIRALAX) 17 gram/dose powder Take 17 g by mouth as needed.  Incontinence Pad, Liner, Disp (BLADDER CONTROL PADS EX ABSORB) pads 1 Packet by Does Not Apply route as needed (incontinence). 30 Each 6    nitroglycerin (NITROSTAT) 0.4 mg SL tablet 1 Tab by SubLINGual route every five (5) minutes as needed for Chest Pain (call 911 if not relieved by 3). 25 Tab 1       Past History     Past Medical History:  Past Medical History:   Diagnosis Date    AICD (automatic cardioverter/defibrillator) present 5/13/2016 5/13/16 [employer] upgrade to dual chamber AICD implant  Dr. Radha Mitchell Alzheimer disease [facility])     Anxiety state, other     Arthritis     OSTEO ARTHRITIS    AVNRT (AV bridgette re-entry tachycardia) ([facility] 5/12/2016 5/12/16 AVNRT ablation: PM/AICD left upper chest :Dr. Raya Mojica Back ache     CAD (coronary artery disease)     Cancer [facility]) 2004    Prostate cancer    Chest tightness     last episode of chest pain 5/2016 before AICD placed; none since then    Coagulation defects 2005    FACTOR V LEIDEN    Dementia ([facility]     Dementia ([facility]     Dizziness     FH: factor V Leiden deficiency     Generalized muscle ache     GERD (gastroesophageal reflux disease)     HEARTBURN    Heart failure ([facility] 2014    as of 07/2019 EF 30-35;  Dr. Juliana Akins, Cardiomyopathy    Hyperlipidemia, mixed     Hypertension     Other ill-defined conditions(799.89) 2004    blood transfusion    Pacemaker     Paroxysmal atrial fibrillation (HCC)     rate controlled with coreg     Postsurgical aortocoronary bypass status 05/29/2012    CABG    Presence of cardiac defibrillator 05/2016    left upper chest    Psychiatric disorder     depression    SSS (sick sinus syndrome) ([facility] [address]     Stroke ([facility] 2011, 1990's    SEVERAL-mini    Thromboembolism ([facility] 2014    left leg: changed to Eliquis from Warfarin    Thromboembolus ([facility] 2005    left leg    Weakness generalized        Past Surgical History:  Past Surgical History:   Procedure Laterality Date    CARDIAC CATHETERIZATION  3/4/2011         CARDIAC SURG PROCEDURE UNLIST      CABG X1 3/5/11    HX HEENT  2019    oral surgery, removed teeth, dentures upper/lower    HX HERNIA REPAIR Left 2002    Ingiunal hernia repair left    HX ORTHOPAEDIC      LEFT KNEE CARTILAGE REPAIR;RIGHT ROTATOR CUFF REPAIR    HX ORTHOPAEDIC  2/14/12    C5-7 ANTERIIOR CERVICAL DISECTOMY AND FUSION    HX ORTHOPAEDIC  6/4/13    C5-C7 POSTERIOR DECOMPRESSION AND FUSION    HX ORTHOPAEDIC      right thumb partial amputation of tip    HX OTHER SURGICAL      steroid injections    HX PACEMAKER Left 05/13/2016    BS D142, Dr. Mariella Zuniga HX PROSTATECTOMY  2004     [place]    HX ROTATOR CUFF REPAIR Left 2019    HX UROLOGICAL       urinary control system    HX UROLOGICAL  12/3/13    REPLACEMENT ARTIFICAL URINARY SPHINCTER       Family History:  Family History   Problem Relation Age of Onset    Asthma Mother     Alcohol abuse Mother     Alcohol abuse Father     Asthma Father     Cancer Sister     Heart Disease Sister     Alcohol abuse Brother     Cancer Brother     Heart Disease Brother        Social History:  Social History     Tobacco Use    Smoking status: Never Smoker    Smokeless tobacco: Never Used   Substance Use Topics    Alcohol use: Not Currently     Alcohol/week: 0.0 standard drinks     Comment: stopped 2010    Drug use: Never       Allergies:  No Known Allergies      Review of Systems   Review of Systems   Constitutional: Negative for chills and fever. HENT: Negative for ear pain and sore throat. Eyes: Negative for redness and visual disturbance. Respiratory: Negative for cough and shortness of breath. Cardiovascular: Negative for chest pain and palpitations. Gastrointestinal: Negative for abdominal pain, nausea and vomiting. Genitourinary: Negative for dysuria and hematuria. Musculoskeletal: Negative for back pain and gait problem. Positive for left chest wall pain   Skin: Negative for rash and wound. Neurological: Negative for dizziness and headaches. Psychiatric/Behavioral: Negative for behavioral problems and confusion. All other systems reviewed and are negative. Physical Exam   Physical Exam  Constitutional:       Appearance: He is not toxic-appearing. Comments: Elderly male in no acute distress. HENT:      Head: Normocephalic and atraumatic. Mouth/Throat:      Mouth: Mucous membranes are moist.   Eyes:      Extraocular Movements: Extraocular movements intact. Pupils: Pupils are equal, round, and reactive to light. Neck:      Musculoskeletal: Normal range of motion and neck supple. Cardiovascular:      Rate and Rhythm: Normal rate and regular rhythm. Pulmonary:      Effort: Pulmonary effort is normal. No respiratory distress. Breath sounds: Normal breath sounds. Comments: Midline sternotomy scar and defibrillator over the left chest wall. Tenderness to palpation over the left mid anterior chest wall, most pronounced around defibrillator. No significant swelling. No ecchymosis or hematoma. Musculoskeletal: Normal range of motion. General: No deformity. Skin:     General: Skin is warm and dry. Neurological:      General: No focal deficit present. Mental Status: He is alert and oriented to person, place, and time. Psychiatric:         Behavior: Behavior normal.           Diagnostic Study Results     Labs -   No results found for this or any previous visit (from the past 12 hour(s)).     Radiologic Studies -   CT CHEST WO CONT   Preliminary Result   IMPRESSION:   Underlying interstitial fibrosis, most pronounced in the lingula. No fractures        CT Results  (Last 48 hours)               08/03/20 1033  CT CHEST WO CONT Preliminary result    Impression:  IMPRESSION:   Underlying interstitial fibrosis, most pronounced in the lingula. No fractures       Narrative:  INDICATION: Rib pain after fall       COMPARISON: 5/21/2020       CONTRAST: None. TECHNIQUE:  5 mm axial images were obtained through the chest. Coronal and   sagittal reformats were generated. CT dose reduction was achieved through use   of a standardized protocol tailored for this examination and automatic exposure   control for dose modulation. The absence of intravenous contrast reduces the sensitivity for evaluation of   the mediastinum, azalea, vasculature, and upper abdominal organs. FINDINGS:       CHEST WALL: No mass or axillary lymphadenopathy. Pacemaker device in the left   upper chest.   THYROID: No nodule. MEDIASTINUM: No mass or lymphadenopathy. Calcified mediastinal nodes. AZALEA: No mass or lymphadenopathy. THORACIC AORTA: No aneurysm. MAIN PULMONARY ARTERY: Normal in caliber. TRACHEA/BRONCHI: Patent. ESOPHAGUS: No wall thickening or dilatation. HEART: Mild cardiomegaly. Minimal coronary artery calcification. Pacemaker   device in place. Anna Frizzle PLEURA: No effusion or pneumothorax. LUNGS: No nodule, mass, or airspace disease. There is an increase to the   interstitial markings diffusely, most pronounced in the lingula. INCIDENTALLY IMAGED UPPER ABDOMEN: There is a left upper renal pole cyst with   density measurement of -29 HU. BONES: No destructive bone lesion. CXR Results  (Last 48 hours)    None            Medical Decision Making   I am the first provider for this patient.     I reviewed the vital signs, available nursing notes, past medical history, past surgical history, family history and social history. Vital Signs-Reviewed the patient's vital signs. Patient Vitals for the past 12 hrs:   Temp Pulse Resp BP SpO2   08/03/20 0950 97.6 °F (36.4 °C) 66 16 110/63 100 %         Records Reviewed: Nursing Notes and Old Medical Records      Provider Notes (Medical Decision Making):   DDx: Rib fracture, rib contusion, pneumothorax    Given x-rays are negative, plan to obtain CT of the contrast for further evaluation. Will treat symptomatically with lidocaine patch. ED Course:   Initial assessment performed. The patients presenting problems have been discussed, and they are in agreement with the care plan formulated and outlined with them. I have encouraged them to ask questions as they arise throughout their visit. ED Course as of Aug 03 1134   Mon Aug 03, 2020   1123 Discussed results with patient and his wife. Will treat symptomatically with lidocaine patches, in addition to the tramadol he is already prescribed. Discussed follow up and return precautions. [IRAIDA]      ED Course User Index  [IRAIDA] [place], [place]         Disposition:  11:34 AM    The patient has been re-evaluated and is ready for discharge. Reviewed available results with patient. Counseled patient on diagnosis and care plan. Patient has expressed understanding, and all questions have been answered. Patient agrees with plan and agrees to follow up as recommended, or to return to the ED if their symptoms worsen. Discharge instructions have been provided and explained to the patient, along with reasons to return to the ED. PLAN:  1. Current Discharge Medication List      START taking these medications    Details   lidocaine 4 % patch 1 Patch by TransDERmal route every twelve (12) hours every twelve (12) hours. Qty: 30 Patch, Refills: 0           2.    Follow-up Information     Follow up With Specialties Details Why Adryan Fuller MD [facility] Medicine Schedule an appointment as soon as possible for a visit in 1 week for a recheck [address] [facility]  [phone]      [facility] EMERGENCY DEPT Emergency Medicine Go to If symptoms worsen, having difficulty breathing, severe cough, fever [address] [place] [address]  [phone]        Return to ED if worse     Diagnosis     Clinical Impression:   1. Contusion of left chest wall, initial encounter            Rafael Anglin PA-C

## 2020-08-03 NOTE — DISCHARGE INSTRUCTIONS
Patient Education        Bruised Rib: Care Instructions  Overview     You can get a bruised rib if you fall or get hit, such as in an accident or while playing sports. The medical term for a bruise is \"contusion. \" Small blood vessels get torn and leak blood under the skin. Most people think of a bruise as a black-and-blue area. But bones and muscles can also get bruised. An injury may damage the rib but not cause a bruise that you can see. Sometimes it can be hard to tell if a rib is bruised or broken. The symptoms may be the same. And a broken bone can't always be seen on an X-ray. But the treatment for a bruised rib is often the same as treatment for a broken one. An injury to the ribs can cause pain. The pain may be worse when you breathe deeply, cough, or sneeze. In most cases, a bruised rib will heal on its own. You can take pain medicine while the rib mends. Pain relief allows you to take deep breaths. Follow-up care is a key part of your treatment and safety. Be sure to make and go to all appointments, and call your doctor if you are having problems. It's also a good idea to know your test results and keep a list of the medicines you take. How can you care for yourself at home? · Rest and protect the injured or sore area. Stop, change, or take a break from any activity that causes pain. · Put ice or a cold pack on the area for 10 to 20 minutes at a time. Put a thin cloth between the ice and your skin. · After 2 or 3 days, if your swelling is gone, put a heating pad set on low or a warm cloth on your chest. Some doctors suggest that you go back and forth between hot and cold. Put a thin cloth between the heating pad and your skin. · Ask your doctor if you can take an over-the-counter pain medicine, such as acetaminophen (Tylenol), ibuprofen (Advil, Motrin), or naproxen (Aleve). Be safe with medicines. Read and follow all instructions on the label.   · As your pain gets better, slowly return to your normal activities. Be patient. Rib bruises can take weeks or months to heal. If the pain gets worse, it may be a sign that you need to rest a while longer. When should you call for help? Call 911 anytime you think you may need emergency care. For example, call if:  · You have severe trouble breathing. Call your doctor now or seek immediate medical care if:  · You have trouble breathing. · You have a fever. · You have a new or worse cough. · You have new or worse pain. Watch closely for changes in your health, and be sure to contact your doctor if:  · You do not get better as expected. Where can you learn more? Go to http://kendell-stephanie.info/  Enter R125 in the search box to learn more about \"Bruised Rib: Care Instructions. \"  Current as of: June 26, 2019               Content Version: 12.5  © 5781-2813 [employer]wise, [employer]orporated. Care instructions adapted under license by [employer] (which disclaims liability or warranty for this information). If you have questions about a medical condition or this instruction, always ask your healthcare professional. [address] any warranty or liability for your use of this information.

## 2020-08-04 PROCEDURE — 74011250636 HC RX REV CODE- 250/636: Performed by: ORTHOPAEDIC SURGERY

## 2020-08-05 NOTE — PERIOP NOTES
Handoff Report from Operating Room to PACU    Report received from [address] [place] [address] and HEATHER Valero CRNA regarding Dot Keane. Surgeon(s):  Lorena Ovalle MD  And Procedure(s) (LRB):  L3-5 LATERAL FUSION WITH BONE MARROW ASPIRATION FROM ILIAC CREST (Left)  confirmed   with allergies, drains and dressings discussed. Anesthesia type, drugs, patient history, complications, estimated blood loss, vital signs, intake and output, and last pain medication, lines, reversal medications and temperature were reviewed. [facility]  IMMUNIZATION CERTIFICATE  (Required of each child enrolled in a public or private school,  program, day care center, certified family  home, or other licensed facility which cares for children.)     Name:  Sarah Melvin  YOB: 2005  Address:  [address] [place] [id number]  -------------------------------------------------------------------------------------------------------------------  Immunization History   Administered Date(s) Administered    DTaP (Infanrix) 2004, 01/18/2005, 02/18/2005, 03/15/2006, 12/03/2008    HPV 9-valent Peraza Kvng) 05/31/2018, 11/01/2018    Hepatitis A Ped/Adol (Havrix, Vaqta) 08/05/2020    Hepatitis A Ped/Adol (Vaqta) 05/31/2018, 11/01/2018    Hepatitis B Ped/Adol (Engerix-B, Recombivax HB) 2004, 2004, 03/21/2005    Influenza Virus Vaccine 10/26/2015    Influenza, MDCK Quadv, IM, PF (Flucelvax 4 yrs and older) 10/15/2019    Influenza, Mayra Puls, IM, PF (6 mo and older Fluzone, Flulaval, Fluarix, and 3 yrs and older Afluria) 11/13/2018    MMR 11/07/2005, 12/03/2008    Meningococcal MCV4O (Menveo) 03/21/2016    Polio IPV (IPOL) 2004, 01/18/2005, 03/15/2006, 12/03/2008    Tdap (Boostrix, Adacel) 03/21/2016    Varicella (Varivax) 12/03/2008, 03/21/2016      -------------------------------------------------------------------------------------------------------------------  *DTaP, DTP, DT, Td   *MMR  for one dose, measles-containing for second. *Hib not required at age 11 years or more. ** Alternative two dose series of approved  adult hepatitis B vaccine for  children 615 years of age. **Varicella  required for children 19 months to 7 years unless a parent, guardian or physician states that the child has had chickenpox disease.      This child is current for immunizations until ____/____/____, (two weeks after the next shot is due)  after which this certificate is no longer valid and a new certificate must be obtained. I CERTIFY THAT THE ABOVE NAMED CHILD HAS RECEIVED IMMUNIZATIONS AS STIPULATED ABOVE. Signature of provider___________________________________________Date_______________  This Certificate should be presented to the school or facility in which the child intends to enroll and should be retained by the school or facility and filed with the childs health record.   [id number] (Rev 8/2002)

## 2020-08-13 ENCOUNTER — OFFICE VISIT (OUTPATIENT)
Dept: CARDIOLOGY CLINIC | Age: 75
End: 2020-08-13

## 2020-08-13 ENCOUNTER — OFFICE VISIT (OUTPATIENT)
Dept: CARDIOLOGY CLINIC | Age: 75
End: 2020-08-13
Payer: COMMERCIAL

## 2020-08-13 ENCOUNTER — TELEPHONE (OUTPATIENT)
Dept: CARDIOLOGY CLINIC | Age: 75
End: 2020-08-13

## 2020-08-13 ENCOUNTER — CLINICAL SUPPORT (OUTPATIENT)
Dept: CARDIOLOGY CLINIC | Age: 75
End: 2020-08-13

## 2020-08-13 VITALS
OXYGEN SATURATION: 95 % | RESPIRATION RATE: 16 BRPM | DIASTOLIC BLOOD PRESSURE: 62 MMHG | HEIGHT: 67 IN | HEART RATE: 59 BPM | WEIGHT: 157.9 LBS | SYSTOLIC BLOOD PRESSURE: 110 MMHG | BODY MASS INDEX: 24.78 KG/M2

## 2020-08-13 VITALS
SYSTOLIC BLOOD PRESSURE: 110 MMHG | OXYGEN SATURATION: 97 % | DIASTOLIC BLOOD PRESSURE: 62 MMHG | HEART RATE: 59 BPM | BODY MASS INDEX: 24.64 KG/M2 | HEIGHT: 67 IN | WEIGHT: 157 LBS | RESPIRATION RATE: 18 BRPM

## 2020-08-13 DIAGNOSIS — I10 ESSENTIAL HYPERTENSION: ICD-10-CM

## 2020-08-13 DIAGNOSIS — I25.5 ISCHEMIC CARDIOMYOPATHY: ICD-10-CM

## 2020-08-13 DIAGNOSIS — I49.5 SSS (SICK SINUS SYNDROME) (HCC): Primary | ICD-10-CM

## 2020-08-13 DIAGNOSIS — Z95.1 S/P CABG X 1: ICD-10-CM

## 2020-08-13 DIAGNOSIS — I42.9 CARDIOMYOPATHY, UNSPECIFIED TYPE (HCC): ICD-10-CM

## 2020-08-13 DIAGNOSIS — Z95.810 AICD (AUTOMATIC CARDIOVERTER/DEFIBRILLATOR) PRESENT: Primary | ICD-10-CM

## 2020-08-13 DIAGNOSIS — I50.22 CHRONIC SYSTOLIC CONGESTIVE HEART FAILURE (HCC): ICD-10-CM

## 2020-08-13 DIAGNOSIS — I50.22 CHRONIC SYSTOLIC (CONGESTIVE) HEART FAILURE (HCC): ICD-10-CM

## 2020-08-13 DIAGNOSIS — E78.2 MIXED HYPERLIPIDEMIA: ICD-10-CM

## 2020-08-13 DIAGNOSIS — I25.10 CORONARY ARTERY DISEASE INVOLVING NATIVE CORONARY ARTERY OF NATIVE HEART WITHOUT ANGINA PECTORIS: Primary | ICD-10-CM

## 2020-08-13 DIAGNOSIS — Z95.810 AICD (AUTOMATIC CARDIOVERTER/DEFIBRILLATOR) PRESENT: ICD-10-CM

## 2020-08-13 DIAGNOSIS — I48.0 PAROXYSMAL ATRIAL FIBRILLATION (HCC): ICD-10-CM

## 2020-08-13 PROCEDURE — 3288F FALL RISK ASSESSMENT DOCD: CPT | Performed by: INTERNAL MEDICINE

## 2020-08-13 PROCEDURE — G8754 DIAS BP LESS 90: HCPCS | Performed by: INTERNAL MEDICINE

## 2020-08-13 PROCEDURE — G8427 DOCREV CUR MEDS BY ELIG CLIN: HCPCS | Performed by: INTERNAL MEDICINE

## 2020-08-13 PROCEDURE — 93000 ELECTROCARDIOGRAM COMPLETE: CPT | Performed by: INTERNAL MEDICINE

## 2020-08-13 PROCEDURE — G8536 NO DOC ELDER MAL SCRN: HCPCS | Performed by: INTERNAL MEDICINE

## 2020-08-13 PROCEDURE — G8420 CALC BMI NORM PARAMETERS: HCPCS | Performed by: INTERNAL MEDICINE

## 2020-08-13 PROCEDURE — 1100F PTFALLS ASSESS-DOCD GE2>/YR: CPT | Performed by: INTERNAL MEDICINE

## 2020-08-13 PROCEDURE — G9717 DOC PT DX DEP/BP F/U NT REQ: HCPCS | Performed by: INTERNAL MEDICINE

## 2020-08-13 PROCEDURE — 93283 PRGRMG EVAL IMPLANTABLE DFB: CPT | Performed by: INTERNAL MEDICINE

## 2020-08-13 PROCEDURE — G8752 SYS BP LESS 140: HCPCS | Performed by: INTERNAL MEDICINE

## 2020-08-13 PROCEDURE — 99214 OFFICE O/P EST MOD 30 MIN: CPT | Performed by: INTERNAL MEDICINE

## 2020-08-13 RX ORDER — NITROGLYCERIN 0.4 MG/1
0.4 TABLET SUBLINGUAL
Qty: 1 BOTTLE | Refills: 1 | Status: ON HOLD | OUTPATIENT
Start: 2020-08-13 | End: 2022-01-01

## 2020-08-13 NOTE — PROGRESS NOTES
Subjective:      Sylvia Hendricks is a 67 y.o. male is here for EP consult. He fell recently and landed on his device site. The patient denies chest pain/ shortness of breath, orthopnea, PND, LE edema, palpitations, syncope, presyncope or fatigue.        Patient Active Problem List    Diagnosis Date Noted    Syncope 02/17/2020    Rotator cuff arthropathy of left shoulder 09/06/2019    Status post reverse arthroplasty of shoulder 09/06/2019    Right rotator cuff tear arthropathy 09/04/2019    Moderate major depression ([address] 07/03/2018    Depression 07/03/2018    DDD (degenerative disc disease), lumbar 04/02/2018    Chronic anticoagulation 07/17/2017    S/P CABG (coronary artery bypass graft) 06/17/2016    AICD (automatic cardioverter/defibrillator) present 05/13/2016    AVNRT (AV bridgette re-entry tachycardia) ([address] 05/12/2016    Cardiomyopathy ([address] 04/27/2016    Chronic systolic congestive heart failure ([address] 04/27/2016    Stenosis of both carotid arteries without cerebral infarction 04/12/2016    Cervicogenic headache 04/12/2016    Alzheimer's dementia, late onset, with behavioral disturbance ([address] 04/12/2016    Spinal stenosis, lumbar region, with neurogenic claudication 04/12/2016    Lumbar back pain with radiculopathy affecting right lower extremity 04/12/2016    Lumbar back pain with radiculopathy affecting left lower extremity 04/12/2016    History of stroke 04/12/2016    ACP (advance care planning) 12/30/2015    B12 deficiency 09/22/2015    Hypothyroidism due to acquired atrophy of thyroid 09/22/2015    Osteoporosis 09/22/2015    Cervical post-laminectomy syndrome 09/22/2015    Neck pain 09/22/2015    Lower GI bleeding 08/19/2015    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 ([address] 07/23/2015    Cardiac pacemaker in situ 03/17/2015    Pacemaker 11/26/2014    Chest pain 11/24/2014    Bradycardia 11/23/2014    Left-sided chest wall pain 11/23/2014    SSS (sick sinus syndrome) ([address] 11/23/2014    S/P cervical spinal fusion 06/06/2013    Osteoarthritis 05/29/2012    Hyperthyroidism 05/29/2012    Mitral valve disorders(424.0) 05/29/2012    Postsurgical aortocoronary bypass status 05/29/2012    Coronary atherosclerosis of native coronary artery 05/29/2012    Paroxysmal supraventricular tachycardia ([address] 05/29/2012    Chronic systolic heart failure ([address] 05/29/2012    Atrial fibrillation ([address] 05/29/2012    Mixed hyperlipidemia 05/29/2012    Palpitations 05/29/2012    History of factor V Leiden mutation 03/07/2011    CABG (coronary artery bypass graft) 03/07/2011    CAD (coronary artery disease) 03/04/2011    Hypertension 03/04/2011    Hyperlipemia 03/04/2011      Apollo Robledo MD  Past Medical History:   Diagnosis Date    AICD (automatic cardioverter/defibrillator) present 5/13/2016 5/13/16 [employer] upgrade to dual chamber AICD implant  Dr. Lillian Goodman Alzheimer disease [facility])     Anxiety state, other     Arthritis     OSTEO ARTHRITIS    AVNRT (AV bridgette re-entry tachycardia) ([facility] 5/12/2016 5/12/16 AVNRT ablation: PM/AICD left upper chest :Dr. Cierra Ashby Back ache     CAD (coronary artery disease)     Cancer [facility]) 2004    Prostate cancer    Chest tightness     last episode of chest pain 5/2016 before AICD placed; none since then    Coagulation defects 2005    FACTOR V LEIDEN    Dementia ([address]     Dementia ([address]     Dizziness     FH: factor V Leiden deficiency     Generalized muscle ache     GERD (gastroesophageal reflux disease)     HEARTBURN    Heart failure ([address] 2014    as of 07/2019 EF 30-35;  Dr. Brooks Estimable, Cardiomyopathy    Hyperlipidemia, mixed     Hypertension     Other ill-defined conditions(799.89) 2004    blood transfusion    Pacemaker     Paroxysmal atrial fibrillation (HCC)     rate controlled with coreg     Postsurgical aortocoronary bypass status 05/29/2012    CABG    Presence of cardiac defibrillator 05/2016    left upper chest    Psychiatric disorder     depression    SSS (sick [facility] syndrome) [facility]     Stroke ([facility] 2011, 1990's    SEVERAL-mini    Thromboembolism ([facility] 2014    left leg: changed to Eliquis from Warfarin    Thromboembolus ([facility] 2005    left leg    Weakness generalized       Past Surgical History:   Procedure Laterality Date    CARDIAC CATHETERIZATION  3/4/2011         CARDIAC SURG PROCEDURE UNLIST      CABG X1 3/5/11    HX HEENT  2019    oral surgery, removed teeth, dentures upper/lower    HX HERNIA REPAIR Left 2002    Ingiunal hernia repair left    HX ORTHOPAEDIC      LEFT KNEE CARTILAGE REPAIR;RIGHT ROTATOR CUFF REPAIR    HX ORTHOPAEDIC  2/14/12    C5-7 ANTERIIOR CERVICAL DISECTOMY AND FUSION    HX ORTHOPAEDIC  6/4/13    C5-C7 POSTERIOR DECOMPRESSION AND FUSION    HX ORTHOPAEDIC      right thumb partial amputation of tip    HX OTHER SURGICAL      steroid injections    HX PACEMAKER Left 05/13/2016    BS D142, Dr. Lord Ignacio HX PROSTATECT[place]  2004     [place]    HX ROTATOR CUFF REPAIR Left 2019    HX UROLOGICAL       urinary control system    HX UROLOGICAL  12/3/13    REPLACEMENT ARTIFICAL URINARY SPHINCTER     No Known Allergies   Family History   Problem Relation Age of Onset    Asthma Mother     Alcohol abuse Mother     Alcohol abuse Father     Asthma Father     Cancer Sister     Heart Disease Sister     Alcohol abuse Brother     Cancer Brother     Heart Disease Brother     negative for cardiac disease  Social History     Socioeconomic History    Marital status:      Spouse name: Not on file    Number of children: Not on file    Years of education: Not on file    Highest education level: Not on file   Tobacco Use    Smoking status: Never Smoker    Smokeless tobacco: Never Used   Substance and Sexual Activity    Alcohol use: Not Currently     Alcohol/week: 0.0 standard drinks     Comment: stopped 2010    Drug use: Never    Sexual activity: Not Currently     Partners: Female   Other Topics Concern    Sleep Concern Yes    Stress Concern Yes     Comment: Family concerns   Social History Narrative    , 2 children     Current Outpatient Medications   Medication Sig    nitroglycerin (NITROSTAT) 0.4 mg SL tablet 1 Tab by SubLINGual route every five (5) minutes as needed for Chest Pain (call 911 if not relieved by 3).  lidocaine 4 % patch 1 Patch by TransDERmal route every twelve (12) hours every twelve (12) hours.  donepeziL (ARICEPT) 10 mg tablet Take 1 Tab by mouth nightly.  atorvastatin (LIPITOR) 40 mg tablet TAKE 1 TABLET BY MOUTH AT BEDTIME    sertraline (ZOLOFT) 100 mg tablet TAKE 2 TABLETS BY MOUTH ONCE DAILY    Entresto 24-26 mg tablet TAKE 1 TABLET BY MOUTH TWICE DAILY    traMADoL (ULTRAM) 50 mg tablet Take 50 mg by mouth every six (6) hours as needed for Pain.  pantoprazole (PROTONIX) 40 mg tablet TAKE 1 TABLET BY MOUTH ONCE DAILY    gabapentin (NEURONTIN) 600 mg tablet TAKE 1 TABLET BY MOUTH THREE TIMES A DAY    senna-docusate (PERICOLACE) 8.6-50 mg per tablet Take 1 Tab by mouth daily. (Patient taking differently: Take 1 Tab by mouth as needed.)    acetaminophen (TYLENOL) 325 mg tablet Take  by mouth every four (4) hours as needed for Pain.  memantine (NAMENDA) 10 mg tablet Take 1 Tab by mouth two (2) times a day.  ELIQUIS 5 mg tablet TAKE 1 TABLET BY MOUTH EVERY 12 HOURS    multivit-min/FA/lycopen/lutein (SENTRY SENIOR PO) Take  by mouth daily.  polyethylene glycol (MIRALAX) 17 gram/dose powder Take 17 g by mouth as needed.  Incontinence Pad, Liner, Disp (BLADDER CONTROL PADS EX ABSORB) pads 1 Packet by Does Not Apply route as needed (incontinence). No current facility-administered medications for this visit.        Vitals:    08/13/20 1059   BP: 110/62   Pulse: (!) 59   Resp: 18   SpO2: 97%   Weight: 157 lb (71.2 kg)   Height: 5' 7\" (1.702 m) I have reviewed the nurses notes, vitals, problem list, allergy list, medical history, family, social history and medications. Review of Symptoms:    General: Pt denies excessive weight gain or loss. Pt is able to conduct ADL's  HEENT: Denies blurred vision, headaches, hearing loss, epistaxis and difficulty swallowing. Respiratory: Denies cough, congestion, shortness of breath, HOLCOMB, wheezing or stridor. Cardiovascular: Denies precordial pain, palpitations, edema or PND  Gastrointestinal: Denies poor appetite, indigestion, abdominal pain or blood in stool  Genitourinary: Denies hematuria, dysuria, increased urinary frequency  Musculoskeletal: Denies joint pain or swelling from muscles or joints  Neurologic: Denies tremor, paresthesias, headache, or sensory motor disturbance  Psychiatric: Denies confusion, insomnia, depression  Integumentray: Denies rash, itching or ulcers. Hematologic: Denies easy bruising, bleeding    Physical Exam:      General: Well developed, in no acute distress. HEENT: Eyes - PERRL, no jvd  Heart:  Normal S1/S2 negative S3 or S4. Regular, no murmur, gallop or rub. Respiratory: Clear bilaterally x 4, no wheezing or rales  Abdomen:   Soft, non-tender, bowel sounds are active. Extremities:  No edema, normal cap refill, no cyanosis. Musculoskeletal: No clubbing  Neuro: A&Ox3, speech clear, gait stable. Skin: Skin color is normal. No rashes or lesions.  Non diaphoretic, no ulcers or subcutaneous nodule  Vascular: 2+ pulses symmetric in all extremities  Psych - judgement intact and orientation is wnl     Cardiographics    Ekg: A paced 60 bpm    icd - A paced 43%    Results for orders placed or performed during the hospital encounter of 05/21/20   EKG, 12 LEAD, INITIAL   Result Value Ref Range    Ventricular Rate 74 BPM    Atrial Rate 73 BPM    QRS Duration 114 ms    Q-T Interval 416 ms    QTC Calculation (Bezet) 461 ms    Calculated R Axis 12 degrees    Calculated T Axis 123 degrees    Diagnosis       Accelerated Junctional rhythm  Incomplete left bundle branch block  ST & T wave abnormality, consider inferior ischemia  Prolonged QT  Abnormal ECG  When compared with ECG of 17-FEB-2020 15:03,  Junctional rhythm has replaced Electronic atrial pacemaker  T wave inversion more evident in Inferior leads  Confirmed by Yakelin Renee MD, --- ([id number]) on 5/23/2020 12:29:19 AM           Lab Results   Component Value Date/Time    WBC 9.1 05/21/2020 03:00 PM    Hemoglobin (POC) 11.6 (L) 02/14/2012 10:03 AM    HGB 13.5 05/21/2020 03:00 PM    Hematocrit (POC) 34 (L) 02/14/2012 10:03 AM    HCT 41.6 05/21/2020 03:00 PM    PLATELET 433 47/86/5935 03:00 PM    MCV 86.3 05/21/2020 03:00 PM      Lab Results   Component Value Date/Time    Sodium 137 05/21/2020 03:00 PM    Potassium 4.0 05/21/2020 03:00 PM    Chloride 102 05/21/2020 03:00 PM    CO2 27 05/21/2020 03:00 PM    Anion gap 8 05/21/2020 03:00 PM    Glucose 111 (H) 05/21/2020 03:00 PM    BUN 23 (H) 05/21/2020 03:00 PM    Creatinine 1.22 05/21/2020 03:00 PM    BUN/Creatinine ratio 19 05/21/2020 03:00 PM    GFR est AA >60 05/21/2020 03:00 PM    GFR est non-AA 58 (L) 05/21/2020 03:00 PM    Calcium 9.9 05/21/2020 03:00 PM    Bilirubin, total 0.6 05/21/2020 03:00 PM    Alk. phosphatase 120 (H) 05/21/2020 03:00 PM    Protein, total 7.6 05/21/2020 03:00 PM    Albumin 3.7 05/21/2020 03:00 PM    Globulin 3.9 05/21/2020 03:00 PM    A-G Ratio 0.9 (L) 05/21/2020 03:00 PM    ALT (SGPT) 18 05/21/2020 03:00 PM         Assessment:     Assessment:        ICD-10-CM ICD-9-CM    1. SSS (sick sinus syndrome) (HCC)  I49.5 427.81    2. Ischemic cardiomyopathy  I25.5 414.8    3. Chronic systolic congestive heart failure (HCC)  I50.22 428.22      428.0    4. Essential hypertension  I10 401.9    5. Paroxysmal atrial fibrillation (HCC)  I48.0 427.31      No orders of the defined types were placed in this encounter. Plan:   Nola Edwards is A paced 43% for sick sinus. He is on oac for PAF (none detected on device). Cont med rx for ischemic cardiomyopathy and htn. Gee Heredia will follow up with me in one year and in the device clinic per routine. Continue medical management for sss, paf and htn. Thank you for allowing me to participate in Gee Heredia 's care.     Elisha Wilkinson MD, [facility]

## 2020-08-13 NOTE — PROGRESS NOTES
Chief Complaint   Patient presents with    Irregular Heart Beat     Follow up     Dizziness     had fallen outside on 8/1/20     Ankle swelling     angeline'     1. Have you been to the ER, urgent care clinic since your last visit? Hospitalized since your last visit? Yes ER for fall and contusion on left chest wall     2. Have you seen or consulted any other health care providers outside of the [address] [place] [address] since your last visit? Include any pap smears or colon screening.   No

## 2020-08-13 NOTE — LETTER
8/13/20 Patient: Sylvia Hendricks YOB: 1945 Date of Visit: 8/13/2020 Riri Myles MD 
[id number] [facility] [id number] VIA In Basket Dear Riri Myles MD, Thank you for referring Mr. Boston Fields to [facility] for evaluation. My notes for this consultation are attached. If you have questions, please do not hesitate to call me. I look forward to following your patient along with you. Sincerely, Linsey Osullivan MD

## 2020-08-13 NOTE — PROGRESS NOTES
1. Have you been to the ER, urgent care clinic since your last visit? Hospitalized since your last visit? Yes When: on 8/3/20 for contusion to left chest wall    2. Have you seen or consulted any other health care providers outside of the [address] [place] [address] since your last visit? Include any pap smears or colon screening. No    Chief Complaint   Patient presents with    Irregular Heart Beat     6 mo f/u    Cholesterol Problem     6 mo f/u    Hypertension     6 mo f/u    Ankle swelling     bilateral ankle swelling     Pt had a fall d/t dizziness. ED visit on 8/3/20. Denies dizziness today.

## 2020-08-13 NOTE — PROGRESS NOTES
[address] [address] [address] [facility]  [phone]     Subjective:      Rosy Her is a 67 y.o. male with pmhx of CAD s/p CABGx1, ischemic cardiomyopathy s/p ICD, PAF, chronic DVT with Factor V Leiden mutation, HTN and HLD  presents for follow up appt. Patient has baseline dementia, wife present to help answer questions. He had an ED visit 5/21/20 for SOB and weakness. Diagnosed with CAP and given abx, d/c home from ED. Ari Cordova, had been 7049 in February of this year. He had ED visit after a fall on 7/29, he had left anterior CP primarily around his device, previously had neg CXR by PCP. W/U in ED was neg and he was given Lidocaine patches and Tramadol. He doesn't remember the fall at all, his wife thinks he passed out. He reports the area around his device is . His home weight has been stable at 157lbs. No chest pain prior to hitting his chest and no chest pain since. He also states that he needs clearance for a urologic procedure with Dr. Suzy Driver. The patient denies orthopnea, PND, LE edema, palpitations, syncope, or presyncope.        Patient Active Problem List    Diagnosis Date Noted    Syncope 02/17/2020    Rotator cuff arthropathy of left shoulder 09/06/2019    Status post reverse arthroplasty of shoulder 09/06/2019    Right rotator cuff tear arthropathy 09/04/2019    Moderate major depression ([address] 07/03/2018    Depression 07/03/2018    DDD (degenerative disc disease), lumbar 04/02/2018    Chronic anticoagulation 07/17/2017    S/P CABG (coronary artery bypass graft) 06/17/2016    AICD (automatic cardioverter/defibrillator) present 05/13/2016    AVNRT (AV bridgette re-entry tachycardia) ([address] 05/12/2016    Cardiomyopathy ([address] 04/27/2016    Chronic systolic congestive heart failure ([address] 04/27/2016    Stenosis of both carotid arteries without cerebral infarction 04/12/2016    Cervicogenic headache 04/12/2016    Alzheimer's dementia, late onset, with behavioral disturbance ([address] 04/12/2016    Spinal stenosis, lumbar region, with neurogenic claudication 04/12/2016    Lumbar back pain with radiculopathy affecting right lower extremity 04/12/2016    Lumbar back pain with radiculopathy affecting left lower extremity 04/12/2016    History of stroke 04/12/2016    ACP (advance care planning) 12/30/2015    B12 deficiency 09/22/2015    Hypothyroidism due to acquired atrophy of thyroid 09/22/2015    Osteoporosis 09/22/2015    Cervical post-laminectomy syndrome 09/22/2015    Neck pain 09/22/2015    Lower GI bleeding 08/19/2015    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 ([address] 07/23/2015    Cardiac pacemaker in situ 03/17/2015    Pacemaker 11/26/2014    Chest pain 11/24/2014    Bradycardia 11/23/2014    Left-sided chest wall pain 11/23/2014    SSS (sick sinus syndrome) ([address] 11/23/2014    S/P cervical spinal fusion 06/06/2013    Osteoarthritis 05/29/2012    Hyperthyroidism 05/29/2012    Mitral valve disorders(424.0) 05/29/2012    Postsurgical aortocoronary bypass status 05/29/2012    Coronary atherosclerosis of native coronary artery 05/29/2012    Paroxysmal supraventricular tachycardia ([address] 05/29/2012    Chronic systolic heart failure ([address] 05/29/2012    Atrial fibrillation ([address] 05/29/2012    Mixed hyperlipidemia 05/29/2012    Palpitations 05/29/2012    History of factor V Leiden mutation 03/07/2011    CABG (coronary artery bypass graft) 03/07/2011    CAD (coronary artery disease) 03/04/2011    Hypertension 03/04/2011    Hyperlipemia 03/04/2011      Danny Evans MD  Past Medical History:   Diagnosis Date    AICD (automatic cardioverter/defibrillator) present 5/13/2016 5/13/16 [employer] upgrade to dual chamber AICD implant  Dr. Laurie Phan Alzheimer disease [facility])     Anxiety state, other     Arthritis     OSTEO ARTHRITIS    AVNRT (AV bridgette re-entry tachycardia) ([address] 5/12/2016 5/12/16 AVNRT ablation: PM/AICD left upper chest :Dr. Nico Villegas    Back ache     CAD (coronary artery disease)     Cancer [facility]) 2004    Prostate cancer    Chest tightness     last episode of chest pain 5/2016 before AICD placed; none since then    Coagulation defects 2005    FACTOR V LEIDEN    Dementia ([address]     Dementia ([address]     Dizziness     FH: factor V Leiden deficiency     Generalized muscle ache     GERD (gastroesophageal reflux disease)     HEARTBURN    Heart failure ([address] 2014    as of 07/2019 EF 30-35;  Dr. Reggie Carroll, Cardiomyopathy    Hyperlipidemia, mixed     Hypertension     Other ill-defined conditions(799.89) 2004    blood transfusion    Pacemaker     Paroxysmal atrial fibrillation ([address]     rate controlled with coreg     Postsurgical aortocoronary bypass status 05/29/2012    CABG    Presence of cardiac defibrillator 05/2016    left upper chest    Psychiatric disorder     depression    SSS (sick sinus syndrome) ([address]     Stroke ([address] 2011, 1990's    SEVERAL-mini    Thromboembolism ([address] 2014    left leg: changed to Eliquis from Warfarin    Thromboembolus ([address] 2005    left leg    Weakness generalized       Past Surgical History:   Procedure Laterality Date    CARDIAC CATHETERIZATION  3/4/2011         CARDIAC SURG PROCEDURE UNLIST      CABG X1 3/5/11    HX HEENT  2019    oral surgery, removed teeth, dentures upper/lower    HX HERNIA REPAIR Left 2002    Ingiunal hernia repair left    HX ORTHOPAEDIC      LEFT KNEE CARTILAGE REPAIR;RIGHT ROTATOR CUFF REPAIR    HX ORTHOPAEDIC  2/14/12    C5-7 ANTERIIOR CERVICAL DISECTOMY AND FUSION    HX ORTHOPAEDIC  6/4/13    C5-C7 POSTERIOR DECOMPRESSION AND FUSION    HX ORTHOPAEDIC      right thumb partial amputation of tip    HX OTHER SURGICAL      steroid injections    HX PACEMAKER Left 05/13/2016    BS D142, Dr. Shari Watson HX PROSTATECTOMY  2004     [place]    HX ROTATOR CUFF REPAIR Left 2019    HX UROLOGICAL       urinary control system    HX UROLOGICAL  12/3/13    REPLACEMENT ARTIFICAL URINARY SPHINCTER     No Known Allergies   Family History   Problem Relation Age of Onset    Asthma Mother     Alcohol abuse Mother     Alcohol abuse Father     Asthma Father     Cancer Sister     Heart Disease Sister     Alcohol abuse Brother     Cancer Brother     Heart Disease Brother       Social History     Socioeconomic History    Marital status:      Spouse name: Not on file    Number of children: Not on file    Years of education: Not on file    Highest education level: Not on file   Occupational History    Not on file   Social Needs    Financial resource strain: Not on file    Food insecurity     Worry: Not on file     Inability: Not on file    Transportation needs     Medical: Not on file     Non-medical: Not on file   Tobacco Use    Smoking status: Never Smoker    Smokeless tobacco: Never Used   Substance and Sexual Activity    Alcohol use: Not Currently     Alcohol/week: 0.0 standard drinks     Comment: stopped 2010    Drug use: Never    Sexual activity: Not Currently     Partners: Female   Lifestyle    Physical activity     Days per week: Not on file     Minutes per session: Not on file    Stress: Not on file   Relationships    Social connections     Talks on phone: Not on file     Gets together: Not on file     Attends Religion service: Not on file     Active member of club or organization: Not on file     Attends meetings of clubs or organizations: Not on file     Relationship status: Not on file    Intimate partner violence     Fear of current or ex partner: Not on file     Emotionally abused: Not on file     Physically abused: Not on file     Forced sexual activity: Not on file   Other Topics Concern     Service Not Asked    Blood Transfusions Not Asked    Caffeine Concern Not Asked    Occupational Exposure Not Asked   [employer] Hazards Not Asked  Sleep Concern Yes    Stress Concern Yes     Comment: Family concerns    Weight Concern Not Asked    Special Diet Not Asked    Back Care Not Asked    Exercise Not Asked    Bike Helmet Not Asked   [address] Not Asked    Self-Exams Not Asked   Social History Narrative    , 2 children      Current Outpatient Medications   Medication Sig    nitroglycerin (NITROSTAT) 0.4 mg SL tablet 1 Tab by SubLINGual route every five (5) minutes as needed for Chest Pain (call 911 if not relieved by 3).  donepeziL (ARICEPT) 10 mg tablet Take 1 Tab by mouth nightly.  atorvastatin (LIPITOR) 40 mg tablet TAKE 1 TABLET BY MOUTH AT BEDTIME    Entresto 24-26 mg tablet TAKE 1 TABLET BY MOUTH TWICE DAILY    traMADoL (ULTRAM) 50 mg tablet Take 50 mg by mouth every six (6) hours as needed for Pain.  pantoprazole (PROTONIX) 40 mg tablet TAKE 1 TABLET BY MOUTH ONCE DAILY    gabapentin (NEURONTIN) 600 mg tablet TAKE 1 TABLET BY MOUTH THREE TIMES A DAY    senna-docusate (PERICOLACE) 8.6-50 mg per tablet Take 1 Tab by mouth daily. (Patient taking differently: Take 1 Tab by mouth as needed.)    acetaminophen (TYLENOL) 325 mg tablet Take  by mouth every four (4) hours as needed for Pain.  memantine (NAMENDA) 10 mg tablet Take 1 Tab by mouth two (2) times a day.  ELIQUIS 5 mg tablet TAKE 1 TABLET BY MOUTH EVERY 12 HOURS    multivit-min/FA/lycopen/lutein (SENTRY SENIOR PO) Take  by mouth daily.  polyethylene glycol (MIRALAX) 17 gram/dose powder Take 17 g by mouth as needed.  Incontinence Pad, Liner, Disp (BLADDER CONTROL PADS EX ABSORB) pads 1 Packet by Does Not Apply route as needed (incontinence).  lidocaine 4 % patch 1 Patch by TransDERmal route every twelve (12) hours every twelve (12) hours.  sertraline (ZOLOFT) 100 mg tablet TAKE 2 TABLETS BY MOUTH ONCE DAILY     No current facility-administered medications for this visit.           Review of Symptoms:  11 systems reviewed, negative other than as stated in the HPI    Physical ExamPhysical Exam:    Vitals:    08/13/20 0903 08/13/20 0908 08/13/20 0912 08/13/20 0913   BP: 130/70 122/64 112/64 110/62   Pulse:       Resp:       SpO2:       Weight:       Height:         Body mass index is 24.73 kg/m². General PE  Gen:  NAD  Mental Status - Alert. General Appearance - Not in acute distress. HEENT:  PERRL, no carotid bruits or JVD  Chest and Lung Exam   Inspection: Accessory muscles - No use of accessory muscles in breathing. Auscultation:   Breath sounds: - Normal.   Cardiovascular   Inspection: Jugular vein - Bilateral - Inspection Normal.   Palpation/Percussion:   Apical Impulse: - Normal.   Auscultation: Rhythm - Regular. Heart Sounds - S1 WNL and S2 WNL. No S3 or S4. Murmurs & Other Heart Sounds: Auscultation of the heart reveals - No Murmurs. Peripheral Vascular   Upper Extremity: Inspection - Bilateral - No Cyanotic nailbeds or Digital clubbing. Lower Extremity:   Palpation: Edema - Bilateral - No edema. Abdomen:   Soft, non-tender, bowel sounds are active. Neuro: A&O times 2, CN and motor grossly WNL  Device site without ecchymosis, or erythema.    Labs:   Lab Results   Component Value Date/Time    Cholesterol, total 160 11/14/2018 12:02 PM    Cholesterol, total 157 03/22/2018 08:44 AM    Cholesterol, total 157 07/05/2016 08:33 AM    Cholesterol, total 170 03/19/2015 11:36 AM    Cholesterol, total 145 11/24/2014 02:05 AM    HDL Cholesterol 72 11/14/2018 12:02 PM    HDL Cholesterol 66 03/22/2018 08:44 AM    HDL Cholesterol 70 07/05/2016 08:33 AM    HDL Cholesterol 83 03/19/2015 11:36 AM    HDL Cholesterol 83 11/24/2014 02:05 AM    LDL, calculated 67 11/14/2018 12:02 PM    LDL, calculated 74 03/22/2018 08:44 AM    LDL, calculated 71 07/05/2016 08:33 AM    LDL, calculated 77 03/19/2015 11:36 AM    LDL, calculated 52.8 11/24/2014 02:05 AM    Triglyceride 105 11/14/2018 12:02 PM    Triglyceride 84 03/22/2018 08:44 AM    Triglyceride 78 07/05/2016 08:33 AM    Triglyceride 51 2015 11:36 AM    Triglyceride 46 2014 02:05 AM    CHOL/HDL Ratio 1.7 2014 02:05 AM    CHOL/HDL Ratio 3.6 2011 11:30 AM     Lab Results   Component Value Date/Time    CK 55 03/10/2019 10:10 AM     Lab Results   Component Value Date/Time    Sodium 137 2020 03:00 PM    Potassium 4.0 2020 03:00 PM    Chloride 102 2020 03:00 PM    CO2 27 2020 03:00 PM    Anion gap 8 2020 03:00 PM    Glucose 111 (H) 2020 03:00 PM    BUN 23 (H) 2020 03:00 PM    Creatinine 1.22 2020 03:00 PM    BUN/Creatinine ratio 19 2020 03:00 PM    GFR est AA >60 2020 03:00 PM    GFR est non-AA 58 (L) 2020 03:00 PM    Calcium 9.9 2020 03:00 PM    Bilirubin, total 0.6 2020 03:00 PM    Alk. phosphatase 120 (H) 2020 03:00 PM    Protein, total 7.6 2020 03:00 PM    Albumin 3.7 2020 03:00 PM    Globulin 3.9 2020 03:00 PM    A-G Ratio 0.9 (L) 2020 03:00 PM    ALT (SGPT) 18 2020 03:00 PM       EKG:  Sinus  Rhythm   Nonspecific QRS widening. ST depression, T-abnormality       Assessment:     Assessment:      1. Coronary artery disease involving native coronary artery of native heart without angina pectoris    2. Chronic systolic (congestive) heart failure ([address]    3. Essential hypertension    4. Mixed hyperlipidemia    5. AICD (automatic cardioverter/defibrillator) present    6. S/P CABG x 1        Orders Placed This Encounter    AMB POC EKG ROUTINE W/ 12 LEADS, INTER & REP     Order Specific Question:   Reason for Exam:     Answer:   routine    nitroglycerin (NITROSTAT) 0.4 mg SL tablet     Si Tab by SubLINGual route every five (5) minutes as needed for Chest Pain (call 911 if not relieved by 3).      Dispense:  1 Bottle     Refill:  1        Plan:     Chronic systolic congestive heart failure, ICD in 2016  Echo in 2019 with LVEF 30-35%,  EF improved 50-55%  Appears compensated, euvolemic  Recent possible syncope vs losing balance/fall. He is having soreness at his device site  Continue with low dose BB, Entresto 24/26 mg BID. Kidney fxn stable 5/20      ASHD  S/p CABGx1 with LIMA to LAD in 3/2011  Lexiscan done 3/2019 without evidence of ischemia  No anginal or anginal equivalent symptoms  Continue on Toprol, ASA and statin     PAF  Continue Eliquis therapy for stroke prevention  [id number] (chf/age/vasc/htn)  On low dose BB     HLD  Continue statin therapy and low fat, low cholesterol diet  Lipids managed by PCP    Preoperative cardiovascular risk assessment for urologic surgery:   Low cardiac risk based on the above. May hold Eliquis 2 days prior, restart when felt to be safe from a urologic standpoint.       Continue current care and f/u in 1 year, sooner as needed.       Rocco Ahumada, MD

## 2020-08-13 NOTE — LETTER
8/13/20 Patient: Jennifer Camejo YOB: 1945 Date of Visit: 8/13/2020 Tamara Tom MD 
[address] [phone] VIA In Basket Dear Tamara Tom MD, Thank you for referring Mr. Armaan Bagley to [facility] for evaluation. My notes for this consultation are attached. If you have questions, please do not hesitate to call me. I look forward to following your patient along with you.  
 
 
Sincerely, 
 
Bernarda Kulkarni MD

## 2020-08-14 DIAGNOSIS — M54.16 RADICULOPATHY, LUMBAR REGION: ICD-10-CM

## 2020-08-14 RX ORDER — TRAMADOL HYDROCHLORIDE 50 MG/1
TABLET ORAL
Qty: 120 TAB | Refills: 4 | Status: SHIPPED | OUTPATIENT
Start: 2020-08-14 | End: 2021-05-14

## 2020-08-25 ENCOUNTER — HOSPITAL ENCOUNTER (OUTPATIENT)
Dept: PREADMISSION TESTING | Age: 75
Discharge: HOME | End: 2020-08-25
Attending: UROLOGY
Payer: MEDICARE

## 2020-08-25 VITALS
BODY MASS INDEX: 23.52 KG/M2 | DIASTOLIC BLOOD PRESSURE: 61 MMHG | HEIGHT: 68 IN | WEIGHT: 155.2 LBS | SYSTOLIC BLOOD PRESSURE: 113 MMHG | HEART RATE: 59 BPM | OXYGEN SATURATION: 94 % | TEMPERATURE: 97.5 F

## 2020-08-25 LAB
ANION GAP SERPL CALC-SCNC: 4 MMOL/L (ref 5–15)
APPEARANCE UR: CLEAR
BACTERIA URNS QL MICRO: NEGATIVE /HPF
BILIRUB UR QL: NEGATIVE
BUN SERPL-MCNC: 25 MG/DL (ref 6–20)
BUN/CREAT SERPL: 19 (ref 12–20)
CALCIUM SERPL-MCNC: 9.3 MG/DL (ref 8.5–10.1)
CHLORIDE SERPL-SCNC: 109 MMOL/L (ref 97–108)
CO2 SERPL-SCNC: 29 MMOL/L (ref 21–32)
COLOR UR: NORMAL
CREAT SERPL-MCNC: 1.29 MG/DL (ref 0.7–1.3)
EPITH CASTS URNS QL MICRO: NORMAL /LPF
ERYTHROCYTE [DISTWIDTH] IN BLOOD BY AUTOMATED COUNT: 15 % (ref 11.5–14.5)
GLUCOSE SERPL-MCNC: 94 MG/DL (ref 65–100)
GLUCOSE UR STRIP.AUTO-MCNC: NEGATIVE MG/DL
HCT VFR BLD AUTO: 39.3 % (ref 36.6–50.3)
HGB BLD-MCNC: 12.4 G/DL (ref 12.1–17)
HGB UR QL STRIP: NEGATIVE
HYALINE CASTS URNS QL MICRO: NORMAL /LPF (ref 0–5)
KETONES UR QL STRIP.AUTO: NEGATIVE MG/DL
LEUKOCYTE ESTERASE UR QL STRIP.AUTO: NEGATIVE
MCH RBC QN AUTO: 29.3 PG (ref 26–34)
MCHC RBC AUTO-ENTMCNC: 31.6 G/DL (ref 30–36.5)
MCV RBC AUTO: 92.9 FL (ref 80–99)
NITRITE UR QL STRIP.AUTO: NEGATIVE
NRBC # BLD: 0 K/UL (ref 0–0.01)
NRBC BLD-RTO: 0 PER 100 WBC
PH UR STRIP: 5.5 [PH] (ref 5–8)
PLATELET # BLD AUTO: 198 K/UL (ref 150–400)
PMV BLD AUTO: 11 FL (ref 8.9–12.9)
POTASSIUM SERPL-SCNC: 4.7 MMOL/L (ref 3.5–5.1)
PROT UR STRIP-MCNC: NEGATIVE MG/DL
RBC # BLD AUTO: 4.23 M/UL (ref 4.1–5.7)
RBC #/AREA URNS HPF: NORMAL /HPF (ref 0–5)
SODIUM SERPL-SCNC: 142 MMOL/L (ref 136–145)
SP GR UR REFRACTOMETRY: 1.02 (ref 1–1.03)
UA: UC IF INDICATED,UAUC: NORMAL
UROBILINOGEN UR QL STRIP.AUTO: 1 EU/DL (ref 0.2–1)
WBC # BLD AUTO: 6.6 K/UL (ref 4.1–11.1)
WBC URNS QL MICRO: NORMAL /HPF (ref 0–4)

## 2020-08-25 PROCEDURE — 80048 BASIC METABOLIC PNL TOTAL CA: CPT

## 2020-08-25 PROCEDURE — 81001 URINALYSIS AUTO W/SCOPE: CPT

## 2020-08-25 PROCEDURE — 36415 COLL VENOUS BLD VENIPUNCTURE: CPT

## 2020-08-25 PROCEDURE — 85027 COMPLETE CBC AUTOMATED: CPT

## 2020-08-25 RX ORDER — SODIUM CHLORIDE, SODIUM LACTATE, POTASSIUM CHLORIDE, CALCIUM CHLORIDE 600; 310; 30; 20 MG/100ML; MG/100ML; MG/100ML; MG/100ML
25 INJECTION, SOLUTION INTRAVENOUS CONTINUOUS
Status: CANCELLED | OUTPATIENT
Start: 2020-09-08

## 2020-08-25 RX ORDER — METOPROLOL SUCCINATE 25 MG/1
25 TABLET, EXTENDED RELEASE ORAL DAILY
COMMUNITY
End: 2020-12-15

## 2020-08-25 RX ORDER — QUETIAPINE FUMARATE 25 MG/1
25 TABLET, FILM COATED ORAL
COMMUNITY
End: 2021-03-03

## 2020-08-25 NOTE — PERIOP NOTES
[facility]  Preoperative Instructions        Surgery Date September 8         Time of Arrival 1100  Contact# [phone]    1. On the day of your surgery, please report to the Surgical Services Registration Desk and sign in at your designated time. The Surgery Center is located to the right of the Emergency Room. 2. You must have someone with you to drive you home. You should not drive a car for 24 hours following surgery. Please make arrangements for a friend or family member to stay with you for the first 24 hours after your surgery. 3. Do not have anything to eat or drink (including water, gum, mints, coffee, juice) after midnight ?9/7/20      . ? This may not apply to medications prescribed by your physician. ?(Please note below the special instructions with medications to take the morning of your procedure.)    4. We recommend you do not drink any alcoholic beverages for 24 hours before and after your surgery. 5. Contact your surgeons office for instructions on the following medications: non-steroidal anti-inflammatory drugs (i.e. Advil, Aleve), vitamins, and supplements. (Some surgeons will want you to stop these medications prior to surgery and others may allow you to take them)  **If you are currently taking Plavix, Coumadin, Aspirin and/or other blood-thinning agents, contact your surgeon for instructions. ** Your surgeon will partner with the physician prescribing these medications to determine if it is safe to stop or if you need to continue taking. Please do not stop taking these medications without instructions from your surgeon    6. Wear comfortable clothes. Wear glasses instead of contacts. Do not bring any money or jewelry. Please bring picture ID, insurance card, and any prearranged co-payment or hospital payment. Do not wear make-up, particularly mascara the morning of your surgery. Do not wear nail polish, particularly if you are having foot /hand surgery. Wear your hair loose or down, no ponytails, buns, louise pins or clips. All body piercings must be removed. Please shower with antibacterial soap for three consecutive days before and on the morning of surgery, but do not apply any lotions, powders or deodorants after the shower on the day of surgery. Please use a fresh towels after each shower. Please sleep in clean clothes and change bed linens the night before surgery. Please do not shave for 48 hours prior to surgery. Shaving of the face is acceptable. 7. You should understand that if you do not follow these instructions your surgery may be cancelled. If your physical condition changes (I.e. fever, cold or flu) please contact your surgeon as soon as possible. 8. It is important that you be on time. If a situation occurs where you may be late, please call [phone] (OR Holding Area). 9. If you have any questions and or problems, please call [phone] (Pre-admission Testing). 10. Your surgery time may be subject to change. You will receive a phone call the evening prior if your time changes. 11.  If having outpatient surgery, you must have someone to drive you here, stay with you during the duration of your stay, and to drive you home at time of discharge. Special Instructions: Follow surgeon instructions and follow print out instructions from [facility] -   Practice incentive spirometry twice a day- ten times a day  Covid test on 9/4 at 0800  Self quarantine form test day to day of surgery  TAKE ALL MEDICATIONS DAY OF SURGERY EXCEPT:  Multivitamins,ELIQUIS,Tramadol    I understand a pre-operative phone call will be made to verify my surgery time. In the event that I am not available, I give permission for a message to be left on my answering service and/or with another person?  yes         ___________________      __________   _________    (Signature of Patient)             (Witness)                (Date and Time)

## 2020-09-04 ENCOUNTER — HOSPITAL ENCOUNTER (OUTPATIENT)
Dept: PREADMISSION TESTING | Age: 75
Discharge: HOME | End: 2020-09-04
Payer: MEDICARE

## 2020-09-04 PROCEDURE — 87635 SARS-COV-2 COVID-19 AMP PRB: CPT

## 2020-09-05 LAB
HEALTH STATUS, XMCV2T: NORMAL
SARS-COV-2, COV2NT: NOT DETECTED
SOURCE, COVRS: NORMAL
SPECIMEN SOURCE, FCOV2M: NORMAL
SPECIMEN TYPE, XMCV1T: NORMAL

## 2020-09-08 ENCOUNTER — HOSPITAL ENCOUNTER (OUTPATIENT)
Age: 75
Setting detail: OUTPATIENT SURGERY
Discharge: HOME | End: 2020-09-08
Attending: UROLOGY | Admitting: UROLOGY
Payer: MEDICARE

## 2020-09-08 ENCOUNTER — ANESTHESIA (OUTPATIENT)
Dept: SURGERY | Age: 75
End: 2020-09-08
Payer: MEDICARE

## 2020-09-08 ENCOUNTER — ANESTHESIA EVENT (OUTPATIENT)
Dept: SURGERY | Age: 75
End: 2020-09-08
Payer: MEDICARE

## 2020-09-08 VITALS
WEIGHT: 151.68 LBS | TEMPERATURE: 98.2 F | DIASTOLIC BLOOD PRESSURE: 52 MMHG | HEART RATE: 68 BPM | HEIGHT: 68 IN | OXYGEN SATURATION: 94 % | SYSTOLIC BLOOD PRESSURE: 115 MMHG | BODY MASS INDEX: 22.99 KG/M2 | RESPIRATION RATE: 10 BRPM

## 2020-09-08 DIAGNOSIS — N39.3 MALE STRESS INCONTINENCE: Primary | ICD-10-CM

## 2020-09-08 PROCEDURE — 77030002933 HC SUT MCRYL J&J -A: Performed by: UROLOGY

## 2020-09-08 PROCEDURE — 74011250636 HC RX REV CODE- 250/636: Performed by: UROLOGY

## 2020-09-08 PROCEDURE — 74011250636 HC RX REV CODE- 250/636: Performed by: REGISTERED NURSE

## 2020-09-08 PROCEDURE — 77030019908 HC STETH ESOPH SIMS -A: Performed by: ANESTHESIOLOGY

## 2020-09-08 PROCEDURE — 76210000021 HC REC RM PH II 0.5 TO 1 HR: Performed by: UROLOGY

## 2020-09-08 PROCEDURE — 74011250637 HC RX REV CODE- 250/637: Performed by: UROLOGY

## 2020-09-08 PROCEDURE — 76060000034 HC ANESTHESIA 1.5 TO 2 HR: Performed by: UROLOGY

## 2020-09-08 PROCEDURE — 76210000016 HC OR PH I REC 1 TO 1.5 HR: Performed by: UROLOGY

## 2020-09-08 PROCEDURE — 77030010507 HC ADH SKN DERMBND J&J -B: Performed by: UROLOGY

## 2020-09-08 PROCEDURE — C1815 PROS, URINARY SPH, IMP: HCPCS | Performed by: UROLOGY

## 2020-09-08 PROCEDURE — 74011000250 HC RX REV CODE- 250: Performed by: REGISTERED NURSE

## 2020-09-08 PROCEDURE — 77030008684 HC TU ET CUF COVD -B: Performed by: ANESTHESIOLOGY

## 2020-09-08 PROCEDURE — 88300 SURGICAL PATH GROSS: CPT

## 2020-09-08 PROCEDURE — 77030040361 HC SLV COMPR DVT MDII -B: Performed by: UROLOGY

## 2020-09-08 PROCEDURE — 77030020229 HC RETRCTR SCROT SYS BSC -D: Performed by: UROLOGY

## 2020-09-08 PROCEDURE — 77030026438 HC STYL ET INTUB CARD -A: Performed by: ANESTHESIOLOGY

## 2020-09-08 PROCEDURE — 77030031139 HC SUT VCRL2 J&J -A: Performed by: UROLOGY

## 2020-09-08 PROCEDURE — 76010000153 HC OR TIME 1.5 TO 2 HR: Performed by: UROLOGY

## 2020-09-08 PROCEDURE — 77030040831 HC BAG URINE DRNG MDII -A: Performed by: UROLOGY

## 2020-09-08 PROCEDURE — 74011250636 HC RX REV CODE- 250/636: Performed by: ANESTHESIOLOGY

## 2020-09-08 PROCEDURE — 77030018836 HC SOL IRR NACL ICUM -A: Performed by: UROLOGY

## 2020-09-08 PROCEDURE — 77030013079 HC BLNKT BAIR HGGR 3M -A: Performed by: ANESTHESIOLOGY

## 2020-09-08 PROCEDURE — 74011000250 HC RX REV CODE- 250: Performed by: UROLOGY

## 2020-09-08 PROCEDURE — 74011000258 HC RX REV CODE- 258: Performed by: UROLOGY

## 2020-09-08 PROCEDURE — 77030013781 HC KT PENIL ACC BSC -F: Performed by: UROLOGY

## 2020-09-08 PROCEDURE — 77030020263 HC SOL INJ SOD CL0.9% LFCR 1000ML: Performed by: UROLOGY

## 2020-09-08 PROCEDURE — 77030011640 HC PAD GRND REM COVD -A: Performed by: UROLOGY

## 2020-09-08 DEVICE — PRESSURE REGULATING BALLOON
Type: IMPLANTABLE DEVICE | Site: PERINEUM | Status: FUNCTIONAL
Brand: AMS 800 ARTIFICIAL URINARY SPHINCTER

## 2020-09-08 RX ORDER — ONDANSETRON 2 MG/ML
INJECTION INTRAMUSCULAR; INTRAVENOUS AS NEEDED
Status: DISCONTINUED | OUTPATIENT
Start: 2020-09-08 | End: 2020-09-08 | Stop reason: HOSPADM

## 2020-09-08 RX ORDER — DIPHENHYDRAMINE HYDROCHLORIDE 50 MG/ML
12.5 INJECTION, SOLUTION INTRAMUSCULAR; INTRAVENOUS AS NEEDED
Status: CANCELLED | OUTPATIENT
Start: 2020-09-08 | End: 2020-09-08

## 2020-09-08 RX ORDER — GLYCOPYRROLATE 0.2 MG/ML
INJECTION INTRAMUSCULAR; INTRAVENOUS AS NEEDED
Status: DISCONTINUED | OUTPATIENT
Start: 2020-09-08 | End: 2020-09-08 | Stop reason: HOSPADM

## 2020-09-08 RX ORDER — LIDOCAINE HYDROCHLORIDE 10 MG/ML
0.1 INJECTION, SOLUTION EPIDURAL; INFILTRATION; INTRACAUDAL; PERINEURAL AS NEEDED
Status: DISCONTINUED | OUTPATIENT
Start: 2020-09-08 | End: 2020-09-08 | Stop reason: HOSPADM

## 2020-09-08 RX ORDER — SODIUM CHLORIDE 0.9 % (FLUSH) 0.9 %
5-40 SYRINGE (ML) INJECTION AS NEEDED
Status: CANCELLED | OUTPATIENT
Start: 2020-09-08

## 2020-09-08 RX ORDER — HYDROCODONE BITARTRATE AND ACETAMINOPHEN 5; 325 MG/1; MG/1
1 TABLET ORAL
Qty: 18 TAB | Refills: 0 | Status: SHIPPED | OUTPATIENT
Start: 2020-09-08 | End: 2020-09-11

## 2020-09-08 RX ORDER — HYDROCODONE BITARTRATE AND ACETAMINOPHEN 5; 325 MG/1; MG/1
1 TABLET ORAL ONCE
Status: COMPLETED | OUTPATIENT
Start: 2020-09-08 | End: 2020-09-08

## 2020-09-08 RX ORDER — FENTANYL CITRATE 50 UG/ML
INJECTION, SOLUTION INTRAMUSCULAR; INTRAVENOUS AS NEEDED
Status: DISCONTINUED | OUTPATIENT
Start: 2020-09-08 | End: 2020-09-08 | Stop reason: HOSPADM

## 2020-09-08 RX ORDER — SODIUM CHLORIDE 0.9 % (FLUSH) 0.9 %
5-40 SYRINGE (ML) INJECTION EVERY 8 HOURS
Status: DISCONTINUED | OUTPATIENT
Start: 2020-09-08 | End: 2020-09-08 | Stop reason: HOSPADM

## 2020-09-08 RX ORDER — HYDROMORPHONE HYDROCHLORIDE 1 MG/ML
0.2 INJECTION, SOLUTION INTRAMUSCULAR; INTRAVENOUS; SUBCUTANEOUS
Status: CANCELLED | OUTPATIENT
Start: 2020-09-08

## 2020-09-08 RX ORDER — FENTANYL CITRATE 50 UG/ML
25 INJECTION, SOLUTION INTRAMUSCULAR; INTRAVENOUS
Status: CANCELLED | OUTPATIENT
Start: 2020-09-08

## 2020-09-08 RX ORDER — CEFUROXIME AXETIL 500 MG/1
500 TABLET ORAL 2 TIMES DAILY
Qty: 10 TAB | Refills: 0 | Status: SHIPPED | OUTPATIENT
Start: 2020-09-08 | End: 2020-09-13

## 2020-09-08 RX ORDER — PHENYLEPHRINE HCL IN 0.9% NACL 0.4MG/10ML
SYRINGE (ML) INTRAVENOUS AS NEEDED
Status: DISCONTINUED | OUTPATIENT
Start: 2020-09-08 | End: 2020-09-08 | Stop reason: HOSPADM

## 2020-09-08 RX ORDER — SODIUM CHLORIDE 0.9 % (FLUSH) 0.9 %
5-40 SYRINGE (ML) INJECTION AS NEEDED
Status: DISCONTINUED | OUTPATIENT
Start: 2020-09-08 | End: 2020-09-08 | Stop reason: HOSPADM

## 2020-09-08 RX ORDER — DEXAMETHASONE SODIUM PHOSPHATE 4 MG/ML
INJECTION, SOLUTION INTRA-ARTICULAR; INTRALESIONAL; INTRAMUSCULAR; INTRAVENOUS; SOFT TISSUE AS NEEDED
Status: DISCONTINUED | OUTPATIENT
Start: 2020-09-08 | End: 2020-09-08 | Stop reason: HOSPADM

## 2020-09-08 RX ORDER — SODIUM CHLORIDE, SODIUM LACTATE, POTASSIUM CHLORIDE, CALCIUM CHLORIDE 600; 310; 30; 20 MG/100ML; MG/100ML; MG/100ML; MG/100ML
25 INJECTION, SOLUTION INTRAVENOUS CONTINUOUS
Status: CANCELLED | OUTPATIENT
Start: 2020-09-08

## 2020-09-08 RX ORDER — ROCURONIUM BROMIDE 10 MG/ML
INJECTION, SOLUTION INTRAVENOUS AS NEEDED
Status: DISCONTINUED | OUTPATIENT
Start: 2020-09-08 | End: 2020-09-08 | Stop reason: HOSPADM

## 2020-09-08 RX ORDER — LIDOCAINE HYDROCHLORIDE 20 MG/ML
INJECTION, SOLUTION EPIDURAL; INFILTRATION; INTRACAUDAL; PERINEURAL AS NEEDED
Status: DISCONTINUED | OUTPATIENT
Start: 2020-09-08 | End: 2020-09-08 | Stop reason: HOSPADM

## 2020-09-08 RX ORDER — SODIUM CHLORIDE 0.9 % (FLUSH) 0.9 %
5-40 SYRINGE (ML) INJECTION EVERY 8 HOURS
Status: CANCELLED | OUTPATIENT
Start: 2020-09-08

## 2020-09-08 RX ORDER — PROPOFOL 10 MG/ML
INJECTION, EMULSION INTRAVENOUS AS NEEDED
Status: DISCONTINUED | OUTPATIENT
Start: 2020-09-08 | End: 2020-09-08 | Stop reason: HOSPADM

## 2020-09-08 RX ORDER — NEOSTIGMINE METHYLSULFATE 1 MG/ML
INJECTION, SOLUTION INTRAVENOUS AS NEEDED
Status: DISCONTINUED | OUTPATIENT
Start: 2020-09-08 | End: 2020-09-08 | Stop reason: HOSPADM

## 2020-09-08 RX ORDER — SODIUM CHLORIDE, SODIUM LACTATE, POTASSIUM CHLORIDE, CALCIUM CHLORIDE 600; 310; 30; 20 MG/100ML; MG/100ML; MG/100ML; MG/100ML
25 INJECTION, SOLUTION INTRAVENOUS CONTINUOUS
Status: DISCONTINUED | OUTPATIENT
Start: 2020-09-08 | End: 2020-09-08 | Stop reason: HOSPADM

## 2020-09-08 RX ORDER — SUCCINYLCHOLINE CHLORIDE 20 MG/ML
INJECTION INTRAMUSCULAR; INTRAVENOUS AS NEEDED
Status: DISCONTINUED | OUTPATIENT
Start: 2020-09-08 | End: 2020-09-08 | Stop reason: HOSPADM

## 2020-09-08 RX ADMIN — SUCCINYLCHOLINE CHLORIDE 100 MG: 20 INJECTION, SOLUTION INTRAMUSCULAR; INTRAVENOUS at 12:39

## 2020-09-08 RX ADMIN — VANCOMYCIN HYDROCHLORIDE 1000 MG: 1 INJECTION, POWDER, LYOPHILIZED, FOR SOLUTION INTRAVENOUS at 12:20

## 2020-09-08 RX ADMIN — PROPOFOL 130 MG: 10 INJECTION, EMULSION INTRAVENOUS at 12:39

## 2020-09-08 RX ADMIN — HYDROCODONE BITARTRATE AND ACETAMINOPHEN 1 TABLET: 5; 325 TABLET ORAL at 15:58

## 2020-09-08 RX ADMIN — SODIUM CHLORIDE, SODIUM LACTATE, POTASSIUM CHLORIDE, AND CALCIUM CHLORIDE 25 ML/HR: 600; 310; 30; 20 INJECTION, SOLUTION INTRAVENOUS at 12:20

## 2020-09-08 RX ADMIN — ROCURONIUM BROMIDE 10 MG: 10 INJECTION INTRAVENOUS at 12:54

## 2020-09-08 RX ADMIN — FENTANYL CITRATE 50 MCG: 50 INJECTION, SOLUTION INTRAMUSCULAR; INTRAVENOUS at 12:54

## 2020-09-08 RX ADMIN — ONDANSETRON HYDROCHLORIDE 4 MG: 2 INJECTION, SOLUTION INTRAMUSCULAR; INTRAVENOUS at 13:09

## 2020-09-08 RX ADMIN — Medication 120 MCG: at 12:43

## 2020-09-08 RX ADMIN — LIDOCAINE HYDROCHLORIDE 80 MG: 20 INJECTION, SOLUTION EPIDURAL; INFILTRATION; INTRACAUDAL; PERINEURAL at 12:39

## 2020-09-08 RX ADMIN — GENTAMICIN SULFATE 336.4 MG: 40 INJECTION, SOLUTION INTRAMUSCULAR; INTRAVENOUS at 12:37

## 2020-09-08 RX ADMIN — DEXAMETHASONE SODIUM PHOSPHATE 8 MG: 4 INJECTION, SOLUTION INTRAMUSCULAR; INTRAVENOUS at 13:09

## 2020-09-08 RX ADMIN — Medication 3 MG: at 13:51

## 2020-09-08 RX ADMIN — Medication 80 MCG: at 12:49

## 2020-09-08 RX ADMIN — GLYCOPYRROLATE 0.4 MG: 0.2 INJECTION, SOLUTION INTRAMUSCULAR; INTRAVENOUS at 13:51

## 2020-09-08 RX ADMIN — FENTANYL CITRATE 50 MCG: 50 INJECTION, SOLUTION INTRAMUSCULAR; INTRAVENOUS at 12:39

## 2020-09-08 RX ADMIN — SODIUM CHLORIDE 50 MCG/MIN: 900 INJECTION, SOLUTION INTRAVENOUS at 12:51

## 2020-09-08 RX ADMIN — Medication 80 MCG: at 12:45

## 2020-09-08 RX ADMIN — ROCURONIUM BROMIDE 10 MG: 10 INJECTION INTRAVENOUS at 12:39

## 2020-09-08 NOTE — ANESTHESIA POSTPROCEDURE EVALUATION
Procedure(s):  ARTIFICIAL URINARY SPHINCTER REPLACEMENT. general    Anesthesia Post Evaluation      Multimodal analgesia: multimodal analgesia used between 6 hours prior to anesthesia start to PACU discharge  Patient location during evaluation: bedside  Patient participation: complete - patient participated  Level of consciousness: awake  Pain management: adequate  Airway patency: patent  Anesthetic complications: no  Cardiovascular status: acceptable  Respiratory status: acceptable  Hydration status: acceptable  Post anesthesia nausea and vomiting:  controlled  Final Post Anesthesia Temperature Assessment:  Normothermia (36.0-37.5 degrees C)      INITIAL Post-op Vital signs:   Vitals Value Taken Time   /67 9/8/2020  4:00 PM   Temp 36.9 °C (98.4 °F) 9/8/2020  3:30 PM   Pulse 73 9/8/2020  4:04 PM   Resp 12 9/8/2020  4:04 PM   SpO2 98 % 9/8/2020  4:04 PM   Vitals shown include unvalidated device data.

## 2020-09-08 NOTE — BRIEF OP NOTE
Brief Postoperative Note    Patient: Jacqueline Ford  YOB: 1945  MRN: [id number]    Date of Procedure: 9/8/2020     Pre-Op Diagnosis: URINARY INCONTINENCE    Post-Op Diagnosis: Same as preoperative diagnosis. Procedure(s):  ARTIFICIAL URINARY SPHINCTER REPLACEMENT    Surgeon(s):  Kimmie Valentin MD    Surgical Assistant: None    Anesthesia: General     Estimated Blood Loss (mL): Minimal    Complications: None    Specimens:   ID Type Source Tests Collected by Time Destination   1 : FOR GROSS ONLY- SPINCTER Preservative Perineum  Kimmie Valentin MD 9/8/2020 1322 Pathology        Implants:   Implant Name Type Inv.  Item Serial No.  Lot No. LRB No. Used Action   KIT ACCS INCONT  IZ --  - SNA  KIT ACCS INCONT  IZ --  NA [facility] [id number] N/A 1 Implanted   PUMP CTRL URIN PROS W/IZ --  - SNA  PUMP CTRL URIN PROS W/IZ --  NA [facility] [id number] N/A 1 Implanted   CUFF OCCL URIN BELL IZ 3.5C --  - SNA  CUFF OCCL URIN BELL IZ 3.5C --  NA [facility] [id number] N/A 1 Implanted   SPHINCTER URIN PRES 61-70CM --  - SNA  SPHINCTER URIN PRES 61-70CM --  NA [facility] [id number] N/A 1 Implanted       Drains: * No LDAs found *    Findings: WILFREDO    Electronically Signed by Ellen Bergeron MD on 9/8/2020 at 2:22 PM

## 2020-09-08 NOTE — ANESTHESIA PREPROCEDURE EVALUATION
Relevant Problems   No relevant active problems       Anesthetic History   No history of anesthetic complications            Review of Systems / Medical History  Patient summary reviewed, nursing notes reviewed and pertinent labs reviewed    Pulmonary  Within defined limits                 Neuro/Psych       CVA  TIA, psychiatric history and dementia    Comments: Depression    Cervical post-laminectomy syndrome  Lumbar spinal stenosis with neurogenic claudication    Alzheimer's Dementia Cardiovascular    Hypertension  Valvular problems/murmurs: tricuspid insufficiency, mitral insufficiency and aortic insufficiency    CHF  Dysrhythmias : SVT  Pacemaker, CAD, CABG and hyperlipidemia    Exercise tolerance: <4 METS  Comments: PSVT  Sick Sinus Syndrome    Factor V Leiden    S/P AICD (2016)    ECG (8/13/20): Sinus  Rhythm   NS changes    TTE (2/3/20):  ·Normal cavity size. Mild concentric hypertrophy. Low normal systolic dysfunction. Estimated left ventricular ejection fraction is 50 - 55%. No regional wall motion abnormality noted. Mild (grade 1) left ventricular diastolic dysfunction. ·Mildly dilated left atrium. ·Mild aortic valve regurgitation is present. ·Mild mitral valve regurgitation is present. ·Mild tricuspid valve regurgitation is present. ·Mild aortic root dilatation.    GI/Hepatic/Renal  Within defined limits   GERD          Comments: H/O GI Bleed Endo/Other  Within defined limits    Hypothyroidism  Arthritis and cancer     Other Findings   Comments: Urinary Incontinence         Physical Exam    Airway  Mallampati: III  TM Distance: 4 - 6 cm  Neck ROM: decreased range of motion   Mouth opening: Normal     Cardiovascular  Regular rate and rhythm,  S1 and S2 normal,  no murmur, click, rub, or gallop             Dental    Dentition: Edentulous     Pulmonary  Breath sounds clear to auscultation               Abdominal  GI exam deferred       Other Findings            Anesthetic Plan    ASA: 3  Anesthesia type: general          Induction: Intravenous  Anesthetic plan and risks discussed with: Patient

## 2020-09-08 NOTE — PROGRESS NOTES
Patient discharged to home. Iv removed. Discharge instructions, scripts, and medication education done with patient and wife.

## 2020-09-08 NOTE — H&P
Stan Muhammad is a 67year old male who presents today for \"AUS broken\". Mr. Ariel Madrid returns today sooner than expected due to a broken sphincter. He was last seen on 07/13/2020 regarding increased incontinence. He does have a hx of prostate cancer. He is s/p radical prostatectomy by Dr. Aron Damian at [facility] in November, 2005. He is s/p replacement of AUS in December 2013. He is still having increased incontinence, mostly with stress maneuvers. He is using 4-6 pads a day. He does leak at night. Denies any hematuria dysuria. Denies any flank pain, fever, nausea vomiting or chills. He is currently on anticoagulants. PVR today is 0ccs. PAST MEDICAL HISTORY:    Allergies: No known allergies. DENIES: Latex, Shellfish, X-Ray Dye, Iodine. Medications: * NEURONTIN 1200 MG ORAL CAPSULE (GABAPENTIN) 1 po qd; Route: ORAL  EQ FIBER THERAPY 625 MG ORAL TABLET (CALCIUM POLYCARBOPHIL)   ASPIRIN LOW DOSE TABLET (ASPIRIN TABS)   MIRALAX ORAL POWDER (POLYETHYLENE GLYCOL 3350)   CRESTOR 20 MG ORAL TABLET (ROSUVASTATIN CALCIUM)     Problems: JEFFERY (stress urinary incontinence), male (DSI-843.68) (DYZ31-Y84.3)  788.21 SYMPTOM, INCOMPLETE BLADDER EMPTYING (ICD-788.21) (NUF75-J56.14)  788.33 URINARY INCONTINENCE, MIXED (ICD-788.33) (NRX63-Y25.46)  591 HYDRONEPHROSIS (ICD-591) (BJS95-B54.30)  BLADDER NECK CONTRACTURE (ICD-596.0) (FOU62-Z64.0)  ERECTILE DYSFUNCTION, ORGANIC (ICD-607.84) (EWP60-W34.9)  185 CARCINOMA, PROSTATE (ICD-185) (IGH62-F97)  V72.81 PREOPERATIVE EXAM CARDIOVASCULAR (ICD-V72.81) (LQJ90-H41.89)  788.30 INCONTINENCE (ICD-788.30) (CCG42-F96)    Illnesses: Heart Disease, Pacemaker/Defibrillator, High Blood Pressure, Bowel Problems, Stroke/Seizure, Bleeding Problems, and Cancer. DENIES: Lung Disease, Diabetes, Kidney Problems, HIV, or Hepatitis. Surgeries: Rotator Cuff Repair, Prostate Biopsy, Prostatectomy, and Open Heart Surgery.        Family History: DENIES: Prostate cancer, Kidney cancer, Kidney disease, Kidney stones. Social History: Retired -  for [employer]. . Smoking status: Former Smoker. Does not drink alcohol. System Review: DENIES: Unexplained Weight Loss, Dry Eyes, Dry Mouth, Leg Swelling, Shortness of Breath, Constipation, Involuntary Urine Loss, Lower Extremity Weakness, Dry Skin, Difficulty Walking, Psychiatric Problems, Impaired Sex Drive, Easy Bleeding, Rash. URINALYSIS from Voided specimen  Urine Dip: pH: 6.0, Bld: Neg, LE: Neg, Nit: Neg, Prot: Neg, Ket: Neg, Gluc: Neg  Urine Micro not done    POST-VOID RESIDUAL  US PVR (08/03/2020):  0 ccs     PSA HISTORY  <0.1 ng/ml on 09/04/2019  <0.1 ng/ml on 10/24/2013  <0.1 ng/ml on 06/07/2012  <0.1 ng/ml on 08/27/2009  <0.1 ng/ml on 02/26/2009  <0.1 ng/ml on 07/01/2008    IMPRESSION:    1. JEFFERY (STRESS URINARY INCONTINENCE) - MALE (TKY17-G83.3) - Unchanged: We will arrange for replacement of AUS. The risks, alternatives as well as benefits of the artificial urinary sphincter replacement including but not limited to bleeding, infection, failure, infection necessitating removal of the device, DVT, PE, risk of transfusion,mechanical failure of the device were discussed in detail with the patient and he understands and wishes to proceed. He'll have to stop his aspirin 7 days before the procedure. We will need cardiac clearance for him to come off his anticoagulants. 2. 185 CARCINOMA - PROSTATE (FLA68-N46) - Resolved: S/p radical prostatectomy by Dr. Maribel Rodriguez at [facility] in November, 2005. PLAN: All questions and concerns were addressed prior to the patient leaving the clinic. The patient understands and agrees with the plan. cc: MD Nikia Galindo MD Elroy Maclachlan, MD  Transcribed by Speech to Text Technology  Today's Services  Bladder Scan, E&M Service    Upcoming Orders  Schedule Surgery - Schedule for AUS REPLACEMENT at [place] [id number] under General anesthesia. Requesting Next Available (Me).

## 2020-09-08 NOTE — PERIOP NOTES
Irrisept Wound Debridement and Cleansing System  Ref: Terry Blind: [id number] [id number] Expiration Date: 6-30-22

## 2020-09-08 NOTE — PROGRESS NOTES
1215- Pt tested NEG f/COVID, currently denies sx's or contact w/anyone w/sx's. Pt reports self quarantine since testing.

## 2020-09-08 NOTE — PERIOP NOTES
Handoff Report from Operating Room to PACU    Report received from KETAN Humphrey and Makeda Hendricks CRNA regarding Ramu Tinoco. Surgeon(s):  Douglas Pacheco MD  And Procedure(s) (LRB):  ARTIFICIAL URINARY SPHINCTER REPLACEMENT (N/A)  confirmed   with allergies, dressings and loca;l anesthetic discussed. Anesthesia type, drugs, patient history, complications, estimated blood loss, vital signs, intake and output, and last pain medication, lines, reversal medications and temperature were reviewed.

## 2020-09-08 NOTE — OP NOTES
OPERATIVE REPORT      PREOPERATIVE DIAGNOSIS:  Urinary incontinence. POSTOPERATIVE DIAGNOSIS:  Urinary incontinence. OPERATIVE PROCEDURE: replacement of QAR873 artificial urinary sphincter. SURGEON:  Mitch Chirinos MD    COMPLICATIONS:  None. PROCEDURE:  After informed consent, the patient received preoperative  antibiotics and then was placed on the operating table in supine position. SCDs and TEDs were placed on the lower extremities.  After adequate  induction of general anesthetic, he was placed in the lithotomy position  and all pressure points were carefully padded.  I then shaved the perineal  area and the suprapubic area, and a full 10-minute prep of the abdomen,  suprapubic area, inner thigh area, penis and scrotum was performed. [facility] had  been draped in sterile fashion, and a sterile drape was sutured to the  perineum to isolate the rectum from the field. I deactivated the AUS.  I began by putting a Cote  catheter in, which went in without any difficulty.  With efflux of clear  urine, 10 mL were placed in the balloon.  I then made a midline perineal  incision and dissected through the bulbospongiosus muscle and opened it in  the midline to expose the sphincter.  It was carefully removed.  I then did careful dissection 1 cm distal to this area  using sharp dissection and Kitner dissection to come around the urethra,  and then measured it for a 3.5 cm cuff.  I then did retrograde irrigation  with antibiotic irrigation through the meatus via an 18 angiocath that  distended the bulbous urethra nicely and there was no extravasation,  indicating no inadvertant injury to the urethra during either dissection.  I then  placed the cuff around the urethra.  I then went up to the suprapubic area  and made a horizontal incision in the right lower quadrant area just above  the symphysis.  I dissected through Camper and Dmitriy fascia until I  encountered the prior tubing and carefully removed the pump and reservoir using cautery and blunt dissection. The area was copiously irrigated with abx irrigation. The 60 to 71 cm reservoir which was placed in the rectus fascia where the prior reservoir was. Kye Cox then closed the fascia around the tubing  using interrupted 0 Vicryl sutures that were preplaced.  Hemostasis had been achieved.  I then  filled the balloon with 23 mL of normal saline and there was no back  pressure noted.  I then tunneled from this suprapubic incision down into  the right dependent portion of the hemiscrotum and placed the pump device  in this area.  I then pulled using a tonsil, I pulled the tubing from the  cuff up into this suprapubic incision.  I then used a Quick Connect to  connect all the appropriate tubing.  I cycled the device several times and  it appeared to work appropriately, and then I deactivated it.  I then  copiously irrigated both incisions with antibiotic irrigation.  Hemostasis  was achieved.  The perineal incision was closed, reapproximating the  bulbospongiosus muscle using running 3-0 vicryl.  I then irrigated, and  then I closed the subcutaneous tissue using running 3-0 vicryl, and then I  used interrupted 2-0 mattress sutures of monocryl to close the skin, and  Dermabond was applied.  I then closed the fatty tissue and subcutaneous  tissue over the tubing in the suprapubic incision using running 3-0  vicryl, and then the skin was closed with 4-0 Monocryl subcuticular and  Dermabond was applied.  The patient tolerated the procedure well without  complications.

## 2020-11-19 ENCOUNTER — OFFICE VISIT (OUTPATIENT)
Dept: CARDIOLOGY CLINIC | Age: 75
End: 2020-11-19
Payer: MEDICARE

## 2020-11-19 DIAGNOSIS — I42.9 CARDIOMYOPATHY, UNSPECIFIED TYPE (HCC): ICD-10-CM

## 2020-11-19 DIAGNOSIS — Z95.810 AICD (AUTOMATIC CARDIOVERTER/DEFIBRILLATOR) PRESENT: Primary | ICD-10-CM

## 2020-11-19 PROCEDURE — 93295 DEV INTERROG REMOTE 1/2/MLT: CPT | Performed by: INTERNAL MEDICINE

## 2020-11-19 PROCEDURE — 93296 REM INTERROG EVL PM/IDS: CPT | Performed by: INTERNAL MEDICINE

## 2020-12-03 ENCOUNTER — TRANSCRIBE ORDER (OUTPATIENT)
Dept: SCHEDULING | Age: 75
End: 2020-12-03

## 2020-12-03 DIAGNOSIS — M48.062 SPINAL STENOSIS, LUMBAR REGION, WITH NEUROGENIC CLAUDICATION: ICD-10-CM

## 2020-12-03 DIAGNOSIS — M43.10 ACQUIRED SPONDYLOLISTHESIS: ICD-10-CM

## 2020-12-03 DIAGNOSIS — M54.42 CHRONIC BILATERAL LOW BACK PAIN WITH LEFT-SIDED SCIATICA: Primary | ICD-10-CM

## 2020-12-03 DIAGNOSIS — G89.29 CHRONIC BILATERAL LOW BACK PAIN WITH LEFT-SIDED SCIATICA: Primary | ICD-10-CM

## 2020-12-03 DIAGNOSIS — M54.50 LOW BACK PAIN WITHOUT SCIATICA, UNSPECIFIED BACK PAIN LATERALITY, UNSPECIFIED CHRONICITY: ICD-10-CM

## 2020-12-03 DIAGNOSIS — M54.31 SCIATICA OF RIGHT SIDE: ICD-10-CM

## 2020-12-03 DIAGNOSIS — M48.061 FORAMINAL STENOSIS OF LUMBAR REGION: ICD-10-CM

## 2020-12-29 ENCOUNTER — OFFICE VISIT (OUTPATIENT)
Dept: PRIMARY CARE CLINIC | Age: 75
End: 2020-12-29

## 2020-12-29 DIAGNOSIS — Z20.822 ENCOUNTER FOR LABORATORY TESTING FOR COVID-19 VIRUS: Primary | ICD-10-CM

## 2020-12-29 NOTE — PROGRESS NOTES
Pt presents to the flu clinic for covid testing. Pt reports mild sx and possible exposure. Pt declined to see a doctor today.  RPG    Verbal consent received to perform test.

## 2020-12-31 LAB — SARS-COV-2, NAA: NOT DETECTED

## 2021-01-01 ENCOUNTER — OFFICE VISIT (OUTPATIENT)
Dept: CARDIOLOGY CLINIC | Age: 76
End: 2021-01-01
Payer: MEDICARE

## 2021-01-01 ENCOUNTER — OFFICE VISIT (OUTPATIENT)
Dept: CARDIOLOGY CLINIC | Age: 76
End: 2021-01-01

## 2021-01-01 ENCOUNTER — HOSPITAL ENCOUNTER (EMERGENCY)
Age: 76
Discharge: HOME | End: 2021-12-13
Attending: EMERGENCY MEDICINE
Payer: MEDICARE

## 2021-01-01 ENCOUNTER — APPOINTMENT (OUTPATIENT)
Dept: GENERAL RADIOLOGY | Age: 76
End: 2021-01-01
Attending: EMERGENCY MEDICINE
Payer: MEDICARE

## 2021-01-01 ENCOUNTER — OFFICE VISIT (OUTPATIENT)
Dept: FAMILY MEDICINE CLINIC | Age: 76
End: 2021-01-01
Payer: MEDICARE

## 2021-01-01 ENCOUNTER — HOSPITAL ENCOUNTER (EMERGENCY)
Age: 76
Discharge: HOME | End: 2021-07-01
Attending: EMERGENCY MEDICINE
Payer: MEDICARE

## 2021-01-01 ENCOUNTER — TELEPHONE (OUTPATIENT)
Dept: FAMILY MEDICINE CLINIC | Age: 76
End: 2021-01-01

## 2021-01-01 VITALS
HEIGHT: 68 IN | SYSTOLIC BLOOD PRESSURE: 144 MMHG | DIASTOLIC BLOOD PRESSURE: 94 MMHG | TEMPERATURE: 98.6 F | RESPIRATION RATE: 19 BRPM | HEART RATE: 68 BPM | OXYGEN SATURATION: 96 % | BODY MASS INDEX: 28.9 KG/M2 | WEIGHT: 190.7 LBS

## 2021-01-01 VITALS
HEART RATE: 60 BPM | OXYGEN SATURATION: 96 % | DIASTOLIC BLOOD PRESSURE: 70 MMHG | TEMPERATURE: 96.8 F | RESPIRATION RATE: 18 BRPM | BODY MASS INDEX: 24.88 KG/M2 | WEIGHT: 164.13 LBS | HEIGHT: 68 IN | SYSTOLIC BLOOD PRESSURE: 135 MMHG

## 2021-01-01 VITALS
HEIGHT: 68 IN | WEIGHT: 160 LBS | OXYGEN SATURATION: 98 % | HEART RATE: 60 BPM | SYSTOLIC BLOOD PRESSURE: 122 MMHG | RESPIRATION RATE: 18 BRPM | BODY MASS INDEX: 24.25 KG/M2 | DIASTOLIC BLOOD PRESSURE: 71 MMHG

## 2021-01-01 VITALS
BODY MASS INDEX: 25.74 KG/M2 | HEIGHT: 67 IN | WEIGHT: 164 LBS | DIASTOLIC BLOOD PRESSURE: 79 MMHG | HEART RATE: 60 BPM | RESPIRATION RATE: 18 BRPM | TEMPERATURE: 98.4 F | OXYGEN SATURATION: 98 % | SYSTOLIC BLOOD PRESSURE: 142 MMHG

## 2021-01-01 DIAGNOSIS — M12.811 ROTATOR CUFF ARTHROPATHY OF RIGHT SHOULDER: Primary | ICD-10-CM

## 2021-01-01 DIAGNOSIS — N18.32 STAGE 3B CHRONIC KIDNEY DISEASE (HCC): ICD-10-CM

## 2021-01-01 DIAGNOSIS — H61.23 BILATERAL IMPACTED CERUMEN: ICD-10-CM

## 2021-01-01 DIAGNOSIS — I10 ESSENTIAL HYPERTENSION: ICD-10-CM

## 2021-01-01 DIAGNOSIS — I50.22 CHRONIC SYSTOLIC (CONGESTIVE) HEART FAILURE (HCC): ICD-10-CM

## 2021-01-01 DIAGNOSIS — Z95.810 AICD (AUTOMATIC CARDIOVERTER/DEFIBRILLATOR) PRESENT: Primary | ICD-10-CM

## 2021-01-01 DIAGNOSIS — I25.10 ATHEROSCLEROSIS OF NATIVE CORONARY ARTERY OF NATIVE HEART WITHOUT ANGINA PECTORIS: ICD-10-CM

## 2021-01-01 DIAGNOSIS — E78.2 MIXED HYPERLIPIDEMIA: ICD-10-CM

## 2021-01-01 DIAGNOSIS — I48.0 PAROXYSMAL ATRIAL FIBRILLATION (HCC): ICD-10-CM

## 2021-01-01 DIAGNOSIS — I42.9 CARDIOMYOPATHY, UNSPECIFIED TYPE (HCC): ICD-10-CM

## 2021-01-01 DIAGNOSIS — Z95.810 AICD (AUTOMATIC CARDIOVERTER/DEFIBRILLATOR) PRESENT: ICD-10-CM

## 2021-01-01 DIAGNOSIS — I50.22 CHRONIC SYSTOLIC HEART FAILURE (HCC): Primary | ICD-10-CM

## 2021-01-01 DIAGNOSIS — F32.1 MODERATE MAJOR DEPRESSION (HCC): ICD-10-CM

## 2021-01-01 DIAGNOSIS — I49.5 SSS (SICK SINUS SYNDROME) (HCC): ICD-10-CM

## 2021-01-01 DIAGNOSIS — J81.0 ACUTE PULMONARY EDEMA (HCC): Primary | ICD-10-CM

## 2021-01-01 DIAGNOSIS — K12.1 ORAL ULCERATION: ICD-10-CM

## 2021-01-01 DIAGNOSIS — I82.492 ACUTE DEEP VEIN THROMBOSIS (DVT) OF OTHER SPECIFIED VEIN OF LEFT LOWER EXTREMITY (HCC): ICD-10-CM

## 2021-01-01 DIAGNOSIS — I50.22 CHRONIC SYSTOLIC HEART FAILURE (HCC): ICD-10-CM

## 2021-01-01 DIAGNOSIS — Z00.00 MEDICARE ANNUAL WELLNESS VISIT, SUBSEQUENT: Primary | ICD-10-CM

## 2021-01-01 DIAGNOSIS — Z95.1 S/P CABG X 1: ICD-10-CM

## 2021-01-01 LAB
ALBUMIN SERPL-MCNC: 3.5 G/DL (ref 3.5–5)
ALBUMIN/GLOB SERPL: 0.9 {RATIO} (ref 1.1–2.2)
ALP SERPL-CCNC: 102 U/L (ref 45–117)
ALT SERPL-CCNC: 22 U/L (ref 12–78)
ANION GAP SERPL CALC-SCNC: 7 MMOL/L (ref 5–15)
AST SERPL-CCNC: 22 U/L (ref 15–37)
BACTERIA SPEC CULT: NORMAL
BACTERIA SPEC CULT: NORMAL
BASOPHILS # BLD: 0 K/UL (ref 0–0.1)
BASOPHILS NFR BLD: 1 % (ref 0–1)
BILIRUB SERPL-MCNC: 0.6 MG/DL (ref 0.2–1)
BNP SERPL-MCNC: 1203 PG/ML (ref 0–450)
BUN SERPL-MCNC: 26 MG/DL (ref 6–20)
BUN/CREAT SERPL: 20 (ref 12–20)
CALCIUM SERPL-MCNC: 9.7 MG/DL (ref 8.5–10.1)
CHLORIDE SERPL-SCNC: 103 MMOL/L (ref 97–108)
CO2 SERPL-SCNC: 29 MMOL/L (ref 21–32)
CREAT SERPL-MCNC: 1.28 MG/DL (ref 0.7–1.3)
DIFFERENTIAL METHOD BLD: NORMAL
EOSINOPHIL # BLD: 0.1 K/UL (ref 0–0.4)
EOSINOPHIL NFR BLD: 2 % (ref 0–7)
ERYTHROCYTE [DISTWIDTH] IN BLOOD BY AUTOMATED COUNT: 14 % (ref 11.5–14.5)
GLOBULIN SER CALC-MCNC: 3.9 G/DL (ref 2–4)
GLUCOSE SERPL-MCNC: 97 MG/DL (ref 65–100)
HCT VFR BLD AUTO: 38.5 % (ref 36.6–50.3)
HGB BLD-MCNC: 12.3 G/DL (ref 12.1–17)
IMM GRANULOCYTES # BLD AUTO: 0 K/UL (ref 0–0.04)
IMM GRANULOCYTES NFR BLD AUTO: 0 % (ref 0–0.5)
LACTATE SERPL-SCNC: 1.1 MMOL/L (ref 0.4–2)
LYMPHOCYTES # BLD: 1.5 K/UL (ref 0.8–3.5)
LYMPHOCYTES NFR BLD: 24 % (ref 12–49)
MCH RBC QN AUTO: 29.1 PG (ref 26–34)
MCHC RBC AUTO-ENTMCNC: 31.9 G/DL (ref 30–36.5)
MCV RBC AUTO: 91 FL (ref 80–99)
MONOCYTES # BLD: 0.5 K/UL (ref 0–1)
MONOCYTES NFR BLD: 8 % (ref 5–13)
NEUTS SEG # BLD: 4.2 K/UL (ref 1.8–8)
NEUTS SEG NFR BLD: 65 % (ref 32–75)
NRBC # BLD: 0 K/UL (ref 0–0.01)
NRBC BLD-RTO: 0 PER 100 WBC
PLATELET # BLD AUTO: 201 K/UL (ref 150–400)
PMV BLD AUTO: 10.6 FL (ref 8.9–12.9)
POTASSIUM SERPL-SCNC: 4.9 MMOL/L (ref 3.5–5.1)
PROT SERPL-MCNC: 7.4 G/DL (ref 6.4–8.2)
RBC # BLD AUTO: 4.23 M/UL (ref 4.1–5.7)
SERVICE CMNT-IMP: NORMAL
SERVICE CMNT-IMP: NORMAL
SODIUM SERPL-SCNC: 139 MMOL/L (ref 136–145)
TROPONIN I SERPL-MCNC: <0.05 NG/ML
WBC # BLD AUTO: 6.5 K/UL (ref 4.1–11.1)

## 2021-01-01 PROCEDURE — 85025 COMPLETE CBC W/AUTO DIFF WBC: CPT

## 2021-01-01 PROCEDURE — G8536 NO DOC ELDER MAL SCRN: HCPCS | Performed by: FAMILY MEDICINE

## 2021-01-01 PROCEDURE — 93295 DEV INTERROG REMOTE 1/2/MLT: CPT | Performed by: INTERNAL MEDICINE

## 2021-01-01 PROCEDURE — 99283 EMERGENCY DEPT VISIT LOW MDM: CPT

## 2021-01-01 PROCEDURE — 93000 ELECTROCARDIOGRAM COMPLETE: CPT | Performed by: INTERNAL MEDICINE

## 2021-01-01 PROCEDURE — G8427 DOCREV CUR MEDS BY ELIG CLIN: HCPCS | Performed by: INTERNAL MEDICINE

## 2021-01-01 PROCEDURE — 83880 ASSAY OF NATRIURETIC PEPTIDE: CPT

## 2021-01-01 PROCEDURE — 93283 PRGRMG EVAL IMPLANTABLE DFB: CPT | Performed by: INTERNAL MEDICINE

## 2021-01-01 PROCEDURE — G8752 SYS BP LESS 140: HCPCS | Performed by: FAMILY MEDICINE

## 2021-01-01 PROCEDURE — G8536 NO DOC ELDER MAL SCRN: HCPCS | Performed by: INTERNAL MEDICINE

## 2021-01-01 PROCEDURE — G8752 SYS BP LESS 140: HCPCS | Performed by: INTERNAL MEDICINE

## 2021-01-01 PROCEDURE — 84484 ASSAY OF TROPONIN QUANT: CPT

## 2021-01-01 PROCEDURE — 36415 COLL VENOUS BLD VENIPUNCTURE: CPT

## 2021-01-01 PROCEDURE — 80053 COMPREHEN METABOLIC PANEL: CPT

## 2021-01-01 PROCEDURE — G8420 CALC BMI NORM PARAMETERS: HCPCS | Performed by: FAMILY MEDICINE

## 2021-01-01 PROCEDURE — G9717 DOC PT DX DEP/BP F/U NT REQ: HCPCS | Performed by: INTERNAL MEDICINE

## 2021-01-01 PROCEDURE — 99214 OFFICE O/P EST MOD 30 MIN: CPT | Performed by: INTERNAL MEDICINE

## 2021-01-01 PROCEDURE — 87040 BLOOD CULTURE FOR BACTERIA: CPT

## 2021-01-01 PROCEDURE — G8754 DIAS BP LESS 90: HCPCS | Performed by: FAMILY MEDICINE

## 2021-01-01 PROCEDURE — 93296 REM INTERROG EVL PM/IDS: CPT | Performed by: INTERNAL MEDICINE

## 2021-01-01 PROCEDURE — G8427 DOCREV CUR MEDS BY ELIG CLIN: HCPCS | Performed by: FAMILY MEDICINE

## 2021-01-01 PROCEDURE — 73030 X-RAY EXAM OF SHOULDER: CPT

## 2021-01-01 PROCEDURE — G9717 DOC PT DX DEP/BP F/U NT REQ: HCPCS | Performed by: FAMILY MEDICINE

## 2021-01-01 PROCEDURE — 1101F PT FALLS ASSESS-DOCD LE1/YR: CPT | Performed by: INTERNAL MEDICINE

## 2021-01-01 PROCEDURE — 99213 OFFICE O/P EST LOW 20 MIN: CPT | Performed by: FAMILY MEDICINE

## 2021-01-01 PROCEDURE — G8420 CALC BMI NORM PARAMETERS: HCPCS | Performed by: INTERNAL MEDICINE

## 2021-01-01 PROCEDURE — G0439 PPPS, SUBSEQ VISIT: HCPCS | Performed by: FAMILY MEDICINE

## 2021-01-01 PROCEDURE — G8754 DIAS BP LESS 90: HCPCS | Performed by: INTERNAL MEDICINE

## 2021-01-01 PROCEDURE — 71046 X-RAY EXAM CHEST 2 VIEWS: CPT

## 2021-01-01 PROCEDURE — 83605 ASSAY OF LACTIC ACID: CPT

## 2021-01-01 PROCEDURE — 74011250637 HC RX REV CODE- 250/637: Performed by: EMERGENCY MEDICINE

## 2021-01-01 PROCEDURE — 1101F PT FALLS ASSESS-DOCD LE1/YR: CPT | Performed by: FAMILY MEDICINE

## 2021-01-01 PROCEDURE — 74011250636 HC RX REV CODE- 250/636: Performed by: EMERGENCY MEDICINE

## 2021-01-01 PROCEDURE — 94640 AIRWAY INHALATION TREATMENT: CPT

## 2021-01-01 PROCEDURE — 99284 EMERGENCY DEPT VISIT MOD MDM: CPT

## 2021-01-01 PROCEDURE — 96374 THER/PROPH/DIAG INJ IV PUSH: CPT

## 2021-01-01 PROCEDURE — 69210 REMOVE IMPACTED EAR WAX UNI: CPT | Performed by: FAMILY MEDICINE

## 2021-01-01 RX ORDER — SACUBITRIL AND VALSARTAN 24; 26 MG/1; MG/1
TABLET, FILM COATED ORAL
Qty: 180 TABLET | Refills: 3 | Status: SHIPPED | OUTPATIENT
Start: 2021-01-01 | End: 2022-01-01 | Stop reason: SDUPTHER

## 2021-01-01 RX ORDER — TRIAMCINOLONE ACETONIDE 1 MG/G
PASTE DENTAL 2 TIMES DAILY
Qty: 15 G | Refills: 0 | Status: ON HOLD | OUTPATIENT
Start: 2021-01-01 | End: 2022-01-01

## 2021-01-01 RX ORDER — FUROSEMIDE 20 MG/1
20 TABLET ORAL DAILY
Qty: 15 TABLET | Refills: 0 | Status: ON HOLD | OUTPATIENT
Start: 2021-01-01 | End: 2022-01-01

## 2021-01-01 RX ORDER — ACETAMINOPHEN 500 MG
1000 TABLET ORAL
Status: COMPLETED | OUTPATIENT
Start: 2021-01-01 | End: 2021-01-01

## 2021-01-01 RX ORDER — ALBUTEROL SULFATE 90 UG/1
1 AEROSOL, METERED RESPIRATORY (INHALATION)
Status: COMPLETED | OUTPATIENT
Start: 2021-01-01 | End: 2021-01-01

## 2021-01-01 RX ORDER — METOPROLOL SUCCINATE 25 MG/1
TABLET, EXTENDED RELEASE ORAL
Qty: 45 TABLET | Refills: 3 | Status: SHIPPED | OUTPATIENT
Start: 2021-01-01 | End: 2022-01-01

## 2021-01-01 RX ORDER — FUROSEMIDE 10 MG/ML
40 INJECTION INTRAMUSCULAR; INTRAVENOUS
Status: COMPLETED | OUTPATIENT
Start: 2021-01-01 | End: 2021-01-01

## 2021-01-01 RX ORDER — AZITHROMYCIN 250 MG/1
TABLET, FILM COATED ORAL
Qty: 6 TABLET | Refills: 0 | Status: SHIPPED | OUTPATIENT
Start: 2021-01-01 | End: 2021-01-01 | Stop reason: ALTCHOICE

## 2021-01-01 RX ORDER — PREDNISONE 10 MG/1
30 TABLET ORAL
Qty: 9 TABLET | Refills: 0 | Status: SHIPPED | OUTPATIENT
Start: 2021-01-01 | End: 2021-01-01

## 2021-01-01 RX ORDER — APIXABAN 5 MG/1
TABLET, FILM COATED ORAL
Qty: 180 TABLET | Refills: 3 | Status: ON HOLD | OUTPATIENT
Start: 2021-01-01 | End: 2022-01-01

## 2021-01-01 RX ORDER — SODIUM CHLORIDE 0.9 % (FLUSH) 0.9 %
5-10 SYRINGE (ML) INJECTION AS NEEDED
Status: DISCONTINUED | OUTPATIENT
Start: 2021-01-01 | End: 2021-01-01 | Stop reason: HOSPADM

## 2021-01-01 RX ADMIN — ACETAMINOPHEN 1000 MG: 500 TABLET ORAL at 11:48

## 2021-01-01 RX ADMIN — ALBUTEROL SULFATE 1 PUFF: 90 AEROSOL, METERED RESPIRATORY (INHALATION) at 14:00

## 2021-01-01 RX ADMIN — SODIUM CHLORIDE 1000 ML: 9 INJECTION, SOLUTION INTRAVENOUS at 14:36

## 2021-01-01 RX ADMIN — FUROSEMIDE 40 MG: 10 INJECTION, SOLUTION INTRAMUSCULAR; INTRAVENOUS at 16:04

## 2021-01-04 NOTE — PROGRESS NOTES
I have called and talked to this patient's wife and gave her this negative result. Wife verbalizes understanding.

## 2021-01-29 ENCOUNTER — IMMUNIZATION (OUTPATIENT)
Dept: PEDIATRICS CLINIC | Age: 76
End: 2021-01-29
Payer: MEDICARE

## 2021-01-29 DIAGNOSIS — Z23 ENCOUNTER FOR IMMUNIZATION: Primary | ICD-10-CM

## 2021-01-29 PROCEDURE — 91301 COVID-19, MRNA, LNP-S, PF, 100MCG/0.5ML DOSE(MODERNA): CPT | Performed by: FAMILY MEDICINE

## 2021-01-29 PROCEDURE — 0011A PR IMM ADMN SARSCOV2 100 MCG/0.5 ML 1ST DOSE: CPT | Performed by: FAMILY MEDICINE

## 2021-02-18 ENCOUNTER — OFFICE VISIT (OUTPATIENT)
Dept: CARDIOLOGY CLINIC | Age: 76
End: 2021-02-18
Payer: MEDICARE

## 2021-02-18 DIAGNOSIS — Z95.810 AICD (AUTOMATIC CARDIOVERTER/DEFIBRILLATOR) PRESENT: Primary | ICD-10-CM

## 2021-02-18 DIAGNOSIS — I42.9 CARDIOMYOPATHY, UNSPECIFIED TYPE (HCC): ICD-10-CM

## 2021-02-18 PROCEDURE — 93295 DEV INTERROG REMOTE 1/2/MLT: CPT | Performed by: INTERNAL MEDICINE

## 2021-02-18 PROCEDURE — 93296 REM INTERROG EVL PM/IDS: CPT | Performed by: INTERNAL MEDICINE

## 2021-02-22 RX ORDER — MEMANTINE HYDROCHLORIDE 10 MG/1
TABLET ORAL
Qty: 180 TAB | Refills: 3 | Status: SHIPPED | OUTPATIENT
Start: 2021-02-22 | End: 2022-01-01

## 2021-02-26 ENCOUNTER — IMMUNIZATION (OUTPATIENT)
Dept: PEDIATRICS CLINIC | Age: 76
End: 2021-02-26
Payer: MEDICARE

## 2021-02-26 DIAGNOSIS — Z23 ENCOUNTER FOR IMMUNIZATION: Primary | ICD-10-CM

## 2021-02-26 PROCEDURE — 0012A COVID-19, MRNA, LNP-S, PF, 100MCG/0.5ML DOSE(MODERNA): CPT | Performed by: FAMILY MEDICINE

## 2021-02-26 PROCEDURE — 91301 COVID-19, MRNA, LNP-S, PF, 100MCG/0.5ML DOSE(MODERNA): CPT | Performed by: FAMILY MEDICINE

## 2021-03-03 NOTE — PERIOP NOTES
[facility]  Ambulatory Surgery Unit  Pre-operative Instructions    Procedure Date  3/9/2021            Tentative Arrival Time 1400      1. On the day of your procedure, please report to the Ambulatory Surgery Unit Registration Desk and sign in at your designated time. The Ambulatory Surgery Unit is located in [facility] on the Critical access hospital side of the [facility] across from the [address] [place] [address]. Please have all of your health insurance cards and a photo ID. 2. You must have someone with you to drive you home as directed by your surgeon. 3. You may have a light breakfast and take normal morning medications. 4. We recommend you do not drink any alcoholic beverages for 24 hours before and after your procedure. 5. Contact your surgeons office for instructions on the following medications: non-steroidal anti-inflammatory drugs (i.e. Advil, Aleve), vitamins, and supplements. (Some surgeons will want you to stop these medications prior to surgery and others may allow you to take them)   **If you are currently taking Plavix, Coumadin, Aspirin and/or other blood-thinning agents, contact your surgeon for instructions. ** Your surgeon will partner with the physician prescribing these medications to determine if it is safe to stop or if you need to continue taking. Please do not stop taking these medications without instructions from your surgeon. 6. In an effort to help prevent surgical site infection, we ask that you shower with an anti-bacterial soap (i.e. Dial or Safeguard) on the morning of your procedure. Do not apply any lotions, powders, or deodorants after showering. 7. Wear comfortable clothes. Wear glasses instead of contacts. Do not bring any jewelry or money (other than copays or fees as instructed). Do not wear make-up, particularly mascara, the morning of your procedure. Wear your hair loose or down, no ponytails, buns, louise pins or clips.  All body piercings must be removed. 8. You should understand that if you do not follow these instructions your procedure may be cancelled. If your physical condition changes (i.e. fever, cold or flu) please contact your surgeon as soon as possible. 9. It is important that you be on time. If a situation occurs where you may be late, or if you have any questions or problems, please call [phone].    10. Your procedure time may be subject to change. You will receive a phone call the day prior to confirm your arrival time. I understand a pre-operative phone call will be made to verify my procedure time. In the event that I am not available, I give permission for a message to be left on my answering service and/or with another person?       yes         ___________________      ___________________      ___________________  (Signature of Patient)          (Witness)                   (Date and Time)

## 2021-03-09 ENCOUNTER — APPOINTMENT (OUTPATIENT)
Dept: GENERAL RADIOLOGY | Age: 76
End: 2021-03-09
Attending: PHYSICAL MEDICINE & REHABILITATION
Payer: MEDICARE

## 2021-03-09 ENCOUNTER — HOSPITAL ENCOUNTER (OUTPATIENT)
Age: 76
Setting detail: OUTPATIENT SURGERY
Discharge: HOME | End: 2021-03-09
Attending: PHYSICAL MEDICINE & REHABILITATION | Admitting: PHYSICAL MEDICINE & REHABILITATION
Payer: MEDICARE

## 2021-03-09 VITALS
OXYGEN SATURATION: 98 % | WEIGHT: 160 LBS | HEIGHT: 70 IN | BODY MASS INDEX: 22.9 KG/M2 | DIASTOLIC BLOOD PRESSURE: 70 MMHG | SYSTOLIC BLOOD PRESSURE: 141 MMHG | HEART RATE: 61 BPM | RESPIRATION RATE: 18 BRPM | TEMPERATURE: 97.6 F

## 2021-03-09 PROCEDURE — 74011250636 HC RX REV CODE- 250/636: Performed by: PHYSICAL MEDICINE & REHABILITATION

## 2021-03-09 PROCEDURE — 2709999900 HC NON-CHARGEABLE SUPPLY: Performed by: PHYSICAL MEDICINE & REHABILITATION

## 2021-03-09 PROCEDURE — 74011000250 HC RX REV CODE- 250: Performed by: PHYSICAL MEDICINE & REHABILITATION

## 2021-03-09 PROCEDURE — 76210000046 HC AMBSU PH II REC FIRST 0.5 HR: Performed by: PHYSICAL MEDICINE & REHABILITATION

## 2021-03-09 PROCEDURE — 72020 X-RAY EXAM OF SPINE 1 VIEW: CPT

## 2021-03-09 PROCEDURE — 77030003666 HC NDL SPINAL BD -A: Performed by: PHYSICAL MEDICINE & REHABILITATION

## 2021-03-09 PROCEDURE — 76030000002 HC AMB SURG OR TIME FIRST 0.: Performed by: PHYSICAL MEDICINE & REHABILITATION

## 2021-03-09 PROCEDURE — 76000 FLUOROSCOPY <1 HR PHYS/QHP: CPT

## 2021-03-09 PROCEDURE — 77030003665 HC NDL SPN BBMI -A: Performed by: PHYSICAL MEDICINE & REHABILITATION

## 2021-03-09 PROCEDURE — 74011000636 HC RX REV CODE- 636: Performed by: PHYSICAL MEDICINE & REHABILITATION

## 2021-03-09 RX ORDER — METHYLPREDNISOLONE ACETATE 40 MG/ML
40 INJECTION, SUSPENSION INTRA-ARTICULAR; INTRALESIONAL; INTRAMUSCULAR; SOFT TISSUE ONCE
Status: COMPLETED | OUTPATIENT
Start: 2021-03-09 | End: 2021-03-09

## 2021-03-09 RX ORDER — BUPIVACAINE HYDROCHLORIDE 5 MG/ML
5 INJECTION, SOLUTION EPIDURAL; INTRACAUDAL ONCE
Status: DISCONTINUED | OUTPATIENT
Start: 2021-03-09 | End: 2021-03-09 | Stop reason: HOSPADM

## 2021-03-09 RX ORDER — LIDOCAINE HYDROCHLORIDE 20 MG/ML
5 INJECTION, SOLUTION INFILTRATION; PERINEURAL ONCE
Status: COMPLETED | OUTPATIENT
Start: 2021-03-09 | End: 2021-03-09

## 2021-03-09 NOTE — H&P
Procedural Case Note    3/9/2021    (1:42 PM)    Darcie Merino    1945   (67 y.o.)    [id number]    CC:  pain    ROS:   Complete ROS obtained, no CP, no SOB, no N or V    PMH:     Past Medical History:   Diagnosis Date    AICD (automatic cardioverter/defibrillator) present 5/13/2016 5/13/16 [employer] upgrade to dual chamber AICD implant  Dr. Carol De Leon disease [facility])     Anxiety state, other     Arthritis     OSTEO ARTHRITIS    AVNRT (AV bridgette re-entry tachycardia) ([facility] 5/12/2016 5/12/16 AVNRT ablation: PM/AICD left upper chest :Dr. Viviana Aly Back ache     CAD (coronary artery disease)     Cancer [facility]) 2004    Prostate cancer    Chest tightness     last episode of chest pain 5/2016 before AICD placed; none since then    Coagulation defects 2005    FACTOR V LEIDEN    Dementia ([facility]     Dementia ([facility]     Dizziness     FH: factor V Leiden deficiency     Generalized muscle ache     GERD (gastroesophageal reflux disease)     HEARTBURN    Heart failure ([address] 2014    as of 07/2019 EF 30-35; Dr. Adrienne Mejia, Cardiomyopathy    Hyperlipidemia, mixed     Hypertension     Other ill-defined conditions(799.89) 2004    blood transfusion    Pacemaker     Paroxysmal atrial fibrillation ([address]     rate controlled with coreg     Postsurgical aortocoronary bypass status 05/29/2012    CABG    Presence of cardiac defibrillator 05/2016    left upper chest    Psychiatric disorder     depression    SSS (sick sinus syndrome) ([facility]     Stroke ([facility] [address] [facility] 2011, 1990's    SEVERAL-mini    Thromboembolism ([address] 2014    left leg: changed to Eliquis from Warfarin    Thromboembolus ([address] 2005    left leg    Weakness generalized        ALLERGIES:   No Known Allergies    MEDS:     No current facility-administered medications for this encounter.            Visit Vitals  /84 (BP Patient Position: At rest)   Pulse (!) 55   Temp 97.9 °F (36.6 °C)   Resp 18   Ht 5' 10\" (1.778 m)   Wt 72.6 kg (160 lb)   SpO2 94%   BMI 22.96 kg/m²     PE:  Gen: NAD  Head: normocephalic  Heart: RRR  Lungs: CTA angeline  Abd: NT, ND, soft  Neuro: awake and alert  Skin: intact    IMPRESSION:   LS DDD    Note:  The clinical status was discussed in detail with the patient. The procedure was again discussed and described in detail. All understand and accept the planned procedure and risks; reject other forms of treatment. All questions are answered.     Won Mercado MD

## 2021-03-09 NOTE — PERIOP NOTES
Sabrina Chance  1945  [id number]    Situation:  Verbal report given from: CHANTAL Ortega RN  Procedure: Procedure(s):  BILATERAL L5 TRANSFORAMINAL  EPIDURAL STEROID INJECTION    Background:    Preoperative diagnosis: DEGENERATIVE DISC DISEASE    Postoperative diagnosis: DEGENERATIVE DISC DISEASE    :  Dr. Jim Minor:  Intra-procedure medications, procedure, and allergies reviewed        Recommendation:    Discharge patient home after discharge instructions reviewed with patient. Rest until local has worn off.

## 2021-03-09 NOTE — PERIOP NOTES
Pt with strong bilateral dorsi and plantar flexion. Injection site without drainage. Ambulate to wheelchair without c/o pain or weakness. Instructions reviewed with wife. Wife aware only one hearing aid in left earPt discharged via wheelchair, accompanied by RN. Pt discharged awake and alert, respirations equal and unlabored, skin warm, dry, and intact. Pt and family members' questions and concerns addressed prior to discharge.

## 2021-03-09 NOTE — OP NOTES
Epidural Steroid Injection Operative Report    Indications: This is a 67 y.o. male who presents with low back pain. He was positive for LS DDD. The patient was admitted for surgery as conservative measures have failed. Date of Surgery: 3/9/2021    Preoperative Diagnosis: LS DDD    Postoperative Diagnosis: LS DDD    Surgeon(s) and Role:     * Rhonda Cartwright MD - Primary     Procedure:  Procedure(s):  BILATERAL L5 TRANSFORAMINAL  EPIDURAL STEROID INJECTION    Procedure in Detail:  After appropriate informed consent was obtained, the patient was taken to the operating suite and placed in the prone position on the operating table on appropriate padding. The LS region was prepped and draped in the usual sterile fashion. Intraoperative fluoroscopy was used to localize the LS spine. The skin was infiltrated with 2% lidocaine. An 22-g needle was advanced into the [place] L5 neuroforamen under fluoroscopic guidance. A small amount of contrast was injected into the epidural space, confirming appropriate needle placement on fluoroscopy. Next, 2ml of 2% lidocaine and 80mg of Depo-Medrol were injected. The needle was removed from the patient. The patient was then turned back into the supine position on the stretcher and was taken to the Recovery Room in stable condition.     Estimated Blood Loss:  none     Specimens: None       Drains: None          Complications:  None    Signed By: Maxi Mchugh MD                        March 9, 2021

## 2021-03-09 NOTE — DISCHARGE INSTRUCTIONS
Dr. Cris Burkett Discharge Instructions  Transforaminal Epidural Steroid Injection/ Selective Nerve Block    You had a transforaminal epidural steroid injection/ selective nerve block today. You will probably have some numbness, and possibly weakness, in your leg for the next 6 to 8 hours. The steroids will slowly become effective, reducing your pain, over the next 2 weeks. You should begin feeling better after a few days, but it may take up to 2 weeks to notice the difference. The benefit you get from your injection will last a variable amount of time, depending on the severity of your lumbar spine problem.  Pain: Most people do not have any increase in pain after this injection. However, you might experience some soreness in your low back. If this happens, putting an ice pack over the sore area will help.  Bandage: You will have a small bandage covering the site of the injection. You may remove it once you get home.  Restrictions: Someone should drive you home after the injection. After that, you have no restrictions. You need to be careful while walking, as you may still have some numbness or weakness in your leg. You may resume your normal level of activity. You may take a shower or bath, and you may eat normally. You should continue your current exercises and/or therapy routine.   Medications: Continue your current medications as prescribed. If your pain decreases, you may reduce the amount of your pain medicines. If you stopped taking anticoagulants or blood-thinners before the injection, start them tomorrow. If you have diabetes, your blood sugar may be elevated for a few days. Call your primary doctor with any questions.   Call Dr. Cris Burkett at [phone] if you experience:   Fever (101 degrees Fahrenheit or greater)   Nausea or vomiting   Headache unrelieved by your normal pain medicine   Redness or swelling at the injection site that lasts more than 1 day   New numbness, tingling, weakness, or pain that you didnt have before the injection    Follow-up appointment:   If still having pain in 1-2 weeks, call office at [phone] for a follow up appointment. DISCHARGE SUMMARY from Nurse    The following personal items collected during your admission are returned to you:   Dental Appliance: Dental Appliances: Lowers, Uppers(doesnt wear them)  Vision: Visual Aid: Glasses(doesnt wear them all the time)  Hearing Aid:    Jewelry:    Clothing:    Other Valuables:    Valuables sent to safe: If you were given prescriptions, please review the written information on prescribed medications. · You will receive a Post Operative Call from one of the Recovery Room Nurses on the day after your surgery to check on you. It is very important for us to know how you are recovering after your surgery. · You may receive an e-mail or letter in the mail from [employer] regarding your experience with us in the Ambulatory Surgery Unit. Your feedback is valuable to us and we appreciate your participation in the survey. If you have not had your influenza or pneumococcal vaccines, please follow up with your primary care physician. The discharge information has been reviewed with the patient. The patient verbalized understanding.

## 2021-03-09 NOTE — PERIOP NOTES
Pt has strong and equal plantar and dorsi flexion. Pt able to stand and hold weight. Unsteady gait, no assistive devices.

## 2021-05-14 NOTE — PERIOP NOTES
[facility]  Ambulatory Surgery Unit  Pre-operative Instructions    Procedure Date  5/14            Tentative Arrival Time 12:00pm      1. On the day of your procedure, please report to the Ambulatory Surgery Unit Registration Desk and sign in at your designated time. The Ambulatory Surgery Unit is located in [facility] on the Count [facility] the [facility] side of the [facility] across from the [address] [place] [address]. Please have all of your health insurance cards and a photo ID. 2. You must have someone with you to drive you home as directed by your surgeon. 3. You may have a light breakfast and take normal morning medications. 4. We recommend you do not drink any alcoholic beverages for 24 hours before and after your procedure. 5. Contact your surgeons office for instructions on the following medications: non-steroidal anti-inflammatory drugs (i.e. Advil, Aleve), vitamins, and supplements. (Some surgeons will want you to stop these medications prior to surgery and others may allow you to take them)   **If you are currently taking Plavix, Coumadin, Aspirin and/or other blood-thinning agents, contact your surgeon for instructions. ** Your surgeon will partner with the physician prescribing these medications to determine if it is safe to stop or if you need to continue taking. Please do not stop taking these medications without instructions from your surgeon. 6. In an effort to help prevent surgical site infection, we ask that you shower with an anti-bacterial soap (i.e. Dial or Safeguard) on the morning of your procedure. Do not apply any lotions, powders, or deodorants after showering. 7. Wear comfortable clothes. Wear glasses instead of contacts. Do not bring any jewelry or money (other than copays or fees as instructed). Do not wear make-up, particularly mascara, the morning of your procedure. Wear your hair loose or down, no ponytails, buns, louise pins or clips.  All body piercings must be removed. 8. You should understand that if you do not follow these instructions your procedure may be cancelled. If your physical condition changes (i.e. fever, cold or flu) please contact your surgeon as soon as possible. 9. It is important that you be on time. If a situation occurs where you may be late, or if you have any questions or problems, please call [phone].    10. Your procedure time may be subject to change. You will receive a phone call the day prior to confirm your arrival time. I understand a pre-operative phone call will be made to verify my procedure time. In the event that I am not available, I give permission for a message to be left on my answering service and/or with another person?       yes    Reviewed by phone with wife per pt request.  Verbalized understanding.     ___________________      ___________________      ___________________  (Signature of Patient)          (Witness)                   (Date and Time)

## 2021-05-18 ENCOUNTER — HOSPITAL ENCOUNTER (OUTPATIENT)
Age: 76
Setting detail: OUTPATIENT SURGERY
Discharge: HOME | End: 2021-05-18
Attending: PHYSICAL MEDICINE & REHABILITATION | Admitting: PHYSICAL MEDICINE & REHABILITATION
Payer: MEDICARE

## 2021-05-18 ENCOUNTER — APPOINTMENT (OUTPATIENT)
Dept: GENERAL RADIOLOGY | Age: 76
End: 2021-05-18
Attending: PHYSICAL MEDICINE & REHABILITATION
Payer: MEDICARE

## 2021-05-18 VITALS
DIASTOLIC BLOOD PRESSURE: 72 MMHG | RESPIRATION RATE: 17 BRPM | WEIGHT: 158 LBS | SYSTOLIC BLOOD PRESSURE: 116 MMHG | HEIGHT: 70 IN | HEART RATE: 61 BPM | TEMPERATURE: 98.1 F | OXYGEN SATURATION: 97 % | BODY MASS INDEX: 22.62 KG/M2

## 2021-05-18 PROCEDURE — 74011000636 HC RX REV CODE- 636: Performed by: PHYSICAL MEDICINE & REHABILITATION

## 2021-05-18 PROCEDURE — 77030003665 HC NDL SPN BBMI -A: Performed by: PHYSICAL MEDICINE & REHABILITATION

## 2021-05-18 PROCEDURE — 74011250636 HC RX REV CODE- 250/636: Performed by: PHYSICAL MEDICINE & REHABILITATION

## 2021-05-18 PROCEDURE — 76000 FLUOROSCOPY <1 HR PHYS/QHP: CPT

## 2021-05-18 PROCEDURE — 74011000250 HC RX REV CODE- 250: Performed by: PHYSICAL MEDICINE & REHABILITATION

## 2021-05-18 PROCEDURE — 2709999900 HC NON-CHARGEABLE SUPPLY: Performed by: PHYSICAL MEDICINE & REHABILITATION

## 2021-05-18 PROCEDURE — 76030000002 HC AMB SURG OR TIME FIRST 0.: Performed by: PHYSICAL MEDICINE & REHABILITATION

## 2021-05-18 PROCEDURE — 72020 X-RAY EXAM OF SPINE 1 VIEW: CPT

## 2021-05-18 PROCEDURE — 76210000046 HC AMBSU PH II REC FIRST 0.5 HR: Performed by: PHYSICAL MEDICINE & REHABILITATION

## 2021-05-18 RX ORDER — METHYLPREDNISOLONE ACETATE 40 MG/ML
40 INJECTION, SUSPENSION INTRA-ARTICULAR; INTRALESIONAL; INTRAMUSCULAR; SOFT TISSUE ONCE
Status: COMPLETED | OUTPATIENT
Start: 2021-05-18 | End: 2021-05-18

## 2021-05-18 RX ORDER — LIDOCAINE HYDROCHLORIDE 20 MG/ML
5 INJECTION, SOLUTION INFILTRATION; PERINEURAL ONCE
Status: COMPLETED | OUTPATIENT
Start: 2021-05-18 | End: 2021-05-18

## 2021-05-18 RX ORDER — BUPIVACAINE HYDROCHLORIDE 5 MG/ML
5 INJECTION, SOLUTION EPIDURAL; INTRACAUDAL ONCE
Status: DISCONTINUED | OUTPATIENT
Start: 2021-05-18 | End: 2021-05-18 | Stop reason: HOSPADM

## 2021-05-18 NOTE — OP NOTES
Epidural Steroid Injection Operative Report    Indications: This is a 67 y.o. male who presents with low back pain. He was positive for LS DDD. The patient was admitted for surgery as conservative measures have failed. Date of Surgery: 5/18/2021    Preoperative Diagnosis: LS DDD    Postoperative Diagnosis: LS DDD    Surgeon(s) and Role:     * Evelyn Magana MD - Primary     Procedure:  Procedure(s):  BILATERAL SACRAL ONE TFESI WITH LOCAL    Procedure in Detail:  After appropriate informed consent was obtained, the patient was taken to the operating suite and placed in the prone position on the operating table on appropriate padding. The LS region was prepped and draped in the usual sterile fashion. Intraoperative fluoroscopy was used to localize the LS spine. The skin was infiltrated with 2% lidocaine. An 22-g needle was advanced into the [place] S1 neuroforamen under fluoroscopic guidance. A small amount of contrast was injected into the epidural space, confirming appropriate needle placement on fluoroscopy. Next, 2ml of 2% lidocaine and 80mg of Depo-Medrol were injected. The needle was removed from the patient. The patient was then turned back into the supine position on the stretcher and was taken to the Recovery Room in stable condition.     Estimated Blood Loss:  none     Specimens: None       Drains: None          Complications:  None    Signed By: Analy Butcher MD                        May 18, 2021

## 2021-05-18 NOTE — PERIOP NOTES
Mansoor Briscoe  1945  [id number]    Situation:  Verbal report given from: brayan blake rn  Procedure: Procedure(s):  BILATERAL SACRAL ONE TFESI WITH LOCAL    Background:    Preoperative diagnosis: DDD  SACROILIITIS    Postoperative diagnosis: DDD  SACROILIITIS    :  Dr. Yue Rondoner:  Intra-procedure medications, procedure, and allergies reviewed        Recommendation:    Discharge patient home after discharge instructions reviewed with patient. Rest until local has worn off.

## 2021-05-18 NOTE — PERIOP NOTES
all discharge instructions reviewed with pt. Verbalized understanding. Assisted to wheelchair. Discharged to car via wheelchair.

## 2021-05-18 NOTE — H&P
Procedural Case Note    5/18/2021    (3:20 PM)    Rimma Galarza    1945   (67 y.o.)    [id number]    CC:  pain    ROS:   Complete ROS obtained, no CP, no SOB, no N or V    PMH:     Past Medical History:   Diagnosis Date    AICD (automatic cardioverter/defibrillator) present 5/13/2016 5/13/16 [employer] upgrade to dual chamber AICD implant  Dr. Brigid Latif disease [facility])     Anxiety state, other     Arthritis     OSTEO ARTHRITIS    AVNRT (AV bridgette re-entry tachycardia) ([facility] [address] 5/12/2016 5/12/16 AVNRT ablation: PM/AICD left upper chest :Dr. Maryam Martinez Back ache     CAD (coronary artery disease)     Cancer [facility]) 2004    Prostate cancer    Chest tightness     last episode of chest pain 5/2016 before AICD placed; none since then    Coagulation defects 2005    FACTOR V LEIDEN    Dementia ([facility] [address]     Depression     Dizziness     FH: factor V Leiden deficiency     Generalized muscle ache     GERD (gastroesophageal reflux disease)     HEARTBURN    Heart failure ([facility] [address] 2014    as of 07/2019 EF 30-35;  Dr. Inessa Banegas, Cardiomyopathy    Hyperlipidemia, mixed     Hypertension     Other ill-defined conditions(799.89) 2004    blood transfusion    Pacemaker     Paroxysmal atrial fibrillation (HCC)     rate controlled with coreg     Postsurgical aortocoronary bypass status 05/29/2012    CABG    Presence of cardiac defibrillator 05/2016    left upper chest    SSS (sick sinus syndrome) ([facility] [address]     Stroke ([facility] [address] 2011, 1990's    SEVERAL-mini    Thromboembolism ([facility] [address] 2014    left leg: changed to Eliquis from Warfarin    Thromboembolus ([facility] [address] 2005    left leg    Weakness generalized        ALLERGIES:   No Known Allergies    MEDS:     Current Facility-Administered Medications   Medication Dose Route Frequency    methylPREDNISolone acetate (DEPO-MEDROL) 40 mg/mL injection 40 mg  40 mg IntraMUSCular ONCE    bupivacaine (PF) (MARCAINE) 0.5 % (5 mg/mL) injection 25 mg  5 mL Epidural ONCE    lidocaine (XYLOCAINE) 20 mg/mL (2 %) injection 100 mg  5 mL IntraDERMal ONCE    iohexoL (OMNIPAQUE) 180 mg iodine/mL injection 5 mL  5 mL Intra artICUlar ONCE          Visit Vitals  Ht 5' 10\" (1.778 m)   Wt 72.6 kg (160 lb)   BMI 22.96 kg/m²     PE:  Gen: NAD  Head: normocephalic  Heart: RRR  Lungs: CTA angeline  Abd: NT, ND, soft  Neuro: awake and alert  Skin: intact    IMPRESSION:   LS DDD    Note:  The clinical status was discussed in detail with the patient. The procedure was again discussed and described in detail. All understand and accept the planned procedure and risks; reject other forms of treatment. All questions are answered.     Wilbert Melendez MD

## 2021-05-18 NOTE — PERIOP NOTES
Patient: Wilmer Mead MRN: [id number]  SSN: [id number]   YOB: 1945  Age: 67 y.o. Sex: male     Patient is status post Procedure(s):  BILATERAL SACRAL ONE TFESI WITH LOCAL. Surgeon(s) and Role:     * Celestino Garcia MD - Primary    Local/Dose/Irrigation:  SEE MAR                                     Dressing/Packing:     NONE  Other:  PATIENT TOLERATED PROCEDURE WITHOUT COMPLAINT.

## 2021-05-20 ENCOUNTER — OFFICE VISIT (OUTPATIENT)
Dept: CARDIOLOGY CLINIC | Age: 76
End: 2021-05-20
Payer: MEDICARE

## 2021-05-20 DIAGNOSIS — I42.9 CARDIOMYOPATHY, UNSPECIFIED TYPE (HCC): ICD-10-CM

## 2021-05-20 DIAGNOSIS — Z95.810 AICD (AUTOMATIC CARDIOVERTER/DEFIBRILLATOR) PRESENT: Primary | ICD-10-CM

## 2021-05-20 DIAGNOSIS — I50.22 CHRONIC SYSTOLIC (CONGESTIVE) HEART FAILURE (HCC): ICD-10-CM

## 2021-05-20 PROCEDURE — 93295 DEV INTERROG REMOTE 1/2/MLT: CPT | Performed by: INTERNAL MEDICINE

## 2021-05-20 PROCEDURE — 93296 REM INTERROG EVL PM/IDS: CPT | Performed by: INTERNAL MEDICINE

## 2021-06-17 ENCOUNTER — HOSPITAL ENCOUNTER (EMERGENCY)
Age: 76
Discharge: HOME | End: 2021-06-17
Attending: EMERGENCY MEDICINE
Payer: MEDICARE

## 2021-06-17 ENCOUNTER — APPOINTMENT (OUTPATIENT)
Dept: GENERAL RADIOLOGY | Age: 76
End: 2021-06-17
Attending: EMERGENCY MEDICINE
Payer: MEDICARE

## 2021-06-17 VITALS
RESPIRATION RATE: 25 BRPM | SYSTOLIC BLOOD PRESSURE: 150 MMHG | HEART RATE: 62 BPM | TEMPERATURE: 98.4 F | BODY MASS INDEX: 23.67 KG/M2 | HEIGHT: 70 IN | DIASTOLIC BLOOD PRESSURE: 62 MMHG | WEIGHT: 165.34 LBS | OXYGEN SATURATION: 94 %

## 2021-06-17 DIAGNOSIS — R55 NEAR SYNCOPE: Primary | ICD-10-CM

## 2021-06-17 DIAGNOSIS — I50.9 CONGESTIVE HEART FAILURE, UNSPECIFIED HF CHRONICITY, UNSPECIFIED HEART FAILURE TYPE (HCC): ICD-10-CM

## 2021-06-17 LAB
ALBUMIN SERPL-MCNC: 3.2 G/DL (ref 3.5–5)
ALBUMIN/GLOB SERPL: 0.9 {RATIO} (ref 1.1–2.2)
ALP SERPL-CCNC: 93 U/L (ref 45–117)
ALT SERPL-CCNC: 18 U/L (ref 12–78)
ANION GAP SERPL CALC-SCNC: 8 MMOL/L (ref 5–15)
APPEARANCE UR: CLEAR
AST SERPL-CCNC: 18 U/L (ref 15–37)
ATRIAL RATE: 67 BPM
BASOPHILS # BLD: 0 K/UL (ref 0–0.1)
BASOPHILS NFR BLD: 1 % (ref 0–1)
BILIRUB SERPL-MCNC: 0.6 MG/DL (ref 0.2–1)
BILIRUB UR QL: NEGATIVE
BNP SERPL-MCNC: 3366 PG/ML (ref 0–450)
BUN SERPL-MCNC: 24 MG/DL (ref 6–20)
BUN/CREAT SERPL: 21 (ref 12–20)
CALCIUM SERPL-MCNC: 9.2 MG/DL (ref 8.5–10.1)
CALCULATED P AXIS, ECG09: 40 DEGREES
CALCULATED R AXIS, ECG10: -12 DEGREES
CALCULATED T AXIS, ECG11: 118 DEGREES
CHLORIDE SERPL-SCNC: 106 MMOL/L (ref 97–108)
CO2 SERPL-SCNC: 27 MMOL/L (ref 21–32)
COLOR UR: NORMAL
CREAT SERPL-MCNC: 1.16 MG/DL (ref 0.7–1.3)
DIAGNOSIS, 93000: NORMAL
DIFFERENTIAL METHOD BLD: ABNORMAL
EOSINOPHIL # BLD: 0.1 K/UL (ref 0–0.4)
EOSINOPHIL NFR BLD: 1 % (ref 0–7)
ERYTHROCYTE [DISTWIDTH] IN BLOOD BY AUTOMATED COUNT: 13.8 % (ref 11.5–14.5)
GLOBULIN SER CALC-MCNC: 3.4 G/DL (ref 2–4)
GLUCOSE SERPL-MCNC: 93 MG/DL (ref 65–100)
GLUCOSE UR STRIP.AUTO-MCNC: NEGATIVE MG/DL
HCT VFR BLD AUTO: 33.1 % (ref 36.6–50.3)
HEMOCCULT STL QL: NEGATIVE
HGB BLD-MCNC: 10.7 G/DL (ref 12.1–17)
HGB UR QL STRIP: NEGATIVE
IMM GRANULOCYTES # BLD AUTO: 0 K/UL (ref 0–0.04)
IMM GRANULOCYTES NFR BLD AUTO: 1 % (ref 0–0.5)
KETONES UR QL STRIP.AUTO: NEGATIVE MG/DL
LEUKOCYTE ESTERASE UR QL STRIP.AUTO: NEGATIVE
LYMPHOCYTES # BLD: 0.9 K/UL (ref 0.8–3.5)
LYMPHOCYTES NFR BLD: 15 % (ref 12–49)
MAGNESIUM SERPL-MCNC: 1.8 MG/DL (ref 1.6–2.4)
MCH RBC QN AUTO: 29.6 PG (ref 26–34)
MCHC RBC AUTO-ENTMCNC: 32.3 G/DL (ref 30–36.5)
MCV RBC AUTO: 91.4 FL (ref 80–99)
MONOCYTES # BLD: 0.6 K/UL (ref 0–1)
MONOCYTES NFR BLD: 9 % (ref 5–13)
NEUTS SEG # BLD: 4.6 K/UL (ref 1.8–8)
NEUTS SEG NFR BLD: 73 % (ref 32–75)
NITRITE UR QL STRIP.AUTO: NEGATIVE
NRBC # BLD: 0 K/UL (ref 0–0.01)
NRBC BLD-RTO: 0 PER 100 WBC
P-R INTERVAL, ECG05: 158 MS
PH UR STRIP: 6 [PH] (ref 5–8)
PLATELET # BLD AUTO: 160 K/UL (ref 150–400)
PMV BLD AUTO: 9.9 FL (ref 8.9–12.9)
POTASSIUM SERPL-SCNC: 4.7 MMOL/L (ref 3.5–5.1)
PROT SERPL-MCNC: 6.6 G/DL (ref 6.4–8.2)
PROT UR STRIP-MCNC: NEGATIVE MG/DL
Q-T INTERVAL, ECG07: 434 MS
QRS DURATION, ECG06: 104 MS
QTC CALCULATION (BEZET), ECG08: 458 MS
RBC # BLD AUTO: 3.62 M/UL (ref 4.1–5.7)
SODIUM SERPL-SCNC: 141 MMOL/L (ref 136–145)
SP GR UR REFRACTOMETRY: 1.01 (ref 1–1.03)
TROPONIN I SERPL-MCNC: <0.05 NG/ML
TROPONIN I SERPL-MCNC: <0.05 NG/ML
UROBILINOGEN UR QL STRIP.AUTO: 0.2 EU/DL (ref 0.2–1)
VENTRICULAR RATE, ECG03: 67 BPM
WBC # BLD AUTO: 6.3 K/UL (ref 4.1–11.1)

## 2021-06-17 PROCEDURE — 84484 ASSAY OF TROPONIN QUANT: CPT

## 2021-06-17 PROCEDURE — 83735 ASSAY OF MAGNESIUM: CPT

## 2021-06-17 PROCEDURE — 94762 N-INVAS EAR/PLS OXIMTRY CONT: CPT

## 2021-06-17 PROCEDURE — 83880 ASSAY OF NATRIURETIC PEPTIDE: CPT

## 2021-06-17 PROCEDURE — 80053 COMPREHEN METABOLIC PANEL: CPT

## 2021-06-17 PROCEDURE — 96374 THER/PROPH/DIAG INJ IV PUSH: CPT

## 2021-06-17 PROCEDURE — 36415 COLL VENOUS BLD VENIPUNCTURE: CPT

## 2021-06-17 PROCEDURE — 93005 ELECTROCARDIOGRAM TRACING: CPT

## 2021-06-17 PROCEDURE — 82272 OCCULT BLD FECES 1-3 TESTS: CPT

## 2021-06-17 PROCEDURE — 99285 EMERGENCY DEPT VISIT HI MDM: CPT

## 2021-06-17 PROCEDURE — 85025 COMPLETE CBC W/AUTO DIFF WBC: CPT

## 2021-06-17 PROCEDURE — 81003 URINALYSIS AUTO W/O SCOPE: CPT

## 2021-06-17 PROCEDURE — 71045 X-RAY EXAM CHEST 1 VIEW: CPT

## 2021-06-17 PROCEDURE — 74011250636 HC RX REV CODE- 250/636: Performed by: EMERGENCY MEDICINE

## 2021-06-17 PROCEDURE — 96361 HYDRATE IV INFUSION ADD-ON: CPT

## 2021-06-17 PROCEDURE — 74011250637 HC RX REV CODE- 250/637: Performed by: EMERGENCY MEDICINE

## 2021-06-17 RX ORDER — FUROSEMIDE 40 MG/1
40 TABLET ORAL DAILY
Qty: 3 TABLET | Refills: 0 | Status: SHIPPED | OUTPATIENT
Start: 2021-06-17 | End: 2021-06-20

## 2021-06-17 RX ORDER — FUROSEMIDE 10 MG/ML
40 INJECTION INTRAMUSCULAR; INTRAVENOUS
Status: COMPLETED | OUTPATIENT
Start: 2021-06-17 | End: 2021-06-17

## 2021-06-17 RX ORDER — SODIUM CHLORIDE 0.9 % (FLUSH) 0.9 %
5-10 SYRINGE (ML) INJECTION ONCE
Status: DISCONTINUED | OUTPATIENT
Start: 2021-06-17 | End: 2021-06-17 | Stop reason: HOSPADM

## 2021-06-17 RX ORDER — ACETAMINOPHEN 325 MG/1
650 TABLET ORAL
Status: DISCONTINUED | OUTPATIENT
Start: 2021-06-17 | End: 2021-06-17 | Stop reason: HOSPADM

## 2021-06-17 RX ADMIN — FUROSEMIDE 40 MG: 10 INJECTION, SOLUTION INTRAMUSCULAR; INTRAVENOUS at 15:29

## 2021-06-17 RX ADMIN — SODIUM CHLORIDE 1000 ML: 9 INJECTION, SOLUTION INTRAVENOUS at 13:34

## 2021-06-17 RX ADMIN — ACETAMINOPHEN 650 MG: 325 TABLET ORAL at 13:47

## 2021-06-17 NOTE — ED PROVIDER NOTES
EMERGENCY DEPARTMENT HISTORY AND PHYSICAL EXAM      Date: 6/17/2021  Patient Name: Giovana Lowery    History of Presenting Illness     Chief Complaint   Patient presents with    Syncope       History Provided By: Patient    HPI:   The history is provided by the patient. Dizziness  This is a new problem. The current episode started 1 to 2 hours ago. The problem has been resolved. There was no focality noted. Primary symptoms include visual change. Pertinent negatives include no focal weakness, no speech difficulty, no memory loss, no mental status change, no unresponsiveness and no disorientation. There has been no fever. Associated symptoms include shortness of breath. Pertinent negatives include no chest pain, no vomiting, no altered mental status, no confusion, no headaches and no nausea. There were no medications administered prior to arrival. Associated medical issues include clotting disorder. Giovana Lowery, 67 y.o. male with multiple medical problems, presents to the ED with cc of near syncopal episode. Patient reports he was outside cutting the grass in the hot weather, he was not drinking water, reports he has been working since 6 AM, reports his lawnmower got stuck in the ditch he was walking to the person's house to get some help to get the lawnmower out when he reports he became lightheaded and dizzy fell like he was going to pass out. He did not pass out. Reports he was seeing spots, he went and sat underneath a tree. He reports he felt little short of breath when  lightheadedness started and he felt like his heart was racing but he had no chest pain. He does not report diaphoresis. After resting and cooling off he felt fine friend came along and helped him with the lawnmower he went home wife checked his blood pressure his blood pressure was low she called EMS and was brought in. Patient reports currently has no complaints.   He denies any recent nausea vomiting diarrhea reports his appetite is been good has been eating and drinking normally. There are no other complaints, changes, or physical findings at this time. PCP: Rajendra Escobar MD    No current facility-administered medications on file prior to encounter. Current Outpatient Medications on File Prior to Encounter   Medication Sig Dispense Refill    sertraline (ZOLOFT) 100 mg tablet TAKE 2 TABLETS BY MOUTH ONCE DAILY 180 Tab 0    memantine (NAMENDA) 10 mg tablet TAKE 1 TABLET BY MOUTH TWICE DAILY 180 Tab 3    gabapentin (NEURONTIN) 600 mg tablet Take 1 Tab by mouth two (2) times a day. Max Daily Amount: 1,200 mg. 90 Tab 3    atorvastatin (LIPITOR) 40 mg tablet TAKE 1 TABLET BY MOUTH AT BEDTIME 90 Tab 1    metoprolol succinate (TOPROL-XL) 25 mg XL tablet TAKE 1/2 TABLET BY MOUTH ONCE DAILY 45 Tab 3    Eliquis 5 mg tablet TAKE 1 TABLET BY MOUTH EVERY 12 HOURS 180 Tab 3    pantoprazole (PROTONIX) 40 mg tablet TAKE 1 TABLET BY MOUTH ONCE DAILY 90 Tab 3    Entresto 24-26 mg tablet TAKE 1 TABLET BY MOUTH TWICE DAILY 180 Tab 3    senna-docusate (PERICOLACE) 8.6-50 mg per tablet Take 1 Tab by mouth daily. (Patient taking differently: Take 1 Tab by mouth as needed.) 30 Tab 0    dilTIAZem ER (TIAZAC) 180 mg capsule Take 180 mg by mouth daily.  nitroglycerin (NITROSTAT) 0.4 mg SL tablet 1 Tab by SubLINGual route every five (5) minutes as needed for Chest Pain (call 911 if not relieved by 3). 1 Bottle 1    donepeziL (ARICEPT) 10 mg tablet Take 1 Tab by mouth nightly. (Patient taking differently: Take 5 mg by mouth nightly.) 90 Tab 1    [DISCONTINUED] atorvastatin (LIPITOR) 40 mg tablet TAKE 1 TABLET BY MOUTH AT BEDTIME 90 Tab 1    acetaminophen (TYLENOL) 325 mg tablet Take 1,000 mg by mouth every four (4) hours as needed for Pain.  multivit-min/FA/lycopen/lutein (SENTRY SENIOR PO) Take  by mouth daily.  polyethylene glycol (MIRALAX) 17 gram/dose powder Take 17 g by mouth as needed.          Past History Past Medical History:  Past Medical History:   Diagnosis Date    AICD (automatic cardioverter/defibrillator) present 5/13/2016 5/13/16 [employer] upgrade to dual chamber AICD implant  Dr. Velvet Wakefield disease [facility])     Anxiety state, other     Arthritis     OSTEO ARTHRITIS    AVNRT (AV bridgette re-entry tachycardia) ([address] 5/12/2016 5/12/16 AVNRT ablation: PM/AICD left upper chest :Dr. Mona Garcia Back ache     CAD (coronary artery disease)     Cancer [facility]) 2004    Prostate cancer    Chest tightness     last episode of chest pain 5/2016 before AICD placed; none since then    Coagulation defects 2005    FACTOR V LEIDEN    Dementia ([address]     Depression     Dizziness     FH: factor V Leiden deficiency     Generalized muscle ache     GERD (gastroesophageal reflux disease)     HEARTBURN    Heart failure ([address] 2014    as of 07/2019 EF 30-35;  Dr. Royce Oliva, Cardiomyopathy    Hyperlipidemia, mixed     Hypertension     Other ill-defined conditions(799.89) 2004    blood transfusion    Pacemaker     Paroxysmal atrial fibrillation ([address]     rate controlled with coreg     Postsurgical aortocoronary bypass status 05/29/2012    CABG    Presence of cardiac defibrillator 05/2016    left upper chest    SSS (sick sinus syndrome) ([address]     Stroke ([address] 2011, 1990's    SEVERAL-mini    Thromboembolism ([address] 2014    left leg: changed to Eliquis from Warfarin    Thromboembolus ([address] 2005    left leg    Weakness generalized        Past Surgical History:  Past Surgical History:   Procedure Laterality Date    CARDIAC CATHETERIZATION  3/4/2011         HX HEENT  2019    oral surgery, removed teeth, dentures upper/lower    HX HERNIA REPAIR Left 2002    Ingiunal hernia repair left    HX ORTHOPAEDIC      LEFT KNEE CARTILAGE REPAIR;RIGHT ROTATOR CUFF REPAIR    HX ORTHOPAEDIC  2/14/12    C5-7 ANTERIIOR CERVICAL DISECTOMY AND FUSION    HX ORTHOPAEDIC  6/4/13    C5-C7 POSTERIOR DECOMPRESSION AND FUSION    HX ORTHOPAEDIC      right thumb partial amputation of tip    HX OTHER SURGICAL      steroid injections    HX PACEMAKER Left 05/13/2016    BS D142, Dr. Harris Avalos HX PROSTATECT[place]  2004     [place]    HX ROTATOR CUFF REPAIR Left 2019    HX UROLOGICAL       urinary control system    HX UROLOGICAL  12/3/13    REPLACEMENT ARTIFICAL URINARY SPHINCTER    [place] CARDIAC SURG PROCEDURE UNLIST      CABG X1 3/5/11       Family History:  Family History   Problem Relation Age of Onset    Asthma Mother     Alcohol abuse Mother     Alcohol abuse Father     Asthma Father     Cancer Sister     Heart Disease Sister     Alcohol abuse Brother     Cancer Brother     Heart Disease Brother        Social History:  Social History     Tobacco Use    Smoking status: Never Smoker    Smokeless tobacco: Never Used   Substance Use Topics    Alcohol use: Not Currently     Alcohol/week: 0.0 standard drinks     Comment: stopped 2010    Drug use: Never       Allergies:  No Known Allergies      Review of Systems   Review of Systems   Constitutional: Negative. Negative for chills and fever. HENT: Negative. Negative for congestion and sore throat. Eyes: Negative. Negative for discharge and redness. Respiratory: Positive for shortness of breath. Negative for cough. Cardiovascular: Positive for palpitations and syncope. Negative for chest pain. Gastrointestinal: Negative. Negative for abdominal pain, nausea and vomiting. Endocrine: Negative. Negative for polydipsia and polyuria. Genitourinary: Negative. Negative for dysuria, flank pain and frequency. Musculoskeletal: Negative. Negative for arthralgias and back pain. Skin: Negative. Negative for rash and wound. Allergic/Immunologic: Negative. Neurological: Positive for dizziness and light-headedness. Negative for focal weakness, syncope, speech difficulty and headaches. Hematological: Negative. Negative for adenopathy. Does not bruise/bleed easily. Psychiatric/Behavioral: Negative. Negative for confusion and memory loss. The patient is not nervous/anxious. All other systems reviewed and are negative. Physical Exam   Physical Exam  Vitals and nursing note reviewed. Constitutional:       General: He is not in acute distress. Appearance: Normal appearance. He is well-developed. He is not ill-appearing, toxic-appearing or diaphoretic. Comments: Elderly male thin   HENT:      Head: Normocephalic and atraumatic. Nose: Nose normal.      Mouth/Throat:      Mouth: Mucous membranes are moist.      Pharynx: Oropharynx is clear. Eyes:      Extraocular Movements: Extraocular movements intact. Conjunctiva/sclera: Conjunctivae normal.      Pupils: Pupils are equal, round, and reactive to light. Cardiovascular:      Rate and Rhythm: Normal rate and regular rhythm. Pulses: Normal pulses. Heart sounds: Normal heart sounds. Comments: pacemaker left chest wall  Pulmonary:      Effort: Pulmonary effort is normal. No respiratory distress. Breath sounds: Normal breath sounds. No wheezing. Abdominal:      General: There is no distension. Palpations: Abdomen is soft. Musculoskeletal:         General: No swelling or tenderness. Normal range of motion. Cervical back: Normal range of motion and neck supple. No rigidity or tenderness. No muscular tenderness. Skin:     General: Skin is warm and dry. Capillary Refill: Capillary refill takes less than 2 seconds. Findings: No erythema or rash. Neurological:      General: No focal deficit present. Mental Status: He is alert and oriented to person, place, and time. Mental status is at baseline. Cranial Nerves: No cranial nerve deficit. Sensory: No sensory deficit. Psychiatric:         Mood and Affect: Mood normal.         Behavior: Behavior normal.         Thought Content:  Thought content normal.         Judgment: Judgment normal.         Diagnostic Study Results     Labs -     Recent Results (from the past 12 hour(s))   EKG, 12 LEAD, INITIAL    Collection Time: 06/17/21  1:04 PM   Result Value Ref Range    Ventricular Rate 67 BPM    Atrial Rate 67 BPM    P-R Interval 158 ms    QRS Duration 104 ms    Q-T Interval 434 ms    QTC Calculation (Bezet) 458 ms    Calculated P Axis 40 degrees    Calculated R Axis -12 degrees    Calculated T Axis 118 degrees    Diagnosis       ** Poor data quality, interpretation may be adversely affected  **Suspect unspecified pacemaker failure  Sinus rhythm with premature supraventricular complexes  Possible Anterior infarct , age undetermined  ST & T wave abnormality, consider lateral ischemia  Abnormal ECG  When compared with ECG of 03-FEB-2021 16:59,  premature ventricular complexes are no longer present  premature supraventricular complexes are now present  Confirmed by Douglas Thompson MD, --- ([id number]) on 6/17/2021 3:15:40 PM     CBC WITH AUTOMATED DIFF    Collection Time: 06/17/21  1:36 PM   Result Value Ref Range    WBC 6.3 4.1 - 11.1 K/uL    RBC 3.62 (L) 4.10 - 5.70 M/uL    HGB 10.7 (L) 12.1 - 17.0 g/dL    HCT 33.1 (L) 36.6 - 50.3 %    MCV 91.4 80.0 - 99.0 FL    MCH 29.6 26.0 - 34.0 PG    MCHC 32.3 30.0 - 36.5 g/dL    RDW 13.8 11.5 - 14.5 %    PLATELET 387 633 - 004 K/uL    MPV 9.9 8.9 - 12.9 FL    NRBC 0.0 0  WBC    ABSOLUTE NRBC 0.00 0.00 - 0.01 K/uL    NEUTROPHILS 73 32 - 75 %    LYMPHOCYTES 15 12 - 49 %    MONOCYTES 9 5 - 13 %    EOSINOPHILS 1 0 - 7 %    BASOPHILS 1 0 - 1 %    IMMATURE GRANULOCYTES 1 (H) 0.0 - 0.5 %    ABS. NEUTROPHILS 4.6 1.8 - 8.0 K/UL    ABS. LYMPHOCYTES 0.9 0.8 - 3.5 K/UL    ABS. MONOCYTES 0.6 0.0 - 1.0 K/UL    ABS. EOSINOPHILS 0.1 0.0 - 0.4 K/UL    ABS. BASOPHILS 0.0 0.0 - 0.1 K/UL    ABS. IMM.  GRANS. 0.0 0.00 - 0.04 K/UL    DF AUTOMATED     METABOLIC PANEL, COMPREHENSIVE    Collection Time: 06/17/21  1:36 PM   Result Value Ref Range Sodium 141 136 - 145 mmol/L    Potassium 4.7 3.5 - 5.1 mmol/L    Chloride 106 97 - 108 mmol/L    CO2 27 21 - 32 mmol/L    Anion gap 8 5 - 15 mmol/L    Glucose 93 65 - 100 mg/dL    BUN 24 (H) 6 - 20 MG/DL    Creatinine 1.16 0.70 - 1.30 MG/DL    BUN/Creatinine ratio 21 (H) 12 - 20      GFR est AA >60 >60 ml/min/1.73m2    GFR est non-AA >60 >60 ml/min/1.73m2    Calcium 9.2 8.5 - 10.1 MG/DL    Bilirubin, total 0.6 0.2 - 1.0 MG/DL    ALT (SGPT) 18 12 - 78 U/L    AST (SGOT) 18 15 - 37 U/L    Alk. phosphatase 93 45 - 117 U/L    Protein, total 6.6 6.4 - 8.2 g/dL    Albumin 3.2 (L) 3.5 - 5.0 g/dL    Globulin 3.4 2.0 - 4.0 g/dL    A-G Ratio 0.9 (L) 1.1 - 2.2     MAGNESIUM    Collection Time: 06/17/21  1:36 PM   Result Value Ref Range    Magnesium 1.8 1.6 - 2.4 mg/dL   TROPONIN I    Collection Time: 06/17/21  1:36 PM   Result Value Ref Range    Troponin-I, Qt. <0.05 <0.05 ng/mL   NT-PRO BNP    Collection Time: 06/17/21  1:36 PM   Result Value Ref Range    NT pro-BNP 3,366 (H) 0 - 450 PG/ML   OCCULT BLOOD, STOOL    Collection Time: 06/17/21  2:36 PM   Result Value Ref Range    Occult blood, stool Negative NEG     TROPONIN I    Collection Time: 06/17/21  3:49 PM   Result Value Ref Range    Troponin-I, Qt. <0.05 <0.05 ng/mL   URINALYSIS W/ RFLX MICROSCOPIC    Collection Time: 06/17/21  3:58 PM   Result Value Ref Range    Color YELLOW/STRAW      Appearance CLEAR CLEAR      Specific gravity 1.010 1.003 - 1.030      pH (UA) 6.0 5.0 - 8.0      Protein Negative NEG mg/dL    Glucose Negative NEG mg/dL    Ketone Negative NEG mg/dL    Bilirubin Negative NEG      Blood Negative NEG      Urobilinogen 0.2 0.2 - 1.0 EU/dL    Nitrites Negative NEG      Leukocyte Esterase Negative NEG         Radiologic Studies -   XR CHEST PORT   Final Result   Interstitial edema pattern. .  .            CT Results  (Last 48 hours)    None        CXR Results  (Last 48 hours)               06/17/21 1330  XR CHEST PORT Final result    Impression:  Interstitial edema pattern. .  . Narrative:  INDICATION:  SOB        EXAM: Chest single view. COMPARISON: 2/3/2021. FINDINGS: A single frontal view of the chest at 1323 hours shows interstitial   edema pattern with decreased lung volumes since the prior study. .  The heart,   mediastinum and pulmonary vasculature are stable status post median sternotomy. AICD from the left appears stable. .  The bony thorax is unremarkable for age,   except for stable cervical fusion and left shoulder arthroplasty. . . Medical Decision Making   I am the first provider for this patient. I reviewed the vital signs, available nursing notes, past medical history, past surgical history, family history and social history. Vital Signs-Reviewed the patient's vital signs. Patient Vitals for the past 12 hrs:   Temp Pulse Resp BP SpO2   06/17/21 1638  62 25  94 %   06/17/21 1635  67 25 (!) 150/62 91 %   06/17/21 1600  65 28 128/72 90 %   06/17/21 1537  60 17  92 %   06/17/21 1534  63 16  92 %   06/17/21 1530  60 18 128/64 (!) 87 %   06/17/21 1529  60  128/64    06/17/21 1501  64 22 131/66 94 %   06/17/21 1432  64 22 133/65 94 %   06/17/21 1400  62 23 127/60 96 %   06/17/21 1340  60  109/64    06/17/21 1340  68 21 (!) 112/57 99 %   06/17/21 1305 98.4 °F (36.9 °C) 65 18 121/67 96 %           EKG interpretation: (Preliminary)  Rhythm: normal sinus rhythm;  regular . Rate (approx.): 67; Blocks: ; Ectopy: PACs Axis: normal; P wave: normal; QRS interval: normal ; ST/T wave: non-specific changes; in  Lead: ; Other findings: paced beats. Records Reviewed: Nursing Notes, Old Medical Records, Previous electrocardiograms, Previous Radiology Studies and Previous Laboratory Studies    Provider Notes (Medical Decision Making):   Patient with a near syncopal episode suspect probably due to exertion and activity. Will check laboratory studies x-ray EKG and hydrate and reassess.     ED Course:   Initial assessment performed. The patients presenting problems have been discussed, and they are in agreement with the care plan formulated and outlined with them. I have encouraged them to ask questions as they arise throughout their visit. ED Course as of Jun 17 1641   Thu Jun 17, 2021   1509 Patient sleeping noted sats around 88%, patient did receive IV fluids for suspected hydration. We will give a dose of Lasix and reassess. [MF]   3920 Patient with no complaints of chest pain shortness of breath ports all other symptoms have resolved. Wife feels is appropriate for discharge. She reports he has been on diuretics intermittently but does not have a prescription. We will give him Lasix to take for 3 days and he will see his cardiologist for follow-up    []   [phone] to 95% on room air    []      ED Course User Index  [MF] Genesis Vance MD           4:41 PM    Patient presents after a lightheaded spell for like he was going to pass out, symptoms of all resolved. Wife reported his blood pressure was low but his blood pressures were normal here he was not orthostatic. Wonder if her reading was correct. Patient was initially hydrated with IV fluids laboratory studies were checked he was anemic but it was heme positive from below. Creatinine was normal.  BUN was slightly elevated. Troponin x2 is negative, EKG revealed normal sinus with unchanged rhythm compared with prior. He did have intermittent pacing noted on cardiac monitor. Oxygen saturations have been stable 9495% has no complaints of chest pain or shortness of breath however given the chest x-ray findings of mild interstitial edema we will diurese him with Lasix for the next 3 days and he will see his cardiologist for follow-up. Pt has been re-examined and states that they are feeling better and have no new complaints.   Laboratory tests, medications, x-rays, diagnosis, follow up plan and return instructions have been reviewed and discussed with the patient and/or family. Pt and/or family were instructed on symptoms that may arise after discharge requiring re-evaluation by a physician. Pt and/or family have had the opportunity to ask questions about their care. Patient and/or family verbalized understanding and agreement with care plan, follow up and return instructions. Patient and/or family agree to return in 50 hours if their symptoms are not improving or immediately if they have any change in their condition. I have also put together some discharge instructions for patient that include: 1) educational information regarding their diagnosis, 2) how to care for their diagnosis at home, as well a 3) list of reasons why they would want to return to the ED prior to their follow-up appointment, should their condition change. Chente Hernandes MD      Procedures          Disposition:    Discharged      DISCHARGE PLAN:  1. Current Discharge Medication List      START taking these medications    Details   furosemide (Lasix) 40 mg tablet Take 1 Tablet by mouth daily for 3 days. Qty: 3 Tablet, Refills: 0  Start date: 6/17/2021, End date: 6/20/2021           2. Follow-up Information     Follow up With Specialties Details Why Contact Info    Chivo Cifuentes MD [facility] Medicine Schedule an appointment as soon as possible for a visit in 2 days For follow up [address] [place] [address]  [phone]          3. Return to ED if worse     Diagnosis     Clinical Impression:   1. Near syncope    2. Congestive heart failure, unspecified HF chronicity, unspecified heart failure type [facility])        Attestations: Chente Hernandes MD    Please note that this dictation was completed with [employer], the computer voice recognition software. Quite often unanticipated grammatical, syntax, homophones, and other interpretive errors are inadvertently transcribed by the computer software. Please disregard these errors.   Please excuse any errors that have escaped final proofreading. Thank you.

## 2021-06-17 NOTE — ED TRIAGE NOTES
Pt arrived by POV after having a near syncopal episode. Pt reports he has been cutting grass since 7 this morning. Pt reports his tractor got stuck and he had to walk a far way and that is when he started to feel dizzy, saw black spots and had some SOB, pt denies chest pain. Pt reports he made it to the house and his wife checked his b/p and it was 80/50. EMS arrived and found pt to be awake alert and oriented x 4. IV was established and NS was given with B/P improvement 113/64  HR 78. Pt arrived awake alert and oriented X 4  Speaking in full complete sentences  NAD. Pt denies all symptoms now and states he feels fine now.   Pt educated on ER flow

## 2021-06-17 NOTE — ED NOTES
Patient educated on the medication(s) he/she is going to receive, allergies reviewed/verified and patient medicated as ordered. Side rails up and call light within reach. Will continue to monitor.

## 2021-06-17 NOTE — ED NOTES
Patient educated on the medication(s) he/she is going to receive, allergies reviewed/verified and patient medicated as ordered. Side rails up and call light within reach. Will continue to monitor.   This writer attempted to call pts wife and cell phone number with busy signal and no answer at home phone, pt updated

## 2021-07-01 NOTE — TELEPHONE ENCOUNTER
Pt C/O Chest tightness, hurts to breath, spitting up green slimy stuff. This all started today. His pulse is 63 and O2 SAT is 94%. Lips are a light pink in color. I have advised the wife that we only have 1 provider her today and she is completely booked up. I have suggested that she take him to the UC or ED for evaluation. Wife verbalizes understanding.

## 2021-07-01 NOTE — ED PROVIDER NOTES
EMERGENCY DEPARTMENT HISTORY AND PHYSICAL EXAM          Date: 7/1/2021  Patient Name: Massimo Reynolds    History of Presenting Illness     Chief Complaint   Patient presents with    Shortness of Breath       History Provided By: Patient    HPI: Massimo Reynolds is a 67 y.o. male, pmhx CAD, factor V Leiden, paroxysmal A. fib with AV bridgette reentrant tachycardia, sick sinus syndrome with AICD, dementia who presents ambulatory to the ED c/o shortness of breath    Patient explains he started with a dry cough a couple of days ago with some shortness of breath. Today he notes the sputum changed to green suicide to come into the ER for evaluation. He denies any fevers, chills, nausea, vomiting and diarrhea with the symptoms. He has been taking his medications without problem and is able to get around his house without issues. He states he is eating and drinking normally and states \"I could not be dehydrated because I drink a ton\". Patient also states he has not been around any sick contacts and has been fully vaccinated against Covid virus. PCP: Claudio Hall MD    Allergies: Unknown  Social Hx: Type tobacco, -vaping, -EtOH, -Illicit Drugs; Lives with his wife and grandson    There are no other complaints, changes, or physical findings at this time. Current Facility-Administered Medications   Medication Dose Route Frequency Provider Last Rate Last Admin    sodium chloride (NS) flush 5-10 mL  5-10 mL IntraVENous PRN Stephen Gonzalez MD        inhalational spacing device   Does Not Apply PRN Stephen Gonzalez MD         Current Outpatient Medications   Medication Sig Dispense Refill    azithromycin (Zithromax Z-Vamsi) 250 mg tablet 2 tabs on day 1, then 1 tab daily 6 Tablet 0    furosemide (Lasix) 20 mg tablet Take 1 Tablet by mouth daily.  15 Tablet 0    sertraline (ZOLOFT) 100 mg tablet TAKE 2 TABLETS BY MOUTH ONCE DAILY 180 Tab 0    memantine (NAMENDA) 10 mg tablet TAKE 1 TABLET BY MOUTH TWICE DAILY 180 Tab 3    gabapentin (NEURONTIN) 600 mg tablet Take 1 Tab by mouth two (2) times a day. Max Daily Amount: 1,200 mg. 90 Tab 3    dilTIAZem ER (TIAZAC) 180 mg capsule Take 180 mg by mouth daily.  atorvastatin (LIPITOR) 40 mg tablet TAKE 1 TABLET BY MOUTH AT BEDTIME 90 Tab 1    metoprolol succinate (TOPROL-XL) 25 mg XL tablet TAKE 1/2 TABLET BY MOUTH ONCE DAILY 45 Tab 3    Eliquis 5 mg tablet TAKE 1 TABLET BY MOUTH EVERY 12 HOURS 180 Tab 3    pantoprazole (PROTONIX) 40 mg tablet TAKE 1 TABLET BY MOUTH ONCE DAILY 90 Tab 3    nitroglycerin (NITROSTAT) 0.4 mg SL tablet 1 Tab by SubLINGual route every five (5) minutes as needed for Chest Pain (call 911 if not relieved by 3). 1 Bottle 1    donepeziL (ARICEPT) 10 mg tablet Take 1 Tab by mouth nightly. (Patient taking differently: Take 5 mg by mouth nightly.) 90 Tab 1    Entresto 24-26 mg tablet TAKE 1 TABLET BY MOUTH TWICE DAILY 180 Tab 3    senna-docusate (PERICOLACE) 8.6-50 mg per tablet Take 1 Tab by mouth daily. (Patient taking differently: Take 1 Tab by mouth as needed.) 30 Tab 0    acetaminophen (TYLENOL) 325 mg tablet Take 1,000 mg by mouth every four (4) hours as needed for Pain.  multivit-min/FA/lycopen/lutein (SENTRY SENIOR PO) Take  by mouth daily.  polyethylene glycol (MIRALAX) 17 gram/dose powder Take 17 g by mouth as needed.          Past History     Past Medical History:  Past Medical History:   Diagnosis Date    AICD (automatic cardioverter/defibrillator) present 5/13/2016 5/13/16 [employer] upgrade to dual chamber AICD implant  Dr. Shannon Rai Alzheimer disease [facility])     Anxiety state, other     Arthritis     OSTEO ARTHRITIS    AVNRT (AV bridgette re-entry tachycardia) ([facility] [address] 5/12/2016 5/12/16 AVNRT ablation: PM/AICD left upper chest :Dr. Lavern Krishnamurthy Back ache     CAD (coronary artery disease)     Cancer [facility]) 2004    Prostate cancer    Chest tightness     last episode of chest pain 5/2016 before AICD placed; none since then    Coagulation defects 2005    FACTOR V LEIDEN    Dementia ([address]     Depression     Dizziness     FH: factor V Leiden deficiency     Generalized muscle ache     GERD (gastroesophageal reflux disease)     HEARTBURN    Heart failure ([address] 2014    as of 07/2019 EF 30-35;  Dr. Jorge Garland, Cardiomyopathy    Hyperlipidemia, mixed     Hypertension     Other ill-defined conditions(799.89) 2004    blood transfusion    Pacemaker     Paroxysmal atrial fibrillation ([address]     rate controlled with coreg     Postsurgical aortocoronary bypass status 05/29/2012    CABG    Presence of cardiac defibrillator 05/2016    left upper chest    SSS (sick sinus syndrome) ([address]     Stroke ([address] 2011, 1990's    SEVERAL-mini    Thromboembolism ([address] 2014    left leg: changed to Eliquis from Warfarin    Thromboembolus ([facility] [address] 2005    left leg    Weakness generalized        Past Surgical History:  Past Surgical History:   Procedure Laterality Date    CARDIAC CATHETERIZATION  3/4/2011         HX HEENT  2019    oral surgery, removed teeth, dentures upper/lower    HX HERNIA REPAIR Left 2002    Ingiunal hernia repair left    HX ORTHOPAEDIC      LEFT KNEE CARTILAGE REPAIR;RIGHT ROTATOR CUFF REPAIR    HX ORTHOPAEDIC  2/14/12    C5-7 ANTERIIOR CERVICAL DISECTOMY AND FUSION    HX ORTHOPAEDIC  6/4/13    C5-C7 POSTERIOR DECOMPRESSION AND FUSION    HX ORTHOPAEDIC      right thumb partial amputation of tip    HX OTHER SURGICAL      steroid injections    HX PACEMAKER Left 05/13/2016    BS D142, Dr. Ching Cooper HX PROSTATECTOMY  2004     [place]    HX ROTATOR CUFF REPAIR Left 2019    HX UROLOGICAL       urinary control system    HX UROLOGICAL  12/3/13    REPLACEMENT ARTIFICAL URINARY SPHINCTER    SD CARDIAC SURG PROCEDURE UNLIST      CABG X1 3/5/11       Family History:  Family History   Problem Relation Age of Onset    Asthma Mother     Alcohol abuse Mother    Pawel Small Alcohol abuse Father     Asthma Father     Cancer Sister     Heart Disease Sister     Alcohol abuse Brother     Cancer Brother     Heart Disease Brother        Social History:  Social History     Tobacco Use    Smoking status: Never Smoker    Smokeless tobacco: Never Used   Substance Use Topics    Alcohol use: Not Currently     Alcohol/week: 0.0 standard drinks     Comment: stopped 2010    Drug use: Never       Allergies:  No Known Allergies      Review of Systems   Review of Systems   Constitutional: Negative for activity change, appetite change, chills, fever and unexpected weight change. HENT: Negative for congestion. Eyes: Negative for pain and visual disturbance. Respiratory: Positive for cough and shortness of breath. Cardiovascular: Negative for chest pain. Gastrointestinal: Negative for abdominal pain, diarrhea, nausea and vomiting. Genitourinary: Negative for dysuria. Musculoskeletal: Negative for back pain. Skin: Negative for rash. Neurological: Negative for headaches. Physical Exam   Physical Exam  Vitals and nursing note reviewed. Constitutional:       Appearance: He is well-developed. He is not diaphoretic. Comments: Healthy-appearing elderly male currently in minimal acute distress with normal vital signs   HENT:      Head: Normocephalic and atraumatic. Eyes:      General:         Right eye: No discharge. Left eye: No discharge. Conjunctiva/sclera: Conjunctivae normal.      Pupils: Pupils are equal, round, and reactive to light. Cardiovascular:      Rate and Rhythm: Normal rate and regular rhythm. Heart sounds: Normal heart sounds. No murmur heard. Pulmonary:      Effort: Pulmonary effort is normal. No respiratory distress. Breath sounds: Wheezing (Bilateral bases right greater than left) present. No rales. Abdominal:      General: Bowel sounds are normal. There is no distension. Palpations: Abdomen is soft.       Tenderness: There is no abdominal tenderness. Musculoskeletal:         General: Normal range of motion. Cervical back: Normal range of motion and neck supple. Right lower leg: No tenderness. No edema. Left lower leg: No tenderness. No edema. Skin:     General: Skin is warm and dry. Findings: No rash. Neurological:      Mental Status: He is alert and oriented to person, place, and time. Cranial Nerves: No cranial nerve deficit. Motor: No abnormal muscle tone. Diagnostic Study Results     Labs -     Recent Results (from the past 12 hour(s))   METABOLIC PANEL, COMPREHENSIVE    Collection Time: 07/01/21  1:55 PM   Result Value Ref Range    Sodium 139 136 - 145 mmol/L    Potassium 4.9 3.5 - 5.1 mmol/L    Chloride 103 97 - 108 mmol/L    CO2 29 21 - 32 mmol/L    Anion gap 7 5 - 15 mmol/L    Glucose 97 65 - 100 mg/dL    BUN 26 (H) 6 - 20 MG/DL    Creatinine 1.28 0.70 - 1.30 MG/DL    BUN/Creatinine ratio 20 12 - 20      GFR est AA >60 >60 ml/min/1.73m2    GFR est non-AA 55 (L) >60 ml/min/1.73m2    Calcium 9.7 8.5 - 10.1 MG/DL    Bilirubin, total 0.6 0.2 - 1.0 MG/DL    ALT (SGPT) 22 12 - 78 U/L    AST (SGOT) 22 15 - 37 U/L    Alk. phosphatase 102 45 - 117 U/L    Protein, total 7.4 6.4 - 8.2 g/dL    Albumin 3.5 3.5 - 5.0 g/dL    Globulin 3.9 2.0 - 4.0 g/dL    A-G Ratio 0.9 (L) 1.1 - 2.2     CBC WITH AUTOMATED DIFF    Collection Time: 07/01/21  1:55 PM   Result Value Ref Range    WBC 6.5 4.1 - 11.1 K/uL    RBC 4.23 4. 10 - 5.70 M/uL    HGB 12.3 12.1 - 17.0 g/dL    HCT 38.5 36.6 - 50.3 %    MCV 91.0 80.0 - 99.0 FL    MCH 29.1 26.0 - 34.0 PG    MCHC 31.9 30.0 - 36.5 g/dL    RDW 14.0 11.5 - 14.5 %    PLATELET 530 852 - 710 K/uL    MPV 10.6 8.9 - 12.9 FL    NRBC 0.0 0  WBC    ABSOLUTE NRBC 0.00 0.00 - 0.01 K/uL    NEUTROPHILS 65 32 - 75 %    LYMPHOCYTES 24 12 - 49 %    MONOCYTES 8 5 - 13 %    EOSINOPHILS 2 0 - 7 %    BASOPHILS 1 0 - 1 %    IMMATURE GRANULOCYTES 0 0.0 - 0.5 %    ABS.  NEUTROPHILS 4. 2 1.8 - 8.0 K/UL    ABS. LYMPHOCYTES 1.5 0.8 - 3.5 K/UL    ABS. MONOCYTES 0.5 0.0 - 1.0 K/UL    ABS. EOSINOPHILS 0.1 0.0 - 0.4 K/UL    ABS. BASOPHILS 0.0 0.0 - 0.1 K/UL    ABS. IMM. GRANS. 0.0 0.00 - 0.04 K/UL    DF AUTOMATED     TROPONIN I    Collection Time: 07/01/21  1:55 PM   Result Value Ref Range    Troponin-I, Qt. <0.05 <0.05 ng/mL   LACTIC ACID    Collection Time: 07/01/21  1:55 PM   Result Value Ref Range    Lactic acid 1.1 0.4 - 2.0 MMOL/L   NT-PRO BNP    Collection Time: 07/01/21  1:55 PM   Result Value Ref Range    NT pro-BNP 1,203 (H) 0 - 450 PG/ML       Radiologic Studies -   XR CHEST PA LAT   Final Result   1. Findings suggestive of congestive change/pulmonary edema. 2. Evidence of an apparent focal density in the region of the right midlung as   described above. CT Results  (Last 48 hours)    None        CXR Results  (Last 48 hours)               07/01/21 1416  XR CHEST PA LAT Final result    Impression:  1. Findings suggestive of congestive change/pulmonary edema. 2. Evidence of an apparent focal density in the region of the right midlung as   described above. Narrative:  Chest 2 views dated 7/1/2021       Comparison chest dated 6/17/2021       History is shortness of breath/green phlegm       PA and lateral views of the chest were obtained. It is difficult to accurately   assess the size of the cardiac silhouette. The cardiac pacer device and leads   are again noted. The cardiac pacer device obscures a large portion of the left   mid/lower lung. There is prominence of the lung markings (left greater than   right). It should be noted that congestive change/edema could give this   appearance. Developing infiltration at the left lung base cannot be excluded. There is an \"apparent \"focal density which projects over the right midlung. This   may represent a confluence of vascular and rib shadows. A follow-up examination   is recommended.  There is evidence of postsurgical change involving the cervical   spine. Medical Decision Making   I am the first provider for this patient. I reviewed the vital signs, available nursing notes, past medical history, past surgical history, family history and social history. Vital Signs-Reviewed the patient's vital signs. Patient Vitals for the past 12 hrs:   Temp Pulse Resp BP SpO2   07/01/21 1604 -- 68 -- (!) 144/94 --   07/01/21 1603 -- 68 19 -- 96 %   07/01/21 1600 -- 71 19 (!) 144/94 --   07/01/21 1530 -- 70 19 (!) 151/81 98 %   07/01/21 1500 -- 66 22 (!) 144/69 98 %   07/01/21 1339 -- 72 23 (!) 148/69 98 %   07/01/21 1333 98.6 °F (37 °C) 69 18 (!) 148/69 97 %       Pulse Oximetry Analysis - 96% on RA    Cardiac Monitor:   Rate: 72bpm  Rhythm: Paced      Records Reviewed: Nursing Notes, Old Medical Records, Previous electrocardiograms, Previous Radiology Studies and Previous Laboratory Studies    Provider Notes (Medical Decision Making):   MDM: Elderly male with 2 days of shortness of breath without fevers. Vital signs normal but given age and complaint of green phlegm, sepsis order set initiated. Currently patient is doing well and not hemodynamically and stable I have low suspicion for sepsis. Given wheeze on exam we will trial of albuterol and see if he gets any improvement. ED Course:   Initial assessment performed. The patients presenting problems have been discussed, and they are in agreement with the care plan formulated and outlined with them. I have encouraged them to ask questions as they arise throughout their visit. EKG interpretation: (Preliminary)  Rhythm: AV paced at a rate of 71 bpm.  This EKG was interpreted by ED Provider Campbell Duarte MD    PROGRESS NOTE:  2:50 PM  Pt back from x-ray which has been read as pulmonary edema. He was seen here and given 3 days of Lasix and his BNP was significantly elevated. We will repeat his BMP today and continue to monitor his vital signs.     ED Course as of Jul 01 1635   Thu Jul 01, 2021   1530 Patient updated regarding results as well as x-ray results. His BNP is down from previous and he admits to taking the medications he was prescribed 3 weeks ago. Recommend he continues the Lasix daily with follow-up in both cardiology office as well as primary care for recheck of his symptoms. Given the x-ray report of possible basal infiltrate with the complaint of green phlegm, will give him a dose of azithromycin as outpatient. [JT]      ED Course User Index  [JT] Jourdan Hammond MD        Discharge note:  Pt re-evaluated and noted to be feeling better, ready for discharge. Updated pt and his wife on all final lab and x-ray findings. Will follow up as instructed with his primary doctor. All questions have been answered, pt voiced understanding and agreement with plan. Abx were prescribed, pt advised that diarrhea and rash are possible side effects of the medications. Specific return precautions provided as well as instructions to return to the ED should sx worsen at any time. Vital signs stable for discharge. Critical Care Time:   0      Diagnosis     Clinical Impression:   1. Acute pulmonary edema (HCC)        PLAN:  1. Discharge Medication List as of 7/1/2021  3:49 PM      START taking these medications    Details   azithromycin (Zithromax Z-Vamsi) 250 mg tablet 2 tabs on day 1, then 1 tab daily, Normal, Disp-6 Tablet, R-0      furosemide (Lasix) 20 mg tablet Take 1 Tablet by mouth daily. , Normal, Disp-15 Tablet, R-0         CONTINUE these medications which have NOT CHANGED    Details   sertraline (ZOLOFT) 100 mg tablet TAKE 2 TABLETS BY MOUTH ONCE DAILY, Normal, Disp-180 Tab, R-0      memantine (NAMENDA) 10 mg tablet TAKE 1 TABLET BY MOUTH TWICE DAILY, Normal, Disp-180 Tab, R-3      gabapentin (NEURONTIN) 600 mg tablet Take 1 Tab by mouth two (2) times a day.  Max Daily Amount: 1,200 mg., No Print, Disp-90 Tab, R-3      dilTIAZem ER (TIAZAC) 180 mg capsule Take 180 mg by mouth daily. , Historical Med      atorvastatin (LIPITOR) 40 mg tablet TAKE 1 TABLET BY MOUTH AT BEDTIME, Normal, Disp-90 Tab, R-1      metoprolol succinate (TOPROL-XL) 25 mg XL tablet TAKE 1/2 TABLET BY MOUTH ONCE DAILY, Normal, Disp-45 Tab, R-3      Eliquis 5 mg tablet TAKE 1 TABLET BY MOUTH EVERY 12 HOURS, Normal, Disp-180 Tab,R-3      pantoprazole (PROTONIX) 40 mg tablet TAKE 1 TABLET BY MOUTH ONCE DAILY, Normal, Disp-90 Tab,R-3      nitroglycerin (NITROSTAT) 0.4 mg SL tablet 1 Tab by SubLINGual route every five (5) minutes as needed for Chest Pain (call 911 if not relieved by 3). , Normal, Disp-1 Bottle,R-1      donepeziL (ARICEPT) 10 mg tablet Take 1 Tab by mouth nightly., Normal, Disp-90 Tab,R-1      Entresto 24-26 mg tablet TAKE 1 TABLET BY MOUTH TWICE DAILY, Normal, Disp-180 Tab,R-3      senna-docusate (PERICOLACE) 8.6-50 mg per tablet Take 1 Tab by mouth daily. , Print, Disp-30 Tab, R-0      acetaminophen (TYLENOL) 325 mg tablet Take 1,000 mg by mouth every four (4) hours as needed for Pain., Historical Med      multivit-min/FA/lycopen/lutein (SENTRY SENIOR PO) Take  by mouth daily. , Historical Med      polyethylene glycol (MIRALAX) 17 gram/dose powder Take 17 g by mouth as needed., Historical Med           2.    Follow-up Information     Follow up With Specialties Details Why Contact Info    Sara Greenberg MD [facility] Schedule an appointment as soon as possible for a visit  Recheck Monday or Tuesday [address] [facility] [address] Parris Garcia MD Cardiology, Internal Medicine Schedule an appointment as soon as possible for a visit  Repeat evaluation and possible echocardiogram [address] [facility]      [address] [facility] Emergency Medicine  If symptoms worsen [address] [id number]        Return to ED if worse     Disposition:  Home       Please note, this dictation was completed with Dragon, the computer voice recognition software. Quite often unanticipated grammatical, syntax, homophones, and other interpretive errors are inadvertently transcribed by the computer software. Please disregard these errors. Please excuse any errors that have escaped final proof reading.

## 2021-07-01 NOTE — ED TRIAGE NOTES
Patient presents to the ED with a complaint of SOB and cough. Per the patient he started with a productive cough yesterday. Per the patient he coughed up green mucus. Denies fevers. Able to speak in clear complete sentences.

## 2021-09-02 NOTE — LETTER
9/2/2021    Patient: Massimo Reynolds   YOB: 1945   Date of Visit: 9/2/2021     Kaveh George MD  [facility] [address]  [address] [facility] [address]    Dear Kaveh Geogre MD,      Thank you for referring Mr. Judith Roth to [address] [place] [address] for evaluation. My notes for this consultation are attached. If you have questions, please do not hesitate to call me. I look forward to following your patient along with you.       Sincerely,    Meme Schulz MD

## 2021-09-02 NOTE — PROGRESS NOTES
Ria Rogers, [facility]    Subjective/HPI:     Stanley Nicholas is a 67 y.o. male is here for routine f/u. He has a PMHx of CAD s/p CABGx1, ischemic cardiomyopathy s/p ICD, PAF, chronic DVT with Factor V Leiden mutation, HTN and HLD. He is doing well. Denies complaints of chest pains, dizziness, orthopnea, PND or edema. Denies palpitation symptoms, orthopnea, edema. Denies shortness of breath worse from baseline. His wife is not present with him today, and she normally provides most history due to his baseline dementia. Past Medical History:   Diagnosis Date    AICD (automatic cardioverter/defibrillator) present 5/13/2016 5/13/16 [employer] upgrade to dual chamber AICD implant  Dr. Dennis Senior Alzheimer disease [facility])     Anxiety state, other     Arthritis     OSTEO ARTHRITIS    AVNRT (AV bridgette re-entry tachycardia) ([address] 5/12/2016 5/12/16 AVNRT ablation: PM/AICD left upper chest :Dr. Liset Solano Back ache     CAD (coronary artery disease)     Cancer [facility]) 2004    Prostate cancer    Chest tightness     last episode of chest pain 5/2016 before AICD placed; none since then    Coagulation defects 2005    FACTOR V LEIDEN    Dementia ([address]     Depression     Dizziness     FH: factor V Leiden deficiency     Generalized muscle ache     GERD (gastroesophageal reflux disease)     HEARTBURN    Heart failure ([address] 2014    as of 07/2019 EF 30-35;  Dr. Chi Up, Cardiomyopathy    Hyperlipidemia, mixed     Hypertension     Other ill-defined conditions(799.89) 2004    blood transfusion    Pacemaker     Paroxysmal atrial fibrillation ([address]     rate controlled with coreg     Postsurgical aortocoronary bypass status 05/29/2012    CABG    Presence of cardiac defibrillator 05/2016    left upper chest    SSS (sick sinus syndrome) ([address]     Stroke ([address] 2011, 1990's    SEVERAL-mini    Syncope     Thromboembolism ([address] 2014    left leg: changed to Eliquis from Warfarin    Throm[facility]emb[address] [facility] 2005    left leg    Thyroid disease     Weakness generalized        Past Surgical History:   Procedure Laterality Date    CARDIAC CATHETERIZATION  3/4/2011         HX HEENT  2019    oral surgery, removed teeth, dentures upper/lower    HX HERNIA REPAIR Left 2002    Ingiunal hernia repair left    HX ORTHOPAEDIC      LEFT KNEE CARTILAGE REPAIR;RIGHT ROTATOR CUFF REPAIR    HX ORTHOPAEDIC  2/14/12    C5-7 ANTERIIOR CERVICAL DISECTOMY AND FUSION    HX ORTHOPAEDIC  6/4/13    C5-C7 POSTERIOR DECOMPRESSION AND FUSION    HX ORTHOPAEDIC      right thumb partial amputation of tip    HX OTHER SURGICAL      steroid injections    HX PACEMAKER Left 05/13/2016    BS D142, Dr. Kimberly Casiano HX PROSTATECTOM[place]  2004     [place]    HX ROTATOR CUFF REPAIR Left 2019    HX UROLOGICAL       urinary control system    HX UROLOGICAL  12/3/13    REPLACEMENT ARTIFICAL URINARY SPHINCTER    CO CARDIAC SURG PROCEDURE UNLIST      CABG X1 3/5/11       Family History   Problem Relation Age of Onset    Asthma Mother     Alcohol abuse Mother     Alcohol abuse Father     Asthma Father     Cancer Sister     Heart Disease Sister     Alcohol abuse Brother     Cancer Brother     Heart Disease Brother        Social History     Socioeconomic History    Marital status:      Spouse name: Not on file    Number of children: Not on file    Years of education: Not on file    Highest education level: Not on file   Occupational History    Not on file   Tobacco Use    Smoking status: Never Smoker    Smokeless tobacco: Never Used   Substance and Sexual Activity    Alcohol use: Not Currently     Alcohol/week: 0.0 standard drinks     Comment: stopped 2010    Drug use: Never    Sexual activity: Not Currently     Partners: Female   Other Topics Concern     Service Not Asked    Blood Transfusions Not Asked    Caffeine Concern Not Asked    Occupational Exposure Not Asked   Qian [employer] Hazards Not Asked    Sleep Concern Yes    Stress Concern Yes     Comment: Family concerns    Weight Concern Not Asked    Special Diet Not Asked    Back Care Not Asked    Exercise Not Asked    Bike Helmet Not Asked   [address] Not Asked    Self-Exams Not Asked   Social History Narrative    , 2 children     Social Determinants of Health     Financial Resource Strain:     Difficulty of Paying Living Expenses:    Food Insecurity:     Worried About Running Out of Food in the Last Year:     [address] in the Last Year:    Transportation Needs:     Lack of Transportation (Medical):  Lack of Transportation (Non-Medical):    Physical Activity:     Days of Exercise per Week:     Minutes of Exercise per Session:    Stress:     Feeling of Stress :    Social Connections:     Frequency of Communication with Friends and Family:     Frequency of Social Gatherings with Friends and Family:     Attends Hindu Services:     Active Member of Clubs or Organizations:     Attends Club or Organization Meetings:     Marital Status:    Intimate Partner Violence:     Fear of Current or Ex-Partner:     Emotionally Abused:     Physically Abused:     Sexually Abused:        No Known Allergies    Current Outpatient Medications on File Prior to Visit   Medication Sig Dispense Refill    gabapentin (NEURONTIN) 600 mg tablet TAKE 1 TABLET BY MOUTH THREE TIMES A DAY 90 Tablet 3    atorvastatin (LIPITOR) 40 mg tablet TAKE 1 TABLET BY MOUTH AT BEDTIME 90 Tablet 1    sertraline (ZOLOFT) 100 mg tablet TAKE 2 TABLETS BY MOUTH ONCE DAILY 180 Tablet 1    Entresto 24-26 mg tablet TAKE 1 TABLET BY MOUTH TWICE DAILY 180 Tablet 3    azithromycin (Zithromax Z-Vamsi) 250 mg tablet 2 tabs on day 1, then 1 tab daily 6 Tablet 0    furosemide (Lasix) 20 mg tablet Take 1 Tablet by mouth daily.  15 Tablet 0    memantine (NAMENDA) 10 mg tablet TAKE 1 TABLET BY MOUTH TWICE DAILY 180 Tab 3    dilTIAZem ER LISE [facility]) 180 mg capsule Take 180 mg by mouth daily.  metoprolol succinate (TOPROL-XL) 25 mg XL tablet TAKE 1/2 TABLET BY MOUTH ONCE DAILY 45 Tab 3    Eliquis 5 mg tablet TAKE 1 TABLET BY MOUTH EVERY 12 HOURS 180 Tab 3    pantoprazole (PROTONIX) 40 mg tablet TAKE 1 TABLET BY MOUTH ONCE DAILY 90 Tab 3    nitroglycerin (NITROSTAT) 0.4 mg SL tablet 1 Tab by SubLINGual route every five (5) minutes as needed for Chest Pain (call 911 if not relieved by 3). 1 Bottle 1    donepeziL (ARICEPT) 10 mg tablet Take 1 Tab by mouth nightly. (Patient taking differently: Take 5 mg by mouth nightly.) 90 Tab 1    senna-docusate (PERICOLACE) 8.6-50 mg per tablet Take 1 Tab by mouth daily. (Patient taking differently: Take 1 Tab by mouth as needed.) 30 Tab 0    acetaminophen (TYLENOL) 325 mg tablet Take 1,000 mg by mouth every four (4) hours as needed for Pain.  multivit-min/FA/lycopen/lutein (SENTRY SENIOR PO) Take  by mouth daily.  polyethylene glycol (MIRALAX) 17 gram/dose powder Take 17 g by mouth as needed.  [DISCONTINUED] atorvastatin (LIPITOR) 40 mg tablet TAKE 1 TABLET BY MOUTH AT BEDTIME 90 Tab 1     No current facility-administered medications on file prior to visit. Review of Symptoms:    Review of Systems   Constitutional: Negative for chills, fever and weight loss. HENT: Negative for nosebleeds. Eyes: Negative for blurred vision and double vision. Respiratory: Negative for cough, shortness of breath and wheezing. Cardiovascular: Negative for chest pain, palpitations, orthopnea, leg swelling and PND. Skin: Negative for rash. Neurological: Negative for dizziness and loss of consciousness. Physical Exam:      General: Well developed, in no acute distress, cooperative and alert  Heart:  reg rate and rhythm; normal S1/S2; no murmurs, no gallops or rubs. Respiratory: Clear bilaterally x 4, no wheezing or rales  Extremities:  Normal cap refill, no cyanosis, atraumatic.   No edema. Vascular: 2+ pulses symmetric in all extremities    Vitals:    09/02/21 0929   BP: 122/71   BP 1 Location: Left arm   BP Patient Position: Sitting   BP Cuff Size: Small adult   Pulse: 60   Resp: 18   Height: 5' 8\" (1.727 m)   Weight: 160 lb (72.6 kg)   SpO2: 98%       Lab Results   Component Value Date/Time    Cholesterol, total 160 11/14/2018 12:02 PM    HDL Cholesterol 72 11/14/2018 12:02 PM    LDL, calculated 67 11/14/2018 12:02 PM    VLDL, calculated 21 11/14/2018 12:02 PM    Triglyceride 105 11/14/2018 12:02 PM    CHOL/HDL Ratio 1.7 11/24/2014 02:05 AM     Lab Results   Component Value Date/Time    WBC 6.5 07/01/2021 01:55 PM    Hemoglobin (POC) 11.6 (L) 02/14/2012 10:03 AM    HGB 12.3 07/01/2021 01:55 PM    Hematocrit (POC) 34 (L) 02/14/2012 10:03 AM    HCT 38.5 07/01/2021 01:55 PM    PLATELET 717 04/44/0695 01:55 PM    MCV 91.0 07/01/2021 01:55 PM     Lab Results   Component Value Date/Time    Sodium 139 07/01/2021 01:55 PM    Potassium 4.9 07/01/2021 01:55 PM    Chloride 103 07/01/2021 01:55 PM    CO2 29 07/01/2021 01:55 PM    Anion gap 7 07/01/2021 01:55 PM    Glucose 97 07/01/2021 01:55 PM    BUN 26 (H) 07/01/2021 01:55 PM    Creatinine 1.28 07/01/2021 01:55 PM    BUN/Creatinine ratio 20 07/01/2021 01:55 PM    GFR est AA >60 07/01/2021 01:55 PM    GFR est non-AA 55 (L) 07/01/2021 01:55 PM    Calcium 9.7 07/01/2021 01:55 PM    Bilirubin, total 0.6 07/01/2021 01:55 PM    Alk. phosphatase 102 07/01/2021 01:55 PM    Protein, total 7.4 07/01/2021 01:55 PM    Albumin 3.5 07/01/2021 01:55 PM    Globulin 3.9 07/01/2021 01:55 PM    A-G Ratio 0.9 (L) 07/01/2021 01:55 PM    ALT (SGPT) 22 07/01/2021 01:55 PM    AST (SGOT) 22 07/01/2021 01:55 PM       ECG done today sinus rhythm; atrially paced     Assessment:       ICD-10-CM ICD-9-CM    1. Chronic systolic heart failure (HCC)  I50.22 428.22    2.  Atherosclerosis of native coronary artery of native heart without angina pectoris  I25.10 414.01 AMB POC EKG ROUTINE W/ 12 LEADS, INTER & REP   3. Paroxysmal atrial fibrillation (HCC)  I48.0 427.31 AMB POC EKG ROUTINE W/ 12 LEADS, INTER & REP   4. Mixed hyperlipidemia  E78.2 272.2    5. S/P CABG x 1  Z95.1 V45.81    6. AICD (automatic cardioverter/defibrillator) present  Z95.810 V45.02         Plan:     1. Chronic systolic congestive heart failure (HCC)  Hx of ischemic cardiomyopathy, now resolved  Echo done 2/2020 with preserved LVEF 50-55%, mild AI/MR and mild TR  Continue Toprol, Entresto. Remains euvolemic     2. Coronary artery disease involving native coronary artery of native heart without angina pectoris  S/p CABGx1 with LIMA to LAD in 3/2011  Lexiscan done 3/2019 without evidence of ischemia  No anginal or anginal equivalent symptoms  Continue Toprol, ASA and statin     3. Paroxysmal atrial fibrillation (HCC)  Maintaining SR today  Continue Toprol  Continue Eliquis therapy for stroke prevention  [id number] (chf/age/vasc/htn)     4. Mixed hyperlipidemia  Continue statin therapy and low fat, low cholesterol diet  Lipids managed by PCP     5. S/P CABG x 1  Hx of CABG x 1 with LIMA to LAD in 3/2011     6.  AICD (automatic cardioverter/defibrillator) present  Continue with routine device interrogation with Dr. Donta Pires.    F/u with Dr. Izabella Tang in 1 year

## 2021-09-02 NOTE — PROGRESS NOTES
Chief Complaint   Patient presents with    Pacemaker Check     annual follow up      1. Have you been to the ER, urgent care clinic since your last visit? Hospitalized since your last visit? Yes ER     2. Have you seen or consulted any other health care providers outside of the [address] [place] [address] since your last visit? Include any pap smears or colon screening.  No

## 2021-09-02 NOTE — PROGRESS NOTES
ELECTROPHYSIOLOGY        Subjective:      Luis A Terrazas is a 67 y.o. male is here for EP follow up. The patient denies chest pain/ shortness of breath, orthopnea, PND, LE edema, palpitations, syncope, presyncope or fatigue. Luis A eTrrazas  is on [facility], reports no melena, hematuria, or obvious signs of bleeding. No falls.      Patient Active Problem List    Diagnosis Date Noted    Syncope 02/17/2020    Rotator cuff arthropathy of left shoulder 09/06/2019    Status post reverse arthroplasty of shoulder 09/06/2019    Right rotator cuff tear arthropathy 09/04/2019    Moderate major depression ([address] 07/03/2018    Depression 07/03/2018    DDD (degenerative disc disease), lumbar 04/02/2018    Chronic anticoagulation 07/17/2017    S/P CABG x 1 06/17/2016    AICD (automatic cardioverter/defibrillator) present 05/13/2016    AVNRT (AV bridgette re-entry tachycardia) ([address] 05/12/2016    Stenosis of both carotid arteries without cerebral infarction 04/12/2016    Cervicogenic headache 04/12/2016    Alzheimer's dementia, late onset, with behavioral disturbance ([address] 04/12/2016    Spinal stenosis, lumbar region, with neurogenic claudication 04/12/2016    Lumbar back pain with radiculopathy affecting right lower extremity 04/12/2016    Lumbar back pain with radiculopathy affecting left lower extremity 04/12/2016    History of stroke 04/12/2016    ACP (advance care planning) 12/30/2015    B12 deficiency 09/22/2015    Hypothyroidism due to acquired atrophy of thyroid 09/22/2015    Osteoporosis 09/22/2015    Cervical post-laminectomy syndrome 09/22/2015    Neck pain 09/22/2015    Lower GI bleeding 08/19/2015    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 ([address] 07/23/2015    Left-sided chest wall pain 11/23/2014    S/P cervical spinal fusion 06/06/2013    Osteoarthritis 05/29/2012    Hyperthyroidism 05/29/2012    Atherosclerosis of native coronary artery of native heart without angina pectoris 05/29/2012    Chronic systolic heart failure ([address] 05/29/2012    Atrial fibrillation ([address] 05/29/2012    Mixed hyperlipidemia 05/29/2012    History of factor V Leiden mutation 03/07/2011    Essential hypertension 03/04/2011      Brittnee Nunez MD  Past Medical History:   Diagnosis Date    AICD (automatic cardioverter/defibrillator) present 5/13/2016 5/13/16 [employer] upgrade to dual chamber AICD implant  Dr. Heriberto Gibbons disease [facility])     Anxiety state, other     Arthritis     OSTEO ARTHRITIS    AVNRT (AV bridgette re-entry tachycardia) ([address] 5/12/2016 5/12/16 AVNRT ablation: PM/AICD left upper chest :Dr. Terra Son Back ache     CAD (coronary artery disease)     Cancer [facility]) 2004    Prostate cancer    Chest tightness     last episode of chest pain 5/2016 before AICD placed; none since then    Coagulation defects 2005    FACTOR V LEIDEN    Dementia ([address]     Depression     Dizziness     FH: factor V Leiden deficiency     Generalized muscle ache     GERD (gastroesophageal reflux disease)     HEARTBURN    Heart failure ([address] 2014    as of 07/2019 EF 30-35;  Dr. Jessica Medellin, Cardiomyopathy    Hyperlipidemia, mixed     Hypertension     Other ill-defined conditions(799.89) 2004    blood transfusion    Pacemaker     Paroxysmal atrial fibrillation ([address]     rate controlled with coreg     Postsurgical aortocoronary bypass status 05/29/2012    CABG    Presence of cardiac defibrillator 05/2016    left upper chest    SSS (sick sinus syndrome) ([address]     Stroke ([address] 2011, 1990's    SEVERAL-mini    Syncope     Thromboembolism ([address] 2014    left leg: changed to Eliquis from Warfarin    Thromboembolus ([address] 2005    left leg    Thyroid disease     Weakness generalized       Past Surgical History:   Procedure Laterality Date    CARDIAC CATHETERIZATION  3/4/2011         HX HEENT  2019    oral surgery, removed teeth, dentures upper/lower    HX HERNIA REPAIR Left 2002    Ingiunal hernia repair left    HX ORTHOPAEDIC      LEFT KNEE CARTILAGE REPAIR;RIGHT ROTATOR CUFF REPAIR    HX ORTHOPAEDIC  2/14/12    C5-7 ANTERIIOR CERVICAL DISECTOMY AND FUSION    HX ORTHOPAEDIC  6/4/13    C5-C7 POSTERIOR DECOMPRESSION AND FUSION    HX ORTHOPAEDIC      right thumb partial amputation of tip    HX OTHER SURGICAL      steroid injections    HX PACEMAKER Left 05/13/2016    BS D142, Dr. Mildred Araujo HX PROSTATECTOMY  2004     [place]    HX ROTATOR CUFF REPAIR Left 2019    HX UROLOGICAL       urinary control system    HX UROLOGICAL  12/3/13    REPLACEMENT ARTIFICAL URINARY SPHINCTER    DC CARDIAC SURG PROCEDURE UNLIST      CABG X1 3/5/11     No Known Allergies   Family History   Problem Relation Age of Onset    Asthma Mother     Alcohol abuse Mother     Alcohol abuse Father     Asthma Father     Cancer Sister     Heart Disease Sister     Alcohol abuse Brother     Cancer Brother     Heart Disease Brother     negative for cardiac disease  Social History     Socioeconomic History    Marital status:      Spouse name: Not on file    Number of children: Not on file    Years of education: Not on file    Highest education level: Not on file   Tobacco Use    Smoking status: Never Smoker    Smokeless tobacco: Never Used   Substance and Sexual Activity    Alcohol use: Not Currently     Alcohol/week: 0.0 standard drinks     Comment: stopped 2010    Drug use: Never    Sexual activity: Not Currently     Partners: Female   Other Topics Concern    Sleep Concern Yes    Stress Concern Yes     Comment: Family concerns   Social History Narrative    , 2 children     Social Determinants of Health     Financial Resource Strain:     Difficulty of Paying Living Expenses:    Food Insecurity:     Worried About Running Out of Food in the Last Year:     Faizan of Food in the Last Year:    Transportation Needs:  Lack of Transportation (Medical):  Lack of Transportation (Non-Medical):    Physical Activity:     Days of Exercise per Week:     Minutes of Exercise per Session:    Stress:     Feeling of Stress :    Social Connections:     Frequency of Communication with Friends and Family:     Frequency of Social Gatherings with Friends and Family:     Attends Voodoo Services:     Active Member of Clubs or Organizations:     Attends Club or Organization Meetings:     Marital Status:      Current Outpatient Medications   Medication Sig    gabapentin (NEURONTIN) 600 mg tablet TAKE 1 TABLET BY MOUTH THREE TIMES A DAY    atorvastatin (LIPITOR) 40 mg tablet TAKE 1 TABLET BY MOUTH AT BEDTIME    sertraline (ZOLOFT) 100 mg tablet TAKE 2 TABLETS BY MOUTH ONCE DAILY    Entresto 24-26 mg tablet TAKE 1 TABLET BY MOUTH TWICE DAILY    azithromycin (Zithromax Z-Vamsi) 250 mg tablet 2 tabs on day 1, then 1 tab daily    furosemide (Lasix) 20 mg tablet Take 1 Tablet by mouth daily.  memantine (NAMENDA) 10 mg tablet TAKE 1 TABLET BY MOUTH TWICE DAILY    dilTIAZem ER (TIAZAC) 180 mg capsule Take 180 mg by mouth daily.  metoprolol succinate (TOPROL-XL) 25 mg XL tablet TAKE 1/2 TABLET BY MOUTH ONCE DAILY    Eliquis 5 mg tablet TAKE 1 TABLET BY MOUTH EVERY 12 HOURS    pantoprazole (PROTONIX) 40 mg tablet TAKE 1 TABLET BY MOUTH ONCE DAILY    nitroglycerin (NITROSTAT) 0.4 mg SL tablet 1 Tab by SubLINGual route every five (5) minutes as needed for Chest Pain (call 911 if not relieved by 3).  donepeziL (ARICEPT) 10 mg tablet Take 1 Tab by mouth nightly. (Patient taking differently: Take 5 mg by mouth nightly.)    senna-docusate (PERICOLACE) 8.6-50 mg per tablet Take 1 Tab by mouth daily. (Patient taking differently: Take 1 Tab by mouth as needed.)    acetaminophen (TYLENOL) 325 mg tablet Take 1,000 mg by mouth every four (4) hours as needed for Pain.     multivit-min/FA/lycopen/lutein (SENT SENIOR PO) Take  by mouth daily.    polyethylene glycol (MIRALAX) 17 gram/dose powder Take 17 g by mouth as needed. No current facility-administered medications for this visit. VITALS;  BP: 122/71   BP 1 Location: Left arm   BP Patient Position: Sitting   BP Cuff Size: Small adult   Pulse: 60   Resp: 18   Height: 5' 8\" (1.727 m)   Weight: 160 lb (72.6 kg)   SpO2: 98%          I have reviewed the nurses notes, vitals, problem list, allergy list, medical history, family, social history and medications. Review of Symptoms:  11 systems reviewed, negative other than as stated in the HPI      Physical Exam:      General: Well developed, in no acute distress. HEENT: Eyes - PERRL  Heart:  Normal S1/S2 negative S3 or S4. Regular, no murmur  Respiratory: Clear bilaterally x 4, no wheezing or rales  Extremities:  No edema, no cyanosis. Musculoskeletal: No clubbing  Neuro: A&Ox3, speech clear  Skin: No visible rashes or lesions. Non diaphoretic.  No visible ulcers  Vascular: 2+ pulses symmetric in all extremities  Psych - judgement intact and orientation is wnl       Cardiographics    ekg - A paced    Pacer - A paced 49%    Results for orders placed or performed during the hospital encounter of 06/17/21   EKG, 12 LEAD, INITIAL   Result Value Ref Range    Ventricular Rate 67 BPM    Atrial Rate 67 BPM    P-R Interval 158 ms    QRS Duration 104 ms    Q-T Interval 434 ms    QTC Calculation (Bezet) 458 ms    Calculated P Axis 40 degrees    Calculated R Axis -12 degrees    Calculated T Axis 118 degrees    Diagnosis       ** Poor data quality, interpretation may be adversely affected   Suspect unspecified pacemaker failure  Sinus rhythm with premature supraventricular complexes  Possible Anterior infarct , age undetermined  ST & T wave abnormality, consider lateral ischemia  Abnormal ECG  When compared with ECG of 03-FEB-2021 16:59,  premature ventricular complexes are no longer present  premature supraventricular complexes are now present  Confirmed by Savanna Mtz MD, --- ([id number]) on 6/17/2021 3:15:40 PM           Lab Results   Component Value Date/Time    WBC 6.5 07/01/2021 01:55 PM    Hemoglobin (POC) 11.6 (L) 02/14/2012 10:03 AM    HGB 12.3 07/01/2021 01:55 PM    Hematocrit (POC) 34 (L) 02/14/2012 10:03 AM    HCT 38.5 07/01/2021 01:55 PM    PLATELET 196 96/68/8177 01:55 PM    MCV 91.0 07/01/2021 01:55 PM      Lab Results   Component Value Date/Time    Sodium 139 07/01/2021 01:55 PM    Potassium 4.9 07/01/2021 01:55 PM    Chloride 103 07/01/2021 01:55 PM    CO2 29 07/01/2021 01:55 PM    Anion gap 7 07/01/2021 01:55 PM    Glucose 97 07/01/2021 01:55 PM    BUN 26 (H) 07/01/2021 01:55 PM    Creatinine 1.28 07/01/2021 01:55 PM    BUN/Creatinine ratio 20 07/01/2021 01:55 PM    GFR est AA >60 07/01/2021 01:55 PM    GFR est non-AA 55 (L) 07/01/2021 01:55 PM    Calcium 9.7 07/01/2021 01:55 PM    Bilirubin, total 0.6 07/01/2021 01:55 PM    Alk. phosphatase 102 07/01/2021 01:55 PM    Protein, total 7.4 07/01/2021 01:55 PM    Albumin 3.5 07/01/2021 01:55 PM    Globulin 3.9 07/01/2021 01:55 PM    A-G Ratio 0.9 (L) 07/01/2021 01:55 PM    ALT (SGPT) 22 07/01/2021 01:55 PM      Lab Results   Component Value Date/Time    TSH 0.529 03/21/2019 04:31 PM           Assessment:           ICD-10-CM ICD-9-CM    1. Chronic systolic heart failure (HCC)  I50.22 428.22    2. Paroxysmal atrial fibrillation (HCC)  I48.0 427.31    3. SSS (sick sinus syndrome) (HCC)  I49.5 427.81    4. AICD (automatic cardioverter/defibrillator) present  Z95.810 V45.02    5. Mixed hyperlipidemia  E78.2 272.2    6. Essential hypertension  I10 401.9      No orders of the defined types were placed in this encounter. Plan:     Mr Shima Maharaj is here for annual follow up and device check. He is 49% AP and 1 % RVP. He has 9 years battery left, <1 % ATR on [address] [place] [address] Road. Due to complaints of fatigue his rate response threshold was adjusted to med- low from med.      During this visit,  the patient and I had a comprehensive discussion of device management using principles of shared decision making. we reviewed device therapy, including the potential risks and benefits of device management. These risks include death, myocardial infarction, stroke, cardiac perforation, vascular injury, injury, pacing induced cardiomyopathy, inappropriate shocks (defibrillator) and other less severe complications. The patient demonstrated a clear understanding of these issues during out discussion. Our plans, determined together after thorough consideration, are outlined else where in this note. Addressed all patient questions and concerns at this visit. Thank you for allowing me to participate in Vikas Wise 's care. Toya Waldron NP      Patient seen and examined by me with nurse practitioner. I personally performed all components of the history, physical, and medical decision making and agree with the assessment and plan with minor modifications as noted. A paced 49% for sick sinus. Stable and compliant with oac of PAF (less than 1%). He has fatigue and we adjusted the rate responsiveness on his device. Cont med rx for htn and hyperlipidemia. F/u in one year. Ivone Gaitan MD, [facility]        On this date 09/02/2021 I have spent 31 minutes reviewing previous notes, test results and face to face with the patient discussing the diagnosis and importance of compliance with the treatment plan as well as documenting on the day of the visit.

## 2021-09-02 NOTE — PROGRESS NOTES
Chief Complaint   Patient presents with    Coronary Artery Disease     no cardiac issues    Follow-up     annual f/u   1. Have you been to the ER, urgent care clinic since your last visit? Hospitalized since your last visit? No    2. Have you seen or consulted any other health care providers outside of the [address] [place] [address] since your last visit? Include any pap smears or colon screening.  No

## 2021-09-11 NOTE — PROGRESS NOTES
Inpatient RN   Plan of Care Update  ________________________________________________    Patient Name:  Cynthia Ledezma  YOB: 1986  MRN: [id number]  Admit Date:  9/10/2021      Assessment Date:  9/11/2021    Vital Signs unstable, Sinus Rythym, Pt currently on room air Pt has been sleeping well with a Pain status of well controlled and finding no nausea and no vomiting.    Cardene drip is held.   SBP was less than 165 throughout the night.     Pt orientated to person, place, time and situation with LOC of alert.    UOP adequate.    Pt has no requests at this time.         Electronically signed by:  RAMU WORTHINGTON RN  09/11/21 06:44 EDT    Problem: Adult Inpatient Plan of Care  Goal: Absence of Hospital-Acquired Illness or Injury  Intervention: Identify and Manage Fall Risk  Recent Flowsheet Documentation  Taken 9/11/2021 0600 by Ramu Worthington RN  Safety Promotion/Fall Prevention:   activity supervised   assistive device/personal items within reach   clutter free environment maintained   fall prevention program maintained   nonskid shoes/slippers when out of bed   room organization consistent   safety round/check completed  Taken 9/11/2021 0400 by Ramu Worthington RN  Safety Promotion/Fall Prevention:   activity supervised   assistive device/personal items within reach   clutter free environment maintained   fall prevention program maintained   nonskid shoes/slippers when out of bed   room organization consistent   safety round/check completed  Taken 9/11/2021 0200 by Ramu Worthington RN  Safety Promotion/Fall Prevention:   activity supervised   assistive device/personal items within reach   clutter free environment maintained   fall prevention program maintained   nonskid shoes/slippers when out of bed   room organization consistent   safety round/check completed  Taken 9/11/2021 0000 by Ramu Worthington RN  Safety Promotion/Fall Prevention:   activity supervised   assistive device/personal items within reach    Tito Marley, [facility]    Subjective/HPI:     Mr. Katya Whalen is a 75 y.o. male is here to discuss stress test results. He has a PMHx of CAD s/p CABG, PAF, ischemic cardiomyopathy s/p ICD, HTN, HLD and dementia. He had stress test done for evaluation of shortness of breath with exertion. Since, he has been noted to be anemic and GI workup has been initiated by PCP. He continues to note symptoms of shortness of breath with exertion. He reports weakness. He denies chest pain symptoms, orthopnea or PND. He denies lower extremity edema. Lexiscan (3/7/19):  · Baseline ECG: Paced rhythm, non-specific ST-T wave abnormalities. · Inconclusive stress test.  · Gated SPECT: Left ventricular function post-stress was abnormal. Calculated ejection fraction is 40%. There is no evidence of transient ischemic dilation (TID).   · Left ventricular perfusion is normal.         PCP Provider  Rojelio Castillo MD    Patient Active Problem List   Diagnosis Code    CAD (coronary artery disease) I25.10    Hypertension I10    Hyperlipemia E78.5    DVT (deep venous thrombosis) chronic coumadin anti-coagulation I82.409    History of factor V Leiden mutation Z86.2    CABG (coronary artery bypass graft)     Chronic venous embolism and thrombosis of unspecified deep vessels of lower extremity I82.509    Osteoarthritis M19.90    Prostate cancer ([facility] [address] C61    Hyperthyroidism E05.90    Mitral valve disorders(424.0) I05.9    Postsurgical aortocoronary bypass status Z95.1    Coronary atherosclerosis of native coronary artery I25.10    Paroxysmal supraventricular tachycardia (HCC) I47.1    Chronic systolic heart failure (HCC) I50.22    Atrial fibrillation (HCC) I48.91    Mixed hyperlipidemia E78.2    Palpitations R00.2    S/P cervical spinal fusion Z98.1    Bradycardia R00.1    Left-sided chest wall pain R07.89    SSS (sick sinus syndrome) ([address] I49.5    Chest pain R07.9    Pacemaker Z95.0    Cardiac clutter free environment maintained   fall prevention program maintained  Taken 9/10/2021 2200 by Diamante Mckenna RN  Safety Promotion/Fall Prevention:   activity supervised   assistive device/personal items within reach   clutter free environment maintained   fall prevention program maintained   nonskid shoes/slippers when out of bed   room organization consistent   safety round/check completed  Taken 9/10/2021 2000 by Diamante Mckenna RN  Safety Promotion/Fall Prevention:   activity supervised   assistive device/personal items within reach   clutter free environment maintained   fall prevention program maintained   nonskid shoes/slippers when out of bed   room organization consistent   safety round/check completed  Intervention: Prevent Skin Injury  Recent Flowsheet Documentation  Taken 9/11/2021 0600 by Diamante Mckenna RN  Body Position: position changed independently  Skin Protection:   adhesive use limited   incontinence pads utilized   transparent dressing maintained   tubing/devices free from skin contact  Taken 9/11/2021 0400 by Diamante Mckenna RN  Body Position: position changed independently  Skin Protection:   adhesive use limited   incontinence pads utilized   transparent dressing maintained   tubing/devices free from skin contact  Taken 9/11/2021 0200 by Diamante Mckenna RN  Body Position: position changed independently  Skin Protection:   adhesive use limited   incontinence pads utilized   transparent dressing maintained   tubing/devices free from skin contact  Taken 9/11/2021 0000 by Diamante Mckenna RN  Body Position: position changed independently  Skin Protection:   adhesive use limited   incontinence pads utilized   transparent dressing maintained   tubing/devices free from skin contact  Taken 9/10/2021 2200 by Diamante Mckenna RN  Body Position: position maintained  Skin Protection:   adhesive use limited   incontinence pads utilized   transparent dressing maintained   tubing/devices free from skin contact  Taken  pacemaker in situ Z95.0    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    Lower GI bleeding K92.2    B12 deficiency E53.8    Hypothyroidism due to acquired atrophy of thyroid E03.4    Osteoporosis M81.0    Cervical post-laminectomy syndrome M96.1    Neck pain M54.2    ACP (advance care planning) Z71.89    Stenosis of both carotid arteries without cerebral infarction I65.23    Cervicogenic headache R51    Alzheimer's dementia, late onset, with behavioral disturbance G30.1, F02.81    Spinal stenosis, lumbar region, with neurogenic claudication M48.062    Lumbar back pain with radiculopathy affecting right lower extremity M54.16    Lumbar back pain with radiculopathy affecting left lower extremity M54.16    History of stroke Z86.73    SVT (supraventricular tachycardia) ([facility]) I47.1    Cardiomyopathy ([facility]) I42.9    Chronic systolic congestive heart failure ([facility]) I50.22    AVNRT (AV bridgette re-entry tachycardia) ([facility]) I47.1    AICD (automatic cardioverter/defibrillator) present Z95.810    S/P CABG (coronary artery bypass graft) Z95.1    Chronic anticoagulation Z79.01    DDD (degenerative disc disease), lumbar M51.36    Moderate major depression ([facility]) F32.1    Depression F32.9     Past Surgical History:   Procedure Laterality Date    CARDIAC CATHETERIZATION  3/4/2011         CARDIAC SURG PROCEDURE UNLIST      CABG X1 3/5/11    HX HERNIA REPAIR  2002    Ingiunal hernia repair left    HX ORTHOPAEDIC      LEFT KNEE CARTILAGE REPAIR;RIGHT ROTATOR CUFF REPAIR    HX ORTHOPAEDIC  2/14/12    C5-7 ANTERIIOR CERVICAL DISECTOMY AND FUSION    HX ORTHOPAEDIC  6/4/13    C5-C7 POSTERIOR DECOMPRESSION AND FUSION    HX ORTHOPAEDIC      right thumb partial amputation of tip    HX OTHER SURGICAL      steroid injections    HX PACEMAKER Left     HX PACEMAKER PLACEMENT      H[place]    HX UROLOGICAL       urinary control system    HX UROLOGICAL 12/3/13    REPLACEMENT ARTIFICAL URINARY SPHINCTER     Family History   Problem Relation Age of Onset    Asthma Mother     Alcohol abuse Mother     Alcohol abuse Father     Asthma Father     Cancer Sister     Heart Disease Sister     Alcohol abuse Brother     Cancer Brother     Heart Disease Brother      Social History     Socioeconomic History    Marital status:      Spouse name: Not on file    Number of children: Not on file    Years of education: Not on file    Highest education level: Not on file   Occupational History    Not on file   Social Needs    Financial resource strain: Not on file    Food insecurity:     Worry: Not on file     Inability: Not on file    Transportation needs:     Medical: Not on file     Non-medical: Not on file   Tobacco Use    Smoking status: Former Smoker     Types: Pipe, Cigars     Last attempt to quit: 10/10/1971     Years since quittin.5    Smokeless tobacco: Never Used   Substance and Sexual Activity    Alcohol use: No     Alcohol/week: 0.6 oz     Types: 1 Cans of beer per week    Drug use: No    Sexual activity: Never     Partners: Female   Lifestyle    Physical activity:     Days per week: Not on file     Minutes per session: Not on file    Stress: Not on file   Relationships    Social connections:     Talks on phone: Not on file     Gets together: Not on file     Attends Sabianism service: Not on file     Active member of club or organization: Not on file     Attends meetings of clubs or organizations: Not on file     Relationship status: Not on file    Intimate partner violence:     Fear of current or ex partner: Not on file     Emotionally abused: Not on file     Physically abused: Not on file     Forced sexual activity: Not on file   Other Topics Concern     Service Not Asked    Blood Transfusions Not Asked    Caffeine Concern Not Asked    Occupational Exposure Not Asked    Hobby Hazards Not Asked    Sleep Concern Yes    9/10/2021 2000 by Diamante Mckenna, RN  Body Position: position changed independently  Skin Protection:   adhesive use limited   incontinence pads utilized   transparent dressing maintained   tubing/devices free from skin contact  Goal: Optimal Comfort and Wellbeing  Intervention: Provide Person-Centered Care  Recent Flowsheet Documentation  Taken 9/10/2021 2000 by Diamante Mckenna, RN  Trust Relationship/Rapport:   care explained   choices provided   emotional support provided   empathic listening provided   questions answered   questions encouraged   reassurance provided   thoughts/feelings acknowledged   Goal Outcome Evaluation:                  Stress Concern Yes     Comment: Family concerns    Weight Concern Not Asked    Special Diet Not Asked    Back Care Not Asked    Exercise Not Asked    Bike Helmet Not Asked   [address] Not Asked    Self-Exams Not Asked   Social History Narrative    , 2 children       No Known Allergies     Current Outpatient Medications   Medication Sig    sacubitril-valsartan (ENTRESTO) 24 mg/26 mg tablet Take 1 Tab by mouth two (2) times a day.  ELIQUIS 5 mg tablet TAKE 1 TABLET BY MOUTH EVERY 12 HOURS    gabapentin (NEURONTIN) 600 mg tablet TAKE 1 TABLET BY MOUTH THREE TIMES A DAY    sertraline (ZOLOFT) 100 mg tablet TAKE 1&1/2 TABLETS (150MG) BY MOUTH ONCE DAILY    multivit-min/FA/lycopen/lutein (SENTRY SENIOR PO) Take  by mouth daily.  carvedilol (COREG) 3.125 mg tablet Take 1 Tab by mouth two (2) times a day.  aspirin 81 mg chewable tablet Take 1 Tab by mouth daily.  furosemide (LASIX) 20 mg tablet Take 1 Tab by mouth daily as needed.  atorvastatin (LIPITOR) 40 mg tablet TAKE 1 TABLET BY MOUTH EVERY DAY    memantine (NAMENDA) 10 mg tablet Take 1 tab twice a day    bisacodyl (DULCOLAX, BISACODYL,) 5 mg EC tablet Take 5 mg by mouth daily as needed for Constipation.  polyethylene glycol (MIRALAX) 17 gram/dose powder Take 17 g by mouth daily.  Incontinence Pad, Liner, Disp (BLADDER CONTROL PADS EX ABSORB) pads 1 Packet by Does Not Apply route as needed (incontinence).  nitroglycerin (NITROSTAT) 0.4 mg SL tablet 1 Tab by SubLINGual route every five (5) minutes as needed for Chest Pain (call 911 if not relieved by 3). No current facility-administered medications for this visit. I have reviewed the problem list, allergy list, medical history, family, social history and medications. Review of Symptoms:    Review of Systems   Constitutional: Negative for chills, fever and weight loss. HENT: Negative for nosebleeds. Eyes: Negative for blurred vision and double vision. Respiratory: Positive for shortness of breath (with exertion). Negative for cough and wheezing. Cardiovascular: Negative for chest pain, palpitations, orthopnea, leg swelling and PND. Gastrointestinal: Negative for abdominal pain, blood in stool, diarrhea, nausea and vomiting. Musculoskeletal: Negative for joint pain. Skin: Negative for rash. Neurological: Negative for dizziness, tingling and loss of consciousness. Endo/Heme/Allergies: Does not bruise/bleed easily. Physical Exam:      General: Well developed, in no acute distress, cooperative and alert  HEENT: No carotid bruits, no JVD, trach is midline. Neck Supple, PEERL, EOM intact. Heart:  reg rate and rhythm; normal S1/S2; no murmurs, gallops or rubs. Respiratory: Clear bilaterally x 4, no wheezing or rales  Abdomen:   Soft, non-tender, no distention, no masses. + BS. Extremities:  Normal cap refill, no cyanosis, atraumatic. No edema. Neuro: A&Ox3, speech clear, gait stable. Skin: Skin color is normal. No rashes or lesions.  Non diaphoretic  Vascular: 2+ pulses symmetric in all extremities    Vitals:    04/10/19 1440 04/10/19 1441   BP: 130/80 140/80   Pulse: 60    Resp: 16    SpO2: 95%    Weight: 172 lb 8 oz (78.2 kg)    Height: 5' 7\" (1.702 m)        Cardiographics    Results for orders placed or performed during the hospital encounter of 03/10/19   EKG, 12 LEAD, INITIAL   Result Value Ref Range    Ventricular Rate 63 BPM    Atrial Rate 63 BPM    P-R Interval 162 ms    QRS Duration 110 ms    Q-T Interval 422 ms    QTC Calculation (Bezet) 431 ms    Calculated P Axis 48 degrees    Calculated R Axis 2 degrees    Calculated T Axis 134 degrees    Diagnosis       Normal sinus rhythm  Incomplete left bundle branch block  ST & T wave abnormality, consider lateral ischemia  Abnormal ECG  When compared with ECG of 23-NOV-2014 04:09,  premature ventricular complexes are no longer present  Questionable change in QRS axis  Confirmed by Alisha Barney Edith Spaulding MD, --- ([id number]) on 3/11/2019 1:52:01 AM         Cardiology Labs:  Lab Results   Component Value Date/Time    Cholesterol, total 160 11/14/2018 12:02 PM    HDL Cholesterol 72 11/14/2018 12:02 PM    LDL, calculated 67 11/14/2018 12:02 PM    Triglyceride 105 11/14/2018 12:02 PM    CHOL/HDL Ratio 1.7 11/24/2014 02:05 AM       Lab Results   Component Value Date/Time    Sodium 140 03/21/2019 04:31 PM    Potassium 4.6 03/21/2019 04:31 PM    Chloride 103 03/21/2019 04:31 PM    CO2 23 03/21/2019 04:31 PM    Anion gap 10 03/10/2019 10:10 AM    Glucose 115 (H) 03/21/2019 04:31 PM    BUN 21 03/21/2019 04:31 PM    Creatinine 1.13 03/21/2019 04:31 PM    BUN/Creatinine ratio 19 03/21/2019 04:31 PM    GFR est AA 74 03/21/2019 04:31 PM    GFR est non-AA 64 03/21/2019 04:31 PM    Calcium 9.7 03/21/2019 04:31 PM    Bilirubin, total 0.3 03/21/2019 04:31 PM    AST (SGOT) 28 03/21/2019 04:31 PM    Alk. phosphatase 99 03/21/2019 04:31 PM    Protein, total 7.0 03/21/2019 04:31 PM    Albumin 4.3 03/21/2019 04:31 PM    Globulin 3.9 03/10/2019 10:10 AM    A-G Ratio 1.6 03/21/2019 04:31 PM    ALT (SGPT) 20 03/21/2019 04:31 PM        Orders Placed This Encounter    sacubitril-valsartan (ENTRESTO) 24 mg/26 mg tablet     Sig: Take 1 Tab by mouth two (2) times a day. Dispense:  180 Tab     Refill:  1     STOP LISINOPRIL FOR 3 DAYS. THEN START ENTRESTO. Assessment:     Assessment:       ICD-10-CM ICD-9-CM    1. Coronary artery disease involving native coronary artery of native heart without angina pectoris I25.10 414.01    2. Chronic systolic congestive heart failure (HCC) I50.22 428.22 sacubitril-valsartan (ENTRESTO) 24 mg/26 mg tablet     428.0    3. Paroxysmal atrial fibrillation (HCC) I48.0 427.31    4. Essential hypertension I10 401.9    5. Mixed hyperlipidemia E78.2 272.2    6. AICD (automatic cardioverter/defibrillator) present Z95.810 V45.02    7.  S/P CABG (coronary artery bypass graft) Z95.1 V45.81      Orders Placed This Encounter    sacubitril-valsartan (ENTRESTO) 24 mg/26 mg tablet     Sig: Take 1 Tab by mouth two (2) times a day. Dispense:  180 Tab     Refill:  1     STOP LISINOPRIL FOR 3 DAYS. THEN START ENTRESTO. Plan:     1. Coronary artery disease involving native coronary artery of native heart without angina pectoris  S/p CABGx1 in 2011. Lolo Misty in 3/2019 was negative for ischemia. Reports inconclusive stress test due to cardiomyopathy, which is known. Continue BB, ASA and statin therapy. Symptoms likely 2/2 anemia, would defer further work-up to PCP. 2. Chronic systolic congestive heart failure ([address]  Appears euvolemic on exam today  Echo in 10/2018 with LVEF 25-30% with grade 2 DD with mild MR/AI and mild-mod TR. Continue coreg and Lasix. Transition from lisinopril to Elle Robin. We will advised him to not take lisinopril for 2 days, then start Entresto. 3. Paroxysmal atrial fibrillation (HCC)  Continue Eliquis therapy and rate control with Coreg. 4. Essential hypertension  BP controlled; continue anti-hypertensive therapy and low sodium diet. 5. Mixed hyperlipidemia  LDL 67 in 11/2018; continue statin therapy and low fat, low cholesterol diet. 6. AICD (automatic cardioverter/defibrillator) present  S/p dual chamber [employer] ICD. Continue with routine interrogations with Dr. Myles Serrano.    7. S/P CABG (coronary artery bypass graft)  S/p CABGx1 in 2011 with LIMA to LAD    Follow-up in 1 month to assess tolerance of Entresto, consider titration of medications. Consider repeat echo about 3 months after starting Entresto.       Stan Layne MD

## 2021-11-23 NOTE — PROGRESS NOTES
11/23/2021      Chief Complaint   Patient presents with   [facility] Annual Wellness Visit         Memory Loss     Alzheimers     Fatigue    Back Pain    Ear Pain     Bilateral     Mouth Pain         History of Present Illness:         is a 67 y.o. male with biltateral clogged ears and sore spot on L upper gum x 2 days. No Known Allergies    Current Outpatient Medications   Medication Sig    triamcinolone acetonide (KENALOG) 0.1 % dental paste by Mouth/Throat route two (2) times a day.  pantoprazole (PROTONIX) 40 mg tablet TAKE 1 TABLET BY MOUTH ONCE DAILY    Eliquis 5 mg tablet TAKE 1 TABLET BY MOUTH EVERY 12 HOURS    gabapentin (NEURONTIN) 600 mg tablet TAKE 1 TABLET BY MOUTH THREE TIMES A DAY    atorvastatin (LIPITOR) 40 mg tablet TAKE 1 TABLET BY MOUTH AT BEDTIME    sertraline (ZOLOFT) 100 mg tablet TAKE 2 TABLETS BY MOUTH ONCE DAILY    Entresto 24-26 mg tablet TAKE 1 TABLET BY MOUTH TWICE DAILY    memantine (NAMENDA) 10 mg tablet TAKE 1 TABLET BY MOUTH TWICE DAILY    metoprolol succinate (TOPROL-XL) 25 mg XL tablet TAKE 1/2 TABLET BY MOUTH ONCE DAILY    nitroglycerin (NITROSTAT) 0.4 mg SL tablet 1 Tab by SubLINGual route every five (5) minutes as needed for Chest Pain (call 911 if not relieved by 3).  donepeziL (ARICEPT) 10 mg tablet Take 1 Tab by mouth nightly. (Patient taking differently: Take 5 mg by mouth nightly.)    senna-docusate (PERICOLACE) 8.6-50 mg per tablet Take 1 Tab by mouth daily. (Patient taking differently: Take 1 Tab by mouth as needed.)    acetaminophen (TYLENOL) 325 mg tablet Take 1,000 mg by mouth every four (4) hours as needed for Pain.  multivit-min/FA/lycopen/lutein (SENTRY SENIOR PO) Take  by mouth daily.  polyethylene glycol (MIRALAX) 17 gram/dose powder Take 17 g by mouth as needed.  furosemide (Lasix) 20 mg tablet Take 1 Tablet by mouth daily.  (Patient not taking: Reported on 11/23/2021)     No current facility-administered medications for this visit. Physical Examination:    Visit Vitals  /70   Pulse 60   Temp 96.8 °F (36 °C) (Oral)   Resp 18   Ht 5' 8\" (1.727 m)   Wt 164 lb 2 oz (74.4 kg)   SpO2 96%   BMI 24.96 kg/m²      General:  Alert, cooperative, no distress. HEENT:  Normocephalic, without obvious abnormality, atraumatic. Conjunctivae/corneas clear. Pupils equal, round, reactive to light. Extraocular movements intact. TMs and external canals normal bilaterally when cerumen cleared. oropharynx clear with 3 mm ulcer L upper alveolar ridge. Lungs: Clear to auscultation bilaterally. Chest wall:  No tenderness or deformity. Heart:  Regular rate and rhythm, S1, S2 normal, no murmur, click, rub, or gallop. Abdomen:   Soft, non-tender. Bowel sounds normal. No masses. No organomegaly. Extremities: Extremities normal, atraumatic, no cyanosis or edema. Pulses: 2+ and symmetric all extremities. Skin: Skin color, texture, turgor normal. No rashes or lesions. Lymph nodes: Cervical, supraclavicular, and axillary nodes normal.   Neurologic: CNII-XII intact. Normal strength, sensation, and reflexes throughout. ASSESSMENT AND PLAN    1. Medicare annual wellness visit, subsequent      2. Bilateral impacted cerumen  Both ears cleared with combo of curretage and irrigation  - REMOVAL IMPACTED CERUMEN INSTRUMENTATION UNILAT    3. Oral ulceration    - triamcinolone acetonide (KENALOG) 0.1 % dental paste; by Mouth/Throat route two (2) times a day. Dispense: 15 g; Refill: 0    4. Moderate major depression (HCC)  stable    5. Stage 3b chronic kidney disease ([facility] [address]  Followed by nephrology          Orders Placed This Encounter    REMOVE IMPACTED EAR WAX    triamcinolone acetonide (KENALOG) 0.1 % dental paste     Sig: by Mouth/Throat route two (2) times a day.      Dispense:  15 g     Refill:  0           Rosalia Burton MD

## 2021-11-23 NOTE — PROGRESS NOTES
1. Have you been to the ER, urgent care clinic since your last visit? Hospitalized since your last visit? Yes - ER Visit 7/1/21 SOB    2. Have you seen or consulted any other health care providers outside of the [address] [place] [address] [place] since your last visit? Include any pap smears or colon screening. Yes - 9/2/21 Cardiology Dr Aurora Ruiz:   Does the patient exhibit a steady gait? yes   How long did it take the patient to get up and walk from a sitting position? 15 seconds   Is the patient self reliant?  (ie can do own laundry, meals, household chores)  yes     Does the patient handle his/her own medications? With Assistance      Does the patient handle his/her own money? Wife pays bills      Is the patients home safe (ie good lighting, handrails on stairs and bath, etc.)? yes     Did you notice or did patient express any hearing difficulties? Yes - wears hearing aids      Did you notice or did patient express any vision difficulties? Yes - wears reading glasses      Were distance and reading eye charts used? no       Advance Care Planning:   Patient was offered the opportunity to discuss advance care planning:  yes     Does patient have an Advance Directive:  Declines    If no, did you provide information on Caring Connections?   no     ADL Assessment 2/5/2021   Feeding yourself No Help Needed   Getting from bed to chair No Help Needed   Getting dressed No Help Needed   Bathing or showering No Help Needed   Walk across the room (includes cane/walker) No Help Needed   Using the telphone No Help Needed   Taking your medications No Help Needed   Preparing meals No Help Needed   Managing money (expenses/bills) No Help Needed   Moderately strenuous housework (laundry) No Help Needed   Shopping for personal items (toiletries/medicines) No Help Needed   Shopping for groceries No Help Needed   Driving No Help Needed   Climbing a flight of stairs No Help Needed   Getting to places beyond walking distances No Help Needed       Abuse Screening Questionnaire 9/2/2021   Do you ever feel afraid of your partner? N   Are you in a relationship with someone who physically or mentally threatens you? N   Is it safe for you to go home? Y       This is the Subsequent Medicare Annual Wellness Exam, performed 12 months or more after the Initial AWV or the last Subsequent AWV    I have reviewed the patient's medical history in detail and updated the computerized patient record. Assessment/Plan   Education and counseling provided:  Are appropriate based on today's review and evaluation    1. Medicare annual wellness visit, subsequent       Depression Risk Factor Screening     3 most recent PHQ Screens 7/1/2021   PHQ Not Done -   Little interest or pleasure in doing things Not at all   Feeling down, depressed, irritable, or hopeless Not at all   Total Score PHQ 2 0   Trouble falling or staying asleep, or sleeping too much -   Feeling tired or having little energy -   Poor appetite, weight loss, or overeating -   Feeling bad about yourself - or that you are a failure or have let yourself or your family down -   Trouble concentrating on things such as school, work, reading, or watching TV -   Moving or speaking so slowly that other people could have noticed; or the opposite being so fidgety that others notice -   Thoughts of being better off dead, or hurting yourself in some way -   PHQ 9 Score -   How difficult have these problems made it for you to do your work, take care of your home and get along with others -       Alcohol Risk Screen    Do you average more than 1 drink per night or more than 7 drinks a week: No    In the past three months have you have had more than 4 drinks containing alcohol on one occasion: No        Functional Ability and Level of Safety    Hearing: Hearing is good. The patient wears hearing aids. Activities of Daily Living: The home contains: no safety equipment.   Patient does total self care      Ambulation: with no difficulty     Fall Risk:  Fall Risk Assessment, last 12 mths 9/2/2021   Able to walk? Yes   Fall in past 12 months? 0   Do you feel unsteady? 0   Are you worried about falling 0   Number of falls in past 12 months -   Fall with injury?  -      Abuse Screen:  Patient is not abused       Cognitive Screening    Has your family/caregiver stated any concerns about your memory: yes - Memory loss      Cognitive Screening: Normal - Clock Drawing Test    Health Maintenance Due     Health Maintenance Due   Topic Date Due    Hepatitis C Screening  Never done    Shingrix Vaccine Age 49> (1 of 2) Never done    Lipid Screen  11/14/2019    COVID-19 Vaccine (3 - Booster for Kami Bill series) 08/26/2021    Flu Vaccine (1) 09/01/2021       Patient Care Team   Patient Care Team:  Jerad Puckett MD as PCP - General (Family Medicine)  Jerad Puckett MD as PCP - [facility] Provider  Joey Duarte MD as Physician (Cardiology)  Nadine Fuentes MD as Physician (Cardiology)  Elisabet Thompson MD (Family Medicine)  Keny Jackson (Physician Assistant)    History     Patient Active Problem List   Diagnosis Code    Essential hypertension I10    History of factor V Leiden mutation Z86.2    Osteoarthritis M19.90    Hyperthyroidism E05.90    Atherosclerosis of native coronary artery of native heart without angina pectoris I25.10    Chronic systolic heart failure ([address] I50.22    Atrial fibrillation ([address] I48.91    Mixed hyperlipidemia E78.2    S/P cervical spinal fusion Z98.1    Left-sided chest wall pain R07.89    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 ([address] F02.81    Lower GI bleeding K92.2    B12 deficiency E53.8    Hypothyroidism due to acquired atrophy of thyroid E03.4    Osteoporosis M81.0    Cervical post-laminectomy syndrome M96.1    Neck pain M54.2    ACP (advance care planning) Z71.89    Stenosis of both carotid arteries without cerebral infarction I65.23    Cervicogenic headache G44.86    Alzheimer's dementia, late onset, with behavioral disturbance ([facility]) G30.1, F02.81    Spinal stenosis, lumbar region, with neurogenic claudication M48.062    Lumbar back pain with radiculopathy affecting right lower extremity M54.16    Lumbar back pain with radiculopathy affecting left lower extremity M54.16    History of stroke Z86.73    AVNRT (AV bridgette re-entry tachycardia) ([facility]) I47.1    AICD (automatic cardioverter/defibrillator) present Z95.810    S/P CABG x 1 Z95.1    Chronic anticoagulation Z79.01    DDD (degenerative disc disease), lumbar M51.36    Moderate major depression ([facility]) F32.1    Depression F32. A    Right rotator cuff tear arthropathy M75.101, M12.811    Rotator cuff arthropathy of left shoulder M12.812    Status post reverse arthroplasty of shoulder Z96.619    Syncope R55     Past Medical History:   Diagnosis Date    AICD (automatic cardioverter/defibrillator) present 5/13/2016 5/13/16 [employer] upgrade to dual chamber AICD implant  Dr. Rufus Sneed Alzheimer disease [facility])     Anxiety state, other     Arthritis     OSTEO ARTHRITIS    AVNRT (AV bridgette re-entry tachycardia) ([facility] 5/12/2016 5/12/16 AVNRT ablation: PM/AICD left upper chest :Dr. Viviana Aly Back ache     CAD (coronary artery disease)     Cancer [facility]) 2004    Prostate cancer    Chest tightness     last episode of chest pain 5/2016 before AICD placed; none since then    Coagulation defects 2005    FACTOR V LEIDEN    Dementia ([facility]     Depression     Dizziness     FH: factor V Leiden deficiency     Generalized muscle ache     GERD (gastroesophageal reflux disease)     HEARTBURN    Heart failure ([facility] 2014    as of 07/2019 EF 30-35;  Dr. Adrienne Mejia, Cardiomyopathy    Hyperlipidemia, mixed     Hypertension     Other ill-defined conditions(799.89) 2004    blood transfusion    Pacemaker     Paroxysmal atrial fibrillation (HCC)     rate controlled with coreg     Postsurgical aortocoronary bypass status 05/29/2012    CABG    Presence of cardiac defibrillator 05/2016    left upper chest    SSS (sick sinus syndrome) ([facility]     Stroke ([facility] 2011, 1990's    SEVERAL-mini    Syncope     Thromboembolism ([facility] 2014    left leg: changed to Eliquis from Warfarin    Thromboembolus ([facility] 2005    left leg    Thyroid disease     Weakness generalized       Past Surgical History:   Procedure Laterality Date    CARDIAC CATHETERIZATION  3/4/2011         HX HEENT  2019    oral surgery, removed teeth, dentures upper/lower    HX HERNIA REPAIR Left 2002    Ingiunal hernia repair left    HX ORTHOPAEDIC      LEFT KNEE CARTILAGE REPAIR;RIGHT ROTATOR CUFF REPAIR    HX ORTHOPAEDIC  2/14/12    C5-7 ANTERIIOR CERVICAL DISECTOMY AND FUSION    HX ORTHOPAEDIC  6/4/13    C5-C7 POSTERIOR DECOMPRESSION AND FUSION    HX ORTHOPAEDIC      right thumb partial amputation of tip    HX OTHER SURGICAL      steroid injections    HX PACEMAKER Left 05/13/2016    BS D142, Dr. Janett Gross HX PROSTATECT[place]  2004     [place]    HX ROTATOR CUFF REPAIR Left 2019    HX UROLOGICAL       urinary control system    HX UROLOGICAL  12/3/13    REPLACEMENT ARTIFICAL URINARY SPHINCTER    [place] CARDIAC SURG PROCEDURE UNLIST      CABG X1 3/5/11     Current Outpatient Medications   Medication Sig Dispense Refill    pantoprazole (PROTONIX) 40 mg tablet TAKE 1 TABLET BY MOUTH ONCE DAILY 90 Tablet 3    Eliquis 5 mg tablet TAKE 1 TABLET BY MOUTH EVERY 12 HOURS 180 Tablet 3    gabapentin (NEURONTIN) 600 mg tablet TAKE 1 TABLET BY MOUTH THREE TIMES A DAY 90 Tablet 3    atorvastatin (LIPITOR) 40 mg tablet TAKE 1 TABLET BY MOUTH AT BEDTIME 90 Tablet 1    sertraline (ZOLOFT) 100 mg tablet TAKE 2 TABLETS BY MOUTH ONCE DAILY 180 Tablet 1    Entresto 24-26 mg tablet TAKE 1 TABLET BY MOUTH TWICE DAILY 180 Tablet 3    azithromycin (Zithromax Z-Vamsi) 250 mg tablet 2 tabs on day 1, then 1 tab daily 6 Tablet 0    furosemide (Lasix) 20 mg tablet Take 1 Tablet by mouth daily. 15 Tablet 0    memantine (NAMENDA) 10 mg tablet TAKE 1 TABLET BY MOUTH TWICE DAILY 180 Tab 3    dilTIAZem ER (TIAZAC) 180 mg capsule Take 180 mg by mouth daily.  metoprolol succinate (TOPROL-XL) 25 mg XL tablet TAKE 1/2 TABLET BY MOUTH ONCE DAILY 45 Tab 3    nitroglycerin (NITROSTAT) 0.4 mg SL tablet 1 Tab by SubLINGual route every five (5) minutes as needed for Chest Pain (call 911 if not relieved by 3). 1 Bottle 1    donepeziL (ARICEPT) 10 mg tablet Take 1 Tab by mouth nightly. (Patient taking differently: Take 5 mg by mouth nightly.) 90 Tab 1    senna-docusate (PERICOLACE) 8.6-50 mg per tablet Take 1 Tab by mouth daily. (Patient taking differently: Take 1 Tab by mouth as needed.) 30 Tab 0    acetaminophen (TYLENOL) 325 mg tablet Take 1,000 mg by mouth every four (4) hours as needed for Pain.  multivit-min/FA/lycopen/lutein (SENTRY SENIOR PO) Take  by mouth daily.  polyethylene glycol (MIRALAX) 17 gram/dose powder Take 17 g by mouth as needed.        No Known Allergies    Family History   Problem Relation Age of Onset    Asthma Mother     Alcohol abuse Mother     Alcohol abuse Father     Asthma Father     Cancer Sister     Heart Disease Sister     Alcohol abuse Brother     Cancer Brother     Heart Disease Brother      Social History     Tobacco Use    Smoking status: Never Smoker    Smokeless tobacco: Never Used   Substance Use Topics    Alcohol use: Not Currently     Alcohol/week: 0.0 standard drinks     Comment: stopped 2010         Harley Saldana LPN

## 2021-11-23 NOTE — PATIENT INSTRUCTIONS
Medicare Wellness Visit, Male    The best way to live healthy is to have a lifestyle where you eat a well-balanced diet, exercise regularly, limit alcohol use, and quit all forms of tobacco/nicotine, if applicable. Regular preventive services are another way to keep healthy. Preventive services (vaccines, screening tests, monitoring & exams) can help personalize your care plan, which helps you manage your own care. Screening tests can find health problems at the earliest stages, when they are easiest to treat. [facility] follows the current, evidence-based guidelines published by the [facility] [employer] ([facility]) when recommending preventive services for our patients. Because we follow these guidelines, sometimes recommendations change over time as research supports it. (For example, a prostate screening blood test is no longer routinely recommended for men with no symptoms). Of course, you and your doctor may decide to screen more often for some diseases, based on your risk and co-morbidities (chronic disease you are already diagnosed with). Preventive services for you include:  - Medicare offers their members a free annual wellness visit, which is time for you and your primary care provider to discuss and plan for your preventive service needs. Take advantage of this benefit every year!  -All adults over age 72 should receive the recommended pneumonia vaccines. Current USPSTF guidelines recommend a series of two vaccines for the best pneumonia protection.   -All adults should have a flu vaccine yearly and tetanus vaccine every 10 years.  -All adults age 48 and older should receive the shingles vaccines (series of two vaccines).        -All adults age 38-68 who are overweight should have a diabetes screening test once every three years.   -Other screening tests & preventive services for persons with diabetes include: an eye exam to screen for diabetic retinopathy, a kidney function test, a foot exam, and stricter control over your cholesterol.   -Cardiovascular screening for adults with routine risk involves an electrocardiogram (ECG) at intervals determined by the provider.   -Colorectal cancer screening should be done for adults age 54-65 with no increased risk factors for colorectal cancer. There are a number of acceptable methods of screening for this type of cancer. Each test has its own benefits and drawbacks. Discuss with your provider what is most appropriate for you during your annual wellness visit. The different tests include: colonoscopy (considered the best screening method), a fecal occult blood test, a fecal DNA test, and sigmoidoscopy.  -All adults born between [facility] should be screened once for Hepatitis C.  -An Abdominal Aortic Aneurysm (AAA) Screening is recommended for men age 73-68 who has ever smoked in their lifetime.      Here is a list of your current Health Maintenance items (your personalized list of preventive services) with a due date:  Health Maintenance Due   Topic Date Due    Hepatitis C Test  Never done    Shingles Vaccine (1 of 2) Never done    Cholesterol Test   11/14/2019    COVID-19 Vaccine (3 - Booster for Moderna series) 08/26/2021    Yearly Flu Vaccine (1) 09/01/2021

## 2021-12-13 NOTE — ED TRIAGE NOTES
Pt lifted a heavy tool box on Saturday and feels he injured his right arm. Pt has difficulty raising his right arm due to pain in shoulder. Pt did not want to see his pcp as he does not approve of his pcp at this time. Ambulatory without difficulty.

## 2021-12-13 NOTE — ED NOTES
I have reviewed discharge instructions with the patient. The patient verbalized understanding. Discharge medications discussed with patient. No questions at this time. Ambulated out without difficulty. Also updated wife on discharge instructions.

## 2021-12-13 NOTE — ED PROVIDER NOTES
EMERGENCY DEPARTMENT HISTORY AND PHYSICAL EXAM          Date: 12/13/2021  Patient Name: Mariza Amaral    History of Presenting Illness     Chief Complaint   Patient presents with    Arm Pain       History Provided By: Patient    HPI: Mariza Amaral is a 67 y.o. male, pmhx CAD, hypertension, high cholesterol, factor V Leiden with thromboembolism, AICD, stroke, Alzheimer's dementia and depression who presents ambulatory to the ED c/o right shoulder pain    Patient explains on Saturday he was doing some work and he lifted up a toolbox and had instant pain in his right shoulder. He states since that time the pain is continued and is not improving. He even notes today that his right hand seems to be swollen which is why he came to the ER. He denies any numbness, tingling or shooting pain from his neck down his arm. He also denies any weakness in his arm but when he raises his right arm he has severe pain in his shoulder. He does not have any pain at rest but it is rated at 8/10 with movement. He has not taken any medication for his symptoms/pain today. PCP: Beryle Sheriff, MD    Allergies: None known    There are no other complaints, changes, or physical findings at this time. Current Outpatient Medications   Medication Sig Dispense Refill    predniSONE (DELTASONE) 10 mg tablet Take 30 mg by mouth daily (with breakfast) for 3 days. 9 Tablet 0    metoprolol succinate (TOPROL-XL) 25 mg XL tablet TAKE 1/2 TABLET BY MOUTH ONCE DAILY 45 Tablet 3    triamcinolone acetonide (KENALOG) 0.1 % dental paste by Mouth/Throat route two (2) times a day.  15 g 0    pantoprazole (PROTONIX) 40 mg tablet TAKE 1 TABLET BY MOUTH ONCE DAILY 90 Tablet 3    Eliquis 5 mg tablet TAKE 1 TABLET BY MOUTH EVERY 12 HOURS 180 Tablet 3    gabapentin (NEURONTIN) 600 mg tablet TAKE 1 TABLET BY MOUTH THREE TIMES A DAY 90 Tablet 3    atorvastatin (LIPITOR) 40 mg tablet TAKE 1 TABLET BY MOUTH AT BEDTIME 90 Tablet 1    sertraline (ZOLOFT) 100 mg tablet TAKE 2 TABLETS BY MOUTH ONCE DAILY 180 Tablet 1    Entresto 24-26 mg tablet TAKE 1 TABLET BY MOUTH TWICE DAILY 180 Tablet 3    furosemide (Lasix) 20 mg tablet Take 1 Tablet by mouth daily. (Patient not taking: Reported on 11/23/2021) 15 Tablet 0    memantine (NAMENDA) 10 mg tablet TAKE 1 TABLET BY MOUTH TWICE DAILY 180 Tab 3    nitroglycerin (NITROSTAT) 0.4 mg SL tablet 1 Tab by SubLINGual route every five (5) minutes as needed for Chest Pain (call 911 if not relieved by 3). 1 Bottle 1    donepeziL (ARICEPT) 10 mg tablet Take 1 Tab by mouth nightly. (Patient taking differently: Take 5 mg by mouth nightly.) 90 Tab 1    senna-docusate (PERICOLACE) 8.6-50 mg per tablet Take 1 Tab by mouth daily. (Patient taking differently: Take 1 Tab by mouth as needed.) 30 Tab 0    acetaminophen (TYLENOL) 325 mg tablet Take 1,000 mg by mouth every four (4) hours as needed for Pain.  multivit-min/FA/lycopen/lutein (SENTRY SENIOR PO) Take  by mouth daily.  polyethylene glycol (MIRALAX) 17 gram/dose powder Take 17 g by mouth as needed.          Past History     Past Medical History:  Past Medical History:   Diagnosis Date    AICD (automatic cardioverter/defibrillator) present 5/13/2016 5/13/16 [employer] upgrade to dual chamber AICD implant  Dr. Archie Sidhu Alzheimer disease [facility])     Anxiety state, other     Arthritis     OSTEO ARTHRITIS    AVNRT (AV bridgette re-entry tachycardia) ([facility] [address] 5/12/2016 5/12/16 AVNRT ablation: PM/AICD left upper chest :Dr. Griffin Wade Back ache     CAD (coronary artery disease)     Cancer [facility]) 2004    Prostate cancer    Chest tightness     last episode of chest pain 5/2016 before AICD placed; none since then    Coagulation defects 2005    FACTOR V LEIDEN    Dementia ([facility] [address]     Depression     Dizziness     FH: factor V Leiden deficiency     Generalized muscle ache     GERD (gastroesophageal reflux disease)     HEARTBURN    Heart failure ([facility] [address] 2014    as of 07/2019 EF 30-35;  Dr. Kimmie Mena, Cardiomyopathy    Hyperlipidemia, mixed     Hypertension     Other ill-defined conditions(799.89) 2004    blood transfusion    Pacemaker     Paroxysmal atrial fibrillation ([address]     rate controlled with coreg     Postsurgical aortocoronary bypass status 05/29/2012    CABG    Presence of cardiac defibrillator 05/2016    left upper chest    SSS (sick sinus syndrome) ([facility] [address] [facility]     Stroke ([facility] [address] 2011, 1990's    SEVERAL-mini    Syncope     Thromboembolism ([facility] [address] 2014    left leg: changed to Eliquis from Warfarin    Thromboembolus ([facility] [address] 2005    left leg    Thyroid disease     Weakness generalized        Past Surgical History:  Past Surgical History:   Procedure Laterality Date    CARDIAC CATHETERIZATION  3/4/2011         HX HEENT  2019    oral surgery, removed teeth, dentures upper/lower    HX HERNIA REPAIR Left 2002    Ingiunal hernia repair left    HX ORTHOPAEDIC      LEFT KNEE CARTILAGE REPAIR;RIGHT ROTATOR CUFF REPAIR    HX ORTHOPAEDIC  2/14/12    C5-7 ANTERIIOR CERVICAL DISECTOMY AND FUSION    HX ORTHOPAEDIC  6/4/13    C5-C7 POSTERIOR DECOMPRESSION AND FUSION    HX ORTHOPAEDIC      right thumb partial amputation of tip    HX OTHER SURGICAL      steroid injections    HX PACEMAKER Left 05/13/2016    BS D142, Dr. Ivet Toledo HX PROSTATECTOMY  2004     [place]    HX ROTATOR CUFF REPAIR Left 2019    HX UROLOGICAL       urinary control system    HX UROLOGICAL  12/3/13    REPLACEMENT ARTIFICAL URINARY SPHINCTER    OK CARDIAC SURG PROCEDURE UNLIST      CABG X1 3/5/11       Family History:  Family History   Problem Relation Age of Onset    Asthma Mother     Alcohol abuse Mother     Alcohol abuse Father     Asthma Father     Cancer Sister     Heart Disease Sister     Alcohol abuse Brother     Cancer Brother     Heart Disease Brother        Social History:  Social History     Tobacco Use    Smoking status: Never Smoker    Smokeless tobacco: Never Used   Vaping Use    Vaping Use: Never used   Substance Use Topics    Alcohol use: Not Currently     Alcohol/week: 0.0 standard drinks     Comment: stopped 2010    Drug use: Never       Allergies:  No Known Allergies      Review of Systems   Review of Systems   Constitutional: Negative for activity change, appetite change, chills, fever and unexpected weight change. HENT: Negative for congestion. Eyes: Negative for pain and visual disturbance. Respiratory: Negative for cough and shortness of breath. Cardiovascular: Negative for chest pain. Gastrointestinal: Negative for abdominal pain, diarrhea, nausea and vomiting. Genitourinary: Negative for dysuria. Musculoskeletal: Positive for arthralgias and joint swelling. Negative for back pain. Skin: Negative for rash. Neurological: Negative for headaches. Physical Exam   Physical Exam  Vitals and nursing note reviewed. Constitutional:       Appearance: He is well-developed. He is not diaphoretic. HENT:      Head: Normocephalic and atraumatic. Eyes:      General:         Right eye: No discharge. Left eye: No discharge. Conjunctiva/sclera: Conjunctivae normal.      Pupils: Pupils are equal, round, and reactive to light. Cardiovascular:      Rate and Rhythm: Normal rate and regular rhythm. Pulses: Normal pulses. Pulmonary:      Effort: Pulmonary effort is normal. No respiratory distress. Musculoskeletal:         General: Normal range of motion. Right shoulder: Normal strength. Normal pulse. Left shoulder: Normal strength. Normal pulse. Cervical back: Normal range of motion and neck supple. No rigidity or tenderness. Comments: Pain in the right shoulder with active and passive abduction. There is no tenderness with bicep curl. There is no tenderness with pronation or supination. There is tenderness along the clavicle, AC joint and anterior glenoid.   In comparison to the left arm the right hand does appear slightly swollen but there is no swelling extension noted up the arm. Lymphadenopathy:      Cervical: No cervical adenopathy. Skin:     General: Skin is warm and dry. Findings: No rash. Neurological:      Mental Status: He is alert and oriented to person, place, and time. Cranial Nerves: No cranial nerve deficit. Motor: No abnormal muscle tone. Comments: Grossly, there is no sensory deficit. Diagnostic Study Results     Labs -   No results found for this or any previous visit (from the past 12 hour(s)). Radiologic Studies -   XR SHOULDER RT AP/LAT MIN 2 V   Final Result   Degenerative changes and evidence of chronic rotator cuff tear,   however no acute abnormality is identified. CT Results  (Last 48 hours)    None        CXR Results  (Last 48 hours)    None            Medical Decision Making   I am the first provider for this patient. I reviewed the vital signs, available nursing notes, past medical history, past surgical history, family history and social history. Vital Signs-Reviewed the patient's vital signs. Patient Vitals for the past 12 hrs:   Temp Pulse Resp BP SpO2   12/13/21 1132     98 %   12/13/21 1128 98.4 °F (36.9 °C) 60 18 (!) 142/79 98 %       Pulse Oximetry Analysis - 98% on RA    Records Reviewed: Nursing Notes and Old Medical Records    Provider Notes (Medical Decision Making):   MDM: This is an elderly man who presents for right arm pain after lifting injury. Not seeing any evidence of DVT, neurologic dysfunction/vascular dysfunction. Patient does not have evidence of bicep tendon rupture but has pain at 30 degrees with lateral abduction. X-ray to be obtained with symptomatic medications to be provided. ED Course:   Initial assessment performed. The patients presenting problems have been discussed, and they are in agreement with the care plan formulated and outlined with them.   I have encouraged them to ask questions as they arise throughout their visit. PROGRESS NOTE:  12:15 PM   Pt continues sitting comfortably. Attempted to find his wife who is not located in the waiting room or outside. Discussed his x-ray results and recommendation for orthopedics for possible joint injection as well as physical therapy and oral steroids for a few days to reduce inflammation. Patient to follow-up if symptoms worsen especially if his right arm becomes more swollen. Discharge note:  Pt re-evaluated and noted to be feeling better, ready for discharge. Updated pt on all final managing findings. Will follow up as instructed. All questions have been answered, pt voiced understanding and agreement with plan. Specific return precautions provided as well as instructions to return to the ED should sx worsen at any time. Vital signs stable for discharge. Critical Care Time:   0      Diagnosis     Clinical Impression:   1. Rotator cuff arthropathy of right shoulder        PLAN:  1. Current Discharge Medication List      START taking these medications    Details   predniSONE (DELTASONE) 10 mg tablet Take 30 mg by mouth daily (with breakfast) for 3 days. Qty: 9 Tablet, Refills: 0  Start date: 12/13/2021, End date: 12/16/2021           2. Follow-up Information     Follow up With Specialties Details Why Contact Info    Amarjit Ohara MD Orthopedic Surgery Schedule an appointment as soon as possible for a visit   [address]  [address] [phone]  [phone]      [address] [place] [address] [address] [facility]  If symptoms worsen [address]  [phone]        Return to ED if worse     Disposition:  Home       Please note, this dictation was completed with [employer], the computer voice recognition software. Quite often unanticipated grammatical, syntax, homophones, and other interpretive errors are inadvertently transcribed by the computer software.  Please disregard these errors. Please excuse any errors that have escaped final proof reading.

## 2022-01-01 ENCOUNTER — ANCILLARY PROCEDURE (OUTPATIENT)
Dept: CARDIOLOGY CLINIC | Age: 77
End: 2022-01-01
Payer: MEDICARE

## 2022-01-01 ENCOUNTER — TELEPHONE (OUTPATIENT)
Dept: FAMILY MEDICINE CLINIC | Age: 77
End: 2022-01-01

## 2022-01-01 ENCOUNTER — APPOINTMENT (OUTPATIENT)
Dept: GENERAL RADIOLOGY | Age: 77
DRG: 291 | End: 2022-01-01
Attending: INTERNAL MEDICINE
Payer: MEDICARE

## 2022-01-01 ENCOUNTER — NURSE TRIAGE (OUTPATIENT)
Dept: OTHER | Facility: CLINIC | Age: 77
End: 2022-01-01

## 2022-01-01 ENCOUNTER — HOSPITAL ENCOUNTER (INPATIENT)
Age: 77
LOS: 2 days | Discharge: HOME HEALTH | DRG: 194 | End: 2022-04-04
Attending: FAMILY MEDICINE | Admitting: INTERNAL MEDICINE
Payer: MEDICARE

## 2022-01-01 ENCOUNTER — OFFICE VISIT (OUTPATIENT)
Dept: CARDIOLOGY CLINIC | Age: 77
End: 2022-01-01
Payer: MEDICARE

## 2022-01-01 ENCOUNTER — HOSPITAL ENCOUNTER (INPATIENT)
Age: 77
LOS: 9 days | Discharge: NURSING, REHAB OR LONG-TERM CARE | DRG: 312 | End: 2022-05-27
Attending: EMERGENCY MEDICINE | Admitting: INTERNAL MEDICINE
Payer: MEDICARE

## 2022-01-01 ENCOUNTER — TELEPHONE (OUTPATIENT)
Dept: CARDIOLOGY CLINIC | Age: 77
End: 2022-01-01

## 2022-01-01 ENCOUNTER — APPOINTMENT (OUTPATIENT)
Dept: GENERAL RADIOLOGY | Age: 77
DRG: 291 | End: 2022-01-01
Attending: HOSPITALIST
Payer: MEDICARE

## 2022-01-01 ENCOUNTER — OFFICE VISIT (OUTPATIENT)
Dept: FAMILY MEDICINE CLINIC | Age: 77
End: 2022-01-01
Payer: MEDICARE

## 2022-01-01 ENCOUNTER — HOSPITAL ENCOUNTER (INPATIENT)
Age: 77
LOS: 8 days | Discharge: NURSING, REHAB OR LONG-TERM CARE | DRG: 291 | End: 2022-06-17
Attending: EMERGENCY MEDICINE | Admitting: INTERNAL MEDICINE
Payer: MEDICARE

## 2022-01-01 ENCOUNTER — APPOINTMENT (OUTPATIENT)
Dept: GENERAL RADIOLOGY | Age: 77
DRG: 194 | End: 2022-01-01
Attending: FAMILY MEDICINE
Payer: MEDICARE

## 2022-01-01 ENCOUNTER — APPOINTMENT (OUTPATIENT)
Dept: GENERAL RADIOLOGY | Age: 77
End: 2022-01-01
Attending: EMERGENCY MEDICINE
Payer: MEDICARE

## 2022-01-01 ENCOUNTER — HOSPITAL ENCOUNTER (INPATIENT)
Age: 77
LOS: 1 days | Discharge: HOME HEALTH | DRG: 291 | End: 2022-02-15
Attending: FAMILY MEDICINE | Admitting: INTERNAL MEDICINE
Payer: MEDICARE

## 2022-01-01 ENCOUNTER — APPOINTMENT (OUTPATIENT)
Dept: ULTRASOUND IMAGING | Age: 77
DRG: 291 | End: 2022-01-01
Attending: INTERNAL MEDICINE
Payer: MEDICARE

## 2022-01-01 ENCOUNTER — APPOINTMENT (OUTPATIENT)
Dept: CT IMAGING | Age: 77
DRG: 194 | End: 2022-01-01
Attending: FAMILY MEDICINE
Payer: MEDICARE

## 2022-01-01 ENCOUNTER — HOSPITAL ENCOUNTER (EMERGENCY)
Age: 77
Discharge: HOME | End: 2022-01-19
Attending: EMERGENCY MEDICINE
Payer: MEDICARE

## 2022-01-01 ENCOUNTER — VIRTUAL VISIT (OUTPATIENT)
Dept: FAMILY MEDICINE CLINIC | Age: 77
End: 2022-01-01
Payer: MEDICARE

## 2022-01-01 ENCOUNTER — APPOINTMENT (OUTPATIENT)
Dept: GENERAL RADIOLOGY | Age: 77
DRG: 948 | End: 2022-01-01
Attending: PHYSICIAN ASSISTANT
Payer: MEDICARE

## 2022-01-01 ENCOUNTER — DOCUMENTATION ONLY (OUTPATIENT)
Dept: CARDIOLOGY CLINIC | Age: 77
End: 2022-01-01

## 2022-01-01 ENCOUNTER — APPOINTMENT (OUTPATIENT)
Dept: GENERAL RADIOLOGY | Age: 77
DRG: 948 | End: 2022-01-01
Attending: STUDENT IN AN ORGANIZED HEALTH CARE EDUCATION/TRAINING PROGRAM
Payer: MEDICARE

## 2022-01-01 ENCOUNTER — APPOINTMENT (OUTPATIENT)
Dept: GENERAL RADIOLOGY | Age: 77
DRG: 291 | End: 2022-01-01
Attending: FAMILY MEDICINE
Payer: MEDICARE

## 2022-01-01 ENCOUNTER — HOSPITAL ENCOUNTER (EMERGENCY)
Age: 77
Discharge: ANOTHER HOSPITAL | End: 2022-05-16
Attending: EMERGENCY MEDICINE
Payer: MEDICARE

## 2022-01-01 ENCOUNTER — APPOINTMENT (OUTPATIENT)
Dept: CARDIOLOGY CLINIC | Age: 77
End: 2022-01-01

## 2022-01-01 ENCOUNTER — APPOINTMENT (OUTPATIENT)
Dept: CT IMAGING | Age: 77
End: 2022-01-01
Attending: EMERGENCY MEDICINE
Payer: MEDICARE

## 2022-01-01 ENCOUNTER — APPOINTMENT (OUTPATIENT)
Dept: CT IMAGING | Age: 77
DRG: 291 | End: 2022-01-01
Attending: INTERNAL MEDICINE
Payer: MEDICARE

## 2022-01-01 ENCOUNTER — APPOINTMENT (OUTPATIENT)
Dept: GENERAL RADIOLOGY | Age: 77
DRG: 291 | End: 2022-01-01
Attending: EMERGENCY MEDICINE
Payer: MEDICARE

## 2022-01-01 ENCOUNTER — HOSPITAL ENCOUNTER (INPATIENT)
Age: 77
LOS: 11 days | Discharge: HOSPICE | DRG: 948 | End: 2022-06-28
Attending: INTERNAL MEDICINE | Admitting: INTERNAL MEDICINE
Payer: MEDICARE

## 2022-01-01 ENCOUNTER — HOSPITAL ENCOUNTER (OUTPATIENT)
Dept: LAB | Age: 77
Discharge: HOME | End: 2022-04-25
Payer: MEDICARE

## 2022-01-01 VITALS
WEIGHT: 164 LBS | HEIGHT: 67 IN | BODY MASS INDEX: 25.74 KG/M2 | DIASTOLIC BLOOD PRESSURE: 80 MMHG | HEART RATE: 60 BPM | OXYGEN SATURATION: 95 % | SYSTOLIC BLOOD PRESSURE: 148 MMHG | RESPIRATION RATE: 16 BRPM | TEMPERATURE: 98.6 F

## 2022-01-01 VITALS
TEMPERATURE: 97.9 F | SYSTOLIC BLOOD PRESSURE: 150 MMHG | DIASTOLIC BLOOD PRESSURE: 71 MMHG | HEIGHT: 67 IN | BODY MASS INDEX: 24.39 KG/M2 | OXYGEN SATURATION: 96 % | HEART RATE: 67 BPM | WEIGHT: 155.42 LBS | RESPIRATION RATE: 20 BRPM

## 2022-01-01 VITALS
DIASTOLIC BLOOD PRESSURE: 60 MMHG | BODY MASS INDEX: 23.7 KG/M2 | RESPIRATION RATE: 14 BRPM | SYSTOLIC BLOOD PRESSURE: 103 MMHG | OXYGEN SATURATION: 95 % | HEIGHT: 69 IN | WEIGHT: 160 LBS | HEART RATE: 88 BPM | TEMPERATURE: 97.2 F

## 2022-01-01 VITALS
DIASTOLIC BLOOD PRESSURE: 74 MMHG | WEIGHT: 161 LBS | SYSTOLIC BLOOD PRESSURE: 118 MMHG | HEIGHT: 71 IN | BODY MASS INDEX: 22.54 KG/M2

## 2022-01-01 VITALS
WEIGHT: 161.3 LBS | BODY MASS INDEX: 26.06 KG/M2 | WEIGHT: 166 LBS | TEMPERATURE: 97 F | TEMPERATURE: 97.7 F | SYSTOLIC BLOOD PRESSURE: 108 MMHG | BODY MASS INDEX: 25.31 KG/M2 | OXYGEN SATURATION: 83 % | DIASTOLIC BLOOD PRESSURE: 55 MMHG | OXYGEN SATURATION: 94 % | HEIGHT: 67 IN | RESPIRATION RATE: 20 BRPM | SYSTOLIC BLOOD PRESSURE: 126 MMHG | HEIGHT: 67 IN | HEART RATE: 70 BPM | HEART RATE: 60 BPM | RESPIRATION RATE: 20 BRPM | DIASTOLIC BLOOD PRESSURE: 61 MMHG

## 2022-01-01 VITALS
HEIGHT: 71 IN | OXYGEN SATURATION: 94 % | BODY MASS INDEX: 22.97 KG/M2 | HEART RATE: 74 BPM | SYSTOLIC BLOOD PRESSURE: 112 MMHG | RESPIRATION RATE: 18 BRPM | DIASTOLIC BLOOD PRESSURE: 68 MMHG | WEIGHT: 164.1 LBS

## 2022-01-01 VITALS
RESPIRATION RATE: 18 BRPM | OXYGEN SATURATION: 96 % | SYSTOLIC BLOOD PRESSURE: 120 MMHG | DIASTOLIC BLOOD PRESSURE: 86 MMHG | TEMPERATURE: 98.1 F | BODY MASS INDEX: 24.25 KG/M2 | WEIGHT: 160 LBS | HEART RATE: 57 BPM | HEIGHT: 68 IN

## 2022-01-01 VITALS
WEIGHT: 162.3 LBS | DIASTOLIC BLOOD PRESSURE: 66 MMHG | BODY MASS INDEX: 22.72 KG/M2 | SYSTOLIC BLOOD PRESSURE: 148 MMHG | TEMPERATURE: 98.4 F | RESPIRATION RATE: 20 BRPM | OXYGEN SATURATION: 98 % | HEIGHT: 71 IN | HEART RATE: 65 BPM

## 2022-01-01 VITALS
DIASTOLIC BLOOD PRESSURE: 76 MMHG | WEIGHT: 151.6 LBS | BODY MASS INDEX: 23.79 KG/M2 | HEART RATE: 80 BPM | OXYGEN SATURATION: 96 % | RESPIRATION RATE: 20 BRPM | TEMPERATURE: 97.8 F | HEIGHT: 67 IN | SYSTOLIC BLOOD PRESSURE: 142 MMHG

## 2022-01-01 VITALS
OXYGEN SATURATION: 93 % | DIASTOLIC BLOOD PRESSURE: 61 MMHG | WEIGHT: 153.9 LBS | SYSTOLIC BLOOD PRESSURE: 98 MMHG | RESPIRATION RATE: 19 BRPM | HEIGHT: 67 IN | TEMPERATURE: 97.8 F | HEART RATE: 76 BPM | BODY MASS INDEX: 24.16 KG/M2

## 2022-01-01 VITALS
DIASTOLIC BLOOD PRESSURE: 54 MMHG | WEIGHT: 161.38 LBS | RESPIRATION RATE: 20 BRPM | BODY MASS INDEX: 22.59 KG/M2 | TEMPERATURE: 97 F | OXYGEN SATURATION: 97 % | HEIGHT: 71 IN | HEART RATE: 67 BPM | SYSTOLIC BLOOD PRESSURE: 84 MMHG

## 2022-01-01 VITALS
TEMPERATURE: 97.6 F | RESPIRATION RATE: 22 BRPM | HEART RATE: 64 BPM | WEIGHT: 164.38 LBS | OXYGEN SATURATION: 95 % | HEIGHT: 67 IN | SYSTOLIC BLOOD PRESSURE: 123 MMHG | BODY MASS INDEX: 25.8 KG/M2 | DIASTOLIC BLOOD PRESSURE: 70 MMHG

## 2022-01-01 DIAGNOSIS — I48.0 PAROXYSMAL ATRIAL FIBRILLATION (HCC): ICD-10-CM

## 2022-01-01 DIAGNOSIS — F32.A DEPRESSION, UNSPECIFIED DEPRESSION TYPE: ICD-10-CM

## 2022-01-01 DIAGNOSIS — I25.5 ISCHEMIC CARDIOMYOPATHY: ICD-10-CM

## 2022-01-01 DIAGNOSIS — I50.22 CHRONIC SYSTOLIC HEART FAILURE (HCC): ICD-10-CM

## 2022-01-01 DIAGNOSIS — I50.813 ACUTE ON CHRONIC RIGHT-SIDED CONGESTIVE HEART FAILURE (HCC): Primary | ICD-10-CM

## 2022-01-01 DIAGNOSIS — J18.9 COMMUNITY ACQUIRED PNEUMONIA OF LEFT LOWER LOBE OF LUNG: Primary | ICD-10-CM

## 2022-01-01 DIAGNOSIS — I95.1 ORTHOSTATIC HYPOTENSION: ICD-10-CM

## 2022-01-01 DIAGNOSIS — I50.22 CHRONIC SYSTOLIC (CONGESTIVE) HEART FAILURE (HCC): ICD-10-CM

## 2022-01-01 DIAGNOSIS — I10 ESSENTIAL HYPERTENSION: ICD-10-CM

## 2022-01-01 DIAGNOSIS — I42.9 CARDIOMYOPATHY, UNSPECIFIED TYPE (HCC): ICD-10-CM

## 2022-01-01 DIAGNOSIS — R05.9 COUGH IN ADULT: ICD-10-CM

## 2022-01-01 DIAGNOSIS — S09.90XA INJURY OF HEAD, INITIAL ENCOUNTER: ICD-10-CM

## 2022-01-01 DIAGNOSIS — J18.9 PNEUMONIA OF LEFT LOWER LOBE DUE TO INFECTIOUS ORGANISM: Primary | ICD-10-CM

## 2022-01-01 DIAGNOSIS — M96.1 CERVICAL POST-LAMINECTOMY SYNDROME: Primary | ICD-10-CM

## 2022-01-01 DIAGNOSIS — R55 SYNCOPE AND COLLAPSE: Primary | ICD-10-CM

## 2022-01-01 DIAGNOSIS — Z95.810 AICD (AUTOMATIC CARDIOVERTER/DEFIBRILLATOR) PRESENT: Primary | ICD-10-CM

## 2022-01-01 DIAGNOSIS — E78.2 MIXED HYPERLIPIDEMIA: ICD-10-CM

## 2022-01-01 DIAGNOSIS — M79.10 MYALGIA: ICD-10-CM

## 2022-01-01 DIAGNOSIS — Z76.89 ENCOUNTER FOR SUPPORT AND COORDINATION OF TRANSITION OF CARE: Primary | ICD-10-CM

## 2022-01-01 DIAGNOSIS — N18.32 STAGE 3B CHRONIC KIDNEY DISEASE (HCC): ICD-10-CM

## 2022-01-01 DIAGNOSIS — I50.9 ACUTE ON CHRONIC CONGESTIVE HEART FAILURE, UNSPECIFIED HEART FAILURE TYPE (HCC): Primary | ICD-10-CM

## 2022-01-01 DIAGNOSIS — Z95.810 AICD (AUTOMATIC CARDIOVERTER/DEFIBRILLATOR) PRESENT: ICD-10-CM

## 2022-01-01 DIAGNOSIS — M25.50 ARTHRALGIA, UNSPECIFIED JOINT: ICD-10-CM

## 2022-01-01 DIAGNOSIS — F32.1 MODERATE MAJOR DEPRESSION (HCC): ICD-10-CM

## 2022-01-01 DIAGNOSIS — J43.9 PULMONARY EMPHYSEMA, UNSPECIFIED EMPHYSEMA TYPE (HCC): Primary | ICD-10-CM

## 2022-01-01 DIAGNOSIS — R06.02 SHORTNESS OF BREATH: Primary | ICD-10-CM

## 2022-01-01 DIAGNOSIS — I49.5 SSS (SICK SINUS SYNDROME) (HCC): ICD-10-CM

## 2022-01-01 DIAGNOSIS — F02.818 ALZHEIMER'S DEMENTIA, LATE ONSET, WITH BEHAVIORAL DISTURBANCE (HCC): ICD-10-CM

## 2022-01-01 DIAGNOSIS — G30.1 ALZHEIMER'S DEMENTIA, LATE ONSET, WITH BEHAVIORAL DISTURBANCE (HCC): ICD-10-CM

## 2022-01-01 DIAGNOSIS — I27.81 COR PULMONALE (CHRONIC) (HCC): ICD-10-CM

## 2022-01-01 DIAGNOSIS — R06.09 DOE (DYSPNEA ON EXERTION): ICD-10-CM

## 2022-01-01 DIAGNOSIS — I25.10 ASHD (ARTERIOSCLEROTIC HEART DISEASE): ICD-10-CM

## 2022-01-01 DIAGNOSIS — I50.33 ACUTE ON CHRONIC DIASTOLIC CHF (CONGESTIVE HEART FAILURE) (HCC): Primary | ICD-10-CM

## 2022-01-01 DIAGNOSIS — I25.10 ASHD (ARTERIOSCLEROTIC HEART DISEASE): Primary | ICD-10-CM

## 2022-01-01 LAB
25(OH)D3+25(OH)D2 SERPL-MCNC: 35.6 NG/ML (ref 30–100)
ALBUMIN SERPL-MCNC: 2.5 G/DL (ref 3.5–5)
ALBUMIN SERPL-MCNC: 2.6 G/DL (ref 3.5–5)
ALBUMIN SERPL-MCNC: 3.2 G/DL (ref 3.5–5)
ALBUMIN SERPL-MCNC: 3.3 G/DL (ref 3.5–5)
ALBUMIN SERPL-MCNC: 3.6 G/DL (ref 3.5–5)
ALBUMIN/GLOB SERPL: 0.6 {RATIO} (ref 1.1–2.2)
ALBUMIN/GLOB SERPL: 0.6 {RATIO} (ref 1.1–2.2)
ALBUMIN/GLOB SERPL: 0.8 {RATIO} (ref 1.1–2.2)
ALBUMIN/GLOB SERPL: 0.8 {RATIO} (ref 1.1–2.2)
ALBUMIN/GLOB SERPL: 0.9 {RATIO} (ref 1.1–2.2)
ALBUMIN/GLOB SERPL: 0.9 {RATIO} (ref 1.1–2.2)
ALBUMIN/GLOB SERPL: 1.1 {RATIO} (ref 1.1–2.2)
ALP SERPL-CCNC: 100 U/L (ref 45–117)
ALP SERPL-CCNC: 108 U/L (ref 45–117)
ALP SERPL-CCNC: 115 U/L (ref 45–117)
ALP SERPL-CCNC: 115 U/L (ref 45–117)
ALP SERPL-CCNC: 118 U/L (ref 45–117)
ALP SERPL-CCNC: 119 U/L (ref 45–117)
ALP SERPL-CCNC: 99 U/L (ref 45–117)
ALT SERPL-CCNC: 18 U/L (ref 12–78)
ALT SERPL-CCNC: 18 U/L (ref 12–78)
ALT SERPL-CCNC: 20 U/L (ref 12–78)
ALT SERPL-CCNC: 23 U/L (ref 12–78)
ALT SERPL-CCNC: 28 U/L (ref 12–78)
ALT SERPL-CCNC: 39 U/L (ref 12–78)
ALT SERPL-CCNC: 41 U/L (ref 12–78)
ANA SER QL: NEGATIVE
ANION GAP SERPL CALC-SCNC: 11 MMOL/L (ref 5–15)
ANION GAP SERPL CALC-SCNC: 2 MMOL/L (ref 5–15)
ANION GAP SERPL CALC-SCNC: 3 MMOL/L (ref 5–15)
ANION GAP SERPL CALC-SCNC: 3 MMOL/L (ref 5–15)
ANION GAP SERPL CALC-SCNC: 4 MMOL/L (ref 5–15)
ANION GAP SERPL CALC-SCNC: 5 MMOL/L (ref 5–15)
ANION GAP SERPL CALC-SCNC: 6 MMOL/L (ref 5–15)
ANION GAP SERPL CALC-SCNC: 7 MMOL/L (ref 5–15)
ANION GAP SERPL CALC-SCNC: 8 MMOL/L (ref 5–15)
ANION GAP SERPL CALC-SCNC: 9 MMOL/L (ref 5–15)
ANION GAP SERPL CALC-SCNC: 9 MMOL/L (ref 5–15)
APPEARANCE UR: CLEAR
APTT PPP: 29.5 SEC (ref 22.1–31)
ARTERIAL PATENCY WRIST A: YES
AST SERPL-CCNC: 16 U/L (ref 15–37)
AST SERPL-CCNC: 19 U/L (ref 15–37)
AST SERPL-CCNC: 21 U/L (ref 15–37)
AST SERPL-CCNC: 21 U/L (ref 15–37)
AST SERPL-CCNC: 23 U/L (ref 15–37)
AST SERPL-CCNC: 30 U/L (ref 15–37)
AST SERPL-CCNC: 31 U/L (ref 15–37)
ATRIAL RATE: 63 BPM
ATRIAL RATE: 70 BPM
ATRIAL RATE: 71 BPM
ATRIAL RATE: 72 BPM
ATRIAL RATE: 74 BPM
ATRIAL RATE: 83 BPM
ATRIAL RATE: 83 BPM
B BURGDOR IGG PATRN SER IB-IMP: NEGATIVE
B BURGDOR IGM PATRN SER IB-IMP: NEGATIVE
B BURGDOR18KD IGG SER QL IB: ABNORMAL
B BURGDOR23KD IGG SER QL IB: PRESENT
B BURGDOR23KD IGM SER QL IB: ABNORMAL
B BURGDOR28KD IGG SER QL IB: ABNORMAL
B BURGDOR30KD IGG SER QL IB: ABNORMAL
B BURGDOR39KD IGG SER QL IB: ABNORMAL
B BURGDOR39KD IGM SER QL IB: ABNORMAL
B BURGDOR41KD IGG SER QL IB: PRESENT
B BURGDOR41KD IGM SER QL IB: ABNORMAL
B BURGDOR45KD IGG SER QL IB: ABNORMAL
B BURGDOR58KD IGG SER QL IB: ABNORMAL
B BURGDOR66KD IGG SER QL IB: ABNORMAL
B BURGDOR93KD IGG SER QL IB: ABNORMAL
BACTERIA SPEC CULT: NORMAL
BACTERIA SPEC CULT: NORMAL
BASE EXCESS BLDA CALC-SCNC: 2 MMOL/L
BASOPHILS # BLD AUTO: 0.1 X10E3/UL (ref 0–0.2)
BASOPHILS # BLD: 0 K/UL (ref 0–0.1)
BASOPHILS # BLD: 0.1 K/UL (ref 0–0.1)
BASOPHILS NFR BLD AUTO: 1 %
BASOPHILS NFR BLD: 0 % (ref 0–1)
BASOPHILS NFR BLD: 1 % (ref 0–1)
BDY SITE: ABNORMAL
BILIRUB SERPL-MCNC: 0.4 MG/DL (ref 0.2–1)
BILIRUB SERPL-MCNC: 0.5 MG/DL (ref 0.2–1)
BILIRUB SERPL-MCNC: 0.6 MG/DL (ref 0.2–1)
BILIRUB UR QL STRIP: NEGATIVE
BILIRUB UR QL: NEGATIVE
BNP SERPL-MCNC: 1375 PG/ML (ref 0–450)
BNP SERPL-MCNC: 2305 PG/ML (ref 0–450)
BNP SERPL-MCNC: 5187 PG/ML (ref 0–450)
BNP SERPL-MCNC: 6193 PG/ML (ref 0–450)
BNP SERPL-MCNC: 714 PG/ML (ref 0–450)
BNP SERPL-MCNC: 7369 PG/ML (ref 0–450)
BNP SERPL-MCNC: 984 PG/ML (ref 0–450)
BUN SERPL-MCNC: 12 MG/DL (ref 6–20)
BUN SERPL-MCNC: 14 MG/DL (ref 6–20)
BUN SERPL-MCNC: 15 MG/DL (ref 6–20)
BUN SERPL-MCNC: 15 MG/DL (ref 6–20)
BUN SERPL-MCNC: 18 MG/DL (ref 6–20)
BUN SERPL-MCNC: 19 MG/DL (ref 6–20)
BUN SERPL-MCNC: 19 MG/DL (ref 6–20)
BUN SERPL-MCNC: 20 MG/DL (ref 6–20)
BUN SERPL-MCNC: 21 MG/DL (ref 6–20)
BUN SERPL-MCNC: 21 MG/DL (ref 6–20)
BUN SERPL-MCNC: 22 MG/DL (ref 6–20)
BUN SERPL-MCNC: 23 MG/DL (ref 8–27)
BUN SERPL-MCNC: 27 MG/DL (ref 6–20)
BUN SERPL-MCNC: 28 MG/DL (ref 6–20)
BUN SERPL-MCNC: 29 MG/DL (ref 6–20)
BUN SERPL-MCNC: 31 MG/DL (ref 6–20)
BUN/CREAT SERPL: 13 (ref 12–20)
BUN/CREAT SERPL: 15 (ref 12–20)
BUN/CREAT SERPL: 17 (ref 12–20)
BUN/CREAT SERPL: 18 (ref 10–24)
BUN/CREAT SERPL: 18 (ref 12–20)
BUN/CREAT SERPL: 19 (ref 12–20)
BUN/CREAT SERPL: 19 (ref 12–20)
BUN/CREAT SERPL: 21 (ref 12–20)
BUN/CREAT SERPL: 21 (ref 12–20)
BUN/CREAT SERPL: 22 (ref 12–20)
BUN/CREAT SERPL: 22 (ref 12–20)
BUN/CREAT SERPL: 23 (ref 12–20)
BUN/CREAT SERPL: 24 (ref 12–20)
BUN/CREAT SERPL: 24 (ref 12–20)
BUN/CREAT SERPL: 28 (ref 12–20)
CALCIUM SERPL-MCNC: 10 MG/DL (ref 8.5–10.1)
CALCIUM SERPL-MCNC: 10 MG/DL (ref 8.5–10.1)
CALCIUM SERPL-MCNC: 10.2 MG/DL (ref 8.5–10.1)
CALCIUM SERPL-MCNC: 9 MG/DL (ref 8.5–10.1)
CALCIUM SERPL-MCNC: 9.2 MG/DL (ref 8.5–10.1)
CALCIUM SERPL-MCNC: 9.2 MG/DL (ref 8.5–10.1)
CALCIUM SERPL-MCNC: 9.3 MG/DL (ref 8.5–10.1)
CALCIUM SERPL-MCNC: 9.4 MG/DL (ref 8.5–10.1)
CALCIUM SERPL-MCNC: 9.5 MG/DL (ref 8.5–10.1)
CALCIUM SERPL-MCNC: 9.6 MG/DL (ref 8.5–10.1)
CALCIUM SERPL-MCNC: 9.7 MG/DL (ref 8.5–10.1)
CALCIUM SERPL-MCNC: 9.8 MG/DL (ref 8.5–10.1)
CALCIUM SERPL-MCNC: 9.8 MG/DL (ref 8.6–10.2)
CALCIUM SERPL-MCNC: 9.9 MG/DL (ref 8.5–10.1)
CALCULATED P AXIS, ECG09: 110 DEGREES
CALCULATED P AXIS, ECG09: 12 DEGREES
CALCULATED P AXIS, ECG09: 21 DEGREES
CALCULATED P AXIS, ECG09: 23 DEGREES
CALCULATED P AXIS, ECG09: 29 DEGREES
CALCULATED P AXIS, ECG09: 7 DEGREES
CALCULATED R AXIS, ECG10: -4 DEGREES
CALCULATED R AXIS, ECG10: -5 DEGREES
CALCULATED R AXIS, ECG10: 10 DEGREES
CALCULATED R AXIS, ECG10: 17 DEGREES
CALCULATED R AXIS, ECG10: 26 DEGREES
CALCULATED R AXIS, ECG10: 41 DEGREES
CALCULATED R AXIS, ECG10: 8 DEGREES
CALCULATED T AXIS, ECG11: 124 DEGREES
CALCULATED T AXIS, ECG11: 137 DEGREES
CALCULATED T AXIS, ECG11: 139 DEGREES
CALCULATED T AXIS, ECG11: 144 DEGREES
CALCULATED T AXIS, ECG11: 150 DEGREES
CALCULATED T AXIS, ECG11: 156 DEGREES
CALCULATED T AXIS, ECG11: 164 DEGREES
CCP IGA+IGG SERPL IA-ACNC: 7 UNITS (ref 0–19)
CHLORIDE SERPL-SCNC: 100 MMOL/L (ref 97–108)
CHLORIDE SERPL-SCNC: 101 MMOL/L (ref 97–108)
CHLORIDE SERPL-SCNC: 102 MMOL/L (ref 96–106)
CHLORIDE SERPL-SCNC: 102 MMOL/L (ref 97–108)
CHLORIDE SERPL-SCNC: 102 MMOL/L (ref 97–108)
CHLORIDE SERPL-SCNC: 103 MMOL/L (ref 97–108)
CHLORIDE SERPL-SCNC: 104 MMOL/L (ref 97–108)
CHLORIDE SERPL-SCNC: 89 MMOL/L (ref 97–108)
CHLORIDE SERPL-SCNC: 90 MMOL/L (ref 97–108)
CHLORIDE SERPL-SCNC: 90 MMOL/L (ref 97–108)
CHLORIDE SERPL-SCNC: 92 MMOL/L (ref 97–108)
CHLORIDE SERPL-SCNC: 94 MMOL/L (ref 97–108)
CHLORIDE SERPL-SCNC: 95 MMOL/L (ref 97–108)
CHLORIDE SERPL-SCNC: 96 MMOL/L (ref 97–108)
CHOLEST SERPL-MCNC: 168 MG/DL
CK SERPL-CCNC: 22 U/L (ref 39–308)
CK SERPL-CCNC: 42 U/L (ref 41–331)
CK SERPL-CCNC: 61 U/L (ref 39–308)
CO2 SERPL-SCNC: 24 MMOL/L (ref 20–29)
CO2 SERPL-SCNC: 27 MMOL/L (ref 21–32)
CO2 SERPL-SCNC: 27 MMOL/L (ref 21–32)
CO2 SERPL-SCNC: 28 MMOL/L (ref 21–32)
CO2 SERPL-SCNC: 28 MMOL/L (ref 21–32)
CO2 SERPL-SCNC: 29 MMOL/L (ref 21–32)
CO2 SERPL-SCNC: 30 MMOL/L (ref 21–32)
CO2 SERPL-SCNC: 31 MMOL/L (ref 21–32)
CO2 SERPL-SCNC: 32 MMOL/L (ref 21–32)
CO2 SERPL-SCNC: 32 MMOL/L (ref 21–32)
CO2 SERPL-SCNC: 33 MMOL/L (ref 21–32)
COLOR UR: ABNORMAL
COMMENT, HOLDF: NORMAL
COVID-19 RAPID TEST, COVR: NOT DETECTED
COVID-19 RAPID TEST, COVR: NOT DETECTED
CREAT SERPL-MCNC: 0.81 MG/DL (ref 0.7–1.3)
CREAT SERPL-MCNC: 0.85 MG/DL (ref 0.7–1.3)
CREAT SERPL-MCNC: 0.9 MG/DL (ref 0.7–1.3)
CREAT SERPL-MCNC: 0.95 MG/DL (ref 0.7–1.3)
CREAT SERPL-MCNC: 0.97 MG/DL (ref 0.7–1.3)
CREAT SERPL-MCNC: 0.97 MG/DL (ref 0.7–1.3)
CREAT SERPL-MCNC: 1.02 MG/DL (ref 0.7–1.3)
CREAT SERPL-MCNC: 1.04 MG/DL (ref 0.7–1.3)
CREAT SERPL-MCNC: 1.06 MG/DL (ref 0.7–1.3)
CREAT SERPL-MCNC: 1.07 MG/DL (ref 0.7–1.3)
CREAT SERPL-MCNC: 1.08 MG/DL (ref 0.7–1.3)
CREAT SERPL-MCNC: 1.15 MG/DL (ref 0.7–1.3)
CREAT SERPL-MCNC: 1.17 MG/DL (ref 0.7–1.3)
CREAT SERPL-MCNC: 1.17 MG/DL (ref 0.7–1.3)
CREAT SERPL-MCNC: 1.2 MG/DL (ref 0.7–1.3)
CREAT SERPL-MCNC: 1.23 MG/DL (ref 0.7–1.3)
CREAT SERPL-MCNC: 1.25 MG/DL (ref 0.76–1.27)
CREAT SERPL-MCNC: 1.27 MG/DL (ref 0.7–1.3)
CREAT SERPL-MCNC: 1.36 MG/DL (ref 0.7–1.3)
CREAT SERPL-MCNC: 1.37 MG/DL (ref 0.7–1.3)
CRP SERPL-MCNC: 61 MG/L (ref 0–10)
DIAGNOSIS, 93000: NORMAL
DIFFERENTIAL METHOD BLD: ABNORMAL
ECHO AO ROOT DIAM: 3.9 CM
ECHO AO ROOT DIAM: 4.4 CM
ECHO AO ROOT INDEX: 2.11 CM/M2
ECHO AO ROOT INDEX: 2.43 CM/M2
ECHO AR MAX VEL PISA: 2.9 M/S
ECHO AR MAX VEL PISA: 3.2 M/S
ECHO AV PEAK GRADIENT: 4 MMHG
ECHO AV PEAK GRADIENT: 5 MMHG
ECHO AV PEAK VELOCITY: 1.1 M/S
ECHO AV PEAK VELOCITY: 1.1 M/S
ECHO AV REGURGITANT PHT: 556.4 MILLISECOND
ECHO AV REGURGITANT PHT: 668.9 MILLISECOND
ECHO EST RA PRESSURE: 10 MMHG
ECHO EST RA PRESSURE: 10 MMHG
ECHO LA DIAMETER INDEX: 1.89 CM/M2
ECHO LA DIAMETER INDEX: 2.32 CM/M2
ECHO LA DIAMETER: 3.5 CM
ECHO LA DIAMETER: 4.2 CM
ECHO LA TO AORTIC ROOT RATIO: 0.9
ECHO LA TO AORTIC ROOT RATIO: 0.95
ECHO LA VOL 2C: 56 ML (ref 18–58)
ECHO LA VOL 4C: 76 ML (ref 18–58)
ECHO LA VOLUME AREA LENGTH: 72 ML
ECHO LA VOLUME INDEX A2C: 31 ML/M2 (ref 16–34)
ECHO LA VOLUME INDEX A4C: 42 ML/M2 (ref 16–34)
ECHO LA VOLUME INDEX AREA LENGTH: 40 ML/M2 (ref 16–34)
ECHO LV E' SEPTAL VELOCITY: 4 CM/S
ECHO LV E' SEPTAL VELOCITY: 4 CM/S
ECHO LV EDV A2C: 121 ML
ECHO LV EDV A2C: 128 ML
ECHO LV EDV A4C: 125 ML
ECHO LV EDV A4C: 128 ML
ECHO LV EDV BP: 126 ML (ref 67–155)
ECHO LV EDV BP: 131 ML (ref 67–155)
ECHO LV EDV INDEX A4C: 69 ML/M2
ECHO LV EDV INDEX A4C: 69 ML/M2
ECHO LV EDV INDEX BP: 70 ML/M2
ECHO LV EDV INDEX BP: 71 ML/M2
ECHO LV EDV NDEX A2C: 67 ML/M2
ECHO LV EDV NDEX A2C: 69 ML/M2
ECHO LV EJECTION FRACTION A2C: 20 %
ECHO LV EJECTION FRACTION A2C: 37 %
ECHO LV EJECTION FRACTION A4C: 18 %
ECHO LV EJECTION FRACTION A4C: 21 %
ECHO LV EJECTION FRACTION BIPLANE: 18 % (ref 55–100)
ECHO LV EJECTION FRACTION BIPLANE: 27 % (ref 55–100)
ECHO LV EJECTION FRACTION BIPLANE: 27 % (ref 55–100)
ECHO LV ESV A2C: 81 ML
ECHO LV ESV A2C: 97 ML
ECHO LV ESV A4C: 102 ML
ECHO LV ESV A4C: 103 ML
ECHO LV ESV BP: 103 ML (ref 22–58)
ECHO LV ESV BP: 95 ML (ref 22–58)
ECHO LV ESV INDEX A2C: 44 ML/M2
ECHO LV ESV INDEX A2C: 54 ML/M2
ECHO LV ESV INDEX A4C: 55 ML/M2
ECHO LV ESV INDEX A4C: 57 ML/M2
ECHO LV ESV INDEX BP: 51 ML/M2
ECHO LV ESV INDEX BP: 57 ML/M2
ECHO LV FRACTIONAL SHORTENING: 13 % (ref 28–44)
ECHO LV FRACTIONAL SHORTENING: 5 % (ref 28–44)
ECHO LV INTERNAL DIMENSION DIASTOLE INDEX: 2.87 CM/M2
ECHO LV INTERNAL DIMENSION DIASTOLE INDEX: 3.03 CM/M2
ECHO LV INTERNAL DIMENSION DIASTOLIC: 5.2 CM (ref 4.2–5.9)
ECHO LV INTERNAL DIMENSION DIASTOLIC: 5.6 CM (ref 4.2–5.9)
ECHO LV INTERNAL DIMENSION SYSTOLIC INDEX: 2.49 CM/M2
ECHO LV INTERNAL DIMENSION SYSTOLIC INDEX: 2.86 CM/M2
ECHO LV INTERNAL DIMENSION SYSTOLIC: 4.5 CM
ECHO LV INTERNAL DIMENSION SYSTOLIC: 5.3 CM
ECHO LV IVSD: 1 CM (ref 0.6–1)
ECHO LV IVSD: 1.1 CM (ref 0.6–1)
ECHO LV MASS 2D: 194.2 G (ref 88–224)
ECHO LV MASS 2D: 234.3 G (ref 88–224)
ECHO LV MASS INDEX 2D: 107.3 G/M2 (ref 49–115)
ECHO LV MASS INDEX 2D: 126.7 G/M2 (ref 49–115)
ECHO LV POSTERIOR WALL DIASTOLIC: 1 CM (ref 0.6–1)
ECHO LV POSTERIOR WALL DIASTOLIC: 1 CM (ref 0.6–1)
ECHO LV RELATIVE WALL THICKNESS RATIO: 0.36
ECHO LV RELATIVE WALL THICKNESS RATIO: 0.38
ECHO LVOT AREA: 3.8 CM2
ECHO LVOT AREA: 5.3 CM2
ECHO LVOT DIAM: 2.2 CM
ECHO LVOT DIAM: 2.6 CM
ECHO MV A VELOCITY: 0.56 M/S
ECHO MV A VELOCITY: 0.6 M/S
ECHO MV E DECELERATION TIME (DT): 284.7 MS
ECHO MV E DECELERATION TIME (DT): 397.9 MS
ECHO MV E VELOCITY: 0.28 M/S
ECHO MV E VELOCITY: 0.28 M/S
ECHO MV E/A RATIO: 0.47
ECHO MV E/A RATIO: 0.5
ECHO MV E/E' SEPTAL: 7
ECHO MV E/E' SEPTAL: 7
ECHO PULMONARY ARTERY END DIASTOLIC PRESSURE: 8 MMHG
ECHO PV REGURGITANT MAX VELOCITY: 1.4 M/S
ECHO RIGHT VENTRICULAR SYSTOLIC PRESSURE (RVSP): 35 MMHG
ECHO RIGHT VENTRICULAR SYSTOLIC PRESSURE (RVSP): 49 MMHG
ECHO TV REGURGITANT MAX VELOCITY: 2.52 M/S
ECHO TV REGURGITANT MAX VELOCITY: 3.12 M/S
ECHO TV REGURGITANT PEAK GRADIENT: 26 MMHG
ECHO TV REGURGITANT PEAK GRADIENT: 39 MMHG
EGFR: 59 ML/MIN/1.73
EOSINOPHIL # BLD AUTO: 0.1 X10E3/UL (ref 0–0.4)
EOSINOPHIL # BLD: 0 K/UL (ref 0–0.4)
EOSINOPHIL # BLD: 0.1 K/UL (ref 0–0.4)
EOSINOPHIL # BLD: 0.2 K/UL (ref 0–0.4)
EOSINOPHIL # BLD: 0.3 K/UL (ref 0–0.4)
EOSINOPHIL NFR BLD AUTO: 1 %
EOSINOPHIL NFR BLD: 0 % (ref 0–7)
EOSINOPHIL NFR BLD: 1 % (ref 0–7)
EOSINOPHIL NFR BLD: 2 % (ref 0–7)
EOSINOPHIL NFR BLD: 3 % (ref 0–7)
EOSINOPHIL NFR BLD: 3 % (ref 0–7)
ERYTHROCYTE [DISTWIDTH] IN BLOOD BY AUTOMATED COUNT: 13.5 % (ref 11.5–14.5)
ERYTHROCYTE [DISTWIDTH] IN BLOOD BY AUTOMATED COUNT: 13.5 % (ref 11.5–14.5)
ERYTHROCYTE [DISTWIDTH] IN BLOOD BY AUTOMATED COUNT: 13.7 % (ref 11.5–14.5)
ERYTHROCYTE [DISTWIDTH] IN BLOOD BY AUTOMATED COUNT: 13.9 % (ref 11.5–14.5)
ERYTHROCYTE [DISTWIDTH] IN BLOOD BY AUTOMATED COUNT: 13.9 % (ref 11.5–14.5)
ERYTHROCYTE [DISTWIDTH] IN BLOOD BY AUTOMATED COUNT: 14 % (ref 11.5–14.5)
ERYTHROCYTE [DISTWIDTH] IN BLOOD BY AUTOMATED COUNT: 14.1 % (ref 11.5–14.5)
ERYTHROCYTE [DISTWIDTH] IN BLOOD BY AUTOMATED COUNT: 14.1 % (ref 11.6–15.4)
ERYTHROCYTE [DISTWIDTH] IN BLOOD BY AUTOMATED COUNT: 14.2 % (ref 11.5–14.5)
ERYTHROCYTE [DISTWIDTH] IN BLOOD BY AUTOMATED COUNT: 14.2 % (ref 11.5–14.5)
ERYTHROCYTE [DISTWIDTH] IN BLOOD BY AUTOMATED COUNT: 14.3 % (ref 11.5–14.5)
ERYTHROCYTE [DISTWIDTH] IN BLOOD BY AUTOMATED COUNT: 14.3 % (ref 11.5–14.5)
ERYTHROCYTE [DISTWIDTH] IN BLOOD BY AUTOMATED COUNT: 14.5 % (ref 11.5–14.5)
ERYTHROCYTE [DISTWIDTH] IN BLOOD BY AUTOMATED COUNT: 14.6 % (ref 11.5–14.5)
ERYTHROCYTE [DISTWIDTH] IN BLOOD BY AUTOMATED COUNT: 14.6 % (ref 11.5–14.5)
ERYTHROCYTE [SEDIMENTATION RATE] IN BLOOD BY WESTERGREN METHOD: 5 MM/HR (ref 0–30)
FIO2 ON VENT: 28 %
FLUAV RNA SPEC QL NAA+PROBE: NOT DETECTED
FLUBV RNA SPEC QL NAA+PROBE: NOT DETECTED
FLUID CULTURE, SPNG2: NORMAL
GAS FLOW.O2 O2 DELIVERY SYS: 2 L/MIN
GLOBULIN SER CALC-MCNC: 3.4 G/DL (ref 2–4)
GLOBULIN SER CALC-MCNC: 3.8 G/DL (ref 2–4)
GLOBULIN SER CALC-MCNC: 4 G/DL (ref 2–4)
GLOBULIN SER CALC-MCNC: 4.2 G/DL (ref 2–4)
GLOBULIN SER CALC-MCNC: 4.2 G/DL (ref 2–4)
GLOBULIN SER CALC-MCNC: 4.3 G/DL (ref 2–4)
GLOBULIN SER CALC-MCNC: 4.4 G/DL (ref 2–4)
GLUCOSE SERPL-MCNC: 100 MG/DL (ref 65–100)
GLUCOSE SERPL-MCNC: 101 MG/DL (ref 65–100)
GLUCOSE SERPL-MCNC: 102 MG/DL (ref 65–100)
GLUCOSE SERPL-MCNC: 102 MG/DL (ref 65–100)
GLUCOSE SERPL-MCNC: 103 MG/DL (ref 65–100)
GLUCOSE SERPL-MCNC: 103 MG/DL (ref 65–100)
GLUCOSE SERPL-MCNC: 106 MG/DL (ref 65–100)
GLUCOSE SERPL-MCNC: 107 MG/DL (ref 65–100)
GLUCOSE SERPL-MCNC: 107 MG/DL (ref 65–100)
GLUCOSE SERPL-MCNC: 108 MG/DL (ref 65–100)
GLUCOSE SERPL-MCNC: 110 MG/DL (ref 65–100)
GLUCOSE SERPL-MCNC: 113 MG/DL (ref 65–100)
GLUCOSE SERPL-MCNC: 113 MG/DL (ref 65–100)
GLUCOSE SERPL-MCNC: 124 MG/DL (ref 65–100)
GLUCOSE SERPL-MCNC: 128 MG/DL (ref 65–100)
GLUCOSE SERPL-MCNC: 135 MG/DL (ref 65–100)
GLUCOSE SERPL-MCNC: 147 MG/DL (ref 65–100)
GLUCOSE SERPL-MCNC: 148 MG/DL (ref 65–100)
GLUCOSE SERPL-MCNC: 162 MG/DL (ref 65–100)
GLUCOSE SERPL-MCNC: 92 MG/DL (ref 65–100)
GLUCOSE SERPL-MCNC: 97 MG/DL (ref 65–100)
GLUCOSE SERPL-MCNC: 99 MG/DL (ref 65–99)
GLUCOSE UR STRIP.AUTO-MCNC: NEGATIVE MG/DL
GLUCOSE UR-MCNC: NEGATIVE MG/DL
HCO3 BLDA-SCNC: 28 MMOL/L (ref 22–26)
HCT VFR BLD AUTO: 29.1 % (ref 36.6–50.3)
HCT VFR BLD AUTO: 30.1 % (ref 36.6–50.3)
HCT VFR BLD AUTO: 30.1 % (ref 36.6–50.3)
HCT VFR BLD AUTO: 30.2 % (ref 36.6–50.3)
HCT VFR BLD AUTO: 30.3 % (ref 36.6–50.3)
HCT VFR BLD AUTO: 30.3 % (ref 36.6–50.3)
HCT VFR BLD AUTO: 30.4 % (ref 36.6–50.3)
HCT VFR BLD AUTO: 30.4 % (ref 36.6–50.3)
HCT VFR BLD AUTO: 31.9 % (ref 36.6–50.3)
HCT VFR BLD AUTO: 32.4 % (ref 36.6–50.3)
HCT VFR BLD AUTO: 34.1 % (ref 36.6–50.3)
HCT VFR BLD AUTO: 34.4 % (ref 36.6–50.3)
HCT VFR BLD AUTO: 34.5 % (ref 36.6–50.3)
HCT VFR BLD AUTO: 35.2 % (ref 36.6–50.3)
HCT VFR BLD AUTO: 35.4 % (ref 36.6–50.3)
HCT VFR BLD AUTO: 35.6 % (ref 36.6–50.3)
HCT VFR BLD AUTO: 35.6 % (ref 36.6–50.3)
HCT VFR BLD AUTO: 37.4 % (ref 36.6–50.3)
HCT VFR BLD AUTO: 37.5 % (ref 36.6–50.3)
HCT VFR BLD AUTO: 39.8 % (ref 37.5–51)
HDLC SERPL-MCNC: 75 MG/DL
HDLC SERPL: 2.2 {RATIO} (ref 0–5)
HGB BLD-MCNC: 10 G/DL (ref 12.1–17)
HGB BLD-MCNC: 10.1 G/DL (ref 12.1–17)
HGB BLD-MCNC: 10.1 G/DL (ref 12.1–17)
HGB BLD-MCNC: 10.4 G/DL (ref 12.1–17)
HGB BLD-MCNC: 10.7 G/DL (ref 12.1–17)
HGB BLD-MCNC: 11 G/DL (ref 12.1–17)
HGB BLD-MCNC: 11 G/DL (ref 12.1–17)
HGB BLD-MCNC: 11.1 G/DL (ref 12.1–17)
HGB BLD-MCNC: 11.1 G/DL (ref 12.1–17)
HGB BLD-MCNC: 11.3 G/DL (ref 12.1–17)
HGB BLD-MCNC: 11.3 G/DL (ref 12.1–17)
HGB BLD-MCNC: 11.9 G/DL (ref 12.1–17)
HGB BLD-MCNC: 12 G/DL (ref 12.1–17)
HGB BLD-MCNC: 12.2 G/DL (ref 13–17.7)
HGB BLD-MCNC: 9.6 G/DL (ref 12.1–17)
HGB BLD-MCNC: 9.9 G/DL (ref 12.1–17)
HGB UR QL STRIP: NEGATIVE
IMM GRANULOCYTES # BLD AUTO: 0 K/UL (ref 0–0.04)
IMM GRANULOCYTES # BLD AUTO: 0 X10E3/UL (ref 0–0.1)
IMM GRANULOCYTES # BLD AUTO: 0.1 K/UL (ref 0–0.04)
IMM GRANULOCYTES NFR BLD AUTO: 0 %
IMM GRANULOCYTES NFR BLD AUTO: 0 % (ref 0–0.5)
IMM GRANULOCYTES NFR BLD AUTO: 1 % (ref 0–0.5)
INR PPP: 1.1 (ref 0.9–1.1)
INTERPRETATION: NORMAL
KETONES P FAST UR STRIP-MCNC: NEGATIVE MG/DL
KETONES UR QL STRIP.AUTO: NEGATIVE MG/DL
L PNEUMO1 AG UR QL IA: NEGATIVE
LACTATE SERPL-SCNC: 0.8 MMOL/L (ref 0.4–2)
LACTATE SERPL-SCNC: 1.8 MMOL/L (ref 0.4–2)
LDLC SERPL CALC-MCNC: 82.8 MG/DL (ref 0–100)
LEUKOCYTE ESTERASE UR QL STRIP.AUTO: NEGATIVE
LYMPHOCYTES # BLD AUTO: 1.6 X10E3/UL (ref 0.7–3.1)
LYMPHOCYTES # BLD: 0.8 K/UL (ref 0.8–3.5)
LYMPHOCYTES # BLD: 0.9 K/UL (ref 0.8–3.5)
LYMPHOCYTES # BLD: 0.9 K/UL (ref 0.8–3.5)
LYMPHOCYTES # BLD: 1 K/UL (ref 0.8–3.5)
LYMPHOCYTES # BLD: 1 K/UL (ref 0.8–3.5)
LYMPHOCYTES # BLD: 1.2 K/UL (ref 0.8–3.5)
LYMPHOCYTES # BLD: 1.2 K/UL (ref 0.8–3.5)
LYMPHOCYTES # BLD: 1.5 K/UL (ref 0.8–3.5)
LYMPHOCYTES # BLD: 1.5 K/UL (ref 0.8–3.5)
LYMPHOCYTES # BLD: 1.7 K/UL (ref 0.8–3.5)
LYMPHOCYTES # BLD: 1.7 K/UL (ref 0.8–3.5)
LYMPHOCYTES # BLD: 1.8 K/UL (ref 0.8–3.5)
LYMPHOCYTES # BLD: 1.9 K/UL (ref 0.8–3.5)
LYMPHOCYTES # BLD: 2.1 K/UL (ref 0.8–3.5)
LYMPHOCYTES NFR BLD AUTO: 17 %
LYMPHOCYTES NFR BLD: 10 % (ref 12–49)
LYMPHOCYTES NFR BLD: 10 % (ref 12–49)
LYMPHOCYTES NFR BLD: 11 % (ref 12–49)
LYMPHOCYTES NFR BLD: 11 % (ref 12–49)
LYMPHOCYTES NFR BLD: 12 % (ref 12–49)
LYMPHOCYTES NFR BLD: 12 % (ref 12–49)
LYMPHOCYTES NFR BLD: 13 % (ref 12–49)
LYMPHOCYTES NFR BLD: 14 % (ref 12–49)
LYMPHOCYTES NFR BLD: 15 % (ref 12–49)
LYMPHOCYTES NFR BLD: 17 % (ref 12–49)
LYMPHOCYTES NFR BLD: 19 % (ref 12–49)
LYMPHOCYTES NFR BLD: 21 % (ref 12–49)
LYMPHOCYTES NFR BLD: 22 % (ref 12–49)
LYMPHOCYTES NFR BLD: 23 % (ref 12–49)
MAGNESIUM SERPL-MCNC: 1.6 MG/DL (ref 1.6–2.4)
MAGNESIUM SERPL-MCNC: 1.7 MG/DL (ref 1.6–2.4)
MAGNESIUM SERPL-MCNC: 1.8 MG/DL (ref 1.6–2.4)
MAGNESIUM SERPL-MCNC: 2 MG/DL (ref 1.6–2.3)
MAGNESIUM SERPL-MCNC: 2 MG/DL (ref 1.6–2.4)
MAGNESIUM SERPL-MCNC: 2.1 MG/DL (ref 1.6–2.4)
MAGNESIUM SERPL-MCNC: 2.2 MG/DL (ref 1.6–2.4)
MCH RBC QN AUTO: 27.2 PG (ref 26–34)
MCH RBC QN AUTO: 27.2 PG (ref 26–34)
MCH RBC QN AUTO: 27.4 PG (ref 26–34)
MCH RBC QN AUTO: 27.5 PG (ref 26–34)
MCH RBC QN AUTO: 27.5 PG (ref 26–34)
MCH RBC QN AUTO: 27.7 PG (ref 26–34)
MCH RBC QN AUTO: 27.7 PG (ref 26–34)
MCH RBC QN AUTO: 27.9 PG (ref 26–34)
MCH RBC QN AUTO: 27.9 PG (ref 26–34)
MCH RBC QN AUTO: 28 PG (ref 26–34)
MCH RBC QN AUTO: 28.1 PG (ref 26.6–33)
MCH RBC QN AUTO: 28.2 PG (ref 26–34)
MCH RBC QN AUTO: 28.3 PG (ref 26–34)
MCH RBC QN AUTO: 28.3 PG (ref 26–34)
MCH RBC QN AUTO: 28.7 PG (ref 26–34)
MCH RBC QN AUTO: 28.8 PG (ref 26–34)
MCH RBC QN AUTO: 29 PG (ref 26–34)
MCH RBC QN AUTO: 29.1 PG (ref 26–34)
MCHC RBC AUTO-ENTMCNC: 30.7 G/DL (ref 31.5–35.7)
MCHC RBC AUTO-ENTMCNC: 31.3 G/DL (ref 30–36.5)
MCHC RBC AUTO-ENTMCNC: 31.4 G/DL (ref 30–36.5)
MCHC RBC AUTO-ENTMCNC: 31.5 G/DL (ref 30–36.5)
MCHC RBC AUTO-ENTMCNC: 31.7 G/DL (ref 30–36.5)
MCHC RBC AUTO-ENTMCNC: 31.9 G/DL (ref 30–36.5)
MCHC RBC AUTO-ENTMCNC: 32 G/DL (ref 30–36.5)
MCHC RBC AUTO-ENTMCNC: 32.1 G/DL (ref 30–36.5)
MCHC RBC AUTO-ENTMCNC: 32.7 G/DL (ref 30–36.5)
MCHC RBC AUTO-ENTMCNC: 32.7 G/DL (ref 30–36.5)
MCHC RBC AUTO-ENTMCNC: 32.8 G/DL (ref 30–36.5)
MCHC RBC AUTO-ENTMCNC: 32.9 G/DL (ref 30–36.5)
MCHC RBC AUTO-ENTMCNC: 32.9 G/DL (ref 30–36.5)
MCHC RBC AUTO-ENTMCNC: 33 G/DL (ref 30–36.5)
MCHC RBC AUTO-ENTMCNC: 33.6 G/DL (ref 30–36.5)
MCHC RBC AUTO-ENTMCNC: 33.6 G/DL (ref 30–36.5)
MCV RBC AUTO: 82.5 FL (ref 80–99)
MCV RBC AUTO: 82.6 FL (ref 80–99)
MCV RBC AUTO: 83 FL (ref 80–99)
MCV RBC AUTO: 83.1 FL (ref 80–99)
MCV RBC AUTO: 83.1 FL (ref 80–99)
MCV RBC AUTO: 83.7 FL (ref 80–99)
MCV RBC AUTO: 84.2 FL (ref 80–99)
MCV RBC AUTO: 84.9 FL (ref 80–99)
MCV RBC AUTO: 88.2 FL (ref 80–99)
MCV RBC AUTO: 88.7 FL (ref 80–99)
MCV RBC AUTO: 88.9 FL (ref 80–99)
MCV RBC AUTO: 89.2 FL (ref 80–99)
MCV RBC AUTO: 89.5 FL (ref 80–99)
MCV RBC AUTO: 90.3 FL (ref 80–99)
MCV RBC AUTO: 90.4 FL (ref 80–99)
MCV RBC AUTO: 91.5 FL (ref 80–99)
MCV RBC AUTO: 91.8 FL (ref 80–99)
MCV RBC AUTO: 92 FL (ref 79–97)
MONOCYTES # BLD AUTO: 0.7 X10E3/UL (ref 0.1–0.9)
MONOCYTES # BLD: 0.1 K/UL (ref 0–1)
MONOCYTES # BLD: 0.2 K/UL (ref 0–1)
MONOCYTES # BLD: 0.7 K/UL (ref 0–1)
MONOCYTES # BLD: 0.8 K/UL (ref 0–1)
MONOCYTES # BLD: 0.8 K/UL (ref 0–1)
MONOCYTES # BLD: 0.9 K/UL (ref 0–1)
MONOCYTES # BLD: 1 K/UL (ref 0–1)
MONOCYTES # BLD: 1.1 K/UL (ref 0–1)
MONOCYTES # BLD: 1.3 K/UL (ref 0–1)
MONOCYTES NFR BLD AUTO: 8 %
MONOCYTES NFR BLD: 1 % (ref 5–13)
MONOCYTES NFR BLD: 10 % (ref 5–13)
MONOCYTES NFR BLD: 11 % (ref 5–13)
MONOCYTES NFR BLD: 12 % (ref 5–13)
MONOCYTES NFR BLD: 12 % (ref 5–13)
MONOCYTES NFR BLD: 3 % (ref 5–13)
MONOCYTES NFR BLD: 8 % (ref 5–13)
MONOCYTES NFR BLD: 9 % (ref 5–13)
MONOCYTES NFR BLD: 9 % (ref 5–13)
NEUTROPHILS # BLD AUTO: 7 X10E3/UL (ref 1.4–7)
NEUTROPHILS NFR BLD AUTO: 73 %
NEUTS SEG # BLD: 5.3 K/UL (ref 1.8–8)
NEUTS SEG # BLD: 5.3 K/UL (ref 1.8–8)
NEUTS SEG # BLD: 5.4 K/UL (ref 1.8–8)
NEUTS SEG # BLD: 5.6 K/UL (ref 1.8–8)
NEUTS SEG # BLD: 5.8 K/UL (ref 1.8–8)
NEUTS SEG # BLD: 5.9 K/UL (ref 1.8–8)
NEUTS SEG # BLD: 6.1 K/UL (ref 1.8–8)
NEUTS SEG # BLD: 6.4 K/UL (ref 1.8–8)
NEUTS SEG # BLD: 6.8 K/UL (ref 1.8–8)
NEUTS SEG # BLD: 6.9 K/UL (ref 1.8–8)
NEUTS SEG # BLD: 7.6 K/UL (ref 1.8–8)
NEUTS SEG # BLD: 8.4 K/UL (ref 1.8–8)
NEUTS SEG # BLD: 9 K/UL (ref 1.8–8)
NEUTS SEG # BLD: 9.6 K/UL (ref 1.8–8)
NEUTS SEG NFR BLD: 66 % (ref 32–75)
NEUTS SEG NFR BLD: 67 % (ref 32–75)
NEUTS SEG NFR BLD: 67 % (ref 32–75)
NEUTS SEG NFR BLD: 68 % (ref 32–75)
NEUTS SEG NFR BLD: 71 % (ref 32–75)
NEUTS SEG NFR BLD: 72 % (ref 32–75)
NEUTS SEG NFR BLD: 73 % (ref 32–75)
NEUTS SEG NFR BLD: 74 % (ref 32–75)
NEUTS SEG NFR BLD: 76 % (ref 32–75)
NEUTS SEG NFR BLD: 76 % (ref 32–75)
NEUTS SEG NFR BLD: 77 % (ref 32–75)
NEUTS SEG NFR BLD: 78 % (ref 32–75)
NEUTS SEG NFR BLD: 87 % (ref 32–75)
NEUTS SEG NFR BLD: 88 % (ref 32–75)
NITRITE UR QL STRIP.AUTO: NEGATIVE
NRBC # BLD: 0 K/UL (ref 0–0.01)
NRBC BLD-RTO: 0 PER 100 WBC
NT-PROBNP SERPL-MCNC: 1578 PG/ML (ref 0–486)
NUC STRESS EJECTION FRACTION: 32 %
ORGANISM ID, SPNG3: NORMAL
P-R INTERVAL, ECG05: 160 MS
P-R INTERVAL, ECG05: 166 MS
P-R INTERVAL, ECG05: 168 MS
P-R INTERVAL, ECG05: 170 MS
P-R INTERVAL, ECG05: 172 MS
P-R INTERVAL, ECG05: 176 MS
P-R INTERVAL, ECG05: 178 MS
PCO2 BLDA: 47 MMHG (ref 35–45)
PH BLDA: 7.39 [PH] (ref 7.35–7.45)
PH UR STRIP: 6 [PH] (ref 4.6–8)
PH UR STRIP: 6 [PH] (ref 5–8)
PLATELET # BLD AUTO: 190 K/UL (ref 150–400)
PLATELET # BLD AUTO: 202 K/UL (ref 150–400)
PLATELET # BLD AUTO: 217 K/UL (ref 150–400)
PLATELET # BLD AUTO: 222 K/UL (ref 150–400)
PLATELET # BLD AUTO: 224 K/UL (ref 150–400)
PLATELET # BLD AUTO: 232 K/UL (ref 150–400)
PLATELET # BLD AUTO: 252 K/UL (ref 150–400)
PLATELET # BLD AUTO: 257 K/UL (ref 150–400)
PLATELET # BLD AUTO: 257 K/UL (ref 150–400)
PLATELET # BLD AUTO: 267 K/UL (ref 150–400)
PLATELET # BLD AUTO: 272 K/UL (ref 150–400)
PLATELET # BLD AUTO: 273 K/UL (ref 150–400)
PLATELET # BLD AUTO: 279 K/UL (ref 150–400)
PLATELET # BLD AUTO: 285 K/UL (ref 150–400)
PLATELET # BLD AUTO: 287 K/UL (ref 150–400)
PLATELET # BLD AUTO: 290 K/UL (ref 150–400)
PLATELET # BLD AUTO: 306 X10E3/UL (ref 150–450)
PLATELET # BLD AUTO: 312 K/UL (ref 150–400)
PLEASE NOTE, SPNG4: NORMAL
PMV BLD AUTO: 10 FL (ref 8.9–12.9)
PMV BLD AUTO: 10.2 FL (ref 8.9–12.9)
PMV BLD AUTO: 10.3 FL (ref 8.9–12.9)
PMV BLD AUTO: 10.4 FL (ref 8.9–12.9)
PMV BLD AUTO: 10.4 FL (ref 8.9–12.9)
PMV BLD AUTO: 10.5 FL (ref 8.9–12.9)
PMV BLD AUTO: 10.6 FL (ref 8.9–12.9)
PMV BLD AUTO: 10.8 FL (ref 8.9–12.9)
PMV BLD AUTO: 11 FL (ref 8.9–12.9)
PMV BLD AUTO: 9.6 FL (ref 8.9–12.9)
PMV BLD AUTO: 9.7 FL (ref 8.9–12.9)
PMV BLD AUTO: 9.7 FL (ref 8.9–12.9)
PMV BLD AUTO: 9.9 FL (ref 8.9–12.9)
PO2 BLDA: 67 MMHG (ref 80–100)
POTASSIUM SERPL-SCNC: 3.8 MMOL/L (ref 3.5–5.1)
POTASSIUM SERPL-SCNC: 3.9 MMOL/L (ref 3.5–5.1)
POTASSIUM SERPL-SCNC: 4.1 MMOL/L (ref 3.5–5.1)
POTASSIUM SERPL-SCNC: 4.2 MMOL/L (ref 3.5–5.1)
POTASSIUM SERPL-SCNC: 4.2 MMOL/L (ref 3.5–5.1)
POTASSIUM SERPL-SCNC: 4.3 MMOL/L (ref 3.5–5.1)
POTASSIUM SERPL-SCNC: 4.4 MMOL/L (ref 3.5–5.1)
POTASSIUM SERPL-SCNC: 4.4 MMOL/L (ref 3.5–5.1)
POTASSIUM SERPL-SCNC: 4.5 MMOL/L (ref 3.5–5.1)
POTASSIUM SERPL-SCNC: 4.6 MMOL/L (ref 3.5–5.1)
POTASSIUM SERPL-SCNC: 4.7 MMOL/L (ref 3.5–5.2)
POTASSIUM SERPL-SCNC: 4.8 MMOL/L (ref 3.5–5.1)
POTASSIUM SERPL-SCNC: 4.9 MMOL/L (ref 3.5–5.1)
POTASSIUM SERPL-SCNC: 5.3 MMOL/L (ref 3.5–5.1)
PROCALCITONIN SERPL-MCNC: 0.16 NG/ML
PROT SERPL-MCNC: 6.7 G/DL (ref 6.4–8.2)
PROT SERPL-MCNC: 7 G/DL (ref 6.4–8.2)
PROT SERPL-MCNC: 7 G/DL (ref 6.4–8.2)
PROT SERPL-MCNC: 7.4 G/DL (ref 6.4–8.2)
PROT SERPL-MCNC: 7.4 G/DL (ref 6.4–8.2)
PROT SERPL-MCNC: 7.6 G/DL (ref 6.4–8.2)
PROT SERPL-MCNC: 7.6 G/DL (ref 6.4–8.2)
PROT UR QL STRIP: NEGATIVE
PROT UR STRIP-MCNC: NEGATIVE MG/DL
PROTHROMBIN TIME: 11.3 SEC (ref 9–11.1)
Q-T INTERVAL, ECG07: 394 MS
Q-T INTERVAL, ECG07: 412 MS
Q-T INTERVAL, ECG07: 416 MS
Q-T INTERVAL, ECG07: 418 MS
Q-T INTERVAL, ECG07: 428 MS
Q-T INTERVAL, ECG07: 434 MS
Q-T INTERVAL, ECG07: 476 MS
QRS DURATION, ECG06: 114 MS
QRS DURATION, ECG06: 116 MS
QRS DURATION, ECG06: 116 MS
QRS DURATION, ECG06: 118 MS
QRS DURATION, ECG06: 118 MS
QRS DURATION, ECG06: 124 MS
QRS DURATION, ECG06: 128 MS
QTC CALCULATION (BEZET), ECG08: 454 MS
QTC CALCULATION (BEZET), ECG08: 457 MS
QTC CALCULATION (BEZET), ECG08: 462 MS
QTC CALCULATION (BEZET), ECG08: 462 MS
QTC CALCULATION (BEZET), ECG08: 475 MS
QTC CALCULATION (BEZET), ECG08: 487 MS
QTC CALCULATION (BEZET), ECG08: 488 MS
R RICKETTSI IGG SER QL IA: POSITIVE
R RICKETTSI IGG TITR SER IF: NORMAL {TITER}
RBC # BLD AUTO: 3.5 M/UL (ref 4.1–5.7)
RBC # BLD AUTO: 3.53 M/UL (ref 4.1–5.7)
RBC # BLD AUTO: 3.57 M/UL (ref 4.1–5.7)
RBC # BLD AUTO: 3.6 M/UL (ref 4.1–5.7)
RBC # BLD AUTO: 3.62 M/UL (ref 4.1–5.7)
RBC # BLD AUTO: 3.63 M/UL (ref 4.1–5.7)
RBC # BLD AUTO: 3.64 M/UL (ref 4.1–5.7)
RBC # BLD AUTO: 3.65 M/UL (ref 4.1–5.7)
RBC # BLD AUTO: 3.68 M/UL (ref 4.1–5.7)
RBC # BLD AUTO: 3.82 M/UL (ref 4.1–5.7)
RBC # BLD AUTO: 3.88 M/UL (ref 4.1–5.7)
RBC # BLD AUTO: 3.9 M/UL (ref 4.1–5.7)
RBC # BLD AUTO: 3.97 M/UL (ref 4.1–5.7)
RBC # BLD AUTO: 3.98 M/UL (ref 4.1–5.7)
RBC # BLD AUTO: 3.99 M/UL (ref 4.1–5.7)
RBC # BLD AUTO: 4.1 M/UL (ref 4.1–5.7)
RBC # BLD AUTO: 4.18 M/UL (ref 4.1–5.7)
RBC # BLD AUTO: 4.34 X10E6/UL (ref 4.14–5.8)
RHEUMATOID FACT SERPL-ACNC: 10.5 IU/ML (ref 0–15)
S PNEUM AG SPEC QL LA: NEGATIVE
SAMPLES BEING HELD,HOLD: NORMAL
SAO2 % BLD: 93 % (ref 92–97)
SAO2% DEVICE SAO2% SENSOR NAME: ABNORMAL
SARS-COV-2, COV2: NOT DETECTED
SERVICE CMNT-IMP: NORMAL
SERVICE CMNT-IMP: NORMAL
SODIUM SERPL-SCNC: 124 MMOL/L (ref 136–145)
SODIUM SERPL-SCNC: 127 MMOL/L (ref 136–145)
SODIUM SERPL-SCNC: 128 MMOL/L (ref 136–145)
SODIUM SERPL-SCNC: 129 MMOL/L (ref 136–145)
SODIUM SERPL-SCNC: 129 MMOL/L (ref 136–145)
SODIUM SERPL-SCNC: 130 MMOL/L (ref 136–145)
SODIUM SERPL-SCNC: 131 MMOL/L (ref 136–145)
SODIUM SERPL-SCNC: 133 MMOL/L (ref 136–145)
SODIUM SERPL-SCNC: 134 MMOL/L (ref 136–145)
SODIUM SERPL-SCNC: 137 MMOL/L (ref 136–145)
SODIUM SERPL-SCNC: 138 MMOL/L (ref 136–145)
SODIUM SERPL-SCNC: 141 MMOL/L (ref 136–145)
SODIUM SERPL-SCNC: 142 MMOL/L (ref 136–145)
SODIUM SERPL-SCNC: 143 MMOL/L (ref 134–144)
SOURCE, COVRS: NORMAL
SOURCE, COVRS: NORMAL
SP GR UR REFRACTOMETRY: 1.02 (ref 1–1.03)
SP GR UR STRIP: 1.01 (ref 1–1.03)
SPECIMEN SITE: ABNORMAL
SPECIMEN SOURCE: NORMAL
SPECIMEN SOURCE: NORMAL
SPECIMEN, SPNG1: NORMAL
STRESS BASELINE DIAS BP: 70 MMHG
STRESS BASELINE HR: 68 BPM
STRESS BASELINE ST DEPRESSION: 0 MM
STRESS BASELINE SYS BP: 130 MMHG
STRESS PEAK DIAS BP: 70 MMHG
STRESS PEAK SYS BP: 130 MMHG
STRESS PERCENT HR ACHIEVED: 64 %
STRESS POST PEAK HR: 91 BPM
STRESS RATE PRESSURE PRODUCT: NORMAL BPM*MMHG
STRESS TARGET HR: 143 BPM
THERAPEUTIC RANGE,PTTT: NORMAL SECS (ref 58–77)
TRIGL SERPL-MCNC: 51 MG/DL (ref ?–150)
TROPONIN-HIGH SENSITIVITY: 11 NG/L (ref 0–76)
TROPONIN-HIGH SENSITIVITY: 12 NG/L (ref 0–76)
TROPONIN-HIGH SENSITIVITY: 13 NG/L (ref 0–76)
TROPONIN-HIGH SENSITIVITY: 13 NG/L (ref 0–76)
TROPONIN-HIGH SENSITIVITY: 15 NG/L (ref 0–76)
TROPONIN-HIGH SENSITIVITY: 17 NG/L (ref 0–76)
TROPONIN-HIGH SENSITIVITY: 18 NG/L (ref 0–76)
TROPONIN-HIGH SENSITIVITY: 21 NG/L (ref 0–76)
TSH SERPL DL<=0.05 MIU/L-ACNC: 0.98 UIU/ML (ref 0.36–3.74)
TSH SERPL DL<=0.05 MIU/L-ACNC: 1.02 UIU/ML (ref 0.36–3.74)
TSH SERPL DL<=0.05 MIU/L-ACNC: 1.15 UIU/ML (ref 0.36–3.74)
UA UROBILINOGEN AMB POC: NORMAL (ref 0.2–1)
URINALYSIS CLARITY POC: CLEAR
URINALYSIS COLOR POC: YELLOW
URINE BLOOD POC: NEGATIVE
URINE LEUKOCYTES POC: NEGATIVE
URINE NITRITES POC: NEGATIVE
UROBILINOGEN UR QL STRIP.AUTO: 2 EU/DL (ref 0.2–1)
VENTRICULAR RATE, ECG03: 63 BPM
VENTRICULAR RATE, ECG03: 70 BPM
VENTRICULAR RATE, ECG03: 71 BPM
VENTRICULAR RATE, ECG03: 72 BPM
VENTRICULAR RATE, ECG03: 74 BPM
VENTRICULAR RATE, ECG03: 83 BPM
VENTRICULAR RATE, ECG03: 83 BPM
VLDLC SERPL CALC-MCNC: 10.2 MG/DL
WBC # BLD AUTO: 10.8 K/UL (ref 4.1–11.1)
WBC # BLD AUTO: 10.9 K/UL (ref 4.1–11.1)
WBC # BLD AUTO: 11.6 K/UL (ref 4.1–11.1)
WBC # BLD AUTO: 12.6 K/UL (ref 4.1–11.1)
WBC # BLD AUTO: 7.4 K/UL (ref 4.1–11.1)
WBC # BLD AUTO: 7.5 K/UL (ref 4.1–11.1)
WBC # BLD AUTO: 7.6 K/UL (ref 4.1–11.1)
WBC # BLD AUTO: 7.9 K/UL (ref 4.1–11.1)
WBC # BLD AUTO: 8 K/UL (ref 4.1–11.1)
WBC # BLD AUTO: 8 K/UL (ref 4.1–11.1)
WBC # BLD AUTO: 8.1 K/UL (ref 4.1–11.1)
WBC # BLD AUTO: 8.2 K/UL (ref 4.1–11.1)
WBC # BLD AUTO: 8.4 K/UL (ref 4.1–11.1)
WBC # BLD AUTO: 8.5 K/UL (ref 4.1–11.1)
WBC # BLD AUTO: 8.7 K/UL (ref 4.1–11.1)
WBC # BLD AUTO: 9 K/UL (ref 4.1–11.1)
WBC # BLD AUTO: 9 K/UL (ref 4.1–11.1)
WBC # BLD AUTO: 9.4 X10E3/UL (ref 3.4–10.8)

## 2022-01-01 PROCEDURE — 77010033678 HC OXYGEN DAILY

## 2022-01-01 PROCEDURE — 74011250637 HC RX REV CODE- 250/637: Performed by: HOSPITALIST

## 2022-01-01 PROCEDURE — 74011250637 HC RX REV CODE- 250/637: Performed by: FAMILY MEDICINE

## 2022-01-01 PROCEDURE — 94760 N-INVAS EAR/PLS OXIMETRY 1: CPT

## 2022-01-01 PROCEDURE — G0378 HOSPITAL OBSERVATION PER HR: HCPCS

## 2022-01-01 PROCEDURE — 74011250637 HC RX REV CODE- 250/637: Performed by: INTERNAL MEDICINE

## 2022-01-01 PROCEDURE — 94640 AIRWAY INHALATION TREATMENT: CPT

## 2022-01-01 PROCEDURE — 84443 ASSAY THYROID STIM HORMONE: CPT

## 2022-01-01 PROCEDURE — G8754 DIAS BP LESS 90: HCPCS | Performed by: FAMILY MEDICINE

## 2022-01-01 PROCEDURE — 85027 COMPLETE CBC AUTOMATED: CPT

## 2022-01-01 PROCEDURE — 65270000029 HC RM PRIVATE

## 2022-01-01 PROCEDURE — 93005 ELECTROCARDIOGRAM TRACING: CPT

## 2022-01-01 PROCEDURE — 83880 ASSAY OF NATRIURETIC PEPTIDE: CPT

## 2022-01-01 PROCEDURE — 97530 THERAPEUTIC ACTIVITIES: CPT

## 2022-01-01 PROCEDURE — 1101F PT FALLS ASSESS-DOCD LE1/YR: CPT | Performed by: FAMILY MEDICINE

## 2022-01-01 PROCEDURE — 65270000046 HC RM TELEMETRY

## 2022-01-01 PROCEDURE — 97535 SELF CARE MNGMENT TRAINING: CPT

## 2022-01-01 PROCEDURE — 96374 THER/PROPH/DIAG INJ IV PUSH: CPT

## 2022-01-01 PROCEDURE — 74011250637 HC RX REV CODE- 250/637: Performed by: NURSE PRACTITIONER

## 2022-01-01 PROCEDURE — 74011250636 HC RX REV CODE- 250/636: Performed by: FAMILY MEDICINE

## 2022-01-01 PROCEDURE — G8420 CALC BMI NORM PARAMETERS: HCPCS | Performed by: INTERNAL MEDICINE

## 2022-01-01 PROCEDURE — 80048 BASIC METABOLIC PNL TOTAL CA: CPT

## 2022-01-01 PROCEDURE — 99285 EMERGENCY DEPT VISIT HI MDM: CPT

## 2022-01-01 PROCEDURE — 87636 SARSCOV2 & INF A&B AMP PRB: CPT

## 2022-01-01 PROCEDURE — 71045 X-RAY EXAM CHEST 1 VIEW: CPT

## 2022-01-01 PROCEDURE — 65270000011 HC RM PRIVATE SNF

## 2022-01-01 PROCEDURE — 85025 COMPLETE CBC W/AUTO DIFF WBC: CPT

## 2022-01-01 PROCEDURE — G8419 CALC BMI OUT NRM PARAM NOF/U: HCPCS | Performed by: FAMILY MEDICINE

## 2022-01-01 PROCEDURE — 97530 THERAPEUTIC ACTIVITIES: CPT | Performed by: OCCUPATIONAL THERAPIST

## 2022-01-01 PROCEDURE — G8536 NO DOC ELDER MAL SCRN: HCPCS | Performed by: FAMILY MEDICINE

## 2022-01-01 PROCEDURE — 82803 BLOOD GASES ANY COMBINATION: CPT

## 2022-01-01 PROCEDURE — 80053 COMPREHEN METABOLIC PANEL: CPT

## 2022-01-01 PROCEDURE — G8427 DOCREV CUR MEDS BY ELIG CLIN: HCPCS | Performed by: FAMILY MEDICINE

## 2022-01-01 PROCEDURE — 74011000250 HC RX REV CODE- 250: Performed by: HOSPITALIST

## 2022-01-01 PROCEDURE — 84484 ASSAY OF TROPONIN QUANT: CPT

## 2022-01-01 PROCEDURE — 73090 X-RAY EXAM OF FOREARM: CPT

## 2022-01-01 PROCEDURE — 87635 SARS-COV-2 COVID-19 AMP PRB: CPT

## 2022-01-01 PROCEDURE — 74011000250 HC RX REV CODE- 250: Performed by: INTERNAL MEDICINE

## 2022-01-01 PROCEDURE — 97166 OT EVAL MOD COMPLEX 45 MIN: CPT

## 2022-01-01 PROCEDURE — G9717 DOC PT DX DEP/BP F/U NT REQ: HCPCS | Performed by: FAMILY MEDICINE

## 2022-01-01 PROCEDURE — 83735 ASSAY OF MAGNESIUM: CPT

## 2022-01-01 PROCEDURE — 74011000250 HC RX REV CODE- 250: Performed by: FAMILY MEDICINE

## 2022-01-01 PROCEDURE — 36415 COLL VENOUS BLD VENIPUNCTURE: CPT

## 2022-01-01 PROCEDURE — 65660000000 HC RM CCU STEPDOWN

## 2022-01-01 PROCEDURE — 96366 THER/PROPH/DIAG IV INF ADDON: CPT

## 2022-01-01 PROCEDURE — 1111F DSCHRG MED/CURRENT MED MERGE: CPT | Performed by: FAMILY MEDICINE

## 2022-01-01 PROCEDURE — 84145 PROCALCITONIN (PCT): CPT

## 2022-01-01 PROCEDURE — 74011250636 HC RX REV CODE- 250/636: Performed by: EMERGENCY MEDICINE

## 2022-01-01 PROCEDURE — 94762 N-INVAS EAR/PLS OXIMTRY CONT: CPT

## 2022-01-01 PROCEDURE — 77030021668 HC NEB PREFIL KT VYRM -A

## 2022-01-01 PROCEDURE — 97110 THERAPEUTIC EXERCISES: CPT

## 2022-01-01 PROCEDURE — 87899 AGENT NOS ASSAY W/OPTIC: CPT

## 2022-01-01 PROCEDURE — 99214 OFFICE O/P EST MOD 30 MIN: CPT | Performed by: FAMILY MEDICINE

## 2022-01-01 PROCEDURE — 4030F OXYGEN THERAPY RX: CPT | Performed by: FAMILY MEDICINE

## 2022-01-01 PROCEDURE — 78452 HT MUSCLE IMAGE SPECT MULT: CPT | Performed by: INTERNAL MEDICINE

## 2022-01-01 PROCEDURE — 93000 ELECTROCARDIOGRAM COMPLETE: CPT | Performed by: INTERNAL MEDICINE

## 2022-01-01 PROCEDURE — 87040 BLOOD CULTURE FOR BACTERIA: CPT

## 2022-01-01 PROCEDURE — 97165 OT EVAL LOW COMPLEX 30 MIN: CPT

## 2022-01-01 PROCEDURE — G8752 SYS BP LESS 140: HCPCS | Performed by: FAMILY MEDICINE

## 2022-01-01 PROCEDURE — 74011250636 HC RX REV CODE- 250/636: Performed by: INTERNAL MEDICINE

## 2022-01-01 PROCEDURE — 82550 ASSAY OF CK (CPK): CPT

## 2022-01-01 PROCEDURE — G8427 DOCREV CUR MEDS BY ELIG CLIN: HCPCS | Performed by: INTERNAL MEDICINE

## 2022-01-01 PROCEDURE — 93296 REM INTERROG EVL PM/IDS: CPT | Performed by: INTERNAL MEDICINE

## 2022-01-01 PROCEDURE — 77030029684 HC NEB SM VOL KT MONA -A

## 2022-01-01 PROCEDURE — G8752 SYS BP LESS 140: HCPCS | Performed by: INTERNAL MEDICINE

## 2022-01-01 PROCEDURE — 74011250636 HC RX REV CODE- 250/636: Performed by: HOSPITALIST

## 2022-01-01 PROCEDURE — 97535 SELF CARE MNGMENT TRAINING: CPT | Performed by: OCCUPATIONAL THERAPIST

## 2022-01-01 PROCEDURE — 93015 CV STRESS TEST SUPVJ I&R: CPT | Performed by: INTERNAL MEDICINE

## 2022-01-01 PROCEDURE — 97162 PT EVAL MOD COMPLEX 30 MIN: CPT

## 2022-01-01 PROCEDURE — 97161 PT EVAL LOW COMPLEX 20 MIN: CPT

## 2022-01-01 PROCEDURE — 85730 THROMBOPLASTIN TIME PARTIAL: CPT

## 2022-01-01 PROCEDURE — 71250 CT THORAX DX C-: CPT

## 2022-01-01 PROCEDURE — 97530 THERAPEUTIC ACTIVITIES: CPT | Performed by: PHYSICAL THERAPIST

## 2022-01-01 PROCEDURE — 99214 OFFICE O/P EST MOD 30 MIN: CPT | Performed by: INTERNAL MEDICINE

## 2022-01-01 PROCEDURE — 97116 GAIT TRAINING THERAPY: CPT | Performed by: PHYSICAL THERAPIST

## 2022-01-01 PROCEDURE — 87449 NOS EACH ORGANISM AG IA: CPT

## 2022-01-01 PROCEDURE — 94761 N-INVAS EAR/PLS OXIMETRY MLT: CPT

## 2022-01-01 PROCEDURE — 74011250636 HC RX REV CODE- 250/636: Performed by: STUDENT IN AN ORGANIZED HEALTH CARE EDUCATION/TRAINING PROGRAM

## 2022-01-01 PROCEDURE — 74011000258 HC RX REV CODE- 258: Performed by: FAMILY MEDICINE

## 2022-01-01 PROCEDURE — 74011000250 HC RX REV CODE- 250: Performed by: EMERGENCY MEDICINE

## 2022-01-01 PROCEDURE — G8754 DIAS BP LESS 90: HCPCS | Performed by: INTERNAL MEDICINE

## 2022-01-01 PROCEDURE — 3028F O2 SATURATION DOC REV: CPT | Performed by: FAMILY MEDICINE

## 2022-01-01 PROCEDURE — 71275 CT ANGIOGRAPHY CHEST: CPT

## 2022-01-01 PROCEDURE — 71046 X-RAY EXAM CHEST 2 VIEWS: CPT

## 2022-01-01 PROCEDURE — 74011000258 HC RX REV CODE- 258: Performed by: HOSPITALIST

## 2022-01-01 PROCEDURE — 99443 PR PHYS/QHP TELEPHONE EVALUATION 21-30 MIN: CPT | Performed by: FAMILY MEDICINE

## 2022-01-01 PROCEDURE — A9502 TC99M TETROFOSMIN: HCPCS | Performed by: INTERNAL MEDICINE

## 2022-01-01 PROCEDURE — G9717 DOC PT DX DEP/BP F/U NT REQ: HCPCS | Performed by: INTERNAL MEDICINE

## 2022-01-01 PROCEDURE — 85018 HEMOGLOBIN: CPT

## 2022-01-01 PROCEDURE — 99222 1ST HOSP IP/OBS MODERATE 55: CPT | Performed by: PSYCHIATRY & NEUROLOGY

## 2022-01-01 PROCEDURE — 93306 TTE W/DOPPLER COMPLETE: CPT

## 2022-01-01 PROCEDURE — 74011000636 HC RX REV CODE- 636: Performed by: FAMILY MEDICINE

## 2022-01-01 PROCEDURE — 1101F PT FALLS ASSESS-DOCD LE1/YR: CPT | Performed by: INTERNAL MEDICINE

## 2022-01-01 PROCEDURE — 74177 CT ABD & PELVIS W/CONTRAST: CPT

## 2022-01-01 PROCEDURE — 99001 SPECIMEN HANDLING PT-LAB: CPT

## 2022-01-01 PROCEDURE — 93970 EXTREMITY STUDY: CPT

## 2022-01-01 PROCEDURE — 96376 TX/PRO/DX INJ SAME DRUG ADON: CPT

## 2022-01-01 PROCEDURE — 81002 URINALYSIS NONAUTO W/O SCOPE: CPT | Performed by: FAMILY MEDICINE

## 2022-01-01 PROCEDURE — 93306 TTE W/DOPPLER COMPLETE: CPT | Performed by: INTERNAL MEDICINE

## 2022-01-01 PROCEDURE — 70450 CT HEAD/BRAIN W/O DYE: CPT

## 2022-01-01 PROCEDURE — 36600 WITHDRAWAL OF ARTERIAL BLOOD: CPT

## 2022-01-01 PROCEDURE — G8536 NO DOC ELDER MAL SCRN: HCPCS | Performed by: INTERNAL MEDICINE

## 2022-01-01 PROCEDURE — 74011250636 HC RX REV CODE- 250/636

## 2022-01-01 PROCEDURE — 97116 GAIT TRAINING THERAPY: CPT

## 2022-01-01 PROCEDURE — 74011250636 HC RX REV CODE- 250/636: Performed by: PHYSICIAN ASSISTANT

## 2022-01-01 PROCEDURE — 96365 THER/PROPH/DIAG IV INF INIT: CPT

## 2022-01-01 PROCEDURE — 96375 TX/PRO/DX INJ NEW DRUG ADDON: CPT

## 2022-01-01 PROCEDURE — 81003 URINALYSIS AUTO W/O SCOPE: CPT

## 2022-01-01 PROCEDURE — 83605 ASSAY OF LACTIC ACID: CPT

## 2022-01-01 PROCEDURE — 93295 DEV INTERROG REMOTE 1/2/MLT: CPT | Performed by: INTERNAL MEDICINE

## 2022-01-01 PROCEDURE — 80061 LIPID PANEL: CPT

## 2022-01-01 PROCEDURE — G8420 CALC BMI NORM PARAMETERS: HCPCS | Performed by: FAMILY MEDICINE

## 2022-01-01 PROCEDURE — 99284 EMERGENCY DEPT VISIT MOD MDM: CPT

## 2022-01-01 PROCEDURE — 99215 OFFICE O/P EST HI 40 MIN: CPT | Performed by: FAMILY MEDICINE

## 2022-01-01 PROCEDURE — 85610 PROTHROMBIN TIME: CPT

## 2022-01-01 PROCEDURE — 99231 SBSQ HOSP IP/OBS SF/LOW 25: CPT | Performed by: STUDENT IN AN ORGANIZED HEALTH CARE EDUCATION/TRAINING PROGRAM

## 2022-01-01 PROCEDURE — 96367 TX/PROPH/DG ADDL SEQ IV INF: CPT

## 2022-01-01 RX ORDER — PANTOPRAZOLE SODIUM 40 MG/1
40 TABLET, DELAYED RELEASE ORAL DAILY
COMMUNITY
End: 2022-01-01

## 2022-01-01 RX ORDER — ATORVASTATIN CALCIUM 40 MG/1
80 TABLET, FILM COATED ORAL DAILY
Status: DISCONTINUED | OUTPATIENT
Start: 2022-01-01 | End: 2022-01-01 | Stop reason: HOSPADM

## 2022-01-01 RX ORDER — MEMANTINE HYDROCHLORIDE 10 MG/1
10 TABLET ORAL 2 TIMES DAILY
COMMUNITY
End: 2022-01-01

## 2022-01-01 RX ORDER — PANTOPRAZOLE SODIUM 40 MG/1
40 TABLET, DELAYED RELEASE ORAL DAILY
Status: DISCONTINUED | OUTPATIENT
Start: 2022-01-01 | End: 2022-01-01 | Stop reason: HOSPADM

## 2022-01-01 RX ORDER — ACETAMINOPHEN 325 MG/1
650 TABLET ORAL
Status: DISCONTINUED | OUTPATIENT
Start: 2022-01-01 | End: 2022-01-01

## 2022-01-01 RX ORDER — POLYETHYLENE GLYCOL 3350 17 G/17G
17 POWDER, FOR SOLUTION ORAL DAILY
Qty: 30 PACKET | Refills: 0 | Status: SHIPPED | OUTPATIENT
Start: 2022-01-01 | End: 2022-01-01

## 2022-01-01 RX ORDER — FUROSEMIDE 10 MG/ML
INJECTION INTRAMUSCULAR; INTRAVENOUS
Status: COMPLETED
Start: 2022-01-01 | End: 2022-01-01

## 2022-01-01 RX ORDER — FUROSEMIDE 10 MG/ML
40 INJECTION INTRAMUSCULAR; INTRAVENOUS
Status: COMPLETED | OUTPATIENT
Start: 2022-01-01 | End: 2022-01-01

## 2022-01-01 RX ORDER — LEVOFLOXACIN 5 MG/ML
500 INJECTION, SOLUTION INTRAVENOUS EVERY 24 HOURS
Status: DISCONTINUED | OUTPATIENT
Start: 2022-01-01 | End: 2022-01-01

## 2022-01-01 RX ORDER — MEMANTINE HYDROCHLORIDE 5 MG/1
10 TABLET ORAL EVERY 12 HOURS
Status: DISCONTINUED | OUTPATIENT
Start: 2022-01-01 | End: 2022-01-01 | Stop reason: HOSPADM

## 2022-01-01 RX ORDER — ONDANSETRON 4 MG/1
4 TABLET, ORALLY DISINTEGRATING ORAL
Status: DISCONTINUED | OUTPATIENT
Start: 2022-01-01 | End: 2022-01-01 | Stop reason: HOSPADM

## 2022-01-01 RX ORDER — FUROSEMIDE 10 MG/ML
20 INJECTION INTRAMUSCULAR; INTRAVENOUS
Status: COMPLETED | OUTPATIENT
Start: 2022-01-01 | End: 2022-01-01

## 2022-01-01 RX ORDER — SERTRALINE HYDROCHLORIDE 100 MG/1
200 TABLET, FILM COATED ORAL DAILY
Status: DISCONTINUED | OUTPATIENT
Start: 2022-01-01 | End: 2022-01-01 | Stop reason: HOSPADM

## 2022-01-01 RX ORDER — MEMANTINE HYDROCHLORIDE 10 MG/1
10 TABLET ORAL 2 TIMES DAILY
Qty: 180 TABLET | Refills: 3 | Status: SHIPPED | OUTPATIENT
Start: 2022-01-01

## 2022-01-01 RX ORDER — POLYETHYLENE GLYCOL 3350 17 G/17G
17 POWDER, FOR SOLUTION ORAL DAILY
Status: DISCONTINUED | OUTPATIENT
Start: 2022-01-01 | End: 2022-01-01 | Stop reason: HOSPADM

## 2022-01-01 RX ORDER — ONDANSETRON 2 MG/ML
4 INJECTION INTRAMUSCULAR; INTRAVENOUS
Status: DISCONTINUED | OUTPATIENT
Start: 2022-01-01 | End: 2022-01-01 | Stop reason: HOSPADM

## 2022-01-01 RX ORDER — DULOXETIN HYDROCHLORIDE 60 MG/1
60 CAPSULE, DELAYED RELEASE ORAL DAILY
Qty: 30 CAPSULE | Refills: 0 | Status: SHIPPED | OUTPATIENT
Start: 2022-01-01

## 2022-01-01 RX ORDER — MEMANTINE HYDROCHLORIDE 5 MG/1
10 TABLET ORAL 2 TIMES DAILY
Status: DISCONTINUED | OUTPATIENT
Start: 2022-01-01 | End: 2022-01-01 | Stop reason: HOSPADM

## 2022-01-01 RX ORDER — LEVOFLOXACIN 750 MG/1
750 TABLET ORAL EVERY 24 HOURS
Status: DISCONTINUED | OUTPATIENT
Start: 2022-01-01 | End: 2022-01-01 | Stop reason: HOSPADM

## 2022-01-01 RX ORDER — MIDODRINE HYDROCHLORIDE 5 MG/1
2.5 TABLET ORAL
Status: DISCONTINUED | OUTPATIENT
Start: 2022-01-01 | End: 2022-01-01

## 2022-01-01 RX ORDER — MEMANTINE HYDROCHLORIDE 10 MG/1
TABLET ORAL
Qty: 180 TABLET | Refills: 3 | Status: ON HOLD | OUTPATIENT
Start: 2022-01-01 | End: 2022-01-01

## 2022-01-01 RX ORDER — DOXYCYCLINE 100 MG/1
100 CAPSULE ORAL EVERY 12 HOURS
Status: DISCONTINUED | OUTPATIENT
Start: 2022-01-01 | End: 2022-01-01 | Stop reason: HOSPADM

## 2022-01-01 RX ORDER — FUROSEMIDE 10 MG/ML
40 INJECTION INTRAMUSCULAR; INTRAVENOUS ONCE
Status: ACTIVE | OUTPATIENT
Start: 2022-01-01 | End: 2022-01-01

## 2022-01-01 RX ORDER — SACUBITRIL AND VALSARTAN 24; 26 MG/1; MG/1
1 TABLET, FILM COATED ORAL 2 TIMES DAILY
Qty: 180 TABLET | Refills: 3 | Status: SHIPPED | OUTPATIENT
Start: 2022-01-01 | End: 2022-01-01

## 2022-01-01 RX ORDER — AZITHROMYCIN 250 MG/1
TABLET, FILM COATED ORAL
Qty: 6 TABLET | Refills: 0 | Status: SHIPPED | OUTPATIENT
Start: 2022-01-01 | End: 2022-01-01

## 2022-01-01 RX ORDER — LEVOFLOXACIN 750 MG/1
750 TABLET ORAL EVERY 24 HOURS
Status: COMPLETED | OUTPATIENT
Start: 2022-01-01 | End: 2022-01-01

## 2022-01-01 RX ORDER — ATORVASTATIN CALCIUM 40 MG/1
40 TABLET, FILM COATED ORAL
Status: DISCONTINUED | OUTPATIENT
Start: 2022-01-01 | End: 2022-01-01 | Stop reason: HOSPADM

## 2022-01-01 RX ORDER — ACETAMINOPHEN 500 MG/1
1000 CAPSULE, LIQUID FILLED ORAL
COMMUNITY
End: 2022-01-01

## 2022-01-01 RX ORDER — SODIUM CHLORIDE 9 MG/ML
50 INJECTION, SOLUTION INTRAVENOUS CONTINUOUS
Status: DISCONTINUED | OUTPATIENT
Start: 2022-01-01 | End: 2022-01-01 | Stop reason: HOSPADM

## 2022-01-01 RX ORDER — SERTRALINE HYDROCHLORIDE 100 MG/1
200 TABLET, FILM COATED ORAL DAILY
Status: CANCELLED | OUTPATIENT
Start: 2022-01-01

## 2022-01-01 RX ORDER — MIDODRINE HYDROCHLORIDE 5 MG/1
5 TABLET ORAL
Status: DISCONTINUED | OUTPATIENT
Start: 2022-01-01 | End: 2022-01-01

## 2022-01-01 RX ORDER — DOXYCYCLINE 100 MG/1
100 TABLET ORAL 2 TIMES DAILY
Qty: 20 TABLET | Refills: 0 | Status: SHIPPED | OUTPATIENT
Start: 2022-01-01 | End: 2022-01-01

## 2022-01-01 RX ORDER — DONEPEZIL HYDROCHLORIDE 5 MG/1
5 TABLET, FILM COATED ORAL
Status: DISCONTINUED | OUTPATIENT
Start: 2022-01-01 | End: 2022-01-01 | Stop reason: HOSPADM

## 2022-01-01 RX ORDER — ONDANSETRON 2 MG/ML
4 INJECTION INTRAMUSCULAR; INTRAVENOUS
Status: DISCONTINUED | OUTPATIENT
Start: 2022-01-01 | End: 2022-01-01

## 2022-01-01 RX ORDER — GABAPENTIN 300 MG/1
600 CAPSULE ORAL 3 TIMES DAILY
Status: DISCONTINUED | OUTPATIENT
Start: 2022-01-01 | End: 2022-01-01

## 2022-01-01 RX ORDER — SODIUM CHLORIDE 0.9 % (FLUSH) 0.9 %
5-40 SYRINGE (ML) INJECTION AS NEEDED
Status: DISCONTINUED | OUTPATIENT
Start: 2022-01-01 | End: 2022-01-01 | Stop reason: HOSPADM

## 2022-01-01 RX ORDER — GUAIFENESIN 100 MG/5ML
81 LIQUID (ML) ORAL DAILY
Status: DISCONTINUED | OUTPATIENT
Start: 2022-01-01 | End: 2022-01-01 | Stop reason: HOSPADM

## 2022-01-01 RX ORDER — DONEPEZIL HYDROCHLORIDE 5 MG/1
5 TABLET, FILM COATED ORAL
Status: CANCELLED | OUTPATIENT
Start: 2022-01-01

## 2022-01-01 RX ORDER — IPRATROPIUM BROMIDE AND ALBUTEROL SULFATE 2.5; .5 MG/3ML; MG/3ML
3 SOLUTION RESPIRATORY (INHALATION)
Status: DISCONTINUED | OUTPATIENT
Start: 2022-01-01 | End: 2022-01-01

## 2022-01-01 RX ORDER — POLYETHYLENE GLYCOL 3350 17 G/17G
17 POWDER, FOR SOLUTION ORAL
COMMUNITY

## 2022-01-01 RX ORDER — SODIUM CHLORIDE 0.9 % (FLUSH) 0.9 %
5-40 SYRINGE (ML) INJECTION EVERY 8 HOURS
Status: DISCONTINUED | OUTPATIENT
Start: 2022-01-01 | End: 2022-01-01 | Stop reason: HOSPADM

## 2022-01-01 RX ORDER — GABAPENTIN 300 MG/1
600 CAPSULE ORAL 3 TIMES DAILY
Status: DISCONTINUED | OUTPATIENT
Start: 2022-01-01 | End: 2022-01-01 | Stop reason: HOSPADM

## 2022-01-01 RX ORDER — DULOXETIN HYDROCHLORIDE 30 MG/1
60 CAPSULE, DELAYED RELEASE ORAL DAILY
Status: DISCONTINUED | OUTPATIENT
Start: 2022-01-01 | End: 2022-01-01 | Stop reason: HOSPADM

## 2022-01-01 RX ORDER — DOXYCYCLINE 100 MG/1
100 CAPSULE ORAL EVERY 12 HOURS
Qty: 10 CAPSULE | Refills: 0 | Status: SHIPPED | OUTPATIENT
Start: 2022-01-01 | End: 2022-01-01

## 2022-01-01 RX ORDER — BUTALBITAL, ACETAMINOPHEN AND CAFFEINE 50; 325; 40 MG/1; MG/1; MG/1
1 TABLET ORAL
Status: DISCONTINUED | OUTPATIENT
Start: 2022-01-01 | End: 2022-01-01 | Stop reason: HOSPADM

## 2022-01-01 RX ORDER — GUAIFENESIN 600 MG/1
600 TABLET, EXTENDED RELEASE ORAL EVERY 12 HOURS
Status: DISCONTINUED | OUTPATIENT
Start: 2022-01-01 | End: 2022-01-01 | Stop reason: HOSPADM

## 2022-01-01 RX ORDER — FUROSEMIDE 20 MG/1
20 TABLET ORAL DAILY
Qty: 90 TABLET | Refills: 1 | Status: SHIPPED | OUTPATIENT
Start: 2022-01-01 | End: 2022-01-01

## 2022-01-01 RX ORDER — CEFUROXIME AXETIL 500 MG/1
500 TABLET ORAL EVERY 12 HOURS
Qty: 10 TABLET | Refills: 0 | Status: SHIPPED | OUTPATIENT
Start: 2022-01-01 | End: 2022-01-01

## 2022-01-01 RX ORDER — FUROSEMIDE 10 MG/ML
20 INJECTION INTRAMUSCULAR; INTRAVENOUS ONCE
Status: ACTIVE | OUTPATIENT
Start: 2022-01-01 | End: 2022-01-01

## 2022-01-01 RX ORDER — SERTRALINE HYDROCHLORIDE 50 MG/1
200 TABLET, FILM COATED ORAL DAILY
Status: DISCONTINUED | OUTPATIENT
Start: 2022-01-01 | End: 2022-01-01 | Stop reason: HOSPADM

## 2022-01-01 RX ORDER — IPRATROPIUM BROMIDE AND ALBUTEROL SULFATE 2.5; .5 MG/3ML; MG/3ML
3 SOLUTION RESPIRATORY (INHALATION)
Qty: 30 NEBULE | Refills: 0 | Status: SHIPPED | OUTPATIENT
Start: 2022-01-01

## 2022-01-01 RX ORDER — FUROSEMIDE 40 MG/1
40 TABLET ORAL DAILY
Status: DISCONTINUED | OUTPATIENT
Start: 2022-01-01 | End: 2022-01-01

## 2022-01-01 RX ORDER — POLYETHYLENE GLYCOL 3350 17 G/17G
17 POWDER, FOR SOLUTION ORAL DAILY PRN
Status: DISCONTINUED | OUTPATIENT
Start: 2022-01-01 | End: 2022-01-01 | Stop reason: HOSPADM

## 2022-01-01 RX ORDER — ALBUTEROL SULFATE 0.83 MG/ML
2.5 SOLUTION RESPIRATORY (INHALATION)
Qty: 30 NEBULE | Refills: 0 | Status: SHIPPED | OUTPATIENT
Start: 2022-01-01 | End: 2022-01-01 | Stop reason: SDUPTHER

## 2022-01-01 RX ORDER — IPRATROPIUM BROMIDE AND ALBUTEROL SULFATE 2.5; .5 MG/3ML; MG/3ML
3 SOLUTION RESPIRATORY (INHALATION)
Status: DISCONTINUED | OUTPATIENT
Start: 2022-01-01 | End: 2022-01-01 | Stop reason: HOSPADM

## 2022-01-01 RX ORDER — LEVOFLOXACIN 5 MG/ML
750 INJECTION, SOLUTION INTRAVENOUS EVERY 24 HOURS
Status: DISCONTINUED | OUTPATIENT
Start: 2022-01-01 | End: 2022-01-01

## 2022-01-01 RX ORDER — DULOXETIN HYDROCHLORIDE 30 MG/1
60 CAPSULE, DELAYED RELEASE ORAL DAILY
Status: CANCELLED | OUTPATIENT
Start: 2022-01-01

## 2022-01-01 RX ORDER — FUROSEMIDE 10 MG/ML
40 INJECTION INTRAMUSCULAR; INTRAVENOUS ONCE
Status: COMPLETED | OUTPATIENT
Start: 2022-01-01 | End: 2022-01-01

## 2022-01-01 RX ORDER — MIDODRINE HYDROCHLORIDE 5 MG/1
10 TABLET ORAL
Status: CANCELLED | OUTPATIENT
Start: 2022-01-01

## 2022-01-01 RX ORDER — AMOXICILLIN 250 MG
1 CAPSULE ORAL AS NEEDED
Status: DISCONTINUED | OUTPATIENT
Start: 2022-01-01 | End: 2022-01-01 | Stop reason: HOSPADM

## 2022-01-01 RX ORDER — ATORVASTATIN CALCIUM 40 MG/1
40 TABLET, FILM COATED ORAL
Status: CANCELLED | OUTPATIENT
Start: 2022-01-01

## 2022-01-01 RX ORDER — POLYETHYLENE GLYCOL 3350 17 G/17G
17 POWDER, FOR SOLUTION ORAL DAILY
Status: ON HOLD | COMMUNITY
Start: 2022-01-01 | End: 2022-01-01

## 2022-01-01 RX ORDER — ACETAMINOPHEN 650 MG/1
650 SUPPOSITORY RECTAL
Status: DISCONTINUED | OUTPATIENT
Start: 2022-01-01 | End: 2022-01-01 | Stop reason: HOSPADM

## 2022-01-01 RX ORDER — PANTOPRAZOLE SODIUM 40 MG/1
40 TABLET, DELAYED RELEASE ORAL DAILY
Status: DISCONTINUED | OUTPATIENT
Start: 2022-01-01 | End: 2022-01-01

## 2022-01-01 RX ORDER — KETOROLAC TROMETHAMINE 15 MG/ML
15 INJECTION, SOLUTION INTRAMUSCULAR; INTRAVENOUS ONCE
Status: COMPLETED | OUTPATIENT
Start: 2022-01-01 | End: 2022-01-01

## 2022-01-01 RX ORDER — MEMANTINE HYDROCHLORIDE 5 MG/1
10 TABLET ORAL 2 TIMES DAILY
Status: CANCELLED | OUTPATIENT
Start: 2022-01-01

## 2022-01-01 RX ORDER — IPRATROPIUM BROMIDE AND ALBUTEROL SULFATE 2.5; .5 MG/3ML; MG/3ML
3 SOLUTION RESPIRATORY (INHALATION)
Status: COMPLETED | OUTPATIENT
Start: 2022-01-01 | End: 2022-01-01

## 2022-01-01 RX ORDER — SODIUM CHLORIDE 0.9 % (FLUSH) 0.9 %
10 SYRINGE (ML) INJECTION AS NEEDED
Status: DISCONTINUED | OUTPATIENT
Start: 2022-01-01 | End: 2022-01-01 | Stop reason: HOSPADM

## 2022-01-01 RX ORDER — AZITHROMYCIN 250 MG/1
250 TABLET, FILM COATED ORAL DAILY
Status: COMPLETED | OUTPATIENT
Start: 2022-01-01 | End: 2022-01-01

## 2022-01-01 RX ORDER — METOPROLOL SUCCINATE 25 MG/1
25 TABLET, EXTENDED RELEASE ORAL DAILY
Status: DISCONTINUED | OUTPATIENT
Start: 2022-01-01 | End: 2022-01-01 | Stop reason: HOSPADM

## 2022-01-01 RX ORDER — MEMANTINE HYDROCHLORIDE 10 MG/1
10 TABLET ORAL 2 TIMES DAILY
Qty: 30 TABLET | Refills: 0 | Status: SHIPPED | OUTPATIENT
Start: 2022-01-01 | End: 2022-07-13

## 2022-01-01 RX ORDER — MIDODRINE HYDROCHLORIDE 10 MG/1
10 TABLET ORAL
Qty: 90 TABLET | Refills: 0 | Status: ON HOLD | OUTPATIENT
Start: 2022-01-01 | End: 2022-01-01

## 2022-01-01 RX ORDER — IPRATROPIUM BROMIDE AND ALBUTEROL SULFATE 2.5; .5 MG/3ML; MG/3ML
3 SOLUTION RESPIRATORY (INHALATION)
Status: CANCELLED | OUTPATIENT
Start: 2022-01-01

## 2022-01-01 RX ORDER — DONEPEZIL HYDROCHLORIDE 5 MG/1
5 TABLET, FILM COATED ORAL
COMMUNITY

## 2022-01-01 RX ORDER — FUROSEMIDE 10 MG/ML
20 INJECTION INTRAMUSCULAR; INTRAVENOUS DAILY
Status: DISCONTINUED | OUTPATIENT
Start: 2022-01-01 | End: 2022-01-01

## 2022-01-01 RX ORDER — ORPHENADRINE CITRATE 30 MG/ML
60 INJECTION INTRAMUSCULAR; INTRAVENOUS ONCE
Status: DISPENSED | OUTPATIENT
Start: 2022-01-01 | End: 2022-01-01

## 2022-01-01 RX ORDER — POLYETHYLENE GLYCOL 3350 17 G/17G
17 POWDER, FOR SOLUTION ORAL DAILY PRN
Status: CANCELLED | OUTPATIENT
Start: 2022-01-01

## 2022-01-01 RX ORDER — ACETAMINOPHEN 325 MG/1
650 TABLET ORAL
Status: DISCONTINUED | OUTPATIENT
Start: 2022-01-01 | End: 2022-01-01 | Stop reason: HOSPADM

## 2022-01-01 RX ORDER — MEMANTINE HYDROCHLORIDE 10 MG/1
10 TABLET ORAL 2 TIMES DAILY
Status: DISCONTINUED | OUTPATIENT
Start: 2022-01-01 | End: 2022-01-01 | Stop reason: HOSPADM

## 2022-01-01 RX ORDER — LEVOFLOXACIN 750 MG/1
750 TABLET ORAL EVERY 24 HOURS
Status: CANCELLED | OUTPATIENT
Start: 2022-01-01 | End: 2022-01-01

## 2022-01-01 RX ORDER — MIDODRINE HYDROCHLORIDE 5 MG/1
10 TABLET ORAL
Status: DISCONTINUED | OUTPATIENT
Start: 2022-01-01 | End: 2022-01-01 | Stop reason: HOSPADM

## 2022-01-01 RX ORDER — SODIUM CHLORIDE 0.9 % (FLUSH) 0.9 %
5-10 SYRINGE (ML) INJECTION ONCE
Status: COMPLETED | OUTPATIENT
Start: 2022-01-01 | End: 2022-01-01

## 2022-01-01 RX ORDER — DONEPEZIL HYDROCHLORIDE 5 MG/1
5 TABLET, FILM COATED ORAL
Qty: 30 TABLET | Refills: 0 | Status: SHIPPED | OUTPATIENT
Start: 2022-01-01 | End: 2022-01-01

## 2022-01-01 RX ORDER — GUAIFENESIN 600 MG/1
600 TABLET, EXTENDED RELEASE ORAL EVERY 12 HOURS
Status: COMPLETED | OUTPATIENT
Start: 2022-01-01 | End: 2022-01-01

## 2022-01-01 RX ORDER — PANTOPRAZOLE SODIUM 40 MG/1
40 TABLET, DELAYED RELEASE ORAL DAILY
Status: CANCELLED | OUTPATIENT
Start: 2022-01-01

## 2022-01-01 RX ORDER — BUTALBITAL, ACETAMINOPHEN AND CAFFEINE 50; 325; 40 MG/1; MG/1; MG/1
1 TABLET ORAL
Status: DISCONTINUED | OUTPATIENT
Start: 2022-01-01 | End: 2022-01-01

## 2022-01-01 RX ORDER — ALBUTEROL SULFATE 90 UG/1
1 AEROSOL, METERED RESPIRATORY (INHALATION)
Status: ON HOLD | COMMUNITY
End: 2022-01-01

## 2022-01-01 RX ORDER — BUTALBITAL, ACETAMINOPHEN AND CAFFEINE 50; 325; 40 MG/1; MG/1; MG/1
1 TABLET ORAL
Status: CANCELLED | OUTPATIENT
Start: 2022-01-01

## 2022-01-01 RX ORDER — ALBUTEROL SULFATE 0.83 MG/ML
2.5 SOLUTION RESPIRATORY (INHALATION)
Qty: 120 NEBULE | Refills: 0 | Status: SHIPPED | OUTPATIENT
Start: 2022-01-01 | End: 2022-01-01

## 2022-01-01 RX ORDER — GUAIFENESIN 600 MG/1
600 TABLET, EXTENDED RELEASE ORAL EVERY 12 HOURS
Status: CANCELLED | OUTPATIENT
Start: 2022-01-01

## 2022-01-01 RX ORDER — PANTOPRAZOLE SODIUM 40 MG/1
40 TABLET, DELAYED RELEASE ORAL DAILY
Qty: 30 TABLET | Refills: 0 | Status: SHIPPED | OUTPATIENT
Start: 2022-01-01 | End: 2022-07-28

## 2022-01-01 RX ORDER — NITROGLYCERIN 0.4 MG/1
0.4 TABLET SUBLINGUAL
Status: DISCONTINUED | OUTPATIENT
Start: 2022-01-01 | End: 2022-01-01 | Stop reason: HOSPADM

## 2022-01-01 RX ORDER — NEBULIZER AND COMPRESSOR
EACH MISCELLANEOUS
Qty: 1 EACH | Refills: 0 | Status: ON HOLD | OUTPATIENT
Start: 2022-01-01 | End: 2022-01-01

## 2022-01-01 RX ORDER — FUROSEMIDE 20 MG/1
TABLET ORAL
Qty: 30 TABLET | Refills: 1 | Status: SHIPPED | OUTPATIENT
Start: 2022-01-01 | End: 2022-01-01

## 2022-01-01 RX ORDER — KETOROLAC TROMETHAMINE 30 MG/ML
15 INJECTION, SOLUTION INTRAMUSCULAR; INTRAVENOUS
Status: COMPLETED | OUTPATIENT
Start: 2022-01-01 | End: 2022-01-01

## 2022-01-01 RX ORDER — CEFUROXIME AXETIL 250 MG/1
500 TABLET ORAL EVERY 12 HOURS
Status: DISCONTINUED | OUTPATIENT
Start: 2022-01-01 | End: 2022-01-01 | Stop reason: HOSPADM

## 2022-01-01 RX ORDER — FUROSEMIDE 10 MG/ML
40 INJECTION INTRAMUSCULAR; INTRAVENOUS 2 TIMES DAILY
Status: COMPLETED | OUTPATIENT
Start: 2022-01-01 | End: 2022-01-01

## 2022-01-01 RX ORDER — SERTRALINE HYDROCHLORIDE 100 MG/1
200 TABLET, FILM COATED ORAL DAILY
COMMUNITY
End: 2022-01-01

## 2022-01-01 RX ORDER — ALBUTEROL SULFATE 0.83 MG/ML
2.5 SOLUTION RESPIRATORY (INHALATION)
Status: DISCONTINUED | OUTPATIENT
Start: 2022-01-01 | End: 2022-01-01 | Stop reason: HOSPADM

## 2022-01-01 RX ORDER — PANTOPRAZOLE SODIUM 40 MG/1
40 TABLET, DELAYED RELEASE ORAL DAILY
Qty: 30 TABLET | Refills: 0 | Status: ON HOLD | OUTPATIENT
Start: 2022-01-01 | End: 2022-01-01

## 2022-01-01 RX ORDER — METOPROLOL SUCCINATE 25 MG/1
12.5 TABLET, EXTENDED RELEASE ORAL DAILY
Status: DISCONTINUED | OUTPATIENT
Start: 2022-01-01 | End: 2022-01-01 | Stop reason: HOSPADM

## 2022-01-01 RX ORDER — PANTOPRAZOLE SODIUM 40 MG/1
40 TABLET, DELAYED RELEASE ORAL
Status: DISCONTINUED | OUTPATIENT
Start: 2022-01-01 | End: 2022-01-01 | Stop reason: HOSPADM

## 2022-01-01 RX ORDER — FUROSEMIDE 10 MG/ML
40 INJECTION INTRAMUSCULAR; INTRAVENOUS 2 TIMES DAILY
Status: DISCONTINUED | OUTPATIENT
Start: 2022-01-01 | End: 2022-01-01

## 2022-01-01 RX ORDER — MIDODRINE HYDROCHLORIDE 10 MG/1
10 TABLET ORAL
Qty: 90 TABLET | Refills: 0 | Status: SHIPPED | OUTPATIENT
Start: 2022-01-01 | End: 2022-07-28

## 2022-01-01 RX ORDER — AMOXICILLIN 250 MG
1 CAPSULE ORAL DAILY
Status: DISCONTINUED | OUTPATIENT
Start: 2022-01-01 | End: 2022-01-01 | Stop reason: HOSPADM

## 2022-01-01 RX ADMIN — PANTOPRAZOLE SODIUM 40 MG: 40 TABLET, DELAYED RELEASE ORAL at 08:43

## 2022-01-01 RX ADMIN — SERTRALINE 200 MG: 100 TABLET, FILM COATED ORAL at 09:46

## 2022-01-01 RX ADMIN — MIDODRINE HYDROCHLORIDE 10 MG: 5 TABLET ORAL at 08:20

## 2022-01-01 RX ADMIN — APIXABAN 5 MG: 5 TABLET, FILM COATED ORAL at 08:51

## 2022-01-01 RX ADMIN — APIXABAN 5 MG: 5 TABLET, FILM COATED ORAL at 08:40

## 2022-01-01 RX ADMIN — BUTALBITAL, ACETAMINOPHEN, AND CAFFEINE 1 TABLET: 50; 325; 40 TABLET ORAL at 00:12

## 2022-01-01 RX ADMIN — SODIUM CHLORIDE, PRESERVATIVE FREE 10 ML: 5 INJECTION INTRAVENOUS at 21:47

## 2022-01-01 RX ADMIN — PANTOPRAZOLE SODIUM 40 MG: 40 TABLET, DELAYED RELEASE ORAL at 06:57

## 2022-01-01 RX ADMIN — APIXABAN 5 MG: 5 TABLET, FILM COATED ORAL at 18:07

## 2022-01-01 RX ADMIN — GUAIFENESIN 600 MG: 600 TABLET, EXTENDED RELEASE ORAL at 09:14

## 2022-01-01 RX ADMIN — ASPIRIN 81 MG 81 MG: 81 TABLET ORAL at 09:58

## 2022-01-01 RX ADMIN — GUAIFENESIN 600 MG: 600 TABLET, EXTENDED RELEASE ORAL at 21:40

## 2022-01-01 RX ADMIN — APIXABAN 5 MG: 5 TABLET, FILM COATED ORAL at 08:39

## 2022-01-01 RX ADMIN — DULOXETINE HYDROCHLORIDE 60 MG: 30 CAPSULE, DELAYED RELEASE ORAL at 09:36

## 2022-01-01 RX ADMIN — MEMANTINE 10 MG: 10 TABLET ORAL at 08:43

## 2022-01-01 RX ADMIN — DONEPEZIL HYDROCHLORIDE 5 MG: 5 TABLET, FILM COATED ORAL at 21:18

## 2022-01-01 RX ADMIN — MEMANTINE HYDROCHLORIDE 10 MG: 5 TABLET ORAL at 09:39

## 2022-01-01 RX ADMIN — ATORVASTATIN CALCIUM 40 MG: 40 TABLET, FILM COATED ORAL at 21:43

## 2022-01-01 RX ADMIN — APIXABAN 5 MG: 5 TABLET, FILM COATED ORAL at 18:00

## 2022-01-01 RX ADMIN — IOPAMIDOL 100 ML: 755 INJECTION, SOLUTION INTRAVENOUS at 21:26

## 2022-01-01 RX ADMIN — SODIUM CHLORIDE, PRESERVATIVE FREE 10 ML: 5 INJECTION INTRAVENOUS at 06:37

## 2022-01-01 RX ADMIN — MEMANTINE 10 MG: 10 TABLET ORAL at 17:37

## 2022-01-01 RX ADMIN — DONEPEZIL HYDROCHLORIDE 5 MG: 5 TABLET, FILM COATED ORAL at 22:05

## 2022-01-01 RX ADMIN — APIXABAN 5 MG: 5 TABLET, FILM COATED ORAL at 21:39

## 2022-01-01 RX ADMIN — SERTRALINE 200 MG: 100 TABLET, FILM COATED ORAL at 10:44

## 2022-01-01 RX ADMIN — SERTRALINE 200 MG: 100 TABLET, FILM COATED ORAL at 08:35

## 2022-01-01 RX ADMIN — IPRATROPIUM BROMIDE AND ALBUTEROL SULFATE 3 ML: .5; 3 SOLUTION RESPIRATORY (INHALATION) at 08:02

## 2022-01-01 RX ADMIN — PANTOPRAZOLE SODIUM 40 MG: 40 TABLET, DELAYED RELEASE ORAL at 11:24

## 2022-01-01 RX ADMIN — MEMANTINE 10 MG: 5 TABLET ORAL at 21:48

## 2022-01-01 RX ADMIN — APIXABAN 5 MG: 5 TABLET, FILM COATED ORAL at 21:12

## 2022-01-01 RX ADMIN — GUAIFENESIN 600 MG: 600 TABLET, EXTENDED RELEASE ORAL at 22:14

## 2022-01-01 RX ADMIN — FUROSEMIDE 20 MG: 10 INJECTION, SOLUTION INTRAMUSCULAR; INTRAVENOUS at 02:09

## 2022-01-01 RX ADMIN — DULOXETINE HYDROCHLORIDE 60 MG: 30 CAPSULE, DELAYED RELEASE ORAL at 08:54

## 2022-01-01 RX ADMIN — APIXABAN 5 MG: 5 TABLET, FILM COATED ORAL at 09:35

## 2022-01-01 RX ADMIN — PANTOPRAZOLE SODIUM 40 MG: 40 TABLET, DELAYED RELEASE ORAL at 06:21

## 2022-01-01 RX ADMIN — APIXABAN 5 MG: 5 TABLET, FILM COATED ORAL at 08:35

## 2022-01-01 RX ADMIN — BUTALBITAL, ACETAMINOPHEN, AND CAFFEINE 1 TABLET: 50; 325; 40 TABLET ORAL at 08:35

## 2022-01-01 RX ADMIN — ACETAMINOPHEN 650 MG: 325 TABLET ORAL at 15:49

## 2022-01-01 RX ADMIN — PANTOPRAZOLE SODIUM 40 MG: 40 TABLET, DELAYED RELEASE ORAL at 06:49

## 2022-01-01 RX ADMIN — ACETAMINOPHEN 650 MG: 325 TABLET ORAL at 13:57

## 2022-01-01 RX ADMIN — PANTOPRAZOLE SODIUM 40 MG: 40 TABLET, DELAYED RELEASE ORAL at 06:16

## 2022-01-01 RX ADMIN — MEMANTINE 10 MG: 10 TABLET ORAL at 17:44

## 2022-01-01 RX ADMIN — SERTRALINE 200 MG: 50 TABLET, FILM COATED ORAL at 11:24

## 2022-01-01 RX ADMIN — MIRABEGRON 50 MG: 25 TABLET, FILM COATED, EXTENDED RELEASE ORAL at 08:42

## 2022-01-01 RX ADMIN — FUROSEMIDE 40 MG: 40 TABLET ORAL at 08:40

## 2022-01-01 RX ADMIN — PANTOPRAZOLE SODIUM 40 MG: 40 TABLET, DELAYED RELEASE ORAL at 08:04

## 2022-01-01 RX ADMIN — MIDODRINE HYDROCHLORIDE 10 MG: 5 TABLET ORAL at 17:00

## 2022-01-01 RX ADMIN — SODIUM CHLORIDE, PRESERVATIVE FREE 10 ML: 5 INJECTION INTRAVENOUS at 06:54

## 2022-01-01 RX ADMIN — SODIUM CHLORIDE, PRESERVATIVE FREE 10 ML: 5 INJECTION INTRAVENOUS at 22:44

## 2022-01-01 RX ADMIN — ATORVASTATIN CALCIUM 40 MG: 40 TABLET, FILM COATED ORAL at 21:20

## 2022-01-01 RX ADMIN — GABAPENTIN 600 MG: 300 CAPSULE ORAL at 21:36

## 2022-01-01 RX ADMIN — IPRATROPIUM BROMIDE AND ALBUTEROL SULFATE 3 ML: .5; 3 SOLUTION RESPIRATORY (INHALATION) at 19:50

## 2022-01-01 RX ADMIN — BUTALBITAL, ACETAMINOPHEN, AND CAFFEINE 1 TABLET: 50; 325; 40 TABLET ORAL at 18:37

## 2022-01-01 RX ADMIN — ATORVASTATIN CALCIUM 40 MG: 40 TABLET, FILM COATED ORAL at 21:37

## 2022-01-01 RX ADMIN — SODIUM CHLORIDE, PRESERVATIVE FREE 10 ML: 5 INJECTION INTRAVENOUS at 14:00

## 2022-01-01 RX ADMIN — APIXABAN 5 MG: 5 TABLET, FILM COATED ORAL at 21:35

## 2022-01-01 RX ADMIN — IPRATROPIUM BROMIDE AND ALBUTEROL SULFATE 3 ML: .5; 3 SOLUTION RESPIRATORY (INHALATION) at 21:13

## 2022-01-01 RX ADMIN — GUAIFENESIN 600 MG: 600 TABLET, EXTENDED RELEASE ORAL at 21:12

## 2022-01-01 RX ADMIN — MIDODRINE HYDROCHLORIDE 10 MG: 5 TABLET ORAL at 08:39

## 2022-01-01 RX ADMIN — MEMANTINE 10 MG: 10 TABLET ORAL at 17:50

## 2022-01-01 RX ADMIN — SODIUM CHLORIDE, PRESERVATIVE FREE 10 ML: 5 INJECTION INTRAVENOUS at 06:05

## 2022-01-01 RX ADMIN — DULOXETINE HYDROCHLORIDE 60 MG: 30 CAPSULE, DELAYED RELEASE ORAL at 08:58

## 2022-01-01 RX ADMIN — DULOXETINE HYDROCHLORIDE 60 MG: 30 CAPSULE, DELAYED RELEASE ORAL at 11:05

## 2022-01-01 RX ADMIN — APIXABAN 5 MG: 5 TABLET, FILM COATED ORAL at 08:41

## 2022-01-01 RX ADMIN — MIDODRINE HYDROCHLORIDE 10 MG: 5 TABLET ORAL at 12:41

## 2022-01-01 RX ADMIN — DONEPEZIL HYDROCHLORIDE 5 MG: 5 TABLET, FILM COATED ORAL at 21:37

## 2022-01-01 RX ADMIN — DONEPEZIL HYDROCHLORIDE 5 MG: 5 TABLET, FILM COATED ORAL at 21:35

## 2022-01-01 RX ADMIN — GABAPENTIN 600 MG: 300 CAPSULE ORAL at 15:59

## 2022-01-01 RX ADMIN — DONEPEZIL HYDROCHLORIDE 5 MG: 5 TABLET, FILM COATED ORAL at 22:14

## 2022-01-01 RX ADMIN — MEMANTINE HYDROCHLORIDE 10 MG: 5 TABLET ORAL at 17:16

## 2022-01-01 RX ADMIN — ATORVASTATIN CALCIUM 40 MG: 40 TABLET, FILM COATED ORAL at 22:27

## 2022-01-01 RX ADMIN — DONEPEZIL HYDROCHLORIDE 5 MG: 5 TABLET, FILM COATED ORAL at 21:30

## 2022-01-01 RX ADMIN — DONEPEZIL HYDROCHLORIDE 5 MG: 5 TABLET, FILM COATED ORAL at 21:48

## 2022-01-01 RX ADMIN — MIDODRINE HYDROCHLORIDE 10 MG: 5 TABLET ORAL at 08:45

## 2022-01-01 RX ADMIN — GUAIFENESIN 600 MG: 600 TABLET, EXTENDED RELEASE ORAL at 09:46

## 2022-01-01 RX ADMIN — MIDODRINE HYDROCHLORIDE 10 MG: 5 TABLET ORAL at 13:19

## 2022-01-01 RX ADMIN — MEMANTINE 10 MG: 5 TABLET ORAL at 09:19

## 2022-01-01 RX ADMIN — ONDANSETRON 4 MG: 2 INJECTION INTRAMUSCULAR; INTRAVENOUS at 10:48

## 2022-01-01 RX ADMIN — MIRABEGRON 50 MG: 25 TABLET, FILM COATED, EXTENDED RELEASE ORAL at 09:34

## 2022-01-01 RX ADMIN — SODIUM CHLORIDE, PRESERVATIVE FREE 10 ML: 5 INJECTION INTRAVENOUS at 20:10

## 2022-01-01 RX ADMIN — APIXABAN 5 MG: 5 TABLET, FILM COATED ORAL at 21:31

## 2022-01-01 RX ADMIN — GUAIFENESIN 600 MG: 600 TABLET, EXTENDED RELEASE ORAL at 21:45

## 2022-01-01 RX ADMIN — DOXYCYCLINE 100 MG: 100 INJECTION, POWDER, LYOPHILIZED, FOR SOLUTION INTRAVENOUS at 10:49

## 2022-01-01 RX ADMIN — MIDODRINE HYDROCHLORIDE 10 MG: 5 TABLET ORAL at 16:58

## 2022-01-01 RX ADMIN — SODIUM CHLORIDE, PRESERVATIVE FREE 10 ML: 5 INJECTION INTRAVENOUS at 15:45

## 2022-01-01 RX ADMIN — IPRATROPIUM BROMIDE AND ALBUTEROL SULFATE 3 ML: .5; 3 SOLUTION RESPIRATORY (INHALATION) at 13:55

## 2022-01-01 RX ADMIN — SODIUM CHLORIDE, PRESERVATIVE FREE 10 ML: 5 INJECTION INTRAVENOUS at 13:18

## 2022-01-01 RX ADMIN — SODIUM CHLORIDE, PRESERVATIVE FREE 10 ML: 5 INJECTION INTRAVENOUS at 14:26

## 2022-01-01 RX ADMIN — DONEPEZIL HYDROCHLORIDE 5 MG: 5 TABLET, FILM COATED ORAL at 21:56

## 2022-01-01 RX ADMIN — DULOXETINE HYDROCHLORIDE 60 MG: 30 CAPSULE, DELAYED RELEASE ORAL at 10:43

## 2022-01-01 RX ADMIN — PANTOPRAZOLE SODIUM 40 MG: 40 TABLET, DELAYED RELEASE ORAL at 08:39

## 2022-01-01 RX ADMIN — SODIUM CHLORIDE, PRESERVATIVE FREE 10 ML: 5 INJECTION INTRAVENOUS at 17:56

## 2022-01-01 RX ADMIN — BUTALBITAL, ACETAMINOPHEN, AND CAFFEINE 1 TABLET: 50; 325; 40 TABLET ORAL at 09:22

## 2022-01-01 RX ADMIN — PANTOPRAZOLE SODIUM 40 MG: 40 TABLET, DELAYED RELEASE ORAL at 06:48

## 2022-01-01 RX ADMIN — MEMANTINE 10 MG: 10 TABLET ORAL at 08:40

## 2022-01-01 RX ADMIN — MIRABEGRON 50 MG: 25 TABLET, FILM COATED, EXTENDED RELEASE ORAL at 09:43

## 2022-01-01 RX ADMIN — GABAPENTIN 600 MG: 300 CAPSULE ORAL at 08:40

## 2022-01-01 RX ADMIN — DULOXETINE HYDROCHLORIDE 60 MG: 30 CAPSULE, DELAYED RELEASE ORAL at 09:44

## 2022-01-01 RX ADMIN — DONEPEZIL HYDROCHLORIDE 5 MG: 5 TABLET, FILM COATED ORAL at 21:12

## 2022-01-01 RX ADMIN — DONEPEZIL HYDROCHLORIDE 5 MG: 5 TABLET, FILM COATED ORAL at 21:42

## 2022-01-01 RX ADMIN — MEMANTINE 10 MG: 10 TABLET ORAL at 08:57

## 2022-01-01 RX ADMIN — MEMANTINE HYDROCHLORIDE 10 MG: 5 TABLET ORAL at 18:00

## 2022-01-01 RX ADMIN — GABAPENTIN 600 MG: 300 CAPSULE ORAL at 16:08

## 2022-01-01 RX ADMIN — MIDODRINE HYDROCHLORIDE 10 MG: 5 TABLET ORAL at 18:07

## 2022-01-01 RX ADMIN — ATORVASTATIN CALCIUM 40 MG: 40 TABLET, FILM COATED ORAL at 21:52

## 2022-01-01 RX ADMIN — MIDODRINE HYDROCHLORIDE 5 MG: 5 TABLET ORAL at 18:19

## 2022-01-01 RX ADMIN — APIXABAN 5 MG: 5 TABLET, FILM COATED ORAL at 09:47

## 2022-01-01 RX ADMIN — DONEPEZIL HYDROCHLORIDE 5 MG: 5 TABLET, FILM COATED ORAL at 21:36

## 2022-01-01 RX ADMIN — SODIUM CHLORIDE 50 ML/HR: 9 INJECTION, SOLUTION INTRAVENOUS at 18:01

## 2022-01-01 RX ADMIN — GUAIFENESIN 600 MG: 600 TABLET, EXTENDED RELEASE ORAL at 09:44

## 2022-01-01 RX ADMIN — DULOXETINE HYDROCHLORIDE 60 MG: 30 CAPSULE, DELAYED RELEASE ORAL at 09:46

## 2022-01-01 RX ADMIN — APIXABAN 5 MG: 5 TABLET, FILM COATED ORAL at 21:56

## 2022-01-01 RX ADMIN — APIXABAN 5 MG: 5 TABLET, FILM COATED ORAL at 21:47

## 2022-01-01 RX ADMIN — FUROSEMIDE 40 MG: 10 INJECTION, SOLUTION INTRAMUSCULAR; INTRAVENOUS at 08:49

## 2022-01-01 RX ADMIN — SODIUM CHLORIDE, PRESERVATIVE FREE 10 ML: 5 INJECTION INTRAVENOUS at 06:51

## 2022-01-01 RX ADMIN — IPRATROPIUM BROMIDE AND ALBUTEROL SULFATE 3 ML: .5; 3 SOLUTION RESPIRATORY (INHALATION) at 09:13

## 2022-01-01 RX ADMIN — SODIUM CHLORIDE, PRESERVATIVE FREE 10 ML: 5 INJECTION INTRAVENOUS at 06:47

## 2022-01-01 RX ADMIN — POLYETHYLENE GLYCOL 3350 17 G: 17 POWDER, FOR SOLUTION ORAL at 08:57

## 2022-01-01 RX ADMIN — GUAIFENESIN 600 MG: 600 TABLET, EXTENDED RELEASE ORAL at 23:01

## 2022-01-01 RX ADMIN — SACUBITRIL AND VALSARTAN 1 TABLET: 24; 26 TABLET, FILM COATED ORAL at 09:32

## 2022-01-01 RX ADMIN — MEMANTINE 10 MG: 10 TABLET ORAL at 17:43

## 2022-01-01 RX ADMIN — APIXABAN 5 MG: 5 TABLET, FILM COATED ORAL at 20:33

## 2022-01-01 RX ADMIN — DONEPEZIL HYDROCHLORIDE 5 MG: 5 TABLET, FILM COATED ORAL at 21:31

## 2022-01-01 RX ADMIN — MIDODRINE HYDROCHLORIDE 10 MG: 5 TABLET ORAL at 09:39

## 2022-01-01 RX ADMIN — MIDODRINE HYDROCHLORIDE 10 MG: 5 TABLET ORAL at 09:55

## 2022-01-01 RX ADMIN — MIDODRINE HYDROCHLORIDE 10 MG: 5 TABLET ORAL at 17:23

## 2022-01-01 RX ADMIN — APIXABAN 5 MG: 5 TABLET, FILM COATED ORAL at 09:03

## 2022-01-01 RX ADMIN — DULOXETINE HYDROCHLORIDE 60 MG: 30 CAPSULE, DELAYED RELEASE ORAL at 08:45

## 2022-01-01 RX ADMIN — AZITHROMYCIN MONOHYDRATE 250 MG: 250 TABLET ORAL at 09:00

## 2022-01-01 RX ADMIN — GUAIFENESIN 600 MG: 600 TABLET, EXTENDED RELEASE ORAL at 08:42

## 2022-01-01 RX ADMIN — MIDODRINE HYDROCHLORIDE 10 MG: 5 TABLET ORAL at 17:42

## 2022-01-01 RX ADMIN — APIXABAN 5 MG: 5 TABLET, FILM COATED ORAL at 08:04

## 2022-01-01 RX ADMIN — MIDODRINE HYDROCHLORIDE 10 MG: 5 TABLET ORAL at 09:17

## 2022-01-01 RX ADMIN — MEMANTINE HYDROCHLORIDE 10 MG: 5 TABLET ORAL at 09:10

## 2022-01-01 RX ADMIN — MEMANTINE HYDROCHLORIDE 10 MG: 5 TABLET ORAL at 10:19

## 2022-01-01 RX ADMIN — MIDODRINE HYDROCHLORIDE 10 MG: 5 TABLET ORAL at 12:22

## 2022-01-01 RX ADMIN — DULOXETINE HYDROCHLORIDE 60 MG: 30 CAPSULE, DELAYED RELEASE ORAL at 08:35

## 2022-01-01 RX ADMIN — SODIUM CHLORIDE, PRESERVATIVE FREE 10 ML: 5 INJECTION INTRAVENOUS at 06:31

## 2022-01-01 RX ADMIN — SERTRALINE 200 MG: 100 TABLET, FILM COATED ORAL at 09:12

## 2022-01-01 RX ADMIN — IPRATROPIUM BROMIDE AND ALBUTEROL SULFATE 3 ML: .5; 3 SOLUTION RESPIRATORY (INHALATION) at 09:14

## 2022-01-01 RX ADMIN — DULOXETINE HYDROCHLORIDE 60 MG: 30 CAPSULE, DELAYED RELEASE ORAL at 09:39

## 2022-01-01 RX ADMIN — SODIUM CHLORIDE, PRESERVATIVE FREE 10 ML: 5 INJECTION INTRAVENOUS at 10:20

## 2022-01-01 RX ADMIN — ATORVASTATIN CALCIUM 40 MG: 40 TABLET, FILM COATED ORAL at 21:42

## 2022-01-01 RX ADMIN — MIDODRINE HYDROCHLORIDE 10 MG: 5 TABLET ORAL at 09:44

## 2022-01-01 RX ADMIN — PANTOPRAZOLE SODIUM 40 MG: 40 TABLET, DELAYED RELEASE ORAL at 06:38

## 2022-01-01 RX ADMIN — MIDODRINE HYDROCHLORIDE 10 MG: 5 TABLET ORAL at 12:44

## 2022-01-01 RX ADMIN — LEVOFLOXACIN 500 MG: 5 INJECTION, SOLUTION INTRAVENOUS at 12:03

## 2022-01-01 RX ADMIN — MEMANTINE 10 MG: 5 TABLET ORAL at 21:44

## 2022-01-01 RX ADMIN — SODIUM CHLORIDE, PRESERVATIVE FREE 10 ML: 5 INJECTION INTRAVENOUS at 22:27

## 2022-01-01 RX ADMIN — DULOXETINE HYDROCHLORIDE 60 MG: 30 CAPSULE, DELAYED RELEASE ORAL at 08:39

## 2022-01-01 RX ADMIN — APIXABAN 5 MG: 5 TABLET, FILM COATED ORAL at 21:30

## 2022-01-01 RX ADMIN — PANTOPRAZOLE SODIUM 40 MG: 40 TABLET, DELAYED RELEASE ORAL at 08:54

## 2022-01-01 RX ADMIN — DULOXETINE HYDROCHLORIDE 60 MG: 30 CAPSULE, DELAYED RELEASE ORAL at 09:56

## 2022-01-01 RX ADMIN — APIXABAN 5 MG: 5 TABLET, FILM COATED ORAL at 20:10

## 2022-01-01 RX ADMIN — SODIUM CHLORIDE, PRESERVATIVE FREE 5 ML: 5 INJECTION INTRAVENOUS at 07:13

## 2022-01-01 RX ADMIN — MEMANTINE 10 MG: 10 TABLET ORAL at 18:19

## 2022-01-01 RX ADMIN — POLYETHYLENE GLYCOL 3350 17 G: 17 POWDER, FOR SOLUTION ORAL at 14:53

## 2022-01-01 RX ADMIN — AZITHROMYCIN MONOHYDRATE 250 MG: 250 TABLET ORAL at 08:35

## 2022-01-01 RX ADMIN — MIDODRINE HYDROCHLORIDE 10 MG: 5 TABLET ORAL at 11:47

## 2022-01-01 RX ADMIN — DULOXETINE HYDROCHLORIDE 60 MG: 30 CAPSULE, DELAYED RELEASE ORAL at 09:12

## 2022-01-01 RX ADMIN — METOPROLOL SUCCINATE 12.5 MG: 25 TABLET, EXTENDED RELEASE ORAL at 08:40

## 2022-01-01 RX ADMIN — APIXABAN 5 MG: 5 TABLET, FILM COATED ORAL at 09:14

## 2022-01-01 RX ADMIN — APIXABAN 5 MG: 5 TABLET, FILM COATED ORAL at 09:39

## 2022-01-01 RX ADMIN — POLYETHYLENE GLYCOL 3350 17 G: 17 POWDER, FOR SOLUTION ORAL at 08:39

## 2022-01-01 RX ADMIN — APIXABAN 5 MG: 5 TABLET, FILM COATED ORAL at 09:12

## 2022-01-01 RX ADMIN — PANTOPRAZOLE SODIUM 40 MG: 40 TABLET, DELAYED RELEASE ORAL at 06:51

## 2022-01-01 RX ADMIN — METOPROLOL SUCCINATE 12.5 MG: 25 TABLET, EXTENDED RELEASE ORAL at 09:11

## 2022-01-01 RX ADMIN — GUAIFENESIN 600 MG: 600 TABLET, EXTENDED RELEASE ORAL at 08:45

## 2022-01-01 RX ADMIN — SODIUM CHLORIDE, PRESERVATIVE FREE 10 ML: 5 INJECTION INTRAVENOUS at 21:46

## 2022-01-01 RX ADMIN — FUROSEMIDE 40 MG: 10 INJECTION, SOLUTION INTRAMUSCULAR; INTRAVENOUS at 23:50

## 2022-01-01 RX ADMIN — MEMANTINE HYDROCHLORIDE 10 MG: 5 TABLET ORAL at 09:00

## 2022-01-01 RX ADMIN — APIXABAN 5 MG: 5 TABLET, FILM COATED ORAL at 11:05

## 2022-01-01 RX ADMIN — GUAIFENESIN 600 MG: 600 TABLET, EXTENDED RELEASE ORAL at 08:04

## 2022-01-01 RX ADMIN — DONEPEZIL HYDROCHLORIDE 5 MG: 5 TABLET, FILM COATED ORAL at 22:27

## 2022-01-01 RX ADMIN — GUAIFENESIN 600 MG: 600 TABLET, EXTENDED RELEASE ORAL at 09:33

## 2022-01-01 RX ADMIN — SODIUM CHLORIDE, PRESERVATIVE FREE 10 ML: 5 INJECTION INTRAVENOUS at 13:50

## 2022-01-01 RX ADMIN — PANTOPRAZOLE SODIUM 40 MG: 40 TABLET, DELAYED RELEASE ORAL at 08:40

## 2022-01-01 RX ADMIN — MIDODRINE HYDROCHLORIDE 10 MG: 5 TABLET ORAL at 08:43

## 2022-01-01 RX ADMIN — PANTOPRAZOLE SODIUM 40 MG: 40 TABLET, DELAYED RELEASE ORAL at 07:08

## 2022-01-01 RX ADMIN — GUAIFENESIN 600 MG: 600 TABLET, EXTENDED RELEASE ORAL at 08:41

## 2022-01-01 RX ADMIN — SERTRALINE 200 MG: 100 TABLET, FILM COATED ORAL at 09:28

## 2022-01-01 RX ADMIN — SODIUM CHLORIDE, PRESERVATIVE FREE 10 ML: 5 INJECTION INTRAVENOUS at 22:00

## 2022-01-01 RX ADMIN — BUTALBITAL, ACETAMINOPHEN, AND CAFFEINE 1 TABLET: 50; 325; 40 TABLET ORAL at 21:18

## 2022-01-01 RX ADMIN — DULOXETINE HYDROCHLORIDE 60 MG: 30 CAPSULE, DELAYED RELEASE ORAL at 08:42

## 2022-01-01 RX ADMIN — SODIUM CHLORIDE, PRESERVATIVE FREE 10 ML: 5 INJECTION INTRAVENOUS at 16:55

## 2022-01-01 RX ADMIN — SODIUM CHLORIDE, PRESERVATIVE FREE 10 ML: 5 INJECTION INTRAVENOUS at 22:15

## 2022-01-01 RX ADMIN — DONEPEZIL HYDROCHLORIDE 5 MG: 5 TABLET, FILM COATED ORAL at 21:39

## 2022-01-01 RX ADMIN — DONEPEZIL HYDROCHLORIDE 5 MG: 5 TABLET, FILM COATED ORAL at 21:14

## 2022-01-01 RX ADMIN — GUAIFENESIN 600 MG: 600 TABLET, EXTENDED RELEASE ORAL at 09:15

## 2022-01-01 RX ADMIN — GUAIFENESIN 600 MG: 600 TABLET, EXTENDED RELEASE ORAL at 09:00

## 2022-01-01 RX ADMIN — ACETAMINOPHEN 650 MG: 325 TABLET ORAL at 10:27

## 2022-01-01 RX ADMIN — DOXYCYCLINE 100 MG: 100 INJECTION, POWDER, LYOPHILIZED, FOR SOLUTION INTRAVENOUS at 11:54

## 2022-01-01 RX ADMIN — PANTOPRAZOLE SODIUM 40 MG: 40 TABLET, DELAYED RELEASE ORAL at 05:40

## 2022-01-01 RX ADMIN — APIXABAN 5 MG: 5 TABLET, FILM COATED ORAL at 09:05

## 2022-01-01 RX ADMIN — SODIUM CHLORIDE, PRESERVATIVE FREE 10 ML: 5 INJECTION INTRAVENOUS at 06:25

## 2022-01-01 RX ADMIN — IPRATROPIUM BROMIDE AND ALBUTEROL SULFATE 3 ML: .5; 3 SOLUTION RESPIRATORY (INHALATION) at 01:28

## 2022-01-01 RX ADMIN — SODIUM CHLORIDE, PRESERVATIVE FREE 10 ML: 5 INJECTION INTRAVENOUS at 16:00

## 2022-01-01 RX ADMIN — MEMANTINE 10 MG: 10 TABLET ORAL at 08:54

## 2022-01-01 RX ADMIN — MIDODRINE HYDROCHLORIDE 10 MG: 5 TABLET ORAL at 14:41

## 2022-01-01 RX ADMIN — MIDODRINE HYDROCHLORIDE 10 MG: 5 TABLET ORAL at 08:42

## 2022-01-01 RX ADMIN — SACUBITRIL AND VALSARTAN 1 TABLET: 24; 26 TABLET, FILM COATED ORAL at 18:35

## 2022-01-01 RX ADMIN — MEMANTINE HYDROCHLORIDE 10 MG: 5 TABLET ORAL at 08:35

## 2022-01-01 RX ADMIN — ATORVASTATIN CALCIUM 40 MG: 40 TABLET, FILM COATED ORAL at 21:30

## 2022-01-01 RX ADMIN — IPRATROPIUM BROMIDE AND ALBUTEROL SULFATE 3 ML: .5; 3 SOLUTION RESPIRATORY (INHALATION) at 08:29

## 2022-01-01 RX ADMIN — PANTOPRAZOLE SODIUM 40 MG: 40 TABLET, DELAYED RELEASE ORAL at 06:43

## 2022-01-01 RX ADMIN — APIXABAN 5 MG: 5 TABLET, FILM COATED ORAL at 09:28

## 2022-01-01 RX ADMIN — MEMANTINE 10 MG: 5 TABLET ORAL at 22:05

## 2022-01-01 RX ADMIN — ATORVASTATIN CALCIUM 40 MG: 40 TABLET, FILM COATED ORAL at 22:05

## 2022-01-01 RX ADMIN — GABAPENTIN 600 MG: 300 CAPSULE ORAL at 21:20

## 2022-01-01 RX ADMIN — DONEPEZIL HYDROCHLORIDE 5 MG: 5 TABLET, FILM COATED ORAL at 21:47

## 2022-01-01 RX ADMIN — APIXABAN 5 MG: 5 TABLET, FILM COATED ORAL at 21:45

## 2022-01-01 RX ADMIN — ACETAMINOPHEN 650 MG: 325 TABLET ORAL at 18:23

## 2022-01-01 RX ADMIN — SODIUM CHLORIDE, PRESERVATIVE FREE 10 ML: 5 INJECTION INTRAVENOUS at 12:18

## 2022-01-01 RX ADMIN — SODIUM CHLORIDE 50 ML/HR: 9 INJECTION, SOLUTION INTRAVENOUS at 22:35

## 2022-01-01 RX ADMIN — CEFUROXIME AXETIL 500 MG: 250 TABLET ORAL at 21:36

## 2022-01-01 RX ADMIN — SACUBITRIL AND VALSARTAN 1 TABLET: 24; 26 TABLET, FILM COATED ORAL at 08:54

## 2022-01-01 RX ADMIN — SODIUM CHLORIDE, PRESERVATIVE FREE 10 ML: 5 INJECTION INTRAVENOUS at 14:43

## 2022-01-01 RX ADMIN — APIXABAN 5 MG: 5 TABLET, FILM COATED ORAL at 21:13

## 2022-01-01 RX ADMIN — GUAIFENESIN 600 MG: 600 TABLET, EXTENDED RELEASE ORAL at 11:04

## 2022-01-01 RX ADMIN — MIDODRINE HYDROCHLORIDE 10 MG: 5 TABLET ORAL at 17:16

## 2022-01-01 RX ADMIN — DONEPEZIL HYDROCHLORIDE 5 MG: 5 TABLET, FILM COATED ORAL at 22:42

## 2022-01-01 RX ADMIN — BUTALBITAL, ACETAMINOPHEN, AND CAFFEINE 1 TABLET: 50; 325; 40 TABLET ORAL at 09:14

## 2022-01-01 RX ADMIN — APIXABAN 5 MG: 5 TABLET, FILM COATED ORAL at 11:24

## 2022-01-01 RX ADMIN — FUROSEMIDE 20 MG: 10 INJECTION, SOLUTION INTRAMUSCULAR; INTRAVENOUS at 21:00

## 2022-01-01 RX ADMIN — IPRATROPIUM BROMIDE AND ALBUTEROL SULFATE 3 ML: .5; 3 SOLUTION RESPIRATORY (INHALATION) at 14:50

## 2022-01-01 RX ADMIN — SERTRALINE 200 MG: 100 TABLET, FILM COATED ORAL at 08:04

## 2022-01-01 RX ADMIN — SERTRALINE 200 MG: 100 TABLET, FILM COATED ORAL at 08:51

## 2022-01-01 RX ADMIN — SODIUM CHLORIDE, PRESERVATIVE FREE 20 ML: 5 INJECTION INTRAVENOUS at 03:22

## 2022-01-01 RX ADMIN — MIDODRINE HYDROCHLORIDE 10 MG: 5 TABLET ORAL at 18:05

## 2022-01-01 RX ADMIN — MEMANTINE 10 MG: 5 TABLET ORAL at 11:07

## 2022-01-01 RX ADMIN — MIDODRINE HYDROCHLORIDE 10 MG: 5 TABLET ORAL at 12:30

## 2022-01-01 RX ADMIN — DONEPEZIL HYDROCHLORIDE 5 MG: 5 TABLET, FILM COATED ORAL at 21:46

## 2022-01-01 RX ADMIN — GUAIFENESIN 600 MG: 600 TABLET, EXTENDED RELEASE ORAL at 15:45

## 2022-01-01 RX ADMIN — SODIUM CHLORIDE, PRESERVATIVE FREE 10 ML: 5 INJECTION INTRAVENOUS at 21:20

## 2022-01-01 RX ADMIN — GUAIFENESIN 600 MG: 600 TABLET, EXTENDED RELEASE ORAL at 21:36

## 2022-01-01 RX ADMIN — ACETAMINOPHEN 650 MG: 325 TABLET ORAL at 11:58

## 2022-01-01 RX ADMIN — ATORVASTATIN CALCIUM 40 MG: 40 TABLET, FILM COATED ORAL at 22:14

## 2022-01-01 RX ADMIN — APIXABAN 5 MG: 5 TABLET, FILM COATED ORAL at 09:56

## 2022-01-01 RX ADMIN — MIDODRINE HYDROCHLORIDE 10 MG: 5 TABLET ORAL at 12:23

## 2022-01-01 RX ADMIN — GUAIFENESIN 600 MG: 600 TABLET, EXTENDED RELEASE ORAL at 09:56

## 2022-01-01 RX ADMIN — IPRATROPIUM BROMIDE AND ALBUTEROL SULFATE 3 ML: .5; 3 SOLUTION RESPIRATORY (INHALATION) at 19:20

## 2022-01-01 RX ADMIN — SERTRALINE 200 MG: 50 TABLET, FILM COATED ORAL at 08:39

## 2022-01-01 RX ADMIN — SODIUM CHLORIDE 50 ML/HR: 9 INJECTION, SOLUTION INTRAVENOUS at 21:54

## 2022-01-01 RX ADMIN — DONEPEZIL HYDROCHLORIDE 5 MG: 5 TABLET, FILM COATED ORAL at 21:44

## 2022-01-01 RX ADMIN — MIRABEGRON 50 MG: 25 TABLET, FILM COATED, EXTENDED RELEASE ORAL at 08:40

## 2022-01-01 RX ADMIN — MIDODRINE HYDROCHLORIDE 10 MG: 5 TABLET ORAL at 17:55

## 2022-01-01 RX ADMIN — SACUBITRIL AND VALSARTAN 1 TABLET: 24; 26 TABLET, FILM COATED ORAL at 11:44

## 2022-01-01 RX ADMIN — BUTALBITAL, ACETAMINOPHEN, AND CAFFEINE 1 TABLET: 50; 325; 40 TABLET ORAL at 23:57

## 2022-01-01 RX ADMIN — FUROSEMIDE 20 MG: 10 INJECTION, SOLUTION INTRAMUSCULAR; INTRAVENOUS at 09:09

## 2022-01-01 RX ADMIN — BUTALBITAL, ACETAMINOPHEN, AND CAFFEINE 1 TABLET: 50; 325; 40 TABLET ORAL at 12:09

## 2022-01-01 RX ADMIN — METOPROLOL SUCCINATE 25 MG: 25 TABLET, EXTENDED RELEASE ORAL at 09:58

## 2022-01-01 RX ADMIN — MIDODRINE HYDROCHLORIDE 10 MG: 5 TABLET ORAL at 12:09

## 2022-01-01 RX ADMIN — GUAIFENESIN 600 MG: 600 TABLET, EXTENDED RELEASE ORAL at 09:39

## 2022-01-01 RX ADMIN — METOPROLOL SUCCINATE 25 MG: 25 TABLET, EXTENDED RELEASE ORAL at 13:57

## 2022-01-01 RX ADMIN — ATORVASTATIN CALCIUM 40 MG: 40 TABLET, FILM COATED ORAL at 21:46

## 2022-01-01 RX ADMIN — DONEPEZIL HYDROCHLORIDE 5 MG: 5 TABLET, FILM COATED ORAL at 21:52

## 2022-01-01 RX ADMIN — MIDODRINE HYDROCHLORIDE 10 MG: 5 TABLET ORAL at 12:35

## 2022-01-01 RX ADMIN — POLYETHYLENE GLYCOL 3350 17 G: 17 POWDER, FOR SOLUTION ORAL at 08:55

## 2022-01-01 RX ADMIN — MIDODRINE HYDROCHLORIDE 10 MG: 5 TABLET ORAL at 12:00

## 2022-01-01 RX ADMIN — ACETAMINOPHEN 650 MG: 325 TABLET ORAL at 15:02

## 2022-01-01 RX ADMIN — MIDODRINE HYDROCHLORIDE 5 MG: 5 TABLET ORAL at 13:18

## 2022-01-01 RX ADMIN — MEMANTINE HYDROCHLORIDE 10 MG: 5 TABLET ORAL at 17:57

## 2022-01-01 RX ADMIN — APIXABAN 5 MG: 5 TABLET, FILM COATED ORAL at 08:54

## 2022-01-01 RX ADMIN — APIXABAN 5 MG: 5 TABLET, FILM COATED ORAL at 17:57

## 2022-01-01 RX ADMIN — APIXABAN 5 MG: 5 TABLET, FILM COATED ORAL at 21:42

## 2022-01-01 RX ADMIN — GUAIFENESIN 600 MG: 600 TABLET, EXTENDED RELEASE ORAL at 22:42

## 2022-01-01 RX ADMIN — FUROSEMIDE 40 MG: 10 INJECTION, SOLUTION INTRAMUSCULAR; INTRAVENOUS at 00:36

## 2022-01-01 RX ADMIN — DULOXETINE HYDROCHLORIDE 60 MG: 30 CAPSULE, DELAYED RELEASE ORAL at 08:41

## 2022-01-01 RX ADMIN — SERTRALINE 200 MG: 100 TABLET, FILM COATED ORAL at 09:44

## 2022-01-01 RX ADMIN — SODIUM CHLORIDE, PRESERVATIVE FREE 10 ML: 5 INJECTION INTRAVENOUS at 16:12

## 2022-01-01 RX ADMIN — LEVOFLOXACIN 750 MG: 750 TABLET, FILM COATED ORAL at 18:27

## 2022-01-01 RX ADMIN — MIDODRINE HYDROCHLORIDE 10 MG: 5 TABLET ORAL at 09:19

## 2022-01-01 RX ADMIN — DULOXETINE HYDROCHLORIDE 60 MG: 30 CAPSULE, DELAYED RELEASE ORAL at 15:44

## 2022-01-01 RX ADMIN — SODIUM CHLORIDE, PRESERVATIVE FREE 10 ML: 5 INJECTION INTRAVENOUS at 15:27

## 2022-01-01 RX ADMIN — APIXABAN 5 MG: 5 TABLET, FILM COATED ORAL at 21:20

## 2022-01-01 RX ADMIN — MIDODRINE HYDROCHLORIDE 10 MG: 5 TABLET ORAL at 13:50

## 2022-01-01 RX ADMIN — CEFTRIAXONE SODIUM 2 G: 2 INJECTION, POWDER, FOR SOLUTION INTRAMUSCULAR; INTRAVENOUS at 22:18

## 2022-01-01 RX ADMIN — PANTOPRAZOLE SODIUM 40 MG: 40 TABLET, DELAYED RELEASE ORAL at 06:56

## 2022-01-01 RX ADMIN — SODIUM CHLORIDE, PRESERVATIVE FREE 10 ML: 5 INJECTION INTRAVENOUS at 05:45

## 2022-01-01 RX ADMIN — SERTRALINE 200 MG: 50 TABLET, FILM COATED ORAL at 09:39

## 2022-01-01 RX ADMIN — MEMANTINE 10 MG: 5 TABLET ORAL at 09:16

## 2022-01-01 RX ADMIN — MEMANTINE 10 MG: 5 TABLET ORAL at 21:12

## 2022-01-01 RX ADMIN — LEVOFLOXACIN 500 MG: 5 INJECTION, SOLUTION INTRAVENOUS at 13:49

## 2022-01-01 RX ADMIN — SODIUM CHLORIDE, PRESERVATIVE FREE 10 ML: 5 INJECTION INTRAVENOUS at 13:27

## 2022-01-01 RX ADMIN — ATORVASTATIN CALCIUM 40 MG: 40 TABLET, FILM COATED ORAL at 23:01

## 2022-01-01 RX ADMIN — MEMANTINE 10 MG: 10 TABLET ORAL at 11:24

## 2022-01-01 RX ADMIN — FUROSEMIDE 40 MG: 10 INJECTION, SOLUTION INTRAMUSCULAR; INTRAVENOUS at 18:08

## 2022-01-01 RX ADMIN — ATORVASTATIN CALCIUM 40 MG: 40 TABLET, FILM COATED ORAL at 22:29

## 2022-01-01 RX ADMIN — PANTOPRAZOLE SODIUM 40 MG: 40 TABLET, DELAYED RELEASE ORAL at 08:57

## 2022-01-01 RX ADMIN — MIDODRINE HYDROCHLORIDE 10 MG: 5 TABLET ORAL at 09:56

## 2022-01-01 RX ADMIN — PANTOPRAZOLE SODIUM 40 MG: 40 TABLET, DELAYED RELEASE ORAL at 08:35

## 2022-01-01 RX ADMIN — APIXABAN 5 MG: 5 TABLET, FILM COATED ORAL at 15:44

## 2022-01-01 RX ADMIN — GUAIFENESIN 600 MG: 600 TABLET, EXTENDED RELEASE ORAL at 20:59

## 2022-01-01 RX ADMIN — MEMANTINE 10 MG: 10 TABLET ORAL at 09:33

## 2022-01-01 RX ADMIN — GUAIFENESIN 600 MG: 600 TABLET, EXTENDED RELEASE ORAL at 08:39

## 2022-01-01 RX ADMIN — MEMANTINE HYDROCHLORIDE 10 MG: 5 TABLET ORAL at 09:01

## 2022-01-01 RX ADMIN — SENNOSIDES AND DOCUSATE SODIUM 1 TABLET: 50; 8.6 TABLET ORAL at 09:01

## 2022-01-01 RX ADMIN — BUTALBITAL, ACETAMINOPHEN, AND CAFFEINE 1 TABLET: 50; 325; 40 TABLET ORAL at 22:08

## 2022-01-01 RX ADMIN — SERTRALINE 200 MG: 100 TABLET, FILM COATED ORAL at 09:59

## 2022-01-01 RX ADMIN — MEMANTINE 10 MG: 10 TABLET ORAL at 17:00

## 2022-01-01 RX ADMIN — MEMANTINE 10 MG: 5 TABLET ORAL at 20:33

## 2022-01-01 RX ADMIN — MIDODRINE HYDROCHLORIDE 10 MG: 5 TABLET ORAL at 18:37

## 2022-01-01 RX ADMIN — MEMANTINE HYDROCHLORIDE 10 MG: 5 TABLET ORAL at 08:05

## 2022-01-01 RX ADMIN — MEMANTINE 10 MG: 10 TABLET ORAL at 09:39

## 2022-01-01 RX ADMIN — APIXABAN 5 MG: 5 TABLET, FILM COATED ORAL at 23:01

## 2022-01-01 RX ADMIN — APIXABAN 5 MG: 5 TABLET, FILM COATED ORAL at 10:44

## 2022-01-01 RX ADMIN — MIDODRINE HYDROCHLORIDE 5 MG: 5 TABLET ORAL at 08:39

## 2022-01-01 RX ADMIN — PANTOPRAZOLE SODIUM 40 MG: 40 TABLET, DELAYED RELEASE ORAL at 06:47

## 2022-01-01 RX ADMIN — MEMANTINE HYDROCHLORIDE 10 MG: 5 TABLET ORAL at 09:55

## 2022-01-01 RX ADMIN — DOXYCYCLINE 100 MG: 100 INJECTION, POWDER, LYOPHILIZED, FOR SOLUTION INTRAVENOUS at 10:05

## 2022-01-01 RX ADMIN — SODIUM CHLORIDE, PRESERVATIVE FREE 10 ML: 5 INJECTION INTRAVENOUS at 05:14

## 2022-01-01 RX ADMIN — MIDODRINE HYDROCHLORIDE 10 MG: 5 TABLET ORAL at 12:16

## 2022-01-01 RX ADMIN — GUAIFENESIN 600 MG: 600 TABLET, EXTENDED RELEASE ORAL at 21:18

## 2022-01-01 RX ADMIN — PANTOPRAZOLE SODIUM 40 MG: 40 TABLET, DELAYED RELEASE ORAL at 10:19

## 2022-01-01 RX ADMIN — MIDODRINE HYDROCHLORIDE 10 MG: 5 TABLET ORAL at 17:34

## 2022-01-01 RX ADMIN — MEMANTINE HYDROCHLORIDE 10 MG: 5 TABLET ORAL at 09:59

## 2022-01-01 RX ADMIN — APIXABAN 5 MG: 5 TABLET, FILM COATED ORAL at 09:16

## 2022-01-01 RX ADMIN — GUAIFENESIN 600 MG: 600 TABLET, EXTENDED RELEASE ORAL at 09:27

## 2022-01-01 RX ADMIN — MIDODRINE HYDROCHLORIDE 10 MG: 5 TABLET ORAL at 11:55

## 2022-01-01 RX ADMIN — SODIUM CHLORIDE, PRESERVATIVE FREE 10 ML: 5 INJECTION INTRAVENOUS at 23:02

## 2022-01-01 RX ADMIN — SACUBITRIL AND VALSARTAN 1 TABLET: 24; 26 TABLET, FILM COATED ORAL at 17:37

## 2022-01-01 RX ADMIN — SACUBITRIL AND VALSARTAN 1 TABLET: 24; 26 TABLET, FILM COATED ORAL at 09:26

## 2022-01-01 RX ADMIN — PANTOPRAZOLE SODIUM 40 MG: 40 TABLET, DELAYED RELEASE ORAL at 09:01

## 2022-01-01 RX ADMIN — APIXABAN 5 MG: 5 TABLET, FILM COATED ORAL at 09:58

## 2022-01-01 RX ADMIN — BUTALBITAL, ACETAMINOPHEN, AND CAFFEINE 1 TABLET: 50; 325; 40 TABLET ORAL at 02:07

## 2022-01-01 RX ADMIN — MIDODRINE HYDROCHLORIDE 10 MG: 5 TABLET ORAL at 11:24

## 2022-01-01 RX ADMIN — GUAIFENESIN 600 MG: 600 TABLET, EXTENDED RELEASE ORAL at 08:54

## 2022-01-01 RX ADMIN — APIXABAN 5 MG: 5 TABLET, FILM COATED ORAL at 21:36

## 2022-01-01 RX ADMIN — DOXYCYCLINE 100 MG: 100 CAPSULE ORAL at 09:00

## 2022-01-01 RX ADMIN — MIRABEGRON 50 MG: 25 TABLET, FILM COATED, EXTENDED RELEASE ORAL at 08:55

## 2022-01-01 RX ADMIN — ATORVASTATIN CALCIUM 40 MG: 40 TABLET, FILM COATED ORAL at 20:50

## 2022-01-01 RX ADMIN — MIRABEGRON 50 MG: 25 TABLET, FILM COATED, EXTENDED RELEASE ORAL at 11:23

## 2022-01-01 RX ADMIN — SODIUM CHLORIDE, PRESERVATIVE FREE 10 ML: 5 INJECTION INTRAVENOUS at 21:41

## 2022-01-01 RX ADMIN — APIXABAN 5 MG: 5 TABLET, FILM COATED ORAL at 22:14

## 2022-01-01 RX ADMIN — ATORVASTATIN CALCIUM 40 MG: 40 TABLET, FILM COATED ORAL at 21:12

## 2022-01-01 RX ADMIN — MIRABEGRON 50 MG: 25 TABLET, FILM COATED, EXTENDED RELEASE ORAL at 09:38

## 2022-01-01 RX ADMIN — DONEPEZIL HYDROCHLORIDE 5 MG: 5 TABLET, FILM COATED ORAL at 21:13

## 2022-01-01 RX ADMIN — SENNOSIDES AND DOCUSATE SODIUM 1 TABLET: 50; 8.6 TABLET ORAL at 09:57

## 2022-01-01 RX ADMIN — APIXABAN 5 MG: 5 TABLET, FILM COATED ORAL at 17:16

## 2022-01-01 RX ADMIN — DONEPEZIL HYDROCHLORIDE 5 MG: 5 TABLET, FILM COATED ORAL at 22:29

## 2022-01-01 RX ADMIN — CEFTRIAXONE SODIUM 1 G: 1 INJECTION, POWDER, FOR SOLUTION INTRAMUSCULAR; INTRAVENOUS at 21:32

## 2022-01-01 RX ADMIN — DULOXETINE HYDROCHLORIDE 60 MG: 30 CAPSULE, DELAYED RELEASE ORAL at 09:15

## 2022-01-01 RX ADMIN — MIDODRINE HYDROCHLORIDE 10 MG: 5 TABLET ORAL at 09:12

## 2022-01-01 RX ADMIN — BUTALBITAL, ACETAMINOPHEN, AND CAFFEINE 1 TABLET: 50; 325; 40 TABLET ORAL at 08:04

## 2022-01-01 RX ADMIN — APIXABAN 5 MG: 5 TABLET, FILM COATED ORAL at 08:43

## 2022-01-01 RX ADMIN — BUTALBITAL, ACETAMINOPHEN, AND CAFFEINE 1 TABLET: 50; 325; 40 TABLET ORAL at 16:33

## 2022-01-01 RX ADMIN — DOXYCYCLINE 100 MG: 100 INJECTION, POWDER, LYOPHILIZED, FOR SOLUTION INTRAVENOUS at 22:39

## 2022-01-01 RX ADMIN — BUTALBITAL, ACETAMINOPHEN, AND CAFFEINE 1 TABLET: 50; 325; 40 TABLET ORAL at 16:51

## 2022-01-01 RX ADMIN — SODIUM CHLORIDE, PRESERVATIVE FREE 10 ML: 5 INJECTION INTRAVENOUS at 21:38

## 2022-01-01 RX ADMIN — APIXABAN 5 MG: 5 TABLET, FILM COATED ORAL at 22:05

## 2022-01-01 RX ADMIN — MIDODRINE HYDROCHLORIDE 10 MG: 5 TABLET ORAL at 11:05

## 2022-01-01 RX ADMIN — POLYETHYLENE GLYCOL 3350 17 G: 17 POWDER, FOR SOLUTION ORAL at 23:46

## 2022-01-01 RX ADMIN — LEVOFLOXACIN 750 MG: 750 TABLET, FILM COATED ORAL at 17:08

## 2022-01-01 RX ADMIN — MIDODRINE HYDROCHLORIDE 10 MG: 5 TABLET ORAL at 17:49

## 2022-01-01 RX ADMIN — POLYETHYLENE GLYCOL 3350 17 G: 17 POWDER, FOR SOLUTION ORAL at 08:40

## 2022-01-01 RX ADMIN — POLYETHYLENE GLYCOL 3350 17 G: 17 POWDER, FOR SOLUTION ORAL at 09:55

## 2022-01-01 RX ADMIN — SODIUM CHLORIDE, PRESERVATIVE FREE 10 ML: 5 INJECTION INTRAVENOUS at 16:38

## 2022-01-01 RX ADMIN — SODIUM CHLORIDE, PRESERVATIVE FREE 10 ML: 5 INJECTION INTRAVENOUS at 22:29

## 2022-01-01 RX ADMIN — SERTRALINE 200 MG: 100 TABLET, FILM COATED ORAL at 09:04

## 2022-01-01 RX ADMIN — MEMANTINE 10 MG: 10 TABLET ORAL at 17:34

## 2022-01-01 RX ADMIN — MEMANTINE 10 MG: 5 TABLET ORAL at 21:35

## 2022-01-01 RX ADMIN — MIDODRINE HYDROCHLORIDE 10 MG: 5 TABLET ORAL at 19:09

## 2022-01-01 RX ADMIN — GABAPENTIN 600 MG: 300 CAPSULE ORAL at 21:37

## 2022-01-01 RX ADMIN — BUTALBITAL, ACETAMINOPHEN, AND CAFFEINE 1 TABLET: 50; 325; 40 TABLET ORAL at 03:09

## 2022-01-01 RX ADMIN — APIXABAN 5 MG: 5 TABLET, FILM COATED ORAL at 21:07

## 2022-01-01 RX ADMIN — MEMANTINE 10 MG: 5 TABLET ORAL at 08:45

## 2022-01-01 RX ADMIN — LEVOFLOXACIN 750 MG: 5 INJECTION, SOLUTION INTRAVENOUS at 11:21

## 2022-01-01 RX ADMIN — METOPROLOL SUCCINATE 12.5 MG: 25 TABLET, EXTENDED RELEASE ORAL at 10:19

## 2022-01-01 RX ADMIN — APIXABAN 5 MG: 5 TABLET, FILM COATED ORAL at 08:57

## 2022-01-01 RX ADMIN — MEMANTINE HYDROCHLORIDE 10 MG: 5 TABLET ORAL at 08:40

## 2022-01-01 RX ADMIN — MEMANTINE 10 MG: 5 TABLET ORAL at 09:12

## 2022-01-01 RX ADMIN — MEMANTINE 10 MG: 10 TABLET ORAL at 09:04

## 2022-01-01 RX ADMIN — MIDODRINE HYDROCHLORIDE 10 MG: 5 TABLET ORAL at 08:35

## 2022-01-01 RX ADMIN — MIDODRINE HYDROCHLORIDE 10 MG: 5 TABLET ORAL at 16:13

## 2022-01-01 RX ADMIN — SODIUM CHLORIDE, PRESERVATIVE FREE 10 ML: 5 INJECTION INTRAVENOUS at 18:01

## 2022-01-01 RX ADMIN — ATORVASTATIN CALCIUM 40 MG: 40 TABLET, FILM COATED ORAL at 22:42

## 2022-01-01 RX ADMIN — MIDODRINE HYDROCHLORIDE 10 MG: 5 TABLET ORAL at 17:10

## 2022-01-01 RX ADMIN — GUAIFENESIN 600 MG: 600 TABLET, EXTENDED RELEASE ORAL at 20:50

## 2022-01-01 RX ADMIN — METHYLPREDNISOLONE SODIUM SUCCINATE 125 MG: 125 INJECTION, POWDER, FOR SOLUTION INTRAMUSCULAR; INTRAVENOUS at 23:20

## 2022-01-01 RX ADMIN — GUAIFENESIN 600 MG: 600 TABLET, EXTENDED RELEASE ORAL at 22:05

## 2022-01-01 RX ADMIN — ATORVASTATIN CALCIUM 40 MG: 40 TABLET, FILM COATED ORAL at 21:48

## 2022-01-01 RX ADMIN — ATORVASTATIN CALCIUM 40 MG: 40 TABLET, FILM COATED ORAL at 21:31

## 2022-01-01 RX ADMIN — SODIUM CHLORIDE 500 ML: 9 INJECTION, SOLUTION INTRAVENOUS at 20:02

## 2022-01-01 RX ADMIN — SODIUM CHLORIDE, PRESERVATIVE FREE 10 ML: 5 INJECTION INTRAVENOUS at 21:42

## 2022-01-01 RX ADMIN — SODIUM CHLORIDE, PRESERVATIVE FREE 10 ML: 5 INJECTION INTRAVENOUS at 13:39

## 2022-01-01 RX ADMIN — DONEPEZIL HYDROCHLORIDE 5 MG: 5 TABLET, FILM COATED ORAL at 21:45

## 2022-01-01 RX ADMIN — MEMANTINE HYDROCHLORIDE 10 MG: 5 TABLET ORAL at 17:56

## 2022-01-01 RX ADMIN — DULOXETINE HYDROCHLORIDE 60 MG: 30 CAPSULE, DELAYED RELEASE ORAL at 08:51

## 2022-01-01 RX ADMIN — PANTOPRAZOLE SODIUM 40 MG: 40 TABLET, DELAYED RELEASE ORAL at 06:35

## 2022-01-01 RX ADMIN — SERTRALINE 200 MG: 100 TABLET, FILM COATED ORAL at 09:39

## 2022-01-01 RX ADMIN — SERTRALINE 200 MG: 50 TABLET, FILM COATED ORAL at 08:54

## 2022-01-01 RX ADMIN — APIXABAN 5 MG: 5 TABLET, FILM COATED ORAL at 09:09

## 2022-01-01 RX ADMIN — APIXABAN 5 MG: 5 TABLET, FILM COATED ORAL at 09:33

## 2022-01-01 RX ADMIN — ATORVASTATIN CALCIUM 40 MG: 40 TABLET, FILM COATED ORAL at 21:47

## 2022-01-01 RX ADMIN — MEMANTINE 10 MG: 5 TABLET ORAL at 22:14

## 2022-01-01 RX ADMIN — SODIUM CHLORIDE, PRESERVATIVE FREE 10 ML: 5 INJECTION INTRAVENOUS at 05:29

## 2022-01-01 RX ADMIN — MEMANTINE HYDROCHLORIDE 10 MG: 5 TABLET ORAL at 09:12

## 2022-01-01 RX ADMIN — ASPIRIN 81 MG 81 MG: 81 TABLET ORAL at 09:01

## 2022-01-01 RX ADMIN — SODIUM CHLORIDE, PRESERVATIVE FREE 10 ML: 5 INJECTION INTRAVENOUS at 13:19

## 2022-01-01 RX ADMIN — SODIUM CHLORIDE, PRESERVATIVE FREE 10 ML: 5 INJECTION INTRAVENOUS at 13:52

## 2022-01-01 RX ADMIN — GUAIFENESIN 600 MG: 600 TABLET, EXTENDED RELEASE ORAL at 21:37

## 2022-01-01 RX ADMIN — GUAIFENESIN 600 MG: 600 TABLET, EXTENDED RELEASE ORAL at 21:47

## 2022-01-01 RX ADMIN — ATORVASTATIN CALCIUM 40 MG: 40 TABLET, FILM COATED ORAL at 21:35

## 2022-01-01 RX ADMIN — METOPROLOL SUCCINATE 25 MG: 25 TABLET, EXTENDED RELEASE ORAL at 09:01

## 2022-01-01 RX ADMIN — SODIUM CHLORIDE, PRESERVATIVE FREE 5 ML: 5 INJECTION INTRAVENOUS at 21:46

## 2022-01-01 RX ADMIN — MEMANTINE HYDROCHLORIDE 10 MG: 5 TABLET ORAL at 10:44

## 2022-01-01 RX ADMIN — BUTALBITAL, ACETAMINOPHEN, AND CAFFEINE 1 TABLET: 50; 325; 40 TABLET ORAL at 22:21

## 2022-01-01 RX ADMIN — DULOXETINE HYDROCHLORIDE 60 MG: 30 CAPSULE, DELAYED RELEASE ORAL at 11:23

## 2022-01-01 RX ADMIN — SODIUM CHLORIDE, PRESERVATIVE FREE 10 ML: 5 INJECTION INTRAVENOUS at 06:12

## 2022-01-01 RX ADMIN — MEMANTINE HYDROCHLORIDE 10 MG: 5 TABLET ORAL at 18:49

## 2022-01-01 RX ADMIN — MIDODRINE HYDROCHLORIDE 10 MG: 5 TABLET ORAL at 17:50

## 2022-01-01 RX ADMIN — MEMANTINE 10 MG: 5 TABLET ORAL at 08:41

## 2022-01-01 RX ADMIN — POLYETHYLENE GLYCOL 3350 17 G: 17 POWDER, FOR SOLUTION ORAL at 09:00

## 2022-01-01 RX ADMIN — APIXABAN 5 MG: 5 TABLET, FILM COATED ORAL at 21:52

## 2022-01-01 RX ADMIN — MEMANTINE 10 MG: 5 TABLET ORAL at 09:35

## 2022-01-01 RX ADMIN — PANTOPRAZOLE SODIUM 40 MG: 40 TABLET, DELAYED RELEASE ORAL at 09:12

## 2022-01-01 RX ADMIN — ATORVASTATIN CALCIUM 40 MG: 40 TABLET, FILM COATED ORAL at 22:43

## 2022-01-01 RX ADMIN — DULOXETINE HYDROCHLORIDE 60 MG: 30 CAPSULE, DELAYED RELEASE ORAL at 09:27

## 2022-01-01 RX ADMIN — SERTRALINE 200 MG: 50 TABLET, FILM COATED ORAL at 09:04

## 2022-01-01 RX ADMIN — FUROSEMIDE 40 MG: 10 INJECTION, SOLUTION INTRAMUSCULAR; INTRAVENOUS at 09:56

## 2022-01-01 RX ADMIN — SODIUM CHLORIDE, PRESERVATIVE FREE 10 ML: 5 INJECTION INTRAVENOUS at 21:37

## 2022-01-01 RX ADMIN — GUAIFENESIN 600 MG: 600 TABLET, EXTENDED RELEASE ORAL at 21:52

## 2022-01-01 RX ADMIN — ATORVASTATIN CALCIUM 40 MG: 40 TABLET, FILM COATED ORAL at 21:39

## 2022-01-01 RX ADMIN — SODIUM CHLORIDE, PRESERVATIVE FREE 10 ML: 5 INJECTION INTRAVENOUS at 04:00

## 2022-01-01 RX ADMIN — ATORVASTATIN CALCIUM 40 MG: 40 TABLET, FILM COATED ORAL at 21:13

## 2022-01-01 RX ADMIN — DONEPEZIL HYDROCHLORIDE 5 MG: 5 TABLET, FILM COATED ORAL at 23:01

## 2022-01-01 RX ADMIN — MIDODRINE HYDROCHLORIDE 10 MG: 5 TABLET ORAL at 09:15

## 2022-01-01 RX ADMIN — BUTALBITAL, ACETAMINOPHEN, AND CAFFEINE 1 TABLET: 50; 325; 40 TABLET ORAL at 16:10

## 2022-01-01 RX ADMIN — MEMANTINE 10 MG: 5 TABLET ORAL at 09:27

## 2022-01-01 RX ADMIN — BUTALBITAL, ACETAMINOPHEN, AND CAFFEINE 1 TABLET: 50; 325; 40 TABLET ORAL at 02:43

## 2022-01-01 RX ADMIN — DOXYCYCLINE 100 MG: 100 CAPSULE ORAL at 21:35

## 2022-01-01 RX ADMIN — KETOROLAC TROMETHAMINE 15 MG: 15 INJECTION, SOLUTION INTRAMUSCULAR; INTRAVENOUS at 16:26

## 2022-01-01 RX ADMIN — SERTRALINE 200 MG: 50 TABLET, FILM COATED ORAL at 08:59

## 2022-01-01 RX ADMIN — APIXABAN 5 MG: 5 TABLET, FILM COATED ORAL at 09:44

## 2022-01-01 RX ADMIN — ONDANSETRON 4 MG: 4 TABLET, ORALLY DISINTEGRATING ORAL at 14:34

## 2022-01-01 RX ADMIN — MIDODRINE HYDROCHLORIDE 10 MG: 5 TABLET ORAL at 13:55

## 2022-01-01 RX ADMIN — MEMANTINE HYDROCHLORIDE 10 MG: 5 TABLET ORAL at 09:44

## 2022-01-01 RX ADMIN — IPRATROPIUM BROMIDE AND ALBUTEROL SULFATE 3 ML: .5; 3 SOLUTION RESPIRATORY (INHALATION) at 22:30

## 2022-01-01 RX ADMIN — BUTALBITAL, ACETAMINOPHEN, AND CAFFEINE 1 TABLET: 50; 325; 40 TABLET ORAL at 21:39

## 2022-01-01 RX ADMIN — METOPROLOL SUCCINATE 12.5 MG: 25 TABLET, EXTENDED RELEASE ORAL at 09:06

## 2022-01-01 RX ADMIN — GUAIFENESIN 600 MG: 600 TABLET, EXTENDED RELEASE ORAL at 08:51

## 2022-01-01 RX ADMIN — SERTRALINE 200 MG: 100 TABLET, FILM COATED ORAL at 09:55

## 2022-01-01 RX ADMIN — BUTALBITAL, ACETAMINOPHEN, AND CAFFEINE 1 TABLET: 50; 325; 40 TABLET ORAL at 11:29

## 2022-01-01 RX ADMIN — SERTRALINE 200 MG: 100 TABLET, FILM COATED ORAL at 08:42

## 2022-01-01 RX ADMIN — BUTALBITAL, ACETAMINOPHEN, AND CAFFEINE 1 TABLET: 50; 325; 40 TABLET ORAL at 20:49

## 2022-01-01 RX ADMIN — ATORVASTATIN CALCIUM 40 MG: 40 TABLET, FILM COATED ORAL at 21:18

## 2022-01-01 RX ADMIN — DONEPEZIL HYDROCHLORIDE 5 MG: 5 TABLET, FILM COATED ORAL at 21:20

## 2022-01-01 RX ADMIN — MIDODRINE HYDROCHLORIDE 10 MG: 5 TABLET ORAL at 16:51

## 2022-01-01 RX ADMIN — SERTRALINE 200 MG: 100 TABLET, FILM COATED ORAL at 09:19

## 2022-01-01 RX ADMIN — SODIUM CHLORIDE, PRESERVATIVE FREE 10 ML: 5 INJECTION INTRAVENOUS at 21:02

## 2022-01-01 RX ADMIN — SERTRALINE 200 MG: 100 TABLET, FILM COATED ORAL at 08:40

## 2022-01-01 RX ADMIN — ATORVASTATIN CALCIUM 40 MG: 40 TABLET, FILM COATED ORAL at 21:56

## 2022-01-01 RX ADMIN — GUAIFENESIN 600 MG: 600 TABLET, EXTENDED RELEASE ORAL at 20:33

## 2022-01-01 RX ADMIN — ACETAMINOPHEN 650 MG: 325 TABLET ORAL at 08:59

## 2022-01-01 RX ADMIN — ACETAMINOPHEN 650 MG: 325 TABLET ORAL at 02:19

## 2022-01-01 RX ADMIN — SODIUM CHLORIDE, PRESERVATIVE FREE 5 ML: 5 INJECTION INTRAVENOUS at 06:48

## 2022-01-01 RX ADMIN — SODIUM CHLORIDE, PRESERVATIVE FREE 10 ML: 5 INJECTION INTRAVENOUS at 06:07

## 2022-01-01 RX ADMIN — CEFTRIAXONE SODIUM 1 G: 1 INJECTION, POWDER, FOR SOLUTION INTRAMUSCULAR; INTRAVENOUS at 21:51

## 2022-01-01 RX ADMIN — ATORVASTATIN CALCIUM 40 MG: 40 TABLET, FILM COATED ORAL at 21:14

## 2022-01-01 RX ADMIN — MEMANTINE HYDROCHLORIDE 10 MG: 5 TABLET ORAL at 18:37

## 2022-01-01 RX ADMIN — SODIUM CHLORIDE, PRESERVATIVE FREE 5 ML: 5 INJECTION INTRAVENOUS at 21:22

## 2022-01-01 RX ADMIN — APIXABAN 5 MG: 5 TABLET, FILM COATED ORAL at 17:23

## 2022-01-01 RX ADMIN — MIDODRINE HYDROCHLORIDE 10 MG: 5 TABLET ORAL at 12:12

## 2022-01-01 RX ADMIN — DULOXETINE HYDROCHLORIDE 60 MG: 30 CAPSULE, DELAYED RELEASE ORAL at 09:40

## 2022-01-01 RX ADMIN — MEMANTINE HYDROCHLORIDE 10 MG: 5 TABLET ORAL at 18:08

## 2022-01-01 RX ADMIN — PANTOPRAZOLE SODIUM 40 MG: 40 TABLET, DELAYED RELEASE ORAL at 15:45

## 2022-01-01 RX ADMIN — GABAPENTIN 600 MG: 300 CAPSULE ORAL at 15:34

## 2022-01-01 RX ADMIN — FUROSEMIDE 40 MG: 10 INJECTION, SOLUTION INTRAMUSCULAR; INTRAVENOUS at 16:55

## 2022-01-01 RX ADMIN — APIXABAN 5 MG: 5 TABLET, FILM COATED ORAL at 21:43

## 2022-01-01 RX ADMIN — FUROSEMIDE 20 MG: 10 INJECTION, SOLUTION INTRAMUSCULAR; INTRAVENOUS at 13:51

## 2022-01-01 RX ADMIN — APIXABAN 5 MG: 5 TABLET, FILM COATED ORAL at 18:37

## 2022-01-01 RX ADMIN — MIDODRINE HYDROCHLORIDE 10 MG: 5 TABLET ORAL at 11:29

## 2022-01-01 RX ADMIN — SACUBITRIL AND VALSARTAN 1 TABLET: 24; 26 TABLET, FILM COATED ORAL at 08:58

## 2022-01-01 RX ADMIN — APIXABAN 5 MG: 5 TABLET, FILM COATED ORAL at 08:45

## 2022-01-01 RX ADMIN — SODIUM CHLORIDE 500 ML: 9 INJECTION, SOLUTION INTRAVENOUS at 13:25

## 2022-01-01 RX ADMIN — SERTRALINE 200 MG: 100 TABLET, FILM COATED ORAL at 09:35

## 2022-01-01 RX ADMIN — PANTOPRAZOLE SODIUM 40 MG: 40 TABLET, DELAYED RELEASE ORAL at 09:58

## 2022-01-01 RX ADMIN — MIDODRINE HYDROCHLORIDE 10 MG: 5 TABLET ORAL at 09:47

## 2022-01-01 RX ADMIN — SODIUM CHLORIDE, PRESERVATIVE FREE 10 ML: 5 INJECTION INTRAVENOUS at 21:32

## 2022-01-01 RX ADMIN — ATORVASTATIN CALCIUM 40 MG: 40 TABLET, FILM COATED ORAL at 21:45

## 2022-01-01 RX ADMIN — LEVOFLOXACIN 750 MG: 5 INJECTION, SOLUTION INTRAVENOUS at 10:57

## 2022-01-01 RX ADMIN — MEMANTINE 10 MG: 5 TABLET ORAL at 09:55

## 2022-01-01 RX ADMIN — MEMANTINE HYDROCHLORIDE 10 MG: 5 TABLET ORAL at 17:28

## 2022-01-01 RX ADMIN — MEMANTINE HYDROCHLORIDE 10 MG: 5 TABLET ORAL at 21:07

## 2022-01-01 RX ADMIN — MEMANTINE HYDROCHLORIDE 10 MG: 5 TABLET ORAL at 17:24

## 2022-01-01 RX ADMIN — DONEPEZIL HYDROCHLORIDE 5 MG: 5 TABLET, FILM COATED ORAL at 22:00

## 2022-01-01 RX ADMIN — MEMANTINE 10 MG: 5 TABLET ORAL at 08:51

## 2022-01-01 RX ADMIN — GUAIFENESIN 600 MG: 600 TABLET, EXTENDED RELEASE ORAL at 22:43

## 2022-01-01 RX ADMIN — DULOXETINE HYDROCHLORIDE 60 MG: 30 CAPSULE, DELAYED RELEASE ORAL at 08:04

## 2022-01-01 RX ADMIN — IPRATROPIUM BROMIDE AND ALBUTEROL SULFATE 3 ML: .5; 3 SOLUTION RESPIRATORY (INHALATION) at 22:39

## 2022-01-01 RX ADMIN — ATORVASTATIN CALCIUM 40 MG: 40 TABLET, FILM COATED ORAL at 21:40

## 2022-01-01 RX ADMIN — APIXABAN 5 MG: 5 TABLET, FILM COATED ORAL at 09:19

## 2022-01-01 RX ADMIN — FUROSEMIDE 20 MG: 10 INJECTION, SOLUTION INTRAMUSCULAR; INTRAVENOUS at 08:05

## 2022-01-01 RX ADMIN — BUTALBITAL, ACETAMINOPHEN, AND CAFFEINE 1 TABLET: 50; 325; 40 TABLET ORAL at 14:16

## 2022-01-01 RX ADMIN — MEMANTINE 10 MG: 10 TABLET ORAL at 18:51

## 2022-01-01 RX ADMIN — PANTOPRAZOLE SODIUM 40 MG: 40 TABLET, DELAYED RELEASE ORAL at 09:33

## 2022-01-01 RX ADMIN — DONEPEZIL HYDROCHLORIDE 5 MG: 5 TABLET, FILM COATED ORAL at 22:43

## 2022-01-01 RX ADMIN — SERTRALINE 200 MG: 50 TABLET, FILM COATED ORAL at 09:33

## 2022-01-01 RX ADMIN — MIDODRINE HYDROCHLORIDE 10 MG: 5 TABLET ORAL at 08:04

## 2022-01-01 RX ADMIN — SODIUM CHLORIDE, PRESERVATIVE FREE 10 ML: 5 INJECTION INTRAVENOUS at 21:48

## 2022-01-01 RX ADMIN — SODIUM CHLORIDE, PRESERVATIVE FREE 10 ML: 5 INJECTION INTRAVENOUS at 21:39

## 2022-01-01 RX ADMIN — MIDODRINE HYDROCHLORIDE 10 MG: 5 TABLET ORAL at 18:51

## 2022-01-01 RX ADMIN — SERTRALINE 200 MG: 50 TABLET, FILM COATED ORAL at 08:43

## 2022-01-01 RX ADMIN — GUAIFENESIN 600 MG: 600 TABLET, EXTENDED RELEASE ORAL at 09:12

## 2022-01-01 RX ADMIN — PANTOPRAZOLE SODIUM 40 MG: 40 TABLET, DELAYED RELEASE ORAL at 06:36

## 2022-01-01 RX ADMIN — ACETAMINOPHEN 650 MG: 325 TABLET ORAL at 15:51

## 2022-01-01 RX ADMIN — ATORVASTATIN CALCIUM 40 MG: 40 TABLET, FILM COATED ORAL at 23:00

## 2022-01-01 RX ADMIN — GABAPENTIN 600 MG: 300 CAPSULE ORAL at 09:05

## 2022-01-01 RX ADMIN — SODIUM CHLORIDE, PRESERVATIVE FREE 10 ML: 5 INJECTION INTRAVENOUS at 06:00

## 2022-01-01 RX ADMIN — POLYETHYLENE GLYCOL 3350 17 G: 17 POWDER, FOR SOLUTION ORAL at 09:40

## 2022-01-01 RX ADMIN — APIXABAN 5 MG: 5 TABLET, FILM COATED ORAL at 21:46

## 2022-01-01 RX ADMIN — BUTALBITAL, ACETAMINOPHEN, AND CAFFEINE 1 TABLET: 50; 325; 40 TABLET ORAL at 00:18

## 2022-01-01 RX ADMIN — MEMANTINE HYDROCHLORIDE 10 MG: 5 TABLET ORAL at 17:18

## 2022-01-01 RX ADMIN — APIXABAN 5 MG: 5 TABLET, FILM COATED ORAL at 20:59

## 2022-01-01 RX ADMIN — ATORVASTATIN CALCIUM 40 MG: 40 TABLET, FILM COATED ORAL at 21:36

## 2022-01-01 RX ADMIN — GUAIFENESIN 600 MG: 600 TABLET, EXTENDED RELEASE ORAL at 13:49

## 2022-01-01 RX ADMIN — APIXABAN 5 MG: 5 TABLET, FILM COATED ORAL at 22:43

## 2022-01-01 RX ADMIN — MIDODRINE HYDROCHLORIDE 10 MG: 5 TABLET ORAL at 17:43

## 2022-01-01 RX ADMIN — PANTOPRAZOLE SODIUM 40 MG: 40 TABLET, DELAYED RELEASE ORAL at 06:42

## 2022-01-01 RX ADMIN — POLYETHYLENE GLYCOL 3350 17 G: 17 POWDER, FOR SOLUTION ORAL at 14:34

## 2022-01-01 RX ADMIN — GABAPENTIN 600 MG: 300 CAPSULE ORAL at 09:09

## 2022-01-01 RX ADMIN — POLYETHYLENE GLYCOL 3350 17 G: 17 POWDER, FOR SOLUTION ORAL at 18:07

## 2022-01-01 RX ADMIN — MIDODRINE HYDROCHLORIDE 10 MG: 5 TABLET ORAL at 11:26

## 2022-01-01 RX ADMIN — SERTRALINE 200 MG: 100 TABLET, FILM COATED ORAL at 09:15

## 2022-01-01 RX ADMIN — MIDODRINE HYDROCHLORIDE 10 MG: 5 TABLET ORAL at 09:35

## 2022-01-01 RX ADMIN — GUAIFENESIN 600 MG: 600 TABLET, EXTENDED RELEASE ORAL at 09:35

## 2022-01-01 RX ADMIN — SODIUM CHLORIDE, PRESERVATIVE FREE 10 ML: 5 INJECTION INTRAVENOUS at 06:16

## 2022-01-01 RX ADMIN — LEVOFLOXACIN 750 MG: 5 INJECTION, SOLUTION INTRAVENOUS at 11:26

## 2022-01-01 RX ADMIN — AZITHROMYCIN MONOHYDRATE 250 MG: 250 TABLET ORAL at 10:44

## 2022-01-01 RX ADMIN — MEMANTINE HYDROCHLORIDE 10 MG: 5 TABLET ORAL at 09:47

## 2022-01-01 RX ADMIN — GABAPENTIN 600 MG: 300 CAPSULE ORAL at 10:19

## 2022-01-01 RX ADMIN — SODIUM CHLORIDE, PRESERVATIVE FREE 5 ML: 5 INJECTION INTRAVENOUS at 22:00

## 2022-01-01 RX ADMIN — SERTRALINE 200 MG: 100 TABLET, FILM COATED ORAL at 11:07

## 2022-01-01 RX ADMIN — MEMANTINE 10 MG: 5 TABLET ORAL at 21:46

## 2022-01-01 RX ADMIN — DOXYCYCLINE 100 MG: 100 INJECTION, POWDER, LYOPHILIZED, FOR SOLUTION INTRAVENOUS at 22:20

## 2022-01-01 RX ADMIN — SODIUM CHLORIDE, PRESERVATIVE FREE 10 ML: 5 INJECTION INTRAVENOUS at 23:01

## 2022-01-01 RX ADMIN — MEMANTINE 10 MG: 5 TABLET ORAL at 22:43

## 2022-01-01 RX ADMIN — MEMANTINE 10 MG: 10 TABLET ORAL at 08:39

## 2022-01-01 RX ADMIN — PANTOPRAZOLE SODIUM 40 MG: 40 TABLET, DELAYED RELEASE ORAL at 09:04

## 2022-01-01 RX ADMIN — MEMANTINE 10 MG: 10 TABLET ORAL at 17:55

## 2022-01-01 RX ADMIN — PANTOPRAZOLE SODIUM 40 MG: 40 TABLET, DELAYED RELEASE ORAL at 09:39

## 2022-01-01 RX ADMIN — MIDODRINE HYDROCHLORIDE 10 MG: 5 TABLET ORAL at 11:59

## 2022-01-01 RX ADMIN — POLYETHYLENE GLYCOL 3350 17 G: 17 POWDER, FOR SOLUTION ORAL at 11:03

## 2022-01-01 RX ADMIN — MEMANTINE 10 MG: 5 TABLET ORAL at 21:56

## 2022-01-01 RX ADMIN — PANTOPRAZOLE SODIUM 40 MG: 40 TABLET, DELAYED RELEASE ORAL at 08:20

## 2022-01-01 RX ADMIN — MIDODRINE HYDROCHLORIDE 10 MG: 5 TABLET ORAL at 09:04

## 2022-01-01 RX ADMIN — DULOXETINE HYDROCHLORIDE 60 MG: 30 CAPSULE, DELAYED RELEASE ORAL at 09:19

## 2022-01-01 RX ADMIN — APIXABAN 5 MG: 5 TABLET, FILM COATED ORAL at 22:26

## 2022-01-01 RX ADMIN — GUAIFENESIN 600 MG: 600 TABLET, EXTENDED RELEASE ORAL at 21:46

## 2022-01-01 RX ADMIN — LEVOFLOXACIN 750 MG: 5 INJECTION, SOLUTION INTRAVENOUS at 12:12

## 2022-01-01 RX ADMIN — MEMANTINE 10 MG: 10 TABLET ORAL at 17:21

## 2022-01-01 RX ADMIN — GUAIFENESIN 600 MG: 600 TABLET, EXTENDED RELEASE ORAL at 09:19

## 2022-01-01 RX ADMIN — POLYETHYLENE GLYCOL 3350 17 G: 17 POWDER, FOR SOLUTION ORAL at 09:01

## 2022-01-01 RX ADMIN — MEMANTINE 10 MG: 5 TABLET ORAL at 08:42

## 2022-01-01 RX ADMIN — BUTALBITAL, ACETAMINOPHEN, AND CAFFEINE 1 TABLET: 50; 325; 40 TABLET ORAL at 11:59

## 2022-01-01 RX ADMIN — DONEPEZIL HYDROCHLORIDE 5 MG: 5 TABLET, FILM COATED ORAL at 20:50

## 2022-01-01 RX ADMIN — MEMANTINE HYDROCHLORIDE 10 MG: 5 TABLET ORAL at 18:53

## 2022-01-01 RX ADMIN — SODIUM CHLORIDE, PRESERVATIVE FREE 10 ML: 5 INJECTION INTRAVENOUS at 20:51

## 2022-01-01 RX ADMIN — BUTALBITAL, ACETAMINOPHEN, AND CAFFEINE 1 TABLET: 50; 325; 40 TABLET ORAL at 14:59

## 2022-01-01 RX ADMIN — SODIUM CHLORIDE, PRESERVATIVE FREE 10 ML: 5 INJECTION INTRAVENOUS at 15:10

## 2022-01-01 RX ADMIN — SODIUM CHLORIDE, PRESERVATIVE FREE 10 ML: 5 INJECTION INTRAVENOUS at 07:50

## 2022-01-01 RX ADMIN — APIXABAN 5 MG: 5 TABLET, FILM COATED ORAL at 08:42

## 2022-01-01 RX ADMIN — MEMANTINE HYDROCHLORIDE 10 MG: 5 TABLET ORAL at 17:58

## 2022-01-01 RX ADMIN — SERTRALINE 200 MG: 100 TABLET, FILM COATED ORAL at 08:45

## 2022-01-01 RX ADMIN — SERTRALINE 200 MG: 100 TABLET, FILM COATED ORAL at 10:20

## 2022-01-01 RX ADMIN — KETOROLAC TROMETHAMINE 15 MG: 30 INJECTION, SOLUTION INTRAMUSCULAR at 20:00

## 2022-01-01 RX ADMIN — PANTOPRAZOLE SODIUM 40 MG: 40 TABLET, DELAYED RELEASE ORAL at 10:44

## 2022-01-01 RX ADMIN — APIXABAN 5 MG: 5 TABLET, FILM COATED ORAL at 10:19

## 2022-01-01 RX ADMIN — SACUBITRIL AND VALSARTAN 1 TABLET: 24; 26 TABLET, FILM COATED ORAL at 17:55

## 2022-01-01 RX ADMIN — APIXABAN 5 MG: 5 TABLET, FILM COATED ORAL at 09:01

## 2022-01-01 RX ADMIN — SODIUM CHLORIDE, PRESERVATIVE FREE 10 ML: 5 INJECTION INTRAVENOUS at 14:36

## 2022-01-01 RX ADMIN — POLYETHYLENE GLYCOL 3350 17 G: 17 POWDER, FOR SOLUTION ORAL at 09:12

## 2022-01-01 RX ADMIN — SERTRALINE 200 MG: 100 TABLET, FILM COATED ORAL at 09:01

## 2022-01-01 RX ADMIN — CEFUROXIME AXETIL 500 MG: 250 TABLET ORAL at 09:01

## 2022-01-01 RX ADMIN — MIDODRINE HYDROCHLORIDE 10 MG: 5 TABLET ORAL at 10:43

## 2022-01-01 RX ADMIN — MIDODRINE HYDROCHLORIDE 10 MG: 5 TABLET ORAL at 18:09

## 2022-01-01 RX ADMIN — APIXABAN 5 MG: 5 TABLET, FILM COATED ORAL at 17:55

## 2022-01-01 RX ADMIN — DULOXETINE HYDROCHLORIDE 60 MG: 30 CAPSULE, DELAYED RELEASE ORAL at 09:03

## 2022-01-01 RX ADMIN — DULOXETINE HYDROCHLORIDE 60 MG: 30 CAPSULE, DELAYED RELEASE ORAL at 09:32

## 2022-01-01 RX ADMIN — SACUBITRIL AND VALSARTAN 1 TABLET: 24; 26 TABLET, FILM COATED ORAL at 20:29

## 2022-01-01 RX ADMIN — APIXABAN 5 MG: 5 TABLET, FILM COATED ORAL at 13:57

## 2022-01-01 RX ADMIN — SERTRALINE 200 MG: 100 TABLET, FILM COATED ORAL at 08:41

## 2022-01-01 RX ADMIN — BUTALBITAL, ACETAMINOPHEN, AND CAFFEINE 1 TABLET: 50; 325; 40 TABLET ORAL at 17:09

## 2022-01-01 RX ADMIN — DULOXETINE HYDROCHLORIDE 60 MG: 30 CAPSULE, DELAYED RELEASE ORAL at 08:43

## 2022-01-01 RX ADMIN — SERTRALINE 200 MG: 100 TABLET, FILM COATED ORAL at 09:56

## 2022-01-01 RX ADMIN — DOXYCYCLINE 100 MG: 100 INJECTION, POWDER, LYOPHILIZED, FOR SOLUTION INTRAVENOUS at 23:21

## 2022-01-01 RX ADMIN — GUAIFENESIN 600 MG: 600 TABLET, EXTENDED RELEASE ORAL at 21:56

## 2022-01-01 RX ADMIN — SODIUM CHLORIDE, PRESERVATIVE FREE 5 ML: 5 INJECTION INTRAVENOUS at 06:42

## 2022-01-01 RX ADMIN — MIDODRINE HYDROCHLORIDE 10 MG: 5 TABLET ORAL at 13:25

## 2022-01-19 NOTE — ED PROVIDER NOTES
EMERGENCY DEPARTMENT HISTORY AND PHYSICAL EXAM          Date: 1/19/2022  Patient Name: Christelle Garrido    History of Presenting Illness     Chief Complaint   Patient presents with    Shortness of Breath     weakness for a few days       History Provided By: Patient    HPI: Christelle Garrido is a 67 y.o. male, pmhx listed below, who presents to the ED c/o generalized fatigue and shortness of breath. Reports that over the last month and a half, he has been having increasing shortness of breath. States that he has been walking at shorter and shorter distances before he needs to stop and catch his breath. Reports some leg swelling. Mild shortness of breath when he lies flat. No fever. Reports he is coughing. No chest pain/diaphoresis/nausea. Has had similar symptoms in the past but states they have been worse than usual over the last month. PCP: Marlon Yeung MD    There are no other complaints, changes, or physical findings at this time. Past History       Past Medical History:  Past Medical History:   Diagnosis Date    AICD (automatic cardioverter/defibrillator) present 5/13/2016 5/13/16 [employer] upgrade to dual chamber AICD implant  Dr. Brian Jules Alzheimer disease [facility])     Anxiety state, other     Arthritis     OSTEO ARTHRITIS    AVNRT (AV bridgette re-entry tachycardia) ([facility] 5/12/2016 5/12/16 AVNRT ablation: PM/AICD left upper chest :Dr. Christel Aleman Back ache     CAD (coronary artery disease)     Cancer [facility]) 2004    Prostate cancer    Chest tightness     last episode of chest pain 5/2016 before AICD placed; none since then    Coagulation defects 2005    FACTOR V LEIDEN    Dementia ([address]     Depression     Dizziness     FH: factor V Leiden deficiency     Generalized muscle ache     GERD (gastroesophageal reflux disease)     HEARTBURN    Heart failure ([address] 2014    as of 07/2019 EF 30-35;  Dr. Amy Carrion, Cardiomyopathy    Hyperlipidemia, mixed     Hypertension     Other ill-defined conditions(799.89) 2004    blood transfusion    Pacemaker     Paroxysmal atrial fibrillation (HCC)     rate controlled with coreg     Postsurgical aortocoronary bypass status 05/29/2012    CABG    Presence of cardiac defibrillator 05/2016    left upper chest    SSS (sick sinus syndrome) ([facility] [address]     Stroke ([facility] [address] 2011, 1990's    SEVERAL-mini    Syncope     Thromboembolism ([facility] [address] 2014    left leg: changed to Eliquis from Warfarin    Thromboembolus ([facility] [address] 2005    left leg    Thyroid disease     Weakness generalized        Past Surgical History:  Past Surgical History:   Procedure Laterality Date    CARDIAC CATHETERIZATION  3/4/2011         HX HEENT  2019    oral surgery, removed teeth, dentures upper/lower    HX HERNIA REPAIR Left 2002    Ingiunal hernia repair left    HX ORTHOPAEDIC      LEFT KNEE CARTILAGE REPAIR;RIGHT ROTATOR CUFF REPAIR    HX ORTHOPAEDIC  2/14/12    C5-7 ANTERIIOR CERVICAL DISECTOMY AND FUSION    HX ORTHOPAEDIC  6/4/13    C5-C7 POSTERIOR DECOMPRESSION AND FUSION    HX ORTHOPAEDIC      right thumb partial amputation of tip    HX OTHER SURGICAL      steroid injections    HX PACEMAKER Left 05/13/2016    BS D142, Dr. Dali Wilkins HX PROSTATECTOMY  2004     [place]    HX ROTATOR CUFF REPAIR Left 2019    HX UROLOGICAL       urinary control system    HX UROLOGICAL  12/3/13    REPLACEMENT ARTIFICAL URINARY SPHINCTER    DC CARDIAC SURG PROCEDURE UNLIST      CABG X1 3/5/11       Family History:  Family History   Problem Relation Age of Onset    Asthma Mother     Alcohol abuse Mother     Alcohol abuse Father     Asthma Father     Cancer Sister     Heart Disease Sister     Alcohol abuse Brother     Cancer Brother     Heart Disease Brother        Social History:  Social History     Tobacco Use    Smoking status: Never Smoker    Smokeless tobacco: Never Used   Vaping Use    Vaping Use: Never used   Substance Use Topics    Alcohol use: Not Currently     Alcohol/week: 0.0 standard drinks     Comment: stopped 2010    Drug use: Never       Current Facility-Administered Medications   Medication Dose Route Frequency Provider Last Rate Last Admin    sodium chloride (NS) flush 10 mL  10 mL IntraVENous PRN Ann Renteria MD   10 mL at 01/19/22 7628     Current Outpatient Medications   Medication Sig Dispense Refill    sertraline (ZOLOFT) 100 mg tablet TAKE 2 TABLETS BY MOUTH EVERY  Tablet 3    metoprolol succinate (TOPROL-XL) 25 mg XL tablet TAKE 1/2 TABLET BY MOUTH ONCE DAILY 45 Tablet 3    triamcinolone acetonide (KENALOG) 0.1 % dental paste by Mouth/Throat route two (2) times a day. 15 g 0    pantoprazole (PROTONIX) 40 mg tablet TAKE 1 TABLET BY MOUTH ONCE DAILY 90 Tablet 3    Eliquis 5 mg tablet TAKE 1 TABLET BY MOUTH EVERY 12 HOURS 180 Tablet 3    gabapentin (NEURONTIN) 600 mg tablet TAKE 1 TABLET BY MOUTH THREE TIMES A DAY 90 Tablet 3    atorvastatin (LIPITOR) 40 mg tablet TAKE 1 TABLET BY MOUTH AT BEDTIME 90 Tablet 1    Entresto 24-26 mg tablet TAKE 1 TABLET BY MOUTH TWICE DAILY 180 Tablet 3    furosemide (Lasix) 20 mg tablet Take 1 Tablet by mouth daily. (Patient not taking: Reported on 11/23/2021) 15 Tablet 0    memantine (NAMENDA) 10 mg tablet TAKE 1 TABLET BY MOUTH TWICE DAILY 180 Tab 3    nitroglycerin (NITROSTAT) 0.4 mg SL tablet 1 Tab by SubLINGual route every five (5) minutes as needed for Chest Pain (call 911 if not relieved by 3). 1 Bottle 1    donepeziL (ARICEPT) 10 mg tablet Take 1 Tab by mouth nightly. (Patient taking differently: Take 5 mg by mouth nightly.) 90 Tab 1    senna-docusate (PERICOLACE) 8.6-50 mg per tablet Take 1 Tab by mouth daily. (Patient taking differently: Take 1 Tab by mouth as needed.) 30 Tab 0    acetaminophen (TYLENOL) 325 mg tablet Take 1,000 mg by mouth every four (4) hours as needed for Pain.       multivit-min/FA/lycopen/lutein (SENTRY SENIOR PO) Take  by mouth daily.  polyethylene glycol (MIRALAX) 17 gram/dose powder Take 17 g by mouth as needed. Allergies:  No Known Allergies      Review of Systems   Review of Systems   Constitutional: Negative for chills and fever. HENT: Negative for ear pain. Eyes: Negative for pain. Respiratory: Positive for shortness of breath. Cardiovascular: Negative for chest pain. Gastrointestinal: Negative for abdominal pain. Genitourinary: Negative for flank pain. Musculoskeletal: Negative for back pain. Skin: Negative for rash. Neurological: Negative for headaches. Psychiatric/Behavioral: Negative for agitation. Physical Exam     Vital Signs-Reviewed the patient's vital signs. Patient Vitals for the past 12 hrs:   Temp Pulse Resp BP SpO2   01/19/22 1655 -- 60 -- (!) 148/75 --   01/19/22 1600 -- 67 16 122/60 94 %   01/19/22 1522 98.6 °F (37 °C) 70 16 (!) 140/70 94 %       Physical Exam  Vitals reviewed. HENT:      Head: Normocephalic and atraumatic. Mouth/Throat:      Mouth: Mucous membranes are moist.   Cardiovascular:      Rate and Rhythm: Normal rate and regular rhythm. Pulmonary:      Effort: Pulmonary effort is normal.      Breath sounds: Examination of the right-middle field reveals rales. Examination of the left-middle field reveals rales. Examination of the right-lower field reveals rales. Examination of the left-lower field reveals rales. Rales present. Abdominal:      Palpations: Abdomen is soft. Tenderness: There is no abdominal tenderness. Musculoskeletal:         General: Normal range of motion. Cervical back: Normal range of motion. Right lower leg: No edema. Left lower leg: No edema. Skin:     General: Skin is warm and dry. Neurological:      Mental Status: He is alert and oriented to person, place, and time.    Psychiatric:         Mood and Affect: Mood normal.         Diagnostic Study Results     Labs -     Recent Results (from the past 12 hour(s))   EKG, 12 LEAD, INITIAL    Collection Time: 01/19/22  3:58 PM   Result Value Ref Range    Ventricular Rate 63 BPM    Atrial Rate 63 BPM    P-R Interval 160 ms    QRS Duration 128 ms    Q-T Interval 476 ms    QTC Calculation (Bezet) 487 ms    Calculated P Axis 110 degrees    Calculated R Axis -4 degrees    Calculated T Axis 156 degrees    Diagnosis       ** Poor data quality, interpretation may be adversely affected  Normal sinus rhythm  Nonspecific intraventricular block  Cannot rule out Inferior infarct , age undetermined  T wave abnormality, consider lateral ischemia  Abnormal ECG  When compared with ECG of 17-JUN-2021 13:04,  premature supraventricular complexes are no longer present  Nonspecific T wave abnormality, worse in Inferior leads     CBC WITH AUTOMATED DIFF    Collection Time: 01/19/22  4:06 PM   Result Value Ref Range    WBC 8.1 4.1 - 11.1 K/uL    RBC 3.88 (L) 4.10 - 5.70 M/uL    HGB 11.3 (L) 12.1 - 17.0 g/dL    HCT 35.6 (L) 36.6 - 50.3 %    MCV 91.8 80.0 - 99.0 FL    MCH 29.1 26.0 - 34.0 PG    MCHC 31.7 30.0 - 36.5 g/dL    RDW 13.9 11.5 - 14.5 %    PLATELET 950 489 - 708 K/uL    MPV 10.0 8.9 - 12.9 FL    NRBC 0.0 0  WBC    ABSOLUTE NRBC 0.00 0.00 - 0.01 K/uL    NEUTROPHILS 67 32 - 75 %    LYMPHOCYTES 23 12 - 49 %    MONOCYTES 8 5 - 13 %    EOSINOPHILS 1 0 - 7 %    BASOPHILS 1 0 - 1 %    IMMATURE GRANULOCYTES 0 0.0 - 0.5 %    ABS. NEUTROPHILS 5.3 1.8 - 8.0 K/UL    ABS. LYMPHOCYTES 1.9 0.8 - 3.5 K/UL    ABS. MONOCYTES 0.7 0.0 - 1.0 K/UL    ABS. EOSINOPHILS 0.1 0.0 - 0.4 K/UL    ABS. BASOPHILS 0.0 0.0 - 0.1 K/UL    ABS. IMM.  GRANS. 0.0 0.00 - 0.04 K/UL    DF AUTOMATED     METABOLIC PANEL, COMPREHENSIVE    Collection Time: 01/19/22  4:06 PM   Result Value Ref Range    Sodium 138 136 - 145 mmol/L    Potassium 5.3 (H) 3.5 - 5.1 mmol/L    Chloride 104 97 - 108 mmol/L    CO2 27 21 - 32 mmol/L    Anion gap 7 5 - 15 mmol/L    Glucose 97 65 - 100 mg/dL    BUN 31 (H) 6 - 20 MG/DL    Creatinine 1.37 (H) 0.70 - 1.30 MG/DL    BUN/Creatinine ratio 23 (H) 12 - 20      GFR est AA >60 >60 ml/min/1.73m2    GFR est non-AA 50 (L) >60 ml/min/1.73m2    Calcium 9.2 8.5 - 10.1 MG/DL    Bilirubin, total 0.4 0.2 - 1.0 MG/DL    ALT (SGPT) 20 12 - 78 U/L    AST (SGOT) 16 15 - 37 U/L    Alk. phosphatase 99 45 - 117 U/L    Protein, total 7.4 6.4 - 8.2 g/dL    Albumin 3.6 3.5 - 5.0 g/dL    Globulin 3.8 2.0 - 4.0 g/dL    A-G Ratio 0.9 (L) 1.1 - 2.2     NT-PRO BNP    Collection Time: 01/19/22  4:06 PM   Result Value Ref Range    NT pro-BNP 1,375 (H) 0 - 450 PG/ML   SAMPLES BEING HELD    Collection Time: 01/19/22  4:06 PM   Result Value Ref Range    SAMPLES BEING HELD 1SST,1BLUE     COMMENT        Add-on orders for these samples will be processed based on acceptable specimen integrity and analyte stability, which may vary by analyte. Radiologic Studies -   XR CHEST SNGL V   Final Result   Findings suggestive of congestive change. CT Results  (Last 48 hours)    None        CXR Results  (Last 48 hours)               01/19/22 1550  XR CHEST SNGL V Final result    Impression:  Findings suggestive of congestive change. Narrative:  Chest single view dated 1/19/2022       Comparison chest dated 7/1/2021       History is shortness of breath       A single portable AP upright view of the chest was obtained. The patient is   slightly rotated towards the right. There is evidence of a previous thoracotomy. The cardiac pacer device and leads are again noted. It is difficult to   accurately assess the size of the cardiac silhouette. There is gross evidence of   cardiomegaly. There is prominence of the pulmonary interstitial markings. There   has been an increase in the conspicuity of interstitial markings as compared   with the previous examination. This is suggestive of worsening congestive   change. There is obliteration of the left hemidiaphragm. A small pleural   effusion may be present.  The previously described focal density which was noted   in the region of the right midlung is not well visualized on the current   examination. Postsurgical change is associated with the lower cervical spine. A   left shoulder prosthesis is present. EKG interpretation: (Preliminary)  Rhythm: Sinus, intraventricular block with morphology similar to prior EKGs, no ST elevation, normal rate  This EKG was interpreted by ED Provider Netta Cm MD    Medical Decision Making   I am the first provider for this patient. I reviewed the vital signs, available nursing notes, past medical history, past surgical history, family history and social history. Records Reviewed: Nursing Notes and Old Medical Records    Provider Notes (Medical Decision Making):   MDM: 65-year-old male with increasing dyspnea on exertion over the last month and a half. In the emergency department, reports he is feeling well but was feeling very short of breath earlier today. Lung sounds suggestive of acute CHF. Also consider pneumonia. We will plan for chest x-ray and lab work. Unclear disposition pending results of testing. Initial assessment performed. The patients presenting problems have been discussed, and they are in agreement with the care plan formulated and outlined with them. I have encouraged them to ask questions as they arise throughout their visit. PROGRESS NOTE:    proBNP low to normal baseline for patient. Chest x-ray reveals pulmonary edema. Given dose of IV Lasix and produced approximately 600 cc of urine, now reports he is feeling well. Will recommend patient have close follow-up with PCP and/or cardiology due to worsening of symptoms and elevation of creatinine from baseline. Discharge note:  Pt re-evaluated and noted to be feeling better, ready for discharge. Updated pt on all final results. Will follow up as instructed. All questions have been answered, pt voiced understanding and agreement with plan.  Specific return precautions provided as well as instructions to return to the ED should sx worsen at any time. Vital signs stable for discharge. Diagnosis     Clinical Impression:   1. Acute on chronic congestive heart failure, unspecified heart failure type [facility])            Disposition:  Discharged    Current Discharge Medication List            Please note, this dictation was completed with [employer], the computer voice recognition software. Quite often unanticipated grammatical, syntax, homophones, and other interpretive errors are inadvertently transcribed by the computer software. Please disregard these errors. Please excuse any errors that have escaped final proof reading.

## 2022-01-19 NOTE — TELEPHONE ENCOUNTER
[place] call [place] [facility] with Red Flag Complaint. Prior to transfer from [facility], patient disconnected the call. This writer returned call to the patient x 2 with no success. As this writer is attempting to contact the patient's spouse, another triage nurse is currently on the phone with her. Attention Provider: Thank you for allowing me to participate in the care of your patient. The patient was connected to triage in response to information provided to the [facility]. Please do not respond through this encounter as the response is not directed to a shared pool. Reason for Disposition   Second attempt to contact caller AND no contact made. Phone number verified.     Protocols used: NO CONTACT OR DUPLICATE CONTACT CALL-ADULT-

## 2022-01-19 NOTE — TELEPHONE ENCOUNTER
Received call from NORM at [facility], caller not on line. Complaint: SOB with exertion    Market: Jayme Hewitt Name: [employer] telephone number verified as [phone]    Unsuccessful attempt to re-connect with caller via phone, unable to leave message for callback       Received call from NORM at [facility], caller not on line. Complaint: SOB with Exertion    Market: Jayme Hewitt Name: [employer] telephone number verified as [phone]    Unsuccessful attempt to re-connect with caller via phone, unable to leave message for callback    Reason for Disposition   Second attempt to contact caller AND no contact made. Phone number verified.     Protocols used: NO CONTACT OR DUPLICATE CONTACT CALL-ADULT-

## 2022-01-19 NOTE — ED NOTES
I have reviewed discharge instructions with the patient. The patient verbalized understanding. Discharge medications discussed with patient. No questions at this time. To car via wheelchair.

## 2022-01-19 NOTE — ED NOTES
Pt pain assessed. Offered use of restroom and assistance as necessary. Pt declined. Pt offered repositioning in bed for comfort. Assessed pt's ability to access possessions, everything within reach of pt. Pt reminded to use call bell as necessary, report any changes in status. Pt thanked for allowing care. Bed locked in lowest position, side rails up as necessary.  Comfortable

## 2022-01-19 NOTE — TELEPHONE ENCOUNTER
Subjective: Caller states \"Wanting to be seen in at the dr for his headaches. Has been having for months but is getting worse. Has seen PCP for headaches before. Notices he is leaning to one side and then will lean to another side. Seems to be more angrier. Has hx of alzheimer. He has made comment \"He is not worth nothing to anybody\" \"     Current Symptoms: Headache, When walking  will lean more to the left. Hx of strokes but hasn't affected his mobility, cough    Onset: 4-5 weeks ago; worsening    Associated Symptoms: NA    Pain Severity: 6/10; throbbing; intermittent     Temperature: 99 by forehead thermometer    What has been tried:      LMP: NA Pregnant: NA    Recommended disposition: Call  Now    Care advice provided, patient verbalizes understanding; denies any other questions or concerns; instructed to call back for any new or worsening symptoms. Caller proceeding to Call 911    Attention Provider: Thank you for allowing me to participate in the care of your patient. The patient was connected to triage in response to symptoms provided. Please do not respond through this encounter as the response is not directed to a shared pool.         Reason for Disposition   Weakness of the face, arm or leg on one side of the body and new-onset    Protocols used: HEADACHE-ADULT-[place]

## 2022-01-19 NOTE — ED NOTES
Wife called and stated he was weak on his left side which is his old stroke side.  Non noted on exam

## 2022-02-11 NOTE — TELEPHONE ENCOUNTER
Spoke with pt and his wife regarding [address] upgrade - they had already attached the device and thought it was good. Had pt send a manual transmission to verify connection. Pt also mentioned HOLCOMB and fatigue that has increased over the past 2 weeks. Pt's wife states he's been sleeping much more than usual, and gets out of breath just walking across the room. Device transmission has no new events, functioning WNL. Message sent to [facility], NP regarding these symptoms. Instructed pt to go to ED if SOB gets worse - verbalized understanding, no further questions at this time.

## 2022-02-11 NOTE — TELEPHONE ENCOUNTER
----- Message from Gulshan Young NP sent at 2/11/2022  4:11 PM EST -----  Regarding: RE: HOLCOMB & fatigue  Actually went to ED in January. Jemima can you just call patient / wife--need to see Dr Colette Elliott for ED follow up.  ----- Message -----  From: Raven Tee RN  Sent: 2/11/2022   4:09 PM EST  To: Genet Mejia LPN, Gulshan Young NP  Subject: HOLCOMB & fatigue                                    Mr. Cha Liliana has been having some HOLCOMB and fatigue that has increased for the past 2 weeks. His wife also reports him sleeping much more than usual. Remote transmission shows freq PVCs, but no new events. Echo 2/5/20 EF 50-55%. I've told them the device part looks good and I would pass along his current symptoms. Thank you!   Audrey Urena

## 2022-02-12 PROBLEM — I50.41 ACUTE COMBINED SYSTOLIC AND DIASTOLIC CONGESTIVE HEART FAILURE (HCC): Status: ACTIVE | Noted: 2022-01-01

## 2022-02-12 PROBLEM — I50.43 ACUTE ON CHRONIC COMBINED SYSTOLIC AND DIASTOLIC ACC/AHA STAGE C CONGESTIVE HEART FAILURE (HCC): Status: ACTIVE | Noted: 2022-01-01

## 2022-02-12 NOTE — H&P
[facility]   Admission History & Physical        2/12/2022 9:01 AM  Patient: Jean Carlos Paul 1945  PCP: Caty Reddy MD    HISTORY  Chief Complaint:   Chief Complaint   Patient presents with    Shortness of Breath       HPI: 67 y.o. male presenting for admission to [facility] for further evaluation and treatment for Acute on chronic combined systolic and diastolic ACC/AHA stage C congestive heart failure ([address] He  has a past medical history of Acute combined systolic and diastolic congestive heart failure ([address] (2/12/2022), AICD (automatic cardioverter/defibrillator) present (5/13/2016), Alzheimer disease ([address] Anxiety state, other, Arthritis, AVNRT (AV bridgette re-entry tachycardia) ([address] (5/12/2016), Back ache, CAD (coronary artery disease), Cancer ([address] (2004), Chest tightness, Coagulation defects (2005), Dementia ([address] Depression, Dizziness, FH: factor V Leiden deficiency, Generalized muscle ache, GERD (gastroesophageal reflux disease), Heart failure ([address] (2014), Hyperlipidemia, mixed, Hypertension, Other ill-defined conditions(799.89) (2004), Pacemaker, Paroxysmal atrial fibrillation ([address] Postsurgical aortocoronary bypass status (05/29/2012), Presence of cardiac defibrillator (05/2016), SSS (sick sinus syndrome) ([address] Stroke ([address] (2011, 1990's), Syncope, Thromboembolism ([address] (2014), Thromboembolus ([address] (2005), Thyroid disease, and Weakness generalized. He has no past medical history of Asthma, Carotid artery disease ([address] Chronic kidney disease, Chronic obstructive pulmonary disease ([address] Diabetes ([address] Glaucoma, Long term current use of anticoagulant therapy, Murmur, Myocardial infarction ([address] Pulmonary embolism ([address] Rheumatic fever, or Valvular heart disease. .    Patient presenting to the ED with complaints of severe shortness of breath, worsening over the past several days.  Patient is followed by radiology, Dr. Oleg Sarah at the [place] office and was last seen in September. He has a history of CAD status post CABG x1, ischemic cardiomyopathy status post ICD, PAF, chronic DVT with factor V Leiden mutation, hypertension and hyperlipidemia. Patient has been symptomatic with fatigue and shortness of breath for several months. He has not had any recent medication adjustments advised. He states he is not taking Lasix but it is listed 20 mg daily on the med list. He has chronic pain associated with multiple musculoskeletal issues-osteoarthritis, lumbar spinal stenosis, bilateral rotator cuff tear, left shoulder joint replacement. His history is noted for atrial fib, recurrent DVT, chronic anticoagulation on Eliquis with history of factor V Leiden mutation. He denies complaints of chest pain, palpitation, dizziness, orthopnea, PND, edema. He does have some daytime edema which clears when lying down. He has nocturia and frequency. He has some baseline dementia but does function fairly independently-organizing his own medications. He lives with his wife who is currently not in the room. The patient has no history for tobacco use in the past 50 years. ECHO Feb 2020:  · Normal cavity size. Mild concentric hypertrophy. Low normal systolic dysfunction. Estimated left ventricular ejection fraction is 50 - 55%. No regional wall motion abnormality noted. Mild (grade 1) left ventricular diastolic dysfunction. · Mildly dilated left atrium. · Mild aortic valve regurgitation is present. · Mild mitral valve regurgitation is present. · Mild tricuspid valve regurgitation is present. · Mild aortic root dilatation. NUCLEAR CARDIAC STRESS March 2019:  · Baseline ECG: Paced rhythm, non-specific ST-T wave abnormalities. · Inconclusive stress test.  · Gated SPECT: Left ventricular function post-stress was abnormal. Calculated ejection fraction is 40%. There is no evidence of transient ischemic dilation (TID). · Left ventricular perfusion is normal.     CTA CHEST May 2020:   No evidence of pulmonary embolism  Pre-existing mild to moderate pulmonary fibrosis  Superimposed areas of groundglass opacity suggests an element of nonspecific  alveolitis or pneumonia. Clinical correlation needed  Incidental thyromegaly    Past Medical History:  Past Medical History:   Diagnosis Date    Acute combined systolic and diastolic congestive heart failure ([address] 2/12/2022    AICD (automatic cardioverter/defibrillator) present 5/13/2016 5/13/16 [employer] upgrade to dual chamber AICD implant  Dr. Waylon Jang disease [facility])     Anxiety state, other     Arthritis     OSTEO ARTHRITIS    AVNRT (AV bridgette re-entry tachycardia) ([address] 5/12/2016 5/12/16 AVNRT ablation: PM/AICD left upper chest :Dr. Arik Ward Back ache     CAD (coronary artery disease)     Cancer [facility]) 2004    Prostate cancer    Chest tightness     last episode of chest pain 5/2016 before AICD placed; none since then    Coagulation defects 2005    FACTOR V LEIDEN    Dementia ([address]     Depression     Dizziness     FH: factor V Leiden deficiency     Generalized muscle ache     GERD (gastroesophageal reflux disease)     HEARTBURN    Heart failure ([address] 2014    as of 07/2019 EF 30-35;  Dr. Odom Range, Cardiomyopathy    Hyperlipidemia, mixed     Hypertension     Other ill-defined conditions(799.89) 2004    blood transfusion    Pacemaker     Paroxysmal atrial fibrillation ([address]     rate controlled with coreg     Postsurgical aortocoronary bypass status 05/29/2012    CABG    Presence of cardiac defibrillator 05/2016    left upper chest    SSS (sick sinus syndrome) ([address]     Stroke ([address] 2011, 1990's    SEVERAL-mini    Syncope     Thromboembolism ([address] 2014    left leg: changed to Eliquis from Warfarin    Thromboembolus ([address] 2005    left leg    Thyroid disease     Weakness generalized        Past Surgical History:  Past Surgical History:   Procedure Laterality Date    CARDIAC CATHETERIZATION 3/4/2011         HX HEENT  2019    oral surgery, removed teeth, dentures upper/lower    HX HERNIA REPAIR Left 2002    Ingiunal hernia repair left    HX ORTHOPAEDIC      LEFT KNEE CARTILAGE REPAIR;RIGHT ROTATOR CUFF REPAIR    HX ORTHOPAEDIC  2/14/12    C5-7 ANTERIIOR CERVICAL DISECTOMY AND FUSION    HX ORTHOPAEDIC  6/4/13    C5-C7 POSTERIOR DECOMPRESSION AND FUSION    HX ORTHOPAEDIC      right thumb partial amputation of tip    HX OTHER SURGICAL      steroid injections    HX PACEMAKER Left 05/13/2016    BS D142, Dr. Gisela Bartlett HX PROSTATECTOMY  2004     [place]    HX ROTATOR CUFF REPAIR Left 2019    HX UROLOGICAL       urinary control system    HX UROLOGICAL  12/3/13    REPLACEMENT ARTIFICAL URINARY SPHINCTER    [place] CARDIAC SURG PROCEDURE UNLIST      CABG X1 3/5/11       Medication:  Prior to Admission medications    Medication Sig Start Date End Date Taking? Authorizing Provider   atorvastatin (LIPITOR) 40 mg tablet TAKE 1 TABLET BY MOUTH AT BEDTIME 1/31/22   Maya Martins MD   sertraline (ZOLOFT) 100 mg tablet TAKE 2 TABLETS BY MOUTH EVERY DAY 1/7/22   Maya Martins MD   metoprolol succinate (TOPROL-XL) 25 mg XL tablet TAKE 1/2 TABLET BY MOUTH ONCE DAILY 12/2/21   Velvet Rubio MD   triamcinolone acetonide (KENALOG) 0.1 % dental paste by Mouth/Throat route two (2) times a day. 11/23/21   Maya Martins MD   pantoprazole (PROTONIX) 40 mg tablet TAKE 1 TABLET BY MOUTH ONCE DAILY 11/19/21   Maya Martins MD   Eliquis 5 mg tablet TAKE 1 TABLET BY MOUTH EVERY 12 HOURS 11/18/21   Velvet Rubio MD   gabapentin (NEURONTIN) 600 mg tablet TAKE 1 TABLET BY MOUTH THREE TIMES A DAY 8/9/21   Maya Martins MD   Entresto 24-26 mg tablet TAKE 1 TABLET BY MOUTH TWICE DAILY 7/7/21   Velvet Rubio MD   furosemide (Lasix) 20 mg tablet Take 1 Tablet by mouth daily.   Patient not taking: Reported on 11/23/2021 7/1/21   Adolph Naqvi MD memantine (NAMENDA) 10 mg tablet TAKE 1 TABLET BY MOUTH TWICE DAILY 2/22/21   Yoli Arzola NP   nitroglycerin (NITROSTAT) 0.4 mg SL tablet 1 Tab by SubLINGual route every five (5) minutes as needed for Chest Pain (call 911 if not relieved by 3). 8/13/20   Ocie Severe, MD   donepeziL (ARICEPT) 10 mg tablet Take 1 Tab by mouth nightly. Patient taking differently: Take 5 mg by mouth nightly. 7/31/20   Aditya Ortega MD   senna-docusate (PERICOLACE) 8.6-50 mg per tablet Take 1 Tab by mouth daily. Patient taking differently: Take 1 Tab by mouth as needed. 2/12/20   Brown Brady NP   acetaminophen (TYLENOL) 325 mg tablet Take 1,000 mg by mouth every four (4) hours as needed for Pain. Provider, Historical   multivit-min/FA/lycopen/lutein (SENTRY SENIOR PO) Take  by mouth daily. Provider, Historical   polyethylene glycol (MIRALAX) 17 gram/dose powder Take 17 g by mouth as needed.     Provider, Historical       Allergies:  No Known Allergies    Social History:  Social History     Tobacco Use    Smoking status: Never Smoker    Smokeless tobacco: Never Used   Vaping Use    Vaping Use: Never used   Substance Use Topics    Alcohol use: Not Currently     Alcohol/week: 0.0 standard drinks     Comment: stopped 2010    Drug use: Never       Family History:  Family History   Problem Relation Age of Onset    Asthma Mother     Alcohol abuse Mother     Alcohol abuse Father     Asthma Father     Cancer Sister     Heart Disease Sister     Alcohol abuse Brother     Cancer Brother     Heart Disease Brother        ROS:  Total of 12 systems reviewed as follows:  POSITIVE= bolded text  Negative = text not bolded       General:  fever, chills, sweats, generalized weakness, weight loss/gain, loss of appetite   Eyes:    blurred vision, eye pain, loss of vision, double vision  ENT:    rhinorrhea, pharyngitis   Respiratory:  cough, sputum production, SOB, HOLCOMB, wheezing, pleuritic pain   Cardiology: chest pain, palpitations, orthopnea, PND, edema, syncope   Gastrointestinal:  abdominal pain , N/V, diarrhea, dysphagia, constipation, bleeding   Genitourinary:  frequency, urgency, dysuria, hematuria, incontinence, prostatism, nocturia   Muskuloskeletal: arthralgia, myalgia, back pain  Hematology:   easy bruising, nose or gum bleeding, lymphadenopathy   Dermatological: rash, ulceration, pruritis, color change / jaundice  Endocrine:   hot flashes or polydipsia   Neurological:  headache, dizziness, confusion, focal weakness, paresthesia, speech difficulties, memory loss, gait difficulty  Psychological: feelings of anxiety, depression, agitation      PHYSICAL EXAM:  Patient Vitals for the past 24 hrs:   Temp Pulse Resp BP SpO2   02/12/22 0737 98.2 °F (36.8 °C) 82 20 (!) 142/80 94 %   02/12/22 0325 97 °F (36.1 °C) 68 22 (!) 150/74 91 %   02/12/22 0155 -- -- -- 139/79 96 %   02/12/22 0150 -- -- -- (!) 140/77 97 %   02/12/22 0050 -- -- -- (!) 157/72 96 %   02/11/22 2354 -- -- -- (!) 140/75 97 %   02/11/22 2229 98.2 °F (36.8 °C) 72 22 (!) 176/86 96 %       General:    Alert, cooperative, no distress, appears stated age. HEENT: Atraumatic, anicteric sclerae, pink conjunctivae     No oral ulcers, mucosa moist, throat clear, dentition fair  Neck:  Supple, symmetrical;   thyroid non tender  Lungs:   Nonlabored on nasal O2. Reduced excursion. Basilar crackles. No wheezing or rhonchi. Chest wall:  No tenderness. No accessory muscle use. Heart:   Regular rhythm. No  murmur. Tr edema (in bed)  Abdomen:   Soft, non-tender. Not distended. Bowel sounds normal  Extremities: No cyanosis. No clubbing      Capillary refill normal,  Radial pulse 2+,  DP 1+  Skin:     Not pale. Not jaundiced. No rashes   Psych:  Depressed. Not anxious or agitated. Neurologic: EOMs intact. No facial asymmetry. No aphasia or slurred speech. Symmetrical strength, Sensation grossly intact.  Alert and oriented    Lab Data Reviewed: Recent Results (from the past 24 hour(s))   CBC WITH AUTOMATED DIFF    Collection Time: 02/11/22 10:36 PM   Result Value Ref Range    WBC 7.9 4.1 - 11.1 K/uL    RBC 3.82 (L) 4.10 - 5.70 M/uL    HGB 11.0 (L) 12.1 - 17.0 g/dL    HCT 34.5 (L) 36.6 - 50.3 %    MCV 90.3 80.0 - 99.0 FL    MCH 28.8 26.0 - 34.0 PG    MCHC 31.9 30.0 - 36.5 g/dL    RDW 14.2 11.5 - 14.5 %    PLATELET 828 174 - 908 K/uL    MPV 10.5 8.9 - 12.9 FL    NRBC 0.0 0  WBC    ABSOLUTE NRBC 0.00 0.00 - 0.01 K/uL    NEUTROPHILS 68 32 - 75 %    LYMPHOCYTES 22 12 - 49 %    MONOCYTES 9 5 - 13 %    EOSINOPHILS 1 0 - 7 %    BASOPHILS 0 0 - 1 %    IMMATURE GRANULOCYTES 0 0.0 - 0.5 %    ABS. NEUTROPHILS 5.3 1.8 - 8.0 K/UL    ABS. LYMPHOCYTES 1.7 0.8 - 3.5 K/UL    ABS. MONOCYTES 0.7 0.0 - 1.0 K/UL    ABS. EOSINOPHILS 0.1 0.0 - 0.4 K/UL    ABS. BASOPHILS 0.0 0.0 - 0.1 K/UL    ABS. IMM. GRANS. 0.0 0.00 - 0.04 K/UL    DF AUTOMATED     METABOLIC PANEL, COMPREHENSIVE    Collection Time: 02/11/22 10:36 PM   Result Value Ref Range    Sodium 142 136 - 145 mmol/L    Potassium 4.3 3.5 - 5.1 mmol/L    Chloride 104 97 - 108 mmol/L    CO2 27 21 - 32 mmol/L    Anion gap 11 5 - 15 mmol/L    Glucose 135 (H) 65 - 100 mg/dL    BUN 22 (H) 6 - 20 MG/DL    Creatinine 1.27 0.70 - 1.30 MG/DL    BUN/Creatinine ratio 17 12 - 20      GFR est AA >60 >60 ml/min/1.73m2    GFR est non-AA 55 (L) >60 ml/min/1.73m2    Calcium 9.0 8.5 - 10.1 MG/DL    Bilirubin, total 0.5 0.2 - 1.0 MG/DL    ALT (SGPT) 18 12 - 78 U/L    AST (SGOT) 21 15 - 37 U/L    Alk.  phosphatase 108 45 - 117 U/L    Protein, total 7.0 6.4 - 8.2 g/dL    Albumin 3.6 3.5 - 5.0 g/dL    Globulin 3.4 2.0 - 4.0 g/dL    A-G Ratio 1.1 1.1 - 2.2     MAGNESIUM    Collection Time: 02/11/22 10:36 PM   Result Value Ref Range    Magnesium 2.0 1.6 - 2.4 mg/dL   NT-PRO BNP    Collection Time: 02/11/22 10:36 PM   Result Value Ref Range    NT pro-BNP 7,369 (H) 0 - 450 PG/ML   TROPONIN-HIGH SENSITIVITY    Collection Time: 02/11/22 10:36 PM Result Value Ref Range    Troponin-High Sensitivity 17 0 - 76 ng/L   EKG, 12 LEAD, INITIAL    Collection Time: 02/11/22 10:43 PM   Result Value Ref Range    Ventricular Rate 70 BPM    Atrial Rate 70 BPM    P-R Interval 166 ms    QRS Duration 118 ms    Q-T Interval 428 ms    QTC Calculation (Bezet) 462 ms    Calculated P Axis 21 degrees    Calculated R Axis -5 degrees    Calculated T Axis 150 degrees    Diagnosis       Normal sinus rhythm  Incomplete left bundle branch block  ST & T wave abnormality, consider lateral ischemia  Prolonged QT  Abnormal ECG  When compared with ECG of 19-JAN-2022 15:58,  Incomplete left bundle branch block has replaced Nonspecific intraventricular   block  Nonspecific T wave abnormality no longer evident in Inferior leads     TROPONIN-HIGH SENSITIVITY    Collection Time: 02/12/22 12:40 AM   Result Value Ref Range    Troponin-High Sensitivity 21 0 - 76 ng/L   CBC W/O DIFF    Collection Time: 02/12/22  8:12 AM   Result Value Ref Range    WBC 9.0 4.1 - 11.1 K/uL    RBC 4.10 4. 10 - 5.70 M/uL    HGB 11.9 (L) 12.1 - 17.0 g/dL    HCT 37.5 36.6 - 50.3 %    MCV 91.5 80.0 - 99.0 FL    MCH 29.0 26.0 - 34.0 PG    MCHC 31.7 30.0 - 36.5 g/dL    RDW 14.0 11.5 - 14.5 %    PLATELET 621 486 - 623 K/uL    MPV 10.4 8.9 - 12.9 FL    NRBC 0.0 0  WBC    ABSOLUTE NRBC 0.00 0.00 - 4.77 K/uL   METABOLIC PANEL, BASIC    Collection Time: 02/12/22  8:12 AM   Result Value Ref Range    Sodium 141 136 - 145 mmol/L    Potassium 3.8 3.5 - 5.1 mmol/L    Chloride 103 97 - 108 mmol/L    CO2 29 21 - 32 mmol/L    Anion gap 9 5 - 15 mmol/L    Glucose 107 (H) 65 - 100 mg/dL    BUN 20 6 - 20 MG/DL    Creatinine 1.17 0.70 - 1.30 MG/DL    BUN/Creatinine ratio 17 12 - 20      GFR est AA >60 >60 ml/min/1.73m2    GFR est non-AA >60 >60 ml/min/1.73m2    Calcium 9.3 8.5 - 10.1 MG/DL       EKG: NSR 70 bpm, incomplete LBBB, NSSTTWC, prolonged QT     Radiology:  pCXR 2/11:  AP portable view of the chest demonstrates prior median sternotomy, left  subclavian pacemaker, and cardiomegaly. Moderate pulmonary edema. No  pneumothorax. Postoperative changes left shoulder and cervical spine.   IMPRESSION:  Cardiomegaly and pulmonary edema. Care Plan discussed with:   Patient x    Family     RN x         Consultant      Expected  Disposition:   Home with Family x   HH/PT/OT/RN    SNF/LTC    NORM      TOTAL TIME:  61 Minutes      Comments    x Reviewed previous records   >50% of visit spent in counseling and coordination of care x Discussion with patient and/or family and questions answered       _______________________________________________________  Given the patient's current clinical presentation, I have a high level of concern for decompensation if discharged from the emergency department. Complex decision making was performed, which includes reviewing the patient's available past medical records, laboratory results, and x-ray films. My assessment of this patient's clinical condition and my plan of care is as follows. ASSESSMENT / PLAN    Principal Problem:    Acute on chronic combined systolic and diastolic ACC/AHA stage C congestive heart failure ([address] (2/12/2022)  With some documented valvular insufficiency disease and a 40% ejection fraction by nuclear stress testing  Elevated pro BNP 7400  Question compliance with diuretics  Some concern for underlying pulmonary disease with fibrosis noted on prior CT  Will need assessment for home O2  Consider trial of bronchodilators  Follow with diuresis    Active Problems:    Atherosclerosis of native coronary artery of native heart without angina pectoris (5/29/2012)    AICD (automatic cardioverter/defibrillator) present (5/13/2016)      Overview: 5/13/16 [employer] upgrade to dual chamber AICD implant  Serial troponin values within range.   Last stress testing/evaluation for cardiac ischemia was 3 years ago      Essential hypertension (3/4/2011)  Maintain on current treatment with [place] Bill 24-26 twice daily, Toprol-XL 12.5 mg daily. Clarify Lasix treatment. Mixed hyperlipidemia (5/29/2012)  Maintain current treatment Lipitor 40 mg daily  Last lipid panel obtained November 2018 included total cholesterol 160, HDL 72, LDL 67, triglyceride 149     Stenosis of both carotid arteries without cerebral infarction (4/12/2016)   Alzheimer's dementia, late onset, with behavioral disturbance ([facility] (4/12/2016)  Continue treatment for carotid occlusive disease with antihypertensive, aspirin, statin  Encourage evaluation for any symptomatology      Hypothyroidism due to acquired atrophy of thyroid (9/22/2015)  No current thyroid replacement  Last TSH recorded 0.53 in March 2019      Osteoarthritis (5/29/2012)  Severe disease with chronic pain management necessary  Has been on Neurontin high-dose 600 mg 3 times daily  This may account for some sedation/fatigue.     SAFETY:   Code Status:Full  DVT prophylaxis:Eliquis  Stress Ulcer prophylaxis: Protonix (home med)  Bladder catheter:no  Family Contact Info:  Primary Emergency Contact: Martínez Jaimes Phone: [phone]: [facility] Room 128/01  Disposition: TBD, likely home when stable  Admission status:  Observation    -Tentative plan of care discussed with patient / family, who demonstrated understanding and is in agreement to the above  -Case was reviewed with the ED MD Michi Arana MD  [facility] Hospitalist  [phone]

## 2022-02-12 NOTE — ED NOTES
TRANSFER - OUT REPORT:    Verbal report given to Lay Marin on Keven Wilson  being transferred to [facility] (unit) for routine progression of care       Report consisted of patients Situation, Background, Assessment and   Recommendations(SBAR). Information from the following report(s) SBAR, Kardex, Intake/Output and MAR was reviewed with the receiving nurse. Lines:   Saline Lock 02/11/22 Left Antecubital (Active)        Opportunity for questions and clarification was provided.       Patient transported with:   Registered Nurse

## 2022-02-12 NOTE — ED NOTES
Patient wife called and wanted a discharge time, writer asked Dr. Grajeda Scot he stated he has not decided to treatment plan, awaiting repeat testing. Wife left a follow up contact number for discharge updates.  [phone]

## 2022-02-12 NOTE — PROGRESS NOTES
Bedside shift change report given to Dallin Ruiz (oncoming nurse) by [place] (offgoing nurse). Report included the following information SBAR, Kardex and MAR.

## 2022-02-12 NOTE — PROGRESS NOTES
02/12/22 0950 02/12/22 1002 02/12/22 1005   RT Walking Oximetry   Stage Resting (on O2) Resting (Room Air) During Walk (Room Air)   SpO2 98 % 92 % (!) 85 %   HR 78 bpm 80 bpm 90 bpm   Rate of Dyspnea 0 0 1   O2 Device Nasal cannula  --   --    O2 Flow Rate (L/min) 2 l/min  --   --    FIO2 (%) 28 %  --   --    Walk/Assistive Device  --   --  Walker   Comments  --   --  some sob      02/12/22 1008 02/12/22 1011   RT Walking Oximetry   Stage During Walk (on O2) After Walk   SpO2 91 % 94 %   HR 90 bpm 88 bpm   Rate of Dyspnea 0 0   O2 Device Nasal cannula Nasal cannula   O2 Flow Rate (L/min) 2 l/min 2 l/min   FIO2 (%) 28 % 28 %   Walk/Assistive Device Walker  --    Comments less sob with walking recovering

## 2022-02-12 NOTE — ED PROVIDER NOTES
[address] [place] [address]   EMERGENCY DEPARTMENT          Date: 2/11/2022  Patient: Gris Hines MRN: [id number]  SSN: [id number]    YOB: 1945  Age: 67 y.o. Sex: male      PCP: Lito Liriano MD  The patient arrived by ambulance and is alone. History of Presenting Illness     Chief Complaint   Patient presents with    Shortness of Breath       History Provided By: Patient    · HPI: Gris Hines is a 67 y.o. male, pmhx AICD, Alzheimer's, AV bridgette reentrant tachycardia, ablation, prostate cancer, factor V Leiden deficiency, atrial fibrillation, on Eliquis, SSS, and systolic congestive heart failure, who presents via ambulance to the ED c/o of breath. Patient states that he is short of breath when he ambulates more than 10 feet. He has mild orthopnea. No PND. No new pedal edema, he has mild left pedal edema which is stable and unchanged. He has had a nonproductive cough. No fever, sweats, chills. No chest pain heaviness or pressure. No abscesses. No abdominal pain is noted. He has chronic back pain and chronic shoulder pain which is unchanged. He was seen here on January 19 with similar symptoms, was found to be in congestive heart failure and was treated with Lasix. He was doing well until the last week or so. Last echocardiogram that I could access was performed 02/2020, results were:  · Normal cavity size. Mild concentric hypertrophy. Low normal systolic dysfunction. Estimated left ventricular ejection fraction is 50 - 55%. No regional wall motion abnormality noted. Mild (grade 1) left ventricular diastolic dysfunction. · Mildly dilated left atrium. · Mild aortic valve regurgitation is present. · Mild mitral valve regurgitation is present. · Mild tricuspid valve regurgitation is present. · Mild aortic root dilatation.         Past History     Past Medical History:   Diagnosis Date    Acute combined systolic and diastolic congestive heart failure [facility]) 2/12/2022    AICD (automatic cardioverter/defibrillator) present 5/13/2016 5/13/16 [employer] upgrade to dual chamber AICD implant  Dr. Nena Dover disease [facility])     Anxiety state, other     Arthritis     OSTEO ARTHRITIS    AVNRT (AV bridgette re-entry tachycardia) ([address] 5/12/2016 5/12/16 AVNRT ablation: PM/AICD left upper chest :Dr. Lesley Colon Back ache     CAD (coronary artery disease)     Cancer [facility]) 2004    Prostate cancer    Chest tightness     last episode of chest pain 5/2016 before AICD placed; none since then    Coagulation defects 2005    FACTOR V LEIDEN    Dementia ([address]     Depression     Dizziness     FH: factor V Leiden deficiency     Generalized muscle ache     GERD (gastroesophageal reflux disease)     HEARTBURN    Heart failure ([address] 2014    as of 07/2019 EF 30-35;  Dr. Willie Martinez, Cardiomyopathy    Hyperlipidemia, mixed     Hypertension     Other ill-defined conditions(799.89) 2004    blood transfusion    Pacemaker     Paroxysmal atrial fibrillation ([address]     rate controlled with coreg     Postsurgical aortocoronary bypass status 05/29/2012    CABG    Presence of cardiac defibrillator 05/2016    left upper chest    SSS (sick sinus syndrome) ([address]     Stroke ([address] 2011, 1990's    SEVERAL-mini    Syncope     Thromboembolism ([address] 2014    left leg: changed to Eliquis from Warfarin    Thromboembolus ([address] 2005    left leg    Thyroid disease     Weakness generalized        Past Surgical History:   Procedure Laterality Date    CARDIAC CATHETERIZATION  3/4/2011         HX HEENT  2019    oral surgery, removed teeth, dentures upper/lower    HX HERNIA REPAIR Left 2002    Ingiunal hernia repair left    HX ORTHOPAEDIC      LEFT KNEE CARTILAGE REPAIR;RIGHT ROTATOR CUFF REPAIR    HX ORTHOPAEDIC  2/14/12    C5-7 ANTERIIOR CERVICAL DISECTOMY AND FUSION    HX ORTHOPAEDIC  6/4/13    C5-C7 POSTERIOR DECOMPRESSION AND FUSION    HX ORTHOPAEDIC right thumb partial amputation of tip    HX OTHER SURGICAL      steroid injections    HX PACEMAKER Left 05/13/2016    BS D142, Dr. Tyrell Tamayo HX PROSTATECTOMY  2004     [place]    HX ROTATOR CUFF REPAIR Left 2019    HX UROLOGICAL       urinary control system    HX UROLOGICAL  12/3/13    REPLACEMENT ARTIFICAL URINARY SPHINCTER    NC CARDIAC SURG PROCEDURE UNLIST      CABG X1 3/5/11       Family History   Problem Relation Age of Onset    Asthma Mother     Alcohol abuse Mother     Alcohol abuse Father     Asthma Father     Cancer Sister     Heart Disease Sister     Alcohol abuse Brother     Cancer Brother     Heart Disease Brother        Social History     Tobacco Use    Smoking status: Never Smoker    Smokeless tobacco: Never Used   Vaping Use    Vaping Use: Never used   Substance Use Topics    Alcohol use: Not Currently     Alcohol/week: 0.0 standard drinks     Comment: stopped 2010    Drug use: Never       No Known Allergies    Current Facility-Administered Medications   Medication Dose Route Frequency Provider Last Rate Last Admin    acetaminophen (TYLENOL) tablet 650 mg  650 mg Oral Q6H PRN Osvaldo Obrien MD        ondansetron (ZOFRAN) injection 4 mg  4 mg IntraVENous Q4H PRN Osvaldo Obrien MD        furosemide (LASIX) injection 40 mg  40 mg IntraVENous BID Osvaldo Obrien MD             Review of Systems     Review of Systems   All other systems reviewed and are negative. Physical Exam     Physical Exam  Vitals and nursing note reviewed. Constitutional:       General: He is not in acute distress. Appearance: He is well-developed and normal weight. He is not ill-appearing, toxic-appearing or diaphoretic. HENT:      Head: Normocephalic and atraumatic. Mouth/Throat:      Mouth: Mucous membranes are moist.      Pharynx: Oropharynx is clear. No pharyngeal swelling or oropharyngeal exudate.    Eyes:      Extraocular Movements: Extraocular movements intact. Pupils: Pupils are equal, round, and reactive to light. Neck:      Thyroid: No thyromegaly. Vascular: No hepatojugular reflux or JVD. Trachea: No tracheal deviation. Cardiovascular:      Rate and Rhythm: Normal rate and regular rhythm. Heart sounds: No murmur heard. No friction rub. No gallop. Pulmonary:      Effort: Pulmonary effort is normal. Tachypnea present. No accessory muscle usage or respiratory distress. Breath sounds: Examination of the right-middle field reveals rales. Examination of the left-middle field reveals rales. Examination of the right-lower field reveals rales. Examination of the left-lower field reveals rales. Rales present. No wheezing. Chest:      Chest wall: No tenderness or edema. Abdominal:      General: Bowel sounds are normal.      Palpations: Abdomen is soft. There is no hepatomegaly, splenomegaly, mass or pulsatile mass. Tenderness: There is no abdominal tenderness. Hernia: No hernia is present. Musculoskeletal:         General: Normal range of motion. Cervical back: Normal range of motion and neck supple. Right lower leg: No edema. Left lower leg: Edema present. Comments: 1+ pedal edema left leg, negative calf pain bilaterally, negative cords bilaterally, negative erythema of calves bilaterally. Lymphadenopathy:      Cervical: No cervical adenopathy. Skin:     General: Skin is warm and dry. Capillary Refill: Capillary refill takes less than 2 seconds. Coloration: Skin is not cyanotic. Findings: No erythema or rash. Neurological:      General: No focal deficit present. Mental Status: He is alert and oriented to person, place, and time. Cranial Nerves: No cranial nerve deficit. Motor: No weakness.    Psychiatric:         Mood and Affect: Mood normal.         Behavior: Behavior normal.           Diagnostic Study Results     Labs -     Recent Results (from the past 48 hour(s)) CBC WITH AUTOMATED DIFF    Collection Time: 02/11/22 10:36 PM   Result Value Ref Range    WBC 7.9 4.1 - 11.1 K/uL    RBC 3.82 (L) 4.10 - 5.70 M/uL    HGB 11.0 (L) 12.1 - 17.0 g/dL    HCT 34.5 (L) 36.6 - 50.3 %    MCV 90.3 80.0 - 99.0 FL    MCH 28.8 26.0 - 34.0 PG    MCHC 31.9 30.0 - 36.5 g/dL    RDW 14.2 11.5 - 14.5 %    PLATELET 128 042 - 520 K/uL    MPV 10.5 8.9 - 12.9 FL    NRBC 0.0 0  WBC    ABSOLUTE NRBC 0.00 0.00 - 0.01 K/uL    NEUTROPHILS 68 32 - 75 %    LYMPHOCYTES 22 12 - 49 %    MONOCYTES 9 5 - 13 %    EOSINOPHILS 1 0 - 7 %    BASOPHILS 0 0 - 1 %    IMMATURE GRANULOCYTES 0 0.0 - 0.5 %    ABS. NEUTROPHILS 5.3 1.8 - 8.0 K/UL    ABS. LYMPHOCYTES 1.7 0.8 - 3.5 K/UL    ABS. MONOCYTES 0.7 0.0 - 1.0 K/UL    ABS. EOSINOPHILS 0.1 0.0 - 0.4 K/UL    ABS. BASOPHILS 0.0 0.0 - 0.1 K/UL    ABS. IMM. GRANS. 0.0 0.00 - 0.04 K/UL    DF AUTOMATED     METABOLIC PANEL, COMPREHENSIVE    Collection Time: 02/11/22 10:36 PM   Result Value Ref Range    Sodium 142 136 - 145 mmol/L    Potassium 4.3 3.5 - 5.1 mmol/L    Chloride 104 97 - 108 mmol/L    CO2 27 21 - 32 mmol/L    Anion gap 11 5 - 15 mmol/L    Glucose 135 (H) 65 - 100 mg/dL    BUN 22 (H) 6 - 20 MG/DL    Creatinine 1.27 0.70 - 1.30 MG/DL    BUN/Creatinine ratio 17 12 - 20      GFR est AA >60 >60 ml/min/1.73m2    GFR est non-AA 55 (L) >60 ml/min/1.73m2    Calcium 9.0 8.5 - 10.1 MG/DL    Bilirubin, total 0.5 0.2 - 1.0 MG/DL    ALT (SGPT) 18 12 - 78 U/L    AST (SGOT) 21 15 - 37 U/L    Alk.  phosphatase 108 45 - 117 U/L    Protein, total 7.0 6.4 - 8.2 g/dL    Albumin 3.6 3.5 - 5.0 g/dL    Globulin 3.4 2.0 - 4.0 g/dL    A-G Ratio 1.1 1.1 - 2.2     MAGNESIUM    Collection Time: 02/11/22 10:36 PM   Result Value Ref Range    Magnesium 2.0 1.6 - 2.4 mg/dL   NT-PRO BNP    Collection Time: 02/11/22 10:36 PM   Result Value Ref Range    NT pro-BNP 7,369 (H) 0 - 450 PG/ML   TROPONIN-HIGH SENSITIVITY    Collection Time: 02/11/22 10:36 PM   Result Value Ref Range    Troponin-High Sensitivity 17 0 - 76 ng/L   EKG, 12 LEAD, INITIAL    Collection Time: 02/11/22 10:43 PM   Result Value Ref Range    Ventricular Rate 70 BPM    Atrial Rate 70 BPM    P-R Interval 166 ms    QRS Duration 118 ms    Q-T Interval 428 ms    QTC Calculation (Bezet) 462 ms    Calculated P Axis 21 degrees    Calculated R Axis -5 degrees    Calculated T Axis 150 degrees    Diagnosis       Normal sinus rhythm  Incomplete left bundle branch block  ST & T wave abnormality, consider lateral ischemia  Prolonged QT  Abnormal ECG  When compared with ECG of 19-JAN-2022 15:58,  Incomplete left bundle branch block has replaced Nonspecific intraventricular   block  Nonspecific T wave abnormality no longer evident in Inferior leads     TROPONIN-HIGH SENSITIVITY    Collection Time: 02/12/22 12:40 AM   Result Value Ref Range    Troponin-High Sensitivity 21 0 - 76 ng/L        Radiologic Studies -   CT Results  (Last 48 hours)    None        XR CHEST SNGL V   Final Result   Cardiomegaly and pulmonary edema. Procedures     Procedures      Medical Decision Making     Records Reviewed: Nursing Notes, Old Medical Records, Previous electrocardiograms, Previous Radiology Studies and Previous Laboratory Studies    Vital Signs  Patient Vitals for the past 24 hrs:   Temp Pulse Resp BP SpO2   02/12/22 0325 97 °F (36.1 °C) 68 22 (!) 150/74 91 %   02/12/22 0155 -- -- -- 139/79 96 %   02/12/22 0150 -- -- -- (!) 140/77 97 %   02/12/22 0050 -- -- -- (!) 157/72 96 %   02/11/22 2354 -- -- -- (!) 140/75 97 %   02/11/22 2229 98.2 °F (36.8 °C) 72 22 (!) 176/86 96 %       Pulse Oximetry Analysis - 96% on RA    Cardiac Monitor:   Rate: 68 bpm  Rhythm: Normal Sinus Rhythm      I am the first provider for this patient.     I reviewed the vital signs, available nursing notes, past medical history, past surgical history, family history and social history, performed a physical exam and reviewed labs and xrays from this visit    MDM  Number of Diagnoses or Management Options  Acute on chronic right-sided congestive heart failure ([facility]): established, worsening     Amount and/or Complexity of Data Reviewed  Clinical lab tests: ordered and reviewed  Tests in the radiology section of CPT®: ordered and reviewed  Tests in the medicine section of CPT®: ordered and reviewed  Decide to obtain previous medical records or to obtain history from someone other than the patient: yes  Review and summarize past medical records: yes  Discuss the patient with other providers: yes (Dr. Toni Banda)    Risk of Complications, Morbidity, and/or Mortality  Presenting problems: high  Diagnostic procedures: moderate  Management options: moderate  General comments: Differential includes pneumonia, pneumothorax, pleural effusion, congestive heart failure, acute coronary syndrome, STEMI, NSTEMI, COPD exacerbation    Patient Progress  Patient progress: stable        ED Course     Initial assessment performed. The patients presenting problems have been discussed, and they are in agreement with the care plan formulated and outlined with them. I have encouraged them to ask questions as they arise throughout their visit. ED Course as of 02/12/22 0658   Fri Feb 11, 2022   2342 EKG is normal sinus rhythm with a normal rate of 70, normal VT interval of 166, QRS duration prolonged at 118, QTc is 462, incomplete left bundle branch block is noted nonspecific ST and T changes noted, QT is noted to be prolonged, no evidence of acute ischemia or infarction, no significant change from EKG dated 9/2/2021. [PS]   Sat Feb 12, 2022   0017 Chest x-ray reveals congestive heart failure, BNP is elevated at 7300, greater than patient's baseline at about 1000-13 100. First troponin is negative at 17, magnesium is normal, CBC is stable as compared to 3 weeks ago with a hemoglobin of 11 and white count of 7900, CMP is stable with glucose 135 BUN 22 and creatinine of 1.27 and normal liver function studies.   Will obtain second troponin and if not elevated will discuss care with hospitalist for admission for diuresis. [PS]   0115 TROPONIN-HIGH SENSITIVITY [PS]   8143 Discussed with Dr. Jose R Burns, will admit for diuresis and correction of volume overload [PS]      ED Course User Index  [PS] Dao James MD        Orders Placed This Encounter    XR CHEST SNGL V    CBC WITH AUTOMATED DIFF    CMP    MAGNESIUM    NT-PRO BNP    TROPONIN-HIGH SENSITIVITY    TROPONIN-HIGH SENSITIVITY    CBC W/O DIFF    METABOLIC PANEL, BASIC    ADULT DIET Regular; Low Fat/Low Chol/High Fiber/TEODORA    CARDIAC MONITOR - ED ONLY    B/P    INTAKE AND OUTPUT    DAILY WEIGHT    BEDREST WITH BATHROOM PRIVILEGES    VITAL SIGNS PER UNIT ROUTINE    INTAKE AND OUTPUT    CARDIAC MONITORING    FULL CODE    RT--OXIMETRY, CONTINUOUS    EKG 12 LEAD INITIAL    SALINE LOCK IV ONE TIME STAT    sodium chloride (NS) flush 5-10 mL    furosemide (LASIX) injection 40 mg    acetaminophen (TYLENOL) tablet 650 mg    ondansetron (ZOFRAN) injection 4 mg    furosemide (LASIX) injection 40 mg    INITIAL PHYSICIAN ORDER: OBSERVATION/OUTPATIENT IN A BED OBSERVATION; Telemetry; Yes; Treatment of congestive heart failure and volume overload       MEDICATIONS GIVEN:    Medications   acetaminophen (TYLENOL) tablet 650 mg (has no administration in time range)   ondansetron (ZOFRAN) injection 4 mg (has no administration in time range)   furosemide (LASIX) injection 40 mg (has no administration in time range)   sodium chloride (NS) flush 5-10 mL (10 mL IntraVENous Given 2/12/22 0400)   furosemide (LASIX) injection 40 mg (40 mg IntraVENous Given 2/11/22 2350)         Diagnosis     Clinical Impression:   1.  Acute on chronic right-sided congestive heart failure ([address]            Disposition     Admission Note    Orders Placed This Encounter    XR CHEST SNGL V    CBC WITH AUTOMATED DIFF    CMP    MAGNESIUM    NT-PRO BNP    TROPONIN-HIGH SENSITIVITY    TROPONIN-HIGH SENSITIVITY    CBC W/O DIFF    METABOLIC PANEL, BASIC    ADULT DIET Regular; Low Fat/Low Chol/High Fiber/TEODORA    CARDIAC MONITOR - ED ONLY    B/P    INTAKE AND OUTPUT    DAILY WEIGHT    BEDREST WITH BATHROOM PRIVILEGES    VITAL SIGNS PER UNIT ROUTINE    INTAKE AND OUTPUT    CARDIAC MONITORING    FULL CODE    RT--OXIMETRY, CONTINUOUS    EKG 12 LEAD INITIAL    SALINE LOCK IV ONE TIME STAT    sodium chloride (NS) flush 5-10 mL    furosemide (LASIX) injection 40 mg    acetaminophen (TYLENOL) tablet 650 mg    ondansetron (ZOFRAN) injection 4 mg    furosemide (LASIX) injection 40 mg    INITIAL PHYSICIAN ORDER: OBSERVATION/OUTPATIENT IN A BED OBSERVATION; Telemetry; Yes; Treatment of congestive heart failure and volume overload       Medications   acetaminophen (TYLENOL) tablet 650 mg (has no administration in time range)   ondansetron (ZOFRAN) injection 4 mg (has no administration in time range)   furosemide (LASIX) injection 40 mg (has no administration in time range)   sodium chloride (NS) flush 5-10 mL (10 mL IntraVENous Given 2/12/22 0400)   furosemide (LASIX) injection 40 mg (40 mg IntraVENous Given 2/11/22 2350)       Patient Vitals for the past 8 hrs:   Temp Pulse Resp BP SpO2   02/12/22 0325 97 °F (36.1 °C) 68 22 (!) 150/74 91 %   02/12/22 0155 -- -- -- 139/79 96 %   02/12/22 0150 -- -- -- (!) 140/77 97 %   02/12/22 0050 -- -- -- (!) 157/72 96 %   02/11/22 2354 -- -- -- (!) 140/75 97 %         DIAGNOSIS:      ICD-10-CM ICD-9-CM    1. Acute on chronic right-sided congestive heart failure (HCC)  I50.813 428.0         I have reviewed all pertinent and currently available diagnostic test results for this visit including, but not limited to, labs, xrays, and EKGs. I have reviewed all pertinent and currently available medical records, past medical history, social history and family history.  My plan of care and further evaluation and/or disposition is based on these results, as well as the initial, and subsequent, history and physical exam, as well as any additional complaints during the visit. Laboratory tests, medications, x-rays, diagnosis, and need for admission or transfer have been reviewed and discussed with the patient and/or family. Pt and/or family have had the opportunity to ask questions about their care. Patient and/or family verbalized understanding and agreement with care plan. After review of patient's ED evaluation I feel patient will benefit from admission to hospital due to need for close observation of cardiac rhythm and diuresis and respiratory status while treating acute acute fluid overload from congestive heart failure. Keith Shultz MD    Please note that this dictation was completed with [employer], the computer voice recognition software. Quite often unanticipated grammatical, syntax, homophones, and other interpretive errors are inadvertently transcribed by the computer software. Please disregard these errors. Please excuse any errors that have escaped final proofreading.

## 2022-02-12 NOTE — PROGRESS NOTES
Problem: Falls - Risk of  Goal: *Absence of Falls  Description: Document Ck Greenberg Fall Risk and appropriate interventions in the flowsheet.   Outcome: Progressing Towards Goal  Note: Fall Risk Interventions:            Medication Interventions: Bed/chair exit alarm,Evaluate medications/consider consulting pharmacy,Patient to call before getting OOB                   Problem: Patient Education: Go to Patient Education Activity  Goal: Patient/Family Education  Outcome: Progressing Towards Goal  Note: Call to go to bathroom  Wait for nurse

## 2022-02-13 NOTE — PROGRESS NOTES
Bedside shift change report given to CHANTAL Heaton RN (oncoming nurse) by Raghavendra Boone RN (offgoing nurse). Report included the following information [employer].

## 2022-02-13 NOTE — PROGRESS NOTES
[facility]  Hospitalist Progress Note    NAME: Nikia Mccormick   :  1945   MRN:  [id number]     Total duration of encounter: 2 days      Interim Hospital Summary: 67 y.o. male who presented on 2022 with Acute on chronic combined systolic and diastolic ACC/AHA stage C congestive heart failure ([address] He has a past medical history of Acute combined systolic and diastolic congestive heart failure ([address] (2022), AICD (automatic cardioverter/defibrillator) present (2016), Alzheimer disease ([address] Anxiety state, other, Arthritis, AVNRT (AV bridgette re-entry tachycardia) ([address] (2016), Back ache, CAD (coronary artery disease), Cancer ([address] (), Chest tightness, Coagulation defects (), Dementia ([address] Depression, Dizziness, FH: factor V Leiden deficiency, Generalized muscle ache, GERD (gastroesophageal reflux disease), Heart failure ([address] (), Hyperlipidemia, mixed, Hypertension, Other ill-defined conditions(799.89) (), Pacemaker, Paroxysmal atrial fibrillation ([address] Postsurgical aortocoronary bypass status (2012), Presence of cardiac defibrillator (2016), SSS (sick sinus syndrome) ([address] Stroke ([address] (, ), Syncope, Thromboembolism ([address] (), Thromboembolus ([address] (), Thyroid disease, and Weakness generalized. He has no past medical history of Asthma, Carotid artery disease ([address] Chronic kidney disease, Chronic obstructive pulmonary disease ([address] Diabetes ([address] Glaucoma, Long term current use of anticoagulant therapy, Murmur, Myocardial infarction ([address] Pulmonary embolism ([address] Rheumatic fever, or Valvular heart disease. .    Patient presenting to the ED with a several day history of worsening dyspnea. Patient is followed by Dr. Kathy Mcdonald in [place], last follow-up in September. She reports symptoms of fatigue, dyspnea, headache for the past several months.   Patient symptomatically improved following Lasix and oxygen treatment in the ED.  Chest x-ray was noted for moderate pulmonary edema. Subjective:     Chief Complaint / Reason for Physician Visit  \"no c/o\".   Discussed with RN   Rested well  No HA today    Review of Systems:  Symptom Y/N Comments  Symptom Y/N Comments   Fever/Chills n   Chest Pain n    Poor Appetite n   Edema n    Cough n   Abdominal Pain n    Sputum n   Joint Pain y  worst shoulders   SOB/HOLCOMB y   Pruritis/Rash n    Nausea/vomit n   Tolerating PT/OT     Diarrhea n   Tolerating Diet y    Constipation n   Other         Current Facility-Administered Medications:     acetaminophen (TYLENOL) tablet 650 mg, 650 mg, Oral, Q6H PRN, Lashell Caldwell MD, 650 mg at 02/12/22 1027    atorvastatin (LIPITOR) tablet 40 mg, 40 mg, Oral, QHS, Lashell Caldwell MD, 40 mg at 02/12/22 2137    donepeziL (ARICEPT) tablet 5 mg, 5 mg, Oral, QHS, Lashell Caldwell MD, 5 mg at 02/12/22 2137    apixaban (ELIQUIS) tablet 5 mg, 5 mg, Oral, Q12H, Lashell Caldwell MD, 5 mg at 02/13/22 0840    sacubitriL-valsartan (ENTRESTO) 24-26 mg tablet 1 Tablet (Patient Supplied), 1 Tablet, Oral, BID, Lashell Caldwell MD, 1 Tablet at 02/13/22 0841    furosemide (LASIX) tablet 40 mg, 40 mg, Oral, DAILY, Lashell Caldwell MD, 40 mg at 02/13/22 0840    gabapentin (NEURONTIN) capsule 600 mg, 600 mg, Oral, TID, Lashell Caldwell MD, 600 mg at 02/13/22 0840    memantine (NAMENDA) tablet 10 mg, 10 mg, Oral, BID, Lashell Caldwell MD, 10 mg at 02/13/22 0840    metoprolol succinate (TOPROL-XL) XL tablet 12.5 mg, 12.5 mg, Oral, DAILY, Lashell Caldwell MD, 12.5 mg at 02/13/22 0840    nitroglycerin (NITROSTAT) tablet 0.4 mg, 0.4 mg, SubLINGual, Q5MIN PRN, Lashell Caldwell MD    pantoprazole (PROTONIX) tablet 40 mg, 40 mg, Oral, ACB, Lashell Caldwell MD, 40 mg at 02/13/22 420    polyethylene glycol (MIRALAX) packet 17 g, 17 g, Oral, DAILY, Lashell Caldwell MD    senna-docusate (PERICOLACE) 8.6-50 mg per tablet 1 Tablet, 1 Tablet, Oral, PRN, Lashell Caldwell MD    sertraline (ZOLOFT) tablet 200 mg, 200 mg, Oral, DAILY, Rebecca Lewis MD, 200 mg at 02/13/22 0840    sodium chloride (NS) flush 5-40 mL, 5-40 mL, IntraVENous, Q8H, Rebecca Lewis MD, 10 mL at 02/12/22 1600    sodium chloride (NS) flush 5-40 mL, 5-40 mL, IntraVENous, PRN, Rebecca Lewis MD, 10 mL at 02/12/22 2010    Objective:     VITALS:   Patient Vitals for the past 12 hrs:   Temp Pulse Resp BP SpO2   02/13/22 0845 -- -- -- -- (!) 89 %   02/13/22 0835 97.6 °F (36.4 °C) 75 18 (!) 103/59 94 %   02/13/22 0400 98.3 °F (36.8 °C) 65 20 116/68 96 %   02/12/22 2359 98 °F (36.7 °C) 61 18 (!) 141/80 94 %       Intake/Output Summary (Last 24 hours) at 2/13/2022 0918  Last data filed at 2/13/2022 0835  Gross per 24 hour   Intake 3000 ml   Output 5050 ml   Net -2050 ml        PHYSICAL EXAM:  General: WD, WN. Alert, cooperative, no acute distress    EENT:  EOMI. Anicteric sclerae. MMM  Resp:  CTA bilaterally, no wheezing or rales. CV:  Regular  rhythm,  No edema  GI:  Soft, Non distended, Non tender. +Bowel sounds  Neurologic:  Alert and oriented X 3, normal speech, nonfocal  Psych:   Not anxious  Skin:  No rashes. No jaundice    LABS:  I reviewed today's most current labs and imaging studies.   Pertinent labs include:  Recent Labs     02/13/22  0700 02/12/22  0812 02/11/22 2236   WBC 8.7 9.0 7.9   HGB 12.0* 11.9* 11.0*   HCT 37.4 37.5 34.5*    217 202     Recent Labs     02/13/22  0700 02/12/22  0812 02/11/22  2236    141 142   K 4.1 3.8 4.3    103 104   CO2 29 29 27   * 107* 135*   BUN 18 20 22*   CREA 1.06 1.17 1.27   CA 9.3 9.3 9.0   MG 2.1  --  2.0   ALB  --   --  3.6   TBILI  --   --  0.5   ALT  --   --  18     Results for Johana Sarah (MRN [id number]) as of 2/13/2022 09:29   2/13/2022 07:00   Triglyceride 51   Chol, total 168   HDL Chol 75   CHOL/HDL  2.2   VLDL, calc 10.2   LDL, calc 82.8       EKG: NSR 70 bpm, incomplete LBBB, NSSTTWC, prolonged QT      Radiology:  pCXR 2/11:  AP portable view of the chest demonstrates prior median sternotomy, left  subclavian pacemaker, and cardiomegaly. Moderate pulmonary edema. No  pneumothorax. Postoperative changes left shoulder and cervical spine.   IMPRESSION:  Cardiomegaly and pulmonary edema.     Procedures: see electronic medical records for all procedures/Xrays and details which were not copied into this note but were reviewed prior to creation of Plan. Assessment / Plan:  Principal Problem:    Acute on chronic combined systolic and diastolic ACC/AHA stage C congestive heart failure ([address] (2/12/2022)  With some documented valvular insufficiency disease by ECHO and a 40% ejection fraction by nuclear stress testing  Elevated pro BNP 7400 on admission, f/u in AM  Question compliance with diuretics - recently has been off Lasix 20mg at home  Some concern for underlying pulmonary disease with fibrosis noted on prior CT - will f/u PA/LAT CXR today post diuresis  Will need assessment for home O2 again before d/c  Exercise Oxim yesterday 2/12:  noted drop in Room Air Oxim 92% rest to 85% during walk/  With 2l/min NC 98% rest to 91% with walk  No h/o Emphysea, has not smoked for 50+years  F/u CXR today, plan f/u Exercise Oximetry     Active Problems:    Atherosclerosis of native coronary artery of native heart without angina pectoris (5/29/2012)    AICD (automatic cardioverter/defibrillator) present (5/13/2016)      Overview: 5/13/16 [employer] upgrade to dual chamber AICD implant  Serial troponin values within range.   Last stress testing/evaluation for cardiac ischemia was 3 years ago - neg  Needs f/u with Cardiology       Essential hypertension (3/4/2011)  Maintain on current treatment with Entresto 24-26 twice daily, Toprol-XL 12.5 mg daily, resumed Lasix at 40mg daily po       Mixed hyperlipidemia (5/29/2012)  Maintain current treatment Lipitor 40 mg daily  Last lipid panel obtained November 2018 included total cholesterol 160, HDL 72, LDL 67, triglyceride 149  Lab this AM LDL 82 and SMH  - good  and CK wnl 61     Stenosis of both carotid arteries without cerebral infarction (4/12/2016)   Alzheimer's dementia, late onset, with behavioral disturbance ([facility] [address] [facility] (4/12/2016)  Continue treatment for carotid occlusive disease - antihypertensive, aspirin, statin  Encourage evaluation for any recurrent symptomatology  Sedation could be a/w hypoxia and medications:  Neurontin,  Aricept, Namenda, Zoloft      Hypothyroidism due to acquired atrophy of thyroid (9/22/2015)  No current thyroid replacement  Last TSH recorded 0.53 in March 2019  Current TSH 2/12 wnl 0.98        Osteoarthritis (5/29/2012)  Severe disease with chronic pain management necessary  Has been on Neurontin high-dose 600 mg 3 times daily  This may account for some sedation/fatigue, was well as Aricept, Namenda, Zoloft     SAFETY:   Code Status:Full  DVT prophylaxis:Eliquis  Stress Ulcer prophylaxis: Protonix (home med)  Bladder catheter:no  Family Contact Info:  Primary Emergency Contact: [address] [facility], Home Phone: [phone]  Bedded: [facility] Room 128/01  Disposition: TBD, likely home when stable, possible need for Home Oxygen  Admission status:  Observation      Reviewed most current lab test results and cultures  YES  Reviewed most current radiology test results   YES  Review and summation of old records today    NO  Reviewed patient's current orders and MAR    YES  PMH/SH reviewed - no change compared to H&P    Care Plan discussed with:                                   Comments  Patient x     Family  x Wife in room   RN x     Care Manager       Consultant                           Multidiciplinary team rounds were held today with , nursing, pharmacist and clinical coordinator. Patient's plan of care was discussed; medications were reviewed and discharge planning was addressed.         ____________________________________________    Total NON Critical Care TIME:  30   Minutes        Comments   >50% of visit spent in counseling and coordination of care   x      Signed: Alison Conteh MD  [facility] Hospitalist  [phone]

## 2022-02-13 NOTE — PROGRESS NOTES
Problem: Falls - Risk of  Goal: *Absence of Falls  Description: Document [place] Fall Risk and appropriate interventions in the flowsheet.   Outcome: Progressing Towards Goal  Note: Fall Risk Interventions:            Medication Interventions: Patient to call before getting OOB                   Problem: Patient Education: Go to Patient Education Activity  Goal: Patient/Family Education  Outcome: Progressing Towards Goal     Problem: Heart Failure: Day 2  Goal: Activity/Safety  Outcome: Progressing Towards Goal  Goal: Diagnostic Test/Procedures  Outcome: Progressing Towards Goal  Goal: Nutrition/Diet  Outcome: Progressing Towards Goal  Goal: Discharge Planning  Outcome: Progressing Towards Goal  Goal: Medications  Outcome: Progressing Towards Goal  Goal: Respiratory  Outcome: Progressing Towards Goal  Goal: Treatments/Interventions/Procedures  Outcome: Progressing Towards Goal  Goal: Psychosocial  Outcome: Progressing Towards Goal  Goal: *Oxygen saturation within defined limits  Outcome: Progressing Towards Goal  Goal: *Hemodynamically stable  Outcome: Progressing Towards Goal  Goal: *Optimal pain control at patient's stated goal  Outcome: Progressing Towards Goal  Goal: *Anxiety reduced or absent  Outcome: Progressing Towards Goal

## 2022-02-14 PROBLEM — J96.90 RESPIRATORY FAILURE (HCC): Status: ACTIVE | Noted: 2022-01-01

## 2022-02-14 NOTE — PROGRESS NOTES
Care Management Interventions  PCP Verified by CM: Yes (, Jeovanny Akins MD)  Last Visit to PCP: 11/27/21  Palliative Care Criteria Met (RRAT>21 & CHF Dx)?: No  Mode of Transport at Discharge: Other (see comment) (POV/spouse)  Transition of Care Consult (CM Consult): Discharge Planning,DME/Supply [address]: No  Reason Outside [place]: Out of service area  [address]: No (Will likely need home oxygen)  Discharge Durable Medical Equipment: Yes  Physical Therapy Consult: No  Occupational Therapy Consult: No  Speech Therapy Consult: No  Support Systems: Spouse/Significant Other,Child([place])  Confirm Follow Up Transport: Family  [employer] Information Provided?: No  Discharge Location  Patient Expects to be Discharged to[de-identified] Home with home health     Mr. Vishnu Rosario was admitted 2/11/22 under Observation status but was upgraded today to Inpatient, with an admitting diagnosis of Respiratory Failure. The State Observation, MOON and initial IM notifications need to be obtained. Mr. Vishnu Rosario has some dementia so I am going to explain the notifications to Mrs. Vishnu Rosario. I called her, and she is coming in around midday today. The [place] live in the [facility]). Mr. Vishnu Rosario has Medicare and Medicaid. There is a 15year old grandson in the home. There is no Advance Directive on file but due to the dementia diagnosis, one cannot be obtained. However, Mrs. Vishnu Rosario is the primary healthcare decision maker for Mr. Vishnu Rosario. For discharge planning, Mr. Vishnu Rosario will need home oxygen ordered. Also home health will be suggested. I do not know if Mr. Vishnu Rosario has Medicaid community based care/personal care but will discuss this with Mrs. Vishnu Rosario.      Advance Care Planning     General Advance Care Planning (ACP) Conversation      Date of Conversation: 02/14/2022  Conducted with: Patient with Decision Making Capacity and Healthcare Decision Maker: Next of Kin by law (only applies in absence of a Healthcare Power of  or [address] [place] [address] Avenue:   No healthcare decision makers have been documented.    Click here to complete [address] including selection of the Healthcare Decision Maker Relationship (ie \"Primary\")    Keith Mason, [phone]    Content/Action Overview:   DECLINED ACP conversation - will revisit periodically   Reviewed DNR/DNI and patient elects Full Code (Attempt Resuscitation)      Length of Voluntary ACP Conversation in minutes:  <16 minutes (Non-Billable)    Mirza Cera     Reason for Admission: Respiratory Failure, CHF                     RUR Score: N/A Admitted under Observation    RRAT: 24 HIGH                    Plan for utilizing home health:  YES        PCP: First and Last name:  Marianela Harley MD     Name of Practice: [facility]   Are you a current patient: Yes/No: YES   Approximate date of last visit: 11/27/21   Can you participate in a virtual visit with your PCP: NO                    Current Advanced Directive/Advance Care Plan: Full Code      Healthcare Decision Maker:   Click here to complete [address] including selection of the [address] Relationship (ie \"Primary\")           Keith Mason, [phone]                  Transition of Care Plan: Home with [address]

## 2022-02-14 NOTE — PROGRESS NOTES
I met with Mrs. Gulshan Villegas as she was visiting Mr. Gulshan Villegas. I explained the State Observation, MOON, and Important Message from Gita Edwards notifications. Copies to patient and chart. Mr. Gulshan Villegas will need home oxygen and Mrs. Gulshan Villegas noted he also needed a standard wheeled walker. These DME items will be ordered through [facility] by care manager here Cira Salguero. Also home health is wanted. Further Mr. Gulshan Villegas requires personal care assistance. Mrs. Gulshan Villegas has been helping him but she has a bad back and uses a wheel chair. Thus Medicaid personal care services are wanted. Mrs. Gulshan Villegas is going to see if she can find an individual to be the caregiver and thus use consumer directed services or if, otherwise, an agency provider is needed. She will advise the care management department here as we need to know where to send the [facility]. I will give her a list of agencies.

## 2022-02-14 NOTE — PROGRESS NOTES
Per request of  Kimberly Bandar called [facility], [phone], and made a home health referral. There is a delay for OT and PT, but Mrs. Roland Limon said this was ok. Information faxed to [facility] [phone]. The Freedom of Choice form (for post acute referrals was received: copies to patient and to chart).

## 2022-02-14 NOTE — PROGRESS NOTES
Evaluation for Home Oxygen    Resting (Room Air)  SpO2:  94  HR:  74    Resting (On O2)  SpO2:   97  HR:  68  O2 Device:  NC  O2 Flow Rate (L/min):  2  FIO2 (%):  28    During Walk (Room Air)  SpO2:  86  HR:  84  Walk/Assistance Device:  Walker  Rate of Dyspnea:  2  Symptoms:  SOB, Tachypnea  Comments:  tires easily.     During Walk (On O2)  SpO2:  93  HR:  70  O2 Device:  NC  O2 Flow Rate (L/min):  2  O2 Flow Rate:  2  O2 Saturation:  93  O2 Flow Rate:  2  O2 Saturation:  94  O2 Flow Rate:  2  O2 Saturation:  95  O2 Flow Rate:  2  O2 Saturation:  92  O2 Flow Rate:  2  O2 Saturation:  92  Walk/Assistance Device:  Wheeled Walker  Rate of Dyspnea:  2  Symptoms:  SOB  After Walk  SpO2:  92  HR:  82  O2 Device:  NC  O2 Flow Rate (L/min):  2  FIO2 (%):  28  Rate of Dyspnea:   1Symptoms:  SOB  Does the Patient Qualify for Home O2:  Yes  Liter Flow at Rest:  2  Liter Flow on Exertion:  2  Does the Patient Need Portable Oxygen Tanks:  Yes    Electronically signed by RT Mike on 2/14/2022 at 11:55 AM

## 2022-02-14 NOTE — TELEPHONE ENCOUNTER
Patient went in on 2/11/22 [facility]. Please have Dr Arizmendi Like review Echo will be completed on 2/15/22. Call if he needs a appointment.  Thanks

## 2022-02-14 NOTE — PROGRESS NOTES
Problem: Falls - Risk of  Goal: *Absence of Falls  Description: Document [employer] Fall Risk and appropriate interventions in the flowsheet.   Outcome: Progressing Towards Goal  Note: Fall Risk Interventions:            Medication Interventions: Patient to call before getting OOB,Teach patient to arise slowly

## 2022-02-14 NOTE — PROGRESS NOTES
Follow up visit with patient in [address]  Provided empathic listening and spiritual support  Advised of  Availability   [address] [place] [address]

## 2022-02-14 NOTE — PROGRESS NOTES
[facility]  Hospitalist Progress Note    NAME: Mickey Jones   :  1945   MRN:  [id number]     Total duration of encounter: 3 days      Interim Hospital Summary: 67 y.o. male who presented on 2022 with Acute on chronic combined systolic and diastolic ACC/AHA stage C congestive heart failure ([address] He has a past medical history of Acute combined systolic and diastolic congestive heart failure ([address] (2022), AICD (automatic cardioverter/defibrillator) present (2016), Alzheimer disease ([address] Anxiety state, other, Arthritis, AVNRT (AV bridgette re-entry tachycardia) ([address] (2016), Back ache, CAD (coronary artery disease), Cancer ([address] (), Chest tightness, Coagulation defects (), Dementia ([address] Depression, Dizziness, FH: factor V Leiden deficiency, Generalized muscle ache, GERD (gastroesophageal reflux disease), Heart failure ([address] (), Hyperlipidemia, mixed, Hypertension, Other ill-defined conditions(799.89) (), Pacemaker, Paroxysmal atrial fibrillation ([address] Postsurgical aortocoronary bypass status (2012), Presence of cardiac defibrillator (2016), SSS (sick sinus syndrome) ([address] Stroke ([address] (, ), Syncope, Thromboembolism ([address] (), Thromboembolus ([address] (), Thyroid disease, and Weakness generalized. He has no past medical history of Asthma, Carotid artery disease ([address] Chronic kidney disease, Chronic obstructive pulmonary disease ([address] Diabetes ([address] Glaucoma, Long term current use of anticoagulant therapy, Murmur, Myocardial infarction ([address] Pulmonary embolism ([address] Rheumatic fever, or Valvular heart disease. .    Patient presenting to the ED with a several day history of worsening dyspnea. Patient is followed by Dr. Ale Lae in [place], last follow-up in September. She reports symptoms of fatigue, dyspnea, headache for the past several months.   Patient symptomatically improved following Lasix and oxygen treatment in the ED. Chest x-ray was noted for moderate pulmonary edema. No CXR improvement following diuresis. CT notes CXR changes more c/o fibrosis / scarring. ECHO         Subjective:     Chief Complaint / Reason for Physician Visit  \"stable\".   Discussed with RN   Rested well  No HA today    Review of Systems:  Symptom Y/N Comments  Symptom Y/N Comments   Fever/Chills n   Chest Pain n    Poor Appetite n   Edema n    Cough n   Abdominal Pain n    Sputum n   Joint Pain y  worst shoulders   SOB/HOLCOMB y   Pruritis/Rash n    Nausea/vomit n   Tolerating PT/OT     Diarrhea n   Tolerating Diet y    Constipation n   Other         Current Facility-Administered Medications:     acetaminophen (TYLENOL) tablet 650 mg, 650 mg, Oral, Q6H PRN, Christine Weaver MD, 650 mg at 02/12/22 1027    atorvastatin (LIPITOR) tablet 40 mg, 40 mg, Oral, QHS, Christine Weaver MD, 40 mg at 02/13/22 2136    donepeziL (ARICEPT) tablet 5 mg, 5 mg, Oral, QHS, Christine Weaver MD, 5 mg at 02/13/22 2136    apixaban (ELIQUIS) tablet 5 mg, 5 mg, Oral, Q12H, Christine Weaver MD, 5 mg at 02/14/22 2073    sacubitriL-valsartan (ENTRESTO) 24-26 mg tablet 1 Tablet (Patient Supplied), 1 Tablet, Oral, BID, Christine Weaver MD, 1 Tablet at 02/14/22 0911    gabapentin (NEURONTIN) capsule 600 mg, 600 mg, Oral, TID, Christine Weaver MD, 600 mg at 02/14/22 1534    memantine (NAMENDA) tablet 10 mg, 10 mg, Oral, BID, Christine Weaver MD, 10 mg at 02/14/22 0900    metoprolol succinate (TOPROL-XL) XL tablet 12.5 mg, 12.5 mg, Oral, DAILY, Christine Weaver MD, 12.5 mg at 02/14/22 0906    nitroglycerin (NITROSTAT) tablet 0.4 mg, 0.4 mg, SubLINGual, Q5MIN PRN, Christine Weaver MD    pantoprazole (PROTONIX) tablet 40 mg, 40 mg, Oral, ACB, Christine Weaver MD, 40 mg at 02/14/22 0656    polyethylene glycol (MIRALAX) packet 17 g, 17 g, Oral, DAILY, Christine Weaver MD    senna-docusate (PERICOLACE) 8.6-50 mg per tablet 1 Tablet, 1 Tablet, Oral, PRN, Christine Weaver MD    sertraline (ZOLOFT) tablet 200 mg, 200 mg, Oral, DAILY, Lele Sosa MD, 200 mg at 02/14/22 6864    sodium chloride (NS) flush 5-40 mL, 5-40 mL, IntraVENous, Q8H, Len MUSA MD, 10 mL at 02/14/22 1400    sodium chloride (NS) flush 5-40 mL, 5-40 mL, IntraVENous, PRN, Lele Sosa MD, 10 mL at 02/12/22 2010    Objective:     VITALS:   Patient Vitals for the past 12 hrs:   Temp Pulse Resp BP SpO2   02/14/22 1205 97.5 °F (36.4 °C) 72 16 105/63 96 %   02/14/22 1154 -- -- -- -- 97 %   02/14/22 0800 -- 69 -- -- --   02/14/22 0740 97.8 °F (36.6 °C) 66 16 114/61 94 %       Intake/Output Summary (Last 24 hours) at 2/14/2022 1610  Last data filed at 2/14/2022 0830  Gross per 24 hour   Intake 600 ml   Output --   Net 600 ml        PHYSICAL EXAM:  General: WD, WN. Alert, cooperative, no acute distress    EENT:  EOMI. Anicteric sclerae. MMM  Resp:  CTA bilaterally, dry crackles, no wheezing or cough  CV:  Regular  rhythm,  No edema  GI:  Soft, Non distended, Non tender. +Bowel sounds  Neurologic:  Alert and oriented X 3, normal speech, nonfocal  Psych:   Not anxious  Skin:  No rashes. No jaundice    LABS:  I reviewed today's most current labs and imaging studies. Pertinent labs include:  Recent Labs     02/13/22  0700 02/12/22  0812 02/11/22  2236   WBC 8.7 9.0 7.9   HGB 12.0* 11.9* 11.0*   HCT 37.4 37.5 34.5*    217 202     Recent Labs     02/14/22  0641 02/13/22  0700 02/12/22  0812 02/11/22  2236 02/11/22  2236    137 141   < > 142   K 4.5 4.1 3.8   < > 4.3    101 103   < > 104   CO2 28 29 29   < > 27   * 107* 107*   < > 135*   BUN 28* 18 20   < > 22*   CREA 1.15 1.06 1.17   < > 1.27   CA 9.4 9.3 9.3   < > 9.0   MG 2.0 2.1  --   --  2.0   ALB  --   --   --   --  3.6   TBILI  --   --   --   --  0.5   ALT  --   --   --   --  18    < > = values in this interval not displayed.      Results for Vinetta Lefort (MRN [id number]) as of 2/13/2022 09:29   2/13/2022 07:00   Triglyceride 51   Chol, total 168   HDL Chol 75   CHOL/HDL  2.2   VLDL, calc 10.2   LDL, calc 82.8     Results for Yonas Colorado (MRN [id number]) as of 2/14/2022 16:08   Ref. Range 1/19/2022 16:06 2/11/2022 22:36 2/14/2022 06:41   NT pro-BNP  0 - 450 PG/ML 1,375 (H) 7,369 (H) 714 (H)       EXERCISE OXIMETRY 2/14:  Resting (Room Air)  SpO2:  94  HR:  74  During Walk (Room Air)  SpO2:  86  HR:  84    Resting (On O2)  SpO2:   97  HR:  68  During Walk (On O2)  SpO2:  93  HR:  70    Does the Patient Qualify for Home O2:  Yes  Liter Flow at Rest:  2  Liter Flow on Exertion:  2  Does the Patient Need Portable Oxygen Tanks:  Yes  Jose العلي,        EKG: NSR 70 bpm, incomplete LBBB, NSSTTWC, prolonged QT      Radiology:  pCXR 2/11:  AP portable view of the chest demonstrates prior median sternotomy, left  subclavian pacemaker, and cardiomegaly. Moderate pulmonary edema. No  pneumothorax. Postoperative changes left shoulder and cervical spine.   IMPRESSION:  Cardiomegaly and pulmonary edema. CT CHEST 2/14:  1. Interval worsening of the aeration of the lungs as described above. Presence  of groundglass opacity and fibrotic change. Evidence of some bullous change  (upper lobe predominance). 2. Interval development of a small left pleural effusion. 3. Evidence of cholelithiasis. 4. Presence of a left renal cyst.    ECHO 2/15: pending     Procedures: see electronic medical records for all procedures/Xrays and details which were not copied into this note but were reviewed prior to creation of Plan.         Assessment / Plan:  Principal Problem:    Acute on chronic combined systolic and diastolic ACC/AHA stage C congestive heart failure ([address] (2/12/2022)  With some documented valvular insufficiency disease by ECHO and a 40% ejection fraction by nuclear stress testing  Elevated pro BNP 7400 on admission, f/u has improved to 700 range  Question compliance with diuretics - recently has been off Lasix 20mg at home  Some concern for underlying pulmonary disease with fibrosis noted on prior CT - will f/u PA/LAT CXR today post diuresis  Will need assessment for home O2 again before d/c  Exercise Oxim yesterday 2/12:  noted drop in Room Air Oxim 92% rest to 85% during walk/  With 2l/min NC 98% rest to 91% with walk  No Cig related COPD - nonsmoker  F/u CXR not improved, CT more c/o fibrosis / scarring  Pt needs Home O2 24/7 - arrangement made.     Active Problems:    Atherosclerosis of native coronary artery of native heart without angina pectoris (5/29/2012)    AICD (automatic cardioverter/defibrillator) present (5/13/2016)      Overview: 5/13/16 [employer] upgrade to dual chamber AICD implant  Serial troponin values within range.   Last stress testing/evaluation for cardiac ischemia was 3 years ago - neg  Needs f/u with Cardiology       Essential hypertension (3/4/2011)  Maintain on current treatment with Entresto 24-26 twice daily, Toprol-XL 12.5 mg daily  Hypotension yesterday tx IV NS 500cc, Held Lasix today  Resume Lasix 20mg in AM 2/15 before discharge       Mixed hyperlipidemia (5/29/2012)  Maintain current treatment Lipitor 40 mg daily  Last lipid panel obtained November 2018 included total cholesterol 160, HDL 72, LDL 67, triglyceride 149  Lab this AM LDL 82 and [facility]  - good  and CK wnl 61     Stenosis of both carotid arteries without cerebral infarction (4/12/2016)   Alzheimer's dementia, late onset, with behavioral disturbance ([facility] (4/12/2016)  Continue treatment for carotid occlusive disease - antihypertensive, aspirin, statin  Encourage evaluation for any recurrent symptomatology  Sedation could be a/w hypoxia and medications:  Neurontin,  Aricept, Namenda, Zoloft      Hypothyroidism due to acquired atrophy of thyroid (9/22/2015)  No current thyroid replacement  Last TSH recorded 0.53 in March 2019  Current TSH 2/12 wnl 0.98        Osteoarthritis (5/29/2012)  Severe disease with chronic pain management necessary  Has been on Neurontin high-dose 600 mg 3 times daily  This may account for some sedation/fatigue, was well as Aricept, Namenda, Zoloft     SAFETY:   Code Status:Full  DVT prophylaxis:Eliquis  Stress Ulcer prophylaxis: Protonix (home med)  Bladder catheter:no  Family Contact Info:  Primary Emergency Contact: [address], Home Phone: [phone]  Bedded: [facility] Room 128/01  Disposition: TBD, likely home when stable, possible need for Home Oxygen  Admission status:  Observation upgrade to Inpatient by Util Review      Reviewed most current lab test results and cultures  YES  Reviewed most current radiology test results   YES  Review and summation of old records today    NO  Reviewed patient's current orders and MAR    YES  PMH/SH reviewed - no change compared to H&P    Care Plan discussed with:                                   Comments  Patient x     Family  x Wife in room   RN x     Care Manager  x     Consultant                          x Multidiciplinary team rounds were held today with , nursing, pharmacist and clinical coordinator. Patient's plan of care was discussed; medications were reviewed and discharge planning was addressed.         ____________________________________________    Total NON Critical Care TIME:  30   Minutes        Comments   >50% of visit spent in counseling and coordination of care   x      Signed: Natalia Retana MD  [facility] Hospitalist  [phone]

## 2022-02-15 PROBLEM — J84.9 INTERSTITIAL LUNG DISEASE (HCC): Status: ACTIVE | Noted: 2022-01-01

## 2022-02-15 NOTE — DISCHARGE INSTRUCTIONS
Hospitalist Recommendation:    Need to use Oxygen 24/7  Follow wts and record  Will need Lasix 20-40mg for swelling / >3# wt gain from d/c  RENEE Huang MD  [facility] [place] from Nurse    PATIENT INSTRUCTIONS:    After general anesthesia or intravenous sedation, for 24 hours or while taking prescription Narcotics:  · Limit your activities  · Do not drive and operate hazardous machinery  · Do not make important personal or business decisions  · Do  not drink alcoholic beverages  · If you have not urinated within 8 hours after discharge, please contact your surgeon on call. Report the following to your surgeon:  · Excessive pain, swelling, redness or odor of or around the surgical area  · Temperature over 100.5  · Nausea and vomiting lasting longer than 4 hours or if unable to take medications  · Any signs of decreased circulation or nerve impairment to extremity: change in color, persistent  numbness, tingling, coldness or increase pain  · Any questions    What to do at Home:  Recommended activity: Activity as tolerated,     If you experience any recurrent symptoms , please follow up with PCP or return to the ED. *  Please give a list of your current medications to your Primary Care Provider. *  Please update this list whenever your medications are discontinued, doses are      changed, or new medications (including over-the-counter products) are added. *  Please carry medication information at all times in case of emergency situations. These are general instructions for a healthy lifestyle:    No smoking/ No tobacco products/ Avoid exposure to second hand smoke  Surgeon General's Warning:  Quitting smoking now greatly reduces serious risk to your health.     Obesity, smoking, and sedentary lifestyle greatly increases your risk for illness    A healthy diet, regular physical exercise & weight monitoring are important for maintaining a healthy lifestyle    You may be retaining fluid if you have a history of heart failure or if you experience any of the following symptoms:  Weight gain of 3 pounds or more overnight or 5 pounds in a week, increased swelling in our hands or feet or shortness of breath while lying flat in bed. Please call your doctor as soon as you notice any of these symptoms; do not wait until your next office visit. The discharge information has been reviewed with the patient. The patient verbalized understanding. Discharge medications reviewed with the patient and appropriate educational materials and side effects teaching were provided.   ___________________________________________________________________________________________________________________________________

## 2022-02-15 NOTE — PROGRESS NOTES
Bedside and Verbal shift change report given to Niyah Tran RN (oncoming nurse) by Karyle Piper, LPN (offgoing nurse). Report included the following information SBAR, Kardex, Intake/Output and MAR.

## 2022-02-15 NOTE — PROGRESS NOTES
Care Management Interventions  PCP Verified by CM: Yes (, Kristina Osullivan MD)  Last Visit to PCP: 11/27/21  Palliative Care Criteria Met (RRAT>21 & CHF Dx)?: No  Mode of Transport at Discharge: Other (see comment) (POV/spouse)  Transition of Care Consult (CM Consult): Discharge Planning,DME/Supply [address]: No  Reason Outside [facility]: Out of service area  [address] [place] [place]: No (Will likely need home oxygen)  Discharge Durable Medical Equipment: Yes  Physical Therapy Consult: No  Occupational Therapy Consult: No  Speech Therapy Consult: No  Support Systems: Spouse/Significant Other,Child([place])  Confirm Follow Up Transport: Family  [employer] Information Provided?: No  Discharge Location  Patient Expects to be Discharged to[de-identified] Home with home health    Patient is being discharged home today. He will be returning home with home health. Referral sent to [email or web address] awaiting to hear back from them--Graciela said they would have to run it. Patient will also be going home with home o2 through [facility]. Ordered all equipment to be delivered to the home but [place] the  delivered the portable here. Told Miguel to deliver the concentrator to the patients home. Patient's nephew Wesley Deal will be there to accept equipment. Wesley Deal lives with patient. Spoke with Ms. Bassam Grimm this morning and she said that Wesley Deal will be picking patient up and that he would be there to accept o2 equipment. Patient is aware and agrees with discharge plan. Told him to contact care management if he has any questions or concerns. RUR: 13% LOW    Transition of Care (ANJU) Plan:  Home w/HH & o2 through [address] Transportation:       How is patient being transported at discharge? POV nephew      When? Today      Is transport scheduled? N/a     Follow-up appointment and transportation:     PCP? Kristina Osullivan MD 2/25/22 at 10:30am     Who is transporting to the follow-up appointment? POV       Is transport for follow up appointment scheduled? N/a     Communication plan (with patient/family): Patient is aware and agrees with discharge plan       Who is being called? Patient or Next of Kin? Responsible party? Patient       What number(s) is to be used? [phone]      What service provider is calling for [facility] services? [facility]       When are they calling?  24--48 hours prior to vernell       Click here to complete [address] including selection of the Healthcare Decision Maker Relationship (ie \"Primary\")  [email or web address]

## 2022-02-15 NOTE — PROGRESS NOTES
IDR Team; MD, Nursing, Care Manager, Physical therapy, Nursing Supervisor, Pharmacy and Dietician, met to review patient's plan of care. Discussed goals, interventions, barriers and progress. Team will continue to monitor progress and report any concerns to the physician and care management as indicated. Transition of Care Plan: Patient able to be picked up today as long as home o2 equipment is obtained. 1043: According to [address] for home o2 delivery will be 2/15. Called patient's wife Amy Torres to see if she heard from [employer] (o2 company). She said she did talk to them this mornign. She said that they said o2 will be at the house before 12pm. Told Ms. Katherine Russell that someone needs to be there to accept the oxygen. She stated understanding. She says that her nephew Casey Davila will be there to accept o2 and that he will be picking patient up. Also told her that it was important that Walter bring the portal oxygen tank with him when he picks up Mr. Katherine Russell. She stated understanding.  Awaiting o2 delivery

## 2022-02-15 NOTE — PROGRESS NOTES
Discharge instructions reviewed with patient. Personal belongings returned: to patient and nephew. Home meds returned: N/A  To front entrance via wheelchair  Discharged home with  nephew @ 1410 hrs.

## 2022-02-15 NOTE — PROGRESS NOTES
Follow up visit with patient in [address]  Provided empathic listening and spiritual support  Advised of  Availability   [address] [place] [address]

## 2022-02-15 NOTE — PROGRESS NOTES
Received call from Elias from [facility]. They do not take patient's insurance. Called [facility] and they state they do NOT take insurance either. [facility] and spoke with Carlie Jane. They DO take insurance and service that area. Referral sent to [facility]. 1450: confirmed with carlos Roque that he DID receive o2 concentrator. Told him and . Angelica Burnette to please contact care management if they have any questions or concerns after discharge.       1500: Confirmed Esteban at [facility] that she has received everything she needs for referral.

## 2022-02-15 NOTE — DISCHARGE SUMMARY
[facility]  Hospitalist Discharge Summary    Patient ID:  Gris Hines  [id number]  67 y.o.  1945    PCP on record: Lito Liriano MD    Admit date: 2/11/2022  Discharge date and time: 2/15/2022     DISCHARGE DIAGNOSIS:    Principal Problem:    Respiratory failure ([address] (2/14/2022)    Active Problems:    Acute on chronic combined systolic and diastolic ACC/AHA stage C congestive heart failure ([address] (2/12/2022)    Interstitial lung disease ([address] (2/15/2022)    Atherosclerosis of native coronary artery of native heart without angina pectoris (5/29/2012)    AICD (automatic cardioverter/defibrillator) present (5/13/2016)      Overview: 5/13/16 [employer] upgrade to dual chamber AICD implant    Essential hypertension (3/4/2011)    Mixed hyperlipidemia (5/29/2012)    Stenosis of both carotid arteries without cerebral infarction (4/12/2016)    Alzheimer's dementia, late onset, with behavioral disturbance ([address] (4/12/2016)    Hypothyroidism due to acquired atrophy of thyroid (9/22/2015)    Osteoarthritis (5/29/2012)    CONSULTATIONS:  None    Excerpted HPI from H&P of Elvia Hayes MD:  67 y.o. male presenting for admission to [facility] for further evaluation and treatment for Acute on chronic combined systolic and diastolic ACC/AHA stage C congestive heart failure ([address]   He  has a past medical history of Acute combined systolic and diastolic congestive heart failure ([address] (2/12/2022), AICD (automatic cardioverter/defibrillator) present (5/13/2016), Alzheimer disease ([address] Anxiety state, other, Arthritis, AVNRT (AV bridgette re-entry tachycardia) ([address] (5/12/2016), Back ache, CAD (coronary artery disease), Cancer ([address] (2004), Chest tightness, Coagulation defects (2005), Dementia ([address] Depression, Dizziness, FH: factor V Leiden deficiency, Generalized muscle ache, GERD (gastroesophageal reflux disease), Heart failure ([address] (2014), Hyperlipidemia, mixed, Hypertension, Other ill-defined conditions(799.89) (2004), Pacemaker, Paroxysmal atrial fibrillation ([address] Postsurgical aortocoronary bypass status (05/29/2012), Presence of cardiac defibrillator (05/2016), SSS (sick sinus syndrome) ([address] Stroke ([address] (2011, 1990's), Syncope, Thromboembolism ([address] (2014), Thromboembolus ([address] (2005), Thyroid disease, and Weakness generalized. He has no past medical history of Asthma, Carotid artery disease ([address] Chronic kidney disease, Chronic obstructive pulmonary disease ([address] Diabetes ([address] Glaucoma, Long term current use of anticoagulant therapy, Murmur, Myocardial infarction ([address] Pulmonary embolism ([address] Rheumatic fever, or Valvular heart disease. .     Patient presenting to the ED with complaints of severe shortness of breath, worsening over the past several days. Patient is followed by radiology, Dr. Reynold Hernandez at the [place] office and was last seen in September. He has a history of CAD status post CABG x1, ischemic cardiomyopathy status post ICD, PAF, chronic DVT with factor V Leiden mutation, hypertension and hyperlipidemia. Patient has been symptomatic with fatigue and shortness of breath for several months. He has not had any recent medication adjustments advised. He states he is not taking Lasix but it is listed 20 mg daily on the med list. He has chronic pain associated with multiple musculoskeletal issues-osteoarthritis, lumbar spinal stenosis, bilateral rotator cuff tear, left shoulder joint replacement. His history is noted for atrial fib, recurrent DVT, chronic anticoagulation on Eliquis with history of factor V Leiden mutation. He denies complaints of chest pain, palpitation, dizziness, orthopnea, PND, edema. He does have some daytime edema which clears when lying down. He has nocturia and frequency. He has some baseline dementia but does function fairly independently-organizing his own medications. He lives with his wife who is currently not in the room.  The patient has no history for tobacco use in the past 50 years.     ECHO Feb 2020:  · Normal cavity size. Mild concentric hypertrophy. Low normal systolic dysfunction. Estimated left ventricular ejection fraction is 50 - 55%. No regional wall motion abnormality noted. Mild (grade 1) left ventricular diastolic dysfunction. · Mildly dilated left atrium. · Mild aortic valve regurgitation is present. · Mild mitral valve regurgitation is present. · Mild tricuspid valve regurgitation is present. · Mild aortic root dilatation.     NUCLEAR CARDIAC STRESS March 2019:  · Baseline ECG: Paced rhythm, non-specific ST-T wave abnormalities. · Inconclusive stress test.  · Gated SPECT: Left ventricular function post-stress was abnormal. Calculated ejection fraction is 40%. There is no evidence of transient ischemic dilation (TID). · Left ventricular perfusion is normal.     CTA CHEST May 2020:  No evidence of pulmonary embolism  Pre-existing mild to moderate pulmonary fibrosis  Superimposed areas of groundglass opacity suggests an element of nonspecific  alveolitis or pneumonia. Clinical correlation needed  Incidental thyromegaly     ______________________________________________________________________  DISCHARGE SUMMARY/HOSPITAL COURSE:  for full details see H&P, daily progress notes, labs, consult notes. He has no past medical history of Asthma, Carotid artery disease ([address] Chronic kidney disease, Chronic obstructive pulmonary disease ([address] Diabetes ([address] Glaucoma, Long term current use of anticoagulant therapy, Murmur, Myocardial infarction ([address] Pulmonary embolism ([address] Rheumatic fever, or Valvular heart disease. .     Patient presenting to the ED with a several day history of worsening dyspnea. Patient is followed by Dr. Richi Baldwin in [place], last follow-up in September. She reports symptoms of fatigue, dyspnea, headache for the past several months.   Patient symptomatically improved following Lasix and oxygen treatment in the ED. Chest x-ray was noted for moderate pulmonary edema. No CXR improvement following diuresis. CT notes CXR changes more c/o fibrosis / scarring. ECHO       Principal Problem:    Respiratory failure ([facility] (2/14/2022)  Presented with respiratory distress and hypoxia according to pulse oximeter  Patient responded promptly to nasal oxygen as well as IV Lasix  Follow-up chest x-ray did not show improvement with diuresis. Diuresis associated with a drop in blood pressure, but no improvement on chest x-ray  Follow-up chest x-ray findings felt to be most consistent with interstitial lung disease  Oxygen obtained at the time of discharge  May use home bronchodilator inhaler, uncertain if this will show a benefit    Active Problems:    Acute on chronic combined systolic and diastolic ACC/AHA stage C congestive heart failure ([facility] (2/12/2022)  With some documented valvular insufficiency disease by ECHO and a 40% ejection fraction by nuclear stress testing  Elevated pro BNP 7400 on admission, f/u has improved to 700 range  Question compliance with diuretics - recently has been off Lasix 20mg at home  Some concern for underlying pulmonary disease with fibrosis noted on prior CT - will f/u PA/LAT CXR today post diuresis  Will need assessment for home O2 again before d/c  Exercise Oxim yesterday 2/12:  noted drop in Room Air Oxim 92% rest to 85% during walk/  With 2l/min NC 98% rest to 91% with walk  No Cig related COPD - nonsmoker  F/u CXR not improved, CT more c/o fibrosis / scarring  Pt needs Home O2 24/7 - arrangement made. Interstitial lung disease ([facility] (2/15/2022)  Some concern for underlying pulmonary disease with fibrosis noted on prior CT - will f/u PA/LAT CXR today post diuresis  Assessment for home O2 again before d/c  NoT Cig related COPD - nonsmoker  F/u CXR not improved, CT more c/o fibrosis / scarring  Pt needs Home O2 24/7 - arrangement made  .     Atherosclerosis of native coronary artery of native heart without angina pectoris (5/29/2012)    AICD (automatic cardioverter/defibrillator) present (5/13/2016)      Overview: 5/13/16 [employer] upgrade to dual chamber AICD implant  Serial troponin values within range. Last stress testing/evaluation for cardiac ischemia was 3 years ago - neg  Needs f/u with Cardiology      Essential hypertension (3/4/2011)  Maintain on current treatment with Entresto 24-26 twice daily, Toprol-XL 12.5 mg daily  Hypotension  tx IV NS 500cc, Held Lasix Rx on admission  Will resume Lasix 20mg following d/c for signs / symptoms suggest a need - Wt gain with SOB      Mixed hyperlipidemia (5/29/2012)  Maintain current treatment Lipitor 40 mg daily  Last lipid panel obtained November 2018 included total cholesterol 160, HDL 72, LDL 67, triglyceride 149  Lab this AM LDL 82 and SMH  - good  and CK wnl 61      Stenosis of both carotid arteries without cerebral infarction (4/12/2016)    Alzheimer's dementia, late onset, with behavioral disturbance ([facility] (4/12/2016)  Continue treatment for carotid occlusive disease - antihypertensive, aspirin, statin  Encourage evaluation for any recurrent symptomatology  Sedation could be a/w hypoxia and medications:  Neurontin,  Aricept, Namenda, Zoloft      Hypothyroidism due to acquired atrophy of thyroid (9/22/2015)  No current thyroid replacement  Last TSH recorded 0.53 in March 2019  Current TSH 2/12 wnl 0.98       Osteoarthritis (5/29/2012)  Severe disease with chronic pain management necessary  Has been on Neurontin high-dose 600 mg 3 times daily  This may account for some sedation/fatigue, was well as Aricept, Namenda, Zoloft    _______________________________________________________________________  Patient seen and examined by me on discharge day.   Pertinent Findings:  Visit Vitals  BP (!) 108/55 (BP 1 Location: Left upper arm, BP Patient Position: Sitting)   Pulse 70   Temp 97.7 °F (36.5 °C)   Resp 20   Ht 5' 7\" (1.702 m) Wt 73.2 kg (161 lb 4.8 oz)   SpO2 94%   BMI 25.26 kg/m²     Gen:    Not in distress  Chest: Nonlabored respiration at rest, Dry crackles  CVS:   Regular rhythm. No edema  Abd:  Soft, not distended, not tender  Neuro:  Alert, nonfocal, weak    LABS:  Results for Zari Singh (MRN [id number]) as of 2/15/2022 12:41   2/11/2022 22:36 2/12/2022 08:12 2/13/2022 07:00   WBC 7.9 9.0 8.7   NRBC 0.0 0.0 0.0   RBC 3.82 (L) 4.10 4.18   HGB 11.0 (L) 11.9 (L) 12.0 (L)   HCT 34.5 (L) 37.5 37.4   MCV 90.3 91.5 89.5   MCH 28.8 29.0 28.7   MCHC 31.9 31.7 32.1   RDW 14.2 14.0 13.9   PLATELET 010 553 520   MPV 10.5 10.4 10.8   NEUTROPHILS 68     LYMPHOCYTE 22     MONOCYTES 9     EOSINOPHILS 1       Results for Zari Singh (MRN [id number]) as of 2/15/2022 12:41   1/19/2022 16:06 2/11/2022 22:36 2/12/2022 00:40 2/12/2022 08:12 2/12/2022 08:46 2/13/2022 07:00 2/14/2022 06:41 2/15/2022 06:29   Sodium 138 142  141  137 138 138   Potassium 5.3 (H) 4.3  3.8  4.1 4.5 4.8   Chloride 104 104  103  101 101 102   CO2 27 27  29  29 28 29   Anion gap 7 11  9  7 9 7   Glucose 97 135 (H)  107 (H)  107 (H) 103 (H) 102 (H)   BUN 31 (H) 22 (H)  20  18 28 (H) 29 (H)   Creatinine 1.37 (H) 1.27  1.17  1.06 1.15 1.04   BUN/Cr ratio 23 (H) 17  17  17 24 (H) 28 (H)   Calcium 9.2 9.0  9.3  9.3 9.4 9.6   Magnesium  2.0    2.1 2.0    GFR  non-AA 50 (L) 55 (L)  >60  >60 >60 >60   Bilirubin, total 0.4 0.5         Protein, total 7.4 7.0         Albumin 3.6 3.6         Globulin 3.8 3.4         A-G Ratio 0.9 (L) 1.1         ALT 20 18         AST 16 21         Alk.  phos 99 108         Triglyceride      51     Chol, total      168     HDL Chol      75     CHOL/HDL       2.2     VLDL, calc      10.2     LDL, calc      82.8     CK     61      Troponin-HS  17 21        NT pro-BNP 1,375 (H) 7,369 (H)     714 (H)      Results for Rosia Second (MRN [id number]) as of 2/15/2022 12:41   2/12/2022 08:46   TSH 0.98     Results for Rosia Second (MRN [id number]) as of 2/15/2022 12:41   2022 12:25   Influenza A by PCR Not detected   Influenza B by PCR Not detected   SARS-COV-2  PCR Not detected     Results for Nicole Tuttle (MRN [id number]) as of 2022 09:29    2022 07:00   Triglyceride 51   Chol, total 168   HDL Chol 75   CHOL/HDL  2.2   VLDL, calc 10.2   LDL, calc 82.8      Results for Nicole Tuttle (MRN [id number]) as of 2022 16:08    Ref. Range 2022 16:06 2022 22:36 2022 06:41   NT pro-BNP  0 - 450 PG/ML 1,375 (H) 7,369 (H) 714 (H)      Results for Nicole Tuttle (MRN [id number]) as of 2/15/2022 12:41   Ref. Range 2012 17:55   pH Latest Ref Range: 7.35 - 7.45   7.27 (LL)   PCO2 Latest Ref Range: 35 - 45 mmHg 62 (HH)   PO2 Latest Ref Range: 80 - 100 mmHg 132 (H)   BICARBONATE Latest Ref Range: 22 - 26 mmol/L 28 (H)   O2 SAT Latest Ref Range: 92 - 97 % 98 (H)   BASE DEFICIT Latest Ref Range: BE NORMAL RANGE -3 T mmol/L 0.4 (A)   Sample source Unknown ARTERIAL   SITE Unknown RB   FIO2 Latest Units: % 40        EXERCISE OXIMETRY :  Resting (Room Air)  SpO2:  94  HR:  74  During Walk (Room Air)  SpO2:  86  HR:  84     Resting (On O2)  SpO2:   97  HR:  68  During Walk (On O2)  SpO2:  93  HR:  70     Does the Patient Qualify for Home O2:  Yes  Liter Flow at Rest:  2  Liter Flow on Exertion:  2  Does the Patient Need Portable Oxygen Tanks:  Yes  Chriss Mediate, RT        EK/11 NSR 70 bpm, incomplete LBBB, NSSTTWC, prolonged QT      Radiology:  pCXR :  AP portable view of the chest demonstrates prior median sternotomy, left  subclavian pacemaker, and cardiomegaly. Moderate pulmonary edema. No  pneumothorax. Postoperative changes left shoulder and cervical spine.   IMPRESSION:  Cardiomegaly and pulmonary edema.     CT CHEST :  1. Interval worsening of the aeration of the lungs as described above. Presence  of groundglass opacity and fibrotic change. Evidence of some bullous change  (upper lobe predominance).   2. Interval development of a small left pleural effusion. 3. Evidence of cholelithiasis. 4. Presence of a left renal cyst.    PA/LAT CXR 2/13:  PA and lateral radiographs of the chest demonstrate the left lower  lobe interstitial process. The cardiac and mediastinal contours. There is a  dual-lead pacemaker present. A left total shoulder replacement is present.    IMPRESSION: Asymmetric pulmonary edema versus viral or atypical pneumonia     ECHO 2/15: pending    _____________________________________________________________________  DISCHARGE MEDICATIONS:   Current Discharge Medication List      START taking these medications    Details   polyethylene glycol (MIRALAX) 17 gram packet Take 1 Packet by mouth daily. Start date: 2/16/2022         CONTINUE these medications which have CHANGED    Details   donepeziL (ARICEPT) 5 mg tablet Take 1 Tablet by mouth nightly. Qty: 30 Tablet, Refills: 0  Start date: 2/15/2022         CONTINUE these medications which have NOT CHANGED    Details   acetaminophen (TYLENOL) 500 mg capsule Take 1,000 mg by mouth every six (6) hours as needed for Pain. albuterol (PROVENTIL HFA, VENTOLIN HFA, PROAIR HFA) 90 mcg/actuation inhaler Take 1 Puff by inhalation every six (6) hours as needed for Wheezing (Rarely uses \"puffer\"). atorvastatin (LIPITOR) 40 mg tablet TAKE 1 TABLET BY MOUTH AT BEDTIME  Qty: 90 Tablet, Refills: 3    Associated Diagnoses: Hyperlipidemia, unspecified hyperlipidemia type      sertraline (ZOLOFT) 100 mg tablet TAKE 2 TABLETS BY MOUTH EVERY DAY  Qty: 180 Tablet, Refills: 3    Comments: This prescription was filled on 10/19/2021. Any refills authorized will be placed on file. Associated Diagnoses: Depression, unspecified depression type      metoprolol succinate (TOPROL-XL) 25 mg XL tablet TAKE 1/2 TABLET BY MOUTH ONCE DAILY  Qty: 45 Tablet, Refills: 3    Comments: This prescription was filled on 9/13/2021. Any refills authorized will be placed on file. pantoprazole (PROTONIX) 40 mg tablet TAKE 1 TABLET BY MOUTH ONCE DAILY  Qty: 90 Tablet, Refills: 3    Comments: This prescription was filled on 8/30/2021. Any refills authorized will be placed on file. Associated Diagnoses: GERD without esophagitis      Eliquis 5 mg tablet TAKE 1 TABLET BY MOUTH EVERY 12 HOURS  Qty: 180 Tablet, Refills: 3    Comments: This prescription was filled on 8/30/2021. Any refills authorized will be placed on file. Associated Diagnoses: Acute deep vein thrombosis (DVT) of other specified vein of left lower extremity (HCC)      gabapentin (NEURONTIN) 600 mg tablet TAKE 1 TABLET BY MOUTH THREE TIMES A DAY  Qty: 90 Tablet, Refills: 3    Associated Diagnoses: Spinal stenosis, lumbar region, with neurogenic claudication      memantine (NAMENDA) 10 mg tablet TAKE 1 TABLET BY MOUTH TWICE DAILY  Qty: 180 Tab, Refills: 3      Entresto 24-26 mg tablet TAKE 1 TABLET BY MOUTH TWICE DAILY  Qty: 180 Tablet, Refills: 3    Associated Diagnoses: Chronic systolic (congestive) heart failure (HCC)      nitroglycerin (NITROSTAT) 0.4 mg SL tablet 1 Tab by SubLINGual route every five (5) minutes as needed for Chest Pain (call 911 if not relieved by 3). Qty: 1 Bottle, Refills: 1      senna-docusate (PERICOLACE) 8.6-50 mg per tablet Take 1 Tab by mouth daily. Qty: 30 Tab, Refills: 0      multivit-min/FA/lycopen/lutein (SENTRY SENIOR PO) Take  by mouth daily.          STOP taking these medications       polyethylene glycol (MIRALAX) 17 gram/dose powder Comments:   Reason for Stopping:               My Recommended  Diet: Cardiac  Activity: [place] with oxygen 24/7  Wound Care: none  Follow-up labs: routine  -  BMP / Mg / CBC with PCP 1-2 weeks    Hospitalist Recommendation:    Need to use Oxygen 24/7  Follow wts and record  Will need Lasix 20-40mg for swelling / >3# wt gain from d/c  RENEE Flynn MD  [phone]    ______________________________________________________________________  DISPOSITION:    Home with Family: Home with HH/PT/OT/RN: x   SNF/LTC:    NORM:    OTHER:        Condition at Discharge:  Stable  _____________________________________________________________________  Follow up with:   PCP : Boogie Matthews MD  Follow-up Information     Follow up With Specialties Details Why Contact Info    Boogie Matthews MD Family Medicine F/u in 10 days  [address] [phone]          Total time in minutes spent coordinating this discharge (includes going over instructions, follow-up, prescriptions, and preparing report for sign off to her PCP) :35 minutes    Signed:  Bradly Kumar MD  [facility] Hospitalist  [phone]

## 2022-02-15 NOTE — PROGRESS NOTES
Bedside and Verbal shift change report given to Hailey DAVILA (oncoming nurse) by VERONICA Anand (offgoing nurse). Report included the following information SBAR and Kardex. Du score 3  Bed/chair alarm yes in use. If in use it is set at the highest volume. Intravenous fluids were administered, none   Patient Vitals for the past 12 hrs:   Temp Pulse Resp BP SpO2   02/14/22 1641 98 °F (36.7 °C) 62 20 109/64 96 %   02/14/22 1205 97.5 °F (36.4 °C) 72 16 105/63 96 %   02/14/22 1154 -- -- -- -- 97 %     No flowsheet data found.      BMP:   Lab Results   Component Value Date/Time     02/14/2022 06:41 AM    K 4.5 02/14/2022 06:41 AM     02/14/2022 06:41 AM    CO2 28 02/14/2022 06:41 AM    AGAP 9 02/14/2022 06:41 AM     (H) 02/14/2022 06:41 AM    BUN 28 (H) 02/14/2022 06:41 AM    CREA 1.15 02/14/2022 06:41 AM    GFRAA >60 02/14/2022 06:41 AM    GFRNA >60 02/14/2022 06:41 AM

## 2022-02-15 NOTE — PROGRESS NOTES
Problem: Falls - Risk of  Goal: *Absence of Falls  Description: Document [place] Fail Fall Risk and appropriate interventions in the flowsheet.   Outcome: Progressing Towards Goal  Note: Fall Risk Interventions:            Medication Interventions: Bed/chair exit alarm,Patient to call before getting OOB                   Problem: Patient Education: Go to Patient Education Activity  Goal: Patient/Family Education  Outcome: Progressing Towards Goal     Problem: Heart Failure: Day 1  Goal: Off [facility] (Use only if patient is Off [facility])  Outcome: Progressing Towards Goal  Goal: Activity/Safety  Outcome: Progressing Towards Goal  Goal: Consults, if ordered  Outcome: Progressing Towards Goal  Goal: Diagnostic Test/Procedures  Outcome: Progressing Towards Goal  Goal: Nutrition/Diet  Outcome: Progressing Towards Goal  Goal: Discharge Planning  Outcome: Progressing Towards Goal  Goal: Medications  Outcome: Progressing Towards Goal  Goal: Respiratory  Outcome: Progressing Towards Goal  Goal: Treatments/Interventions/Procedures  Outcome: Progressing Towards Goal  Goal: Psychosocial  Outcome: Progressing Towards Goal  Goal: *Oxygen saturation within defined limits  Outcome: Progressing Towards Goal  Goal: *Hemodynamically stable  Outcome: Progressing Towards Goal  Goal: *Optimal pain control at patient's stated goal  Outcome: Progressing Towards Goal  Goal: *Anxiety reduced or absent  Outcome: Progressing Towards Goal

## 2022-02-18 NOTE — TELEPHONE ENCOUNTER
Is that per Pts request to wait until after test is resulted [place] suggest that pt see odilia the same day as stress test

## 2022-02-18 NOTE — TELEPHONE ENCOUNTER
Spoke with patients wife, on HPI  Verified with two patient identifiers. Advised pt per Louisa Calvert NP, His EF or heart pumping strength is down from 50-55% in 2020 to now 20-25%  I have spoken with Dr Dore Merlin wants to get stress test (Jesica Patiño) to rule out any new blood flow problem   As he has history of CAD/CABG.  I will put order in once you speak with patient.  Follow up with dr Sharmin Garcia same day of stress test. If any recurrent sob or cp, call us and if it is bad enough, dont hesitate to call 911. Advised I will have  call and schedule stress test and same day appt with [place]. Patients wife verbalized understanding.

## 2022-02-18 NOTE — TELEPHONE ENCOUNTER
Marisela Valenzuela     After Kamilla Gamboa puts in order for stress test can you call pt and schedule for stress test and same day appt with odilia after stress test. Thanks!

## 2022-02-21 NOTE — PROGRESS NOTES
Telephone call with spouse Lorri Ledesma, [phone]. Plan is that FANNY Hannon RN will do [facility]. Mrs. Shannon Fontaine want to go through [place] consumer directed services for personal care, as she has found a caregiver. Will contact Facilitator Nuria More of [employer] when [facility] paperwork is returned.

## 2022-02-24 NOTE — PROGRESS NOTES
Follow up intervention: I talked to Olivia Tavera of [employer], Facilitator for [facility] consumer directed services. He has been authorized to take the [place] plans and can do the facilitation for Mr. Margy Valente. When [facility] ready to send, we can fax it to [employer] [phone]. Mr. Thee Bose telephone number is [phone]. [facility] will also need to be faxed to [facility] [phone]. I called Mrs. Margy Valente to let her know this update, [phone].

## 2022-02-25 NOTE — PROGRESS NOTES
1. Have you been to the ER, urgent care clinic since your last visit? Hospitalized since your last visit? Yes - [facility] 2/11/22 thru 2/15/22 - Respiratory Failure     2. Have you seen or consulted any other health care providers outside of the [address] [place] [address] since your last visit? Include any pap smears or colon screening. No     2/25/2022      Chief Complaint   Patient presents with   [facility] Follow Up     [facility] 2/11/22 thru 2/15/22 (Respiratory Failure)          History of Present Illness:        Alison Rendon is a 67 y.o. male with ANJU visit for three day hospitalization discharged 2.16 for pulmonary edema. Discharged on O2, though sats in hospital don't seem to support this. No change in meds. No Known Allergies    Current Outpatient Medications   Medication Sig    furosemide (LASIX) 20 mg tablet Take one tab daily for weight gain > #3    gabapentin (NEURONTIN) 600 mg tablet TAKE 1 TABLET BY MOUTH THREE TIMES A DAY    polyethylene glycol (MIRALAX) 17 gram packet Take 1 Packet by mouth daily.  donepeziL (ARICEPT) 5 mg tablet Take 1 Tablet by mouth nightly.  acetaminophen (TYLENOL) 500 mg capsule Take 1,000 mg by mouth every six (6) hours as needed for Pain.  albuterol (PROVENTIL HFA, VENTOLIN HFA, PROAIR HFA) 90 mcg/actuation inhaler Take 1 Puff by inhalation every six (6) hours as needed for Wheezing (Rarely uses \"puffer\").     atorvastatin (LIPITOR) 40 mg tablet TAKE 1 TABLET BY MOUTH AT BEDTIME    sertraline (ZOLOFT) 100 mg tablet TAKE 2 TABLETS BY MOUTH EVERY DAY    metoprolol succinate (TOPROL-XL) 25 mg XL tablet TAKE 1/2 TABLET BY MOUTH ONCE DAILY    pantoprazole (PROTONIX) 40 mg tablet TAKE 1 TABLET BY MOUTH ONCE DAILY    Eliquis 5 mg tablet TAKE 1 TABLET BY MOUTH EVERY 12 HOURS    Entresto 24-26 mg tablet TAKE 1 TABLET BY MOUTH TWICE DAILY    memantine (NAMENDA) 10 mg tablet TAKE 1 TABLET BY MOUTH TWICE DAILY    nitroglycerin (NITROSTAT) 0.4 mg SL tablet 1 Tab by SubLINGual route every five (5) minutes as needed for Chest Pain (call 911 if not relieved by 3).  senna-docusate (PERICOLACE) 8.6-50 mg per tablet Take 1 Tab by mouth daily. (Patient taking differently: Take 1 Tablet by mouth as needed for Constipation.)    multivit-min/FA/lycopen/lutein (SENTRY SENIOR PO) Take  by mouth daily. No current facility-administered medications for this visit. Physical Examination:    Visit Vitals  /70 (BP 1 Location: Left upper arm, BP Patient Position: Sitting, BP Cuff Size: Adult)   Pulse 64   Temp 97.6 °F (36.4 °C) (Oral)   Resp 22   Ht 5' 7\" (1.702 m)   Wt 164 lb 6 oz (74.6 kg)   SpO2 95%   BMI 25.74 kg/m²      General:  Alert, cooperative, no distress. HEENT:  Normocephalic, without obvious abnormality, atraumatic. Conjunctivae/corneas clear. Pupils equal, round, reactive to light. Extraocular movements intact. TMs and external canals normal bilaterally. Nasal mucosa and oropharynx clear. Lungs: Clear to auscultation bilaterally. Chest wall:  No tenderness or deformity. Heart:  Regular rate and rhythm, S1, S2 normal, no murmur, click, rub, or gallop. Abdomen:   Soft, non-tender. Bowel sounds normal. No masses. No organomegaly. Extremities: Extremities normal, atraumatic, no cyanosis or edema. Pulses: 2+ and symmetric all extremities. Skin: Skin color, texture, turgor normal. No rashes or lesions. Lymph nodes: Cervical, supraclavicular, and axillary nodes normal.   Neurologic: CNII-XII intact. Normal strength, sensation, and reflexes throughout. ASSESSMENT AND PLAN    1. Encounter for support and coordination of transition of care  Given furosemide to dose based on weight gain    2. Moderate major depression (HCC)  stable    3. Paroxysmal atrial fibrillation (HCC)  Likely source of poor O2 saturation monitoring    4.  Stage 3b chronic kidney disease (HCC)  stable            Orders Placed This Encounter    furosemide (LASIX) 20 mg tablet     Sig: Take one tab daily for weight gain > #3     Dispense:  30 Tablet     Refill:  1           Nohelia Goodwin MD

## 2022-03-01 NOTE — PROGRESS NOTES
3/1/22 1252  UAI submitted for processing. 3/1/22  0950  Error in UAI submitted yesterday 3/1. New I submitted. Processing.

## 2022-03-02 NOTE — TELEPHONE ENCOUNTER
I have called and talked to this patient. I have advised him that per Dr Xavi Fleming, he will need to make an apt for evaluation for the portable O2 due to his O2 sats not being low enough. He will have his wife call back to make an apt.

## 2022-03-02 NOTE — TELEPHONE ENCOUNTER
Patient's wife asking for order for a battery operated oxygen tank. States the oxygen is too hard for her to maneuver when she takes him to appointments.  Please fax order to Jayme Riley at [phone]

## 2022-03-03 NOTE — PROGRESS NOTES
I called Mr. Alexis Kimbrough wife Jcarlos Roberts [phone] to advise her that UAI has been sent to [employer] [employer] for the consumer directed services Medicaid care facilitation. I texted her his contact information.

## 2022-03-03 NOTE — PROGRESS NOTES
I faxed the UAI to [employer] [address] [facility] [employer] [phone] per family request. I tried calling Mrs. Radha Solis to advise her but could not get through.

## 2022-03-04 PROBLEM — I25.5 ISCHEMIC CARDIOMYOPATHY: Status: ACTIVE | Noted: 2022-01-01

## 2022-03-04 PROBLEM — J43.9 PULMONARY EMPHYSEMA (HCC): Status: ACTIVE | Noted: 2022-01-01

## 2022-03-04 NOTE — PROGRESS NOTES
1. Have you been to the ER, urgent care clinic since your last visit? Hospitalized since your last visit? No    2. Have you seen or consulted any other health care providers outside of the [address] [place] [address] since your last visit? Include any pap smears or colon screening. No     3/4/2022      Chief Complaint   Patient presents with    Breathing Problem         History of Present Illness:         is a 67 y.o. male to recheck need for O2 since hospital discharge. CT there demonstrated bilateral upper lobe emphysema and recent echo demonstrated LVEF of 20-25%. No SOB at rest.      No Known Allergies    Current Outpatient Medications   Medication Sig    acetaminophen (TYLENOL) 500 mg capsule Take 1,000 mg by mouth every six (6) hours as needed for Pain.  albuterol (PROVENTIL HFA, VENTOLIN HFA, PROAIR HFA) 90 mcg/actuation inhaler Take 1 Puff by inhalation every six (6) hours as needed for Wheezing (Rarely uses \"puffer\").  furosemide (LASIX) 20 mg tablet Take one tab daily for weight gain > #3    gabapentin (NEURONTIN) 600 mg tablet TAKE 1 TABLET BY MOUTH THREE TIMES A DAY    polyethylene glycol (MIRALAX) 17 gram packet Take 1 Packet by mouth daily.  donepeziL (ARICEPT) 5 mg tablet Take 1 Tablet by mouth nightly.  atorvastatin (LIPITOR) 40 mg tablet TAKE 1 TABLET BY MOUTH AT BEDTIME    sertraline (ZOLOFT) 100 mg tablet TAKE 2 TABLETS BY MOUTH EVERY DAY    metoprolol succinate (TOPROL-XL) 25 mg XL tablet TAKE 1/2 TABLET BY MOUTH ONCE DAILY    pantoprazole (PROTONIX) 40 mg tablet TAKE 1 TABLET BY MOUTH ONCE DAILY    Eliquis 5 mg tablet TAKE 1 TABLET BY MOUTH EVERY 12 HOURS    Entresto 24-26 mg tablet TAKE 1 TABLET BY MOUTH TWICE DAILY    memantine (NAMENDA) 10 mg tablet TAKE 1 TABLET BY MOUTH TWICE DAILY    nitroglycerin (NITROSTAT) 0.4 mg SL tablet 1 Tab by SubLINGual route every five (5) minutes as needed for Chest Pain (call 911 if not relieved by 3).     senna-docusate (PERICOLACE) 8.6-50 mg per tablet Take 1 Tab by mouth daily. (Patient taking differently: Take 1 Tablet by mouth as needed for Constipation.)    multivit-min/FA/lycopen/lutein (SENTRY SENIOR PO) Take  by mouth daily. No current facility-administered medications for this visit. Physical Examination:    Visit Vitals  /61 (BP 1 Location: Left upper arm, BP Patient Position: Sitting, BP Cuff Size: Adult)   Pulse 60   Temp 97 °F (36.1 °C) (Oral)   Resp 20   Ht 5' 7\" (1.702 m)   Wt 166 lb (75.3 kg)   SpO2 (!) 83% Comment: Exercise of room air   BMI 26.00 kg/m²      General:  Alert, cooperative, no distress. HEENT:  Normocephalic, without obvious abnormality, atraumatic. Conjunctivae/corneas clear. Pupils equal, round, reactive to light. Extraocular movements intact. TMs and external canals normal bilaterally. Nasal mucosa and oropharynx clear. Lungs: Clear to auscultation bilaterally. Chest wall:  No tenderness or deformity. Heart:  Regular rate and rhythm, S1, S2 normal, no murmur, click, rub, or gallop. Abdomen:   Soft, non-tender. Bowel sounds normal. No masses. No organomegaly. Extremities: Extremities normal, atraumatic, no cyanosis or edema. Pulses: 2+ and symmetric all extremities. Skin: Skin color, texture, turgor normal. No rashes or lesions. Lymph nodes: Cervical, supraclavicular, and axillary nodes normal.   Neurologic: CNII-XII intact. Normal strength, sensation, and reflexes throughout. 92 % sat at rest on RA falls to 83% with two circuits of nurse station. Marked HOLCOMB. ASSESSMENT AND PLAN    1. Pulmonary emphysema, unspecified emphysema type ([address]  Complicating his heart failure  - OXYGEN SATURATION RESULTS DOCUMENTED/REVIEWED  - LONG TERM OXYGEN THERAPY PRESCRIBED  - AMB SUPPLY ORDER    2.  Ischemic cardiomyopathy  Hypoxic with exercise on maximal medical therapy through cardiology.  - OXYGEN SATURATION RESULTS DOCUMENTED/REVIEWED  - LONG TERM OXYGEN THERAPY PRESCRIBED  - AMB SUPPLY ORDER            Orders Placed This Encounter    OXYGEN SATURATION RESULTS DOCUMENTED/REVIEWED    LONG TERM OXYGEN THERAPY PRESCRIBED    AMB SUPPLY ORDER     Home O2  2L nasal canula with portable concentrator           Rachna Llanes MD

## 2022-03-09 NOTE — TELEPHONE ENCOUNTER
I have called and talked to Arie Squires (pt's wife). I have given her Guillermo's ([facility] Representative) phone number. Patient's wife needs to call and talk to Prasanth Lemons to discuss the POC and O2 for home. ROSI james has been approved, but they have been unable to contact the patient to set up a day and time for delivery,  Patient's wife verbalizes understanding. She will call [facility].

## 2022-03-14 NOTE — PROGRESS NOTES
Please advise patient stress test without evidence of flow-limiting blockages in the arteries of the heart. EF per stress test is low,  same as echo. He needs to see Dr Calos Velez sooner than September. See if he wants to see him at [facility] office and I will have PSR contact him.  thanks

## 2022-03-14 NOTE — TELEPHONE ENCOUNTER
----- Message from Reema Gloria NP sent at 3/14/2022  8:50 AM EDT -----  Please advise patient stress test without evidence of flow-limiting blockages in the arteries of the heart. EF per stress test is low,  same as echo. He needs to see Dr Megan Pleitez sooner than September. See if he wants to see him at [facility] office and I will have PSR contact him.  thanks

## 2022-03-14 NOTE — TELEPHONE ENCOUNTER
Spoke with pt's wife. Missed remote transmission from last Thursday. Assisted them in sending a manual transmission - right side of remote monitor green, and in progress for 15 minutes. Had them reset monitor with same result. Gave tech services telephone number, will touch base with them tomorrow. Encouarged them to go to ED if pt thinks he's gotten shocked again and feels poorly.

## 2022-03-14 NOTE — TELEPHONE ENCOUNTER
Patient spouse informed  On Hippa . Shis is concerned he had a shock from device. Kimberlyn Rudd will you call to advise . John Arce they are requesting an appt here sooner than [facility].  Please  Call to schedule thanks

## 2022-03-17 NOTE — TELEPHONE ENCOUNTER
LM for pt regarding remote transmission (no events). Will cancel in clinic ICD check next Wednesday and keep annual schedule for device checks.

## 2022-03-23 PROBLEM — I27.81 COR PULMONALE (CHRONIC) (HCC): Status: ACTIVE | Noted: 2022-01-01

## 2022-03-23 NOTE — PROGRESS NOTES
[address], [facility]  [phone]     Subjective:      Nikia Mccormick is a 67 y.o. male is here for hospital follow-up, admitted with acute respiratory failure felt to be due to interstitial fibrosis over pulmonary edema February 2025:  Hospital course as follows:     DISCHARGE SUMMARY/HOSPITAL COURSE:  for full details see H&P, daily progress notes, labs, consult notes.      He has no past medical history of Asthma, Carotid artery disease ([address] Chronic kidney disease, Chronic obstructive pulmonary disease ([address] Diabetes ([address] Glaucoma, Long term current use of anticoagulant therapy, Murmur, Myocardial infarction ([address] Pulmonary embolism ([address] Rheumatic fever, or Valvular heart disease. .     Patient presenting to the ED with a several day history of worsening dyspnea.  Patient is followed by Dr. Kathy Mcdonald in [place], last follow-up in September.  She reports symptoms of fatigue, dyspnea, headache for the past several months.  Patient symptomatically improved following Lasix and oxygen treatment in the ED.  Chest x-ray was noted for moderate pulmonary edema.  No CXR improvement following diuresis.  CT notes CXR changes more c/o fibrosis / scarring. [facility]         Principal Problem:    Respiratory failure ([address] (2/14/2022)  Presented with respiratory distress and hypoxia according to pulse oximeter  Patient responded promptly to nasal oxygen as well as IV Lasix  Follow-up chest x-ray did not show improvement with diuresis.   Diuresis associated with a drop in blood pressure, but no improvement on chest x-ray  Follow-up chest x-ray findings felt to be most consistent with interstitial lung disease  Oxygen obtained at the time of discharge  May use home bronchodilator inhaler, uncertain if this will show a benefit     Active Problems:    Acute on chronic combined systolic and diastolic ACC/AHA stage C congestive heart failure ([address] (2/12/2022)  With some documented valvular insufficiency disease by ECHO and a 40% ejection fraction by nuclear stress testing  Elevated pro BNP 7400 on admission, f/u has improved to 700 range  Question compliance with diuretics - recently has been off Lasix 20mg at home  Some concern for underlying pulmonary disease with fibrosis noted on prior CT - will f/u PA/LAT CXR today post diuresis  Will need assessment for home O2 again before d/c  Exercise Oxim yesterday 2/12:  noted drop in Room Air Oxim 92% rest to 85% during walk/  With 2l/min NC 98% rest to 91% with walk  No Cig related COPD - nonsmoker  F/u CXR not improved, CT more c/o fibrosis / scarring  Pt needs Home O2 24/7 - arrangement made.       Interstitial lung disease ([address] (2/15/2022)  Some concern for underlying pulmonary disease with fibrosis noted on prior CT - will f/u PA/LAT CXR today post diuresis  Assessment for home O2 again before d/c  NoT Cig related COPD - nonsmoker  F/u CXR not improved, CT more c/o fibrosis / scarring  Pt needs Home O2 24/7 - arrangement made  . Atherosclerosis of native coronary artery of native heart without angina pectoris (5/29/2012)    AICD (automatic cardioverter/defibrillator) present (5/13/2016)      Overview: 5/13/16 [employer] upgrade to dual chamber AICD implant  Serial troponin values within range.   Last stress testing/evaluation for cardiac ischemia was 3 years ago - neg  Needs f/u with Cardiology       Essential hypertension (3/4/2011)  Maintain on current treatment with Entresto 24-26 twice daily, Toprol-XL 12.5 mg daily  Hypotension  tx IV NS 500cc, Held Lasix Rx on admission  Will resume Lasix 20mg following d/c for signs / symptoms suggest a need - Wt gain with SOB       Mixed hyperlipidemia (5/29/2012)  Maintain current treatment Lipitor 40 mg daily  Last lipid panel obtained November 2018 included total cholesterol 160, HDL 72, LDL 67, triglyceride 149  Lab this AM LDL 82 and SMH  - good  and CK wnl 61       Stenosis of both carotid arteries without cerebral infarction (4/12/2016)    Alzheimer's dementia, late onset, with behavioral disturbance ([address] (4/12/2016)  Continue treatment for carotid occlusive disease - antihypertensive, aspirin, statin  Encourage evaluation for any recurrent symptomatology  Sedation could be a/w hypoxia and medications:  Neurontin,  Aricept, Namenda, Zoloft       Hypothyroidism due to acquired atrophy of thyroid (9/22/2015)  No current thyroid replacement  Last TSH recorded 0.53 in March 2019  Current TSH 2/12 wnl 0.98        Osteoarthritis (5/29/2012)  Severe disease with chronic pain management necessary  Has been on Neurontin high-dose 600 mg 3 times daily    The patient remains short of breath today, requiring oxygen, but no orthopnea or lower extremity edema. Denies chest pain,  PND,  palpitations, syncope, or presyncope.        Patient Active Problem List    Diagnosis Date Noted    Ischemic cardiomyopathy 03/04/2022    Pulmonary emphysema ([address] 03/04/2022    Interstitial lung disease ([address] 02/15/2022    Respiratory failure ([address] 02/14/2022    Acute on chronic combined systolic and diastolic ACC/AHA stage C congestive heart failure ([address] 02/12/2022    Syncope 02/17/2020    Rotator cuff arthropathy of left shoulder 09/06/2019    Status post reverse arthroplasty of shoulder 09/06/2019    Right rotator cuff tear arthropathy 09/04/2019    Moderate major depression ([address] 07/03/2018    Depression 07/03/2018    DDD (degenerative disc disease), lumbar 04/02/2018    Chronic anticoagulation 07/17/2017    S/P CABG x 1 06/17/2016    AICD (automatic cardioverter/defibrillator) present 05/13/2016    AVNRT (AV bridgette re-entry tachycardia) ([address] 05/12/2016    Stenosis of both carotid arteries without cerebral infarction 04/12/2016    Cervicogenic headache 04/12/2016    Alzheimer's dementia, late onset, with behavioral disturbance ([address] 04/12/2016    Spinal stenosis, lumbar region, with neurogenic claudication 04/12/2016    Lumbar back pain with radiculopathy affecting right lower extremity 04/12/2016    Lumbar back pain with radiculopathy affecting left lower extremity 04/12/2016    History of stroke 04/12/2016    ACP (advance care planning) 12/30/2015    B12 deficiency 09/22/2015    Hypothyroidism due to acquired atrophy of thyroid 09/22/2015    Osteoporosis 09/22/2015    Cervical post-laminectomy syndrome 09/22/2015    Neck pain 09/22/2015    Lower GI bleeding 08/19/2015    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 ([address] 07/23/2015    Left-sided chest wall pain 11/23/2014    S/P cervical spinal fusion 06/06/2013    Osteoarthritis 05/29/2012    Hyperthyroidism 05/29/2012    Atherosclerosis of native coronary artery of native heart without angina pectoris 05/29/2012    Chronic systolic heart failure ([address] 05/29/2012    Atrial fibrillation ([address] 05/29/2012    Mixed hyperlipidemia 05/29/2012    History of factor V Leiden mutation 03/07/2011    Essential hypertension 03/04/2011      Gurpreet Vee MD  Past Medical History:   Diagnosis Date    Acute combined systolic and diastolic congestive heart failure ([address] 2/12/2022    AICD (automatic cardioverter/defibrillator) present 5/13/2016 5/13/16 [employer] upgrade to dual chamber AICD implant  Dr. Luna Alcaraz Alzheimer disease [facility])     Anxiety state, other     Arthritis     OSTEO ARTHRITIS    AVNRT (AV bridgette re-entry tachycardia) ([address] 5/12/2016 5/12/16 AVNRT ablation: PM/AICD left upper chest :Dr. Wesley Garza Back ache     CAD (coronary artery disease)     Cancer [facility]) 2004    Prostate cancer    Chest tightness     last episode of chest pain 5/2016 before AICD placed; none since then    Coagulation defects 2005    FACTOR V LEIDEN    Dementia ([address]     Depression     Dizziness     FH: factor V Leiden deficiency     Generalized muscle ache     GERD (gastroesophageal reflux disease)     HEARTBURN    Heart failure ([facility] 2014    as of 07/2019 EF 30-35;  Dr. Kimball Alvina, Cardiomyopathy    Hyperlipidemia, mixed     Hypertension     Long term current use of anticoagulant therapy     Other ill-defined conditions(799.89) 2004    blood transfusion    Pacemaker     Paroxysmal atrial fibrillation (HCC)     rate controlled with coreg     Postsurgical aortocoronary bypass status 05/29/2012    CABG    Presence of cardiac defibrillator 05/2016    left upper chest    Pulmonary emphysema ([address] 3/4/2022    SSS (sick sinus syndrome) ([address]     Stroke ([address] 2011, 1990's    SEVERAL-mini    Syncope     Thromboembolism ([address] 2014    left leg: changed to Eliquis from Warfarin    Thromboembolus ([address] 2005    left leg    Thyroid disease     Weakness generalized       Past Surgical History:   Procedure Laterality Date    CARDIAC CATHETERIZATION  3/4/2011         HX HEENT  2019    oral surgery, removed teeth, dentures upper/lower    HX HERNIA REPAIR Left 2002    Ingiunal hernia repair left    HX ORTHOPAEDIC      LEFT KNEE CARTILAGE REPAIR;RIGHT ROTATOR CUFF REPAIR    HX ORTHOPAEDIC  2/14/12    C5-7 ANTERIIOR CERVICAL DISECTOMY AND FUSION    HX ORTHOPAEDIC  6/4/13    C5-C7 POSTERIOR DECOMPRESSION AND FUSION    HX ORTHOPAEDIC      right thumb partial amputation of tip    HX OTHER SURGICAL      steroid injections    HX PACEMAKER Left 05/13/2016    BS D142, Dr. Julian Colón HX PROSTATECT[place]Y  2004     [place]    HX ROTATOR CUFF REPAIR Left 2019    HX UROLOGICAL       urinary control system    HX UROLOGICAL  12/3/13    REPLACEMENT ARTIFICAL URINARY SPHINCTER    IA CARDIAC SURG PROCEDURE UNLIST      CABG X1 3/5/11     No Known Allergies   Family History   Problem Relation Age of Onset    Asthma Mother     Alcohol abuse Mother     Alcohol abuse Father     Asthma Father     Cancer Sister     Heart Disease Sister     Alcohol abuse Brother     Cancer Brother  Heart Disease Brother       Social History     Socioeconomic History    Marital status:      Spouse name: Not on file    Number of children: Not on file    Years of education: Not on file    Highest education level: Not on file   Occupational History    Not on file   Tobacco Use    Smoking status: Never Smoker    Smokeless tobacco: Never Used   Vaping Use    Vaping Use: Never used   Substance and Sexual Activity    Alcohol use: Not Currently     Alcohol/week: 0.0 standard drinks     Comment: stopped 2010    Drug use: Never    Sexual activity: Not Currently     Partners: Female   Other Topics Concern     Service Not Asked    Blood Transfusions Not Asked    Caffeine Concern Not Asked    Occupational Exposure Not Asked    Hobby Hazards Not Asked    Sleep Concern Yes    Stress Concern Yes     Comment: Family concerns    Weight Concern Not Asked    Special Diet Not Asked    Back Care Not Asked    Exercise Not Asked    Bike Helmet Not Asked   [address] Not Asked    Self-Exams Not Asked   Social History Narrative    , 2 children     Social Determinants of Health     Financial Resource Strain:     Difficulty of Paying Living Expenses: Not on file   Food Insecurity:     Worried About Running Out of Food in the Last Year: Not on file    Faizan of Food in the Last Year: Not on file   Transportation Needs:     Lack of Transportation (Medical): Not on file    Lack of Transportation (Non-Medical):  Not on file   Physical Activity:     Days of Exercise per Week: Not on file    Minutes of Exercise per Session: Not on file   Stress:     Feeling of Stress : Not on file   Social Connections:     Frequency of Communication with Friends and Family: Not on file    Frequency of Social Gatherings with Friends and Family: Not on file    Attends Latter day Services: Not on file    Active Member of Clubs or Organizations: Not on file    Attends Club or Organization Meetings: Not on file    Marital Status: Not on file   Intimate Partner Violence:     Fear of Current or Ex-Partner: Not on file    Emotionally Abused: Not on file    Physically Abused: Not on file    Sexually Abused: Not on file   Housing Stability:     Unable to Pay for Housing in the Last Year: Not on file    Number of [place] in the Last Year: Not on file    Unstable Housing in the Last Year: Not on file      Current Outpatient Medications   Medication Sig    Oxygen     gabapentin (NEURONTIN) 600 mg tablet TAKE 1 TABLET BY MOUTH THREE TIMES A DAY    polyethylene glycol (MIRALAX) 17 gram packet Take 1 Packet by mouth daily.  donepeziL (ARICEPT) 5 mg tablet Take 1 Tablet by mouth nightly.  acetaminophen (TYLENOL) 500 mg capsule Take 1,000 mg by mouth every six (6) hours as needed for Pain.  atorvastatin (LIPITOR) 40 mg tablet TAKE 1 TABLET BY MOUTH AT BEDTIME    sertraline (ZOLOFT) 100 mg tablet TAKE 2 TABLETS BY MOUTH EVERY DAY    metoprolol succinate (TOPROL-XL) 25 mg XL tablet TAKE 1/2 TABLET BY MOUTH ONCE DAILY    pantoprazole (PROTONIX) 40 mg tablet TAKE 1 TABLET BY MOUTH ONCE DAILY    Eliquis 5 mg tablet TAKE 1 TABLET BY MOUTH EVERY 12 HOURS    Entresto 24-26 mg tablet TAKE 1 TABLET BY MOUTH TWICE DAILY    memantine (NAMENDA) 10 mg tablet TAKE 1 TABLET BY MOUTH TWICE DAILY    senna-docusate (PERICOLACE) 8.6-50 mg per tablet Take 1 Tab by mouth daily. (Patient taking differently: Take 1 Tablet by mouth as needed for Constipation.)    multivit-min/FA/lycopen/lutein (SENTRY SENIOR PO) Take  by mouth daily.  furosemide (LASIX) 20 mg tablet Take one tab daily for weight gain > #3 (Patient not taking: Reported on 3/23/2022)    albuterol (PROVENTIL HFA, VENTOLIN HFA, PROAIR HFA) 90 mcg/actuation inhaler Take 1 Puff by inhalation every six (6) hours as needed for Wheezing (Rarely uses \"puffer\").  (Patient not taking: Reported on 3/23/2022)    nitroglycerin (NITROSTAT) 0.4 mg SL tablet 1 Tab by SubLINGual route every five (5) minutes as needed for Chest Pain (call 911 if not relieved by 3). (Patient not taking: Reported on 3/23/2022)     No current facility-administered medications for this visit. Review of Symptoms:  11 systems reviewed, negative other than as stated in the HPI    Physical ExamPhysical Exam:    Vitals:    03/23/22 1259   BP: 112/68   Pulse: 74   Resp: 18   SpO2: 94%   Weight: 164 lb 1.6 oz (74.4 kg)   Height: 5' 11\" (1.803 m)     Body mass index is 22.89 kg/m². General PE  Gen:  NAD, using oxygen concentrator  Mental Status - Alert. General Appearance - Not in acute distress. HEENT:  PERRL, no carotid bruits or JVD  Chest and Lung Exam   Inspection: Accessory muscles - No use of accessory muscles in breathing. Auscultation:   Breath sounds: - trace LE edema  Cardiovascular   Inspection: Jugular vein - Bilateral - Inspection Normal.   Palpation/Percussion:   Apical Impulse: - Normal.   Auscultation: Rhythm - Regular. Heart Sounds - S1 WNL and S2 WNL. No S3 or S4. Murmurs & Other Heart Sounds: Auscultation of the heart reveals - No Murmurs. Peripheral Vascular   Upper Extremity: Inspection - Bilateral - No Cyanotic nailbeds or Digital clubbing. Lower Extremity:   Palpation: Edema - Bilateral - No edema. Abdomen:   Soft, non-tender, bowel sounds are active.   Neuro: A&O times 3, CN and motor grossly WNL    Labs:   Lab Results   Component Value Date/Time    Cholesterol, total 168 02/13/2022 07:00 AM    Cholesterol, total 160 11/14/2018 12:02 PM    Cholesterol, total 157 03/22/2018 08:44 AM    Cholesterol, total 157 07/05/2016 08:33 AM    Cholesterol, total 170 03/19/2015 11:36 AM    HDL Cholesterol 75 02/13/2022 07:00 AM    HDL Cholesterol 72 11/14/2018 12:02 PM    HDL Cholesterol 66 03/22/2018 08:44 AM    HDL Cholesterol 70 07/05/2016 08:33 AM    HDL Cholesterol 83 03/19/2015 11:36 AM    LDL, calculated 82.8 02/13/2022 07:00 AM    LDL, calculated 67 11/14/2018 12:02 PM LDL, calculated 74 03/22/2018 08:44 AM    LDL, calculated 71 07/05/2016 08:33 AM    LDL, calculated 77 03/19/2015 11:36 AM    Triglyceride 51 02/13/2022 07:00 AM    Triglyceride 105 11/14/2018 12:02 PM    Triglyceride 84 03/22/2018 08:44 AM    Triglyceride 78 07/05/2016 08:33 AM    Triglyceride 51 03/19/2015 11:36 AM    CHOL/HDL Ratio 2.2 02/13/2022 07:00 AM    CHOL/HDL Ratio 1.7 11/24/2014 02:05 AM    CHOL/HDL Ratio 3.6 03/04/2011 11:30 AM     Lab Results   Component Value Date/Time    CK 61 02/12/2022 08:46 AM     Lab Results   Component Value Date/Time    Sodium 138 02/15/2022 06:29 AM    Potassium 4.8 02/15/2022 06:29 AM    Chloride 102 02/15/2022 06:29 AM    CO2 29 02/15/2022 06:29 AM    Anion gap 7 02/15/2022 06:29 AM    Glucose 102 (H) 02/15/2022 06:29 AM    BUN 29 (H) 02/15/2022 06:29 AM    Creatinine 1.04 02/15/2022 06:29 AM    BUN/Creatinine ratio 28 (H) 02/15/2022 06:29 AM    GFR est AA >60 02/15/2022 06:29 AM    GFR est non-AA >60 02/15/2022 06:29 AM    Calcium 9.6 02/15/2022 06:29 AM    Bilirubin, total 0.5 02/11/2022 10:36 PM    Alk. phosphatase 108 02/11/2022 10:36 PM    Protein, total 7.0 02/11/2022 10:36 PM    Albumin 3.6 02/11/2022 10:36 PM    Globulin 3.4 02/11/2022 10:36 PM    A-G Ratio 1.1 02/11/2022 10:36 PM    ALT (SGPT) 18 02/11/2022 10:36 PM       EKG:  NSR, with occasional atrial pacing     Assessment:          ICD-10-CM ICD-9-CM    1. ASHD (arteriosclerotic heart disease)  I25.10 414.00    2. Cardiomyopathy, unspecified type (HCC)  I42.9 425.4 AMB POC EKG ROUTINE W/ 12 LEADS, INTER & REP   3. AICD (automatic cardioverter/defibrillator) present  Z95.810 V45.02    4. Chronic systolic heart failure (HCC)  I50.22 428.22    5. SSS (sick sinus syndrome) ([facility])  I49.5 427.81    6. Paroxysmal atrial fibrillation ([facility])  I48.0 427.31    7. Mixed hyperlipidemia  E78.2 272.2    8.  Essential hypertension  I10 401.9        Orders Placed This Encounter    AMB POC EKG ROUTINE W/ 12 LEADS, INTER & REP Order Specific Question:   Reason for Exam:     Answer:   routine    Oxygen        Plan:      Chronic systolic congestive heart failure (HCC)/cor pulmonale due to pulmonary fibrosis  Hx of ischemic cardiomyopathy, now recurrent, with LVEF 20 to 25% February 2022-possible component of cardiac stenting due to respiratory failure from pulmonary fibrosis  Echo done 2/2020 with preserved LVEF 50-55%, mild AI/MR and mild TR   Stress Myoview with normal perfusion March 2022  Continue Toprol, Entresto. Appears grossly euvolemic, but in case of any component of systolic heart failure, adding back 20 mg of Lasix daily, repeat labs in 3 weeks, and follow-up in 4 to 5 weeks. Pulmonary fibrosis by chest imaging at [facility] in February 2022:  Placed on permanent oxygen. Will need pulmonary follow-up if not already arranged.     Coronary artery disease involving native coronary artery of native heart without angina pectoris  S/p CABGx1 with LIMA to LAD in 3/2011  Lexiscan done 3/2019, March 2022 without evidence of ischemia  No anginal or anginal equivalent symptoms  Continue Toprol, ASA and statin     Paroxysmal atrial fibrillation (HCC)  Maintaining SR today  Continue Toprol  Continue Eliquis therapy for stroke prevention  [id number] (chf/age/vasc/htn)     Mixed hyperlipidemia  Continue statin therapy and low fat, low cholesterol diet  Lipids managed by PCP  LDL 82 in February 2022      S/P CABG x 1  Hx of CABG x 1 with LIMA to LAD in 3/2011     AICD (automatic cardioverter/defibrillator) present  Continue with routine device interrogation with Dr. Beatriz Harley.  Only pacing 1% of the time and QRS duration is only 120 ms. Unlikely candidate for biventricular upgrade at this time, but monitor.     Labs in 3 weeks, follow-up in 4 to 5 weeks with Franco Barlow in [facility]. Reassess shortness of breath and oxygenation.       Archie Zuleta MD

## 2022-03-23 NOTE — PROGRESS NOTES
1. Have you been to the ER, urgent care clinic since your last visit? Hospitalized since your last visit? Yes, 2/11/22, ED, Respiratory failure    2. Have you seen or consulted any other health care providers outside of the [address] [place] [address] since your last visit? Include any pap smears or colon screening.  No         Chief Complaint   Patient presents with    Breathing Problem     C/O  SOB, CP, Headaches, Ankle and leg swelling

## 2022-03-23 NOTE — LETTER
3/23/2022    Patient: Jacquelin Michelle   YOB: 1945   Date of Visit: 3/23/2022     Vanessa Strange MD  [address] [place] [address] [address] [address]  [place]    Dear Vanessa Strange MD,      Thank you for referring Mr. Kristina Miguel to [address] [place] [address] for evaluation. My notes for this consultation are attached. If you have questions, please do not hesitate to call me. I look forward to following your patient along with you.       Sincerely,    Fidelina Hewitt MD

## 2022-03-31 PROBLEM — J18.9 PNEUMONIA: Status: ACTIVE | Noted: 2022-01-01

## 2022-03-31 NOTE — Clinical Note
Patient Class[de-identified] OBSERVATION [104]   Type of Bed: Remote Telemetry [29]   Cardiac Monitoring Required?: Yes   Reason for Observation: need for iv abx, fluids, oxygen   Admitting Diagnosis: Pneumonia [[id number]]   Admitting Physician: Renée Martinez   Attending Physician: Dereje Gautam [[id number]]

## 2022-04-01 NOTE — PROGRESS NOTES
Bedside shift change report given to TRACIE Roblero RN (oncoming nurse) by [employer]. GIOVANNI Bray (offgoing nurse). Report included the following information SBAR, Kardex, Accordion and Recent Results.

## 2022-04-01 NOTE — PROGRESS NOTES
Spiritual Care Assessment/Progress Note  Payam Wood      NAME: Geovanny Rothman      MRN: [id number]  AGE: 65 y.o.  SEX: male  Jehovah's witness Affiliation: Adventism   Language: English     4/1/2022     Total Time (in minutes): 10     Spiritual Assessment begun in [facility] MED/SURG through conversation with:         [x]Patient        [] Family    [] Friend(s)        Reason for Consult: Initial/Spiritual assessment, patient floor     Spiritual beliefs: (Please include comment if needed)     [x] Identifies with a shahnaz tradition:         [] Supported by a shahnaz community:            [] Claims no spiritual orientation:           [] Seeking spiritual identity:                [] Adheres to an individual form of spirituality:           [] Not able to assess:                           Identified resources for coping:      [] Prayer                               [] Music                  [] Guided Imagery     [x] Family/friends                 [] Pet visits     [] Devotional reading                         [] Unknown     [] Other:                                          Interventions offered during this visit: (See comments for more details)    Patient Interventions: Affirmation of emotions/emotional suffering,Affirmation of shahnaz,Iconic (affirming the presence of God/Higher Power),Prayer (assurance of)           Plan of Care:     [x] Support spiritual and/or cultural needs    [] Support AMD and/or advance care planning process      [] Support grieving process   [] Coordinate Rites and/or Rituals    [] Coordination with community clergy   [] No spiritual needs identified at this time   [] Detailed Plan of Care below (See Comments)  [] Make referral to Music Therapy  [] Make referral to Pet Therapy     [] Make referral to Addiction services  [] Make referral to [facility]  [] Make referral to Spiritual Care Partner  [] No future visits requested        [] Contact Spiritual Care for further referrals Comments: Initial spiritual support in Rm 116 in Med/Surg. Patient was alone in the room during the visit . patient talked about it being his birthday.  Provided empathic listening and spiritual support  Advised of  Availability   [address] [place] [address]

## 2022-04-01 NOTE — PROGRESS NOTES
Care Management Interventions  PCP Verified by CM: Yes Ashish Serna MD )  Last Visit to PCP: 03/04/22  Palliative Care Criteria Met (RRAT>21 & CHF Dx)?: No (No MD order)  Mode of Transport at Discharge: Other (see comment) (POV)  Transition of Care Consult (CM Consult): Discharge Planning  Discharge Durable Medical Equipment: No  Physical Therapy Consult: No  Occupational Therapy Consult: No  Speech Therapy Consult: No  Support Systems: Spouse/Significant Other  Confirm Follow Up Transport: Family  The Plan for Transition of Care is Related to the Following Treatment Goals : Treat pneumonia  Discharge Location  Patient Expects to be Discharged to[de-identified] Home with home health     Patient lives at home with his wife Diedre Cheadle who is his primary caregiver. On his last admission, HEATHER Baca MSW worked on consumer directed services through [employer]. According to Ms. Wimla Borden, the company never called her. She is still interested in obtaining consumer directed services either her get paid or her family member. Reached out to [employer]. She said she would have to get in touch with insurance company and she would let me know. Info sent to [facility] at [facility]. Awaiting to hear back from her. Patient does NOT have an ACP document but states he would choose his wife Diedre Cheadle. Will give him ACP information and template if he chooses to complete it. Told him next of kin law applies at this point which is his decision anyway. Patient uses a walker and cane at home. Patient HAS HOME O2 and states he currently uses 4L at home. Patient is currently in observation status. ENGLISH letter explained to patient. He stated understanding and signed letter. Signed letter placed in chart and copy given to patient. Patient also given care management information and told to call if he has any questions or concerns.      Reason for Admission:  Pneumonia                      RUR Score:   RUR: N/A PT IN OBS STATUS RRAT: 32 HIGH            Plan for utilizing home health:      TBD    PCP: First and Last name:  Dariusz Banerjee MD     Name of Practice: [facility]    Are you a current patient: Yes/No: yes   Approximate date of last visit: 3/4/22   Can you participate in a virtual visit with your PCP: yes                    Current Advanced Directive/Advance Care Plan: Full Code      Healthcare Decision Maker:   Click here to complete [address] including selection of the Healthcare Decision Maker Relationship (ie \"Primary\")                             Transition of Care Plan:                  Home when medically stable. Advance Care Planning     General Advance Care Planning (ACP) Conversation      Date of Conversation: 3/31/2022  Conducted with: Patient with Decision Making Capacity    Healthcare Decision Maker:   No healthcare decision makers have been documented. Click here to complete [address] including selection of the Healthcare Decision Maker Relationship (ie \"Primary\")    Today we documented Decision Maker(s) consistent with Legal Next of Kin hierarchy.     Content/Action Overview:   Has NO ACP documents/care preferences - requested patient complete ACP documents  Reviewed DNR/DNI and patient elects Full Code (Attempt Resuscitation)  Topics discussed: treatment goals  Additional Comments: n/a     Length of Voluntary ACP Conversation in minutes:  16 minutes    Franck

## 2022-04-01 NOTE — PROGRESS NOTES
Assisted pt. With voiding. He stated he had a pump that he had to push to void. He pushed a button and was able to void without difficulty. Pt. Tolerated AM care well.

## 2022-04-01 NOTE — H&P
4BB M with a complicated PMH including DVT, factor V Leiden, CAD s/p CABG who presented to the ED with shortness of breath and LUQ abd pain. Work-up including CTA chest, abd and pelvis significant for LLL pneumonia. He will be admitted to the hospitalist service, treated with ceftriaxone and doxycycline and will check sputum culture, strep pneumo and legionella.   Formal H+P to follow by daytime hospitalist.

## 2022-04-01 NOTE — PROGRESS NOTES
Problem: Patient Education: Go to Patient Education Activity  Goal: Patient/Family Education  Outcome: Progressing Towards Goal     Problem: Pneumonia: Day 1  Goal: Activity/Safety  Outcome: Progressing Towards Goal  Goal: Diagnostic Test/Procedures  Outcome: Progressing Towards Goal  Goal: Nutrition/Diet  Outcome: Progressing Towards Goal  Goal: Medications  Outcome: Progressing Towards Goal  Goal: Respiratory  Outcome: Progressing Towards Goal  Goal: Psychosocial  Outcome: Progressing Towards Goal  Goal: *Oxygen saturation within defined limits  Outcome: Progressing Towards Goal  Goal: *Hemodynamically stable  Outcome: Progressing Towards Goal  Goal: *Demonstrates progressive activity  Outcome: Progressing Towards Goal  Goal: *Tolerating diet  Outcome: Progressing Towards Goal  Pt. Is quite independent. He was able to do the majority of his ADL's independently without any S.O.B.

## 2022-04-01 NOTE — PROGRESS NOTES
Bedside shift change report given to FARZNAEH Hubbard RN (oncoming nurse) by Roxi Busch RN   (offgoing nurse). Report included the following information SBAR, Kardex, Intake/Output, MAR and Recent Results. Du score 3  Bed/chair alarm is in use. If in use it is set at the highest volume. Intravenous fluids were administered, normal saline 50 ml/hr  Patient Vitals for the past 12 hrs:   Temp Pulse Resp BP SpO2   04/01/22 1618 97.2 °F (36.2 °C) (!) 58 18 (!) 146/67 96 %   04/01/22 1056 97.5 °F (36.4 °C) 65 20 (!) 149/77 95 %   04/01/22 0750 97.5 °F (36.4 °C) 64 20 (!) 140/75 96 %     No flowsheet data found. Lab results reviewed. For significant abnormal values and values requiring intervention, see assessment and plan.

## 2022-04-01 NOTE — H&P
[facility]   Admission History & Physical        4/1/2022 2:49 PM  Patient: Carla Calix 1945  PCP: Dee Arechiga MD    HISTORY  Chief Complaint: Shortness of breath for 2 days and  respiratory distress  HPI: 65 y.o. male with medical history of combined systolic and diastolic congestive heart failure, coronary artery disease status post CABG with LIMA to LAD about 10 years ago, sick sinus syndrome, AV bridgette reentry tachycardia, atrial fibrillation, factor V Leiden mutation, carotid artery stenosis, history of stroke, hypertension, hyperlipidemia, hypothyroidism, Alzheimer dementia, and chronic anticoagulation with Eliquis . H also has interstitial lung disease, COPD with emphysema, on home O2 therapy. He was hospitalized in February 2020 for acute respiratory failure secondary to interstitial  fibrosis and pulmonary edema, echo at that time revealed ejection fraction around 20 to 25% with severely dilated right ventricle and grade 1 diastolic dysfunction. He presented to the ER with chief complaint of shortness of breath for the last 2 days, getting progressively worse especially with exertion. He reported cough mostly dry , swelling and frequently feeling hot, no  chills. No hemoptysis or hematemesis. No chest pain. No headache neck pain or stiffness. No syncope or focal neuro deficits. No abdominal pain. No nausea vomiting or diarrhea. Acute urinary complaints.      Past Medical History:  Past Medical History:   Diagnosis Date    Acute combined systolic and diastolic congestive heart failure ([address] 2/12/2022    AICD (automatic cardioverter/defibrillator) present 5/13/2016 5/13/16 [employer] upgrade to dual chamber AICD implant  Dr. Jayson Muhammad disease [facility])     Anxiety state, other     Arthritis     OSTEO ARTHRITIS    AVNRT (AV bridgette re-entry tachycardia) ([address] 5/12/2016 5/12/16 AVNRT ablation: PM/AICD left upper chest :Dr. Lunda Scheuermann  Back ache     CAD (coronary artery disease)     Cancer ([address] 2004    Prostate cancer    Chest tightness     last episode of chest pain 5/2016 before AICD placed; none since then    Coagulation defects 2005    FACTOR V LEIDEN    Dementia ([address]     Depression     Dizziness     FH: factor V Leiden deficiency     Generalized muscle ache     GERD (gastroesophageal reflux disease)     HEARTBURN    Heart failure ([address] 2014    as of 07/2019 EF 30-35;  Dr. Yanez Schools, Cardiomyopathy    Hyperlipidemia, mixed     Hypertension     Long term current use of anticoagulant therapy     Other ill-defined conditions(799.89) 2004    blood transfusion    Pacemaker     Paroxysmal atrial fibrillation (HCC)     rate controlled with coreg     Postsurgical aortocoronary bypass status 05/29/2012    CABG    Presence of cardiac defibrillator 05/2016    left upper chest    Pulmonary emphysema ([address] 3/4/2022    SSS (sick sinus syndrome) ([address]     Stroke ([address] 2011, 1990's    SEVERAL-mini    Syncope     Thromboembolism ([address] 2014    left leg: changed to Eliquis from Warfarin    Thromboembolus ([address] 2005    left leg    Thyroid disease     Weakness generalized        Past Surgical History:  Past Surgical History:   Procedure Laterality Date    CARDIAC CATHETERIZATION  3/4/2011         HX HEENT  2019    oral surgery, removed teeth, dentures upper/lower    HX HERNIA REPAIR Left 2002    Ingiunal hernia repair left    HX ORTHOPAEDIC      LEFT KNEE CARTILAGE REPAIR;RIGHT ROTATOR CUFF REPAIR    HX ORTHOPAEDIC  2/14/12    C5-7 ANTERIIOR CERVICAL DISECTOMY AND FUSION    HX ORTHOPAEDIC  6/4/13    C5-C7 POSTERIOR DECOMPRESSION AND FUSION    HX ORTHOPAEDIC      right thumb partial amputation of tip    HX OTHER SURGICAL      steroid injections    HX PACEMAKER Left 05/13/2016    BS D142, Dr. Deanne Luong HX PROSTATECTOMY  2004     [place]    HX ROTATOR CUFF REPAIR Left 2019    HX UROLOGICAL      AMS 800 urinary control system    HX UROLOGICAL  12/3/13    REPLACEMENT ARTIFICAL URINARY SPHINCTER    [place] CARDIAC SURG PROCEDURE UNLIST      CABG X1 3/5/11       Medication:  Prior to Admission medications    Medication Sig Start Date End Date Taking? Authorizing Provider   Oxygen    Yes Provider, Historical   furosemide (LASIX) 20 mg tablet Take 1 Tablet by mouth daily. 3/23/22  Yes Debbie Oropeza MD   gabapentin (NEURONTIN) 600 mg tablet TAKE 1 TABLET BY MOUTH THREE TIMES A DAY 2/21/22  Yes Gianna Hook MD   sertraline (ZOLOFT) 100 mg tablet TAKE 2 TABLETS BY MOUTH EVERY DAY 1/7/22  Yes Gianna Hook MD   metoprolol succinate (TOPROL-XL) 25 mg XL tablet TAKE 1/2 TABLET BY MOUTH ONCE DAILY 12/2/21  Yes Debbie Oropeza MD   pantoprazole (PROTONIX) 40 mg tablet TAKE 1 TABLET BY MOUTH ONCE DAILY 11/19/21  Yes Gianna Hook MD   Entresto 24-26 mg tablet TAKE 1 TABLET BY MOUTH TWICE DAILY 7/7/21  Yes Debbie Oropeza MD   memantine (NAMENDA) 10 mg tablet TAKE 1 TABLET BY MOUTH TWICE DAILY 2/22/21  Yes Helen Davis NP   polyethylene glycol (MIRALAX) 17 gram packet Take 1 Packet by mouth daily. Patient not taking: Reported on 3/31/2022 2/16/22   Cheryl Queen MD   donepeziL (ARICEPT) 5 mg tablet Take 1 Tablet by mouth nightly. 2/15/22   Cheryl Queen MD   acetaminophen (TYLENOL) 500 mg capsule Take 1,000 mg by mouth every six (6) hours as needed for Pain. Patient not taking: Reported on 3/31/2022    Provider, Historical   albuterol (PROVENTIL HFA, VENTOLIN HFA, PROAIR HFA) 90 mcg/actuation inhaler Take 1 Puff by inhalation every six (6) hours as needed for Wheezing (Rarely uses \"puffer\").   Patient not taking: Reported on 3/23/2022    Provider, Historical   atorvastatin (LIPITOR) 40 mg tablet TAKE 1 TABLET BY MOUTH AT BEDTIME 1/31/22   Gianna Hook MD   Eliquis 5 mg tablet TAKE 1 TABLET BY MOUTH EVERY 12 HOURS 11/18/21   Debbie Orpoeza MD   nitroglycerin (NITROSTAT) 0.4 mg SL tablet 1 Tab by SubLINGual route every five (5) minutes as needed for Chest Pain (call 911 if not relieved by 3). Patient not taking: Reported on 3/23/2022 8/13/20   MD darnell Howard-docusate (PERICOLACE) 8.6-50 mg per tablet Take 1 Tab by mouth daily. Patient not taking: Reported on 3/31/2022 2/12/20   Hakeem Sosa NP   multivit-min/FA/lycopen/lutein (SENTRY SENIOR PO) Take  by mouth daily.   Patient not taking: Reported on 3/31/2022    Provider, Historical       Allergies:  No Known Allergies    Social History:  Social History     Tobacco Use    Smoking status: Never Smoker    Smokeless tobacco: Never Used   Vaping Use    Vaping Use: Never used   Substance Use Topics    Alcohol use: Not Currently     Alcohol/week: 0.0 standard drinks     Comment: stopped 2010    Drug use: Never       Family History:  Family History   Problem Relation Age of Onset    Asthma Mother     Alcohol abuse Mother     Alcohol abuse Father     Asthma Father     Cancer Sister     Heart Disease Sister     Alcohol abuse Brother     Cancer Brother     Heart Disease Brother        ROS:    General:  Intermittent  Fever,  chills,generalized weakness, no significant weight loss or gain,   Eyes:    No  blurred vision, eye pain, loss of vision, or double vision  ENT:    No rhinorrhea ,no sore throat  Respiratory:  Positive cough  With occasional sputum production, SOB, HOLCOMB, wheezing,   Cardiology:    chest pain, palpitations, orthopnea, PND, edema, syncope   Gastrointestinal:  No abdominal pain , N/V, diarrhea, no dysphagia, no constipation, no  bleeding   Genitourinary:  No increase in urine frequency, urgency, dysuria, hematuria,  Muskuloskeletal: Chronic fatigue, knee pain, and chronic back pain  Hematology:   No  easy bruising, no nose or gum bleeding, no lymphadenopathy   Dermatological: No rash, ulceration, pruritis, color change / jaundice  Endocrine:   or polydipsia , no significant change in weight  Neurological:  No , headache, remittent dizziness, no focal weakness,, speech difficulties,gait is at baseline  Psychological: feelings of anxiety, depression, occasional agitation      PHYSICAL EXAM:  Patient Vitals for the past 24 hrs:   Temp Pulse Resp BP SpO2   04/01/22 1056 97.5 °F (36.4 °C) 65 20 (!) 149/77 95 %   04/01/22 0750 97.5 °F (36.4 °C) 64 20 (!) 140/75 96 %   04/01/22 0405 98.7 °F (37.1 °C) 69 18 127/67 96 %   04/01/22 0349 -- -- -- -- 97 %   04/01/22 0002 -- -- -- -- 97 %   03/31/22 2344 98.7 °F (37.1 °C) 67 18 139/62 97 %   03/31/22 2331 98.6 °F (37 °C) 67 22 (!) 122/58 97 %   03/31/22 2300 -- 67 -- 132/63 97 %   03/31/22 2230 -- 64 -- 126/66 98 %   03/31/22 2200 -- 66 24 123/65 99 %   03/31/22 2130 -- 72 -- 129/73 98 %   03/31/22 2100 -- 69 22 114/62 98 %   03/31/22 2020 -- -- -- -- 94 %   03/31/22 2017 99.4 °F (37.4 °C) 80 22 (!) 142/72 94 %       General:    Alert, cooperative, no distress, appears stated age. HEENT: Atraumatic, anicteric sclerae, pink conjunctivae     No oral ulcers, mucosa moist, throat clear, dentition fair  Neck:  Supple, symmetrical;   thyroid non tender, no JVD  Lungs:   Reduced air entry on the left lung lower bases, few rhonchi. No rales. Prolonged  expiratory phase of respiration, and few faint end expiratory wheeze upper lobe  Chest wall:  No tenderness. No accessory muscle use. Heart:   Regular rhythm. No  murmur. No edema  Abdomen:   Soft, non-tender. Not distended. Bowel sounds normal  Extremities: No cyanosis. No clubbing      Capillary refill normal,  Radial pulse 2+Skin:     Not pale. Not jaundiced. No rashes   Psych:  Good insight. Cooperative, looks anxious  Neurologic: EOMs intact. No facial asymmetry. No aphasia or slurred speech. Symmetrical strength, Sensation grossly intact. Alert and oriented X 4.     Lab Data Reviewed:    Recent Results (from the past 24 hour(s))   CBC WITH AUTOMATED DIFF    Collection Time: 03/31/22  8:25 PM   Result Value Ref Range    WBC 10.8 4.1 - 11.1 K/uL    RBC 3.99 (L) 4.10 - 5.70 M/uL    HGB 11.3 (L) 12.1 - 17.0 g/dL    HCT 35.6 (L) 36.6 - 50.3 %    MCV 89.2 80.0 - 99.0 FL    MCH 28.3 26.0 - 34.0 PG    MCHC 31.7 30.0 - 36.5 g/dL    RDW 13.5 11.5 - 14.5 %    PLATELET 765 816 - 916 K/uL    MPV 10.2 8.9 - 12.9 FL    NRBC 0.0 0  WBC    ABSOLUTE NRBC 0.00 0.00 - 0.01 K/uL    NEUTROPHILS 71 32 - 75 %    LYMPHOCYTES 19 12 - 49 %    MONOCYTES 8 5 - 13 %    EOSINOPHILS 2 0 - 7 %    BASOPHILS 0 0 - 1 %    IMMATURE GRANULOCYTES 0 0.0 - 0.5 %    ABS. NEUTROPHILS 7.6 1.8 - 8.0 K/UL    ABS. LYMPHOCYTES 2.1 0.8 - 3.5 K/UL    ABS. MONOCYTES 0.8 0.0 - 1.0 K/UL    ABS. EOSINOPHILS 0.2 0.0 - 0.4 K/UL    ABS. BASOPHILS 0.0 0.0 - 0.1 K/UL    ABS. IMM. GRANS. 0.0 0.00 - 0.04 K/UL    DF AUTOMATED     METABOLIC PANEL, COMPREHENSIVE    Collection Time: 03/31/22  8:25 PM   Result Value Ref Range    Sodium 138 136 - 145 mmol/L    Potassium 4.1 3.5 - 5.1 mmol/L    Chloride 100 97 - 108 mmol/L    CO2 32 21 - 32 mmol/L    Anion gap 6 5 - 15 mmol/L    Glucose 147 (H) 65 - 100 mg/dL    BUN 28 (H) 6 - 20 MG/DL    Creatinine 1.36 (H) 0.70 - 1.30 MG/DL    BUN/Creatinine ratio 21 (H) 12 - 20      GFR est AA >60 >60 ml/min/1.73m2    GFR est non-AA 51 (L) >60 ml/min/1.73m2    Calcium 9.4 8.5 - 10.1 MG/DL    Bilirubin, total 0.4 0.2 - 1.0 MG/DL    ALT (SGPT) 28 12 - 78 U/L    AST (SGOT) 23 15 - 37 U/L    Alk.  phosphatase 119 (H) 45 - 117 U/L    Protein, total 7.6 6.4 - 8.2 g/dL    Albumin 3.6 3.5 - 5.0 g/dL    Globulin 4.0 2.0 - 4.0 g/dL    A-G Ratio 0.9 (L) 1.1 - 2.2     MAGNESIUM    Collection Time: 03/31/22  8:25 PM   Result Value Ref Range    Magnesium 2.0 1.6 - 2.4 mg/dL   NT-PRO BNP    Collection Time: 03/31/22  8:25 PM   Result Value Ref Range    NT pro-BNP 2,305 (H) 0 - 450 PG/ML   TROPONIN-HIGH SENSITIVITY    Collection Time: 03/31/22  8:25 PM   Result Value Ref Range    Troponin-High Sensitivity 15 0 - 76 ng/L TSH 3RD GENERATION    Collection Time: 03/31/22  8:25 PM   Result Value Ref Range    TSH 1.02 0.36 - 3.74 uIU/mL   LACTIC ACID    Collection Time: 03/31/22  8:25 PM   Result Value Ref Range    Lactic acid 1.8 0.4 - 2.0 MMOL/L   CULTURE, BLOOD    Collection Time: 03/31/22  8:25 PM    Specimen: Blood   Result Value Ref Range    Special Requests: NO SPECIAL REQUESTS      Culture result: NO GROWTH AFTER 9 HOURS     CULTURE, BLOOD    Collection Time: 03/31/22  8:27 PM    Specimen: Blood   Result Value Ref Range    Special Requests: NO SPECIAL REQUESTS      Culture result: NO GROWTH AFTER 9 HOURS     EKG, 12 LEAD, INITIAL    Collection Time: 03/31/22  8:28 PM   Result Value Ref Range    Ventricular Rate 83 BPM    Atrial Rate 83 BPM    P-R Interval 172 ms    QRS Duration 118 ms    Q-T Interval 394 ms    QTC Calculation (Bezet) 462 ms    Calculated P Axis 23 degrees    Calculated R Axis 10 degrees    Calculated T Axis 124 degrees    Diagnosis       Sinus rhythm with premature atrial complexes  Incomplete left bundle branch block  ST & T wave abnormality, consider lateral ischemia  Abnormal ECG  When compared with ECG of 11-FEB-2022 22:43,  premature atrial complexes are now present     COVID-19 WITH INFLUENZA A/B    Collection Time: 03/31/22  8:50 PM   Result Value Ref Range    SARS-CoV-2 by PCR Not detected NOTD      Influenza A by PCR Not detected NOTD      Influenza B by PCR Not detected NOTD     URINALYSIS W/ RFLX MICROSCOPIC    Collection Time: 03/31/22  8:52 PM   Result Value Ref Range    Color YELLOW/STRAW      Appearance CLEAR CLEAR      Specific gravity 1.020 1.003 - 1.030      pH (UA) 6.0 5.0 - 8.0      Protein Negative NEG mg/dL    Glucose Negative NEG mg/dL    Ketone Negative NEG mg/dL    Bilirubin Negative NEG      Blood Negative NEG      Urobilinogen 2.0 (H) 0.2 - 1.0 EU/dL    Nitrites Negative NEG      Leukocyte Esterase Negative NEG     METABOLIC PANEL, BASIC    Collection Time: 04/01/22  5:33 AM   Result Value Ref Range    Sodium 137 136 - 145 mmol/L    Potassium 4.3 3.5 - 5.1 mmol/L    Chloride 100 97 - 108 mmol/L    CO2 31 21 - 32 mmol/L    Anion gap 6 5 - 15 mmol/L    Glucose 162 (H) 65 - 100 mg/dL    BUN 27 (H) 6 - 20 MG/DL    Creatinine 1.23 0.70 - 1.30 MG/DL    BUN/Creatinine ratio 22 (H) 12 - 20      GFR est AA >60 >60 ml/min/1.73m2    GFR est non-AA 57 (L) >60 ml/min/1.73m2    Calcium 9.9 8.5 - 10.1 MG/DL   CBC WITH AUTOMATED DIFF    Collection Time: 04/01/22  5:33 AM   Result Value Ref Range    WBC 8.0 4.1 - 11.1 K/uL    RBC 3.90 (L) 4.10 - 5.70 M/uL    HGB 11.0 (L) 12.1 - 17.0 g/dL    HCT 34.4 (L) 36.6 - 50.3 %    MCV 88.2 80.0 - 99.0 FL    MCH 28.2 26.0 - 34.0 PG    MCHC 32.0 30.0 - 36.5 g/dL    RDW 13.7 11.5 - 14.5 %    PLATELET 746 968 - 653 K/uL    MPV 11.0 8.9 - 12.9 FL    NRBC 0.0 0  WBC    ABSOLUTE NRBC 0.00 0.00 - 0.01 K/uL    NEUTROPHILS 88 (H) 32 - 75 %    LYMPHOCYTES 11 (L) 12 - 49 %    MONOCYTES 1 (L) 5 - 13 %    EOSINOPHILS 0 0 - 7 %    BASOPHILS 0 0 - 1 %    IMMATURE GRANULOCYTES 0 0.0 - 0.5 %    ABS. NEUTROPHILS 6.9 1.8 - 8.0 K/UL    ABS. LYMPHOCYTES 0.9 0.8 - 3.5 K/UL    ABS. MONOCYTES 0.1 0.0 - 1.0 K/UL    ABS. EOSINOPHILS 0.0 0.0 - 0.4 K/UL    ABS. BASOPHILS 0.0 0.0 - 0.1 K/UL    ABS. IMM. GRANS. 0.0 0.00 - 0.04 K/UL    DF AUTOMATED     SAMPLES BEING HELD    Collection Time: 04/01/22  5:33 AM   Result Value Ref Range    SAMPLES BEING HELD 1SST     COMMENT        Add-on orders for these samples will be processed based on acceptable specimen integrity and analyte stability, which may vary by analyte. EKG: No acute ischemic change , unchanged from previous tracings    Radiology:   CTA CHEST W OR W WO CONT   Final Result   1. No evidence of pulmonary embolus. 2.  Left lower lobe pneumonia and small left pleural effusion. 3. Cardiomegaly. 4. Background of pulmonary fibrosis. CT ABD PELV W CONT   Final Result      1. No acute abdominal or pelvic pathology.    2. Moderate generalized constipation. XR CHEST SNGL V   Final Result   Pulmonary edema. ASSESSMENT / PLAN    1 left lower lobe pneumonia, appears to be community-acquired pneumonia patient is a started on Rocephin and doxycycline in the ER, will continue this antibiotic combination, patient was hospitalized last month we will consider hospital-acquired pneumonia if clinical condition did indicate so, Tylenol as needed for fever or pain    2. COPD exacerbation, seems to be mild at this time and responded to DuoNeb bronchodilators and oxygen therapy. Patient has recently steroid use continue nebulized bronchodilators with Atrovent and Proventil  3. Coronary artery disease. Currently stable no chest pain, no acute EKG changes , troponin is normal.. Continue Eliquis 5 mg daily, continue sacubitril/valsartan ( Entresto) 25-26 mg p.o. twice daily  4 history of congestive heart failure, currently stable , careful management of fluid on body weight. 5.  Hypercholesterolemia: Continue atorvastatin( Lipitor) 80 mg daily  6.   Dementia: Currently stable, the patient lives with his wife at home with good support     SAFETY:   Code Status:Full code   DVT prophylaxis: Continue Eliquis 5 mg daily  Stress Ulcer prophylaxis: Protonix 40 mg p.o. daily  Bladder catheter:No catheter   Family Contact Info:  Primary Emergency Contact: Martínez Arellano Phone: [phone]  [facility]ded: [facility] Room 116/01  Disposition: TBD, likely home when stable  Admission status:  Inpatient        -Complex decision making was performed, which includes reviewing the patient's available past medical records, laboratory         results, and x-ray films.      -Tentative plan of care discussed with patient / family, who demonstrated understanding and is in agreement to the above    -Case was reviewed with the ED Provider.,  RN and discussed in multidisciplinary meeting.    -Time spent with the patient is 60 minutes with more than 50% in direct carmen.        Art Mcgarry MD

## 2022-04-01 NOTE — ED PROVIDER NOTES
[address] [place] [address]   EMERGENCY DEPARTMENT          Date: 3/31/2022  Patient: Maty Gonzalez MRN: [id number]  SSN: [id number]    YOB: 1945  Age: 65 y.o. Sex: male      PCP: Robert Mckoy MD  The patient arrived by private car and is accompanied by spouse. History of Presenting Illness     Chief Complaint   Patient presents with    Respiratory Distress       History Provided By: Patient and Patient's Wife    HPI: Maty Gonzalez is a 65 y.o. male, pmhx combined systolic and diastolic ACC/AHA stage C congestive heart failure, interstitial lung disease, emphysema, coronary artery disease status post CABG x1 (LIMA to LAD in March 20110, AICD present, sick sinus syndrome, AV bridgette reentry tachycardia, atrial fibrillation, history of factor V Leiden mutation, DVT, chronic anticoagulation with Eliquis, carotid artery stenosis, stroke, chronic low back pain, hypertension, hyperlipidemia, Alzheimer's dementia, hypothyroidism, osteoarthritis, who presents ambulatory to the ED c/o shortness of breath and left upper quadrant pain. Patient states that for the last 2 days he has had increasing shortness of breath, dyspnea with exertion, nonproductive cough. He has runny nose and headache. He has chronic back pain which is stable. He has left upper quadrant pain with deep breathing. He is on oxygen chronically at home. He has nausea but no vomiting. There may have been some sweats at home but no chills. No increased pedal edema is been noted. No diarrhea. He has enlarged prostate and chronically has difficulty initiating urinary stream, no history of dysuria or frequency. No chest pain heaviness pressure neck arm or jaw pain. No rashes noted. Patient was recently seen by cardiology (March 23, Dr. Renay Lacey) for follow-up after being admitted here in February with acute respiratory failure secondary to interstitial fibrosis and pulmonary edema.   proBNP is dropped to 700 at the time of discharge, EF was noted to be 20 to 25%, RV severely dilated grade 1 diastolic dysfunction with normal LAP noted and moderately reduced systolic function noted on echocardiogram in February 2022, stress Myoview with normal perfusion and LVEF measuring 32% with moderate reduction in global function noted in March 2022,, is noted to drop to the 85% range during walking which responded to nasal cannula with improvement to the 98% range, patient was placed on home oxygen. Past History     Past Medical History:   Diagnosis Date    Acute combined systolic and diastolic congestive heart failure ([facility] [address] 2/12/2022    AICD (automatic cardioverter/defibrillator) present 5/13/2016 5/13/16 [employer] upgrade to dual chamber AICD implant  Dr. Gifford Romberg disease [facility])     Anxiety state, other     Arthritis     OSTEO ARTHRITIS    AVNRT (AV bridgette re-entry tachycardia) ([facility] [address] 5/12/2016 5/12/16 AVNRT ablation: PM/AICD left upper chest :Dr. Galvan Masters Back ache     CAD (coronary artery disease)     Cancer [facility]) 2004    Prostate cancer    Chest tightness     last episode of chest pain 5/2016 before AICD placed; none since then    Coagulation defects 2005    FACTOR V LEIDEN    Dementia ([facility] [facility]     Depression     Dizziness     FH: factor V Leiden deficiency     Generalized muscle ache     GERD (gastroesophageal reflux disease)     HEARTBURN    Heart failure ([facility] [address] 2014    as of 07/2019 EF 30-35;  Dr. Galilea Cárdenas, Cardiomyopathy    Hyperlipidemia, mixed     Hypertension     Long term current use of anticoagulant therapy     Other ill-defined conditions(799.89) 2004    blood transfusion    Pacemaker     Paroxysmal atrial fibrillation (HCC)     rate controlled with coreg     Postsurgical aortocoronary bypass status 05/29/2012    CABG    Presence of cardiac defibrillator 05/2016    left upper chest    Pulmonary emphysema ([facility] [address] 3/4/2022    SSS (sick sinus syndrome) ([facility] [address]    Curry Stroke ([facility] 2011, 1990's    SEVERAL-mini    Syncope     Thromboembolism ([facility] [facility] 2014    left leg: changed to Eliquis from Warfarin    Thromboembolus ([facility] 2005    left leg    Thyroid disease     Weakness generalized        Past Surgical History:   Procedure Laterality Date    CARDIAC CATHETERIZATION  3/4/2011         HX HEENT  2019    oral surgery, removed teeth, dentures upper/lower    HX HERNIA REPAIR Left 2002    Ingiunal hernia repair left    HX ORTHOPAEDIC      LEFT KNEE CARTILAGE REPAIR;RIGHT ROTATOR CUFF REPAIR    HX ORTHOPAEDIC  2/14/12    C5-7 ANTERIIOR CERVICAL DISECTOMY AND FUSION    HX ORTHOPAEDIC  6/4/13    C5-C7 POSTERIOR DECOMPRESSION AND FUSION    HX ORTHOPAEDIC      right thumb partial amputation of tip    HX OTHER SURGICAL      steroid injections    HX PACEMAKER Left 05/13/2016    BS D142, Dr. Jason Gee HX PROSTATECTOMY  2004     [place]    HX ROTATOR CUFF REPAIR Left 2019    HX UROLOGICAL       urinary control system    HX UROLOGICAL  12/3/13    REPLACEMENT ARTIFICAL URINARY SPHINCTER    IA CARDIAC SURG PROCEDURE UNLIST      CABG X1 3/5/11       Family History   Problem Relation Age of Onset    Asthma Mother     Alcohol abuse Mother     Alcohol abuse Father     Asthma Father     Cancer Sister     Heart Disease Sister     Alcohol abuse Brother     Cancer Brother     Heart Disease Brother        Social History     Tobacco Use    Smoking status: Never Smoker    Smokeless tobacco: Never Used   Vaping Use    Vaping Use: Never used   Substance Use Topics    Alcohol use: Not Currently     Alcohol/week: 0.0 standard drinks     Comment: stopped 2010    Drug use: Never       No Known Allergies    Current Facility-Administered Medications   Medication Dose Route Frequency Provider Last Rate Last Admin    sodium chloride (NS) flush 5-40 mL  5-40 mL IntraVENous Q8H Amanda Garcia MD        sodium chloride (NS) flush 5-40 mL  5-40 mL IntraVENous PRN Rebecca Gee MD        cefTRIAXone (ROCEPHIN) 1 g in 0.9% sodium chloride (MBP/ADV) 50 mL MBP  1 g IntraVENous Q24H [place]one, Kathren Brunner, MD        albuterol (PROVENTIL VENTOLIN) nebulizer solution 2.5 mg  2.5 mg Nebulization Q4H PRN Rebecca Gee MD        doxycycline (VIBRAMYCIN) 100 mg in 0.9% sodium chloride (MBP/ADV) 100 mL MBP  100 mg IntraVENous Q12H Nelson Villar  mL/hr at 03/31/22 2321 100 mg at 03/31/22 2321    acetaminophen (TYLENOL) tablet 650 mg  650 mg Oral Q6H PRN Nelson Villar MD        ondansetron [facility]) injection 4 mg  4 mg IntraVENous Q4H PRN Nelson Villar MD        0.9% sodium chloride infusion  50 mL/hr IntraVENous CONTINUOUS Nelson Villar MD 50 mL/hr at 03/31/22 2235 50 mL/hr at 03/31/22 2235         Review of Systems     Review of Systems   All other systems reviewed and are negative. Physical Exam     Physical Exam  Vitals and nursing note reviewed. Constitutional:       General: He is not in acute distress. Appearance: Normal appearance. He is normal weight. He is not toxic-appearing. Comments: Skin slightly moist, cool   HENT:      Head: Normocephalic and atraumatic. Right Ear: External ear normal.      Left Ear: External ear normal.      Nose: Nose normal.      Mouth/Throat:      Mouth: Mucous membranes are moist.   Eyes:      Extraocular Movements: Extraocular movements intact. Conjunctiva/sclera: Conjunctivae normal.      Pupils: Pupils are equal, round, and reactive to light. Cardiovascular:      Rate and Rhythm: Normal rate and regular rhythm. Heart sounds: Normal heart sounds. No murmur heard. No friction rub. No gallop. Comments: No JVD noted, 1+ pedal edema noted bilaterally. Pulmonary:      Effort: Respiratory distress present. Breath sounds: Wheezing and rales present.       Comments: Patient in mild respiratory distress upon arrival, air sats in the 84% range improving to 94-96% range with oxygen, rales on left, inspiratory wheezes noted throughout, decreased breath sounds on the right  Chest:      Chest wall: No tenderness. Abdominal:      General: There is no distension. Palpations: Abdomen is soft. Tenderness: There is abdominal tenderness. There is no right CVA tenderness, left CVA tenderness, guarding or rebound. Comments: Left upper quadrant pain to palpation, no mass appreciated no rebound or guarding noted mild left lower quadrant pain to palpation noted, no rebound or guarding or mass either. Right upper and right lower quadrant epigastrium nontender to palpation. Musculoskeletal:         General: No swelling or tenderness. Normal range of motion. Cervical back: Normal range of motion and neck supple. No rigidity or tenderness. Right lower leg: Edema present. Left lower leg: Edema present. Lymphadenopathy:      Cervical: No cervical adenopathy. Skin:     Capillary Refill: Capillary refill takes less than 2 seconds. Coloration: Skin is pale. Findings: No bruising or rash. Neurological:      Mental Status: He is alert and oriented to person, place, and time. Cranial Nerves: No cranial nerve deficit. Sensory: No sensory deficit. Motor: Weakness present. Coordination: Coordination normal.      Gait: Gait normal.   Psychiatric:         Mood and Affect: Mood normal.         Behavior: Behavior normal.         Thought Content:  Thought content normal.         Judgment: Judgment normal.           Diagnostic Study Results     Labs -     Recent Results (from the past 48 hour(s))   CBC WITH AUTOMATED DIFF    Collection Time: 03/31/22  8:25 PM   Result Value Ref Range    WBC 10.8 4.1 - 11.1 K/uL    RBC 3.99 (L) 4.10 - 5.70 M/uL    HGB 11.3 (L) 12.1 - 17.0 g/dL    HCT 35.6 (L) 36.6 - 50.3 %    MCV 89.2 80.0 - 99.0 FL    MCH 28.3 26.0 - 34.0 PG    MCHC 31.7 30.0 - 36.5 g/dL    RDW 13.5 11.5 - 14.5 %    PLATELET 043 088 - 400 K/uL    MPV 10.2 8.9 - 12.9 FL    NRBC 0.0 0  WBC    ABSOLUTE NRBC 0.00 0.00 - 0.01 K/uL    NEUTROPHILS 71 32 - 75 %    LYMPHOCYTES 19 12 - 49 %    MONOCYTES 8 5 - 13 %    EOSINOPHILS 2 0 - 7 %    BASOPHILS 0 0 - 1 %    IMMATURE GRANULOCYTES 0 0.0 - 0.5 %    ABS. NEUTROPHILS 7.6 1.8 - 8.0 K/UL    ABS. LYMPHOCYTES 2.1 0.8 - 3.5 K/UL    ABS. MONOCYTES 0.8 0.0 - 1.0 K/UL    ABS. EOSINOPHILS 0.2 0.0 - 0.4 K/UL    ABS. BASOPHILS 0.0 0.0 - 0.1 K/UL    ABS. IMM. GRANS. 0.0 0.00 - 0.04 K/UL    DF AUTOMATED     METABOLIC PANEL, COMPREHENSIVE    Collection Time: 03/31/22  8:25 PM   Result Value Ref Range    Sodium 138 136 - 145 mmol/L    Potassium 4.1 3.5 - 5.1 mmol/L    Chloride 100 97 - 108 mmol/L    CO2 32 21 - 32 mmol/L    Anion gap 6 5 - 15 mmol/L    Glucose 147 (H) 65 - 100 mg/dL    BUN 28 (H) 6 - 20 MG/DL    Creatinine 1.36 (H) 0.70 - 1.30 MG/DL    BUN/Creatinine ratio 21 (H) 12 - 20      GFR est AA >60 >60 ml/min/1.73m2    GFR est non-AA 51 (L) >60 ml/min/1.73m2    Calcium 9.4 8.5 - 10.1 MG/DL    Bilirubin, total 0.4 0.2 - 1.0 MG/DL    ALT (SGPT) 28 12 - 78 U/L    AST (SGOT) 23 15 - 37 U/L    Alk.  phosphatase 119 (H) 45 - 117 U/L    Protein, total 7.6 6.4 - 8.2 g/dL    Albumin 3.6 3.5 - 5.0 g/dL    Globulin 4.0 2.0 - 4.0 g/dL    A-G Ratio 0.9 (L) 1.1 - 2.2     MAGNESIUM    Collection Time: 03/31/22  8:25 PM   Result Value Ref Range    Magnesium 2.0 1.6 - 2.4 mg/dL   NT-PRO BNP    Collection Time: 03/31/22  8:25 PM   Result Value Ref Range    NT pro-BNP 2,305 (H) 0 - 450 PG/ML   TROPONIN-HIGH SENSITIVITY    Collection Time: 03/31/22  8:25 PM   Result Value Ref Range    Troponin-High Sensitivity 15 0 - 76 ng/L   TSH 3RD GENERATION    Collection Time: 03/31/22  8:25 PM   Result Value Ref Range    TSH 1.02 0.36 - 3.74 uIU/mL   LACTIC ACID    Collection Time: 03/31/22  8:25 PM   Result Value Ref Range    Lactic acid 1.8 0.4 - 2.0 MMOL/L   EKG, 12 LEAD, INITIAL    Collection Time: 03/31/22  8:28 PM   Result Value Ref Range    Ventricular Rate 83 BPM    Atrial Rate 83 BPM    P-R Interval 172 ms    QRS Duration 118 ms    Q-T Interval 394 ms    QTC Calculation (Bezet) 462 ms    Calculated P Axis 23 degrees    Calculated R Axis 10 degrees    Calculated T Axis 124 degrees    Diagnosis       Sinus rhythm with premature atrial complexes  Incomplete left bundle branch block  ST & T wave abnormality, consider lateral ischemia  Abnormal ECG  When compared with ECG of 11-FEB-2022 22:43,  premature atrial complexes are now present     COVID-19 WITH INFLUENZA A/B    Collection Time: 03/31/22  8:50 PM   Result Value Ref Range    SARS-CoV-2 by PCR Not detected NOTD      Influenza A by PCR Not detected NOTD      Influenza B by PCR Not detected NOTD     URINALYSIS W/ RFLX MICROSCOPIC    Collection Time: 03/31/22  8:52 PM   Result Value Ref Range    Color YELLOW/STRAW      Appearance CLEAR CLEAR      Specific gravity 1.020 1.003 - 1.030      pH (UA) 6.0 5.0 - 8.0      Protein Negative NEG mg/dL    Glucose Negative NEG mg/dL    Ketone Negative NEG mg/dL    Bilirubin Negative NEG      Blood Negative NEG      Urobilinogen 2.0 (H) 0.2 - 1.0 EU/dL    Nitrites Negative NEG      Leukocyte Esterase Negative NEG          Radiologic Studies -   CT Results  (Last 48 hours)               03/31/22 2123  CTA CHEST W OR W WO CONT Final result    Impression:  1. No evidence of pulmonary embolus. 2.  Left lower lobe pneumonia and small left pleural effusion. 3. Cardiomegaly. 4. Background of pulmonary fibrosis. Narrative:  INDICATION:   dyspnea, hx factor 5 leiden deficiency, interstitial fibrosis, chf           COMPARISON:  CT chest February 14, 2022       TECHNIQUE:  Following the uneventful intravenous administration of 100 cc Isovue   042, thin helical axial images were obtained through the chest. Postprocessing   was performed. 3D image postprocessing was performed.        CT dose reduction was achieved through the use of a standardized protocol   tailored for this examination and automatic exposure control for dose   modulation. FINDINGS: There is a left-sided pacer device. The thyroid is enlarged. There are no enlarged mediastinal or hilar lymph nodes. There is cardiomegaly   with evidence of median sternotomy. There is no pericardial effusion. No filling defect is seen within the pulmonary arterial system to suggest   pulmonary embolus. The aorta is normal in caliber. There is evidence of pulmonary fibrosis. Airspace opacity has worsened in the   left lower lobe. No pulmonary nodule or mass is seen. There is a small left   pleural effusion. Limited evaluation of the upper abdomen demonstrates no acute abnormality. The   osseous structures are unremarkable. 03/31/22 2123  CT ABD PELV W CONT Final result    Impression:      1. No acute abdominal or pelvic pathology. 2. Moderate generalized constipation. Narrative:  EXAM: CT ABD PELV W CONT       INDICATION: luq, llq pain, fever       COMPARISON: None        CONTRAST: 100 mL of Isovue-370. ORAL CONTRAST: Not given       TECHNIQUE:    Following the uneventful intravenous administration of contrast, thin axial   images were obtained through the abdomen and pelvis. Coronal and sagittal   reconstructions were generated. CT dose reduction was achieved through use of a   standardized protocol tailored for this examination and automatic exposure   control for dose modulation. FINDINGS:    LOWER THORAX: Cardiomegaly, left lower lobe airspace disease, and small left   pleural effusion   LIVER: No mass. BILIARY TREE: Gallbladder is contracted. CBD is not dilated. SPLEEN: within normal limits. PANCREAS: No mass or ductal dilatation. ADRENALS: Unremarkable. KIDNEYS: Left upper pole renal cyst measures 4.2 cm, and requires no follow-up. No hydronephrosis or hydroureter. STOMACH: Unremarkable.    SMALL BOWEL: No dilatation or wall thickening. COLON: Moderate generalized constipation. Diverticulosis of the colon. APPENDIX: Normal   PERITONEUM: No ascites or pneumoperitoneum. RETROPERITONEUM: Diffuse calcific aortic atherosclerosis without aneurysm. REPRODUCTIVE ORGANS: Evidence of prostatectomy. Penile implant, with reservoir   in the right lower quadrant. URINARY BLADDER: No mass or calculus. BONES: Postsurgical changes of posterior decompression and fusion in the lumbar   spine   ABDOMINAL WALL: No mass or hernia. ADDITIONAL COMMENTS: N/A               CTA CHEST W OR W WO CONT   Final Result   1. No evidence of pulmonary embolus. 2.  Left lower lobe pneumonia and small left pleural effusion. 3. Cardiomegaly. 4. Background of pulmonary fibrosis. CT ABD PELV W CONT   Final Result      1. No acute abdominal or pelvic pathology. 2. Moderate generalized constipation. XR CHEST SNGL V   Final Result   Pulmonary edema. Procedures     Procedures      Medical Decision Making     Records Reviewed: Nursing Notes, Old Medical Records, Previous electrocardiograms, Previous Radiology Studies and Previous Laboratory Studies    Vital Signs  Patient Vitals for the past 24 hrs:   Temp Pulse Resp BP SpO2   04/01/22 0405 98.7 °F (37.1 °C) 69 18 127/67 96 %   04/01/22 0349 -- -- -- -- 97 %   04/01/22 0002 -- -- -- -- 97 %   03/31/22 2344 98.7 °F (37.1 °C) 67 18 139/62 97 %   03/31/22 2331 98.6 °F (37 °C) 67 22 (!) 122/58 97 %   03/31/22 2300 -- 67 -- 132/63 97 %   03/31/22 2230 -- 64 -- 126/66 98 %   03/31/22 2200 -- 66 24 123/65 99 %   03/31/22 2130 -- 72 -- 129/73 98 %   03/31/22 2100 -- 69 22 114/62 98 %   03/31/22 2020 -- -- -- -- 94 %   03/31/22 2017 99.4 °F (37.4 °C) 80 22 (!) 142/72 94 %       Pulse Oximetry Analysis - 96% on 2 lpm    Cardiac Monitor:   Rate: 84 bpm  Rhythm: Normal Sinus Rhythm      I am the first provider for this patient.     I reviewed the vital signs, available nursing notes, past medical history, past surgical history, family history and social history, performed a physical exam and reviewed labs and xrays from this visit    MDM  Number of Diagnoses or Management Options  Community acquired pneumonia of left lower lobe of lung: new, needed workup     Amount and/or Complexity of Data Reviewed  Clinical lab tests: ordered and reviewed  Tests in the radiology section of CPT®: ordered and reviewed  Tests in the medicine section of CPT®: ordered and reviewed  Review and summarize past medical records: yes  Discuss the patient with other providers: yes    Risk of Complications, Morbidity, and/or Mortality  Presenting problems: high  Diagnostic procedures: high  Management options: moderate  General comments: Differential includes pneumonia, CHF, pneumothorax, hemothorax, coronary artery disease, angina, Covid, influenza    Patient Progress  Patient progress: stable      ED Course     Initial assessment performed. The patients presenting problems have been discussed, and they are in agreement with the care plan formulated and outlined with them. I have encouraged them to ask questions as they arise throughout their visit. ED Course as of 04/01/22 0553   u Mar 31, 2022   2032 EKG reveals sinus rhythm with PACs, normal ventricular rate of 83, normal NE interval 172, QRS duration prolonged at 118, QTc prolonged 462, incomplete left bundle branch block noted ST and T wave abnormality consider lateral ischemia noted, compared to EKG dated 11 February 2022, PACs are new and rate slightly faster than 70, otherwise unchanged. No evidence of acute ischemia or infarction. [PS]   2110 Chest x-ray reveals congestive heart failure, analysis is unremarkable, CBC is hemoglobin of 11.3 and white count 10,800, platelet count 272,393 which is stable for patient, CMP has sugar 147, BUN 28, creatinine 1.36 which is increased compared to patient's usual creatinine of 1.05.   Will obtain CT of chest abdomen pelvis due to patient's left upper quadrant pain to rule out possible pulmonary embolism or other airspace disease left lower lobe or diverticulitis, patient has been given Lasix 20 mg IV and a COVID test is pending. If Covid is negative will administer DuoNeb treatment via nebulizer. [PS]   2124 TSH normal, NT pro elevated 2300, magnesium normal, LFTs normal. [PS]   2132 Covid and influenza both negative. [PS]   2143 Troponin is 15 which appears to be patient's baseline between 17 and 20. [PS]   2213 CT demonstrates pneumonia on the left. Patient will be treated with Rocephin and doxycycline as he has QT prolongation and will consult Dr. iM Cain. [PS]      ED Course User Index  [PS] Theresa Wynne MD        Orders Placed This Encounter    NO VTE PROPHYLAXIS NEEDED    CULTURE, BLOOD    CULTURE, BLOOD    COVID-19 WITH INFLUENZA A/B    CULTURE, RESPIRATORY/SPUTUM/BRONCH W GRAM STAIN    LEGIONELLA PNEUMOPHILA AG, URINE    S. PNEUMO AG, UR/CSF    XR CHEST SNGL V    CTA CHEST W OR W WO CONT    CT ABD PELV W CONT    CBC WITH AUTOMATED DIFF    METABOLIC PANEL, COMPREHENSIVE    MAGNESIUM    URINALYSIS W/ RFLX MICROSCOPIC    NT-PRO BNP    TROPONIN-HIGH SENSITIVITY    TSH, 3RD GENERATION    LACTIC ACID, PLASMA    PROCALCITONIN    METABOLIC PANEL, BASIC    CBC W/ AUTOMATED DIFF    ADULT DIET Regular; Low Fat/Low Chol/High Fiber/2 gm Na    CARDIAC MONITOR - ED ONLY    B/P    OXYGEN CANNULA    CARDIAC MONITORING    BEDREST W/ BEDSIDE COMMODE    VITAL SIGNS PER UNIT ROUTINE    VITAL SIGNS PER UNIT ROUTINE    AMBULATE WITH ASSISTANCE    FULL CODE    RT--OXIMETRY, CONTINUOUS    OXIMETRY, SPOT CHECK    EKG, 12 LEAD, INITIAL    SALINE LOCK IV ONE TIME STAT    sodium chloride (NS) flush 5-10 mL    furosemide (LASIX) injection 20 mg    iopamidoL (ISOVUE-370) 76 % injection 100 mL    cefTRIAXone (ROCEPHIN) 2 g in 0.9% sodium chloride (MBP/ADV) 50 mL MBP    doxycycline (VIBRAMYCIN) 100 mg in 0.9% sodium chloride (MBP/ADV) 100 mL MBP    albuterol-ipratropium (DUO-NEB) 2.5 MG-0.5 MG/3 ML    methylPREDNISolone (PF) (Solu-MEDROL) injection 125 mg    acetaminophen (TYLENOL) tablet 650 mg    ondansetron (ZOFRAN) injection 4 mg    0.9% sodium chloride infusion    sodium chloride (NS) flush 5-40 mL    sodium chloride (NS) flush 5-40 mL    cefTRIAXone (ROCEPHIN) 1 g in 0.9% sodium chloride (MBP/ADV) 50 mL MBP    albuterol (PROVENTIL VENTOLIN) nebulizer solution 2.5 mg    IP CONSULT TO HOSPITALIST    INITIAL PHYSICIAN ORDER: OBSERVATION/OUTPATIENT IN A BED OBSERVATION; Remote Telemetry;  Yes; need for iv abx, fluids, oxygen       MEDICATIONS GIVEN:    Medications   doxycycline (VIBRAMYCIN) 100 mg in 0.9% sodium chloride (MBP/ADV) 100 mL MBP (100 mg IntraVENous Continued On Admission 3/31/22 2331)   acetaminophen (TYLENOL) tablet 650 mg (has no administration in time range)   ondansetron (ZOFRAN) injection 4 mg (has no administration in time range)   0.9% sodium chloride infusion (50 mL/hr IntraVENous New Bag 3/31/22 2235)   sodium chloride (NS) flush 5-40 mL (has no administration in time range)   sodium chloride (NS) flush 5-40 mL (has no administration in time range)   cefTRIAXone (ROCEPHIN) 1 g in 0.9% sodium chloride (MBP/ADV) 50 mL MBP (has no administration in time range)   albuterol (PROVENTIL VENTOLIN) nebulizer solution 2.5 mg (has no administration in time range)   sodium chloride (NS) flush 5-10 mL (10 mL IntraVENous Given 3/31/22 2102)   furosemide (LASIX) injection 20 mg (20 mg IntraVENous Given 3/31/22 2100)   iopamidoL (ISOVUE-370) 76 % injection 100 mL (100 mL IntraVENous Given 3/31/22 2126)   cefTRIAXone (ROCEPHIN) 2 g in 0.9% sodium chloride (MBP/ADV) 50 mL MBP (0 g IntraVENous IV Completed 3/31/22 2248)   albuterol-ipratropium (DUO-NEB) 2.5 MG-0.5 MG/3 ML (3 mL Nebulization Given 3/31/22 2230)   methylPREDNISolone (PF) (Solu-MEDROL) injection 125 mg (125 mg IntraVENous Given 3/31/22 2810)         Diagnosis     Clinical Impression:   1. Community acquired pneumonia of left lower lobe of lung            Disposition     Admission Note    Orders Placed This Encounter    NO VTE PROPHYLAXIS NEEDED    CULTURE, BLOOD    CULTURE, BLOOD    COVID-19 WITH INFLUENZA A/B    CULTURE, RESPIRATORY/SPUTUM/BRONCH W GRAM STAIN    LEGIONELLA PNEUMOPHILA AG, URINE    S. PNEUMO AG, UR/CSF    XR CHEST SNGL V    CTA CHEST W OR W WO CONT    CT ABD PELV W CONT    CBC WITH AUTOMATED DIFF    METABOLIC PANEL, COMPREHENSIVE    MAGNESIUM    URINALYSIS W/ RFLX MICROSCOPIC    NT-PRO BNP    TROPONIN-HIGH SENSITIVITY    TSH, 3RD GENERATION    LACTIC ACID, PLASMA    PROCALCITONIN    METABOLIC PANEL, BASIC    CBC W/ AUTOMATED DIFF    ADULT DIET Regular; Low Fat/Low Chol/High Fiber/2 gm Na    CARDIAC MONITOR - ED ONLY    B/P    OXYGEN CANNULA    CARDIAC MONITORING    BEDREST W/ BEDSIDE COMMODE    VITAL SIGNS PER UNIT ROUTINE    VITAL SIGNS PER UNIT ROUTINE    AMBULATE WITH ASSISTANCE    FULL CODE    RT--OXIMETRY, CONTINUOUS    OXIMETRY, SPOT CHECK    EKG, 12 LEAD, INITIAL    SALINE LOCK IV ONE TIME STAT    sodium chloride (NS) flush 5-10 mL    furosemide (LASIX) injection 20 mg    iopamidoL (ISOVUE-370) 76 % injection 100 mL    cefTRIAXone (ROCEPHIN) 2 g in 0.9% sodium chloride (MBP/ADV) 50 mL MBP    doxycycline (VIBRAMYCIN) 100 mg in 0.9% sodium chloride (MBP/ADV) 100 mL MBP    albuterol-ipratropium (DUO-NEB) 2.5 MG-0.5 MG/3 ML    methylPREDNISolone (PF) (Solu-MEDROL) injection 125 mg    acetaminophen (TYLENOL) tablet 650 mg    ondansetron (ZOFRAN) injection 4 mg    0.9% sodium chloride infusion    sodium chloride (NS) flush 5-40 mL    sodium chloride (NS) flush 5-40 mL    cefTRIAXone (ROCEPHIN) 1 g in 0.9% sodium chloride (MBP/ADV) 50 mL MBP    albuterol (PROVENTIL VENTOLIN) nebulizer solution 2.5 mg    IP CONSULT TO HOSPITALIST    INITIAL PHYSICIAN ORDER: OBSERVATION/OUTPATIENT IN A BED OBSERVATION; Remote Telemetry; Yes; need for iv abx, fluids, oxygen       Medications   doxycycline (VIBRAMYCIN) 100 mg in 0.9% sodium chloride (MBP/ADV) 100 mL MBP (100 mg IntraVENous Continued On Admission 3/31/22 2331)   acetaminophen (TYLENOL) tablet 650 mg (has no administration in time range)   ondansetron (ZOFRAN) injection 4 mg (has no administration in time range)   0.9% sodium chloride infusion (50 mL/hr IntraVENous New Bag 3/31/22 2235)   sodium chloride (NS) flush 5-40 mL (has no administration in time range)   sodium chloride (NS) flush 5-40 mL (has no administration in time range)   cefTRIAXone (ROCEPHIN) 1 g in 0.9% sodium chloride (MBP/ADV) 50 mL MBP (has no administration in time range)   albuterol (PROVENTIL VENTOLIN) nebulizer solution 2.5 mg (has no administration in time range)   sodium chloride (NS) flush 5-10 mL (10 mL IntraVENous Given 3/31/22 2102)   furosemide (LASIX) injection 20 mg (20 mg IntraVENous Given 3/31/22 2100)   iopamidoL (ISOVUE-370) 76 % injection 100 mL (100 mL IntraVENous Given 3/31/22 2126)   cefTRIAXone (ROCEPHIN) 2 g in 0.9% sodium chloride (MBP/ADV) 50 mL MBP (0 g IntraVENous IV Completed 3/31/22 2248)   albuterol-ipratropium (DUO-NEB) 2.5 MG-0.5 MG/3 ML (3 mL Nebulization Given 3/31/22 2230)   methylPREDNISolone (PF) (Solu-MEDROL) injection 125 mg (125 mg IntraVENous Given 3/31/22 2320)       Patient Vitals for the past 8 hrs:   Temp Pulse Resp BP SpO2   04/01/22 0405 98.7 °F (37.1 °C) 69 18 127/67 96 %   04/01/22 0349 -- -- -- -- 97 %   04/01/22 0002 -- -- -- -- 97 %   03/31/22 2344 98.7 °F (37.1 °C) 67 18 139/62 97 %   03/31/22 2331 98.6 °F (37 °C) 67 22 (!) 122/58 97 %   03/31/22 2300 -- 67 -- 132/63 97 %   03/31/22 2230 -- 64 -- 126/66 98 %   03/31/22 2200 -- 66 24 123/65 99 %         DIAGNOSIS:      ICD-10-CM ICD-9-CM    1.  Community acquired pneumonia of left lower lobe of lung  J18.9 486         I have reviewed all pertinent and currently available diagnostic test results for this visit including, but not limited to, labs, xrays, and EKGs. I have reviewed all pertinent and currently available medical records, past medical history, social history and family history. My plan of care and further evaluation and/or disposition is based on these results, as well as the initial, and subsequent, history and physical exam, as well as any additional complaints during the visit. Laboratory tests, medications, x-rays, diagnosis, and need for admission or transfer have been reviewed and discussed with the patient and/or family. Pt and/or family have had the opportunity to ask questions about their care. Patient and/or family verbalized understanding and agreement with care plan. After review of patient's ED evaluation I feel patient will benefit from admission to hospital due to need for oxygen, IV antibiotics, IV steroids close monitoring of heart rhythm    Gabe Ragsdale MD    Please note that this dictation was completed with [employer], the computer voice recognition software. Quite often unanticipated grammatical, syntax, homophones, and other interpretive errors are inadvertently transcribed by the computer software. Please disregard these errors. Please excuse any errors that have escaped final proofreading.

## 2022-04-01 NOTE — ED TRIAGE NOTES
Hx of CHF, on home O2 with increasing SOB and episodes of dizziness over several days and Lt lower rib cage pain with cough, deep breath.  Re-producable on palpation

## 2022-04-01 NOTE — ED NOTES
Report given to [facility], all questions answered. Patient transported to , condition unchanged. NAD noted when leaving department.

## 2022-04-02 NOTE — PROGRESS NOTES
Bedside and Verbal shift change report given to CHANTAL Paulson 84 RN (oncoming nurse) by FARZANEH Roberson RN (offgoing nurse). Report included the following information SBAR, Kardex, Intake/Output, MAR, Recent Results and Quality Measures. Du score 3  Bed/chair alarm *are in use. If in use it is set at the highest volume. Intravenous fluids were administered, normal saline 50 ml/hr  Patient Vitals for the past 12 hrs:   Temp Pulse Resp BP SpO2   04/02/22 0723 97.5 °F (36.4 °C) 62 20 139/71 94 %   04/02/22 0401 97.7 °F (36.5 °C) 72 18 137/76 95 %   04/02/22 0024 97.8 °F (36.6 °C) 60 18 (!) 151/70 95 %   04/01/22 2031 -- -- -- -- 97 %     No flowsheet data found. All lab results for the last 24 hours reviewed.

## 2022-04-02 NOTE — PROGRESS NOTES
Problem: General Medical Care Plan  Goal: *Skin integrity maintained  Outcome: Progressing Towards Goal

## 2022-04-02 NOTE — PROGRESS NOTES
[facility]  Hospitalist Progress Note    NAME: Albertina Mathias   :  1945   MRN:  [id number]     Total duration of encounter: 4 days     Chief  presenting symptom:shortness of breath for the last 2 days    Interim Hospital course 65 y.o. male with medical history of combined systolic and diastolic congestive heart failure, coronary artery disease status post CABG with LIMA to LAD about 10 years ago, sick sinus syndrome, AV bridgette reentry tachycardia, atrial fibrillation, factor V Leiden mutation, carotid artery stenosis, history of stroke, hypertension, hyperlipidemia, hypothyroidism, Alzheimer dementia, and chronic anticoagulation with Eliquis . H also has interstitial lung disease, COPD with emphysema, on home O2 therapy. He was hospitalized in 2020 for acute respiratory failure secondary to interstitial  fibrosis and pulmonary edema, echo at that time revealed ejection fraction around 20 to 25% with severely dilated right ventricle and grade 1 diastolic dysfunction. He presented with progressively worsening shortness of breath for the last 2 days, cough, and feeling hot ,  Found to have pneumonia and COPD cerebration. He is  improving on Rocephin and doxycycline, Atrovent Proventil nebulized bronchodilator and oxygen therapy    Subjective:     Chief Complaint / Reason for Physician Visit  Discussed with RN the patient said he feels better, still mild shortness of breath with exertion  No fever or chills, appetite. Minimal cough. No nausea or vomiting. No diarrhea. No chest pain abdominal pain. Tolerating diet. PT. no headache no other complaints    No current facility-administered medications for this encounter.     Current Outpatient Medications:     albuterol (PROVENTIL VENTOLIN) 2.5 mg /3 mL (0.083 %) nebu, 3 mL by Nebulization route every four (4) hours as needed for Wheezing., Disp: 30 Nebule, Rfl: 0    Oxygen, , Disp: , Rfl:     furosemide (LASIX) 20 mg tablet, Take 1 Tablet by mouth daily. , Disp: 90 Tablet, Rfl: 1    gabapentin (NEURONTIN) 600 mg tablet, TAKE 1 TABLET BY MOUTH THREE TIMES A DAY, Disp: 90 Tablet, Rfl: 3    sertraline (ZOLOFT) 100 mg tablet, TAKE 2 TABLETS BY MOUTH EVERY DAY, Disp: 180 Tablet, Rfl: 3    metoprolol succinate (TOPROL-XL) 25 mg XL tablet, TAKE 1/2 TABLET BY MOUTH ONCE DAILY, Disp: 45 Tablet, Rfl: 3    pantoprazole (PROTONIX) 40 mg tablet, TAKE 1 TABLET BY MOUTH ONCE DAILY, Disp: 90 Tablet, Rfl: 3    memantine (NAMENDA) 10 mg tablet, TAKE 1 TABLET BY MOUTH TWICE DAILY, Disp: 180 Tab, Rfl: 3    sacubitriL-valsartan (Entresto) 24-26 mg tablet, Take 1 Tablet by mouth two (2) times a day., Disp: 180 Tablet, Rfl: 3    polyethylene glycol (MIRALAX) 17 gram packet, Take 1 Packet by mouth daily. (Patient not taking: Reported on 3/31/2022), Disp: , Rfl:     donepeziL (ARICEPT) 5 mg tablet, Take 1 Tablet by mouth nightly. (Patient not taking: Reported on 4/6/2022), Disp: 30 Tablet, Rfl: 0    acetaminophen (TYLENOL) 500 mg capsule, Take 1,000 mg by mouth every six (6) hours as needed for Pain. (Patient not taking: Reported on 3/31/2022), Disp: , Rfl:     albuterol (PROVENTIL HFA, VENTOLIN HFA, PROAIR HFA) 90 mcg/actuation inhaler, Take 1 Puff by inhalation every six (6) hours as needed for Wheezing (Rarely uses \"puffer\"). (Patient not taking: Reported on 3/23/2022), Disp: , Rfl:     atorvastatin (LIPITOR) 40 mg tablet, TAKE 1 TABLET BY MOUTH AT BEDTIME, Disp: 90 Tablet, Rfl: 3    Eliquis 5 mg tablet, TAKE 1 TABLET BY MOUTH EVERY 12 HOURS, Disp: 180 Tablet, Rfl: 3    nitroglycerin (NITROSTAT) 0.4 mg SL tablet, 1 Tab by SubLINGual route every five (5) minutes as needed for Chest Pain (call 911 if not relieved by 3). (Patient not taking: Reported on 3/23/2022), Disp: 1 Bottle, Rfl: 1    senna-docusate (PERICOLACE) 8.6-50 mg per tablet, Take 1 Tab by mouth daily.  (Patient not taking: Reported on 3/31/2022), Disp: 30 Tab, Rfl: 0   multivit-min/FA/lycopen/lutein (SENTRY SENIOR PO), Take  by mouth daily. (Patient not taking: Reported on 3/31/2022), Disp: , Rfl:     Objective:     VITALS:   No data found. No intake or output data in the 24 hours ending 04/11/22 1445     PHYSICAL EXAM:  General: WD, WN. Alert, cooperative, no acute distress    EENT:  EOMI. Anicteric sclerae. MMM  Resp:  Good equal air entry bilaterally, no wheezing or rales. No accessory muscle use  CV:  Regular  rhythm,  No edema  GI:  Soft, Non distended, Non tender. +Bowel sounds  Neurologic:  Alert and oriented X 3, normal speech,   Psych:   Good insight. Not anxious nor agitated  Skin:  No rashes. No jaundice    LABS:  I reviewed today's most current labs and imaging studies. Pertinent labs include:  No results for input(s): WBC, HGB, HCT, PLT, HGBEXT, HCTEXT, PLTEXT in the last 72 hours. No results for input(s): NA, K, CL, CO2, GLU, BUN, CREA, CA, MG, PHOS, ALB, TBIL, TBILI, ALT, INR, INREXT in the last 72 hours. No lab exists for component: SGOT    Radiology:   CTA CHEST W OR W WO CONT   Final Result   1. No evidence of pulmonary embolus. 2.  Left lower lobe pneumonia and small left pleural effusion. 3. Cardiomegaly. 4. Background of pulmonary fibrosis.             CT ABD PELV W CONT   Final Result       1. No acute abdominal or pelvic pathology. 2. Moderate generalized constipation.       XR CHEST SNGL V   Final Result   Pulmonary edema.          Assessment / Plan:     Left lower Lobe Pneumonia   Improved on Rocephin and doxycycline, to continue. Tylenol PRN for fever or pain    Clinical picture is more of committee acquired pneumonia . hospital-acquired pathogens are unlikely    the  patient was hospitalized last month     CTA no PE     COPD exacerbation    Improving on  DuoNeb bronchodilators and oxygen therapy.     Steroid held as the patient Patient has recently steroid use       Coronary Artery Disease/CHF    - stable no chest pain, no acute EKG changes , troponin is normal.    -continue preadmission Eliquis 5 mg daily, sacubitril/valsartan ( Entresto) 25-26 mg PO BID    currently stable for him without discharge evaluate. Hypercholesterolemia: Continue    atorvastatin( Lipitor) 80 mg daily        Dementia: Currently stable, the patient lives with his wife at home with good support     SAFETY:  Addressed  Code Status:Full code   DVT prophylaxis: Continue Eliquis 5 mg daily  Stress Ulcer prophylaxis: Protonix 40 mg p.o. daily  Bladder catheter:No catheter   Family Contact Info:  Primary Emergency Contact: [address] [facility], [place] Phone: [phone]  Bedded: [facility] Room 116/01  Disposition: TBD, likely home when stable  Admission status:  Inpatient      -Discussed with the patient, family and they are agreeable with the plan. Questions  were answered   -Multidiciplinary team rounds were held today with RN, , nursing, pharmacist and clinical coordinator. Patient's plan of care was discussed; medications were reviewed and discharge planning was addressed.   -Time spent with the patient was 45 minutes, with more than 50% in direct patient care.      Bran Love MD

## 2022-04-03 NOTE — PROGRESS NOTES
Bedside and Verbal shift change report given to Sera Saini RN (oncoming nurse) by FARZANEH Simon RN (offgoing nurse). Report included the following information SBAR, Kardex, Intake/Output, MAR, Recent Results and Quality Measures. Du score 3  Bed/chair alarm are in use. If in use it is set at the highest volume. Patient Vitals for the past 12 hrs:   Temp Pulse Resp BP SpO2   04/03/22 0712 98.7 °F (37.1 °C) 61 17 125/68 94 %   04/03/22 0438 98.2 °F (36.8 °C) 69 16 116/64 97 %   04/03/22 0007 97.7 °F (36.5 °C) 64 20 133/63 96 %   04/02/22 2027 -- -- -- -- 94 %     No flowsheet data found. All lab results for the last 24 hours reviewed.

## 2022-04-03 NOTE — PROGRESS NOTES
Supervisor, Colton Aguirre, spoke w/Dr. Milka Ortiz, verbal order received to change pt from observation to inpatient

## 2022-04-03 NOTE — PROGRESS NOTES
Problem: General Medical Care Plan  Goal: *Optimal pain control at patient's stated goal  Outcome: Progressing Towards Goal  Goal: *Skin integrity maintained  Outcome: Progressing Towards Goal     Problem: Falls - Risk of  Goal: *Absence of Falls  Description: Document Debi Cal Fall Risk and appropriate interventions in the flowsheet.   Outcome: Progressing Towards Goal  Note: Fall Risk Interventions:  Mobility Interventions: Bed/chair exit alarm,Patient to call before getting OOB         Medication Interventions: Bed/chair exit alarm,Patient to call before getting OOB    Elimination Interventions: Bed/chair exit alarm,Call light in reach,Patient to call for help with toileting needs

## 2022-04-04 NOTE — PROGRESS NOTES
Problem: General Medical Care Plan  Goal: *Vital signs within specified parameters  Outcome: Progressing Towards Goal  Goal: *Labs within defined limits  Outcome: Progressing Towards Goal  Goal: *Absence of infection signs and symptoms  Outcome: Progressing Towards Goal  Goal: *Optimal pain control at patient's stated goal  Outcome: Progressing Towards Goal  Goal: *Skin integrity maintained  Outcome: Progressing Towards Goal  Goal: *Fluid volume balance  Outcome: Progressing Towards Goal  Goal: *Optimize nutritional status  Outcome: Progressing Towards Goal  Goal: *Anxiety reduced or absent  Outcome: Progressing Towards Goal  Goal: *Progressive mobility and function (eg: ADL's)  Outcome: Progressing Towards Goal     Problem: Patient Education: Go to Patient Education Activity  Goal: Patient/Family Education  Outcome: Progressing Towards Goal     Problem: Falls - Risk of  Goal: *Absence of Falls  Description: Document Osmel Fall Risk and appropriate interventions in the flowsheet.   Outcome: Progressing Towards Goal  Note: Fall Risk Interventions:  Mobility Interventions: Bed/chair exit alarm,Patient to call before getting OOB,Utilize walker, cane, or other assistive device         Medication Interventions: Bed/chair exit alarm,Patient to call before getting OOB,Teach patient to arise slowly    Elimination Interventions: Call light in reach,Bed/chair exit alarm              Problem: Patient Education: Go to Patient Education Activity  Goal: Patient/Family Education  Outcome: Progressing Towards Goal     Problem: Hypertension  Goal: *Fluid volume balance  Outcome: Progressing Towards Goal  Goal: *Labs within defined limits  Outcome: Progressing Towards Goal     Problem: Patient Education: Go to Patient Education Activity  Goal: Patient/Family Education  Outcome: Progressing Towards Goal

## 2022-04-04 NOTE — PROGRESS NOTES
Problem: Patient Education: Go to Patient Education Activity  Goal: Patient/Family Education  Outcome: Progressing Towards Goal     Problem: Pneumonia: Day 1  Goal: Activity/Safety  Outcome: Resolved/Met  Goal: Consults, if ordered  Outcome: Resolved/Met  Goal: Diagnostic Test/Procedures  Outcome: Resolved/Met  Goal: Nutrition/Diet  Outcome: Resolved/Met  Goal: Medications  Outcome: Resolved/Met  Goal: Respiratory  Outcome: Resolved/Met  Goal: Treatments/Interventions/Procedures  Outcome: Resolved/Met  Goal: Psychosocial  Outcome: Resolved/Met  Goal: *Oxygen saturation within defined limits  Outcome: Resolved/Met  Goal: *Influenza vaccine administered (October-March)  Outcome: Resolved/Met  Goal: *Pneumoccocal vaccine administered  Outcome: Resolved/Met  Goal: *Hemodynamically stable  Outcome: Resolved/Met  Goal: *Demonstrates progressive activity  Outcome: Resolved/Met  Goal: *Tolerating diet  Outcome: Resolved/Met     Problem: Pneumonia: Discharge Outcomes  Goal: *Demonstrates progressive activity  Outcome: Progressing Towards Goal  Goal: *Describes follow-up/return visits to physicians  Outcome: Progressing Towards Goal  Goal: *Tolerating diet  Outcome: Progressing Towards Goal  Goal: *Verbalizes name, dosage, time, side effects, and number of days to continue medications  Outcome: Progressing Towards Goal  Goal: *Respiratory status at baseline  Outcome: Progressing Towards Goal  Goal: *Vital signs within defined limits  Outcome: Progressing Towards Goal  Goal: *Describes available resources and support systems  Outcome: Progressing Towards Goal  Goal: *Optimal pain control at patient's stated goal  Outcome: Progressing Towards Goal     Problem: Pneumonia: Day 2  Goal: Off Pathway (Use only if patient is Off Pathway)  Outcome: Resolved/Met  Goal: Activity/Safety  Outcome: Resolved/Met  Goal: Consults, if ordered  Outcome: Resolved/Met  Goal: Nutrition/Diet  Outcome: Resolved/Met  Goal: Discharge Planning  Outcome: Resolved/Met  Goal: Medications  Outcome: Resolved/Met  Goal: Respiratory  Outcome: Resolved/Met  Goal: Treatments/Interventions/Procedures  Outcome: Resolved/Met  Goal: Psychosocial  Outcome: Resolved/Met  Goal: *Oxygen saturation within defined limits  Outcome: Resolved/Met  Goal: *Hemodynamically stable  Outcome: Resolved/Met  Goal: *Demonstrates progressive activity  Outcome: Resolved/Met  Goal: *Tolerating diet  Outcome: Resolved/Met  Goal: *Optimal pain control at patient's stated goal  Outcome: Resolved/Met     Problem: Pneumonia: Day 3  Goal: Off Pathway (Use only if patient is Off Pathway)  Outcome: Resolved/Met  Goal: Activity/Safety  Outcome: Resolved/Met  Goal: Consults, if ordered  Outcome: Resolved/Met  Goal: Diagnostic Test/Procedures  Outcome: Resolved/Met  Goal: Nutrition/Diet  Outcome: Resolved/Met  Goal: Discharge Planning  Outcome: Resolved/Met  Goal: Respiratory  Outcome: Resolved/Met  Goal: Treatments/Interventions/Procedures  Outcome: Resolved/Met  Goal: Psychosocial  Outcome: Resolved/Met  Goal: *Oxygen saturation within defined limits  Outcome: Resolved/Met  Goal: *Hemodynamically stable  Outcome: Resolved/Met  Goal: *Demonstrates progressive activity  Outcome: Resolved/Met  Goal: *Tolerating diet  Outcome: Resolved/Met  Goal: *Describes available resources and support systems  Outcome: Resolved/Met  Goal: *Optimal pain control at patient's stated goal  Outcome: Resolved/Met     Problem: Pneumonia: Day 4  Goal: Off Pathway (Use only if patient is Off Pathway)  Outcome: Progressing Towards Goal  Goal: Activity/Safety  Outcome: Progressing Towards Goal  Goal: Nutrition/Diet  Outcome: Progressing Towards Goal  Goal: Discharge Planning  Outcome: Progressing Towards Goal  Goal: Respiratory  Outcome: Progressing Towards Goal  Goal: Treatments/Interventions/Procedures  Outcome: Progressing Towards Goal  Goal: Psychosocial  Outcome: Progressing Towards Goal     Problem: Pneumonia: Discharge Outcomes  Goal: *Demonstrates progressive activity  Outcome: Progressing Towards Goal  Goal: *Describes follow-up/return visits to physicians  Outcome: Progressing Towards Goal  Goal: *Tolerating diet  Outcome: Progressing Towards Goal  Goal: *Verbalizes name, dosage, time, side effects, and number of days to continue medications  Outcome: Progressing Towards Goal  Goal: *Influenza immunization  Outcome: Progressing Towards Goal  Goal: *Pneumococcal immunization  Outcome: Progressing Towards Goal  Goal: *Respiratory status at baseline  Outcome: Progressing Towards Goal  Goal: *Vital signs within defined limits  Outcome: Progressing Towards Goal  Goal: *Describes available resources and support systems  Outcome: Progressing Towards Goal  Goal: *Optimal pain control at patient's stated goal  Outcome: Progressing Towards Goal     Problem: General Medical Care Plan  Goal: *Vital signs within specified parameters  Outcome: Progressing Towards Goal  Goal: *Labs within defined limits  Outcome: Progressing Towards Goal  Goal: *Absence of infection signs and symptoms  Outcome: Progressing Towards Goal  Goal: *Optimal pain control at patient's stated goal  Outcome: Progressing Towards Goal  Goal: *Skin integrity maintained  Outcome: Progressing Towards Goal  Goal: *Fluid volume balance  Outcome: Progressing Towards Goal  Goal: *Optimize nutritional status  Outcome: Progressing Towards Goal  Goal: *Anxiety reduced or absent  Outcome: Progressing Towards Goal  Goal: *Progressive mobility and function (eg: ADL's)  Outcome: Progressing Towards Goal     Problem: Patient Education: Go to Patient Education Activity  Goal: Patient/Family Education  Outcome: Progressing Towards Goal     Problem: Falls - Risk of  Goal: *Absence of Falls  Description: Document Osmel Fall Risk and appropriate interventions in the flowsheet.   Outcome: Progressing Towards Goal  Note: Fall Risk Interventions:  Mobility Interventions: Bed/chair exit alarm,Patient to call before getting OOB,Utilize walker, cane, or other assistive device         Medication Interventions: Bed/chair exit alarm,Patient to call before getting OOB,Teach patient to arise slowly    Elimination Interventions: Call light in reach,Bed/chair exit alarm              Problem: Patient Education: Go to Patient Education Activity  Goal: Patient/Family Education  Outcome: Progressing Towards Goal     Problem: Hypertension  Goal: *Fluid volume balance  Outcome: Progressing Towards Goal  Goal: *Labs within defined limits  Outcome: Progressing Towards Goal     Problem: Patient Education: Go to Patient Education Activity  Goal: Patient/Family Education  Outcome: Progressing Towards Goal

## 2022-04-04 NOTE — PROGRESS NOTES
[facility]  Hospitalist Progress Note    NAME: Shanel Mena   :  1945   MRN:  [id number]     Total duration of encounter: 3 days        Chief Complaint / Reason for Physician Visit shortness of breath  Interim Hospital course 65 y.o. male with medical history of combined systolic and diastolic congestive heart failure, coronary artery disease status post CABG with LIMA to LAD about 10 years ago, sick sinus syndrome, AV bridgette reentry tachycardia, atrial fibrillation, factor V Leiden mutation, carotid artery stenosis, history of stroke, hypertension, hyperlipidemia, hypothyroidism, Alzheimer dementia, and chronic anticoagulation with Eliquis . H also has interstitial lung disease, COPD with emphysema, on home O2 therapy. He was hospitalized in 2020 for acute respiratory failure secondary to interstitial  fibrosis and pulmonary edema, echo at that time revealed ejection fraction around 20 to 25% with severely dilated right ventricle and grade 1 diastolic dysfunction. He presented with progressively worsening shortness of breath for the last 2 days, cough, and feeling hot ,  Found to have pneumonia and COPD cerebration. He is  improving on Rocephin and doxycycline, Atrovent Proventil nebulized bronchodilator and oxygen therapy. Prepare the patient for  for discharge     Subjective:     Discussed with RN the patient said he feels better, still mild shortness of breath with exertion  No fever or chills, appetite. Minimal cough. No nausea or vomiting. No diarrhea. No chest pain abdominal pain. Tolerating diet.   PT. no headache no other complaints      Current Facility-Administered Medications:     doxycycline (MONODOX) capsule 100 mg, 100 mg, Oral, Q12H, Saul Chu MD    cefUROXime (CEFTIN) tablet 500 mg, 500 mg, Oral, Q12H, Saul Chu MD    aspirin chewable tablet 81 mg, 81 mg, Oral, DAILY, Saul Chu MD, 81 mg at 22 0958    [Held by provider] atorvastatin (LIPITOR) tablet 80 mg, 80 mg, Oral, DAILY, Saul Chu MD    donepeziL (ARICEPT) tablet 5 mg, 5 mg, Oral, QHS, Saul Chu MD, 5 mg at 04/02/22 2131    apixaban (ELIQUIS) tablet 5 mg, 5 mg, Oral, Q12H, Saul Chu MD, 5 mg at 04/03/22 8999    sacubitriL-valsartan (ENTRESTO) 24-26 mg tablet 1 Tablet, 1 Tablet, Oral, BID, Saul Chu MD, 1 Tablet at 04/03/22 2029    memantine (NAMENDA) tablet 10 mg, 10 mg, Oral, BID, Saul Chu MD, 10 mg at 04/03/22 1853    sertraline (ZOLOFT) tablet 200 mg, 200 mg, Oral, DAILY, Saul Chu MD, 200 mg at 04/03/22 0959    polyethylene glycol (MIRALAX) packet 17 g, 17 g, Oral, DAILY, Saul Chu MD, 17 g at 04/03/22 0955    senna-docusate (PERICOLACE) 8.6-50 mg per tablet 1 Tablet, 1 Tablet, Oral, DAILY, Saul Chu MD, 1 Tablet at 04/03/22 0957    metoprolol succinate (TOPROL-XL) XL tablet 25 mg, 25 mg, Oral, DAILY, Saul Chu MD, 25 mg at 04/03/22 0958    atorvastatin (LIPITOR) tablet 40 mg, 40 mg, Oral, QHS, Saul Chu MD, 40 mg at 04/02/22 2131    pantoprazole (PROTONIX) tablet 40 mg, 40 mg, Oral, DAILY, Saul Chu MD, 40 mg at 04/03/22 0958    sodium chloride (NS) flush 5-40 mL, 5-40 mL, IntraVENous, Q8H, Ana Maria Garcia MD, 10 mL at 04/03/22 1436    sodium chloride (NS) flush 5-40 mL, 5-40 mL, IntraVENous, PRN, Ana Maria Garcia MD    albuterol (PROVENTIL VENTOLIN) nebulizer solution 2.5 mg, 2.5 mg, Nebulization, Q4H PRN, Ana Maria Garcia MD    acetaminophen (TYLENOL) tablet 650 mg, 650 mg, Oral, Q6H PRN, Nabila Alberts MD, 650 mg at 04/03/22 1502    ondansetron [facility]) injection 4 mg, 4 mg, IntraVENous, Q4H PRN, Nabila Alberts MD, 4 mg at 04/02/22 1048    [Held by provider] 0.9% sodium chloride infusion, 50 mL/hr, IntraVENous, CONTINUOUS, Nabila Alberts MD, Last Rate: 50 mL/hr at 04/02/22 1801, 50 mL/hr at 04/02/22 1801    Objective:     VITALS:   Patient Vitals for the past 12 hrs:   Temp Pulse Resp BP SpO2   04/03/22 2029 -- 60 -- 133/64 --   04/03/22 2019 97.8 °F (36.6 °C) 60 20 133/64 97 %   04/03/22 1916 -- -- -- -- 95 %   04/03/22 1527 97.5 °F (36.4 °C) 60 20 (!) 141/63 94 %   04/03/22 1144 -- 61 -- 138/63 --   04/03/22 1133 98.2 °F (36.8 °C) 61 20 138/63 96 %   04/03/22 0958 -- 62 -- 128/68 --       Intake/Output Summary (Last 24 hours) at 4/3/2022 2052  Last data filed at 4/3/2022 1800  Gross per 24 hour   Intake 180 ml   Output 800 ml   Net -620 ml        PHYSICAL EXAM:  General: WD, WN. Alert, cooperative, no acute distress    EENT:  EOMI. Anicteric sclerae. MMM  Resp:  CTA bilaterally, no wheezing or rales. No accessory muscle use  CV:  Regular  rhythm,  No edema  GI:  Soft, Non distended, Non tender. +Bowel sounds  Neurologic:  Alert and oriented X 3, normal speech,   Psych:   Good insight. Not anxious nor agitated  Skin:  No rashes. No jaundice    LABS:  I reviewed today's most current labs and imaging studies. Pertinent labs include:    Recent Labs     04/01/22  0533      K 4.3      CO2 31   *   BUN 27*   CREA 1.23   CA 9.9       RADIOLOGY:  Chest x-ray right middle lobe pneumonia                    Assessment / Plan:      Left lower Lobe Pneumonia     Responding well to Rocephin and doxycycline, to continue. No fever                  Tylenol PRN for fever or pain     Clinical picture is more of committee acquired pneumonia . hospital-acquired pathogens are unlikely    the  patient was hospitalized last month     CTA showed no PE   Discharge in the morning Monday. COPD exacerbation   Return to baseline. On  DuoNeb bronchodilators and oxygen therapy. The patient uses wife nebulizer machine at home. Prescription to get his own nebulizer   Steroid held as the patient Patient has recently steroid use   The patient has history of pulmonary fibrosis which contribute to sense of dyspnea.   Advised to follow-up with pulmonary as an outpatient      Coronary Artery Disease/CHF - stable no chest pain, no acute EKG changes , troponin is normal.    -continue preadmission Eliquis 5 mg daily, sacubitril/valsartan ( Entresto) 25-26 mg PO BID    -currently stable for him without discharge evaluate. Hypercholesterolemia: Continue    atorvastatin( Lipitor) 80 mg daily        Dementia: Currently stable, the patient lives with his wife at home with good support     SAFETY:  Addressed  Code Status:Full code   DVT prophylaxis: Continue Eliquis 5 mg daily  Stress Ulcer prophylaxis: Protonix 40 mg p.o. daily  Bladder catheter:No catheter   Family Contact Info:  Primary Emergency Contact: Grey Lamas, Home Phone: [phone]  Bedded: [facility] Room 116/01  Disposition: TBD, likely home when stable  Admission status:  Inpatient      -Discussed with the patient, family and they are agreeable with the plan. Questions  were answered   -Multidiciplinary team rounds were held today with RN, , nursing, pharmacist and clinical coordinator. Patient's plan of care was discussed; medications reviewed and discharge planning was addressed.   -Time spent with the patient was 45 minutes, with more than 50% in direct patient care.      Signed: Fili Plata MD  [facility] Hospitalist  [phone]        _

## 2022-04-04 NOTE — TELEPHONE ENCOUNTER
Patient was discharged from [facility] on 4.4.2022 for possible pneumonia.   He has an appt on 4.6.2022 at 10:30am.  Patient can be reached at [phone]

## 2022-04-04 NOTE — PROGRESS NOTES
Bedside and Verbal shift change report given to GEOVANNA Bray LPN (oncoming nurse) by Nataliia Barkley RN   (offgoing nurse). Report included the following information SBAR, Kardex, Procedure Summary, Intake/Output, MAR, Accordion, Recent Results, Med Rec Status, Cardiac Rhythm atrial paced and Alarm Parameters . Du score 3  Bed/chair alarm yes in use. If in use it is set at the highest volume. Intravenous fluids were administered, IV fluids antibiotic Doxycycline  Patient Vitals for the past 12 hrs:   Temp Pulse Resp BP SpO2   04/03/22 2029 -- 60 -- 133/64 --   04/03/22 2019 97.8 °F (36.6 °C) 60 20 133/64 97 %   04/03/22 1916 -- -- -- -- 95 %   04/03/22 1527 97.5 °F (36.4 °C) 60 20 (!) 141/63 94 %   04/03/22 1144 -- 61 -- 138/63 --   04/03/22 1133 98.2 °F (36.8 °C) 61 20 138/63 96 %   04/03/22 0958 -- 62 -- 128/68 --     No flowsheet data found. Lab results reviewed. For significant abnormal values and values requiring intervention, see assessment and plan.

## 2022-04-04 NOTE — PROGRESS NOTES
Bedside shift change report given to KETAN Mayen (oncoming nurse) by [address] GIOVANNI Bray (offgoing nurse). Report included the following information SBAR, Kardex, Intake/Output and Recent Results.        Visit Vitals  BP (!) 148/66 (BP 1 Location: Right upper arm, BP Patient Position: At rest)   Pulse 72   Temp 98.4 °F (36.9 °C)   Resp 20   Ht 5' 11\" (1.803 m)   Wt 73.6 kg (162 lb 4.8 oz)   SpO2 98%   BMI 22.64 kg/m²

## 2022-04-04 NOTE — PROGRESS NOTES
Problem: Patient Education: Go to Patient Education Activity  Goal: Patient/Family Education  Outcome: Progressing Towards Goal     Problem: Pneumonia: Day 1  Goal: Nutrition/Diet  Outcome: Progressing Towards Goal     Problem: Pneumonia: Discharge Outcomes  Goal: *Respiratory status at baseline  Outcome: Progressing Towards Goal  Goal: *Vital signs within defined limits  Outcome: Progressing Towards Goal     Problem: General Medical Care Plan  Goal: *Labs within defined limits  Outcome: Progressing Towards Goal     Problem: Falls - Risk of  Goal: *Absence of Falls  Description: Document Osmel Fall Risk and appropriate interventions in the flowsheet. Outcome: Progressing Towards Goal  Note: Fall Risk Interventions:  Mobility Interventions: Bed/chair exit alarm         Medication Interventions: Bed/chair exit alarm,Patient to call before getting OOB    Elimination Interventions: Call light in reach,Bed/chair exit alarm,Stay With Me (per policy),Urinal in reach              Problem: Falls - Risk of  Goal: *Absence of Falls  Description: Document Tenzin Patel Fall Risk and appropriate interventions in the flowsheet.   Outcome: Progressing Towards Goal  Note: Fall Risk Interventions:  Mobility Interventions: Bed/chair exit alarm         Medication Interventions: Bed/chair exit alarm,Patient to call before getting OOB    Elimination Interventions: Call light in reach,Bed/chair exit alarm,Stay With Me (per policy),Urinal in reach

## 2022-04-04 NOTE — TELEPHONE ENCOUNTER
Care Transitions Initial Follow Up Call    Outreach made within 2 business days of discharge: Yes    Patient: Suze Mao Patient : 1945   MRN: [id number]  Reason for Admission: Respiratory Distress  Discharge Date: 22       Spoke with: John Sexton (wife)    Discharge department/facility: [facility]    TCM Interactive Patient Contact:  Was patient able to fill all prescriptions: Yes  Was patient instructed to bring all medications to the follow-up visit: Yes  Is patient taking all medications as directed in the discharge summary?  Yes  Does patient understand their discharge instructions: Yes  Does patient have questions or concerns that need addressed prior to 7-14 day follow up office visit: No    Scheduled appointment with PCP within 7-14 days    Follow Up  Future Appointments   Date Time Provider Jayme Barton   2022 10:30 AM Tony Russell MD [facility]   2022  1:00 PM Gila Irvin., NP [facility]   2022  2:15 PM REMOTE_[facility]   2022  9:15 AM MD HERIBERTO [facility]   2022 10:00 AM PACEMAKER, [facility]   2022 10:20 AM Lita Garcia, ANP [facility]       Melissa Woodward RN

## 2022-04-04 NOTE — DISCHARGE SUMMARY
[facility]  Hospitalist Discharge Summary    Patient ID:  Mihir Tapia  [id number]  51 y.o.  1945    PCP on record: Crystal Beach MD    Admit date: 3/31/2022  Discharge date and time: 4/4/2022     DISCHARGE DIAGNOSIS:  Main diagnosis  1. Community-acquired Left lower lobe pneumonia. 2.  Acute COPD exacerbation. Other contributing medical problems include  1. Chronic hypoxemia patient is O2 dependent. 2 Coronary artery disease  3. Congestive heart failure      CONSULTATIONS: None  Procedures: None    DISCHARGE SUMMARY/HOSPITAL COURSE: for full details see H&P, daily progress notes, labs, consult notes  . 65 y.o. male with medical history of combined systolic and diastolic congestive heart failure, coronary artery disease status post CABG with LIMA to LAD about 10 years ago, sick sinus syndrome, AV bridgette reentry tachycardia, atrial fibrillation, factor V Leiden mutation, carotid artery stenosis, history of stroke, hypertension, hyperlipidemia, hypothyroidism, Alzheimer dementia, and chronic anticoagulation with Eliquis . H also has interstitial lung disease, COPD with emphysema, on home O2 therapy. He presented with shortness of breath and cough for over 2 days. Work-up in the ER revealed a left lower lobe pneumonia, and mild COPD decerebration. In the hospital he was treated with Rocephin 1 g IV daily and doxycycline 100 mg IV twice daily. He was continued on Advair and Proventil nebulized bronchodilators. Oxygen was maintained at 2 L nasal cannula. He responded well with resolution of his symptoms shortness of breath and cough subside. He also had extensive history of coronary artery disease and congestive heart failure which were currently stable. No chest pain or EKG changes suggestive of ischemia at this time. He was continued on Eliquis 5 mg daily and Entresto 25/26 mg twice daily. Patient oral intake and sleeping were satisfactory.   He was noted to walk around with a walker difficulty. As the patient felt he is back to normal will be discharged home today on Ceftin and doxycycline to complete antibiotic treatment, will prescribe a nebulizer and nebulized Atrovent Proventil bronchodilator, continue oxygen at home. The patient wife is agreeable with this plan and she is  supportive and involved in his care    Review of system on discharge:  No further complaints or concerns today good appetite no fever, no headache no neck pain or stiffness, no chest pain or palpitations, no shortness of breath no wheeze, no nausea no vomiting abdominal pain, no sensory focal neuro deficit, no skin rash, no urinary complaints,    Patient seen and examined by me on discharge day. Pertinent Findings:  Visit Vitals  /61   Pulse 68   Temp 97.8 °F (36.6 °C)   Resp 20   Ht 5' 11\" (1.803 m)   Wt 73.6 kg (162 lb 4.8 oz)   SpO2 96%   BMI 22.64 kg/m²     Gen:    Not in distress  Chest: Nonlabored respiration, good equal air entry bilateral, no wheeze  CVS:   Regular rhythm. No edema  Abd:  Soft, not distended, not tender  Neuro:  Alert, nonfocal, no withdrawal with walker  Skin : no rash, petechia  Neuro exam nonfocal    DISCHARGE MEDICATIONS:    doxycycline 100 mg p.o. twice daily for 5 days   Ceftin 500 mg p.o. twice daily for 5 days   Atrovent Proventil nebulized  Bronchodilators   Oxygen 2 L nasal cannula   Continue home medication      My Recommended  Diet: Low-salt diet  Activity: Increase gradually as tolerated  Wound Care: none  Follow-up labs:  Follow-up with PCP in 3 to 5 days    Disposition discharge home  Condition at Discharge:  Stable    Follow up with: PCP Dr. Caty Tate MD   Total time in minutes spent coordinating this discharge (includes going over instructions, follow-up, prescriptions, and preparing report for sign off to her PCP) :45 minutes    Signed:  Negra Hawley MD  [facility] Hospitalist  [phone]

## 2022-04-04 NOTE — DISCHARGE INSTRUCTIONS
Patient Education        Pneumonia: Care Instructions  Overview     Pneumonia is an infection of the lungs. Most cases are caused by infections from bacteria or viruses. Pneumonia may be mild or very severe. If it is caused by bacteria, you will be treated with antibiotics. It may take a few weeks to a few months to recover fully from pneumonia, depending on how sick you were and whether your overall health is good. Follow-up care is a key part of your treatment and safety. Be sure to make and go to all appointments, and call your doctor if you are having problems. It's also a good idea to know your test results and keep a list of the medicines you take. How can you care for yourself at home? · Take your antibiotics exactly as directed. Do not stop taking the medicine just because you are feeling better. You need to take the full course of antibiotics. · Take your medicines exactly as prescribed. Call your doctor if you think you are having a problem with your medicine. · Get plenty of rest and sleep. You may feel weak and tired for a while, but your energy level will improve with time. · To prevent dehydration, drink plenty of fluids. Choose water and other clear liquids. If you have kidney, heart, or liver disease and have to limit fluids, talk with your doctor before you increase the amount of fluids you drink. · Take care of your cough so you can rest. A cough that brings up mucus from your lungs is common with pneumonia. It is one way your body gets rid of the infection. But if coughing keeps you from resting or causes severe fatigue and chest-wall pain, talk to your doctor. Your doctor may suggest that you take a medicine to reduce the cough. · Use a vaporizer or humidifier to add moisture to your bedroom. Follow the directions for cleaning the machine. · Do not smoke or allow others to smoke around you. Smoke will make your cough last longer.  If you need help quitting, talk to your doctor about stop-smoking programs and medicines. These can increase your chances of quitting for good. · Take an over-the-counter pain medicine, such as acetaminophen (Tylenol), ibuprofen (Advil, Motrin), or naproxen (Aleve). Read and follow all instructions on the label. · Do not take two or more pain medicines at the same time unless the doctor told you to. Many pain medicines have acetaminophen, which is Tylenol. Too much acetaminophen (Tylenol) can be harmful. · If you were given a spirometer to measure how well your lungs are working, use it as instructed. This can help your doctor tell how your recovery is going. · To prevent pneumonia in the future, talk to your doctor about getting a yearly flu vaccine and a pneumococcal vaccine. When should you call for help? Call 911 anytime you think you may need emergency care. For example, call if:    · You have severe trouble breathing. Call your doctor now or seek immediate medical care if:    · You cough up dark brown or bloody mucus (sputum).     · You have new or worse trouble breathing.     · You are dizzy or lightheaded, or you feel like you may faint. Watch closely for changes in your health, and be sure to contact your doctor if:    · You have a new or higher fever.     · You are coughing more deeply or more often.     · You are not getting better after 2 days (48 hours).     · You do not get better as expected. Where can you learn more? Go to http://www.[employer].com/  Enter D336 in the search box to learn more about \"Pneumonia: Care Instructions. \"  Current as of: July 6, 2021               Content Version: 13.2  © 8814-6125 [email or web address]. Care instructions adapted under license by [employer] (which disclaims liability or warranty for this information).  If you have questions about a medical condition or this instruction, always ask your healthcare professional. Keli Acosta disclaims any warranty or liability for your use of this information. DISCHARGE SUMMARY from Nurse    PATIENT INSTRUCTIONS:    What to do at Home:  Recommended activity: Activity as tolerated,     If you experience any recurrent symptoms , please follow up with PCP or return to the ED. *  Please give a list of your current medications to your Primary Care Provider. *  Please update this list whenever your medications are discontinued, doses are      changed, or new medications (including over-the-counter products) are added. *  Please carry medication information at all times in case of emergency situations. These are general instructions for a healthy lifestyle:    No smoking/ No tobacco products/ Avoid exposure to second hand smoke  Surgeon General's Warning:  Quitting smoking now greatly reduces serious risk to your health. Obesity, smoking, and sedentary lifestyle greatly increases your risk for illness    A healthy diet, regular physical exercise & weight monitoring are important for maintaining a healthy lifestyle    You may be retaining fluid if you have a history of heart failure or if you experience any of the following symptoms:  Weight gain of 3 pounds or more overnight or 5 pounds in a week, increased swelling in our hands or feet or shortness of breath while lying flat in bed. Please call your doctor as soon as you notice any of these symptoms; do not wait until your next office visit. The discharge information has been reviewed with the patient. The patient verbalized understanding. Discharge medications reviewed with the patient and appropriate educational materials and side effects teaching were provided.   ___________________________________________________________________________________________________________________________________

## 2022-04-04 NOTE — ROUTINE PROCESS
Discharge instructions reviewed with patient  Personal belongings returned: n/a  Home meds returned: n/a  To front entrance via wheelchair  Discharged home with  derek @ 0969

## 2022-04-04 NOTE — PROGRESS NOTES
Care Management Interventions  PCP Verified by CM: Yes Reva Escobar MD )  Last Visit to PCP: 03/04/22  Palliative Care Criteria Met (RRAT>21 & CHF Dx)?: No (No MD order)  Mode of Transport at Discharge: Other (see comment) (POV)  Transition of Care Consult (CM Consult): Discharge Planning  Discharge Durable Medical Equipment: No  Physical Therapy Consult: No  Occupational Therapy Consult: No  Speech Therapy Consult: No  Support Systems: Spouse/Significant Other  Confirm Follow Up Transport: Family  The Plan for Transition of Care is Related to the Following Treatment Goals : Treat pneumonia  The Patient and/or Patient Representative was Provided with a Choice of Provider and Agrees with the Discharge Plan?: Yes  Freedom of Choice List was Provided with Basic Dialogue that Supports the Patient's Individualized Plan of Care/Goals, Treatment Preferences and Shares the Quality Data Associated with the Providers?: Yes  Discharge Location  Patient Expects to be Discharged to[de-identified] Home with home health [facility] )      Patient is being discharged home today. He will going home with [address] [place] [address] which is the only [facility] agency that will accept his insurance. Patient explained the freedom of choice form and stated understanding. Patient signed freedom of choice and copy given to patient. PT/OT and nursing ordered. On patient's last visit, [facility] home health accepted patient. Spoke with patient and he said no one ever came out to visit patient. Spoke with [place]. She said home health didn't come out because patient had a nonworking number. Not able to contact patient. She said [place] tried to contact [facility] but CM here did NOT receive a message. Will resend referral. Confirmed with patient what number to call. [place] has correct number to contact patient/wife. And call within a few days to see if they have visited patient.  Also, last admission a referral was sent to [employer] for consumer directed services which did not pan out. No one ever contacted patient's wife. Will send referral to [employer] for consumer directed services, so his family member can get paid to take care of him. Patient will need a nebulizer. Unfortunately, no pharmacy in the area will bill medicare for equipment. Called patient's pharmacy ([address] [place] [address]) and spoke with Allison Squires. She said they do NOT bill medicare. The cost of the nebulizer is $40 out of pocket. Asked patient if he could afford this and he said he could NOT afford it. Will call [facility] about a nebulizer. Patient was upgraded to inpatient status over the weekend. Medicare pt has received, reviewed, and signed 1st IM letter informing them of their right to appeal the discharge. Patient is aware of right to appeal discharged but CHOOSES to leave today. Signed copy has been placed on pt bedside chart. Patient and family told to contact care management for any questions or concerns. RUR: 16% MODERATE    Transition of Care (ANJU) Plan:  Home w/HH through [facility] directed services     ANJU Transportation:       How is patient being transported at discharge? POV     When? Today      Is transport scheduled? N/a     Follow-up appointment and transportation:     PCP? 4/6/22 at 10:30am      Who is transporting to the follow-up appointment? POV      Is transport for follow up appointment scheduled? N/a     Communication plan (with patient/family): patient/family aware and agree with discharge plan      Who is being called? Patient or Next of Kin? Responsible party? Patient      What number(s) is to be used? [phone]      What service provider is calling for [facility] services? [facility]       When are they calling?  24--48 hours prior to vernell       Click here to complete [address] including selection of the Healthcare Decision Maker Relationship (ie \"Primary\")  @[email or web address]

## 2022-04-05 NOTE — PROGRESS NOTES
4/5/22 1156    Attempted to send referral to [employer] but was sent to [employer]. Left a message for them to call back. Do not have a fax number for them. Will try again later. 4/5/22 1534  Received msg from Nohelia Mack at [facility] saying she did not receive the correct phone number. Told her that I circled the wife's phone number to contact and that I have confirmation of this. Nohelia Mack confirmed she has the information already. Apologizes for not seeing it. 4/5/22 [address] Again, was forwarded to [employer]. Left another voicemail. Called [employer] and spoke with Zainab New. She said the only company that does consumer directed services is [employer].

## 2022-04-06 NOTE — PROGRESS NOTES
1. Have you been to the ER, urgent care clinic since your last visit? Hospitalized since your last visit? Yes - [facility] 3/31/22 thru 4/4/22 - Pneumonia/COPD exacerbation     2. Have you seen or consulted any other health care providers outside of the [address] [place] [address] since your last visit? Include any pap smears or colon screening. Yes- 3/23/22 Dr Varinder Ambrocio (Cardiology)     4/6/2022      Chief Complaint   Patient presents with   [address] 3/31/22 thru 4/4/22 (Pneumonia/COPD Exacerbation)         History of Present Illness:        Ugo Russell is a 65 y.o. male discharged five days ago from [facility] for LLL pneumonia admission. No changes in meds, on oral abx and doing better. He does not appear to be taking his entresto today. Sees cardiology on about three weeks. No Known Allergies    Current Outpatient Medications   Medication Sig    sacubitriL-valsartan (Entresto) 24-26 mg tablet Take 1 Tablet by mouth two (2) times a day.  albuterol (PROVENTIL VENTOLIN) 2.5 mg /3 mL (0.083 %) nebu 3 mL by Nebulization route every four (4) hours as needed for Wheezing.  doxycycline (MONODOX) 100 mg capsule Take 1 Capsule by mouth every twelve (12) hours for 5 days.  cefUROXime (CEFTIN) 500 mg tablet Take 1 Tablet by mouth every twelve (12) hours for 5 days.  Oxygen     furosemide (LASIX) 20 mg tablet Take 1 Tablet by mouth daily.     gabapentin (NEURONTIN) 600 mg tablet TAKE 1 TABLET BY MOUTH THREE TIMES A DAY    atorvastatin (LIPITOR) 40 mg tablet TAKE 1 TABLET BY MOUTH AT BEDTIME    sertraline (ZOLOFT) 100 mg tablet TAKE 2 TABLETS BY MOUTH EVERY DAY    metoprolol succinate (TOPROL-XL) 25 mg XL tablet TAKE 1/2 TABLET BY MOUTH ONCE DAILY    pantoprazole (PROTONIX) 40 mg tablet TAKE 1 TABLET BY MOUTH ONCE DAILY    Eliquis 5 mg tablet TAKE 1 TABLET BY MOUTH EVERY 12 HOURS    memantine (NAMENDA) 10 mg tablet TAKE 1 TABLET BY MOUTH TWICE DAILY    polyethylene glycol (MIRALAX) 17 gram packet Take 1 Packet by mouth daily. (Patient not taking: Reported on 3/31/2022)    donepeziL (ARICEPT) 5 mg tablet Take 1 Tablet by mouth nightly. (Patient not taking: Reported on 4/6/2022)    acetaminophen (TYLENOL) 500 mg capsule Take 1,000 mg by mouth every six (6) hours as needed for Pain. (Patient not taking: Reported on 3/31/2022)    albuterol (PROVENTIL HFA, VENTOLIN HFA, PROAIR HFA) 90 mcg/actuation inhaler Take 1 Puff by inhalation every six (6) hours as needed for Wheezing (Rarely uses \"puffer\"). (Patient not taking: Reported on 3/23/2022)    nitroglycerin (NITROSTAT) 0.4 mg SL tablet 1 Tab by SubLINGual route every five (5) minutes as needed for Chest Pain (call 911 if not relieved by 3). (Patient not taking: Reported on 3/23/2022)    senna-docusate (PERICOLACE) 8.6-50 mg per tablet Take 1 Tab by mouth daily. (Patient not taking: Reported on 3/31/2022)    multivit-min/FA/lycopen/lutein (SENTRY SENIOR PO) Take  by mouth daily. (Patient not taking: Reported on 3/31/2022)     No current facility-administered medications for this visit. Physical Examination:    Visit Vitals  BP (!) 84/54 (BP 1 Location: Left upper arm, BP Patient Position: Sitting, BP Cuff Size: Adult)   Pulse 67   Temp 97 °F (36.1 °C) (Oral)   Resp 20   Ht 5' 11\" (1.803 m)   Wt 161 lb 6 oz (73.2 kg)   SpO2 97% Comment: with 2 Liters oxygen   BMI 22.51 kg/m²      General:  Alert, cooperative, no distress. HEENT:  Normocephalic, without obvious abnormality, atraumatic. Conjunctivae/corneas clear. Pupils equal, round, reactive to light. Extraocular movements intact. TMs and external canals normal bilaterally. Nasal mucosa and oropharynx clear. Lungs: Clear to auscultation bilaterally. Chest wall:  No tenderness or deformity. Heart:  Regular rate and rhythm, S1, S2 normal, no murmur, click, rub, or gallop. Abdomen:   Soft, non-tender. Bowel sounds normal. No masses. No organomegaly.    Extremities: Extremities normal, atraumatic, no cyanosis or edema. Pulses: 2+ and symmetric all extremities. Skin: Skin color, texture, turgor normal. No rashes or lesions. Lymph nodes: Cervical, supraclavicular, and axillary nodes normal.   Neurologic: CNII-XII intact. Normal strength, sensation, and reflexes throughout. ASSESSMENT AND PLAN    1. Chronic systolic (congestive) heart failure (HCC)  Cont current regimen  - sacubitriL-valsartan (Entresto) 24-26 mg tablet; Take 1 Tablet by mouth two (2) times a day. Dispense: 180 Tablet; Refill: 3    2. Pneumonia of left lower lobe due to infectious organism  Complete abx          Orders Placed This Encounter    sacubitriL-valsartan (Entresto) 24-26 mg tablet     Sig: Take 1 Tablet by mouth two (2) times a day.      Dispense:  180 Tablet     Refill:  3     RTC 3 months    Lupe Mares MD

## 2022-04-06 NOTE — PROGRESS NOTES
TELEPHONE CALL F/U:  4/6/22  9am    Contacted the patient's wife to f/u with finding a personal care agency and delivery of her 's nebulizer machine. According to Mrs. Ayaz Gonzalez, someone called her but she could not remember the person's name or why they were calling and what agency they were calling from. Contacted [facility] to f/u the status of the nebulizer machine referral that was faxed on 4/4/22. Spoke with Ugo Garland who stated that the referral was forwarded to the [place] office because of the patient's address. Will continue to f/u with the patient's wife. As far as the personal care agency, Cait Evans RN BSN has been trying to find an agency for the patient. She was unable to get one agency, [employer] (she attempted x2), but was able speak with someone at [facility]. Will f/u with that issue as well. Encouraged Mrs. Ayaz Gonzalez to please call Case Management to keep us updated on the above issues by letting us know whether or not she has received any info from either company.

## 2022-04-08 NOTE — ADT AUTH CERT NOTES
22 PA recommendation by [employer]       Review Entered Review Status   2022 12:20 In Primary      Criteria Review   We recommend that the following pt's hospitalization under OBSERVATION [104] status is upgraded to INPATIENT If you agree, please place a new ADMIT order in [facility] as recommended.       Name: Geovanny Rothman   : 1945   ADEEL# : [id number]  Insurance: [employer] Medicare     Clinical summary 65-year-old admitted with left lower lobe pneumonia, requiring supplemental oxygen, on IV antibiotics ,work-up in progress  Vitals stable  Labs and Imaging reviewed  MCG criteria applies yes  Comments based on medical necessity, this patient meets inpatient criteria      This chart was reviewed at 11:07 AM 2022    Sarahi Castlilo MD MD   Physician [facility]   Cell [phone]        Pneumonia - Care Day 2 (2022) by Kaela Carballo RN       Review Entered Review Status   2022 14:22 Completed      Criteria Review      Care Day: 2 Care Date: 2022 Level of Care:    Guideline Day 1    Level Of Care    (X) ICU or floor    2022 14:22:33 EDT by Kaela Carballo      remote tele    Clinical Status    ( ) * Clinical Indications met    2022 14:22:33 EDT by Kaela Carballo      98.7 69 149/77 20 95%NC2L    Activity    (X) Activity as tolerated    2022 14:22:33 EDT by Li Devine ambulate with assist    Routes    (X) Liquid or usual diet    2022 14:22:33 EDT by Kaela Carballo      Regular; Low Fat/Low Chol/High Fiber/2 gm Na [[id number]]    Interventions    (X) WBC    2022 14:22:33 EDT by Kaela Carballo      WBC: 8.0    (X) Blood culture    2022 14:22:33 EDT by Li Devine NGTD    (X) Probable oxygen    2022 14:22:33 EDT by Kaela Carballo      NC2L    Medications    (X) IV antibiotics    2022 14:22:33 EDT by Kaela Carballo      vibramycin 10mg ivq12 rocephin 1g ivq24    * Milestone   Additional Notes    LOC OBS Remote Tele      Labs   WBC: 8.0   NRBC: 0.0   RBC: 3.90 (L)   HGB: 11.0 (L)   HCT: 34.4 (L)   MCV: 88.2   MCH: 28.2   MCHC: 32.0   RDW: 13.7   PLATELET: 857   MPV: 11.0   NEUTROPHILS: 88 (H)   LYMPHOCYTES: 11 (L)   MONOCYTES: 1 (L)   EOSINOPHILS: 0   BASOPHILS: 0   IMMATURE GRANULOCYTES: 0   DF: AUTOMATED   ABSOLUTE NRBC: 0.00   ABS. NEUTROPHILS: 6.9   ABS. IMM. GRANS.: 0.0   ABS. LYMPHOCYTES: 0.9   ABS. MONOCYTES: 0.1   ABS. EOSINOPHILS: 0.0   ABS.  BASOPHILS: 0.0   Sodium: 137   Potassium: 4.3   Chloride: 100   CO2: 31   Anion gap: 6   Glucose: 162 (H)   BUN: 27 (H)   Creatinine: 1.23   BUN/Creatinine ratio: 22 (H)   Calcium: 9.9   GFR est non-AA: 57 (L)   GFR est AA: >60

## 2022-04-08 NOTE — PROGRESS NOTES
4/8/22 1146    Received a message from Richy Gr from [facility]. She said to send [facility] to [phone]. Referral sent. Ms. Lien Barnes said she is out of office till Tuesday.

## 2022-04-11 NOTE — PROGRESS NOTES
Telephone call from Chari Wolf with [employer] consumer directed services facilitator [employer], [phone]. She can orchestrate the facilitation for Mr. Kaylan Madsen. She did not receive the [facility] faxed previously. She gave me another fax number - [phone]. I faxed the [employer] to this number. Further, I called patient's wife Carlos Alberto Son [phone] to advise her.

## 2022-04-29 NOTE — PROGRESS NOTES
Please advise labs are stable/improved after starting Lasix. Continue current care, and follow-up as planned.

## 2022-05-02 NOTE — TELEPHONE ENCOUNTER
Verified patient with two types of identifiers. Spoke with pt's wife Maureen Matthews, listed on PHI release form. Informed her that labs are stable/improved, per Dr. Ced Camarillo. It was noted that upcoming appt on 5/5/22 shows it is at [facility] with Edilia Lopes NP. Informed pt's wife that this may need to be rescheduled, due to provider transitioning to this office location, and that someone will reach out to her to reschedule. Ms. Laurent Bud verbalized understanding and will call with any other questions.           Future Appointments   Date Time Provider Jayme Barton   5/5/2022  1:00 PM Amarjit Alicea., NP [facility] BS AMB   6/16/2022  2:15 PM REMOTE_RCAM [facility] BS AMB   9/8/2022 10:00 AM PACEMAKER, RCAM [facility] BS AMB   9/8/2022 10:20 AM Lita Garcia, ANP [facility] BS AMB

## 2022-05-02 NOTE — TELEPHONE ENCOUNTER
----- Message from Antoinette Damon MD sent at 4/29/2022 11:16 AM EDT -----  Please advise labs are stable/improved after starting Lasix. Continue current care, and follow-up as planned.

## 2022-05-10 PROBLEM — N18.30 CHRONIC RENAL DISEASE, STAGE III (HCC): Status: ACTIVE | Noted: 2022-01-01

## 2022-05-10 NOTE — PROGRESS NOTES
Rebecca Jiang is a 65 y.o. male who presents to the office today with the following:  Chief Complaint   Patient presents with    Generalized Body Aches       HPI  Hx of generalized body aches  Can't do nothing  Has been feeling bad since breathing problems  Was in the hospital for CHF  On oxygen  Still with SOB when doing anything  Was ordered Albuterol neb solution but has no neb machine    Depression  On Zoloft 100 2 a day  Not helping    CHF  Seeing Card PA  Has apt 5/23/22    Malick Tierney in the bath tub when getting up from a chair 3-4 w ago  Has a better shower chair now  But hit head then  Has had wound on head per wife   Has had headaches all the time    Review of Systems   Constitutional: Negative for weight loss. HENT: Negative for sinus pain. Respiratory: Positive for shortness of breath. Negative for cough. Cardiovascular: Positive for leg swelling. Negative for chest pain. Gastrointestinal: Negative for abdominal pain. Genitourinary: Negative. Negative for dysuria. Has a bladder pump   Musculoskeletal: Positive for falls and myalgias (in legs). Neurological: Positive for dizziness and headaches. Psychiatric/Behavioral: Positive for depression. Negative for suicidal ideas. See HPI.     Past Medical History:   Diagnosis Date    Acute combined systolic and diastolic congestive heart failure ([address] 2/12/2022    AICD (automatic cardioverter/defibrillator) present 5/13/2016 5/13/16 [employer] upgrade to dual chamber AICD implant  Dr. Gin Mcleod disease [facility])     Anxiety state, other     Arthritis     OSTEO ARTHRITIS    AVNRT (AV bridgette re-entry tachycardia) ([address] 5/12/2016 5/12/16 AVNRT ablation: PM/AICD left upper chest :Dr. Alissa Golden Back ache     CAD (coronary artery disease)     Cancer [facility]) 2004    Prostate cancer    Chest tightness     last episode of chest pain 5/2016 before AICD placed; none since then    Coagulation defects 2005 FACTOR V LEIDEN    Dementia [facility]     Depression     Dizziness     FH: factor V Leiden deficiency     Generalized muscle ache     GERD (gastroesophageal reflux disease)     HEARTBURN    Heart failure ([address] 2014    as of 07/2019 EF 30-35;  Dr. Gabriela Quiros, Cardiomyopathy    Hyperlipidemia, mixed     Hypertension     Long term current use of anticoagulant therapy     Other ill-defined conditions(799.89) 2004    blood transfusion    Pacemaker     Paroxysmal atrial fibrillation (HCC)     rate controlled with coreg     Postsurgical aortocoronary bypass status 05/29/2012    CABG    Presence of cardiac defibrillator 05/2016    left upper chest    Pulmonary emphysema ([address] 3/4/2022    SSS (sick sinus syndrome) ([address]     Stroke ([facility] 2011, 1990's    SEVERAL-mini    Syncope     Thromboembolism ([facility] 2014    left leg: changed to Eliquis from Warfarin    Thromboembolus ([facility] 2005    left leg    Thyroid disease     Weakness generalized        Past Surgical History:   Procedure Laterality Date    CARDIAC CATHETERIZATION  3/4/2011         HX HEENT  2019    oral surgery, removed teeth, dentures upper/lower    HX HERNIA REPAIR Left 2002    Ingiunal hernia repair left    HX ORTHOPAEDIC      LEFT KNEE CARTILAGE REPAIR;RIGHT ROTATOR CUFF REPAIR    HX ORTHOPAEDIC  2/14/12    C5-7 ANTERIIOR CERVICAL DISECTOMY AND FUSION    HX ORTHOPAEDIC  6/4/13    C5-C7 POSTERIOR DECOMPRESSION AND FUSION    HX ORTHOPAEDIC      right thumb partial amputation of tip    HX OTHER SURGICAL      steroid injections    HX PACEMAKER Left 05/13/2016    BS D142, Dr. Bria Dorantes HX PROSTATECTOM[place]  2004     [place]    HX ROTATOR CUFF REPAIR Left 2019    HX UROLOGICAL       urinary control system    HX UROLOGICAL  12/3/13    REPLACEMENT ARTIFICAL URINARY SPHINCTER    [place] CARDIAC SURG PROCEDURE UNLIST      CABG X1 3/5/11       No Known Allergies    Current Outpatient Medications   Medication Sig    Nebulizer & Compressor machine Use nebulizer qid prn    Nebulizer Accessories kit Use nebulizer qid prn    albuterol (PROVENTIL VENTOLIN) 2.5 mg /3 mL (0.083 %) nebu 3 mL by Nebulization route every four (4) hours as needed for Wheezing.  DULoxetine (CYMBALTA) 60 mg capsule Take 1 Capsule by mouth daily.  memantine (NAMENDA) 10 mg tablet TAKE 1 TABLET BY MOUTH TWICE DAILY    sacubitriL-valsartan (Entresto) 24-26 mg tablet Take 1 Tablet by mouth two (2) times a day.  Oxygen     furosemide (LASIX) 20 mg tablet Take 1 Tablet by mouth daily.  gabapentin (NEURONTIN) 600 mg tablet TAKE 1 TABLET BY MOUTH THREE TIMES A DAY    polyethylene glycol (MIRALAX) 17 gram packet Take 17 g by mouth daily.  acetaminophen (TYLENOL) 500 mg capsule Take 1,000 mg by mouth every six (6) hours as needed for Pain.  atorvastatin (LIPITOR) 40 mg tablet TAKE 1 TABLET BY MOUTH AT BEDTIME    metoprolol succinate (TOPROL-XL) 25 mg XL tablet TAKE 1/2 TABLET BY MOUTH ONCE DAILY    pantoprazole (PROTONIX) 40 mg tablet TAKE 1 TABLET BY MOUTH ONCE DAILY    Eliquis 5 mg tablet TAKE 1 TABLET BY MOUTH EVERY 12 HOURS    albuterol (PROVENTIL HFA, VENTOLIN HFA, PROAIR HFA) 90 mcg/actuation inhaler Take 1 Puff by inhalation every six (6) hours as needed for Wheezing (Rarely uses \"puffer\"). (Patient not taking: Reported on 3/23/2022)    nitroglycerin (NITROSTAT) 0.4 mg SL tablet 1 Tab by SubLINGual route every five (5) minutes as needed for Chest Pain (call 911 if not relieved by 3). (Patient not taking: Reported on 3/23/2022)     No current facility-administered medications for this visit.        Social History     Socioeconomic History    Marital status:    Tobacco Use    Smoking status: Never Smoker    Smokeless tobacco: Never Used   Vaping Use    Vaping Use: Never used   Substance and Sexual Activity    Alcohol use: Not Currently     Alcohol/week: 0.0 standard drinks     Comment: stopped 2010    Drug use: Never    Sexual activity: Not Currently     Partners: Female   Other Topics Concern    Sleep Concern Yes    Stress Concern Yes     Comment: Family concerns   Social History Narrative    , 2 children       Family History   Problem Relation Age of Onset    Asthma Mother     Alcohol abuse Mother     Alcohol abuse Father     Asthma Father     Cancer Sister     Heart Disease Sister     Alcohol abuse Brother     Cancer Brother     Heart Disease Brother          Physical Exam:  Visit Vitals  /60   Pulse 88   Temp 97.2 °F (36.2 °C)   Resp 14   Ht 5' 9\" (1.753 m)   Wt 160 lb (72.6 kg)   SpO2 95%   BMI 23.63 kg/m²     Physical Exam  Vitals and nursing note reviewed. HENT:      Head: Normocephalic and atraumatic. Nose: Congestion present. Mouth/Throat:      Mouth: Mucous membranes are moist.      Pharynx: Oropharynx is clear. Eyes:      Extraocular Movements: Extraocular movements intact. Conjunctiva/sclera: Conjunctivae normal.   Cardiovascular:      Rate and Rhythm: Normal rate and regular rhythm. Heart sounds: Normal heart sounds. Pulmonary:      Effort: Pulmonary effort is normal.      Breath sounds: Normal breath sounds. No rales. Wheezes: few. Comments: On oxygen  Abdominal:      General: There is no distension. Palpations: Abdomen is soft. Tenderness: There is no abdominal tenderness. There is no right CVA tenderness or left CVA tenderness. Musculoskeletal:      Comments: Trace of edema bilat only   Lymphadenopathy:      Cervical: Cervical adenopathy present. Skin:     General: Skin is warm and dry. Neurological:      Mental Status: He is alert and oriented to person, place, and time.       Comments: depressed   Psychiatric:         Behavior: Behavior normal.         Recent Results (from the past 12 hour(s))   AMB POC URINALYSIS DIP STICK MANUAL W/O MICRO    Collection Time: 05/10/22  3:15 PM   Result Value Ref Range    Color (UA POC) Yellow     Clarity (UA POC) Clear     Glucose (UA POC) Negative Negative    Bilirubin (UA POC) Negative Negative    Ketones (UA POC) Negative Negative    Specific gravity (UA POC) 1.015 1.001 - 1.035    Blood (UA POC) Negative Negative    pH (UA POC) 6.0 4.6 - 8.0    Protein (UA POC) Negative Negative    Urobilinogen (UA POC) 0.2 mg/dL 0.2 - 1    Nitrites (UA POC) Negative Negative    Leukocyte esterase (UA POC) Negative Negative       Assessment/Plan:    ICD-10-CM ICD-9-CM    1. Shortness of breath  R06.02 786.05 Nebulizer & Compressor machine      Nebulizer Accessories kit      albuterol (PROVENTIL VENTOLIN) 2.5 mg /3 mL (0.083 %) nebu      CBC WITH AUTOMATED DIFF      CBC WITH AUTOMATED DIFF   2. Depression, unspecified depression type  F32. A 311 DULoxetine (CYMBALTA) 60 mg capsule   3. Myalgia  M79.10 729.1 C REACTIVE PROTEIN, QT      CK      DILIP, DIRECT, W/REFLEX      R RICKETTSII AB IGG W/REFL      SED RATE (ESR)      VITAMIN D, 25 HYDROXY      C REACTIVE PROTEIN, QT      CK      DILIP, DIRECT, W/REFLEX      R RICKETTSII AB IGG W/REFL      SED RATE (ESR)      VITAMIN D, 25 HYDROXY      AMB POC URINALYSIS DIP STICK MANUAL W/O MICRO   4. Arthralgia, unspecified joint  M25.50 719.40 LYME AB, IGG & IGM BY WB      DILIP, DIRECT, W/REFLEX      RA + CCP ABS      VITAMIN D, 25 HYDROXY      LYME AB, IGG & IGM BY WB      DILIP, DIRECT, W/REFLEX      RA + CCP ABS      VITAMIN D, 25 HYDROXY   5. Injury of head, initial encounter  S09.90XA 959.01 CT HEAD WO CONT     Recheck in 2 w    Follow-up and Dispositions    · Return in about 2 weeks (around 5/24/2022).      pt promised x2 to let us know if feelings of hurting self or others is getting stronger  Or go to the ER      Karlos Carmichael MD

## 2022-05-15 NOTE — ED PROVIDER NOTES
EMERGENCY DEPARTMENT HISTORY AND PHYSICAL EXAM      Date: 5/15/2022  Patient Name: Geovanny Rothman      Diagnosis     Clinical Impression:   1. Syncope and collapse    2. Orthostatic hypotension              History of Presenting Illness     Chief Complaint   Patient presents with    Dizziness       History Provided By: Patient    HPI:   The history is provided by the patient. Syncope   This is a new problem. The current episode started less than 1 hour ago. The problem has been resolved. He lost consciousness for a period of less than one minute. The problem is associated with normal activity. Associated symptoms include palpitations and light-headedness. Pertinent negatives include no visual change, no chest pain, no confusion, no fever, no abdominal pain, no bowel incontinence, no nausea, no vomiting, no bladder incontinence, no congestion, no headaches, no back pain, no dizziness, no focal weakness, no seizures, no slurred speech and no head injury. He has tried nothing for the symptoms. The treatment provided no relief. His past medical history is significant for HTN, syncope and AICD. Geovanny Rothman, 65 y.o. male  presents to the ED with cc of syncopal episode. Patient reports he went to the grocery store felt like he became dizzy with the room spinning and subsequently passed out, he reports he unconscious for just a few seconds, he was with a friend who caught him he reports he did not hit his head but he hit his left forearm on a stand and has a skin tear. He did report some palpitations prior to the fall he has chronic shortness of breath with sudden changes on 2 L of nasal cannula oxygen at home. Denies any chest pain abdominal pain nausea vomiting diarrhea, has been eating and drinking normally. Denies any headache denies neck or back pain. He has history of high blood pressure sick sinus syndrome status post AICD history of CHF COPD.   Currently patient does not have any complaints. There are no other complaints, changes, or physical findings at this time. PCP: Trell Canchola MD    No current facility-administered medications on file prior to encounter. Current Outpatient Medications on File Prior to Encounter   Medication Sig Dispense Refill    Nebulizer & Compressor machine Use nebulizer qid prn 1 Each 0    Nebulizer Accessories kit Use nebulizer qid prn 1 Kit 0    albuterol (PROVENTIL VENTOLIN) 2.5 mg /3 mL (0.083 %) nebu 3 mL by Nebulization route every four (4) hours as needed for Wheezing. 120 Nebule 0    DULoxetine (CYMBALTA) 60 mg capsule Take 1 Capsule by mouth daily. 30 Capsule 0    memantine (NAMENDA) 10 mg tablet TAKE 1 TABLET BY MOUTH TWICE DAILY 180 Tablet 3    sacubitriL-valsartan (Entresto) 24-26 mg tablet Take 1 Tablet by mouth two (2) times a day. 180 Tablet 3    Oxygen       furosemide (LASIX) 20 mg tablet Take 1 Tablet by mouth daily. 90 Tablet 1    gabapentin (NEURONTIN) 600 mg tablet TAKE 1 TABLET BY MOUTH THREE TIMES A DAY 90 Tablet 3    polyethylene glycol (MIRALAX) 17 gram packet Take 17 g by mouth daily.  acetaminophen (TYLENOL) 500 mg capsule Take 1,000 mg by mouth every six (6) hours as needed for Pain.  albuterol (PROVENTIL HFA, VENTOLIN HFA, PROAIR HFA) 90 mcg/actuation inhaler Take 1 Puff by inhalation every six (6) hours as needed for Wheezing (Rarely uses \"puffer\"). (Patient not taking: Reported on 3/23/2022)      atorvastatin (LIPITOR) 40 mg tablet TAKE 1 TABLET BY MOUTH AT BEDTIME 90 Tablet 3    metoprolol succinate (TOPROL-XL) 25 mg XL tablet TAKE 1/2 TABLET BY MOUTH ONCE DAILY 45 Tablet 3    pantoprazole (PROTONIX) 40 mg tablet TAKE 1 TABLET BY MOUTH ONCE DAILY 90 Tablet 3    Eliquis 5 mg tablet TAKE 1 TABLET BY MOUTH EVERY 12 HOURS 180 Tablet 3    nitroglycerin (NITROSTAT) 0.4 mg SL tablet 1 Tab by SubLINGual route every five (5) minutes as needed for Chest Pain (call 911 if not relieved by 3).  (Patient not taking: Reported on 3/23/2022) 1 Bottle 1       Past History     Past Medical History:  Past Medical History:   Diagnosis Date    Acute combined systolic and diastolic congestive heart failure ([address] 2/12/2022    AICD (automatic cardioverter/defibrillator) present 5/13/2016 5/13/16 [employer] upgrade to dual chamber AICD implant  Dr. Graciela Elliott disease [facility])     Anxiety state, other     Arthritis     OSTEO ARTHRITIS    AVNRT (AV bridgette re-entry tachycardia) ([address] 5/12/2016 5/12/16 AVNRT ablation: PM/AICD left upper chest :Dr. Bg Ruggiero Back ache     CAD (coronary artery disease)     Cancer [facility]) 2004    Prostate cancer    Chest tightness     last episode of chest pain 5/2016 before AICD placed; none since then    Coagulation defects 2005    FACTOR V LEIDEN    Dementia ([address]     Depression     Dizziness     FH: factor V Leiden deficiency     Generalized muscle ache     GERD (gastroesophageal reflux disease)     HEARTBURN    Heart failure ([address] 2014    as of 07/2019 EF 30-35;  Dr. Renee Prince, Cardiomyopathy    Hyperlipidemia, mixed     Hypertension     Long term current use of anticoagulant therapy     Other ill-defined conditions(799.89) 2004    blood transfusion    Pacemaker     Paroxysmal atrial fibrillation (HCC)     rate controlled with coreg     Postsurgical aortocoronary bypass status 05/29/2012    CABG    Presence of cardiac defibrillator 05/2016    left upper chest    Pulmonary emphysema ([address] 3/4/2022    SSS (sick sinus syndrome) ([address]     Stroke ([address] 2011, 1990's    SEVERAL-mini    Syncope     Thromboembolism ([address] 2014    left leg: changed to Eliquis from Warfarin    Thromboembolus ([address] 2005    left leg    Thyroid disease     Weakness generalized        Past Surgical History:  Past Surgical History:   Procedure Laterality Date    CARDIAC CATHETERIZATION  3/4/2011         HX HEENT  2019    oral surgery, removed teeth, dentures upper/lower    HX HERNIA REPAIR Left 2002    Ingiunal hernia repair left    HX ORTHOPAEDIC      LEFT KNEE CARTILAGE REPAIR;RIGHT ROTATOR CUFF REPAIR    HX ORTHOPAEDIC  2/14/12    C5-7 ANTERIIOR CERVICAL DISECTOMY AND FUSION    HX ORTHOPAEDIC  6/4/13    C5-C7 POSTERIOR DECOMPRESSION AND FUSION    HX ORTHOPAEDIC      right thumb partial amputation of tip    HX OTHER SURGICAL      steroid injections    HX PACEMAKER Left 05/13/2016    YOLANDE D142, Dr. Gilmar Gaspar HX PROSTATECTOMY  2004     [place]    HX ROTATOR CUFF REPAIR Left 2019    HX UROLOGICAL       urinary control system    HX UROLOGICAL  12/3/13    REPLACEMENT ARTIFICAL URINARY SPHINCTER    [place] CARDIAC SURG PROCEDURE UNLIST      CABG X1 3/5/11       Family History:  Family History   Problem Relation Age of Onset    Asthma Mother     Alcohol abuse Mother     Alcohol abuse Father     Asthma Father     Cancer Sister     Heart Disease Sister     Alcohol abuse Brother     Cancer Brother     Heart Disease Brother        Social History:  Social History     Tobacco Use    Smoking status: Never Smoker    Smokeless tobacco: Never Used   Vaping Use    Vaping Use: Never used   Substance Use Topics    Alcohol use: Not Currently     Alcohol/week: 0.0 standard drinks     Comment: stopped 2010    Drug use: Never       Allergies:  No Known Allergies      Review of Systems   Review of Systems   Constitutional: Negative. Negative for chills and fever. HENT: Negative. Negative for congestion and sore throat. Eyes: Negative. Negative for discharge and redness. Respiratory: Negative. Negative for cough and shortness of breath. Cardiovascular: Positive for palpitations and syncope. Negative for chest pain. Gastrointestinal: Negative. Negative for abdominal pain, bowel incontinence, nausea and vomiting. Endocrine: Negative. Negative for polydipsia and polyuria. Genitourinary: Negative.   Negative for bladder incontinence, dysuria, flank pain and frequency. Musculoskeletal: Negative. Negative for arthralgias and back pain. Skin: Negative. Negative for rash and wound. Allergic/Immunologic: Negative. Neurological: Positive for syncope and light-headedness. Negative for dizziness, focal weakness, seizures and headaches. Hematological: Negative. Negative for adenopathy. Does not bruise/bleed easily. Psychiatric/Behavioral: Negative. Negative for confusion. The patient is not nervous/anxious. All other systems reviewed and are negative. Physical Exam   Physical Exam  Vitals and nursing note reviewed. Constitutional:       General: He is not in acute distress. Appearance: Normal appearance. He is well-developed. He is not ill-appearing, toxic-appearing or diaphoretic. HENT:      Head: Normocephalic and atraumatic. Nose: Nose normal.      Mouth/Throat:      Mouth: Mucous membranes are moist.      Pharynx: Oropharynx is clear. Eyes:      Extraocular Movements: Extraocular movements intact. Right eye: Normal extraocular motion and no nystagmus. Left eye: Normal extraocular motion and no nystagmus. Conjunctiva/sclera: Conjunctivae normal.      Pupils: Pupils are equal, round, and reactive to light. Cardiovascular:      Rate and Rhythm: Normal rate and regular rhythm. Pulses: Normal pulses. Heart sounds: Normal heart sounds. Pulmonary:      Effort: Pulmonary effort is normal. No respiratory distress. Breath sounds: Normal breath sounds. Comments: AICD left upper chest  Abdominal:      General: There is no distension. Palpations: Abdomen is soft. There is no mass. Tenderness: There is no abdominal tenderness. There is no guarding or rebound. Hernia: No hernia is present. Musculoskeletal:         General: No swelling or tenderness. Normal range of motion. Left forearm: Laceration present. No deformity.         Arms:       Cervical back: Normal range of motion and neck supple. No rigidity or tenderness. No muscular tenderness. Skin:     General: Skin is warm and dry. Capillary Refill: Capillary refill takes less than 2 seconds. Findings: No erythema or rash. Neurological:      General: No focal deficit present. Mental Status: He is alert and oriented to person, place, and time. Mental status is at baseline. Cranial Nerves: No cranial nerve deficit. Sensory: No sensory deficit. Psychiatric:         Mood and Affect: Mood normal.         Behavior: Behavior normal.         Thought Content: Thought content normal.         Judgment: Judgment normal.         Diagnostic Study Results     Labs -     Recent Results (from the past 12 hour(s))   TROPONIN-HIGH SENSITIVITY    Collection Time: 05/16/22 12:01 AM   Result Value Ref Range    Troponin-High Sensitivity 12 0 - 76 ng/L   NT-PRO BNP    Collection Time: 05/16/22 12:01 AM   Result Value Ref Range    NT pro- (H) 0 - 450 PG/ML       Radiologic Studies -   XR FOREARM RT AP/LAT   Final Result   No acute fracture. CT HEAD WO CONT   Final Result   No acute intracranial abnormality. XR CHEST PORT   Final Result   Chronic interstitial lung changes. No new pneumonia. CT Results  (Last 48 hours)               05/15/22 1951  CT HEAD WO CONT Final result    Impression:  No acute intracranial abnormality. Narrative:  HEAD CT WITHOUT CONTRAST: 5/15/2022 7:51 PM       INDICATION: syncope       COMPARISON: 2/17/2020. PROCEDURE: Axial images of the head were obtained without contrast. Coronal and   sagittal reformats were performed. CT dose reduction was achieved through use of   a standardized protocol tailored for this examination and automatic exposure   control for dose modulation. FINDINGS: There is chronic encephalomalacia in the left inferior frontal white   matter.  Left basal ganglier lacunar infarcts for better seen on the prior study.   The ventricles and sulci are appropriate in size and configuration for age. No   loss of gray-white differentiation to suggest late acute or early subacute   infarction. No mass effect or intracranial hemorrhage. CXR Results  (Last 48 hours)               05/15/22 1849  XR CHEST PORT Final result    Impression:  Chronic interstitial lung changes. No new pneumonia. Narrative:  EXAM:  XR CHEST PORT       INDICATION:  Chest pain       COMPARISON: March 31, 2022. TECHNIQUE: AP portable chest radiograph. FINDINGS: Chronic interstitial changes in the lungs. No superimposed   consolidation. Pacing wires are unchanged. Left shoulder arthroplasty. No   pneumothorax. Heart size and mediastinal contours are stable. Medical Decision Making   I am the first provider for this patient. I reviewed the vital signs, available nursing notes, past medical history, past surgical history, family history and social history. Vital Signs-Reviewed the patient's vital signs. Patient Vitals for the past 12 hrs:   Pulse Resp BP SpO2   05/16/22 0449 69 -- 128/72 99 %   05/16/22 0445 61 -- (!) 79/57 --   05/16/22 0400 66 -- 113/60 100 %   05/16/22 0300 60 -- 118/60 --   05/16/22 0200 62 -- 106/66 --   05/16/22 0100 (!) 56 -- 118/64 --   05/16/22 0001 (!) 57 18 135/66 99 %   05/15/22 2300 (!) 54 18 137/68 98 %   05/15/22 2200 (!) 57 18 137/72 97 %   05/15/22 2100 (!) 58 18 (!) 149/87 99 %   05/15/22 2005 65 20 132/67 98 %   05/15/22 1945 79 -- (!) 76/45 --   05/15/22 1942 73 -- (!) 85/59 --   05/15/22 1939 66 -- 103/61 --           EKG interpretation: (Preliminary)  Rhythm: normal sinus rhythm;  regular . Rate (approx.): 71;  Blocks: none; Ectopy: noneAxis: normal; P wave: normal; QRS interval: normal ; ST/T wave: depressed; in  Lead: lateral; Other findings: .        Records Reviewed: Nursing Notes, Old Medical Records, Previous electrocardiograms, Previous Radiology Studies and Previous Laboratory Studies    Provider Notes (Medical Decision Making):   Presents after syncopal episode, he was vertiginous prior to the fall but he reports loss of consciousness, will check basic laboratory studies head CT EKG and plan to repair his skin tear. ED Course:   Initial assessment performed. The patients presenting problems have been discussed, and they are in agreement with the care plan formulated and outlined with them. I have encouraged them to ask questions as they arise throughout their visit. ED Course as of 05/16/22 7690   Roque Castillo May 15, 2022   2110 Updated patient on lab results plan to interrogate pacemaker. Case was discussed with Dr. Darby Fragoso if were unable to interrogate pacemaker recommend transfer for cardiology evaluation [MF]   2151 Staff attempting to get pacemaker interrogated working, wound care provided with Dermabond to reapproximate edges of skin tear [MF]   2200 Updated family on plan for possible admission here versus admission to [facility] [address], family prefers to [facility] [address] [MF]   2258 Unable to interrogate AICD, discussed with hospitalist recommend transferring for cardiology evaluation and evaluation of pacemaker. [MF]   2300 Transfer center contacted awaiting callback [MF]   2359 Patient accepted by Dr. Maximo Boyd to [facility] telemetry bed, awaiting bed placement []   Mon May 16, 2022   0016 Family and pt updated on admissino plan, wife notified by phone. [MF]   1141 Dr Scott Oro accepts to [facility]  ED as a hold admit, no bed assigned []      ED Course User Index  [MF] Elian Hearn MD         Medications Given in the ED:    Medications   sodium chloride 0.9 % bolus infusion 500 mL (0 mL IntraVENous IV Completed 5/15/22 2017)           11:00 PM    Presents after syncopal episode, laboratory studies revealed slight anemia, he had normal troponin. No evidence of dehydration however he was orthostatic on evaluation. He was given IV fluids gently due to history of systolic CHF. Patient does have an AICD, concern for possible arrhythmia given his history of coronary disease and needs to be evaluated. Unable to interrogate pacemaker here due to type, discussed with hospitalist, will transfer patient for cardiology evaluation admission for syncopal episode and further evaluation cardiac monitoring. I have reviewed all pertinent and currently available diagnostic test results for this visit including, but not limited to, labs, xrays, and EKGs. I have reviewed all pertinent and currently available medical records. My plan of care and further evaluation and/or disposition is based on these results, as well as the initial, and subsequent, history and physical exam, as well as any additional complaints during the visit. Kristi Torres MD        Wound Closure by Adhesive    Date/Time: 5/15/2022 9:49 PM  Performed by: Marcsu Dela Cruz MD  Authorized by: Marcus Dela Cruz MD     Consent:     Consent obtained:  Verbal    Consent given by:  Patient    Risks discussed:  Infection and pain  Anesthesia (see MAR for exact dosages): Anesthesia method:  None  Laceration details:     Location:  Shoulder/arm    Shoulder/arm location:  L lower arm    Length (cm):  4    Depth (mm):  2  Repair type:     Repair type:  Simple  Exploration:     Hemostasis achieved with:  Direct pressure    Wound extent: no foreign bodies/material noted, no nerve damage noted and no tendon damage noted      Contaminated: no    Treatment:     Area cleansed with:  Saline    Amount of cleaning:  Standard    Visualized foreign bodies/material removed: no    Skin repair:     Repair method:  Tissue adhesive  Approximation:     Approximation:  Close  Post-procedure details:     Dressing:  Non-adherent dressing    Patient tolerance of procedure:   Tolerated well, no immediate complications  Comments:      Irregular skin tear, edges of the skin tear were reapproximated to provide biologic bandage, no lacerations noted          Critical Care Time:   CRITICAL CARE NOTE :    11:59 PM    IMPENDING DETERIORATION -Cardiovascular and Metabolic  ASSOCIATED RISK FACTORS - Hypoxia, Dysrhythmia and Metabolic changes  MANAGEMENT- Bedside Assessment, Supervision of Care and Transfer  INTERPRETATION -  Xrays, ECG and Blood Pressure  INTERVENTIONS - Metabolic interventions  CASE REVIEW - Hospitalist/Intensivist, Nursing and Family  TREATMENT RESPONSE -Improved and Stable  PERFORMED BY - Self    NOTES   :  I have spent 32 minutes of critical care time involved in lab review, consultations with specialist, family decision- making, bedside attention and documentation. This time excludes time spent in any separate billed procedures. During this entire length of time I was immediately available to the patient . Yaa Garcia MD      Disposition:  Transferred to Another Facility            Attestations: Yaa Garcia MD    Please note that this dictation was completed with [employer], the computer voice recognition software. Quite often unanticipated grammatical, syntax, homophones, and other interpretive errors are inadvertently transcribed by the computer software. Please disregard these errors. Please excuse any errors that have escaped final proofreading. Thank you.

## 2022-05-15 NOTE — ED TRIAGE NOTES
At the [facility] and became light headed and syncopal . Erich Band to ground , axox4 now .  Skin tear right forearm

## 2022-05-16 PROBLEM — I95.1 ORTHOSTATIC HYPOTENSION: Status: ACTIVE | Noted: 2022-01-01

## 2022-05-16 NOTE — CONSULTS
[facility]  [address]  [phone]  [email or web address]    CARDIOLOGY INITIAL EVALUATION     Date of  Admission: 5/16/2022 11:27 AM     Admission type:Emergency    Referral for:  Referral by: Subjective:     Fredrick Casiano is a 65 y.o. male admitted for Syncope [R55]; Orthostatic hypotension [I95.1]. Known cardiomyopathy with ICD. Legs felt wobbly the past few days culminating in a syncopal episode yesterday. Othrostatic in the [facility] ER. Did not get shocked by his device.   Denies CP or SOB      Patient Active Problem List    Diagnosis Date Noted    Orthostatic hypotension 05/16/2022    Chronic renal disease, stage III 05/10/2022    Pneumonia 03/31/2022    Cor pulmonale (chronic) ([facility]) 03/23/2022    Ischemic cardiomyopathy 03/04/2022    Pulmonary emphysema ([address] 03/04/2022    Interstitial lung disease ([address] 02/15/2022    Respiratory failure ([address] 02/14/2022    Acute on chronic combined systolic and diastolic ACC/AHA stage C congestive heart failure ([address] 02/12/2022    Syncope 02/17/2020    Rotator cuff arthropathy of left shoulder 09/06/2019    Status post reverse arthroplasty of shoulder 09/06/2019    Right rotator cuff tear arthropathy 09/04/2019    Moderate major depression ([address] 07/03/2018    Depression 07/03/2018    DDD (degenerative disc disease), lumbar 04/02/2018    Chronic anticoagulation 07/17/2017    S/P CABG x 1 06/17/2016    AICD (automatic cardioverter/defibrillator) present 05/13/2016    AVNRT (AV bridgette re-entry tachycardia) ([address] 05/12/2016    Stenosis of both carotid arteries without cerebral infarction 04/12/2016    Cervicogenic headache 04/12/2016    Alzheimer's dementia, late onset, with behavioral disturbance ([address] 04/12/2016    Spinal stenosis, lumbar region, with neurogenic claudication 04/12/2016    Lumbar back pain with radiculopathy affecting right lower extremity 04/12/2016    Lumbar back pain with radiculopathy affecting left lower extremity 04/12/2016    History of stroke 04/12/2016    ACP (advance care planning) 12/30/2015    B12 deficiency 09/22/2015    Hypothyroidism due to acquired atrophy of thyroid 09/22/2015    Osteoporosis 09/22/2015    Cervical post-laminectomy syndrome 09/22/2015    Neck pain 09/22/2015    Lower GI bleeding 08/19/2015    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 ([address] 07/23/2015    Left-sided chest wall pain 11/23/2014    S/P cervical spinal fusion 06/06/2013    Osteoarthritis 05/29/2012    Hyperthyroidism 05/29/2012    Atherosclerosis of native coronary artery of native heart without angina pectoris 05/29/2012    Chronic systolic heart failure ([address] 05/29/2012    Atrial fibrillation ([address] 05/29/2012    Mixed hyperlipidemia 05/29/2012    History of factor V Leiden mutation 03/07/2011    Essential hypertension 03/04/2011      Gamal Hung MD  Past Medical History:   Diagnosis Date    Acute combined systolic and diastolic congestive heart failure ([address] 2/12/2022    AICD (automatic cardioverter/defibrillator) present 5/13/2016 5/13/16 [employer] upgrade to dual chamber AICD implant  Dr. Ashely Rodriguez Alzheimer disease [facility])     Anxiety state, other     Arthritis     OSTEO ARTHRITIS    AVNRT (AV bridgette re-entry tachycardia) ([address] 5/12/2016 5/12/16 AVNRT ablation: PM/AICD left upper chest :Dr. Jean-Pierre Banegas Back ache     CAD (coronary artery disease)     Cancer [facility]) 2004    Prostate cancer    Chest tightness     last episode of chest pain 5/2016 before AICD placed; none since then    Coagulation defects 2005    FACTOR V LEIDEN    Dementia ([address]     Depression     Dizziness     FH: factor V Leiden deficiency     Generalized muscle ache     GERD (gastroesophageal reflux disease)     HEARTBURN    Heart failure ([address] 2014    as of 07/2019 EF 30-35;  Dr. Mercy Grant, Cardiomyopathy  Hyperlipidemia, mixed     Hypertension     Long term current use of anticoagulant therapy     Other ill-defined conditions(799.89) 2004    blood transfusion    Pacemaker     Paroxysmal atrial fibrillation (HCC)     rate controlled with coreg     Postsurgical aortocoronary bypass status 05/29/2012    CABG    Presence of cardiac defibrillator 05/2016    left upper chest    Pulmonary emphysema ([address] 3/4/2022    S[facility] syndrome) [facility]     Stroke ([facility] 2011, 1990's    SEVERAL-mini    Syncope     Thromboembolism ([address] 2014    left leg: changed to Eliquis from Warfarin    Thromboembolus ([facility] 2005    left leg    Thyroid disease     Weakness generalized       Social History     Socioeconomic History    Marital status:    Tobacco Use    Smoking status: Never Smoker    Smokeless tobacco: Never Used   Vaping Use    Vaping Use: Never used   Substance and Sexual Activity    Alcohol use: Not Currently     Alcohol/week: 0.0 standard drinks     Comment: stopped 2010    Drug use: Never    Sexual activity: Not Currently     Partners: Female   Other Topics Concern    Sleep Concern Yes    Stress Concern Yes     Comment: Family concerns   Social History Narrative    , 2 children     No Known Allergies   Family History   Problem Relation Age of Onset    Asthma Mother     Alcohol abuse Mother     Alcohol abuse Father     Asthma Father     Cancer Sister     Heart Disease Sister     Alcohol abuse Brother     Cancer Brother     Heart Disease Brother       Current Facility-Administered Medications   Medication Dose Route Frequency    sodium chloride (NS) flush 5-40 mL  5-40 mL IntraVENous Q8H    sodium chloride (NS) flush 5-40 mL  5-40 mL IntraVENous PRN    acetaminophen (TYLENOL) tablet 650 mg  650 mg Oral Q6H PRN    Or    acetaminophen (TYLENOL) suppository 650 mg  650 mg Rectal Q6H PRN    polyethylene glycol (MIRALAX) packet 17 g  17 g Oral DAILY PRN    ondansetron (ZOFRAN ODT) tablet 4 mg  4 mg Oral Q8H PRN    Or    ondansetron (ZOFRAN) injection 4 mg  4 mg IntraVENous Q6H PRN    atorvastatin (LIPITOR) tablet 40 mg  40 mg Oral QHS    DULoxetine (CYMBALTA) capsule 60 mg  60 mg Oral DAILY    apixaban (ELIQUIS) tablet 5 mg  5 mg Oral Q12H    gabapentin (NEURONTIN) capsule 600 mg  600 mg Oral TID    memantine (NAMENDA) tablet 10 mg  10 mg Oral BID    pantoprazole (PROTONIX) tablet 40 mg  40 mg Oral DAILY    [START ON 5/17/2022] polyethylene glycol (MIRALAX) packet 17 g  17 g Oral DAILY    albuterol-ipratropium (DUO-NEB) 2.5 MG-0.5 MG/3 ML  3 mL Nebulization Q6H RT    guaiFENesin ER (MUCINEX) tablet 600 mg  600 mg Oral Q12H    midodrine (PROAMATINE) tablet 2.5 mg  2.5 mg Oral TID PRN    sacubitriL-valsartan (ENTRESTO) 24-26 mg tablet 1 Tablet  1 Tablet Oral BID          Review of Symptoms:  Constitutional: negative  Eyes: negative  Ears, nose, mouth, throat, and face: negative  Respiratory: negative  Cardiovascular: negative  Gastrointestinal: negative  Genitourinary:negative  Musculoskeletal:negative  Neurological: negative  Behvioral/Psych: negative  Endocrine: negative            Objective:      Visit Vitals  /71 (BP 1 Location: Right upper arm, BP Patient Position: Sitting)   Pulse 73   Temp 97.9 °F (36.6 °C)   Resp 18   Ht 5' 7\" (1.702 m)   Wt 152 lb (68.9 kg)   SpO2 97%   BMI 23.81 kg/m²       Physical Assessment:   General Appearance:  alert, cooperative, well nourished, well developed; appears stated age  Eyes: sclera anicteric  Mouth/Throat: moist mucous membranes; oral pharynx clear  Neck: supple; no JVD or bruit  Pulmonary:  clear to auscultation bilaterally; good effort  Cardiovascular: regular rate and rhythm; no murmur, click, rub, or gallop  Abdomen: soft, non-tender, non-distended; bowel sounds normal  Musculoskeletal: no swelling or deformity; moves all extremities  Extremities: no edema; palpable distal pulses   Skin: warm and dry  Neuro: grossly normal  Psych: normal mood and affect given the setting      Data Review:   Recent Labs     05/15/22  1848   WBC 7.6   HGB 11.1*   HCT 35.2*        Recent Labs     05/15/22  1848      K 4.9      CO2 30   *   BUN 28*   CREA 1.17   CA 9.8   MG 2.0   ALB 3.3*   TBILI 0.4   ALT 18   INR 1.1       Recent Labs     05/16/22  0001 05/15/22  1848   TROPHS 12 11       No intake or output data in the 24 hours ending 05/16/22 1423     Cardiographics    Telemetry: NSR  ECG: NSR  Echocardiogram:   CXRAY:       Assessment:       Active Problems:    Syncope (2/17/2020)      Orthostatic hypotension (5/16/2022)         Plan:     Pt presented to [facility] with a syncopal episode after a few days of wobbly legs and was found to be orthostatic. Now not orthostatic after being hydrated. This is likely the cause. Complete interrogation of ICD.     Anisha Dowd MD

## 2022-05-16 NOTE — PROGRESS NOTES
End of Shift Note    Bedside shift change report given to Christine (oncoming nurse) by Belem Newsome (offgoing nurse). Report included the following information SBAR, Kardex and MAR    Shift worked:  2:15pm to 7pm     Shift summary and any significant changes:     Patient tolerated care fairly throughout shift. Hourly rounding completed. Medications given and education provided regarding all meds. Patient up x1 at the side of the bed. No complaints of pain. Patient has a right skin tear that was cleaned and dressed. Wound care already consulted. Patient turned Q2 hours.           Belem Newsome

## 2022-05-16 NOTE — H&P
Hospitalist Admission Note    NAME: Donne Kehr   :  1945   MRN:  [id number]     Date/Time:  2022 12:52 PM    Patient PCP: Robel Baldwin MD    Please note that this dictation was completed with [employer], the computer voice recognition software. Quite often unanticipated grammatical, syntax, homophones, and other interpretive errors are inadvertently transcribed by the computer software. Please disregard these errors. Please excuse any errors that have escaped final proofreading  ______________________________________________________________________   Assessment & Plan:  Syncope due to orthostatic hypotension  -- Observation. ICD interrogation. -- Repeat orthostatics done here is negative. He was given a liter of normal saline at outside ER.  -- Blood pressure is still on the low side, 105/71 and 123/63  -- Hold Lasix and metoprolol. -- Cardiology was consulted  --check orthostatics qshift  --midodrine 2.5mg tid prn SBP <90    Chronic combined systolic systolic and diastolic CHF  EF 75% 2022 with CHF admission at that time  -- Hold Lasix for now. Continue Entresto if blood pressure allows, hold for systolic less than 768    Coronary artery disease status post CABG  -- Hold metoprolol due to relative hypotension. -- Continue Lipitor    History of A. fib status post AV node ablation, PPM/ICD  -- Continue Eliquis    COPD  Interstitial lung disease with chronic respiratory failure on 2 L oxygen at baseline  --Add Mucinex, DuoNeb every 6 hours    GERD  Continue Protonix    Dementia  --seems mild but story ER physician at [facility] obtained is different from what patient mentioned to [address] [place] [address]    Body mass index is 23.81 kg/m².     Code: full  DVT prophylaxis: eliquis  Surrogate decision maker:  Wife Katherine          Subjective:   CHIEF COMPLAINT: Syncope    HISTORY OF PRESENT ILLNESS:     Donne Kehr is a 65 y.o. male transferred for syncope and AICD interrogation. Patient said yesterday 4:30pm, drove to get gas at grocery store but was short $1 in payment so he drove home. Drove back to store, walk in and spotted a friend. He then suddenly passed out. His friend caught him before he fell down. He was out for about 5 minutes. He did not have any chest pain, shortness of breath or dizziness at that time. However for the past 3 days he has been having periods of dizziness when he is walking. No fevers chills. Positive increased cough, nonproductive. Denies any wheezing, leg edema, orthopnea, signs of bleeding, nausea vomiting or diarrhea. He was taken to [facility] where he was noted to be orthostatic:  Supine 103/61  P 66  Sitting 85/59  P74  Standing 76/45  P79  Given a total of 1 L of IV fluid but remains orthostatic with BP 79/57 standing so referred for admission. He was hospitalized in March 2022 with left lower lobe pneumonia and COPD exacerbation and in February 2022 for combined CHF exacerbation. Echocardiogram showed EF of 40% which is down from 50 to 55% in February 2020     We were asked to admit for work up and evaluation of the above problems.      Past Medical History:   Diagnosis Date    Acute combined systolic and diastolic congestive heart failure ([facility] 2/12/2022    AICD (automatic cardioverter/defibrillator) present 5/13/2016 5/13/16 [employer] upgrade to dual chamber AICD implant  Dr. Brasher Risk disease [facility])     Anxiety state, other     Arthritis     OSTEO ARTHRITIS    AVNRT (AV bridgette re-entry tachycardia) ([facility] 5/12/2016 5/12/16 AVNRT ablation: PM/AICD left upper chest :Dr. Trini Burnette Back ache     CAD (coronary artery disease)     Cancer [facility]) 2004    Prostate cancer    Chest tightness     last episode of chest pain 5/2016 before AICD placed; none since then    Coagulation defects 2005    FACTOR V LEIDEN    Dementia ([facility]     Depression     Dizziness     FH: factor V Leiden deficiency     Generalized muscle ache     GERD (gastroesophageal reflux disease)     HEARTBURN    Heart failure ([address] 2014    as of 07/2019 EF 30-35;  Dr. Kathy Vega, Cardiomyopathy    Hyperlipidemia, mixed     Hypertension     Long term current use of anticoagulant therapy     Other ill-defined conditions(799.89) 2004    blood transfusion    Pacemaker     Paroxysmal atrial fibrillation (HCC)     rate controlled with coreg     Postsurgical aortocoronary bypass status 05/29/2012    CABG    Presence of cardiac defibrillator 05/2016    left upper chest    Pulmonary emphysema ([address] 3/4/2022    SSS (sick sinus syndrome) ([address]     Stroke ([address] 2011, 1990's    SEVERAL-mini    Syncope     Thromboembolism ([address] 2014    left leg: changed to Eliquis from Warfarin    Thromboembolus ([facility] 2005    left leg    Thyroid disease     Weakness generalized       Past Surgical History:   Procedure Laterality Date    CARDIAC CATHETERIZATION  3/4/2011         HX HEENT  2019    oral surgery, removed teeth, dentures upper/lower    HX HERNIA REPAIR Left 2002    Ingiunal hernia repair left    HX ORTHOPAEDIC      LEFT KNEE CARTILAGE REPAIR;RIGHT ROTATOR CUFF REPAIR    HX ORTHOPAEDIC  2/14/12    C5-7 ANTERIIOR CERVICAL DISECTOMY AND FUSION    HX ORTHOPAEDIC  6/4/13    C5-C7 POSTERIOR DECOMPRESSION AND FUSION    HX ORTHOPAEDIC      right thumb partial amputation of tip    HX OTHER SURGICAL      steroid injections    HX PACEMAKER Left 05/13/2016    BS D142, Dr. Baeza [facility] HX PROSTATECTOMY  2004     [place]    HX ROTATOR CUFF REPAIR Left 2019    HX UROLOGICAL       urinary control system    HX UROLOGICAL  12/3/13    REPLACEMENT ARTIFICAL URINARY SPHINCTER    [place] CARDIAC SURG PROCEDURE UNLIST      CABG X1 3/5/11     Social History     Tobacco Use    Smoking status: Never Smoker    Smokeless tobacco: Never Used   Substance Use Topics    Alcohol use: Not Currently Alcohol/week: 0.0 standard drinks     Comment: stopped 2010       Family History   Problem Relation Age of Onset    Asthma Mother     Alcohol abuse Mother     Alcohol abuse Father     Asthma Father     Cancer Sister     Heart Disease Sister     Alcohol abuse Brother     Cancer Brother     Heart Disease Brother       No Known Allergies     Prior to Admission medications    Medication Sig Start Date End Date Taking? Authorizing Provider   apixaban (ELIQUIS) 5 mg tablet Take 5 mg by mouth two (2) times a day. Yes Provider, Historical   memantine (NAMENDA) 10 mg tablet Take 10 mg by mouth two (2) times a day. Yes Provider, Historical   pantoprazole (Protonix) 40 mg tablet Take 40 mg by mouth daily. Yes Provider, Historical   polyethylene glycol (MIRALAX) 17 gram packet Take 17 g by mouth daily as needed for Constipation. Yes Provider, Historical   donepeziL (ARICEPT) 5 mg tablet Take 5 mg by mouth nightly. Yes Provider, Historical   mirabegron ER (Myrbetriq) 50 mg ER tablet Take 50 mg by mouth daily. Yes Provider, Historical   sertraline (ZOLOFT) 100 mg tablet Take 200 mg by mouth daily. Yes Provider, Historical   albuterol (PROVENTIL VENTOLIN) 2.5 mg /3 mL (0.083 %) nebu 3 mL by Nebulization route every four (4) hours as needed for Wheezing. 5/10/22  Yes Shobha Townsend MD   DULoxetine (CYMBALTA) 60 mg capsule Take 1 Capsule by mouth daily. 5/10/22  Yes Shobha Townsend MD   furosemide (LASIX) 20 mg tablet Take 1 Tablet by mouth daily. 3/23/22  Yes Dianna Cano MD   acetaminophen (TYLENOL) 500 mg capsule Take 1,000 mg by mouth every six (6) hours as needed for Pain. Yes Provider, Historical   atorvastatin (LIPITOR) 40 mg tablet TAKE 1 TABLET BY MOUTH AT BEDTIME 1/31/22  Yes Eduard Jack MD   sacubitriL-valsartan Shadi Beckman) 24-26 mg tablet Take 1 Tablet by mouth two (2) times a day.  4/6/22   Eduard Jack MD   metoprolol succinate (TOPROL-XL) 25 mg XL tablet TAKE 1/2 TABLET BY MOUTH ONCE DAILY 21   Anabell Coleman MD     REVIEW OF SYSTEMS:  POSITIVE= Bold. Negative = normal text  General:  fever, chills, sweats, generalized weakness, weight loss/gain, loss of appetite  Eyes:  blurred vision, eye pain, loss of vision, diplopia  Ear Nose and Throat:  rhinorrhea, pharyngitis  Respiratory:   cough, sputum production, SOB, wheezing, HOLCOMB, pleuritic pain  Cardiology:  chest pain, palpitations, orthopnea, PND, edema, syncope   Gastrointestinal:  abdominal pain, N/V, dysphagia, diarrhea, constipation, bleeding  Genitourinary:  frequency, urgency, dysuria, hematuria, incontinence  Muskuloskeletal :  arthralgia, myalgia  Hematology:  easy bruising, bleeding, lymphadenopathy  Dermatological:  rash, ulceration, pruritis  Endocrine:  hot flashes or polydipsia  Neurological:  headache, dizziness, confusion, focal weakness, paresthesia, memory loss, gait disturbance  Psychological: anxiety, depression, agitation      Objective:   VITALS:    Visit Vitals  /63 (BP Patient Position: At rest)   Pulse 72   Temp 97.9 °F (36.6 °C)   Resp 18   Ht 5' 7\" (1.702 m)   Wt 68.9 kg (152 lb)   SpO2 97%   BMI 23.81 kg/m²     Temp (24hrs), Av.1 °F (36.7 °C), Min:97.9 °F (36.6 °C), Max:98.1 °F (36.7 °C)    Body mass index is 23.81 kg/m². PHYSICAL EXAM:    General:    Alert, cooperative, no distress, appears stated age. HEENT: Atraumatic, anicteric sclerae, pink conjunctivae     No oral ulcers, mucosa moist, throat clear. Hearing aids present, hearing intact. Neck:  Supple, symmetrical,  thyroid: non tender  Lungs:   Coarse BS, bilateral crackles throughout, rhonchi in bases. No wheezing  hest wall:  No tenderness  No Accessory muscle use. Heart:   Regular  rhythm,  No  murmur   No gallop. No edema. Abdomen:   Soft, non-tender. Not distended. Bowel sounds normal. No masses  Extremities: No cyanosis.   No clubbing  Skin:     Not pale Not Jaundiced  No rashes   Psych:  Good insight. Not depressed. Not anxious or agitated. Neurologic: EOMs intact. No facial asymmetry. No aphasia or slurred speech. Symmetrical strength legs 5/5, right arm 3/5 due to right rotator cuff injury, left arm 4/5 due to hx rotator cuff surgery, forearms 5/5 b/l with equal strong , Alert and oriented X 3. IMAGING RESULTS:   []       I have personally reviewed the actual   []     CXR  []     CT scan  CXR:  CT :  EKG:   ________________________________________________________________________  Care Plan discussed with:    Comments   Patient y    [facility]:      ________________________________________________________________________  Prophylaxis:  GI PPI   DVT eliquis   ________________________________________________________________________  Recommended Disposition:   Home with Family y   HH/PT/OT/RN y   SNF/LTC    NORM    ________________________________________________________________________  Code Status:  Full Code y   DNR/DNI    ________________________________________________________________________  TOTAL TIME:  65 minutes      Comments    y Reviewed previous records   >50% of visit spent in counseling and coordination of care  Discussion with patient and/or family and questions answered         ______________________________________________________________________  Ivanna Ballesteros MD      Procedures: see electronic medical records for all procedures/Xrays and details which were not copied into this note but were reviewed prior to creation of Plan.     LAB DATA REVIEWED:    Recent Results (from the past 24 hour(s))   EKG, 12 LEAD, INITIAL    Collection Time: 05/15/22  6:40 PM   Result Value Ref Range    Ventricular Rate 71 BPM    Atrial Rate 71 BPM    P-R Interval 176 ms    QRS Duration 114 ms    Q-T Interval 418 ms    QTC Calculation (Bezet) 454 ms    Calculated P Axis 7 degrees    Calculated R Axis 26 degrees    Calculated T Axis 137 degrees Diagnosis       Normal sinus rhythm  Anterior infarct , age undetermined  ST & T wave abnormality, consider lateral ischemia  Abnormal ECG  When compared with ECG of 31-MAR-2022 20:28,  premature atrial complexes are no longer present     CBC WITH AUTOMATED DIFF    Collection Time: 05/15/22  6:48 PM   Result Value Ref Range    WBC 7.6 4.1 - 11.1 K/uL    RBC 3.97 (L) 4.10 - 5.70 M/uL    HGB 11.1 (L) 12.1 - 17.0 g/dL    HCT 35.2 (L) 36.6 - 50.3 %    MCV 88.7 80.0 - 99.0 FL    MCH 28.0 26.0 - 34.0 PG    MCHC 31.5 30.0 - 36.5 g/dL    RDW 14.6 (H) 11.5 - 14.5 %    PLATELET 939 568 - 224 K/uL    MPV 10.4 8.9 - 12.9 FL    NRBC 0.0 0  WBC    ABSOLUTE NRBC 0.00 0.00 - 0.01 K/uL    NEUTROPHILS 73 32 - 75 %    LYMPHOCYTES 15 12 - 49 %    MONOCYTES 9 5 - 13 %    EOSINOPHILS 1 0 - 7 %    BASOPHILS 1 0 - 1 %    IMMATURE GRANULOCYTES 1 (H) 0.0 - 0.5 %    ABS. NEUTROPHILS 5.6 1.8 - 8.0 K/UL    ABS. LYMPHOCYTES 1.2 0.8 - 3.5 K/UL    ABS. MONOCYTES 0.7 0.0 - 1.0 K/UL    ABS. EOSINOPHILS 0.1 0.0 - 0.4 K/UL    ABS. BASOPHILS 0.1 0.0 - 0.1 K/UL    ABS. IMM. GRANS. 0.0 0.00 - 0.04 K/UL    DF AUTOMATED     METABOLIC PANEL, COMPREHENSIVE    Collection Time: 05/15/22  6:48 PM   Result Value Ref Range    Sodium 138 136 - 145 mmol/L    Potassium 4.9 3.5 - 5.1 mmol/L    Chloride 102 97 - 108 mmol/L    CO2 30 21 - 32 mmol/L    Anion gap 6 5 - 15 mmol/L    Glucose 103 (H) 65 - 100 mg/dL    BUN 28 (H) 6 - 20 MG/DL    Creatinine 1.17 0.70 - 1.30 MG/DL    BUN/Creatinine ratio 24 (H) 12 - 20      GFR est AA >60 >60 ml/min/1.73m2    GFR est non-AA >60 >60 ml/min/1.73m2    Calcium 9.8 8.5 - 10.1 MG/DL    Bilirubin, total 0.4 0.2 - 1.0 MG/DL    ALT (SGPT) 18 12 - 78 U/L    AST (SGOT) 19 15 - 37 U/L    Alk.  phosphatase 100 45 - 117 U/L    Protein, total 7.6 6.4 - 8.2 g/dL    Albumin 3.3 (L) 3.5 - 5.0 g/dL    Globulin 4.3 (H) 2.0 - 4.0 g/dL    A-G Ratio 0.8 (L) 1.1 - 2.2     TROPONIN-HIGH SENSITIVITY    Collection Time: 05/15/22  6:48 PM   Result Value Ref Range    Troponin-High Sensitivity 11 0 - 76 ng/L   PROTHROMBIN TIME + INR    Collection Time: 05/15/22  6:48 PM   Result Value Ref Range    INR 1.1 0.9 - 1.1      Prothrombin time 11.3 (H) 9.0 - 11.1 sec   PTT    Collection Time: 05/15/22  6:48 PM   Result Value Ref Range    aPTT 29.5 22.1 - 31.0 sec    aPTT, therapeutic range     58.0 - 77.0 SECS   MAGNESIUM    Collection Time: 05/15/22  6:48 PM   Result Value Ref Range    Magnesium 2.0 1.6 - 2.4 mg/dL   TROPONIN-HIGH SENSITIVITY    Collection Time: 05/16/22 12:01 AM   Result Value Ref Range    Troponin-High Sensitivity 12 0 - 76 ng/L   NT-PRO BNP    Collection Time: 05/16/22 12:01 AM   Result Value Ref Range    NT pro- (H) 0 - 450 PG/ML

## 2022-05-16 NOTE — ED NOTES
Charge nurse report received, pt noted to be resting quietly with eyes closed resp even and unlabored  NAD. Pt to be transferred to [facility] for cardiology and pacemaker interrogation. Pt with syncope and collapse along with orthostatic hypotension.   Per report  ETA for [facility] is 0915 and report still has to be called to receiving facility

## 2022-05-16 NOTE — TELEPHONE ENCOUNTER
I have called [facility] and spoke to Jeff. She informs me that this patient is scheduled for delivery and set up of a nebulizer machine this Wednesday 5/18/22.

## 2022-05-16 NOTE — ED NOTES
TRANSFER - OUT REPORT:    Verbal report given to yTe Bryson RN(name) on Liv Erickson  being transferred to ED [facility] ED(unit) for routine progression of care       Report consisted of patients Situation, Background, Assessment and   Recommendations(SBAR). Information from the following report(s) SBAR, ED Summary and MAR was reviewed with the receiving nurse. Lines:   Peripheral IV 05/15/22 Left Antecubital (Active)   Site Assessment Clean, dry, & intact 05/15/22 1851   Phlebitis Assessment 0 05/15/22 1851   Infiltration Assessment 0 05/15/22 1851        Opportunity for questions and clarification was provided.       Patient transported with:   Monitor  O2 @ 2 liters

## 2022-05-16 NOTE — PROGRESS NOTES
Pharmacy Medication Reconciliation     The patient was NOT interviewed regarding current PTA medication list, use and drug allergies. Patient has dementia and wife Dulce Trevino manages his medications. Called her and confirmed his PTA list.    Allergy Update: Patient has no known allergies. Recommendations/Findings: The following amendments were made to the patient's active medication list on file at [facility]:   1) Additions:   Donepezil  Mirabegron  Sertraline    2) Deletions:   Gabapentin  Nitroglycerin  Proair    3) Changes:  None      Pertinent Findings: Patient ecently stopped taking gabapentin and is now taking duloxetine instead. Clarified PTA med list with Jesus Alberto Phillips. PTA medication list was corrected to the following:     Prior to Admission Medications   Prescriptions Last Dose Informant Taking? DULoxetine (CYMBALTA) 60 mg capsule   Yes   Sig: Take 1 Capsule by mouth daily. acetaminophen (TYLENOL) 500 mg capsule   Yes   Sig: Take 1,000 mg by mouth every six (6) hours as needed for Pain. albuterol (PROVENTIL VENTOLIN) 2.5 mg /3 mL (0.083 %) nebu   Yes   Sig: 3 mL by Nebulization route every four (4) hours as needed for Wheezing. apixaban (ELIQUIS) 5 mg tablet   Yes   Sig: Take 5 mg by mouth two (2) times a day. atorvastatin (LIPITOR) 40 mg tablet   Yes   Sig: TAKE 1 TABLET BY MOUTH AT BEDTIME   donepeziL (ARICEPT) 5 mg tablet   Yes   Sig: Take 5 mg by mouth nightly. furosemide (LASIX) 20 mg tablet   Yes   Sig: Take 1 Tablet by mouth daily. memantine (NAMENDA) 10 mg tablet   Yes   Sig: Take 10 mg by mouth two (2) times a day. metoprolol succinate (TOPROL-XL) 25 mg XL tablet   No   Sig: TAKE 1/2 TABLET BY MOUTH ONCE DAILY   mirabegron ER (Myrbetriq) 50 mg ER tablet   Yes   Sig: Take 50 mg by mouth daily. pantoprazole (Protonix) 40 mg tablet   Yes   Sig: Take 40 mg by mouth daily.    polyethylene glycol (MIRALAX) 17 gram packet   Yes   Sig: Take 17 g by mouth daily as needed for Constipation. sacubitriL-valsartan (Entresto) 24-26 mg tablet   No   Sig: Take 1 Tablet by mouth two (2) times a day. sertraline (ZOLOFT) 100 mg tablet   Yes   Sig: Take 200 mg by mouth daily.       Facility-Administered Medications: None        Thank you,  Inga Goldberg, [facility]

## 2022-05-16 NOTE — ED PROVIDER NOTES
EMERGENCY DEPARTMENT HISTORY AND PHYSICAL EXAM      Date: 5/16/2022  Patient Name: Tal Salinas    History of Presenting Illness     Chief Complaint   Patient presents with    Syncope     transfer from [facility] for syncopal episode and want pacer interrogated       History Provided By: Patient and  record    HPI: Tal Salinas, 65 y.o. male presents to the ED with cc of transfer for syncope and AICD interrogation. Patient symptoms started last night. He had a syncopal episode, which lasted for less than a minute. It was associated with palpitations and lightheadedness. Denies chest pain, fever, pain. He has chronic shortness of breath, and is on oxygen normally. The ER note indicates that the patient had gone to the grocery store, and had a spinning sensation. He passed out for a few seconds. He was caught by his friend and did not hit his head. He did hit his left forearm, and had a skin tear. Denied headache, neck pain or back pain. Labs, CT head, chest x-ray and forearm x-ray were unremarkable. He was orthostatic there and received fluids. They were unable to interrogate his AICD, so he was transferred here for admission. There are no other complaints, changes, or physical findings at this time. PCP: Jose C Davis MD    Current Facility-Administered Medications on File Prior to Encounter   Medication Dose Route Frequency Provider Last Rate Last Admin    [COMPLETED] sodium chloride 0.9 % bolus infusion 500 mL  500 mL IntraVENous NOW Queenie Hein MD   IV Completed at 05/15/22 2017     Current Outpatient Medications on File Prior to Encounter   Medication Sig Dispense Refill    Nebulizer & Compressor machine Use nebulizer qid prn 1 Each 0    Nebulizer Accessories kit Use nebulizer qid prn 1 Kit 0    albuterol (PROVENTIL VENTOLIN) 2.5 mg /3 mL (0.083 %) nebu 3 mL by Nebulization route every four (4) hours as needed for Wheezing.  120 Nebule 0    DULoxetine (CYMBALTA) 60 mg capsule Take 1 Capsule by mouth daily. 30 Capsule 0    memantine (NAMENDA) 10 mg tablet TAKE 1 TABLET BY MOUTH TWICE DAILY 180 Tablet 3    sacubitriL-valsartan (Entresto) 24-26 mg tablet Take 1 Tablet by mouth two (2) times a day. 180 Tablet 3    Oxygen       furosemide (LASIX) 20 mg tablet Take 1 Tablet by mouth daily. 90 Tablet 1    gabapentin (NEURONTIN) 600 mg tablet TAKE 1 TABLET BY MOUTH THREE TIMES A DAY 90 Tablet 3    polyethylene glycol (MIRALAX) 17 gram packet Take 17 g by mouth daily.  acetaminophen (TYLENOL) 500 mg capsule Take 1,000 mg by mouth every six (6) hours as needed for Pain.  albuterol (PROVENTIL HFA, VENTOLIN HFA, PROAIR HFA) 90 mcg/actuation inhaler Take 1 Puff by inhalation every six (6) hours as needed for Wheezing (Rarely uses \"puffer\"). (Patient not taking: Reported on 3/23/2022)      atorvastatin (LIPITOR) 40 mg tablet TAKE 1 TABLET BY MOUTH AT BEDTIME 90 Tablet 3    metoprolol succinate (TOPROL-XL) 25 mg XL tablet TAKE 1/2 TABLET BY MOUTH ONCE DAILY 45 Tablet 3    pantoprazole (PROTONIX) 40 mg tablet TAKE 1 TABLET BY MOUTH ONCE DAILY 90 Tablet 3    Eliquis 5 mg tablet TAKE 1 TABLET BY MOUTH EVERY 12 HOURS 180 Tablet 3    nitroglycerin (NITROSTAT) 0.4 mg SL tablet 1 Tab by SubLINGual route every five (5) minutes as needed for Chest Pain (call 911 if not relieved by 3).  (Patient not taking: Reported on 3/23/2022) 1 Bottle 1       Past History     Past Medical History:  Past Medical History:   Diagnosis Date    Acute combined systolic and diastolic congestive heart failure ([address] 2/12/2022    AICD (automatic cardioverter/defibrillator) present 5/13/2016 5/13/16 [employer] upgrade to dual chamber AICD implant  Dr. Jayson Muhammad disease [facility])     Anxiety state, other     Arthritis     OSTEO ARTHRITIS    AVNRT (AV bridgette re-entry tachycardia) ([address] 5/12/2016 5/12/16 AVNRT ablation: PM/AICD left upper chest :Dr. Lunda Scheuermann    Back ache     CAD (coronary artery disease)     Cancer ([facility] 2004    Prostate cancer    Chest tightness     last episode of chest pain 5/2016 before AICD placed; none since then    Coagulation defects 2005    FACTOR V LEIDEN    Dementia ([address]     Depression     Dizziness     FH: factor V Leiden deficiency     Generalized muscle ache     GERD (gastroesophageal reflux disease)     HEARTBURN    Heart failure ([address] 2014    as of 07/2019 EF 30-35;  Dr. Monica Klein, Cardiomyopathy    Hyperlipidemia, mixed     Hypertension     Long term current use of anticoagulant therapy     Other ill-defined conditions(799.89) 2004    blood transfusion    Pacemaker     Paroxysmal atrial fibrillation (HCC)     rate controlled with coreg     Postsurgical aortocoronary bypass status 05/29/2012    CABG    Presence of cardiac defibrillator 05/2016    left upper chest    Pulmonary emphysema ([address] 3/4/2022    SSS (sick sinus syndrome) ([address]     Stroke ([address] 2011, 1990's    SEVERAL-mini    Syncope     Thromboembolism ([address] 2014    left leg: changed to Eliquis from Warfarin    Thromboembolus ([address] 2005    left leg    Thyroid disease     Weakness generalized        Past Surgical History:  Past Surgical History:   Procedure Laterality Date    CARDIAC CATHETERIZATION  3/4/2011         HX HEENT  2019    oral surgery, removed teeth, dentures upper/lower    HX HERNIA REPAIR Left 2002    Ingiunal hernia repair left    HX ORTHOPAEDIC      LEFT KNEE CARTILAGE REPAIR;RIGHT ROTATOR CUFF REPAIR    HX ORTHOPAEDIC  2/14/12    C5-7 ANTERIIOR CERVICAL DISECTOMY AND FUSION    HX ORTHOPAEDIC  6/4/13    C5-C7 POSTERIOR DECOMPRESSION AND FUSION    HX ORTHOPAEDIC      right thumb partial amputation of tip    HX OTHER SURGICAL      steroid injections    HX PACEMAKER Left 05/13/2016    BS D142, Dr. Juan Miguel Ravi HX PROSTATECTOMY  2004     [place]    HX ROTATOR CUFF REPAIR Left 2019    HX UROLOGICAL       urinary control system    HX UROLOGICAL  12/3/13    REPLACEMENT ARTIFICAL URINARY SPHINCTER    [place] CARDIAC SURG PROCEDURE UNLIST      CABG X1 3/5/11       Family History:  Family History   Problem Relation Age of Onset    Asthma Mother     Alcohol abuse Mother     Alcohol abuse Father     Asthma Father     Cancer Sister     Heart Disease Sister     Alcohol abuse Brother     Cancer Brother     Heart Disease Brother        Social History:  Social History     Tobacco Use    Smoking status: Never Smoker    Smokeless tobacco: Never Used   Vaping Use    Vaping Use: Never used   Substance Use Topics    Alcohol use: Not Currently     Alcohol/week: 0.0 standard drinks     Comment: stopped 2010    Drug use: Never       Allergies:  No Known Allergies      Review of Systems   Review of Systems   Constitutional: Negative for fever. HENT: Negative for congestion. Eyes: Negative. Respiratory: Positive for shortness of breath. Cardiovascular: Negative for chest pain. Gastrointestinal: Negative for abdominal pain. Endocrine: Negative for heat intolerance. Genitourinary: Negative. Musculoskeletal: Negative for back pain. Skin: Negative for rash. Allergic/Immunologic: Negative for immunocompromised state. Neurological: Positive for dizziness, syncope and light-headedness. Hematological: Does not bruise/bleed easily. Psychiatric/Behavioral: Negative. All other systems reviewed and are negative. Physical Exam   Physical Exam  Vitals and nursing note reviewed. Constitutional:       General: He is not in acute distress. Appearance: He is well-developed. HENT:      Head: Normocephalic and atraumatic. Cardiovascular:      Rate and Rhythm: Normal rate and regular rhythm. Pulses: Normal pulses. Heart sounds: Normal heart sounds. Pulmonary:      Effort: Pulmonary effort is normal.      Breath sounds: Normal breath sounds.    Abdominal:      General: Bowel sounds are normal. Palpations: Abdomen is soft. Musculoskeletal:         General: Normal range of motion. Cervical back: Normal range of motion. Skin:     General: Skin is warm and dry. Neurological:      General: No focal deficit present. Mental Status: He is alert and oriented to person, place, and time. Coordination: Coordination normal.   Psychiatric:         Mood and Affect: Mood normal.         Diagnostic Study Results     Labs -     Recent Results (from the past 12 hour(s))   TROPONIN-HIGH SENSITIVITY    Collection Time: 05/16/22 12:01 AM   Result Value Ref Range    Troponin-High Sensitivity 12 0 - 76 ng/L   NT-PRO BNP    Collection Time: 05/16/22 12:01 AM   Result Value Ref Range    NT pro- (H) 0 - 450 PG/ML       Radiologic Studies -   No orders to display     CT Results  (Last 48 hours)               05/15/22 1951  CT HEAD WO CONT Final result    Impression:  No acute intracranial abnormality. Narrative:  HEAD CT WITHOUT CONTRAST: 5/15/2022 7:51 PM       INDICATION: syncope       COMPARISON: 2/17/2020. PROCEDURE: Axial images of the head were obtained without contrast. Coronal and   sagittal reformats were performed. CT dose reduction was achieved through use of   a standardized protocol tailored for this examination and automatic exposure   control for dose modulation. FINDINGS: There is chronic encephalomalacia in the left inferior frontal white   matter. Left basal ganglier lacunar infarcts for better seen on the prior study. The ventricles and sulci are appropriate in size and configuration for age. No   loss of gray-white differentiation to suggest late acute or early subacute   infarction. No mass effect or intracranial hemorrhage. CXR Results  (Last 48 hours)               05/15/22 1849  XR CHEST PORT Final result    Impression:  Chronic interstitial lung changes. No new pneumonia.            Narrative:  EXAM:  XR CHEST PORT       INDICATION:  Chest pain COMPARISON: March 31, 2022. TECHNIQUE: AP portable chest radiograph. FINDINGS: Chronic interstitial changes in the lungs. No superimposed   consolidation. Pacing wires are unchanged. Left shoulder arthroplasty. No   pneumothorax. Heart size and mediastinal contours are stable. Medical Decision Making   I am the first provider for this patient. I reviewed the vital signs, available nursing notes, past medical history, past surgical history, family history and social history. Vital Signs-Reviewed the patient's vital signs. Patient Vitals for the past 12 hrs:   Temp Pulse Resp BP SpO2   05/16/22 1137 97.9 °F (36.6 °C) 72 18 123/63 97 %         Records Reviewed: Nursing Notes, Old Medical Records, Previous electrocardiograms, Ambulance Run Sheet, Previous Radiology Studies and Previous Laboratory Studies    Provider Notes (Medical Decision Making):   Dehydration, electrolyte abnormality, arrhythmia    ED Course:   Initial assessment performed. The patients presenting problems have been discussed, and they are in agreement with the care plan formulated and outlined with them. I have encouraged them to ask questions as they arise throughout their visit. Consult note:    Patient has been evaluated by Dr. Phyllis Goldstein, cardiology    Consult note: The patient is being admitted by Dr. Jose Zaidi, hospitalist           Critical Care Time:   0    Disposition:  admit    DISCHARGE PLAN:  1. Current Discharge Medication List        2. Follow-up Information    None       3. Return to ED if worse     Diagnosis     Clinical Impression:   1. Syncope and collapse    2. Orthostatic hypotension        Attestations:    Aarti Blair MD    Please note that this dictation was completed with [employer], the computer voice recognition software. Quite often unanticipated grammatical, syntax, homophones, and other interpretive errors are inadvertently transcribed by the computer software.   Please disregard these errors. Please excuse any errors that have escaped final proofreading. Thank you.

## 2022-05-16 NOTE — ED NOTES
Patient arrives to [facility] from [address], ER. Patient VSS, axo x4. Report received from [facility]. No complaints at this time.  Patient unsure of what type of pacer he has

## 2022-05-16 NOTE — ED NOTES
Spoke with Jesús Espinoza (EWELINA)    Requested ALS transport with cardiac monitor to ED [facility] ED.     ETA: [id number]

## 2022-05-16 NOTE — ED NOTES
pts wife called and insisted that this writer wake patient to take this phone call,  Pt arouses to vocal and tactile stimuli, pt handed the phone

## 2022-05-17 NOTE — PROGRESS NOTES
Problem: Self Care Deficits Care Plan (Adult)  Goal: *Acute Goals and Plan of Care (Insert Text)  Description: FUNCTIONAL STATUS PRIOR TO ADMISSION:  Pt lives in trailer with his wife and grandson. They state he had been able to amb without device prior to illnesses and hospitalization in March 2022, pneumonia, some decline since but still amb without device. He has had 4 falls in last 6 months. No O2 used prior to illness in March 2022, CHF, and pneumonia in April 2022, 2L since but \"huffing and puffing if he just walks to bathroom with portable O2 in place. \" Pt states \"my legs just give out\" during the 4 falls. He does have intermittent help with ADLs daily, from both wife and grandson for UE and LE dressing. Uses garden tub with shower chair min A,  able to complete toileting hygiene despite B impaired AROM of shoulders. HOME SUPPORT PRIOR TO ADMISSION: The patient lived with wife and adult grandson to provide assistance for dressing and bathing, he was mod I to toilet; has not been able to assist w/IADLs since March      Occupational Therapy Goals  Initiated 5/17/2022  1. Patient will perform grooming in standing with minimal assistance/contact guard assist within 7 day(s). 2.  Patient will perform upper body w/dressing stick technique with minimal assistance/contact guard assist within 7 day(s). 3.  Patient will perform lower body dressing with minimal assistance/contact guard assist with AE within 7 day(s). 4.  Patient will perform toilet transfers with minimal assistance/contact guard assist within 7 day(s). 5.  Patient will perform all aspects of toileting with minimal assistance/contact guard assist within 7 day(s). 6.  Patient will participate in upper extremity therapeutic exercise/activities with minimal assistance/contact guard assist for 5 minutes within 7 day(s).     7.  Patient will utilize energy conservation, PLB, fall preventionand pain management techniques during functional activities with verbal cues within 7 day(s). Outcome: Progressing Towards Goal  OCCUPATIONAL THERAPY EVALUATION  Patient: Mihir Tapia (75 y.o. male)  Date: 5/17/2022  Primary Diagnosis: Syncope [R55]  Orthostatic hypotension [I95.1]        Precautions:   Fall,Bed Alarm,Skin,Spinal (orthostatic; 4 falls in 6 mo; PMH cervical sx; )    ASSESSMENT  Based on the objective data described below, the patient presents to ER 5-16 after fall in local grocery store, Passed out, not trip. \" Note skin tear R forearm and headache since fall. Today he presents with  limitations in activity tolerance with orthostatic drop in BP upon sitting, decreased gait and functional mobility. Pt describes 4 falls in last 6 months and states he didn't pass out prior to this fall prompting admission but does state he felt like his legs would give way. He is able to complete bed mobility with supervision but was unable to progress beyond this due to drop in BP. Pt c/o 6/10 HA since admission. Note R UE marked tremors at rest and with activity, slight tremors noted L UE; B shoulder impairments with OA changes: family assist with UE dressing and LE dressing PTA due to severity of shoulder AROM/PROM deficits; Tremors have been noted over past year. Current Level of Function Impacting Discharge (ADLs/self-care): set up to Mod A UE ADLs, limited by shoulder deficits; D-max a LE ADLs, limited by BP, shoulders; S-CGA bed mobility; unable to advance functional mobility due to 85/61 BP in brief sitting. Has urinary device that impedes use of urinal; bedpan more effective per RN    Functional Outcome Measure: The patient scored Total: 35/100 on the Barthel Index outcome measure which is indicative of being very dependent in basic self-care.        Other factors to consider for discharge:    Supine /75 HR 86  Sitting BP 85/61 HR 97%  Sats 94% on 2L - pt is on 2L at baseline since hospitalization in March 2022 for CHF and April 2022 for pneumonia     Patient will benefit from skilled therapy intervention to address the above noted impairments. PLAN :  Recommendations and Planned Interventions: self care training, functional mobility training, therapeutic exercise, balance training, therapeutic activities, cognitive retraining, endurance activities, patient education, home safety training, and family training/education    Frequency/Duration: Patient will be followed by occupational therapy 5 times a week to address goals. Recommendation for discharge: (in order for the patient to meet his/her long term goals)  Occupational therapy at least 2 days/week in the home AND ensure assist and/or supervision for safety with mobility and self care depending on BP needs    This discharge recommendation:  A follow-up discussion with the attending provider and/or case management is planned    IF patient discharges home will need the following DME: AE: long handled bathing, AE: long handled dressing, bedside commode, gait belt, portable oxygen, shower chair, walker: rolling, and may need w/c use of BP does not stabilize       SUBJECTIVE:   Patient stated Oh I can reach to clean my backside.     OBJECTIVE DATA SUMMARY:   HISTORY:   Past Medical History:   Diagnosis Date    Acute combined systolic and diastolic congestive heart failure ([facility] 2/12/2022    AICD (automatic cardioverter/defibrillator) present 5/13/2016 5/13/16 [employer] upgrade to dual chamber AICD implant  Dr. Bárbara Pearson    Alzheimer disease [facility])     Anxiety state, other     Arthritis     OSTEO ARTHRITIS    AVNRT (AV bridgette re-entry tachycardia) ([facility] 5/12/2016 5/12/16 AVNRT ablation: PM/AICD left upper chest :Dr. Zaira Varner    Back ache     CAD (coronary artery disease)     Cancer [facility]) 2004    Prostate cancer    Chest tightness     last episode of chest pain 5/2016 before AICD placed; none since then    Coagulation defects 2005    FACTOR V LEIDEN    Dementia ([facility] Depression     Dizziness     FH: factor V Leiden deficiency     Generalized muscle ache     GERD (gastroesophageal reflux disease)     HEARTBURN    Heart failure ([address] 2014    as of 07/2019 EF 30-35;  Dr. Glory Peck, Cardiomyopathy    Hyperlipidemia, mixed     Hypertension     Long term current use of anticoagulant therapy     Other ill-defined conditions(799.89) 2004    blood transfusion    Pacemaker     Paroxysmal atrial fibrillation ([address]     rate controlled with coreg     Postsurgical aortocoronary bypass status 05/29/2012    CABG    Presence of cardiac defibrillator 05/2016    left upper chest    Pulmonary emphysema ([address] 3/4/2022    [facility] spotted fever)     SSS (sick sinus syndrome) ([address]     Stroke ([address] 2011, 1990's    SEVERAL-mini    Syncope     Thromboembolism ([address] 2014    left leg: changed to Eliquis from Warfarin    Thromboembolus ([address] 2005    left leg    Thyroid disease     Weakness generalized      Past Surgical History:   Procedure Laterality Date    CARDIAC CATHETERIZATION  3/4/2011         HX HEENT  2019    oral surgery, removed teeth, dentures upper/lower    HX HERNIA REPAIR Left 2002    Ingiunal hernia repair left    HX ORTHOPAEDIC      LEFT KNEE CARTILAGE REPAIR;RIGHT ROTATOR CUFF REPAIR    HX ORTHOPAEDIC  2/14/12    C5-7 ANTERIIOR CERVICAL DISECTOMY AND FUSION    HX ORTHOPAEDIC  6/4/13    C5-C7 POSTERIOR DECOMPRESSION AND FUSION    HX ORTHOPAEDIC      right thumb partial amputation of tip    HX OTHER SURGICAL      steroid injections    HX PACEMAKER Left 05/13/2016    BS D142, Dr. Lucia Theodore  2004     [place]    HX Garima Aguilar Left 2019    HX UROLOGICAL       urinary control system    HX UROLOGICAL  12/3/13    REPLACEMENT ARTIFICAL URINARY SPHINCTER    [place] CARDIAC SURG PROCEDURE UNLIST      CABG X1 3/5/11       Expanded or extensive additional review of patient history:     Home Situation  Home Environment: Trailer/mobile home  # Steps to Enter: 0  Wheelchair Ramp: Yes  One/Two Story Residence: One story  Living Alone: No  Support Systems: Spouse/Significant Other,Other Family Member(s)  Patient Expects to be Discharged to[de-identified] Home  Current DME Used/Available at Home: Oxygen, portable,Walker, rolling,Shower chair (dressing stick, sock aide, reacher)  Tub or Shower Type: Tub/Shower combination (garden tub)    Hand dominance: Right    EXAMINATION OF PERFORMANCE DEFICITS:  Cognitive/Behavioral Status:  Neurologic State: Alert  Orientation Level: Oriented X4  Cognition: Follows commands (not formally assessed; recommend MoCA; appears WFL)  Perception: Appears intact  Perseveration: No perseveration noted  Safety/Judgement: Awareness of environment; Fall prevention;Decreased insight into deficits; Decreased awareness of need for safety;Decreased awareness of need for assistance    Skin: skin tear R forearm with most recent fall    Edema: + R forearm    Hearing:   Auditory  Auditory Impairment: Hard of hearing, bilateral,Hearing aid(s)  Hearing Aids/Status: With patient    Vision/Perceptual:                           Acuity:  (WFL)    Corrective Lenses: Reading glasses    Range of Motion:  B UE  AROM: Generally decreased, functional (except B shoulders decr signif  - chronic)                         Strength:  B UE  Strength: Generally decreased, functional distally, B LEs appear WFL, does not appear to be weakness causing multiple falls                Coordination:  Coordination: Generally decreased, functional (note resting tremor R>L)  Fine Motor Skills-Upper: Left Impaired;Right Impaired (tremors noted B,at rest, R worse than Ldeclines with fatigue)    Gross Motor Skills-Upper: Left Impaired;Right Impaired (mild B UE, R worse than L)    Tone & Sensation:  Tremors noted B UEs, R worse than L, noted at rest and with activity  Tone: Normal  Sensation: Intact                      Balance:  Sitting: Intact  Standing:  (NT)    Functional Mobility and Transfers for ADLs:  Bed Mobility:  Rolling: Supervision  Supine to Sit: Supervision  Sit to Supine: Supervision  Scooting: Supervision    Transfers:  Sit to Stand:  (NT due to drop in BP)  Toilet Transfer :  (recommend bedpan due to severity of BP in sitting/standing)    ADL Assessment:  Feeding: Setup;Contact guard assistance; Additional time (limited by tremors dominant R UE)    Oral Facial Hygiene/Grooming: Supervision;Contact guard assistance (dominant R UE w/tremors, A at times)    Bathing: Minimum assistance;Maximum assistance (limited today by hypotension)    Upper Body Dressing: Moderate assistance;Maximum assistance; Additional time    Lower Body Dressing: Maximum assistance; Total assistance; Additional time (grandson assists PTA, limited by general debility)    Toileting: Minimum assistance;Maximum assistance; Additional time (grandson assists PTA; has urine valve/urinal difficult in trammell)                ADL Intervention and task modifications:     Patient instructed and indicated understanding the benefits of maintaining activity tolerance, functional mobility, and independence with self care tasks during acute stay  to ensure safe return home and to baseline. Encouraged patient to increase frequency and duration head of bed up working towards increased tolerance/stability of BP in sitting, be in chair position at minimum for all meals, perform daily ADLs (as approved by RN/MD regarding bathing etc), and performing functional mobility once BP is in safe range    Cognitive Retraining  Safety/Judgement: Awareness of environment; Fall prevention;Decreased insight into deficits; Decreased awareness of need for safety;Decreased awareness of need for assistance    Trained need for increased AROM B UEs and LEs even now while in bed, to facilitate circulation and normalization of BP  Functional Measure:    Barthel Index:  Bathin  Bladder: 5  Bowels: 10  Groomin  Dressin  Feedin  Mobility: 0  Stairs: 0  Toilet Use: 5  Transfer (Bed to Chair and Back): 10 (inferred; unable to test due to low BP seated)  Total: 35/100      The Barthel ADL Index: Guidelines  1. The index should be used as a record of what a patient does, not as a record of what a patient could do. 2. The main aim is to establish degree of independence from any help, physical or verbal, however minor and for whatever reason. 3. The need for supervision renders the patient not independent. 4. A patient's performance should be established using the best available evidence. Asking the patient, friends/relatives and nurses are the usual sources, but direct observation and common sense are also important. However direct testing is not needed. 5. Usually the patient's performance over the preceding 24-48 hours is important, but occasionally longer periods will be relevant. 6. Middle categories imply that the patient supplies over 50 per cent of the effort. 7. Use of aids to be independent is allowed. Score Interpretation (from [address] [facility] [address])    Independent   60-79 Minimally independent   40-59 Partially dependent   20-39 Very dependent   <20 Totally dependent     -Joce Sweet., Barthel, D.W. (1965). Functional evaluation: the Barthel Index. [address] [facility] ([address] (1997). The Barthel activities of daily living index: self-reporting versus actual performance in the old (> or = 75 years). Journal of [address] [place] [address] 45(7), [address] [facility], .REGGIE, Trev Ravi., Carlos Nam. (1999). Measuring the change in disability after inpatient rehabilitation; comparison of the responsiveness of the Barthel Index and Functional [place] Measure. Journal of Neurology, Neurosurgery, and Psychiatry, 66(4), 754-681. Db Armijo, N.J.A, ALPHONSO Ambrose.TAMMI, & Argelia Edwards MEnA. (2004) Assessment of post-stroke quality of life in cost-effectiveness studies:  The usefulness of the Barthel Index and the EuroQoL-5D. Quality of Life Research, 15, 417-25     Occupational Therapy Evaluation Charge Determination   History Examination Decision-Making   LOW Complexity : Brief history review  HIGH Complexity : 5 or more performance deficits relating to physical, cognitive , or psychosocial skils that result in activity limitations and / or participation restrictions HIGH Complexity : Patient presents with comorbidities that affect occupational performance. Signifigant modification of tasks or assistance (eg, physical or verbal) with assessment (s) is necessary to enable patient to complete evaluation       Based on the above components, the patient evaluation is determined to be of the following complexity level: LOW   Pain Ratin/10, pounding headache; nsg notified; initiated training pain management; Patient is orthostatic    Activity Tolerance:   Poor, desaturates with exertion and requires oxygen, requires frequent rest breaks, observed SOB with activity, and signs and symptoms of orthostatic hypotension    After treatment patient left in no apparent distress:    Supine in bed, Call bell within reach, Caregiver / family present, and Side rails x 3    COMMUNICATION/EDUCATION:   The patients plan of care was discussed with: Physical therapist and Registered nurse. Home safety education was provided and the patient/caregiver indicated understanding., Patient/family have participated as able in goal setting and plan of care. , and Patient/family agree to work toward stated goals and plan of care. This patients plan of care is appropriate for delegation to [facility] [place].     Thank you for this referral.  Bam Landa OTR/L  Time Calculation: 18 mins

## 2022-05-17 NOTE — PROGRESS NOTES
ADULT PROTOCOL: JET AEROSOL ASSESSMENT    Patient  Larry Thompson     65 y.o.   male     5/17/2022  2:44 AM    Breath Sounds Pre Procedure: Right Breath Sounds: Diminished                               Left Breath Sounds: Diminished    Breath Sounds Post Procedure: Right Breath Sounds: Diminished                                 Left Breath Sounds: Diminished    Breathing pattern: Pre procedure Breathing Pattern: Regular          Post procedure Breathing Pattern: Regular    Heart Rate: Pre procedure Pulse: 72           Post procedure Pulse: 72    Resp Rate: Pre procedure Respirations: 18           Post procedure Respirations: 18      Cough: Pre procedure Cough: Non-productive               Post procedure Cough: Non-productive      Oxygen: O2 Device: Nasal cannula,Humidifier   2L    SpO2: Pre procedure SpO2: 93 %                 Post procedure SpO2: 93 %      Nebulizer Therapy: Current medications Aerosolized Medications: DuoNeb          Smoking History: never smoker     Problem List:   Patient Active Problem List   Diagnosis Code    Essential hypertension I10    History of factor V Leiden mutation Z86.2    Osteoarthritis M19.90    Hyperthyroidism E05.90    Atherosclerosis of native coronary artery of native heart without angina pectoris I25.10    Chronic systolic heart failure (HCC) I50.22    Atrial fibrillation (HCC) I48.91    Mixed hyperlipidemia E78.2    S/P cervical spinal fusion Z98.1    Left-sided chest wall pain R07.89    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) F02.81    Lower GI bleeding K92.2    B12 deficiency E53.8    Hypothyroidism due to acquired atrophy of thyroid E03.4    Osteoporosis M81.0    Cervical post-laminectomy syndrome M96.1    Neck pain M54.2    ACP (advance care planning) Z71.89    Stenosis of both carotid arteries without cerebral infarction I65.23    Cervicogenic headache G44.86    Alzheimer's dementia, late onset, with behavioral disturbance ([facility]) G30.1, F02.81    Spinal stenosis, lumbar region, with neurogenic claudication M48.062    Lumbar back pain with radiculopathy affecting right lower extremity M54.16    Lumbar back pain with radiculopathy affecting left lower extremity M54.16    History of stroke Z86.73    AVNRT (AV bridgette re-entry tachycardia) ([facility]) I47.1    AICD (automatic cardioverter/defibrillator) present Z95.810    S/P CABG x 1 Z95.1    Chronic anticoagulation Z79.01    DDD (degenerative disc disease), lumbar M51.36    Moderate major depression ([facility]) F32.1    Depression F32. A    Right rotator cuff tear arthropathy M75.101, M12.811    Rotator cuff arthropathy of left shoulder M12.812    Status post reverse arthroplasty of shoulder Z96.619    Syncope R55    Acute on chronic combined systolic and diastolic ACC/AHA stage C congestive heart failure ([facility]) I50.43    Respiratory failure ([facility]) J96.90    Interstitial lung disease ([facility]) J84.9    Ischemic cardiomyopathy I25.5    Pulmonary emphysema ([facility]) J43.9    Cor pulmonale (chronic) ([facility]) I27.81    Pneumonia J18.9    Chronic renal disease, stage III N18.30    Orthostatic hypotension I95.1       Respiratory Therapist: Lisseth Rincon
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
## 2022-05-17 NOTE — PROGRESS NOTES
Call pt and wife  One inflammation test is high, the other one is normal  The muscle test is normal  The Lupus test is negative  The CBC is ok  The Rheumatoid arthritis test is negative  The Vit D is normal  The Lyme test is negative  The RMSF test is weakly positive showing poss a past infection  But if never treated for it I will call in an Antibiotic called Doxycycline to take for 10 d  Recheck the inflammation test is 1 mo

## 2022-05-17 NOTE — PROGRESS NOTES
Problem: Falls - Risk of  Goal: *Absence of Falls  Description: Document Chiquis Esqueda Fall Risk and appropriate interventions in the flowsheet.   Outcome: Progressing Towards Goal  Note: Fall Risk Interventions:  Mobility Interventions: Bed/chair exit alarm,Communicate number of staff needed for ambulation/transfer,OT consult for ADLs,Patient to call before getting OOB,PT Consult for mobility concerns,PT Consult for assist device competence,Utilize walker, cane, or other assistive device         Medication Interventions: Bed/chair exit alarm,Patient to call before getting OOB,Teach patient to arise slowly         History of Falls Interventions: Bed/chair exit alarm,Door open when patient unattended,Room close to nurse's station         Problem: Patient Education: Go to Patient Education Activity  Goal: Patient/Family Education  Outcome: Progressing Towards Goal     Problem: Syncope  Goal: *Absence of injury  Outcome: Progressing Towards Goal  Goal: Decrease or eliminate episodes of syncope  Outcome: Progressing Towards Goal

## 2022-05-17 NOTE — PROGRESS NOTES
End of Shift Note    Bedside shift change report given to [facility] (oncoming nurse) by Abby Stinson (offgoing nurse). Report included the following information SBAR, Kardex and MAR    Shift worked:  7am to 7pm     Shift summary and any significant changes:     Patient tolerated care fairly throughout shift. Hourly rounding completed. Medications given and education provided regarding all meds. Patient turned Q2 hours and orthostatics completed. PT/OT worked with patient and family present at bedside.           Abby Stinson

## 2022-05-17 NOTE — PROGRESS NOTES
Problem: Mobility Impaired (Adult and Pediatric)  Goal: *Acute Goals and Plan of Care (Insert Text)  Description: FUNCTIONAL STATUS PRIOR TO ADMISSION: Pt lives in trailer with his wife. They state he had been able to amb without device prior to illnesses and hospitalization in March, some decline since but still amb without device. He has had 4 falls in last 6 months. Pt states \"my legs just give out\". He does have intermittent help with ADLs. HOME SUPPORT PRIOR TO ADMISSION: The patient lived with wife to provide assistance. Physical Therapy Goals  Initiated 5/17/2022  1. Patient will move from supine to sit and sit to supine , scoot up and down, and roll side to side in bed with independence within 7 day(s). 2.  Patient will transfer from bed to chair and chair to bed with modified independence using the least restrictive device within 7 day(s). 3.  Patient will perform sit to stand with modified independence within 7 day(s). 4.  Patient will ambulate with modified independence for 150 feet with the least restrictive device within 7 day(s). Outcome: Not Met   PHYSICAL THERAPY EVALUATION  Patient: Gunjan Shaw (89 y.o. male)  Date: 5/17/2022  Primary Diagnosis: Syncope [R55]  Orthostatic hypotension [I95.1]        Precautions:   Fall,Bed Alarm,Skin,Spinal (orthostatic; 4 falls in 6 mo; PMH cervical sx; )    ASSESSMENT  Based on the objective data described below, the patient presents with limitations in activity tolerance with orthostatic drop in BP upon sitting, decreased gait and functional mobility with hx of falls. Pt describes 4 falls in last 6 months and states he didn't pass out prior to this fall prompting admission but does state he felt like his legs would give way. He is able to complete bed mobility with supervision but was unable to progress beyond this due to drop in BP. Pt c/o 6/10 HA since admission.   Supine /75 HR 86  Sitting BP 85/61 HR 97%  Sats 94% on 2L - pt is on 2L at baseline since hospitalization in March. Current Level of Function Impacting Discharge (mobility/balance): see above details; limited eval due to orthostasis    Functional Outcome Measure: The patient scored 45/100 on the barthel outcome measure which is indicative of 55% functional impairment. Other factors to consider for discharge: hx falls, orthostatic currently in sitting with limited ability to progress activity     Patient will benefit from skilled therapy intervention to address the above noted impairments. PLAN :  Recommendations and Planned Interventions: bed mobility training, transfer training, gait training, therapeutic exercises, patient and family training/education, and therapeutic activities      Frequency/Duration: Patient will be followed by physical therapy:  4 times a week to address goals. Recommendation for discharge: (in order for the patient to meet his/her long term goals)  To be determined: likely home with HHPT if BP issues are resolved - pending progress    This discharge recommendation:  A follow-up discussion with the attending provider and/or case management is planned    IF patient discharges home will need the following DME: to be determined         SUBJECTIVE:   Patient stated I want to get rid of this headache.     OBJECTIVE DATA SUMMARY:   HISTORY:    Past Medical History:   Diagnosis Date    Acute combined systolic and diastolic congestive heart failure ([facility] 2/12/2022    AICD (automatic cardioverter/defibrillator) present 5/13/2016 5/13/16 [employer] upgrade to dual chamber AICD implant  Dr. Iram Paris disease [facility])     Anxiety state, other     Arthritis     OSTEO ARTHRITIS    AVNRT (AV bridgette re-entry tachycardia) ([facility] 5/12/2016 5/12/16 AVNRT ablation: PM/AICD left upper chest :Dr. Shaheed Bravo Back ache     CAD (coronary artery disease)     Cancer [facility]) 2004    Prostate cancer    Chest tightness     last episode of chest pain 5/2016 before AICD placed; none since then    Coagulation defects 2005    FACTOR V LEIDEN    Dementia ([address]     Depression     Dizziness     FH: factor V Leiden deficiency     Generalized muscle ache     GERD (gastroesophageal reflux disease)     HEARTBURN    Heart failure ([address] 2014    as of 07/2019 EF 30-35;  Dr. Michele Mckeon, Cardiomyopathy    Hyperlipidemia, mixed     Hypertension     Long term current use of anticoagulant therapy     Other ill-defined conditions(799.89) 2004    blood transfusion    Pacemaker     Paroxysmal atrial fibrillation (HCC)     rate controlled with coreg     Postsurgical aortocoronary bypass status 05/29/2012    CABG    Presence of cardiac defibrillator 05/2016    left upper chest    Pulmonary emphysema ([address] 3/4/2022    [facility] [place] spotted fever)     SSS (sick sinus syndrome) ([address]     Stroke ([address] 2011, 1990's    SEVERAL-mini    Syncope     Thromboembolism ([address] 2014    left leg: changed to Eliquis from Warfarin    Thromboembolus ([address] 2005    left leg    Thyroid disease     Weakness generalized      Past Surgical History:   Procedure Laterality Date    CARDIAC CATHETERIZATION  3/4/2011         HX HEENT  2019    oral surgery, removed teeth, dentures upper/lower    HX HERNIA REPAIR Left 2002    Ingiunal hernia repair left    HX ORTHOPAEDIC      LEFT KNEE CARTILAGE REPAIR;RIGHT ROTATOR CUFF REPAIR    HX ORTHOPAEDIC  2/14/12    C5-7 ANTERIIOR CERVICAL DISECTOMY AND FUSION    HX ORTHOPAEDIC  6/4/13    C5-C7 POSTERIOR DECOMPRESSION AND FUSION    HX ORTHOPAEDIC      right thumb partial amputation of tip    HX OTHER SURGICAL      steroid injections    HX PACEMAKER Left 05/13/2016    BS D142, Dr. Varela Hands HX PROSTATECTOMY  2004     [place]    HX ROTATOR CUFF REPAIR Left 2019    HX UROLOGICAL       urinary control system    HX UROLOGICAL  12/3/13    REPLACEMENT ARTIFICAL URINARY SPHINCTER    CO CARDIAC SURG PROCEDURE UNLIST      CABG X1 3/5/11       Personal factors and/or comorbidities impacting plan of care: on home O2 at baseline 2L    [address]: Trailer/mobile home  # Steps to Enter: 0  Wheelchair Ramp: Yes  One/Two Story Residence: One story  Living Alone: No  Support Systems: Spouse/Significant Other,Other Family Member(s)  Patient Expects to be Discharged to[de-identified] Home  Current DME Used/Available at Home: Oxygen, portable,Walker, rolling,Shower chair (dressing stick, sock aide, reacher)  Tub or Shower Type: Tub/Shower combination (garden tub)    EXAMINATION/PRESENTATION/DECISION MAKING:   Critical Behavior:  Neurologic State: Alert  Orientation Level: Oriented X4        Hearing: Auditory  Auditory Impairment: Hard of hearing, bilateral,Hearing aid(s)  Hearing Aids/Status: With patient    Range Of Motion:  AROM: Generally decreased, functional (except B shoulders decr signif  - chronic)                       Strength:    Strength: Generally decreased, functional                    Tone & Sensation:   Tone: Normal              Sensation: Intact               Coordination:  Coordination: Generally decreased, functional (note resting tremor R>L)       Functional Mobility:  Bed Mobility:  Rolling: Supervision  Supine to Sit: Supervision  Sit to Supine: Supervision  Scooting: Supervision  Transfers:  Sit to Stand:  (NT due to drop in BP)                          Balance:   Sitting: Intact  Standing:  (NT)      Functional Measure:  Barthel Index:    Bathin  Bladder: 5  Bowels: 10  Groomin  Dressin  Feeding: 10  Mobility: 0  Stairs: 0  Toilet Use: 5  Transfer (Bed to Chair and Back): 10 (inferred but unable to complete due to BP drop)  Total: 45/100       The Barthel ADL Index: Guidelines  1. The index should be used as a record of what a patient does, not as a record of what a patient could do.   2. The main aim is to establish degree of independence from any help, physical or verbal, however minor and for whatever reason. 3. The need for supervision renders the patient not independent. 4. A patient's performance should be established using the best available evidence. Asking the patient, friends/relatives and nurses are the usual sources, but direct observation and common sense are also important. However direct testing is not needed. 5. Usually the patient's performance over the preceding 24-48 hours is important, but occasionally longer periods will be relevant. 6. Middle categories imply that the patient supplies over 50 per cent of the effort. 7. Use of aids to be independent is allowed. Score Interpretation (from [facility])    Independent   60-79 Minimally independent   40-59 Partially dependent   20-39 Very dependent   <20 Totally dependent     -Joce Sweet., Barthel, D.W. (1965). Functional evaluation: the Barthel Index. [facility] ([address] (1997). The Barthel activities of daily living index: self-reporting versus actual performance in the old (> or = 75 years). Journal [address] 45(7), [address] [facility], [facility], [facility]., [facility]. (1999). Measuring the change in disability after inpatient rehabilitation; comparison of the responsiveness of the Barthel Index and Functional [place] Measure. Journal of Neurology, Neurosurgery, and Psychiatry, 66(4), 715-648. Stephie Crane NRAMILA.LENCHO, ELIER Ambrose, & Herman Orellana, M.A. (2004) Assessment of post-stroke quality of life in cost-effectiveness studies: The usefulness of the Barthel Index and the EuroQoL-5D.  Quality of Life Research, 15, 580-59           Physical Therapy Evaluation Charge Determination   History Examination Presentation Decision-Making   HIGH Complexity :3+ comorbidities / personal factors will impact the outcome/ POC  HIGH Complexity : 4+ Standardized tests and measures addressing body structure, function, activity limitation and / or participation in recreation  HIGH Complexity : Unstable and unpredictable characteristics  LOW Complexity : FOTO score of       Based on the above components, the patient evaluation is determined to be of the following complexity level: LOW     Pain Ratin/10 headache    Activity Tolerance:   requires rest breaks and signs and symptoms of orthostatic hypotension    After treatment patient left in no apparent distress:   Supine in bed, Call bell within reach, Caregiver / family present, and Side rails x 3    COMMUNICATION/EDUCATION:   The patients plan of care was discussed with: Occupational therapist and Registered nurse. Fall prevention education was provided and the patient/caregiver indicated understanding., Patient/family have participated as able in goal setting and plan of care. , and Patient/family agree to work toward stated goals and plan of care.     Thank you for this referral.  James Barros, PT   Time Calculation: 18 mins

## 2022-05-17 NOTE — WOUND CARE
Wound care consult for the skin tear on the right arm that occurred yesterday when he fell in a store (when he \"blacked out\"). Pt. Is not up to date with tetanus shot, \"has not had one in a long long time\" per his wife. Pt. Hit the right arm on some shelves in a store. Assessment:  the skin tear is partial thickness and has some adherent purple skin tissue on the wound edges. Otherwise the skin tear is pink wound bed. Treatment: The wound was cleansed with Caraklenz spray and wiped with gauze to remove the old drainage and wound debris. A Mepitel One silicone layer dressing was applied to the wound as a contact layer and then a Vaseline gauze and then 4x4's, wrapped with small roll christen and then an ACE bandage for better protection. Plan: wound cleansing / care needs to be done every other day while in the hospital and then can be done every three days at home. Can keep the silicone layer in place for up to 14 days. After the two weeks the silicone can be removed, the wound cleansed again and then leave the skin tear open to air. Note: While treating the patient today, the pt.'s PCP office called his wife who was sitting there and she was on speaker phone. She was reporting the recent test results that the patient had done in the office and a [facility]" test was [employer] positive\". This information was relayed to the Dr. Cody Alvarado and Estephania Alvarez RN and an ID consult was placed to address this. The results are in the hospital chart and can be viewed here. His PCP is [facility].    Fernando Talley RN, BSN, [employer]

## 2022-05-17 NOTE — PROGRESS NOTES
Hospitalist Progress Note    NAME: Kaleigh Pablo   :  1945   MRN:  [id number]       Assessment / Plan:  Syncope due to orthostatic hypotension, hold Entresto today per cardiology. --  ICD interrogation. -- Hold Lasix and metoprolol. -- Cardiology was consulted  --check orthostatics qshift     Chronic combined systolic systolic and diastolic CHF  EF 18% 2022 with CHF admission at that time  -- Hold Lasix for now. Continue Entresto if blood pressure allows, hold for systolic less than 668     Coronary artery disease status post CABG  -- Hold metoprolol due to relative hypotension. -- Continue Lipitor     History of A. fib status post AV node ablation, PPM/ICD  -- Continue Eliquis     COPD  Interstitial lung disease with chronic respiratory failure on 2 L oxygen at baseline  --Add Mucinex, DuoNeb every 6 hours     GERD  Continue Protonix     Dementia  --seems mild but story ER physician at [facility] obtained is different from what patient mentioned to writer  Continue Namenda     Body mass index is 23.81 kg/m². Positive IgG for rickettsial  disease, no acute infection, ID consulted      Code: full  DVT prophylaxis: eliquis  Surrogate decision maker:  Wife Katherine        Subjective:     Chief Complaint / Reason for Physician Visit  \"had BM, very dizzy with minimal ambulation \". Discussed with RN events overnight. Review of Systems:  Symptom Y/N Comments  Symptom Y/N Comments   Fever/Chills    Chest Pain     Poor Appetite    Edema     Cough    Abdominal Pain     Sputum    Joint Pain     SOB/HOLCOMB    Pruritis/Rash     Nausea/vomit    Tolerating PT/OT     Diarrhea    Tolerating Diet     Constipation    Other       Could NOT obtain due to:      Objective:     VITALS:   Last 24hrs VS reviewed since prior progress note.  Most recent are:  Patient Vitals for the past 24 hrs:   Temp Pulse Resp BP SpO2   22 1117 98 °F (36.7 °C) 86 18 119/69 96 %   22 0914 -- -- -- -- 95 %   22 0858 -- -- -- 138/64 --   05/17/22 0753 97.5 °F (36.4 °C) 92 19 (!) 142/62 98 %   05/17/22 0316 97.8 °F (36.6 °C) 84 18 (!) 131/59 95 %   05/17/22 0018 98.7 °F (37.1 °C) 79 20 127/66 97 %   05/16/22 2046 97.7 °F (36.5 °C) 92 20 (!) 99/55 95 %   05/16/22 1950 -- -- -- -- 93 %   05/16/22 1737 -- 75 -- (!) 110/55 --   05/16/22 1542 -- 72 -- 115/63 --   05/16/22 1517 -- 73 -- (!) 121/49 100 %   05/16/22 1450 -- -- -- -- 95 %   05/16/22 1419 97.7 °F (36.5 °C) 68 18 (!) 124/49 97 %   05/16/22 1302 -- 73 -- 105/71 --       Intake/Output Summary (Last 24 hours) at 5/17/2022 1249  Last data filed at 5/17/2022 0848  Gross per 24 hour   Intake 1240 ml   Output 900 ml   Net 340 ml        I had a face to face encounter and independently examined this patient on 5/17/2022, as outlined below:  PHYSICAL EXAM:  General: WD, WN. Alert, cooperative, no acute distress    EENT:  EOMI. Anicteric sclerae. MMM  Resp:  CTA bilaterally, no wheezing or rales. No accessory muscle use  CV:  Regular  rhythm,  No edema  GI:  Soft, Non distended, Non tender. +Bowel sounds  Neurologic:  Alert and oriented X 3, normal speech,   Psych:   Good insight. Not anxious nor agitated  Skin:  No rashes. No jaundice    Reviewed most current lab test results and cultures  YES  Reviewed most current radiology test results   YES  Review and summation of old records today    NO  Reviewed patient's current orders and MAR    YES  PMH/SH reviewed - no change compared to H&P  ________________________________________________________________________  Care Plan discussed with:    Comments   Patient     Family      RN     Care Manager     Consultant                        Multidiciplinary team rounds were held today with , nursing, pharmacist and clinical coordinator. Patient's plan of care was discussed; medications were reviewed and discharge planning was addressed.      ________________________________________________________________________  Total NON critical care TIME:  30  Minutes    Total CRITICAL CARE TIME Spent:   Minutes non procedure based      Comments   >50% of visit spent in counseling and coordination of care     ________________________________________________________________________  Osmani Sidhu MD     Procedures: see electronic medical records for all procedures/Xrays and details which were not copied into this note but were reviewed prior to creation of Plan. LABS:  I reviewed today's most current labs and imaging studies.   Pertinent labs include:  Recent Labs     05/17/22  0322 05/15/22  1848   WBC 7.5 7.6   HGB 10.0* 11.1*   HCT 31.9* 35.2*    252     Recent Labs     05/17/22  0322 05/15/22  1848    138   K 4.4 4.9    102   CO2 31 30   * 103*   BUN 21* 28*   CREA 0.95 1.17   CA 9.6 9.8   MG  --  2.0   ALB  --  3.3*   TBILI  --  0.4   ALT  --  18   INR  --  1.1       Signed: Osmani Sidhu MD

## 2022-05-17 NOTE — CONSULTS
Infectious Disease Consult Note    Reason for Consult: Positive test for RMSF  Date of Consultation: May 17, 2022  Date of Admission: 5/16/2022  Referring Physician: Dr. Mansoor Mg      HPI:  Meghana Solano is a 65y.o. year old male with history of SSS with ICD/pacemaker, CAD, CABG, Afib, on 2L home O2 since ~ 03/2022, who presented on 5/15/22 after a syncopal episode at a grocery store. Patient found to be Orthostatic. ID consulted for positive RMSF IgG test from 5/10/22 that was sent by his PCP for complaints of myalgias. The R. Ricketsii IgG came positive, but the reflex IFA was negative. On history, patient reports that he is usually very active outdoors, gardening around shrubs, and in March 2022 had noticed a tick on his leg. Patient has been less active outdoors since acute exacerbation of CHF and then COPD with pneumonia in April 2022. He reports headaches since ~3 months, which is before he had noticed the tick on him. Headaches are not worsening. No objective fevers are home, no history of rash. In April, patient was given a script for 5 days of cefdinir and doxycycline, but the wife is unsure if he took it. Patient afebrile. Labs reflect no leukopenia, thrombocytopenia, or LFT derangements.          Past Medical History:  Past Medical History:   Diagnosis Date    Acute combined systolic and diastolic congestive heart failure ([address] 2/12/2022    AICD (automatic cardioverter/defibrillator) present 5/13/2016 5/13/16 [employer] upgrade to dual chamber AICD implant  Dr. Darion Thorpe disease [facility])     Anxiety state, other     Arthritis     OSTEO ARTHRITIS    AVNRT (AV bridgette re-entry tachycardia) ([address] 5/12/2016 5/12/16 AVNRT ablation: PM/AICD left upper chest :Dr. Gil Jump Back ache     CAD (coronary artery disease)     Cancer [facility]) 2004    Prostate cancer    Chest tightness     last episode of chest pain 5/2016 before AICD placed; none since then   Opal Blackman Coagulation defects 2005    FACTOR V LEIDEN    Dementia [facility]     Depression     Dizziness     FH: factor V Leiden deficiency     Generalized muscle ache     GERD (gastroesophageal reflux disease)     HEARTBURN    Heart failure ([address] 2014    as of 07/2019 EF 30-35;  Dr. Sonja South, Cardiomyopathy    Hyperlipidemia, mixed     Hypertension     Long term current use of anticoagulant therapy     Other ill-defined conditions(799.89) 2004    blood transfusion    Pacemaker     Paroxysmal atrial fibrillation (HCC)     rate controlled with coreg     Postsurgical aortocoronary bypass status 05/29/2012    CABG    Presence of cardiac defibrillator 05/2016    left upper chest    Pulmonary emphysema ([address] 3/4/2022    [facility] spotted fever)     SSS (sick sinus syndrome) ([facility]     Stroke ([facility] 2011, 1990's    SEVERAL-mini    Syncope     Thromboembolism ([address] 2014    left leg: changed to Eliquis from Warfarin    Thromboembolus ([facility] 2005    left leg    Thyroid disease     Weakness generalized          Surgical History:  Past Surgical History:   Procedure Laterality Date    CARDIAC CATHETERIZATION  3/4/2011         HX HEENT  2019    oral surgery, removed teeth, dentures upper/lower    HX HERNIA REPAIR Left 2002    Ingiunal hernia repair left    HX ORTHOPAEDIC      LEFT KNEE CARTILAGE REPAIR;RIGHT ROTATOR CUFF REPAIR    HX ORTHOPAEDIC  2/14/12    C5-7 ANTERIIOR CERVICAL DISECTOMY AND FUSION    HX ORTHOPAEDIC  6/4/13    C5-C7 POSTERIOR DECOMPRESSION AND FUSION    HX ORTHOPAEDIC      right thumb partial amputation of tip    HX OTHER SURGICAL      steroid injections    HX PACEMAKER Left 05/13/2016    BS D142, Dr. Giuseppe Alba H[place] PROSTATECT[place]  2004     [place]    HX ROTATOR CUFF REPAIR Left 2019    HX UROLOGICAL       urinary control system    HX UROLOGICAL  12/3/13    REPLACEMENT ARTIFICAL URINARY SPHINCTER    IN CARDIAC SURG PROCEDURE UNLIST      CABG X1 3/5/11         Family History:   Family History   Problem Relation Age of Onset    Asthma Mother     Alcohol abuse Mother     Alcohol abuse Father     Asthma Father     Cancer Sister     Heart Disease Sister     Alcohol abuse Brother     Cancer Brother     Heart Disease Brother          Social History:     · Lives with his wife. Denies smoking or drinking. Allergies:  No Known Allergies      Review of Systems:     Negative except as in HPI    Medications:  No current facility-administered medications on file prior to encounter. Current Outpatient Medications on File Prior to Encounter   Medication Sig Dispense Refill    apixaban (ELIQUIS) 5 mg tablet Take 5 mg by mouth two (2) times a day.  memantine (NAMENDA) 10 mg tablet Take 10 mg by mouth two (2) times a day.  pantoprazole (Protonix) 40 mg tablet Take 40 mg by mouth daily.  polyethylene glycol (MIRALAX) 17 gram packet Take 17 g by mouth daily as needed for Constipation.  donepeziL (ARICEPT) 5 mg tablet Take 5 mg by mouth nightly.  mirabegron ER (Myrbetriq) 50 mg ER tablet Take 50 mg by mouth daily.  sertraline (ZOLOFT) 100 mg tablet Take 200 mg by mouth daily.  albuterol (PROVENTIL VENTOLIN) 2.5 mg /3 mL (0.083 %) nebu 3 mL by Nebulization route every four (4) hours as needed for Wheezing. 120 Nebule 0    DULoxetine (CYMBALTA) 60 mg capsule Take 1 Capsule by mouth daily. 30 Capsule 0    furosemide (LASIX) 20 mg tablet Take 1 Tablet by mouth daily. 90 Tablet 1    acetaminophen (TYLENOL) 500 mg capsule Take 1,000 mg by mouth every six (6) hours as needed for Pain.  atorvastatin (LIPITOR) 40 mg tablet TAKE 1 TABLET BY MOUTH AT BEDTIME 90 Tablet 3    doxycycline (ADOXA) 100 mg tablet Take 1 Tablet by mouth two (2) times a day for 10 days. 20 Tablet 0    sacubitriL-valsartan (Entresto) 24-26 mg tablet Take 1 Tablet by mouth two (2) times a day.  180 Tablet 3    metoprolol succinate (TOPROL-XL) 25 mg XL tablet TAKE 1/2 TABLET BY MOUTH ONCE DAILY 45 Tablet 3           Physical Exam:    Vitals:   Patient Vitals for the past 24 hrs:   Temp Pulse Resp BP SpO2   05/17/22 1117 98 °F (36.7 °C) 86 18 119/69 96 %   05/17/22 0914 -- -- -- -- 95 %   05/17/22 0858 -- -- -- 138/64 --   05/17/22 0753 97.5 °F (36.4 °C) 92 19 (!) 142/62 98 %   05/17/22 0316 97.8 °F (36.6 °C) 84 18 (!) 131/59 95 %   05/17/22 0018 98.7 °F (37.1 °C) 79 20 127/66 97 %   05/16/22 2046 97.7 °F (36.5 °C) 92 20 (!) 99/55 95 %   05/16/22 1950 -- -- -- -- 93 %   05/16/22 1737 -- 75 -- (!) 110/55 --   05/16/22 1542 -- 72 -- 115/63 --   05/16/22 1517 -- 73 -- (!) 121/49 100 %   05/16/22 1450 -- -- -- -- 95 %   05/16/22 1419 97.7 °F (36.5 °C) 68 18 (!) 124/49 97 %   05/16/22 1302 -- 73 -- 105/71 --   ·   · GEN: NAD  · HEENT: Normocephalic, atraumatic, PERRL, no scleral icterus  · CV: HDS  · Lungs: Clear to auscultation bilaterally  · Abdomen: soft, non distended, non tender  · Genitourinary:  no leija  · Extremities: no edema  · Neuro: Alert, oriented to time, place and situation, moves all extremities to commands, verbal   · Skin: no rash, right forearm has wound incurred from the syncopal episode on 5/15/22.    · Psych: good affect, good eye contact, non tearful   · Lines: PIVs      Labs:   Recent Results (from the past 24 hour(s))   METABOLIC PANEL, BASIC    Collection Time: 05/17/22  3:22 AM   Result Value Ref Range    Sodium 137 136 - 145 mmol/L    Potassium 4.4 3.5 - 5.1 mmol/L    Chloride 104 97 - 108 mmol/L    CO2 31 21 - 32 mmol/L    Anion gap 2 (L) 5 - 15 mmol/L    Glucose 101 (H) 65 - 100 mg/dL    BUN 21 (H) 6 - 20 MG/DL    Creatinine 0.95 0.70 - 1.30 MG/DL    BUN/Creatinine ratio 22 (H) 12 - 20      GFR est AA >60 >60 ml/min/1.73m2    GFR est non-AA >60 >60 ml/min/1.73m2    Calcium 9.6 8.5 - 10.1 MG/DL   CBC W/O DIFF    Collection Time: 05/17/22  3:22 AM   Result Value Ref Range    WBC 7.5 4.1 - 11.1 K/uL    RBC 3.53 (L) 4.10 - 5.70 M/uL    HGB 10.0 (L) 12.1 - 17.0 g/dL    HCT 31.9 (L) 36.6 - 50.3 %    MCV 90.4 80.0 - 99.0 FL    MCH 28.3 26.0 - 34.0 PG    MCHC 31.3 30.0 - 36.5 g/dL    RDW 14.6 (H) 11.5 - 14.5 %    PLATELET 649 237 - 271 K/uL    MPV 10.6 8.9 - 12.9 FL    NRBC 0.0 0  WBC    ABSOLUTE NRBC 0.00 0.00 - 0.01 K/uL       Microbiology Data: In HPI        Assessment/Recommendations:  History, physical, labs (including CBC, CMP, R. Ricketssia serology) are not convincing of an acute RMSF infection in this patient. His IFA was negative and this is a very sensitive test for RMSF. IgG positivity can reflect past infection. Patient's headaches and mylagias are non-specific and ongoing for >3 months. Patient undergoing evaluation for orthostasis. Serology can be repeated in 2-4 weeks. No antibiotic management is recommended at this time. Thank for the opportunity to participate in the care of this patient. Please contact with questions or concerns.            Talat Elam MD  Infectious Diseases

## 2022-05-17 NOTE — PROGRESS NOTES
End of Shift Note    Bedside shift change report given to Angel Rodriguez (oncoming nurse) by Paulino Lang RN (offgoing nurse). Report included the following information SBAR, Kardex, ED Summary, Intake/Output, MAR, Recent Results and Cardiac Rhythm NSR    Shift worked:  1901-2043     Shift summary and any significant changes:     Patient neuro checks negative.  + CMST all extremeities. No c/o pain. Voiding QS. Tolerating PO. Vitals WNl. Patient slept well. NO c/o dizziness                           . Concerns for physician to address:   See above     Zone phone for oncoming shift:  [id number]     Activity:  Activity Level: Up with Assistance  Number times ambulated in hallways past shift: 0  Number of times OOB to chair past shift: 0    Cardiac:   Cardiac Monitoring: Yes      Cardiac Rhythm: Sinus Rhythm,Multiform PVCs    Access:   Current line(s): PIV     Genitourinary:   Urinary status: voiding    Respiratory:   O2 Device: Nasal cannula,Humidifier  Chronic home O2 use?: YES  Incentive spirometer at bedside: YES       GI:  Last Bowel Movement Date: 05/15/22  Current diet:  ADULT DIET Easy to Chew; Low Fat/Low Chol/High Fiber/2 gm Na  Passing flatus: YES  Tolerating current diet: YES       Pain Management:   Patient states pain is manageable on current regimen: NO c/o pain. Skin:  Fernando Score: 21  Interventions: float heels, increase time out of bed, PT/OT consult and limit briefs    Patient Safety:  Fall Score:  Total Score: 3  Interventions: bed/chair alarm, assistive device (walker, cane, etc), gripper socks and pt to call before getting OOB  High Fall Risk: Yes    Length of Stay:  Expected LOS: - - -  Actual LOS: 0      Christine Keating RN

## 2022-05-18 NOTE — PROGRESS NOTES
Hospitalist Progress Note    NAME: Kendal Dalal   :  1945   MRN:  [id number]     HPI excerpted from admission H&P:  Suresh Russell is a 65 y.o. male transferred for syncope and AICD interrogation.       Patient said yesterday 4:30 p.m., drove to get gas at grocery store but was short $1 in payment so he drove home. Drove back to store, walk in and spotted a friend. He then suddenly passed out. His friend caught him before he fell down. He was out for about 5 minutes. He did not have any chest pain, shortness of breath or dizziness at that time. However for the past 3 days he has been having periods of dizziness when he is walking. No fevers chills. Positive increased cough, nonproductive. Denies any wheezing, leg edema, orthopnea, signs of bleeding, nausea vomiting or diarrhea.       He was taken to [facility] where he was noted to be orthostatic:  Supine 103/61  P 66  Sitting 85/59  P74  Standing 76/45  P79  Given a total of 1 L of IV fluid but remains orthostatic with BP 79/57 standing so referred for admission.     He was hospitalized in 2022 with left lower lobe pneumonia and COPD exacerbation and in 2022 for combined CHF exacerbation. Echocardiogram showed EF of 40% which is down from 50 to 55% in 2020. \"    Assessment / Plan:  Syncope  Orthostatic hypotension  Chronic combined systolic/diastolic heart failure  CAD s/p remote CABG  H/O afib s/p AVN ablation with ICD implantation  Dyslipidemia   - Cardiology input appreciated. - Syncope likely 2/2 orthostatic hypotension.    - Resume sacubitril-valsartan 24-26 mg po bid with hold parameters, monitor for orthostatic hypotension.    - Continue to hold metoprolol succinate 12.5 mg po daily. - Continue to hold furosemide 20 mg po daily. - Continue apixaban 5 mg po q12h. - Continue atorvastatin 40 mg po daily.   - Add JAMSHID hose.       Mild azoteia  - Improving.  - Likely related to volume contraction from diuretic.  - Continue to hold diuretic for today. - Monitor. Positive RMSF IgG  - ID input appreciated. - Sent by PCP 2/2 complaints of myalgias. - Reflex IFA negative. - Plan for repeat serology in 2-4 weeks. COPD  - Continue albuterol-ipratropium 2.5-0.5 mg nebs q6h. GERD  - Continue pantoprazole 40 mg po daily. Dementia  - Continue donepezil 5 mg po daily at hs.   - Continue memantine 10 mg po bid. Depression   - Continue duloxetine 60 mg po daily. - Continue sertraline 200 mg po daily. OAB  - Continue mirabegron ER 50 mg po daily. Mild hyperglycemia   - No tx indicated. - Monitor with a.m. labs. Anemia   - No tx indicated. - OP follow up. 18.5 - 24.9 Normal weight / Body mass index is 23.93 kg/m². Estimated discharge date: May 19  Barriers:    Code status: Full  Prophylaxis: Apixaban  Recommended Disposition: Home w/Family     Subjective:     Chief Complaint / Reason for Physician Visit  Patient with no complaints. States that he has not been out of bed. Plan of care and pertinent events reviewed with bedside nurse. Review of Systems:  Symptom Y/N Comments  Symptom Y/N Comments   Fever/Chills N   Chest Pain N    Poor Appetite N   Edema N    Cough N   Abdominal Pain N    Sputum N   Joint Pain N    SOB/HOLCOMB N   Pruritis/Rash N    Nausea/vomit N   Tolerating PT/OT Y    Diarrhea N   Tolerating Diet Y    Constipation N   Other       Could NOT obtain due to:      Objective:     VITALS:   Last 24hrs VS reviewed since prior progress note.  Most recent are:  Patient Vitals for the past 24 hrs:   Temp Pulse Resp BP SpO2   05/18/22 0418 98.7 °F (37.1 °C) 74 18 (!) 116/45 96 %   05/17/22 2348 97.5 °F (36.4 °C) 80 18 126/65 97 %   05/17/22 2113 -- -- -- -- 100 %   05/17/22 1900 98 °F (36.7 °C) 77 18 (!) 149/66 99 %   05/17/22 1442 98 °F (36.7 °C) 97 19 (!) 98/45 97 %   05/17/22 1357 -- -- -- -- 95 %   05/17/22 1117 98 °F (36.7 °C) 86 18 119/69 96 %   05/17/22 0914 -- -- -- -- 95 %   05/17/22 0858 -- -- -- 138/64 --   05/17/22 0753 97.5 °F (36.4 °C) 92 19 (!) 142/62 98 %       Intake/Output Summary (Last 24 hours) at 5/18/2022 5478  Last data filed at 5/18/2022 0544  Gross per 24 hour   Intake 900 ml   Output 1775 ml   Net -875 ml        I had a face to face encounter and independently examined this patient on 5/18/2022, as outlined below:  PHYSICAL EXAM:  General:  A/A/O.  NAD. HEENT:  Normocephalic. Sclera anicteric. EOMI. Mucous membranes moist.    Chest:  Resps even/unlabored with symmetrical CWE. Air entry diminished. Lungs CTA. No use of accessory muscles. CV:  IRRR. Normal S1/S2. I/VI systolic murmur noted. No JVD. No peripheral edema. Cap refill < 3 sec. Peripheral pulses 2+. GI:  Abdomen soft/NT/ND. No organomegaly. No hernia. ABT X 4.    :  Voiding. Neurologic:  Nonfocal.  CN II-XII grossly intact. Face symmetrical.  Speech normal.     Psych:  Cooperative. No anxiety or agitation. No suicidal/homicidal ideation. Skin:  Warm and color appropriate. No rashes or jaundice. Turgor elastic. Reviewed most current lab test results and cultures  YES  Reviewed most current radiology test results   YES  Review and summation of old records today    NO  Reviewed patient's current orders and MAR    YES  PMH/SH reviewed - no change compared to H&P  ________________________________________________________________________  Care Plan discussed with:    Comments   [address] [place] [address]     Consultant                        Multidiciplinary team rounds were held today with , nursing, pharmacist and clinical coordinator. Patient's plan of care was discussed; medications were reviewed and discharge planning was addressed.      ________________________________________________________________________  Candida العلي NP     Procedures: see electronic medical records for all procedures/Xrays and details which were not copied into this note but were reviewed prior to creation of Plan. LABS:  I reviewed today's most current labs and imaging studies.   Pertinent labs include:  Recent Labs     05/17/22  0322 05/15/22  1848   WBC 7.5 7.6   HGB 10.0* 11.1*   HCT 31.9* 35.2*    252     Recent Labs     05/17/22  0322 05/15/22  1848    138   K 4.4 4.9    102   CO2 31 30   * 103*   BUN 21* 28*   CREA 0.95 1.17   CA 9.6 9.8   MG  --  2.0   ALB  --  3.3*   TBILI  --  0.4   ALT  --  18   INR  --  1.1       Signed: Yara Tiwari NP

## 2022-05-18 NOTE — PROGRESS NOTES
[facility]  [facility]  [address]  [phone]  [email or web address]         Cardiology Progress Note      5/18/2022 10:06 AM    Admit Date: 5/16/2022    Admit Diagnosis:   Syncope [R55]  Orthostatic hypotension [I95.1]    Interval History/Subjective:     Dimple Smallwood has been doing well. No acute events overnight. Denies dizziness.     Visit Vitals  BP (!) 122/57 (BP 1 Location: Left arm, BP Patient Position: At rest)   Pulse 70   Temp 98 °F (36.7 °C)   Resp 18   Ht 5' 7\" (1.702 m)   Wt 70 kg (154 lb 5.2 oz)   SpO2 99%   BMI 24.17 kg/m²       Current Facility-Administered Medications   Medication Dose Route Frequency    albuterol-ipratropium (DUO-NEB) 2.5 MG-0.5 MG/3 ML  3 mL Nebulization Q6HWA RT    sodium chloride (NS) flush 5-40 mL  5-40 mL IntraVENous Q8H    sodium chloride (NS) flush 5-40 mL  5-40 mL IntraVENous PRN    acetaminophen (TYLENOL) tablet 650 mg  650 mg Oral Q6H PRN    Or    acetaminophen (TYLENOL) suppository 650 mg  650 mg Rectal Q6H PRN    polyethylene glycol (MIRALAX) packet 17 g  17 g Oral DAILY PRN    ondansetron (ZOFRAN ODT) tablet 4 mg  4 mg Oral Q8H PRN    Or    ondansetron (ZOFRAN) injection 4 mg  4 mg IntraVENous Q6H PRN    atorvastatin (LIPITOR) tablet 40 mg  40 mg Oral QHS    DULoxetine (CYMBALTA) capsule 60 mg  60 mg Oral DAILY    apixaban (ELIQUIS) tablet 5 mg  5 mg Oral Q12H    memantine (NAMENDA) tablet 10 mg  10 mg Oral BID    pantoprazole (PROTONIX) tablet 40 mg  40 mg Oral DAILY    polyethylene glycol (MIRALAX) packet 17 g  17 g Oral DAILY    guaiFENesin ER (MUCINEX) tablet 600 mg  600 mg Oral Q12H    midodrine (PROAMATINE) tablet 2.5 mg  2.5 mg Oral TID PRN    sacubitriL-valsartan (ENTRESTO) 24-26 mg tablet 1 Tablet  1 Tablet Oral BID    donepeziL (ARICEPT) tablet 5 mg  5 mg Oral QHS    sertraline (ZOLOFT) tablet 200 mg  200 mg Oral DAILY    mirabegron ER (MYRBETRIQ) tablet 50 mg  50 mg Oral DAILY       Objective:      Physical Exam:  General: Well developed, in no acute distress, cooperative and alert  HEENT: No carotid bruits, PEERL, EOM intact. Heart:  reg rate and rhythm; normal S1/S2; no murmurs  Respiratory: Clear bilaterally x 4, no wheezing or rales  Abdomen:   Soft, non-tender, no distention, no masses. + BS. Extremities:  Normal cap refill, no cyanosis, atraumatic. No edema. Neuro: A&Ox3, speech clear  Skin: Skin color is normal. Non diaphoretic  Vascular: 2+ pulses symmetric in all extremities    Data Review:   Recent Labs     05/17/22 0322 05/15/22  1848   WBC 7.5 7.6   HGB 10.0* 11.1*   HCT 31.9* 35.2*    252     Recent Labs     05/17/22  0322 05/15/22  1848    138   K 4.4 4.9    102   CO2 31 30   * 103*   BUN 21* 28*   CREA 0.95 1.17   CA 9.6 9.8   MG  --  2.0   ALB  --  3.3*   TBILI  --  0.4   ALT  --  18   INR  --  1.1       No results for input(s): TROIQ, CPK, CKMB in the last 72 hours. Intake/Output Summary (Last 24 hours) at 5/18/2022 1006  Last data filed at 5/18/2022 0544  Gross per 24 hour   Intake 900 ml   Output 1300 ml   Net -400 ml        Telemetry:     EKG: SR       Assessment:     Active Problems:    Syncope (2/17/2020)      Orthostatic hypotension (5/16/2022)        Plan:     1. Syncope  2/2 orthostatic hypotension  Entresto started this AM with hold parameters. BP appears stable. Continue midodrine. Hold BB, lasix for now. Can consider resuming BB at low dose if BP remains stable without further orthostatic hypotension. 2. Chronic systolic congestive heart failure (HCC)  Hx of ischemic cardiomyopathy, previously resolved, now recurred  Echo done 2/2022 with reduced LVEF 20-25%. GDMT limited due to orthostatic hypotension. Entresto started. Hold BB, lasix for now -- appears euvolemic on exam today.     3.  Coronary artery disease involving native coronary artery of native heart without angina pectoris  Hx CABGx1 with LIMA to LAD in 3/2011  Lexiscan stress test done 3/2022 without evidence of ischemia  No angina today  Continue medical management.     4. Paroxysmal atrial fibrillation (HCC)  Maintaining SR  BB on hold due to orthostatic hypotension. Continue Eliquis therapy for stroke prevention     5. Mixed hyperlipidemia  Continue statin therapy and low fat, low cholesterol diet  Lipids managed by PCP     6. AICD (automatic cardioverter/defibrillator) present  Continue with routine device interrogation with Dr. Jessica Garcia.    Pt will follow up with Dr. Marli Sotelo NP at [facility] office at discharge. Keep f/u with Dr. Jessica Garcia for device check. Bg Hancock NP     Patient seen, examined by me personally. Plan discussed as detailed. Agree with note as outlined by NP with modifications as noted. My independent physical exam reveals. No additional findings noted. Agree with plan as outlined above with modifications as noted. Please call if can be of any further assistance.      Myra Starr MD

## 2022-05-18 NOTE — PROGRESS NOTES
Problem: Self Care Deficits Care Plan (Adult)  Goal: *Acute Goals and Plan of Care (Insert Text)  Description: FUNCTIONAL STATUS PRIOR TO ADMISSION:  Pt lives in trailer with his wife and grandson. They state he had been able to amb without device prior to illnesses and hospitalization in March 2022, pneumonia, some decline since but still amb without device. He has had 4 falls in last 6 months. No O2 used prior to illness in March 2022, CHF, and pneumonia in April 2022, 2L since but \"huffing and puffing if he just walks to bathroom with portable O2 in place. \" Pt states \"my legs just give out\" during the 4 falls. He does have intermittent help with ADLs daily, from both wife and grandson for UE and LE dressing. Uses garden tub with shower chair min A,  able to complete toileting hygiene despite B impaired AROM of shoulders. HOME SUPPORT PRIOR TO ADMISSION: The patient lived with wife and adult grandson to provide assistance for dressing and bathing, he was mod I to toilet; has not been able to assist w/IADLs since March      Occupational Therapy Goals  Initiated 5/17/2022  1. Patient will perform grooming in standing with minimal assistance/contact guard assist within 7 day(s). 2.  Patient will perform upper body w/dressing stick technique with minimal assistance/contact guard assist within 7 day(s). 3.  Patient will perform lower body dressing with minimal assistance/contact guard assist with AE within 7 day(s). 4.  Patient will perform toilet transfers with minimal assistance/contact guard assist within 7 day(s). 5.  Patient will perform all aspects of toileting with minimal assistance/contact guard assist within 7 day(s). 6.  Patient will participate in upper extremity therapeutic exercise/activities with minimal assistance/contact guard assist for 5 minutes within 7 day(s).     7.  Patient will utilize energy conservation, PLB, fall preventionand pain management techniques during functional activities with verbal cues within 7 day(s). Outcome: Progressing Towards Goal   OCCUPATIONAL THERAPY TREATMENT  Patient: Matilda Cruz (89 y.o. male)  Date: 5/18/2022  Diagnosis: Syncope [R55]  Orthostatic hypotension [I95.1] <principal problem not specified>       Precautions: Fall,Bed Alarm,Skin,Spinal (orthostatic; 4 falls in 6 mo; PMH cervical sx; )  Chart, occupational therapy assessment, plan of care, and goals were reviewed. ASSESSMENT  Patient continues with skilled OT services and is progressing towards goals. Tolerated sitting with therapy without drop in BP, sitting more than 15 minutes without drop in BP; Immediate drop in BP in standing. Patient pleased that he tolerated sitting today without BP drop. Continues to have headache which has been present since admission. Safety training with hypotension,  TEDs in place B LEs. Edema R hand, ace wrap re-wrapped and he reports less pain in R forearm/hand. Does continue to report \"pounding headache,\" in all positions. Current Level of Function Impacting Discharge (ADLs): set up grooming edge of bed without drop in BP; patient     Other factors to consider for discharge:   Vitals:    05/18/22 1550 05/18/22 1559 05/18/22 1604 05/18/22 1610   BP: 138/66 123/66 (!) 75/52  Comment: asymptomatic (!) 140/64   BP 2:       BP 1 Location: Left upper arm Left upper arm Left upper arm Left upper arm   BP Patient Position: Supine; At rest Sitting Standing Supine  Comment: post standing   Pulse: 71 75 85 77   Pulse 2:       Temp:       Resp:       Height:       Weight:       SpO2: 99% 99% 96% 98%             PLAN :  Patient continues to benefit from skilled intervention to address the above impairments. Continue treatment per established plan of care to address goals.     Recommend with staff: up to chair for meals as tolerated with close BP measure    Recommend next OT session: S bed mobility and sitting, CGA standing with drop in BP    Recommendation for discharge: (in order for the patient to meet his/her long term goals)  Occupational therapy at least 2 days/week in the home AND ensure assist and/or supervision for safety with ADLs and IADLs    This discharge recommendation:  Has been made in collaboration with the attending provider and/or case management    IF patient discharges home will need the following DME: TBA       SUBJECTIVE:   Patient stated I want to go home.     OBJECTIVE DATA SUMMARY:   Cognitive/Behavioral Status:  Neurologic State: Alert  Orientation Level: Oriented X4  Cognition: Follows commands; Appropriate for age attention/concentration; Appropriate safety awareness  Perception: Appears intact  Perseveration: No perseveration noted  Safety/Judgement: Fall prevention; Awareness of environment;Good awareness of safety precautions (receptive to bed alarm, not getting up alone due to BP)    Functional Mobility and Transfers for ADLs:  Bed Mobility:  Rolling: Supervision  Supine to Sit: Supervision  Sit to Supine: Supervision  Scooting: Supervision    Transfers:  Sit to Stand: Contact guard assistance  Functional Transfers  Toilet Transfer : Contact guard assistance (recommend BSC due to hypotension)  Bed to Chair: Contact guard assistance    Balance:  Sitting: Intact  Standing: Impaired  Standing - Static: Fair  Standing - Dynamic : Fair    ADL Intervention:  Feeding  Feeding Assistance: Modified independent    Grooming  Grooming Assistance: Set-up  Position Performed: Seated edge of bed                        Toileting  Toileting Assistance:  (recommend BSC due to hypotension)    Cognitive Retraining  Attention to Task: Single task  Maintains Attention For (Time): Greater than 10 minutes  Following Commands: Follows one step commands/directions; Follows two step commands/directions  Safety/Judgement: Fall prevention; Awareness of environment;Good awareness of safety precautions (receptive to bed alarm, not getting up alone due to BP)    Therapeutic Exercises:   Trained in AROM R hand/6 pack hand exercises with edema from skin tear related to last fall/syncopal episode    Pain:  6/10 pounding headache    Activity Tolerance:   Fair, requires rest breaks, and signs and symptoms of orthostatic hypotension    After treatment patient left in no apparent distress:   Supine in bed, Call bell within reach, Bed / chair alarm activated, and Side rails x 3    COMMUNICATION/COLLABORATION:   The patients plan of care was discussed with: Registered nurse.      Christopher Flores OTR/L  Time Calculation: 25 mins

## 2022-05-18 NOTE — PROGRESS NOTES
Occupational Therapy  Good participation despite hypotension: full note to follow;     Vitals:    05/18/22 1550 05/18/22 1559 05/18/22 1604 05/18/22 1610   BP: 138/66 123/66 (!) 75/52  Comment: asymptomatic (!) 140/64   BP 2:       BP 1 Location: Left upper arm Left upper arm Left upper arm Left upper arm   BP Patient Position: Supine; At rest Sitting Standing Supine  Comment: post standing   Pulse: 71 75 85 77   Pulse 2:       Temp:       Resp:       Height:       Weight:       SpO2: 99% 99% 96% 98%

## 2022-05-18 NOTE — PROGRESS NOTES
End of Shift Note    Bedside shift change report given to Rosalie LENTZ (oncoming nurse) by Omer Azar RN (offgoing nurse). Report included the following information SBAR, Kardex, ED Summary, Intake/Output, MAR, Recent Results and Cardiac Rhythm NSR, irregular, PVc's    Shift worked:  3441-2420     Shift summary and any significant changes:     Patient no c/o pain. Patient x1 assist to [facility] with no c/o dizziness. Voiding QS. Tolerating PO. Vitals WNL. Neuro checks negative. x1 BM. Concerns for physician to address:  see above     Zone phone for oncoming shift:  [phone]     Activity:  Activity Level: Up with Assistance  Number times ambulated in hallways past shift: 0  Number of times OOB to chair past shift: 3    Cardiac:   Cardiac Monitoring: Yes      Cardiac Rhythm: Sinus Rhythm,Atrial Fib,Multiform PVCs    Access:   Current line(s): PIV     Genitourinary:   Urinary status: voiding    Respiratory:   O2 Device: Nasal cannula,Humidifier  Chronic home O2 use?: YES  Incentive spirometer at bedside: NO       GI:  Last Bowel Movement Date: 05/17/22  Current diet:  ADULT DIET Easy to Chew; Low Fat/Low Chol/High Fiber/2 gm Na  Passing flatus: YES  Tolerating current diet: YES       Pain Management:   Patient states pain is manageable on current regimen: NO  C/O pain. Skin:  Fernando Score: 20  Interventions: increase time out of bed and PT/OT consult    Patient Safety:  Fall Score:  Total Score: 6  Interventions: bed/chair alarm, assistive device (walker, cane, etc), gripper socks and pt to call before getting OOB  High Fall Risk: Yes    Length of Stay:  Expected LOS: - - -  Actual LOS: 0      Christine Keating RN

## 2022-05-18 NOTE — PROGRESS NOTES
End of Shift Note    Bedside shift change report given to Sha Tinsley, RN (oncoming nurse) by Mahendra Schumacher RN (offgoing nurse). Report included the following information SBAR and Kardex    Shift worked:  day   Shift summary and any significant changes:     Pt arrived on unit. PT/OT worked with pt. Tylenol given for c/o headache.        Concerns for physician to address:  none   Zone phone for oncoming shift:   [phone]     Patient Information  Hansa Gonzalez  65 y.o.  5/16/2022 11:27 AM by Sobia Perez MD. Hansa Gonzalez was admitted from Home    Problem List  Patient Active Problem List    Diagnosis Date Noted    Orthostatic hypotension 05/16/2022    Chronic renal disease, stage III 05/10/2022    Pneumonia 03/31/2022    Cor pulmonale (chronic) ([address] 03/23/2022    Ischemic cardiomyopathy 03/04/2022    Pulmonary emphysema ([address] 03/04/2022    Interstitial lung disease ([address] 02/15/2022    Respiratory failure ([address] 02/14/2022    Acute on chronic combined systolic and diastolic ACC/AHA stage C congestive heart failure ([address] 02/12/2022    Syncope 02/17/2020    Rotator cuff arthropathy of left shoulder 09/06/2019    Status post reverse arthroplasty of shoulder 09/06/2019    Right rotator cuff tear arthropathy 09/04/2019    Moderate major depression ([address] 07/03/2018    Depression 07/03/2018    DDD (degenerative disc disease), lumbar 04/02/2018    Chronic anticoagulation 07/17/2017    S/P CABG x 1 06/17/2016    AICD (automatic cardioverter/defibrillator) present 05/13/2016    AVNRT (AV bridgette re-entry tachycardia) ([address] 05/12/2016    Stenosis of both carotid arteries without cerebral infarction 04/12/2016    Cervicogenic headache 04/12/2016    Alzheimer's dementia, late onset, with behavioral disturbance ([address] 04/12/2016    Spinal stenosis, lumbar region, with neurogenic claudication 04/12/2016    Lumbar back pain with radiculopathy affecting right lower extremity 04/12/2016    Lumbar back pain with radiculopathy affecting left lower extremity 04/12/2016    History of stroke 04/12/2016    ACP (advance care planning) 12/30/2015    B12 deficiency 09/22/2015    Hypothyroidism due to acquired atrophy of thyroid 09/22/2015    Osteoporosis 09/22/2015    Cervical post-laminectomy syndrome 09/22/2015    Neck pain 09/22/2015    Lower GI bleeding 08/19/2015    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 ([address] 07/23/2015    Left-sided chest wall pain 11/23/2014    S/P cervical spinal fusion 06/06/2013    Osteoarthritis 05/29/2012    Hyperthyroidism 05/29/2012    Atherosclerosis of native coronary artery of native heart without angina pectoris 05/29/2012    Chronic systolic heart failure ([address] 05/29/2012    Atrial fibrillation ([address] 05/29/2012    Mixed hyperlipidemia 05/29/2012    History of factor V Leiden mutation 03/07/2011    Essential hypertension 03/04/2011     Past Medical History:   Diagnosis Date    Acute combined systolic and diastolic congestive heart failure ([address] 2/12/2022    AICD (automatic cardioverter/defibrillator) present 5/13/2016 5/13/16 [employer] upgrade to dual chamber AICD implant  Dr. Cathren Alpers Alzheimer disease [facility])     Anxiety state, other     Arthritis     OSTEO ARTHRITIS    AVNRT (AV bridgette re-entry tachycardia) ([address] 5/12/2016 5/12/16 AVNRT ablation: PM/AICD left upper chest :Dr. Erick Muniz Back ache     CAD (coronary artery disease)     Cancer ([address] 2004    Prostate cancer    Chest tightness     last episode of chest pain 5/2016 before AICD placed; none since then    Coagulation defects 2005    FACTOR V LEIDEN    Dementia ([address]     Depression     Dizziness     FH: factor V Leiden deficiency     Generalized muscle ache     GERD (gastroesophageal reflux disease)     HEARTBURN    Heart failure ([address] 2014    as of 07/2019 EF 30-35;  Dr. Dheeraj Worrell, Cardiomyopathy    Hyperlipidemia, mixed     Hypertension     Long term current use of anticoagulant therapy     Other ill-defined conditions(799.89) 2004    blood transfusion    Pacemaker     Paroxysmal atrial fibrillation (HCC)     rate controlled with coreg     Postsurgical aortocoronary bypass status 05/29/2012    CABG    Presence of cardiac defibrillator 05/2016    left upper chest    Pulmonary emphysema ([facility] 3/4/2022    [facility] spotted fever)     SSS (sick sinus syndrome) ([facility]     Stroke ([facility] 2011, 1990's    SEVERAL-mini    Syncope     Thromboembolism ([facility] 2014    left leg: changed to Eliquis from Warfarin    Thromboembolus ([facility] 2005    left leg    Thyroid disease     Weakness generalized        Core Measures:  CVA: No No  CHF:No No  PNA:No No    Activity:  Activity Level: Up with Assistance  Number times ambulated in hallways past shift: 0  Number of times OOB to chair past shift: 0    Cardiac:   Cardiac Monitoring: Yes      Cardiac Rhythm: Sinus Rhythm    Access:   Current line(s): PIV   Central Line? No Placement date  Reason Medically Necessary   PICC LINE? No Placement date Reason Medically Necessary     Genitourinary:   Urinary status: voiding  Urinary Catheter? No Placement Date  Reason Medically Necessary     Respiratory:   O2 Device: Nasal cannula  Chronic home O2 use?: YES  Incentive spirometer at bedside: NO       GI:  Last Bowel Movement Date: 05/17/22  Current diet:  ADULT DIET Easy to Chew; Low Fat/Low Chol/High Fiber/2 gm Na  Passing flatus: YES  Tolerating current diet: YES       Pain Management:   Patient states pain is manageable on current regimen: YES    Skin:  Fernando Score: 20  Interventions: increase time out of bed, PT/OT consult and nutritional support     Patient Safety:  Fall Score:  Total Score: 6  Interventions: bed/chair alarm, assistive device (walker, cane, etc), gripper socks and pt to call before getting OOB  High Fall Risk: Yes  @Rollbelt  @dexterity to release roll belt  Yes/No ( must document dexterity here by stating Yes or No here, otherwise this is a restraint and must follow restraint documentation policy.)    DVT prophylaxis:  DVT prophylaxis Med- Yes  DVT prophylaxis SCD or JAMSHID- Yes     Wounds: (If Applicable)  Wounds-Yes  Location R arm    Active Consults:  IP CONSULT TO CARDIOLOGY  IP CONSULT TO INFECTIOUS DISEASES    Length of Stay:  Expected LOS: 2d 7h  Actual LOS: 2  Discharge Plan: No TBD      Rossi Booker RN

## 2022-05-18 NOTE — PROGRESS NOTES
Roni Molina TRANSFER - OUT REPORT:    Verbal report given to Lita LENTZ(name) on Calista Natarajan  being transferred to Neuro(unit) for routine progression of care       Report consisted of patients Situation, Background, Assessment and   Recommendations(SBAR). Information from the following report(s) SBAR, Kardex, Intake/Output, MAR, Recent Results and Med Rec Status was reviewed with the receiving nurse. Lines:   Peripheral IV 05/15/22 Left Antecubital (Active)   Site Assessment Clean, dry, & intact 05/18/22 1011   Phlebitis Assessment 0 05/18/22 1011   Infiltration Assessment 0 05/18/22 1011   Dressing Status Clean, dry, & intact 05/18/22 1011   Dressing Type Tape;Transparent 05/18/22 1011   Hub Color/Line Status Patent; Flushed 05/18/22 1011   Action Taken Blood drawn 05/17/22 0316   Alcohol Cap Used Yes 05/17/22 0753        Opportunity for questions and clarification was provided.       Patient transported with:   Registered Nurse

## 2022-05-18 NOTE — PROGRESS NOTES
Problem: Mobility Impaired (Adult and Pediatric)  Goal: *Acute Goals and Plan of Care (Insert Text)  Description: FUNCTIONAL STATUS PRIOR TO ADMISSION: Pt lives in trailer with his wife. They state he had been able to amb without device prior to illnesses and hospitalization in March, some decline since but still amb without device. He has had 4 falls in last 6 months. Pt states \"my legs just give out\". He does have intermittent help with ADLs. HOME SUPPORT PRIOR TO ADMISSION: The patient lived alone with wife to provide assistance. Physical Therapy Goals  Initiated 5/17/2022  1. Patient will move from supine to sit and sit to supine , scoot up and down, and roll side to side in bed with independence within 7 day(s). 2.  Patient will transfer from bed to chair and chair to bed with modified independence using the least restrictive device within 7 day(s). 3.  Patient will perform sit to stand with modified independence within 7 day(s). 4.  Patient will ambulate with modified independence for 150 feet with the least restrictive device within 7 day(s). Outcome: Progressing Towards Goal   .PHYSICAL THERAPY TREATMENT  Patient: Meghana Solano (51 y.o. male)  Date: 5/18/2022  Diagnosis: Syncope [R55]  Orthostatic hypotension [I95.1] <principal problem not specified>       Precautions: Fall,Bed Alarm,Skin,Spinal (orthostatic; 4 falls in 6 mo; PMH cervical sx; )  Chart, physical therapy assessment, plan of care and goals were reviewed. ASSESSMENT  Patient continues with skilled PT services and is progressing towards goals. Pt presents with hypotension. Pt performed bed mobility at supervision. Pt performed sit to stand tranfers at [facility]. Pt able to take steps over to [facility] at [facility]. Pt with no complaints of symptoms of hypotension once sitting on BSC. Pt BP at 92/66 with HR at 89. Pt reported feeling fine. Pt returned supine and unable to get a BP read due to machine but pt reported feeling ok.  Will continue to increase upright tolerance as pt BP is improving for last visit. .     Current Level of Function Impacting Discharge (mobility/balance): bed mobility at supervision, sit to stand transfer at [facility], bed to chair transfer at [facility]     Other factors to consider for discharge: hypotension          PLAN :  Patient continues to benefit from skilled intervention to address the above impairments. Continue treatment per established plan of care. to address goals. Recommendation for discharge: (in order for the patient to meet his/her long term goals)  Physical therapy at least 2 days/week in the home     This discharge recommendation:  Has been made in collaboration with the attending provider and/or case management    IF patient discharges home will need the following DME: none       SUBJECTIVE:   Patient stated  I passed out at the store.     OBJECTIVE DATA SUMMARY:   Critical Behavior:  Neurologic State: Alert  Orientation Level: Oriented X4  Cognition: Follows commands,Appropriate for age attention/concentration  Safety/Judgement: Awareness of environment,Fall prevention,Decreased insight into deficits,Decreased awareness of need for safety,Decreased awareness of need for assistance  Functional Mobility Training:  Bed Mobility:  Rolling: Supervision  Supine to Sit: Supervision  Sit to Supine: Supervision  Scooting: Supervision        Transfers:  Sit to Stand: Contact guard assistance  Stand to Sit: Contact guard assistance        Bed to Chair: Contact guard assistance                    Balance:  Sitting: Intact  Standing: Impaired  Standing - Static: Fair  Standing - Dynamic : Fair  Ambulation/Gait Training:     Pt able to take a few steps to the [facility] at CGA          Pain Rating:  Pt with no complaints of pain     Activity Tolerance:   Fair    After treatment patient left in no apparent distress:   Supine in bed, Call bell within reach, and Side rails x 3    COMMUNICATION/COLLABORATION:   The patients plan of care was discussed with: Registered nurse.      Adam Mc, NAOMI   Time Calculation: 24 mins

## 2022-05-18 NOTE — RT PROTOCOL NOTE
ADULT PROTOCOL: JET AEROSOL ASSESSMENT    Patient  Yung Haro     65 y.o.   male     5/18/2022  12:01 AM    Breath Sounds Pre Procedure: Right Breath Sounds: Clear                               Left Breath Sounds: Clear    Breath Sounds Post Procedure: Right Breath Sounds: Clear,Diminished                                 Left Breath Sounds: Coarse,Diminished    Breathing pattern: Pre procedure Breathing Pattern: Regular          Post procedure Breathing Pattern: Regular    Heart Rate: Pre procedure Pulse: 44           Post procedure Pulse: 53    Resp Rate: Pre procedure Respirations: 16           Post procedure Respirations: 16          Cough: Pre procedure Cough: Non-productive               Post procedure Cough: Non-productive                  Oxygen: O2 Device: Nasal cannula   Flow rate (L/min) 2     Changed: NO    SpO2: Pre procedure SpO2: 100 %   with oxygen              Post procedure SpO2: 98 %  with oxygen    Nebulizer Therapy: Current medications Aerosolized Medications: DuoNeb      Changed: YES    Problem List:   Patient Active Problem List   Diagnosis Code    Essential hypertension I10    History of factor V Leiden mutation Z86.2    Osteoarthritis M19.90    Hyperthyroidism E05.90    Atherosclerosis of native coronary artery of native heart without angina pectoris I25.10    Chronic systolic heart failure (HCC) I50.22    Atrial fibrillation (HCC) I48.91    Mixed hyperlipidemia E78.2    S/P cervical spinal fusion Z98.1    Left-sided chest wall pain R07.89    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) F02.81    Lower GI bleeding K92.2    B12 deficiency E53.8    Hypothyroidism due to acquired atrophy of thyroid E03.4    Osteoporosis M81.0    Cervical post-laminectomy syndrome M96.1    Neck pain M54.2    ACP (advance care planning) Z71.89    Stenosis of both carotid arteries without cerebral infarction I65.23    Cervicogenic headache G44.86    Alzheimer's dementia, late onset, with behavioral disturbance ([facility] [address] G30.1, F02.81    Spinal stenosis, lumbar region, with neurogenic claudication M48.062    Lumbar back pain with radiculopathy affecting right lower extremity M54.16    Lumbar back pain with radiculopathy affecting left lower extremity M54.16    History of stroke Z86.73    AVNRT (AV bridgette re-entry tachycardia) ([facility]) I47.1    AICD (automatic cardioverter/defibrillator) present Z95.810    S/P CABG x 1 Z95.1    Chronic anticoagulation Z79.01    DDD (degenerative disc disease), lumbar M51.36    Moderate major depression ([facility]) F32.1    Depression F32. A    Right rotator cuff tear arthropathy M75.101, M12.811    Rotator cuff arthropathy of left shoulder M12.812    Status post reverse arthroplasty of shoulder Z96.619    Syncope R55    Acute on chronic combined systolic and diastolic ACC/AHA stage C congestive heart failure (HCC) I50.43    Respiratory failure (HCC) J96.90    Interstitial lung disease (HCC) J84.9    Ischemic cardiomyopathy I25.5    Pulmonary emphysema (HCC) J43.9    Cor pulmonale (chronic) ([facility]) I27.81    Pneumonia J18.9    Chronic renal disease, stage III N18.30    Orthostatic hypotension I95.1       Respiratory Therapist: Costa Jacob RT

## 2022-05-18 NOTE — PROGRESS NOTES
Occupational Therapy  Chart reviewed; cleared for tx, transferring to new unit; will retry later as able.  Erin Harley OTR/L

## 2022-05-19 NOTE — PROGRESS NOTES
Problem: Self Care Deficits Care Plan (Adult)  Goal: *Acute Goals and Plan of Care (Insert Text)  Description: FUNCTIONAL STATUS PRIOR TO ADMISSION:  Pt lives in trailer with his wife and grandson. They state he had been able to amb without device prior to illnesses and hospitalization in March 2022, pneumonia, some decline since but still amb without device. He has had 4 falls in last 6 months. No O2 used prior to illness in March 2022, CHF, and pneumonia in April 2022, 2L since but \"huffing and puffing if he just walks to bathroom with portable O2 in place. \" Pt states \"my legs just give out\" during the 4 falls. He does have intermittent help with ADLs daily, from both wife and grandson for UE and LE dressing. Uses garden tub with shower chair min A,  able to complete toileting hygiene despite B impaired AROM of shoulders. HOME SUPPORT PRIOR TO ADMISSION: The patient lived with wife and adult grandson to provide assistance for dressing and bathing, he was mod I to toilet; has not been able to assist w/IADLs since March      Occupational Therapy Goals  Initiated 5/17/2022  1. Patient will perform grooming in standing with minimal assistance/contact guard assist within 7 day(s). 2.  Patient will perform upper body w/dressing stick technique with minimal assistance/contact guard assist within 7 day(s). 3.  Patient will perform lower body dressing with minimal assistance/contact guard assist with AE within 7 day(s). 4.  Patient will perform toilet transfers with minimal assistance/contact guard assist within 7 day(s). 5.  Patient will perform all aspects of toileting with minimal assistance/contact guard assist within 7 day(s). 6.  Patient will participate in upper extremity therapeutic exercise/activities with minimal assistance/contact guard assist for 5 minutes within 7 day(s).     7.  Patient will utilize energy conservation, PLB, fall preventionand pain management techniques during functional activities with verbal cues within 7 day(s). Outcome: Progressing Towards Goal     OCCUPATIONAL THERAPY TREATMENT  Patient: Sybil Ernandez (44 y.o. male)  Date: 5/19/2022  Diagnosis: Syncope [R55]  Orthostatic hypotension [I95.1] <principal problem not specified>       Precautions: Fall,Bed Alarm,Skin,Spinal (orthostatic; 4 falls in 6 mo; PMH cervical sx; )  Chart, occupational therapy assessment, plan of care, and goals were reviewed. ASSESSMENT  Patient continues with skilled OT services and is progressing towards goals. Session greatly limited by orthostasis on this date (see below). Required mod A to come to EOB. Pt able to come to stand and endorsing dizziness. Despite calf raises and marching in place pts BP continuing to drop. Pt with significant dizziness and returned to supine in bed. Hr elevated significantly to 200s however decreasing quickly with supine rest break. Pt required BLEs elevated over heart level with BP improving. Educated pt on use of chair position to improve upright tolerance. Rn notified of session and family at bedside at conclusion of session. Current Level of Function Impacting Discharge (ADLs): min - max A     Other factors to consider for discharge: orthostatic         PLAN :  Patient continues to benefit from skilled intervention to address the above impairments. Continue treatment per established plan of care to address goals. Recommend with staff: chair position as tolerated    Recommend next OT session: seated grooming, chair position    Recommendation for discharge: (in order for the patient to meet his/her long term goals)  HHOT/PT with family assist vs. IP Rehab stay pending progress     This discharge recommendation:  Has been made in collaboration with the attending provider and/or case management    IF patient discharges home will need the following DME: TBD       SUBJECTIVE:   Patient stated Leanora See feel really dizzy.     OBJECTIVE DATA SUMMARY:   Vital Signs:       05/19/22 1116 05/19/22 1118 05/19/22 1120   Vital Signs   Pulse (Heart Rate) 83 84 (!) 203   BP (!) 100/59 (!) 99/57 (!) 79/47   MAP (Calculated) 73 71 (!) 58   BP 1 Method Automatic Automatic Automatic   BP Patient Position At rest;Supine Sitting Standing      05/19/22 1122   Vital Signs   Pulse (Heart Rate) 91   BP (!) 149/67   MAP (Calculated) 94   BP 1 Method Automatic   BP Patient Position Supine     Cognitive/Behavioral Status:  Neurologic State: Alert  Orientation Level: Oriented X4  Cognition: Follows commands             Functional Mobility and Transfers for ADLs:  Bed Mobility:  Rolling: Supervision  Supine to Sit: Supervision  Sit to Supine: Moderate assistance;Assist x2  Scooting: Supervision    Transfers:  Sit to Stand: Minimum assistance          Balance:  Sitting: Intact  Standing: Impaired; With support  Standing - Static: Constant support;Good  Standing - Dynamic : Not tested    ADL Intervention:     Session limited by orthostasis during session. Unable to progress to seated or standing ADL tasks. Pain:  Patient did not report pain during session     Activity Tolerance:   Poor, signs and symptoms of orthostatic hypotension, and dizziness and fatigue in standing     After treatment patient left in no apparent distress:   Supine in bed, Call bell within reach, Bed / chair alarm activated, Caregiver / family present, and Side rails x 3    COMMUNICATION/COLLABORATION:   The patients plan of care was discussed with: Physical therapist, Occupational therapist, and Registered nurse.      Elisha Hein OT  Time Calculation: 20 mins

## 2022-05-19 NOTE — PROGRESS NOTES
Problem: Falls - Risk of  Goal: *Absence of Falls  Description: Document Pete Interiano Fall Risk and appropriate interventions in the flowsheet.   Outcome: Progressing Towards Goal  Note: Fall Risk Interventions:  Mobility Interventions: Assess mobility with egress test,Bed/chair exit alarm,OT consult for ADLs,Patient to call before getting OOB,PT Consult for mobility concerns,PT Consult for assist device competence         Medication Interventions: Assess postural VS orthostatic hypotension,Bed/chair exit alarm,Patient to call before getting OOB,Teach patient to arise slowly    Elimination Interventions: Bed/chair exit alarm,Call light in reach,Patient to call for help with toileting needs,Toilet paper/wipes in reach,Toileting schedule/hourly rounds    History of Falls Interventions: Bed/chair exit alarm,Consult care management for discharge planning,Door open when patient unattended         Problem: Patient Education: Go to Patient Education Activity  Goal: Patient/Family Education  Outcome: Progressing Towards Goal     Problem: Syncope  Goal: *Absence of injury  Outcome: Progressing Towards Goal  Goal: Decrease or eliminate episodes of syncope  Outcome: Progressing Towards Goal     Problem: Patient Education: Go to Patient Education Activity  Goal: Patient/Family Education  Outcome: Progressing Towards Goal     Problem: Patient Education: Go to Patient Education Activity  Goal: Patient/Family Education  Outcome: Progressing Towards Goal     Problem: Patient Education: Go to Patient Education Activity  Goal: Patient/Family Education  Outcome: Progressing Towards Goal     Problem: Patient Education: Go to Patient Education Activity  Goal: Patient/Family Education  Outcome: Progressing Towards Goal

## 2022-05-19 NOTE — PROGRESS NOTES
8290: Bedside shift change report given to MARY CARMEN Boo (oncoming nurse) by Carlos Maher RN (offgoing nurse). Report included the following information SBAR, Kardex, ED Summary, Intake/Output, MAR and Recent Results. Patient was refusing to wear JAMSHID stockings because they made his feet hot and sweaty. 1129: OT Just let RN know patient is very orthostatic, BP's documented in the chart. MD messaged via Figure 1. 1350: Compromised with patient and put gauze in the bottom of his JAMSHID stockings to soak up the sweat a little bit. 1900:   End of Shift Note    Bedside shift change report given to Carlos Maher RN (oncoming nurse) by Casandra Monroy RN (offgoing nurse). Report included the following information SBAR, Kardex, Intake/Output, MAR and Recent Results    Shift worked:  [id number]     Shift summary and any significant changes:     No other significant changes. Patient complied with JAMSHID stockings from this afternoon to shift change. Cardiology added 5mg PO of midodrine 3x daily with meals to help with orthostatic hypotension. Possible discharge tomorrow with home health aide. Concerns for physician to address:       Zone phone for oncoming shift:          Activity:  Activity Level:  Up with Assistance  Number times ambulated in hallways past shift: 0  Number of times OOB to chair past shift: 2    Cardiac:   Cardiac Monitoring: No      Cardiac Rhythm: Sinus Rhythm    Access:   Current line(s): PIV     Genitourinary:   Urinary status: voiding    Respiratory:   O2 Device: Nasal cannula  Chronic home O2 use?: YES  Incentive spirometer at bedside: YES       GI:  Last Bowel Movement Date: 05/17/22  Current diet:  ADULT DIET Easy to Chew; Low Fat/Low Chol/High Fiber/2 gm Na  Passing flatus: YES  Tolerating current diet: YES       Pain Management:   Patient states pain is manageable on current regimen: N/A    Skin:  Fernando Score: 20  Interventions: speciality bed, float heels, increase time out of bed and PT/OT consult    Patient Safety:  Fall Score:  Total Score: 6  Interventions: bed/chair alarm, assistive device (walker, cane, etc), gripper socks and pt to call before getting OOB  High Fall Risk: Yes    Length of Stay:  Expected LOS: 2d 7h  Actual LOS: 1      Breonna Mckeon RN

## 2022-05-19 NOTE — PROGRESS NOTES
Hospitalist Progress Note    NAME: Johanne Julian   :  1945   MRN:  [id number]     HPI excerpted from admission H&P:  Kateryna Duncan is a 65 y.o. male transferred for syncope and AICD interrogation.       Patient said yesterday 4:30 p.m., drove to get gas at grocery store but was short $1 in payment so he drove home. Drove back to store, walk in and spotted a friend. He then suddenly passed out. His friend caught him before he fell down. He was out for about 5 minutes. He did not have any chest pain, shortness of breath or dizziness at that time. However for the past 3 days he has been having periods of dizziness when he is walking. No fevers chills. Positive increased cough, nonproductive. Denies any wheezing, leg edema, orthopnea, signs of bleeding, nausea vomiting or diarrhea.       He was taken to [facility] where he was noted to be orthostatic:  Supine 103/61  P 66  Sitting 85/59  P74  Standing 76/45  P79  Given a total of 1 L of IV fluid but remains orthostatic with BP 79/57 standing so referred for admission.     He was hospitalized in 2022 with left lower lobe pneumonia and COPD exacerbation and in 2022 for combined CHF exacerbation. Echocardiogram showed EF of 40% which is down from 50 to 55% in 2020. \"    Assessment / Plan:  Syncope  Orthostatic hypotension  Chronic combined systolic/diastolic heart failure  CAD s/p remote CABG  H/O afib s/p AVN ablation with ICD implantation  Dyslipidemia   - Cardiology input appreciated. - Syncope likely 2/2 orthostatic hypotension, patient remains orthostatic.    - Sacubitril-valsartan 24-26 mg po bid stopped. - Continue to hold metoprolol succinate 12.5 mg po daily. - Continue to hold furosemide 20 mg po daily. - Continue apixaban 5 mg po q12h. - Continue atorvastatin 40 mg po daily. - Continue JAMSHID hose. - Midodrine 5 mg po tid added. Positive RMSF IgG  - ID input appreciated.    - Sent by PCP 2/2 complaints of myalgias. - Reflex IFA negative. - Plan for repeat serology in 2-4 weeks. Mild asymptomatic hyponatremia   - No tx indicated. - Monitor. COPD  - Continue albuterol-ipratropium 2.5-0.5 mg nebs q6h. - Continue supplemental O2, patient on home oxygen. GERD  - Continue pantoprazole 40 mg po daily. Dementia  - Continue donepezil 5 mg po daily at hs.   - Continue memantine 10 mg po bid. Depression   - Continue duloxetine 60 mg po daily. - Continue sertraline 200 mg po daily. OAB  - Continue mirabegron ER 50 mg po daily. Mild hyperglycemia   - No tx indicated. - Monitor with a.m. labs. Anemia   - H/H overall stable. - No tx indicated. - OP follow up. Mild azotemia, resolved  - Likely related to volume contraction from diuretic.  - Continue to hold diuretic for today. - Monitor. 18.5 - 24.9 Normal weight / Body mass index is 20.65 kg/m². Estimated discharge date: May 19  Barriers:    Code status: Full  Prophylaxis: Apixaban  Recommended Disposition: Home w/Family     Subjective:     Chief Complaint / Reason for Physician Visit  Patient with no complaints. Plan of care and pertinent events reviewed with bedside nurse. Review of Systems:  Symptom Y/N Comments  Symptom Y/N Comments   Fever/Chills N   Chest Pain N    Poor Appetite N   Edema N    Cough N   Abdominal Pain N    Sputum N   Joint Pain N    SOB/HOLCOMB N   Pruritis/Rash N    Nausea/vomit N   Tolerating PT/OT Y    Diarrhea N   Tolerating Diet Y    Constipation N   Other       Could NOT obtain due to:      Objective:     VITALS:   Last 24hrs VS reviewed since prior progress note.  Most recent are:  Patient Vitals for the past 24 hrs:   Temp Pulse Resp BP SpO2   05/19/22 1131 -- 75 20 132/68 92 %   05/19/22 1122 -- 91 -- (!) 149/67 --   05/19/22 1120 -- (!) 203 -- (!) 79/47 --   05/19/22 1118 -- 84 -- (!) 99/57 --   05/19/22 1116 -- 83 -- (!) 100/59 --   05/19/22 0830 -- -- -- -- 95 %   05/19/22 0735 97.4 °F (36.3 °C) 75 18 124/66 93 %   05/19/22 0326 98.2 °F (36.8 °C) 74 18 (!) 116/58 97 %   05/18/22 2304 97.8 °F (36.6 °C) 70 18 120/79 97 %   05/18/22 1927 97.8 °F (36.6 °C) 70 18 122/66 96 %   05/18/22 1610 -- 77 -- (!) 140/64 98 %   05/18/22 1604 -- 85 -- (!) 75/52 96 %   05/18/22 1559 -- 75 -- 123/66 99 %   05/18/22 1550 -- 71 -- 138/66 99 %   05/18/22 1458 97.8 °F (36.6 °C) 73 18 109/65 99 %     No intake or output data in the 24 hours ending 05/19/22 1321     I had a face to face encounter and independently examined this patient on 5/19/2022, as outlined below:  PHYSICAL EXAM:  General:  A/A/O.  NAD. HEENT:  Normocephalic. Sclera anicteric. Mucous membranes moist.    Chest:  Resps even/unlabored with symmetrical CWE. Air entry diminished. Lungs CTA. No use of accessory muscles. CV:  IRRR. Normal S1/S2. I/VI systolic murmur noted. No JVD. No peripheral edema. Cap refill < 3 sec. Peripheral pulses 1-2+. GI:  Abdomen soft/NT/ND. ABT X 4.    :  Voiding. Neurologic:  Nonfocal.  CN II-XII grossly intact. Face symmetrical.  Speech normal.     Psych:  Cooperative. No anxiety or agitation. No suicidal/homicidal ideation. Skin:  Warm and color appropriate. No rashes or jaundice. Turgor elastic. Reviewed most current lab test results and cultures  YES  Reviewed most current radiology test results   YES  Review and summation of old records today    NO  Reviewed patient's current orders and MAR    YES  PMH/SH reviewed - no change compared to H&P  ________________________________________________________________________  Care Plan discussed with:    Comments   Patient [address] [place] [address]     Consultant                        Multidiciplinary team rounds were held today with , nursing, pharmacist and clinical coordinator. Patient's plan of care was discussed; medications were reviewed and discharge planning was addressed. ________________________________________________________________________  Bernadette Cade NP     Procedures: see electronic medical records for all procedures/Xrays and details which were not copied into this note but were reviewed prior to creation of Plan. LABS:  I reviewed today's most current labs and imaging studies.   Pertinent labs include:  Recent Labs     05/19/22 0220 05/17/22 0322   WBC  --  7.5   HGB 10.7* 10.0*   HCT 34.1* 31.9*   PLT  --  224     Recent Labs     05/19/22 0220 05/17/22 0322   * 137   K 4.1 4.4    104   CO2 30 31   * 101*   BUN 14 21*   CREA 1.06 0.95   CA 10.0 9.6   MG 2.2  --        Signed: Bernadette Cade NP

## 2022-05-19 NOTE — PROGRESS NOTES
[facility]  [address]  [phone]  [facility]      Cardiology Progress Note      5/19/2022 2:34 PM    Admit Date: 5/16/2022    Admit Diagnosis:   Syncope [R55]  Orthostatic hypotension [I95.1]    Subjective:     Sarahy Daily     70 point drop in systolic BP today going from lying to standing.   Dizzy    Visit Vitals  /68   Pulse 75   Temp 97.4 °F (36.3 °C)   Resp 20   Ht 5' 7\" (1.702 m)   Wt 131 lb 13.4 oz (59.8 kg)   SpO2 92%   BMI 20.65 kg/m²       Current Facility-Administered Medications   Medication Dose Route Frequency    albuterol-ipratropium (DUO-NEB) 2.5 MG-0.5 MG/3 ML  3 mL Nebulization BID RT    midodrine (PROAMATINE) tablet 5 mg  5 mg Oral TID WITH MEALS    sodium chloride (NS) flush 5-40 mL  5-40 mL IntraVENous Q8H    sodium chloride (NS) flush 5-40 mL  5-40 mL IntraVENous PRN    acetaminophen (TYLENOL) tablet 650 mg  650 mg Oral Q6H PRN    Or    acetaminophen (TYLENOL) suppository 650 mg  650 mg Rectal Q6H PRN    polyethylene glycol (MIRALAX) packet 17 g  17 g Oral DAILY PRN    ondansetron (ZOFRAN ODT) tablet 4 mg  4 mg Oral Q8H PRN    Or    ondansetron (ZOFRAN) injection 4 mg  4 mg IntraVENous Q6H PRN    atorvastatin (LIPITOR) tablet 40 mg  40 mg Oral QHS    DULoxetine (CYMBALTA) capsule 60 mg  60 mg Oral DAILY    apixaban (ELIQUIS) tablet 5 mg  5 mg Oral Q12H    memantine (NAMENDA) tablet 10 mg  10 mg Oral BID    pantoprazole (PROTONIX) tablet 40 mg  40 mg Oral DAILY    polyethylene glycol (MIRALAX) packet 17 g  17 g Oral DAILY    guaiFENesin ER (MUCINEX) tablet 600 mg  600 mg Oral Q12H    donepeziL (ARICEPT) tablet 5 mg  5 mg Oral QHS    sertraline (ZOLOFT) tablet 200 mg  200 mg Oral DAILY    mirabegron ER (MYRBETRIQ) tablet 50 mg  50 mg Oral DAILY       Objective:      Physical Exam:  General Appearance:    Chest:   Clear  Cardiovascular: RRR  Extremities: no edema  Skin:  Warm and dry.     Data Review:   Recent Labs     05/19/22 0220 05/17/22 0322   WBC  --  7.5   HGB 10.7* 10.0*   HCT 34.1* 31.9*   PLT  --  224     Recent Labs     05/19/22 0220 05/17/22 0322   * 137   K 4.1 4.4    104   CO2 30 31   * 101*   BUN 14 21*   CREA 1.06 0.95   CA 10.0 9.6   MG 2.2  --        No results for input(s): TROIQ, CPK, CKMB in the last 72 hours. No intake or output data in the 24 hours ending 05/19/22 1434     Telemetry:   EKG:  Cxray:    Assessment:     Active Problems:    Syncope (2/17/2020)      Orthostatic hypotension (5/16/2022)        Plan:     MELANIE wheatsto.   Change midodrine to 5 tid, not prn    Jeffydodie Tarango M.D., [facility]

## 2022-05-19 NOTE — PROGRESS NOTES
ADULT PROTOCOL: JET AEROSOL ASSESSMENT    Patient  Kim Drake     65 y.o.   male     5/19/2022  10:01 AM    Breath Sounds Pre Procedure: Right Breath Sounds: Diminished                               Left Breath Sounds: Diminished    Breath Sounds Post Procedure: Right Breath Sounds: Clear,Diminished                                 Left Breath Sounds: Coarse,Diminished    Breathing pattern: Pre procedure Breathing Pattern: Regular          Post procedure Breathing Pattern: Regular    Heart Rate: Pre procedure Pulse: 70           Post procedure Pulse: 74    Resp Rate: Pre procedure Respirations: 16           Post procedure Respirations: 16     Cough: Pre procedure Cough: Non-productive               Post procedure Cough: Non-productive              Oxygen: O2 Device: Nasal cannula  2l     Changed: no    SpO2: Pre procedure SpO2: 95 % with oxygen              Post procedure SpO2: 99 % with oxygen    Nebulizer Therapy: Current medications Aerosolized Medications: DuoNeb      Changed: yes bid        Problem List:   Patient Active Problem List   Diagnosis Code    Essential hypertension I10    History of factor V Leiden mutation Z86.2    Osteoarthritis M19.90    Hyperthyroidism E05.90    Atherosclerosis of native coronary artery of native heart without angina pectoris I25.10    Chronic systolic heart failure (HCC) I50.22    Atrial fibrillation (HCC) I48.91    Mixed hyperlipidemia E78.2    S/P cervical spinal fusion Z98.1    Left-sided chest wall pain R07.89    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) F02.81    Lower GI bleeding K92.2    B12 deficiency E53.8    Hypothyroidism due to acquired atrophy of thyroid E03.4    Osteoporosis M81.0    Cervical post-laminectomy syndrome M96.1    Neck pain M54.2    ACP (advance care planning) Z71.89    Stenosis of both carotid arteries without cerebral infarction I65.23    Cervicogenic headache G44.86    Alzheimer's dementia, late onset, with behavioral disturbance ([facility] G30.1, F02.81    Spinal stenosis, lumbar region, with neurogenic claudication M48.062    Lumbar back pain with radiculopathy affecting right lower extremity M54.16    Lumbar back pain with radiculopathy affecting left lower extremity M54.16    History of stroke Z86.73    AVNRT (AV bridgette re-entry tachycardia) ([facility]) I47.1    AICD (automatic cardioverter/defibrillator) present Z95.810    S/P CABG x 1 Z95.1    Chronic anticoagulation Z79.01    DDD (degenerative disc disease), lumbar M51.36    Moderate major depression ([facility]) F32.1    Depression F32. A    Right rotator cuff tear arthropathy M75.101, M12.811    Rotator cuff arthropathy of left shoulder M12.812    Status post reverse arthroplasty of shoulder Z96.619    Syncope R55    Acute on chronic combined systolic and diastolic ACC/AHA stage C congestive heart failure (HCC) I50.43    Respiratory failure (HCC) J96.90    Interstitial lung disease (HCC) J84.9    Ischemic cardiomyopathy I25.5    Pulmonary emphysema (HCC) J43.9    Cor pulmonale (chronic) ([facility]) I27.81    Pneumonia J18.9    Chronic renal disease, stage III N18.30    Orthostatic hypotension I95.1       Respiratory Therapist: Asia Arrieta RT

## 2022-05-19 NOTE — PROGRESS NOTES
Transition of Care Plan:    RUR: 18% \"mod risk\"  Disposition: TBD- IPR vs HH  >Anticipate home with HH & follow-up appts  Follow up appointments: PCP & specialists as indicated  DME needed: Per PT/OT, tbd  Transportation at Discharge: Wife  [address] or means to access home: Yes       IM Medicare Letter: Needed prior to d/c  Is patient a BCPI-A Bundle: No         If yes, was Bundle Letter given?:  N/A  Is patient a  and connected with the [address]? No            If yes, was [place] transfer form completed and [facility] notified? N/A  Caregiver Contact: Wife- Leia Pratt, [phone]   Discharge Caregiver contacted prior to discharge? Care Conference needed?:                 Reviewed pt's chart. Contacted pt by bedside phone to complete initial assessment; wife answered phone & confirmed demographic information is up to date. Pt lives with his wife, nephew, & grandson. Wife reports pt requires mod assist with ADLs; pt able to drive but has not driven recently. Uses a RW & 2L home 02 provided by [employer]. No prior rehab; prior [facility] with [address]. Wife to transport pt home at discharge. Unit CM to follow. Reason for Admission:   Orthostatic hypotension; syncope                  RUR Score:    18%              PCP: First and Last name:   Joaquin Ortega MD   Name of Practice: [address] [facility]   Are you a current patient: Yes/No: Yes   Approximate date of last visit: May 10, 2022   Can you participate in a virtual visit if needed:     Do you (patient/family) have any concerns for transition/discharge? Wife reports minimal concern for transportation needs but aware of possible benefits through [place]               Plan for utilizing home health: PT/OT evals completed with recs for IPR vs HH. Pt reports wanting to d/c home with [facility]. CM provided [facility] list & Freedom of Choice. Pt/ wife aware of limited providers in service area; identified [facility].  Referral faxed to [employer] (fax# [phone]); will need Face to Face. Unit CM aware. Current Advanced Directive/Advance Care Plan:  Full Code    Advance Care Planning   Healthcare Decision Maker:       Primary Decision Maker: Calin Guerra - Spouse - [phone]    Click here to complete [address] including selection of the Healthcare Decision Maker Relationship (ie \"Primary\")  Today we documented Decision Maker(s) consistent with Legal Next of Kin hierarchy. Care Management Interventions  PCP Verified by CM: Yes  Palliative Care Criteria Met (RRAT>21 & CHF Dx)?: No  Mode of Transport at Discharge:  Other (see comment) (Wife)  Transition of Care Consult (CM Consult): Discharge Planning  Discharge Durable Medical Equipment: No  Physical Therapy Consult: Yes  Occupational Therapy Consult: Yes  Speech Therapy Consult: No  Support Systems: Spouse/Significant Other,Other Family Member(s)  Confirm Follow Up Transport: Family  The Plan for Transition of Care is Related to the Following Treatment Goals : [place] & follow-up appts  The Patient and/or Patient Representative was Provided with a Choice of Provider and Agrees with the Discharge Plan?: Yes  Name of the Patient Representative Who was Provided with a Choice of Provider and Agrees with the Discharge Plan: Wife- Teresa Hernandez  [place] of Choice List was Provided with Basic Dialogue that Supports the Patient's Individualized Plan of Care/Goals, Treatment Preferences and Shares the Quality Data Associated with the Providers?: Yes  Discharge Location  Patient Expects to be Discharged to[de-identified] Home with home health    JOSE MARTIN Bronson  Care Management

## 2022-05-19 NOTE — PROGRESS NOTES
Problem: Mobility Impaired (Adult and Pediatric)  Goal: *Acute Goals and Plan of Care (Insert Text)  Description: FUNCTIONAL STATUS PRIOR TO ADMISSION: Pt lives in trailer with his wife. They state he had been able to amb without device prior to illnesses and hospitalization in March, some decline since but still amb without device. He has had 4 falls in last 6 months. Pt states \"my legs just give out\". He does have intermittent help with ADLs. HOME SUPPORT PRIOR TO ADMISSION: The patient lived alone with wife to provide assistance. Physical Therapy Goals  Initiated 5/17/2022  1. Patient will move from supine to sit and sit to supine , scoot up and down, and roll side to side in bed with independence within 7 day(s). 2.  Patient will transfer from bed to chair and chair to bed with modified independence using the least restrictive device within 7 day(s). 3.  Patient will perform sit to stand with modified independence within 7 day(s). 4.  Patient will ambulate with modified independence for 150 feet with the least restrictive device within 7 day(s). Outcome: Progressing Towards Goal   PHYSICAL THERAPY TREATMENT  Patient: Meghana Solano (89 y.o. male)  Date: 5/19/2022  Diagnosis: Syncope [R55]  Orthostatic hypotension [I95.1] <principal problem not specified>       Precautions: Fall,Bed Alarm,Skin,Spinal (orthostatic; 4 falls in 6 mo; PMH cervical sx; )  Chart, physical therapy assessment, plan of care and goals were reviewed. ASSESSMENT  Patient continues with skilled PT services and is slowly progressing towards goals. Pt received in supine, agreeable to PT session. Pt found with 1.5L O2 via NC during session. He demonstrated bed mobility with SBA. Sit <> stands from edge of bed with Min A and B HHA in standing for steadying. Attempted calf raises and standing marches edge of bed to improve BP, Min A with B HHA. Orthostatic vitals taken in supine, sitting, and standing.  Pt with BP dropping as low as 79/47 in standing with B HHA after performing standing marches. HR noted to be 203 on monitor with low BP, pt complaining of dizziness and unable to keep eyes open/hold conversation. He required Mod A x2 to quickly to return to supine with B LEs elevated over heart. BP improved to 149/67 in supine after rest. Pt left in chair pose, all needs met, family at bedside. He continues to benefit from PT services. Likely will require HH PT and increased family support on DC. Will continue to follow. Vitals:   05/19/22 1116 05/19/22 1118 05/19/22 1120   Vital Signs   Pulse (Heart Rate) 83 84 (!) 203   BP (!) 100/59 (!) 99/57 (!) 79/47   MAP (Calculated) 73 71 (!) 58   BP 1 Method Automatic Automatic Automatic   BP Patient Position At rest;Supine Sitting Standing      05/19/22 1122   Vital Signs   Pulse (Heart Rate) 91   BP (!) 149/67   MAP (Calculated) 94   BP 1 Method Automatic   BP Patient Position Supine     Current Level of Function Impacting Discharge (mobility/balance): SBA for mobility but pt very orthostatic with position changes. Other factors to consider for discharge[place] Wife has mobility deficits and is unable to provide assist.         PLAN :  Patient continues to benefit from skilled intervention to address the above impairments. Continue treatment per established plan of care. to address goals. Recommendation for discharge: (in order for the patient to meet his/her long term goals)  HH with family assist vs. IP Rehab stay pending progress     This discharge recommendation:  Has been made in collaboration with the attending provider and/or case management    IF patient discharges home will need the following DME: rolling walker       SUBJECTIVE:   Patient stated Is it still going down? (bp).     OBJECTIVE DATA SUMMARY:   Critical Behavior:  Neurologic State: Alert  Orientation Level: Oriented X4  Cognition: Follows commands  Safety/Judgement: Fall prevention,Awareness of environment,Good awareness of safety precautions (receptive to bed alarm, not getting up alone due to BP)  Functional Mobility Training:  Bed Mobility:  Rolling: Supervision  Supine to Sit: Supervision  Sit to Supine: Moderate assistance;Assist x2  Scooting: Supervision        Transfers:  Sit to Stand: Minimum assistance  Stand to Sit: Minimum assistance                             Balance:  Sitting: Intact  Standing: Impaired; With support  Standing - Static: Constant support;Good  Standing - Dynamic : Not tested  Ambulation/Gait Training:  Unable to assess gait due to low BP in standing and with standing activities. Therapeutic Exercises:   Standing calf raises and marches with B HHA edge of bed performed to tolerance, 10-20reps each, to improve blood flow return/ increase BP    Pain Rating:  Endorsed headache, but did not quantify    Activity Tolerance:   Fair and signs and symptoms of orthostatic hypotension    After treatment patient left in no apparent distress:   Heels elevated for pressure relief, Call bell within reach, Bed / chair alarm activated, Caregiver / family present, Side rails x 3 and bed in \"chair pose\" to tolerance    COMMUNICATION/COLLABORATION:   The patients plan of care was discussed with: Occupational therapist and Registered nurse.      Alvaro [place], PT, DPT, NCS   Time Calculation: 23 mins

## 2022-05-19 NOTE — PROGRESS NOTES
End of Shift Note    Bedside shift change report given to MARY CARMEN Boo (oncoming nurse) by Naty Botello RN (offgoing nurse). Report included the following information SBAR and Kardex    Shift worked:  night   Shift summary and any significant changes:     Pt in bed, no complaints of pain.  No significant changes to patient condition        Concerns for physician to address:  none   Zone phone for oncoming shift:   [phone]     Patient Information  Suze Mao  65 y.o.  5/16/2022 11:27 AM by Aracelis Hernandez MD. Suze Mao was admitted from Home    Problem List  Patient Active Problem List    Diagnosis Date Noted    Orthostatic hypotension 05/16/2022    Chronic renal disease, stage III 05/10/2022    Pneumonia 03/31/2022    Cor pulmonale (chronic) ([address] 03/23/2022    Ischemic cardiomyopathy 03/04/2022    Pulmonary emphysema ([address] 03/04/2022    Interstitial lung disease ([address] 02/15/2022    Respiratory failure ([address] 02/14/2022    Acute on chronic combined systolic and diastolic ACC/AHA stage C congestive heart failure ([address] 02/12/2022    Syncope 02/17/2020    Rotator cuff arthropathy of left shoulder 09/06/2019    Status post reverse arthroplasty of shoulder 09/06/2019    Right rotator cuff tear arthropathy 09/04/2019    Moderate major depression ([address] 07/03/2018    Depression 07/03/2018    DDD (degenerative disc disease), lumbar 04/02/2018    Chronic anticoagulation 07/17/2017    S/P CABG x 1 06/17/2016    AICD (automatic cardioverter/defibrillator) present 05/13/2016    AVNRT (AV bridgette re-entry tachycardia) ([address] 05/12/2016    Stenosis of both carotid arteries without cerebral infarction 04/12/2016    Cervicogenic headache 04/12/2016    Alzheimer's dementia, late onset, with behavioral disturbance ([address] 04/12/2016    Spinal stenosis, lumbar region, with neurogenic claudication 04/12/2016    Lumbar back pain with radiculopathy affecting right lower extremity 04/12/2016    Lumbar back pain with radiculopathy affecting left lower extremity 04/12/2016    History of stroke 04/12/2016    ACP (advance care planning) 12/30/2015    B12 deficiency 09/22/2015    Hypothyroidism due to acquired atrophy of thyroid 09/22/2015    Osteoporosis 09/22/2015    Cervical post-laminectomy syndrome 09/22/2015    Neck pain 09/22/2015    Lower GI bleeding 08/19/2015    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 ([address] 07/23/2015    Left-sided chest wall pain 11/23/2014    S/P cervical spinal fusion 06/06/2013    Osteoarthritis 05/29/2012    Hyperthyroidism 05/29/2012    Atherosclerosis of native coronary artery of native heart without angina pectoris 05/29/2012    Chronic systolic heart failure ([address] 05/29/2012    Atrial fibrillation ([address] 05/29/2012    Mixed hyperlipidemia 05/29/2012    History of factor V Leiden mutation 03/07/2011    Essential hypertension 03/04/2011     Past Medical History:   Diagnosis Date    Acute combined systolic and diastolic congestive heart failure ([address] 2/12/2022    AICD (automatic cardioverter/defibrillator) present 5/13/2016 5/13/16 [employer] upgrade to dual chamber AICD implant  Dr. Dania Sanchez Alzheimer disease [facility])     Anxiety state, other     Arthritis     OSTEO ARTHRITIS    AVNRT (AV bridgette re-entry tachycardia) ([address] 5/12/2016 5/12/16 AVNRT ablation: PM/AICD left upper chest :Dr. Nan De Guzman Back ache     CAD (coronary artery disease)     Cancer ([address] 2004    Prostate cancer    Chest tightness     last episode of chest pain 5/2016 before AICD placed; none since then    Coagulation defects 2005    FACTOR V LEIDEN    Dementia ([address]     Depression     Dizziness     FH: factor V Leiden deficiency     Generalized muscle ache     GERD (gastroesophageal reflux disease)     HEARTBURN    Heart failure ([address] 2014    as of 07/2019 EF 30-35;  Dr. Sanya Martinez, Cardiomyopathy    Hyperlipidemia, mixed     Hypertension     Long term current use of anticoagulant therapy     Other ill-defined conditions(799.89) 2004    blood transfusion    Pacemaker     Paroxysmal atrial fibrillation (HCC)     rate controlled with coreg     Postsurgical aortocoronary bypass status 05/29/2012    CABG    Presence of cardiac defibrillator 05/2016    left upper chest    Pulmonary emphysema ([facility] 3/4/2022    [facility] spotted fever)     SSS (sick sinus syndrome) ([facility]     Stroke ([facility] 2011, 1990's    SEVERAL-mini    Syncope     Thromboembolism ([facility] 2014    left leg: changed to Eliquis from Warfarin    Thromboembolus ([facility] 2005    left leg    Thyroid disease     Weakness generalized        Core Measures:  CVA: No No  CHF:No No  PNA:No No    Activity:  Activity Level: Up with Assistance  Number times ambulated in hallways past shift: 0  Number of times OOB to chair past shift: 0    Cardiac:   Cardiac Monitoring: Yes      Cardiac Rhythm: Sinus Rhythm    Access:   Current line(s): PIV   Central Line? No Placement date  Reason Medically Necessary   PICC LINE? No Placement date Reason Medically Necessary     Genitourinary:   Urinary status: voiding  Urinary Catheter? No Placement Date  Reason Medically Necessary     Respiratory:   O2 Device: Nasal cannula  Chronic home O2 use?: YES  Incentive spirometer at bedside: NO       GI:  Last Bowel Movement Date: 05/17/22  Current diet:  ADULT DIET Easy to Chew; Low Fat/Low Chol/High Fiber/2 gm Na  Passing flatus: YES  Tolerating current diet: YES       Pain Management:   Patient states pain is manageable on current regimen: YES    Skin:  Fernando Score: 20  Interventions: increase time out of bed, PT/OT consult and nutritional support     Patient Safety:  Fall Score:  Total Score: 6  Interventions: bed/chair alarm, assistive device (walker, cane, etc), gripper socks and pt to call before getting OOB  High Fall Risk: Yes  @Rollbelt  @dexterity to release roll belt  Yes/No ( must document dexterity  here by stating Yes or No here, otherwise this is a restraint and must follow restraint documentation policy.)    DVT prophylaxis:  DVT prophylaxis Med- Yes  DVT prophylaxis SCD or JAMSHID- Yes     Wounds: (If Applicable)  Wounds-Yes  Location R arm    Active Consults:  IP CONSULT TO CARDIOLOGY  IP CONSULT TO INFECTIOUS DISEASES    Length of Stay:  Expected LOS: 2d 7h  Actual LOS: 2  Discharge Plan: No JULIANNE Heredia RN

## 2022-05-20 NOTE — PROGRESS NOTES
End of Shift Note    Bedside shift change report given to Iván Valenzuela (oncoming nurse) by Ceasar Chew RN (offgoing nurse). Report included the following information SBAR and Kardex    Shift worked:  night   Shift summary and any significant changes:     Pt in bed, no complaints of pain. No significant changes to patient condition     Up to the commode throughout the night, no complaints of dizziness or orthostatic pressure.      JAMSHID hose on all night      Concerns for physician to address:  none   Zone phone for oncoming shift:        Patient Information  Sarahy Daily  65 y.o.  5/16/2022 11:27 AM by Jus Vizcaino MD. Sarahy Daily was admitted from Home    Problem List  Patient Active Problem List    Diagnosis Date Noted    Orthostatic hypotension 05/16/2022    Chronic renal disease, stage III 05/10/2022    Pneumonia 03/31/2022    Cor pulmonale (chronic) ([address] 03/23/2022    Ischemic cardiomyopathy 03/04/2022    Pulmonary emphysema ([address] 03/04/2022    Interstitial lung disease ([address] 02/15/2022    Respiratory failure ([address] 02/14/2022    Acute on chronic combined systolic and diastolic ACC/AHA stage C congestive heart failure ([address] 02/12/2022    Syncope 02/17/2020    Rotator cuff arthropathy of left shoulder 09/06/2019    Status post reverse arthroplasty of shoulder 09/06/2019    Right rotator cuff tear arthropathy 09/04/2019    Moderate major depression ([address] 07/03/2018    Depression 07/03/2018    DDD (degenerative disc disease), lumbar 04/02/2018    Chronic anticoagulation 07/17/2017    S/P CABG x 1 06/17/2016    AICD (automatic cardioverter/defibrillator) present 05/13/2016    AVNRT (AV bridgette re-entry tachycardia) ([address] 05/12/2016    Stenosis of both carotid arteries without cerebral infarction 04/12/2016    Cervicogenic headache 04/12/2016    Alzheimer's dementia, late onset, with behavioral disturbance ([address] 04/12/2016    Spinal stenosis, lumbar region, with neurogenic claudication 04/12/2016    Lumbar back pain with radiculopathy affecting right lower extremity 04/12/2016    Lumbar back pain with radiculopathy affecting left lower extremity 04/12/2016    History of stroke 04/12/2016    ACP (advance care planning) 12/30/2015    B12 deficiency 09/22/2015    Hypothyroidism due to acquired atrophy of thyroid 09/22/2015    Osteoporosis 09/22/2015    Cervical post-laminectomy syndrome 09/22/2015    Neck pain 09/22/2015    Lower GI bleeding 08/19/2015    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 ([address] 07/23/2015    Left-sided chest wall pain 11/23/2014    S/P cervical spinal fusion 06/06/2013    Osteoarthritis 05/29/2012    Hyperthyroidism 05/29/2012    Atherosclerosis of native coronary artery of native heart without angina pectoris 05/29/2012    Chronic systolic heart failure ([address] 05/29/2012    Atrial fibrillation ([address] 05/29/2012    Mixed hyperlipidemia 05/29/2012    History of factor V Leiden mutation 03/07/2011    Essential hypertension 03/04/2011     Past Medical History:   Diagnosis Date    Acute combined systolic and diastolic congestive heart failure ([address] 2/12/2022    AICD (automatic cardioverter/defibrillator) present 5/13/2016 5/13/16 [employer] upgrade to dual chamber AICD implant  Dr. Srinivasa Rowland Alzheimer disease [facility])     Anxiety state, other     Arthritis     OSTEO ARTHRITIS    AVNRT (AV bridgette re-entry tachycardia) ([address] 5/12/2016 5/12/16 AVNRT ablation: PM/AICD left upper chest :Dr. Bg Ruggiero Back ache     CAD (coronary artery disease)     Cancer ([address] 2004    Prostate cancer    Chest tightness     last episode of chest pain 5/2016 before AICD placed; none since then    Coagulation defects 2005    FACTOR V LEIDEN    Dementia ([address]     Depression     Dizziness     FH: factor V Leiden deficiency     Generalized muscle ache     GERD (gastroesophageal reflux disease)     HEARTBURN  Heart failure ([facility] 2014    as of 07/2019 EF 30-35; Dr. Merry Sinha, Cardiomyopathy    Hyperlipidemia, mixed     Hypertension     Long term current use of anticoagulant therapy     Other ill-defined conditions(799.89) 2004    blood transfusion    Pacemaker     Paroxysmal atrial fibrillation (HCC)     rate controlled with coreg     Postsurgical aortocoronary bypass status 05/29/2012    CABG    Presence of cardiac defibrillator 05/2016    left upper chest    Pulmonary emphysema ([facility] 3/4/2022    RMSF [facility] spotted fever)     SSS (sick sinus syndrome) ([facility]     Stroke ([facility] 2011, 1990's    SEVERAL-mini    Syncope     Thromboembolism ([facility] 2014    left leg: changed to Eliquis from Warfarin    Thromboembolus ([facility] 2005    left leg    Thyroid disease     Weakness generalized        Core Measures:  CVA: No No  CHF:No No  PNA:No No    Activity:  Activity Level: Up with Assistance  Number times ambulated in hallways past shift: 0  Number of times OOB to chair past shift: 0    Cardiac:   Cardiac Monitoring: Yes      Cardiac Rhythm: Sinus Rhythm    Access:   Current line(s): PIV   Central Line? No Placement date  Reason Medically Necessary   PICC LINE? No Placement date Reason Medically Necessary     Genitourinary:   Urinary status: voiding  Urinary Catheter? No Placement Date  Reason Medically Necessary     Respiratory:   O2 Device: Nasal cannula  Chronic home O2 use?: YES  Incentive spirometer at bedside: NO       GI:  Last Bowel Movement Date: 05/17/22  Current diet:  ADULT DIET Easy to Chew; Low Fat/Low Chol/High Fiber/2 gm Na  Passing flatus: YES  Tolerating current diet: YES       Pain Management:   Patient states pain is manageable on current regimen: YES    Skin:  Fernando Score: 20  Interventions: increase time out of bed, PT/OT consult and nutritional support     Patient Safety:  Fall Score:  Total Score: 6  Interventions: bed/chair alarm, assistive device (walker, cane, etc), gripper socks and pt to call before getting OOB  High Fall Risk: Yes  @Rollbelt  @dexterity to release roll belt  Yes/No ( must document dexterity  here by stating Yes or No here, otherwise this is a restraint and must follow restraint documentation policy.)    DVT prophylaxis:  DVT prophylaxis Med- Yes  DVT prophylaxis SCD or JAMSHID- Yes     Wounds: (If Applicable)  Wounds-Yes  Location R arm    Active Consults:  IP CONSULT TO CARDIOLOGY  IP CONSULT TO INFECTIOUS DISEASES    Length of Stay:  Expected LOS: 2d 7h  Actual LOS: 2  Discharge Plan: No TBBRONSON Hart RN

## 2022-05-20 NOTE — PROGRESS NOTES
ADULT PROTOCOL: JET AEROSOL  REASSESSMENT    Patient  Sarahy Beckford     65 y.o.   male     5/20/2022  9:08 AM    Breath Sounds Pre Procedure: Right Breath Sounds: Diminished                               Left Breath Sounds: Diminished    Breath Sounds Post Procedure: Right Breath Sounds: Clear,Diminished                                 Left Breath Sounds: Coarse,Diminished    Breathing pattern: Pre procedure Breathing Pattern: Regular          Post procedure Breathing Pattern: Regular    Heart Rate: Pre procedure Pulse: 76           Post procedure Pulse: 74    Resp Rate: Pre procedure Respirations: 16           Post procedure Respirations: 16    Cough: Pre procedure Cough: Non-productive               Post procedure Cough: Non-productive      Oxygen: 2L/NC     Changed: NO    SpO2: Pre procedure SpO2: 96 %                 Post procedure SpO2: 97 %     Nebulizer Therapy: Current medications Aerosolized Medications: DuoNeb      Changed: NO        Problem List:   Patient Active Problem List   Diagnosis Code    Essential hypertension I10    History of factor V Leiden mutation Z86.2    Osteoarthritis M19.90    Hyperthyroidism E05.90    Atherosclerosis of native coronary artery of native heart without angina pectoris I25.10    Chronic systolic heart failure (HCC) I50.22    Atrial fibrillation (HCC) I48.91    Mixed hyperlipidemia E78.2    S/P cervical spinal fusion Z98.1    Left-sided chest wall pain R07.89    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) F02.81    Lower GI bleeding K92.2    B12 deficiency E53.8    Hypothyroidism due to acquired atrophy of thyroid E03.4    Osteoporosis M81.0    Cervical post-laminectomy syndrome M96.1    Neck pain M54.2    ACP (advance care planning) Z71.89    Stenosis of both carotid arteries without cerebral infarction I65.23    Cervicogenic headache G44.86    Alzheimer's dementia, late onset, with behavioral disturbance (HCC) G30.1, F02.81    Spinal stenosis, lumbar region, with neurogenic claudication M48.062    Lumbar back pain with radiculopathy affecting right lower extremity M54.16    Lumbar back pain with radiculopathy affecting left lower extremity M54.16    History of stroke Z86.73    AVNRT (AV bridgette re-entry tachycardia) ([facility] I47.1    AICD (automatic cardioverter/defibrillator) present Z95.810    S/P CABG x 1 Z95.1    Chronic anticoagulation Z79.01    DDD (degenerative disc disease), lumbar M51.36    Moderate major depression ([facility]) F32.1    Depression F32. A    Right rotator cuff tear arthropathy M75.101, M12.811    Rotator cuff arthropathy of left shoulder M12.812    Status post reverse arthroplasty of shoulder Z96.619    Syncope R55    Acute on chronic combined systolic and diastolic ACC/AHA stage C congestive heart failure (HCC) I50.43    Respiratory failure ([facility]) J96.90    Interstitial lung disease ([facility]) J84.9    Ischemic cardiomyopathy I25.5    Pulmonary emphysema ([facility]) J43.9    Cor pulmonale (chronic) ([facility]) I27.81    Pneumonia J18.9    Chronic renal disease, stage III N18.30    Orthostatic hypotension I95.1       Respiratory Therapist: Yonis De Los [facility]

## 2022-05-20 NOTE — PROGRESS NOTES
Hospital follow-up PCP transitional care appointment has been scheduled with Saurabh Sales on 4/25/22 at 1620. This is a previously scheduled appt. Pending patient discharge.   Amelia Gloria, Care Management Assistant

## 2022-05-20 NOTE — PROGRESS NOTES
Physical Therapist Note    Patient demonstrates orthostatic hypotension with cardiologist this AM. Will defer AM PT tx and re-attempt this PM.

## 2022-05-20 NOTE — PROGRESS NOTES
Hospitalist Progress Note    NAME: Sybil Ernandez   :  1945   MRN:  [id number]     HPI excerpted from admission H&P:  Marcelina Jean is a 65 y.o. male transferred for syncope and AICD interrogation.       Patient said yesterday 4:30 p.m., drove to get gas at grocery store but was short $1 in payment so he drove home. Drove back to store, walk in and spotted a friend. He then suddenly passed out. His friend caught him before he fell down. He was out for about 5 minutes. He did not have any chest pain, shortness of breath or dizziness at that time. However for the past 3 days he has been having periods of dizziness when he is walking. No fevers chills. Positive increased cough, nonproductive. Denies any wheezing, leg edema, orthopnea, signs of bleeding, nausea vomiting or diarrhea.       He was taken to [facility] where he was noted to be orthostatic:  Supine 103/61  P 66  Sitting 85/59  P74  Standing 76/45  P79  Given a total of 1 L of IV fluid but remains orthostatic with BP 79/57 standing so referred for admission.     He was hospitalized in 2022 with left lower lobe pneumonia and COPD exacerbation and in 2022 for combined CHF exacerbation. Echocardiogram showed EF of 40% which is down from 50 to 55% in 2020. \"    Assessment / Plan:  Syncope  Orthostatic hypotension  Chronic combined systolic/diastolic heart failure  CAD s/p remote CABG  H/O afib s/p AVN ablation with ICD implantation  Dyslipidemia   - Cardiology input appreciated. - Syncope likely 2/2 orthostatic hypotension, patient remains orthostatic.    - Sacubitril-valsartan 24-26 mg po bid stopped. - Metoprolol succinate 12.5 mg po daily stopped. - Furosemide 20 mg po daily stopped. - Continue apixaban 5 mg po q12h. - Continue atorvastatin 40 mg po daily. - Continue JAMSHID hose. - Midodrine increased to 10 mg po tid. Positive RMSF IgG  - ID input appreciated.    - Sent by PCP 2/2 complaints of myalgias. - Reflex IFA negative. - Plan for repeat serology in 2-4 weeks. Mild asymptomatic hyponatremia   - No tx indicated. - Monitor. COPD  - Continue albuterol-ipratropium 2.5-0.5 mg nebs q6h. - Continue supplemental O2, patient on home oxygen. GERD  - Continue pantoprazole 40 mg po daily. Dementia  - Continue donepezil 5 mg po daily at hs.   - Continue memantine 10 mg po bid. Depression   - Continue duloxetine 60 mg po daily. - Continue sertraline 200 mg po daily. OAB  - Continue mirabegron ER 50 mg po daily. Mild hyperglycemia   - No tx indicated. - Monitor with a.m. labs. Anemia   - H/H overall stable. - No tx indicated. - OP follow up. Mild azotemia, resolved  - Likely related to volume contraction from diuretic.  - Continue to hold diuretic for today. - Monitor. 18.5 - 24.9 Normal weight / Body mass index is 20.65 kg/m². Estimated discharge date: May 19  Barriers:    Code status: Full  Prophylaxis: Apixaban  Recommended Disposition: Home w/Family     Subjective:     Chief Complaint / Reason for Physician Visit  Patient with no complaints. Plan of care and pertinent events reviewed with bedside nurse. Review of Systems:  Symptom Y/N Comments  Symptom Y/N Comments   Fever/Chills N   Chest Pain N    Poor Appetite N   Edema N    Cough N   Abdominal Pain N    Sputum N   Joint Pain N    SOB/HOLCOMB N   Pruritis/Rash N    Nausea/vomit N   Tolerating PT/OT Y    Diarrhea N   Tolerating Diet Y    Constipation N   Other       Could NOT obtain due to:      Objective:     VITALS:   Last 24hrs VS reviewed since prior progress note.  Most recent are:  Patient Vitals for the past 24 hrs:   Temp Pulse Resp BP SpO2   05/20/22 1123 97.6 °F (36.4 °C) 66 20 123/68 100 %   05/20/22 0837 97.7 °F (36.5 °C) 61 20 121/61 97 %   05/20/22 0803 -- -- -- -- 96 %   05/19/22 2240 -- -- -- -- 94 %   05/19/22 2046 97.5 °F (36.4 °C) 76 19 135/79 94 % 05/19/22 1645 -- -- -- 129/76 --   05/19/22 1604 97.8 °F (36.6 °C) 86 20 (!) 82/65 94 %     No intake or output data in the 24 hours ending 05/20/22 1246     I had a face to face encounter and independently examined this patient on 5/20/2022, as outlined below:  PHYSICAL EXAM:  General:  A/A/O.  NAD. HEENT:  Normocephalic. Sclera anicteric. Mucous membranes moist.    Chest:  Resps even/unlabored with symmetrical CWE. Air entry diminished. Lungs CTA. No use of accessory muscles. CV:  IRRR. Normal S1/S2. I/VI systolic murmur noted. No JVD. Trace pretibial edema angeline. Cap refill < 3 sec. Peripheral pulses 1-2+. GI:  Abdomen soft/NT/ND. ABT X 4.    :  Voiding. Neurologic:  Nonfocal.  CN II-XII grossly intact. Face symmetrical.  Speech normal.     Psych:  Cooperative. No anxiety or agitation. No suicidal/homicidal ideation. Skin:  Warm and color appropriate. No rashes or jaundice. Turgor elastic. Reviewed most current lab test results and cultures  YES  Reviewed most current radiology test results   YES  Review and summation of old records today    NO  Reviewed patient's current orders and MAR    YES  PMH/ reviewed - no change compared to H&P  ________________________________________________________________________  Care Plan discussed with:    Comments   Patient [address]     Consultant                        Multidiciplinary team rounds were held today with , nursing, pharmacist and clinical coordinator. Patient's plan of care was discussed; medications were reviewed and discharge planning was addressed. ________________________________________________________________________  Yara Tiwari NP     Procedures: see electronic medical records for all procedures/Xrays and details which were not copied into this note but were reviewed prior to creation of Plan.       LABS:  I reviewed today's most current labs and imaging studies.   Pertinent labs include:  Recent Labs     05/19/22 0220   HGB 10.7*   HCT 34.1*     Recent Labs     05/19/22 0220   *   K 4.1      CO2 30   *   BUN 14   CREA 1.06   CA 10.0   MG 2.2       Signed: Tony Shi NP

## 2022-05-20 NOTE — PROGRESS NOTES
[facility]  [address]  [phone]  [place]      Cardiology Progress Note      5/20/2022 2:34 PM    Admit Date: 5/16/2022    Admit Diagnosis:   Syncope [R55]  Orthostatic hypotension [I95.1]    Subjective:     Yaquelin Wade   Reports feeling well today. Denies chest pain or SOB. Due to orthostatic hypotension Midodrine changed to 5mg TID yesterday and Entresto placed on hold. Visit Vitals  /61   Pulse 61   Temp 97.7 °F (36.5 °C)   Resp 20   Ht 5' 7\" (1.702 m)   Wt 59.8 kg (131 lb 13.4 oz)   SpO2 97%   BMI 20.65 kg/m²       Current Facility-Administered Medications   Medication Dose Route Frequency    albuterol-ipratropium (DUO-NEB) 2.5 MG-0.5 MG/3 ML  3 mL Nebulization BID RT    midodrine (PROAMATINE) tablet 5 mg  5 mg Oral TID WITH MEALS    sodium chloride (NS) flush 5-40 mL  5-40 mL IntraVENous Q8H    sodium chloride (NS) flush 5-40 mL  5-40 mL IntraVENous PRN    acetaminophen (TYLENOL) tablet 650 mg  650 mg Oral Q6H PRN    Or    acetaminophen (TYLENOL) suppository 650 mg  650 mg Rectal Q6H PRN    polyethylene glycol (MIRALAX) packet 17 g  17 g Oral DAILY PRN    ondansetron (ZOFRAN ODT) tablet 4 mg  4 mg Oral Q8H PRN    Or    ondansetron (ZOFRAN) injection 4 mg  4 mg IntraVENous Q6H PRN    atorvastatin (LIPITOR) tablet 40 mg  40 mg Oral QHS    DULoxetine (CYMBALTA) capsule 60 mg  60 mg Oral DAILY    apixaban (ELIQUIS) tablet 5 mg  5 mg Oral Q12H    memantine (NAMENDA) tablet 10 mg  10 mg Oral BID    pantoprazole (PROTONIX) tablet 40 mg  40 mg Oral DAILY    polyethylene glycol (MIRALAX) packet 17 g  17 g Oral DAILY    guaiFENesin ER (MUCINEX) tablet 600 mg  600 mg Oral Q12H    donepeziL (ARICEPT) tablet 5 mg  5 mg Oral QHS    sertraline (ZOLOFT) tablet 200 mg  200 mg Oral DAILY    mirabegron ER (MYRBETRIQ) tablet 50 mg  50 mg Oral DAILY       Objective:      Physical Exam:  General Appearance:  In NAD, well appearing   Chest:   Clear, no Murmur   Cardiovascular: RRR  Extremities: no edema, MAEx4   Skin:  Warm and dry. Data Review:   Recent Labs     22   HGB 10.7*   HCT 34.1*     Recent Labs     22   *   K 4.1      CO2 30   *   BUN 14   CREA 1.06   CA 10.0   MG 2.2       No results for input(s): TROIQ, CPK, CKMB in the last 72 hours. No intake or output data in the 24 hours ending 22 1005     Telemetry:   EKG:  Cxray:    Assessment:     Active Problems:    Syncope (2020)      Orthostatic hypotension (2022)      Ortho Vitals by this Provider  Supine: 142/70  Sitting 132/59  Standin/52 mild dizziness w/ positional change   Plan:     Continued orthostatic hypotension, continue to hold entresto/coreg. Clinically euvolemic. Increase dose of midodrine to 10mg three times daily. Still with orthostatic hypotension. Will encourage oral hydration. Will need to tolerate higher bps to prevent further syncope. Thank you for allowing me to participate in this patients care.     Cecelia Yeboah MD, Aníbal Bahena

## 2022-05-20 NOTE — PROGRESS NOTES
Problem: Falls - Risk of  Goal: *Absence of Falls  Description: Document [place] [employer] Fall Risk and appropriate interventions in the flowsheet.   Outcome: Progressing Towards Goal  Note: Fall Risk Interventions:  Mobility Interventions: Bed/chair exit alarm         Medication Interventions: Assess postural VS orthostatic hypotension,Bed/chair exit alarm,Patient to call before getting OOB,Teach patient to arise slowly    Elimination Interventions: Bed/chair exit alarm    History of Falls Interventions: Bed/chair exit alarm         Problem: Patient Education: Go to Patient Education Activity  Goal: Patient/Family Education  Outcome: Progressing Towards Goal     Problem: Syncope  Goal: *Absence of injury  Outcome: Progressing Towards Goal

## 2022-05-20 NOTE — PROGRESS NOTES
Problem: Mobility Impaired (Adult and Pediatric)  Goal: *Acute Goals and Plan of Care (Insert Text)  Description: FUNCTIONAL STATUS PRIOR TO ADMISSION: Pt lives in trailer with his wife. They state he had been able to amb without device prior to illnesses and hospitalization in March, some decline since but still amb without device. He has had 4 falls in last 6 months. Pt states \"my legs just give out\". He does have intermittent help with ADLs. HOME SUPPORT PRIOR TO ADMISSION: The patient lived alone with wife to provide assistance. Physical Therapy Goals  Initiated 5/17/2022  1. Patient will move from supine to sit and sit to supine , scoot up and down, and roll side to side in bed with independence within 7 day(s). 2.  Patient will transfer from bed to chair and chair to bed with modified independence using the least restrictive device within 7 day(s). 3.  Patient will perform sit to stand with modified independence within 7 day(s). 4.  Patient will ambulate with modified independence for 150 feet with the least restrictive device within 7 day(s). Outcome: Progressing Towards Goal   PHYSICAL THERAPY TREATMENT  Patient: Obi Hernandez (48 y.o. male)  Date: 5/20/2022  Diagnosis: Syncope [R55]  Orthostatic hypotension [I95.1] <principal problem not specified>       Precautions: Fall,Bed Alarm,Skin,Spinal (orthostatic; 4 falls in 6 mo; PMH cervical sx; )  Chart, physical therapy assessment, plan of care and goals were reviewed. ASSESSMENT  Patient continues with skilled PT services and is progressing towards goals. He demonstrates improved BP this session. Supine and seated therex are performed for improved BP prior to mobility. Patient continues to demonstrate decreased BP with gait training but is able to progress to increased distance and BP is stable enough for patient to stay up in chair with legs elevated. Please refer to flowsheets for pressures.     Current Level of Function Impacting Discharge (mobility/balance): CGA     Other factors to consider for discharge: medical stability         PLAN :  Patient continues to benefit from skilled intervention to address the above impairments. Continue treatment per established plan of care. to address goals. Recommendation for discharge: (in order for the patient to meet his/her long term goals)  Physical therapy at least 2 days/week in the home AND ensure assist and/or supervision for safety with functional mobiltiy     This discharge recommendation:  Has been made in collaboration with the attending provider and/or case management    IF patient discharges home will need the following DME: patient owns DME required for discharge       SUBJECTIVE:   Patient stated i feel fine.     OBJECTIVE DATA SUMMARY:   Critical Behavior:  Neurologic State: Alert  Orientation Level: Oriented X4  Cognition: Follows commands,Poor safety awareness  Safety/Judgement: Fall prevention,Awareness of environment,Good awareness of safety precautions (receptive to bed alarm, not getting up alone due to BP)  Functional Mobility Training:  Bed Mobility:                    Transfers:                                   Balance:     Ambulation/Gait Training:                                                        Stairs: Therapeutic Exercises:     Pain Rating:      Activity Tolerance:   Good and signs and symptoms of orthostatic hypotension    After treatment patient left in no apparent distress:   Sitting in chair, Heels elevated for pressure relief, and Call bell within reach    COMMUNICATION/COLLABORATION:   The patients plan of care was discussed with: Occupational therapist and Registered nurse.      Jesús Garciaor, PT   Time Calculation: 35 mins

## 2022-05-20 NOTE — PROGRESS NOTES
Transition of Care Plan:     RUR: 18% \"mod risk\"    ULYSSES: 5/21   Medical Clearance. Talked to Hospitalist and not DC today maybe tomorrow. Disposition: Home with [address]  3:33 PM   [facility]. [facility] FAXED the [address] to me and I placed it in the bedside chart for completion. NP Aric Rodrigez stated that he would complete it and we need to fax it to [phone] with DC Summary once stable for DC.     12:17 PM   [address] is Pending:   Offered FOC and Pt selected:   [facility]  -Per Previous CM: Referral was  to [address] yesterday. CM called Office [phone] and they had not received it. CM will refax it today. These were sent via [employer]. - [facility]: Pending  -[facility]: Pending  -[facility]: Pending    Follow up appointments: PCP: Jennifer Fischer: CM emailed CM specialist to make appt next week. 5/24/22 at 4:20 PM  Cardiology: Wife wanted to schedule. DME needed: Pt owns RW  Transportation at Discharge: Wife  Nava Comas or means to access home: Yes       IM Medicare Letter: delivered 5/20/22  Is patient a BCPI-A Bundle: No                    If yes, was Bundle Letter given?:  N/A  Is patient a [employer] and connected with the [place]? No            If yes, was [employer] transfer form completed and [facility] notified? N/A  Caregiver Contact: Wife- Nanette Bence, [phone]   Discharge Caregiver contacted prior to discharge? Yes. Called and reviewed plan with wife over phone. Care Conference needed?:       no    CM will continue to monitor discharge plan.      Oliva Lucio, [address]  [phone]

## 2022-05-20 NOTE — PROGRESS NOTES
End of Shift Note    Bedside shift change report given to MARY CARMEN ROBERTS (oncoming nurse) by Sandra Fitzgerald RN (offgoing nurse).   Report included the following information SBAR and Kardex    Shift worked:  DAYS   Shift summary and any significant changes:    -NONE     Concerns for physician to address:  NONE   Zone phone for oncoming shift:   [phone]     Patient Information  Kaleigh Pablo  65 y.o.  5/16/2022 11:27 AM by Rupert Vidal MD. Kaleigh Pablo was admitted from Home    Problem List  Patient Active Problem List    Diagnosis Date Noted    Orthostatic hypotension 05/16/2022    Chronic renal disease, stage III 05/10/2022    Pneumonia 03/31/2022    Cor pulmonale (chronic) ([address] 03/23/2022    Ischemic cardiomyopathy 03/04/2022    Pulmonary emphysema ([address] 03/04/2022    Interstitial lung disease ([address] 02/15/2022    Respiratory failure ([address] 02/14/2022    Acute on chronic combined systolic and diastolic ACC/AHA stage C congestive heart failure ([address] 02/12/2022    Syncope 02/17/2020    Rotator cuff arthropathy of left shoulder 09/06/2019    Status post reverse arthroplasty of shoulder 09/06/2019    Right rotator cuff tear arthropathy 09/04/2019    Moderate major depression ([address] 07/03/2018    Depression 07/03/2018    DDD (degenerative disc disease), lumbar 04/02/2018    Chronic anticoagulation 07/17/2017    S/P CABG x 1 06/17/2016    AICD (automatic cardioverter/defibrillator) present 05/13/2016    AVNRT (AV bridgette re-entry tachycardia) ([address] 05/12/2016    Stenosis of both carotid arteries without cerebral infarction 04/12/2016    Cervicogenic headache 04/12/2016    Alzheimer's dementia, late onset, with behavioral disturbance ([address] 04/12/2016    Spinal stenosis, lumbar region, with neurogenic claudication 04/12/2016    Lumbar back pain with radiculopathy affecting right lower extremity 04/12/2016    Lumbar back pain with radiculopathy affecting left lower extremity 04/12/2016    History of stroke 04/12/2016    ACP (advance care planning) 12/30/2015    B12 deficiency 09/22/2015    Hypothyroidism due to acquired atrophy of thyroid 09/22/2015    Osteoporosis 09/22/2015    Cervical post-laminectomy syndrome 09/22/2015    Neck pain 09/22/2015    Lower GI bleeding 08/19/2015    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 ([address] 07/23/2015    Left-sided chest wall pain 11/23/2014    S/P cervical spinal fusion 06/06/2013    Osteoarthritis 05/29/2012    Hyperthyroidism 05/29/2012    Atherosclerosis of native coronary artery of native heart without angina pectoris 05/29/2012    Chronic systolic heart failure ([address] 05/29/2012    Atrial fibrillation ([address] 05/29/2012    Mixed hyperlipidemia 05/29/2012    History of factor V Leiden mutation 03/07/2011    Essential hypertension 03/04/2011     Past Medical History:   Diagnosis Date    Acute combined systolic and diastolic congestive heart failure ([address] 2/12/2022    AICD (automatic cardioverter/defibrillator) present 5/13/2016 5/13/16 [employer] upgrade to dual chamber AICD implant  Dr. Cathren Alpers Alzheimer disease [facility])     Anxiety state, other     Arthritis     OSTEO ARTHRITIS    AVNRT (AV bridgette re-entry tachycardia) ([address] 5/12/2016 5/12/16 AVNRT ablation: PM/AICD left upper chest :Dr. Erick Muniz Back ache     CAD (coronary artery disease)     Cancer ([address] 2004    Prostate cancer    Chest tightness     last episode of chest pain 5/2016 before AICD placed; none since then    Coagulation defects 2005    FACTOR V LEIDEN    Dementia ([address]     Depression     Dizziness     FH: factor V Leiden deficiency     Generalized muscle ache     GERD (gastroesophageal reflux disease)     HEARTBURN    Heart failure ([address] 2014    as of 07/2019 EF 30-35;  Dr. Dheeraj Worrell, Cardiomyopathy    Hyperlipidemia, mixed     Hypertension     Long term current use of anticoagulant therapy     Other ill-defined conditions(799.89) 2004    blood transfusion    Pacemaker     Paroxysmal atrial fibrillation (HCC)     rate controlled with coreg     Postsurgical aortocoronary bypass status 05/29/2012    CABG    Presence of cardiac defibrillator 05/2016    left upper chest    Pulmonary emphysema ([facility] 3/4/2022    RMSF [facility] spotted fever)     SSS (sick sinus syndrome) ([facility]     Stroke ([facility] 2011, 1990's    SEVERAL-mini    Syncope     [facility] 2014    left leg: changed to Eliquis from Warfarin    Thromboembolus ([facility] 2005    left leg    Thyroid disease     Weakness generalized        Core Measures:  CVA: No No  CHF:No No  PNA:No No    Activity:  Activity Level: Up with Assistance  Number times ambulated in hallways past shift: 0  Number of times OOB to chair past shift: 0    Cardiac:   Cardiac Monitoring: Yes      Cardiac Rhythm: Sinus Rhythm    Access:   Current line(s): PIV   Central Line? No Placement date  Reason Medically Necessary   PICC LINE? No Placement date Reason Medically Necessary     Genitourinary:   Urinary status: voiding  Urinary Catheter? No Placement Date  Reason Medically Necessary     Respiratory:   O2 Device: Nasal cannula  Chronic home O2 use?: YES  Incentive spirometer at bedside: NO       GI:  Last Bowel Movement Date: 05/17/22  Current diet:  ADULT DIET Easy to Chew; Low Fat/Low Chol/High Fiber/2 gm Na  Passing flatus: YES  Tolerating current diet: YES       Pain Management:   Patient states pain is manageable on current regimen: YES    Skin:  Fernando Score: 19  Interventions: increase time out of bed, PT/OT consult and nutritional support     Patient Safety:  Fall Score:  Total Score: 6  Interventions: bed/chair alarm, assistive device (walker, cane, etc), gripper socks and pt to call before getting OOB  High Fall Risk: Yes  @Rollbelt  @dexterity to release roll belt  Yes/No ( must document dexterity  here by stating Yes or No here, otherwise this is a restraint and must follow restraint documentation policy.)    DVT prophylaxis:  DVT prophylaxis Med- Yes  DVT prophylaxis SCD or JAMSHID- Yes     Wounds: (If Applicable)  Wounds-Yes  Location R arm    Active Consults:  IP CONSULT TO CARDIOLOGY  IP CONSULT TO INFECTIOUS DISEASES    Length of Stay:  Expected LOS: 2d 7h  Actual LOS: 2  Discharge Plan: No TBD      Emmy Noriega RN

## 2022-05-20 NOTE — PROGRESS NOTES
Problem: Self Care Deficits Care Plan (Adult)  Goal: *Acute Goals and Plan of Care (Insert Text)  Description: FUNCTIONAL STATUS PRIOR TO ADMISSION:  Pt lives in trailer with his wife and grandson. They state he had been able to amb without device prior to illnesses and hospitalization in March 2022, pneumonia, some decline since but still amb without device. He has had 4 falls in last 6 months. No O2 used prior to illness in March 2022, CHF, and pneumonia in April 2022, 2L since but \"huffing and puffing if he just walks to bathroom with portable O2 in place. \" Pt states \"my legs just give out\" during the 4 falls. He does have intermittent help with ADLs daily, from both wife and grandson for UE and LE dressing. Uses garden tub with shower chair min A,  able to complete toileting hygiene despite B impaired AROM of shoulders. HOME SUPPORT PRIOR TO ADMISSION: The patient lived with wife and adult grandson to provide assistance for dressing and bathing, he was mod I to toilet; has not been able to assist w/IADLs since March      Occupational Therapy Goals  Initiated 5/17/2022  1. Patient will perform grooming in standing with minimal assistance/contact guard assist within 7 day(s). 2.  Patient will perform upper body w/dressing stick technique with minimal assistance/contact guard assist within 7 day(s). 3.  Patient will perform lower body dressing with minimal assistance/contact guard assist with AE within 7 day(s). 4.  Patient will perform toilet transfers with minimal assistance/contact guard assist within 7 day(s). 5.  Patient will perform all aspects of toileting with minimal assistance/contact guard assist within 7 day(s). 6.  Patient will participate in upper extremity therapeutic exercise/activities with minimal assistance/contact guard assist for 5 minutes within 7 day(s).     7.  Patient will utilize energy conservation, PLB, fall preventionand pain management techniques during functional activities with verbal cues within 7 day(s). Outcome: Progressing Towards Goal    OCCUPATIONAL THERAPY TREATMENT  Patient: Sybil Ernandez (36 y.o. male)  Date: 5/20/2022  Diagnosis: Syncope [R55]  Orthostatic hypotension [I95.1] <principal problem not specified>       Precautions: Fall,Bed Alarm,Skin,Spinal (orthostatic; 4 falls in 6 mo; PMH cervical sx; )  Chart, occupational therapy assessment, plan of care, and goals were reviewed. ASSESSMENT  Patient continues with skilled OT services and is progressing towards goals. Patient seen on this date for functional mobility and therapeutic activity, received on 2L O2 via NC. Obtained BP multiple times throughout session, discussed with RN pt receiving meds 30 mins prior. Patient able to come to EOB to don socks with increased time and min A. Patient able to complete seated UB and core exercises as well as hallway/bedroom mobility on this date in prep for activity. Following completed brief bedroom mobility without AD and with CGA. At conclusion of session pt completed increased hallway mobility requiring 1 seated rest break. BP assessed following hallway mobility, noted significant drop. Opted to transfer pt back to room via recliner chair, BP rebounding to normal range with seated rest break and BLEs elevated in recliner. Pt remained seated in recliner at conclusion of session educated on call bell usage. See below for obtained vitals/orthostatics. Anticipate pt will require no more than HHOT/PT at DC pending BP stability with activity. Current Level of Function Impacting Discharge (ADLs): min A - SPV     Other factors to consider for discharge: orthostatic, alexandra hose         PLAN :  Patient continues to benefit from skilled intervention to address the above impairments. Continue treatment per established plan of care to address goals.     Recommend with staff: up to chair vs chair position    Recommend next OT session: standing grooming, toileting Recommendation for discharge: (in order for the patient to meet his/her long term goals)  Occupational therapy at least 2 days/week in the home     This discharge recommendation:  Has been made in collaboration with the attending provider and/or case management    IF patient discharges home will need the following DME: shower chair       SUBJECTIVE:   Patient stated I think my hearing aids , I cant hear a thing.     OBJECTIVE DATA SUMMARY:   Vial Signs:      22 1340 22 1342 22 1344   Vital Signs   Pulse (Heart Rate) 85 88 94   /71 (!) 80/51 (!) 100/55   MAP (Calculated) 88 (!) 61 70   BP 1 Location Right upper arm Right upper arm Right upper arm   BP Patient Position Supine Sitting Sitting  (following activity)      22 1346 22 1348 22 1350   Vital Signs   Pulse (Heart Rate) 91 93 88   /62 (!) 112/55 (!) 78/50   MAP (Calculated) 76 74 (!) 59   BP 1 Location Right upper arm Right upper arm Right upper arm   BP Patient Position Sitting  (following bried ambulation) Sitting  (at rest) Sitting  (following increased hallway mobiltiy)      22 1351   Vital Signs   Pulse (Heart Rate) 88   /77   MAP (Calculated) 92   BP 1 Location Right upper arm   BP Patient Position Sitting  (BLE reclined)     Cognitive/Behavioral Status:  Neurologic State: Alert  Orientation Level: Oriented X4  Cognition: Follows commands;Poor safety awareness             Functional Mobility and Transfers for ADLs:  Bed Mobility:       Transfers:  Sit to Stand: Contact guard assistance     Bed to Chair: Contact guard assistance    Balance:  Sitting: Intact  Standing: Impaired  Standing - Static: Good  Standing - Dynamic : Good    ADL Intervention:       Lower Body Dressing Assistance  Socks: Minimum assistance      Pain:  Patient did not report pain on this date    Activity Tolerance:   Fair, requires rest breaks, and signs and symptoms of orthostatic hypotension    After treatment patient left in no apparent distress:   Sitting in chair and Call bell within reach    COMMUNICATION/COLLABORATION:   The patients plan of care was discussed with: Physical therapist, Occupational therapist, and Registered nurse.      Marco Osorio OT  Time Calculation: 38 mins

## 2022-05-21 NOTE — PROGRESS NOTES
MANOLO WOODS  [facility]  [address] [place] [address], [address] [facility]  [phone]  [email or web address]  CARDIOLOGY PROGRESS NOTE    5/21/2022 9:40 AM    Admit Date: 5/16/2022    Admit Diagnosis:   Syncope [R55]  Orthostatic hypotension [I95.1]    Subjective:     Ethyl Trina denies chest pain, chest pressure/discomfort, dyspnea, palpitations, irregular heart beats, near-syncope, syncope, fatigue, orthopnea.     Visit Vitals  /73   Pulse 66   Temp 98.2 °F (36.8 °C)   Resp 19   Ht 5' 7\" (1.702 m)   Wt 131 lb 13.4 oz (59.8 kg)   SpO2 96%   BMI 20.65 kg/m²       Current Facility-Administered Medications   Medication Dose Route Frequency    albuterol-ipratropium (DUO-NEB) 2.5 MG-0.5 MG/3 ML  3 mL Nebulization BID PRN    midodrine (PROAMATINE) tablet 10 mg  10 mg Oral TID WITH MEALS    sodium chloride (NS) flush 5-40 mL  5-40 mL IntraVENous Q8H    sodium chloride (NS) flush 5-40 mL  5-40 mL IntraVENous PRN    acetaminophen (TYLENOL) tablet 650 mg  650 mg Oral Q6H PRN    Or    acetaminophen (TYLENOL) suppository 650 mg  650 mg Rectal Q6H PRN    polyethylene glycol (MIRALAX) packet 17 g  17 g Oral DAILY PRN    ondansetron (ZOFRAN ODT) tablet 4 mg  4 mg Oral Q8H PRN    Or    ondansetron (ZOFRAN) injection 4 mg  4 mg IntraVENous Q6H PRN    atorvastatin (LIPITOR) tablet 40 mg  40 mg Oral QHS    DULoxetine (CYMBALTA) capsule 60 mg  60 mg Oral DAILY    apixaban (ELIQUIS) tablet 5 mg  5 mg Oral Q12H    memantine (NAMENDA) tablet 10 mg  10 mg Oral BID    pantoprazole (PROTONIX) tablet 40 mg  40 mg Oral DAILY    polyethylene glycol (MIRALAX) packet 17 g  17 g Oral DAILY    donepeziL (ARICEPT) tablet 5 mg  5 mg Oral QHS    sertraline (ZOLOFT) tablet 200 mg  200 mg Oral DAILY    mirabegron ER (MYRBETRIQ) tablet 50 mg  50 mg Oral DAILY         Objective:      Physical Exam    General: Well developed, in no acute distress, cooperative and alert  HEENT: No carotid bruits, no JVD, trach is midline. Neck Supple, PERRL, EOM intact. Heart:  Normal S1/S2 negative S3 or S4. Regular, no murmur, gallop or rub. Respiratory: Clear bilaterally, no wheezing or rales  Abdomen:   Soft, non-tender, bowel sounds are active. Extremities:  No edema, no cyanosis, atraumatic. Neuro: A&Ox3, speech clear. Data Review:   Recent Labs     05/19/22 0220   HGB 10.7*   HCT 34.1*     Recent Labs     05/19/22 0220   *   K 4.1      CO2 30   *   BUN 14   CREA 1.06   CA 10.0   MG 2.2       Telemetry: SR    Assessment:     Active Problems:    Chronic systolic heart failure ([facility] (5/29/2012)      Syncope (2/17/2020)      Orthostatic hypotension (5/16/2022)        Plan:     Unable to tolerate entresto/coreg due to orthostatic hypotension. Hold for now. Can try it as out pt. Continue midodrine and compression stockings. Ok to dc from cardiac stand point. F/u with Dr. Bertha Muhammad.      Sharmila Hare MD

## 2022-05-21 NOTE — PROGRESS NOTES
End of Shift Note    Bedside shift change report given to Marilin Sanders RN (oncoming nurse) by Raina Raya RN (offgoing nurse). Report included the following information SBAR and Kardex    Shift worked:  night   Shift summary and any significant changes:    Uneventful night, unable to get lab, stick patient twice , passed it on to be drawn by day staff.  Able to make needs known      Concerns for physician to address:  NONE   Zone phone for oncoming shift:       Patient Information  Maty Gonzalez  65 y.o.  5/16/2022 11:27 AM by Brianna Gillette MD. Maty Gonzalez was admitted from Home    Problem List  Patient Active Problem List    Diagnosis Date Noted    Orthostatic hypotension 05/16/2022    Chronic renal disease, stage III 05/10/2022    Pneumonia 03/31/2022    Cor pulmonale (chronic) ([address] 03/23/2022    Ischemic cardiomyopathy 03/04/2022    Pulmonary emphysema ([address] 03/04/2022    Interstitial lung disease ([address] 02/15/2022    Respiratory failure ([address] 02/14/2022    Acute on chronic combined systolic and diastolic ACC/AHA stage C congestive heart failure ([address] 02/12/2022    Syncope 02/17/2020    Rotator cuff arthropathy of left shoulder 09/06/2019    Status post reverse arthroplasty of shoulder 09/06/2019    Right rotator cuff tear arthropathy 09/04/2019    Moderate major depression ([address] 07/03/2018    Depression 07/03/2018    DDD (degenerative disc disease), lumbar 04/02/2018    Chronic anticoagulation 07/17/2017    S/P CABG x 1 06/17/2016    AICD (automatic cardioverter/defibrillator) present 05/13/2016    AVNRT (AV bridgette re-entry tachycardia) ([address] 05/12/2016    Stenosis of both carotid arteries without cerebral infarction 04/12/2016    Cervicogenic headache 04/12/2016    Alzheimer's dementia, late onset, with behavioral disturbance ([address] 04/12/2016    Spinal stenosis, lumbar region, with neurogenic claudication 04/12/2016    Lumbar back pain with radiculopathy affecting right lower extremity 04/12/2016    Lumbar back pain with radiculopathy affecting left lower extremity 04/12/2016    History of stroke 04/12/2016    ACP (advance care planning) 12/30/2015    B12 deficiency 09/22/2015    Hypothyroidism due to acquired atrophy of thyroid 09/22/2015    Osteoporosis 09/22/2015    Cervical post-laminectomy syndrome 09/22/2015    Neck pain 09/22/2015    Lower GI bleeding 08/19/2015    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 ([address] 07/23/2015    Left-sided chest wall pain 11/23/2014    S/P cervical spinal fusion 06/06/2013    Osteoarthritis 05/29/2012    Hyperthyroidism 05/29/2012    Atherosclerosis of native coronary artery of native heart without angina pectoris 05/29/2012    Chronic systolic heart failure ([address] 05/29/2012    Atrial fibrillation ([address] 05/29/2012    Mixed hyperlipidemia 05/29/2012    History of factor V Leiden mutation 03/07/2011    Essential hypertension 03/04/2011     Past Medical History:   Diagnosis Date    Acute combined systolic and diastolic congestive heart failure ([address] 2/12/2022    AICD (automatic cardioverter/defibrillator) present 5/13/2016 5/13/16 [employer] upgrade to dual chamber AICD implant  Dr. Heather Good Alzheimer disease [facility])     Anxiety state, other     Arthritis     OSTEO ARTHRITIS    AVNRT (AV bridgette re-entry tachycardia) ([address] 5/12/2016 5/12/16 AVNRT ablation: PM/AICD left upper chest :Dr. Ludwig Bentley Back ache     CAD (coronary artery disease)     Cancer ([address] 2004    Prostate cancer    Chest tightness     last episode of chest pain 5/2016 before AICD placed; none since then    Coagulation defects 2005    FACTOR V LEIDEN    Dementia ([address]     Depression     Dizziness     FH: factor V Leiden deficiency     Generalized muscle ache     GERD (gastroesophageal reflux disease)     HEARTBURN    Heart failure ([address] 2014    as of 07/2019 EF 30-35;  Dr. James Lagunas, Cardiomyopathy  Hyperlipidemia, mixed     Hypertension     Long term current use of anticoagulant therapy     Other ill-defined conditions(799.89) 2004    blood transfusion    Pacemaker     Paroxysmal atrial fibrillation (HCC)     rate controlled with coreg     Postsurgical aortocoronary bypass status 05/29/2012    CABG    Presence of cardiac defibrillator 05/2016    left upper chest    Pulmonary emph[facility]sema ([facility] 3/4/2022    RMSF [facility] spotted fever)     SSS (sick sinus syndrome) ([facility]     Stroke ([facility] 2011, 1990's    SEVERAL-mini    Syncope     Thromboembolism ([facility] 2014    left leg: changed to Eliquis from Warfarin    Thromboembolus ([facility] 2005    left leg    Thyroid disease     Weakness generalized        Core Measures:  CVA: No No  CHF:No No  PNA:No No    Activity:  Activity Level: Up with Assistance  Number times ambulated in hallways past shift: 0  Number of times OOB to chair past shift: 0    Cardiac:   Cardiac Monitoring: Yes      Cardiac Rhythm: Sinus Rhythm    Access:   Current line(s): PIV   Central Line? No Placement date  Reason Medically Necessary   PICC LINE? No Placement date Reason Medically Necessary     Genitourinary:   Urinary status: voiding  Urinary Catheter? No Placement Date  Reason Medically Necessary     Respiratory:   O2 Device: None (Room air)  Chronic home O2 use?: YES  Incentive spirometer at bedside: NO       GI:  Last Bowel Movement Date: 05/17/22  Current diet:  ADULT DIET Easy to Chew; Low Fat/Low Chol/High Fiber/2 gm Na  Passing flatus: YES  Tolerating current diet: YES       Pain Management:   Patient states pain is manageable on current regimen: YES    Skin:  Fernando Score: 19  Interventions: increase time out of bed, PT/OT consult and nutritional support     Patient Safety:  Fall Score:  Total Score: 6  Interventions: bed/chair alarm, assistive device (walker, cane, etc), gripper socks and pt to call before getting OOB  High Fall Risk: Yes  @Rollbelt  @dexterity to release roll belt  Yes/No ( must document dexterity  here by stating Yes or No here, otherwise this is a restraint and must follow restraint documentation policy.)    DVT prophylaxis:  DVT prophylaxis Med- Yes  DVT prophylaxis SCD or JAMSHID- Yes     Wounds: (If Applicable)  Wounds-Yes  Location R arm    Active Consults:  IP CONSULT TO CARDIOLOGY  IP CONSULT TO INFECTIOUS DISEASES    Length of Stay:  Expected LOS: 2d 7h  Actual LOS: 2  Discharge Plan: No JULIANNE Samuels RN

## 2022-05-21 NOTE — PROGRESS NOTES
Problem: Falls - Risk of  Goal: *Absence of Falls  Description: Document Cecily Hall Fall Risk and appropriate interventions in the flowsheet.   Outcome: Progressing Towards Goal  Note: Fall Risk Interventions:  Mobility Interventions: Bed/chair exit alarm         Medication Interventions: Assess postural VS orthostatic hypotension,Bed/chair exit alarm,Patient to call before getting OOB,Teach patient to arise slowly    Elimination Interventions: Bed/chair exit alarm    History of Falls Interventions: Bed/chair exit alarm         Problem: Patient Education: Go to Patient Education Activity  Goal: Patient/Family Education  Outcome: Progressing Towards Goal

## 2022-05-21 NOTE — PROGRESS NOTES
End of Shift Note    Bedside shift change report given to Edwina Bowling (oncoming nurse) by Jassi Ford RN (offgoing nurse).   Report included the following information SBAR and Kardex    Shift worked:  DAYS   Shift summary and any significant changes:    STILL VERY ORTHOSTATIC   Concerns for physician to address:  NONE   Zone phone for oncoming shift:   [phone]     Patient Information  Fredrick Casiano  65 y.o.  5/16/2022 11:27 AM by Velvet Orellana MD. Fredrick Casiano was admitted from Home    Problem List  Patient Active Problem List    Diagnosis Date Noted    Orthostatic hypotension 05/16/2022    Chronic renal disease, stage III 05/10/2022    Pneumonia 03/31/2022    Cor pulmonale (chronic) ([address] 03/23/2022    Ischemic cardiomyopathy 03/04/2022    Pulmonary emphysema ([address] 03/04/2022    Interstitial lung disease ([address] 02/15/2022    Respiratory failure ([address] 02/14/2022    Acute on chronic combined systolic and diastolic ACC/AHA stage C congestive heart failure ([address] 02/12/2022    Syncope 02/17/2020    Rotator cuff arthropathy of left shoulder 09/06/2019    Status post reverse arthroplasty of shoulder 09/06/2019    Right rotator cuff tear arthropathy 09/04/2019    Moderate major depression ([address] 07/03/2018    Depression 07/03/2018    DDD (degenerative disc disease), lumbar 04/02/2018    Chronic anticoagulation 07/17/2017    S/P CABG x 1 06/17/2016    AICD (automatic cardioverter/defibrillator) present 05/13/2016    AVNRT (AV bridgette re-entry tachycardia) ([address] 05/12/2016    Stenosis of both carotid arteries without cerebral infarction 04/12/2016    Cervicogenic headache 04/12/2016    Alzheimer's dementia, late onset, with behavioral disturbance ([address] 04/12/2016    Spinal stenosis, lumbar region, with neurogenic claudication 04/12/2016    Lumbar back pain with radiculopathy affecting right lower extremity 04/12/2016    Lumbar back pain with radiculopathy affecting left lower extremity 04/12/2016    History of stroke 04/12/2016    ACP (advance care planning) 12/30/2015    B12 deficiency 09/22/2015    Hypothyroidism due to acquired atrophy of thyroid 09/22/2015    Osteoporosis 09/22/2015    Cervical post-laminectomy syndrome 09/22/2015    Neck pain 09/22/2015    Lower GI bleeding 08/19/2015    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 ([address] 07/23/2015    Left-sided chest wall pain 11/23/2014    S/P cervical spinal fusion 06/06/2013    Osteoarthritis 05/29/2012    Hyperthyroidism 05/29/2012    Atherosclerosis of native coronary artery of native heart without angina pectoris 05/29/2012    Chronic systolic heart failure ([address] 05/29/2012    Atrial fibrillation ([address] 05/29/2012    Mixed hyperlipidemia 05/29/2012    History of factor V Leiden mutation 03/07/2011    Essential hypertension 03/04/2011     Past Medical History:   Diagnosis Date    Acute combined systolic and diastolic congestive heart failure ([address] 2/12/2022    AICD (automatic cardioverter/defibrillator) present 5/13/2016 5/13/16 [employer] upgrade to dual chamber AICD implant  Dr. Maris Jay [facility] disease [facility])     Anxiety state, other     Arthritis     OSTEO ARTHRITIS    AVNRT (AV bridgette re-entry tachycardia) ([address] 5/12/2016 5/12/16 AVNRT ablation: PM/AICD left upper chest :Dr. Dariana Cornejo Back ache     CAD (coronary artery disease)     Cancer ([address] 2004    Prostate cancer    Chest tightness     last episode of chest pain 5/2016 before AICD placed; none since then    Coagulation defects 2005    FACTOR V LEIDEN    Dementia ([address]     Depression     Dizziness     FH: factor V Leiden deficiency     Generalized muscle ache     GERD (gastroesophageal reflux disease)     HEARTBURN    Heart failure ([address] 2014    as of 07/2019 EF 30-35;  Dr. Michele Mckeon, Cardiomyopathy    Hyperlipidemia, mixed     Hypertension     Long term current use of anticoagulant therapy     Other ill-defined conditions(799.89) 2004    blood transfusion    Pacemaker     Paroxysmal atrial fibrillation (HCC)     rate controlled with coreg     Postsurgical aortocoronary bypass status 05/29/2012    CABG    Presence of cardiac defibrillator 05/2016    left upper chest    Pulmonary emphysema ([facility] 3/4/2022    RMSF [facility] spotted fever)     SSS (sick sinus syndrome) ([facility]     Stroke ([facility] 2011, 1990's    SEVERAL-mini    Syncope     Thromboembolism ([facility] 2014    left leg: changed to Eliquis from Warfarin    Thromboembolus ([facility] 2005    left leg    Thyroid disease     Weakness generalized        Core Measures:  CVA: No No  CHF:No No  PNA:No No    Activity:  Activity Level: Up with Assistance  Number times ambulated in hallways past shift: 0  Number of times OOB to chair past shift: 0    Cardiac:   Cardiac Monitoring: Yes      Cardiac Rhythm: Sinus Rhythm    Access:   Current line(s): PIV   Central Line? No Placement date  Reason Medically Necessary   PICC LINE? No Placement date Reason Medically Necessary     Genitourinary:   Urinary status: voiding  Urinary Catheter? No Placement Date  Reason Medically Necessary     Respiratory:   O2 Device: Nasal cannula  Chronic home O2 use?: YES  Incentive spirometer at bedside: NO       GI:  Last Bowel Movement Date: 05/19/22  Current diet:  ADULT DIET Easy to Chew; Low Fat/Low Chol/High Fiber/2 gm Na  Passing flatus: YES  Tolerating current diet: YES       Pain Management:   Patient states pain is manageable on current regimen: YES    Skin:  Fernando Score: 19  Interventions: increase time out of bed, PT/OT consult and nutritional support     Patient Safety:  Fall Score:  Total Score: 6  Interventions: bed/chair alarm, assistive device (walker, cane, etc), gripper socks and pt to call before getting OOB  High Fall Risk: Yes  @Rollbelt  @dexterity to release roll belt  Yes/No ( must document dexterity  here by stating Yes or No here, otherwise this is a restraint and must follow restraint documentation policy.)    DVT prophylaxis:  DVT prophylaxis Med- Yes  DVT prophylaxis SCD or JAMSHID- Yes     Wounds: (If Applicable)  Wounds-Yes  Location R arm    Active Consults:  IP CONSULT TO CARDIOLOGY  IP CONSULT TO INFECTIOUS DISEASES    Length of Stay:  Expected LOS: 2d 7h  Actual LOS: 2  Discharge Plan: No TBD      Lizzie Velasco RN

## 2022-05-21 NOTE — DISCHARGE INSTRUCTIONS
Orthostatic Hypotension: Care Instructions  Your Care Instructions     Orthostatic hypotension is a quick drop in blood pressure. It happens when you get up from sitting or lying down. You may feel faint, lightheaded, or dizzy. When a person sits up or stands up, the body changes the way it pumps blood. This can slow the flow of blood to the brain for a very short time. And that can make you feel lightheaded. Many medicines can cause this problem, especially in older people. Lack of fluids (dehydration) or illnesses such as diabetes or heart disease also can cause it. Follow-up care is a key part of your treatment and safety. Be sure to make and go to all appointments, and call your doctor if you are having problems. It's also a good idea to know your test results and keep a list of the medicines you take. How can you care for yourself at home? · Be safe with medicines. Call your doctor if you think you are having a problem with your medicine. You will get more details on the specific medicines your doctor prescribes. · If you feel dizzy or lightheaded, sit down or lie down for a few minutes. Or you can sit down and put your head between your knees. This will help your blood pressure go back to normal and help your symptoms go away. · Follow your doctor's suggestions for ways to prevent symptoms like dizziness. These suggestions may include:  ? Get up slowly from bed or after sitting for a long time. If you are in bed, roll to your side and swing your legs over the edge of the bed and onto the floor. Push your body up to a sitting position. Wait for a while before you slowly stand up.  ? Add more salt to your diet, if your doctor recommends it. ? Drink plenty of fluids. Choose water and other clear liquids. If you have kidney, heart, or liver disease and have to limit fluids, talk with your doctor before you increase the amount of fluids you drink. ?  Avoid or limit alcohol to 2 drinks a day for men and 1 drink a day for women. Alcohol may interfere with your medicine. In addition, alcohol can make your low blood pressure worse by causing your body to lose water. ? Wear compression stockings to help improve blood flow. When should you call for help? Call 911 anytime you think you may need emergency care. For example, call if:    · You passed out (lost consciousness). Watch closely for changes in your health, and be sure to contact your doctor if:    · You do not get better as expected. Where can you learn more? Go to http://www.tijerina.com/  Enter V984 in the search box to learn more about \"Orthostatic Hypotension: Care Instructions. \"  Current as of: January 10, 2022               Content Version: 13.2   [employer]. Care instructions adapted under license by [employer] (which disclaims liability or warranty for this information). If you have questions about a medical condition or this instruction, always ask your healthcare professional. [place] [address] any warranty or liability for your use of this information.           HOSPITALIST DISCHARGE INSTRUCTIONS    NAME: Mihir Tapia   :  1945   MRN:  [id number]     Date/Time:  2022 6:36 AM    ADMIT DATE: 2022   DISCHARGE DATE: 2022     Attending Physician: Chica Castorena NP    DISCHARGE DIAGNOSIS:  Syncope (passing out)  Orthostatic hypotension (decrease in blood pressure with position change)  Chronic combined systolic/diastolic heart failure (abnormal pumping of the heart muscle)  Coronary artery disease (abnormal blood flow to the heart muscle for which you had a bypass surgery)  History of atrial fibrillation (irregular heartbeat for which you had an ablation in pacemaker/defibrillator placed)  Dyslipidemia (abnormal blood lipids/fats)  Positive RMSF IgG (abnormal serology which was sent by your primary care provider, you will need to have repeat labs drawn in 2 to 4 weeks)  Mild asymptomatic hyponatremia (low blood sodium which is not causing symptoms)  COPD (chronic obstructive pulmonary disease)  GERD (gastroesophageal reflux disease, reflux of stomach contents into the esophagus)  Depression   Overactive bladder  Mild hyperglycemia (mildly elevated blood sugar)  Anemia (low blood count)      Medications: Per above medication reconciliation. Pain Management: per above medications    Recommended diet: Cardiac Diet    Recommended activity: Make position changes slowly    Wound care: None    Indwelling devices:  None    Supplemental Oxygen: Chronic Oxygen,  2  LNC at baseline    Required Lab work: Basic metabolic profile (BMP) and complete blood count (CBC)    Glucose management:  None    Code status: Full     Take all discharge paperwork with you to all of your follow up doctor appointments. Take your medications exactly as outlined in your discharge paperwork. Do not take any other medications unless specifically prescribed after your hospital stay. If your symptoms return/worsen seek medical attention immediately. Outside physician follow up: Follow-up Information     Follow up With Specialties Details Why Mary Grace Singer MD Family Medicine Go on 5/24/2022 at 4:20pm for your PCP hospital follow up as previously scheduled. [address]      Zoila Ponce MD Cardiovascular Disease Physician In 1 week Call to schedule hospital follow-up appointment [address] [facility]      Sushant Amaro MD Clinical Cardiac Electrophysiology Physician, [address] [facility] Vascular Surgery, Cardiovascular Disease Physician In 1 week Call to schedule follow-up appointment [address] [address]  [address]   this is your home health agency. Phone: [phone]          Information obtained by :   I understand that if any problems occur once I am at home I am to contact my physician. I understand and acknowledge receipt of the instructions indicated above.                                                                                                                                            Physician's or R.N.'s Signature                                                                  Date/Time                                                                                                                                              Patient or Representative

## 2022-05-21 NOTE — PROGRESS NOTES
Hospitalist Progress Note    NAME: Kim Drake   :  1945   MRN:  [id number]     HPI excerpted from admission H&P:  Katrin Greer is a 65 y.o. male transferred for syncope and AICD interrogation.       Patient said yesterday 4:30 p.m., drove to get gas at grocery store but was short $1 in payment so he drove home. Drove back to store, walk in and spotted a friend. He then suddenly passed out. His friend caught him before he fell down. He was out for about 5 minutes. He did not have any chest pain, shortness of breath or dizziness at that time. However for the past 3 days he has been having periods of dizziness when he is walking. No fevers chills. Positive increased cough, nonproductive. Denies any wheezing, leg edema, orthopnea, signs of bleeding, nausea vomiting or diarrhea.       He was taken to THE [facility] where he was noted to be orthostatic:  Supine 103/61  P 66  Sitting 85/59  P74  Standing 76/45  P79  Given a total of 1 L of IV fluid but remains orthostatic with BP 79/57 standing so referred for admission.     He was hospitalized in 2022 with left lower lobe pneumonia and COPD exacerbation and in 2022 for combined CHF exacerbation. Echocardiogram showed EF of 40% which is down from 50 to 55% in 2020. \"    Assessment / Plan:  Syncope  Orthostatic hypotension  Chronic combined systolic/diastolic heart failure  CAD s/p remote CABG  H/O afib s/p AVN ablation with ICD implantation  Dyslipidemia   - Cardiology input appreciated, cleared for discharge. - Syncope likely 2/2 orthostatic hypotension, patient remains orthostatic - BP lying 149/74, sitting 113/58, standing 88/53.    - Sacubitril-valsartan 24-26 mg po bid stopped. - Metoprolol succinate 12.5 mg po daily stopped. - Furosemide 20 mg po daily stopped. - Continue apixaban 5 mg po q12h. - Continue atorvastatin 40 mg po daily. - Continue JAMSHID hose.     - Continue midodrine 10 mg po tid.    Positive RMSF IgG  - ID input appreciated. - Sent by PCP 2/2 complaints of myalgias. - Reflex IFA negative. - Plan for repeat serology in 2-4 weeks. Mild asymptomatic hyponatremia   - No tx indicated. - Monitor. COPD  - Continue albuterol-ipratropium 2.5-0.5 mg nebs q6h. - Continue supplemental O2, patient on home oxygen. GERD  - Continue pantoprazole 40 mg po daily. Dementia  - Continue donepezil 5 mg po daily at hs.   - Continue memantine 10 mg po bid. Depression   - Continue duloxetine 60 mg po daily. - Continue sertraline 200 mg po daily. OAB  - Continue mirabegron ER 50 mg po daily. Mild hyperglycemia   - No tx indicated. - Monitor with a.m. labs. Anemia   - H/H overall stable. - No tx indicated. - OP follow up. Mild azotemia, resolved  - Likely related to volume contraction from diuretic.  - Continue to hold diuretic for today. - Monitor. 18.5 - 24.9 Normal weight / Body mass index is 20.65 kg/m². Estimated discharge date: May 19  Barriers:    Code status: Full  Prophylaxis: Apixaban  Recommended Disposition: Home w/Family     Subjective:     Chief Complaint / Reason for Physician Visit  Patient with no complaints. Plan of care and pertinent events reviewed with bedside nurse. Review of Systems:  Symptom Y/N Comments  Symptom Y/N Comments   Fever/Chills N   Chest Pain N    Poor Appetite N   Edema N    Cough N   Abdominal Pain N    Sputum N   Joint Pain N    SOB/HOLCOMB N   Pruritis/Rash N    Nausea/vomit N   Tolerating PT/OT Y    Diarrhea N   Tolerating Diet Y    Constipation N   Other       Could NOT obtain due to:      Objective:     VITALS:   Last 24hrs VS reviewed since prior progress note.  Most recent are:  Patient Vitals for the past 24 hrs:   Temp Pulse Resp BP SpO2   05/21/22 1252 -- 68 18 (!) 149/74 --   05/21/22 1211 98 °F (36.7 °C) 74 16 124/76 97 %   05/21/22 0735 98.2 °F (36.8 °C) 66 19 125/73 96 %   05/21/22 0355 98.4 °F (36.9 °C) 70 18 119/72 98 %   05/20/22 2358 98 °F (36.7 °C) 76 18 135/73 94 %   05/20/22 1950 97.5 °F (36.4 °C) 70 18 136/89 100 %   05/20/22 1921 -- -- -- -- 96 %   05/20/22 1555 97.5 °F (36.4 °C) 93 -- 111/69 95 %     No intake or output data in the 24 hours ending 05/21/22 1522     I had a face to face encounter and independently examined this patient on 5/21/2022, as outlined below:  PHYSICAL EXAM:  General:  A/A/O.  NAD. HEENT:  Normocephalic. Sclera anicteric. Mucous membranes moist.    Chest:  Resps even/unlabored with symmetrical CWE. Air entry diminished. Lungs CTA. No use of accessory muscles. CV:  IRRR. Normal S1/S2. I/VI systolic murmur noted. No JVD. Trace pretibial edema angeline. Cap refill < 3 sec. Peripheral pulses 1-2+. GI:  Abdomen soft/NT/ND. ABT X 4.    :  Voiding. Neurologic:  Nonfocal.  CN II-XII grossly intact. Face symmetrical.  Speech normal.     Psych:  Cooperative. No anxiety or agitation. No suicidal/homicidal ideation. Skin:  Warm and color appropriate. No rashes or jaundice. Turgor elastic. Reviewed most current lab test results and cultures  YES  Reviewed most current radiology test results   YES  Review and summation of old records today    NO  Reviewed patient's current orders and MAR    YES  PMH/SH reviewed - no change compared to H&P  ________________________________________________________________________  Care Plan discussed with:    Comments   Patient [address]     Consultant                        Multidiciplinary team rounds were held today with , nursing, pharmacist and clinical coordinator. Patient's plan of care was discussed; medications were reviewed and discharge planning was addressed.      ________________________________________________________________________  Sharif Tim NP     Procedures: see electronic medical records for all procedures/Xrays and details which were not copied into this note but were reviewed prior to creation of Plan. LABS:  I reviewed today's most current labs and imaging studies.   Pertinent labs include:  Recent Labs     05/19/22 0220   HGB 10.7*   HCT 34.1*     Recent Labs     05/19/22 0220   *   K 4.1      CO2 30   *   BUN 14   CREA 1.06   CA 10.0   MG 2.2       Signed: Zeb Howard NP

## 2022-05-22 NOTE — PROGRESS NOTES
Hospitalist Progress Note    NAME: Gunjan Shaw   :  1945   MRN:  [id number]     HPI excerpted from admission H&P:  Tabby Soto is a 65 y.o. male transferred for syncope and AICD interrogation.       Patient said yesterday 4:30 p.m., drove to get gas at grocery store but was short $1 in payment so he drove home. Drove back to store, walk in and spotted a friend. He then suddenly passed out. His friend caught him before he fell down. He was out for about 5 minutes. He did not have any chest pain, shortness of breath or dizziness at that time. However for the past 3 days he has been having periods of dizziness when he is walking. No fevers chills. Positive increased cough, nonproductive. Denies any wheezing, leg edema, orthopnea, signs of bleeding, nausea vomiting or diarrhea.       He was taken to THE [facility] where he was noted to be orthostatic:  Supine 103/61  P 66  Sitting 85/59  P74  Standing 76/45  P79  Given a total of 1 L of IV fluid but remains orthostatic with BP 79/57 standing so referred for admission.     He was hospitalized in 2022 with left lower lobe pneumonia and COPD exacerbation and in 2022 for combined CHF exacerbation. Echocardiogram showed EF of 40% which is down from 50 to 55% in 2020. \"    Assessment / Plan:  Syncope  Orthostatic hypotension  Chronic combined systolic/diastolic heart failure  CAD s/p remote CABG  H/O afib s/p AVN ablation with ICD implantation  Dyslipidemia   - Cardiology input appreciated, cleared for discharge. - Syncope likely 2/2 orthostatic hypotension, patient remains orthostatic but minimally symptomatic.      - Sacubitril-valsartan 24-26 mg po bid stopped. - Metoprolol succinate 12.5 mg po daily stopped. - Furosemide 20 mg po daily stopped. - Continue JAMSHID hose. - Continue midodrine 10 mg po tid. - Continue apixaban 5 mg po q12h. - Continue atorvastatin 40 mg po daily.      Positive RMSF IgG  - ID input appreciated. - Sent by PCP 2/2 complaints of myalgias. - Reflex IFA negative. - Plan for repeat serology in 2-4 weeks. Mild asymptomatic hyponatremia   - No tx indicated. - Monitor. COPD  - Continue albuterol-ipratropium 2.5-0.5 mg nebs q6h. - Continue supplemental O2, patient on home oxygen. GERD  - Continue pantoprazole 40 mg po daily. Dementia  - Continue donepezil 5 mg po daily at hs.   - Continue memantine 10 mg po bid. Depression   - Continue duloxetine 60 mg po daily. - Continue sertraline 200 mg po daily. OAB  - Continue mirabegron ER 50 mg po daily. Mild hyperglycemia   - No tx indicated. - Monitor with a.m. labs. Anemia   - H/H overall stable. - No tx indicated. - OP follow up. Mild azotemia, resolved  - Likely related to volume contraction from diuretic.  - Diuretic stopped. - Monitor. Misc  - Patient and patient's wife would like to be evaluated for SNF for rehab.      18.5 - 24.9 Normal weight / Body mass index is 20.65 kg/m². Estimated discharge date: May 19  Barriers:    Code status: Full  Prophylaxis: Apixaban  Recommended Disposition: Home w/Family     Subjective:     Chief Complaint / Reason for Physician Visit  Patient with no complaints. Plan of care and pertinent events reviewed with bedside nurse. Review of Systems:  Symptom Y/N Comments  Symptom Y/N Comments   Fever/Chills N   Chest Pain N    Poor Appetite N   Edema N    Cough N   Abdominal Pain N    Sputum N   Joint Pain N    SOB/HOLCOMB N   Pruritis/Rash N    Nausea/vomit N   Tolerating PT/OT Y    Diarrhea N   Tolerating Diet Y    Constipation N   Other       Could NOT obtain due to:      Objective:     VITALS:   Last 24hrs VS reviewed since prior progress note.  Most recent are:  Patient Vitals for the past 24 hrs:   Temp Pulse Resp BP SpO2   05/22/22 0835 -- 75 -- 129/67 98 %   05/22/22 0355 98.1 °F (36.7 °C) 71 18 (!) 147/70 94 %   05/21/22 2325 97.7 °F (36.5 °C) 74 16 (!) 151/65 97 %   05/21/22 1640 97.8 °F (36.6 °C) 78 16 138/76 98 %   05/21/22 1252 -- 68 18 (!) 149/74 --   05/21/22 1211 98 °F (36.7 °C) 74 16 124/76 97 %     No intake or output data in the 24 hours ending 05/22/22 1042     I had a face to face encounter and independently examined this patient on 5/22/2022, as outlined below:  PHYSICAL EXAM:  General:  A/A/O.  NAD. HEENT:  Normocephalic. Sclera anicteric. Mucous membranes moist.    Chest:  Resps even/unlabored with symmetrical CWE. Air entry diminished. Lungs CTA. No use of accessory muscles. CV:  IRRR. Normal S1/S2. I/VI systolic murmur noted. No JVD. Trace pretibial edema angeline. Cap refill < 3 sec. Peripheral pulses 1-2+. GI:  Abdomen soft/NT/ND. ABT X 4.    :  Voiding. Neurologic:  Nonfocal.  CN II-XII grossly intact. Face symmetrical.  Speech normal.     Psych:  Cooperative. No anxiety or agitation. No suicidal/homicidal ideation. Skin:  Warm and color appropriate. No rashes or jaundice. Turgor elastic. Reviewed most current lab test results and cultures  YES  Reviewed most current radiology test results   YES  Review and summation of old records today    NO  Reviewed patient's current orders and MAR    YES  PMH/SH reviewed - no change compared to H&P  ________________________________________________________________________  Care Plan discussed with:    Comments   Patient [address] [place] [address]     Consultant                        Multidiciplinary team rounds were held today with , nursing, pharmacist and clinical coordinator. Patient's plan of care was discussed; medications were reviewed and discharge planning was addressed.      ________________________________________________________________________  Poonam Michelle NP     Procedures: see electronic medical records for all procedures/Xrays and details which were not copied into this note but were reviewed prior to creation of Plan. LABS:  I reviewed today's most current labs and imaging studies.   Pertinent labs include:  Recent Labs     05/22/22 0456   WBC 8.2   HGB 11.1*   HCT 35.4*        Recent Labs     05/22/22 0456   *   K 4.2      CO2 30   *   BUN 20   CREA 0.97   CA 10.2*       Signed: Kelsy Hawkins, NP

## 2022-05-22 NOTE — PROGRESS NOTES
Problem: Falls - Risk of  Goal: *Absence of Falls  Description: Document [place] Fall Risk and appropriate interventions in the flowsheet.   Outcome: Progressing Towards Goal  Note: Fall Risk Interventions:  Mobility Interventions: Bed/chair exit alarm         Medication Interventions: Assess postural VS orthostatic hypotension,Bed/chair exit alarm,Patient to call before getting OOB,Teach patient to arise slowly    Elimination Interventions: Bed/chair exit alarm    History of Falls Interventions: Bed/chair exit alarm

## 2022-05-22 NOTE — PROGRESS NOTES
Transition of Care Plan:     RUR: 18% \"mod risk\"  Disposition: SNF: [facility] (pending)  Follow up appointments: PCP & specialists as indicated  DME needed: Per PT/OT, tbd  Transportation at Discharge: Wife  Allen Qiu or means to access home: Yes       IM Medicare Letter: Needed prior to d/c  Is patient a BCPI-A Bundle: No                    If yes, was Bundle Letter given?:  N/A  Is patient a  and connected with the [place]? No            If yes, was [employer] transfer form completed and VA notified? N/A  Caregiver Contact: Wife- Fede Greenberg, [phone]   Discharge Caregiver contacted prior to discharge? Care Conference needed?: No    10:34AM UPDATE  CM spoke with NP re: d/c planning updates. Pt/spouse agreeable to pursuing SNF at this time, preferably close to home ([facility] closest, next [facility]). CM called pt's spouse Deedee Watkins [phone]) and left confidential VM with call back number. Awaiting response at this time. 12:00PM UPDATE  CM spoke with pt's spouse Sarahi Avila) re: SNF preferences. FOC offered. Would like for referral to be sent to [facility]. CM attempted to send referral via [employer], unable to locate in [employer]. CM called [facility] ([phone]), informed that admissions staff is out of the office today and to try calling again tomorrow to confirm as they do not have fax number for referrals to admissions either.      Jose Roberto Cerna. Miguelangel Tiwari [address] Manager  [phone]

## 2022-05-22 NOTE — PROGRESS NOTES
Problem: Falls - Risk of  Goal: *Absence of Falls  Description: Document Sherita Win Fall Risk and appropriate interventions in the flowsheet.   Outcome: Progressing Towards Goal  Note: Fall Risk Interventions:  Mobility Interventions: Bed/chair exit alarm         Medication Interventions: Assess postural VS orthostatic hypotension,Bed/chair exit alarm,Patient to call before getting OOB,Teach patient to arise slowly    Elimination Interventions: Bed/chair exit alarm    History of Falls Interventions: Bed/chair exit alarm         Problem: Patient Education: Go to Patient Education Activity  Goal: Patient/Family Education  Outcome: Progressing Towards Goal     Problem: Syncope  Goal: *Absence of injury  Outcome: Progressing Towards Goal  Goal: Decrease or eliminate episodes of syncope  Outcome: Progressing Towards Goal     Problem: Patient Education: Go to Patient Education Activity  Goal: Patient/Family Education  Outcome: Progressing Towards Goal     Problem: Patient Education: Go to Patient Education Activity  Goal: Patient/Family Education  Outcome: Progressing Towards Goal

## 2022-05-22 NOTE — PROGRESS NOTES
No new complaint, no clinical events. Routine nursing care provided as needed. Report  Endorsed to MARY CARMEN Guadarrama.

## 2022-05-22 NOTE — PROGRESS NOTES
Problem: Falls - Risk of  Goal: *Absence of Falls  Description: Document Edwin Sites Fall Risk and appropriate interventions in the flowsheet.   Outcome: Progressing Towards Goal  Note: Fall Risk Interventions:  Mobility Interventions: Bed/chair exit alarm         Medication Interventions: Assess postural VS orthostatic hypotension,Bed/chair exit alarm    Elimination Interventions: Bed/chair exit alarm    History of Falls Interventions: Bed/chair exit alarm

## 2022-05-22 NOTE — PROGRESS NOTES
End of Shift Note    Bedside shift change report given to MARY CARMEN CRUZ (oncoming nurse) by Nevaeh Willett RN (offgoing nurse).   Report included the following information SBAR and Kardex    Shift worked:  DAYS   Shift summary and any significant changes:    -STILL VERY ORTHOSTATIC  -PATIENT WAS SHAVED TODAY     Concerns for physician to address:  NONE   Zone phone for oncoming shift:   [phone]     Patient Information  Obi Hernandez  65 y.o.  5/16/2022 11:27 AM by Lalo Alexander MD. Obi Hernandez was admitted from Home    Problem List  Patient Active Problem List    Diagnosis Date Noted    Orthostatic hypotension 05/16/2022    Chronic renal disease, stage III 05/10/2022    Pneumonia 03/31/2022    Cor pulmonale (chronic) ([address] 03/23/2022    Ischemic cardiomyopathy 03/04/2022    Pulmonary emphysema ([address] 03/04/2022    Interstitial lung disease ([address] 02/15/2022    Respiratory failure ([address] 02/14/2022    Acute on chronic combined systolic and diastolic ACC/AHA stage C congestive heart failure ([address] 02/12/2022    Syncope 02/17/2020    Rotator cuff arthropathy of left shoulder 09/06/2019    Status post reverse arthroplasty of shoulder 09/06/2019    Right rotator cuff tear arthropathy 09/04/2019    Moderate major depression ([address] 07/03/2018    Depression 07/03/2018    DDD (degenerative disc disease), lumbar 04/02/2018    Chronic anticoagulation 07/17/2017    S/P CABG x 1 06/17/2016    AICD (automatic cardioverter/defibrillator) present 05/13/2016    AVNRT (AV bridgette re-entry tachycardia) ([address] 05/12/2016    Stenosis of both carotid arteries without cerebral infarction 04/12/2016    Cervicogenic headache 04/12/2016    Alzheimer's dementia, late onset, with behavioral disturbance ([address] 04/12/2016    Spinal stenosis, lumbar region, with neurogenic claudication 04/12/2016    Lumbar back pain with radiculopathy affecting right lower extremity 04/12/2016    Lumbar back pain with radiculopathy affecting left lower extremity 04/12/2016    History of stroke 04/12/2016    ACP (advance care planning) 12/30/2015    B12 deficiency 09/22/2015    Hypothyroidism due to acquired atrophy of thyroid 09/22/2015    Osteoporosis 09/22/2015    Cervical post-laminectomy syndrome 09/22/2015    Neck pain 09/22/2015    Lower GI bleeding 08/19/2015    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 ([address] 07/23/2015    Left-sided chest wall pain 11/23/2014    S/P cervical spinal fusion 06/06/2013    Osteoarthritis 05/29/2012    Hyperthyroidism 05/29/2012    Atherosclerosis of native coronary artery of native heart without angina pectoris 05/29/2012    Chronic systolic heart failure ([address] 05/29/2012    Atrial fibrillation ([address] 05/29/2012    Mixed hyperlipidemia 05/29/2012    History of factor V Leiden mutation 03/07/2011    Essential hypertension 03/04/2011     Past Medical History:   Diagnosis Date    Acute combined systolic and diastolic congestive heart failure ([address] 2/12/2022    AICD (automatic cardioverter/defibrillator) present 5/13/2016 5/13/16 [employer] upgrade to dual chamber AICD implant  Dr. Ashely Rodriguez Alzheimer disease [facility])     Anxiety state, other     Arthritis     OSTEO ARTHRITIS    AVNRT (AV bridgette re-entry tachycardia) ([address] 5/12/2016 5/12/16 AVNRT ablation: PM/AICD left upper chest :Dr. Jean-Pierre Banegas Back ache     CAD (coronary artery disease)     Cancer ([address] 2004    Prostate cancer    Chest tightness     last episode of chest pain 5/2016 before AICD placed; none since then    Coagulation defects 2005    FACTOR V LEIDEN    Dementia ([address]     Depression     Dizziness     FH: factor V Leiden deficiency     Generalized muscle ache     GERD (gastroesophageal reflux disease)     HEARTBURN    Heart failure ([address] 2014    as of 07/2019 EF 30-35;  Dr. Mercy Grant, Cardiomyopathy    Hyperlipidemia, mixed     Hypertension     Long term current use of anticoagulant therapy     Other ill-defined conditions(799.89) 2004    blood transfusion    Pacemaker     Paroxysmal atrial fibrillation (HCC)     rate controlled with coreg     Postsurgical aortocoronary bypass status 05/29/2012    CABG    Presence of cardiac defibrillator 05/2016    left upper chest    Pulmonary emphysema [facility] 3/4/2022    RMSF [facility] spotted fever)     SSS (sick sinus syndrome) ([facility]     Stroke ([facility] 2011, 1990's    SEVERAL-mini    Syncope     Thromboembolism ([facility] 2014    left leg: changed to Eliquis from Warfarin    Thromboembolus ([facility] 2005    left leg    Thyroid disease     Weakness generalized        Core Measures:  CVA: No No  CHF:No No  PNA:No No    Activity:  Activity Level: Up with Assistance  Number times ambulated in hallways past shift: 0  Number of times OOB to chair past shift: 0    Cardiac:   Cardiac Monitoring: Yes      Cardiac Rhythm: Sinus Rhythm    Access:   Current line(s): PIV   Central Line? No Placement date  Reason Medically Necessary   PICC LINE? No Placement date Reason Medically Necessary     Genitourinary:   Urinary status: voiding  Urinary Catheter? No Placement Date  Reason Medically Necessary     Respiratory:   O2 Device: Nasal cannula  Chronic home O2 use?: YES  Incentive spirometer at bedside: NO       GI:  Last Bowel Movement Date: 05/19/22  Current diet:  ADULT DIET Easy to Chew; Low Fat/Low Chol/High Fiber/2 gm Na  Passing flatus: YES  Tolerating current diet: YES       Pain Management:   Patient states pain is manageable on current regimen: YES    Skin:  Fernando Score: 19  Interventions: increase time out of bed, PT/OT consult and nutritional support     Patient Safety:  Fall Score:  Total Score: 6  Interventions: bed/chair alarm, assistive device (walker, cane, etc), gripper socks and pt to call before getting OOB  High Fall Risk: Yes  @Rollbelt  @dexterity to release roll belt  Yes/No ( must document dexterity  here by stating Yes or No here, otherwise this is a restraint and must follow restraint documentation policy.)    DVT prophylaxis:  DVT prophylaxis Med- Yes  DVT prophylaxis SCD or JAMSHID- Yes     Wounds: (If Applicable)  Wounds-Yes  Location R arm    Active Consults:  IP CONSULT TO CARDIOLOGY  IP CONSULT TO INFECTIOUS DISEASES    Length of Stay:  Expected LOS: 2d 7h  Actual LOS: 2  Discharge Plan: No JULIANNE Kent RN

## 2022-05-23 NOTE — PROGRESS NOTES
Transition of Care Plan:     RUR: 18% \"mod risk\"    ULYSSES: 5/24? Bed availability   Auth    Disposition: SNF: [address]   Report: [phone]  Fax: [phone] or [phone]  Admissions: Fer Nicholas ([phone])    4:50 PM   Faxed updated therapy notes to [facility]. 1:55 PM    CM talked to Radha and they can accept. They would like updated therapy notes to be sent to her so that she can use them for requesting auth. CM will send updated therapy notes this afternoon to the [phone] SO THAT THEY CAN START AUTH    8:57 AM  FOC on bedside chart. Referrals sent to   [facility]: pending  [address]: Pending    Once we get an accepting SNF. Kendra Chavez they will need to get auth through BC/ Medicare    Follow up appointments: PCP & specialists as indicated  DME needed: Per PT/OT, tbd  Transportation at [facility] will need to be arranged  Keys or means to access home: Yes        Medicare Letter: Needed prior to d/c  Is patient a BCPI-A Bundle: No                    If yes, was Bundle Letter given?:  N/A  Is patient a [place] and connected with the [facility]? No            If yes, was [employer] transfer form completed and [facility] notified? N/A  Caregiver Contact: Wife- Katherine Pena, [phone]   Discharge Caregiver contacted prior to discharge? yes  Care Conference needed?: No    CM will continue to monitor discharge plan.      Tor Figueredo CM  Ext [phone]

## 2022-05-23 NOTE — PROGRESS NOTES
End of Shift Note    Bedside shift change report given to [address] [place] [address] (oncoming nurse) by Yulia Norwood RN (offgoing nurse). Report included the following information. Shift worked:  [id number]   Shift summary and any significant changes:     No observable changes overnight. Concerns for physician to address: None   Zone phone for oncoming shift:         Activity:  Activity Level: Up with Assistance      Cardiac:   Cardiac Monitoring: yes   Cardiac Rhythm: Sinus Rhythm    Access:   Current line(s):piv    Genitourinary:   Urinary status:voids    Respiratory:   O2 Device: Nasal cannula         GI:  Last Bowel Movement Date: 05/19/22  Current diet:  ADULT DIET Easy to Chew; Low Fat/Low Chol/High Fiber/2 gm Na         Pain Management:   Patient states pain is manageable on current regimen: yes    Skin:  Fernando Score: 19  Interventions: Turns  Patient Safety:  Fall Score:  Total Score: 6  Interventions: Bed alarm  High Fall Risk: Yes    Length of Stay:  Expected LOS: 2d 7h  Actual LOS: [facility], RN

## 2022-05-23 NOTE — PROGRESS NOTES
Problem: Falls - Risk of  Goal: *Absence of Falls  Description: Document Noe Pfeiffer Fall Risk and appropriate interventions in the flowsheet.   Outcome: Progressing Towards Goal  Note: Fall Risk Interventions:  Mobility Interventions: Bed/chair exit alarm,Patient to call before getting OOB         Medication Interventions: Bed/chair exit alarm,Patient to call before getting OOB,Teach patient to arise slowly    Elimination Interventions: Bed/chair exit alarm,Call light in reach,Patient to call for help with toileting needs,Stay With Me (per policy),Toilet paper/wipes in reach,Toileting schedule/hourly rounds,Urinal in reach    History of Falls Interventions: Bed/chair exit alarm,Door open when patient unattended,Room close to nurse's station         Problem: Syncope  Goal: *Absence of injury  Outcome: Progressing Towards Goal  Goal: Decrease or eliminate episodes of syncope  Outcome: Progressing Towards Goal

## 2022-05-23 NOTE — PROGRESS NOTES
Hospitalist Progress Note    NAME: Tal Salinas   :  1945   MRN:  [id number]     HPI excerpted from admission H&P:  [place] is a 65 y.o. male transferred for syncope and AICD interrogation.       Patient said yesterday 4:30 p.m., drove to get gas at grocery store but was short $1 in payment so he drove home. Drove back to store, walk in and spotted a friend. He then suddenly passed out. His friend caught him before he fell down. He was out for about 5 minutes. He did not have any chest pain, shortness of breath or dizziness at that time. However for the past 3 days he has been having periods of dizziness when he is walking. No fevers chills. Positive increased cough, nonproductive. Denies any wheezing, leg edema, orthopnea, signs of bleeding, nausea vomiting or diarrhea.       He was taken to [facility] where he was noted to be orthostatic:  Supine 103/61  P 66  Sitting 85/59  P74  Standing 76/45  P79  Given a total of 1 L of IV fluid but remains orthostatic with BP 79/57 standing so referred for admission.     He was hospitalized in 2022 with left lower lobe pneumonia and COPD exacerbation and in 2022 for combined CHF exacerbation. Echocardiogram showed EF of 40% which is down from 50 to 55% in 2020. \"    Assessment / Plan:  Syncope  Orthostatic hypotension  Chronic combined systolic/diastolic heart failure  CAD s/p remote CABG  H/O afib s/p AVN ablation with ICD implantation  Dyslipidemia   - Cardiology input appreciated, cleared for discharge. - Syncope likely 2/2 orthostatic hypotension, patient remains orthostatic but minimally symptomatic.      - Sacubitril-valsartan 24-26 mg po bid stopped. - Metoprolol succinate 12.5 mg po daily stopped. - Furosemide 20 mg po daily stopped. - Continue JAMSHID hose. - Continue midodrine 10 mg po tid. - Continue apixaban 5 mg po q12h. - Continue atorvastatin 40 mg po daily.      Positive RMSF IgG  - ID input appreciated. - Sent by PCP 2/2 complaints of myalgias. - Reflex IFA negative. - Plan for repeat serology in 2-4 weeks. Mild asymptomatic hyponatremia   - No tx indicated. - Monitor. COPD without acute exacerbation   - Continue albuterol-ipratropium 2.5-0.5 mg nebs q6h. - Continue supplemental O2, patient on home oxygen. GERD  - Continue pantoprazole 40 mg po daily. Dementia  - Continue donepezil 5 mg po daily at hs.   - Continue memantine 10 mg po bid. Depression   - Continue duloxetine 60 mg po daily. - Continue sertraline 200 mg po daily. OAB  - Continue mirabegron ER 50 mg po daily. Mild hyperglycemia   - No tx indicated. - Monitor with a.m. labs. Anemia   - H/H overall stable. - No tx indicated. - OP follow up. Mild azotemia, resolved  - Likely related to volume contraction from diuretic.  - Diuretic stopped. - Monitor. Misc  - Patient and patient's wife would like to be evaluated for SNF for rehab.      18.5 - 24.9 Normal weight / Body mass index is 20.65 kg/m². Estimated discharge date: May 19  Barriers:    Code status: Full  Prophylaxis: Apixaban  Recommended Disposition: Home w/Family     Subjective:     Chief Complaint / Reason for Physician Visit  Patient with no complaints. Plan of care and pertinent events reviewed with bedside nurse. Review of Systems:  Symptom Y/N Comments  Symptom Y/N Comments   Fever/Chills N   Chest Pain N    Poor Appetite N   Edema N    Cough N   Abdominal Pain N    Sputum N   Joint Pain N    SOB/HOLCOMB N   Pruritis/Rash N    Nausea/vomit N   Tolerating PT/OT Y    Diarrhea N   Tolerating Diet Y    Constipation N   Other       Could NOT obtain due to:      Objective:     VITALS:   Last 24hrs VS reviewed since prior progress note.  Most recent are:  Patient Vitals for the past 24 hrs:   Temp Pulse Resp BP SpO2   05/23/22 0908 97.9 °F (36.6 °C) 67 17 125/79 97 %   05/23/22 0329 97.9 °F (36.6 °C) 74 18 114/64 96 %   05/22/22 2315 97.6 °F (36.4 °C) 94 18 139/67 --   05/22/22 1940 98.8 °F (37.1 °C) 70 18 100/63 --   05/22/22 1524 97.9 °F (36.6 °C) 78 18 123/65 96 %   05/22/22 1316 98 °F (36.7 °C) 74 18 119/61 98 %       Intake/Output Summary (Last 24 hours) at 5/23/2022 0933  Last data filed at 5/23/2022 0423  Gross per 24 hour   Intake 500 ml   Output 600 ml   Net -100 ml        I had a face to face encounter and independently examined this patient on 5/23/2022, as outlined below:  PHYSICAL EXAM:  General:  A/A/O.  NAD. HEENT:  Normocephalic. Sclera anicteric. Mucous membranes moist.    Chest:  Resps even/unlabored with symmetrical CWE. Air entry diminished. Lungs CTA. No use of accessory muscles. CV:  IRRR. Normal S1/S2. I/VI systolic murmur noted. No JVD. Trace pretibial edema angeline. Cap refill < 3 sec. Peripheral pulses 1-2+. GI:  Abdomen soft/NT/ND. ABT X 4.    :  Voiding. Neurologic:  Nonfocal.  CN II-XII grossly intact. Face symmetrical.  Speech normal.     Psych:  Cooperative. No anxiety or agitation. No suicidal/homicidal ideation. Skin:  Warm and color appropriate. No rashes or jaundice. Turgor elastic. Reviewed most current lab test results and cultures  YES  Reviewed most current radiology test results   YES  Review and summation of old records today    NO  Reviewed patient's current orders and MAR    YES  PMH/ reviewed - no change compared to H&P  ________________________________________________________________________  Care Plan discussed with:    Comments   Patient [address]     Consultant                        Multidiciplinary team rounds were held today with , nursing, pharmacist and clinical coordinator. Patient's plan of care was discussed; medications were reviewed and discharge planning was addressed.      ________________________________________________________________________  Zeb Howard NP Procedures: see electronic medical records for all procedures/Xrays and details which were not copied into this note but were reviewed prior to creation of Plan. LABS:  I reviewed today's most current labs and imaging studies.   Pertinent labs include:  Recent Labs     05/22/22 0456   WBC 8.2   HGB 11.1*   HCT 35.4*        Recent Labs     05/22/22 0456   *   K 4.2      CO2 30   *   BUN 20   CREA 0.97   CA 10.2*       Signed: Jesenia Chappell, NP

## 2022-05-23 NOTE — PROGRESS NOTES
Problem: Mobility Impaired (Adult and Pediatric)  Goal: *Acute Goals and Plan of Care (Insert Text)  Description: FUNCTIONAL STATUS PRIOR TO ADMISSION: Pt lives in trailer with his wife. They state he had been able to amb without device prior to illnesses and hospitalization in March, some decline since but still amb without device. He has had 4 falls in last 6 months. Pt states \"my legs just give out\". He does have intermittent help with ADLs. HOME SUPPORT PRIOR TO ADMISSION: The patient lived alone with wife to provide assistance. Physical Therapy Goals  Initiated 5/17/2022  1. Patient will move from supine to sit and sit to supine , scoot up and down, and roll side to side in bed with independence within 7 day(s). 2.  Patient will transfer from bed to chair and chair to bed with modified independence using the least restrictive device within 7 day(s). 3.  Patient will perform sit to stand with modified independence within 7 day(s). 4.  Patient will ambulate with modified independence for 150 feet with the least restrictive device within 7 day(s). Outcome: Progressing Towards Goal   PHYSICAL THERAPY TREATMENT  Patient: Johanne Julian (67 y.o. male)  Date: 5/23/2022  Diagnosis: Syncope [R55]  Orthostatic hypotension [I95.1] <principal problem not specified>       Precautions: Fall,Bed Alarm,Skin,Spinal (orthostatic; 4 falls in 6 mo; PMH cervical sx; )  Chart, physical therapy assessment, plan of care and goals were reviewed. ASSESSMENT  Patient continues with skilled PT services and is progressing towards goals. Pt received supine in bed, alexandra hose on, agreeable to PT session. Bed mobility performed with S and rail. Vitals stable in all positions initially. Sit <> stands and stand pivot bed > bedside commode with S and cues for safety. 2L O2 via NC throughout. Pt walked 80ft x2 with RW and SBA, chair follow for safety.  Noted significant BP drop after first bout requiring seated rest in recliner, legs elevated, and head lowered. BP improved quickly and pt able to complete 2nd walk back to room. Returned to supine in bed with SBA, all needs met, call bell in reach. He continues to benefit from PT services and likely will require HH PT vs. SNF stay upon discharge for further mobility interventions prior to return home. Will continue to follow. Current Level of Function Impacting Discharge (mobility/balance): SBA-S without device    Other factors to consider for discharge: very orthostatic         PLAN :  Patient continues to benefit from skilled intervention to address the above impairments. Continue treatment per established plan of care. to address goals. Recommendation for discharge: (in order for the patient to meet his/her long term goals)  HH PT with family support vs. SNF stay. This discharge recommendation:  Has been made in collaboration with the attending provider and/or case management    IF patient discharges home will need the following DME: to be determined (TBD)       SUBJECTIVE:   Patient stated I am feeling some better.     OBJECTIVE DATA SUMMARY:   Critical Behavior:  Neurologic State: Alert  Orientation Level: Oriented X4  Cognition: Follows commands  Safety/Judgement: Fall prevention,Awareness of environment,Good awareness of safety precautions (receptive to bed alarm, not getting up alone due to BP)  Functional Mobility Training:  Bed Mobility:  Rolling: Supervision  Supine to Sit: Supervision  Sit to Supine: Supervision  Scooting: Supervision        Transfers:  Sit to Stand: Supervision  Stand to Sit: Supervision                             Balance:  Sitting: Intact  Standing: Impaired  Standing - Static: Good  Standing - Dynamic : Good  Ambulation/Gait Training:  Distance (ft): 80 Feet (ft) (80ft x2, no AD, SBA)  Assistive Device: Gait belt  Ambulation - Level of Assistance: Stand-by assistance        Gait Abnormalities: Decreased step clearance;Shuffling gait Base of Support: Narrowed     Speed/Hazel: Slow  Step Length: Left shortened;Right shortened     Therex:  Seated ankle pumps during all rest breaks to improve venous return. Pain Rating:  Denies pain    Activity Tolerance:   Fair, desaturates with exertion and requires oxygen, requires frequent rest breaks, and signs and symptoms of orthostatic hypotension    After treatment patient left in no apparent distress:   Supine in bed, Heels elevated for pressure relief, Call bell within reach, Bed / chair alarm activated, and Side rails x 3    COMMUNICATION/COLLABORATION:   The patients plan of care was discussed with: Occupational therapist and Registered nurse.      Calos Kraus, PT, DPT, NCS   Time Calculation: 26 mins

## 2022-05-23 NOTE — PROGRESS NOTES
Problem: Self Care Deficits Care Plan (Adult)  Goal: *Acute Goals and Plan of Care (Insert Text)  Description: FUNCTIONAL STATUS PRIOR TO ADMISSION:  Pt lives in trailer with his wife and grandson. They state he had been able to amb without device prior to illnesses and hospitalization in March 2022, pneumonia, some decline since but still amb without device. He has had 4 falls in last 6 months. No O2 used prior to illness in March 2022, CHF, and pneumonia in April 2022, 2L since but \"huffing and puffing if he just walks to bathroom with portable O2 in place. \" Pt states \"my legs just give out\" during the 4 falls. He does have intermittent help with ADLs daily, from both wife and grandson for UE and LE dressing. Uses garden tub with shower chair min A,  able to complete toileting hygiene despite B impaired AROM of shoulders. HOME SUPPORT PRIOR TO ADMISSION: The patient lived with wife and adult grandson to provide assistance for dressing and bathing, he was mod I to toilet; has not been able to assist w/IADLs since March      Occupational Therapy Goals  Initiated 5/17/2022  1. Patient will perform grooming in standing with minimal assistance/contact guard assist within 7 day(s). 2.  Patient will perform upper body w/dressing stick technique with minimal assistance/contact guard assist within 7 day(s). 3.  Patient will perform lower body dressing with minimal assistance/contact guard assist with AE within 7 day(s). 4.  Patient will perform toilet transfers with minimal assistance/contact guard assist within 7 day(s). 5.  Patient will perform all aspects of toileting with minimal assistance/contact guard assist within 7 day(s). 6.  Patient will participate in upper extremity therapeutic exercise/activities with minimal assistance/contact guard assist for 5 minutes within 7 day(s).     7.  Patient will utilize energy conservation, PLB, fall preventionand pain management techniques during functional activities with verbal cues within 7 day(s). Outcome: Progressing Towards Goal     OCCUPATIONAL THERAPY TREATMENT  Patient: Grey Harmon (69 y.o. male)  Date: 5/23/2022  Diagnosis: Syncope [R55]  Orthostatic hypotension [I95.1] <principal problem not specified>      Precautions: Fall,Bed Alarm,Skin,Spinal (orthostatic; 4 falls in 6 mo; PMH cervical sx; )  Chart, occupational therapy assessment, plan of care, and goals were reviewed. ASSESSMENT  Patient continues with skilled OT services and is progressing towards goals. Patient received supine in bed, alexandra mireles, on 2L O2 via NC, agreeable to hallway mobility and toileting. Patient able to complete brief stand pivot from EOB > BSC with CGA, able to manage all aspects of hygiene with SPV. Following pt completed hallway mobility with CGA and close recliner chair follow. Patient remains orthostatic however asymptomatic at this time (see doc flow below). Dropping from 130s/60s >80s/50s with standing activity. Pt returned to bed at conclusion of session endorsing fatigue, VSS, all needs met. Encouraged pt to continue use of BSC and to sit in recliner chair for meals. Current Level of Function Impacting Discharge (ADLs): min A - SPV     Other factors to consider for discharge: below baseline, orthostatic, Habematolel         PLAN :  Patient continues to benefit from skilled intervention to address the above impairments. Continue treatment per established plan of care to address goals.     Recommend with staff: up to chair for meals, BSC assist x1    Recommend next OT session: toileting    Recommendation for discharge: (in order for the patient to meet his/her long term goals)  Therapy up to 5 days/week in SNF setting vs home with [address] [place], [place] [address]S [place] and increased assist from family     This discharge recommendation:  Has been made in collaboration with the attending provider and/or case management    IF patient discharges home will need the following DME: TBD - defer to facility        SUBJECTIVE:   Patient stated i feel alright.     OBJECTIVE DATA SUMMARY:   Vital Signs:      05/23/22 1403 05/23/22 1405 05/23/22 1407   Vital Signs   Pulse (Heart Rate)  --   --   --    /66 (!) 93/58 120/74   MAP (Calculated) 88 70 89   BP 1 Location Left upper arm Left upper arm Left upper arm   BP Patient Position Supine Sitting  (inital sit) Sitting; Other (Comment)  (with ankle pumps)      05/23/22 1409 05/23/22 1411 05/23/22 1413   Vital Signs   Pulse (Heart Rate) 80 80 77   BP (!) 100/59 (!) 83/57 (!) 141/76   MAP (Calculated) 73 66 98   BP 1 Location Left upper arm Left upper arm Left upper arm   BP Patient Position Standing  (inital stand) Walking;Standing Sitting; Other (Comment)  (recliner in recliner c rest [facility])      05/23/22 1415   Vital Signs   Pulse (Heart Rate) 87   /74   MAP (Calculated) 95   BP 1 Location Left upper arm   BP Patient Position Supine       Cognitive/Behavioral Status:  Neurologic State: Alert  Orientation Level: Oriented X4  Cognition: Follows commands             Functional Mobility and Transfers for ADLs:  Bed Mobility:  Rolling: Supervision  Supine to Sit: Supervision  Sit to Supine: Supervision  Scooting: Supervision    Transfers:  Sit to Stand: Supervision  Functional Transfers  Toilet Transfer : Contact guard assistance       Balance:  Sitting: Intact  Standing: Impaired  Standing - Static: Good  Standing - Dynamic : Good    ADL Intervention:       Lower Body Dressing Assistance  Socks: Minimum assistance; difficulty managing over distal BLEs, required min A to thread over toes.      Toileting  Toileting Assistance: Supervision  Bladder Hygiene: Supervision  Bowel Hygiene: Supervision       Pain:  Pt did not endorse pain on this date    Activity Tolerance:   Fair and signs and symptoms of orthostatic hypotension    After treatment patient left in no apparent distress:   Supine in bed, Call bell within reach, Bed / chair alarm activated, and Side rails x 3    COMMUNICATION/COLLABORATION:   The patients plan of care was discussed with: Physical therapist, Occupational therapist, and Registered nurse.      Sonja Hernandez OT  Time Calculation: 25 mins

## 2022-05-23 NOTE — PROGRESS NOTES
End of Shift Note    Bedside shift change report given to MARY CARMEN Melgar (oncoming nurse) by Angela Serrano RN (offgoing nurse).   Report included the following information SBAR, Kardex and Recent Results    Shift worked: 7a-7p   Shift summary and any significant changes:    No significant changes     Concerns for physician to address: None   Zone phone for oncoming shift:  [phone]     Patient Information  Hernandez Bradford  65 y.o.  5/16/2022 11:27 AM by Tressa Prieto MD. Hernandez Bradford was admitted from Home    Problem List  Patient Active Problem List    Diagnosis Date Noted    Orthostatic hypotension 05/16/2022    Chronic renal disease, stage III 05/10/2022    Pneumonia 03/31/2022    Cor pulmonale (chronic) ([address] 03/23/2022    Ischemic cardiomyopathy 03/04/2022    Pulmonary emphysema ([address] 03/04/2022    Interstitial lung disease ([address] 02/15/2022    Respiratory failure ([address] 02/14/2022    Acute on chronic combined systolic and diastolic ACC/AHA stage C congestive heart failure ([address] 02/12/2022    Syncope 02/17/2020    Rotator cuff arthropathy of left shoulder 09/06/2019    Status post reverse arthroplasty of shoulder 09/06/2019    Right rotator cuff tear arthropathy 09/04/2019    Moderate major depression ([address] 07/03/2018    Depression 07/03/2018    DDD (degenerative disc disease), lumbar 04/02/2018    Chronic anticoagulation 07/17/2017    S/P CABG x 1 06/17/2016    AICD (automatic cardioverter/defibrillator) present 05/13/2016    AVNRT (AV bridgette re-entry tachycardia) ([address] 05/12/2016    Stenosis of both carotid arteries without cerebral infarction 04/12/2016    Cervicogenic headache 04/12/2016    Alzheimer's dementia, late onset, with behavioral disturbance ([address] 04/12/2016    Spinal stenosis, lumbar region, with neurogenic claudication 04/12/2016    Lumbar back pain with radiculopathy affecting right lower extremity 04/12/2016    Lumbar back pain with radiculopathy affecting left lower extremity 04/12/2016    History of stroke 04/12/2016    ACP (advance care planning) 12/30/2015    B12 deficiency 09/22/2015    Hypothyroidism due to acquired atrophy of thyroid 09/22/2015    Osteoporosis 09/22/2015    Cervical post-laminectomy syndrome 09/22/2015    Neck pain 09/22/2015    Lower GI bleeding 08/19/2015    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 ([address] 07/23/2015    Left-sided chest wall pain 11/23/2014    S/P cervical spinal fusion 06/06/2013    Osteoarthritis 05/29/2012    Hyperthyroidism 05/29/2012    Atherosclerosis of native coronary artery of native heart without angina pectoris 05/29/2012    Chronic systolic heart failure ([address] 05/29/2012    Atrial fibrillation ([address] 05/29/2012    Mixed hyperlipidemia 05/29/2012    History of factor V Leiden mutation 03/07/2011    Essential hypertension 03/04/2011     Past Medical History:   Diagnosis Date    Acute combined systolic and diastolic congestive heart failure ([address] 2/12/2022    AICD (automatic cardioverter/defibrillator) present 5/13/2016 5/13/16 [employer] upgrade to dual chamber AICD implant  Dr. Fay De Los Santos Alzheimer disease [facility])     Anxiety state, other     Arthritis     OSTEO ARTHRITIS    AVNRT (AV bridgette re-entry tachycardia) ([address] 5/12/2016 5/12/16 AVNRT ablation: PM/AICD left upper chest :Dr. Jaqueline Dakin Back ache     CAD (coronary artery disease)     Cancer ([address] 2004    Prostate cancer    Chest tightness     last episode of chest pain 5/2016 before AICD placed; none since then    Coagulation defects 2005    FACTOR V LEIDEN    Dementia ([address]     Depression     Dizziness     FH: factor V Leiden deficiency     Generalized muscle ache     GERD (gastroesophageal reflux disease)     HEARTBURN    Heart failure ([address] 2014    as of 07/2019 EF 30-35;  Dr. Caridad Gonzáles, Cardiomyopathy    Hyperlipidemia, mixed     Hypertension     Long term current use of anticoagulant therapy     Other ill-defined conditions(799.89) 2004    blood transfusion    Pacemaker     Paroxysmal atrial fibrillation (HCC)     rate controlled with coreg     Postsurgical aortocoronary bypass status 05/29/2012    CABG    Presence of cardiac defibrillator 05/2016    left upper chest    Pulmonary emphysema ([facility] 3/4/2022    RMSF [facility] spotted fever)     SSS (sick sinus syndrome) ([facility]     Stroke ([facility] 2011, 1990's    SEVERAL-mini    Syncope     Thromboembolism ([facility] 2014    left leg: changed to Eliquis from Warfarin    Thromboembolus ([facility] 2005    left leg    Thyroid disease     Weakness generalized        Core Measures:  CVA: No No  CHF:Yes No  PNA:No No    Activity:  Activity Level: Up with Assistance  Number times ambulated in hallways past shift: 0  Number of times OOB to chair past shift: 0    Cardiac:   Cardiac Monitoring: Yes      Cardiac Rhythm: Sinus Rhythm    Access:   Current line(s): PIV    Genitourinary:   Urinary status: voiding  Urinary Catheter? No    Respiratory:   O2 Device: Nasal cannula  Chronic home O2 use?: YES  Incentive spirometer at bedside: NO       GI:  Last Bowel Movement Date: 05/19/22  Current diet:  ADULT DIET Easy to Chew; Low Fat/Low Chol/High Fiber/2 gm Na  Passing flatus: YES  Tolerating current diet: YES       Pain Management:   Patient states pain is manageable on current regimen: YES    Skin:  Fernando Score: 19  Interventions: float heels, increase time out of bed, limit briefs and nutritional support     Patient Safety:  Fall Score:  Total Score: 6  Interventions: bed/chair alarm, assistive device (walker, cane, etc), gripper socks, pt to call before getting OOB and stay with me (per policy)  High Fall Risk: Yes    DVT prophylaxis:  DVT prophylaxis Med- Yes  DVT prophylaxis SCD or JAMSHID- Yes     Wounds: (If Applicable)  Wounds- Yes  Location R arm    Active Consults:  IP CONSULT TO CARDIOLOGY  IP CONSULT TO INFECTIOUS DISEASES    Length of Stay:  Expected LOS: 2d 7h  Actual LOS: 5  Discharge Plan:  To skilled Nursing facility pending acceptance      Nawaf Verduzco RN

## 2022-05-24 NOTE — PROGRESS NOTES
End of Shift Note     Bedside shift change report given to Gino Quick RN (oncoming nurse) by Abimael Lara RN (offgoing nurse).   Report included the following information SBAR, Kardex and Recent Results     Shift worked: Days    Shift summary and any significant changes:     No significant changes    Pt worked with therapy today       Concerns for physician to address: None   Zone phone for oncoming shift:  [phone]      Patient Information  Rebecca Jiang  65 y.o.  5/16/2022 11:27 AM by Husam Whittaker MD. Rebecca Jiang was admitted from Home     Problem List       Patient Active Problem List     Diagnosis Date Noted    Orthostatic hypotension 05/16/2022    Chronic renal disease, stage III 05/10/2022    Pneumonia 03/31/2022    Cor pulmonale (chronic) ([address] 03/23/2022    Ischemic cardiomyopathy 03/04/2022    Pulmonary emphysema ([address] 03/04/2022    Interstitial lung disease ([address] 02/15/2022    Respiratory failure ([address] 02/14/2022    Acute on chronic combined systolic and diastolic ACC/AHA stage C congestive heart failure ([address] 02/12/2022    Syncope 02/17/2020    Rotator cuff arthropathy of left shoulder 09/06/2019    Status post reverse arthroplasty of shoulder 09/06/2019    Right rotator cuff tear arthropathy 09/04/2019    Moderate major depression ([address] 07/03/2018    Depression 07/03/2018    DDD (degenerative disc disease), lumbar 04/02/2018    Chronic anticoagulation 07/17/2017    S/P CABG x 1 06/17/2016    AICD (automatic cardioverter/defibrillator) present 05/13/2016    AVNRT (AV bridgette re-entry tachycardia) ([address] 05/12/2016    Stenosis of both carotid arteries without cerebral infarction 04/12/2016    Cervicogenic headache 04/12/2016    Alzheimer's dementia, late onset, with behavioral disturbance ([address] 04/12/2016    Spinal stenosis, lumbar region, with neurogenic claudication 04/12/2016    Lumbar back pain with radiculopathy affecting right lower extremity 04/12/2016    Lumbar back pain with radiculopathy affecting left lower extremity 04/12/2016    History of stroke 04/12/2016    ACP (advance care planning) 12/30/2015    B12 deficiency 09/22/2015    Hypothyroidism due to acquired atrophy of thyroid 09/22/2015    Osteoporosis 09/22/2015    Cervical post-laminectomy syndrome 09/22/2015    Neck pain 09/22/2015    Lower GI bleeding 08/19/2015    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 ([address] 07/23/2015    Left-sided chest wall pain 11/23/2014    S/P cervical spinal fusion 06/06/2013    Osteoarthritis 05/29/2012    Hyperthyroidism 05/29/2012    Atherosclerosis of native coronary artery of native heart without angina pectoris 05/29/2012    Chronic systolic heart failure ([address] 05/29/2012    Atrial fibrillation ([address] 05/29/2012    Mixed hyperlipidemia 05/29/2012    History of factor V Leiden mutation 03/07/2011    Essential hypertension 03/04/2011           Past Medical History:   Diagnosis Date    Acute combined systolic and diastolic congestive heart failure ([address] 2/12/2022    AICD (automatic cardioverter/defibrillator) present 5/13/2016 5/13/16 [employer] upgrade to dual chamber AICD implant  Dr. Isabel Bowling Alzheimer disease [facility])      Anxiety state, other      Arthritis       OSTEO ARTHRITIS    AVNRT (AV bridgette re-entry tachycardia) ([address] 5/12/2016 5/12/16 AVNRT ablation: PM/AICD left upper chest :Dr. Klarissa Bah Back ache      CAD (coronary artery disease)      Cancer ([address] 2004     Prostate cancer    Chest tightness       last episode of chest pain 5/2016 before AICD placed; none since then    Coagulation defects 2005     FACTOR V LEIDEN    Dementia ([address]      Depression      Dizziness      FH: factor V Leiden deficiency      Generalized muscle ache      GERD (gastroesophageal reflux disease)       HEARTBURN    Heart failure ([address] 2014     as of 07/2019 EF 30-35;  Dr. Merry Sinha, Cardiomyopathy    Hyperlipidemia, mixed      Hypertension      Long term current use of anticoagulant therapy      Other ill-defined conditions(799.89) 2004     blood transfusion    Pacemaker      Paroxysmal atrial fibrillation (HCC)       rate controlled with coreg     Postsurgical aortocoronary bypass status 05/29/2012     CABG    Presence of cardiac defibrillator 05/2016     left upper chest    Pulmonary emphysema ([facility] 3/4/2022    RMSF [facility] spotted fever)      SSS (sick sinus syndrome) ([facility]      Stroke ([facility] 2011, 1990's     SEVERAL-mini    Syncope      Thromboembolism ([facility] 2014     left leg: changed to Eliquis from Warfarin    Thromboembolus ([facility] 2005     left leg    Thyroid disease      Weakness generalized           Core Measures:  CVA: No No  CHF:Yes No  PNA:No No     Activity:  Activity Level: Up with Assistance  Number times ambulated in hallways past shift: 0  Number of times OOB to chair past shift: 0     Cardiac:   Cardiac Monitoring: Yes      Cardiac Rhythm: Sinus Rhythm     Access:   Current line(s): PIV     Genitourinary:   Urinary status: voiding  Urinary Catheter? No     Respiratory:   O2 Device: Nasal cannula  Chronic home O2 use?: YES  Incentive spirometer at bedside: NO     GI:  Last Bowel Movement Date: 05/19/22  Current diet:  ADULT DIET Easy to Chew; Low Fat/Low Chol/High Fiber/2 gm Na  Passing flatus: YES  Tolerating current diet: YES     Pain Management:   Patient states pain is manageable on current regimen: YES     Skin:  Fernando Score: 19  Interventions: float heels, increase time out of bed, limit briefs and nutritional support     Patient Safety:  Fall Score: Total Score: 6  Interventions: bed/chair alarm, assistive device (walker, cane, etc), gripper socks, pt to call before getting OOB and stay with me (per policy)  High Fall Risk:  Yes     DVT prophylaxis:  DVT prophylaxis Med- Yes  DVT prophylaxis SCD or JAMSHID- Yes      Wounds: (If Applicable)  Wounds- Yes  Location R arm     Active Consults:  IP CONSULT TO CARDIOLOGY  IP CONSULT TO INFECTIOUS DISEASES     Length of Stay:  Expected LOS: 2d 7h  Actual LOS: 8  [facility]  [facility] auth         Romeo Reese RN

## 2022-05-24 NOTE — PROGRESS NOTES
Problem: Self Care Deficits Care Plan (Adult)  Goal: *Acute Goals and Plan of Care (Insert Text)  Description: FUNCTIONAL STATUS PRIOR TO ADMISSION:  Pt lives in trailer with his wife and grandson. They state he had been able to amb without device prior to illnesses and hospitalization in March 2022, pneumonia, some decline since but still amb without device. He has had 4 falls in last 6 months. No O2 used prior to illness in March 2022, CHF, and pneumonia in April 2022, 2L since but \"huffing and puffing if he just walks to bathroom with portable O2 in place. \" Pt states \"my legs just give out\" during the 4 falls. He does have intermittent help with ADLs daily, from both wife and grandson for UE and LE dressing. Uses garden tub with shower chair min A,  able to complete toileting hygiene despite B impaired AROM of shoulders. HOME SUPPORT PRIOR TO ADMISSION: The patient lived with wife and adult grandson to provide assistance for dressing and bathing, he was mod I to toilet; has not been able to assist w/IADLs since March      Occupational Therapy Goals  Initiated 5/17/2022; Goals updated 5/24/22  1. Patient will perform grooming in standing with minimal assistance/contact guard assist within 7 day(s). UPGRADE: SPV/DODGE  2. Patient will perform upper body w/dressing stick technique with minimal assistance/contact guard assist within 7 day(s). 3.  Patient will perform lower body dressing with minimal assistance/contact guard assist with AE within 7 day(s). 4.  Patient will perform toilet transfers with minimal assistance/contact guard assist within 7 day(s). 5.  Patient will perform all aspects of toileting with minimal assistance/contact guard assist within 7 day(s). 6.  Patient will participate in upper extremity therapeutic exercise/activities with minimal assistance/contact guard assist for 5 minutes within 7 day(s).     7.  Patient will utilize energy conservation, PLB, fall preventionand pain management techniques during functional activities with verbal cues within 7 day(s). Occupational Therapy Goals  Initiated 5/17/2022; Goals updated 5/24/22  1. Patient will perform grooming in standing with minimal assistance/contact guard assist within 7 day(s). UPGRADE: SPV/DODGE  2. Patient will perform upper body dressing with minimal assistance/contact guard assist while seated within 7 day(s). UPGRADE: SPV/DODGE  3. Patient will perform lower body dressing with minimal assistance/contact guard assist with AE within 7 day(s). UPGRADE: SPV/DODGE  4. Patient will perform toilet transfers with minimal assistance/contact guard assist within 7 day(s). UPGRADE: SPV/DODGE  5. Patient will perform all aspects of toileting with minimal assistance/contact guard assist within 7 day(s). UPGRADE: SPV/DODGE  6. Patient will participate in upper extremity therapeutic exercise/activities with minimal assistance/contact guard assist for 5 minutes within 7 day(s). 7.  Patient will utilize energy conservation, PLB, fall prevention and pain management techniques during functional activities with verbal cues within 7 day(s). Outcome: Progressing Towards Goal    OCCUPATIONAL THERAPY TREATMENT/WEEKLY RE-ASSESSMENT  Patient: Suraj Santillan (46 y.o. male)  Date: 5/24/2022  Diagnosis: Syncope [R55]  Orthostatic hypotension [I95.1] <principal problem not specified>      Precautions: Fall,Bed Alarm,Skin,Spinal (orthostatic; 4 falls in 6 mo; PMH cervical sx; )  Chart, occupational therapy assessment, plan of care, and goals were reviewed. ASSESSMENT  Patient continues with skilled OT services and is progressing towards goals. Patient received supine in bed agreeable session and motivated. Patient remains orthostatic with activity, supine BP at 115/69 however with activity in sitting pt dropping to 93/64. Patient remains asymptomatic and with limited improvement in BP with alexandra hose and ankle pumps/activity.  Pt requesting to use bathroom, able to come to stand. BP dropping to 83/54 however pt remaining asymptomatic. Pt able to complete bedroom/bathroom mobility with CGA and agreeable to brief seated rest break while seated on toilet. BP and remaining in 80s/60s. At conclusion of session pt returned to sitting in recliner chair, all needs met, BP improving to 97/58. Pt educated on call bell usage, receptive. Current Level of Function Impacting Discharge (ADLs): min A - SPV; max A for alexandra hsoe    Other factors to consider for discharge: OH, below baseline, recently requiring supplemental O2         PLAN :  Goals have been updated based on progression since last assessment. Patient continues to benefit from skilled intervention to address the above impairments. Continue to follow patient 4 times a week to address goals. Recommend with staff: up to chair daily, assist x1 > BSC    Recommendation for discharge: (in order for the patient to meet his/her long term goals)  Rehab vs HH with increased family support    This discharge recommendation:  Has been made in collaboration with the attending provider and/or case management    IF patient discharges home will need the following DME: walker: rolling       SUBJECTIVE:   Patient stated im feeling good today.     OBJECTIVE DATA SUMMARY:   Cognitive/Behavioral Status:  Neurologic State: Alert  Orientation Level: Oriented X4  Cognition: Follows commands             Functional Mobility and Transfers for ADLs:  Bed Mobility:  Rolling: Modified independent  Supine to Sit: Supervision  Scooting: Supervision    Transfers:  Sit to Stand: Supervision  Functional Transfers  Bathroom Mobility: Contact guard assistance  Toilet Transfer : Minimum assistance       Balance:  Sitting: Intact  Standing: Intact; With support  Standing - Static: Good  Standing - Dynamic : Good    ADL Intervention:       Lower Body Dressing Assistance  Socks: Minimum assistance  Antiembolitic Stockings: Maximum assistance (alexandra hose)    Toileting  Toileting Assistance: Supervision  Clothing Management: Supervision        Pain:  Pt did not endorse pain during session     Activity Tolerance:   Fair and signs and symptoms of orthostatic hypotension    After treatment patient left in no apparent distress:   Sitting in chair, Call bell within reach, and Bed / chair alarm activated    COMMUNICATION/COLLABORATION:   The patients plan of care was discussed with: Physical therapist, Occupational therapist, and Registered nurse.      Edie Ahumada, OT  Time Calculation: 24 mins

## 2022-05-24 NOTE — PROGRESS NOTES
Spiritual Care Assessment/Progress Note  [facility]      NAME: Sarahy Beckford      MRN: [id number]  AGE: 65 y.o.  SEX: male  Gnosticist Affiliation: Confucianist   Language: English     5/24/2022     Total Time (in minutes): 12     Spiritual Assessment begun in [facility] NEUROSCIENCE TELEMETRY through conversation with:         [x]Patient        [] Family    [] Friend(s)        Reason for Consult: Initial/Spiritual assessment, patient floor     Spiritual beliefs: (Please include comment if needed)     [x] Identifies with a shahnaz tradition:   Confucianist      [x] Supported by a shahnaz community:            [] Claims no spiritual orientation:           [] Seeking spiritual identity:                [] Adheres to an individual form of spirituality:           [] Not able to assess:                           Identified resources for coping:      [x] Prayer                               [] Music                  [] Guided Imagery     [x] Family/friends                 [] Pet visits     [] Devotional reading                         [] Unknown     [] Other:                                        Interventions offered during this visit: (See comments for more details)    Patient Interventions: Affirmation of emotions/emotional suffering,Affirmation of shahnaz,Catharsis/review of pertinent events in supportive environment,Iconic (affirming the presence of God/Higher Power),Initial/Spiritual assessment, patient floor,Prayer (assurance of),Gnosticist beliefs/image of God discussed           Plan of Care:     [] Support spiritual and/or cultural needs    [] Support AMD and/or advance care planning process      [] Support grieving process   [] Coordinate Rites and/or Rituals    [] Coordination with community clergy   [x] No spiritual needs identified at this time   [] Detailed Plan of Care below (See Comments)  [] Make referral to Music Therapy  [] Make referral to Pet Therapy     [] Make referral to Addiction services  [] Make referral to [facility]  [] Make referral to Spiritual Care Partner  [] No future visits requested        [x] Contact Spiritual Care for further referrals     Comments:  Reviewed chart prior to visit on Neuro unit for spiritual assessment. No family/friends present. Provided spiritual presence and supportive listening as Mr Samson Salinas shared about current medical challenges. He shared he will go to rehab before returning home. He lives with his wife; she is supportive. He belongs to a [facility] and indicated he goes to [employer] every Sunday when he is at home. He expressed no spiritual needs or concerns during visit, but was receptive to assurance of prayer. Advised him of ongoing availability of pastoral support.        Radha Alcantar, [facility], [address], Staff [address] [place] [address]    Hussain Smith Paging Service  [phone])

## 2022-05-24 NOTE — PROGRESS NOTES
Problem: Falls - Risk of  Goal: *Absence of Falls  Description: Document Morene Hole Fall Risk and appropriate interventions in the flowsheet.   Outcome: Progressing Towards Goal  Note: Fall Risk Interventions:  Mobility Interventions: Bed/chair exit alarm         Medication Interventions: Bed/chair exit alarm    Elimination Interventions: Bed/chair exit alarm,Call light in reach    History of Falls Interventions: Bed/chair exit alarm,Door open when patient unattended,Room close to nurse's station         Problem: Patient Education: Go to Patient Education Activity  Goal: Patient/Family Education  Outcome: Progressing Towards Goal     Problem: Syncope  Goal: *Absence of injury  Outcome: Progressing Towards Goal  Goal: Decrease or eliminate episodes of syncope  Outcome: Progressing Towards Goal     Problem: Patient Education: Go to Patient Education Activity  Goal: Patient/Family Education  Outcome: Progressing Towards Goal     Problem: Patient Education: Go to Patient Education Activity  Goal: Patient/Family Education  Outcome: Progressing Towards Goal     Problem: Patient Education: Go to Patient Education Activity  Goal: Patient/Family Education  Outcome: Progressing Towards Goal

## 2022-05-24 NOTE — PROGRESS NOTES
Problem: Mobility Impaired (Adult and Pediatric)  Goal: *Acute Goals and Plan of Care (Insert Text)  Description: FUNCTIONAL STATUS PRIOR TO ADMISSION: Pt lives in trailer with his wife. They state he had been able to amb without device prior to illnesses and hospitalization in March, some decline since but still amb without device. He has had 4 falls in last 6 months. Pt states \"my legs just give out\". He does have intermittent help with ADLs. HOME SUPPORT PRIOR TO ADMISSION: The patient lived alone with wife to provide assistance. Physical Therapy Goals  Initiated 5/17/2022. Goals reviwed 5/24/22, continue x 7 days  1. Patient will move from supine to sit and sit to supine , scoot up and down, and roll side to side in bed with independence within 7 day(s). 2.  Patient will transfer from bed to chair and chair to bed with modified independence using the least restrictive device within 7 day(s). 3.  Patient will perform sit to stand with modified independence within 7 day(s). 4.  Patient will ambulate with modified independence for 150 feet with the least restrictive device within 7 day(s).       5/24/2022 1301 by Tung Fairbanks, PT, DPT  Outcome: Progressing Towards Goal  5/24/2022 1300 by Tung Fairbanks, PT, DPT  Outcome: Progressing Towards Goal   PHYSICAL THERAPY TREATMENT: WEEKLY REASSESSMENT  Patient: Placido Frankel (44 y.o. male)  Date: 5/24/2022  Primary Diagnosis: Syncope [R55]  Orthostatic hypotension [I95.1]        Precautions:   Fall,Bed Alarm,Skin,Spinal (orthostatic; 4 falls in 6 mo; PMH cervical sx; )      ASSESSMENT  Patient continues with skilled PT services and is progressing towards goals. Patient overall limited by continued hypotension, gait instability, and decreased activity tolerance. Currently needing supervision with bed mobility and CGA for transfers. BP does drop with all positional changes but patient is asymptomatic.   Otherwise patient is moving around with only CGA.  Patient has had frequent falls at home and elderly wife is unable to assist.  Recommend SNF rehab to progress to more independent level of function. Patient's progression toward goals since last assessment: continues to progress toward goals      Other factors to consider for discharge: at risk for falls, below baselin         PLAN :  Goals have been updated based on progression since last assessment. Patient continues to benefit from skilled intervention to address the above impairments. Recommendations and Planned Interventions: bed mobility training, transfer training, gait training, therapeutic exercises, neuromuscular re-education, patient and family training/education, and therapeutic activities      Frequency/Duration: Patient will be followed by physical therapy:  4 times a week to address goals. Recommendation for discharge: (in order for the patient to meet his/her long term goals)  Therapy up to 5 days/week in SNF setting. If does not qualify for SNF the recommend HH PT       IF patient discharges home will need the following DME: to be determined (TBD)         SUBJECTIVE:   Patient stated I think my wife wants me to go to rehab.     OBJECTIVE DATA SUMMARY:   HISTORY:    Past Medical History:   Diagnosis Date    Acute combined systolic and diastolic congestive heart failure ([address] 2/12/2022    AICD (automatic cardioverter/defibrillator) present 5/13/2016 5/13/16 [employer] upgrade to dual chamber AICD implant  Dr. Keila Gallegos    Alzheimer disease [facility])     Anxiety state, other     Arthritis     OSTEO ARTHRITIS    AVNRT (AV bridgette re-entry tachycardia) ([address] 5/12/2016 5/12/16 AVNRT ablation: PM/AICD left upper chest :Dr. Екатерина Smith    Back ache     CAD (coronary artery disease)     Cancer [facility]) 2004    Prostate cancer    Chest tightness     last episode of chest pain 5/2016 before AICD placed; none since then    Coagulation defects 2005    FACTOR V LEIDEN    Dementia [facility])     Depression     Dizziness     FH: factor V Leiden deficiency     Generalized muscle ache     GERD (gastroesophageal reflux disease)     HEARTBURN    Heart failure ([address] 2014    as of 07/2019 EF 30-35;  Dr. Ran Randolph, Cardiomyopathy    Hyperlipidemia, mixed     Hypertension     Long term current use of anticoagulant therapy     Other ill-defined conditions(799.89) 2004    blood transfusion    Pacemaker     Paroxysmal atrial fibrillation ([address]     rate controlled with coreg     Postsurgical aortocoronary bypass status 05/29/2012    CABG    Presence of cardiac defibrillator 05/2016    left upper chest    Pulmonary emphysema ([address] 3/4/2022    RMSF [facility] spotted fever)     SSS (sick sinus syndrome) ([address]     Stroke ([address] 2011, 1990's    SEVERAL-mini    Syncope     Thromboembolism ([address] 2014    left leg: changed to Eliquis from Warfarin    Thromboembolus ([address] 2005    left leg    Thyroid disease     Weakness generalized      Past Surgical History:   Procedure Laterality Date    CARDIAC CATHETERIZATION  3/4/2011         HX HEENT  2019    oral surgery, removed teeth, dentures upper/lower    HX HERNIA REPAIR Left 2002    Ingiunal hernia repair left    HX ORTHOPAEDIC      LEFT KNEE CARTILAGE REPAIR;RIGHT ROTATOR CUFF REPAIR    HX ORTHOPAEDIC  2/14/12    C5-7 ANTERIIOR CERVICAL DISECTOMY AND FUSION    HX ORTHOPAEDIC  6/4/13    C5-C7 POSTERIOR DECOMPRESSION AND FUSION    HX ORTHOPAEDIC      right thumb partial amputation of tip    HX OTHER SURGICAL      steroid injections    HX PACEMAKER Left 05/13/2016    BS D142, Dr. Monalisa Guillen  2004     [place]    HX Vita Null Left 2019    HX UROLOGICAL       urinary control system    HX UROLOGICAL  12/3/13    REPLACEMENT ARTIFICAL URINARY SPHINCTER    AK CARDIAC SURG PROCEDURE UNLIST      CABG X1 3/5/11       Personal factors and/or comorbidities impacting plan of care:     Home Situation  Home Environment: Trailer/mobile home  # Steps to Enter: 0  Wheelchair Ramp: Yes  One/Two Story Residence: One story  Living Alone: No  Support Systems: Spouse/Significant Other,Other Family Member(s)  Patient Expects to be Discharged to[de-identified] Skilled nursing facility  Current DME Used/Available at Home: Oxygen, portable,Walker, rolling,Shower chair (dressing stick, sock aide, reacher)  Tub or Shower Type: Tub/Shower combination (garden tub)    EXAMINATION/PRESENTATION/DECISION MAKING:   Critical Behavior:  Neurologic State: Alert  Orientation Level: Oriented X4  Cognition: Follows commands  Safety/Judgement: Fall prevention,Awareness of environment,Good awareness of safety precautions (receptive to bed alarm, not getting up alone due to BP)  Hearing: Auditory  Auditory Impairment: Hard of hearing, bilateral,Hearing aid(s)  Hearing Aids/Status: With patient       Functional Mobility:  Bed Mobility:  Rolling: Modified independent  Supine to Sit: Supervision     Scooting: Supervision  Transfers:  Sit to Stand: Supervision  Stand to Sit: Supervision                       Balance:   Sitting: Intact  Standing: Intact; With support  Standing - Static: Good  Standing - Dynamic : Good  Ambulation/Gait Training:  Distance (ft): 20 Feet (ft) (x 2, seated rest break)  Assistive Device: Gait belt  Ambulation - Level of Assistance: Contact guard assistance        Gait Abnormalities: Decreased step clearance        Base of Support: Narrowed     Speed/Hazel: Pace decreased (<100 feet/min); Slow  Step Length: Left shortened;Right shortened         Pain Rating:  No c/o pain    Activity Tolerance:   Fair and requires rest breaks    After treatment patient left in no apparent distress:   Sitting in chair, Call bell within reach, and Bed / chair alarm activated    COMMUNICATION/EDUCATION:   The patients plan of care was discussed with: Physical therapist, Occupational therapist, and Registered nurse.      Fall prevention education was provided and the patient/caregiver indicated understanding., Patient/family have participated as able in goal setting and plan of care. , and Patient/family agree to work toward stated goals and plan of care.     Thank you for this referral.  Jose Miguel Wilkins, PT, DPT   Time Calculation: 23 mins

## 2022-05-24 NOTE — PROGRESS NOTES
Comprehensive Nutrition Assessment    Type and Reason for Visit: Initial,RD nutrition re-screen/LOS    Nutrition Recommendations/Plan:   1. Continue current diet  2. Please document % meals consumed in flowsheet I/O's under intake        Nutrition Assessment:     Chart reviewed for LOS. Pt medically noted for syncope, orthostatic hypotension, positive RMSF IgG, CHF, CAD, DLD, COPD, GERD, dementia, depression. Pt's weight hx is stable. Current wt appears inaccurate given hx and other weights since admission. Was 154lb via standing scale on 5/18. Pt medically ready for discharge pending insurance auth. No acute nutrition concerns at this time. Wt Readings from Last 5 Encounters:   05/19/22 59.8 kg (131 lb 13.4 oz) - no source   05/15/22 72.6 kg (160 lb)   05/10/22 72.6 kg (160 lb)   04/06/22 73.2 kg (161 lb 6 oz)   04/03/22 73.6 kg (162 lb 4.8 oz)   ]    Nutrition Related Findings:    No labs today. Meds: lipitor, miralax. BM 5/24. Wound Type: None    Current Nutrition Intake & Therapies:  Average Meal Intake: Unable to assess  Average Supplement Intake: None ordered  ADULT DIET Easy to Chew; Low Fat/Low Chol/High Fiber/2 gm Na    Anthropometric Measures:  Height: 5' 7\" (170.2 cm)  Ideal Body Weight (IBW): 148 lbs (67 kg)     Current Body Wt:  59.8 kg (131 lb 13.4 oz), 89.1 % IBW. Standing scale  Current BMI (kg/m2): 20.6                          BMI Category: Underweight (BMI less than 22) age over 72    Estimated Daily Nutrient Needs:  Energy Requirements Based On: Formula  Weight Used for Energy Requirements: Current  Energy (kcal/day): 1660 kcals (BMR x 1. 3AF)  Weight Used for Protein Requirements: Current  Protein (g/day): 48-60g (0.8-1.0g/kg)  Method Used for Fluid Requirements: 1 ml/kcal  Fluid (ml/day): 1660mL    Nutrition Diagnosis:   No nutrition diagnosis at this time    Nutrition Interventions:   Food and/or Nutrient Delivery: Continue current diet  Nutrition Education/Counseling: No recommendations at this time  Coordination of Nutrition Care: Continue to monitor while inpatient       Goals:     Goals:  (PO intake >50% meals next 2-4 days)       Nutrition Monitoring and Evaluation:   Behavioral-Environmental Outcomes: None identified  Food/Nutrient Intake Outcomes: Food and nutrient intake  Physical Signs/Symptoms Outcomes: Biochemical data,GI status,Weight    Discharge Planning:    Continue current diet    Clay Gutierres, [address]  Contact: [id number]

## 2022-05-24 NOTE — PROGRESS NOTES
Transition of Care Plan:     RUR: 19%  Disposition: SNF: [facility] pending)  Follow up appointments: PCP & specialists as indicated  DME needed: N/A  Transportation at [address] vs medical transport   [address] or means to access home: Yes       IM Medicare Letter: Needed prior to d/c  Is patient a BCPI-A Bundle: No                    If yes, was Bundle Letter given?:  N/A  Is patient a  and connected with [facility]? No            If yes, was [employer] transfer form completed and [facility] notified? N/A  Caregiver Contact: Wife- Katherine Pena, [phone]   Discharge Caregiver contacted prior to discharge? To be contacted prior to d/c   Care Conference needed?: No    Chart reviewed. CM continuing to follow for d/c planning. [facility] (SNF) accepted pt for placement at d/c. Spoke with Brennan Mohan in admissions this AM. Per Cheryl Malhotra insurance authorization was initiated yesterday and authorization remains pending at this time. [facility] requesting that copy of covid test and chest x-ray be faxed for review upon completion. Fax: [phone] or [phone]  Anticipate d/c in 24-48 hrs pending authorization. She confirmed bed is available for admission once approved. Will continue to follow.     JOSE MARTIN Capone   Care Manager, [facility]  [phone]

## 2022-05-24 NOTE — PROGRESS NOTES
Hospitalist Progress Note    NAME: Massimo Reynolds   :  1945   MRN:  [id number]     HPI excerpted from admission H&P:  Nancy Vázquez is a 65 y.o. male transferred for syncope and AICD interrogation.       Patient said yesterday 4:30 p.m., drove to get gas at grocery store but was short $1 in payment so he drove home. Drove back to store, walk in and spotted a friend. He then suddenly passed out. His friend caught him before he fell down. He was out for about 5 minutes. He did not have any chest pain, shortness of breath or dizziness at that time. However for the past 3 days he has been having periods of dizziness when he is walking. No fevers chills. Positive increased cough, nonproductive. Denies any wheezing, leg edema, orthopnea, signs of bleeding, nausea vomiting or diarrhea.       He was taken to [facility] where he was noted to be orthostatic:  Supine 103/61  P 66  Sitting 85/59  P74  Standing 76/45  P79  Given a total of 1 L of IV fluid but remains orthostatic with BP 79/57 standing so referred for admission.     He was hospitalized in 2022 with left lower lobe pneumonia and COPD exacerbation and in 2022 for combined CHF exacerbation. Echocardiogram showed EF of 40% which is down from 50 to 55% in 2020. \"    Assessment / Plan:  Syncope  Orthostatic hypotension  Chronic combined systolic/diastolic heart failure  CAD s/p remote CABG  H/O afib s/p AVN ablation with ICD implantation  Dyslipidemia   - Cardiology input appreciated, cleared for discharge. - Syncope likely 2/2 orthostatic hypotension, patient remains orthostatic but no longer symptomatic.    - Sacubitril-valsartan 24-26 mg po bid stopped. - Metoprolol succinate 12.5 mg po daily stopped. - Furosemide 20 mg po daily stopped. - Continue JAMSHID hose. - Continue midodrine 10 mg po tid. - Continue apixaban 5 mg po q12h. - Continue atorvastatin 40 mg po daily.      Positive RMSF IgG  - ID input appreciated. - Sent by PCP 2/2 complaints of myalgias. - Reflex IFA negative. - Plan for repeat serology in 2-4 weeks. Mild asymptomatic hyponatremia   - No tx indicated. - Monitor. COPD without acute exacerbation   - Continue albuterol-ipratropium 2.5-0.5 mg nebs q6h. - Continue supplemental O2, patient on home oxygen. GERD  - Continue pantoprazole 40 mg po daily. Dementia  - Continue donepezil 5 mg po daily at hs.   - Continue memantine 10 mg po bid. Depression   - Continue duloxetine 60 mg po daily. - Continue sertraline 200 mg po daily. OAB  - Continue mirabegron ER 50 mg po daily. Mild hyperglycemia   - No tx indicated. - Monitor with a.m. labs. Anemia   - H/H overall stable. - No tx indicated. - OP follow up. Mild azotemia, resolved  - Likely related to volume contraction from diuretic.  - Diuretic stopped. - Monitor. Misc  - Patient accepted for SNF, insurance authorization pending. 18.5 - 24.9 Normal weight / Body mass index is 20.65 kg/m². Estimated discharge date: May 19  Barriers:    Code status: Full  Prophylaxis: Apixaban  Recommended Disposition: Home w/Family     Subjective:     Chief Complaint / Reason for Physician Visit  Patient with no complaints. OOB today with no dizziness, pre-syncope, or syncope. Plan of care and pertinent events reviewed with bedside nurse. Review of Systems:  Symptom Y/N Comments  Symptom Y/N Comments   Fever/Chills N   Chest Pain N    Poor Appetite N   Edema N    Cough N   Abdominal Pain N    Sputum N   Joint Pain N    SOB/HOLCOMB N   Pruritis/Rash N    Nausea/vomit N   Tolerating PT/OT Y    Diarrhea N   Tolerating Diet Y    Constipation N   Other       Could NOT obtain due to:      Objective:     VITALS:   Last 24hrs VS reviewed since prior progress note.  Most recent are:  Patient Vitals for the past 24 hrs:   Temp Pulse Resp BP SpO2   05/24/22 1100 98.4 °F (36.9 °C) 83 17 126/77 99 %   05/24/22 0807 98.4 °F (36.9 °C) 80 20 122/76 99 %   05/23/22 2315 98.6 °F (37 °C) 88 16 128/77 99 %   05/23/22 1955 97.5 °F (36.4 °C) 74 18 133/82 98 %   05/23/22 1611 97.5 °F (36.4 °C) 70 18 127/69 --   05/23/22 1415 -- 87 -- 136/74 --   05/23/22 1413 -- 77 -- (!) 141/76 --   05/23/22 1411 -- 80 -- (!) 83/57 --   05/23/22 1409 -- 80 -- (!) 100/59 --   05/23/22 1407 -- -- -- 120/74 --   05/23/22 1405 -- -- -- (!) 93/58 --   05/23/22 1403 -- -- -- 133/66 --     No intake or output data in the 24 hours ending 05/24/22 1341     I had a face to face encounter and independently examined this patient on 5/24/2022, as outlined below:  PHYSICAL EXAM:  General:  A/A/O.  NAD. HEENT:  Normocephalic. Sclera anicteric. Mucous membranes moist.    Chest:  Resps even/unlabored with symmetrical CWE. Air entry diminished. Lungs CTA. No use of accessory muscles. CV:  IRRR. Normal S1/S2. I/VI systolic murmur noted. No JVD. Trace pretibial edema angeline. Cap refill < 3 sec. Peripheral pulses 1-2+. GI:  Abdomen soft/NT/ND. ABT X 4.    :  Voiding. Neurologic:  Nonfocal.  CN II-XII grossly intact. Face symmetrical.  Speech normal.     Psych:  Cooperative. No anxiety or agitation. No suicidal/homicidal ideation. Skin:  Warm and color appropriate. No rashes or jaundice. Turgor elastic. Reviewed most current lab test results and cultures  YES  Reviewed most current radiology test results   YES  Review and summation of old records today    NO  Reviewed patient's current orders and MAR    YES  PMH/SH reviewed - no change compared to H&P  ________________________________________________________________________  Care Plan discussed with:    Comments   Patient [address]     Consultant                        Multidiciplinary team rounds were held today with , nursing, pharmacist and clinical coordinator.   Patient's plan of care was discussed; medications were reviewed and discharge planning was addressed. ________________________________________________________________________  Candida العلي NP     Procedures: see electronic medical records for all procedures/Xrays and details which were not copied into this note but were reviewed prior to creation of Plan. LABS:  I reviewed today's most current labs and imaging studies.   Pertinent labs include:  Recent Labs     05/22/22 0456   WBC 8.2   HGB 11.1*   HCT 35.4*        Recent Labs     05/22/22 0456   *   K 4.2      CO2 30   *   BUN 20   CREA 0.97   CA 10.2*       Signed: Candida العلي NP

## 2022-05-24 NOTE — PROGRESS NOTES
End of Shift Note    Bedside shift change report given to Benito Ortega (oncoming nurse) by Keshia Billy RN (offgoing nurse).   Report included the following information SBAR, Kardex and Recent Results    Shift worked: nights   Shift summary and any significant changes:    No significant changes     Concerns for physician to address: None   Zone phone for oncoming shift:  [phone]     Patient Information  Abigail Santos  65 y.o.  5/16/2022 11:27 AM by Lea Long MD. Abigail Santos was admitted from Home    Problem List  Patient Active Problem List    Diagnosis Date Noted    Orthostatic hypotension 05/16/2022    Chronic renal disease, stage III 05/10/2022    Pneumonia 03/31/2022    Cor pulmonale (chronic) ([address] 03/23/2022    Ischemic cardiomyopathy 03/04/2022    Pulmonary emphysema ([address] 03/04/2022    Interstitial lung disease ([address] 02/15/2022    Respiratory failure ([address] 02/14/2022    Acute on chronic combined systolic and diastolic ACC/AHA stage C congestive heart failure ([address] 02/12/2022    Syncope 02/17/2020    Rotator cuff arthropathy of left shoulder 09/06/2019    Status post reverse arthroplasty of shoulder 09/06/2019    Right rotator cuff tear arthropathy 09/04/2019    Moderate major depression ([address] 07/03/2018    Depression 07/03/2018    DDD (degenerative disc disease), lumbar 04/02/2018    Chronic anticoagulation 07/17/2017    S/P CABG x 1 06/17/2016    AICD (automatic cardioverter/defibrillator) present 05/13/2016    AVNRT (AV bridgette re-entry tachycardia) ([address] 05/12/2016    Stenosis of both carotid arteries without cerebral infarction 04/12/2016    Cervicogenic headache 04/12/2016    Alzheimer's dementia, late onset, with behavioral disturbance ([address] 04/12/2016    Spinal stenosis, lumbar region, with neurogenic claudication 04/12/2016    Lumbar back pain with radiculopathy affecting right lower extremity 04/12/2016    Lumbar back pain with radiculopathy affecting left lower extremity 04/12/2016    History of stroke 04/12/2016    ACP (advance care planning) 12/30/2015    B12 deficiency 09/22/2015    Hypothyroidism due to acquired atrophy of thyroid 09/22/2015    Osteoporosis 09/22/2015    Cervical post-laminectomy syndrome 09/22/2015    Neck pain 09/22/2015    Lower GI bleeding 08/19/2015    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 ([address] 07/23/2015    Left-sided chest wall pain 11/23/2014    S/P cervical spinal fusion 06/06/2013    Osteoarthritis 05/29/2012    Hyperthyroidism 05/29/2012    Atherosclerosis of native coronary artery of native heart without angina pectoris 05/29/2012    Chronic systolic heart failure ([address] 05/29/2012    Atrial fibrillation ([address] 05/29/2012    Mixed hyperlipidemia 05/29/2012    History of factor V Leiden mutation 03/07/2011    Essential hypertension 03/04/2011     Past Medical History:   Diagnosis Date    Acute combined systolic and diastolic congestive heart failure ([address] 2/12/2022    AICD (automatic cardioverter/defibrillator) present 5/13/2016 5/13/16 [employer] upgrade to dual chamber AICD implant  Dr. Srinivasa Rowland Alzheimer disease [facility])     Anxiety state, other     Arthritis     OSTEO ARTHRITIS    AVNRT (AV bridgette re-entry tachycardia) ([address] 5/12/2016 5/12/16 AVNRT ablation: PM/AICD left upper chest :Dr. Bg Ruggiero Back ache     CAD (coronary artery disease)     Cancer ([address] 2004    Prostate cancer    Chest tightness     last episode of chest pain 5/2016 before AICD placed; none since then    Coagulation defects 2005    FACTOR V LEIDEN    Dementia ([address]     Depression     Dizziness     FH: factor V Leiden deficiency     Generalized muscle ache     GERD (gastroesophageal reflux disease)     HEARTBURN    Heart failure ([address] 2014    as of 07/2019 EF 30-35;  Dr. Renee Prince, Cardiomyopathy    Hyperlipidemia, mixed     Hypertension     Long term current use of anticoagulant therapy     Other ill-defined conditions(799.89) 2004    blood transfusion    Pacemaker     Paroxysmal atrial fibrillation (HCC)     rate controlled with coreg     Postsurgical aortocoronary bypass status 05/29/2012    CABG    Presence of cardiac defibrillator 05/2016    left upper chest    Pulmonary emphysema ([address] 3/4/2022    RMSF [facility] spotted fever)     SSS (sick sinus syndrome) ([address]     Stroke ([address] 2011, 1990's    SEVERAL-mini    Syncope     Thromboembolism ([address] 2014    left leg: changed to Eliquis from Warfarin    Thromboembolus ([address] 2005    left leg    Thyroid disease     Weakness generalized        Core Measures:  CVA: No No  CHF:Yes No  PNA:No No    Activity:  Activity Level: Up with Assistance  Number times ambulated in hallways past shift: 0  Number of times OOB to chair past shift: 0    Cardiac:   Cardiac Monitoring: Yes      Cardiac Rhythm: Sinus Rhythm    Access:   Current line(s): PIV    Genitourinary:   Urinary status: voiding  Urinary Catheter? No    Respiratory:   O2 Device: Nasal cannula  Chronic home O2 use?: YES  Incentive spirometer at bedside: NO       GI:  Last Bowel Movement Date: 05/19/22  Current diet:  ADULT DIET Easy to Chew; Low Fat/Low Chol/High Fiber/2 gm Na  Passing flatus: YES  Tolerating current diet: YES       Pain Management:   Patient states pain is manageable on current regimen: YES    Skin:  Fernando Score: 19  Interventions: float heels, increase time out of bed, limit briefs and nutritional support     Patient Safety:  Fall Score:  Total Score: 6  Interventions: bed/chair alarm, assistive device (walker, cane, etc), gripper socks, pt to call before getting OOB and stay with me (per policy)  High Fall Risk: Yes    DVT prophylaxis:  DVT prophylaxis Med- Yes  DVT prophylaxis SCD or JAMSHID- Yes     Wounds: (If Applicable)  Wounds- Yes  Location R arm    Active Consults:  IP CONSULT TO CARDIOLOGY  IP CONSULT TO INFECTIOUS DISEASES    Length of Stay:  Expected LOS: 2d 7h  Actual LOS: 6  Discharge Plan:  To skilled Nursing facility pending acceptance      Heavenly Melo RN

## 2022-05-25 NOTE — PROGRESS NOTES
End of Shift Note     Bedside shift change report given to Linda Puente (oncoming nurse) by Jv Zamarripa RN (offgoing nurse).   Report included the following information SBAR, Kardex and Recent Results     Shift worked: Days    Shift summary and any significant changes:     No complaints of pain or significant changes to condition     Wound care completed           Concerns for physician to address: None   Zone phone for oncoming shift:  [phone]      Patient Information  Massimo Reynolds  65 y.o.  5/16/2022 11:27 AM by Edna Salamanca MD. Dharmesh Kelley admitted from Home     Problem List          Patient Active Problem List     Diagnosis Date Noted    Orthostatic hypotension 05/16/2022    Chronic renal disease, stage III 05/10/2022    Pneumonia 03/31/2022    Cor pulmonale (chronic) ([address] 03/23/2022    Ischemic cardiomyopathy 03/04/2022    Pulmonary emphysema ([address] 03/04/2022    Interstitial lung disease ([address] 02/15/2022    Respiratory failure ([address] 02/14/2022    Acute on chronic combined systolic and diastolic ACC/AHA stage C congestive heart failure ([address] 02/12/2022    Syncope 02/17/2020    Rotator cuff arthropathy of left shoulder 09/06/2019    Status post reverse arthroplasty of shoulder 09/06/2019    Right rotator cuff tear arthropathy 09/04/2019    Moderate major depression ([address] 07/03/2018    Depression 07/03/2018    DDD (degenerative disc disease), lumbar 04/02/2018    Chronic anticoagulation 07/17/2017    S/P CABG x 1 06/17/2016    AICD (automatic cardioverter/defibrillator) present 05/13/2016    AVNRT (AV bridgette re-entry tachycardia) ([address] 05/12/2016    Stenosis of both carotid arteries without cerebral infarction 04/12/2016    Cervicogenic headache 04/12/2016    Alzheimer's dementia, late onset, with behavioral disturbance ([address] 04/12/2016    Spinal stenosis, lumbar region, with neurogenic claudication 04/12/2016    Lumbar back pain with radiculopathy affecting right lower extremity 04/12/2016    Lumbar back pain with radiculopathy affecting left lower extremity 04/12/2016    History of stroke 04/12/2016    ACP (advance care planning) 12/30/2015    B12 deficiency 09/22/2015    Hypothyroidism due to acquired atrophy of thyroid 09/22/2015    Osteoporosis 09/22/2015    Cervical post-laminectomy syndrome 09/22/2015    Neck pain 09/22/2015    Lower GI bleeding 08/19/2015    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 ([address] 07/23/2015    Left-sided chest wall pain 11/23/2014    S/P cervical spinal fusion 06/06/2013    Osteoarthritis 05/29/2012    Hyperthyroidism 05/29/2012    Atherosclerosis of native coronary artery of native heart without angina pectoris 05/29/2012    Chronic systolic heart failure ([address] 05/29/2012    Atrial fibrillation ([address] 05/29/2012    Mixed hyperlipidemia 05/29/2012    History of factor V Leiden mutation 03/07/2011    Essential hypertension 03/04/2011              Past Medical History:   Diagnosis Date    Acute combined systolic and diastolic congestive heart failure ([address] 2/12/2022    AICD (automatic cardioverter/defibrillator) present 5/13/2016 5/13/16 [employer] upgrade to dual chamber AICD implant  Dr. Niecy Good    Alzheimer disease ([address]      Anxiety state, other      Arthritis       OSTEO ARTHRITIS    AVNRT (AV bridgette re-entry tachycardia) ([address] 5/12/2016 5/12/16 AVNRT ablation: PM/AICD left upper chest :Dr. Brush Grief Back ache      CAD (coronary artery disease)      Cancer ([address] 2004     Prostate cancer    Chest tightness       last episode of chest pain 5/2016 before AICD placed; none since then    Coagulation defects 2005     FACTOR V LEIDEN    Dementia ([address]      Depression      Dizziness      FH: factor V Leiden deficiency      Generalized muscle ache      GERD (gastroesophageal reflux disease)       HEARTBURN    Heart failure ([address] 2014     as of 07/2019 EF 30-35;   [address], Cardiomyopathy    Hyperlipidemia, mixed      Hypertension      Long term current use of anticoagulant therapy      Other ill-defined conditions(799.89) 2004     blood transfusion    Pacemaker      Paroxysmal atrial fibrillation (HCC)       rate controlled with coreg     Postsurgical aortocoronary bypass status 05/29/2012     CABG    Presence of cardiac defibrillator 05/2016     left upper chest    Pulmonary emphysema ([facility] 3/4/2022    [facility] spotted fever)      SSS (sick sinus syndrome) ([facility]      Stroke ([facility] 2011, 1990's     SEVERAL-mini    Syncope      Thromboembolism ([facility] 2014     left leg: changed to Eliquis from Warfarin    Thromboembolus ([facility] 2005     left leg    Thyroid disease      Weakness generalized           Core Measures:  CVA: No No  CHF:Yes No  PNA:No No     Activity:  Activity Level: Up with Assistance  Number times ambulated in hallways past shift: 0  Number of times OOB to chair past shift: 0     Cardiac:   Cardiac Monitoring: Yes      Cardiac Rhythm: Sinus Rhythm     Access:   Current line(s): PIV     Genitourinary:   Urinary status: voiding  Urinary Catheter? No     Respiratory:   O2 Device: Nasal cannula  Chronic home O2 use?: YES  Incentive spirometer at bedside: NO     GI:  Last Bowel Movement Date: 05/19/22  Current diet:  ADULT DIET Easy to Chew; Low Fat/Low Chol/High Fiber/2 gm Na  Passing flatus: YES  Tolerating current diet: YES     Pain Management:   Patient states pain is manageable on current regimen: YES     Skin:  Fernando Score: 19  Interventions: float heels, increase time out of bed, limit briefs and nutritional support     Patient Safety:  Fall Score: Total Score: 6  Interventions: bed/chair alarm, assistive device (walker, cane, etc), gripper socks, pt to call before getting OOB and stay with me (per policy)  High Fall Risk:  Yes     DVT prophylaxis:  DVT prophylaxis Med- Yes  DVT prophylaxis SCD or JAMSHID- Yes      Wounds: (If Applicable)  Wounds- Yes  Location R arm     Active Consults:  IP CONSULT TO CARDIOLOGY  IP CONSULT TO INFECTIOUS DISEASES     Length of Stay:  Expected LOS: 2d 7h  Actual LOS: 9  Discharge Plan:  To [facility] pending insurance auth         Karen Blanco RN

## 2022-05-25 NOTE — PROGRESS NOTES
Problem: Mobility Impaired (Adult and Pediatric)  Goal: *Acute Goals and Plan of Care (Insert Text)  Description: FUNCTIONAL STATUS PRIOR TO ADMISSION: Pt lives in trailer with his wife. They state he had been able to amb without device prior to illnesses and hospitalization in March, some decline since but still amb without device. He has had 4 falls in last 6 months. Pt states \"my legs just give out\". He does have intermittent help with ADLs. HOME SUPPORT PRIOR TO ADMISSION: The patient lived alone with wife to provide assistance. Physical Therapy Goals  Initiated 5/17/2022. Goals reviwed 5/24/22, continue x 7 days  1. Patient will move from supine to sit and sit to supine , scoot up and down, and roll side to side in bed with independence within 7 day(s). 2.  Patient will transfer from bed to chair and chair to bed with modified independence using the least restrictive device within 7 day(s). 3.  Patient will perform sit to stand with modified independence within 7 day(s). 4.  Patient will ambulate with modified independence for 150 feet with the least restrictive device within 7 day(s). Outcome: Progressing Towards Goal   PHYSICAL THERAPY TREATMENT  Patient: Tal Salinas (1 y.o. male)  Date: 5/25/2022  Diagnosis: Syncope [R55]  Orthostatic hypotension [I95.1] <principal problem not specified>       Precautions: Fall,Bed Alarm,Skin,Spinal (orthostatic; 4 falls in 6 mo; PMH cervical sx; )  Chart, physical therapy assessment, plan of care and goals were reviewed. ASSESSMENT  Patient continues with skilled PT services and is progressing towards goals. Patient continues with hypotension, generalized weakness, gait instability and decreased activity tolerance. Currently needing CGA to come to stand from chair and able to increase gait distance this session. BP continues to be low but he is essentially asymptomatic which puts him at increased risk for falls.   Does report some LE \"weakness\" toward end of gait which may be a result of Bp dropping. Educated  that this may be his symptom for BP and needs to sit when feeling this. Continue to recommend SNF to progress to more independent level of function. 127/63 sitting  93/54 standing  83/51 after amb       Other factors to consider for discharge: at risk for falls, below baseline         PLAN :  Patient continues to benefit from skilled intervention to address the above impairments. Continue treatment per established plan of care. to address goals. Recommendation for discharge: (in order for the patient to meet his/her long term goals)  Therapy up to 5 days/week in SNF setting      IF patient discharges home will need the following DME: patient owns DME required for discharge       SUBJECTIVE:   Patient stated I don't feel bad.     OBJECTIVE DATA SUMMARY:   Critical Behavior:  Neurologic State: Alert  Orientation Level: Oriented X4  Cognition: Follows commands  Safety/Judgement: Fall prevention,Awareness of environment,Good awareness of safety precautions (receptive to bed alarm, not getting up alone due to BP)  Functional Mobility Training:  Bed Mobility:      Not tested              Transfers:  Sit to Stand: Supervision  Stand to Sit: Supervision                             Balance:  Sitting: Intact  Standing: Intact; With support  Standing - Static: Good  Standing - Dynamic : Good  Ambulation/Gait Training:  Distance (ft): 80 Feet (ft)  Assistive Device: Gait belt  Ambulation - Level of Assistance: Contact guard assistance        Gait Abnormalities: Decreased step clearance; Path deviations        Base of Support: Narrowed     Speed/Hazel: Pace decreased (<100 feet/min); Slow  Step Length: Left shortened;Right shortened       Pain Rating:  No c/o pain    Activity Tolerance:   Fair and requires rest breaks    After treatment patient left in no apparent distress:   Sitting in chair, Call bell within reach, and Caregiver / family present    COMMUNICATION/COLLABORATION:   The patients plan of care was discussed with: Physical therapist, Occupational therapist, and Registered nurse.      Alvaro Bonilla PT, DPT   Time Calculation: 24 mins

## 2022-05-25 NOTE — PROGRESS NOTES
End of Shift Note     Bedside shift change report given to Bishop Quyen RN (oncoming nurse) by Roxana Galvez RN (offgoing nurse).   Report included the following information SBAR, Kardex and Recent Results     Shift worked: nights    Shift summary and any significant changes:     No significant changes          Concerns for physician to address: None   Zone phone for oncoming shift:  [phone]      Patient Information  Kim Drake  65 y.o.  5/16/2022 11:27 AM by Darci Burns MD. Kmi Drake was admitted from Home     Problem List       Patient Active Problem List     Diagnosis Date Noted    Orthostatic hypotension 05/16/2022    Chronic renal disease, stage III 05/10/2022    Pneumonia 03/31/2022    Cor pulmonale (chronic) ([address] 03/23/2022    Ischemic cardiomyopathy 03/04/2022    Pulmonary emphysema ([address] 03/04/2022    Interstitial lung disease ([address] 02/15/2022    Respiratory failure ([address] 02/14/2022    Acute on chronic combined systolic and diastolic ACC/AHA stage C congestive heart failure ([address] 02/12/2022    Syncope 02/17/2020    Rotator cuff arthropathy of left shoulder 09/06/2019    Status post reverse arthroplasty of shoulder 09/06/2019    Right rotator cuff tear arthropathy 09/04/2019    Moderate major depression ([address] 07/03/2018    Depression 07/03/2018    DDD (degenerative disc disease), lumbar 04/02/2018    Chronic anticoagulation 07/17/2017    S/P CABG x 1 06/17/2016    AICD (automatic cardioverter/defibrillator) present 05/13/2016    AVNRT (AV bridgette re-entry tachycardia) ([address] 05/12/2016    Stenosis of both carotid arteries without cerebral infarction 04/12/2016    Cervicogenic headache 04/12/2016    Alzheimer's dementia, late onset, with behavioral disturbance ([address] 04/12/2016    Spinal stenosis, lumbar region, with neurogenic claudication 04/12/2016    Lumbar back pain with radiculopathy affecting right lower extremity 04/12/2016    Lumbar back pain with radiculopathy affecting left lower extremity 04/12/2016    History of stroke 04/12/2016    ACP (advance care planning) 12/30/2015    B12 deficiency 09/22/2015    Hypothyroidism due to acquired atrophy of thyroid 09/22/2015    Osteoporosis 09/22/2015    Cervical post-laminectomy syndrome 09/22/2015    Neck pain 09/22/2015    Lower GI bleeding 08/19/2015    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 ([address] 07/23/2015    Left-sided chest wall pain 11/23/2014    S/P cervical spinal fusion 06/06/2013    Osteoarthritis 05/29/2012    Hyperthyroidism 05/29/2012    Atherosclerosis of native coronary artery of native heart without angina pectoris 05/29/2012    Chronic systolic heart failure ([address] 05/29/2012    Atrial fibrillation ([address] 05/29/2012    Mixed hyperlipidemia 05/29/2012    History of factor V Leiden mutation 03/07/2011    Essential hypertension 03/04/2011           Past Medical History:   Diagnosis Date    Acute combined systolic and diastolic congestive heart failure ([address] 2/12/2022    AICD (automatic cardioverter/defibrillator) present 5/13/2016 5/13/16 [employer] upgrade to dual chamber AICD implant  Dr. Tanja Fleming Alzheimer disease [facility])      Anxiety state, other      Arthritis       OSTEO ARTHRITIS    AVNRT (AV bridgette re-entry tachycardia) ([address] 5/12/2016 5/12/16 AVNRT ablation: PM/AICD left upper chest :Dr. Gil Jump Back ache      CAD (coronary artery disease)      Cancer ([address] 2004     Prostate cancer    Chest tightness       last episode of chest pain 5/2016 before AICD placed; none since then    Coagulation defects 2005     FACTOR V LEIDEN    Dementia ([address]      Depression      Dizziness      FH: factor V Leiden deficiency      Generalized muscle ache      GERD (gastroesophageal reflux disease)       HEARTBURN    Heart failure ([address] 2014     as of 07/2019 EF 30-35;  Dr. Scout Constantino, Cardiomyopathy    Hyperlipidemia, mixed      Hypertension      Long term current use of anticoagulant therapy      Other ill-defined conditions(799.89) 2004     blood transfusion    Pacemaker      Paroxysmal atrial fibrillation (HCC)       rate controlled with coreg     Postsurgical aortocoronary bypass status 05/29/2012     CABG    Presence of cardiac defibrillator 05/2016     left upper chest    Pulmonary emphysema ([facility] 3/4/2022    RMSF [facility] spotted fever)      SSS (sick sinus syndrome) ([facility]      Stroke ([facility] 2011, 1990's     SEVERAL-mini    Syncope      Thromboembolism ([facility] 2014     left leg: changed to Eliquis from Warfarin    Thromboembolus ([facility] 2005     left leg    Thyroid disease      Weakness generalized           Core Measures:  CVA: No No  CHF:Yes No  PNA:No No     Activity:  Activity Level: Up with Assistance  Number times ambulated in hallways past shift: 0  Number of times OOB to chair past shift: 0     Cardiac:   Cardiac Monitoring: Yes      Cardiac Rhythm: Sinus Rhythm     Access:   Current line(s): PIV     Genitourinary:   Urinary status: voiding  Urinary Catheter? No     Respiratory:   O2 Device: Nasal cannula  Chronic home O2 use?: YES  Incentive spirometer at bedside: NO     GI:  Last Bowel Movement Date: 05/19/22  Current diet:  ADULT DIET Easy to Chew; Low Fat/Low Chol/High Fiber/2 gm Na  Passing flatus: YES  Tolerating current diet: YES     Pain Management:   Patient states pain is manageable on current regimen: YES     Skin:  Fernando Score: 19  Interventions: float heels, increase time out of bed, limit briefs and nutritional support     Patient Safety:  Fall Score: Total Score: 6  Interventions: bed/chair alarm, assistive device (walker, cane, etc), gripper socks, pt to call before getting OOB and stay with me (per policy)  High Fall Risk:  Yes     DVT prophylaxis:  DVT prophylaxis Med- Yes  DVT prophylaxis SCD or JAMSHID- Yes      Wounds: (If Applicable)  Wounds- Yes  Location R arm     Active Consults:  IP CONSULT TO CARDIOLOGY  IP CONSULT TO INFECTIOUS DISEASES     Length of Stay:  Expected LOS: 2d 7h  Actual LOS: 8  Discharge Plan:  To [facility] pending insurance mary Estrada RN

## 2022-05-25 NOTE — PROGRESS NOTES
Hospitalist Progress Note    NAME: Calista Natarajan   :  1945   MRN:  [id number]       Assessment / Plan:  Orthostatic hypotension  Chronic combined systolic/diastolic heart failure  CAD s/p remote CABG  H/O afib s/p AVN ablation with ICD implantation  Dyslipidemia   - Cardiology input appreciated, cleared for discharge. - Syncope likely 2/2 orthostatic hypotension, patient remains orthostatic but no longer symptomatic.    - Sacubitril-valsartan 24-26 mg po bid stopped. - Metoprolol succinate 12.5 mg po daily stopped. - Furosemide 20 mg po daily stopped. - Continue JAMSHID hose. - Continue midodrine 10 mg po tid. - Continue apixaban 5 mg po q12h. - Continue atorvastatin 40 mg po daily.      Positive RMSF IgG  - ID input appreciated. - Sent by PCP 2/2 complaints of myalgias. - Reflex IFA negative. - Plan for repeat serology in 2-4 weeks.       Mild asymptomatic hyponatremia   - No tx indicated. - Monitor.      COPD without acute exacerbation   - Continue albuterol-ipratropium 2.5-0.5 mg nebs q6h. - Continue supplemental O2, patient on home oxygen.       GERD  - Continue pantoprazole 40 mg po daily.       Dementia  - Continue donepezil 5 mg po daily at hs.   - Continue memantine 10 mg po bid.      Depression   - Continue duloxetine 60 mg po daily. - Continue sertraline 200 mg po daily.       OAB  - Continue mirabegron ER 50 mg po daily.       Mild hyperglycemia   - No tx indicated. - Monitor with a.m. labs.      Anemia   - H/H overall stable. - No tx indicated. - OP follow up.       Mild azotemia, resolved  - Likely related to volume contraction from diuretic.  - Diuretic stopped. - Monitor.       Misc  - Patient accepted for SNF, insurance authorization pending.           25.0 - 29.9 Overweight / Body mass index is 25.14 kg/m².     Estimated discharge date: May 26  Barriers:    Code status: Full  Prophylaxis: eliquis  Recommended Disposition: Home w/Family     Subjective: Chief Complaint / Reason for Physician Visit  \"\". Discussed with RN events overnight. Review of Systems:  Symptom Y/N Comments  Symptom Y/N Comments   Fever/Chills    Chest Pain     Poor Appetite    Edema     Cough    Abdominal Pain     Sputum    Joint Pain     SOB/HOLCOMB    Pruritis/Rash     Nausea/vomit    Tolerating PT/OT     Diarrhea    Tolerating Diet     Constipation    Other       Could NOT obtain due to:      Objective:     VITALS:   Last 24hrs VS reviewed since prior progress note. Most recent are:  Patient Vitals for the past 24 hrs:   Temp Pulse Resp BP SpO2   05/25/22 0720 97.9 °F (36.6 °C) 71 18 123/70 99 %   05/25/22 0310 97.9 °F (36.6 °C) 78 18 131/71 93 %   05/24/22 2305 98.7 °F (37.1 °C) 77 16 128/77 97 %   05/24/22 1904 98.6 °F (37 °C) 69 18 137/83 96 %   05/24/22 1504 98.5 °F (36.9 °C) 79 18 123/78 98 %     No intake or output data in the 24 hours ending 05/25/22 1152     I had a face to face encounter and independently examined this patient on 5/25/2022, as outlined below:  PHYSICAL EXAM:  General: WD, WN. Alert, cooperative, no acute distress    EENT:  EOMI. Anicteric sclerae. MMM  Resp:  CTA bilaterally, no wheezing or rales. No accessory muscle use  CV:  Regular  rhythm,  No edema  GI:  Soft, Non distended, Non tender. +Bowel sounds  Neurologic:  Alert and oriented X 3, normal speech,   Psych:   Good insight. Not anxious nor agitated  Skin:  No rashes.   No jaundice    Reviewed most current lab test results and cultures  YES  Reviewed most current radiology test results   YES  Review and summation of old records today    NO  Reviewed patient's current orders and MAR    YES  PMH/SH reviewed - no change compared to H&P  ________________________________________________________________________  Care Plan discussed with:    Comments   Patient     Family      RN     Care Manager     Consultant                        Multidiciplinary team rounds were held today with , nursing, pharmacist and clinical coordinator. Patient's plan of care was discussed; medications were reviewed and discharge planning was addressed. ________________________________________________________________________  Total NON critical care TIME:  35   Minutes    Total CRITICAL CARE TIME Spent:   Minutes non procedure based      Comments   >50% of visit spent in counseling and coordination of care     ________________________________________________________________________  Nano Marcano MD     Procedures: see electronic medical records for all procedures/Xrays and details which were not copied into this note but were reviewed prior to creation of Plan. LABS:  I reviewed today's most current labs and imaging studies. Pertinent labs include:  Recent Labs     05/25/22  0121   HGB 10.4*   HCT 32.4*     Recent Labs     05/25/22  0121   *   K 4.3   CL 96*   CO2 28   *   BUN 15   CREA 0.90   CA 10.0       Signed:  Nano Marcano MD

## 2022-05-25 NOTE — PROGRESS NOTES
Problem: Self Care Deficits Care Plan (Adult)  Goal: *Acute Goals and Plan of Care (Insert Text)  Description: FUNCTIONAL STATUS PRIOR TO ADMISSION:  Pt lives in trailer with his wife and grandson. They state he had been able to amb without device prior to illnesses and hospitalization in March 2022, pneumonia, some decline since but still amb without device. He has had 4 falls in last 6 months. No O2 used prior to illness in March 2022, CHF, and pneumonia in April 2022, 2L since but \"huffing and puffing if he just walks to bathroom with portable O2 in place. \" Pt states \"my legs just give out\" during the 4 falls. He does have intermittent help with ADLs daily, from both wife and grandson for UE and LE dressing. Uses garden tub with shower chair min A,  able to complete toileting hygiene despite B impaired AROM of shoulders. HOME SUPPORT PRIOR TO ADMISSION: The patient lived with wife and adult grandson to provide assistance for dressing and bathing, he was mod I to toilet; has not been able to assist w/IADLs since March      Occupational Therapy Goals  Initiated 5/17/2022; Goals updated 5/24/22  1. Patient will perform grooming in standing with minimal assistance/contact guard assist within 7 day(s). UPGRADE: SPV/DODGE  2. Patient will perform upper body w/dressing stick technique with minimal assistance/contact guard assist within 7 day(s). 3.  Patient will perform lower body dressing with minimal assistance/contact guard assist with AE within 7 day(s). 4.  Patient will perform toilet transfers with minimal assistance/contact guard assist within 7 day(s). 5.  Patient will perform all aspects of toileting with minimal assistance/contact guard assist within 7 day(s). 6.  Patient will participate in upper extremity therapeutic exercise/activities with minimal assistance/contact guard assist for 5 minutes within 7 day(s).     7.  Patient will utilize energy conservation, PLB, fall preventionand pain management techniques during functional activities with verbal cues within 7 day(s). Occupational Therapy Goals  Initiated 5/17/2022; Goals updated 5/24/22  1. Patient will perform grooming in standing with minimal assistance/contact guard assist within 7 day(s). UPGRADE: SPV/DODGE  2. Patient will perform upper body dressing with minimal assistance/contact guard assist while seated within 7 day(s). UPGRADE: SPV/DODGE  3. Patient will perform lower body dressing with minimal assistance/contact guard assist with AE within 7 day(s). UPGRADE: SPV/DODGE  4. Patient will perform toilet transfers with minimal assistance/contact guard assist within 7 day(s). UPGRADE: SPV/DODGE  5. Patient will perform all aspects of toileting with minimal assistance/contact guard assist within 7 day(s). UPGRADE: SPV/DODGE  6. Patient will participate in upper extremity therapeutic exercise/activities with minimal assistance/contact guard assist for 5 minutes within 7 day(s). 7.  Patient will utilize energy conservation, PLB, fall prevention and pain management techniques during functional activities with verbal cues within 7 day(s). Outcome: Progressing Towards Goal   OCCUPATIONAL THERAPY TREATMENT  Patient: Suze Mao (74 y.o. male)  Date: 5/25/2022  Diagnosis: Syncope [R55]  Orthostatic hypotension [I95.1] <principal problem not specified>       Precautions: Fall,Bed Alarm,Skin,Spinal (orthostatic; 4 falls in 6 mo; PMH cervical sx; )  Chart, occupational therapy assessment, plan of care, and goals were reviewed. ASSESSMENT  Patient continues with skilled OT services and is progressing towards goals. Patient is received on baseline 2L, seated in bedside chair and agreeable to session. Patient continues to mobilize at overall CGA level, engaging in bathroom toileting and standing handwashing with SpO2 91%.  Patient activity remains limited by orthostatic hypotension; although largely asymptomatic, patient does mention legs feeling \"weak\" just prior to low BP reading. Suspect leg weakness may be patient's primary symptom and indicator that he needs to sit and rest. Reviewed home safety, energy conservation, and fall prevention strategies with patient and family present - all verbalizing understanding. At this time, patient remains below baseline. For best outcome, recommend SNF at discharge. If patient were to return home, would require 24/7 supervision/assist and HH. At rest, seated: /63, HR 74  Standing: BP 93/54, HR 80  Seated, post-ambulation: BP 93/51, HR 90  Seated, post-toileting: BP 99/61, HR 83     Current Level of Function Impacting Discharge (ADLs): CGA    Other factors to consider for discharge: orthostatic hypotension, asymptomatic         PLAN :  Patient continues to benefit from skilled intervention to address the above impairments. Continue treatment per established plan of care to address goals. Recommend with staff: monitor BP    Recommendation for discharge: (in order for the patient to meet his/her long term goals)  Therapy up to 5 days/week in SNF setting    This discharge recommendation:  Has been made in collaboration with the attending provider and/or case management    IF patient discharges home will need the following DME: TBD       SUBJECTIVE:   Patient stated my legs feel a little weak.     OBJECTIVE DATA SUMMARY:   Cognitive/Behavioral Status:  Neurologic State: Alert; Appropriate for age  Orientation Level: Oriented X4  Cognition: Follows commands  Perception: Appears intact  Perseveration: No perseveration noted  Safety/Judgement: Awareness of environment; Fall prevention;Home safety; Insight into deficits    Functional Mobility and Transfers for ADLs:  Bed Mobility:       Transfers:  Sit to Stand: Contact guard assistance  Stand to Sit: Contact guard assistance;Standby assistance  Functional Transfers  Bathroom Mobility: Contact guard assistance  Toilet Transfer : Stand-by assistance     Balance:  Sitting: Intact  Standing: Intact; With support  Standing - Static: Good  Standing - Dynamic : Good    ADL Intervention:  Grooming  Grooming Assistance: Stand-by assistance  Position Performed: Standing  Washing Face: Stand-by assistance  Washing Hands: Stand-by assistance  Cues: Verbal cues provided    Toileting  Toileting Assistance: Stand-by assistance  Bladder Hygiene: Supervision  Clothing Management: Stand-by assistance  Cues: Verbal cues provided    Cognitive Retraining  Orientation Retraining: Awareness of environment (knowing areas to sit/rest are available)  Safety/Judgement: Awareness of environment; Fall prevention;Home safety; Insight into deficits    Educated patient on energy conservation strategies to facilitate greater independence in valued roles/routines and maximize patient safety. Pt verbalized/demonstrated good understanding of topics discussed. Deep breathing or pursed lip breathing   Calming / stress management  3. Pacing activities and taking frequent rest breaks   4. Prioritizing daily tasks, spreading needed tasks throughout day/week as able  5. Accepting help when offered  6. Availability of equipment (AE / DME) to facilitate energy conservation (ie shower seat, high stool/chair in kitchen, using AE for lower body tasks)  7. Compensatory strategies to preserve energy for other tasks (ie using ramez cloth robe to dry off from shower; using cooler water for showers; sitting for dressing; sliding items on counter instead of lifting/carrying)  8. General safety considerations in the home (not carrying items while using RW - using RW tray or bag as able)     Educated on fall prevention strategies to facilitate patient's safe participation in valued roles and routines.   Using shower chair for bathing  Positioning chairs throughout the home for rest breaks  Sitting when legs \"feel weak\" as this seems to be patient's primary indicator of BP dropping  Sitting for dynamic, lower body tasks  Ensuring ability to rest vs extended activity that must be completed    Pain:  No reports. Activity Tolerance:   Fair    After treatment patient left in no apparent distress:   Sitting in chair, Call bell within reach, and Caregiver / family present    COMMUNICATION/COLLABORATION:   The patients plan of care was discussed with: Physical therapist and Registered nurse.      Arthpop Sandoval OT  Time Calculation: 23 mins

## 2022-05-25 NOTE — PROGRESS NOTES
Transition of Care Plan:     RUR: 18% - \"moderate risk\"   Disposition: [facility] - [facility] (pending insurance auth - auth pending as of 5/25/22)  Follow up appointments: PCP & specialists as indicated  DME needed: Defer to SNF for DME needs  Transportation at Discharge: BLS transport  [address] or means to access home: N/A - pt transitioning to SNF at d/c      IM Medicare Letter: 2nd IM needed prior to d/c  Is patient a BCPI-A Bundle: No        Is patient a [place] and connected with [facility]? No            Caregiver Contact: Pt's wife Francine Yee: [phone])   Discharge Caregiver contacted prior to discharge? To be contacted prior to d/c   Care Conference needed?: No    Update - 12:28 PM: CM checked the [address] [place] [address] web portal to determine if LTSS has been completed in the past; no LTSS has been completed. CM will complete LTSS per accepting SNF's request.    Initial note: Chart reviewed. CM contacted [facility] ([phone]) to f/u on status of insurance auth. FANNY spoke with admission liaison Ursula Stalh) who reported Matth[facility] Green remains pending at this time. Remedios reiterated need for chest x-ray & COVID-19 test results to be faxed to facility for reference (f: [phone]). Maureen Infante also inquired if pt has had LTSS completed in the past & informed CM one will need to be completed prior to facility accepting for placement. CM will check the [facility] web portal to determine if LTSS has been completed previously. CM reported update to pt and wife Francine Yee) at bedside. No immediate questions/concerns identified. CM will continue to follow & remain accessible for d/c planning.     JOSE MARTIN Yates  Care Manager, [facility]

## 2022-05-25 NOTE — PROGRESS NOTES
Problem: Falls - Risk of  Goal: *Absence of Falls  Description: Document Christy Sung Fall Risk and appropriate interventions in the flowsheet.   Outcome: Progressing Towards Goal  Note: Fall Risk Interventions:  Mobility Interventions: Bed/chair exit alarm         Medication Interventions: Bed/chair exit alarm    Elimination Interventions: Bed/chair exit alarm,Call light in reach    History of Falls Interventions: Bed/chair exit alarm         Problem: Patient Education: Go to Patient Education Activity  Goal: Patient/Family Education  Outcome: Progressing Towards Goal     Problem: Syncope  Goal: *Absence of injury  Outcome: Progressing Towards Goal  Goal: Decrease or eliminate episodes of syncope  Outcome: Progressing Towards Goal     Problem: Patient Education: Go to Patient Education Activity  Goal: Patient/Family Education  Outcome: Progressing Towards Goal     Problem: Patient Education: Go to Patient Education Activity  Goal: Patient/Family Education  Outcome: Progressing Towards Goal     Problem: Patient Education: Go to Patient Education Activity  Goal: Patient/Family Education  Outcome: Progressing Towards Goal     Problem: Patient Education: Go to Patient Education Activity  Goal: Patient/Family Education  Outcome: Progressing Towards Goal

## 2022-05-26 NOTE — PROGRESS NOTES
1307    Full Report given to hardy Mancia at [facility] at 1315. Staff member stated that she has no further questions at this time. Patient is aware of discharge. Patient has no further questions at this point. Patient IV and telemetry have been removed.

## 2022-05-26 NOTE — PROGRESS NOTES
Pharmacist Discharge Medication Reconciliation    Significant PMH:   Past Medical History:   Diagnosis Date    Acute combined systolic and diastolic congestive heart failure ([address] 2/12/2022    AICD (automatic cardioverter/defibrillator) present 5/13/2016 5/13/16 [employer] upgrade to dual chamber AICD implant  Dr. Jory Nieto    Alzheimer disease [facility])     Anxiety state, other     Arthritis     OSTEO ARTHRITIS    AVNRT (AV bridgette re-entry tachycardia) ([address] 5/12/2016 5/12/16 AVNRT ablation: PM/AICD left upper chest :Dr. Milana Merida    Back ache     CAD (coronary artery disease)     Cancer ([address] 2004    Prostate cancer    Chest tightness     last episode of chest pain 5/2016 before AICD placed; none since then    Coagulation defects 2005    FACTOR V LEIDEN    Dementia ([address]     Depression     Dizziness     FH: factor V Leiden deficiency     Generalized muscle ache     GERD (gastroesophageal reflux disease)     HEARTBURN    Heart failure ([address] 2014    as of 07/2019 EF 30-35; Dr. Gaby Puckett, Cardiomyopathy    Hyperlipidemia, mixed     Hypertension     Long term current use of anticoagulant therapy     Other ill-defined conditions(799.89) 2004    blood transfusion    Pacemaker     Paroxysmal atrial fibrillation ([address]     rate controlled with coreg     Postsurgical aortocoronary bypass status 05/29/2012    CABG    Presence of cardiac defibrillator 05/2016    left upper chest    Pulmonary emphysema ([address] [facility]20[facility]     SSS (sick sinus syndrome) ([address]     Stroke ([address] 2011, 1990's    SEVERAL-mini    Syncope     Thromboembolism ([address] 2014    left leg: changed to Eliquis from Warfarin    Thromboembolus ([address] 2005    left leg    Thyroid disease     Weakness generalized      Encounter Diagnoses:   Encounter Diagnoses   Name Primary? Syncope and collapse Yes    Orthostatic hypotension      Allergies: Patient has no known allergies.     Discharge Medications:   Current Discharge Medication List        START taking these medications    Details   midodrine (PROAMATINE) 10 mg tablet Take 1 Tablet by mouth three (3) times daily (with meals) for 30 days. Indications: a feeling of dizziness upon standing due to a drop in blood pressure  Qty: 90 Tablet, Refills: 0  Start date: 5/26/2022, End date: 6/25/2022           CONTINUE these medications which have CHANGED    Details   memantine (NAMENDA) 10 mg tablet Take 1 Tablet by mouth two (2) times a day. Qty: 180 Tablet, Refills: 3  Start date: 5/26/2022      pantoprazole (PROTONIX) 40 mg tablet Take 1 Tablet by mouth daily for 30 days. Qty: 30 Tablet, Refills: 0  Start date: 5/27/2022, End date: 6/26/2022           CONTINUE these medications which have NOT CHANGED    Details   apixaban (ELIQUIS) 5 mg tablet Take 5 mg by mouth two (2) times a day. polyethylene glycol (MIRALAX) 17 gram packet Take 17 g by mouth daily as needed for Constipation. donepeziL (ARICEPT) 5 mg tablet Take 5 mg by mouth nightly. mirabegron ER (Myrbetriq) 50 mg ER tablet Take 50 mg by mouth daily. sertraline (ZOLOFT) 100 mg tablet Take 200 mg by mouth daily. DULoxetine (CYMBALTA) 60 mg capsule Take 1 Capsule by mouth daily.   Qty: 30 Capsule, Refills: 0    Associated Diagnoses: Depression, unspecified depression type      atorvastatin (LIPITOR) 40 mg tablet TAKE 1 TABLET BY MOUTH AT BEDTIME  Qty: 90 Tablet, Refills: 3    Associated Diagnoses: Hyperlipidemia, unspecified hyperlipidemia type           STOP taking these medications       albuterol (PROVENTIL VENTOLIN) 2.5 mg /3 mL (0.083 %) nebu Comments:   Reason for Stopping:         furosemide (LASIX) 20 mg tablet Comments:   Reason for Stopping:         acetaminophen (TYLENOL) 500 mg capsule Comments:   Reason for Stopping:         doxycycline (ADOXA) 100 mg tablet Comments:   Reason for Stopping:         sacubitriL-valsartan (Entresto) 24-26 mg tablet Comments:   Reason for Stopping: metoprolol succinate (TOPROL-XL) 25 mg XL tablet Comments:   Reason for Stopping:               The patient's chart, MAR and AVS were reviewed by Adrian Romero, [address] [place] [address].     Discharging Provider: Ruthy Barillas MD    Thank you,     Adrian Romero, [address] [place] [address]

## 2022-05-26 NOTE — DISCHARGE SUMMARY
Hospitalist Discharge Summary     Patient ID:  Carla Calix  [id number]  77 y.o.  1945 5/16/2022    PCP on record: Dee Arechiga MD    Admit date: 5/16/2022  Discharge date and time: 5/26/2022    DISCHARGE DIAGNOSIS:    Orthostatic hypotension    CONSULTATIONS:  IP CONSULT TO CARDIOLOGY  IP CONSULT TO INFECTIOUS DISEASES    Excerpted HPI from H&P of Oz Justin MD:  Carla Calix is a 65 y.o. male transferred for syncope and AICD interrogation.       Patient said yesterday 4:30pm, drove to get gas at [employer] but was short $1 in payment so he drove home. Drove back to store, walk in and spotted a friend. He then suddenly passed out. His friend caught him before he fell down. He was out for about 5 minutes. He did not have any chest pain, shortness of breath or dizziness at that time. However for the past 3 days he has been having periods of dizziness when he is walking. No fevers chills. Positive increased cough, nonproductive. Denies any wheezing, leg edema, orthopnea, signs of bleeding, nausea vomiting or diarrhea.       He was taken to [facility] where he was noted to be orthostatic:  Supine 103/61  P 66  Sitting 85/59  P74  Standing 76/45  P79  Given a total of 1 L of IV fluid but remains orthostatic with BP 79/57 standing so referred for admission.     He was hospitalized in March 2022 with left lower lobe pneumonia and COPD exacerbation and in February 2022 for combined CHF exacerbation. Echocardiogram showed EF of 40% which is down from 50 to 55% in February 2020 ______________________________________________________________________  DISCHARGE SUMMARY/HOSPITAL COURSE:  for full details see H&P, daily progress notes, labs, consult notes.      Orthostatic hypotension  Chronic combined systolic/diastolic heart failure  CAD s/p remote CABG  H/O afib s/p AVN ablation with ICD implantation  Dyslipidemia   - Cardiology input appreciated, cleared for discharge. - Syncope likely 2/2 orthostatic hypotension, patient remains orthostatic but no longer symptomatic.    - Sacubitril-valsartan 24-26 mg po bid stopped.      - Metoprolol succinate 12.5 mg po daily stopped.    - Furosemide 20 mg po daily stopped. - Continue JAMSHID hose.     - Continue midodrine 10 mg po tid. - Continue apixaban 5 mg po q12h. - Continue atorvastatin 40 mg po daily.      Positive RMSF IgG  - ID input appreciated. - Sent by PCP 2/2 complaints of myalgias. - Reflex IFA negative. - Plan for repeat serology in 2-4 weeks.       Mild asymptomatic hyponatremia   - No tx indicated. - Monitor.      COPD without acute exacerbation   - Continue albuterol-ipratropium 2.5-0.5 mg nebs q6h. - Continue supplemental O2, patient on home oxygen.       GERD  - Continue pantoprazole 40 mg po daily.       Dementia  - Continue donepezil 5 mg po daily at hs.   - Continue memantine 10 mg po bid.      Depression   - Continue duloxetine 60 mg po daily. - Continue sertraline 200 mg po daily.       OAB  - Continue mirabegron ER 50 mg po daily.       Mild hyperglycemia   - No tx indicated. - Monitor with a.m. labs.      Anemia   - H/H overall stable. - No tx indicated. - OP follow up.       Mild azotemia, resolved  - Likely related to volume contraction from diuretic.  - Diuretic stopped.    - Monitor.       Misc  - Patient accepted for SNF, insurance authorization pending.          _______________________________________________________________________  Patient seen and examined by me on discharge day. Pertinent Findings:  Gen:    Not in distress  Chest: Clear lungs  CVS:   Regular rhythm.   No edema  Abd:  Soft, not distended, not tender  Neuro:  Alert,   _______________________________________________________________________  DISCHARGE MEDICATIONS:   Current Discharge Medication List      START taking these medications    Details   midodrine (PROAMATINE) 10 mg tablet Take 1 Tablet by mouth three (3) times daily (with meals) for 30 days. Indications: a feeling of dizziness upon standing due to a drop in blood pressure  Qty: 90 Tablet, Refills: 0  Start date: 5/26/2022, End date: 6/25/2022         CONTINUE these medications which have CHANGED    Details   memantine (NAMENDA) 10 mg tablet Take 1 Tablet by mouth two (2) times a day. Qty: 180 Tablet, Refills: 3  Start date: 5/26/2022      pantoprazole (PROTONIX) 40 mg tablet Take 1 Tablet by mouth daily for 30 days. Qty: 30 Tablet, Refills: 0  Start date: 5/27/2022, End date: 6/26/2022         CONTINUE these medications which have NOT CHANGED    Details   apixaban (ELIQUIS) 5 mg tablet Take 5 mg by mouth two (2) times a day. polyethylene glycol (MIRALAX) 17 gram packet Take 17 g by mouth daily as needed for Constipation. donepeziL (ARICEPT) 5 mg tablet Take 5 mg by mouth nightly. mirabegron ER (Myrbetriq) 50 mg ER tablet Take 50 mg by mouth daily. sertraline (ZOLOFT) 100 mg tablet Take 200 mg by mouth daily. DULoxetine (CYMBALTA) 60 mg capsule Take 1 Capsule by mouth daily. Qty: 30 Capsule, Refills: 0    Associated Diagnoses: Depression, unspecified depression type      atorvastatin (LIPITOR) 40 mg tablet TAKE 1 TABLET BY MOUTH AT BEDTIME  Qty: 90 Tablet, Refills: 3    Associated Diagnoses: Hyperlipidemia, unspecified hyperlipidemia type         STOP taking these medications       albuterol (PROVENTIL VENTOLIN) 2.5 mg /3 mL (0.083 %) nebu Comments:   Reason for Stopping:         furosemide (LASIX) 20 mg tablet Comments:   Reason for Stopping:         acetaminophen (TYLENOL) 500 mg capsule Comments:   Reason for Stopping:         doxycycline (ADOXA) 100 mg tablet Comments:   Reason for Stopping:         sacubitriL-valsartan (Entresto) 24-26 mg tablet Comments:   Reason for Stopping:         metoprolol succinate (TOPROL-XL) 25 mg XL tablet Comments:   Reason for Stopping:                 Patient Follow Up Instructions:    Activity: PT/OT Eval and Treat  Diet: Soft diet   Wound Care: As directed    Follow-up with PCP  in 5 days. Follow-up tests/labs     Follow-up Information     Follow up With Specialties Details Why Cj Betancourt MD Family Medicine Go on 5/24/2022 at 4:20 P. M. for your PCP hospital follow up as previously scheduled. [address]      Mercedez Simpson MD Cardiovascular Disease Physician In 1 week Call to schedule hospital follow-up appointment [facility] 14 [facility] 421 [facility]      Sofía Peng MD Clinical Cardiac Electrophysiology Physician, [address] [facility] Vascular Surgery, Cardiovascular Disease Physician In 1 week Call to schedule follow-up appointment [facility] 16   this is your home health agency. Phone: [phone]    Jose C Davis MD Family Medicine In 5 days  [facility] [address] [place] [address]  [phone]          ________________________________________________________________    Risk of deterioration: Moderate    Condition at Discharge:  Stable  __________________________________________________________________    Disposition  IP Rehab    ____________________________________________________________________    Code Status: Full Code  ___________________________________________________________________      Total time in minutes spent coordinating this discharge (includes going over instructions, follow-up, prescriptions, and preparing report for sign off to her PCP) :  >30 minutes    Signed:   Phill Ga MD

## 2022-05-26 NOTE — PROGRESS NOTES
Problem: Falls - Risk of  Goal: *Absence of Falls  Description: Document Christel Simpson Fall Risk and appropriate interventions in the flowsheet.   Outcome: Progressing Towards Goal  Note: Fall Risk Interventions:  Mobility Interventions: Bed/chair exit alarm         Medication Interventions: Bed/chair exit alarm    Elimination Interventions: Bed/chair exit alarm,Call light in reach    History of Falls Interventions: Bed/chair exit alarm         Problem: Patient Education: Go to Patient Education Activity  Goal: Patient/Family Education  Outcome: Progressing Towards Goal     Problem: Syncope  Goal: *Absence of injury  Outcome: Progressing Towards Goal  Goal: Decrease or eliminate episodes of syncope  Outcome: Progressing Towards Goal     Problem: Patient Education: Go to Patient Education Activity  Goal: Patient/Family Education  Outcome: Progressing Towards Goal     Problem: Patient Education: Go to Patient Education Activity  Goal: Patient/Family Education  Outcome: Progressing Towards Goal     Problem: Patient Education: Go to Patient Education Activity  Goal: Patient/Family Education  Outcome: Progressing Towards Goal     Problem: Patient Education: Go to Patient Education Activity  Goal: Patient/Family Education  Outcome: Progressing Towards Goal     Problem: Patient Education: Go to Patient Education Activity  Goal: Patient/Family Education  Outcome: Progressing Towards Goal

## 2022-05-26 NOTE — PROGRESS NOTES
End of Shift Note    Bedside shift change report given to LENIN RN (oncoming nurse) by Juice Almendarez RN (offgoing nurse).   Report included the following information SBAR and Kardex    Shift worked:  DAYS   Shift summary and any significant changes:    STILL VERY ORTHOSTATIC   Concerns for physician to address:  NONE   Zone phone for oncoming shift:   [phone]     Patient Information  Brandon Damon  65 y.o.  5/16/2022 11:27 AM by Myah Portillo MD. Brandon Damon was admitted from Home    Problem List  Patient Active Problem List    Diagnosis Date Noted    Orthostatic hypotension 05/16/2022    Chronic renal disease, stage III 05/10/2022    Pneumonia 03/31/2022    Cor pulmonale (chronic) ([address] 03/23/2022    Ischemic cardiomyopathy 03/04/2022    Pulmonary emphysema ([address] 03/04/2022    Interstitial lung disease ([address] 02/15/2022    Respiratory failure ([address] 02/14/2022    Acute on chronic combined systolic and diastolic ACC/AHA stage C congestive heart failure ([address] 02/12/2022    Syncope 02/17/2020    Rotator cuff arthropathy of left shoulder 09/06/2019    Status post reverse arthroplasty of shoulder 09/06/2019    Right rotator cuff tear arthropathy 09/04/2019    Moderate major depression ([address] 07/03/2018    Depression 07/03/2018    DDD (degenerative disc disease), lumbar 04/02/2018    Chronic anticoagulation 07/17/2017    S/P CABG x 1 06/17/2016    AICD (automatic cardioverter/defibrillator) present 05/13/2016    AVNRT (AV bridgette re-entry tachycardia) ([address] 05/12/2016    Stenosis of both carotid arteries without cerebral infarction 04/12/2016    Cervicogenic headache 04/12/2016    Alzheimer's dementia, late onset, with behavioral disturbance ([address] 04/12/2016    Spinal stenosis, lumbar region, with neurogenic claudication 04/12/2016    Lumbar back pain with radiculopathy affecting right lower extremity 04/12/2016    Lumbar back pain with radiculopathy affecting left lower extremity 04/12/2016  History of stroke 04/12/2016    ACP (advance care planning) 12/30/2015    B12 deficiency 09/22/2015    Hypothyroidism due to acquired atrophy of thyroid 09/22/2015    Osteoporosis 09/22/2015    Cervical post-laminectomy syndrome 09/22/2015    Neck pain 09/22/2015    Lower GI bleeding 08/19/2015    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 ([address] 07/23/2015    Left-sided chest wall pain 11/23/2014    S/P cervical spinal fusion 06/06/2013    Osteoarthritis 05/29/2012    Hyperthyroidism 05/29/2012    Atherosclerosis of native coronary artery of native heart without angina pectoris 05/29/2012    Chronic systolic heart failure ([address] 05/29/2012    Atrial fibrillation ([address] 05/29/2012    Mixed hyperlipidemia 05/29/2012    History of factor V Leiden mutation 03/07/2011    Essential hypertension 03/04/2011     Past Medical History:   Diagnosis Date    Acute combined systolic and diastolic congestive heart failure ([address] 2/12/2022    AICD (automatic cardioverter/defibrillator) present 5/13/2016 5/13/16 [employer] upgrade to dual chamber AICD implant  Dr. Geena Ohara Alzheimer disease [facility])     Anxiety state, other     Arthritis     OSTEO ARTHRITIS    AVNRT (AV bridgette re-entry tachycardia) ([address] 5/12/2016 5/12/16 AVNRT ablation: PM/AICD left upper chest :Dr. Roni John Back ache     CAD (coronary artery disease)     Cancer ([address] 2004    Prostate cancer    Chest tightness     last episode of chest pain 5/2016 before AICD placed; none since then    Coagulation defects 2005    FACTOR V LEIDEN    Dementia ([address]     Depression     Dizziness     FH: factor V Leiden deficiency     Generalized muscle ache     GERD (gastroesophageal reflux disease)     HEARTBURN    Heart failure ([address] 2014    as of 07/2019 EF 30-35;  Dr. Elvis Kuhn, Cardiomyopathy    Hyperlipidemia, mixed     Hypertension     Long term current use of anticoagulant therapy     Other ill-defined conditions(799.89) 2004    blood transfusion    Pacemaker     Paroxysmal atrial fibrillation (HCC)     rate controlled with coreg     Postsurgical aortocoronary bypass status 05/29/2012    CABG    Presence of cardiac defibrillator 05/2016    left upper chest    Pulmonary emphysema ([facility] 3/4/2022    RMSF [facility] spotted fever)     SSS (sick sinus syndrome) ([facility]     Stroke ([facility] 2011, 1990's    SEVERAL-mini    Syncope     Thromboembolism ([facility] 2014    left leg: changed to Eliquis from Warfarin    Thromboembolus ([facility] 2005    left leg    Thyroid disease     Weakness generalized        Core Measures:  CVA: No No  CHF:No No  PNA:No No    Activity:  Activity Level: Up with Assistance  Number times ambulated in hallways past shift: 0  Number of times OOB to chair past shift: 0    Cardiac:   Cardiac Monitoring: Yes      Cardiac Rhythm: Sinus Rhythm    Access:   Current line(s): PIV   Central Line? No Placement date  Reason Medically Necessary   PICC LINE? No Placement date Reason Medically Necessary     Genitourinary:   Urinary status: voiding  Urinary Catheter? No Placement Date  Reason Medically Necessary     Respiratory:   O2 Device: Nasal cannula  Chronic home O2 use?: YES  Incentive spirometer at bedside: NO       GI:  Last Bowel Movement Date: 05/25/22  Current diet:  ADULT DIET Easy to Chew; Low Fat/Low Chol/High Fiber/2 gm Na  Passing flatus: YES  Tolerating current diet: YES       Pain Management:   Patient states pain is manageable on current regimen: YES    Skin:  Fernando Score: 19  Interventions: increase time out of bed, PT/OT consult and nutritional support     Patient Safety:  Fall Score:  Total Score: 6  Interventions: bed/chair alarm, assistive device (walker, cane, etc), gripper socks and pt to call before getting OOB  High Fall Risk: Yes  @Rollbelt  @dexterity to release roll belt  Yes/No ( must document dexterity  here by stating Yes or No here, otherwise this is a restraint and must follow restraint documentation policy.)    DVT prophylaxis:  DVT prophylaxis Med- Yes  DVT prophylaxis SCD or JAMSHID- Yes     Wounds: (If Applicable)  Wounds-Yes  Location R arm    Active Consults:  IP CONSULT TO CARDIOLOGY  IP CONSULT TO INFECTIOUS DISEASES    Length of Stay:  Expected LOS: 2d 7h  Actual LOS: 2  Discharge Plan: No TBBRONSON Almendarez, MARY CARMEN

## 2022-05-26 NOTE — PROGRESS NOTES
No further CM needs identified. CM notified pt's nurse of d/c. Transition of Care Plan:     RUR: 18% - \"moderate risk\"   Disposition: SNF - [place] [address].    *Report: [phone], ask for [address]   *Room: [address]Fax: [phone]  Follow up appointments: PCP & specialists as indicated  DME needed: Defer to SNF for DME needs  Transportation at Discharge: [facility] secured for 2:30 PM; PCS completed, copy on chart  Keys or means to access home: N/A - pt transitioning to SNF at d/c      IM Medicare Letter: 2nd IM reviewed/signed at bedside 5/26/22; copy on chart  Is patient a BCPI-A Bundle: No        Is patient a [place] and connected with the [facility]? No            Caregiver Contact: Pt's wife Juan Blocker: [phone])   Discharge Caregiver contacted prior to discharge? Wife contacted the day of d/c (5/26/22); wife agreeable to the d/c plan   Care Conference needed?: No  COVID-19 test: Rapid COVID-19 test completed 5/24/22 & 5/26/22; both negative     Update - 12:28 PM: CM faxed d/c summary to [place] [address]. for reference. CM will remain accessible for consult if additional needs arise prior to d/c.     Update - 9:17 AM: CM met with pt, reviewed plan for d/c, and confirmed he's agreeable. Pt informed of [facility] transport secured for 2:30 PM. No additional questions/concerns identified. 2nd IM reviewed/signed; copy on chart. Update - 8:46 AM: MD confirmed pt is medically stable for d/c to SNF. MD informed of need for hard scripts if pt d/c on any controlled substances. CM contacted pt's wife, reviewed plan for d/c, and confirmed she's agreeable. Wife informed of [facility] transport requested for 2:30 PM. Wife provided with pt's room assignment at the facility. No additional questions/concerns identified. Wife reported she plans to meet pt at the facility. CM will f/u with pt at bedside to provide overview of the d/c plan. Initial note: Chart reviewed.  CM received confirmation from [place] [address]. admission liaison Belinda Williamson) that pt's insurance auth successfully processed & facility can accept today for admission. Number for report & room assignment reflected in [facility] plan detailed above. CM faxed chest x-ray, COVID-19 results, wound care instructions, and MAR to [place] [address]. for reference (f: [phone]). CM will fax pt's d/c summary to the facility once available. CM sent referral via [employer] to [facility] requesting medical transport at 2:30 PM; request pending confirmation. [place] [address]. aware of transport request for 2:30 PM.    CM will Perfect Serve MD to report updates related to disposition. CM will f/u with pt and wife Kathie Triana) to review plan for d/c & address questions/concerns as needed. Care Management Interventions  PCP Verified by CM:  Yes  Palliative Care Criteria Met (RRAT>21 & CHF Dx)?: Yes (CHF dx)  Palliative Consult Recommended?: No  Reason Palliative Care Not Recommended?: Palliative Care not utilized by provider  Mode of Transport at Discharge: [facility] Transport Time of Discharge: 1430  Transition of Care Consult (CM Consult): SNF  Partner SNF: No  Reason Why Partner SNF Not Chosen: Location  Discharge Durable Medical Equipment: No  Physical Therapy Consult: Yes  Occupational Therapy Consult: Yes  Speech Therapy Consult: No  Support Systems: Spouse/Significant Other,Other Family Member(s)  Confirm Follow Up Transport: Family  The Plan for Transition of Care is Related to the Following Treatment Goals : SNF  The Patient and/or Patient Representative was Provided with a Choice of Provider and Agrees with the Discharge Plan?: Yes  Name of the Patient Representative Who was Provided with a Choice of Provider and Agrees with the Discharge Plan: patient's wife Kathie Triana)  Freedom of Choice List was Provided with Basic Dialogue that Supports the Patient's Individualized Plan of Care/Goals, Treatment Preferences and Shares the Quality Data Associated with the Providers?: Yes  [employer] Information Provided?: No  Discharge Location  Patient Expects to be Discharged to[de-identified] Skilled nursing facility ([place] [address].)    Transition of Care Plan to SNF/Rehab    SNF/Rehab Transition:  Patient has been accepted to [address] and meets criteria for admission. Patient will transported by [facility] and expected to leave at 2:30 PM 5/26/22    Communication to Patient/Family:  Reviewed plan with pt and wife Justa Morris) and confirmed they are agreeable to the transition plan. Communication to SNF/Rehab:  Bedside RN has been notified to update the transition plan to the facility and call report: [phone], ask for [address]  Discharge information has been updated on the AVS.     Discharge instructions to be fax'd to facility at: [phone]    Nursing Please include all hard scripts for controlled substances, med rec and dc summary, and AVS in packet. Reviewed and confirmed with [address] that they can manage the patient care needs for the following:     [place] with (X) only those applicable:    Medication:  [x]  Medications will be available at the facility  []  IV Antibiotics   []  Controlled Substance - hard copy to be sent with patient   []  Weekly Labs   Documents:  [] Hard RX  [x] MAR  [x] Kardex  [x] AVS  [x]Transfer Summary  [x]Discharge   Equipment:  []  CPAP/BiPAP  []  Wound Vacuum  []  Cote or Urinary Device  []  PICC/Central Line  []  Nebulizer  []  Ventilator   Treatment:  []Isolation (for MRSA, VRE, etc.)  []Surgical Drain Management  []Tracheostomy Care  [x]Dressing Changes/wound care instructions:   Complete every other day on right forearm/wrist area: remove the old dressing but leave the Silicone layer in place (the dressing with the holes in it): cleanse the wound through the silicone layer and wipe the wound without removing it.  Apply the Petrolatum dressing on top of it and cover with 4x4s and wrap with small roll christen and then an ACE bandage for better protection  []Dialysis with transportation and chair time   []PEG Care  [x]Oxygen: 2L of O2  [x]Daily Weights for Heart Failure: CHF dx   Dietary:  [x]Any diet limitations:  Primary Diet: Easy to Chew   Cardiac Restriction: Low Fat/Low Chol/High Fiber/2 gm Na   []Tube Feedings   []Total Parenteral Management (TPN)   Eligible for Medicaid Long Term Services and Supports  Yes:  [] Eligible for medical assistance or will become eligible within 180 days and UAI completed. [] Provider/Patient and/or support system has requested screening. [] UAI copy provided to patient or responsible party  [x] UAI unavailable at discharge will send once processed to SNF provider: LTSS to be completed & faxed to SNF once available   [] UAI unavailable at discharged mailed to patient  No:   [] Private pay and is not financially eligible for Medicaid within the next 180 days. [] Reside out-of-state.   [] A residents of a state owned/operated facility that is licensed  by [address] and [facility] or [facility]  [] Enrollment in [facility] hospice services  [] [address] [place] [address]  [] Patient /Family declines to have screening completed or provide financial information for screening     Financial Resources:  Medicaid    [] Initiated and application pending   [x] Full coverage     Advanced Care Plan:  [x]Surrogate Decision Maker of Care: Pt's wife Tara Pena: [phone])   []POA  [x]Communicated Code Status: FULL   Other       Malika Guy, [address], [address]

## 2022-05-26 NOTE — PROGRESS NOTES
End of Shift Note     Bedside shift change report given to Estefany Kirkpatrick RN (oncoming nurse) by Bubba Dickson RN (offgoing nurse).   Report included the following information SBAR, Kardex and Recent Results     Shift worked: nights   Shift summary and any significant changes:     No changes           Concerns for physician to address: None   Zone phone for oncoming shift:  [phone]      Patient Information  Mihir Tapia  65 y.o.  5/16/2022 11:27 AM by Edna Collado MD. Dharmesh Amin admitted from Home     Problem List          Patient Active Problem List     Diagnosis Date Noted    Orthostatic hypotension 05/16/2022    Chronic renal disease, stage III 05/10/2022    Pneumonia 03/31/2022    Cor pulmonale (chronic) ([address] 03/23/2022    Ischemic cardiomyopathy 03/04/2022    Pulmonary emphysema ([address] 03/04/2022    Interstitial lung disease ([address] 02/15/2022    Respiratory failure ([address] 02/14/2022    Acute on chronic combined systolic and diastolic ACC/AHA stage C congestive heart failure ([address] 02/12/2022    Syncope 02/17/2020    Rotator cuff arthropathy of left shoulder 09/06/2019    Status post reverse arthroplasty of shoulder 09/06/2019    Right rotator cuff tear arthropathy 09/04/2019    Moderate major depression ([address] 07/03/2018    Depression 07/03/2018    DDD (degenerative disc disease), lumbar 04/02/2018    Chronic anticoagulation 07/17/2017    S/P CABG x 1 06/17/2016    AICD (automatic cardioverter/defibrillator) present 05/13/2016    AVNRT (AV bridgette re-entry tachycardia) ([address] 05/12/2016    Stenosis of both carotid arteries without cerebral infarction 04/12/2016    Cervicogenic headache 04/12/2016    Alzheimer's dementia, late onset, with behavioral disturbance ([address] 04/12/2016    Spinal stenosis, lumbar region, with neurogenic claudication 04/12/2016    Lumbar back pain with radiculopathy affecting right lower extremity 04/12/2016    Lumbar back pain with radiculopathy affecting left lower extremity 04/12/2016    History of stroke 04/12/2016    ACP (advance care planning) 12/30/2015    B12 deficiency 09/22/2015    Hypothyroidism due to acquired atrophy of thyroid 09/22/2015    Osteoporosis 09/22/2015    Cervical post-laminectomy syndrome 09/22/2015    Neck pain 09/22/2015    Lower GI bleeding 08/19/2015    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 ([address] 07/23/2015    Left-sided chest wall pain 11/23/2014    S/P cervical spinal fusion 06/06/2013    Osteoarthritis 05/29/2012    Hyperthyroidism 05/29/2012    Atherosclerosis of native coronary artery of native heart without angina pectoris 05/29/2012    Chronic systolic heart failure ([address] 05/29/2012    Atrial fibrillation ([address] 05/29/2012    Mixed hyperlipidemia 05/29/2012    History of factor V Leiden mutation 03/07/2011    Essential hypertension 03/04/2011              Past Medical History:   Diagnosis Date    Acute combined systolic and diastolic congestive heart failure ([address] 2/12/2022    AICD (automatic cardioverter/defibrillator) present 5/13/2016 5/13/16 [employer] upgrade to dual chamber AICD implant  Dr. Bobo Pablo    Alzheimer disease ([address]      Anxiety state, other      Arthritis       OSTEO ARTHRITIS    AVNRT (AV bridgette re-entry tachycardia) ([address] 5/12/2016 5/12/16 AVNRT ablation: PM/AICD left upper chest :Dr. Nikolay King Back ache      CAD (coronary artery disease)      Cancer ([address] 2004     Prostate cancer    Chest tightness       last episode of chest pain 5/2016 before AICD placed; none since then    Coagulation defects 2005     FACTOR V LEIDEN    Dementia ([address]      Depression      Dizziness      FH: factor V Leiden deficiency      Generalized muscle ache      GERD (gastroesophageal reflux disease)       HEARTBURN    Heart failure ([address] 2014     as of 07/2019 EF 30-35;  Dr. Busch Books, Cardiomyopathy    Hyperlipidemia, mixed      Hypertension    Long term current use of anticoagulant therapy      Other ill-defined conditions(799.89) 2004     blood transfusion    Pacemaker      Paroxysmal atrial fibrillation (HCC)       rate controlled with coreg     Postsurgical aortocoronary bypass status 05/29/2012     CABG    Presence of cardiac defibrillator 05/2016     left upper chest    Pulmonary emphysema ([facility] 3/4/2022    [facility] spotted fever)      SSS (sick sinus syndrome) ([facility]      Stroke ([facility] 2011, 1990's     SEVERAL-mini    Syncope      Thromboembolism ([facility] 2014     left leg: changed to Eliquis from Warfarin    Thromboembolus ([facility] 2005     left leg    Thyroid disease      Weakness generalized           Core Measures:  CVA: No No  CHF:Yes No  PNA:No No     Activity:  Activity Level: Up with Assistance  Number times ambulated in hallways past shift: 0  Number of times OOB to chair past shift: 0     Cardiac:   Cardiac Monitoring: Yes      Cardiac Rhythm: Sinus Rhythm     Access:   Current line(s): PIV     Genitourinary:   Urinary status: voiding  Urinary Catheter? No     Respiratory:   O2 Device: Nasal cannula  Chronic home O2 use?: YES  Incentive spirometer at bedside: NO     GI:  Last Bowel Movement Date: 05/19/22  Current diet:  ADULT DIET Easy to Chew; Low Fat/Low Chol/High Fiber/2 gm Na  Passing flatus: YES  Tolerating current diet: YES     Pain Management:   Patient states pain is manageable on current regimen: YES     Skin:  Fernando Score: 19  Interventions: float heels, increase time out of bed, limit briefs and nutritional support     Patient Safety:  Fall Score: Total Score: 6  Interventions: bed/chair alarm, assistive device (walker, cane, etc), gripper socks, pt to call before getting OOB and stay with me (per policy)  High Fall Risk:  Yes     DVT prophylaxis:  DVT prophylaxis Med- Yes  DVT prophylaxis SCD or JAMSHID- Yes      Wounds: (If Applicable)  Wounds- Yes  Location R arm     Active Consults:  IP CONSULT TO CARDIOLOGY  IP CONSULT TO INFECTIOUS DISEASES     Length of Stay:  Expected LOS: 2d 7h  Actual LOS: 9  Discharge Plan:  To [facility] pending insurance auth         Jazzmine Olivier RN

## 2022-05-27 NOTE — PROGRESS NOTES
Contacted [facility] for updated ETA for transport. [facility] rep [facility] stated revised ETA 2245. Notified patient. Attempted to call [facility] rehab. No answer at [phone] contacted [facility] for updated ETA.   [facility] estimates [id number]

## 2022-06-06 NOTE — PROGRESS NOTES
Albertina Mathias is a 65 y.o. male, evaluated via audio-only technology on 6/6/2022 for Cough (non-productive   ([phone])), Nasal Congestion (Greenish mucous), Generalized Body Aches, Foot Pain (Bilateral ), and Back Pain (6/10)  . Assessment & Plan:   Diagnoses and all orders for this visit:    1. Cervical post-laminectomy syndrome  -     AMB SUPPLY ORDER  -     REFERRAL TO HOME HEALTH    2. Alzheimer's dementia, late onset, with behavioral disturbance ([facility]  -     AMB SUPPLY ORDER  -     REFERRAL TO HOME HEALTH    3. Ischemic cardiomyopathy  -     AMB SUPPLY ORDER  -     REFERRAL TO HOME HEALTH    4. Stage 3b chronic kidney disease ([facility]    5. Cough in adult  -     azithromycin (ZITHROMAX) 250 mg tablet; Take 2 tablets today, then take 1 tablet daily    Empiric abx. Doubt he will succeed at home as his wife does not have physical capacity to care for him. Hospital bed, wheelchair and raised toilet seat ordered due to debility and positioning required not achievable in a conventional bed. HH restarted. 12  Subjective:     Admitted to hospital and discharged 5/16 for hypotension and FTT. Has been at [facility] and left AMA 6/4. Entresto, furosemide and metoprolol stopped, midodrine added. Returned home with productive cough and malaise. Prior to Admission medications    Medication Sig Start Date End Date Taking? Authorizing Provider   azithromycin (ZITHROMAX) 250 mg tablet Take 2 tablets today, then take 1 tablet daily 6/6/22 6/11/22 Yes Eduard Jack MD   [facility]) 10 mg tablet Take 1 Tablet by mouth two (2) times a day. 5/26/22  Yes Trudi Lux MD   pantoprazole (PROTONIX) 40 mg tablet Take 1 Tablet by mouth daily for 30 days. 5/27/22 6/26/22 Yes Trudi Lux MD   midodrine (PROAMATINE) 10 mg tablet Take 1 Tablet by mouth three (3) times daily (with meals) for 30 days.  Indications: a feeling of dizziness upon standing due to a drop in blood pressure 5/26/22 6/25/22 Yes Sam Leonard MD   [facility]) 5 mg tablet Take 5 mg by mouth two (2) times a day. Yes Provider, Historical   polyethylene glycol (MIRALAX) 17 gram packet Take 17 g by mouth daily as needed for Constipation. Yes Provider, Historical   donepeziL (ARICEPT) 5 mg tablet Take 5 mg by mouth nightly. Yes Provider, Historical   mirabegron ER (Myrbetriq) 50 mg ER tablet Take 50 mg by mouth daily. Yes Provider, Historical   sertraline (ZOLOFT) 100 mg tablet Take 200 mg by mouth daily. Yes Provider, Historical   DULoxetine (CYMBALTA) 60 mg capsule Take 1 Capsule by mouth daily.  5/10/22  Yes Shira Townsend MD   atorvastatin (LIPITOR) 40 mg tablet TAKE 1 TABLET BY MOUTH AT BEDTIME 1/31/22  Yes Adeel Phipps MD     Patient Active Problem List   Diagnosis Code    Essential hypertension I10    History of factor V Leiden mutation Z86.2    Osteoarthritis M19.90    Hyperthyroidism E05.90    Atherosclerosis of native coronary artery of native heart without angina pectoris I25.10    Chronic systolic heart failure (HCC) I50.22    Atrial fibrillation (HCC) I48.91    Mixed hyperlipidemia E78.2    S/P cervical spinal fusion Z98.1    Left-sided chest wall pain R07.89    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) F02.81    Lower GI bleeding K92.2    B12 deficiency E53.8    Hypothyroidism due to acquired atrophy of thyroid E03.4    Osteoporosis M81.0    Cervical post-laminectomy syndrome M96.1    Neck pain M54.2    ACP (advance care planning) Z71.89    Stenosis of both carotid arteries without cerebral infarction I65.23    Cervicogenic headache G44.86    Alzheimer's dementia, late onset, with behavioral disturbance (HCC) G30.1, F02.81    Spinal stenosis, lumbar region, with neurogenic claudication M48.062    Lumbar back pain with radiculopathy affecting right lower extremity M54.16    Lumbar back pain with radiculopathy affecting left lower extremity M54.16    History of stroke Z80.78    AVNRT (AV bridgette re-entry tachycardia) ([facility]) I47.1    AICD (automatic cardioverter/defibrillator) present Z95.810    S/P CABG x 1 Z95.1    Chronic anticoagulation Z79.01    DDD (degenerative disc disease), lumbar M51.36    Moderate major depression ([facility]) F32.1    Depression F32. A    Right rotator cuff tear arthropathy M75.101, M12.811    Rotator cuff arthropathy of left shoulder M12.812    Status post reverse arthroplasty of shoulder Z96.619    Syncope R55    Acute on chronic combined systolic and diastolic ACC/AHA stage C congestive heart failure ([facility]) I50.43    Respiratory failure ([facility]) J96.90    Interstitial lung disease ([facility]) J84.9    Ischemic cardiomyopathy I25.5    Pulmonary emphysema ([facility]) J43.9    Cor pulmonale (chronic) ([facility]) I27.81    Pneumonia J18.9    Chronic renal disease, stage III N18.30    Orthostatic hypotension I95.1     Current Outpatient Medications   Medication Sig Dispense Refill    azithromycin (ZITHROMAX) 250 mg tablet Take 2 tablets today, then take 1 tablet daily 6 Tablet 0    memantine (NAMENDA) 10 mg tablet Take 1 Tablet by mouth two (2) times a day. 180 Tablet 3    pantoprazole (PROTONIX) 40 mg tablet Take 1 Tablet by mouth daily for 30 days. 30 Tablet 0    midodrine (PROAMATINE) 10 mg tablet Take 1 Tablet by mouth three (3) times daily (with meals) for 30 days. Indications: a feeling of dizziness upon standing due to a drop in blood pressure 90 Tablet 0    apixaban (ELIQUIS) 5 mg tablet Take 5 mg by mouth two (2) times a day.  polyethylene glycol (MIRALAX) 17 gram packet Take 17 g by mouth daily as needed for Constipation.  donepeziL (ARICEPT) 5 mg tablet Take 5 mg by mouth nightly.  mirabegron ER (Myrbetriq) 50 mg ER tablet Take 50 mg by mouth daily.  sertraline (ZOLOFT) 100 mg tablet Take 200 mg by mouth daily.  DULoxetine (CYMBALTA) 60 mg capsule Take 1 Capsule by mouth daily.  30 Capsule 0    atorvastatin (LIPITOR) 40 mg tablet TAKE 1 TABLET BY MOUTH AT BEDTIME 90 Tablet 3     No Known Allergies    ROS    No data recorded     Suze Mao was evaluated through a patient-initiated, synchronous (real-time) audio only encounter. He (or guardian if applicable) is aware that it is a billable service, which includes applicable co-pays, with coverage as determined by his insurance carrier. This visit was conducted with the patient's (and/or Kelly Batres guardian's) verbal consent. He has not had a related appointment within my department in the past 7 days or scheduled within the next 24 hours. The patient was located in a state where the provider was licensed to provide care. The patient was located at: Home: [address] [place] [phone]  The provider was located at: Facility ([facility]): [place] [phone]    Total Time: minutes: 21-30 minutes    Andrés Hutchison MD 1. \"Have you been to the ER, urgent care clinic since your last visit? Hospitalized since your last visit? \" No    2. \"Have you seen or consulted any other health care providers outside of the [address] [place] [address] since your last visit? \" No     3. For patients aged 39-70: Has the patient had a colonoscopy / FIT/ Cologuard? NA - based on age      If the patient is female:    4. For patients aged 41-77: Has the patient had a mammogram within the past 2 years? No      5. For patients aged 21-65: Has the patient had a pap smear? NA - based on age or sex      Spoke to patients wife during VV after HIPPA form verified.

## 2022-06-08 NOTE — TELEPHONE ENCOUNTER
Spoke to patients wife Bethany Avalos) after verifying HIPPA, Name, , states Ashfield Kevin was coming in from outside and he became lightheaded and dizzy. Denies: fall. They helped him back in, vitals: B/P 140/73, 76HR, 92% Oxygen. Consumed 2 bottles of water so far today. Advised if symptoms worsened to follow up or go to the ER to be evaluated further. Acknowledged understanding.

## 2022-06-09 PROBLEM — I50.9 CHF (CONGESTIVE HEART FAILURE) (HCC): Status: ACTIVE | Noted: 2022-01-01

## 2022-06-09 NOTE — TELEPHONE ENCOUNTER
Spoke with patient's wife, bp is better, WNL. Pulse is 64, O2 86. I advised he to take patient to ED if O2 level does not rise to 90. Patient was sleeping, and she will re-check 02 level in next several minutes when he rises.

## 2022-06-09 NOTE — ED TRIAGE NOTES
Pt arrived by EMS for near syncopal episode. Per EMS pt called twice, pt called earlier for EMS for not feeling well but declined transport, pt called a 2nd time for weakness/SOB and EMS noted pt had a near syncopal episode when walking back from the bathroom, negative on stroke scale. Pt noted with SpO2 in the 70's, pt is on home O2 at [facility]. Pt was discharged from [facility] a week ago for which he was receiving PT. EMS placed pt on 4LNC with improvement of SpO2 96%, pt was given a duo neb, zofran and solumedrol PTA.   Pt arrived awake alert and oriented X 4, pt educated on ER flow

## 2022-06-09 NOTE — TELEPHONE ENCOUNTER
I have confirmed with Doreen Luna that [facility] can supply patient with a hospital bed and standard wheelchair. I have re-faxed the order and notes to [facility] attention Doreen Luna. He will take care of making sure that the patient has this equipment delivered to his home.

## 2022-06-09 NOTE — ED NOTES
TRANSFER - OUT REPORT:    Verbal report given to Lorena Garcia (name) on Albertina Mathias  being transferred to [facility] (unit) for routine progression of care       Report consisted of patients Situation, Background, Assessment and   Recommendations(SBAR). Information from the following report(s) SBAR, Kardex and ED Summary was reviewed with the receiving nurse. Lines:   Peripheral IV 06/09/22 Left Antecubital (Active)       Peripheral IV 06/09/22 Left (Active)   Site Assessment Clean, dry, & intact 06/09/22 1709   Phlebitis Assessment 0 06/09/22 1709   Infiltration Assessment 0 06/09/22 1709   Dressing Status Clean, dry, & intact 06/09/22 1709        Opportunity for questions and clarification was provided.       Patient transported with:   Monitor  O2 @ 4 liters

## 2022-06-09 NOTE — ED PROVIDER NOTES
EMERGENCY DEPARTMENT HISTORY AND PHYSICAL EXAM          Date: 6/9/2022  Patient Name: Obi Hernandez    History of Presenting Illness     Chief Complaint   Patient presents with    Syncope       History Provided By: Patient    HPI: Obi Hernandez is a 65 y.o. male, pmhx listed below, who presents to the ED c/o shortness of breath. Patient reports he has a history of syncope secondary to orthostatic hypotension, was in [place] for Rehab on his most recent hospitalization. Reports he signed out last week because he did not like the care he was receiving there. Today patient reports he started feeling more short of breath than usual.  Called EMS and declined transport, called them back later because he was not improving. States he really wears 2 L of oxygen, tried to use a nebulizer treatment but still did not feel better. EMS got on scene and found patient had an O2 sat of 73%. Gave patient nebulizer as well as Solu-Medrol 125 in route to hospital.  On 4 L nasal cannula, EMS maintained O2 sat in the 90s. EMS also noted that patient had a syncopal event he stood up to use a bedside commode, witnessed by them, no head injury, he returned to baseline after sitting down. PCP: Solange Elena MD    There are no other complaints, changes, or physical findings at this time.          Past History       Past Medical History:  Past Medical History:   Diagnosis Date    Acute combined systolic and diastolic congestive heart failure ([address] 2/12/2022    AICD (automatic cardioverter/defibrillator) present 5/13/2016 5/13/16 [employer] upgrade to dual chamber AICD implant  Dr. Laz Rodriguez disease [facility])     Anxiety state, other     Arthritis     OSTEO ARTHRITIS    AVNRT (AV bridgette re-entry tachycardia) ([address] 5/12/2016 5/12/16 AVNRT ablation: PM/AICD left upper chest :Dr. Missy Oneal    Back ache     CAD (coronary artery disease)     Cancer [facility]) 2004    Prostate cancer    Chest tightness     last episode of chest pain 5/2016 before AICD placed; none since then    Coagulation defects 2005    FACTOR V LEIDEN    Dementia [facility]     Depression     Dizziness     FH: factor V Leiden deficiency     Generalized muscle ache     GERD (gastroesophageal reflux disease)     HEARTBURN    Heart failure ([address] 2014    as of 07/2019 EF 30-35;  Dr. Osvaldo Kingsley, Cardiomyopathy    Hyperlipidemia, mixed     Hypertension     Long term current use of anticoagulant therapy     Other ill-defined conditions(799.89) 2004    blood transfusion    Pacemaker     Paroxysmal atrial fibrillation (HCC)     rate controlled with coreg     Postsurgical aortocoronary bypass status 05/29/2012    CABG    Presence of cardiac defibrillator 05/2016    left upper chest    Pulmonary emphysema ([address] 3/4/2022    [facility] spotted fever)     SSS (sick sinus syndrome) ([address]     Stroke ([address] 2011, 1990's    SEVERAL-mini    Syncope     Thromboembolism ([address] 2014    left leg: changed to Eliquis from Warfarin    Thromboembolus ([address] 2005    left leg    Thyroid disease     Weakness generalized        Past Surgical History:  Past Surgical History:   Procedure Laterality Date    CARDIAC CATHETERIZATION  3/4/2011         HX HEENT  2019    oral surgery, removed teeth, dentures upper/lower    HX HERNIA REPAIR Left 2002    Ingiunal hernia repair left    HX ORTHOPAEDIC      LEFT KNEE CARTILAGE REPAIR;RIGHT ROTATOR CUFF REPAIR    HX ORTHOPAEDIC  2/14/12    C5-7 ANTERIIOR CERVICAL DISECTOMY AND FUSION    HX ORTHOPAEDIC  6/4/13    C5-C7 POSTERIOR DECOMPRESSION AND FUSION    HX ORTHOPAEDIC      right thumb partial amputation of tip    HX OTHER SURGICAL      steroid injections    HX PACEMAKER Left 05/13/2016    BS D142, Dr. Brain Kramer HX PROSTATECT[place]  2004     [place]    HX Raad Suzette Left 2019    HX UROLOGICAL       urinary control system    HX UROLOGICAL  12/3/13 REPLACEMENT ARTIFICAL URINARY SPHINCTER    [place] CARDIAC SURG PROCEDURE UNLIST      CABG X1 3/5/11       Family History:  Family History   Problem Relation Age of Onset    Asthma Mother     Alcohol abuse Mother     Alcohol abuse Father     Asthma Father     Cancer Sister     Heart Disease Sister     Alcohol abuse Brother     Cancer Brother     Heart Disease Brother        Social History:  Social History     Tobacco Use    Smoking status: Never Smoker    Smokeless tobacco: Never Used   Vaping Use    Vaping Use: Never used   Substance Use Topics    Alcohol use: Not Currently     Alcohol/week: 0.0 standard drinks     Comment: stopped 2010    Drug use: Never       Current Facility-Administered Medications   Medication Dose Route Frequency Provider Last Rate Last Admin    furosemide (LASIX) injection 40 mg  40 mg IntraVENous ONCE John Bray MD         Current Outpatient Medications   Medication Sig Dispense Refill    azithromycin (ZITHROMAX) 250 mg tablet Take 2 tablets today, then take 1 tablet daily 6 Tablet 0    sertraline (ZOLOFT) 100 mg tablet TAKE 2 TABLETS BY MOUTH EVERY  Tablet 3    memantine (NAMENDA) 10 mg tablet Take 1 Tablet by mouth two (2) times a day. 180 Tablet 3    pantoprazole (PROTONIX) 40 mg tablet Take 1 Tablet by mouth daily for 30 days. 30 Tablet 0    midodrine (PROAMATINE) 10 mg tablet Take 1 Tablet by mouth three (3) times daily (with meals) for 30 days. Indications: a feeling of dizziness upon standing due to a drop in blood pressure 90 Tablet 0    apixaban (ELIQUIS) 5 mg tablet Take 5 mg by mouth two (2) times a day.  polyethylene glycol (MIRALAX) 17 gram packet Take 17 g by mouth daily as needed for Constipation.  donepeziL (ARICEPT) 5 mg tablet Take 5 mg by mouth nightly.  mirabegron ER (Myrbetriq) 50 mg ER tablet Take 50 mg by mouth daily.  DULoxetine (CYMBALTA) 60 mg capsule Take 1 Capsule by mouth daily.  30 Capsule 0    atorvastatin (LIPITOR) 40 mg tablet TAKE 1 TABLET BY MOUTH AT BEDTIME 90 Tablet 3       Allergies:  No Known Allergies      Review of Systems   Review of Systems   Constitutional: Negative for chills and fever. HENT: Negative for ear pain. Eyes: Negative for pain. Respiratory: Positive for shortness of breath. Cardiovascular: Negative for chest pain. Gastrointestinal: Negative for abdominal pain. Genitourinary: Negative for flank pain. Musculoskeletal: Negative for back pain. Skin: Negative for rash. Neurological: Negative for headaches. Psychiatric/Behavioral: Negative for agitation. Physical Exam     Vital Signs-Reviewed the patient's vital signs. Patient Vitals for the past 12 hrs:   Temp Pulse Resp BP SpO2   06/09/22 1625 -- 71 19 (!) 131/59 95 %   06/09/22 1548 -- 75 18 -- 95 %   06/09/22 1536 -- 73 23 -- 97 %   06/09/22 1518 98.6 °F (37 °C) 75 17 129/69 98 %       Physical Exam  Vitals reviewed. Constitutional:       Comments: Weak appearing   HENT:      Head: Normocephalic and atraumatic. Mouth/Throat:      Mouth: Mucous membranes are moist.   Cardiovascular:      Rate and Rhythm: Normal rate and regular rhythm. Pulmonary:      Effort: Pulmonary effort is normal.      Breath sounds: Normal breath sounds. Comments: Tachypneic  Abdominal:      Palpations: Abdomen is soft. Tenderness: There is no abdominal tenderness. Musculoskeletal:         General: Normal range of motion. Cervical back: Normal range of motion. Skin:     General: Skin is warm and dry. Neurological:      Mental Status: He is alert and oriented to person, place, and time.    Psychiatric:         Mood and Affect: Mood normal.         Diagnostic Study Results     Labs -     Recent Results (from the past 12 hour(s))   CBC WITH AUTOMATED DIFF    Collection Time: 06/09/22  3:18 PM   Result Value Ref Range    WBC 11.6 (H) 4.1 - 11.1 K/uL    RBC 3.60 (L) 4.10 - 5.70 M/uL    HGB 9.9 (L) 12.1 - 17.0 g/dL    HCT 30.3 (L) 36.6 - 50.3 %    MCV 84.2 80.0 - 99.0 FL    MCH 27.5 26.0 - 34.0 PG    MCHC 32.7 30.0 - 36.5 g/dL    RDW 14.1 11.5 - 14.5 %    PLATELET 515 040 - 631 K/uL    MPV 9.9 8.9 - 12.9 FL    NRBC 0.0 0  WBC    ABSOLUTE NRBC 0.00 0.00 - 0.01 K/uL    NEUTROPHILS 78 (H) 32 - 75 %    LYMPHOCYTES 13 12 - 49 %    MONOCYTES 8 5 - 13 %    EOSINOPHILS 1 0 - 7 %    BASOPHILS 1 0 - 1 %    IMMATURE GRANULOCYTES 0 0.0 - 0.5 %    ABS. NEUTROPHILS 9.0 (H) 1.8 - 8.0 K/UL    ABS. LYMPHOCYTES 1.5 0.8 - 3.5 K/UL    ABS. MONOCYTES 0.9 0.0 - 1.0 K/UL    ABS. EOSINOPHILS 0.1 0.0 - 0.4 K/UL    ABS. BASOPHILS 0.1 0.0 - 0.1 K/UL    ABS. IMM. GRANS. 0.1 (H) 0.00 - 0.04 K/UL    DF AUTOMATED     METABOLIC PANEL, COMPREHENSIVE    Collection Time: 06/09/22  3:18 PM   Result Value Ref Range    Sodium 131 (L) 136 - 145 mmol/L    Potassium 4.5 3.5 - 5.1 mmol/L    Chloride 94 (L) 97 - 108 mmol/L    CO2 29 21 - 32 mmol/L    Anion gap 8 5 - 15 mmol/L    Glucose 108 (H) 65 - 100 mg/dL    BUN 21 (H) 6 - 20 MG/DL    Creatinine 1.20 0.70 - 1.30 MG/DL    BUN/Creatinine ratio 18 12 - 20      GFR est AA >60 >60 ml/min/1.73m2    GFR est non-AA 59 (L) >60 ml/min/1.73m2    Calcium 9.3 8.5 - 10.1 MG/DL    Bilirubin, total 0.6 0.2 - 1.0 MG/DL    ALT (SGPT) 23 12 - 78 U/L    AST (SGOT) 21 15 - 37 U/L    Alk.  phosphatase 118 (H) 45 - 117 U/L    Protein, total 7.4 6.4 - 8.2 g/dL    Albumin 3.2 (L) 3.5 - 5.0 g/dL    Globulin 4.2 (H) 2.0 - 4.0 g/dL    A-G Ratio 0.8 (L) 1.1 - 2.2     TROPONIN-HIGH SENSITIVITY    Collection Time: 06/09/22  3:18 PM   Result Value Ref Range    Troponin-High Sensitivity 18 0 - 76 ng/L   NT-PRO BNP    Collection Time: 06/09/22  3:18 PM   Result Value Ref Range    NT pro-BNP 6,193 (H) 0 - 450 PG/ML   COVID-19 WITH INFLUENZA A/B    Collection Time: 06/09/22  3:29 PM   Result Value Ref Range    SARS-CoV-2 by PCR Not detected NOTD      Influenza A by PCR Not detected NOTD      Influenza B by PCR Not detected NOTD Radiologic Studies -   XR CHEST SNGL V   Final Result   Prominence of the pulmonary interstitial markings as described   above. Findings suggestive of cardiomegaly. Developing congestive change cannot   be excluded. A follow-up chest examination is recommended. CT Results  (Last 48 hours)    None        CXR Results  (Last 48 hours)               06/09/22 1525  XR CHEST SNGL V Final result    Impression:  Prominence of the pulmonary interstitial markings as described   above. Findings suggestive of cardiomegaly. Developing congestive change cannot   be excluded. A follow-up chest examination is recommended. Narrative:  Chest single view dated 6/9/2022       Comparison chest dated 5/15/2022       History is shortness of breath       A single frontal view of the chest was obtained. This film was obtained during a   less than optimal degree of inspiration it is difficult to accurately assess the   size of the cardiac silhouette. There is a suggestion of cardiomegaly. Continues   be evidence of chronic interstitial disease. It should be noted that   superimposed, developing congestive change cannot be excluded. A follow-up chest   examination is recommended. There is obliteration of the left hemidiaphragm and   there is some indistinctness of portions of the right hemidiaphragm. The left   shoulder prosthesis is again noted. Postsurgical change is associated with the   lower cervical spine. EKG interpretation: (Preliminary)  Rhythm: Sinus, rate 71, ST depressions in lateral leads similar to prior EKG morphology. This EKG was interpreted by ED Provider Chiquita Bright MD        Medical Decision Making   I am the first provider for this patient. I reviewed the vital signs, available nursing notes, past medical history, past surgical history, family history and social history.     Records Reviewed: Nursing Notes and Old Medical Records    Provider Notes (Medical Decision Making): MDM: 50-year-old male with shortness of breath. Noted to be hypoxic prior to arrival.  Received nebulizer, increased oxygen by nasal cannula, and steroids. Here patient is not wheezing and not complaining of cough. Concern for CAD, CHF, pneumonia, COVID, COPD. We will plan for chest x-ray, lab work, EKG now. Unclear disposition pending results of testing. Initial assessment performed. The patients presenting problems have been discussed, and they are in agreement with the care plan formulated and outlined with them. I have encouraged them to ask questions as they arise throughout their visit. PROGRESS NOTE:    COVID-negative. proBNP elevated as well as signs of open congestive change on x-ray and sodium of 131. Will give dose of Lasix now. Case discussed with Dr. Byron Talavera for admission. Patient, wife, nephew updated on plan of care. Diagnosis     Clinical Impression:   1. Acute on chronic diastolic CHF (congestive heart failure) ([facility])            Disposition:      Current Discharge Medication List            Please note, this dictation was completed with [employer], the computer voice recognition software. Quite often unanticipated grammatical, syntax, homophones, and other interpretive errors are inadvertently transcribed by the computer software. Please disregard these errors. Please excuse any errors that have escaped final proof reading.

## 2022-06-09 NOTE — H&P
[facility]   Admission History & Physical        6/9/2022 7:44 PM  Patient: Carla Calix 1945  PCP: Dee Arechiga MD    HISTORY  Chief Complaint:   Chief Complaint   Patient presents with    Syncope       HPI: 65 y.o. male presenting for admission to [facility] for further evaluation and treatment for Acute on chronic combined systolic and diastolic ACC/AHA stage C congestive heart failure ([address] He  has a past medical history of Acute combined systolic and diastolic congestive heart failure ([address] (2/12/2022), AICD (automatic cardioverter/defibrillator) present (5/13/2016), Alzheimer disease ([address] Anxiety state, other, Arthritis, AVNRT (AV bridgette re-entry tachycardia) ([address] (5/12/2016), Back ache, CAD (coronary artery disease), Cancer ([address] (2004), Chest tightness, Coagulation defects (2005), Dementia ([address] Depression, Dizziness, FH: factor V Leiden deficiency, Generalized muscle ache, GERD (gastroesophageal reflux disease), Heart failure ([address] (2014), Hyperlipidemia, mixed, Hypertension, Long term current use of anticoagulant therapy, Other ill-defined conditions(799.89) (2004), Pacemaker, Paroxysmal atrial fibrillation ([address] Postsurgical aortocoronary bypass status (05/29/2012), Presence of cardiac defibrillator (05/2016), Pulmonary emphysema ([address] (3/4/2022), RMSF [facility] spotted fever), SSS (sick sinus syndrome) ([address] Stroke ([address] (2011, 1990's), Syncope, Thromboembolism ([address] (2014), Thromboembolus ([address] (2005), Thyroid disease, and Weakness generalized. He has no past medical history of Asthma, Carotid artery disease ([address] Chronic kidney disease, Diabetes ([address] Glaucoma, Murmur, Myocardial infarction ([address] Pulmonary embolism ([address] Rheumatic fever, or Valvular heart disease. .     Patient presenting to the ED via EMS following an episode of extreme dyspnea at home this afternoon.   Patient was last hospitalized from May 16 through May 26 at [facility]  He was primarily hospitalized with a diagnosis of orthostatic hypotension. .  ED exam then noted orthstatic drop in /61 down to 76/45 standing. Patient was admitted for the diagnosis of syncope and the need for AICD interrogation. At that time he had a syncopal episode while talking to a friend in a convenience store. His friend was able to avoid trauma and noted his altered status lasted for about 5 minutes. At [facility] he received 1 L of IV saline but remained orthostatic. Transferred to [facility].  Was seen by cardiology. His syncope was felt to be most likely due to orthostatic hypotension. He was discharged on Sacubitril-valsartan 24-26 mg po bid, Metoprolol succinate 12.5 mg po daily, and Lasix 20mg daily. He was advised to continue JAMSHID hose, midodrine 10 mg 3 times daily, apixaban 5 mg twice daily and atorvastatin 40 mg daily. Titers were concerning for [facility] spotted fever and follow-up reflex IFA was advised in July. Patient symptomatically proved during transport and on arrival to the ED. He initially received oxygen at 4 L nasal cannula, but was gradually tapered.       - Furosemide 20 mg po daily stopped  Past Medical History:  Past Medical History:   Diagnosis Date    Acute combined systolic and diastolic congestive heart failure ([address] 2/12/2022    AICD (automatic cardioverter/defibrillator) present 5/13/2016 5/13/16 [employer] upgrade to dual chamber AICD implant  Dr. Jayson Muhammad disease [facility])     Anxiety state, other     Arthritis     OSTEO ARTHRITIS    AVNRT (AV bridgette re-entry tachycardia) ([address] 5/12/2016 5/12/16 AVNRT ablation: PM/AICD left upper chest :Dr. Erick Muniz Back ache     CAD (coronary artery disease)     Cancer [facility]) 2004    Prostate cancer    Chest tightness     last episode of chest pain 5/2016 before AICD placed; none since then    Coagulation defects 2005    FACTOR V LEIDEN    Dementia ([address]     Depression     Dizziness     FH: factor V Leiden deficiency     Generalized muscle ache     GERD (gastroesophageal reflux disease)     HEARTBURN    Heart failure ([address] 2014    as of 07/2019 EF 30-35;  Dr. Mercy Grant, Cardiomyopathy    Hyperlipidemia, mixed     Hypertension     Long term current use of anticoagulant therapy     Other ill-defined conditions(799.89) 2004    blood transfusion    Pacemaker     Paroxysmal atrial fibrillation (HCC)     rate controlled with coreg     Postsurgical aortocoronary bypass status 05/29/2012    CABG    Presence of cardiac defibrillator 05/2016    left upper chest    Pulmonary emphysema ([facility] 3/4/2022    RMSF [facility] spotted fever)     SSS (sick sinus syndrome) ([facility]     Stroke ([facility] 2011, 1990's    SEVERAL-mini    Syncope     Thromboembolism ([address] 2014    left leg: changed to Eliquis from Warfarin    Thromboembolus ([facility] 2005    left leg    Thyroid disease     Weakness generalized        Past Surgical History:  Past Surgical History:   Procedure Laterality Date    CARDIAC CATHETERIZATION  3/4/2011         HX HEENT  2019    oral surgery, removed teeth, dentures upper/lower    HX HERNIA REPAIR Left 2002    Ingiunal hernia repair left    HX ORTHOPAEDIC      LEFT KNEE CARTILAGE REPAIR;RIGHT ROTATOR CUFF REPAIR    HX ORTHOPAEDIC  2/14/12    C5-7 ANTERIIOR CERVICAL DISECTOMY AND FUSION    HX ORTHOPAEDIC  6/4/13    C5-C7 POSTERIOR DECOMPRESSION AND FUSION    HX ORTHOPAEDIC      right thumb partial amputation of tip    HX OTHER SURGICAL      steroid injections    HX PACEMAKER Left 05/13/2016    BS D142, Dr. Madelyn Rivera HX PROSTATECTOMY  2004     [place]    HX ROTATOR CUFF REPAIR Left 2019    HX UROLOGICAL       urinary control system    HX UROLOGICAL  12/3/13    REPLACEMENT ARTIFICAL URINARY SPHINCTER    IA CARDIAC SURG PROCEDURE UNLIST      CABG X1 3/5/11       Medication:  Prior to Admission medications    Medication Sig Start Date End Date Taking? Authorizing Provider   sertraline (ZOLOFT) 100 mg tablet TAKE 2 TABLETS BY MOUTH EVERY DAY 6/6/22  Yes Clemencia Mckeon MD   memantine [facility]) 10 mg tablet Take 1 Tablet by mouth two (2) times a day. 5/26/22  Yes Ashli Oropeza MD   pantoprazole (PROTONIX) 40 mg tablet Take 1 Tablet by mouth daily for 30 days. 5/27/22 6/26/22 Yes Ashli Oropeza MD   midodrine (PROAMATINE) 10 mg tablet Take 1 Tablet by mouth three (3) times daily (with meals) for 30 days. Indications: a feeling of dizziness upon standing due to a drop in blood pressure 5/26/22 6/25/22 Yes Ashli Oropeza MD   [facility]) 5 mg tablet Take 5 mg by mouth two (2) times a day. Yes Provider, Historical   donepeziL (ARICEPT) 5 mg tablet Take 5 mg by mouth nightly. Yes Provider, Historical   DULoxetine (CYMBALTA) 60 mg capsule Take 1 Capsule by mouth daily. 5/10/22  Yes Kirstie Townsend MD   atorvastatin (LIPITOR) 40 mg tablet TAKE 1 TABLET BY MOUTH AT BEDTIME 1/31/22  Yes Clemencia Mckeon MD   [facility]) 250 mg tablet Take 2 tablets today, then take 1 tablet daily 6/6/22 6/11/22  Clemencia Mckeon MD   polyethylene glycol [facility]) 17 gram packet Take 17 g by mouth daily as needed for Constipation. Provider, Historical   mirabegron ER (Myrbetriq) 50 mg ER tablet Take 50 mg by mouth daily.     Provider, Historical       Allergies:  No Known Allergies    Social History:  Social History     Tobacco Use    Smoking status: Never Smoker    Smokeless tobacco: Never Used   Vaping Use    Vaping Use: Never used   Substance Use Topics    Alcohol use: Not Currently     Alcohol/week: 0.0 standard drinks     Comment: stopped 2010    Drug use: Never       Family History:  Family History   Problem Relation Age of Onset    Asthma Mother     Alcohol abuse Mother     Alcohol abuse Father     Asthma Father     Cancer Sister     Heart Disease Sister     Alcohol abuse Brother     Cancer Brother     Heart Disease Brother        ROS:  Total of 12 systems reviewed as follows:  POSITIVE= bolded text  Negative = text not bolded       General:  fever, chills, sweats, generalized weakness, weight loss/gain, loss of appetite   Eyes:    blurred vision, eye pain, loss of vision, double vision  ENT:    rhinorrhea, pharyngitis   Respiratory:  cough, sputum production, SOB, HOLCOMB, wheezing, pleuritic pain   Cardiology:   chest pain, palpitations, orthopnea, PND, edema, syncope   Gastrointestinal:  abdominal pain , N/V, diarrhea, dysphagia, constipation, bleeding   Genitourinary:  frequency, urgency, dysuria, hematuria, incontinence, prostatism   Muskuloskeletal: arthralgia, myalgia, back pain  Hematology:   easy bruising, nose or gum bleeding, lymphadenopathy   Dermatological: rash, ulceration, pruritis, color change / jaundice  Endocrine:   hot flashes or polydipsia   Neurological:  headache, dizziness, confusion, focal weakness, paresthesia, speech difficulties, memory loss, gait difficulty  Psychological: feelings of anxiety, depression, agitation      PHYSICAL EXAM:  Patient Vitals for the past 24 hrs:   Temp Pulse Resp BP SpO2   06/09/22 1805 97.5 °F (36.4 °C) 79 20 (!) 154/76 97 %   06/09/22 1704 -- 83 20 (!) 96/48 91 %   06/09/22 1703 -- 76 21 120/61 92 %   06/09/22 1701 -- 73 17 129/69 95 %   06/09/22 1657 -- 74 25 129/69 99 %   06/09/22 1625 -- 71 19 (!) 131/59 95 %   06/09/22 1548 -- 75 18 -- 95 %   06/09/22 1536 -- 73 23 -- 97 %   06/09/22 1518 98.6 °F (37 °C) 75 17 129/69 98 %       General:    Alert, cooperative, no distress, appears stated age. HEENT: Atraumatic, anicteric sclerae, pink conjunctivae     No oral ulcers, mucosa moist, throat clear, dentition fair  Neck:  Supple, symmetrical;   thyroid non tender  Lungs:   Nonlabored, scant crackles. No wheezing or rhonchi. No rales. No cough  Chest wall:  No tenderness. No accessory muscle use. Heart:   Regular rhythm. Ectopy. No  murmur.   1+ edema  Abdomen:   Soft, non-tender. Not distended. Bowel sounds normal  Extremities: No cyanosis. No clubbing      Capillary refill normal,  Radial pulse 2+,  DP 1+  Skin:     Not jaundiced. No rashes   Psych:  Not depressed. Not anxious or agitated. Neurologic: EOMs intact. No facial asymmetry. No aphasia or slurred speech. Symmetrical strength, Sensation grossly intact. Alert and oriented     Lab Data Reviewed:    Recent Results (from the past 24 hour(s))   CBC WITH AUTOMATED DIFF    Collection Time: 06/09/22  3:18 PM   Result Value Ref Range    WBC 11.6 (H) 4.1 - 11.1 K/uL    RBC 3.60 (L) 4.10 - 5.70 M/uL    HGB 9.9 (L) 12.1 - 17.0 g/dL    HCT 30.3 (L) 36.6 - 50.3 %    MCV 84.2 80.0 - 99.0 FL    MCH 27.5 26.0 - 34.0 PG    MCHC 32.7 30.0 - 36.5 g/dL    RDW 14.1 11.5 - 14.5 %    PLATELET 283 282 - 898 K/uL    MPV 9.9 8.9 - 12.9 FL    NRBC 0.0 0  WBC    ABSOLUTE NRBC 0.00 0.00 - 0.01 K/uL    NEUTROPHILS 78 (H) 32 - 75 %    LYMPHOCYTES 13 12 - 49 %    MONOCYTES 8 5 - 13 %    EOSINOPHILS 1 0 - 7 %    BASOPHILS 1 0 - 1 %    IMMATURE GRANULOCYTES 0 0.0 - 0.5 %    ABS. NEUTROPHILS 9.0 (H) 1.8 - 8.0 K/UL    ABS. LYMPHOCYTES 1.5 0.8 - 3.5 K/UL    ABS. MONOCYTES 0.9 0.0 - 1.0 K/UL    ABS. EOSINOPHILS 0.1 0.0 - 0.4 K/UL    ABS. BASOPHILS 0.1 0.0 - 0.1 K/UL    ABS. IMM.  GRANS. 0.1 (H) 0.00 - 0.04 K/UL    DF AUTOMATED     METABOLIC PANEL, COMPREHENSIVE    Collection Time: 06/09/22  3:18 PM   Result Value Ref Range    Sodium 131 (L) 136 - 145 mmol/L    Potassium 4.5 3.5 - 5.1 mmol/L    Chloride 94 (L) 97 - 108 mmol/L    CO2 29 21 - 32 mmol/L    Anion gap 8 5 - 15 mmol/L    Glucose 108 (H) 65 - 100 mg/dL    BUN 21 (H) 6 - 20 MG/DL    Creatinine 1.20 0.70 - 1.30 MG/DL    BUN/Creatinine ratio 18 12 - 20      GFR est AA >60 >60 ml/min/1.73m2    GFR est non-AA 59 (L) >60 ml/min/1.73m2    Calcium 9.3 8.5 - 10.1 MG/DL    Bilirubin, total 0.6 0.2 - 1.0 MG/DL    ALT (SGPT) 23 12 - 78 U/L    AST (SGOT) 21 15 - 37 U/L Alk. phosphatase 118 (H) 45 - 117 U/L    Protein, total 7.4 6.4 - 8.2 g/dL    Albumin 3.2 (L) 3.5 - 5.0 g/dL    Globulin 4.2 (H) 2.0 - 4.0 g/dL    A-G Ratio 0.8 (L) 1.1 - 2.2     TROPONIN-HIGH SENSITIVITY    Collection Time: 06/09/22  3:18 PM   Result Value Ref Range    Troponin-High Sensitivity 18 0 - 76 ng/L   NT-PRO BNP    Collection Time: 06/09/22  3:18 PM   Result Value Ref Range    NT pro-BNP 6,193 (H) 0 - 450 PG/ML   COVID-19 WITH INFLUENZA A/B    Collection Time: 06/09/22  3:29 PM   Result Value Ref Range    SARS-CoV-2 by PCR Not detected NOTD      Influenza A by PCR Not detected NOTD      Influenza B by PCR Not detected NOTD     BLOOD GAS, ARTERIAL    Collection Time: 06/09/22  5:30 PM   Result Value Ref Range    pH 7.39 7.35 - 7.45      PCO2 47 (H) 35 - 45 mmHg    PO2 67 (L) 80 - 100 mmHg    O2 SAT 93 92 - 97 %    BICARBONATE 28 (H) 22 - 26 mmol/L    BASE EXCESS 2.0 mmol/L    O2 METHOD NASAL CANNULA      O2 FLOW RATE 2.00 L/min    FIO2 28 %    Sample source ARTERIAL      SITE RIGHT RADIAL      SANDRO'S TEST YES         EKG:  NSR, Freq PACs, 72 bpm, IVCD, RRWP possible ant MI, NSSTTWC    Radiology:  XR CHEST SNGL V   Final Result   Prominence of the pulmonary interstitial markings as described   above. Findings suggestive of cardiomegaly. Developing congestive change cannot   be excluded. A follow-up chest examination is recommended.           Care Plan discussed with:   Patient x    Family Wife in room    RN x         Consultant      Expected  Disposition:   Home with Family x   HH/PT/OT/RN x   SNF/LTC    NORM      TOTAL TIME:  61 Minutes      Comments    x Reviewed previous records   >50% of visit spent in counseling and coordination of care x Discussion with patient and/or family and questions answered       _______________________________________________________  Given the patient's current clinical presentation, I have a high level of concern for decompensation if discharged from the emergency department. Complex decision making was performed, which includes reviewing the patient's available past medical records, laboratory results, and x-ray films. My assessment of this patient's clinical condition and my plan of care is as follows. ASSESSMENT / PLAN    Principal Problem:    Acute on chronic combined systolic and diastolic ACC/AHA stage C congestive heart failure ([address] (2/12/2022)  Suspect exacerbated congestive failure associated with mixed deficiencies  Current adequate O2 sats with nasal oxygen back to 2 L/min nasal cannula  Follow clinically on current diuretic therapy  Plan for long-term adjustments pending progress    Plan d/c home with [place] / Home PT/OT, [place] - Bed , BSC    Active Problems:    Atrial fibrillation ([address] (5/29/2012)    AVNRT (AV bridgette re-entry tachycardia) ([facility] (5/12/2016)      Overview: 5/12/16 AVNRT ablation    AICD (automatic cardioverter/defibrillator) present (5/13/2016)      Overview: 5/13/16 [employer] upgrade to dual chamber AICD implant  Monitored during admission  Consider 3-day Holter on discharge  Could account for near syncopal type complaints prior to admission      Orthostatic hypotension (5/16/2022)  Likely cause for preadmission episode of near syncope  Followed by Dr. Joselito Donaldson current treatment ProAmatine 10 mg 3 times daily with meals      Interstitial lung disease ([facility] (2/15/2022)  Maintain oxygen 2 L nasal cannula  Following stabilization in the ED arterial blood gas showed pH 7.39/PCO2 47/PO2 67 with O2 sat 93% on 2 L/min nasal cannula  Continue same for now  Complete the course of Azithromycin initiate earlier this week      Essential hypertension (3/4/2011)  Cardiac diet with no added salt  No current treatment necessary, monitor      Mixed hyperlipidemia (5/29/2012)  Maintain home treatment with Lipitor 40 mg q.  Evening      Alzheimer's dementia, late onset, with behavioral disturbance ([address] (4/12/2016)  Assist with ADL Hypothyroidism due to acquired atrophy of thyroid (9/22/2015)  Continue routine thyroid replacement  Monitor clinically and on laboratory assessment  Maintain Aricept and Namenda    SAFETY:   Code Status:Full  DVT prophylaxis:Eliquis  Stress Ulcer prophylaxis: Protonix (home med)  Bladder catheter:no  Family Contact Info:  Primary Emergency Contact: [address] [facility] [place] Phone: [phone]  Bedded: [facility] Room 130/01  Disposition: TBD, likely home in [facility] on  Admission status:  Observation    -Tentative plan of care discussed with patient / family, who demonstrated understanding and is in agreement to the above  -Case was reviewed with the ED Provider, Lesley Lopez, [address], MD  [facility] Hospitalist  [phone]

## 2022-06-09 NOTE — ED NOTES
Placed pt on portable cardiac monitoring. Hospitalist at bedside evaluating pt before department departure.

## 2022-06-10 NOTE — PROGRESS NOTES
Problem: Pressure Injury - Risk of  Goal: *Prevention of pressure injury  Description: Document Fernando Scale and appropriate interventions in the flowsheet.   Outcome: Progressing Towards Goal  Note: Pressure Injury Interventions:  Sensory Interventions: Assess changes in LOC,Check visual cues for pain    Moisture Interventions: Absorbent underpads,Apply protective barrier, creams and emollients    Activity Interventions: Increase time out of bed    Mobility Interventions: Assess need for specialty bed,Pressure redistribution bed/mattress (bed type)    Nutrition Interventions: Document food/fluid/supplement intake    Friction and Shear Interventions: Apply protective barrier, creams and emollients,HOB 30 degrees or less,Lift sheet                Problem: Patient Education: Go to Patient Education Activity  Goal: Patient/Family Education  Outcome: Progressing Towards Goal

## 2022-06-10 NOTE — PROGRESS NOTES
Care Management Interventions  PCP Verified by CM: Yes Tavo Suazo MD )  Last Visit to PCP: 06/06/22  Palliative Care Criteria Met (RRAT>21 & CHF Dx)?: No (No MD order)  Mode of Transport at Discharge: Other (see comment) (POV)  Transition of Care Consult (CM Consult): Discharge Planning  MyChart Signup: No  Discharge Durable Medical Equipment: No  Physical Therapy Consult: No  Occupational Therapy Consult: No  Speech Therapy Consult: No  Support Systems: Spouse/Significant Other  Confirm Follow Up Transport: Family  The Plan for Transition of Care is Related to the Following Treatment Goals : Treat CHF  Discharge Location  Patient Expects to be Discharged to[de-identified] Home with home health    Patient lives at home with his wife Natalia Weems. Patient states he does NOT have ACP document. Gave patient document and asked him to fill it out. He states he would choose his wife to make decisions for him. He HAS OXYGEN at home. Normally uses 2L. Thinks its from [facility]. Patient was recently discharged from [facility]. from skilled care. Asked if patient was discharged due to insurance company denying further stay and patient states he believes so. Sent a message to Silvia Estrada at [facility] to see what services patient was discharged with after staying with them. Waiting to hear back from her. Patient is currently in observation status. ENGLISH letter explained to patient. He stated understanding and signed letter. Patient told to contact care management for any questions or concerns. .     Reason for Admission:  CHF                     RUR Score:          N/A PT IN OBS STATUS          RRAT: 32 HIGH            Plan for utilizing home health:      Yes    PCP: First and Last name:  Jeanna Tinoco MD     Name of Practice: [facility]    Are you a current patient: Yes/No: yes   Approximate date of last visit: 6/6/2022   Can you participate in a virtual visit with your PCP: Yes Current Advanced Directive/Advance Care Plan: Full Code      Healthcare Decision Maker:   Click here to complete HealthCare Decision Makers including selection of the Healthcare Decision Maker Relationship (ie \"Primary\")             Primary Decision Maker: Glen Schlatter - Spouse - [phone]                  Transition of Care Plan:                        Advance Care Planning     General Advance Care Planning (ACP) Conversation      Date of Conversation: 6/9/2022  Conducted with: Patient with Decision Making Capacity    Healthcare Decision Maker:     Primary Decision Maker: Glen Schlatter - Spouse - [phone]  Click here to complete [address] including selection of the Healthcare Decision Maker Relationship (ie \"Primary\")      Today we documented Decision Maker(s) consistent with Legal Next of Kin hierarchy.     Content/Action Overview:   Has NO ACP documents/care preferences - information provided, considering goals and options  Topics discussed: treatment goals  Additional Comments: n/a     Length of Voluntary ACP Conversation in minutes:  16 minutes    Franck

## 2022-06-10 NOTE — PROGRESS NOTES
Problem: Mobility Impaired (Adult and Pediatric)  Goal: *Acute Goals and Plan of Care (Insert Text)  Outcome: Progressing Towards Goal   FUNCTIONAL STATUS PRIOR TO ADMISSION:  At baseline pt was ambulatory w/ 2WW and dependent on supplemental O2 at 2L NC; he required assistance from spouse for ADLs and was dependent for IADLs. HOME SUPPORT PRIOR TO ADMISSION: The patient lived with spouse in trailer w/ ramp to enter. Physical Therapy Goals  Initiated 6/10/2022  1. Patient will move from supine to sit and sit to supine  in bed with modified independence within 7 day(s). 2.  Patient will transfer from bed to chair and chair to bed with supervision/set-up using the least restrictive device within 7 day(s). 3.  Patient will perform sit to stand with supervision/set-up within 7 day(s). 4.  Patient will ambulate with supervision/set-up for 150 feet with the least restrictive device within 7 day(s). PHYSICAL THERAPY EVALUATION  Patient: Gunjan Shaw (87 y.o. male)  Date: 6/10/2022  Primary Diagnosis: CHF (congestive heart failure) ([facility]) [I50.9]        Precautions: recent hx of orthostatic hypotension w/ fall, AICD, Alzheimers, nickname \"Johnson\", (+) reading glasses, B hearing aids, telemetry, impulsive,  Bed Alarm,Fall    ASSESSMENT  Based on the objective data described below, the patient presents with B LE weakness, impaired functional mobility including transfers and ambulation w/ AD, impaired balance, reduced activity tolerance and pt experienced BP drops w/ positional changes as well as O2 sat drop w/ activities into the low 80%s. Orthostatic BP was checked in supine, sitting and standing. He was not positive for orthostatic BP but it got very close and he c/o \"shaky legs\". Supplemental O2 was increased to 3L NC to get pt back to 90% and by the end of the session he still required 3L to stay at 90%; RN aware of both BP and O2 situation.  He was assisted over to the recliner chair and left resting comfortably w/ all needs met as able. Current Level of Function Impacting Discharge (mobility/balance): impulsive; transfers CGA; reduced activity tolerance; BP and O2 dropping w/ activities; balance fair in standing but good on EOB    Functional Outcome Measure: The patient scored 40/100 on the Barthel Index outcome measure which is indicative of partially dependent. Other factors to consider for discharge: supportive spouse; ambulatory w/ 2WW at baseline w/ supplemental O2     Patient will benefit from skilled therapy intervention to address the above noted impairments. PLAN :  Recommendations and Planned Interventions: bed mobility training, transfer training, gait training, therapeutic exercises, neuromuscular re-education, patient and family training/education, and therapeutic activities      Frequency/Duration: Patient will be followed by physical therapy:  6 times a week to address goals. Recommendation for discharge: (in order for the patient to meet his/her long term goals)  Physical therapy at least 2 days/week in the home     This discharge recommendation:  Has been made in collaboration with the attending provider and/or case management    IF patient discharges home will need the following DME: bedside commode, gait belt, hospital bed, and wheelchair         SUBJECTIVE:   Patient stated  I'm doing ok. Everybody around here knows me.     OBJECTIVE DATA SUMMARY:   HISTORY:    Past Medical History:   Diagnosis Date    Acute combined systolic and diastolic congestive heart failure ([address] 2/12/2022    AICD (automatic cardioverter/defibrillator) present 5/13/2016 5/13/16 [employer] upgrade to dual chamber AICD implant  Dr. Marielle Freitas    Alzheimer disease [facility])     Anxiety state, other     Arthritis     OSTEO ARTHRITIS    AVNRT (AV bridgette re-entry tachycardia) ([address] 5/12/2016 5/12/16 AVNRT ablation: PM/AICD left upper chest :Dr. Vázquez Repress    Back ache     CAD (coronary artery disease)     Cancer ([facility] 2004    Prostate cancer    Chest tightness     last episode of chest pain 5/2016 before AICD placed; none since then    Coagulation defects 2005    FACTOR V LEIDEN    Dementia ([address]     Depression     Dizziness     FH: factor V Leiden deficiency     Generalized muscle ache     GERD (gastroesophageal reflux disease)     HEARTBURN    Heart failure ([address] 2014    as of 07/2019 EF 30-35;  Dr. Glory Peck, Cardiomyopathy    Hyperlipidemia, mixed     Hypertension     Long term current use of anticoagulant therapy     Other ill-defined conditions(799.89) 2004    blood transfusion    Pacemaker     Paroxysmal atrial fibrillation (HCC)     rate controlled with coreg     Postsurgical aortocoronary bypass status 05/29/2012    CABG    Presence of cardiac defibrillator 05/2016    left upper chest    Pulmonary emphysema ([facility] 3/4/2022    [facility])     SSS (sick sinus syndrome) ([facility]     Stroke ([facility] 2011, 1990's    SEVERAL-mini    Syncope     Thromboembolism ([facility] 2014    left leg: changed to Eliquis from Warfarin    Thromboembolus ([facility] 2005    left leg    Thyroid disease     Weakness generalized      Past Surgical History:   Procedure Laterality Date    CARDIAC CATHETERIZATION  3/4/2011         HX HEENT  2019    oral surgery, removed teeth, dentures upper/lower    HX HERNIA REPAIR Left 2002    Ingiunal hernia repair left    HX ORTHOPAEDIC      LEFT KNEE CARTILAGE REPAIR;RIGHT ROTATOR CUFF REPAIR    HX ORTHOPAEDIC  2/14/12    C5-7 ANTERIIOR CERVICAL DISECTOMY AND FUSION    HX ORTHOPAEDIC  6/4/13    C5-C7 POSTERIOR DECOMPRESSION AND FUSION    HX ORTHOPAEDIC      right thumb partial amputation of tip    HX OTHER SURGICAL      steroid injections    HX PACEMAKER Left 05/13/2016    BS D142, Dr. Lucia Theodore  2004     [place]    HX Garima Aguilar Left 2019    HX UROLOGICAL       urinary control system    HX UROLOGICAL  12/3/13 REPLACEMENT ARTIFICAL URINARY SPHINCTER    [place] CARDIAC SURG PROCEDURE UNLIST      CABG X1 3/5/11       Personal factors and/or comorbidities impacting plan of care: extensive and complex medical hx    Home Situation  Home Environment: Trailer/mobile home  # Steps to Enter: 0  Rails to Enter: Yes (on ramp)  Hand Rails : Bilateral  Wheelchair Ramp: Yes  One/Two Story Residence: One story  Living Alone: No  Support Systems: Spouse/Significant Other  Patient Expects to be Discharged to[de-identified] Home with home health  Current DME Used/Available at Home: Oxygen, portable,Walker, rolling    EXAMINATION/PRESENTATION/DECISION MAKING:   Critical Behavior:  Neurologic State: Alert  Orientation Level: Oriented X4        Hearing: Auditory  Auditory Impairment: Hearing aid(s),Hard of hearing, bilateral  Hearing Aids/Status: Bilateral,With patient  Range Of Motion:  AROM: Within functional limits                       Strength:    Strength: Generally decreased, functional                    Tone & Sensation:                  Sensation: Intact               Coordination:  Coordination: Within functional limits  Vision:   Corrective Lenses: Reading glasses  Functional Mobility:  Bed Mobility:     Supine to Sit: Stand-by assistance;Contact guard assistance; Additional time;Assist x1;Bed Modified; Other (comment) (HOB elevated and used bedrail)        Transfers:  Sit to Stand: Contact guard assistance;Assist x1  Stand to Sit: Contact guard assistance        Bed to Chair: Contact guard assistance              Balance:   Sitting: Intact; Without support  Standing: Intact; With support  Ambulation/Gait Training:  Distance (ft): 5 Feet (ft)  Assistive Device: Gait belt;Walker, rolling  Ambulation - Level of Assistance: Contact guard assistance     Gait Description (WDL): Exceptions to WDL  Gait Abnormalities: Decreased step clearance;Shuffling gait        Base of Support: Narrowed     Speed/Hazel: Shuffled  Step Length: Left shortened;Right shortened       Stairs:          NA      Functional Measure:  Barthel 40/100       Physical Therapy Evaluation Charge Determination   History Examination Presentation Decision-Making   MEDIUM  Complexity : 1-2 comorbidities / personal factors will impact the outcome/ POC  MEDIUM Complexity : 3 Standardized tests and measures addressing body structure, function, activity limitation and / or participation in recreation  MEDIUM Complexity : Evolving with changing characteristics  MEDIUM Complexity : FOTO score of 26-74      Based on the above components, the patient evaluation is determined to be of the following complexity level: MEDIUM    Pain Rating:  Denied overall but did note headache    Activity Tolerance:   Fair, desaturates with exertion and requires oxygen, requires rest breaks, and observed SOB with activity    After treatment patient left in no apparent distress:   Sitting in chair, Heels elevated for pressure relief, Call bell within reach, and Bed / chair alarm activated    COMMUNICATION/EDUCATION:   The patients plan of care was discussed with: Occupational therapist, Registered nurse, Case management, and Certified nursing assistant/patient care technician. Fall prevention education was provided and the patient/caregiver indicated understanding., Patient/family have participated as able in goal setting and plan of care. , and Patient/family agree to work toward stated goals and plan of care.     Thank you for this referral.  Ketty Carr, PT   Time Calculation: 48 mins

## 2022-06-10 NOTE — PROGRESS NOTES
Problem: Pressure Injury - Risk of  Goal: *Prevention of pressure injury  Description: Document Fernando Scale and appropriate interventions in the flowsheet. Outcome: Progressing Towards Goal  Note: Pressure Injury Interventions:  Sensory Interventions: Assess changes in LOC,Check visual cues for pain    Moisture Interventions: Absorbent underpads,Apply protective barrier, creams and emollients    Activity Interventions: Increase time out of bed    Mobility Interventions: Chair cushion,PT/OT evaluation    Nutrition Interventions: Document food/fluid/supplement intake    Friction and Shear Interventions: Apply protective barrier, creams and emollients                Problem: Falls - Risk of  Goal: *Absence of Falls  Description: Document Osmel Fall Risk and appropriate interventions in the flowsheet.   Outcome: Progressing Towards Goal  Note: Fall Risk Interventions:  Mobility Interventions: PT Consult for mobility concerns,Utilize walker, cane, or other assistive device         Medication Interventions: Patient to call before getting OOB,Assess postural VS orthostatic hypotension    Elimination Interventions: Bed/chair exit alarm,Call light in reach    History of Falls Interventions: Bed/chair exit alarm,Door open when patient unattended

## 2022-06-10 NOTE — TELEPHONE ENCOUNTER
I have called and left a detailed message on Katherine's personalized [employer], glad that the bed and chair are being delivered. I am working on finding a [email or web address] company to order the raised toilet potty chair. I will let her know as soon as I do.

## 2022-06-10 NOTE — PROGRESS NOTES
Bedside shift change report given to Dinorah (oncoming nurse) by Guadalupe Allen (offgoing nurse). Report included the following information SBAR, Kardex and MAR.

## 2022-06-10 NOTE — PROGRESS NOTES
[facility]  Hospitalist Progress Note    NAME: Donne Kehr   :  1945   MRN:  [id number]     Total duration of encounter: 1 day     65years old male  presented to the ED via EMS following an episode of  dyspnea at home. .Admitted with orthostatic hypotension  ED exam then noted orthstatic drop in /61 down to 76/45 standing  He Patient was last hospitalized from May 16 through May 26 at [facility].  He initially received oxygen at 4 L nasal cannula, but was gradually tapered. Subjective:   Discussed with RN  Reported feeling better. Breakfast well.   Slept okay  No nausea vomiting no diarrhea or abdominal pain  Not short of breath, chest pain or palpitation  No cough, no headache no neck pain or stiffness,  No extremity  complaint      Current Facility-Administered Medications:     butalbital-acetaminophen-caffeine (FIORICET, ESGIC) -40 mg per tablet 1 Tablet, 1 Tablet, Oral, Q4H PRN, Lavanda Kayser, MD, 1 Tablet at 06/10/22 08    apixaban (ELIQUIS) tablet 5 mg, 5 mg, Oral, BID, Colby Guzman MD, 5 mg at 06/10/22 08    atorvastatin (LIPITOR) tablet 40 mg, 40 mg, Oral, QHS, Colby Guzman MD, 40 mg at 22 230    donepeziL (ARICEPT) tablet 5 mg, 5 mg, Oral, QHS, Colby Guzman MD, 5 mg at 22 230    DULoxetine (CYMBALTA) capsule 60 mg, 60 mg, Oral, DAILY, Colby Guzman MD, 60 mg at 06/10/22 08    memantine (NAMENDA) tablet 10 mg, 10 mg, Oral, BID, Colby Guzman MD, 10 mg at 06/10/22 08    midodrine (PROAMATINE) tablet 10 mg, 10 mg, Oral, TID WITH MEALS, Colby Guzman MD, 10 mg at 06/10/22 08    pantoprazole (PROTONIX) tablet 40 mg, 40 mg, Oral, DAILY, Colby Guzman MD, 40 mg at 06/10/22 08    sertraline (ZOLOFT) tablet 200 mg, 200 mg, Oral, DAILY, Colby Guzman MD, 200 mg at 06/10/22 0835    sodium chloride (NS) flush 5-40 mL, 5-40 mL, IntraVENous, Q8H, Colby Guzman MD, 10 mL at 06/10/22 0605    sodium chloride (NS) flush 5-40 mL, 5-40 mL, IntraVENous, PRN, Reji Garcia MD, 10 mL at 06/10/22 0607    polyethylene glycol (MIRALAX) packet 17 g, 17 g, Oral, DAILY PRN, Reji Garcia MD    azithromycin [facility]) tablet 250 mg, 250 mg, Oral, DAILY, Reji Garcia MD, 250 mg at 06/10/22 0835    Objective:     VITALS:   Patient Vitals for the past 12 hrs:   Temp Pulse Resp BP SpO2   06/10/22 0803 97.2 °F (36.2 °C) 74 20 138/62 96 %   06/10/22 0800 -- 71 -- -- --   06/10/22 0401 97.3 °F (36.3 °C) 67 20 122/76 95 %   06/09/22 2356 97.6 °F (36.4 °C) 70 20 (!) 146/75 94 %     No intake or output data in the 24 hours ending 06/10/22 0922     PHYSICAL EXAM:  General: WD, WN. Alert, cooperative, no acute distress    EENT:  EOMI. Anicteric sclerae. MMM  Resp:  CTA bilaterally, no wheezing or rales. No accessory muscle use  CV:  Regular  rhythm,  No edema  GI:  Soft, Non distended, Non tender. +Bowel sounds  Neurologic:  Alert and oriented X 3, normal speech,   Psych:   Good insight. Not anxious nor agitated  Skin:  No rashes.   No jaundice    LABS:  Recent Labs     06/10/22  0612 06/09/22  1518   WBC 7.4 11.6*   HGB 9.9* 9.9*   HCT 30.3* 30.3*    272     Recent Labs     06/10/22  0612 06/09/22  1518   * 131*   K 4.4 4.5   CL 95* 94*   CO2 30 29   * 108*   BUN 18 21*   CREA 0.97 1.20   CA 9.6 9.3   MG 1.8  --    ALB  --  3.2*   TBILI  --  0.6   ALT  --  23     Assessment / Plan:    Principal Problem:    Orthostatic hypotension    Improved     Acute on chronic combined systolic and diastolic ACC/AHA stage C congestive heart failure  (2/12/2022)  Current adequate O2 sats with nasal oxygen back to 2 L/min nasal cannula  Follow clinically on current diuretic therapy    Active Problems:   Atrial fibrillation ([address] (5/29/2012)   AVNRT (AV bridgette re-entry tachycardia) ([facility] (5/12/2016)   Overview: 5/12/16 AVNRT ablation   AICD (automatic cardioverter/defibrillator) present (5/13/2016)   Overview: 5/13/16 [employer] upgrade to dual chamber AICD implant  Monitored during admission  Consider 3-day Holter on discharge  Could account for near syncopal type complaints prior to admission     Orthostatic hypotension (5/16/2022)  Likely cause for preadmission episode of near syncope  Followed by Dr. Roni Guzman current treatment ProAmatine 10 mg 3 times daily with meals      Interstitial lung disease ([address] (2/15/2022)  Maintain oxygen 2 L nasal cannula  Following stabilization in the ED arterial blood gas showed pH 7.39/PCO2 47/PO2 67 with O2 sat 93% on 2 L/min nasal cannula  Continue same for now  Complete the course of Azithromycin initiate earlier this week      Essential hypertension (3/4/2011)  Cardiac diet with no added salt  No current treatment necessary, monitor      Mixed hyperlipidemia (5/29/2012)  Maintain home treatment with Lipitor 40 mg q.  Evening      Alzheimer's dementia, late onset, with behavioral disturbance  Assist with ADL     Hypothyroidism due to acquired atrophy of thyroid (9/22/2015)  Continue routine thyroid replacement  Monitor clinically and on laboratory assessment  Maintain Aricept and Namenda  ______________________________________________________________________    SAFETY:   Code Status:Full  DVT prophylaxis:Eliquis  Stress Ulcer prophylaxis: Protonix (home med)  Bladder catheter:no  Family Contact Info:  Primary Emergency Contact: [address] [facility], Home Phone: [phone]  Bedded: [facility] Room 130/01  Disposition: TBD, likely home in [facility] on  Admission status:  Observation    Reviewed most current lab test results and cultures  YES  Reviewed most current radiology test results   YES  Review and summation of old records today    NO  Reviewed patient's current orders and MAR    YES  PMH/SH reviewed - no change compared to H&P  ____________________________________________    -Discussed with the pt who is in agreement with the plan  -Multidiciplinary team rounds were held today with , nursing, pharmacist and clinical coordinator. Patient's plan of care was discussed; medications were reviewed and discharge planning was addressed.  -Total time spent is 40 mint >50% of visit spent in counseling and coordination of care.     Signed Margaret Padilla MD   [facility] Hospitalist  [phone]

## 2022-06-10 NOTE — PROGRESS NOTES
Pt has been in chair all afternoon. VSS, no syncope when standing. Cooperative, remains on O2 at 2 liters with no SOB or edema.

## 2022-06-10 NOTE — PROGRESS NOTES
IDR Team; MD, Care Manager, Nursing Supervisor,  and Dietician, met to review patient's plan of care. Discussed goals, interventions, barriers and progress. Team will continue to monitor progress and report any concerns to the physician and care management as indicated. Transition of Care Plan: MD said patient's bp was significantly lower when standing. Kidney function Is better. Eating well. MD said patient couldn't be discharged possibly today depending on PT/OT consult. 1100: Spoke with patient/family. Wife Nikki Deleon expresses concerns about patient going home. [facility] not like the care they received there. She said he was there for 2 weeks. Said he wasn't doing much walking. Ms. Sharon Mead goes on to say they DO NOT [address] She said she is still trying to fill out paperwork to get him help in the home. Patient was here at [facility] in April, at that time personal care agency [employer] was sent the [facility]. Nephew was going to be caregiver under consumer directed services. For whatever reason, that was never done. Maybe more paperwork needed by ? Ms. Sharon Mead said she is still filling paperwork out for that. Will have HEATHER Gallegos MSW assist Ms. Sharon Mead with this paperwork. Ms. Sharon Mead also requesting a hospital bed, potty chair and wheelchair when patient is discharged home. Asked PT/OT to work with patient as soon as they can, give CM a recommendation. Will go ahead and send info to patient's insurance company for skilled care. Patient has [employer] medicare. Once PT/OT notes in will initiate auth. Patient will likely remain in acute status over the weekend. Received email back from [employer] at [place]. Patient left AMA on Sunday 6/5, therefore no services were set up for him when he left. She also said that his insurance company was covering him at that time.

## 2022-06-10 NOTE — PROGRESS NOTES
OCCUPATIONAL THERAPY EVALUATION  Patient: Mitch Guardado (19 y.o. male)  Date: 6/10/2022  Primary Diagnosis: CHF (congestive heart failure) ([facility]) [I50.9]        Precautions: falls, orthostatic Bp,02 needs,some impulsivity effecting safety       ASSESSMENT  Based on the objective data described below, the patient presents with hypostatic bp, with 132/75- 94% supine , 127/72 82% turned up to 3L from 2l and improving to 91%, 106/64 standing 1 min , stats 92%, after 3 mins standing 115/56 w/92% and limited AROM R shoulder 70 degrees flexion, AROM 90 and Wife remove pt from SNF AMA and pt fell 3 days later with him reporting that his leg crumbled. Pt could benefit from OT to improve self-care and functional mobility safely. Current Level of Function Impacting Discharge (ADLs/self-care): pt has multiple issue from joint issues in UE and LEs but dropping  bp with movement is effect the pt's skills the most. Plan to work to built up tolerance to sitting and standing activities of daily living    Functional Outcome Measure: The patient scored 40 /100 on the Barthel Index outcome measure which is indicative of being partially dependent in basic self-care. Other factors to consider for discharge: lives in trailer with ramp but needs new shower to be replaced     Patient will benefit from skilled therapy intervention to address the above noted impairments. PLAN :  Recommendations and Planned Interventions: self care training, functional mobility training, therapeutic exercise, balance training, patient education, home safety training, and family training/education    Frequency/Duration: Patient will be followed by occupational therapy 3 -5times a week/1-2 xaday to address goals.     Recommendation for discharge: (in order for the patient to meet his/her long term goals)  Occupational therapy at least 2 days/week in the home     This discharge recommendation:  Has not yet been discussed the attending provider and/or case management    IF patient discharges home will need the following DME: hospital bed, wheelchair, and BSC       SUBJECTIVE:   Patient stated I just felt my legs crumble out from me when I fell yesterday    OBJECTIVE DATA SUMMARY:   HISTORY:   Past Medical History:   Diagnosis Date    Acute combined systolic and diastolic congestive heart failure ([address] 2/12/2022    AICD (automatic cardioverter/defibrillator) present 5/13/2016 5/13/16 [employer] upgrade to dual chamber AICD implant  Dr. Del Toro Adjutant    Alzheimer disease [facility])     Anxiety state, other     Arthritis     OSTEO ARTHRITIS    AVNRT (AV bridgette re-entry tachycardia) ([address] 5/12/2016 5/12/16 AVNRT ablation: PM/AICD left upper chest :Dr. Will West    Back ache     CAD (coronary artery disease)     Cancer ([address] 2004    Prostate cancer    Chest tightness     last episode of chest pain 5/2016 before AICD placed; none since then    Coagulation defects 2005    FACTOR V LEIDEN    Dementia ([address]     Depression     Dizziness     FH: factor V Leiden deficiency     Generalized muscle ache     GERD (gastroesophageal reflux disease)     HEARTBURN    Heart failure ([address] 2014    as of 07/2019 EF 30-35;  Dr. Caridad Gonzáles, Cardiomyopathy    Hyperlipidemia, mixed     Hypertension     Long term current use of anticoagulant therapy     Other ill-defined conditions(799.89) 2004    blood transfusion    Pacemaker     Paroxysmal atrial fibrillation ([address]     rate controlled with coreg     Postsurgical aortocoronary bypass status 05/29/2012    CABG    Presence of cardiac defibrillator 05/2016    left upper chest    Pulmonary emphysema ([address] 3/4/2022    [facility] spotted fever)     SSS (sick sinus syndrome) ([address]     Stroke ([address] 2011, 1990's    SEVERAL-mini    Syncope     Thromboembolism ([address] 2014    left leg: changed to Eliquis from Warfarin    Thromboembolus ([address] 2005    left leg    Thyroid disease     Weakness generalized      Past Surgical History:   Procedure Laterality Date    CARDIAC CATHETERIZATION  3/4/2011         HX HEENT  2019    oral surgery, removed teeth, dentures upper/lower    HX HERNIA REPAIR Left 2002    Ingiunal hernia repair left    HX ORTHOPAEDIC      LEFT KNEE CARTILAGE REPAIR;RIGHT ROTATOR CUFF REPAIR    HX ORTHOPAEDIC  2/14/12    C5-7 ANTERIIOR CERVICAL DISECTOMY AND FUSION    HX ORTHOPAEDIC  6/4/13    C5-C7 POSTERIOR DECOMPRESSION AND FUSION    HX ORTHOPAEDIC      right thumb partial amputation of tip    HX OTHER SURGICAL      steroid injections    HX PACEMAKER Left 05/13/2016    BS D142, Dr. Artemio Azar      HX PROSTATECTOMY  2004     [place]    HX ROTATOR CUFF REPAIR Left 2019    HX UROLOGICAL       urinary control system    HX UROLOGICAL  12/3/13    REPLACEMENT ARTIFICAL URINARY SPHINCTER    [facility] CARDIAC SURG PROCEDURE UNLIST      CABG X1 3/5/11       Expanded or extensive additional review of patient history:     Home Situation  Home Environment: Trailer/mobile home  # Steps to Enter:  (4 ft ramp)  One/Two Story Residence: One story  Living Alone: No  Support Systems: Spouse/Significant Other  Patient Expects to be Discharged to[de-identified] Home with home health  Current DME Used/Available at Home: Oxygen, portable,Walker, rolling    Hand dominance: Right    EXAMINATION OF PERFORMANCE DEFICITS:  Cognitive/Behavioral Status:  Neurologic State: Alert; Appropriate for age  Orientation Level: Oriented X4    Skin: some old bruision UEs and LEs    Edema: none observed    Hearing: Auditory  Auditory Impairment: Hearing aid(s),Hard of hearing, bilateral  Hearing Aids/Status: Bilateral,With patient    Vision/Perceptual:        Corrective Lenses: Reading glasses    Range of Motion:  Limited in UEs at Bilateral shoulders  R AROM to 70, PROM to 90  L WFL  Strength:  Shoulders fair+ overall WFL for ADLS  Coordination:     Fine Motor Skills-Upper: Right Intact; Left Intact       Tone & Sensation:  No issues notes  Balance:  Sitting: Intact  Standing: Intact; With support    Functional Mobility and Transfers for ADLs:  Bed Mobility:  Supine to Sit: Bed Modified;Stand-by assistance    Transfers:  Sit to Stand: Contact guard assistance;Assist x2 (monitioring for dropping bp)  Bed to Chair: Contact guard assistance (cues for hand placement)  Assistive Device : Walker, rolling    ADL Assessment:  Feeding: Modified independent;Setup    Oral Facial Hygiene/Grooming: Independent;Setup    Upper Body Dressing: Minimum assistance    Lower Body Dressing: Maximum assistance    ADL Intervention and task modifications:    Lower Body Dressing Assistance  Socks: Total assistance (dependent)    Toileting  Toileting Assistance: Contact guard assistance  Clothing Management: Moderate assistance       Functional Measure:    Barthel Index:  Feeding 10  Bathing 0  Dressing 5  Bowels 5  Bladder 5  Toilet 5  Transfer 10  Mobility 0  Stairs 0  40 /100      The Barthel ADL Index: Guidelines  1. The index should be used as a record of what a patient does, not as a record of what a patient could do. 2. The main aim is to establish degree of independence from any help, physical or verbal, however minor and for whatever reason. 3. The need for supervision renders the patient not independent. 4. A patient's performance should be established using the best available evidence. Asking the patient, friends/relatives and nurses are the usual sources, but direct observation and common sense are also important. However direct testing is not needed. 5. Usually the patient's performance over the preceding 24-48 hours is important, but occasionally longer periods will be relevant. 6. Middle categories imply that the patient supplies over 50 per cent of the effort. 7. Use of aids to be independent is allowed.     Score Interpretation (from [address] [place] [address])    Independent   60-79 Minimally independent   40-59 Partially dependent   20-39 Very dependent <20 Totally dependent     -Marleny Sweet, Barthel, D.W. ([id number]). Functional evaluation: the Barthel Index. [address] ([address] (1997). The Barthel activities of daily living index: self-reporting versus actual performance in the old (> or = 75 years). Journal of [address] [place] [address] 45(7), [address] [facility], JAYDEN, Taran Dear., Stephen Gillette. (1999). Measuring the change in disability after inpatient rehabilitation; comparison of the responsiveness of the Barthel Index and Functional Rebecca Measure. Journal of Neurology, Neurosurgery, and Psychiatry, 66(4), 541-777. Salomon Arzola, N.J.A, ELIER Ambrose, & Kaci Quick M.A. (2004) Assessment of post-stroke quality of life in cost-effectiveness studies: The usefulness of the Barthel Index and the EuroQoL-5D. Quality of Life Research, 15, 457-61     Occupational Therapy Evaluation Charge Determination   History Examination Decision-Making   MEDIUM Complexity : Expanded review of history including physical, cognitive and psychosocial  history  MEDIUM Complexity : 3-5 performance deficits relating to physical, cognitive , or psychosocial skils that result in activity limitations and / or participation restrictions MEDIUM Complexity : Patient may present with comorbidities that affect occupational performnce.  Miniml to moderate modification of tasks or assistance (eg, physical or verbal ) with assesment(s) is necessary to enable patient to complete evaluation       Based on the above components, the patient evaluation is determined to be of the following complexity level: MEDIUM  Pain Rating:  I have a migraine headache 4/10--but the medicine usually helps    Activity Tolerance:   Fair    After treatment patient left in no apparent distress:    Sitting in chair, Call bell within reach, and Bed / chair alarm activated    COMMUNICATION/EDUCATION:   The patients plan of care was discussed with: Physical therapist, Registered nurse, Physician, and Case management. Patient/family have participated as able in goal setting and plan of care. This patients plan of care is appropriate for delegation to SCOTTIE.     Thank you for this referral.  Cong Lira OT  Time Calculation: 48 mins

## 2022-06-10 NOTE — TELEPHONE ENCOUNTER
Janice Dumont called and will be delivering the bed and wheelchair, however, he also needs a potty chair.   Misa Hinson can be reached at [phone]

## 2022-06-10 NOTE — PROGRESS NOTES
Patient reported to CNA that he has a migraine. No PRN medications ordered. Dr. Rachel Humphreys notified. Order entered for fioricet 1 tab q4 PRN for headache. Primary nurse Amy Berger is aware.

## 2022-06-11 NOTE — PROGRESS NOTES
Bedside shift change report given to Maddie Pierre RN (oncoming nurse) by Adwoa Bentley RN   (offgoing nurse). Report included the following information SBAR and Intake/Output.

## 2022-06-11 NOTE — PROGRESS NOTES
Problem: Pressure Injury - Risk of  Goal: *Prevention of pressure injury  Description: Document Fernando Scale and appropriate interventions in the flowsheet. Note: Pressure Injury Interventions:  Sensory Interventions: Assess changes in LOC    Moisture Interventions: Absorbent underpads,Apply protective barrier, creams and emollients,Check for incontinence Q2 hours and as needed    Activity Interventions: Increase time out of bed    Mobility Interventions: HOB 30 degrees or less    Nutrition Interventions: Document food/fluid/supplement intake    Friction and Shear Interventions: Apply protective barrier, creams and emollients,HOB 30 degrees or less                Problem: Falls - Risk of  Goal: *Absence of Falls  Description: Document Osmel Fall Risk and appropriate interventions in the flowsheet.   Outcome: Progressing Towards Goal  Note: Fall Risk Interventions:  Mobility Interventions: PT Consult for mobility concerns         Medication Interventions: Patient to call before getting OOB    Elimination Interventions: Bed/chair exit alarm,Call light in reach    History of Falls Interventions: Bed/chair exit alarm

## 2022-06-11 NOTE — PROGRESS NOTES
Bedside and Verbal shift change report given to Sindhu Ng (oncoming nurse) by Sheryl London LPN (offgoing nurse). Report included the following information SBAR, Kardex and Recent Results.

## 2022-06-11 NOTE — PROGRESS NOTES
0000 Pt C/O \"A changed in my breathing. \" Assessed breath sounds sats 95% on 2Lnc no distress. Looked at mar and called respiratory to double check pt.    0020 Respiratory bedside heard slight crackles especially on the right. 0040 perfect serve MD. To make aware of change. 0100 pt resting comfortably     0130 After communicating with  Dr. Tabitha Rodriguez, he is putting in orders for AM. MD put in B. Met and CBC for AM. Md. Aware no chest xray in for AM. No order for that.    0400 Pt woke up and is feeling better and breath sounds clear.

## 2022-06-11 NOTE — PROGRESS NOTES
Problem: Pressure Injury - Risk of  Goal: *Prevention of pressure injury  Description: Document Fernando Scale and appropriate interventions in the flowsheet. Outcome: Progressing Towards Goal  Note: Pressure Injury Interventions:  Sensory Interventions: Assess changes in LOC    Moisture Interventions: Absorbent underpads    Activity Interventions: Increase time out of bed    Mobility Interventions: HOB 30 degrees or less    Nutrition Interventions: Document food/fluid/supplement intake    Friction and Shear Interventions: Apply protective barrier, creams and emollients                Problem: Falls - Risk of  Goal: *Absence of Falls  Description: Document Osmel Fall Risk and appropriate interventions in the flowsheet.   Outcome: Progressing Towards Goal  Note: Fall Risk Interventions:  Mobility Interventions: Bed/chair exit alarm,Patient to call before getting OOB         Medication Interventions: Bed/chair exit alarm,Patient to call before getting OOB    Elimination Interventions: Bed/chair exit alarm,Call light in reach    History of Falls Interventions: Bed/chair exit alarm,Door open when patient unattended

## 2022-06-11 NOTE — PROGRESS NOTES
Patient c/o SOB. RR=18  Patient on 2lpm. BS few crackles especially on the right. Does not appear in distress.

## 2022-06-11 NOTE — PROGRESS NOTES
Hospitalist Progress Note    NAME: Meghana Solano   :  1945   MRN:  [id number]       Subjective:   Chief Complaint  Cough   Shortness of breath  Subjective  This is 54-year-old male patient seen at bedside this morning. Patient admitted with a diagnosis of CHF exacerbation seen at bedside this morning. He continues to have cough,  shortness of breath, and fever. During the time of examination he was seen to have low-grade fever. He has dry cough. The patient denied chest pain or palpitation. Review of Systems:  Symptom Y/N Comments  Symptom Y/N Comments   Fever/Chills y   Chest Pain n    Poor Appetite n   Edema n    Cough y   Abdominal Pain n    Sputum n   Joint Pain n    SOB/HOLCOMB y   Pruritis/Rash n    Nausea/vomit n   Tolerating PT/OT     Diarrhea n   Tolerating Diet y    Constipation n   Other         Objective:     VITALS:   Last 24hrs VS reviewed since prior progress note. Most recent are:  Patient Vitals for the past 24 hrs:   Temp Pulse Resp BP SpO2   22 0839 100 °F (37.8 °C) 92 18 121/61 91 %   22 0800 -- 84 -- -- --   22 0400 98.1 °F (36.7 °C) 80 18 120/63 93 %   22 0000 98 °F (36.7 °C) 73 20 134/82 94 %   06/10/22 2032 97.7 °F (36.5 °C) 73 20 (!) 147/77 95 %   06/10/22 2002 -- -- -- -- 98 %   06/10/22 1700 -- -- -- 132/75 92 %   06/10/22 1600 98.4 °F (36.9 °C) 80 16 (!) 152/65 96 %   06/10/22 1200 -- 74 -- -- --       Intake/Output Summary (Last 24 hours) at 2022 1055  Last data filed at 6/10/2022 1200  Gross per 24 hour   Intake 240 ml   Output --   Net 240 ml        PHYSICAL EXAM:  General: WD, WN. Alert, cooperative, no acute distress    HEENT: EOMI. Pink conjunctiva, Anicteric sclerae. Resp:  CTA bilaterally, no wheezing or rales. No accessory muscle use  CV:  Regular  rhythm, No JVD, No murmur or gallop, No peripheral edema edema  GI:  Soft, Non distended, Non tender. +Bowel sounds  Skin:             No rashes.   No edema  MSK                No trauma deformity, swelling or tenderness of joints  Neurologic:     Alert and oriented X 3, normal speech. Motor system  exam  Psych:            Good insight. Not anxious nor agitated        Reviewed most current lab test results and cultures   YES  Reviewed most current radiology test results              YES  Review and summation of old records today               Yes  Reviewed patient's current orders and MAR                YES  PMH/SH reviewed - no change compared to H&P  __  Procedures: see electronic medical records for all procedures/Xrays and details which were not copied into this note but were reviewed prior to creation of Plan. I reviewed today's most current labs and imaging studies. Laboratory Results:  Pertinent labs include:  Recent Labs     06/11/22  0725 06/10/22  0612 06/09/22  1518   WBC 12.6* 7.4 11.6*   HGB 10.0* 9.9* 9.9*   HCT 30.4* 30.3* 30.3*    273 272     Recent Labs     06/11/22  0725 06/10/22  0612 06/09/22  1518   * 131* 131*   K 4.6 4.4 4.5   CL 94* 95* 94*   CO2 30 30 29   GLU 92 128* 108*   BUN 19 18 21*   CREA 1.08 0.97 1.20   CA 9.5 9.6 9.3   MG  --  1.8  --    ALB  --   --  3.2*   TBILI  --   --  0.6   ALT  --   --  23       Imaging Results:  XR CHEST PA LAT    Result Date: 6/11/2022  INDICATION: Shortness of breath COMPARISON: 6/9/2022 FINDINGS: PA and lateral views of the chest demonstrate a stable cardiomediastinal silhouette. The patient is status post median sternotomy. Left-sided AICD is stable in position. Diffuse bilateral interstitial and alveolar opacities are increased. There is a small left pleural effusion. Increased diffuse bilateral interstitial and alveolar opacities may represent worsening pulmonary edema and/or pneumonia. Small left pleural effusion. Assessment / Plan:  The patient continues to have cough shortness of breath and wheezing.   Also noted low-grade fever, bilateral scanty wheezing and decreased air entry on the right lower lung fields. Hyperactive precordium and murmur in the aortic area. No peripheral edema. Labs from this morning significant for WBC 12.6, hemoglobin 10 and platelet 407. BMP from this morning significant for sodium 131 others within normal limits magnesium 1.8. 1-Acute exacerbation of CHF  Patient will remain on telemetry  Closely monitor vital signs  Started Lasix 20 mg IV daily  Entresto is on hold  Will resume Entresto when blood pressure stable  Monitor OMI  2 g sodium diet  Check CBC and BMP daily  Out of bed to chair when he can    2-Superimposed pneumonia  Continue with azithromycin  Started levofloxacin for continued cough low-grade fever and leukocytosis and history of recent hospitalization    3-History of A. fib  Heart rate remained well controlled  Continue Eliquis    4-status post AICD has history of recent evaluation by the cardiologist; has appointment with his cardiologist in the office next Friday. No reports of firing from [employer] on this admission  Will arrange for cardiology follow-up on discharge. 5-history of hypertension: Blood pressure average 120/69  Will hold on blood pressure medications  Continue to monitor blood pressure and resume BP meds at BP >140/90    6-History of interstitial lung disease  Added DuoNebs every 6 as needed for shortness of present wheezing    7-Orthostatic hypotension stable now has no lightheadedness. Continue with midodrine 10 mg p.o. 3 times daily. 8-History of dementia  Continue with Aricept 5 mg p.o. nightly    25.0 - 29.9 Overweight / Body mass index is 24.67 kg/m².          SAFETY:   Code Status:Full  DVT prophylaxis:Eliquis  Stress Ulcer prophylaxis: Protonix (home med)  Bladder catheter:no  Family Contact Info:  Primary Emergency Contact: Fidelina Carrasco, [place] Phone: [phone]  [facility]: [facility] Room 130/01  Disposition: TBD, likely home in [facility] on  Admission status:  Observation ______________________________________________________________________  Care Plan discussed with:    Comments   Patient X    Family      RN X    Care Manager     Consultant                        Multidiciplinary team rounds were held today with , nursing, pharmacist and clinical coordinator. Patient's plan of care was discussed; medications were reviewed and discharge planning was addressed.      ________________________________________________________________________    Total None critical care TIME:  35   Minutes        Comments   >50% of visit spent in counseling and coordination of care       ______________________________________________________________________          Signed: Henny Godinez MD

## 2022-06-12 NOTE — PROGRESS NOTES
Hospitalist Progress Note    NAME: Hansa Gonzalez   :  1945   MRN:  [id number]       Subjective:   Chief Complaint  Shortness of breath  Cough  Generalized body weakness  Lightheadedness. Subjective  This is a 16-year-old male patient seen at bedside this morning. Patient continues to have complaints of cough and shortness of breath  He also has complaints of being lightheaded when he tries to get of his bed and sit and walk. His antibiotic was changed to levofloxacin yesterday with consideration of pneumonia in the face of recent hospitalization. Patient's progress and situation was discussed with his wife who was at the bedside yesterday. We agreed to continue same management and if no improvement or new deterioration seen will consider transfer to [address] [place] [address] for cardiology evaluation. Review of Systems:  Symptom Y/N Comments  Symptom Y/N Comments   Fever/Chills x   Chest Pain x    Poor Appetite x   Edema x    Cough y   Abdominal Pain x    Sputum x   Joint Pain x    SOB/HOLCOMB y   Pruritis/Rash x    Nausea/vomit x   Tolerating PT/OT     Diarrhea x   Tolerating Diet y    Constipation x   Other         Objective:     VITALS:   Last 24hrs VS reviewed since prior progress note. Most recent are:  Patient Vitals for the past 24 hrs:   Temp Pulse Resp BP SpO2   22 0808 98.6 °F (37 °C) 91 22 114/64 --   22 0800 -- 84 -- -- --   22 0418 97.4 °F (36.3 °C) 84 20 (!) 150/80 91 %   22 0020 97.3 °F (36.3 °C) 75 20 135/67 94 %   22 2005 98.3 °F (36.8 °C) 68 20 131/68 93 %   22 1600 99 °F (37.2 °C) 75 20 116/65 90 %   22 1358 98.8 °F (37.1 °C) -- -- -- --   22 1200 98.7 °F (37.1 °C) 91 20 117/66 90 %       Intake/Output Summary (Last 24 hours) at 2022 1101  Last data filed at 2022 1012  Gross per 24 hour   Intake 180 ml   Output --   Net 180 ml        PHYSICAL EXAM:  General: WD, WN. Alert, cooperative, no acute distress    HEENT: EOMI.  Pink conjunctiva, Anicteric sclerae. Resp:  CTA bilaterally, no wheezing or rales. No accessory muscle use  CV:  Regular  rhythm, No JVD, No murmur or gallop, No peripheral edema edema  GI:  Soft, Non distended, Non tender. +Bowel sounds  Skin:             No rashes. No edema  MSK                No trauma deformity, swelling or tenderness of joints  Neurologic:     Alert and oriented X 3, normal speech. Motor system  exam  Psych:            Good insight. Not anxious nor agitated        Reviewed most current lab test results and cultures   YES  Reviewed most current radiology test results              YES  Review and summation of old records today               Yes  Reviewed patient's current orders and MAR                YES  PMH/SH reviewed - no change compared to H&P  __  Procedures: see electronic medical records for all procedures/Xrays and details which were not copied into this note but were reviewed prior to creation of Plan. I reviewed today's most current labs and imaging studies. Laboratory Results:  Pertinent labs include:  Recent Labs     06/11/22  0725 06/10/22  0612 06/09/22  1518   WBC 12.6* 7.4 11.6*   HGB 10.0* 9.9* 9.9*   HCT 30.4* 30.3* 30.3*    273 272     Recent Labs     06/11/22  0725 06/10/22  0612 06/09/22  1518   * 131* 131*   K 4.6 4.4 4.5   CL 94* 95* 94*   CO2 30 30 29   GLU 92 128* 108*   BUN 19 18 21*   CREA 1.08 0.97 1.20   CA 9.5 9.6 9.3   MG  --  1.8  --    ALB  --   --  3.2*   TBILI  --   --  0.6   ALT  --   --  23       Imaging Results:  No results found. Assessment / Plan: This is a 69-year-old male patient seen at bedside this morning. Patient continues to have complaints of cough and shortness of breath  He also has complaints of being lightheaded when he tries to get of his bed and sit and walk. His antibiotic was changed to levofloxacin yesterday with consideration of pneumonia in the face of recent hospitalization.     Patient's progress and situation was discussed with his wife who was at the bedside yesterday. We agreed to continue same management and if no improvement or new deterioration seen will consider transfer to [address] [facility] for cardiology evaluation    He is acutely sick looking, tachypneic, average blood pressure 114/64, pulse rate 91 strong, temperature 98.6 with records of low-grade fever of 100. Respiratory 2020 oxygen saturation 91% with oxygen at 3 L/min via nasal cannula. Labs CBC leucocytosis improved and BMP WNL. Normal Glucose and electrolytes. 18.5 - 24.9 Normal weight / Body mass index is 24.67 kg/m². 1-Acute exacerbation of CHF  Patient will remain on telemetry  Closely monitor vital signs  Started Lasix 20 mg IV daily  Entresto is on hold  Will resume Entresto when blood pressure stable  Monitor OMI  2 g sodium diet  Check CBC and BMP daily and follow-up results sent today. Out of bed to chair when he can     2-Superimposed pneumonia  Continue with azithromycin  Continue levofloxacin for continued cough low-grade fever and leukocytosis and history of recent hospitalization     3-History of A. fib  Heart rate remained well controlled  Continue Eliquis     4-Status post AICD has history of recent evaluation by the cardiologist; has appointment with his cardiologist in the office next Friday. No reports of firing from [employer] on this admission  Will arrange for cardiology follow-up on discharge.     5-history of hypertension: Blood pressure average 120/69  Will hold on blood pressure medications  Continue to monitor blood pressure and resume BP meds at BP >140/90     6-History of interstitial lung disease  Added DuoNebs every 6 as needed for shortness of present wheezing     7-Orthostatic hypotension stable now has still complains of lightheadedness when he tries to get off his bed and sit stand. Advised on fall precaution and to slowly rise and sit from supine position  Continue with midodrine 10 mg p.o. 3 times daily.   Apply JAMSHID hose    9-Hyponatremia  The patient has serum N a level of 129 today. He had gradual decline in Na level since 5/2022 from 134<<<<129. Is multifactorial, is  likely is a combination of heart  Failure with hypotonic hypervolemic  Hyponatremia. Monitor Se Na and   Check Urine electrolytes    8-History of dementia  Continue with Aricept 5 mg p.o. nightly     25.0 - 29.9 Overweight / Body mass index is 24.67 kg/m².        SAFETY:   Code Status:Full  DVT prophylaxis:Eliquis  Stress Ulcer prophylaxis: Protonix (home med)  Bladder catheter:no  Family Contact Info:  Primary Emergency Contact: Wendy Stuart, Home Phone: [phone]  Bedded: [facility] Room 130/01  Disposition: TBD, likely home in [facility] on  Admission status:  Observation            ______________________________________________________________________  Care Plan discussed with:    Comments   Patient x    Family  x    RN x    Care Manager     Consultant                        Multidiciplinary team rounds were held today with , nursing, pharmacist and clinical coordinator. Patient's plan of care was discussed; medications were reviewed and discharge planning was addressed.      ________________________________________________________________________    Total None critical care TIME:35   Minutes        Comments   >50% of visit spent in counseling and coordination of care       ______________________________________________________________________          Signed: Azra Blair MD

## 2022-06-12 NOTE — PROGRESS NOTES
0900 In to assess; sats 81 on 2L; increased flow to 4L sats improved to 92%   0909 20mg lasix given per order. 1000 Patient up to recliner with 1x assist   [id number] 64 02 69 given per PRN order for headache pain rated 6/10   1300 patient returned to bed  1620 c/o headache and requesting medication; Fioricet given. 1711 resting in bed.  Daughter at bedside no c/o at this time/

## 2022-06-13 NOTE — PROGRESS NOTES
Problem: Mobility Impaired (Adult and Pediatric)  Goal: *Acute Goals and Plan of Care (Insert Text)  Description: Problem: Mobility Impaired (Adult and Pediatric)  Goal: *Acute Goals and Plan of Care (Insert Text)  Outcome: Progressing Towards Goal   FUNCTIONAL STATUS PRIOR TO ADMISSION:  At baseline pt was ambulatory w/ 2WW and dependent on supplemental O2 at 2L NC; he required assistance from spouse for ADLs and was dependent for IADLs. HOME SUPPORT PRIOR TO ADMISSION: The patient lived with spouse in trailer w/ ramp to enter. Physical Therapy Goals  Initiated 6/10/2022  1. Patient will move from supine to sit and sit to supine  in bed with modified independence within 7 day(s). 2.  Patient will transfer from bed to chair and chair to bed with supervision/set-up using the least restrictive device within 7 day(s). 3.  Patient will perform sit to stand with supervision/set-up within 7 day(s). 4.  Patient will ambulate with supervision/set-up for 150 feet with the least restrictive device within 7 day(s). Outcome: Not Met      PHYSICAL THERAPY TREATMENT  Patient: Suraj Santillan (58 y.o. male)  Date: 6/13/2022  Diagnosis: CHF (congestive heart failure) ([facility]) [I50.9] Acute on chronic combined systolic and diastolic ACC/AHA stage C congestive heart failure (HCC)       Precautions: Bed Alarm,Fall  Chart, physical therapy assessment, plan of care and goals were reviewed. ASSESSMENT  Patient continues with skilled PT services and is progressing towards goals. Pt is received in bed on 3L of O2 NC and had just finished bathing in the bed with tech. He appeared visibly SOB but not in distress. Sats at rest 92%. Pt came to the edge of the bed with min A of 1. BP dropped with sitting with sats at 91%. Stood at rolling walker with BP improving slightly with activity but sats decreasing to 87%. Attempted education in PLB and sats continued to drop on 3L to 84%. Increased to 4L O2 and sats returned to 90%. Pt urgently needing to use the [facility] and transferred with min A to stand but then with CGA to turn with the RW for the transfer. After toileting, pt continued to be marginal at 90% on 4L. He returned to bed unable to tolerate more OOB activity. Pt did have some c/o dizziness with BP at 93/62 after toileting, returning to 104/64 in bed. Pt left in bed on 4L with RN aware of all of vitals and tolerance. Per CM, family wants pt to come home and not rehab/SNF. He will need 24 hr assist and HHPT post d/c.     Current Level of Function Impacting Discharge (mobility/balance): Overall CGA/min A of 1 but with limited tolerance, see above; vitals:     06/13/22 1004 06/13/22 1007 06/13/22 1011   Vital Signs   Pulse (Heart Rate) 74 79 82   /73 (!) 87/58 (!) 98/42   MAP (Calculated) 90 68 (!) 61   BP 1 Location Left upper arm Left upper arm  --    BP 1 Method Automatic Automatic  --    BP Patient Position Semi fowlers Sitting Standing   O2 Sat (%) 92 % 91 % (!) 87 %   O2 Device Nasal cannula  --   --    O2 Flow Rate (L/min) 3 l/min  --   --         06/13/22 1014 06/13/22 1016 06/13/22 1023   Vital Signs   Pulse (Heart Rate)  --   --  80   BP  --  115/61 (!) 97/55   MAP (Calculated)  --  79 69   BP 1 Location  --   --   --    BP 1 Method  --   --   --    BP Patient Position  --  Sitting Sitting  (BSC)   O2 Sat (%) (!) 84 % 90 % 90 %   O2 Device  --   --  Nasal cannula   O2 Flow Rate (L/min) 3 l/min 4 l/min 4 l/min      06/13/22 1023 06/13/22 1029 06/13/22 1114   Vital Signs   Pulse (Heart Rate) 80 75 70   Heart Rate Source  --   --  Monitor   Level of Consciousness  --   --  Alert (0)   BP (!) 97/55 93/62 104/64   MAP (Calculated) 69 72 77   BP 1 Location  --   --  Left upper arm   BP 1 Method  --   --  Automatic   BP Patient Position Sitting  (BSC) Semi fowlers At rest   Resp Rate  --   --  20   O2 Sat (%) 90 % 90 % 92 %   O2 Device Nasal cannula Nasal cannula Nasal cannula   O2 Flow Rate (L/min) 4 l/min 4 l/min 4 l/min      Other factors to consider for discharge: poor tolerance, fall risk, needing increased O2         PLAN :  Patient continues to benefit from skilled intervention to address the above impairments. Continue treatment per established plan of care. to address goals. Recommendation for discharge: (in order for the patient to meet his/her long term goals)  Physical therapy at least 2 days/week in the home AND ensure assist and/or supervision for safety with mobility/gait    This discharge recommendation:  Has been made in collaboration with the attending provider and/or case management    IF patient discharges home will need the following DME: rolling walker       SUBJECTIVE:   Patient stated now I'm a little dizzy.     OBJECTIVE DATA SUMMARY:   Critical Behavior:  Neurologic State: Alert  Orientation Level: Oriented X4  Cognition: Appropriate for age attention/concentration,Follows commands  Safety/Judgement: Awareness of environment  Functional Mobility Training:  Bed Mobility:     Supine to Sit: Minimum assistance     Scooting: Contact guard assistance        Transfers:  Sit to Stand: Minimum assistance;Contact guard assistance; Additional time  Stand to Sit: Contact guard assistance; Additional time        Bed to Chair: Contact guard assistance; Other (comment) (with RW bed to and from [facility])                    Balance:  Sitting: Impaired  Sitting - Static: Good (unsupported)  Sitting - Dynamic: Fair (occasional)  Standing: Impaired  Standing - Static: Fair;Constant support (RW)  Standing - Dynamic : Fair;Constant support; Other (comment) (RW)  Ambulation/Gait Training:              Gait Description (WDL):  (turning steps only from bed to/fr BS)             Pain Rating:  No c/o    Activity Tolerance:   Fair    After treatment patient left in no apparent distress:   Supine in bed, Call bell within reach and Side rails x 3    COMMUNICATION/COLLABORATION:   The patients plan of care was discussed with: Occupational therapist and Registered nurse.      Herber Cerna, PT   Time Calculation: 40 mins

## 2022-06-13 NOTE — PROGRESS NOTES
Problem: Pressure Injury - Risk of  Goal: *Prevention of pressure injury  Description: Document Fernando Scale and appropriate interventions in the flowsheet. Outcome: Progressing Towards Goal  Note: Pressure Injury Interventions:  Sensory Interventions: Assess changes in LOC,Float heels,Keep linens dry and wrinkle-free,Minimize linen layers    Moisture Interventions: Absorbent underpads,Limit adult briefs,Minimize layers    Activity Interventions: Increase time out of bed,PT/OT evaluation    Mobility Interventions: HOB 30 degrees or less,PT/OT evaluation    Nutrition Interventions: Document food/fluid/supplement intake    Friction and Shear Interventions: Apply protective barrier, creams and emollients,Lift sheet,HOB 30 degrees or less,Minimize layers                Problem: Patient Education: Go to Patient Education Activity  Goal: Patient/Family Education  Outcome: Progressing Towards Goal     Problem: Falls - Risk of  Goal: *Absence of Falls  Description: Document Osmel Fall Risk and appropriate interventions in the flowsheet.   Outcome: Progressing Towards Goal  Note: Fall Risk Interventions:  Mobility Interventions: Bed/chair exit alarm,Patient to call before getting OOB,Utilize walker, cane, or other assistive device         Medication Interventions: Bed/chair exit alarm,Patient to call before getting OOB,Teach patient to arise slowly    Elimination Interventions: Bed/chair exit alarm,Call light in reach,Patient to call for help with toileting needs,Stay With Me (per policy),Toilet paper/wipes in reach    History of Falls Interventions: Bed/chair exit alarm,Door open when patient unattended,Room close to nurse's station         Problem: Patient Education: Go to Patient Education Activity  Goal: Patient/Family Education  Outcome: Progressing Towards Goal     Problem: Pain  Goal: *Control of Pain  Outcome: Progressing Towards Goal

## 2022-06-13 NOTE — PROGRESS NOTES
[facility]  Hospitalist Progress Note    NAME: Fredrick Casiano   :  1945   MRN:  [id number]     Total duration of encounter: 3days  79years old male medical history of combined systolic/systolic congestive heart failure, AICD (automatic cardioverter/defibrillator, atrial fibrillation, coronary artery disease, dementia, depression,FH: factor V Leiden deficiency. GERD, hyperlipidemia, and Long term current use of anticoagulant therapy   Presented with dyspnea and found to have orthostatic retention upon arrival to the ED. He was refused oxygen but was gradually tapered to baseline 2 L nasal cannula. He received Zithromax for 3 days and then he started on Levaquin 500 mg daily for 2 days. Today he developed orthostatic hypotension during physical therapy. No chest pain, redness of breath. Subjective: Today he reported feeling better, less cough. Shortness of breath got better.               Symptom Y/N Comments   Symptom Y/N Comments   Fever/Chills N     Chest Pain n      Poor Appetite N     Edema n      Cough y     Abdominal Pain n      Sputum n     Joint Pain n      SOB/HOLCOMB y     Pruritis/Rash n      Nausea/vomit n     Tolerating PT/OT        Diarrhea n     Tolerating Diet y      Constipation n     Other             Current Facility-Administered Medications:     levoFLOXacin (LEVAQUIN) 500 mg in D5W IVPB, 500 mg, IntraVENous, Q24H, Michelle Land MD, Last Rate: 100 mL/hr at 22 1203, 500 mg at 22 1203    albuterol-ipratropium (DUO-NEB) 2.5 MG-0.5 MG/3 ML, 3 mL, Nebulization, Q6H PRN, Michelle Land MD    guaiFENesin ER (MUCINEX) tablet 600 mg, 600 mg, Oral, Q12H, Michelle Land MD, 600 mg at 22 0804    furosemide (LASIX) injection 20 mg, 20 mg, IntraVENous, DAILY, Michelle Land MD, 20 mg at 22 0805    butalbital-acetaminophen-caffeine (FIORICET, ESGIC) -40 mg per tablet 1 Tablet, 1 Tablet, Oral, Q4H PRN, Jackie Romero MD HARPREET, 1 Tablet at 06/13/22 0804    apixaban (ELIQUIS) tablet 5 mg, 5 mg, Oral, BID, Maame Mohan MD, 5 mg at 06/13/22 0804    atorvastatin (LIPITOR) tablet 40 mg, 40 mg, Oral, QHS, Maame Mohan MD, 40 mg at 06/12/22 2137    donepeziL (ARICEPT) tablet 5 mg, 5 mg, Oral, QHS, Maame Mohan MD, 5 mg at 06/12/22 2137    DULoxetine (CYMBALTA) capsule 60 mg, 60 mg, Oral, DAILY, Maame Mohan MD, 60 mg at 06/13/22 0804    memantine (NAMENDA) tablet 10 mg, 10 mg, Oral, BID, Maame Mohan MD, 10 mg at 06/13/22 0805    midodrine (PROAMATINE) tablet 10 mg, 10 mg, Oral, TID WITH MEALS, Maame Mohan MD, 10 mg at 06/13/22 0804    pantoprazole (PROTONIX) tablet 40 mg, 40 mg, Oral, DAILY, Maame Mohan MD, 40 mg at 06/13/22 0804    sertraline (ZOLOFT) tablet 200 mg, 200 mg, Oral, DAILY, Maame Mohan MD, 200 mg at 06/13/22 0804    sodium chloride (NS) flush 5-40 mL, 5-40 mL, IntraVENous, Q8H, Maame Mohan MD, 10 mL at 06/13/22 0631    sodium chloride (NS) flush 5-40 mL, 5-40 mL, IntraVENous, PRN, Maame Mohan MD, 10 mL at 06/11/22 1352    polyethylene glycol (MIRALAX) packet 17 g, 17 g, Oral, DAILY PRN, Maame Mohan MD    Objective:     Review of Systems:            Symptom Y/N Comments   Symptom Y/N Comments   Fever/Chills y     Chest Pain n      Poor Appetite n     Edema n      Cough y     Abdominal Pain n      Sputum n     Joint Pain n      SOB/HOLCOMB y     Pruritis/Rash n      Nausea/vomit n     Tolerating PT/OT        Diarrhea n     Tolerating Diet y      Constipation n     Other           VITALS:   Patient Vitals for the past 12 hrs:   Temp Pulse Resp BP SpO2   06/13/22 0800 -- 71 -- -- --   06/13/22 0746 98.2 °F (36.8 °C) 73 18 121/65 92 %   06/13/22 0402 97.5 °F (36.4 °C) 70 20 124/63 95 %   06/13/22 0008 97.2 °F (36.2 °C) 61 20 127/64 100 %       Intake/Output Summary (Last 24 hours) at 6/13/2022 0903  Last data filed at 6/13/2022 0000  Gross per 24 hour   Intake 380 ml   Output --   Net 380 ml PHYSICAL EXAM:  General: WD, WN. Alert, cooperative, no acute distress    EENT:  EOMI. Anicteric sclerae. MMM  Resp:  CTA bilaterally, no wheezing or rales. No accessory muscle use  CV:  Regular  rhythm,  No edema  GI:  Soft, Non distended, Non tender. +Bowel sounds  Neurologic:  Alert and oriented X 3, normal speech,   Psych:   Good insight. Not anxious nor agitated  Skin:  No rashes. No jaundice    LABS:  I reviewed today's most current labs and imaging studies. Pertinent labs include:  Recent Labs     06/13/22  0546 06/12/22  1110 06/11/22  0725   WBC 8.4 10.9 12.6*   HGB 9.6* 10.1* 10.0*   HCT 29.1* 30.1* 30.4*    285 290     Recent Labs     06/13/22  0546 06/12/22  1110 06/11/22  0725   * 129* 131*   K 3.9 4.2 4.6   CL 94* 92* 94*   CO2 31 31 30    113* 92   BUN 15 20 19   CREA 0.85 1.07 1.08   CA 9.2 9.7 9.5   MG 1.8  --   --      Assessment / Plan:    Continue admission for  IV antibiotics for treatment of pneumonia, close monitoring of blood pressure, hold Lasix reevaluate orthostatic hypotension. May need to a more liberal fluid intake in the coming few days    1-Acute exacerbation of CHF   Continue Telemetry,  Closely monitor of BP   Hold Lasix , the pt developed Orthostasis today during physical therapy    Ailin Walton is on hold, can  resume Entresto when blood pressure stable  Monitor OMI, 2 g sodium diet   PT/OT , Out of bed to chair when he can    The patient has borderline hyponatremia with sodium 131 sodium was 129 3 weeks ago on on May 25, 2022    2-Superimposed pneumonia  The patient reported less cough, shortness of breath. WBC count improved from 12.6 days ago to -8.4 today. Received Zithromax for 3 days   Received levofloxacin 500 mg IV X 2 doses . Increase Levaquin dose to 750 mg daily for 5 days to cover Pneumonia . f  ( The pt C/O  cough low-grade fever and leukocytosis and history of recent hospitalization)     3-History of A.  Fib ( Atrial fibrillation, 2012,Automatic cardioverter/defibrillator /2016)  Heart rate remained well controlled  Continue Eliquis     4-status post AICD has history of recent evaluation by the cardiologist; has appointment with his cardiologist in the office next Friday. No reports of firing from [employer] on this admission  Will arrange for cardiology follow-up on discharge.     5-history of hypertension: Blood pressure average 120/69  Will hold on blood pressure medications  Continue to monitor blood pressure and resume BP meds at BP >140/90     6-History of interstitial lung disease  Added DuoNebs every 6 as needed for shortness of present wheezing     7-Orthostatic hypotension stable now has no lightheadedness. Continue with midodrine 10 mg p.o. 3 times daily.     8-History of dementia  Continue with Aricept 5 mg p.o. nightly     25.0 - 29.9 Overweight / Body mass index is 24.67 kg/m². SAFETY:   Code Status:Full  DVT prophylaxis:Eliquis  Stress Ulcer prophylaxis: Protonix (home med)  Bladder catheter:no  Family Contact Info:  Primary Emergency Contact: Carmelo Calles, Home Phone: [phone]  Bedded: [facility] Room 130/01  Disposition: TBD, likely home in [facility] on  Admission status:  Observation  ______________________________________________________________________    Reviewed most current lab test results and cultures  YES  Reviewed most current radiology test results   YES  Review and summation of old records today    NO  Reviewed patient's current orders and MAR    YES  PMH/ reviewed - no change compared to H&P    Care Plan discussed with:                                   Comments  Patient X     Family   brother / guardian - Archie Yeboah   RN X     Care Manager X      Consultant                          X Multidiciplinary team rounds were held today with , nursing, pharmacist and clinical coordinator. Patient's plan of care was discussed; medications were reviewed and discharge planning was addressed. ____________________________________________   Total NON Critical Care TIME: 35 Minutes, >50% of visit spent in counseand coordination of care    Signed: Josiah Longo MD  [facility] Hospitalist  [phone]

## 2022-06-13 NOTE — PROGRESS NOTES
Problem: Self Care Deficits Care Plan (Adult)  Goal: *Acute Goals and Plan of Care (Insert Text)  Description: FUNCTIONAL STATUS PRIOR TO ADMISSION: Patient was modified independent using a rolling walker for functional mobility. HOME SUPPORT: The patient lived with spouse and required CGA for upper body dressing & min for lower body dressing &bathing. Occupational Therapy Goals  Initiated 6/10/2022  1. Patient will perform  upper body bathing with minimal assistance/contact guard assist within 7 day(s). 2.  Patient will perform  lower body bathing with minimal assistance/contact guard assist within 7 day(s). 3.  Patient will perform upper body dressing with supervision/set-up within 7 day(s). 4.  Patient will perform toilet transfers with supervision/set-up within 7 day(s). 5.  Patient will perform all aspects of toileting with modified independence within 7 day(s). 6.  Patient will participate in upper extremity therapeutic exercise/activities with supervision/set-up for 8 minutes within 7 day(s). 7.  Patient will utilize energy conservation techniques during functional activities with verbal cues within 7 day(s). Outcome: Progressing Towards Goal    OCCUPATIONAL THERAPY TREATMENT  Patient: Gunjan Shaw (36 y.o. male)  Date: 6/13/2022  Diagnosis: CHF (congestive heart failure) ([facility]) [I50.9] Acute on chronic combined systolic and diastolic ACC/AHA stage C congestive heart failure ([address]       Precautions: Bed Alarm,Fall  Chart, occupational therapy assessment, plan of care, and goals were reviewed. ASSESSMENT  Patient continues with skilled OT services and is progressing towards goals. He presents with orthostatic hypotension (refer to doc flow sheets), but is mostly asymptomatic. BP improves slightly after sitting edge of bed for 3 minutes. Patient was initially on 3 liters O2 via nasal canula. SpO2 was 90-92% at rest, but with activity dropped as low as 84%.  Provided cues and education for pursed lip breathing; after 2 minutes he recovered only to 87%. Increased oxygen to 4 liters and he slowly improved to 90%. Discussed with RN; left at 4 liters O2 to enable him to fully recover from activity. Patient is a high fall risk. Patient will continue to benefit from skilled OT treatment to maximize independence and safety in ADL. PLAN :  Patient continues to benefit from skilled intervention to address the above impairments. Continue treatment per established plan of care to address goals. Recommendation for discharge: (in order for the patient to meet his/her long term goals)  Occupational therapy at least 2 days/week in the home AND ensure assist and/or supervision for safety    This discharge recommendation:  Has been made in collaboration with the attending provider and/or case management    IF patient discharges home will need the following DME: bedside commode and wheelchair       SUBJECTIVE:   Patient stated I'm okay.     OBJECTIVE DATA SUMMARY:   Cognitive/Behavioral Status:  Neurologic State: Alert  Orientation Level: Oriented X4  Cognition: Appropriate for age attention/concentration; Follows commands  Perception: Appears intact  Perseveration: No perseveration noted  Safety/Judgement: Awareness of environment    Functional Mobility and Transfers for ADLs:  Bed Mobility:  Supine to Sit: Minimum assistance  Scooting: Contact guard assistance    Transfers:  Sit to Stand: Minimum assistance;Contact guard assistance; Additional time  Functional Transfers  Toilet Transfer : Minimum assistance; Additional time  Cues: Verbal cues provided;Visual cues provided  Adaptive Equipment: Bedside commode;Walker (comment)  Bed to Chair: Contact guard assistance; Other (comment) (with RW bed to and from [facility])    Balance:  Sitting: Impaired  Sitting - Static: Good (unsupported)  Sitting - Dynamic: Fair (occasional)  Standing: Impaired  Standing - Static: Fair;Constant support (RW)  Standing - Dynamic : Fair;Constant support; Other (comment) (RW)    ADL Intervention:       Grooming  Position Performed: Seated edge of bed  Washing Hands: Set-up (using wipes)    Upper [address]: Minimum  assistance; Compensatory technique training (Additional time)  Cues: Verbal cues provided;Visual cues provided    Lower Body Dressing Assistance  Socks: Total assistance (dependent)  Position Performed: Long sitting on bed    Toileting  Toileting Assistance: Moderate assistance  Bladder Hygiene: Minimum assistance  Clothing Management: Moderate assistance  Cues: Verbal cues provided;Visual cues provided; Tactile cues provided    Cognitive Retraining  Safety/Judgement: Awareness of environment    Therapeutic Exercises:   Encouraged to complete upper extremity exercises throughout the day to increase strength and endurance for OT. Pain:  No complaints    Activity Tolerance:   Poor, desaturates with exertion and requires oxygen, requires frequent rest breaks, and signs and symptoms of orthostatic hypotension    After treatment patient left in no apparent distress:   Supine in bed, Call bell within reach, and Side rails x 3    COMMUNICATION/COLLABORATION:   The patients plan of care was discussed with: Physical therapist and Registered nurse.      OLIVA Malone/L  Time Calculation: 41 mins

## 2022-06-13 NOTE — PROGRESS NOTES
Problem: Pressure Injury - Risk of  Goal: *Prevention of pressure injury  Description: Document Fernando Scale and appropriate interventions in the flowsheet. Outcome: Progressing Towards Goal  Note: Pressure Injury Interventions:  Sensory Interventions: Assess changes in LOC    Moisture Interventions: Absorbent underpads    Activity Interventions: Increase time out of bed    Mobility Interventions: HOB 30 degrees or less    Nutrition Interventions: Document food/fluid/supplement intake,Offer support with meals,snacks and hydration    Friction and Shear Interventions: HOB 30 degrees or less                Problem: Patient Education: Go to Patient Education Activity  Goal: Patient/Family Education  Outcome: Progressing Towards Goal     Problem: Falls - Risk of  Goal: *Absence of Falls  Description: Document Osmel Fall Risk and appropriate interventions in the flowsheet.   Outcome: Progressing Towards Goal  Note: Fall Risk Interventions:  Mobility Interventions: Bed/chair exit alarm,OT consult for ADLs,Patient to call before getting OOB,PT Consult for mobility concerns,PT Consult for assist device competence,Strengthening exercises (ROM-active/passive),Utilize walker, cane, or other assistive device         Medication Interventions: Bed/chair exit alarm,Patient to call before getting OOB    Elimination Interventions: Bed/chair exit alarm,Call light in reach,Patient to call for help with toileting needs    History of Falls Interventions: Bed/chair exit alarm         Problem: Patient Education: Go to Patient Education Activity  Goal: Patient/Family Education  Outcome: Progressing Towards Goal     Problem: Patient Education: Go to Patient Education Activity  Goal: Patient/Family Education  Outcome: Progressing Towards Goal     Problem: Patient Education: Go to Patient Education Activity  Goal: Patient/Family Education  Outcome: Progressing Towards Goal     Problem: Patient Education: Go to Patient Education Activity  Goal: Patient/Family Education  Outcome: Progressing Towards Goal     Problem: Pain  Goal: *Control of Pain  Outcome: Progressing Towards Goal

## 2022-06-13 NOTE — PROGRESS NOTES
1305 - pt states he will eat icey and tea only because he doesn't have teeth and cannot chew - offered sandwich, ensure and asked what he could eat at home and writer attempt to get something similar. Pt stated he isn't hungry and would just like icey and tea.

## 2022-06-13 NOTE — PROGRESS NOTES
For Medicaid personal care/LTC Medicaid EW Celeste Boyle at [address] needs the Appendix D completed. I called Mrs. Wilma Borden and she is not feeling well so will not come to the hospital today. I completed the application over the phone and had Mr. Wilma Borden sign it. The only verification that is needed is a current bank statement and Mrs. Wilma Borden will get this to Ms. Dominique Cano at [employer]. The Appendix D paperwork was faxed to Ms. Dominique Cano today, 6/13/22. [address]: [phone], fax [phone]  Mrs. Wilma Borden: [phone]

## 2022-06-13 NOTE — PROGRESS NOTES
Follow up visit with patient in Rm 130 in Med/Surg  Provided empathic listening and spiritual support  Advised of  Availability   [address] [place] [address]

## 2022-06-13 NOTE — PROGRESS NOTES
IDR Team; MD, Nursing, Care Manager, occupational therapy, Nursing Supervisor, Pharmacy and Dietician, met to review patient's plan of care. Discussed goals, interventions, barriers and progress.      Team will continue to monitor progress and report any concerns to the physician and care management as indicated.     Transition of Care Plan: Per MD, patient here for pneumonia. Per RN, patient is orthostatic--drops to low 90's when sitting. o2 dropped to 80's. On o2 at 4L. Normally wears 2L at home. Patient not medically stable for swing bed yet. CM will wait till medically stable prior to starting auth for swing bed.

## 2022-06-14 NOTE — PROGRESS NOTES
Bedside and Verbal shift change report given to CHANTAL Gonzalez RN (oncoming nurse) by Юлия Henson LPN (offgoing nurse). Report included the following information SBAR, Kardex, Intake/Output and MAR.

## 2022-06-14 NOTE — PROGRESS NOTES
Patient informed writer that he was experiencing BLE cramping/discomfort. Stating \"it feels like it has in the past when I've had clots. \" Dr. De Guzman Feeling made aware. BLE duplex ordered per Dr. Gab Arellano. VSS.

## 2022-06-14 NOTE — PROGRESS NOTES
1410: Called to get auth for skilled care. Called member services 3 times but no answer. Sounded like I was forwarded to fax machine. Called member services and spoke with Ari gomez. Was then transferred but was on hold for over 20 minutes w/no answer. Will call back later on.     1530: Ryan Garza. No person answered. It was a prompt. Told me to fax skilled care auth requests to [phone]. Info faxed to that number. Waiting to hear back from insurance company.

## 2022-06-14 NOTE — PROGRESS NOTES
Problem: Mobility Impaired (Adult and Pediatric)  Goal: *Acute Goals and Plan of Care (Insert Text)  Description: Problem: Mobility Impaired (Adult and Pediatric)  Goal: *Acute Goals and Plan of Care (Insert Text)  Outcome: Progressing Towards Goal   FUNCTIONAL STATUS PRIOR TO ADMISSION:  At baseline pt was ambulatory w/ 2WW and dependent on supplemental O2 at 2L NC; he required assistance from spouse for ADLs and was dependent for IADLs. HOME SUPPORT PRIOR TO ADMISSION: The patient lived with spouse in trailer w/ ramp to enter. Physical Therapy Goals  Initiated 6/10/2022  1. Patient will move from supine to sit and sit to supine  in bed with modified independence within 7 day(s). 2.  Patient will transfer from bed to chair and chair to bed with supervision/set-up using the least restrictive device within 7 day(s). 3.  Patient will perform sit to stand with supervision/set-up within 7 day(s). 4.  Patient will ambulate with supervision/set-up for 150 feet with the least restrictive device within 7 day(s). Outcome: Progressing Towards Goal   PHYSICAL THERAPY TREATMENT  Patient: Calista Natarajan (42 y.o. male)  Date: 6/14/2022  Diagnosis: CHF (congestive heart failure) ([facility]) [I50.9] Acute on chronic combined systolic and diastolic ACC/AHA stage C congestive heart failure (H[facility])       Precautions: Bed Alarm,Fall  Chart, physical therapy assessment, plan of care and goals were reviewed. ASSESSMENT  Patient continues with skilled PT services and is not progressing towards goals. Patient limited in therapy and activity tolerance secondary to symptomatic orthostatic hypotension. Patient tolerated standing and attempted limited standing activity at bedside to assess BP response. BP mildly improved but remained hypotensive with c/o dizziness. He continues to present with weakness, HOLCOMB and unable to wean O2. Will continue to follow and progress activity as BP allows.       RN informed of BP.    06/14/22 1330 06/14/22 1332 06/14/22 1336 06/14/22 1340 06/14/22 1348   Vital Signs     Pulse (Heart Rate) 81 86 87 91 82   /67 (!) 119/59 (!) 81/48  (symptomatic) (!) 92/54 (!) 147/73   MAP (Calculated) 86 79 (!) 59 67 98   BP 1 Location Left upper arm Left upper arm Left upper arm Left upper arm Left upper arm   BP 1 Method Automatic Automatic Automatic Automatic Automatic   BP Patient Position Supine Sitting Standing Standing  (post activity) Supine  (post activity)       Current Level of Function Impacting Discharge (mobility/balance): CGA overall but limited activity due to hypotension     Other factors to consider for discharge: spouse just diagnosed with Covid          PLAN :  Patient continues to benefit from skilled intervention to address the above impairments. Continue treatment per established plan of care. to address goals. Recommendation for discharge: (in order for the patient to meet his/her long term goals)  Physical therapy at least 2 days/week in the home with increased family assistance     This discharge recommendation:  Has been made in collaboration with the attending provider and/or case management    IF patient discharges home will need the following DME: to be determined (TBD)       SUBJECTIVE:   Patient stated My wife has that Covid and is home sick.     OBJECTIVE DATA SUMMARY:   Critical Behavior:  Neurologic State: Alert  Orientation Level: Oriented X4  Cognition: Follows commands  Safety/Judgement: Awareness of environment  Functional Mobility Training:  Bed Mobility:     Supine to Sit: Supervision; Additional time;Bed Modified  Sit to Supine: Minimum assistance; Additional time  Scooting: Supervision; Additional time        Transfers:  Sit to Stand: Contact guard assistance; Additional time  Stand to Sit: Contact guard assistance   In standing patient performed min marching, 2 step forward/to step backward to assess PT response to activity prior to progressing transfers/ambulation. Balance:  Sitting: Intact  Standing: Impaired  Standing - Static: Fair  Standing - Dynamic : Fair  Ambulation/Gait Training:   Unable to safely attempt due to symptomatic hypotension   Pain Rating:  No c/o pain     Activity Tolerance:   Fair, Poor, desaturates with exertion and requires oxygen, requires rest breaks, observed SOB with activity, and signs and symptoms of orthostatic hypotension    After treatment patient left in no apparent distress:   Supine in bed, Call bell within reach, Bed / chair alarm activated, and Side rails x 3    COMMUNICATION/COLLABORATION:   The patients plan of care was discussed with: Registered nurse.      Ivana Vergara, PT, DPT   Time Calculation: 23 mins

## 2022-06-14 NOTE — PROGRESS NOTES
Bedside and Verbal shift change report given to COURTNEY Bray LPN (oncoming nurse) by Angel Valadez RN (offgoing nurse). Report included the following information SBAR, Kardex, Intake/Output, MAR and Recent Results.

## 2022-06-14 NOTE — PROGRESS NOTES
[facility]  Hospitalist Progress Note    NAME: Johanne Julian   :  1945   MRN:  [id number]     Total duration of encounter: 5 days    Total duration of encounter: 3days  79years old male medical history of combined systolic/systolic congestive heart failure, AICD (automatic cardioverter/defibrillator, atrial fibrillation, coronary artery disease, dementia, depression,FH: factor V Leiden deficiency. GERD, hyperlipidemia, and Long term current use of anticoagulant therapy. He Presented with dyspnea that responded to increasing oxygen therapy to 4 L/min instead of his baseline of O2 therapy of 2 L/min. Chest x-ray revealed basilar space disease. Currently on Levaquin 750 daily, day 2   ((He received Zithromax for 3 days and then he started on Levaquin 500 mg daily for 2 days. Yesterday he developed orthostatic hypotension during physical therapy)). Case management and  are trying to arrange for a skilled bed in the hospital.  Reported waiting for insurance approval.        Subjective:   Cussed with RN  Today he complained of lateral lower extremity pain  Reported he does not like to be in the hospital.  Worried about his wife who has COVID   No cough.  No Shortness of breath                        Symptom Y/N Comments   Symptom Y/N Comments   Fever/Chills N     Chest Pain n       Poor Appetite N     Edema n       Cough y     Abdominal Pain n       Sputum n     Joint Pain n       SOB/HOLCOMB y     Pruritis/Rash n       Nausea/vomit n     Tolerating PT/OT         Diarrhea n     Tolerating Diet y       Constipation n     Other              Current Facility-Administered Medications:     levoFLOXacin (LEVAQUIN) 750 mg in D5W IVPB, 750 mg, IntraVENous, Q24H, Saul Chu MD, Last Rate: 100 mL/hr at 22 1212, 750 mg at 22 1212    albuterol-ipratropium (DUO-NEB) 2.5 MG-0.5 MG/3 ML, 3 mL, Nebulization, Q6H PRN, Georgetta Deis, Argyle Epley, MD  guaiFENesin [facility]) tablet 600 mg, 600 mg, Oral, Q12H, Michelle Land MD, 600 mg at 06/14/22 0944    [Held by provider] furosemide (LASIX) injection 20 mg, 20 mg, IntraVENous, DAILY, Michelle Land MD, 20 mg at 06/13/22 0805    butalbital-acetaminophen-caffeine (FIORICET, ESGIC) -40 mg per tablet 1 Tablet, 1 Tablet, Oral, Q4H PRN, Jeanie Saini MD, 1 Tablet at 06/13/22 2118    apixaban (ELIQUIS) tablet 5 mg, 5 mg, Oral, BID, J Luis Waterman MD, 5 mg at 06/14/22 0944    atorvastatin (LIPITOR) tablet 40 mg, 40 mg, Oral, QHS, J Luis Waterman MD, 40 mg at 06/13/22 2118    donepeziL (ARICEPT) tablet 5 mg, 5 mg, Oral, QHS, J Luis Waterman MD, 5 mg at 06/13/22 2118    DULoxetine (CYMBALTA) capsule 60 mg, 60 mg, Oral, DAILY, J Luis Waterman MD, 60 mg at 06/14/22 0944    memantine (NAMENDA) tablet 10 mg, 10 mg, Oral, BID, J Luis Waterman MD, 10 mg at 06/14/22 0944    midodrine (PROAMATINE) tablet 10 mg, 10 mg, Oral, TID WITH MEALS, J Luis Waterman MD, 10 mg at 06/14/22 1212    pantoprazole (PROTONIX) tablet 40 mg, 40 mg, Oral, DAILY, J Luis Waterman MD, 40 mg at 06/14/22 0636    sertraline (ZOLOFT) tablet 200 mg, 200 mg, Oral, DAILY, J Luis Waterman MD, 200 mg at 06/14/22 0944    sodium chloride (NS) flush 5-40 mL, 5-40 mL, IntraVENous, Q8H, J Luis Waterman MD, 10 mL at 06/14/22 1510    sodium chloride (NS) flush 5-40 mL, 5-40 mL, IntraVENous, PRN, J Luis Waterman MD, 10 mL at 06/11/22 1352    polyethylene glycol (MIRALAX) packet 17 g, 17 g, Oral, DAILY PRN, J Luis Waterman MD, 17 g at 06/13/22 3156    Objective:     VITALS:   Patient Vitals for the past 12 hrs:   Temp Pulse Resp BP SpO2   06/14/22 1537 97.8 °F (36.6 °C) 78 20 129/68 96 %   06/14/22 1348 -- 82 -- (!) 147/73 --   06/14/22 1340 -- 91 -- (!) 92/54 --   06/14/22 1336 -- 87 -- (!) 81/48 --   06/14/22 1332 -- 86 -- (!) 119/59 --   06/14/22 1330 -- 81 -- 124/67 --   06/14/22 1144 98.6 °F (37 °C) 68 20 116/70 94 %   06/14/22 0758 97.8 °F (36.6 °C) 68 18 118/72 92 %       Intake/Output Summary (Last 24 hours) at 6/14/2022 1628  Last data filed at 6/14/2022 1200  Gross per 24 hour   Intake 480 ml   Output --   Net 480 ml        PHYSICAL EXAM:  General: WD, WN. Alert, cooperative, no acute distress    EENT:  EOMI. Anicteric sclerae. MMM  Resp:  CTA bilaterally, no wheezing or rales. No accessory muscle use  CV:  Regular  rhythm,  No edema  GI:  Soft, Non distended, Non tender. +Bowel sounds  Neurologic:  Alert and oriented X 3, normal speech,   Psych:   Good insight. Not anxious nor agitated  Skin:  No rashes. No jaundice    LABS:    Recent Labs     06/13/22  0546 06/12/22  1110   WBC 8.4 10.9   HGB 9.6* 10.1*   HCT 29.1* 30.1*    285     Recent Labs     06/13/22  0546 06/12/22  1110   * 129*   K 3.9 4.2   CL 94* 92*   CO2 31 31    113*   BUN 15 20   CREA 0.85 1.07   CA 9.2 9.7   MG 1.8  --      Assessment / Plan:    Exposure to COVID-19 : The patient tested negative for COVID-19 and flu AMB today. Acute exacerbation of CHF   Continue Telemetry,  Closely monitor of BP   Hold Lasix , the pt developed Orthostasis today during physical therapy    Guerita Prince is on hold, can  resume Entresto when blood pressure stable)  Monitor OMI, 2 g sodium diet   PT/OT , Out of bed to chair when he can  The patient has  hyponatremia with sodium 131 sodium was 129 3 weeks ago on 05/ 25/ 2022     Superimposed pneumonia  -Improved, no  cough,no  shortness of breath  - Increase Levaquin dose to 750 mg daily for 5 days to cover Pneumonia   Received Zithromax for 3 days, and levofloxacin 500 mg IV X 2 doses . .   ( The pt C/O  cough low-grade fever and leukocytosis and history of recent hospitalization)     History of A.  Fib ( Atrial fibrillation, 2012,Automatic cardioverter/defibrillator /2016)  Heart rate remained controlled- Continue Eliquis     Status post AICD has history of recent evaluation by the cardiologist; has appointment with his cardiologist in the office next Friday. No reports of firing from [employer] on this admission  Will arrange for cardiology follow-up on discharge.     5-history of hypertension: Blood pressure average 120/69  Will hold on blood pressure medications  Continue to monitor blood pressure and resume BP meds at BP >140/90     History of interstitial lung disease  Added DuoNebs every 6 as needed for shortness of present wheezing     Orthostatic hypotension stable now has no lightheadedness. Continue with midodrine 10 mg p.o. 3 times daily.     History of dementia : Continue with Aricept 5 mg p.o. nightly  ______________________________________________________________________  SAFETY:   Code Status:Full  DVT prophylaxis:Eliquis  Stress Ulcer prophylaxis: Protonix (home med)  Bladder catheter:no  Family Contact Info:  Primary Emergency Contact: Bjbrayden Cooks, Home Phone: [phone]  Bedded: [facility] Room 130/01  Disposition: TBD, likely home in [facility] on  Admission status: Admission    Reviewed most current lab test results and cultures  YES  Reviewed most current radiology test results   YES  Review and summation of old records today    NO  Reviewed patient's current orders and MAR    YES  PMH/ reviewed - no change compared to H&P    Care Plan discussed with:                                   Comments  Patient x     Family   brother / guardian - Loni Peralta RN x     Care Manager x      Consultant                           Multidiciplinary team rounds were held today with , nursing, pharmacist and clinical coordinator. Patient's plan of care was discussed; medications were reviewed and discharge planning was addressed.         ____________________________________________    Total NON Critical Care TIME: 35   Minutes >50% of visit spent in counseling and coordination of care    Signed: Mitesh Pineda MD  [facility] Hospitalist  [phone]

## 2022-06-14 NOTE — PROGRESS NOTES
Problem: Pressure Injury - Risk of  Goal: *Prevention of pressure injury  Description: Document Fernando Scale and appropriate interventions in the flowsheet. Outcome: Progressing Towards Goal  Note: Pressure Injury Interventions:  Sensory Interventions: Assess changes in LOC    Moisture Interventions: Absorbent underpads,Check for incontinence Q2 hours and as needed    Activity Interventions: Increase time out of bed    Mobility Interventions: HOB 30 degrees or less    Nutrition Interventions: Document food/fluid/supplement intake    Friction and Shear Interventions: HOB 30 degrees or less                Problem: Patient Education: Go to Patient Education Activity  Goal: Patient/Family Education  Outcome: Progressing Towards Goal     Problem: Falls - Risk of  Goal: *Absence of Falls  Description: Document Osmel Fall Risk and appropriate interventions in the flowsheet.   Outcome: Progressing Towards Goal  Note: Fall Risk Interventions:  Mobility Interventions: Bed/chair exit alarm,Patient to call before getting OOB         Medication Interventions: Bed/chair exit alarm,Patient to call before getting OOB,Teach patient to arise slowly    Elimination Interventions: Bed/chair exit alarm,Call light in reach,Patient to call for help with toileting needs    History of Falls Interventions: Bed/chair exit alarm,Door open when patient unattended,Room close to nurse's station         Problem: Patient Education: Go to Patient Education Activity  Goal: Patient/Family Education  Outcome: Progressing Towards Goal     Problem: Pain  Goal: *Control of Pain  Outcome: Progressing Towards Goal We will call you with your results (wet prep and GC testing)     If you miss our call, we will leave a voicemail with a callback number and call us back when you are able to.     If we have to send through a prescription, we will let you know, if you receive a prescription please take this until completion    Abstain from intercourse until you receive your results and we will further direct you on this if needed    If you develop abdominal pain, fevers, chills, other signs of infection follow up promptly

## 2022-06-14 NOTE — PROGRESS NOTES
Problem: Pressure Injury - Risk of  Goal: *Prevention of pressure injury  Description: Document Fernando Scale and appropriate interventions in the flowsheet. Outcome: Progressing Towards Goal  Note: Pressure Injury Interventions:  Sensory Interventions: Assess changes in LOC    Moisture Interventions: Absorbent underpads,Apply protective barrier, creams and emollients,Check for incontinence Q2 hours and as needed    Activity Interventions: Increase time out of bed    Mobility Interventions: HOB 30 degrees or less    Nutrition Interventions: Document food/fluid/supplement intake    Friction and Shear Interventions: HOB 30 degrees or less                Problem: Patient Education: Go to Patient Education Activity  Goal: Patient/Family Education  Outcome: Progressing Towards Goal     Problem: Falls - Risk of  Goal: *Absence of Falls  Description: Document Osmel Fall Risk and appropriate interventions in the flowsheet.   Outcome: Progressing Towards Goal  Note: Fall Risk Interventions:  Mobility Interventions: Bed/chair exit alarm,Patient to call before getting OOB         Medication Interventions: Bed/chair exit alarm,Patient to call before getting OOB,Teach patient to arise slowly    Elimination Interventions: Bed/chair exit alarm,Call light in reach,Patient to call for help with toileting needs    History of Falls Interventions: Bed/chair exit alarm,Door open when patient unattended,Room close to nurse's station         Problem: Pain  Goal: *Control of Pain  Outcome: Progressing Towards Goal

## 2022-06-14 NOTE — PROGRESS NOTES
Follow up visit with patient in Rm 130 in MED/SURG  Provided empathic listening and spiritual supportAdvised of Queenie Freedman

## 2022-06-14 NOTE — PROGRESS NOTES
PT informed OTR of pt experiencing orthostatic hypotension during activity. This afternoon he had symptomatic drop from sitting 119//59 to standing 81/48 and then in sitting again was 92/54 with supine afterward measured at 147/73. Holding today.

## 2022-06-15 NOTE — PROGRESS NOTES
OTR read notes and pt has imaging ordered for BLE due to pain that pt said is like when he had 'clots' in the past. HOLD until study completed/resulted

## 2022-06-15 NOTE — PROGRESS NOTES
IDR Team; MD, Nursing clinical coordinator, Care Manager, SLP and Dietician, met to review patient's plan of care. Discussed goals, interventions, barriers and progress.      Team will continue to monitor progress and report any concerns to the physician and care management as indicated.     Transition of Care Plan:  Per  MD, patient wasn't feeling well today. Wife tested COVID positive. Patient is negative. Patient having leg pain. DVT screening ordered. MD prescribed muscle relaxants that helped. Chest pain when he coughs. On Levaquin. Thinks patient is depressed psych ordered will be ordered. CM waiting on auth from insurance company. 1311: Left message for patient's insurance company about status of skilled care auth [id number]. Also [employer].  Waiting for response

## 2022-06-15 NOTE — PROGRESS NOTES
Bedside and Verbal shift change report given to FARZANEH Chung RN (oncoming nurse) by Jose R Snyder RN (offgoing nurse). Report included the following information SBAR, Kardex, Intake/Output, MAR and Accordion.

## 2022-06-15 NOTE — PROGRESS NOTES
Problem: Mobility Impaired (Adult and Pediatric)  Goal: *Acute Goals and Plan of Care (Insert Text)  Description: Problem: Mobility Impaired (Adult and Pediatric)  Goal: *Acute Goals and Plan of Care (Insert Text)  Outcome: Progressing Towards Goal   FUNCTIONAL STATUS PRIOR TO ADMISSION:  At baseline pt was ambulatory w/ 2WW and dependent on supplemental O2 at 2L NC; he required assistance from spouse for ADLs and was dependent for IADLs. HOME SUPPORT PRIOR TO ADMISSION: The patient lived with spouse in trailer w/ ramp to enter. Physical Therapy Goals  Initiated 6/10/2022  1. Patient will move from supine to sit and sit to supine  in bed with modified independence within 7 day(s). 2.  Patient will transfer from bed to chair and chair to bed with supervision/set-up using the least restrictive device within 7 day(s). 3.  Patient will perform sit to stand with supervision/set-up within 7 day(s). 4.  Patient will ambulate with supervision/set-up for 150 feet with the least restrictive device within 7 day(s). Outcome: Progressing Towards Goal  Note:   PHYSICAL THERAPY TREATMENT  Patient: Maty Gonzalez (8 y.o. male)  Date: 6/15/2022  Diagnosis: CHF (congestive heart failure) ([facility]) [I50.9] Acute on chronic combined systolic and diastolic ACC/AHA stage C congestive heart failure (HCC)       Precautions: Bed Alarm,Fall  Chart, physical therapy assessment, plan of care and goals were reviewed. ASSESSMENT  Patient continues with skilled PT services and is slowly progressing towards goals. Negative LE imaging noted. Patient continues to feel lightheaded with position change but today is better in sitting. Patient on 3L O2 throughout session. Able to come to EOB with use of bed rail. Good sitting balance noted. Patient able to stand and take a few steps from bed to bedside commode with rolling walker and CGA. Verbal cues for safety.   Patient with a slight drop in BP in sitting; unable to obtain reading in standing. Assisted back to bed after using bedside commode. Encouraged patient to sit in chair for dinner if BP stable. Recommend HHPT and increased assistance at discharge. Current Level of Function Impacting Discharge (mobility/balance): CGA    Other factors to consider for discharge: back pain, BP         PLAN :  Patient continues to benefit from skilled intervention to address the above impairments. Continue treatment per established plan of care. to address goals. Recommendation for discharge: (in order for the patient to meet his/her long term goals)  Physical therapy at least 2 days/week in the home AND ensure assist and/or supervision for safety with mobility    This discharge recommendation:  Has been made in collaboration with the attending provider and/or case management    IF patient discharges home will need the following DME: rolling walker if doesn't own       SUBJECTIVE:   Patient stated I need to use the bathroom.     OBJECTIVE DATA SUMMARY:   Critical Behavior:  Neurologic State: Alert  Orientation Level: Oriented X4  Cognition: Follows commands  Safety/Judgement: Awareness of environment  Functional Mobility Training:  Bed Mobility:     Supine to Sit: Modified independent; Additional time  Sit to Supine: Contact guard assistance           Transfers:  Sit to Stand: Contact guard assistance  Stand to Sit: Contact guard assistance        Bed to Chair: Contact guard assistance (to bedside commode)                    Balance:  Sitting: Intact  Sitting - Static: Good (unsupported)  Sitting - Dynamic: Good (unsupported)  Standing: Impaired  Standing - Static: Constant support; Fair  Standing - Dynamic : Constant support; Fair  Ambulation/Gait Training:  Distance (ft): 6 Feet (ft) (3' x 2)  Assistive Device: Walker, rolling;Gait belt  Ambulation - Level of Assistance: Contact guard assistance        Gait Abnormalities: Decreased step clearance;Shuffling gait        Base of Support: Narrowed     Speed/Hazel: Pace decreased (<100 feet/min)  Step Length: Left shortened;Right shortened                                  Therapeutic Exercises: Ankle pumps  Pain Rating:  Headache and back pain reported; no number given    Activity Tolerance:   Fair    After treatment patient left in no apparent distress:   Supine in bed, Call bell within reach, Caregiver / family present, and Side rails x 3    COMMUNICATION/COLLABORATION:   The patients plan of care was discussed with: Registered nurse.      Aliya Espinal, PT   Time Calculation: 27 mins

## 2022-06-15 NOTE — PROGRESS NOTES
[facility]  Hospitalist Progress Note    NAME: Navdeep Hodgson   :  1945   MRN:  [id number]     Total duration of encounter: 6 days  Total duration of encounter: 3days  79years old male medical history of combined systolic/systolic congestive heart failure, AICD (automatic cardioverter/defibrillator, atrial fibrillation, coronary artery disease, dementia, depression,FH: factor V Leiden deficiency.  GERD, hyperlipidemia, and Long term current use of anticoagulant therapy. She is currently treated for pneumonia with Lovenox. Treated for CHF. Lasix was held at the patient follow-up orthostatic hypotension. Reported lower extremity pain that resolved today. Ultrasound DVT screen was obtained. Today he developed chest pain with breathing. No diaphoresis no nausea or vomiting. No  Palpitation. Ported feeling depressed suicidal ideation. Subjective:   Discussed with RN   Lower extremity pain resolved  Reported feeling depressed. Told the RN he with he can get himself  Reported chest pain with breathing. No cough. No Shortness of breath                       Symptom Y/N Comments   Symptom Y/N Comments   Fever/Chills N     Chest Pain Y       Poor Appetite N     Edema n       Cough y     Abdominal Pain n       Sputum n     Joint Pain n       SOB/HOLCOMB y     Pruritis/Rash n       Nausea/vomit n     Tolerating PT/OT         Diarrhea n     Tolerating Diet y       Constipation n     Other              Current Facility-Administered Medications:     furosemide (LASIX) injection 40 mg, 40 mg, IntraVENous, BID, Saul Chu MD, 40 mg at 06/15/22 1808    levoFLOXacin (LEVAQUIN) 750 mg in D5W IVPB, 750 mg, IntraVENous, Q24H, Saul Chu MD, Last Rate: 100 mL/hr at 06/15/22 1057, 750 mg at 06/15/22 1057    albuterol-ipratropium (DUO-NEB) 2.5 MG-0.5 MG/3 ML, 3 mL, Nebulization, Q6H PRN, Michelle Land MD    guaiFENesin ER (MUCINEX) tablet 600 mg, 600 mg, Oral, Q12H, Nesrane, Saima Puentes MD, 600 mg at 06/15/22 0946    [Held by provider] furosemide (LASIX) injection 20 mg, 20 mg, IntraVENous, DAILY, Nesrane, Michelle RAO MD, 20 mg at 06/13/22 0805    butalbital-acetaminophen-caffeine (FIORICET, ESGIC) -40 mg per tablet 1 Tablet, 1 Tablet, Oral, Q4H PRN, Luis Manuel Lara MD, 1 Tablet at 06/15/22 1459    apixaban (ELIQUIS) tablet 5 mg, 5 mg, Oral, BID, Eddie Pierce MD, 5 mg at 06/15/22 1807    atorvastatin (LIPITOR) tablet 40 mg, 40 mg, Oral, QHS, Eddie Pierce MD, 40 mg at 06/14/22 2050    donepeziL (ARICEPT) tablet 5 mg, 5 mg, Oral, QHS, Eddie Pierce MD, 5 mg at 06/14/22 2050    DULoxetine (CYMBALTA) capsule 60 mg, 60 mg, Oral, DAILY, Eddie Pierce MD, 60 mg at 06/15/22 0946    memantine (NAMENDA) tablet 10 mg, 10 mg, Oral, BID, Eddie Pierce MD, 10 mg at 06/15/22 1808    midodrine (PROAMATINE) tablet 10 mg, 10 mg, Oral, TID WITH MEALS, Eddie Pierce MD, 10 mg at 06/15/22 1807    pantoprazole (PROTONIX) tablet 40 mg, 40 mg, Oral, DAILY, Eddie Pierce MD, 40 mg at 06/15/22 0651    sertraline (ZOLOFT) tablet 200 mg, 200 mg, Oral, DAILY, Eddie Pierce MD, 200 mg at 06/15/22 0946    sodium chloride (NS) flush 5-40 mL, 5-40 mL, IntraVENous, Q8H, Eddie Pierce MD, 10 mL at 06/15/22 1400    sodium chloride (NS) flush 5-40 mL, 5-40 mL, IntraVENous, PRN, Eddie Pierce MD, 10 mL at 06/11/22 1352    polyethylene glycol (MIRALAX) packet 17 g, 17 g, Oral, DAILY PRN, Eddie Pierce MD, 17 g at 06/13/22 0036    VITALS:   Patient Vitals for the past 12 hrs:   Temp Pulse Resp BP SpO2   06/15/22 1604 97.5 °F (36.4 °C) 72 20 139/79 96 %   06/15/22 1200 -- 71 -- -- --   06/15/22 1154 97.1 °F (36.2 °C) 74 20 (!) 97/45 90 %   06/15/22 0858 -- -- -- -- 92 %   06/15/22 0800 97.3 °F (36.3 °C) 60 20 138/64 90 %       Intake/Output Summary (Last 24 hours) at 6/15/2022 1854  Last data filed at 6/15/2022 1833  Gross per 24 hour   Intake 600 ml   Output 450 ml   Net 150 ml PHYSICAL EXAM:  General: WD, WN. Alert, cooperative, looks sad and depressed    EENT:  EOMI. Anicteric sclerae. MMM  Resp:  CTA bilaterally, no wheezing or rales. No accessory muscle use  CV:  Regular  rhythm,  No edema  GI:  Soft, Non distended, Non tender. +Bowel sounds  Neurologic:  Alert and oriented X 3, normal speech,   Psych:   Good insight. Not anxious nor agitated  Skin:  No rashes. No jaundice    LABS:  :  Recent Labs     06/15/22  1110 06/13/22  0546   WBC 9.0 8.4   HGB 10.0* 9.6*   HCT 30.4* 29.1*    257     Recent Labs     06/15/22  1110 06/13/22  0546   * 130*   K 3.9 3.9   CL 89* 94*   CO2 30 31   * 100   BUN 18 15   CREA 1.02 0.85   CA 9.4 9.2   MG 1.7 1.8   ALB 2.5*  --    TBILI 0.4  --    ALT 39  --      Assessment / Plan:     Depression/suicidal ideation: Psych consult     Acute exacerbation of CHF   -BNP is still elevated at 5,187. Level was 6.193 a week ago on 06/09/22  -Bumex 1 mg twice daily as ordered . Keep close monitoring of input output chart, 2 g sodium diet   (Entresto is on hold, can  resume Entresto when blood pressure stable)  Monitor OMI,     Hyponatremia  Sodium is 124 today. Measure/calculate osmolar gap  Sodium has been wheezing  gradually in the last few days. Repeat tomorrow after trial of Bumex. Strict free water intake     Superimposed pneumonia  -Improved, no  cough,no  shortness of breath  - Increase Levaquin dose to 750 mg daily for 5 days to cover Pneumonia   Received Zithromax for 3 days, and levofloxacin 500 mg IV X 2 doses . .   ( The pt C/O  cough low-grade fever and leukocytosis and history of recent hospitalization)      Exposure to COVID-19 : The patient tested negative for COVID-19 and flu AMB today. History of A.  Fib ( Atrial fibrillation, 2012,Automatic cardioverter/defibrillator /2016)  Heart rate remained controlled- Continue Eliquis     Status post AICD has history of recent evaluation by the cardiologist; has appointment with his cardiologist in the office next Friday. No reports of firing from [employer] on this admission  Will arrange for cardiology follow-up on discharge.     5-history of hypertension: Blood pressure average 120/69  Will hold on blood pressure medications  Continue to monitor blood pressure and resume BP meds at BP >140/90     History of interstitial lung disease  Added DuoNebs every 6 as needed for shortness of present wheezing     Orthostatic hypotension stable now has no lightheadedness.   Continue with midodrine 10 mg p.o. 3 times daily.     History of dementia : Continue with Aricept 5 mg p.o. nightly  _____________________________________________________________________  SAFETY:   Code Status:Full  DVT prophylaxis:Eliquis  Stress Ulcer prophylaxis: Protonix (home med)  Bladder catheter:no  Family Contact Info:  Primary Emergency Contact: Verónica Rowe, Home Phone: [phone]  Bedded: [facility] Room 130/01  Disposition: TBD, likely home in [facility] on  Admission status: Admission    Reviewed most current radiology test results   YES  Review and summation of old records today    NO  Reviewed patient's current orders and MAR    YES  PMH/SH reviewed - no change compared to H&P    Discussed with the patient his wife and his daughter , who are agreeable with the management plan  Total NON Critical Care TIME:  40   Minutes with more than 50% direct patient    Signed: Maty Thomas MD  [address] Hospitalist  [phone]

## 2022-06-15 NOTE — PROGRESS NOTES
While rounding on patient, patient appeared unhappy and upset. Writer asked patient what was bothering him to which he responded, \"I'm tired of feeling this way. If I had poison in front of me, I'd drink it. I'm tired of being a burden. \" Suicide screening completed which revealed patient to be high risk based on responses. Suicide precautions put in place, including constant observer in room. Dr. Margarite Boeck made aware and to order psych consult. Nursing Supervisor, López Rodriguez, also aware. Patient currently resting in bed.

## 2022-06-15 NOTE — PROGRESS NOTES
Problem: Pressure Injury - Risk of  Goal: *Prevention of pressure injury  Description: Document Fernando Scale and appropriate interventions in the flowsheet.   Outcome: Progressing Towards Goal  Note: Pressure Injury Interventions:  Sensory Interventions: Assess changes in LOC,Avoid rigorous massage over bony prominences,Check visual cues for pain    Moisture Interventions: Apply protective barrier, creams and emollients,Absorbent underpads,Check for incontinence Q2 hours and as needed    Activity Interventions: Increase time out of bed    Mobility Interventions: Float heels,HOB 30 degrees or less    Nutrition Interventions: Document food/fluid/supplement intake,Offer support with meals,snacks and hydration    Friction and Shear Interventions: HOB 30 degrees or less

## 2022-06-15 NOTE — PROGRESS NOTES
Physical therapy  Reviewed chart and patient patient complaining of LE pain. Imaging ordered to rule out DVT. Will await result prior to further mobility.

## 2022-06-15 NOTE — PROGRESS NOTES
Problem: Pressure Injury - Risk of  Goal: *Prevention of pressure injury  Description: Document Fernando Scale and appropriate interventions in the flowsheet. Outcome: Progressing Towards Goal  Note: Pressure Injury Interventions:  Sensory Interventions: Assess changes in LOC,Avoid rigorous massage over bony prominences,Check visual cues for pain    Moisture Interventions: Apply protective barrier, creams and emollients,Absorbent underpads,Check for incontinence Q2 hours and as needed    Activity Interventions: Increase time out of bed    Mobility Interventions: Float heels,HOB 30 degrees or less    Nutrition Interventions: Document food/fluid/supplement intake,Offer support with meals,snacks and hydration    Friction and Shear Interventions: HOB 30 degrees or less                Problem: Patient Education: Go to Patient Education Activity  Goal: Patient/Family Education  Outcome: Progressing Towards Goal     Problem: Falls - Risk of  Goal: *Absence of Falls  Description: Document Osmel Fall Risk and appropriate interventions in the flowsheet.   Outcome: Progressing Towards Goal  Note: Fall Risk Interventions:  Mobility Interventions: Bed/chair exit alarm,Patient to call before getting OOB,Utilize walker, cane, or other assistive device         Medication Interventions: Bed/chair exit alarm,Patient to call before getting OOB,Teach patient to arise slowly    Elimination Interventions: Call light in reach,Bed/chair exit alarm,Patient to call for help with toileting needs    History of Falls Interventions: Bed/chair exit alarm,Door open when patient unattended,Room close to nurse's station         Problem: Pain  Goal: *Control of Pain  Outcome: Progressing Towards Goal

## 2022-06-15 NOTE — PROGRESS NOTES
Received e-mail from Ally العلي from [employer]. She said case is still pending. Case number is [id number]. CM assigned to case is CinaMaker. Waiting to hear back from her.

## 2022-06-16 NOTE — PROGRESS NOTES
Bedside shift change report given to Shyla Torres  (oncoming nurse) by Nalini Martin  (offgoing nurse).  Report included the following information SBAR, Kardex, Intake/Output, MAR, Recent Results and Med Rec Status

## 2022-06-16 NOTE — PROGRESS NOTES
Problem: Self Care Deficits Care Plan (Adult)  Goal: *Acute Goals and Plan of Care (Insert Text)  Description: FUNCTIONAL STATUS PRIOR TO ADMISSION: Patient was modified independent using a rolling walker for functional mobility. HOME SUPPORT: The patient lived with spouse and required CGA for upper body dressing & min for lower body dressing &bathing. Occupational Therapy Goals  Initiated 6/10/2022  1. Patient will perform  upper body bathing with minimal assistance/contact guard assist within 7 day(s). 2.  Patient will perform  lower body bathing with minimal assistance/contact guard assist within 7 day(s). 3.  Patient will perform upper body dressing with supervision/set-up within 7 day(s). 4.  Patient will perform toilet transfers with supervision/set-up within 7 day(s). 5.  Patient will perform all aspects of toileting with modified independence within 7 day(s). 6.  Patient will participate in upper extremity therapeutic exercise/activities with supervision/set-up for 8 minutes within 7 day(s). 7.  Patient will utilize energy conservation techniques during functional activities with verbal cues within 7 day(s). Outcome: Not Progressing Towards Goal   OCCUPATIONAL THERAPY TREATMENT  Patient: Donne Kehr (34 y.o. male)  Date: 6/16/2022  Diagnosis: CHF (congestive heart failure) ([facility]) [I50.9] Acute on chronic combined systolic and diastolic ACC/AHA stage C congestive heart failure ([address]       Precautions: Bed Alarm,Fall  Chart, occupational therapy assessment, plan of care, and goals were reviewed. ASSESSMENT  Patient continues with skilled OT services and is not progressing towards goals. Pt was with CNA as is under supervision. OTR initiated session and pt willing to get up from supine. The pt's BP this morning was 119/76. Pt able to get to EOB with min assist and sits w/o support. He is SOB and his O2 after transfer was 88 but rebounded quickly to 91.   OTR took his BP sitting EOB and it dropped to 71/46 so he was asked to lay back down. The pyschiatrist came in and OT session stopped due to orthostatic hypotension. OTR notified CM as pt has been unable to participate in therapy due to BP drops. Current Level of Function Impacting Discharge (ADLs): able to get to EOB with min assist but even after sitting for approx 2 mins his BP drops impacting ability to engage in therapy    Other factors to consider for discharge: pt being evaluated by psychiatry         PLAN :  Patient continues to benefit from skilled intervention to address the above impairments. Continue treatment per established plan of care to address goals.     Recommend with staff: monitor BP when sitting up    Recommend next OT session: continue to see and work on bed level activities if tolerated, sitting EOB    Recommendation for discharge: (in order for the patient to meet his/her long term goals)  Therapy up to 5 days/week in SNF setting once medically stable    This discharge recommendation:  Has been made in collaboration with the attending provider and/or case management    IF patient discharges home will need the following DME: none       SUBJECTIVE:   Patient stated Lyudmila Perfect can't get my BP normal.    OBJECTIVE DATA SUMMARY:   Cognitive/Behavioral Status:   Alert, cooperative                   Functional Mobility and Transfers for ADLs:  Bed Mobility:  Rolling: Independent  Supine to Sit: Minimum assistance  Sit to Supine: Modified independent    Transfers:   Unsafe to attempt sit to stand due to BP drop          Balance:  Sitting: Intact    ADL Intervention:        deferred                  Pain:  No complaints    Activity Tolerance:   observed SOB with activity and signs and symptoms of orthostatic hypotension    After treatment patient left in no apparent distress:   Supine in bed and Caregiver / family present    COMMUNICATION/COLLABORATION:   The patients plan of care was discussed with: Case management and Certified nursing assistant/patient care technician.      Alma Del Toro OT  Time Calculation: 8 mins

## 2022-06-16 NOTE — PROGRESS NOTES
Follow up visit with patient in Rm 130 in MED/Surg  Patient was alone in the room during the visit  Patient shared about his medical  Issues  Provided empathic listening and spiritual support  Advised of  Availability   [address] [place] [address]

## 2022-06-16 NOTE — PROGRESS NOTES
New orders received, will resume care tomorrow. Will hold today secondary to low BP.  Bebeto Messina, PT

## 2022-06-16 NOTE — PROGRESS NOTES
Problem: Pressure Injury - Risk of  Goal: *Prevention of pressure injury  Description: Document Fernando Scale and appropriate interventions in the flowsheet. Outcome: Progressing Towards Goal  Note: Pressure Injury Interventions:  Sensory Interventions: Keep linens dry and wrinkle-free    Moisture Interventions: Check for incontinence Q2 hours and as needed    Activity Interventions: Increase time out of bed    Mobility Interventions: HOB 30 degrees or less    Nutrition Interventions: Offer support with meals,snacks and hydration    Friction and Shear Interventions: HOB 30 degrees or less                Problem: Patient Education: Go to Patient Education Activity  Goal: Patient/Family Education  Outcome: Progressing Towards Goal     Problem: Falls - Risk of  Goal: *Absence of Falls  Description: Document Osmel Fall Risk and appropriate interventions in the flowsheet.   Outcome: Progressing Towards Goal  Note: Fall Risk Interventions:  Mobility Interventions: Patient to call before getting OOB         Medication Interventions: Teach patient to arise slowly    Elimination Interventions: Call light in reach,Patient to call for help with toileting needs    History of Falls Interventions: Room close to nurse's station         Problem: Pain  Goal: *Control of Pain  Outcome: Progressing Towards Goal     Problem: Suicide  Goal: *STG: Remains safe in hospital  Outcome: Progressing Towards Goal  Goal: *STG: Seeks staff when feelings of self harm or harm towards others arise  Outcome: Progressing Towards Goal  Goal: *STG/LTG: Complies with medication therapy  Outcome: Progressing Towards Goal  Goal: *STG/LTG: No longer expresses self destructive or suicidal thoughts  Outcome: Progressing Towards Goal

## 2022-06-16 NOTE — PROGRESS NOTES
Bedside and Verbal shift change report given to Anjana Coats LPN (oncoming nurse) by Robson Tobin RN (offgoing nurse). Report included the following information SBAR, Kardex, Intake/Output, MAR and Recent Results.

## 2022-06-16 NOTE — ED TRIAGE NOTES
Spoke with Dr. Ev Barron via telephone, he stated that the patient could be taken off suicidal precautions. Patient is at no risk of suicide at this time. Primary nurse aware.

## 2022-06-16 NOTE — PROGRESS NOTES
Physical therapy cancellation orders received. Please re consult if status changes and therapy is indicated.  Brian Curtis, PT

## 2022-06-16 NOTE — CONSULTS
[facility].    Name:  Glenroy Fields  MR#:  [id number]  :  1945  ACCOUNT #:  [de-identified]  DATE OF SERVICE:  2022    PSYCHIATRIC CONSULTATION    ATTENDING PHYSICIAN:  Dr. Nava Hall. REASON FOR CONSULTATION:  Evaluate for depression and possible suicidal thinking. HISTORY OF PRESENT ILLNESS:  The patient is a 58-year-old  white male, who has a past psychiatric history of major depression as well as a history of dementia, which had been diagnosed before . He was admitted medically on 2022 with significant dyspnea secondary to his congestive heart failure. In the context of his treatment here, he was noted to express some depressive symptoms, and possibly some suicidal thoughts. His history indicates that he has a longstanding history of depression, including having seen Psychiatrist in the past.  Even as far back as , records indicate that he had tried multiple antidepressants and none of the medicines had ever been very effective for him. Interestingly, he likely would had more emotional and behavioral instability back then related to the dementia, and was given Depakote and Seroquel likely to help manage some of the agitation. It appears to be less agitation and emotional lability more recently, likely due to his weakened physical condition. He indicates that he does in fact continued to feel depressed, but he implies that is essentially at his baseline, and how he has been feeling for \"a long time\". He states he does feel hopeless and he does have suicidal thoughts, mostly passive thoughts about wishing he were dead because he feels as though he is a burden to his wife. When asked about having made any prior attempts, he indicated that he had almost considered shooting himself about two years ago with a small rifle that he has at home, but he never followed through with it.   It appears as though he has never made any full attempts to try to end his life, and he states he would not do that here at the hospital and does not have any plans or intentions of trying to end his life or harm himself. He does continue to stay, however, that if he would die it would not be upsetting to him. He endorses some neurovegetative dysfunction consistent with his chronically depressed state. He states his appetite and energy level are very low, that he feels at least mild to moderately anhedonic and dysphoric, and that he has excessive feelings of guilt, believing he is a burden to his wife. He states, however, that his sleep is actually fairly good. He also indicates that he feels as though his memory is no worse than it has been for sometime, and based on reviewing the records from as far back as 2015, it appears as though his cognitive function has not worsened all that much suggesting more of a vascular type cognitive impairment. He states that it is accurate that the medications have never been overly effective in helping his mood or his emotional state, but he does state that the Zoloft and Cymbalta combination has worked as well as anything. He denies having any side effects with the medications. Of note, he was hospitalized in [facility] last month with significant and refractory orthostasis, and when I went to see him today, he had just had an orthostatic episode, with a significant drop in his blood pressure. He, therefore, is lying down, was feeling somewhat tired. Because of that, I am reluctant to consider making any medication changes particularly given that there is no strong indication that more aggressive or different antidepressant therapy would really help that much, and also there is no indication that any different type of psychotropic agents are indicated. PAST PSYCHIATRIC HISTORY:  He states he has never been hospitalized psychiatrically, but he had seen Psychiatrist including Dr. Jazzmine Love in 2015. Mostly, his medicines have been managed by his primary care doctor. He previously had taken Depakote and Seroquel, Celexa, Aricept, and obviously Zoloft and Cymbalta, and likely some others over the years. It should also be noted that he has a history of alcohol abuse in the past, but he has been clean and sober for many years. PAST MEDICAL HISTORY:  1. Coronary artery disease. 2.  Congestive heart failure. 3.  History of DVTs. 4.  Atrial fibrillation. 5.  Hyperlipidemia. 6.  History of CVAs/TIAs. 7.  History of prostate cancer. 8.  Hypertension. 9.  GERD. PERTINENT MEDICATIONS:  1.  Zoloft 200 mg daily. 2.  Cymbalta 60 mg daily. 3.  Aricept 5 mg at bedtime. ALLERGIES:  NO KNOWN ALLERGIES. FAMILY HISTORY:  Records indicate that his mother, father, and brother were all alcoholic. There is no other family psychiatric history reported and he denies any that he is aware of. SOCIAL HISTORY:  He has been  for roughly 31 years, it is being a second marriage. He has two children from his first marriage and two step-children from his second marriage. He is a retired . He lives in [place] with his wife. He states he graduated high school. He does not smoke. MENTAL STATUS EXAMINATION:  The patient was noted to be slightly frail appearing 65year-old, who is lying in bed, in a somewhat weakened condition. However, he was awake and reasonably alert and participated in the interview cooperatively. He was a little bit hard hearing, but was able to answer questions appropriately. He endorsed some ongoing and chronic depressive symptoms as noted above including some neurovegetative dysfunction at baseline. He denies any current suicidal thoughts or intentions, but he does have strong passive thoughts about wishing he were dead and he states that is fairly chronic. He has no intent to try to harm himself at this point, at all.   There was no evidence of any jessica or hypomania nor any symptoms of psychosis such as hallucinations or delusions. His judgement and insight appeared to be fair at best.  His cognitive functioning shows some overall mild deficits in areas of short-term and recent memory, processing ability, and some limited difficulty with long-term memory. DIAGNOSTIC IMPRESSION:  AXIS I:  Chronic major depression, currently mild-to-moderate. This appears to be at or very near his baseline level of intensity. AXIS II:  History of dementia with some behavioral disturbance, which appears to be more stable. AXIS III:  Chronic suicidal thoughts, but mostly consistent with passive thoughts of wanting to die. RECOMMENDATIONS:  1. At this point, I will continue the Zoloft and Cymbalta as noted above, along with Aricept. I do not want to make any changes given the potential (mild) impact on his orthostasis and cardiovascular function. 2.  I feel he is safe to be managed on the medical floor and I did not think that he needs a sitter or that he is a high-risk for acting on any suicidal impulses at this point. 3.  I will check in with him tomorrow and as needed.       Tilda Blizzard, MD      [id number]  D:  06/16/2022 11:27  T:  06/16/2022 13:43  JOB #:  [id number]

## 2022-06-17 PROBLEM — Z51.89 ENCOUNTER FOR REHABILITATION: Status: ACTIVE | Noted: 2022-01-01

## 2022-06-17 NOTE — PROGRESS NOTES
Received call from [facility] [place] . She said patient has been approved 10 days for skilled care. 6/17-6/26. Updates due 6/27 Auth number [id number]. Level 2 auth. Follow up person is [employer]. [phone]. fax [phone]. Called nursing supervisor to make reservation.

## 2022-06-17 NOTE — TELEPHONE ENCOUNTER
I have called and talked to Alvin chandler at [facility] and asked about the status of the order for a raised toilet seat for this patient. She informs me that a representative form the company reached out tot he patient and was told that they did not want a raised toilet set, but rather a \"3-N-1 Commode\". Can you please write this order and I will fax it to [facility]. Please and thank you.

## 2022-06-17 NOTE — ROUTINE PROCESS
Bedside and Verbal shift change report given to Jewel Siemens (oncoming nurse) by Prince Portillo (offgoing nurse). Report included the following information SBAR, Kardex, ED Summary, Intake/Output, MAR, Recent Results and Cardiac Rhythm NA was paced.

## 2022-06-17 NOTE — PROGRESS NOTES
Follow up visit with patient in Rm 130 in Med/Surg  Patient was alone during the visit  Provided empathic listening and spiritual support  Advised of  Availability   [address] [place] [address]

## 2022-06-17 NOTE — PROGRESS NOTES
IDR Team; MD, Nursing, Care Manager, Physical therapy, Nursing Supervisor and Dietician, met to review patient's plan of care. Discussed goals, interventions, barriers and progress. Team will continue to monitor progress and report any concerns to the physician and care management as indicated. Transition of Care Plan: CM waiting to hear back from insurance company with [address]. Per RN, patient had a 1:1 sitter yesterday due to depression. Psychiatrist saw patient and cleared him from 1:1 sitter. RN trying to wean down to 2L but it wasn't working so she put him back on 3L. BP systolic was 977'G. Had low bp yesterday systolic 80'E. On midorine and bp meds. CM will call [employer] about [address]. 1114: Received message from Linsey Waters from [place] asking who the accepting facility was and she also requested more PT/OT notes. Called Silvana back at [phone]. No answer. Left her a message. Updated PT/OT notes sent to [phone].

## 2022-06-17 NOTE — PROGRESS NOTES
Problem: Pressure Injury - Risk of  Goal: *Prevention of pressure injury  Description: Document Fernando Scale and appropriate interventions in the flowsheet. Outcome: Progressing Towards Goal  Note: Pressure Injury Interventions:  Sensory Interventions: Assess changes in LOC    Moisture Interventions: Absorbent underpads,Apply protective barrier, creams and emollients,Check for incontinence Q2 hours and as needed    Activity Interventions: Increase time out of bed    Mobility Interventions: HOB 30 degrees or less    Nutrition Interventions: Document food/fluid/supplement intake    Friction and Shear Interventions: HOB 30 degrees or less                Problem: Patient Education: Go to Patient Education Activity  Goal: Patient/Family Education  Outcome: Progressing Towards Goal     Problem: Falls - Risk of  Goal: *Absence of Falls  Description: Document Osmel Fall Risk and appropriate interventions in the flowsheet.   Outcome: Progressing Towards Goal  Note: Fall Risk Interventions:  Mobility Interventions: Bed/chair exit alarm,Patient to call before getting OOB         Medication Interventions: Bed/chair exit alarm,Patient to call before getting OOB,Teach patient to arise slowly    Elimination Interventions: Bed/chair exit alarm,Call light in reach,Patient to call for help with toileting needs    History of Falls Interventions: Bed/chair exit alarm,Door open when patient unattended,Room close to nurse's station         Problem: Patient Education: Go to Patient Education Activity  Goal: Patient/Family Education  Outcome: Progressing Towards Goal     Problem: Pain  Goal: *Control of Pain  Outcome: Progressing Towards Goal     Problem: Suicide  Goal: *STG: Remains safe in hospital  Outcome: Progressing Towards Goal  Goal: *STG: Seeks staff when feelings of self harm or harm towards others arise  Outcome: Progressing Towards Goal  Goal: *STG: Attends activities and groups  Outcome: Progressing Towards Goal  Goal: *STG:  Verbalizes alternative ways of dealing with maladaptive feelings/behaviors  Outcome: Progressing Towards Goal  Goal: *STG/LTG: Complies with medication therapy  Outcome: Progressing Towards Goal  Goal: *STG/LTG: No longer expresses self destructive or suicidal thoughts  Outcome: Progressing Towards Goal  Goal: *LTG:  Identifies available community resources  Outcome: Progressing Towards Goal  Goal: *LTG:  Develops proactive suicide prevention plan  Outcome: Progressing Towards Goal  Goal: Interventions  Outcome: Progressing Towards Goal     Problem: Patient Education: Go to Patient Education Activity  Goal: Patient/Family Education  Outcome: Progressing Towards Goal

## 2022-06-17 NOTE — DISCHARGE SUMMARY
[facility]  Hospitalist Discharge Summary    Patient ID:  Obi Hernandez  [id number]  65 y.o.  1945    PCP on record: Solange Elena MD    Admit date: 6/9/2022  Discharge date and time: 6/17/2022     DISCHARGE DIAGNOSIS:    Principal Problem:    Acute on chronic combined systolic and diastolic ACC/AHA stage C congestive heart failure ([address] (2/12/2022)    Active Problems:    Atrial fibrillation ([address] (5/29/2012)    AVNRT (AV bridgette re-entry tachycardia) ([address] (5/12/2016)      Overview: 5/12/16 AVNRT ablation    AICD (automatic cardioverter/defibrillator) present (5/13/2016)      Overview: 5/13/16 [place] [employer] upgrade to dual chamber AICD implant    Orthostatic hypotension (5/16/2022)    Interstitial lung disease ([address] (2/15/2022)    Essential hypertension (3/4/2011)    Mixed hyperlipidemia (5/29/2012)    Alzheimer's dementia, late onset, with behavioral disturbance ([address] (4/12/2016)    Hypothyroidism due to acquired atrophy of thyroid (9/22/2015)    Moderate major depression ([address] (7/3/2018)    CONSULTATIONS:  IP CONSULT TO PSYCHIATRY  DIAGNOSTIC IMPRESSION:  AXIS I:  Chronic major depression, currently mild-to-moderate. This appears to be at or very near his baseline level of intensity. AXIS II:  History of dementia with some behavioral disturbance, which appears to be more stable. AXIS III:  Chronic suicidal thoughts, but mostly consistent with passive thoughts of wanting to die.   RECOMMENDATIONS:  1. At this point, I will continue the Zoloft and Cymbalta as noted above, along with Aricept. I do not want to make any changes given the potential (mild) impact on his orthostasis and cardiovascular function. 2.  I feel he is safe to be managed on the medical floor and I did not think that he needs a sitter or that he is a high-risk for acting on any suicidal impulses at this point.   3.  I will check in with him tomorrow and as needed.   CASANDRA BROWN, MD    Excerpted HPI from H&P of Adelina Romero MD :  65 y.o. male presenting for admission to [facility] for further evaluation and treatment for Acute on chronic combined systolic and diastolic ACC/AHA stage C congestive heart failure ([address] He  has a past medical history of Acute combined systolic and diastolic congestive heart failure ([address] (2/12/2022), AICD (automatic cardioverter/defibrillator) present (5/13/2016), Alzheimer disease ([address] Anxiety state, other, Arthritis, AVNRT (AV bridgette re-entry tachycardia) ([address] (5/12/2016), Back ache, CAD (coronary artery disease), Cancer ([address] (2004), Chest tightness, Coagulation defects (2005), Dementia ([address] Depression, Dizziness, FH: factor V Leiden deficiency, Generalized muscle ache, GERD (gastroesophageal reflux disease), Heart failure ([address] (2014), Hyperlipidemia, mixed, Hypertension, Long term current use of anticoagulant therapy, Other ill-defined conditions(799.89) (2004), Pacemaker, Paroxysmal atrial fibrillation ([address] Postsurgical aortocoronary bypass status (05/29/2012), Presence of cardiac defibrillator (05/2016), Pulmonary emphysema ([address] (3/4/2022), [facility] spotted fever), SSS (sick sinus syndrome) ([address] Stroke ([address] (2011, 1990's), Syncope, Thromboembolism ([address] (2014), Thromboembolus ([address] (2005), Thyroid disease, and Weakness generalized. He has no past medical history of Asthma, Carotid artery disease ([address] Chronic kidney disease, Diabetes ([address] Glaucoma, Murmur, Myocardial infarction ([address] Pulmonary embolism ([address] Rheumatic fever, or Valvular heart disease. .      Patient presenting to the ED via EMS following an episode of extreme dyspnea at home this afternoon. Patient was last hospitalized from May 16 through May 26 at [facility].  He was primarily hospitalized with a diagnosis of orthostatic hypotension. .  ED exam then noted orthstatic drop in /61 down to 76/45 standing.   Patient was admitted for the diagnosis of syncope and the need for AICD interrogation. At that time he had a syncopal episode while talking to a friend in a convenience store. His friend was able to avoid trauma and noted his altered status lasted for about 5 minutes. At \[facility]\"" he received 1 L of IV saline but remained orthostatic. Transferred to [facility].  Was seen by cardiology. His syncope was felt to be most likely due to orthostatic hypotension. He was discharged on Sacubitril-valsartan 24-26 mg po bid, Metoprolol succinate 12.5 mg po daily, and Lasix 20mg daily. He was advised to continue JAMSHID hose, midodrine 10 mg 3 times daily, apixaban 5 mg twice daily and atorvastatin 40 mg daily. Titers were concerning for [facility] spotted fever and follow-up reflex IFA was advised in July.     Patient symptomatically proved during transport and on arrival to the ED. He initially received oxygen at 4 L nasal cannula, but was gradually tapered. ______________________________________________________________________  DISCHARGE SUMMARY/HOSPITAL COURSE:  for full details see H&P, daily progress notes, labs, consult notes.      Principal Problem:    Acute on chronic combined systolic and diastolic ACC/AHA stage C congestive heart failure ([address] (2/12/2022)  Attempted adjustments in chronic treatment  Echocardiogram documented progressive decline left ventricular function  Medicines not tolerated well due to poor LV function and orthostatic hypotension  Patient is not able to tolerate the activity level he desires  Patient transferred to [address] [place] [address] rehab for attempted rehab on current cardiac regimen    Active Problems:    Atrial fibrillation ([address] (5/29/2012)    AVNRT (AV bridgette re-entry tachycardia) ([facility] (5/12/2016)      Overview: 5/12/16 AVNRT ablation    AICD (automatic cardioverter/defibrillator) present (5/13/2016)      Overview: 5/13/16 [employer] upgrade to dual chamber AICD implant  Monitored during admission  Consider 3-day Holter on discharge  Could account for near syncopal type complaints prior to admission  Placated by the associated orthostatic hypotension      Orthostatic hypotension (5/16/2022)  Likely cause for preadmission episode of near syncope  Followed by Dr. Ivone Colunga current treatment ProAmatine 10 mg 3 times daily with meals to increase daytime blood pressures      Interstitial lung disease (HCC) (2/15/2022)  Maintain oxygen 2 L nasal cannula, titrate to benefit  Following stabilization in the ED arterial blood gas showed pH 7.39/PCO2 47/PO2 67 with O2 sat 93% on 2 L/min nasal cannula  Continue same for now  Completed the course of Azithromycin       Essential hypertension (3/4/2011)  Cardiac diet with no added salt  Not tolerating antihypertensives due to orthostasis and CHF      Mixed hyperlipidemia (5/29/2012)  Maintain prior to admission treatment with Lipitor 40 mg daily each evening      Alzheimer's dementia, late onset, with behavioral disturbance ([facility] (4/12/2016)  Maintain active treatment with Aricept and Namenda  Assist with ADL  Maximize cardiac output and cerebral blood flow      Hypothyroidism due to acquired atrophy of thyroid (9/22/2015)  Continue thyroid replacement  Monitor periodically with TSH/free T4      Moderate major depression ([facility] (7/3/2018)  Continue Zoloft 200 mg daily          _______________________________________________________________________  Patient seen and examined by me on discharge day. Pertinent Findings:  Visit Vitals  BP (!) 121/56 (BP Patient Position: Sitting)   Pulse 77   Temp 97.8 °F (36.6 °C)   Resp 19   Ht 5' 7\" (1.702 m)   Wt 69.8 kg (153 lb 14.4 oz)   SpO2 93%   BMI 24.10 kg/m²     Gen:    Not in distress  Chest: Mildly labored, basilar crackles, no cough  CVS:   Regular rhythm.   No edema  Abd:  Soft, not distended, not tender  Neuro:  Alert, nonfocal, weak    LABS:  Results for Jeronimo Warren (MRN [id number]) as of 6/17/2022 14:28   6/9/2022 15:18 6/10/2022 06:12 6/11/2022 07:25 6/12/2022 11:10 6/13/2022 05:46 6/15/2022 11:10 6/16/2022 16:01   WBC 11.6 (H) 7.4 12.6 (H) 10.9 8.4 9.0 8.0   NRBC 0.0 0.0 0.0 0.0 0.0 0.0 0.0   RBC 3.60 (L) 3.57 (L) 3.63 (L) 3.62 (L) 3.50 (L) 3.68 (L) 3.65 (L)   HGB 9.9 (L) 9.9 (L) 10.0 (L) 10.1 (L) 9.6 (L) 10.0 (L) 10.1 (L)   HCT 30.3 (L) 30.3 (L) 30.4 (L) 30.1 (L) 29.1 (L) 30.4 (L) 30.1 (L)   MCV 84.2 84.9 83.7 83.1 83.1 82.6 82.5   MCH 27.5 27.7 27.5 27.9 27.4 27.2 27.7   MCHC 32.7 32.7 32.9 33.6 33.0 32.9 33.6   RDW 14.1 14.0 14.2 14.5 14.3 14.0 14.0   PLATELET 138 770 242 285 257 279 287   MPV 9.9 10.3 10.2 9.7 10.0 9.6 9.7   NEUTROPHILS 78 (H) 87 (H) 76 (H) 77 (H) 72 76 (H) 74   LYMPHOCYTE 13 10 (L) 14 11 (L) 12 10 (L) 12   MONOCYTES 8 3 (L) 8 12 11 12 11   EOSINOPHILS 1 0 1 0 3 2 2   BASOPHILS 1 0 0 0 1 0 0     Results for Ivelisse Raleigh (MRN [id number]) as of 6/17/2022 14:28   6/10/2022 06:12 6/11/2022 07:25 6/12/2022 11:10 6/13/2022 05:46 6/15/2022 11:10 6/16/2022 16:01   Sodium 131 (L) 131 (L) 129 (L) 130 (L) 124 (L) 128 (L)   Potassium 4.4 4.6 4.2 3.9 3.9 4.5   Chloride 95 (L) 94 (L) 92 (L) 94 (L) 89 (L) 90 (L)   CO2 30 30 31 31 30 32   Anion gap 6 7 6 5 5 6   Glucose 128 (H) 92 113 (H) 100 124 (H) 110 (H)   BUN 18 19 20 15 18 19   Creatinine 0.97 1.08 1.07 0.85 1.02 1.06   BUN/Cr ratio 19 18 19 18 18 18   Calcium 9.6 9.5 9.7 9.2 9.4 9.3   Magnesium 1.8   1.8 1.7 1.8   GFR  non-AA >60 >60 >60 >60 >60 >60   Bilirubin, total     0.4 0.4   Protein, total     6.7 7.0   Albumin     2.5 (L) 2.6 (L)   Globulin     4.2 (H) 4.4 (H)   A-G Ratio     0.6 (L) 0.6 (L)   ALT     39 41   AST     31 30   Alk.  phos     115 115   Lactic acid 0.8        CK     22 (L)    Troponin-HS     13    NT pro-BNP     5,187 (H)      Results for [facility] (MRN [id number]) as of 6/17/2022 14:28   6/14/2022 15:30   Influenza A by PCR Not detected   Influenza B by PCR Not detected   SARS-COV-2  PCR Not detected     RADIOLOGY  pCXR 6/15  A single portable AP upright view of the chest was obtained. It is difficult to  accurately assess the size of the cardiac silhouette. There continues be  abnormal alveolar opacity involving both lungs. The left costophrenic angle is  obscured by the cardiac pacer device. The left shoulder prosthesis is again  noted   IMPRESSION: Continued evidence of diffuse, bilateral interstitial and alveolar opacities. LE DUPLEX 6/15:  · No evidence of deep vein thrombosis in the right lower extremity. · No evidence of acute deep vein thrombosis in the right common femoral, femoral, popliteal, posterior tibial, and peroneal veins. The veins were imaged in the transverse and longitudinal planes. The vessels showed normal color filling and compressibility. Doppler interrogation showed phasic and spontaneous flow. · No evidence of deep vein thrombosis in the left lower extremity. · No evidence of acute deep vein thrombosis in the left common femoral, femoral, popliteal, posterior tibial, and peroneal veins. The veins were imaged in the transverse and longitudinal planes. The vessels showed normal color filling and compressibility. Doppler interrogation showed phasic and spontaneous flow. ECHO 6/17:     Left Ventricle: Normal left ventricular systolic function with a visually estimated EF of 20 - 25%. Left ventricle size is normal. Normal wall thickness. Severe global hypokinesis present. Normal diastolic function for age.   Left Atrium: Left atrium is mildly dilated. Left atrial volume index is mildly increased (35-41 mL/m2).   Right Ventricle: Right ventricle is mildly dilated. Normal wall thickness. Mildly reduced systolic function. Global hypokinesis present.   Aortic Valve: Mild regurgitation.   Mitral Valve: Mild regurgitation.   Tricuspid Valve: Mild regurgitation. Moderately elevated RVSP. The estimated RVSP is 49 mmHg.   Aorta: Moderately dilated aortic root. Ao Root diameter is 4.4 cm.     EKG 6/9: NSR 74 bpm, PACs, Ant MI, STTWC c/w lateral ischemia  _______________________________________________________________________  DISCHARGE MEDICATIONS:   Discharge Medication List as of 6/17/2022  2:25 PM        Current Facility-Administered Medications:     levoFLOXacin (LEVAQUIN) tablet 750 mg, 750 mg, Oral, Q24H, Saul Chu MD    albuterol-ipratropium (DUO-NEB) 2.5 MG-0.5 MG/3 ML, 3 mL, Nebulization, Q6H PRN, Michelle Land MD    guaiFENesin ER [facility]) tablet 600 mg, 600 mg, Oral, Q12H, Michelle Land MD, 600 mg at 06/17/22 4866    butalbital-acetaminophen-caffeine (FIORICET, ESGIC) -40 mg per tablet 1 Tablet, 1 Tablet, Oral, Q4H PRN, David Terry MD, 1 Tablet at 06/17/22 0243    apixaban (ELIQUIS) tablet 5 mg, 5 mg, Oral, BID, Norberto King MD, 5 mg at 06/17/22 3830    atorvastatin (LIPITOR) tablet 40 mg, 40 mg, Oral, QHS, Norberto King MD, 40 mg at 06/16/22 2152    donepeziL (ARICEPT) tablet 5 mg, 5 mg, Oral, QHS, Norberto King MD, 5 mg at 06/16/22 2152    DULoxetine (CYMBALTA) capsule 60 mg, 60 mg, Oral, DAILY, Norberto King MD, 60 mg at 06/17/22 0939    memantine (NAMENDA) tablet 10 mg, 10 mg, Oral, BID, Norberto King MD, 10 mg at 06/17/22 0422    midodrine (PROAMATINE) tablet 10 mg, 10 mg, Oral, TID WITH MEALS, Norberto King MD, 10 mg at 06/16/22 1837    pantoprazole (PROTONIX) tablet 40 mg, 40 mg, Oral, DAILY, Norberto King MD, 40 mg at 06/17/22 0806    sertraline (ZOLOFT) tablet 200 mg, 200 mg, Oral, DAILY, Norberto King MD, 200 mg at 06/17/22 0939    sodium chloride (NS) flush 5-40 mL, 5-40 mL, IntraVENous, Q8H, Norberto King MD, 5 mL at 06/17/22 8553    sodium chloride (NS) flush 5-40 mL, 5-40 mL, IntraVENous, PRN, Norberto King MD, 10 mL at 06/11/22 1352    polyethylene glycol (MIRALAX) packet 17 g, 17 g, Oral, DAILY PRN, Norberto King MD, 17 g at 06/16/22 9983    Current Outpatient Medications:     sertraline (ZOLOFT) 100 mg tablet, TAKE 2 TABLETS BY MOUTH EVERY DAY, Disp: 180 Tablet, Rfl: 3    memantine (NAMENDA) 10 mg tablet, Take 1 Tablet by mouth two (2) times a day., Disp: 180 Tablet, Rfl: 3    pantoprazole (PROTONIX) 40 mg tablet, Take 1 Tablet by mouth daily for 30 days. , Disp: 30 Tablet, Rfl: 0    midodrine (PROAMATINE) 10 mg tablet, Take 1 Tablet by mouth three (3) times daily (with meals) for 30 days. Indications: a feeling of dizziness upon standing due to a drop in blood pressure, Disp: 90 Tablet, Rfl: 0    apixaban (ELIQUIS) 5 mg tablet, Take 5 mg by mouth two (2) times a day., Disp: , Rfl:     donepeziL (ARICEPT) 5 mg tablet, Take 5 mg by mouth nightly., Disp: , Rfl:     DULoxetine (CYMBALTA) 60 mg capsule, Take 1 Capsule by mouth daily. , Disp: 30 Capsule, Rfl: 0    atorvastatin (LIPITOR) 40 mg tablet, TAKE 1 TABLET BY MOUTH AT BEDTIME, Disp: 90 Tablet, Rfl: 3    polyethylene glycol (MIRALAX) 17 gram packet, Take 17 g by mouth daily as needed for Constipation. , Disp: , Rfl:     mirabegron ER (Myrbetriq) 50 mg ER tablet, Take 50 mg by mouth daily. , Disp: , Rfl:       My Recommended  Diet: Cardiac soft / bite sized  Activity: Up only with assistance  Wound Care: none  Follow-up labs: routine    ______________________________________________________________________  DISPOSITION:    Home with Family:    Home with HH/PT/OT/RN:    SNF/LTC: \[facility]"" REHAB   NORM:    OTHER:        Condition at Discharge:  Stable  _____________________________________________________________________  Follow up with:   PCP : Aman Banks MD  Follow-up Information     Follow up With Specialties Details Why Contact Info    Aman Banks MD Family Medicine Plan f/u following Rehab Admission  [address] [place] [address]  [phone]            Total time in minutes spent coordinating this discharge (includes going over instructions, follow-up, prescriptions, and preparing report for sign off to her PCP) :35 minutes    Signed:  Yarelis Tony MD  [facility] Hospitalist  [phone]

## 2022-06-17 NOTE — PROGRESS NOTES
Problem: Pressure Injury - Risk of  Goal: *Prevention of pressure injury  Description: Document Fernando Scale and appropriate interventions in the flowsheet. Outcome: Progressing Towards Goal  Note: Pressure Injury Interventions:  Sensory Interventions: Float heels,Turn and reposition approx. every two hours (pillows and wedges if needed),Keep linens dry and wrinkle-free,Monitor skin under medical devices    Moisture Interventions: Absorbent underpads    Activity Interventions: PT/OT evaluation,Pressure redistribution bed/mattress(bed type)    Mobility Interventions: HOB 30 degrees or less,PT/OT evaluation,Float heels,Turn and reposition approx.  every two hours(pillow and wedges)    Nutrition Interventions: Document food/fluid/supplement intake

## 2022-06-17 NOTE — PROGRESS NOTES
Patient stated that they felt sob and was having trouble breathing through their nose due to sinus issues. Patient's 02 sats were 88% when patient was assessed. Patient is on 3 liters of 02 via nasal cannula and patient was titrated to 4.5 liters before 02 sats started to improve. Patient improved to 96%. Patient's vitals are stable. Will continue to monitor.

## 2022-06-17 NOTE — PROGRESS NOTES
Bedside and Verbal shift change report given to TRACIE Mosqueda RN (oncoming nurse) by Vivien Frey LPN (offgoing nurse). Report included the following information SBAR, Kardex, Intake/Output and MAR.

## 2022-06-17 NOTE — SWING BED INTERDISCIPLINARY CARE PLAN NURSE NOTE
Nursing:    Week #1: Date 6/17/2022  Medical status (changes from previous week):Progressing  Treatment changes this shift: na  Cognition: Present status: oriented          Is cueing needed?: Yes          Frequency of cueing needs: occasional           Type of cueing used: verbal  ADL (general):  Minimum assistance  Activity type: Social leisure skills  Participation: Daily  Level of participation: No change  Pain status: No c/o pain  Patient/Family Education needs: na    Upon review of the patients current care plan, the following issues, problems, or needs remain: leon Mcclure RN

## 2022-06-17 NOTE — PROGRESS NOTES
OCCUPATIONAL THERAPY TREATMENT  Patient: Sarahy Beckford (22 y.o. male)  Date: 6/17/2022  Diagnosis: CHF (congestive heart failure) ([facility]) [I50.9] Acute on chronic combined systolic and diastolic ACC/AHA stage C congestive heart failure ([facility]       Precautions: Bed Alarm,Fall  Chart, occupational therapy assessment, plan of care, and goals were reviewed. ASSESSMENT  Patient continues with skilled OT services and is progressing towards goals. Pt reports he is feeling depressed but is willing to work with [address] Pt step-son visits at end of session and report pt's wife is planning to visit tomorrow. Pt's /56 sitting after 2 mins 109/58 no reports of feeling lightheaded or dizzy. Current Level of Function Impacting Discharge (ADLs): orthostatic hypertension    Other factors to consider for discharge: fall risk         PLAN :  Patient continues to benefit from skilled intervention to address the above impairments. Continue treatment per established plan of care to address goals. Recommend with staff: Jordan Evans for meals and set up to complete basic adls seated. Recommend next OT session: continue per POC    Recommendation for discharge: (in order for the patient to meet his/her long term goals)  To be determined: as pt needs assist and supervision. This discharge recommendation:  Has not yet been discussed the attending provider and/or case management    IF patient discharges home will need the following DME: bedside commode and walker: rolling       SUBJECTIVE:   Patient stated i'm feeling depressed but I will get up and work with you.     OBJECTIVE DATA SUMMARY:   Cognitive/Behavioral Status:  Neurologic State: Lethargic; Alert  Orientation Level: Appropriate for age;Oriented to situation  Cognition: Follows commands     Functional Mobility and Transfers for ADLs:  Bed Mobility:  Supine to Sit: Stand-by assistance  Scooting: Additional time;Modified independent    Transfers:    To [facility] with min a  Balance:  Sitting - Static: Good (unsupported)  Sitting - Dynamic: Good (unsupported)    ADL Intervention:  Feeding  Feeding Assistance: Modified independent; Set-up  Utensil Management: Set-up    Grooming  Washing Face: Modified independent; Set-up  Washing Hands: Modified independent; Set-up  Brushing Teeth: Set-up; Modified independent    Upper Body [address]: Stand-by assistance    Pain:  Not report to OTR.  During EOB work    Activity Tolerance:   Good    After treatment patient left in no apparent distress:   Supine in bed, Call bell within reach, Bed / chair alarm activated, and Caregiver / family present    COMMUNICATION/COLLABORATION:   The patients plan of care was discussed with: Physical therapist.     Cintia Taylor OT  Time Calculation: 31 mins

## 2022-06-17 NOTE — PROGRESS NOTES
[facility]  Hospitalist Progress Note    NAME: Janny Burger   :  1945   MRN:  [id number]     Total duration of encounter: 7 days    PAST MEDICAL HISTORY:  65 y. o. Acute on chronic combined systolic and diastolic ACC/AHA stage C congestive heart failure . He  has a past medical history of Acute combined systolic and diastolic congestive heart failure , AICD (automatic cardioverter/defibrillator) present , Alzheimer disease  Anxiety with depression, other, Arthritis, AVNRT (AV bridgette re-entry tachycardia)  , Back ache, CAD , Cancer , Chest tightness, Coagulation defects , Dementia , Depression, Dizziness, FH: factor V Leiden deficiency, Generalized muscle ache, GERD, Heart failure , Hyperlipidemia, mixed, Hypertension, Long term current use of anticoagulant therapy; Patient is treated for pneumonia with antibiotics Levaquin 750 IV. Levaquin is switched to oral and the patient will need 2 more doses with 750 daily p.o. 22 and 22. Psych consult to evaluate depression and suicidal ideation  advised continue current medications Zoloft, Cymbalta and Aricept with no more antidepressants. The patient wife tested for COVID-19 recently and she still under quarantine. The patient is a negative for COVID-19 2 days ago.  case management are involved with tentative plan to get the patient to skilled services      Subjective:     Discussed with RN   No shortness of breath today, no cough. Slept good last night. Better appetite  Reported feeling less depressed.   Not suicidal today  Reported chest pain with breathing.                       Symptom Y/N Comments   Symptom Y/N Comments   Fever/Chills N     Chest Pain Y       Poor Appetite N     Edema n       Cough y     Abdominal Pain n       Sputum n     Joint Pain n       SOB/HOLCOMB y     Pruritis/Rash n       Nausea/vomit n     Tolerating PT/OT         Diarrhea n     Tolerating Diet y       Constipation n     Other             Current Facility-Administered Medications:     [START ON 6/17/2022] levoFLOXacin (LEVAQUIN) tablet 750 mg, 750 mg, Oral, Q24H, Saul Chu MD    albuterol-ipratropium (DUO-NEB) 2.5 MG-0.5 MG/3 ML, 3 mL, Nebulization, Q6H PRN, Michelle Land MD    guaiFENesin ER [facility]) tablet 600 mg, 600 mg, Oral, Q12H, Michelle Land MD, 600 mg at 06/16/22 2152    [Held by provider] furosemide (LASIX) injection 20 mg, 20 mg, IntraVENous, DAILY, Michelle Land MD, 20 mg at 06/13/22 0805    butalbital-acetaminophen-caffeine (FIORICET, ESGIC) -40 mg per tablet 1 Tablet, 1 Tablet, Oral, Q4H PRN, Talia Aguirre MD, 1 Tablet at 06/16/22 1837    apixaban (ELIQUIS) tablet 5 mg, 5 mg, Oral, BID, Yair Mcgregor MD, 5 mg at 06/16/22 1837    atorvastatin (LIPITOR) tablet 40 mg, 40 mg, Oral, QHS, Yair Mcgregor MD, 40 mg at 06/16/22 2152    donepeziL (ARICEPT) tablet 5 mg, 5 mg, Oral, QHS, Yair Mcgregor MD, 5 mg at 06/16/22 2152    DULoxetine (CYMBALTA) capsule 60 mg, 60 mg, Oral, DAILY, Yair Mcgregor MD, 60 mg at 06/16/22 0956    memantine (NAMENDA) tablet 10 mg, 10 mg, Oral, BID, Yair Mcgregor MD, 10 mg at 06/16/22 1837    midodrine (PROAMATINE) tablet 10 mg, 10 mg, Oral, TID WITH MEALS, Yair Mcgregor MD, 10 mg at 06/16/22 1837    pantoprazole (PROTONIX) tablet 40 mg, 40 mg, Oral, DAILY, Yair Mcgregor MD, 40 mg at 06/16/22 0616    sertraline (ZOLOFT) tablet 200 mg, 200 mg, Oral, DAILY, Yair Mcgregor MD, 200 mg at 06/16/22 0956    sodium chloride (NS) flush 5-40 mL, 5-40 mL, IntraVENous, Q8H, Yair Mcgregor MD, 5 mL at 06/16/22 2200    sodium chloride (NS) flush 5-40 mL, 5-40 mL, IntraVENous, PRN, Yair Mcgregor MD, 10 mL at 06/11/22 1352    polyethylene glycol (MIRALAX) packet 17 g, 17 g, Oral, DAILY PRN, Yair Mcgregor MD, 17 g at 06/16/22 1453    Objective:     VITALS:   Patient Vitals for the past 12 hrs:   Temp Pulse Resp BP SpO2   06/16/22 1958 -- -- -- -- 93 %   06/16/22 1953 97.8 °F (36.6 °C) 60 20 128/75 90 %   06/16/22 1600 97.5 °F (36.4 °C) 68 20 133/70 94 %   06/16/22 1200 97.5 °F (36.4 °C) 84 20 (!) 100/59 92 %   06/16/22 1110 -- 76 -- (!) 99/59 --   06/16/22 1100 -- -- -- (!) 71/46 --       Intake/Output Summary (Last 24 hours) at 6/16/2022 2217  Last data filed at 6/16/2022 0856  Gross per 24 hour   Intake 300 ml   Output 425 ml   Net -125 ml        PHYSICAL EXAM:  General: WD, WN. Alert, cooperative, no acute distress    EENT:  EOMI. Anicteric sclerae. MMM  Resp:  CTA bilaterally, no wheezing or rales. No accessory muscle use  CV:  Regular  rhythm,  No edema  GI:  Soft, Non distended, Non tender. +Bowel sounds  Neurologic:  Alert and oriented X 3, normal speech,   Psych:   Good insight. Not anxious nor agitated  Skin:  No rashes. No jaundice    LABS:  I reviewed today's most current labs and imaging studies. Pertinent labs include:  Recent Labs     06/16/22  1601 06/15/22  1110   WBC 8.0 9.0   HGB 10.1* 10.0*   HCT 30.1* 30.4*    279     Recent Labs     06/16/22  1601 06/15/22  1110   * 124*   K 4.5 3.9   CL 90* 89*   CO2 32 30   * 124*   BUN 19 18   CREA 1.06 1.02   CA 9.3 9.4   MG 1.8 1.7   ALB 2.6* 2.5*   TBILI 0.4 0.4   ALT 41 39       Assessment / Plan:    Depression/suicidal ideation:  -Psych consult and input is appreciated  -The patient is currently on Zoloft 200 mg daily, Cymbalta 60 mg daily and Aricept 5 mg at bedtime. Psych recommended to continue current medication especially in view of his history of orthostasis and reduced cardiovascular function. Psych recommended managing the patient on the floor. Acute exacerbation of CHF , Reduced Ejection Fraction,  -The patient fluid balance is very sensitive. He reported shortness of breath resolved. Will resume Lasix 20 mg daily. With more Lasix the patient shortness of breath tends to do better however he gets more orthostatic.   Patient will benefit from cardiology evaluation.  -We will repeat echo. -BNP  at 5,187. Level was 6.193 a week ago on 06/09/22   Keep close monitoring of input output chart, 2 g sodium diet   (Entresto is on hold, can  resume Entresto when blood pressure stable)  Monitor OMI    Hyponatremia  -Sodium improved to 124 from 129. Strict free water intake  Hyponatremia is  it is most likely due to fluid overload. Continue gentle diuresis and follow-up sodium level. Patient sodium has been on the low side     Superimposed pneumonia  -Improved, no  cough,no  shortness of breath  -Switch Levaquin to p.o. 750 mg daily for 2 more days    Exposure to COVID-19 : The patient tested negative for COVID-19 and flu AMB .    History of A. Fib ( Atrial fibrillation, 2012,Automatic cardioverter/defibrillator /2016)  Heart rate remained controlled- Continue Eliquis     Status post AICD has history of recent evaluation by the cardiologist; has appointment with his cardiologist in the office next Friday. No reports of firing from AICD on this admission  Will arrange for cardiology follow-up on discharge.     5-history of hypertension: Blood pressure average 120/69  Will hold on blood pressure medications  Continue to monitor blood pressure and resume BP meds at BP >140/90     History of interstitial lung disease  Added DuoNebs every 6 as needed for shortness of present wheezing     Orthostatic hypotension stable now has no lightheadedness.   Continue with midodrine 10 mg p.o. 3 times daily.     History of dementia : Continue with Aricept 5 mg p.o. nightly  _____________________________________________________________________    SAFETY:   Code Status:Full  DVT prophylaxis:Eliquis  Stress Ulcer prophylaxis: Protonix (home med)  Bladder catheter:no  Family Contact Info:  Primary Emergency ContactMartínez Leo Phone: [phone]  Bedded: [facility] Room 130/01  Disposition: TBD, likely home in [facility] on  Admission status: Admission     Reviewed most current radiology test results   YES  Review and summation of old records today    NO  Reviewed patient's current orders and MAR    YES  PMH/SH reviewed - no change compared to H&P     Discussed with the patient , who are agreeable with the management plan  Total NON Critical Care TIME:  40   Minutes with more than 50% direct patient     [facility] Hospitalist  [phone]

## 2022-06-17 NOTE — PROGRESS NOTES
Care Management Interventions  PCP Verified by CM: Yes Za Stewart )  Last Visit to PCP: 06/06/22  Palliative Care Criteria Met (RRAT>21 & CHF Dx)?: No (No MD order)  Mode of Transport at Discharge: Other (see comment) (POV)  Transition of Care Consult (CM Consult): Discharge Planning  Discharge Durable Medical Equipment: No  Physical Therapy Consult: Yes  Occupational Therapy Consult: Yes  Speech Therapy Consult: No  Support Systems: Spouse/Significant Other,Other Family Member(s)  Confirm Follow Up Transport: Family  The Plan for Transition of Care is Related to the Following Treatment Goals : Swing bed--PT/OT  Discharge Location  Patient Expects to be Discharged to[de-identified] Home with home health    [facility]. Patient's insurance has approved him today 6/17-6/26 with updates due 6/27. Patient's goal is to gain strength, mobility and endurance to return to baseline status. Patient was explained the swing bed orientation consent. He stated understanding and signed document. Signed document placed in chart and copy given to patient. Told patient to contact cm for any questions or concerns.

## 2022-06-17 NOTE — PROGRESS NOTES
Problem: Pressure Injury - Risk of  Goal: *Prevention of pressure injury  Description: Document Fernando Scale and appropriate interventions in the flowsheet. Outcome: Progressing Towards Goal  Note: Pressure Injury Interventions:  Sensory Interventions: Float heels,Turn and reposition approx. every two hours (pillows and wedges if needed),Keep linens dry and wrinkle-free,Monitor skin under medical devices    Moisture Interventions: Absorbent underpads    Activity Interventions: PT/OT evaluation,Pressure redistribution bed/mattress(bed type)    Mobility Interventions: HOB 30 degrees or less,PT/OT evaluation,Float heels,Turn and reposition approx.  every two hours(pillow and wedges)    Nutrition Interventions: Document food/fluid/supplement intake                     Problem: Heart Failure: Day 1  Goal: Activity/Safety  Outcome: Progressing Towards Goal  Goal: Psychosocial  Outcome: Progressing Towards Goal  Goal: *Oxygen saturation within defined limits  Outcome: Progressing Towards Goal  Goal: *Optimal pain control at patient's stated goal  Outcome: Progressing Towards Goal  Goal: *Anxiety reduced or absent  Outcome: Progressing Towards Goal

## 2022-06-17 NOTE — PROGRESS NOTES
Care Management Interventions  PCP Verified by CM: Yes Jelena Salazar MD )  Last Visit to PCP: 06/06/22  Palliative Care Criteria Met (RRAT>21 & CHF Dx)?: No (No MD order)  Mode of Transport at Discharge: Other (see comment) (POV)  Transition of Care Consult (CM Consult): Discharge Planning  MyChart Signup: No  Discharge Durable Medical Equipment: No  Physical Therapy Consult: No  Occupational Therapy Consult: No  Speech Therapy Consult: No  Support Systems: Spouse/Significant Other  Confirm Follow Up Transport: Family  The Plan for Transition of Care is Related to the Following Treatment Goals : Treat CHF  Discharge Location  Patient Expects to be Discharged to[de-identified] Skilled nursing facility    Patient has been discharged from acute care and transitioning to swing bed. Patient's goal is to gain strength, mobility and endurance prior to returning home. Goal is to get back to baseline status. Patient will eventually go home w/personal care aides and home health at this point. Medicare pt has received, reviewed, and signed 2nd IM letter informing them of their right to appeal the discharge. Signed copy has been placed on pt bedside chart. Patient/family aware and agree with discharge plan. Told them to call case management for any questions or concerns after discharge. RUR: 23% LOW     Transition of Care (ANJU) Plan:  Swing bed at [address]:       How is patient being transported at discharge? N/a    When? Today      Is transport scheduled? N/a     Follow-up appointment and transportation: Patient will be followed by hospitalist at least once a week. More If clinically indicated. Communication plan (with patient/family): Patient is aware and agrees with discharge plan. Who is being called? Patient or Next of Kin? Responsible party? Patient      What number(s) is to be used? n.a      What service provider is calling for [place]? [facility]      When are they calling?  24--48 hours prior to vernell       Click here to complete [address] including selection of the Healthcare Decision Maker Relationship (ie \"Primary\")  @[email or web address]      Transition of Care Plan to SNF/Rehab    SNF/Rehab Transition:  Patient has been accepted to [facility] Swing bed  and meets criteria for admission. Communication to Patient/Family:  Met with patient and Maureen Matthews (identified care giver) and they are agreeable to the transition plan. Communication to SNF/Rehab:  Bedside RN, TRACIE Mosqueda, has been notified to update the transition plan to the facility   Discharge information has been updated on the AVS.         Nursing Please include all hard scripts for controlled substances, med rec and dc summary, and AVS in packet. Reviewed and confirmed with facility, [facility], can manage the patient care needs for the following:     [place] with (X) only those applicable:    Medication:  [x]  Medications will be available at the facility  []  IV Antibiotics   []  Controlled Substance - hard copy to be sent with patient   []  Weekly Labs   Documents:  [] Hard RX  [x] MAR  [x] Kardex  [] AVS  []Transfer Summary  [x]Discharge   Equipment:  []  CPAP/BiPAP  []  Wound Vacuum  []  Cote or Urinary Device  []  PICC/Central Line  []  Nebulizer  []  Ventilator   Treatment:  []Isolation (for MRSA, VRE, etc.)  []Surgical Drain Management  []Tracheostomy Care  []Dressing Changes  []Dialysis with transportation and chair time   []PEG Care  [x]Oxygen  []Daily Weights for Heart Failure   Dietary:  []Any diet limitations  []Tube Feedings   []Total Parenteral Management (TPN)   Eligible for Medicaid Long Term Services and Supports  Yes:  [x] Eligible for medical assistance or will become eligible within 180 days and UAI completed. [] Provider/Patient and/or support system has requested screening.   [] UAI copy provided to patient or responsible party,   [] UAI unavailable at discharge will send once processed to SNF provider. [] UAI unavailable at discharged mailed to patient  No:   [] Private pay and is not financially eligible for Medicaid within the next 180 days. [] Reside out-of-state.   [] A residents of a state owned/operated facility that is licensed  by [facility] and [facility] or [facility]  [] Enrollment in [facility] hospice services  []  Medical [facility]  [] Patient /Family declines to have screening completed or provide financial information for screening     Financial Resources:  Medicaid    [] Initiated and application pending   [x] Full coverage     Advanced Care Plan:  []Surrogate Decision Maker of Care  []POA  []Communicated Code Status FULL   Other

## 2022-06-17 NOTE — PROGRESS NOTES
[facility]    Psychosocial Assessment Swing Bed Resident    1. APPEARANCE  Resident is well groomed    2. SPEECH & COMMUNICATION SKILLS  Resident is able to converse and make needs known    3. SOCIAL STATUS  Resident desires interaction with others and seeks it out    4. EMOTIONAL STATUS  Which does the resident's normal mood convey? Contentment    5. MENTAL/COGNITIVE STATUS  Does resident understand what is happening to them? YES  Does resident comprehend what is being said to them? YES  Is there a particular time of day that the resident's comprehension level is better than other times? No  Is resident's short term memory accurate? YES  Does resident make decisions using objective information? YES  Is resident irrational in decision making? NO    6. CURRENT RELATIONSHIPS  Resident maintains supportive relationship with (state specific individuals & frequency & type of contact): Enmanuel Whitmore [phone]    7. PLACEMENT & DISCHARGE  Does resident understand placement? YES  Does the resident understand diagnosis & intended length of placement? YES  Does the resident express concerns regarding previous residence and/or possessions? NO  Is there support (financial & emotional) when possible discharge occurs? YES    8. ADJUSTMENT DIFFICULTIES  Is resident experiencing any difficulty related to depression, loss, lack of control, uneasy feelings, grieving? YES  If yes, explain: Patient was expressing thoughts of sadness.  Cleared by psychiatrist.       Jake Carbone Date:6/17/2022

## 2022-06-17 NOTE — PROGRESS NOTES
Readmission Assessment  Number of days since last admission?: 8-30 days  Previous disposition: SNF  Who is being interviewed?: Patient  What was the patient's/caregiver's perception as to why they think they needed to return back to the hospital?: Other (Comment) [facility] didn't treat him right)  Did you visit your Primary Care Physician after you left the hospital, before you returned this time?: No  Why weren't you able to visit your PCP?: Other (Comment) (Saw physician at SNF)  Did you see a specialist, such as Cardiac, Pulmonary, Orthopedic Physician, etc. after you left the hospital?: No  Who advised the patient to return to the hospital?: Self-referral  Does the patient report anything that got in the way of taking their medications?: No  In our efforts to provide the best possible care to you and others like you, can you think of anything that we could have done to help you after you left the hospital the first time, so that you might not have needed to return so soon?: Other (Comment) (Could not think of anything.)    Reason for Readmission:     CHF         RUR Score/Risk Level:     Was 18% (Moderate) when discharged from [facility]    PCP: First and Last name:  Adelina Gonsalez    Name of Practice: [facility]   Are you a current patient: Yes/No: yes   Approximate date of last visit: 6/6/22   Can you participate in a virtual visit with your PCP: Yes    Is a Care Conference indicated: Not at this time        Did you attend your follow up appointment (s): If not, why not: No f/u appointment was made because patient went to SNF ([facility]). Patient/family signed out AMA from [facility] therefore, was not set up with any services. Resources/supports as identified by patient/family:   Patient has medicaid. Can get personal care assistance in the home. Top Challenges facing patient (as identified by patient/family and CM):          Finances/Medication cost?     Covered by [employer] cross/medicaid. Transportation      n/a  Support system or lack thereof? Wife and nephew       Living arrangements? W/wife and nephew   Self-care/ADLs/Cognition? Needs assistance. Current Advanced Directive/Advance Care Plan:  No           Plan for utilizing home health:   Yes, but swing bed first.              Transition of Care Plan:    Based on readmission, the patient's previous Plan of Care   has been evaluated and/or modified. The current Transition of Care Plan is:       Swing bed. Then home w/hh and personal care aides.

## 2022-06-17 NOTE — PROGRESS NOTES
Physical Therapy:    Session attempted 2x in the AM but first pt was having what looked like an ECHO performed and upon second attempt he had just received lunch and was eating while seated on EOB; family member present in the room. Will continue to follow later in the day as able and appropriate.      Lois CUEVAS

## 2022-06-17 NOTE — SWING BED INTERDISCIPLINARY CARE PLAN DISCHARGE PLANNING NOTE
Discharge Planning:    Psychosocial concerns/family concerns, status of previous week; discharge plan (place, DME, services): Wife stated patient needed [facility], hospital bed. Will need to set up personal care aides prior to discharge. Patient has medicaid. Discharge Plan Update: Home w/spouse  Further anticipated LOS: 1 week or less  DME to order: bed, W/C and commode  Anticipated D/C services: Home Health  Need for patient/family conference: NO   If yes, planned for when: n/a not at this time. Upon review of the patients current care plan, the following issues, problems, or needs remain: Patient needs to gain strength, mobility and endurance prior to discharge. Hopefully will return to baseline status.    Za Villegas

## 2022-06-17 NOTE — PROGRESS NOTES
Physical Therapy Goals  Revised 6/17/2022 (weekly progress note)  1. Patient will move from supine to sit and sit to supine  in bed with modified independence within 7 day(s). 2.  Patient will transfer from bed to chair and chair to bed with SBA using the least restrictive device within 7 day(s). 3.  Patient will perform sit to stand with SBA within 7 day(s). 4.  Patient will ambulate with SBA assist for 200 feet with the least restrictive device within 7 day(s). PHYSICAL THERAPY TREATMENT: WEEKLY REASSESSMENT  Patient: Navdeep Hodgson (19 y.o. male)  Date: 6/17/2022  Primary Diagnosis: Encounter for rehabilitation [Z51.89]        Precautions: orthostatic BP ,  Bed Alarm,Fall, [place]      ASSESSMENT  Patient continues with skilled PT services and is slowly progressing towards goals as able. Pt is still significantly hindered by his orthostatic BP. His bed mobility and transferring require very little assistance and his sitting balance is good overall. However, he is experiencing large BP drops w/ inconsistent symptoms therefore he has not been safe to ambulate away from EOB. See vital signs in vitals section for specifics. He noted that he is feeling discouraged by his currently situation but wanted to get up into the recliner chair as long as it was safe. He was assisted over to it w/ 2WW and CGA only. He was left at end of session resting comfortably in the chair w/ all needs met as able; nursing aware. Patient's progression toward goals since last assessment: limited due to (+) orthostatic BP particularly when he stands    Current Level of Function Impacting Discharge (mobility/balance): transfers CGA; balance seated good; standing is fair w/ support     Other factors to consider for discharge: lives w/ supportive spouse         PLAN :  Goals have been updated based on progression since last assessment.   Patient continues to benefit from skilled intervention to address the above impairments. Recommendations and Planned Interventions: bed mobility training, transfer training, gait training, therapeutic exercises, neuromuscular re-education, patient and family training/education, and therapeutic activities      Frequency/Duration: Patient will be followed by physical therapy:  6 times a week to address goals. Recommendation for discharge: (in order for the patient to meet his/her long term goals)  Physical therapy at least 2 days/week in the home     This discharge recommendation:  Has not yet been discussed the attending provider and/or case management    IF patient discharges home will need the following DME: to be determined (TBD)         SUBJECTIVE:   Patient stated  They can't get my blood pressure straightened out.     OBJECTIVE DATA SUMMARY:   HISTORY:    Past Medical History:   Diagnosis Date    Acute combined systolic and diastolic congestive heart failure ([facility] 2/12/2022    AICD (automatic cardioverter/defibrillator) present 5/13/2016 5/13/16 [employer] upgrade to dual chamber AICD implant  Dr. Mkie Anderson    Alzheimer disease [facility])     Anxiety state, other     Arthritis     OSTEO ARTHRITIS    AVNRT (AV bridgette re-entry tachycardia) ([facility] 5/12/2016 5/12/16 AVNRT ablation: PM/AICD left upper chest :Dr. Missy Oneal    Back ache     CAD (coronary artery disease)     Cancer ([facility] [facility] 2004    Prostate cancer    Chest tightness     last episode of chest pain 5/2016 before AICD placed; none since then    Coagulation defects 2005    FACTOR V LEIDEN    Dementia ([facility] [facility]     Depression     Dizziness     FH: factor V Leiden deficiency     Generalized muscle ache     GERD (gastroesophageal reflux disease)     HEARTBURN    Heart failure ([facility] 2014    as of 07/2019 EF 30-35;  Dr. Nichole Walter, Cardiomyopathy    Hyperlipidemia, mixed     Hypertension     Long term current use of anticoagulant therapy     Other ill-defined conditions(799.89) 2004    blood transfusion    Pacemaker Paroxysmal atrial fibrillation (HCC)     rate controlled with coreg     Postsurgical aortocoronary bypass status 05/29/2012    CABG    Presence of cardiac defibrillator 05/2016    left upper chest    Pulmonary emphysema ([facility] [address] 3/4/2022    RMSF [facility] spotted fever)     SSS (sick sinus syndrome) ([facility] [address]     Stroke ([facility] [address] 2011, 1990's    SEVERAL-mini    Syncope     Thromboembolism ([facility] [address] 2014    left leg: changed to Eliquis from Warfarin    Thromboembolus ([facility] [address] 2005    left leg    Thyroid disease     Weakness generalized      Past Surgical History:   Procedure Laterality Date    CARDIAC CATHETERIZATION  3/4/2011         HX HEENT  2019    oral surgery, removed teeth, dentures upper/lower    HX HERNIA REPAIR Left 2002    Ingiunal hernia repair left    HX ORTHOPAEDIC      LEFT KNEE CARTILAGE REPAIR;RIGHT ROTATOR CUFF REPAIR    HX ORTHOPAEDIC  2/14/12    C5-7 ANTERIIOR CERVICAL DISECTOMY AND FUSION    HX ORTHOPAEDIC  6/4/13    C5-C7 POSTERIOR DECOMPRESSION AND FUSION    HX ORTHOPAEDIC      right thumb partial amputation of tip    HX OTHER SURGICAL      steroid injections    HX PACEMAKER Left 05/13/2016    BS D142, Dr. Hammad Trotter  2004     [place]    HX Alexander Aschoff Left 2019    HX UROLOGICAL       urinary control system    HX UROLOGICAL  12/3/13    REPLACEMENT ARTIFICAL URINARY SPHINCTER    OR CARDIAC SURG PROCEDURE UNLIST      CABG X1 3/5/11       Personal factors and/or comorbidities impacting plan of care: complex medical hx    Home Situation  Support Systems: Spouse/Significant Other,Other Family Member(s)  Patient Expects to be Discharged to[de-identified] Home with home health    EXAMINATION/PRESENTATION/DECISION MAKING:   Critical Behavior:  Neurologic State: Alert     Cognition: Follows commands     Hearing: [place]     Range Of Motion:         Grossly WFL                 Strength:        Generally decreased but functional                 Tone & Sensation: intact                     Coordination:   intact  Functional Mobility:  Bed Mobility:     Supine to Sit: Stand-by assistance        Transfers:  Sit to Stand: Contact guard assistance  Stand to Sit: Contact guard assistance        Bed to Chair: Contact guard assistance              Balance:   Sitting: Intact; Without support  Standing: Impaired; With support  Standing - Static: Constant support; Fair  Standing - Dynamic : Constant support; Fair  Ambulation/Gait Training:  Distance (ft): 5 Feet (ft) (only bed to chair due to BP drop)  Assistive Device: Gait belt;Walker, rolling  Ambulation - Level of Assistance: Contact guard assistance        Gait Abnormalities: Decreased step clearance        Base of Support: Narrowed     Speed/Hazel: Shuffled  Step Length: Left shortened;Right shortened         Pain Rating:  denied    Activity Tolerance:   Fair, Poor, requires frequent rest breaks, and signs and symptoms of orthostatic hypotension    After treatment patient left in no apparent distress:   Sitting in chair, Heels elevated for pressure relief, Call bell within reach, and Bed / chair alarm activated    COMMUNICATION/EDUCATION:   The patients plan of care was discussed with: Registered nurse. Fall prevention education was provided and the patient/caregiver indicated understanding., Patient/family have participated as able in goal setting and plan of care. , and Patient/family agree to work toward stated goals and plan of care.     Thank you for this referral.  Lawrance Halsted, PT   Time Calculation: 29 mins

## 2022-06-17 NOTE — PROGRESS NOTES
Comprehensive Nutrition Assessment    Type and Reason for Visit: Initial    Nutrition Recommendations/Plan:   1. Continue soft and bite sized diet   2. Ensure and magic cup with meals   3. Encourage po intake      Malnutrition Assessment:  Malnutrition Status:  Mild malnutrition (06/17/22 1202)    Context:  Acute illness     Findings of the 6 clinical characteristics of malnutrition:   Energy Intake:  75% or less of est energy req for 7 or more days  Weight Loss:  No significant weight loss     Body Fat Loss:  No significant body fat loss,     Muscle Mass Loss:  No significant muscle mass loss,    Fluid Accumulation:  No significant fluid accumulation,     Strength:  Not performed     Nutrition Assessment:  Present on Admission:   Atrial fibrillation (HCC)   AVNRT (AV bridgette re-entry tachycardia) (HCC)   Acute on chronic combined systolic and diastolic ACC/AHA stage C congestive heart failure (HCC)   Interstitial lung disease (HCC)   AICD (automatic cardioverter/defibrillator) present   Hypothyroidism due to acquired atrophy of thyroid   Alzheimer's dementia, late onset, with behavioral disturbance ([address]   Essential hypertension   Mixed hyperlipidemia   Orthostatic hypotension   Moderate major depression ([address]    Pt admitted with above. His po intake is ranging but mostly poor. When he doesn't eat or like the food he is offered another choice and will not take it. He does like drinking ensure. He has no teeth and a couple of days diet was downgraded. Weight is down a little but not significant. Would encourage po intake and continue supplements. Pt may need help with meal set-up but can feed himself.       Nutrition Related Findings:    labs- H/H-10/30.4 low, Na-124 low, cl-89 low, meds-lipitor, aricept, lasix, protonix, zoloft Wound Type: None    Current Nutrition Intake & Therapies:  Average Meal Intake: 26-50%  Average Supplement Intake: 26-50%  ADULT ORAL NUTRITION SUPPLEMENT Breakfast, Lunch; Standard High Calorie/High Protein  ADULT ORAL NUTRITION SUPPLEMENT Lunch, Dinner; Frozen Supplement  ADULT DIET Dysphagia - Soft & Bite Sized; Safety Tray; Safety Tray (Disposables); Low Fat/Low Chol/High Fiber/TEODORA   Patient Vitals for the past 168 hrs:   % Diet Eaten   06/16/22 0856 1 - 25%   06/15/22 1833 51 - 75%   06/15/22 1200 0%   06/15/22 0800 1 - 25%   06/14/22 1200 1 - 25%   06/14/22 0800 1 - 25%   06/13/22 1200 0%   06/12/22 1012 76 - 100%       Anthropometric Measures:  Height: 5' 7\" (170.2 cm)  Ideal Body Weight (IBW): 148 lbs (67 kg)  Current Body Wt:  71.2 kg (156 lb 15.5 oz), 106.1 % IBW.     Current BMI (kg/m2): 24.6   BMI Category: Normal weight (BMI 22.0-24.9) age over 72   Weight Loss Metrics 6/17/2022 6/9/2022 5/26/2022 5/16/2022 5/15/2022 5/10/2022 4/6/2022   Today's Wt 153 lb 14.4 oz - 155 lb 6.8 oz - 160 lb 160 lb 161 lb 6 oz   BMI - 24.1 kg/m2 - 24.34 kg/m2 24.33 kg/m2 23.63 kg/m2 22.51 kg/m2       Estimated Daily Nutrient Needs:  Energy Requirements Based On: Formula  Weight Used for Energy Requirements: Admission  Energy (kcal/day): 1669  Weight Used for Protein Requirements: Current  Protein (g/day): 71  Method Used for Fluid Requirements: 1 ml/kcal  Fluid (ml/day): 3483    Nutrition Diagnosis:   · Inadequate oral intake related to cognitive or neurological impairment,impaired respiratory function as evidenced by intake 0-25%,intake 26-50%      Nutrition Interventions:   Food and/or Nutrient Delivery: Continue current diet,Continue oral nutrition supplement  Nutrition Education/Counseling: No recommendations at this time  Coordination of Nutrition Care: Continue to monitor while inpatient,Interdisciplinary rounds       Goals:     Goals: PO intake 50% or greater,within 2 days       Nutrition Monitoring and Evaluation:   Behavioral-Environmental Outcomes: None identified  Food/Nutrient Intake Outcomes: Food and nutrient intake,Supplement intake  Physical Signs/Symptoms Outcomes: Chewing or swallowing,Weight,Meal time behavior    Discharge Planning:    Continue oral nutrition supplement,Continue current diet    Moraima Liu RD

## 2022-06-18 NOTE — PROGRESS NOTES
Patient feeling better after nebulizer and xray completed, and Lasix given, notified  Theresa DURAN with results.

## 2022-06-18 NOTE — PROGRESS NOTES
Patient given PRN duoneb for SOB. BS crackles throughout pre and post HHN. Replaced H2O bottle. Patient stated that he felt better post [facility] but c/o headache. RN aware.

## 2022-06-18 NOTE — PROGRESS NOTES
Patient having SOB perfect [facility] Ordered chest xray and lasix 20 mg IV once .

## 2022-06-18 NOTE — PROGRESS NOTES
Problem: Mobility Impaired (Adult and Pediatric)  Goal: *Acute Goals and Plan of Care (Insert Text)  Description: Physical Therapy Goals  Revised 6/17/2022  1. Patient will move from supine to sit and sit to supine  in bed with modified independence within 7 day(s). 2.  Patient will transfer from bed to chair and chair to bed with SBA using the least restrictive device within 7 day(s). 3.  Patient will perform sit to stand with SBA within 7 day(s). 4.  Patient will ambulate with SBA assist for 200 feet with the least restrictive device within 7 day(s). Outcome: Progressing Towards Goal   PHYSICAL THERAPY TREATMENT  Patient: Donne Kehr (8 y.o. male)  Date: 6/18/2022  Diagnosis: Encounter for rehabilitation [Z51.89] <principal problem not specified>       Precautions: Bed Alarm,Fall  Chart, physical therapy assessment, plan of care and goals were reviewed. ASSESSMENT  Patient continues with skilled PT services and is slowly progressing towards goals. Pt received supine in bed, 2L O2 via NC, agreeable to PT session. Bed mobility performed Mod I with rail, extra time. Sit <> stands with CGA and L hand on arm of chair for steadying. Dynamic balance performed for doffing brief and donning new brief after episode of urinary incontinence. Pt performed alice care with R hand and CGA for steadying from therapist. Bed > chair transfer performed with pt stepping x5feet with hand on armrest for steadying. Pt declined gait this date due to fatigue and wanting to rest. Pt left seated in recliner chair, all needs met, call bell in lap, eating lunch. He continues to benefit from PT services and likely will require [facility] PT with increase family assist vs. SNF placement upon DC. Will continue to follow. Current Level of Function Impacting Discharge (mobility/balance): CGA-SBA with support vs. RW. Limited by low BP    Other factors to consider for discharge: very orthostatic and sometimes asymptomatic. PLAN :  Patient continues to benefit from skilled intervention to address the above impairments. Continue treatment per established plan of care. to address goals. Recommendation for discharge: (in order for the patient to meet his/her long term goals)  HH with family support vs. SNF    This discharge recommendation:  Has been made in collaboration with the attending provider and/or case management    IF patient discharges home will need the following DME: patient owns DME required for discharge - Rw       SUBJECTIVE:   Patient stated I just am tired.     OBJECTIVE DATA SUMMARY:   Critical Behavior:  Neurologic State: Alert  Orientation Level: Appropriate for age  Cognition: Appropriate decision making     Functional Mobility Training:  Bed Mobility:  Rolling: Modified independent  Supine to Sit: Modified independent     Scooting: Modified independent        Transfers:  Sit to Stand: Contact guard assistance  Stand to Sit: Contact guard assistance        Bed to Chair: Contact guard assistance                    Balance:  Sitting: Intact  Standing: Impaired; With support  Standing - Static: Constant support; Fair  Standing - Dynamic : Constant support; Fair  Ambulation/Gait Training:  Distance (ft): 5 Feet (ft) (only bed > chair due to pt reported fatigue)  Assistive Device:  (hand supported on armrest of chair for stepping)  Ambulation - Level of Assistance: Contact guard assistance        Gait Abnormalities: Decreased step clearance        Base of Support: Narrowed     Speed/Hazel: Shuffled; Slow  Step Length: Left shortened;Right shortened          Pain Rating:  denied    Activity Tolerance:   Fair, desaturates with exertion and requires oxygen, and signs and symptoms of orthostatic hypotension    After treatment patient left in no apparent distress:   Sitting in chair, Heels elevated for pressure relief, Call bell within reach, and Bed / chair alarm activated    COMMUNICATION/COLLABORATION:   The patients plan of care was discussed with: Registered nurse.      Leela Benz, PT, DPT, NCS   Time Calculation: 11 mins

## 2022-06-18 NOTE — PROGRESS NOTES
Bedside and Verbal shift change report given to TRACIE Mosqueda RN (oncoming nurse) by VERONICA Anand (offgoing nurse). Report included the following information SBAR and Kardex. Du score 3  Bed/chair alarm yes in use. If in use it is set at the highest volume. Intravenous fluids were administered,  Patient Vitals for the past 12 hrs:   Temp Pulse Resp BP SpO2   06/18/22 0128 -- -- -- -- 94 %   06/17/22 2016 97.5 °F (36.4 °C) 83 20 (!) 146/83 98 %   06/17/22 2015 -- -- -- -- 94 %     No flowsheet data found.    BMP: No results found for: NA, K, CL, CO2, AGAP, GLU, BUN, CREA, GFRAA, GFRNA

## 2022-06-18 NOTE — PROGRESS NOTES
Bedside shift change report given to Albaro Laird RN  (oncoming nurse) by Aniket Singh  (offgoing nurse). Report included the following information SBAR, Kardex, Intake/Output, MAR, Recent Results and Med Rec Status.

## 2022-06-19 NOTE — H&P
[facility] Admission History & Physical        6/18/2022 8:57 PM  Patient: Meghana Solano 1945  PCP: Aman Banks MD    HISTORY  Chief Complaint: No chief complaint on file. HPI: 65 y.o. male presenting for admission to [facility] for further evaluation and treatment for Encounter for rehabilitation. He  has a past medical history of Acute combined systolic and diastolic congestive heart failure ([address] (2/12/2022), AICD (automatic cardioverter/defibrillator) present (5/13/2016), Alzheimer disease ([address] Anxiety state, other, Arthritis, AVNRT (AV bridgette re-entry tachycardia) ([address] (5/12/2016), Back ache, CAD (coronary artery disease), Cancer ([address] (2004), Chest tightness, Coagulation defects (2005), Dementia ([address] Depression, Dizziness, FH: factor V Leiden deficiency, Generalized muscle ache, GERD (gastroesophageal reflux disease), Heart failure ([address] (2014), Hyperlipidemia, mixed, Hypertension, Long term current use of anticoagulant therapy, Other ill-defined conditions(799.89) (2004), Pacemaker, Paroxysmal atrial fibrillation ([address] Postsurgical aortocoronary bypass status (05/29/2012), Presence of cardiac defibrillator (05/2016), Pulmonary emphysema ([address] (3/4/2022), [facility] spotted fever), SSS (sick sinus syndrome) ([address] Stroke ([address] (2011, 1990's), Syncope, Thromboembolism ([address] (2014), Thromboembolus ([address] (2005), Thyroid disease, and Weakness generalized. He has no past medical history of Asthma, Carotid artery disease ([address] Chronic kidney disease, Diabetes ([address] Glaucoma, Murmur, Myocardial infarction ([address] Pulmonary embolism ([address] Rheumatic fever, or Valvular heart disease. .    Patient is transferred to skilled care for continued monitoring and therapy efforts following acute care admission June 9 through June 17th. Patient is still very weak with poor exercise tolerance-attributed to his poor cardiac and pulmonary status.   His wife has been his caregiver, however she has become ill and is weakened from recent [place]. He becomes notably dyspneic with limited physical activity. On recent admission his systolic pressure was noted to drop from 10 3-76 on standing. He has AICD without known significant arrhythmia or defibrillations. Recent syncopal episode at a convenience store while talking with friends. They assisted him down to the floor without injury present. Right diagnostic testing as listed below and is felt stable for transfer to a rehab therapy bed for further monitoring. Long-term prognosis is not good. Family meeting needs to be arranged to discuss possible hospice discussions on discharge home. Past Medical History:  Past Medical History:   Diagnosis Date    Acute combined systolic and diastolic congestive heart failure ([facility] 2/12/2022    AICD (automatic cardioverter/defibrillator) present 5/13/2016 5/13/16 [employer] upgrade to dual chamber AICD implant  Dr. Yulissa Casillas disease [facility])     Anxiety state, other     Arthritis     OSTEO ARTHRITIS    AVNRT (AV bridgette re-entry tachycardia) ([facility] 5/12/2016 5/12/16 AVNRT ablation: PM/AICD left upper chest :Dr. Abhijeet Menendez Back ache     CAD (coronary artery disease)     Cancer [facility]) 2004    Prostate cancer    Chest tightness     last episode of chest pain 5/2016 before AICD placed; none since then    Coagulation defects 2005    FACTOR V LEIDEN    Dementia ([facility]     Depression     Dizziness     FH: factor V Leiden deficiency     Generalized muscle ache     GERD (gastroesophageal reflux disease)     HEARTBURN    Heart failure ([facility] 2014    as of 07/2019 EF 30-35;  Dr. Vlad Vazquez, Cardiomyopathy    Hyperlipidemia, mixed     Hypertension     Long term current use of anticoagulant therapy     Other ill-defined conditions(799.89) 2004    blood transfusion    Pacemaker     Paroxysmal atrial fibrillation (HCC)     rate controlled with coreg  Postsurgical aortocoronary bypass status 05/29/2012    CABG    Presence of cardiac defibrillator 05/2016    left upper chest    Pulmonary emphysema ([facility] 3/4/2022    [facility] spotted fever)     SSS (sick sinus syndrome) ([facility]     Stroke ([facility] 2011, 1990's    SEVERAL-mini    Syncope     Thromboembolism ([facility] 2014    left leg: changed to Eliquis from Warfarin    Thromboembolus ([facility] 2005    left leg    Thyroid disease     Weakness generalized        Past Surgical History:  Past Surgical History:   Procedure Laterality Date    CARDIAC CATHETERIZATION  3/4/2011         HX HEENT  2019    oral surgery, removed teeth, dentures upper/lower    HX HERNIA REPAIR Left 2002    Ingiunal hernia repair left    HX ORTHOPAEDIC      LEFT KNEE CARTILAGE REPAIR;RIGHT ROTATOR CUFF REPAIR    HX ORTHOPAEDIC  2/14/12    C5-7 ANTERIIOR CERVICAL DISECTOMY AND FUSION    HX ORTHOPAEDIC  6/4/13    C5-C7 POSTERIOR DECOMPRESSION AND FUSION    HX ORTHOPAEDIC      right thumb partial amputation of tip    HX OTHER SURGICAL      steroid injections    HX PACEMAKER Left 05/13/2016    BS D142, Dr. Brain Kramer HX PROSTATECTOMY  2004     [place]    HX ROTATOR CUFF REPAIR Left 2019    HX UROLOGICAL       urinary control system    HX UROLOGICAL  12/3/13    REPLACEMENT ARTIFICAL URINARY SPHINCTER    RI CARDIAC SURG PROCEDURE UNLIST      CABG X1 3/5/11       Medication:  Prior to Admission medications    Medication Sig Start Date End Date Taking? Authorizing Provider   sertraline (ZOLOFT) 100 mg tablet TAKE 2 TABLETS BY MOUTH EVERY DAY 6/6/22  Yes Joaquin Ortega MD   [facility]) 10 mg tablet Take 1 Tablet by mouth two (2) times a day. 5/26/22  Yes Bud Evans MD   pantoprazole (PROTONIX) 40 mg tablet Take 1 Tablet by mouth daily for 30 days.  5/27/22 6/26/22 Yes Bud Evans MD   midodrine (PROAMATINE) 10 mg tablet Take 1 Tablet by mouth three (3) times daily (with meals) for 30 days. Indications: a feeling of dizziness upon standing due to a drop in blood pressure 5/26/22 6/25/22 Yes Oliver Yoder MD   [facility]) 5 mg tablet Take 5 mg by mouth two (2) times a day. Yes Provider, Historical   polyethylene glycol (MIRALAX) 17 gram packet Take 17 g by mouth daily as needed for Constipation. Yes Provider, Historical   donepeziL (ARICEPT) 5 mg tablet Take 5 mg by mouth nightly. Yes Provider, Historical   DULoxetine (CYMBALTA) 60 mg capsule Take 1 Capsule by mouth daily. 5/10/22  Yes Riley Townsend MD   atorvastatin (LIPITOR) 40 mg tablet TAKE 1 TABLET BY MOUTH AT BEDTIME 1/31/22  Yes Ivan Carbone MD   mirabegron ER (Myrbetriq) 50 mg ER tablet Take 50 mg by mouth daily.     Provider, Historical       Allergies:  No Known Allergies    Social History:  Social History     Tobacco Use    Smoking status: Never Smoker    Smokeless tobacco: Never Used   Vaping Use    Vaping Use: Never used   Substance Use Topics    Alcohol use: Not Currently     Alcohol/week: 0.0 standard drinks     Comment: stopped 2010    Drug use: Never       Family History:  Family History   Problem Relation Age of Onset    Asthma Mother     Alcohol abuse Mother     Alcohol abuse Father     Asthma Father     Cancer Sister     Heart Disease Sister     Alcohol abuse Brother     Cancer Brother     Heart Disease Brother        ROS  Total of 12 systems reviewed as follows:  POSITIVE= bolded text  Negative = text not bolded       General:  fever, chills, sweats, generalized weakness, weight loss/gain, loss of appetite   Eyes:    blurred vision, eye pain, loss of vision, double vision  ENT:    rhinorrhea, pharyngitis   Respiratory:  cough, sputum production, SOB, HOLCOMB, wheezing, pleuritic pain   Cardiology:   chest pain, palpitations, orthopnea, PND, edema, syncope   Gastrointestinal:  abdominal pain , N/V, diarrhea, dysphagia, constipation, bleeding   Genitourinary:  frequency, urgency, dysuria, hematuria, incontinence, prostatism   Muskuloskeletal: arthralgia, myalgia, back pain  Hematology:   easy bruising, nose or gum bleeding, lymphadenopathy   Dermatological: rash, ulceration, pruritis, color change / jaundice  Endocrine:   hot flashes or polydipsia   Neurological:  headache, dizziness, confusion, focal weakness, paresthesia, speech difficulties, memory loss, gait difficulty  Psychological: feelings of anxiety, depression, agitation      PHYSICAL EXAM:  Patient Vitals for the past 24 hrs:   Temp Pulse Resp BP SpO2   22 98.1 °F (36.7 °C) 77 20 131/78 --   22 1704 -- 74 -- 135/76 --   22 1137 -- 72 -- 116/72 --   22 0754 97.6 °F (36.4 °C) 78 20 (!) 110/58 94 %   22 0128 -- -- -- -- 94 %       General:    Alert, cooperative, no distress, appears stated age. HEENT: Atraumatic, anicteric sclerae, pink conjunctivae     No oral ulcers, mucosa moist, throat clear, dentition fair  Neck:  Supple, symmetrical;   thyroid non tender  Lungs:   Clear to auscultation bilaterally. No wheezing or rhonchi. No rales. Chest wall:  No tenderness. No accessory muscle use. Heart:   Regular rhythm. No  murmur. No edema  Abdomen:   Soft, non-tender. Not distended. Bowel sounds normal  Extremities: No cyanosis. No clubbing      Capillary refill normal,  Radial pulse 2+,  DP 1+  Skin:     Not pale. Not jaundiced. No rashes   Psych:  Depressed. Occationally anxious. Neurologic: EOMs intact. No facial asymmetry. No aphasia or slurred speech. Symmetrical strength, Sensation grossly intact. Alert and oriented X 4.     Lab Data Reviewed:  Plan BMP / CBC f/u    EK/18 NSR 83 bpm, Occ Fusion beats, IVCD, NSSTTWC,     Radiology:    F/u pCXR:  Stable bilateral lung opacities    Care Plan discussed with:   Patient x    Family     RN x         Consultant      Expected  Disposition:   Home with Family x   HH/PT/OT/RN    SNF/LTC    NORM      TOTAL TIME:  61 Minutes      Comments    x Reviewed previous records   >50% of visit spent in counseling and coordination of care x Discussion with patient and/or family and questions answered       _______________________________________________________       My assessment of this patient's clinical condition and my plan of care is as follows. ASSESSMENT / PLAN    Principal Problem:    Encounter for rehabilitation (6/17/2022)  Patient admitted to [address] [place] [address] rehab for PT OT efforts to improve functional state for ability to return home  With chronic underlying condition and it is uncertain how feasible this will be  He is not able to be discharged home since his wife is ill and the patient will need a caregiver    Active Problems:    Atrial fibrillation ([address] (5/29/2012)    AICD (automatic cardioverter/defibrillator) present (5/13/2016)      Overview: 5/13/16 [employer] upgrade to dual chamber AICD implant    Atherosclerosis of native coronary artery of native heart without angina pectoris (5/29/2012)  Controlled. Maintain Eliquis.   Zoloft, Aricept, and Namenda      Essential hypertension (3/4/2011)  No current antihypertensive treatment      Mixed hyperlipidemia (5/29/2012)  Taking Lipitor 40 mg daily      Orthostatic hypotension (5/16/2022)  Off antihypertensives  Was symptomatic with limited physical activity  Attempts  increased activity tolerance  Continue ProAmatine      Stenosis of both carotid arteries without cerebral infarction (4/12/2016)  Came current treatment with Lipitor, Eliquis and Namenda/ Aricept      Alzheimer's dementia, late onset, with behavioral disturbance ([address] (4/12/2016)  Needs close assistance with a caregiver  Been options and/or hospice at home need considered on discharge      Osteoarthritis (5/29/2012)  Symptomatic treatment as needed      Hypothyroidism due to acquired atrophy of thyroid (9/22/2015)  No current tx  TSH 1.1 March 2022, f/u TSH / Free T4 needed      Moderate major depression ([address] (7/3/2018)  Maintain Zoloft        SAFETY:   Code Status:Full  DVT prophylaxis:Eliquis  Stress Ulcer prophylaxis: Protonixpo  Bladder catheter:no  Family Contact Info:  Primary Emergency Contact: [address], Home Phone: [phone]  Bedded: [facility] Room 130/01  Disposition: TBD, likely home when stable  Admission status:  TCU REHAB    -Tentative plan of care discussed with patient / family, who demonstrated understanding and is in agreement to the above    Keira Avila MD  [facility] Hospitalist  [phone]

## 2022-06-19 NOTE — ROUTINE PROCESS
Bedside and Verbal shift change report given to [facility] (oncoming nurse) by Maury Bolanos (offgoing nurse). Report included the following information SBAR, Kardex, Intake/Output, MAR, Recent Results and Cardiac Rhythm NA.

## 2022-06-19 NOTE — PROGRESS NOTES
Bedside shift change report given to TRACIE Lauren RN (oncoming nurse) by Yanelis Sood RN (offgoing nurse). Report included the following information SBAR.

## 2022-06-19 NOTE — PROGRESS NOTES
Problem: Heart Failure: Day 4  Goal: Nutrition/Diet  Outcome: Progressing Towards Goal  Goal: Medications  Outcome: Progressing Towards Goal

## 2022-06-19 NOTE — PROGRESS NOTES
2339 Called to pt room, complaints of L sided chest pain when he takes a deep breath. VS obtained- /77, HR 88, RR 20, 94% on 3LNC. EKG performed at bedside by Abby Kapadia, RT- read by ED physician Dr. Colton Arrington. 80 Perfect served Dr. Germania Milian. 0000 Dr. Germania Milian returned phone call- pt status reviewed with MD    0012 MD states he \"needs to go to a code blue but to give 40mg IV lasix and get another chest Xray\" and that he will return call when he is able.

## 2022-06-19 NOTE — PROGRESS NOTES
About 1330; family member cut patient's toe nails and accidentally cut into his skin especially right foot-fourth toe.

## 2022-06-20 NOTE — PROGRESS NOTES
Bedside and Verbal shift change report given to Myranda Mendez LPN (oncoming nurse) by VERONICA Anand (offgoing nurse). Report included the following information SBAR and Kardex. Du score 3  Bed/chair alarm yes in use. If in use it is set at the highest volume. Intravenous fluids were administered, none   Patient Vitals for the past 12 hrs:   Pulse BP SpO2   06/20/22 1508 82 111/76 92 %   06/20/22 1505 -- 105/67 --   06/20/22 1501 74 (!) 149/72 --   06/20/22 1100 -- -- 96 %     No flowsheet data found. Lab results reviewed. For significant abnormal values and values requiring intervention, see assessment and plan.

## 2022-06-20 NOTE — PROGRESS NOTES
Problem: Self Care Deficits Care Plan (Adult)  Goal: *Acute Goals and Plan of Care (Insert Text)  Description: FUNCTIONAL STATUS PRIOR TO ADMISSION: Pt had portable O2 at home. Used a walker. Was independent in toileting and self care. HOME SUPPORT: lives with wife but she has her own medical issues    Occupational Therapy Goals  Initiated 6/20/2022  1. Patient will perform grooming with supervision/set-up long sitting in bed within 7 day(s). 2.  Patient will perform bathing long sitting in bed with minimal assistance/contact guard assist within 7 day(s). 3.  Patient will perform lower body dressing with minimal assistance/contact guard assist within 7 day(s). 4.  Patient will perform toilet transfers with supervision/set-up within 7 day(s). 5.  Patient will perform all aspects of toileting with modified independence within 7 day(s). 6.  Patient will participate in upper extremity therapeutic exercise/activities with supervision/set-up for 8 minutes within 7 day(s). 7.  Patient will utilize energy conservation techniques during functional activities with verbal cues within 7 day(s). Outcome: Not Progressing Towards Goal   OCCUPATIONAL THERAPY EVALUATION  Patient: Sarahy Beckford (23 y.o. male)  Date: 6/20/2022  Primary Diagnosis: Encounter for rehabilitation [Z51.89]        Precautions:   Fall,Bed Alarm    ASSESSMENT  Based on the objective data described below, the patient presents with need for skilled therapy but has ongoing stage IV CHF. Pt was seen twice today. First session aborted due to low HR of 39 while lying in bed. OTR used dynamap but MD felt he might have weaker pulse not getting picked up by machine. Later with wife in room OTR went back. His BP was 149/72 with HR of 74. He was able to get to EOB using rail and sitting his BP dropped to 105/67. No reported sxs. OTR waited 2 mins and retook with measure at 111/76 with HR 82.   OTR alerted family this was still too great of a drop in systolic measurement to engage in sitting/ OOB activities. OTR able to speak to staff and pt is familiar as he was seen by Sera Richards as acute care last week to complete assessment. Current Level of Function Impacting Discharge (ADLs/self-care): currently is unsafe due to BP to get to standing. He had low HR this morning that needs to be monitored. He is total assist for toileting and max assist for bathing at this time. Functional Outcome Measure: The patient scored Total: 25/100 on the Barthel Index outcome measure which is indicative of being significant dependence in basic self-care. Other factors to consider for discharge: lives with wife     Patient will benefit from skilled therapy intervention to address the above noted impairments. PLAN :  Recommendations and Planned Interventions: self care training, functional mobility training, therapeutic exercise, therapeutic activities, endurance activities, and patient education    Frequency/Duration: Patient will be followed by occupational therapy 3 -5 times a week, 1-2 x day to address goals. Recommendation for discharge: (in order for the patient to meet his/her long term goals)  Therapy up to 5 days/week in SNF setting    This discharge recommendation:  Has been made in collaboration with the attending provider and/or case management    IF patient discharges home will need the following DME: spoke with MD.  Given pt chronic heart condition MD does not believe his BP/HR will consistently be in a range that we can work with. OTR recommends a w/c with training at this time. SUBJECTIVE:   Patient stated I dont feel well.   pt report this morning. This afternoon his skin appeared sweaty.     OBJECTIVE DATA SUMMARY:   HISTORY:   Past Medical History:   Diagnosis Date    Acute combined systolic and diastolic congestive heart failure ([address] 2/12/2022    AICD (automatic cardioverter/defibrillator) present 5/13/2016 5/13/16 [address] upgrade to dual chamber AICD implant  Dr. Robin Phi    Alzheimer disease [facility])     Anxiety state, other     Arthritis     OSTEO ARTHRITIS    AVNRT (AV bridgette re-entry tachycardia) ([address] 5/12/2016 5/12/16 AVNRT ablation: PM/AICD left upper chest :Dr. Otis Murillo    Back ache     CAD (coronary artery disease)     Cancer ([address] 2004    Prostate cancer    Chest tightness     last episode of chest pain 5/2016 before AICD placed; none since then    Coagulation defects 2005    FACTOR V LEIDEN    Dementia ([address]     Depression     Dizziness     FH: factor V Leiden deficiency     Generalized muscle ache     GERD (gastroesophageal reflux disease)     HEARTBURN    Heart failure ([address] 2014    as of 07/2019 EF 30-35;  Dr. Pdama Wallace, Cardiomyopathy    Hyperlipidemia, mixed     Hypertension     Long term current use of anticoagulant therapy     Other ill-defined conditions(799.89) 2004    blood transfusion    Pacemaker     Paroxysmal atrial fibrillation (HCC)     rate controlled with coreg     Postsurgical aortocoronary bypass status 05/29/2012    CABG    Presence of cardiac defibrillator 05/2016    left upper chest    Pulmonary emphysema ([address] 3/4/2022    [facility] spotted fever)     SSS (sick sinus syndrome) ([address]     Stroke ([address] 2011, 1990's    SEVERAL-mini    Syncope     Thromboembolism ([address] 2014    left leg: changed to Eliquis from Warfarin    Thromboembolus ([address] 2005    left leg    Thyroid disease     Weakness generalized      Past Surgical History:   Procedure Laterality Date    CARDIAC CATHETERIZATION  3/4/2011         HX HEENT  2019    oral surgery, removed teeth, dentures upper/lower    HX HERNIA REPAIR Left 2002    Ingiunal hernia repair left    HX ORTHOPAEDIC      LEFT KNEE CARTILAGE REPAIR;RIGHT ROTATOR CUFF REPAIR    HX ORTHOPAEDIC  2/14/12    C5-7 ANTERIIOR CERVICAL DISECTOMY AND FUSION    HX ORTHOPAEDIC  6/4/13    C5-C7 POSTERIOR DECOMPRESSION AND FUSION    HX ORTHOPAEDIC      right thumb partial amputation of tip    HX OTHER SURGICAL      steroid injections    HX PACEMAKER Left 05/13/2016    BS D142, Dr. Gabino Avila  2004     [place]    HX Isabela Golder Left 2019    HX UROLOGICAL       urinary control system    HX UROLOGICAL  12/3/13    REPLACEMENT ARTIFICAL URINARY SPHINCTER    NM CARDIAC SURG PROCEDURE UNLIST      CABG X1 3/5/11       Expanded or extensive additional review of patient history:     Home Situation  Home Environment: Trailer/mobile home  # Steps to Enter: 0  Wheelchair Ramp: Yes  One/Two Story Residence: One story  Living Alone: No  Support Systems: Spouse/Significant Other  Patient Expects to be Discharged to[de-identified] Home with home health  Current DME Used/Available at Home: Velta Aver, rolling,Oxygen, portable    Hand dominance: Right    EXAMINATION OF PERFORMANCE DEFICITS:  Cognitive/Behavioral Status:  Neurologic State: Alert  Orientation Level: Oriented X4  Cognition: Follows commands;Memory loss             Skin: perspiring this afternoon    Edema: none seen    Hearing: Auditory  Auditory Impairment: Hard of hearing, bilateral    Vision/Perceptual:                                Corrective Lenses: Reading glasses    Range of Motion:  WFL BUE                            Strength:  Diminished BUE, did not perform MMT today                   Coordination:     Fine Motor Skills-Upper: Left Intact; Right Intact    Gross Motor Skills-Upper: Left Intact; Right Intact    Tone & Sensation:  normal                            Balance:  Sitting: Intact    Functional Mobility and Transfers for ADLs:  Bed Mobility:   Modified indep    Transfers:   deferred    ADL Assessment:  Feeding: Setup    Oral Facial Hygiene/Grooming: Setup    Bathing: Setup;Minimum assistance    Upper Body Dressing: Setup;Minimum assistance    Lower Body Dressing: Maximum assistance    Toileting:  Total assistance                   Functional Measure:    Barthel Index:  Bathing: 0  Bladder: 5  Bowels: 5  Groomin  Dressin  Feeding: 10  Mobility: 0  Stairs: 0  Toilet Use: 0  Transfer (Bed to Chair and Back): 0  Total: 25/100      The Barthel ADL Index: Guidelines  1. The index should be used as a record of what a patient does, not as a record of what a patient could do. 2. The main aim is to establish degree of independence from any help, physical or verbal, however minor and for whatever reason. 3. The need for supervision renders the patient not independent. 4. A patient's performance should be established using the best available evidence. Asking the patient, friends/relatives and nurses are the usual sources, but direct observation and common sense are also important. However direct testing is not needed. 5. Usually the patient's performance over the preceding 24-48 hours is important, but occasionally longer periods will be relevant. 6. Middle categories imply that the patient supplies over 50 per cent of the effort. 7. Use of aids to be independent is allowed. Score Interpretation (from [address] [facility])    Independent   60-79 Minimally independent   40-59 Partially dependent   20-39 Very dependent   <20 Totally dependent     -Joce Sweet., Barthel, DEnW. (1965). Functional evaluation: the Barthel Index. [address] [facility] ([address] (). The Barthel activities of daily living index: self-reporting versus actual performance in the old (> or = 75 years). Journal of [address] [place] [address] 45(7), [facility], .EnM., [facility].Ирина. (). Measuring the change in disability after inpatient rehabilitation; comparison of the responsiveness of the Barthel Index and Functional [place] Measure. Journal of Neurology, Neurosurgery, and Psychiatry, 66(4), 705-698.   Stephie Crane, N.J.A, ELIER Ambrose, & Herman Orellana, M.A. (2004) Assessment of post-stroke quality of life in cost-effectiveness studies: The usefulness of the Barthel Index and the EuroQoL-5D. Quality of Life Research, 15, 079-37     Occupational Therapy Evaluation Charge Determination   History Examination Decision-Making   MEDIUM Complexity : Expanded review of history including physical, cognitive and psychosocial  history  MEDIUM Complexity : 3-5 performance deficits relating to physical, cognitive , or psychosocial skils that result in activity limitations and / or participation restrictions MEDIUM Complexity : Patient may present with comorbidities that affect occupational performnce. Miniml to moderate modification of tasks or assistance (eg, physical or verbal ) with assesment(s) is necessary to enable patient to complete evaluation       Based on the above components, the patient evaluation is determined to be of the following complexity level: MEDIUM  Pain Rating:  None reported    Activity Tolerance:   signs and symptoms of orthostatic hypotension    After treatment patient left in no apparent distress:    Supine in bed, Call bell within reach, and Bed / chair alarm activated    COMMUNICATION/EDUCATION:   The patients plan of care was discussed with: Registered nurse and Case management. Patient/family have participated as able in goal setting and plan of care. This patients plan of care is appropriate for delegation to [facility].     Thank you for this referral.  Christiano Reyes OT  Time Calculation: 15 mins

## 2022-06-20 NOTE — PROGRESS NOTES
Problem: Pressure Injury - Risk of  Goal: *Prevention of pressure injury  Description: Document Fernando Scale and appropriate interventions in the flowsheet.   Outcome: Progressing Towards Goal  Note: Pressure Injury Interventions:  Sensory Interventions: Check visual cues for pain,Assess changes in LOC,Float heels    Moisture Interventions: Check for incontinence Q2 hours and as needed,Apply protective barrier, creams and emollients,Absorbent underpads    Activity Interventions: Increase time out of bed    Mobility Interventions: HOB 30 degrees or less,Float heels    Nutrition Interventions: Document food/fluid/supplement intake,Offer support with meals,snacks and hydration    Friction and Shear Interventions: Feet elevated on foot rest,Apply protective barrier, creams and emollients

## 2022-06-20 NOTE — PROGRESS NOTES
Follow up visit with patient in R[address] 130 in Med/Surg  Patient was alone during the visit  Provided empathic listening and spiritual support  Advised of  Availability [address] [place] [address]

## 2022-06-20 NOTE — SWING BED INTERDISCIPLINARY CARE PLAN NURSE NOTE
Nursing:    Week #1: Date 6/20/2022  Medical status (changes from previous week): No change  Treatment changes this shift: unable to do PT because of low HR  Cognition: Present status: oriented          Is cueing needed?: Yes          Frequency of cueing needs: frequent           Type of cueing used: verbal  ADL (general):  Minimum assistance  Activity type: Social leisure skills  Participation: Daily  Level of participation: Improving  Pain status: No c/o pain  Patient/Family Education needs: monitor heart rate and BP with ambulation    Upon review of the patients current care plan, the following issues, problems, or needs remain: monitoring HR and BP with activity    Gin Ruiz RN

## 2022-06-20 NOTE — PROGRESS NOTES
Physical Therapy    OT has just attempted to see pt. Pt had stated he was not feeling well, HR low. Was reported to nursing and pt was deferred for OT. Will also hold off for now and follow.     Suzy Zurita PT

## 2022-06-20 NOTE — PROGRESS NOTES
Pt had multiple complaints tonight about abdominal pain and problems urinating. Pt last BM was today and to date pt is incontinent of urine and changed multiple times a shift. Did bladder scan on pt and he had 65 mls of urine in his bladder. Pt also change and brief was wet. Asked pt what was going on with him tonight. Pt stated that he is kind of anxious and \"ready to get the hell outta here\". Pt also said that he is not sure how is wife is going to be able to work the hospital bed if they ever get one at home. Advised pt that someone will show them the controls on the hospital bed when they get them. As for abdominal pain pt did feel better after fiorcet and some ginger ale. Reassured pt that he is making plenty of urine.

## 2022-06-20 NOTE — PROGRESS NOTES
OTR eval for skilled care aborted. Pt stated \"I'm not feeling good\" and was supine in bed. OTR took his BP which was 119/58 however his HR was 39. MD notified. OTR notes he's on 3L O2 and dynamap pulse ox meausured 92% saturation and HR up to 44. Explained defer due to low HR to pt and will check back later.

## 2022-06-20 NOTE — PROGRESS NOTES
Bedside shift change report given to VERONICA Gonzalez RN (oncoming nurse) by COURTNEY Bray LPN (offgoing nurse). Report included the following information SBAR, Kardex, MAR and Recent Results.

## 2022-06-21 NOTE — PROGRESS NOTES
Bedside and Verbal shift change report given to VERONICA Gonzalez RN (oncoming nurse) by Jc Chan LPN (offgoing nurse). Report included the following information SBAR, Kardex, Intake/Output and MAR.

## 2022-06-21 NOTE — PROGRESS NOTES
Problem: Pressure Injury - Risk of  Goal: *Prevention of pressure injury  Description: Document Fernando Scale and appropriate interventions in the flowsheet. Outcome: Progressing Towards Goal  Note: Pressure Injury Interventions:  Sensory Interventions: Assess changes in LOC    Moisture Interventions: Absorbent underpads,Apply protective barrier, creams and emollients,Check for incontinence Q2 hours and as needed    Activity Interventions: Increase time out of bed    Mobility Interventions: HOB 30 degrees or less    Nutrition Interventions: Document food/fluid/supplement intake    Friction and Shear Interventions: HOB 30 degrees or less                Problem: Patient Education: Go to Patient Education Activity  Goal: Patient/Family Education  Outcome: Progressing Towards Goal     Problem: Falls - Risk of  Goal: *Absence of Falls  Description: Document Osmel Fall Risk and appropriate interventions in the flowsheet.   Outcome: Progressing Towards Goal  Note: Fall Risk Interventions:  Mobility Interventions: Bed/chair exit alarm,Patient to call before getting OOB         Medication Interventions: Bed/chair exit alarm,Patient to call before getting OOB,Teach patient to arise slowly    Elimination Interventions: Bed/chair exit alarm,Call light in reach,Patient to call for help with toileting needs    History of Falls Interventions: Bed/chair exit alarm,Door open when patient unattended,Room close to nurse's station         Problem: Patient Education: Go to Patient Education Activity  Goal: Patient/Family Education  Outcome: Progressing Towards Goal     Problem: Patient Education: Go to Patient Education Activity  Goal: Patient/Family Education  Outcome: Progressing Towards Goal     Problem: Heart Failure: Day 1  Goal: Off Pathway (Use only if patient is Off Pathway)  Outcome: Progressing Towards Goal  Goal: Activity/Safety  Outcome: Progressing Towards Goal  Goal: Consults, if ordered  Outcome: Progressing Towards Goal  Goal: Diagnostic Test/Procedures  Outcome: Progressing Towards Goal  Goal: Nutrition/Diet  Outcome: Progressing Towards Goal  Goal: Discharge Planning  Outcome: Progressing Towards Goal  Goal: Medications  Outcome: Progressing Towards Goal  Goal: Respiratory  Outcome: Progressing Towards Goal  Goal: Treatments/Interventions/Procedures  Outcome: Progressing Towards Goal  Goal: Psychosocial  Outcome: Progressing Towards Goal  Goal: *Oxygen saturation within defined limits  Outcome: Progressing Towards Goal  Goal: *Hemodynamically stable  Outcome: Progressing Towards Goal  Goal: *Optimal pain control at patient's stated goal  Outcome: Progressing Towards Goal  Goal: *Anxiety reduced or absent  Outcome: Progressing Towards Goal     Problem: Heart Failure: Day 2  Goal: Off Pathway (Use only if patient is Off Pathway)  Outcome: Progressing Towards Goal  Goal: Activity/Safety  Outcome: Progressing Towards Goal  Goal: Consults, if ordered  Outcome: Progressing Towards Goal  Goal: Diagnostic Test/Procedures  Outcome: Progressing Towards Goal  Goal: Nutrition/Diet  Outcome: Progressing Towards Goal  Goal: Discharge Planning  Outcome: Progressing Towards Goal  Goal: Medications  Outcome: Progressing Towards Goal  Goal: Respiratory  Outcome: Progressing Towards Goal  Goal: Treatments/Interventions/Procedures  Outcome: Progressing Towards Goal  Goal: Psychosocial  Outcome: Progressing Towards Goal  Goal: *Oxygen saturation within defined limits  Outcome: Progressing Towards Goal  Goal: *Hemodynamically stable  Outcome: Progressing Towards Goal  Goal: *Optimal pain control at patient's stated goal  Outcome: Progressing Towards Goal  Goal: *Anxiety reduced or absent  Outcome: Progressing Towards Goal  Goal: *Demonstrates progressive activity  Outcome: Progressing Towards Goal     Problem: Heart Failure: Day 3  Goal: Off Pathway (Use only if patient is Off Pathway)  Outcome: Progressing Towards Goal  Goal: Activity/Safety  Outcome: Progressing Towards Goal  Goal: Diagnostic Test/Procedures  Outcome: Progressing Towards Goal  Goal: Nutrition/Diet  Outcome: Progressing Towards Goal  Goal: Discharge Planning  Outcome: Progressing Towards Goal  Goal: Medications  Outcome: Progressing Towards Goal  Goal: Respiratory  Outcome: Progressing Towards Goal  Goal: Treatments/Interventions/Procedures  Outcome: Progressing Towards Goal  Goal: Psychosocial  Outcome: Progressing Towards Goal  Goal: *Oxygen saturation within defined limits  Outcome: Progressing Towards Goal  Goal: *Hemodynamically stable  Outcome: Progressing Towards Goal  Goal: *Optimal pain control at patient's stated goal  Outcome: Progressing Towards Goal  Goal: *Anxiety reduced or absent  Outcome: Progressing Towards Goal  Goal: *Demonstrates progressive activity  Outcome: Progressing Towards Goal

## 2022-06-21 NOTE — PROGRESS NOTES
Problem: Self Care Deficits Care Plan (Adult)  Goal: *Acute Goals and Plan of Care (Insert Text)  Description: FUNCTIONAL STATUS PRIOR TO ADMISSION: Pt had portable O2 at home. Used a walker. Was independent in toileting and self care. HOME SUPPORT: lives with wife but she has her own medical issues    Occupational Therapy Goals  Initiated 6/20/2022  1. Patient will perform grooming with supervision/set-up long sitting in bed within 7 day(s). 2.  Patient will perform bathing long sitting in bed with minimal assistance/contact guard assist within 7 day(s). 3.  Patient will perform lower body dressing with minimal assistance/contact guard assist within 7 day(s). 4.  Patient will perform toilet transfers with supervision/set-up within 7 day(s). 5.  Patient will perform all aspects of toileting with modified independence within 7 day(s). 6.  Patient will participate in upper extremity therapeutic exercise/activities with supervision/set-up for 8 minutes within 7 day(s). 7.  Patient will utilize energy conservation techniques during functional activities with verbal cues within 7 day(s). Outcome: Not Progressing Towards Goal   OCCUPATIONAL THERAPY TREATMENT  Patient: Carla Calix (60 y.o. male)  Date: 6/21/2022  Diagnosis: Encounter for rehabilitation [Z51.89] Encounter for rehabilitation       Precautions: Fall,Bed Alarm  Chart, occupational therapy assessment, plan of care, and goals were reviewed. ASSESSMENT  Patient continues with skilled OT services and is not progressing towards goals. Pt able to tolerate being EOB and getting into chair. BP checked 3 times and while he did drop he recovered after several minutes rest. His O2 saturation in sitting however did not recover and OTR had to bump him from 3 to 4L with cues for breathing and keeping mouth closed. He has no dizziness when moving/up. He gets to EOB using features. SBA for transfer and few steps to chair then toilet.   He did need total assist to remove wet undergarment and total assist for bowel care and new undergarment. Current Level of Function Impacting Discharge (ADLs): total assist for bowel care and clothing mgmt. Transfers SBA. Other factors to consider for discharge: lives with wife         PLAN :  Patient continues to benefit from skilled intervention to address the above impairments. Continue treatment per established plan of care to address goals. Recommend with staff: up to [facility] as needed, monitor O2    Recommend next OT session: continue working on self cre    Recommendation for discharge: (in order for the patient to meet his/her long term goals)  Therapy up to 5 days/week in SNF setting    This discharge recommendation:  Has been made in collaboration with the attending provider and/or case management    IF patient discharges home will need the following DME: none       SUBJECTIVE:   Patient stated I haven't gone for 2 days.    Complaint prior to using BSC for bowel mvmt  OBJECTIVE DATA SUMMARY:   Cognitive/Behavioral Status:  Neurologic State: Alert  Orientation Level: Oriented X4  Cognition: Follows commands;Poor safety awareness        Safety/Judgement: Awareness of environment    Functional Mobility and Transfers for ADLs:  Bed Mobility:  Rolling:  (received sitting in the chair)    Transfers:  Sit to Stand:  (unable to stand due to dropping BP)  Functional Transfers  Toilet Transfer : Contact guard assistance       Balance:  Sitting: Intact  Standing:  (unable to tolerate stand due to LBP)  Standing - Static: Constant support; Fair  Standing - Dynamic : Constant support; Fair    ADL Intervention:                           Lower Body Dressing Assistance  Socks: Total assistance (dependent)    Toileting  Toileting Assistance: Maximum assistance  Bladder Hygiene: Set-up  Bowel Hygiene:  Total assistance (dependent)  Clothing Management: Total assistance (dependent)    Cognitive Retraining  Following Commands: Follows one step commands/directions  Safety/Judgement: Awareness of environment        Pain:  No complaints    Activity Tolerance:   Fair    After treatment patient left in no apparent distress:   Sitting in chair, Call bell within reach, and Bed / chair alarm activated    COMMUNICATION/COLLABORATION:   The patients plan of care was discussed with: Physical therapist and Case management. Nurse RE: O2 increase. Even after having 4L on for several minute he still registered 90-91 saturation.     Janet Garcia OT  Time Calculation: 38 mins

## 2022-06-21 NOTE — SWING BED INTERDISCIPLINARY CARE PLAN NURSE NOTE
Nursing:    Week #1: Date 6/21/2022  Medical status (changes from previous week):Progressing  Treatment changes this shift: up in recliner, still monitoring vitals  Cognition: Present status: oriented          Is cueing needed?: Yes          Frequency of cueing needs: occasional           Type of cueing used: verbal  ADL (general):  Minimum assistance  Activity type: Social leisure skills  Participation: Daily  Level of participation: Improving  Pain status: No c/o pain  Patient/Family Education needs: monitor BP, HR    Upon review of the patients current care plan, the following issues, problems, or needs remain: monitor BP, HR    Marti Vazquez RN

## 2022-06-21 NOTE — PROGRESS NOTES
Had a family meeting with patient, her daughter (via telephone), MD and care management. MD discussed that there is no cure for patient's condition. Describes it as \"futile care\". Discussed advanced care directives and signing a DDNR. Ms. Mariza Dey said she agrees with having patient being a DDNR. No DDNR. Will have to discuss with patient since he is alert and oriented. MD discussed hospice would be a good idea. CM described hospice. TOld her that patient's can remain on hospice for many years sometimes and that they are an excellent resource. They provide equipment and more staff than home health can offer. CM also discussed obtaining personal care aides because he has medicaid. Wife states understanding. Would like us to discuss with patient to make sure that's what he wants. Will have Ms. Mariza Dey visit with patient first then CM will talk to him about discharge options. Will talk to him tomorrow.  Will still send insurance company updates on 6/27 to prepare patient/spouse

## 2022-06-21 NOTE — PROGRESS NOTES
Follow up visit with patient in Rm 130 in Med/Surg  Provided empathic listening and spiritual support  Advised of  Availability   [address] [place] [address]

## 2022-06-21 NOTE — PROGRESS NOTES
Bedside and Verbal shift change report given to 31 Nelly Lainez (oncoming nurse) by VERONICA Anand (offgoing nurse). Report included the following information SBAR and Kardex. Du score 3  Bed/chair alarm yes in use. If in use it is set at the highest volume. Intravenous fluids were administered, none   Patient Vitals for the past 12 hrs:   Temp Pulse Resp BP SpO2   06/21/22 0753 97.3 °F (36.3 °C) 83 20 134/63 97 %     No flowsheet data found.    BMP: No results found for: NA, K, CL, CO2, AGAP, GLU, BUN, CREA, GFRAA, GFRNA

## 2022-06-21 NOTE — PROGRESS NOTES
Problem: Mobility Impaired (Adult and Pediatric)  Goal: *Acute Goals and Plan of Care (Insert Text)  Description: Physical Therapy Goals  Revised 6/17/2022  1. Patient will move from supine to sit and sit to supine  in bed with modified independence within 7 day(s). 2.  Patient will transfer from bed to chair and chair to bed with SBA using the least restrictive device within 7 day(s). 3.  Patient will perform sit to stand with SBA within 7 day(s). 4.  Patient will ambulate with SBA assist for 200 feet with the least restrictive device within 7 day(s). Outcome: Not Progressing Towards Goal   PHYSICAL THERAPY TREATMENT  Patient: Abigail Santos (89 y.o. male)  Date: 6/21/2022  Diagnosis: Encounter for rehabilitation [Z51.89] Encounter for rehabilitation       Precautions: Fall,Bed Alarm  Chart, physical therapy assessment, plan of care and goals were reviewed. ASSESSMENT  Patient continues with skilled PT services and is not progressing towards goals. Pt received in chair resting after OT session, LE elevated. At end of OT session ~30 prior, BP was in 08S systolically. Upon arrival and putting LEs down, BP 88/59 HR 96. Had pt do LE AROM exercise program knee ext, hip flexion, hip abd/add and ankle pumps but BP continued to drop 83/62 HR 98. RN present. Pt not dizzy but does get SOB. Sats on the 4L (needed to be bumped from 3 to 4L with OT) was at %. Pt needed to be reclined with LEs elevated to get BP to come to 121/52 with . Unable to progress to further standing or amb. Discussed in IDR that pt may at this point need to progress functionally at w/c level as his tolerance to stand/amb is poor. He also does relate his hx of multiple falls all related to getting weak after being up and just collapsing - likely BP related from his description. Will continue and progress if tolerated. Wife wants to take pt home; he will need 24 hr supervision/assist and likely w/c level functioning. Current Level of Function Impacting Discharge (mobility/balance): see above, activity limited by BP/tolerance    Other factors to consider for discharge: wife in limited health, pt will need assist/supervision at home, fall risk with orthostatic hypotension         PLAN :  Patient continues to benefit from skilled intervention to address the above impairments. Continue treatment per established plan of care. to address goals. Recommendation for discharge: (in order for the patient to meet his/her long term goals)  Physical therapy at least 2 days/week in the home AND ensure assist and/or supervision for safety with 24/7 assist; may need additional caregivers to assist wife    This discharge recommendation:  Has been made in collaboration with the attending provider and/or case management    IF patient discharges home will need the following DME: wheelchair if not owned       SUBJECTIVE:   Patient stated I'm not dizzy.     OBJECTIVE DATA SUMMARY:   Critical Behavior:  Neurologic State: Alert  Orientation Level: Oriented X4  Cognition: Follows commands,Poor safety awareness     Functional Mobility Training:  Bed Mobility:  Rolling:  (received sitting in the chair)                 Transfers:  Sit to Stand:  (unable to stand due to dropping BP)                                Balance:  Sitting: Intact  Standing:  (unable to tolerate stand due to LBP)                       Therapeutic Exercises:   Seated exercises x10 reps as noted above  Pain Rating:  C/o low back pain but relieved with repositioning in chair    Activity Tolerance:   Poor, observed SOB with activity, and signs and symptoms of orthostatic hypotension    After treatment patient left in no apparent distress:   Sitting in chair, Call bell within reach, and Bed / chair alarm activated - reclined with LEs elevated    COMMUNICATION/COLLABORATION:   The patients plan of care was discussed with: Occupational therapist, Registered nurse, Physician, and Case management.      Denise Cerna, PT   Time Calculation: 13 mins

## 2022-06-21 NOTE — PROGRESS NOTES
Problem: Mobility Impaired (Adult and Pediatric)  Goal: *Acute Goals and Plan of Care (Insert Text)  Description: Physical Therapy Goals  Revised 6/17/2022  1. Patient will move from supine to sit and sit to supine  in bed with modified independence within 7 day(s). 2.  Patient will transfer from bed to chair and chair to bed with SBA using the least restrictive device within 7 day(s). 3.  Patient will perform sit to stand with SBA within 7 day(s). 4.  Patient will ambulate with SBA assist for 200 feet with the least restrictive device within 7 day(s). Outcome: Progressing Towards Goal  Note:   PHYSICAL THERAPY TREATMENT  Patient: Maty Gonzalez (89 y.o. male)  Date: 6/21/2022  Diagnosis: Encounter for rehabilitation [Z51.89] Encounter for rehabilitation       Precautions: Fall,Bed Alarm  Chart, physical therapy assessment, plan of care and goals were reviewed. ASSESSMENT  Patient continues with skilled PT services and is progressing towards goals. Patient semi-reclined in chair upon arrival, on 4L O2. /62. Recliner brought to sitting position and BP drops to 92/59, , sats 93%. Patient able to stand with CGA and take a few steps to bed, then return to supine with supervision only. Assisted patient with LE exercises and patient able to complete with verbal cues/supervision. /77 at end of session; sats 98%, HR 61. Recommend HHPT and 24/7 supervision. Current Level of Function Impacting Discharge (mobility/balance): CGA    Other factors to consider for discharge: BP control         PLAN :  Patient continues to benefit from skilled intervention to address the above impairments. Continue treatment per established plan of care. to address goals.     Recommendation for discharge: (in order for the patient to meet his/her long term goals)  Physical therapy at least 2 days/week in the home AND ensure assist and/or supervision for safety with all mobility    This discharge recommendation:  Has been made in collaboration with the attending provider and/or case management    IF patient discharges home will need the following DME: rolling walker and possibly wheelchair if doesn't own       SUBJECTIVE:   Patient stated I'd like to go back to bed.     OBJECTIVE DATA SUMMARY:   Critical Behavior:  Neurologic State: Alert  Orientation Level: Oriented X4  Cognition: Follows commands,Poor safety awareness  Safety/Judgement: Awareness of environment  Functional Mobility Training:  Bed Mobility:  Rolling:  (received sitting in the chair)     Sit to Supine: Modified independent  Scooting: Supervision        Transfers:  Sit to Stand: Contact guard assistance  Stand to Sit: Contact guard assistance        Bed to Chair: Contact guard assistance (chair to bed)                    Balance:  Sitting: Intact  Sitting - Static: Good (unsupported)  Sitting - Dynamic: Good (unsupported)  Standing:  (unable to tolerate stand due to LBP)  Standing - Static: Constant support;Good  Standing - Dynamic : Constant support; Fair  Ambulation/Gait Training:  Distance (ft): 5 Feet (ft)  Assistive Device: Gait belt;Walker, rolling  Ambulation - Level of Assistance: Contact guard assistance        Gait Abnormalities: Decreased step clearance        Base of Support: Narrowed     Speed/Hazel: Pace decreased (<100 feet/min)  Step Length: Left shortened;Right shortened                                  Therapeutic Exercises: Ankle pumps, heel slides, hip abd/adduction, SLR. Each ex performed 5 reps with verbal cues. Pain Rating:  None reported    Activity Tolerance:   Fair    After treatment patient left in no apparent distress:   Supine in bed, Heels elevated for pressure relief, Call bell within reach, and Side rails x 3    COMMUNICATION/COLLABORATION:   The patients plan of care was discussed with: Registered nurse.      Lorrie Theodore, PT   Time Calculation: 21 mins

## 2022-06-21 NOTE — PROGRESS NOTES
IDR Team; MD, Nursing, Care Manager, Physical therapy, Nursing , Pharmacy and, met to review patient's plan of care. Discussed goals, interventions, barriers and progress. Team will continue to monitor progress and report any concerns to the physician and care management as indicated    Transition of Care Plan:  Patient remains in [address] for skilled care at this time. Difficult for therapy to work with the patient because when they attempt to get the patient up, his blood pressure drops and patient is not able to ambulate. Patient is still very weak with poor exercise tolerance-attributed to his poor cardiac and pulmonary status. Will arrange a conference with the patient's wife to discuss the patient's plan of care going forward. Insurance has approved the patient for skilled care to 6/27/22.

## 2022-06-21 NOTE — SWING BED INTERDISCIPLINARY CARE PLAN NURSE NOTE
Nursing:    Week # 1: Date 6/21/2022  Medical status (changes from previous week): No change  Treatment changes this shift: None  Cognition: Present status: oriented          Is cueing needed?: Yes          Frequency of cueing needs: frequent           Type of cueing used: verbal  ADL (general):  Minimum assistance  Activity type: Social leisure skills  Participation: Daily  Level of participation: Improving  Pain status: No c/o pain  Patient/Family Education needs: Safety    Upon review of the patients current care plan, the following issues, problems, or needs remain: Safety    Fozia Finn LPN

## 2022-06-22 NOTE — PROGRESS NOTES
Problem: Pressure Injury - Risk of  Goal: *Prevention of pressure injury  Description: Document Fernando Scale and appropriate interventions in the flowsheet. Outcome: Progressing Towards Goal  Note: Pressure Injury Interventions:  Sensory Interventions: Assess changes in LOC,Keep linens dry and wrinkle-free,Minimize linen layers    Moisture Interventions: Absorbent underpads,Check for incontinence Q2 hours and as needed,Minimize layers    Activity Interventions: Increase time out of bed    Mobility Interventions: HOB 30 degrees or less    Nutrition Interventions: Document food/fluid/supplement intake    Friction and Shear Interventions: Apply protective barrier, creams and emollients,Lift sheet,Minimize layers                Problem: Patient Education: Go to Patient Education Activity  Goal: Patient/Family Education  Outcome: Progressing Towards Goal     Problem: Falls - Risk of  Goal: *Absence of Falls  Description: Document Osmel Fall Risk and appropriate interventions in the flowsheet.   Outcome: Progressing Towards Goal  Note: Fall Risk Interventions:  Mobility Interventions: Bed/chair exit alarm         Medication Interventions: Bed/chair exit alarm,Patient to call before getting OOB,Teach patient to arise slowly    Elimination Interventions: Bed/chair exit alarm,Call light in reach,Patient to call for help with toileting needs,Stay With Me (per policy)    History of Falls Interventions: Bed/chair exit alarm,Door open when patient unattended,Room close to nurse's station         Problem: Patient Education: Go to Patient Education Activity  Goal: Patient/Family Education  Outcome: Progressing Towards Goal     Problem: Patient Education: Go to Patient Education Activity  Goal: Patient/Family Education  Outcome: Progressing Towards Goal     Problem: Heart Failure: Discharge Outcomes  Goal: *Demonstrates ability to perform prescribed activity without shortness of breath or discomfort  Outcome: Progressing Towards Goal  Goal: *Left ventricular function assessment completed prior to or during stay, or planned for post-discharge  Outcome: Progressing Towards Goal  Goal: *ACEI prescribed if LVEF less than 40% and no contraindications or ARB prescribed  Outcome: Progressing Towards Goal  Goal: *Verbalizes understanding/describes prescribed medications  Outcome: Progressing Towards Goal  Goal: *Describes available resources and support systems  Description: (eg: Home Health, Palliative Care, Advanced Medical Directive)  Outcome: Progressing Towards Goal  Goal: *Describes smoking cessation resources  Outcome: Progressing Towards Goal  Goal: *Understands and describes signs and symptoms to report to providers(Stroke Metric)  Outcome: Progressing Towards Goal  Goal: *Describes/verbalizes understanding of follow-up/return appt  Description: (eg: to physicians, diabetes treatment coordinator, and other resources  Outcome: Progressing Towards Goal  Goal: *Describes importance of continuing daily weights and changes to report to physician  Outcome: Progressing Towards Goal

## 2022-06-22 NOTE — PROGRESS NOTES
Problem: Self Care Deficits Care Plan (Adult)  Goal: *Acute Goals and Plan of Care (Insert Text)  Description: FUNCTIONAL STATUS PRIOR TO ADMISSION: Pt had portable O2 at home. Used a walker. Was independent in toileting and self care. HOME SUPPORT: lives with wife but she has her own medical issues    Occupational Therapy Goals  Initiated 6/20/2022  1. Patient will perform grooming with supervision/set-up long sitting in bed within 7 day(s). 2.  Patient will perform bathing long sitting in bed with minimal assistance/contact guard assist within 7 day(s). 3.  Patient will perform lower body dressing with minimal assistance/contact guard assist within 7 day(s). 4.  Patient will perform toilet transfers with supervision/set-up within 7 day(s). 5.  Patient will perform all aspects of toileting with modified independence within 7 day(s). 6.  Patient will participate in upper extremity therapeutic exercise/activities with supervision/set-up for 8 minutes within 7 day(s). 7.  Patient will utilize energy conservation techniques during functional activities with verbal cues within 7 day(s). Outcome: Not Progressing Towards Goal   OCCUPATIONAL THERAPY TREATMENT  Patient: Suze Mao (41 y.o. male)  Date: 6/22/2022  Diagnosis: Encounter for rehabilitation [Z51.89] Encounter for rehabilitation       Precautions: Fall,Bed Alarm  Chart, occupational therapy assessment, plan of care, and goals were reviewed. ASSESSMENT  Patient continues with skilled OT services and is not progressing towards goals. OTR and CM spent time with patient this morning in a care conference. Pt's wife had been given recommendation from CM about hospice and asked staff broach with pt w/o family in the room. OTR explained the limitations in our ability to have him engage in therapy due to orthostatic hypotension that occurs with position changes.  Pt is aware cardiology does not have a solution to his heart issues as he's in stage IV CHF and this is compounded by his pulmonary issues. He is more stable on 4L when he is up in the bed or with any exertion. CM explained what Hospice would have to offer for support so he could go home. Currently pt is approved through 6/27 via insurance to remain in skilled but he is not progressing because medically he is not appropriate to fully participate. Pt was mostly quiet during this time. OTR went back later and took pt's pressures in supine and sitting EOB. His supine was 119/64 but sitting EOB was 92/60 and then a few mins later retaken (to check recovery) and dropped to 92/54. OTR had pt lay back down. While he is not having symptoms he is not appropriate to participate due to his drop in pressure. Current Level of Function Impacting Discharge (ADLs): unable to fully participate again today. Difficulty maintaining his BP in sitting. Other factors to consider for discharge: lives with wife         PLAN :  Patient continues to benefit from skilled intervention to address the above impairments. Continue treatment per established plan of care to address goals. Recommend with staff: must stay with him while on BSC due to BP issues and risk of falling over/off    Recommend next OT session: continue to attempt to safely get pt into chair    Recommendation for discharge: (in order for the patient to meet his/her long term goals)  To be determined: hospice potentially    This discharge recommendation:  Has been made in collaboration with the attending provider and/or case management    IF patient discharges home will need the following DME: wheelchair       SUBJECTIVE:   Patient stated I just want to get up and walk.     OBJECTIVE DATA SUMMARY:   Cognitive/Behavioral Status:  Neurologic State: Alert  Orientation Level: Oriented X4  Cognition: Follows commands;Memory loss             Functional Mobility and Transfers for ADLs:  Bed Mobility:  Rolling: Modified independent  Supine to Sit: Minimum assistance  Sit to Supine: Modified independent    Transfers:  Sit to Stand: Contact guard assistance          Balance:  Sitting: Intact  Standing: Impaired  Standing - Static: Constant support;Good  Standing - Dynamic : Not tested      Pain:  No complaints    Activity Tolerance:   signs and symptoms of orthostatic hypotension    After treatment patient left in no apparent distress:   Supine in bed, Call bell within reach, and Bed / chair alarm activated    COMMUNICATION/COLLABORATION:   The patients plan of care was discussed with: Physical therapist, Physician, and Case management.      Cullen Hunter OT  Time Calculation: 24 mins plus 11 mins for second session

## 2022-06-22 NOTE — PROGRESS NOTES
Problem: Mobility Impaired (Adult and Pediatric)  Goal: *Acute Goals and Plan of Care (Insert Text)  Description: Physical Therapy Goals  Revised 6/17/2022  1. Patient will move from supine to sit and sit to supine  in bed with modified independence within 7 day(s). 2.  Patient will transfer from bed to chair and chair to bed with SBA using the least restrictive device within 7 day(s). 3.  Patient will perform sit to stand with SBA within 7 day(s). 4.  Patient will ambulate with SBA assist for 200 feet with the least restrictive device within 7 day(s). Outcome: Progressing Towards Goal   PHYSICAL THERAPY TREATMENT  Patient: Placido Frankel (40 y.o. male)  Date: 6/22/2022  Diagnosis: Encounter for rehabilitation [Z51.89] Encounter for rehabilitation       Precautions: Fall,Bed Alarm  Chart, physical therapy assessment, plan of care and goals were reviewed. ASSESSMENT  Patient continues with skilled PT services and is slowly progressing towards goals. He is limited by orthostatic blood pressure today, standing blood pressure is 79/44 (and he needs to sit before reading is complete due to shakiness/dizziness). Reclining blood pressure prior to standing attempt is 113/65, and later (after seated LE exercises) seated blood pressure is 113/62 with no orthostatic symptoms. All activity is performed on 4L O2 with saturation >/= 95%. Supportive wife is present and encouraging to patient throughout. Patient is able to complete all seated exercises x 10 reps except for chair push-ups; he performs 1 push-up with contact guard assist then states that \"I'm too weak for this\" and asks to stop. Patient is left in reclined position with chair alarm on and wife/family present. Nursing is aware of all the above.      Current Level of Function Impacting Discharge (mobility/balance): contact guard sit to/from stand; patient unable to tolerate static stand >45 seconds at rolling walker due to orthostatic symptoms and blood pressure 79/44    Other factors to consider for discharge: Hospice is being considered at this point, as well as DNR         PLAN :  Patient continues to benefit from skilled intervention to address the above impairments. Continue treatment per established plan of care. to address goals. Recommendation for discharge: (in order for the patient to meet his/her long term goals)  To be determined: Hospice vs HHPT/assist as needed for transfers and gait    This discharge recommendation:  Has been made in collaboration with the attending provider and/or case management    IF patient discharges home will need the following DME: rolling walker and wheelchair       SUBJECTIVE:   Patient stated I've got to sit down.     OBJECTIVE DATA SUMMARY:   Critical Behavior:  Neurologic State: Alert  Orientation Level: Oriented X4  Cognition: Follows commands,Memory loss  Safety/Judgement: Awareness of environment  Functional Mobility Training:  Bed Mobility:      Patient in recliner at start/end of treatment              Transfers:  Sit to Stand: Contact guard assistance;Assist x1  Stand to Sit: Contact guard assistance;Assist x1                             Balance:  Sitting: Intact  Standing: Impaired  Standing - Static: Constant support;Good  Standing - Dynamic : Not tested due to low blood pressure      Therapeutic Exercises:   Bilateral LE exercises from chair: ankle pumps, heel raises, LAQ, seated march-in-place x 10-15, and 1 chair push-up before patient declines further there-ex/family arrives  Pain Rating:  Patient does not complain of pain    Activity Tolerance:   Poor, desaturates with exertion and requires oxygen, and signs and symptoms of orthostatic hypotension    After treatment patient left in no apparent distress:   Sitting in chair, Heels elevated for pressure relief, Call bell within reach, Bed / chair alarm activated, and Caregiver / family present    COMMUNICATION/COLLABORATION:   The patients plan of care was discussed with: Registered nurse.      Brandie Choudhury, PT   Time Calculation: 19 mins

## 2022-06-23 NOTE — SWING BED INTERDISCIPLINARY CARE PLAN NURSE NOTE
Nursing:    Week #1: Date 6/22/2022  Medical status (changes from previous week):Progressing  Treatment changes this shift: none  Cognition: Present status: oriented          Is cueing needed?: Yes          Frequency of cueing needs: occasional           Type of cueing used: verbal  ADL (general):  Minimum assistance  Activity type: Social leisure skills  Participation: Daily  Level of participation: Improving  Pain status: No c/o pain  Patient/Family Education needs: fall precautions    Upon review of the patients current care plan, the following issues, problems, or needs remain: fall  precautions    Margaret Henderson RN

## 2022-06-23 NOTE — PROGRESS NOTES
Follow up visit with patient in Rm 130 in Med/Surg  Provided empathic listening and spiritual support  Advised of  Availability   [address] [place] [address]

## 2022-06-23 NOTE — PROGRESS NOTES
IDR Team; MD, Nursing, Care Manager,  Nursing Supervisor and , met to review patient's plan of care. Discussed goals, interventions, barriers and progress. RUR: 18% MEDIUM    Team will continue to monitor progress and report any concerns to the physician and care management as indicated    Transition of Care Plan:    John Paul Otero, Occupational Therapist and Care Management(writer) spent time with the patient yesterday morning updating him on his progress in therapy, limitations to fully participate with therapy because of orthostatic hypotension, very weak with poor exercise tolerance that is attributed to his poor cardiac and pulmonary status. Patient is approved through 6/27/22 by [email or web address]. Patient is not progressing in therapy because of his poor cardiac status. Discussed hospice with the patient with an explanation of what hospice is and that it can be more of a support vs home health. Also discussed hospice with his wife the day before. Patient was alert, oriented, coherent and lucid. He even mentioned that hospice was mentioned to him sometime in March(guess by one of his physicians). Will f/u with the patient and his wife to see what they decided. Mrs. Guilherme Vizcaino has a hospital bed coming to the home arranged by patient's PCP.

## 2022-06-23 NOTE — ROUTINE PROCESS
Bedside and Verbal shift change report given to Guadalupe Allen (oncoming nurse) by Benito Rosas (offgoing nurse). Report included the following information SBAR, Kardex, Intake/Output, MAR, Recent Results and Cardiac Rhythm NA.

## 2022-06-23 NOTE — PROGRESS NOTES
Problem: Pressure Injury - Risk of  Goal: *Prevention of pressure injury  Description: Document Fernando Scale and appropriate interventions in the flowsheet. Outcome: Progressing Towards Goal  Note: Pressure Injury Interventions:  Sensory Interventions: Assess changes in LOC    Moisture Interventions: Absorbent underpads    Activity Interventions: Increase time out of bed    Mobility Interventions: HOB 30 degrees or less    Nutrition Interventions: Document food/fluid/supplement intake    Friction and Shear Interventions: Apply protective barrier, creams and emollients                Problem: Falls - Risk of  Goal: *Absence of Falls  Description: Document Osmel Fall Risk and appropriate interventions in the flowsheet.   Outcome: Progressing Towards Goal  Note: Fall Risk Interventions:  Mobility Interventions: Bed/chair exit alarm         Medication Interventions: Bed/chair exit alarm    Elimination Interventions: Call light in reach    History of Falls Interventions: Bed/chair exit alarm         Problem: Patient Education: Go to Patient Education Activity  Goal: Patient/Family Education  Outcome: Progressing Towards Goal

## 2022-06-23 NOTE — SWING BED INTERDISCIPLINARY CARE PLAN NURSE NOTE
Nursing:    Week #1: Date 6/23/2022  Medical status (changes from previous week):Progressing  Treatment changes this shift: no  Cognition: Present status: oriented          Is cueing needed?: Yes          Frequency of cueing needs: occasional           Type of cueing used: verbal  ADL (general):  Minimum assistance  Activity type: Social leisure skills  Participation: Daily  Level of participation: No change  Pain status: No c/o pain  Patient/Family Education needs: na    Upon review of the patients current care plan, the following issues, problems, or needs remain: leon Soto RN

## 2022-06-23 NOTE — PROGRESS NOTES
Problem: Mobility Impaired (Adult and Pediatric)  Goal: *Acute Goals and Plan of Care (Insert Text)  Description: Physical Therapy Goals  Revised 6/17/2022  1. Patient will move from supine to sit and sit to supine  in bed with modified independence within 7 day(s). 2.  Patient will transfer from bed to chair and chair to bed with SBA using the least restrictive device within 7 day(s). 3.  Patient will perform sit to stand with SBA within 7 day(s). 4.  Patient will ambulate with SBA assist for 200 feet with the least restrictive device within 7 day(s). Outcome: Not Met  PHYSICAL THERAPY TREATMENT  Patient: Hansa Gonzalez (46 y.o. male)  Date: 6/23/2022  Diagnosis: Encounter for rehabilitation [Z51.89]   Precautions: Bed Alarm,Fall,Skin (low hearing)  Chart, physical therapy assessment, plan of care and goals were reviewed. ASSESSMENT  Patient continues with skilled PT services and is progressing towards some goals. He requires decreased assistance for bed mobility and engages in seated and supine exercises as below. Pt utilizing 4LNCO2 throughout encounter with SpO2 90-92%. Orthostatic hypotension + associated symptoms remain most significant limitation to progress. BP rebounds partially with return to sitting and fully upon return to supine. Vitals:    06/23/22 1410 06/23/22 1412 06/23/22 1414 06/23/22 1430   BP: 120/75 118/60 (!) 84/57 133/64   BP 2:       BP 1 Location:       BP Patient Position: Supine Sitting Standing Supine  Comment: post activity   Pulse: 93  88 83   Temp:       Resp:       Height:       Weight:       SpO2: 92%   91%           Current Level of Function Impacting Discharge (mobility/balance): CGA to min A standing    Other factors to consider for discharge: spouse at bedside         PLAN :  Patient continues to benefit from skilled intervention to address the above impairments. Continue treatment per established plan of care. to address goals.     Recommendation for discharge: (in order for the patient to meet his/her long term goals)  Per chart pt may discharge home with Hospice services. This discharge recommendation:  Has been made in collaboration with the attending provider and/or case management    IF patient discharges home will need the following DME: to be determined (TBD)       SUBJECTIVE:   Patient stated I wish I could do more.     Pt received supine, agreeable to PT and cleared by RN. Pt up in chair for several hours earlier today. OBJECTIVE DATA SUMMARY:   Critical Behavior:  Neurologic State: Alert  Orientation Level: Oriented to person,Oriented to place,Oriented to situation,Disoriented to time  Cognition: Memory loss,Follows commands  Safety/Judgement: Awareness of environment  Functional Mobility Training:  Bed Mobility:     Supine to Sit: Supervision; Adaptive equipment  Sit to Supine: Supervision; Adaptive equipment  Scooting: Supervision      Trialed brief knee-high LE compression using ace wraps to assess for benefits to BP stability. No benefit noted - pt remains with orthostatic hypotension. Transfers:  Sit to Stand: Contact guard assistance  Stand to Sit: Contact guard assistance         Increased multidirectional sway during static stance. Balance:  Sitting: Without support  Sitting - Static: Good (unsupported)  Sitting - Dynamic: Good (unsupported)  Standing: With support  Standing - Static: Fair  Standing - Dynamic : Not tested  Ambulation/Gait Training:  Distance (ft): 2 Feet (ft)  Assistive Device: Walker, rolling;Gait belt (lateral steps to head of bed)  Ambulation - Level of Assistance: Contact guard assistance        Gait Abnormalities: Decreased step clearance        Base of Support: Narrowed     Speed/Hazel: Pace decreased (<100 feet/min)                         Therapeutic Exercises: Ankle pumps x 20  LAQ 2 x 10  SLR x 5  Pain Rating:  States abdominal discomfort x 1 hour.  Unknown etiology, does not rate.    Activity Tolerance:   requires rest breaks and signs and symptoms of orthostatic hypotension    After treatment patient left in no apparent distress:   Supine in bed, Call bell within reach, Bed / chair alarm activated, Caregiver / family present, and Side rails x 3    COMMUNICATION/COLLABORATION:   The patients plan of care was discussed with: Occupational therapist and Registered nurse.      Maura Gutierrez, PT, DPT   Time Calculation: 30 mins

## 2022-06-23 NOTE — PROGRESS NOTES
Bedside shift change report given to Alejandra LENTZ (oncoming nurse) by Braxton Tafoya. Paolo LENTZ (offgoing nurse). Report included the following information Kardex.

## 2022-06-23 NOTE — PROGRESS NOTES
Problem: Self Care Deficits Care Plan (Adult)  Goal: *Acute Goals and Plan of Care (Insert Text)  Description: FUNCTIONAL STATUS PRIOR TO ADMISSION: Pt had portable O2 at home. Used a walker. Was independent in toileting and self care. HOME SUPPORT: lives with wife but she has her own medical issues    Occupational Therapy Goals  Initiated 6/20/2022  1. Patient will perform grooming with supervision/set-up long sitting in bed within 7 day(s). 2.  Patient will perform bathing long sitting in bed with minimal assistance/contact guard assist within 7 day(s). 3.  Patient will perform lower body dressing with minimal assistance/contact guard assist within 7 day(s). 4.  Patient will perform toilet transfers with supervision/set-up within 7 day(s). 5.  Patient will perform all aspects of toileting with modified independence within 7 day(s). 6.  Patient will participate in upper extremity therapeutic exercise/activities with supervision/set-up for 8 minutes within 7 day(s). 7.  Patient will utilize energy conservation techniques during functional activities with verbal cues within 7 day(s). Outcome: Not Progressing Towards Goal   OCCUPATIONAL THERAPY TREATMENT  Patient: Mihir Tapia (89 y.o. male)  Date: 6/23/2022  Diagnosis: Encounter for rehabilitation [Z51.89] Encounter for rehabilitation       Precautions: Fall,Bed Alarm  Chart, occupational therapy assessment, plan of care, and goals were reviewed. ASSESSMENT  Patient continues with skilled OT services and is not progressing towards goals. Pt up in chair. His initial BP in sitting was 115/67 at 92% at4L and HR was 89. He was able to perform light grooming with set-up. We practiced a stand and he was only able to stand for approx 1 min before needing to sit. BP dropped to  81/51 with HR of 98. After a short 3 min rest he was 108/55 with legs elevated in chair and head reclined.     Current Level of Function Impacting Discharge (ADLs): Pt continuing to display orthostatic hypotension    Other factors to consider for discharge: Pt lives with wife         PLAN :  Patient continues to benefit from skilled intervention to address the above impairments. Continue treatment per established plan of care to address goals. Recommend with staff: monitor BP while up in chair     Recommend next OT session: continue working on tolerance to position change and activity    Recommendation for discharge: (in order for the patient to meet his/her long term goals)  Occupational therapy at least 2 days/week in the home AND ensure assist and/or supervision for safety with transfers versus Hospice care    This discharge recommendation:  Has been made in collaboration with the attending provider and/or case management    IF patient discharges home will need the following DME: hospital bed       SUBJECTIVE:   Patient stated my stomach hurts.     OBJECTIVE DATA SUMMARY:   Cognitive/Behavioral Status:  Pt is hard of hearing but able to follow directions    Functional Mobility and Transfers for ADLs:  Bed Mobility:       Transfers:   Sit to stand SBA          Balance:   Impaired, constant support in static standing    ADL Intervention:       Grooming  Washing Face: Set-up  Washing Hands: Set-up    Pain:  0/10    Activity Tolerance:   requires frequent rest breaks and signs and symptoms of orthostatic hypotension    After treatment patient left in no apparent distress:   Sitting in chair, alarm on, call bell in reach    COMMUNICATION/COLLABORATION:   The patients plan of care was discussed with: Physical therapist and Case management.      César Cary, VERONIKA  Time Calculation: 14 mins

## 2022-06-24 NOTE — SWING BED INTERDISCIPLINARY CARE PLAN MASTER NOTE
Interdisciplinary Plan of Care Master Note:    Date: 06/24/22  Admit Date: 6/17/2022  Patient: Tal Salinas    The Interdisciplinary Team met to review the patient's plan of care progress. Goals, inventions, barriers and progress were discussed. Team members disciplines in attendance were:  X Physician  X Physical Therapist  __Occupational Therapist  __Speech Therapist  X Nurse  X Registered Dietician  __Respiratory Therapy  __Admission/Discharge Nurse  X Care Manager  __Social Worker  X Pharmacist  __Infection Control  X   __Other:     Team will continue to monitor the patient's progress and report any concerns to the Physician and Care Management as indicated. Summary (To be shared with patient and family): Per MD, patient has poor heart condition. Poor prognosis. PT said patient refused earlier this morning. RN said patient transferred from bed to chair without issues. Was complaining of \"fullness\" in his head. Patient is eating. CM will discuss with patient/family if hospice is the definent plan. 1143: Spoke with patient and family about discharge plans. Patient wants to go home with hospice. No preference. April from [facility] called and said she will meet patient/family shortly. Care team Notes:  Swing Bed Interdisciplinary Care Plan Nurse Note by Heather Gardner RN at 06/24/22 4641  Version 1 of 1       Nursing:    Week #1: Date 6/24/2022  Medical status (changes from previous week): No change  Treatment changes this shift: na  Cognition: Present status: oriented          Is cueing needed?: Yes          Frequency of cueing needs: occasional           Type of cueing used: verbal  ADL (general):  Minimum assistance and Assistive device,  Walker  Activity type: Social leisure skills  Participation: Daily  Level of participation: No change  Pain status: No c/o pain  Patient/Family Education needs: NA    Upon review of the patients current care plan, the following issues, problems, or needs remain: BHUMI Rosario RN           Interdisciplinary Care Plan RT Note    No notes of this type exist for this encounter. Interdisciplinary Care Plan Dietary Note    No notes of this type exist for this encounter. [facility] Interdisciplinary Care Plan Nurse Note by Kimberley Gilliam RN at 06/24/22 6129  Version 1 of 1       Nursing:    Week #1: Date 6/24/2022  Medical status (changes from previous week): No change  Treatment changes this shift: na  Cognition: Present status: oriented          Is cueing needed?: Yes          Frequency of cueing needs: occasional           Type of cueing used: verbal  ADL (general): Minimum assistance and Assistive device,  Walker  Activity type: Social leisure skills  Participation: Daily  Level of participation: No change  Pain status: No c/o pain  Patient/Family Education needs: NA    Upon review of the patients current care plan, the following issues, problems, or needs remain: BHUMI Rosario RN         Interdisciplinary Care Plan PT Note    No notes of this type exist for this encounter. Interdisciplinary Care Plan OT Note    No notes of this type exist for this encounter. Interdisciplinary Care Plan SLP Note    No notes of this type exist for this encounter. Interdisciplinary Care Plan D/C Planning Note      Swing Bed Interdisciplinary Care Plan Discharge Planning Note by Jose Murray at 06/17/22 1506  Version 1 of 1       Discharge Planning:    Psychosocial concerns/family concerns, status of previous week; discharge plan (place, DME, services): Wife stated patient needed [facility], hospital bed. Will need to set up personal care aides prior to discharge. Patient has medicaid. Discharge Plan Update: Home w/spouse  Further anticipated LOS: 1 week or less  DME to order: bed, W/C and commode  Anticipated D/C services: Home Health  Need for patient/family conference: NO   If yes, planned for when: n/a not at this time.      Upon review of the patients current care plan, the following issues, problems, or needs remain: Patient needs to gain strength, mobility and endurance prior to discharge. Hopefully will return to baseline status.    Kaylie Boudreaux

## 2022-06-24 NOTE — SWING BED INTERDISCIPLINARY CARE PLAN NURSE NOTE
Nursing:    Week #1: Date 6/24/2022  Medical status (changes from previous week): No change  Treatment changes this shift: na  Cognition: Present status: oriented          Is cueing needed?: Yes          Frequency of cueing needs: occasional           Type of cueing used: verbal  ADL (general):  Minimum assistance and Assistive device,  Walker  Activity type: Social leisure skills  Participation: Daily  Level of participation: No change  Pain status: No c/o pain  Patient/Family Education needs: NA    Upon review of the patients current care plan, the following issues, problems, or needs remain: BHUMI Abbott RN

## 2022-06-24 NOTE — PROGRESS NOTES
Problem: Mobility Impaired (Adult and Pediatric)  Goal: *Acute Goals and Plan of Care (Insert Text)  Description: Physical Therapy Goals  Revised 6/17/2022 (updated 6/24/22 for weekly progress note)  1. Patient will move from supine to sit and sit to supine  in bed with modified independence within 7 day(s). -continue  2. Patient will transfer from bed to chair and chair to bed with SBA using the least restrictive device within 7 day(s). -continue  3. Patient will perform sit to stand with SBA within 7 day(s). -continue  4. Patient will ambulate with CGA assist for 50 feet with the least restrictive device within 7 day(s). -downgraded         6/24/2022 1712 by Kam Anderson, PT  Outcome: Progressing Towards Goal  6/24/2022 1430 by Kam Min, PT  Outcome: Not Progressing Towards Goal   PHYSICAL THERAPY TREATMENT  Patient: Dimple Smallwood (42 y.o. male)  Date: 6/24/2022  Diagnosis: Encounter for rehabilitation [Z51.89] Encounter for rehabilitation       Precautions: Bed Alarm,Fall  Chart, physical therapy assessment, plan of care and goals were reviewed. ASSESSMENT  Patient continues with skilled PT services and is progressing towards goals. Pt presented still sitting up in chair. Per CM pt is most likely going to go home next week on hospice. However, this PM session he presented pleasant, motivated and wanted to participate. He continues to experience significant BP drops in standing but did not have any symptoms and his O2 sats were much better than during AM session. Further pt noted that he was able to walk to the bathroom w/ nursing staff. Subsequently therapist decided to have pt attempt increased activities as tolerated. He was able to walk to the door then back to recliner chair and engage in some standing therex w/o any symptoms of dizziness; his BP did drop and he was instructed to sit after noted activities were completed.  Once in chair feet were elevated and his BP fully rebound back to 146/88. Pt was very excited that he was able to tolerate increased activities levels but therapist explained to him that all parties working w/ him still need to be very careful as his BP is still a large challenge. He was left resting comfortably in his recliner chair watching TV w/ all needs met as able. Current Level of Function Impacting Discharge (mobility/balance): transfers CGA; balance w/ AD is fair    Other factors to consider for discharge: orthostatic BP; supportive family         PLAN :  Patient continues to benefit from skilled intervention to address the above impairments. Continue treatment per established plan of care. to address goals. Recommendation for discharge: (in order for the patient to meet his/her long term goals)  To be determined: home w/ HHPT vs hospice    This discharge recommendation:  Has been made in collaboration with the attending provider and/or case management    IF patient discharges home will need the following DME: patient owns DME required for discharge       SUBJECTIVE:   Patient stated  I'd like to try and see what I can do.     OBJECTIVE DATA SUMMARY:   Critical Behavior:  Neurologic State: Alert  Orientation Level: Disoriented to situation,Disoriented to time,Oriented to person,Oriented to place  Cognition: Follows commands  Safety/Judgement: Awareness of environment  Functional Mobility Training:  Bed Mobility:                    Transfers:  Sit to Stand: Stand-by assistance;Contact guard assistance;Assist x1  Stand to Sit: Stand-by assistance;Contact guard assistance;Assist x1                             Balance:  Standing: Impaired; With support  Standing - Static: Constant support; Fair  Standing - Dynamic : Constant support; Fair  Ambulation/Gait Training:  Distance (ft): 15 Feet (ft)  Assistive Device: Gait belt;Walker, rolling  Ambulation - Level of Assistance: Contact guard assistance        Gait Abnormalities: Decreased step clearance; Path deviations Base of Support: Narrowed     Speed/Hazel: Pace decreased (<100 feet/min)  Step Length: Left shortened;Right shortened        Therapeutic Exercises:   Standing therex at recliner chair w/ 2WW for support included 1 set each of marching and heel raises up to 7 reps each    Pain Ratin/10 low back    Activity Tolerance:   Fair, requires frequent rest breaks, and signs and symptoms of orthostatic hypotension    After treatment patient left in no apparent distress:   Sitting in chair, Heels elevated for pressure relief, Call bell within reach, and Bed / chair alarm activated    COMMUNICATION/COLLABORATION:   The patients plan of care was discussed with: Registered nurse and CM.      Lelo Kyle, PT   Time Calculation: 17 mins

## 2022-06-24 NOTE — ROUTINE PROCESS
Bedside and Verbal shift change report given to Efrem Wray (oncoming nurse) by Heber Braxton (offgoing nurse). Report included the following information SBAR, Kardex, Intake/Output, MAR and Recent Results.

## 2022-06-24 NOTE — PROGRESS NOTES
FOLLOW UP VISIT with patient in Rm 130 in MED/SURG  PROVIDED empathic listening and spiritual support.   Advised of  Availability   [address] [place] [address]

## 2022-06-24 NOTE — PROGRESS NOTES
Bedside shift change report given to Alejandra (oncoming nurse) by Daniel (offgoing nurse). Report included the following information SBAR, Kardex and MAR.

## 2022-06-24 NOTE — PROGRESS NOTES
Patient called wife, telling her to come pick him up. I happened to be checking on him and asked him what was wrong. He said he was tired  Of being in the hospital and wanted to go home. I reminded him it was the middle of the night, his wife would not be able to pick him up. This attitude was very belligerent and different from when I had helped him to the bathroom where he had a large BM, and when he had called the nursing station to say he was wet. I changed his diaper and gown. He had appeared to go back to sleep. His wife called and was very concerned. I told her I had just finished talking to her , and told him he wouldn't be discharged in the middle of the night. She talked to him again, he is currently sleeping.  0200 Patient called to be changed. When I walked in the room , he immediately apologized, for how he had spoken to me earlier. I accepted his apology. Maybe he needs something to help him sleep?

## 2022-06-24 NOTE — SWING BED INTERDISCIPLINARY CARE PLAN DIETARY NOTE
Dietary:    Diet:  Dysphagia-minced and moist        Admit wt.:155#   Current weight/date: 151# 6/20   Type/Amount of Supplement taken: ensure and magic cup %   Significant wt. Loss: No  Significant wt.  Gain: NO  Intake: <50% Inadequate   Patient Vitals for the past 168 hrs:   % Diet Eaten   06/24/22 1319 26 - 50%   06/24/22 1200 1 - 25%   06/24/22 0843 51 - 75%   06/23/22 1729 51 - 75%   06/23/22 1300 76 - 100%   06/23/22 1200 0%   06/23/22 0853 76 - 100%   06/22/22 1700 1 - 25%   06/22/22 1200 0%   06/22/22 0800 1 - 25%   06/20/22 1800 76 - 100%   06/20/22 1359 26 - 50%   06/19/22 1700 1 - 25%   06/19/22 1200 0%   06/19/22 0800 0%   06/18/22 1800 1 - 25%   06/18/22 1200 0%   06/18/22 0820 26 - 50%       Upon review of the patients current care plan, the following issues, problems, or needs remain: no teeth, poor to fair appetite, likes supplements, losing a little bit of wt, agreed to hospice     Jayce Arenas, RD

## 2022-06-24 NOTE — PROGRESS NOTES
Problem: Mobility Impaired (Adult and Pediatric)  Goal: *Acute Goals and Plan of Care (Insert Text)  Description: Physical Therapy Goals  Revised 6/17/2022 (updated 6/24/22 for weekly progress note)  1. Patient will move from supine to sit and sit to supine  in bed with modified independence within 7 day(s). -continue  2. Patient will transfer from bed to chair and chair to bed with SBA using the least restrictive device within 7 day(s). -continue  3. Patient will perform sit to stand with SBA within 7 day(s). -continue  4. Patient will ambulate with CGA assist for 50 feet with the least restrictive device within 7 day(s). -downgraded     Outcome: Not Progressing Towards Goal  PHYSICAL THERAPY TREATMENT: WEEKLY REASSESSMENT  Patient: Mihir Tapia (59 y.o. male)  Date: 6/24/2022  Primary Diagnosis: Encounter for rehabilitation [Z51.89]        Precautions: orthostatic BP,  Bed Alarm,Fall,Skin (low hearing)      ASSESSMENT  Patient continues with skilled PT services and is not progressing towards goals. Pt presented sitting up in recliner chair w/ spouse and another family member present (son?). Initially pt refused to participate all together but after a few minutes of talking w/ pt and family he was willing to at least \"try a little\". Unfortunately he is still experiencing a significant drop in BP w/ positional changes which is making it very difficult and unsafe to increase his mobility activities. Standing for about 1 minute w/ 2WW support and CGA pt not only experienced an orthostatic BP drop w/ symptoms of dizziness but his O2 sats also dropped to 83-84% on 4L NC. It took 2-3 minutes for him to recover once back down in the chair. He began to get emotional as he is currently unable to progress w/ any other activities due to his overall medical complications. Additionally during session the rep from [facility] arrived to speak w/ pt and spouse.  He was set up for lunch w/ all needs met as able; call bell in reach. BP rebound and his sats were back to 90% upon therapist departure. Patient's progression toward goals since last assessment: no progress due to medical complexities    Current Level of Function Impacting Discharge (mobility/balance): transfers CGA; balance fair w/ support     Other factors to consider for discharge: supportive family         PLAN :  Goals have been updated based on progression since last assessment. Patient continues to benefit from skilled intervention to address the above impairments. Recommendations and Planned Interventions: transfer training, gait training, therapeutic exercises, neuromuscular re-education, patient and family training/education, and therapeutic activities      Frequency/Duration: Patient will be followed by physical therapy:  6 times a week to address goals. Recommendation for discharge: (in order for the patient to meet his/her long term goals)  To be determined: pending progress but pt may d/c home on hospice    This discharge recommendation:  Has been made in collaboration with the attending provider and/or case management    IF patient discharges home will need the following DME: to be determined (TBD)         SUBJECTIVE:   Patient stated  I can't do anything but sit here or lay in the bed.     OBJECTIVE DATA SUMMARY:   HISTORY:    Past Medical History:   Diagnosis Date    Acute combined systolic and diastolic congestive heart failure ([facility] [address] 2/12/2022    AICD (automatic cardioverter/defibrillator) present 5/13/2016 5/13/16 [employer] upgrade to dual chamber AICD implant  Dr. Jesse Armstrong    Alzheimer disease [facility])     Anxiety state, other     Arthritis     OSTEO ARTHRITIS    AVNRT (AV bridgette re-entry tachycardia) ([facility] [address] 5/12/2016 5/12/16 AVNRT ablation: PM/AICD left upper chest :Dr. Ela Burkett    Back ache     CAD (coronary artery disease)     Cancer [facility]) 2004    Prostate cancer    Chest tightness     last episode of chest pain 5/2016 before AICD placed; none since then    Coagulation defects 2005    FACTOR V LEIDEN    Dementia ([address]     Depression     Dizziness     FH: factor V Leiden deficiency     Generalized muscle ache     GERD (gastroesophageal reflux disease)     HEARTBURN    Heart failure ([address] 2014    as of 07/2019 EF 30-35;  Dr. Padma Wallace, Cardiomyopathy    Hyperlipidemia, mixed     Hypertension     Long term current use of anticoagulant therapy     Other ill-defined conditions(799.89) 2004    blood transfusion    Pacemaker     Paroxysmal atrial fibrillation (HCC)     rate controlled with coreg     Postsurgical aortocoronary bypass status 05/29/2012    CABG    Presence of cardiac defibrillator 05/2016    left upper chest    Pulmonary emphysema ([address] 3/4/2022    [facility] spotted fever)     SSS (sick sinus syndrome) ([address]     Stroke ([address] 2011, 1990's    SEVERAL-mini    Syncope     Thromboembolism ([address] 2014    left leg: changed to Eliquis from Warfarin    Thromboembolus ([address] 2005    left leg    Thyroid disease     Weakness generalized      Past Surgical History:   Procedure Laterality Date    CARDIAC CATHETERIZATION  3/4/2011         HX HEENT  2019    oral surgery, removed teeth, dentures upper/lower    HX HERNIA REPAIR Left 2002    Ingiunal hernia repair left    HX ORTHOPAEDIC      LEFT KNEE CARTILAGE REPAIR;RIGHT ROTATOR CUFF REPAIR    HX ORTHOPAEDIC  2/14/12    C5-7 ANTERIIOR CERVICAL DISECTOMY AND FUSION    HX ORTHOPAEDIC  6/4/13    C5-C7 POSTERIOR DECOMPRESSION AND FUSION    HX ORTHOPAEDIC      right thumb partial amputation of tip    HX OTHER SURGICAL      steroid injections    HX PACEMAKER Left 05/13/2016    BS D142, Dr. Gt Holloway      HX PROSTATECTOMY  2004     [place]    HX ROTATOR CUFF REPAIR Left 2019    HX UROLOGICAL       urinary control system    HX UROLOGICAL  12/3/13    REPLACEMENT ARTIFICAL URINARY SPHINCTER    DC CARDIAC SURG PROCEDURE UNLIST      CABG X1 3/5/11 Personal factors and/or comorbidities impacting plan of care: complex medical hx    Home Situation  Home Environment: Trailer/mobile home  # Steps to Enter: 0  Wheelchair Ramp: Yes  One/Two Story Residence: One story  Living Alone: No  Support Systems: Spouse/Significant Other  Patient Expects to be Discharged to[de-identified] Home with home health  Current DME Used/Available at Home: Jessica Andujar, rolling,Oxygen, portable    EXAMINATION/PRESENTATION/DECISION MAKING:   Critical Behavior:  Neurologic State: Alert  Orientation Level: Disoriented to situation,Disoriented to time,Oriented to person,Oriented to place  Cognition: Follows commands,Impulsive  Safety/Judgement: Awareness of environment  Hearing: Auditory  Auditory Impairment: Hard of hearing, bilateral  Range Of Motion:   Grossly WFL                       Strength:        Functional but generally decreased                 Tone & Sensation:       Grossly intact                           Coordination:   Grossly intact     Functional Mobility:  Bed Mobility:   NT today           Transfers:  Sit to Stand: Stand-by assistance;Contact guard assistance  Stand to Sit: Stand-by assistance;Contact guard assistance                       Balance:   Standing: Impaired; With support  Standing - Static: Constant support;Fair;Occasional  Standing - Dynamic : Not tested  Ambulation/Gait Training:         Unable due to symptomatic BP drops                              Therapeutic Exercises:   B LE therex completed in seated position including: ankle pumps, LAQs and marching; each completed for 1-2 sets and reps up to 12 as tolerated          Pain Ratin/10 low back pain    Activity Tolerance:   Poor, desaturates with exertion and requires oxygen, requires frequent rest breaks, and signs and symptoms of orthostatic hypotension    After treatment patient left in no apparent distress:   Sitting in chair, Call bell within reach, Bed / chair alarm activated, and Caregiver / family present    COMMUNICATION/EDUCATION:   The patients plan of care was discussed with: Occupational therapist, Registered nurse, and Case management. Fall prevention education was provided and the patient/caregiver indicated understanding., Patient/family have participated as able in goal setting and plan of care. , and Patient/family agree to work toward stated goals and plan of care.     Thank you for this referral.  Mitzy Sepulveda, PT   Time Calculation: 26 mins

## 2022-06-24 NOTE — PROGRESS NOTES
Pt agreed to OT in am but then asked for OTR to come back after breakfast. Pt refused treatment. Stating he was done. Talked about it mid morning while wife and son present. Refused x 3 attempts. Discussed with dc planner and plans are now for pt to go home next week on hospice.

## 2022-06-24 NOTE — ROUTINE PROCESS
0957:  Dr. Lenka Valera notified pt c/o stuffiness to ears. She voiced she will look into it. About 1320: Contacted provider for anti-nausea medication. 1412:  Patient wanted more alone time in the bathroom when checked in on him. 1430:  Pt attempted BM on commode but not successful. Pt agreed to Miralax. 1751:  Pt declined offer to return to bed. Pt is cheery this evening.

## 2022-06-25 NOTE — PROGRESS NOTES
Bedside shift change report given to Anjelica Rice 65 (oncoming nurse) by Delonte Martinez RN (offgoing nurse). Report included the following information Kardex.

## 2022-06-25 NOTE — PROGRESS NOTES
Problem: Mobility Impaired (Adult and Pediatric)  Goal: *Acute Goals and Plan of Care (Insert Text)  Description: Physical Therapy Goals  Revised 6/17/2022 (updated 6/24/22 for weekly progress note)  1. Patient will move from supine to sit and sit to supine  in bed with modified independence within 7 day(s). -continue  2. Patient will transfer from bed to chair and chair to bed with SBA using the least restrictive device within 7 day(s). -continue  3. Patient will perform sit to stand with SBA within 7 day(s). -continue  4. Patient will ambulate with CGA assist for 50 feet with the least restrictive device within 7 day(s). -downgraded         Outcome: Progressing Towards Goal  PHYSICAL THERAPY TREATMENT  Patient: Mihir Tapia (61 y.o. male)  Date: 6/25/2022  Diagnosis: Encounter for rehabilitation [Z51.89] Encounter for rehabilitation       Precautions: Bed Alarm,Fall  Chart, physical therapy assessment, plan of care and goals were reviewed. ASSESSMENT  Patient continues with skilled PT services and is progressing towards goals. .     Current Level of Function Impacting Discharge (mobility/balance): Other factors to consider for discharge:          PLAN :  Patient continues to benefit from skilled intervention to address the above impairments. Continue treatment per established plan of care. to address goals. Recommendation for discharge: (in order for the patient to meet his/her long term goals)  To be determined: This discharge recommendation:  A follow-up discussion with the attending provider and/or case management is planned    IF patient discharges home will need the following DME: to be determined (TBD)       SUBJECTIVE:   Patient stated I want to go back to bed.     OBJECTIVE DATA SUMMARY:   Critical Behavior:  Neurologic State: Alert  Orientation Level: Oriented X4  Cognition: Follows commands  Safety/Judgement: Awareness of environment  Functional Mobility Training:  Bed Mobility:      Min/CGA for mobility     Transfers:   Sit to stand from bedside commode with SBA/CGA of one. Balance:   Fair standing balance. Ambulation/Gait Training:   Took a few steps to bed with walker and CGA of one. Therapeutic Exercises:   Leg exercises in all planes for strengthening for transfers and gait. Pain Rating:  No pain reported during session. Activity Tolerance:   Fair    After treatment patient left in no apparent distress:   Supine in bed, Call bell within reach, and Bed / chair alarm activated    COMMUNICATION/COLLABORATION:   The patients plan of care was discussed with: Registered nurse and Certified nursing assistant/patient care technician.      Renato Arias PTA   Time Calculation: 10 mins

## 2022-06-25 NOTE — PROGRESS NOTES
Bedside and Verbal shift change [place] [facility] [place]) by Holly Larson RN (offgoing nurse). Report included the following information to include SBAR, MAR, VS, Labs.

## 2022-06-25 NOTE — PROGRESS NOTES
Problem: Patient Education: Go to Patient Education Activity  Goal: Patient/Family Education  Outcome: Progressing Towards Goal     Problem: Pressure Injury - Risk of  Goal: *Prevention of pressure injury  Description: Document Fernando Scale and appropriate interventions in the flowsheet. Outcome: Progressing Towards Goal  Note: Pressure Injury Interventions:  Sensory Interventions: Assess changes in LOC,Check visual cues for pain    Moisture Interventions: Absorbent underpads,Apply protective barrier, creams and emollients    Activity Interventions: Increase time out of bed    Mobility Interventions: Turn and reposition approx.  every two hours(pillow and wedges)    Nutrition Interventions: Document food/fluid/supplement intake,Offer support with meals,snacks and hydration    Friction and Shear Interventions: Apply protective barrier, creams and emollients

## 2022-06-26 NOTE — PROGRESS NOTES
Problem: Pressure Injury - Risk of  Goal: *Prevention of pressure injury  Description: Document Fernando Scale and appropriate interventions in the flowsheet. Outcome: Progressing Towards Goal  Note: Pressure Injury Interventions:  Sensory Interventions: Assess changes in LOC    Moisture Interventions: Absorbent underpads,Apply protective barrier, creams and emollients,Check for incontinence Q2 hours and as needed    Activity Interventions: Increase time out of bed    Mobility Interventions: HOB 30 degrees or less    Nutrition Interventions: Document food/fluid/supplement intake    Friction and Shear Interventions: Apply protective barrier, creams and emollients,HOB 30 degrees or less                Problem: Patient Education: Go to Patient Education Activity  Goal: Patient/Family Education  Outcome: Progressing Towards Goal     Problem: Falls - Risk of  Goal: *Absence of Falls  Description: Document Osmel Fall Risk and appropriate interventions in the flowsheet.   Outcome: Progressing Towards Goal  Note: Fall Risk Interventions:  Mobility Interventions: Bed/chair exit alarm,Patient to call before getting OOB         Medication Interventions: Bed/chair exit alarm,Patient to call before getting OOB,Teach patient to arise slowly    Elimination Interventions: Bed/chair exit alarm,Call light in reach,Patient to call for help with toileting needs    History of Falls Interventions: Bed/chair exit alarm,Door open when patient unattended,Room close to nurse's station         Problem: Patient Education: Go to Patient Education Activity  Goal: Patient/Family Education  Outcome: Progressing Towards Goal     Problem: Patient Education: Go to Patient Education Activity  Goal: Patient/Family Education  Outcome: Progressing Towards Goal     Problem: Heart Failure: Day 1  Goal: Off Pathway (Use only if patient is Off Pathway)  Outcome: Progressing Towards Goal  Goal: Activity/Safety  Outcome: Progressing Towards Goal  Goal: Consults, if ordered  Outcome: Progressing Towards Goal  Goal: Diagnostic Test/Procedures  Outcome: Progressing Towards Goal  Goal: Nutrition/Diet  Outcome: Progressing Towards Goal  Goal: Discharge Planning  Outcome: Progressing Towards Goal  Goal: Medications  Outcome: Progressing Towards Goal  Goal: Respiratory  Outcome: Progressing Towards Goal  Goal: Treatments/Interventions/Procedures  Outcome: Progressing Towards Goal  Goal: Psychosocial  Outcome: Progressing Towards Goal  Goal: *Oxygen saturation within defined limits  Outcome: Progressing Towards Goal  Goal: *Hemodynamically stable  Outcome: Progressing Towards Goal  Goal: *Optimal pain control at patient's stated goal  Outcome: Progressing Towards Goal  Goal: *Anxiety reduced or absent  Outcome: Progressing Towards Goal     Problem: Heart Failure: Day 2  Goal: Off Pathway (Use only if patient is Off Pathway)  Outcome: Progressing Towards Goal  Goal: Activity/Safety  Outcome: Progressing Towards Goal  Goal: Consults, if ordered  Outcome: Progressing Towards Goal  Goal: Diagnostic Test/Procedures  Outcome: Progressing Towards Goal  Goal: Nutrition/Diet  Outcome: Progressing Towards Goal  Goal: Discharge Planning  Outcome: Progressing Towards Goal  Goal: Medications  Outcome: Progressing Towards Goal  Goal: Respiratory  Outcome: Progressing Towards Goal  Goal: Treatments/Interventions/Procedures  Outcome: Progressing Towards Goal  Goal: Psychosocial  Outcome: Progressing Towards Goal  Goal: *Oxygen saturation within defined limits  Outcome: Progressing Towards Goal  Goal: *Hemodynamically stable  Outcome: Progressing Towards Goal  Goal: *Optimal pain control at patient's stated goal  Outcome: Progressing Towards Goal  Goal: *Anxiety reduced or absent  Outcome: Progressing Towards Goal  Goal: *Demonstrates progressive activity  Outcome: Progressing Towards Goal

## 2022-06-26 NOTE — PROGRESS NOTES
Patient complaint hot, no temp 98.9, diminished lungs all lobes complaint of SOB and expiratory wheez scattered thruout all lobes. BP /69 rt arm and 127/80 left arm. Family at bedside. Patient drank all ensure at 2 meals.

## 2022-06-26 NOTE — PROGRESS NOTES
Bedside and Verbal shift change report given to Leeanna Guerra RN (oncoming nurse) by Gil Murillo LPN (offgoing nurse). Report included the following information SBAR, Kardex, Intake/Output and MAR.

## 2022-06-26 NOTE — PROGRESS NOTES
Respriatory notified that patient is SOB on 4 LPM/NC with complaint of fullness  Instructed to raise O2 to 5 LPM/NC. SPO2 is now 96%. New bubble bottle placed on oxygen for humidification, patient complaint of sore nares.

## 2022-06-26 NOTE — SWING BED INTERDISCIPLINARY CARE PLAN NURSE NOTE
Nursing:    Week # 1: Date 6/26/2022  Medical status (changes from previous week):Progressing  Treatment changes this shift: NONE  Cognition: Present status: oriented          Is cueing needed?: Yes          Frequency of cueing needs: frequent           Type of cueing used: verbal  ADL (general):  Minimum assistance  Activity type: Social leisure skills  Participation: Daily  Level of participation: Improving  Pain status: Yes (avg. intensity-use scale): 7/10 headache  Patient/Family Education needs: Discharge planning    Upon review of the patients current care plan, the following issues, problems, or needs remain: Discharge planning    Grant Estevez LPN

## 2022-06-26 NOTE — PROGRESS NOTES
Problem: Patient Education: Go to Patient Education Activity  Goal: Patient/Family Education  Outcome: Progressing Towards Goal     Problem: Pressure Injury - Risk of  Goal: *Prevention of pressure injury  Description: Document Fernando Scale and appropriate interventions in the flowsheet. Outcome: Progressing Towards Goal  Note: Pressure Injury Interventions:  Sensory Interventions: Assess changes in LOC    Moisture Interventions: Absorbent underpads,Apply protective barrier, creams and emollients,Check for incontinence Q2 hours and as needed    Activity Interventions: Increase time out of bed    Mobility Interventions: HOB 30 degrees or less    Nutrition Interventions: Document food/fluid/supplement intake    Friction and Shear Interventions: Apply protective barrier, creams and emollients,HOB 30 degrees or less                Problem: Falls - Risk of  Goal: *Absence of Falls  Description: Document Osmel Fall Risk and appropriate interventions in the flowsheet.   Outcome: Progressing Towards Goal  Note: Fall Risk Interventions:  Mobility Interventions: Bed/chair exit alarm,Patient to call before getting OOB         Medication Interventions: Bed/chair exit alarm,Patient to call before getting OOB,Teach patient to arise slowly    Elimination Interventions: Bed/chair exit alarm,Call light in reach,Patient to call for help with toileting needs    History of Falls Interventions: Bed/chair exit alarm,Door open when patient unattended,Room close to nurse's station         Problem: Heart Failure: Day 4  Goal: Off Pathway (Use only if patient is Off Pathway)  Outcome: Progressing Towards Goal  Goal: *Oxygen saturation within defined limits  Outcome: Progressing Towards Goal  Goal: *Hemodynamically stable  Outcome: Progressing Towards Goal     Problem: Heart Failure: Discharge Outcomes  Goal: *Verbalizes understanding/describes prescribed medications  Outcome: Progressing Towards Goal

## 2022-06-26 NOTE — SWING BED INTERDISCIPLINARY CARE PLAN NURSE NOTE
Nursing:    Week #1: Date 6/26/2022  Medical status (changes from previous week):Progressing  Treatment changes this shift: increased oxygen to 5 LPM/NC  Cognition: Present status: oriented  With some dementia taking Aricept and Namenda. Is cueing needed?: Yes          Frequency of cueing needs: occasional           Type of cueing used: verbal  ADL (general): Moderate assistance  Activity type: Social leisure skills  Participation: Daily  Level of participation: Improving  Pain status: No c/o pain  Patient/Family Education needs: ambulation     Upon review of the patients current care plan, the following issues, problems, or needs remain: safety breathing exercises.     Jonathan Adrian RN

## 2022-06-27 NOTE — PROGRESS NOTES
Chart reviewed for PT treatment. At time of attempt, patient is reclined in chair with eyes closed, but he opens his eyes to sound of PT calling his name. Patient politely refuses, London Das of you all just left the room from doing therapy with me\" (he had OT this morning). He states that his worst problem now is his neck, that it has been painful and that he is afraid that if he moves it will \"crack\". He states that he has no pain in his neck at this time. He politely declines PT services, including exercise from chair, but remains open to PT treatment tomorrow. Will continue to follow.

## 2022-06-27 NOTE — SWING BED INTERDISCIPLINARY CARE PLAN NURSE NOTE
Nursing:    Week # 2 : Date 6/27/2022  Medical status (changes from previous week):Progressing  Treatment changes this shift: none  Cognition: Present status: oriented          Is cueing needed?: No          Frequency of cueing needs: none           Type of cueing used: none  ADL (general): Moderate assistance  Activity type: Social leisure skills  Participation: Daily  Level of participation: Improving  Pain status: No c/o pain  Patient/Family Education needs: patient needs to have continuous education about falls     Upon review of the patients current care plan, the following issues, problems, or needs remain: patient needs to have continuous education about falls.     Sindy Marcelo, RN

## 2022-06-27 NOTE — PROGRESS NOTES
Bedside and Verbal shift change report given to Margie Lang RN (oncoming nurse) by Ulises Goode (offgoing nurse). Report included the following information SBAR and Kardex. Du score 3  Bed/chair alarm yes in use. If in use it is set at the highest volume. Intravenous fluids were administered, none   No data found. No flowsheet data found. All lab results for the last 24 hours reviewed.

## 2022-06-27 NOTE — PROGRESS NOTES
Comprehensive Nutrition Assessment    Type and Reason for Visit: Reassess    Nutrition Recommendations/Plan:   1. Continue diet as safely tolerated per SLP. 2. Continue Ensure Enlive shakes as desired. 3. No plans for aggressive nutrition intervention; RD will sign-off. Please re-consult should nutrition needs arise. 4. Please document % meals and supplements consumed in flowsheet I/O's under intake. Malnutrition Assessment:  Malnutrition Status:  Mild malnutrition (06/21/22 1052)    Context:  Acute illness     Findings of the 6 clinical characteristics of malnutrition:   Energy Intake:  50% or less of est energy requirements for 5 or more days  Weight Loss:  No significant weight loss     Body Fat Loss:  No significant body fat loss,     Muscle Mass Loss:  No significant muscle mass loss,    Fluid Accumulation:  No significant fluid accumulation,     Strength:  Not performed     Nutrition Assessment:     6/27: Chart reviewed; med noted for CHF, HTN, alzheimer's dementia. Per CM note, \"Wife is coming in today to initiate a DDNR and get the form signed. Contacted Eve Tena, Hospice Care Consultant for [address] [place] [address] to inform her of the plan to discharge patient either Tuesday or Wednesday\". Pt continues on a Minced/Moist diet with Ensure Enlive ordered with meals. No plans for aggressive nutrition intervention; RD will sign-off.     Patient Vitals for the past 168 hrs:   % Diet Eaten   06/27/22 1200 1 - 25%   06/27/22 0800 1 - 25%   06/26/22 1105 26 - 50%   06/26/22 0808 51 - 75%   06/25/22 1730 76 - 100%   06/24/22 1319 26 - 50%   06/24/22 1200 1 - 25%   06/24/22 0843 51 - 75%   06/23/22 1729 51 - 75%   06/23/22 1300 76 - 100%   06/23/22 1200 0%   06/23/22 0853 76 - 100%   06/22/22 1700 1 - 25%   06/22/22 1200 0%   06/22/22 0800 1 - 25%   06/20/22 1800 76 - 100%     Patient Vitals for the past 168 hrs:   Supplement intake %   06/25/22 1730 76 - 100%   06/23/22 1729 76 - 100%     Last Weight Metric  Weight Loss Metrics 6/20/2022 6/17/2022 6/17/2022 6/9/2022 5/26/2022 5/16/2022 5/15/2022   Today's Wt 151 lb 3.2 oz - 153 lb 14.4 oz - 155 lb 6.8 oz - 160 lb   BMI - 23.68 kg/m2 - 24.1 kg/m2 - 24.34 kg/m2 24.33 kg/m2     Nutrition Related Findings:    Labs: reviewed; Meds: reviewed Wound Type: Skin tears    Current Nutrition Intake & Therapies:  Average Meal Intake: 26-50%  Average Supplement Intake: 51-75%  ADULT ORAL NUTRITION SUPPLEMENT Breakfast, Lunch, Dinner; Standard High Calorie/High Protein  ADULT DIET Dysphagia - Minced & Moist  ADULT ORAL NUTRITION SUPPLEMENT Breakfast, Lunch, Dinner; Frozen Supplement    Anthropometric Measures:  Height: 5' 7\" (170.2 cm)  Ideal Body Weight (IBW): 148 lbs (67 kg)     Current Body Wt:  68.6 kg (151 lb 3.8 oz), 102.2 % IBW.     Current BMI (kg/m2): 23.7                          BMI Category: Normal weight (BMI 22.0-24.9) age over 72    Estimated Daily Nutrient Needs:  Energy Requirements Based On: Formula  Weight Used for Energy Requirements: Admission  Energy (kcal/day): 1638  Weight Used for Protein Requirements: Admission  Protein (g/day): 69  Method Used for Fluid Requirements: 1 ml/kcal  Fluid (ml/day): 8601    Nutrition Diagnosis:   · Inadequate oral intake related to cognitive or neurological impairment,impaired respiratory function as evidenced by intake 0-25%,intake 26-50%    Nutrition Interventions:   Food and/or Nutrient Delivery: Continue current diet,Continue oral nutrition supplement  Nutrition Education/Counseling: No recommendations at this time  Coordination of Nutrition Care: Continue to monitor while inpatient,Interdisciplinary rounds       Goals:  Previous Goal Met: No progress toward goal(s)  Goals: PO intake 50% or greater,by next RD assessment       Nutrition Monitoring and Evaluation:   Behavioral-Environmental Outcomes: None identified  Food/Nutrient Intake Outcomes: Food and nutrient intake,Supplement intake  Physical Signs/Symptoms Outcomes: Chewing or swallowing,Weight,Meal time behavior    Discharge Planning:    Continue oral nutrition supplement,Continue current diet    Sigifredo Flynn RD  Contact:

## 2022-06-27 NOTE — PROGRESS NOTES
Follow up visit with patient in Rm  130 in Med/Surg  Patient was  alert and responsive  Provided empathic listening and spiritual support  Advised of  Availability   [address] [place] [address]

## 2022-06-27 NOTE — PROGRESS NOTES
OCCUPATIONAL THERAPY TREATMENT  Patient: Meghana Solano (50 y.o. male)  Date: 6/27/2022  Diagnosis: Encounter for rehabilitation [Z51.89] Encounter for rehabilitation       Precautions: Bed Alarm,Fall  Chart, occupational therapy assessment, plan of care, and goals were reviewed. ASSESSMENT  Patient continues with skilled OT services and is progressing towards goals. Pt is on higher 02 at 5L yesterday and 4.5L today. Pt was cooperative but want to go home on Hospice. Which is better set up per RN. Current Level of Function Impacting Discharge (ADLs): bouts of orthostatic hypotension    Other factors to consider for discharge: increased 02 need         PLAN :  Patient continues to benefit from skilled intervention to address the above impairments. Continue treatment per established plan of care to address goals. Recommend with staff: Clint Watkins to chair as much as tolerated    Recommend next OT session: Self-care as able seated    Recommendation for discharge: (in order for the patient to meet his/her long term goals)  No skilled occupational therapy/ follow up rehabilitation needs identified at this time. This discharge recommendation:  Has been made in collaboration with the attending provider and/or case management    IF patient discharges home will need the following DME: bedside commode, shower chair, and walker: rolling       SUBJECTIVE:   Patient stated I want to go home.     OBJECTIVE DATA SUMMARY:   Cognitive/Behavioral Status:  Neurologic State: Alert  Orientation Level: Oriented X4  Cognition: Appropriate decision making; Follows commands    Functional Mobility and Transfers for ADLs:  Bed Mobility:   Can scoot with help R shoulder limited > then L but both painful with activity    Transfers  Bed to Chair: Contact guard assistance;Assist x1    Balance:  Sitting: With support; Intact  Sitting - Static: Good (unsupported)  Sitting - Dynamic: Fair (occasional)    ADL Intervention: Grooming  Washing Face: Set-up  Washing Hands: Set-up  Brushing Teeth: Set-up; Modified independent  Brushing/Combing Hair: Set-up  Type of Bath: Basin/Soap/Water;Partial  Therapeutic Exercises: punched, bicep curls horizontal and diagonal shoulder abd/add but in lower plans secondary to limited shoulder flexion    Pain:  6-7/10 for Right shoulder with activity    Activity Tolerance:   Fair    After treatment patient left in no apparent distress:   Sitting in chair, Call bell within reach, and Bed / chair alarm activated    COMMUNICATION/COLLABORATION:   The patients plan of care was discussed with: Occupational therapist, Registered nurse, and Rehabilitation technician.      Maria Elena Rivas OT  Time Calculation: 23 mins

## 2022-06-27 NOTE — PROGRESS NOTES
Problem: Falls - Risk of  Goal: *Absence of Falls  Description: Document Noe Pfeiffer Fall Risk and appropriate interventions in the flowsheet.   Outcome: Progressing Towards Goal  Note: Fall Risk Interventions:  Mobility Interventions: Bed/chair exit alarm,Patient to call before getting OOB,Utilize walker, cane, or other assistive device         Medication Interventions: Bed/chair exit alarm,Patient to call before getting OOB,Teach patient to arise slowly    Elimination Interventions: Bed/chair exit alarm,Call light in reach,Patient to call for help with toileting needs,Stay With Me (per policy)    History of Falls Interventions: Bed/chair exit alarm,Door open when patient unattended,Room close to nurse's station         Problem: Patient Education: Go to Patient Education Activity  Goal: Patient/Family Education  Outcome: Progressing Towards Goal     Problem: Patient Education: Go to Patient Education Activity  Goal: Patient/Family Education  Outcome: Progressing Towards Goal     Problem: Heart Failure: Day 1  Goal: Off Pathway (Use only if patient is Off Pathway)  Outcome: Progressing Towards Goal  Goal: Activity/Safety  Outcome: Progressing Towards Goal  Goal: Consults, if ordered  Outcome: Progressing Towards Goal  Goal: Diagnostic Test/Procedures  Outcome: Progressing Towards Goal  Goal: Nutrition/Diet  Outcome: Progressing Towards Goal  Goal: Discharge Planning  Outcome: Progressing Towards Goal  Goal: Medications  Outcome: Progressing Towards Goal  Goal: Respiratory  Outcome: Progressing Towards Goal  Goal: Treatments/Interventions/Procedures  Outcome: Progressing Towards Goal  Goal: Psychosocial  Outcome: Progressing Towards Goal  Goal: *Oxygen saturation within defined limits  Outcome: Progressing Towards Goal  Goal: *Hemodynamically stable  Outcome: Progressing Towards Goal  Goal: *Optimal pain control at patient's stated goal  Outcome: Progressing Towards Goal  Goal: *Anxiety reduced or absent  Outcome: Progressing Towards Goal

## 2022-06-27 NOTE — PROGRESS NOTES
Ate good dinner, gave Miralex for complaint of  constipation , will continue to monitor, resting quietly at this time.

## 2022-06-27 NOTE — PROGRESS NOTES
IDR Team; MD, Nursing, Care Manager, Nursing Supervisor, Therapy, Pharmacy, Speech Therapy and , met to review patient's plan of care. Discussed goals, interventions, barriers and progress. RUR: 17%  MEDIUM    Team will continue to monitor progress and report any concerns to the physician and care management as indicated    Transition of Care Plan: Wife is coming in today to initiate a DDNR and get the form signed. Contacted Eve Tena, Hospice Care Consultant for [address] [place] [address] to inform her of the plan to discharge patient either Tuesday or Wednesday. Hospital Bed already delivered to the home per April. Will f/u with Mrs. Darien Castorena to over and DDNR form and have the patient sign. [address] notification of discharge explained to the patient and family that Swing Bed/ Skilled Services will end on 6/29/22 and that the date of financial liability will begin on 6/30/22   Provided a copy of the notification to the patient and copy placed in the patients chart.

## 2022-06-28 NOTE — PROGRESS NOTES
Transition of Care (ANJU) Plan:  Patient will be discharged home with [facility]. Patient and his wife in agreement with the discharge plan. ANJU Transportation:       How is patient being transported at discharge? [facility]     When? 6/28/22     Is transport scheduled? Follow-up appointment and transportation:     PCP? Solange Gates MD               Click here to [address] including selection of the Healthcare Decision Maker Relationship (ie \"Primary\")  @[email or web address]    Care Management Interventions  PCP Verified by CM:  Yes Solange Gates MD)  Last Visit to PCP: 06/06/22  Palliative Care Criteria Met (RRAT>21 & CHF Dx)?: Yes (Patient will be cared for by [facility])  Palliative Consult Recommended?: Yes  Mode of Transport at Discharge: BLS  Transition of Care Consult (CM Consult): Home Hospice [facility])  Discharge Durable Medical Equipment: No  Physical Therapy Consult: No  Occupational Therapy Consult: No  Speech Therapy Consult: No  Support Systems: Spouse/Significant Other,Other Family Member(s),Hospice  Confirm Follow Up Transport: Other (see comment)  The Plan for Transition of Care is Related to the Following Treatment Goals : Swing bed--PT/OT  [employer] Provided?: No  Discharge Location  Patient Expects to be Discharged to[de-identified] Home with hospice ([facility]) no known mental health issues.

## 2022-06-28 NOTE — PROGRESS NOTES
Problem: Pressure Injury - Risk of  Goal: *Prevention of pressure injury  Description: Document Fernando Scale and appropriate interventions in the flowsheet. Outcome: Progressing Towards Goal  Note: Pressure Injury Interventions:  Sensory Interventions: Assess changes in LOC,Float heels,Keep linens dry and wrinkle-free,Minimize linen layers    Moisture Interventions: Absorbent underpads,Apply protective barrier, creams and emollients,Limit adult briefs,Minimize layers    Activity Interventions: Increase time out of bed    Mobility Interventions: Float heels,HOB 30 degrees or less    Nutrition Interventions: Document food/fluid/supplement intake    Friction and Shear Interventions: HOB 30 degrees or less,Lift sheet,Minimize layers                Problem: Patient Education: Go to Patient Education Activity  Goal: Patient/Family Education  Outcome: Progressing Towards Goal     Problem: Falls - Risk of  Goal: *Absence of Falls  Description: Document Osmel Fall Risk and appropriate interventions in the flowsheet.   Outcome: Progressing Towards Goal  Note: Fall Risk Interventions:  Mobility Interventions: Bed/chair exit alarm,Patient to call before getting OOB         Medication Interventions: Bed/chair exit alarm,Patient to call before getting OOB,Teach patient to arise slowly    Elimination Interventions: Bed/chair exit alarm,Call light in reach,Patient to call for help with toileting needs,Stay With Me (per policy),Toilet paper/wipes in reach    History of Falls Interventions: Bed/chair exit alarm,Door open when patient unattended,Room close to nurse's station         Problem: Patient Education: Go to Patient Education Activity  Goal: Patient/Family Education  Outcome: Progressing Towards Goal     Problem: Patient Education: Go to Patient Education Activity  Goal: Patient/Family Education  Outcome: Progressing Towards Goal     Problem: Heart Failure: Day 1  Goal: Off Pathway (Use only if patient is Off Pathway)  Outcome: Resolved/Met  Goal: Activity/Safety  Outcome: Resolved/Met  Goal: Consults, if ordered  Outcome: Resolved/Met  Goal: Diagnostic Test/Procedures  Outcome: Resolved/Met  Goal: Nutrition/Diet  Outcome: Resolved/Met  Goal: Discharge Planning  Outcome: Resolved/Met  Goal: Medications  Outcome: Resolved/Met  Goal: Respiratory  Outcome: Resolved/Met  Goal: Treatments/Interventions/Procedures  Outcome: Resolved/Met  Goal: Psychosocial  Outcome: Resolved/Met  Goal: *Oxygen saturation within defined limits  Outcome: Resolved/Met  Goal: *Hemodynamically stable  Outcome: Resolved/Met  Goal: *Optimal pain control at patient's stated goal  Outcome: Resolved/Met  Goal: *Anxiety reduced or absent  Outcome: Resolved/Met     Problem: Heart Failure: Day 2  Goal: Off Pathway (Use only if patient is Off Pathway)  Outcome: Resolved/Met  Goal: Activity/Safety  Outcome: Resolved/Met  Goal: Consults, if ordered  Outcome: Resolved/Met  Goal: Diagnostic Test/Procedures  Outcome: Resolved/Met  Goal: Nutrition/Diet  Outcome: Resolved/Met  Goal: Discharge Planning  Outcome: Resolved/Met  Goal: Medications  Outcome: Resolved/Met  Goal: Respiratory  Outcome: Resolved/Met  Goal: Treatments/Interventions/Procedures  Outcome: Resolved/Met  Goal: Psychosocial  Outcome: Resolved/Met  Goal: *Oxygen saturation within defined limits  Outcome: Resolved/Met  Goal: *Hemodynamically stable  Outcome: Resolved/Met  Goal: *Optimal pain control at patient's stated goal  Outcome: Resolved/Met  Goal: *Anxiety reduced or absent  Outcome: Resolved/Met  Goal: *Demonstrates progressive activity  Outcome: Resolved/Met     Problem: Heart Failure: Day 3  Goal: Off Pathway (Use only if patient is Off Pathway)  Outcome: Resolved/Met  Goal: Activity/Safety  Outcome: Resolved/Met  Goal: Diagnostic Test/Procedures  Outcome: Resolved/Met  Goal: Nutrition/Diet  Outcome: Resolved/Met  Goal: Discharge Planning  Outcome: Resolved/Met  Goal: Medications  Outcome: Resolved/Met  Goal: Respiratory  Outcome: Resolved/Met  Goal: Treatments/Interventions/Procedures  Outcome: Resolved/Met  Goal: Psychosocial  Outcome: Resolved/Met  Goal: *Oxygen saturation within defined limits  Outcome: Resolved/Met  Goal: *Hemodynamically stable  Outcome: Resolved/Met  Goal: *Optimal pain control at patient's stated goal  Outcome: Resolved/Met  Goal: *Anxiety reduced or absent  Outcome: Resolved/Met  Goal: *Demonstrates progressive activity  Outcome: Resolved/Met     Problem: Heart Failure: Day 4  Goal: Off Pathway (Use only if patient is Off Pathway)  Outcome: Resolved/Met  Goal: Activity/Safety  Outcome: Resolved/Met  Goal: Diagnostic Test/Procedures  Outcome: Resolved/Met  Goal: Nutrition/Diet  Outcome: Resolved/Met  Goal: Discharge Planning  Outcome: Resolved/Met  Goal: Medications  Outcome: Resolved/Met  Goal: Respiratory  Outcome: Resolved/Met  Goal: Treatments/Interventions/Procedures  Outcome: Resolved/Met  Goal: Psychosocial  Outcome: Resolved/Met  Goal: *Oxygen saturation within defined limits  Outcome: Resolved/Met  Goal: *Hemodynamically stable  Outcome: Resolved/Met  Goal: *Optimal pain control at patient's stated goal  Outcome: Resolved/Met  Goal: *Anxiety reduced or absent  Outcome: Resolved/Met  Goal: *Demonstrates progressive activity  Outcome: Resolved/Met     Problem: Heart Failure: Day 5  Goal: Off Pathway (Use only if patient is Off Pathway)  Outcome: Resolved/Met  Goal: Discharge Planning  Outcome: Progressing Towards Goal  Goal: Medications  Outcome: Progressing Towards Goal  Goal: Respiratory  Outcome: Progressing Towards Goal  Goal: Treatments/Interventions/Procedures  Outcome: Progressing Towards Goal  Goal: Psychosocial  Outcome: Progressing Towards Goal     Problem: Heart Failure: Discharge Outcomes  Goal: *Demonstrates ability to perform prescribed activity without shortness of breath or discomfort  Outcome: Progressing Towards Goal  Goal: *Left ventricular function assessment completed prior to or during stay, or planned for post-discharge  Outcome: Progressing Towards Goal  Goal: *ACEI prescribed if LVEF less than 40% and no contraindications or ARB prescribed  Outcome: Progressing Towards Goal  Goal: *Verbalizes understanding and describes prescribed diet  Outcome: Progressing Towards Goal  Goal: *Verbalizes understanding/describes prescribed medications  Outcome: Progressing Towards Goal  Goal: *Describes available resources and support systems  Description: (eg: Home Health, Palliative Care, Advanced Medical Directive)  Outcome: Progressing Towards Goal  Goal: *Describes smoking cessation resources  Outcome: Progressing Towards Goal  Goal: *Understands and describes signs and symptoms to report to providers(Stroke Metric)  Outcome: Progressing Towards Goal  Goal: *Describes/verbalizes understanding of follow-up/return appt  Description: (eg: to physicians, diabetes treatment coordinator, and other resources  Outcome: Progressing Towards Goal  Goal: *Describes importance of continuing daily weights and changes to report to physician  Outcome: Progressing Towards Goal

## 2022-06-28 NOTE — PROGRESS NOTES
Problem: Patient Education: Go to Patient Education Activity  Goal: Patient/Family Education  Outcome: Resolved/Met     Problem: Pressure Injury - Risk of  Goal: *Prevention of pressure injury  Description: Document Fernando Scale and appropriate interventions in the flowsheet. Outcome: Resolved/Met  Note: Pressure Injury Interventions:  Sensory Interventions: Assess changes in LOC    Moisture Interventions: Absorbent underpads,Apply protective barrier, creams and emollients    Activity Interventions: Increase time out of bed,PT/OT evaluation    Mobility Interventions: HOB 30 degrees or less,Float heels    Nutrition Interventions: Document food/fluid/supplement intake    Friction and Shear Interventions: HOB 30 degrees or less,Apply protective barrier, creams and emollients                Problem: Patient Education: Go to Patient Education Activity  Goal: Patient/Family Education  Outcome: Resolved/Met     Problem: Falls - Risk of  Goal: *Absence of Falls  Description: Document Osmel Fall Risk and appropriate interventions in the flowsheet.   Outcome: Resolved/Met  Note: Fall Risk Interventions:  Mobility Interventions: Bed/chair exit alarm         Medication Interventions: Bed/chair exit alarm,Patient to call before getting OOB    Elimination Interventions: Call light in reach,Bed/chair exit alarm    History of Falls Interventions: Door open when patient unattended,Bed/chair exit alarm         Problem: Patient Education: Go to Patient Education Activity  Goal: Patient/Family Education  Outcome: Resolved/Met     Problem: Patient Education: Go to Patient Education Activity  Goal: Patient/Family Education  Outcome: Resolved/Met     Problem: Heart Failure: Day 5  Goal: Discharge Planning  Outcome: Resolved/Met  Goal: Medications  Outcome: Resolved/Met  Goal: Respiratory  Outcome: Resolved/Met  Goal: Treatments/Interventions/Procedures  Outcome: Resolved/Met  Goal: Psychosocial  Outcome: Resolved/Met     Problem: Heart Failure: Discharge Outcomes  Goal: *Demonstrates ability to perform prescribed activity without shortness of breath or discomfort  Outcome: Resolved/Met  Goal: *Left ventricular function assessment completed prior to or during stay, or planned for post-discharge  Outcome: Resolved/Met  Goal: *ACEI prescribed if LVEF less than 40% and no contraindications or ARB prescribed  Outcome: Resolved/Met  Goal: *Verbalizes understanding and describes prescribed diet  Outcome: Resolved/Met  Goal: *Verbalizes understanding/describes prescribed medications  Outcome: Resolved/Met  Goal: *Describes available resources and support systems  Description: (eg: Home Health, Palliative Care, Advanced Medical Directive)  Outcome: Resolved/Met  Goal: *Describes smoking cessation resources  Outcome: Resolved/Met  Goal: *Understands and describes signs and symptoms to report to providers(Stroke Metric)  Outcome: Resolved/Met  Goal: *Describes/verbalizes understanding of follow-up/return appt  Description: (eg: to physicians, diabetes treatment coordinator, and other resources  Outcome: Resolved/Met  Goal: *Describes importance of continuing daily weights and changes to report to physician  Outcome: Resolved/Met     Problem: Patient Education: Go to Patient Education Activity  Goal: Patient/Family Education  Outcome: Resolved/Met     Problem: Patient Education: Go to Patient Education Activity  Goal: Patient/Family Education  Outcome: Resolved/Met

## 2022-06-28 NOTE — PROGRESS NOTES
Discharge instructions reviewed with EWELINA  Personal belongings returned: yes  Home meds returned: N/A  To front entrance via stetcher  Discharged home with  [facility] @   Patient is calling back looking for results

## 2022-06-28 NOTE — PROGRESS NOTES
Pt Declined PT due to:    [x]  \"I'm not getting up\"    []  Eating  []  Pain  []  Pt lethargic  []  Off Unit  Will F/u if D/c is cancelled. Thank you.   Alfredo Hawk, PTA

## 2022-06-28 NOTE — DISCHARGE SUMMARY
[facility]  Hospitalist Discharge Summary    Patient ID:  Mitch Guardado  [id number]  65 y.o.  1945    PCP on record: Gianna Hook MD    Admit date: 6/17/2022  Discharge date and time: 6/28/2022     DISCHARGE DIAGNOSIS:    Principal Problem:    Encounter for rehab     Active Problems:      Acute on chronic combined systolic and diastolic ACC/AHA stage C congestive heart failure    Atrial fibrillation ([address] (5/29/2012)    AVNRT (AV bridgette re-entry tachycardia) ([address] (5/12/2016)      Overview: 5/12/16 AVNRT ablation    AICD (automatic cardioverter/defibrillator) present (5/13/2016)      Overview: 5/13/16 igob[employer] upgrade to dual chamber AICD implant    Orthostatic hypotension (5/16/2022)    Interstitial lung disease ([facility] (2/15/2022)    Essential hypertension (3/4/2011)    Mixed hyperlipidemia (5/29/2012)    Alzheimer's dementia, late onset, with behavioral disturbance ([address] (4/12/2016)    Hypothyroidism due to acquired atrophy of thyroid (9/22/2015)    Moderate major depression ([address] (7/3/2018)     ______________________________________________________________________    DISCHARGE SUMMARY/HOSPITAL COURSE:    Patient is transferred to skilled care for continued monitoring and therapy efforts following acute care admission June 9 through June 17th. Patient is still very weak with poor exercise tolerance-attributed to his poor cardiac and pulmonary status. His wife has been his caregiver, however she has become ill and is weakened from recent [place]. He becomes notably dyspneic with limited physical activity. On recent admission his systolic pressure was noted to drop from 10 3-76 on standing. He has AICD without known significant arrhythmia or defibrillations. Recent syncopal episode at a convenience store while talking with friends. They assisted him down to the floor without injury present.   Right diagnostic testing as listed below and is felt stable for transfer to a rehab therapy bed for further monitoring. Long-term prognosis is not good. Family meeting needs to be arranged to discuss possible hospice discussions on discharge home. The patient and his wife decided to get the patient discharged to home hospice and home health     Management was as follows    Encounter for rehabilitation (6/17/2022)  Patient admitted to [address] [place] [address] rehab for PT OT efforts to improve functional state for ability to return home  With chronic underlying condition and it is uncertain how feasible this will be  He is not able to be discharged home since his wife is ill and the patient will need a caregiver       Atrial fibrillation  (5/29/2012)    AICD (automatic cardioverter/defibrillator) present (5/13/2016)     Overview: 5/13/16 [employer] upgrade to dual chamber AICD implant    Atherosclerosis of native coronary artery of native heart without angina pectoris (5/29/2012)  Controlled.   Maintain Eliquis.     Essential hypertension (3/4/2011)  No current antihypertensive treatment      Mixed hyperlipidemia (5/29/2012)  Taking Lipitor 40 mg daily       Orthostatic hypotension (5/16/2022)  Off antihypertensives  Was symptomatic with limited physical activity  Attempts  increased activity tolerance  Continue ProAmatine       Stenosis of both carotid arteries without cerebral infarction (4/12/2016)  Came current treatment with Lipitor, Eliquis and Namenda/ Aricept       Alzheimer's dementia, late onset, with behavioral disturbance ([facility] (4/12/2016)  Needs close assistance with a caregiver  Been options and/or hospice at home need considered on discharge       Osteoarthritis (5/29/2012)  Symptomatic treatment as needed       Hypothyroidism due to acquired atrophy of thyroid (9/22/2015)  No current tx  TSH 1.1 March 2022, f/u TSH / Free T4 needed       Moderate major depression dementia / ( 7/3/2018)  Maintain Zoloft, Aricept, and Namenda      Recent studies  RADIOLOGY  pCXR 6/15  A single portable AP upright view of the chest was obtained. It is difficult to  accurately assess the size of the cardiac silhouette. There continues be  abnormal alveolar opacity involving both lungs. The left costophrenic angle is  obscured by the cardiac pacer device. The left shoulder prosthesis is again  noted   IMPRESSION: Continued evidence of diffuse, bilateral interstitial and alveolar opacities.     LE DUPLEX 6/15:  · No evidence of deep vein thrombosis in the right lower extremity. · No evidence of acute deep vein thrombosis in the right common femoral, femoral, popliteal, posterior tibial, and peroneal veins. The veins were imaged in the transverse and longitudinal planes. The vessels showed normal color filling and compressibility. Doppler interrogation showed phasic and spontaneous flow. · No evidence of deep vein thrombosis in the left lower extremity. · No evidence of acute deep vein thrombosis in the left common femoral, femoral, popliteal, posterior tibial, and peroneal veins. The veins were imaged in the transverse and longitudinal planes. The vessels showed normal color filling and compressibility. Doppler interrogation showed phasic and spontaneous flow.     ECHO 6/17:     Left Ventricle: Normal left ventricular systolic function with a visually estimated EF of 20 - 25%. Left ventricle size is normal. Normal wall thickness. Severe global hypokinesis present. Normal diastolic function for age.   Left Atrium: Left atrium is mildly dilated. Left atrial volume index is mildly increased (35-41 mL/m2).   Right Ventricle: Right ventricle is mildly dilated. Normal wall thickness. Mildly reduced systolic function. Global hypokinesis present.   Aortic Valve: Mild regurgitation.   Mitral Valve: Mild regurgitation.   Tricuspid Valve: Mild regurgitation. Moderately elevated RVSP. The estimated RVSP is 49 mmHg.   Aorta: Moderately dilated aortic root.  Ao Root diameter is 4.4 cm.     EKG 6/9: NSR 74 bpm, PACs, Ant MI, STTWC c/w lateral ischemia  ____________________________________________________________________  Review of system on discharge  Reported feeling good no nausea or vomiting no fever or chills. No chest pain or shortness of breath  No diarrhea or abdominal pain  Focal sensorimotor deficit  No urinary complaints no skin rash    Examination on discharge  Patient seen and examined by me on discharge day. Pertinent Findings:  Visit Vitals  BP (!) 142/76 (BP 1 Location: Left upper arm, BP Patient Position: At rest)   Pulse 80   Temp 97.8 °F (36.6 °C)   Resp 20   Ht 5' 7\" (1.702 m)   Wt 68.8 kg (151 lb 9.6 oz)   SpO2 96%   BMI 23.74 kg/m²     Gen:    Not in distress  Chest: Nonlabored respiration, Clear lungs  CVS:   Regular rhythm. No edema  Abd:  Soft, not distended, not tender  Neuro:  Alert, nonfocal, weak  _______________________________________________________________________  DISCHARGE MEDICATIONS:   Current Discharge Medication List      START taking these medications    Details   albuterol-ipratropium (DUO-NEB) 2.5 mg-0.5 mg/3 ml nebu 3 mL by Nebulization route every six (6) hours as needed for Wheezing. Qty: 30 Nebule, Refills: 0  Start date: 6/28/2022      !! memantine (NAMENDA) 10 mg tablet Take 1 Tablet by mouth two (2) times a day for 15 days. Qty: 30 Tablet, Refills: 0  Start date: 6/28/2022, End date: 7/13/2022       !! - Potential duplicate medications found. Please discuss with provider. CONTINUE these medications which have CHANGED    Details   midodrine (PROAMATINE) 10 mg tablet Take 1 Tablet by mouth three (3) times daily (with meals) for 30 days. Indications: a feeling of dizziness upon standing due to a drop in blood pressure  Qty: 90 Tablet, Refills: 0  Start date: 6/28/2022, End date: 7/28/2022      pantoprazole (PROTONIX) 40 mg tablet Take 1 Tablet by mouth daily for 30 days.   Qty: 30 Tablet, Refills: 0  Start date: 6/28/2022, End date: 7/28/2022 CONTINUE these medications which have NOT CHANGED    Details   !! memantine (NAMENDA) 10 mg tablet Take 1 Tablet by mouth two (2) times a day. Qty: 180 Tablet, Refills: 3      apixaban (ELIQUIS) 5 mg tablet Take 5 mg by mouth two (2) times a day. polyethylene glycol (MIRALAX) 17 gram packet Take 17 g by mouth daily as needed for Constipation. donepeziL (ARICEPT) 5 mg tablet Take 5 mg by mouth nightly. DULoxetine (CYMBALTA) 60 mg capsule Take 1 Capsule by mouth daily. Qty: 30 Capsule, Refills: 0    Associated Diagnoses: Depression, unspecified depression type      atorvastatin (LIPITOR) 40 mg tablet TAKE 1 TABLET BY MOUTH AT BEDTIME  Qty: 90 Tablet, Refills: 3    Associated Diagnoses: Hyperlipidemia, unspecified hyperlipidemia type       !! - Potential duplicate medications found. Please discuss with provider.       STOP taking these medications       sertraline (ZOLOFT) 100 mg tablet Comments:   Reason for Stopping:         mirabegron ER (Myrbetriq) 50 mg ER tablet Comments:   Reason for Stopping:             Recommendation  _Diet: Cardiac soft / bite sized  Activity: Up only with assistance  Wound Care: none  Follow-up labs: routine_    Disposition Home with family with hospice and home care  Condition at Discharge:  Stable  _____________________________________________________________________  Follow up with:   PCP : Claudio Hall MD  Follow-up Information     Follow up With Specialties Details Why Contact Info    Claudio Hall MD Family Medicine   [address] [id number]  [address] [place] [address]  [phone]        Total time in minutes spent coordinating this discharge (includes going over instructions, follow-up, prescriptions, and preparing report for sign off to her PCP) :45 minutes    Signed:  Karli Lloyd MD  [facility] Hospitalist  [phone]

## 2022-06-28 NOTE — PROGRESS NOTES
Patient confirmed his address. States he has a 40 foot ramp. Patient was explained the Freedom of Choice form. He stated understanding and signed letter. Signed letter placed in chart and copy given to patient.

## 2022-06-28 NOTE — PROGRESS NOTES
Follow up visit with patient in Rm 130 in Med/Surg  Patient was content in going home  Provided empathic listening and spiritual support  Advised of  Availability   [address] [place] [address]

## 2022-06-28 NOTE — DISCHARGE INSTRUCTIONS
DISCHARGE SUMMARY from Nurse    PATIENT INSTRUCTIONS:    After general anesthesia or intravenous sedation, for 24 hours or while taking prescription Narcotics:  · Limit your activities  · Do not drive and operate hazardous machinery  · Do not make important personal or business decisions  · Do  not drink alcoholic beverages  · If you have not urinated within 8 hours after discharge, please contact your surgeon on call. Report the following to your surgeon:  · Excessive pain, swelling, redness or odor of or around the surgical area  · Temperature over 100.5  · Nausea and vomiting lasting longer than 4 hours or if unable to take medications  · Any signs of decreased circulation or nerve impairment to extremity: change in color, persistent  numbness, tingling, coldness or increase pain  · Any questions    What to do at Home:  Recommended activity: Activity as tolerated,     If you experience any of the following symptoms worsening of symptoms, please follow up with Hospice. *  Please give a list of your current medications to your Primary Care Provider. *  Please update this list whenever your medications are discontinued, doses are      changed, or new medications (including over-the-counter products) are added. *  Please carry medication information at all times in case of emergency situations. These are general instructions for a healthy lifestyle:    No smoking/ No tobacco products/ Avoid exposure to second hand smoke  Surgeon General's Warning:  Quitting smoking now greatly reduces serious risk to your health.     Obesity, smoking, and sedentary lifestyle greatly increases your risk for illness    A healthy diet, regular physical exercise & weight monitoring are important for maintaining a healthy lifestyle    You may be retaining fluid if you have a history of heart failure or if you experience any of the following symptoms:  Weight gain of 3 pounds or more overnight or 5 pounds in a week, increased swelling in our hands or feet or shortness of breath while lying flat in bed. Please call your doctor as soon as you notice any of these symptoms; do not wait until your next office visit. The discharge information has been reviewed with the patient. The patient verbalized understanding. Discharge medications reviewed with the patient and appropriate educational materials and side effects teaching were provided.   ___________________________________________________________________________________________________________________________________

## 2022-06-28 NOTE — PROGRESS NOTES
Writer contacted [facility] and arranged BLS transport for this patient to his home residence after discharge. Requested information provided (Dx, wt, ht, informed that patient is on 5 liters via nasal cannula, no isolation precautions, home address and insurance information given)  ETA [id number]-- Care Management and Satinder Kowalski RN made aware.

## 2023-03-16 NOTE — MR AVS SNAPSHOT
[facility] [address] 
[phone] Patient: Jenna Guevara MRN: [id number] PCX:[id number] Visit Information Date & Time Provider Department Dept. Phone Encounter #  
 2/9/2018  1:40 PM Jese Mandel  [facility] [phone] [id number] Your Appointments 3/20/2018  1:40 PM  
Follow Up with Stanton Arguello MD  
Neurology [facility] at [facility] [address]) Appt Note: f/u memory loss, jrb 8/21/17  
 [address] [place] [address], 
[address] [place] [address], [address] [phone]  
[address], [address], [address] [address]  
  
    
 3/22/2018  1:00 PM  
Any with Meka Bang PA-C  
[facility] [address]) Appt Note: 2 mo f/u,CP$0/1.24.2018  
 [address]  
[phone]  
  
   
 [address] [id number]  
  
    
 4/12/2018 10:45 AM  
PACEMAKER with PACEMAKER, [facility] [address]) Appt Note: BSC DC ICD 3mo check, no landline [address]  
[phone] [address]  
  
    
 6/4/2018  1:00 PM  
Medicare Physical with Meka Bang PA-C  
[facility] ([address]) Appt Note:  $0 CP mbm  
 [phone] [phone]  
[phone]  
  
   
 [address] [id number] Upcoming Health Maintenance Date Due FOBT Q 1 YEAR AGE 50-75 4/1/1994 ZOSTER VACCINE AGE 60> 2/1/2004 GLAUCOMA SCREENING Q2Y 4/1/2009 MEDICARE YEARLY EXAM 6/30/2018 DTaP/Tdap/Td series (2 - Td) 6/29/2027 Allergies as of 2/9/2018  Review Complete On: 2/9/2018 By: Rigo Shields LPN No Known Allergies Current Immunizations  Reviewed on 7/17/2017 Prolonged hospital course with concerns of hepatic encephalopathy, end-stage renal disease on dialysis, past PEA arrest, seizure disorder, and chronic liver disease with prior liver transplant.  DATE & TIME: 03- AM shift                        Cognitive Concerns/ Orientation : A/Ox1-2; orientation fluctuates. Disoriented to time/place. At times more clear and able to answer simple questions.   BEHAVIOR & AGGRESSION TOOL COLOR: Green, calm and cooperative. Restless at times but redirectable. Family is visiting, very helpful with redirecting pt.     ABNL VS/O2: VSS on RA. Unlabored breathing. Infrequent cough noted.   MOBILITY: Up with assist of two and mark steady. Up to the chair for meals. T/R q2hr when in bed. PT is following and working with pt on getting him walking. Pt was able to make few steps today with walker.   PAIN MANAGMENT: c/o lower back pain, declined lidocaine patch Tylenol x 1 given.   DIET: Regular diet with mildly thickened liquids. Encourage TF if pt eats less than 50% of the meal. Ate most of his breakfast and lunch today.Takes pills whole with thickened liquids. Order for video swallow.  BOWEL/BLADDER: continent of bowel this shift, BM x 3, ok to hold lactulose, goal 3 BM per day. Dialysis pt, no urine output this shift.   ABNL LAB/BG:n/a  DRAIN/DEVICES: R chest CVC/double lumen for hemodialysis; R PIV saline locked; PEG tube/clamped  SKIN: Scattered bruising and scabs; skin tear L neck covered with mepilex;  TESTS/PROCEDURES: US of UE vein mapping  D/C DATE: Pending placement, SW is following. Per PT, TCU recommended. Nephrology is following.   Discharge Barriers: restlessness, sitter  OTHER IMPORTANT INFO: Family is visiting. Dialysis MWF.   Name Date Influenza High Dose Vaccine PF 10/11/2017, 11/10/2016 Influenza Vaccine 9/16/2014 Influenza Vaccine Split 9/14/2011 Influenza Vaccine Whole 9/1/2010 Pneumococcal Conjugate (PCV-13) 11/10/2016 Pneumococcal Polysaccharide (PPSV-23) 11/13/2012 12:00 AM  
 Td 5/8/2012 ZZZ-RETIRED (DO NOT USE) Pneumococcal Vaccine (Unspecified Type) 9/14/2011 Not reviewed this visit You Were Diagnosed With   
  
 Codes Comments Sciatic pain, right    -  Primary ICD-10-CM: M54.31 
ICD-9-CM: 724.3 Vitals BP Pulse Temp Resp Height(growth percentile) (!) 119/91 (BP 1 Location: Right arm, BP Patient Position: Sitting) 70 98.4 °F (36.9 °C) (Temporal) 18 5' 8\" (1.727 m) Weight(growth percentile) SpO2 BMI Smoking Status 163 lb (73.9 kg) 97% 24.78 kg/m2 Former Smoker BMI and BSA Data Body Mass Index Body Surface Area 24.78 kg/m 2 1.88 m 2 Preferred Pharmacy Pharmacy Name Phone [facility] [address] [place] [address] Your Updated Medication List  
  
   
This list is accurate as of: 2/9/18  2:41 PM.  Always use your most recent med list.  
  
  
  
  
 atorvastatin 40 mg tablet Commonly known as:  LIPITOR  
TAKE 1 TABLET BY MOUTH EVERY DAY  
  
 dexamethasone 4 mg/mL injection Commonly known as:  DECADRON  
2.5 mL by IntraMUSCular route once for 1 dose. DULCOLAX (BISACODYL) 5 mg EC tablet Generic drug:  bisacodyl Take 5 mg by mouth daily as needed for Constipation. ELIQUIS 5 mg tablet Generic drug:  apixaban TAKE 1 TABLET BY MOUTH EVERY 12 HOURS  
  
 fluticasone 50 mcg/actuation nasal spray Commonly known as:  Eli Manners 2 Sprays by Both Nostrils route daily. gabapentin 100 mg capsule Commonly known as:  NEURONTIN Take 1 Cap by mouth three (3) times daily. HYDROcodone-acetaminophen 5-325 mg per tablet Commonly known as:  Claritza Minor  
 Take 1 Tab by mouth every eight (8) hours as needed for Pain. Max Daily Amount: 3 Tabs. Incontinence Pad, Liner, Disp Pads Commonly known as:  BLADDER CONTROL PADS EX ABSORB  
1 Packet by Does Not Apply route as needed (incontinence). lisinopril 5 mg tablet Commonly known as:  PRINIVIL, ZESTRIL  
TAKE 1 TABLET BY MOUTH DAILY FOR 30 DAYS  
  
 memantine 10 mg tablet Commonly known as:  Vivek Cava Take 1 tab twice a day  
  
 metoprolol succinate 25 mg XL tablet Commonly known as:  TOPROL-XL Take 1 Tab by mouth daily. Indications: hypertension MIRALAX 17 gram/dose powder Generic drug:  polyethylene glycol Take 17 g by mouth daily. nitroglycerin 0.4 mg SL tablet Commonly known as:  NITROSTAT  
1 Tab by SubLINGual route every five (5) minutes as needed for Chest Pain (call 911 if not relieved by 3). sertraline 100 mg tablet Commonly known as:  ZOLOFT Take 1 Tab by mouth daily. tiZANidine 4 mg tablet Commonly known as:  Netta Ray TAKE 1/2 TABLET (2MG) BY MOUTH EVERY 6-8 HOURS . MAX 3 DOSES IN 24 HOURS  
  
 traZODone 100 mg tablet Commonly known as:  DESYREL  
TAKE 1 TABLET BY MOUTH NIGHTLY. Prescriptions Printed Refills HYDROcodone-acetaminophen (NORCO) 5-325 mg per tablet 0 Sig: Take 1 Tab by mouth every eight (8) hours as needed for Pain. Max Daily Amount: 3 Tabs. Class: Print Route: Oral  
  
We Performed the Following DEXAMETHASONE SODIUM PHOSPHATE INJECTION 1 MG [ hospitals] VA THER/PROPH/DIAG INJECTION, SUBCUT/IM [id number] CPT(R)] Introducing [facility]! Dear Mary Bang: Thank you for requesting a [employer] account. Our records indicate that you already have an active [employer] account. You can access your account anytime at [email or web address] Did you know that you can access your hospital and ER discharge instructions at any time in [employer]?   You can also review all of your test results from your hospital stay or ER visit. Additional Information If you have questions, please visit the Frequently Asked Questions section of the [employer] website at [email or web address] Remember, [employer] is NOT to be used for urgent needs. For medical emergencies, dial 911. Now available from your iPhone and Android! Please provide this summary of care documentation to your next provider. Your primary care clinician is listed as José Miguel Manzo. If you have any questions after today's visit, please call [phone].

## 2023-08-08 NOTE — DISCHARGE INSTRUCTIONS
Dr. Minnie Arzola Discharge Instructions  Transforaminal Epidural Steroid Injection/ Selective Nerve Block    You had a transforaminal epidural steroid injection/ selective nerve block today. You will probably have some numbness, and possibly weakness, in your leg for the next 6 to 8 hours. The steroids will slowly become effective, reducing your pain, over the next 2 weeks. You should begin feeling better after a few days, but it may take up to 2 weeks to notice the difference. The benefit you get from your injection will last a variable amount of time, depending on the severity of your lumbar spine problem.  Pain: Most people do not have any increase in pain after this injection. However, you might experience some soreness in your low back. If this happens, putting an ice pack over the sore area will help.  Bandage: You will have a small bandage covering the site of the injection. You may remove it once you get home.  Restrictions: Someone should drive you home after the injection. After that, you have no restrictions. You need to be careful while walking, as you may still have some numbness or weakness in your leg. You may resume your normal level of activity. You may take a shower or bath, and you may eat normally. You should continue your current exercises and/or therapy routine.   Medications: Continue your current medications as prescribed. If your pain decreases, you may reduce the amount of your pain medicines. If you stopped taking anticoagulants or blood-thinners before the injection, start them tomorrow. If you have diabetes, your blood sugar may be elevated for a few days. Call your primary doctor with any questions.   Call Dr. Minnie Arzola at [phone] if you experience:   Fever (101 degrees Fahrenheit or greater)   Nausea or vomiting   Headache unrelieved by your normal pain medicine   Redness or swelling at the injection site that lasts more than 1 day   New numbness, tingling, weakness, or pain that you didnt have before the injection    Follow-up appointment:   If still having pain in 1-2 weeks, call office at [phone] for a follow up appointment. DISCHARGE SUMMARY from Nurse    The following personal items collected during your admission are returned to you:   Dental Appliance: Dental Appliances: None  Vision: Visual Aid: None  Hearing Aid:    Jewelry: Jewelry: None  Clothing: Clothing: With patient, Belt  Other Valuables: Other Valuables: None  Valuables sent to safe: If you were given prescriptions, please review the written information on prescribed medications. · You will receive a Post Operative Call from one of the Recovery Room Nurses on the day after your surgery to check on you. It is very important for us to know how you are recovering after your surgery. · You may receive an e-mail or letter in the mail from [place] regarding your experience with us in the Ambulatory Surgery Unit. Your feedback is valuable to us and we appreciate your participation in the survey. If you have not had your influenza or pneumococcal vaccines, please follow up with your primary care physician. The discharge information has been reviewed with the patient. The patient verbalized understanding. independent/needs device

## 2024-04-24 NOTE — TELEPHONE ENCOUNTER
Preoperative diagnosis:  Dysphagia  History of head neck cancer  Palatal mass  Postoperative diagnosis: Same  Procedure:  Excision of palatal mass  Surgeon:Ruben  Anesthesia: General  Blood loss: Less than 10 cc  Complications: None  Specimen: Palatal mass, anterior, posterior margins      Indication:  This is a 72-year-old gentleman with a history of chemoradiation for head neck cancer who presented with increasing dysphagia, weight loss, throat pain.  Examination the office revealed a cystic mass that was essentially replacing the uvula.  After discussing the risks, benefits and alternatives, the patient elected to the operating room for the above-mentioned procedure for both diagnostic and therapeutic purposes.    Procedure:  After being brought to the operating, general endotracheal anesthesia was induced.  The bed was rotated.  The patient was draped in standard fashion.  A mouthgag was inserted and opened.  There was significant trismus owing to his history of head neck cancer and radiation.  Examination of the palate showed a approximately 3 cm mass replacing the distal two thirds of the uvula.  The base of the uvula.  Unremarkable.  1% lidocaine with epinephrine 1: 100,000 was injected into the palate and uvula.  The uvula was amputated after the base.  The lesion was sent in formalin.  Tissue margin was taken anteriorly and posteriorly.  A small flap was developed to aid in closure.  2 interrupted chromic sutures were then used to reapproximate the mucosa.  At this point, the operation was completed.  The patient was returned anesthesia for wakeup. which proceeded uneventfully.   Will need to discuss alternative with pt as his insurance will no longer cover this med. If he would like refill and to pay that is fine, otherwise will need to discuss alt meds at his next visit.
